# Patient Record
Sex: FEMALE | Race: WHITE | NOT HISPANIC OR LATINO | Employment: OTHER | URBAN - METROPOLITAN AREA
[De-identification: names, ages, dates, MRNs, and addresses within clinical notes are randomized per-mention and may not be internally consistent; named-entity substitution may affect disease eponyms.]

---

## 2020-02-03 ENCOUNTER — RECORDS - HEALTHEAST (OUTPATIENT)
Dept: LAB | Facility: CLINIC | Age: 65
End: 2020-02-03

## 2020-02-03 LAB
ANION GAP SERPL CALCULATED.3IONS-SCNC: 11 MMOL/L (ref 5–18)
BASOPHILS # BLD AUTO: 0 THOU/UL (ref 0–0.2)
BASOPHILS NFR BLD AUTO: 0 % (ref 0–2)
BUN SERPL-MCNC: 46 MG/DL (ref 8–22)
CALCIUM SERPL-MCNC: 10.4 MG/DL (ref 8.5–10.5)
CHLORIDE BLD-SCNC: 100 MMOL/L (ref 98–107)
CO2 SERPL-SCNC: 26 MMOL/L (ref 22–31)
CREAT SERPL-MCNC: 1.58 MG/DL (ref 0.6–1.1)
EOSINOPHIL # BLD AUTO: 0.2 THOU/UL (ref 0–0.4)
EOSINOPHIL NFR BLD AUTO: 2 % (ref 0–6)
ERYTHROCYTE [DISTWIDTH] IN BLOOD BY AUTOMATED COUNT: 14.2 % (ref 11–14.5)
GFR SERPL CREATININE-BSD FRML MDRD: 33 ML/MIN/1.73M2
GLUCOSE BLD-MCNC: 91 MG/DL (ref 70–125)
HCT VFR BLD AUTO: 30.1 % (ref 35–47)
HGB BLD-MCNC: 9.2 G/DL (ref 12–16)
LYMPHOCYTES # BLD AUTO: 2 THOU/UL (ref 0.8–4.4)
LYMPHOCYTES NFR BLD AUTO: 30 % (ref 20–40)
MCH RBC QN AUTO: 27 PG (ref 27–34)
MCHC RBC AUTO-ENTMCNC: 30.6 G/DL (ref 32–36)
MCV RBC AUTO: 88 FL (ref 80–100)
MONOCYTES # BLD AUTO: 0.5 THOU/UL (ref 0–0.9)
MONOCYTES NFR BLD AUTO: 8 % (ref 2–10)
NEUTROPHILS # BLD AUTO: 4 THOU/UL (ref 2–7.7)
NEUTROPHILS NFR BLD AUTO: 59 % (ref 50–70)
PLATELET # BLD AUTO: 142 THOU/UL (ref 140–440)
PMV BLD AUTO: 12.1 FL (ref 8.5–12.5)
POTASSIUM BLD-SCNC: 4.3 MMOL/L (ref 3.5–5)
RBC # BLD AUTO: 3.41 MILL/UL (ref 3.8–5.4)
SODIUM SERPL-SCNC: 137 MMOL/L (ref 136–145)
WBC: 6.8 THOU/UL (ref 4–11)

## 2020-02-05 ENCOUNTER — RECORDS - HEALTHEAST (OUTPATIENT)
Dept: LAB | Facility: CLINIC | Age: 65
End: 2020-02-05

## 2020-02-05 LAB
HGB BLD-MCNC: 8.6 G/DL (ref 12–16)
PLATELET # BLD AUTO: 150 THOU/UL (ref 140–440)
TSH SERPL DL<=0.005 MIU/L-ACNC: <0.01 UIU/ML (ref 0.3–5)

## 2020-02-10 ENCOUNTER — RECORDS - HEALTHEAST (OUTPATIENT)
Dept: LAB | Facility: CLINIC | Age: 65
End: 2020-02-10

## 2020-02-10 LAB — HGB BLD-MCNC: 8.2 G/DL (ref 12–16)

## 2020-02-17 ENCOUNTER — RECORDS - HEALTHEAST (OUTPATIENT)
Dept: LAB | Facility: CLINIC | Age: 65
End: 2020-02-17

## 2020-02-17 LAB
ANION GAP SERPL CALCULATED.3IONS-SCNC: 10 MMOL/L (ref 5–18)
BUN SERPL-MCNC: 91 MG/DL (ref 8–22)
CALCIUM SERPL-MCNC: 11.3 MG/DL (ref 8.5–10.5)
CHLORIDE BLD-SCNC: 107 MMOL/L (ref 98–107)
CO2 SERPL-SCNC: 24 MMOL/L (ref 22–31)
CREAT SERPL-MCNC: 1.99 MG/DL (ref 0.6–1.1)
GFR SERPL CREATININE-BSD FRML MDRD: 25 ML/MIN/1.73M2
GLUCOSE BLD-MCNC: 74 MG/DL (ref 70–125)
POTASSIUM BLD-SCNC: 4.9 MMOL/L (ref 3.5–5)
SODIUM SERPL-SCNC: 141 MMOL/L (ref 136–145)

## 2020-02-18 ENCOUNTER — RECORDS - HEALTHEAST (OUTPATIENT)
Dept: LAB | Facility: CLINIC | Age: 65
End: 2020-02-18

## 2020-02-19 LAB — HGB BLD-MCNC: 7.9 G/DL (ref 12–16)

## 2020-04-30 ENCOUNTER — RECORDS - HEALTHEAST (OUTPATIENT)
Dept: LAB | Facility: CLINIC | Age: 65
End: 2020-04-30

## 2020-04-30 LAB
ANION GAP SERPL CALCULATED.3IONS-SCNC: 10 MMOL/L (ref 5–18)
BUN SERPL-MCNC: 50 MG/DL (ref 8–22)
CALCIUM SERPL-MCNC: 11.4 MG/DL (ref 8.5–10.5)
CHLORIDE BLD-SCNC: 100 MMOL/L (ref 98–107)
CO2 SERPL-SCNC: 25 MMOL/L (ref 22–31)
CREAT SERPL-MCNC: 1.71 MG/DL (ref 0.6–1.1)
ERYTHROCYTE [DISTWIDTH] IN BLOOD BY AUTOMATED COUNT: 14.8 % (ref 11–14.5)
GFR SERPL CREATININE-BSD FRML MDRD: 30 ML/MIN/1.73M2
GLUCOSE BLD-MCNC: 83 MG/DL (ref 70–125)
HCT VFR BLD AUTO: 33.8 % (ref 35–47)
HGB BLD-MCNC: 10.8 G/DL (ref 12–16)
MCH RBC QN AUTO: 27.3 PG (ref 27–34)
MCHC RBC AUTO-ENTMCNC: 32 G/DL (ref 32–36)
MCV RBC AUTO: 86 FL (ref 80–100)
PLATELET # BLD AUTO: 92 THOU/UL (ref 140–440)
PMV BLD AUTO: 13 FL (ref 8.5–12.5)
POTASSIUM BLD-SCNC: 4.4 MMOL/L (ref 3.5–5)
RBC # BLD AUTO: 3.95 MILL/UL (ref 3.8–5.4)
SODIUM SERPL-SCNC: 135 MMOL/L (ref 136–145)
WBC: 5.9 THOU/UL (ref 4–11)

## 2020-05-06 ENCOUNTER — RECORDS - HEALTHEAST (OUTPATIENT)
Dept: LAB | Facility: CLINIC | Age: 65
End: 2020-05-06

## 2020-05-07 LAB
ANION GAP SERPL CALCULATED.3IONS-SCNC: 8 MMOL/L (ref 5–18)
BUN SERPL-MCNC: 35 MG/DL (ref 8–22)
CALCIUM SERPL-MCNC: 11.3 MG/DL (ref 8.5–10.5)
CHLORIDE BLD-SCNC: 107 MMOL/L (ref 98–107)
CO2 SERPL-SCNC: 27 MMOL/L (ref 22–31)
CREAT SERPL-MCNC: 1.34 MG/DL (ref 0.6–1.1)
ERYTHROCYTE [DISTWIDTH] IN BLOOD BY AUTOMATED COUNT: 14.5 % (ref 11–14.5)
GFR SERPL CREATININE-BSD FRML MDRD: 40 ML/MIN/1.73M2
GLUCOSE BLD-MCNC: 88 MG/DL (ref 70–125)
HCT VFR BLD AUTO: 31.8 % (ref 35–47)
HGB BLD-MCNC: 10.1 G/DL (ref 12–16)
MCH RBC QN AUTO: 27.2 PG (ref 27–34)
MCHC RBC AUTO-ENTMCNC: 31.8 G/DL (ref 32–36)
MCV RBC AUTO: 86 FL (ref 80–100)
PLATELET # BLD AUTO: 82 THOU/UL (ref 140–440)
PMV BLD AUTO: 12.6 FL (ref 8.5–12.5)
POTASSIUM BLD-SCNC: 3.9 MMOL/L (ref 3.5–5)
RBC # BLD AUTO: 3.72 MILL/UL (ref 3.8–5.4)
SODIUM SERPL-SCNC: 142 MMOL/L (ref 136–145)
WBC: 5.2 THOU/UL (ref 4–11)

## 2020-06-24 PROBLEM — B37.9 CANDIDIASIS: Status: ACTIVE | Noted: 2020-03-01

## 2020-06-24 PROBLEM — I25.10 CORONARY ARTERY DISEASE INVOLVING NATIVE CORONARY ARTERY: Status: ACTIVE | Noted: 2020-04-17

## 2020-06-24 PROBLEM — M54.50 CHRONIC BILATERAL LOW BACK PAIN WITHOUT SCIATICA: Status: ACTIVE | Noted: 2018-01-17

## 2020-06-24 PROBLEM — K59.00 CONSTIPATION: Status: ACTIVE | Noted: 2020-03-20

## 2020-06-24 PROBLEM — R45.851 SUICIDAL IDEATION: Status: ACTIVE | Noted: 2020-04-01

## 2020-06-24 PROBLEM — Z79.899 LONG-TERM USE OF HIGH-RISK MEDICATION: Status: ACTIVE | Noted: 2020-04-14

## 2020-06-24 PROBLEM — Z76.5 DRUG-SEEKING BEHAVIOR: Status: ACTIVE | Noted: 2020-04-04

## 2020-06-24 PROBLEM — R53.1 WEAKNESS: Status: ACTIVE | Noted: 2019-12-17

## 2020-06-24 PROBLEM — I50.9 ACUTE EXACERBATION OF CHF (CONGESTIVE HEART FAILURE) (H): Status: ACTIVE | Noted: 2019-11-11

## 2020-06-24 PROBLEM — I65.23 INTERNAL CAROTID ARTERY STENOSIS, BILATERAL: Status: ACTIVE | Noted: 2018-05-05

## 2020-06-24 PROBLEM — J44.9 COPD (CHRONIC OBSTRUCTIVE PULMONARY DISEASE) (H): Status: ACTIVE | Noted: 2020-06-24

## 2020-06-24 PROBLEM — Z72.0 TOBACCO ABUSE: Status: ACTIVE | Noted: 2017-02-17

## 2020-06-24 PROBLEM — E53.8 VITAMIN B12 DEFICIENCY: Status: ACTIVE | Noted: 2018-02-14

## 2020-06-24 PROBLEM — F40.01 PANIC DISORDER WITH AGORAPHOBIA: Status: ACTIVE | Noted: 2020-04-14

## 2020-06-24 PROBLEM — N39.46 URINARY INCONTINENCE, MIXED: Status: ACTIVE | Noted: 2017-09-24

## 2020-06-24 PROBLEM — I50.23 ACUTE ON CHRONIC SYSTOLIC (CONGESTIVE) HEART FAILURE (H): Status: ACTIVE | Noted: 2020-01-28

## 2020-06-24 PROBLEM — F43.10 POSTTRAUMATIC STRESS DISORDER: Status: ACTIVE | Noted: 2020-03-01

## 2020-06-24 PROBLEM — F06.30 MOOD DISORDER DUE TO A GENERAL MEDICAL CONDITION: Status: ACTIVE | Noted: 2020-03-01

## 2020-06-24 PROBLEM — G89.4 CHRONIC PAIN DISORDER: Status: ACTIVE | Noted: 2017-10-01

## 2020-06-24 PROBLEM — G89.29 CHRONIC BILATERAL LOW BACK PAIN WITHOUT SCIATICA: Status: ACTIVE | Noted: 2018-01-17

## 2020-06-24 PROBLEM — I24.9 ACUTE CORONARY SYNDROME (H): Status: ACTIVE | Noted: 2019-11-22

## 2020-06-24 PROBLEM — F60.9 PERSONALITY DISORDER (H): Status: ACTIVE | Noted: 2020-03-05

## 2020-06-24 RX ORDER — METOPROLOL SUCCINATE 25 MG/1
37.5 TABLET, EXTENDED RELEASE ORAL DAILY
COMMUNITY

## 2020-06-24 RX ORDER — LANOLIN ALCOHOL/MO/W.PET/CERES
3 CREAM (GRAM) TOPICAL AT BEDTIME
COMMUNITY
End: 2020-07-29

## 2020-06-24 RX ORDER — PRAZOSIN HYDROCHLORIDE 1 MG/1
1 CAPSULE ORAL AT BEDTIME
Status: ON HOLD | COMMUNITY
End: 2020-09-14

## 2020-06-24 RX ORDER — POLYETHYLENE GLYCOL 3350 17 G/17G
17 POWDER, FOR SOLUTION ORAL DAILY PRN
COMMUNITY
End: 2021-04-06

## 2020-06-24 RX ORDER — ACETAMINOPHEN 500 MG
1000 TABLET ORAL 2 TIMES DAILY
COMMUNITY
End: 2020-10-31

## 2020-06-24 RX ORDER — CYANOCOBALAMIN 1000 UG/ML
1 INJECTION, SOLUTION INTRAMUSCULAR; SUBCUTANEOUS
COMMUNITY
End: 2020-07-29

## 2020-06-24 RX ORDER — ERGOCALCIFEROL 1.25 MG/1
50000 CAPSULE, LIQUID FILLED ORAL WEEKLY
COMMUNITY
End: 2020-07-09 | Stop reason: DRUGHIGH

## 2020-06-24 RX ORDER — IPRATROPIUM BROMIDE AND ALBUTEROL SULFATE 2.5; .5 MG/3ML; MG/3ML
1 SOLUTION RESPIRATORY (INHALATION) EVERY 6 HOURS PRN
COMMUNITY
End: 2020-06-25

## 2020-06-24 RX ORDER — SENNOSIDES 8.6 MG
2 TABLET ORAL AT BEDTIME
COMMUNITY

## 2020-06-24 RX ORDER — KETOCONAZOLE 20 MG/G
CREAM TOPICAL AT BEDTIME
COMMUNITY
End: 2021-08-06

## 2020-06-24 RX ORDER — FERROUS SULFATE 325(65) MG
325 TABLET ORAL
COMMUNITY
End: 2021-04-06

## 2020-06-24 RX ORDER — ESCITALOPRAM OXALATE 20 MG/1
20 TABLET ORAL DAILY
Status: ON HOLD | COMMUNITY
End: 2020-09-14

## 2020-06-24 RX ORDER — DIVALPROEX SODIUM 125 MG/1
125 TABLET, DELAYED RELEASE ORAL 3 TIMES DAILY
COMMUNITY
End: 2020-08-28

## 2020-06-24 RX ORDER — QUETIAPINE FUMARATE 50 MG/1
50 TABLET, FILM COATED ORAL
Status: ON HOLD | COMMUNITY
End: 2020-09-14

## 2020-06-24 RX ORDER — ALBUTEROL SULFATE 90 UG/1
2 AEROSOL, METERED RESPIRATORY (INHALATION) EVERY 4 HOURS PRN
COMMUNITY
End: 2020-10-27

## 2020-06-24 RX ORDER — ARIPIPRAZOLE 5 MG/1
5 TABLET ORAL AT BEDTIME
Status: ON HOLD | COMMUNITY
End: 2020-09-14

## 2020-06-24 RX ORDER — PANTOPRAZOLE SODIUM 40 MG/1
TABLET, DELAYED RELEASE ORAL
COMMUNITY
End: 2021-04-06

## 2020-06-24 ASSESSMENT — MIFFLIN-ST. JEOR: SCORE: 1110.15
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
## 2020-06-25 ENCOUNTER — VIRTUAL VISIT (OUTPATIENT)
Dept: GERIATRICS | Facility: CLINIC | Age: 65
End: 2020-06-25
Payer: MEDICARE

## 2020-06-25 VITALS
DIASTOLIC BLOOD PRESSURE: 60 MMHG | BODY MASS INDEX: 31.49 KG/M2 | RESPIRATION RATE: 16 BRPM | HEART RATE: 70 BPM | HEIGHT: 58 IN | OXYGEN SATURATION: 93 % | WEIGHT: 150 LBS | TEMPERATURE: 96.6 F | SYSTOLIC BLOOD PRESSURE: 124 MMHG

## 2020-06-25 DIAGNOSIS — E03.9 HYPOTHYROIDISM, UNSPECIFIED TYPE: ICD-10-CM

## 2020-06-25 DIAGNOSIS — R45.851 SUICIDAL IDEATION: ICD-10-CM

## 2020-06-25 DIAGNOSIS — N18.4 CKD (CHRONIC KIDNEY DISEASE) STAGE 4, GFR 15-29 ML/MIN (H): ICD-10-CM

## 2020-06-25 DIAGNOSIS — I48.20 CHRONIC ATRIAL FIBRILLATION (H): ICD-10-CM

## 2020-06-25 DIAGNOSIS — K21.9 GASTROESOPHAGEAL REFLUX DISEASE, ESOPHAGITIS PRESENCE NOT SPECIFIED: ICD-10-CM

## 2020-06-25 DIAGNOSIS — E78.2 MIXED HYPERLIPIDEMIA: ICD-10-CM

## 2020-06-25 DIAGNOSIS — Z76.5 DRUG-SEEKING BEHAVIOR: ICD-10-CM

## 2020-06-25 DIAGNOSIS — F06.30 MOOD DISORDER DUE TO A GENERAL MEDICAL CONDITION: ICD-10-CM

## 2020-06-25 DIAGNOSIS — M54.50 CHRONIC BILATERAL LOW BACK PAIN WITHOUT SCIATICA: ICD-10-CM

## 2020-06-25 DIAGNOSIS — F33.9 EPISODE OF RECURRENT MAJOR DEPRESSIVE DISORDER, UNSPECIFIED DEPRESSION EPISODE SEVERITY (H): ICD-10-CM

## 2020-06-25 DIAGNOSIS — G89.4 CHRONIC PAIN DISORDER: ICD-10-CM

## 2020-06-25 DIAGNOSIS — I25.5 ISCHEMIC CARDIOMYOPATHY: ICD-10-CM

## 2020-06-25 DIAGNOSIS — E53.8 VITAMIN B12 DEFICIENCY (NON ANEMIC): ICD-10-CM

## 2020-06-25 DIAGNOSIS — I65.23 INTERNAL CAROTID ARTERY STENOSIS, BILATERAL: ICD-10-CM

## 2020-06-25 DIAGNOSIS — I50.22 CHRONIC SYSTOLIC CONGESTIVE HEART FAILURE (H): ICD-10-CM

## 2020-06-25 DIAGNOSIS — I25.119 CORONARY ARTERY DISEASE INVOLVING NATIVE CORONARY ARTERY OF NATIVE HEART WITH ANGINA PECTORIS (H): ICD-10-CM

## 2020-06-25 DIAGNOSIS — D64.9 ANEMIA, UNSPECIFIED TYPE: ICD-10-CM

## 2020-06-25 DIAGNOSIS — F40.01 PANIC DISORDER WITH AGORAPHOBIA: ICD-10-CM

## 2020-06-25 DIAGNOSIS — M35.3 POLYMYALGIA RHEUMATICA (H): ICD-10-CM

## 2020-06-25 DIAGNOSIS — F60.9 PERSONALITY DISORDER (H): ICD-10-CM

## 2020-06-25 DIAGNOSIS — Z95.1 S/P CABG X 5: ICD-10-CM

## 2020-06-25 DIAGNOSIS — J44.9 CHRONIC OBSTRUCTIVE PULMONARY DISEASE, UNSPECIFIED COPD TYPE (H): Primary | ICD-10-CM

## 2020-06-25 DIAGNOSIS — E66.01 MORBID OBESITY (H): ICD-10-CM

## 2020-06-25 DIAGNOSIS — E11.69 TYPE 2 DIABETES MELLITUS WITH OTHER SPECIFIED COMPLICATION, UNSPECIFIED WHETHER LONG TERM INSULIN USE (H): ICD-10-CM

## 2020-06-25 DIAGNOSIS — G62.9 PERIPHERAL POLYNEUROPATHY: ICD-10-CM

## 2020-06-25 DIAGNOSIS — K58.0 IRRITABLE BOWEL SYNDROME WITH DIARRHEA: ICD-10-CM

## 2020-06-25 DIAGNOSIS — I10 ESSENTIAL HYPERTENSION: ICD-10-CM

## 2020-06-25 DIAGNOSIS — G89.29 CHRONIC BILATERAL LOW BACK PAIN WITHOUT SCIATICA: ICD-10-CM

## 2020-06-25 DIAGNOSIS — F43.10 POSTTRAUMATIC STRESS DISORDER: ICD-10-CM

## 2020-06-25 PROCEDURE — 99310 SBSQ NF CARE HIGH MDM 45: CPT | Mod: GT | Performed by: NURSE PRACTITIONER

## 2020-06-25 RX ORDER — HYDROXYZINE HYDROCHLORIDE 25 MG/1
25 TABLET, FILM COATED ORAL EVERY 6 HOURS PRN
Qty: 30 TABLET | Refills: 1 | Status: SHIPPED | OUTPATIENT
Start: 2020-06-25 | End: 2020-07-29
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
## 2020-06-29 ENCOUNTER — HOSPITAL LABORATORY (OUTPATIENT)
Dept: NURSING HOME | Facility: OTHER | Age: 65
End: 2020-06-29

## 2020-06-29 LAB
ALBUMIN SERPL-MCNC: 2.4 G/DL (ref 3.4–5)
ALP SERPL-CCNC: 46 U/L (ref 40–150)
ALT SERPL W P-5'-P-CCNC: 16 U/L (ref 0–50)
ANION GAP SERPL CALCULATED.3IONS-SCNC: 6 MMOL/L (ref 3–14)
AST SERPL W P-5'-P-CCNC: 18 U/L (ref 0–45)
BILIRUB SERPL-MCNC: 0.4 MG/DL (ref 0.2–1.3)
BUN SERPL-MCNC: 17 MG/DL (ref 7–30)
CALCIUM SERPL-MCNC: 9.7 MG/DL (ref 8.5–10.1)
CHLORIDE SERPL-SCNC: 108 MMOL/L (ref 94–109)
CO2 SERPL-SCNC: 25 MMOL/L (ref 20–32)
CREAT SERPL-MCNC: 0.97 MG/DL (ref 0.52–1.04)
FERRITIN SERPL-MCNC: 236 NG/ML (ref 8–252)
GFR SERPL CREATININE-BSD FRML MDRD: 61 ML/MIN/{1.73_M2}
GLUCOSE SERPL-MCNC: 65 MG/DL (ref 70–99)
HGB BLD-MCNC: 10.3 G/DL (ref 11.7–15.7)
POTASSIUM SERPL-SCNC: 4.2 MMOL/L (ref 3.4–5.3)
PROT SERPL-MCNC: 5.6 G/DL (ref 6.8–8.8)
SODIUM SERPL-SCNC: 139 MMOL/L (ref 133–144)
T4 FREE SERPL-MCNC: 1.43 NG/DL (ref 0.76–1.46)
TSH SERPL DL<=0.005 MIU/L-ACNC: 0.02 MU/L (ref 0.4–4)
VIT B12 SERPL-MCNC: 1228 PG/ML (ref 193–986)

## 2020-06-30 LAB
DEPRECATED CALCIDIOL+CALCIFEROL SERPL-MC: <68 UG/L (ref 20–75)
VITAMIN D2 SERPL-MCNC: 63 UG/L
VITAMIN D3 SERPL-MCNC: <5 UG/L

## 2020-07-08 VITALS
WEIGHT: 139.2 LBS | SYSTOLIC BLOOD PRESSURE: 129 MMHG | HEIGHT: 58 IN | HEART RATE: 77 BPM | OXYGEN SATURATION: 98 % | TEMPERATURE: 96.1 F | DIASTOLIC BLOOD PRESSURE: 63 MMHG | BODY MASS INDEX: 29.22 KG/M2 | RESPIRATION RATE: 16 BRPM

## 2020-07-08 ASSESSMENT — MIFFLIN-ST. JEOR: SCORE: 1061.16

## 2020-07-08 NOTE — PROGRESS NOTES
" [facility]  Letty Escobar is being evaluated via a billable video visit due to the restrictions of the Covid-19 pandemic.   The patient has been notified of following:  \"This video visit will be conducted via a call between you and your provider. We have found that certain health care needs can be provided without the need for an in-person physical exam.  This service lets us provide the care you need with a video conversation. If during the course of the call the provider feels a video visit is not appropriate, you will not be charged for this service.\"   The provider has received verbal consent for a Video Visit from the patient and or first contact? Yes  Patient/facility staff would like the video invitation sent by: N/A   Video Start Time: 10:31  Which Facility the Patient is at during the time of visit: [facility]   PRIMARY CARE PROVIDER AND CLINIC:  KILO Alcazar [facility], [address] [address]  Chief Complaint   Patient presents with     [facility] Medical Record Number:  [id number]  Letty Escobar  is a 64 year old  (1955), admitted to the above facility from another SNF facilty (the [facility])..  Admitted to this facility for  rehab, medical management and nursing care.  HPI:    HPI information obtained from: facility staff, patient report and [facility] chart review.   Briefly:  - pt with PMH notable for extensive psychiatric disorder, extensive CVD. Recent hospitalization for ACOPDE, transitioned to a rehab unit, now transferred to LTC unit of this facility.     Updates on Status Since Skilled nursing Admission:   - Pt seen in the presence of RN who graciously assisted with the virtual visit  - Resident reports doing Ok, but has chronic anxiety, no triggering factors, but better with meds.   - reports breathing is ok, denies wheezing or cough    ===============================================================    CODE " STATUS/ADVANCE DIRECTIVES DISCUSSION:   DNR / DNI  Patient's living condition: lives in a skilled nursing facility  ALLERGIES: Contrast dye; Isosorbide mononitrate [isosorbide]; Nitroglycerin; Nystatin; Sulfa drugs; Adhesive tape; Diphenhydramine hcl; and Penicillins  PAST MEDICAL HISTORY:  has a past medical history of Depressive disorder, Diabetes mellitus (H), Myocardial infarction, Neuromuscular disorder (H), and Thyroid disease.  PAST SURGICAL HISTORY:   has a past surgical history that includes Release carpal tunnel; Cholecystectomy; Cardiac surgery; and Abdomen surgery.  FAMILY HISTORY: mother had heart dz and diabetes, father had heart dz, drug abuse and alcoholism in brother.   SOCIAL HISTORY:   former cigarettes smoker and alcohol.   Current Outpatient Medications   Medication Sig Dispense Refill     acetaminophen (TYLENOL) 500 MG tablet Take 1,000 mg by mouth 2 times daily       albuterol (PROAIR HFA/PROVENTIL HFA/VENTOLIN HFA) 108 (90 Base) MCG/ACT inhaler Inhale 2 puffs into the lungs every 4 hours as needed for shortness of breath / dyspnea or wheezing       ARIPiprazole (ABILIFY) 5 MG tablet Take 5 mg by mouth At Bedtime       ASPIRIN PO Take 81 mg by mouth daily        Clopidogrel Bisulfate (PLAVIX PO) Take 75 mg by mouth daily.         cyanocobalamin (CYANOCOBALAMIN) 1000 MCG/ML injection Inject 1 mL into the muscle every 30 days       divalproex sodium delayed-release (DEPAKOTE) 125 MG DR tablet Take 125 mg by mouth 3 times daily       escitalopram (LEXAPRO) 20 MG tablet Take 20 mg by mouth daily       ferrous sulfate (FEROSUL) 325 (65 Fe) MG tablet Take 325 mg by mouth daily (with breakfast)       fluticasone-salmeterol (ADVAIR) 250-50 MCG/DOSE inhaler Inhale 1 puff into the lungs 2 times daily       hydrOXYzine (ATARAX) 25 MG tablet Take 1 tablet (25 mg) by mouth every 6 hours as needed for itching 30 tablet 1     ketoconazole (NIZORAL) 2 % external cream Apply topically At Bedtime        "Levothyroxine Sodium (SYNTHROID PO) Take 200 mcg by mouth daily Before breakfast.        melatonin 3 MG tablet Take 3 mg by mouth At Bedtime       metoprolol succinate ER (TOPROL-XL) 25 MG 24 hr tablet Take 25 mg by mouth daily       miconazole (MICATIN) 2 % AERP powder Apply topically 2 times daily as needed       Omega-3 Fatty Acids (OMEGA-3 FISH OIL PO) Take 1 g by mouth daily.         pantoprazole (PROTONIX) 40 MG EC tablet Take 40 mg by mouth daily       polyethylene glycol (MIRALAX) 17 g packet Take 17 g by mouth daily as needed for constipation       prazosin (MINIPRESS) 1 MG capsule Take 1 mg by mouth At Bedtime       QUEtiapine (SEROQUEL) 50 MG tablet Take 50 mg by mouth At Bedtime       Rosuvastatin Calcium (CRESTOR PO) Take 10 mg by mouth At Bedtime        sennosides (SENOKOT) 8.6 MG tablet Take 2 tablets by mouth At Bedtime       vitamin D2 (ERGOCALCIFEROL) 25110 units (1250 mcg) capsule Take 50,000 Units by mouth once a week      ROS: 10 point ROS of systems including Constitutional, Eyes, Respiratory, Cardiovascular, Gastroenterology, Genitourinary, Integumentary, Musculoskeletal, Psychiatric were all negative except for pertinent positives noted in my HPI.  Vitals:/63   Pulse 77   Temp 96.1  F (35.6  C)   Resp 16   Ht 1.473 m (4' 10\")   Wt 63.1 kg (139 lb 3.2 oz)   SpO2 98%   BMI 29.09 kg/m     Limited Visit Exam done given COVID-19 precautions:  GENERAL APPEARANCE:  in no distress  RESP:  unlabored breathing  M/S:   no joint deformity noted  SKIN:  no rash noted  NEURO:   no purposeful movement in upper and lower extremities  PSYCH:  affect and mood normal    Lab/Diagnostic data: Reviewed in the chart and EHR.      ASSESSMENT/PLAN:  --------------------------------  Chronic obstructive pulmonary disease, unspecified COPD type (H):  Hx of ELI  -  stable.     Chronic atrial fibrillation (H)  Coronary artery disease involving native coronary artery of native heart with angina pectoris " (H)  S/P CABG x 5  Ischemic cardiomyopathy, s/p Biv-AICD implantation  Essential hypertension  Mixed hyperlipidemia  Chronic systolic congestive heart failure (H) - 10/20/17 ECHO: EF 45-50%, severely increased LV thickness, Grade 2 LV diastolic filling  Internal carotid artery stenosis, bilateral  - compensated. Followed by Cardiology.     Episode of recurrent major depressive disorder, unspecified depression episode severity (H)  Personality disorder (H)  Panic disorder with agoraphobia  Suicidal ideation  Posttraumatic stress disorder  Drug-seeking behavior  Mood disorder due to a general medical condition  - stable.     Polymyalgia rheumatica (H)  Chronic pain disorder  Chronic bilateral low back pain without sciatica  Peripheral polyneuropathy  Frailty  - analgesia optimal.   - Significant  Deficits requiring NH placement. Requiring extensive assistance from nursing. Up for meals only o/w spends the day resting in bed    Irritable bowel syndrome with diarrhea  Gastroesophageal reflux disease, esophagitis presence not specified  - stable.     Hypothyroidism:  TSH   Date Value Ref Range Status   06/29/2020 0.02 (L) 0.40 - 4.00 mU/L Final   -  On Levothyroxine. In frail Elderly adult, recommended TSH level is around 6 providing patient is asymptomatic and FT4 is wnl- preferably on the lower normal level.         ORDERS: 07/09  - Psych consultation. Pt in agreement  - reduce LT4 from 200 mcg to 175 mcg  - recheck TSH and FT4 in 6 weeks  - discontinue Vit D 50,000 IU weekly  - start Vit D3 2000 IU daily.     Electronically signed by:  Paola Pastor MD     Video-Visit Details  Type of service:  Video Visit  Video End Time (time video stopped): 10:51  Distant Location (provider location):  [facility]

## 2020-07-09 ENCOUNTER — VIRTUAL VISIT (OUTPATIENT)
Dept: GERIATRICS | Facility: CLINIC | Age: 65
End: 2020-07-09
Payer: MEDICARE

## 2020-07-09 DIAGNOSIS — I50.22 CHRONIC SYSTOLIC CONGESTIVE HEART FAILURE (H): ICD-10-CM

## 2020-07-09 DIAGNOSIS — I25.5 ISCHEMIC CARDIOMYOPATHY: ICD-10-CM

## 2020-07-09 DIAGNOSIS — F60.9 PERSONALITY DISORDER (H): ICD-10-CM

## 2020-07-09 DIAGNOSIS — J44.9 CHRONIC OBSTRUCTIVE PULMONARY DISEASE, UNSPECIFIED COPD TYPE (H): ICD-10-CM

## 2020-07-09 DIAGNOSIS — F06.30 MOOD DISORDER DUE TO A GENERAL MEDICAL CONDITION: ICD-10-CM

## 2020-07-09 DIAGNOSIS — E03.9 HYPOTHYROIDISM, UNSPECIFIED TYPE: Primary | ICD-10-CM

## 2020-07-09 DIAGNOSIS — F33.9 EPISODE OF RECURRENT MAJOR DEPRESSIVE DISORDER, UNSPECIFIED DEPRESSION EPISODE SEVERITY (H): ICD-10-CM

## 2020-07-09 DIAGNOSIS — I48.20 CHRONIC ATRIAL FIBRILLATION (H): ICD-10-CM

## 2020-07-09 PROCEDURE — 99306 1ST NF CARE HIGH MDM 50: CPT | Mod: 95 | Performed by: FAMILY MEDICINE

## 2020-07-09 RX ORDER — LEVOTHYROXINE SODIUM 175 UG/1
175 TABLET ORAL DAILY
Refills: 0 | Status: ON HOLD
Start: 2020-07-09 | End: 2020-09-03

## 2020-07-09 RX ORDER — CHOLECALCIFEROL (VITAMIN D3) 50 MCG
1 TABLET ORAL DAILY
Start: 2020-07-09 | End: 2021-03-16

## 2020-07-09 NOTE — LETTER
"    7/9/2020        RE: Letty Escobar  [address] [facility] [address]         [facility] [place] [facility]  Letty Escobar is being evaluated via a billable video visit due to the restrictions of the Covid-19 pandemic.   The patient has been notified of following:  \"This video visit will be conducted via a call between you and your provider. We have found that certain health care needs can be provided without the need for an in-person physical exam.  This service lets us provide the care you need with a video conversation. If during the course of the call the provider feels a video visit is not appropriate, you will not be charged for this service.\"   The provider has received verbal consent for a Video Visit from the patient and or first contact? Yes  Patient/facility staff would like the video invitation sent by: N/A   Video Start Time: 10:31  Which Facility the Patient is at during the time of visit: [facility]   PRIMARY CARE PROVIDER AND CLINIC:  Priscilla Calderon, KILO [facility], [address] [place] [address] / [facility] [id number]  Chief Complaint   Patient presents with     [facility] Medical Record Number:  [id number]  Letty Escobar  is a 64 year old  (1955), admitted to the above facility from another SNF facilty (the [facility])..  Admitted to this facility for  rehab, medical management and nursing care.  HPI:    HPI information obtained from: facility staff, patient report and [facility] chart review.   Briefly:  - pt with PMH notable for extensive psychiatric disorder, extensive CVD. Recent hospitalization for ACOPDE, transitioned to a rehab unit, now transferred to LTC unit of this facility.     Updates on Status Since Skilled nursing Admission:   - Pt seen in the presence of RN who graciously assisted with the virtual visit  - Resident reports doing Ok, but has chronic anxiety, no triggering factors, but better with meds.   - reports breathing is ok, denies wheezing or " cough    ===============================================================    CODE STATUS/ADVANCE DIRECTIVES DISCUSSION:   DNR / DNI  Patient's living condition: lives in a skilled nursing facility  ALLERGIES: Contrast dye; Isosorbide mononitrate [isosorbide]; Nitroglycerin; Nystatin; Sulfa drugs; Adhesive tape; Diphenhydramine hcl; and Penicillins  PAST MEDICAL HISTORY:  has a past medical history of Depressive disorder, Diabetes mellitus (H), Myocardial infarction, Neuromuscular disorder (H), and Thyroid disease.  PAST SURGICAL HISTORY:   has a past surgical history that includes Release carpal tunnel; Cholecystectomy; Cardiac surgery; and Abdomen surgery.  FAMILY HISTORY: mother had heart dz and diabetes, father had heart dz, drug abuse and alcoholism in brother.   SOCIAL HISTORY:   former cigarettes smoker and alcohol.   Current Outpatient Medications   Medication Sig Dispense Refill     acetaminophen (TYLENOL) 500 MG tablet Take 1,000 mg by mouth 2 times daily       albuterol (PROAIR HFA/PROVENTIL HFA/VENTOLIN HFA) 108 (90 Base) MCG/ACT inhaler Inhale 2 puffs into the lungs every 4 hours as needed for shortness of breath / dyspnea or wheezing       ARIPiprazole (ABILIFY) 5 MG tablet Take 5 mg by mouth At Bedtime       ASPIRIN PO Take 81 mg by mouth daily        Clopidogrel Bisulfate (PLAVIX PO) Take 75 mg by mouth daily.         cyanocobalamin (CYANOCOBALAMIN) 1000 MCG/ML injection Inject 1 mL into the muscle every 30 days       divalproex sodium delayed-release (DEPAKOTE) 125 MG DR tablet Take 125 mg by mouth 3 times daily       escitalopram (LEXAPRO) 20 MG tablet Take 20 mg by mouth daily       ferrous sulfate (FEROSUL) 325 (65 Fe) MG tablet Take 325 mg by mouth daily (with breakfast)       fluticasone-salmeterol (ADVAIR) 250-50 MCG/DOSE inhaler Inhale 1 puff into the lungs 2 times daily       hydrOXYzine (ATARAX) 25 MG tablet Take 1 tablet (25 mg) by mouth every 6 hours as needed for itching 30 tablet 1      "ketoconazole (NIZORAL) 2 % external cream Apply topically At Bedtime       Levothyroxine Sodium (SYNTHROID PO) Take 200 mcg by mouth daily Before breakfast.        melatonin 3 MG tablet Take 3 mg by mouth At Bedtime       metoprolol succinate ER (TOPROL-XL) 25 MG 24 hr tablet Take 25 mg by mouth daily       miconazole (MICATIN) 2 % AERP powder Apply topically 2 times daily as needed       Omega-3 Fatty Acids (OMEGA-3 FISH OIL PO) Take 1 g by mouth daily.         pantoprazole (PROTONIX) 40 MG EC tablet Take 40 mg by mouth daily       polyethylene glycol (MIRALAX) 17 g packet Take 17 g by mouth daily as needed for constipation       prazosin (MINIPRESS) 1 MG capsule Take 1 mg by mouth At Bedtime       QUEtiapine (SEROQUEL) 50 MG tablet Take 50 mg by mouth At Bedtime       Rosuvastatin Calcium (CRESTOR PO) Take 10 mg by mouth At Bedtime        sennosides (SENOKOT) 8.6 MG tablet Take 2 tablets by mouth At Bedtime       vitamin D2 (ERGOCALCIFEROL) 99191 units (1250 mcg) capsule Take 50,000 Units by mouth once a week      ROS: 10 point ROS of systems including Constitutional, Eyes, Respiratory, Cardiovascular, Gastroenterology, Genitourinary, Integumentary, Musculoskeletal, Psychiatric were all negative except for pertinent positives noted in my HPI.  Vitals:/63   Pulse 77   Temp 96.1  F (35.6  C)   Resp 16   Ht 1.473 m (4' 10\")   Wt 63.1 kg (139 lb 3.2 oz)   SpO2 98%   BMI 29.09 kg/m     Limited Visit Exam done given COVID-19 precautions:  GENERAL APPEARANCE:  in no distress  RESP:  unlabored breathing  M/S:   no joint deformity noted  SKIN:  no rash noted  NEURO:   no purposeful movement in upper and lower extremities  PSYCH:  affect and mood normal    Lab/Diagnostic data: Reviewed in the chart and EHR.      ASSESSMENT/PLAN:  --------------------------------  Chronic obstructive pulmonary disease, unspecified COPD type (H):  Hx of ELI  -  stable.     Chronic atrial fibrillation (H)  Coronary artery disease " involving native coronary artery of native heart with angina pectoris (H)  S/P CABG x 5  Ischemic cardiomyopathy, s/p Biv-AICD implantation  Essential hypertension  Mixed hyperlipidemia  Chronic systolic congestive heart failure (H) - 10/20/17 ECHO: EF 45-50%, severely increased LV thickness, Grade 2 LV diastolic filling  Internal carotid artery stenosis, bilateral  - compensated. Followed by Cardiology.     Episode of recurrent major depressive disorder, unspecified depression episode severity (H)  Personality disorder (H)  Panic disorder with agoraphobia  Suicidal ideation  Posttraumatic stress disorder  Drug-seeking behavior  Mood disorder due to a general medical condition  - stable.     Polymyalgia rheumatica (H)  Chronic pain disorder  Chronic bilateral low back pain without sciatica  Peripheral polyneuropathy  Frailty  - analgesia optimal.   - Significant  Deficits requiring NH placement. Requiring extensive assistance from nursing. Up for meals only o/w spends the day resting in bed    Irritable bowel syndrome with diarrhea  Gastroesophageal reflux disease, esophagitis presence not specified  - stable.     Hypothyroidism:  TSH   Date Value Ref Range Status   06/29/2020 0.02 (L) 0.40 - 4.00 mU/L Final   -  On Levothyroxine. In frail Elderly adult, recommended TSH level is around 6 providing patient is asymptomatic and FT4 is wnl- preferably on the lower normal level.         ORDERS: 07/09  - Psych consultation. Pt in agreement  - reduce LT4 from 200 mcg to 175 mcg  - recheck TSH and FT4 in 6 weeks  - discontinue Vit D 50,000 IU weekly  - start Vit D3 2000 IU daily.     Electronically signed by:  Paola Pastor MD     Video-Visit Details  Type of service:  Video Visit  Video End Time (time video stopped): 10:51  Distant Location (provider location):  [facility]                 Sincerely,        Paola Pastor MD

## 2020-07-29 ENCOUNTER — VIRTUAL VISIT (OUTPATIENT)
Dept: GERIATRICS | Facility: CLINIC | Age: 65
End: 2020-07-29
Payer: MEDICARE

## 2020-07-29 VITALS
OXYGEN SATURATION: 98 % | HEIGHT: 58 IN | RESPIRATION RATE: 16 BRPM | WEIGHT: 140.4 LBS | SYSTOLIC BLOOD PRESSURE: 120 MMHG | DIASTOLIC BLOOD PRESSURE: 65 MMHG | BODY MASS INDEX: 29.47 KG/M2 | TEMPERATURE: 96.8 F | HEART RATE: 70 BPM

## 2020-07-29 DIAGNOSIS — F60.9 PERSONALITY DISORDER (H): ICD-10-CM

## 2020-07-29 DIAGNOSIS — J44.9 CHRONIC OBSTRUCTIVE PULMONARY DISEASE, UNSPECIFIED COPD TYPE (H): Primary | ICD-10-CM

## 2020-07-29 DIAGNOSIS — F43.10 POSTTRAUMATIC STRESS DISORDER: ICD-10-CM

## 2020-07-29 DIAGNOSIS — F06.30 MOOD DISORDER DUE TO A GENERAL MEDICAL CONDITION: ICD-10-CM

## 2020-07-29 DIAGNOSIS — Z95.1 S/P CABG X 5: ICD-10-CM

## 2020-07-29 DIAGNOSIS — Z76.5 DRUG-SEEKING BEHAVIOR: ICD-10-CM

## 2020-07-29 DIAGNOSIS — N18.4 CKD (CHRONIC KIDNEY DISEASE) STAGE 4, GFR 15-29 ML/MIN (H): ICD-10-CM

## 2020-07-29 DIAGNOSIS — F40.01 PANIC DISORDER WITH AGORAPHOBIA: ICD-10-CM

## 2020-07-29 DIAGNOSIS — I10 ESSENTIAL HYPERTENSION: ICD-10-CM

## 2020-07-29 DIAGNOSIS — I25.119 CORONARY ARTERY DISEASE INVOLVING NATIVE CORONARY ARTERY OF NATIVE HEART WITH ANGINA PECTORIS (H): ICD-10-CM

## 2020-07-29 DIAGNOSIS — F33.9 EPISODE OF RECURRENT MAJOR DEPRESSIVE DISORDER, UNSPECIFIED DEPRESSION EPISODE SEVERITY (H): ICD-10-CM

## 2020-07-29 DIAGNOSIS — I50.22 CHRONIC SYSTOLIC CONGESTIVE HEART FAILURE (H): ICD-10-CM

## 2020-07-29 DIAGNOSIS — I65.23 INTERNAL CAROTID ARTERY STENOSIS, BILATERAL: ICD-10-CM

## 2020-07-29 DIAGNOSIS — I25.5 ISCHEMIC CARDIOMYOPATHY: ICD-10-CM

## 2020-07-29 DIAGNOSIS — D64.9 ANEMIA, UNSPECIFIED TYPE: ICD-10-CM

## 2020-07-29 DIAGNOSIS — E78.2 MIXED HYPERLIPIDEMIA: ICD-10-CM

## 2020-07-29 PROCEDURE — 99309 SBSQ NF CARE MODERATE MDM 30: CPT | Mod: 95 | Performed by: NURSE PRACTITIONER

## 2020-07-29 RX ORDER — HYDROXYZINE HYDROCHLORIDE 25 MG/1
12.5 TABLET, FILM COATED ORAL EVERY 6 HOURS PRN
Qty: 30 TABLET | Refills: 1
Start: 2020-07-29 | End: 2020-08-28

## 2020-07-29 RX ORDER — LANOLIN ALCOHOL/MO/W.PET/CERES
6 CREAM (GRAM) TOPICAL AT BEDTIME
Status: ON HOLD
Start: 2020-07-29 | End: 2020-09-03

## 2020-07-29 ASSESSMENT — MIFFLIN-ST. JEOR: SCORE: 1066.6
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
## 2020-08-07 ENCOUNTER — HOSPITAL LABORATORY (OUTPATIENT)
Dept: NURSING HOME | Facility: OTHER | Age: 65
End: 2020-08-07

## 2020-08-07 LAB
ALBUMIN SERPL-MCNC: 2.4 G/DL (ref 3.4–5)
ALP SERPL-CCNC: 45 U/L (ref 40–150)
ALT SERPL W P-5'-P-CCNC: 11 U/L (ref 0–50)
ANION GAP SERPL CALCULATED.3IONS-SCNC: 3 MMOL/L (ref 3–14)
AST SERPL W P-5'-P-CCNC: 8 U/L (ref 0–45)
BASOPHILS # BLD AUTO: 0 10E9/L (ref 0–0.2)
BASOPHILS NFR BLD AUTO: 0.3 %
BILIRUB SERPL-MCNC: 0.3 MG/DL (ref 0.2–1.3)
BUN SERPL-MCNC: 22 MG/DL (ref 7–30)
CALCIUM SERPL-MCNC: 10 MG/DL (ref 8.5–10.1)
CHLORIDE SERPL-SCNC: 106 MMOL/L (ref 94–109)
CO2 SERPL-SCNC: 32 MMOL/L (ref 20–32)
CREAT SERPL-MCNC: 0.76 MG/DL (ref 0.52–1.04)
DIFFERENTIAL METHOD BLD: ABNORMAL
EOSINOPHIL NFR BLD AUTO: 2 %
ERYTHROCYTE [DISTWIDTH] IN BLOOD BY AUTOMATED COUNT: 14.6 % (ref 10–15)
GFR SERPL CREATININE-BSD FRML MDRD: 82 ML/MIN/{1.73_M2}
GLUCOSE SERPL-MCNC: 71 MG/DL (ref 70–99)
HCT VFR BLD AUTO: 29.6 % (ref 35–47)
HGB BLD-MCNC: 9.7 G/DL (ref 11.7–15.7)
IMM GRANULOCYTES # BLD: 0.1 10E9/L (ref 0–0.4)
IMM GRANULOCYTES NFR BLD: 1.1 %
LYMPHOCYTES # BLD AUTO: 2.3 10E9/L (ref 0.8–5.3)
LYMPHOCYTES NFR BLD AUTO: 36.6 %
MCH RBC QN AUTO: 29.7 PG (ref 26.5–33)
MCHC RBC AUTO-ENTMCNC: 32.8 G/DL (ref 31.5–36.5)
MCV RBC AUTO: 91 FL (ref 78–100)
MONOCYTES # BLD AUTO: 0.5 10E9/L (ref 0–1.3)
MONOCYTES NFR BLD AUTO: 8.6 %
NEUTROPHILS # BLD AUTO: 3.2 10E9/L (ref 1.6–8.3)
NEUTROPHILS NFR BLD AUTO: 51.4 %
NRBC # BLD AUTO: 0 10*3/UL
NRBC BLD AUTO-RTO: 0 /100
PLATELET # BLD AUTO: 135 10E9/L (ref 150–450)
POTASSIUM SERPL-SCNC: 3.1 MMOL/L (ref 3.4–5.3)
PROT SERPL-MCNC: 5.4 G/DL (ref 6.8–8.8)
RBC # BLD AUTO: 3.27 10E12/L (ref 3.8–5.2)
SODIUM SERPL-SCNC: 141 MMOL/L (ref 133–144)
WBC # BLD AUTO: 6.2 10E9/L (ref 4–11)

## 2020-08-10 ENCOUNTER — HOSPITAL LABORATORY (OUTPATIENT)
Dept: NURSING HOME | Facility: OTHER | Age: 65
End: 2020-08-10

## 2020-08-10 LAB — POTASSIUM SERPL-SCNC: 4.1 MMOL/L (ref 3.4–5.3)

## 2020-08-17 ENCOUNTER — HOSPITAL LABORATORY (OUTPATIENT)
Dept: NURSING HOME | Facility: OTHER | Age: 65
End: 2020-08-17

## 2020-08-17 LAB
ANION GAP SERPL CALCULATED.3IONS-SCNC: 3 MMOL/L (ref 3–14)
BUN SERPL-MCNC: 19 MG/DL (ref 7–30)
CALCIUM SERPL-MCNC: 10.6 MG/DL (ref 8.5–10.1)
CHLORIDE SERPL-SCNC: 108 MMOL/L (ref 94–109)
CO2 SERPL-SCNC: 28 MMOL/L (ref 20–32)
CREAT SERPL-MCNC: 0.82 MG/DL (ref 0.52–1.04)
GFR SERPL CREATININE-BSD FRML MDRD: 75 ML/MIN/{1.73_M2}
GLUCOSE SERPL-MCNC: 74 MG/DL (ref 70–99)
POTASSIUM SERPL-SCNC: 4.3 MMOL/L (ref 3.4–5.3)
SODIUM SERPL-SCNC: 139 MMOL/L (ref 133–144)
T4 FREE SERPL-MCNC: 1.88 NG/DL (ref 0.76–1.46)
TSH SERPL DL<=0.005 MIU/L-ACNC: 0.01 MU/L (ref 0.4–4)

## 2020-08-27 NOTE — PROGRESS NOTES
[facility]  [facility] Record Number:  [id number]  Place of Service where encounter took place:  [facility] [[id number]]  Chief Complaint   Patient presents with     RECHECK       HPI:    Letty Escobar  is a 64 year old (1955), who is being seen today for an episodic care visit.  HPI information obtained from: facility chart records, facility staff, patient report, [facility] chart review and family/first contact Pt's  (Edwar)'s report. Today's concern is:     Severe protein-calorie malnutrition (H)  Weight loss  Pressure injury of head, stage 3 (H)  Failure to thrive in adult  Episode of recurrent major depressive disorder, unspecified depression episode severity (H)  Personality disorder (H)  Panic disorder with agoraphobia  Posttraumatic stress disorder  Mood disorder due to a general medical condition  CKD (chronic kidney disease) stage 4, GFR 15-29 ml/min (H)  Chronic systolic congestive heart failure (H)  Coronary artery disease involving native coronary artery of native heart with angina pectoris (H)  S/P CABG x 5  Ischemic cardiomyopathy  Essential hypertension  Mixed hyperlipidemia  Internal carotid artery stenosis, bilateral     Patient is a 64 year old medically complex woman with PMH including panic disorder with agoraphobia, MDD, anxiety, drug-seeking behavior, personality disorder, PTSD, History suicidal ideation, HTN, HLD, ischemic cardiomyopathy, History MI and CABG x 5 (2015), CHF (EF 45-50%), ICA stenosis bilateral, pAfib, Morbid obesity, IBS with Diarrhea, GERD, DM2, chronic pain with neuropathy, urinary incontinence, PMR, Anemia, Vit B12 def, hypothyroidism who recently moved to [facility] to be closer to family.       Recent Med Changes:  - 6/25/20: Hydroxyzine 25 mg q 6 hours PRN  , discontinue DuoNebs  - 7/9/20 levothyroxine decreased from 200 mcg --> 175 mcg daily, discontinue Vit D 50K weekly,  start Vit D 2000 units daily.   - 8/5/20: Boost 4 oz TID added for weight loss   - 8/18/20: Psychiatrist visit: Taper Depakote, added Buspar  - 8/21/20 Pantoprazole 40 mg daily added  - 8/25/20: ProSource added for wound healing     Ongoing progressive worsening of failure to thrive with patient refusing to eat, refusing to get OOB or change positions which has resulted in a right ear pressure injury.       Labs done today and showing no metabolic or infectious etiology for reason for decline.  Noted hypoalbuminemia and low total protein.    Patient continues to lose weight.  [facility] have been contacted without ability to find open bed for direct admit.  They have recommended going through the ED for admission.    Nursing reporting patient has compassionate-care visit with her  tomorrow AM.      Today I met Letty in her LTC SNF room.  She is laying in bed on her right side and declines turning over to change positions.  She reports she has a lot of anxiety but cannot articulate what she is particularly anxious about.  She reports she has anhedonia and anorexia as well.  She denies depression but her status and demeanor correlates with depression.  She reports she sleeps fine.  She reports she has no thoughts of harming herself or suicide.    PHQ9 - 14 last    SLUMS 3/2020: 30/30, BIMS last 15      Past Medical and Surgical History reviewed in Epic today.    MEDICATIONS:  Current Outpatient Medications   Medication Sig Dispense Refill     mirtazapine (REMERON) 15 MG tablet Take 15 mg by mouth At Bedtime       QUEtiapine (SEROQUEL) 50 MG tablet Take 50 mg by mouth At Bedtime prn x 14 days. Update NP in 12 days with usage + effectiveness       acetaminophen (TYLENOL) 500 MG tablet Take 1,000 mg by mouth 2 times daily       albuterol (PROAIR HFA/PROVENTIL HFA/VENTOLIN HFA) 108 (90 Base) MCG/ACT inhaler Inhale 2 puffs into the lungs every 4 hours as needed for shortness of breath / dyspnea  or wheezing       ARIPiprazole (ABILIFY) 5 MG tablet Take 5 mg by mouth At Bedtime       ASPIRIN PO Take 81 mg by mouth daily        Clopidogrel Bisulfate (PLAVIX PO) Take 75 mg by mouth daily.         escitalopram (LEXAPRO) 20 MG tablet Take 20 mg by mouth daily       ferrous sulfate (FEROSUL) 325 (65 Fe) MG tablet Take 325 mg by mouth daily (with breakfast)       fluticasone-salmeterol (ADVAIR) 250-50 MCG/DOSE inhaler Inhale 1 puff into the lungs 2 times daily       ketoconazole (NIZORAL) 2 % external cream Apply topically At Bedtime       levothyroxine (SYNTHROID/LEVOTHROID) 175 MCG tablet Take 1 tablet (175 mcg) by mouth daily 30 minutes before breakfast  0     melatonin 3 MG tablet Take 2 tablets (6 mg) by mouth At Bedtime       metoprolol succinate ER (TOPROL-XL) 25 MG 24 hr tablet Take 25 mg by mouth daily       miconazole (MICATIN) 2 % AERP powder Apply topically 2 times daily as needed       Omega-3 Fatty Acids (OMEGA-3 FISH OIL PO) Take 1 g by mouth daily.         pantoprazole (PROTONIX) 40 MG EC tablet Take 40 mg by mouth daily       polyethylene glycol (MIRALAX) 17 g packet Take 17 g by mouth daily as needed for constipation       prazosin (MINIPRESS) 1 MG capsule Take 1 mg by mouth At Bedtime       Rosuvastatin Calcium (CRESTOR PO) Take 10 mg by mouth At Bedtime        sennosides (SENOKOT) 8.6 MG tablet Take 2 tablets by mouth At Bedtime       vitamin D3 (CHOLECALCIFEROL) 50 mcg (2000 units) tablet Take 1 tablet (50 mcg) by mouth daily       REVIEW OF SYSTEMS:  10 point ROS of systems including Constitutional, Eyes, Respiratory, Cardiovascular, Gastroenterology, Genitourinary, Integumentary, Musculoskeletal, Psychiatric were all negative except for pertinent positives noted in my HPI.    Objective:  /81   Pulse 78   Temp 97.6  F (36.4  C)   Resp 18   Wt 54.9 kg (121 lb)   SpO2 96%   BMI 25.29 kg/m    Exam:  GENERAL APPEARANCE:  Alert, in no distress, depressed, deconditioned, hypoactive,    RESP:  respiratory effort and palpation of chest normal, auscultation of lungs clear, no respiratory distress, on RA< no cough  CV:  Palpation and auscultation of heart done , rate and rhythm regular/distant, no murmur, no LE peripheral edema  ABDOMEN:  normal bowel sounds, soft, nontender, no hepatosplenomegaly or other masses  M/S:   Gait and station - in bed at today's visit, Digits and nails with arthritic changes, reduced muscle mass  SKIN:  Inspection and Palpation of skin and subcutaneous tissue pale, fragile, thin   PSYCH:  insight and judgement, memory with impairment , affect flat and mood hypoactive, depressed, does follows commands readily d/t will     Labs:   CBC RESULTS:   Recent Labs   Lab Test 08/28/20  0811 08/07/20  0805   WBC 6.8 6.2   RBC 3.33* 3.27*   HGB 10.1* 9.7*   HCT 31.0* 29.6*   MCV 93 91   MCH 30.3 29.7   MCHC 32.6 32.8   RDW 13.8 14.6   * 135*       Last Basic Metabolic Panel:  Recent Labs   Lab Test 08/28/20  0811 08/17/20  0738    139   POTASSIUM 4.1 4.3   CHLORIDE 109 108   ORESTES 12.2* 10.6*   CO2 24 28   BUN 30 19   CR 0.97 0.82   GLC 78 74       Liver Function Studies -   Recent Labs   Lab Test 08/28/20  0811 08/07/20  0805   PROTTOTAL 5.8* 5.4*   ALBUMIN 2.4* 2.4*   BILITOTAL 0.3 0.3   ALKPHOS 56 45   AST 13 8   ALT 18 11       TSH   Date Value Ref Range Status   08/28/2020 0.01 (L) 0.40 - 4.00 mU/L Final   08/17/2020 0.01 (L) 0.40 - 4.00 mU/L Final   ]  ASSESSMENT/PLAN:  Severe protein-calorie malnutrition (H)  Weight loss  Unstable and worsening  Depression with weight loss and malnutrition as patient has declined eating.    She is on Boost 4 oz TID, pureed diet d/t edentulous status  SLP consulted when patient reports fear of swallowing troubles and no issues identified.    EHR showing po intake most often 1-25% or none.      8/29/20 Albumin 2.4, T Protein 5.8  Weights:  6/24/20: 150 lbs  7/9/20: 142.8 lbs  8/1/20: 130 lbs  8/27/20: 121 lbs.    PLAN:  - see  "depression/anxiety POC below  - continue supplements as ordered  - continue to push po intake with food/fluids as able.   - (new) mirtazapine 15 mg q HS for appetite stimulation    Pressure injury of head, stage 3 (H)  Unstable  To right ear.  Nursing noting 100% slough at base so difficult to stage.    See facility EHR for measurements  WOC RN consulted and orders for daily pressing changes in place  Patient declines repositioning off right ear for offloading.   Noted malnutrition also contributing.     PLAN:  - WOC RN following for wound recommendations  - offload as able  - encourage po intake  - on Prosource 30 mm BID    Failure to thrive in adult  Episode of recurrent major depressive disorder, unspecified depression episode severity (H)  Personality disorder (H)  Panic disorder with agoraphobia  Posttraumatic stress disorder  Mood disorder due to a general medical condition  Very unstable and worsening  3/18/2020: [facility] 30/30  Previously f/b MARIBEL ElizabethRAND with [facility] for psychotherapy, also was receiving services from [facility], by [facility] ([phone])    Now f/b Dr Martell, Psychiatrist with [facility] and in-house psychologist, [place], with [facility].  Last visit with Dr Martell 8/18/20 where divalproex was tapered off and Buspirone was started.     History of multiple [facility] stays, multiple ED visits for anxiety with poor coping skills.       On Abilify 5 mg q HS,  Buspirone 10 mg BID, Vitamin D2 50,000 weekly, Lexapro 20 mg daily, melatonin 6 mg q HS, Seroquel 50 mg q HS     Noted worsening failure to thrive with weight loss d/t refusing to eat, new pressure injury 2/2 malnutrition and refusing to get OOB.  Patient today endorsed anxiety but was unable to articulate where the anxiety was coming from or what it was about.  She was not wishing to talk much during our visit and answered \"I don't know to almost everything.  She denied depression but it is " "very clear she has a lot of depression,   She endorses anhedonia and anorexia.  She denies thoughts of suicide or harming herself.  When I mentioned we were all concerned about her, rosa m her family, she said, \"I know.\"  But when asked how we can help or if she could try to eat more, etc she reported, \"I don't know.\"  I asked her if she felt better without the Depakote and with the buspar, which was prescribed by her psychiatrist to help with her anxiety, and she said, \"I don't know.\"     I had spoken to her  Edwar recently who is unable to care for her at home.  We discussed a [facility]-psych stay and he is in agreement.  It is my professional opinion that Letty does not have the mental capacity 2/2 anxiety and depression to make her own decisions at this time and would defer to Edwar (next of kin, POA) for decisions.    Work-up today showing no metabolic or infectious etiology to be cause of failure to thrive.      Edwar is coming to the SNF tomorrow AM for a compassionate visit.  I'm fearful if left to her own devices, she would continue to fail and would be in jeopardy of death.      PLAN:  - continue abilify 5 mg q HS, monitor for need to increase  - continue buspirone 10 mg BID  - continue escitalopram 20 mg daily  - continue melatonin 6 mg daily but monitor for need to decrease  - Change seroquel to PRN as may be sedating/depressing her.  Known PTSD so will monitor for administration and needs  - add mirtazapine 15 mg q HS for appetite stimulation and depression.    - Compassionate care visit tomorrow with her .  Monitor after this visit and if not improved, may anticipate sending patient to ED for eval for [facility]-psych placement.     CKD (chronic kidney disease) stage 4, GFR 15-29 ml/min (H)  worsening  Last few BMPs:   8/7/20: BUN 22, Creat 0.76, GFR 82  8/17/20: BUN 19, Creat 0.82, GFR 75  8/28/20: BUN 30, Creat 0.97, GFR 61    Patient is drinking water but likely needs to be encouraged.  She is not " on ACEI, diuretics nor NSAIDs.     PLAN:  - encourage po intake  - Will avoid nephrotoxic agents (such as ACEI/ARB/NSAIDs, IV contrast) and mindful prescribing with renal dosing.       Chronic systolic congestive heart failure (H) - 10/20/17 ECHO: EF 45-50%, severely increased LV thickness, Grade 2 LV diastolic filling  Coronary artery disease involving native coronary artery of native heart with angina pectoris (H)  S/P CABG x 5  Ischemic cardiomyopathy  Essential hypertension  Mixed hyperlipidemia  Internal carotid artery stenosis, bilateral  Stable   F/b [facility], Dr Saucedo  Per Care Everywhere:   2/5/2009 - MI - Proximal RCA 99%, mild-mod disease elsewhere. EF 60%. PCI: MYRON to pRCA.  2/12/2009 - admit CP - Widely patent RCA stent. Moderate diffuse CAD. Severe stenosis in trivial PDA branch. LVEF 45%.  1/25/2010 - admit CP - PTCA and stent of diagonal  9/8/2010 - admit CP - LAD patent stent. Moderate diffuse CAD. Medical management recommended.   4/25/2012 - NSTEMI - Acute total occlusion of the ostial Circumflex. Acute total occlusion of the ostial ramus. Acute total occlusion of the distal 1st Obtuse Marginal. Angioplasty of ostial circumflex and ostial ramus. There was distal embolization to the OM1 vessel. This vessel was small and not amendable to intervention. Indefinite: plavix and asa 81.  8/10/2015 - CABx5 - 1. LIMA to distal LAD, reverse SVG to OM1 and OM2, reverse SVG to diagonal, reverse SVG to PDA.   2. Mitral valve repair with a [employer]tron[employer] 3-D full ring, 28 mm.   3. Tricuspid repair with a Medtronic 28 mm partial ring  1/12/2016 - TTE - EF 40-45%  Also noted history of Biv-AICD implanted     On ASA 81 mg daily, Plavix 75 mg daily, Toprol XL 25 mg daily, fish oil daily, prazosin 1 mg q HS, rosuvastatin 10 mg q HS      4/4/20 Lipid Panel  Chol 83, HDL 23, LDL 42, Trig 90    BPs 120-130s/60-80s  HRs 70-90s  Patient denies CP, HA, lightheadedness today (may be poor historian or  )    No LE edema  LS clear    PLAN:  - continue ASA, Plavix, Toprol, fish oil, prazosin, rosuvastatin  - BP goals are ~130 -165/60 -90 mmHg.This is higher than ACC and AHA recommendations due to risk for hypotension, risk of dizziness and falls and risk of tissue/cerebral hypoperfusion.        transcribed by : Reta Arrington  1. Mirtazapine 15 mg po at bedtime. Dx: anxiety, depression, weight loss  2. Change Seroquel to prn x 14 days. Update NP in 12 days with usage + effectiveness. Dx: psychotic disorder due to other medical dx of anxiety, depression, PTSD, panic disorder + agoraphobia     Total time with patient visit: >45 minutes including discussions about the POC and care coordination with the patient, family, patient's  and nursing, SW. Greater than 50% of total time spent with counseling and coordinating care due to several and continued conversations with patient's , Nursing , SNF SW, Psychiatrist and Psychologist, SNF collaborating MD and Medical Director, and patient about worsening failure to thrive, medical work-up, psych interventions, malnutrition and new pressure wound, possible [facility]-psych placement and ongoing placement availability, etc.  Patient's status is serious and tenuous.  She likely will need to be evaluated in the ED for ciara-psych placement if compassionate care visit with her family tomorrow AM does not improve her involvement in eating, off-loading, etc or if her medication changes today do not show positive results.  Significant concern for her prognosis is present.        Electronically signed by:  KILO Alcazar CNP

## 2020-08-28 ENCOUNTER — NURSING HOME VISIT (OUTPATIENT)
Dept: GERIATRICS | Facility: CLINIC | Age: 65
End: 2020-08-28
Payer: MEDICARE

## 2020-08-28 ENCOUNTER — HOSPITAL LABORATORY (OUTPATIENT)
Dept: NURSING HOME | Facility: OTHER | Age: 65
End: 2020-08-28

## 2020-08-28 VITALS
SYSTOLIC BLOOD PRESSURE: 123 MMHG | OXYGEN SATURATION: 96 % | DIASTOLIC BLOOD PRESSURE: 81 MMHG | WEIGHT: 121 LBS | TEMPERATURE: 97.6 F | BODY MASS INDEX: 25.29 KG/M2 | RESPIRATION RATE: 18 BRPM | HEART RATE: 78 BPM

## 2020-08-28 DIAGNOSIS — L89.813: ICD-10-CM

## 2020-08-28 DIAGNOSIS — F06.30 MOOD DISORDER DUE TO A GENERAL MEDICAL CONDITION: ICD-10-CM

## 2020-08-28 DIAGNOSIS — I25.5 ISCHEMIC CARDIOMYOPATHY: ICD-10-CM

## 2020-08-28 DIAGNOSIS — Z95.1 S/P CABG X 5: ICD-10-CM

## 2020-08-28 DIAGNOSIS — I65.23 INTERNAL CAROTID ARTERY STENOSIS, BILATERAL: ICD-10-CM

## 2020-08-28 DIAGNOSIS — N18.4 CKD (CHRONIC KIDNEY DISEASE) STAGE 4, GFR 15-29 ML/MIN (H): ICD-10-CM

## 2020-08-28 DIAGNOSIS — F33.9 EPISODE OF RECURRENT MAJOR DEPRESSIVE DISORDER, UNSPECIFIED DEPRESSION EPISODE SEVERITY (H): ICD-10-CM

## 2020-08-28 DIAGNOSIS — I10 ESSENTIAL HYPERTENSION: ICD-10-CM

## 2020-08-28 DIAGNOSIS — F43.10 POSTTRAUMATIC STRESS DISORDER: ICD-10-CM

## 2020-08-28 DIAGNOSIS — R62.7 FAILURE TO THRIVE IN ADULT: ICD-10-CM

## 2020-08-28 DIAGNOSIS — R63.4 WEIGHT LOSS: ICD-10-CM

## 2020-08-28 DIAGNOSIS — F60.9 PERSONALITY DISORDER (H): ICD-10-CM

## 2020-08-28 DIAGNOSIS — F40.01 PANIC DISORDER WITH AGORAPHOBIA: ICD-10-CM

## 2020-08-28 DIAGNOSIS — I50.22 CHRONIC SYSTOLIC CONGESTIVE HEART FAILURE (H): ICD-10-CM

## 2020-08-28 DIAGNOSIS — I25.119 CORONARY ARTERY DISEASE INVOLVING NATIVE CORONARY ARTERY OF NATIVE HEART WITH ANGINA PECTORIS (H): ICD-10-CM

## 2020-08-28 DIAGNOSIS — E43 SEVERE PROTEIN-CALORIE MALNUTRITION (H): Primary | ICD-10-CM

## 2020-08-28 DIAGNOSIS — E78.2 MIXED HYPERLIPIDEMIA: ICD-10-CM

## 2020-08-28 LAB
ALBUMIN SERPL-MCNC: 2.4 G/DL (ref 3.4–5)
ALP SERPL-CCNC: 56 U/L (ref 40–150)
ALT SERPL W P-5'-P-CCNC: 18 U/L (ref 0–50)
ANION GAP SERPL CALCULATED.3IONS-SCNC: 6 MMOL/L (ref 3–14)
AST SERPL W P-5'-P-CCNC: 13 U/L (ref 0–45)
BASOPHILS # BLD AUTO: 0 10E9/L (ref 0–0.2)
BASOPHILS NFR BLD AUTO: 0.1 %
BILIRUB SERPL-MCNC: 0.3 MG/DL (ref 0.2–1.3)
BUN SERPL-MCNC: 30 MG/DL (ref 7–30)
CALCIUM SERPL-MCNC: 12.2 MG/DL (ref 8.5–10.1)
CHLORIDE SERPL-SCNC: 109 MMOL/L (ref 94–109)
CO2 SERPL-SCNC: 24 MMOL/L (ref 20–32)
CREAT SERPL-MCNC: 0.97 MG/DL (ref 0.52–1.04)
DIFFERENTIAL METHOD BLD: ABNORMAL
EOSINOPHIL # BLD AUTO: 0.2 10E9/L (ref 0–0.7)
EOSINOPHIL NFR BLD AUTO: 2.3 %
ERYTHROCYTE [DISTWIDTH] IN BLOOD BY AUTOMATED COUNT: 13.8 % (ref 10–15)
GFR SERPL CREATININE-BSD FRML MDRD: 61 ML/MIN/{1.73_M2}
GLUCOSE SERPL-MCNC: 78 MG/DL (ref 70–99)
HCT VFR BLD AUTO: 31 % (ref 35–47)
HGB BLD-MCNC: 10.1 G/DL (ref 11.7–15.7)
LYMPHOCYTES # BLD AUTO: 2.3 10E9/L (ref 0.8–5.3)
LYMPHOCYTES NFR BLD AUTO: 34.2 %
MCH RBC QN AUTO: 30.3 PG (ref 26.5–33)
MCHC RBC AUTO-ENTMCNC: 32.6 G/DL (ref 31.5–36.5)
MCV RBC AUTO: 93 FL (ref 78–100)
MONOCYTES # BLD AUTO: 0.5 10E9/L (ref 0–1.3)
MONOCYTES NFR BLD AUTO: 6.7 %
NEUTROPHILS # BLD AUTO: 3.9 10E9/L (ref 1.6–8.3)
NEUTROPHILS NFR BLD AUTO: 56.7 %
PLATELET # BLD AUTO: 148 10E9/L (ref 150–450)
POTASSIUM SERPL-SCNC: 4.1 MMOL/L (ref 3.4–5.3)
PROT SERPL-MCNC: 5.8 G/DL (ref 6.8–8.8)
RBC # BLD AUTO: 3.33 10E12/L (ref 3.8–5.2)
SODIUM SERPL-SCNC: 139 MMOL/L (ref 133–144)
T4 FREE SERPL-MCNC: 1.68 NG/DL (ref 0.76–1.46)
TSH SERPL DL<=0.005 MIU/L-ACNC: 0.01 MU/L (ref 0.4–4)
WBC # BLD AUTO: 6.8 10E9/L (ref 4–11)

## 2020-08-28 PROCEDURE — 99310 SBSQ NF CARE HIGH MDM 45: CPT | Performed by: NURSE PRACTITIONER

## 2020-08-28 RX ORDER — MIRTAZAPINE 15 MG/1
15 TABLET, FILM COATED ORAL AT BEDTIME
Status: ON HOLD | COMMUNITY
End: 2020-09-14

## 2020-08-28 NOTE — LETTER
8/28/2020        RE: Letty Escobar  [facility], [place]  [address] [facility] [address]        [facility]  [facility] Medical Record Number:  [id number]  Place of Service where encounter took place:  [facility] [[id number]]  Chief Complaint   Patient presents with     RECHECK       HPI:    Letty Escobar  is a 64 year old (1955), who is being seen today for an episodic care visit.  HPI information obtained from: facility chart records, facility staff, patient report, [facility] chart review and family/first contact Pt's  (Edwar)'s report. Today's concern is:     Severe protein-calorie malnutrition (H)  Weight loss  Pressure injury of head, stage 3 (H)  Failure to thrive in adult  Episode of recurrent major depressive disorder, unspecified depression episode severity (H)  Personality disorder (H)  Panic disorder with agoraphobia  Posttraumatic stress disorder  Mood disorder due to a general medical condition  CKD (chronic kidney disease) stage 4, GFR 15-29 ml/min (H)  Chronic systolic congestive heart failure (H)  Coronary artery disease involving native coronary artery of native heart with angina pectoris (H)  S/P CABG x 5  Ischemic cardiomyopathy  Essential hypertension  Mixed hyperlipidemia  Internal carotid artery stenosis, bilateral     Patient is a 64 year old medically complex woman with PMH including panic disorder with agoraphobia, MDD, anxiety, drug-seeking behavior, personality disorder, PTSD, History suicidal ideation, HTN, HLD, ischemic cardiomyopathy, History MI and CABG x 5 (2015), CHF (EF 45-50%), ICA stenosis bilateral, pAfib, Morbid obesity, IBS with Diarrhea, GERD, DM2, chronic pain with neuropathy, urinary incontinence, PMR, Anemia, Vit B12 def, hypothyroidism who recently moved to [facility] to be closer to family.       Recent Med Changes:  - 6/25/20: Hydroxyzine 25 mg q 6 hours PRN  , discontinue DuoNebs  -  7/9/20 levothyroxine decreased from 200 mcg --> 175 mcg daily, discontinue Vit D 50K weekly, start Vit D 2000 units daily.   - 8/5/20: Boost 4 oz TID added for weight loss   - 8/18/20: Psychiatrist visit: Taper Depakote, added Buspar  - 8/21/20 Pantoprazole 40 mg daily added  - 8/25/20: ProSource added for wound healing     Ongoing progressive worsening of failure to thrive with patient refusing to eat, refusing to get OOB or change positions which has resulted in a right ear pressure injury.       Labs done today and showing no metabolic or infectious etiology for reason for decline.  Noted hypoalbuminemia and low total protein.    Patient continues to lose weight.  [facility] have been contacted without ability to find open bed for direct admit.  They have recommended going through the ED for admission.    Nursing reporting patient has compassionate-care visit with her  tomorrow AM.      Today I met Letty in her LTC SNF room.  She is laying in bed on her right side and declines turning over to change positions.  She reports she has a lot of anxiety but cannot articulate what she is particularly anxious about.  She reports she has anhedonia and anorexia as well.  She denies depression but her status and demeanor correlates with depression.  She reports she sleeps fine.  She reports she has no thoughts of harming herself or suicide.    PHQ9 - 14 last    SLUMS 3/2020: 30/30, BIMS last 15      Past Medical and Surgical History reviewed in Epic today.    MEDICATIONS:  Current Outpatient Medications   Medication Sig Dispense Refill     mirtazapine (REMERON) 15 MG tablet Take 15 mg by mouth At Bedtime       QUEtiapine (SEROQUEL) 50 MG tablet Take 50 mg by mouth At Bedtime prn x 14 days. Update NP in 12 days with usage + effectiveness       acetaminophen (TYLENOL) 500 MG tablet Take 1,000 mg by mouth 2 times daily       albuterol (PROAIR HFA/PROVENTIL HFA/VENTOLIN HFA) 108 (90 Base) MCG/ACT  inhaler Inhale 2 puffs into the lungs every 4 hours as needed for shortness of breath / dyspnea or wheezing       ARIPiprazole (ABILIFY) 5 MG tablet Take 5 mg by mouth At Bedtime       ASPIRIN PO Take 81 mg by mouth daily        Clopidogrel Bisulfate (PLAVIX PO) Take 75 mg by mouth daily.         escitalopram (LEXAPRO) 20 MG tablet Take 20 mg by mouth daily       ferrous sulfate (FEROSUL) 325 (65 Fe) MG tablet Take 325 mg by mouth daily (with breakfast)       fluticasone-salmeterol (ADVAIR) 250-50 MCG/DOSE inhaler Inhale 1 puff into the lungs 2 times daily       ketoconazole (NIZORAL) 2 % external cream Apply topically At Bedtime       levothyroxine (SYNTHROID/LEVOTHROID) 175 MCG tablet Take 1 tablet (175 mcg) by mouth daily 30 minutes before breakfast  0     melatonin 3 MG tablet Take 2 tablets (6 mg) by mouth At Bedtime       metoprolol succinate ER (TOPROL-XL) 25 MG 24 hr tablet Take 25 mg by mouth daily       miconazole (MICATIN) 2 % AERP powder Apply topically 2 times daily as needed       Omega-3 Fatty Acids (OMEGA-3 FISH OIL PO) Take 1 g by mouth daily.         pantoprazole (PROTONIX) 40 MG EC tablet Take 40 mg by mouth daily       polyethylene glycol (MIRALAX) 17 g packet Take 17 g by mouth daily as needed for constipation       prazosin (MINIPRESS) 1 MG capsule Take 1 mg by mouth At Bedtime       Rosuvastatin Calcium (CRESTOR PO) Take 10 mg by mouth At Bedtime        sennosides (SENOKOT) 8.6 MG tablet Take 2 tablets by mouth At Bedtime       vitamin D3 (CHOLECALCIFEROL) 50 mcg (2000 units) tablet Take 1 tablet (50 mcg) by mouth daily       REVIEW OF SYSTEMS:  10 point ROS of systems including Constitutional, Eyes, Respiratory, Cardiovascular, Gastroenterology, Genitourinary, Integumentary, Musculoskeletal, Psychiatric were all negative except for pertinent positives noted in my HPI.    Objective:  /81   Pulse 78   Temp 97.6  F (36.4  C)   Resp 18   Wt 54.9 kg (121 lb)   SpO2 96%   BMI 25.29  kg/m    Exam:  GENERAL APPEARANCE:  Alert, in no distress, depressed, deconditioned, hypoactive,   RESP:  respiratory effort and palpation of chest normal, auscultation of lungs clear, no respiratory distress, on RA< no cough  CV:  Palpation and auscultation of heart done , rate and rhythm regular/distant, no murmur, no LE peripheral edema  ABDOMEN:  normal bowel sounds, soft, nontender, no hepatosplenomegaly or other masses  M/S:   Gait and station - in bed at today's visit, Digits and nails with arthritic changes, reduced muscle mass  SKIN:  Inspection and Palpation of skin and subcutaneous tissue pale, fragile, thin   PSYCH:  insight and judgement, memory with impairment , affect flat and mood hypoactive, depressed, does follows commands readily d/t will     Labs:   CBC RESULTS:   Recent Labs   Lab Test 08/28/20  0811 08/07/20  0805   WBC 6.8 6.2   RBC 3.33* 3.27*   HGB 10.1* 9.7*   HCT 31.0* 29.6*   MCV 93 91   MCH 30.3 29.7   MCHC 32.6 32.8   RDW 13.8 14.6   * 135*       Last Basic Metabolic Panel:  Recent Labs   Lab Test 08/28/20  0811 08/17/20  0738    139   POTASSIUM 4.1 4.3   CHLORIDE 109 108   ORESTES 12.2* 10.6*   CO2 24 28   BUN 30 19   CR 0.97 0.82   GLC 78 74       Liver Function Studies -   Recent Labs   Lab Test 08/28/20  0811 08/07/20  0805   PROTTOTAL 5.8* 5.4*   ALBUMIN 2.4* 2.4*   BILITOTAL 0.3 0.3   ALKPHOS 56 45   AST 13 8   ALT 18 11       TSH   Date Value Ref Range Status   08/28/2020 0.01 (L) 0.40 - 4.00 mU/L Final   08/17/2020 0.01 (L) 0.40 - 4.00 mU/L Final   ]  ASSESSMENT/PLAN:  Severe protein-calorie malnutrition (H)  Weight loss  Unstable and worsening  Depression with weight loss and malnutrition as patient has declined eating.    She is on Boost 4 oz TID, pureed diet d/t edentulous status  SLP consulted when patient reports fear of swallowing troubles and no issues identified.    EHR showing po intake most often 1-25% or none.      8/29/20 Albumin 2.4, T Protein  5.8  Weights:  6/24/20: 150 lbs  7/9/20: 142.8 lbs  8/1/20: 130 lbs  8/27/20: 121 lbs.    PLAN:  - see depression/anxiety POC below  - continue supplements as ordered  - continue to push po intake with food/fluids as able.   - (new) mirtazapine 15 mg q HS for appetite stimulation    Pressure injury of head, stage 3 (H)  Unstable  To right ear.  Nursing noting 100% slough at base so difficult to stage.    See facility EHR for measurements  WOC RN consulted and orders for daily pressing changes in place  Patient declines repositioning off right ear for offloading.   Noted malnutrition also contributing.     PLAN:  - WOC RN following for wound recommendations  - offload as able  - encourage po intake  - on Prosource 30 mm BID    Failure to thrive in adult  Episode of recurrent major depressive disorder, unspecified depression episode severity (H)  Personality disorder (H)  Panic disorder with agoraphobia  Posttraumatic stress disorder  Mood disorder due to a general medical condition  Very unstable and worsening  3/18/2020: SLUMS 30/30  Previously f/[facility] MARIBEL [facility], also was receiving services from [facility], by Jay Moyer [facility] ([phone])    Now f/b Dr Martell, Psychiatrist with [facility] and in-house psychologist, [place], with [facility].  Last visit with Dr Martell 8/18/20 where divalproex was tapered off and Buspirone was started.     History of multiple ciara-psych stays, multiple ED visits for anxiety with poor coping skills.       On Abilify 5 mg q HS,  Buspirone 10 mg BID, Vitamin D2 50,000 weekly, Lexapro 20 mg daily, melatonin 6 mg q HS, Seroquel 50 mg q HS     Noted worsening failure to thrive with weight loss d/t refusing to eat, new pressure injury 2/2 malnutrition and refusing to get OOB.  Patient today endorsed anxiety but was unable to articulate where the anxiety was coming from or what it was about.  She was not wishing to talk  "much during our visit and answered \"I don't know to almost everything.  She denied depression but it is very clear she has a lot of depression,   She endorses anhedonia and anorexia.  She denies thoughts of suicide or harming herself.  When I mentioned we were all concerned about her, rosa m her family, she said, \"I know.\"  But when asked how we can help or if she could try to eat more, etc she reported, \"I don't know.\"  I asked her if she felt better without the Depakote and with the buspar, which was prescribed by her psychiatrist to help with her anxiety, and she said, \"I don't know.\"     I had spoken to her  Edwar recently who is unable to care for her at home.  We discussed a ciara-psych stay and he is in agreement.  It is my professional opinion that Letty does not have the mental capacity 2/2 anxiety and depression to make her own decisions at this time and would defer to Edwar (next of kin, POA) for decisions.    Work-up today showing no metabolic or infectious etiology to be cause of failure to thrive.      Edwar is coming to the SNF tomorrow AM for a compassionate visit.  I'm fearful if left to her own devices, she would continue to fail and would be in jeopardy of death.      PLAN:  - continue abilify 5 mg q HS, monitor for need to increase  - continue buspirone 10 mg BID  - continue escitalopram 20 mg daily  - continue melatonin 6 mg daily but monitor for need to decrease  - Change seroquel to PRN as may be sedating/depressing her.  Known PTSD so will monitor for administration and needs  - add mirtazapine 15 mg q HS for appetite stimulation and depression.    - Compassionate care visit tomorrow with her .  Monitor after this visit and if not improved, may anticipate sending patient to ED for eval for ciara-psych placement.     CKD (chronic kidney disease) stage 4, GFR 15-29 ml/min (H)  worsening  Last few BMPs:   8/7/20: BUN 22, Creat 0.76, GFR 82  8/17/20: BUN 19, Creat 0.82, GFR 75  8/28/20: " BUN 30, Creat 0.97, GFR 61    Patient is drinking water but likely needs to be encouraged.  She is not on ACEI, diuretics nor NSAIDs.     PLAN:  - encourage po intake  - Will avoid nephrotoxic agents (such as ACEI/ARB/NSAIDs, IV contrast) and mindful prescribing with renal dosing.       Chronic systolic congestive heart failure (H) - 10/20/17 ECHO: EF 45-50%, severely increased LV thickness, Grade 2 LV diastolic filling  Coronary artery disease involving native coronary artery of native heart with angina pectoris (H)  S/P CABG x 5  Ischemic cardiomyopathy  Essential hypertension  Mixed hyperlipidemia  Internal carotid artery stenosis, bilateral  Stable   F/b [facility], Dr Saucedo  Per Care Everywhere:   2/5/2009 - MI - Proximal RCA 99%, mild-mod disease elsewhere. EF 60%. PCI: MYRON to pRCA.  2/12/2009 - admit CP - Widely patent RCA stent. Moderate diffuse CAD. Severe stenosis in trivial PDA branch. LVEF 45%.  1/25/2010 - admit CP - PTCA and stent of diagonal  9/8/2010 - admit CP - LAD patent stent. Moderate diffuse CAD. Medical management recommended.   4/25/2012 - NSTEMI - Acute total occlusion of the ostial Circumflex. Acute total occlusion of the ostial ramus. Acute total occlusion of the distal 1st Obtuse Marginal. Angioplasty of ostial circumflex and ostial ramus. There was distal embolization to the OM1 vessel. This vessel was small and not amendable to intervention. Indefinite: plavix and asa 81.  8/10/2015 - CABx5 - 1. LIMA to distal LAD, reverse SVG to OM1 and OM2, reverse SVG to diagonal, reverse SVG to PDA.   2. Mitral valve repair with a Medtronics 3-D full ring, 28 mm.   3. Tricuspid repair with a Medtronic 28 mm partial ring  1/12/2016 - TTE - EF 40-45%  Also noted history of Biv-AICD implanted     On ASA 81 mg daily, Plavix 75 mg daily, Toprol XL 25 mg daily, fish oil daily, prazosin 1 mg q HS, rosuvastatin 10 mg q HS      4/4/20 Lipid Panel  Chol 83, HDL 23, LDL 42, Trig 90    BPs  120-130s/60-80s  HRs 70-90s  Patient denies CP, HA, lightheadedness today (may be poor historian or )    No LE edema  LS clear    PLAN:  - continue ASA, Plavix, Toprol, fish oil, prazosin, rosuvastatin  - BP goals are ~130 -165/60 -90 mmHg.This is higher than ACC and AHA recommendations due to risk for hypotension, risk of dizziness and falls and risk of tissue/cerebral hypoperfusion.        transcribed by : Reta Arrington  1. Mirtazapine 15 mg po at bedtime. Dx: anxiety, depression, weight loss  2. Change Seroquel to prn x 14 days. Update NP in 12 days with usage + effectiveness. Dx: psychotic disorder due to other medical dx of anxiety, depression, PTSD, panic disorder + agoraphobia     Total time with patient visit: >45 minutes including discussions about the POC and care coordination with the patient, family, patient's  and nursing, SW. Greater than 50% of total time spent with counseling and coordinating care due to several and continued conversations with patient's , Nursing , SNF SW, Psychiatrist and Psychologist, SNF collaborating MD and Medical Director, and patient about worsening failure to thrive, medical work-up, psych interventions, malnutrition and new pressure wound, possible ciara-psych placement and ongoing placement availability, etc.  Patient's status is serious and tenuous.  She likely will need to be evaluated in the ED for ciara-psych placement if compassionate care visit with her family tomorrow AM does not improve her involvement in eating, off-loading, etc or if her medication changes today do not show positive results.  Significant concern for her prognosis is present.        Electronically signed by:  KILO Alcazar CNP               Sincerely,        KILO Alcazar CNP

## 2020-09-02 ENCOUNTER — HOSPITAL ENCOUNTER (EMERGENCY)
Facility: CLINIC | Age: 65
Discharge: PSYCHIATRIC OR SUBSTANCE USE FACILITY | End: 2020-09-03
Attending: EMERGENCY MEDICINE | Admitting: EMERGENCY MEDICINE
Payer: MEDICARE

## 2020-09-02 VITALS
SYSTOLIC BLOOD PRESSURE: 120 MMHG | OXYGEN SATURATION: 99 % | DIASTOLIC BLOOD PRESSURE: 72 MMHG | TEMPERATURE: 96.7 F | RESPIRATION RATE: 18 BRPM | HEART RATE: 78 BPM

## 2020-09-02 DIAGNOSIS — R62.7 FAILURE TO THRIVE IN ADULT: ICD-10-CM

## 2020-09-02 DIAGNOSIS — F40.01 PANIC DISORDER WITH AGORAPHOBIA: ICD-10-CM

## 2020-09-02 LAB
ALBUMIN SERPL-MCNC: 2.5 G/DL (ref 3.4–5)
ALP SERPL-CCNC: 65 U/L (ref 40–150)
ALT SERPL W P-5'-P-CCNC: 24 U/L (ref 0–50)
ANION GAP SERPL CALCULATED.3IONS-SCNC: 6 MMOL/L (ref 3–14)
AST SERPL W P-5'-P-CCNC: 21 U/L (ref 0–45)
BASOPHILS # BLD AUTO: 0 10E9/L (ref 0–0.2)
BASOPHILS NFR BLD AUTO: 0.4 %
BILIRUB SERPL-MCNC: 0.1 MG/DL (ref 0.2–1.3)
BUN SERPL-MCNC: 30 MG/DL (ref 7–30)
CALCIUM SERPL-MCNC: 11.9 MG/DL (ref 8.5–10.1)
CHLORIDE SERPL-SCNC: 109 MMOL/L (ref 94–109)
CO2 SERPL-SCNC: 24 MMOL/L (ref 20–32)
CREAT SERPL-MCNC: 0.97 MG/DL (ref 0.52–1.04)
DIFFERENTIAL METHOD BLD: ABNORMAL
EOSINOPHIL NFR BLD AUTO: 1.8 %
ERYTHROCYTE [DISTWIDTH] IN BLOOD BY AUTOMATED COUNT: 13.5 % (ref 10–15)
GFR SERPL CREATININE-BSD FRML MDRD: 61 ML/MIN/{1.73_M2}
GLUCOSE SERPL-MCNC: 98 MG/DL (ref 70–99)
HCT VFR BLD AUTO: 31.3 % (ref 35–47)
HGB BLD-MCNC: 10.4 G/DL (ref 11.7–15.7)
IMM GRANULOCYTES # BLD: 0.1 10E9/L (ref 0–0.4)
IMM GRANULOCYTES NFR BLD: 1 %
LYMPHOCYTES # BLD AUTO: 2.8 10E9/L (ref 0.8–5.3)
LYMPHOCYTES NFR BLD AUTO: 34 %
MCH RBC QN AUTO: 30.4 PG (ref 26.5–33)
MCHC RBC AUTO-ENTMCNC: 33.2 G/DL (ref 31.5–36.5)
MCV RBC AUTO: 92 FL (ref 78–100)
MONOCYTES # BLD AUTO: 0.6 10E9/L (ref 0–1.3)
MONOCYTES NFR BLD AUTO: 7.4 %
NEUTROPHILS # BLD AUTO: 4.5 10E9/L (ref 1.6–8.3)
NEUTROPHILS NFR BLD AUTO: 55.4 %
NRBC # BLD AUTO: 0 10*3/UL
NRBC BLD AUTO-RTO: 0 /100
PLATELET # BLD AUTO: 188 10E9/L (ref 150–450)
POTASSIUM SERPL-SCNC: 4.1 MMOL/L (ref 3.4–5.3)
PROT SERPL-MCNC: 6.1 G/DL (ref 6.8–8.8)
RBC # BLD AUTO: 3.42 10E12/L (ref 3.8–5.2)
SARS-COV-2 RNA SPEC QL NAA+PROBE: NORMAL
SODIUM SERPL-SCNC: 139 MMOL/L (ref 133–144)
SPECIMEN SOURCE: NORMAL
TSH SERPL DL<=0.005 MIU/L-ACNC: 0.02 MU/L (ref 0.4–4)
WBC # BLD AUTO: 8.1 10E9/L (ref 4–11)

## 2020-09-02 PROCEDURE — 84443 ASSAY THYROID STIM HORMONE: CPT | Performed by: EMERGENCY MEDICINE

## 2020-09-02 PROCEDURE — 99285 EMERGENCY DEPT VISIT HI MDM: CPT | Mod: 25 | Performed by: EMERGENCY MEDICINE

## 2020-09-02 PROCEDURE — 36415 COLL VENOUS BLD VENIPUNCTURE: CPT | Performed by: EMERGENCY MEDICINE

## 2020-09-02 PROCEDURE — 85025 COMPLETE CBC W/AUTO DIFF WBC: CPT | Performed by: EMERGENCY MEDICINE

## 2020-09-02 PROCEDURE — 80053 COMPREHEN METABOLIC PANEL: CPT | Performed by: EMERGENCY MEDICINE

## 2020-09-02 PROCEDURE — 99285 EMERGENCY DEPT VISIT HI MDM: CPT | Mod: Z6 | Performed by: EMERGENCY MEDICINE

## 2020-09-02 PROCEDURE — C9803 HOPD COVID-19 SPEC COLLECT: HCPCS | Performed by: EMERGENCY MEDICINE

## 2020-09-02 PROCEDURE — 25800030 ZZH RX IP 258 OP 636: Performed by: EMERGENCY MEDICINE

## 2020-09-02 PROCEDURE — U0003 INFECTIOUS AGENT DETECTION BY NUCLEIC ACID (DNA OR RNA); SEVERE ACUTE RESPIRATORY SYNDROME CORONAVIRUS 2 (SARS-COV-2) (CORONAVIRUS DISEASE [COVID-19]), AMPLIFIED PROBE TECHNIQUE, MAKING USE OF HIGH THROUGHPUT TECHNOLOGIES AS DESCRIBED BY CMS-2020-01-R: HCPCS | Performed by: EMERGENCY MEDICINE

## 2020-09-02 PROCEDURE — 90791 PSYCH DIAGNOSTIC EVALUATION: CPT

## 2020-09-02 PROCEDURE — 96360 HYDRATION IV INFUSION INIT: CPT | Performed by: EMERGENCY MEDICINE

## 2020-09-02 RX ORDER — BUSPIRONE HYDROCHLORIDE 10 MG/1
10 TABLET ORAL 2 TIMES DAILY
Status: ON HOLD | COMMUNITY
End: 2020-09-14

## 2020-09-02 RX ADMIN — SODIUM CHLORIDE 1000 ML: 9 INJECTION, SOLUTION INTRAVENOUS at 15:34

## 2020-09-02 NOTE — ED NOTES
Oral Hygiene done, Lots of dried skin removed from mouth and lips. Offered her something to eat and drink, she declined as she is afraid she will choke.

## 2020-09-02 NOTE — ED NOTES
Has a pressure boot to left foot and has incontinent brief on which is dry, and has a pressure dressing to left outer ear.

## 2020-09-02 NOTE — ED NOTES
Pt found in bed in curled fetal position, quiet, she reported to triage nurse she is here because she is afraid of everything, and not eating. She says she is always depressed. She has been at [facility]. She is failure to thrive. She is here to get medically cleared to got to a geriatric psych facility. She will answer questions when spoken to.

## 2020-09-02 NOTE — ED NOTES
Called to [facility]- spoke with Intake- she is on the list and will go in order, they have a few beds- but have staffing issues, might not be placed tonight. As long as the covid swab has been collected they will moved forward.

## 2020-09-02 NOTE — ED NOTES
Very diffiicult IV start (x 8 attempts by four different individuals) Will have ray do lab draw for her labs.

## 2020-09-02 NOTE — ED PROVIDER NOTES
History     Chief Complaint   Patient presents with     Mental Health Problem     HPI  Letty Escobar is a 64 year old female with longstanding mental health disease including personality disorder, [place] phobia, panic disorder and PTSD.  She is currently on  multiple chronic psychiatric medications including Abilify, BuSpar, Celexa, melatonin and Remeron but still failing at the local nursing home.  They  describe a situation and basically failure to thrive.  She is not eating or walking.  She is scared to do these things due to paranoia.  She denies suicidal homicidal thoughts.  She has lost 29 pounds they report.  No fever.  No cough.  No infectious disease symptoms.  She does have a couple areas of minor skin breakdown due to pressure.      Allergies:  Allergies   Allergen Reactions     Contrast Dye      Isosorbide Mononitrate [Isosorbide]      Nitroglycerin      Nystatin      Sulfa Drugs      Adhesive Tape Rash     Diphenhydramine Hcl Palpitations     Penicillins Rash       Problem List:    Patient Active Problem List    Diagnosis Date Noted     COPD (chronic obstructive pulmonary disease) (H) 06/24/2020     Priority: Medium     Coronary artery disease involving native coronary artery 04/17/2020     Priority: Medium     Long-term use of high-risk medication 04/14/2020     Priority: Medium     Panic disorder with agoraphobia 04/14/2020     Priority: Medium     Drug-seeking behavior 04/04/2020     Priority: Medium     Suicidal ideation 04/01/2020     Priority: Medium     Constipation 03/20/2020     Priority: Medium     Personality disorder (H) 03/05/2020     Priority: Medium     Rule out dependent personality       Candidiasis 03/01/2020     Priority: Medium     Mood disorder due to a general medical condition 03/01/2020     Priority: Medium     Posttraumatic stress disorder 03/01/2020     Priority: Medium     Acute on chronic systolic (congestive) heart failure (H) 01/28/2020     Priority: Medium      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, left leg (H) 01/15/2020     Priority: Medium     Weakness 12/17/2019     Priority: Medium     Acute coronary syndrome (H) 11/22/2019     Priority: Medium     Acute exacerbation of CHF (congestive heart failure) (H) 11/11/2019     Priority: Medium     Polymyalgia rheumatica (H) 11/28/2018     Priority: Medium     Internal carotid artery stenosis, bilateral 05/05/2018     Priority: Medium     Carotid US 05/04/2018 showed moderate plaque formation, consistent with 50 to 69% stenosis in the right internal carotid artery, not significantly changed from 8/5/2015.  Moderate plaque formation, consistent with 50 to 69% stenosis in the left internal carotid artery; there has been mild progression of the left ICA stenosis since 8/5/2015.       Vitamin B12 deficiency 02/14/2018     Priority: Medium     Chronic bilateral low back pain without sciatica 01/17/2018     Priority: Medium     Chronic pain disorder 10/01/2017     Priority: Medium     Urinary incontinence, mixed 09/24/2017     Priority: Medium     Tobacco abuse 02/17/2017     Priority: Medium     Hypertension 10/14/2015     Priority: Medium     Paroxysmal atrial fibrillation (H) 10/02/2015     Priority: Medium     Ischemic cardiomyopathy 09/20/2015     Priority: Medium     EF of 40-45%, status post RV lead revision and LV epicardial lead placement via mini-thoracotomy in August 2016.       S/P CABG x 5 08/21/2015     Priority: Medium     Anemia of chronic disease 01/05/2013     Priority: Medium     Cubital tunnel syndrome on left 11/13/2012     Priority: Medium     Hypothyroidism 12/10/2010     Priority: Medium     ELI (obstructive sleep apnea) 01/31/2010     Priority: Medium     PSG on April/2008 that showed moderate Obstructive Sleep Apnea with an AHI of 23.5 . Limb movements persisted at 29 movements/hour at optimal pressures, despite carbidopa use.       Restless legs syndrome  (RLS) 08/29/2007     Priority: Medium     Hyperlipidemia with target low density lipoprotein (LDL) cholesterol less than 70 mg/dL 05/30/2007     Priority: Medium     Hereditary and idiopathic peripheral neuropathy 04/24/2007     Priority: Medium     Diabetes mellitus type 2 in obese (H) 07/13/2006     Priority: Medium        Past Medical History:    Past Medical History:   Diagnosis Date     Depressive disorder      Diabetes mellitus (H)      Myocardial infarction (H)      Neuromuscular disorder (H)      Thyroid disease        Past Surgical History:    Past Surgical History:   Procedure Laterality Date     CARDIAC SURGERY      stents x 11     CHOLECYSTECTOMY       GENITOURINARY SURGERY      Tubal ligation     RELEASE CARPAL TUNNEL         Family History:    No family history on file.    Social History:  Marital Status:   [2]  Social History     Tobacco Use     Smoking status: Not on file   Substance Use Topics     Alcohol use: Not on file     Drug use: Not on file        Medications:    acetaminophen (TYLENOL) 500 MG tablet  albuterol (PROAIR HFA/PROVENTIL HFA/VENTOLIN HFA) 108 (90 Base) MCG/ACT inhaler  ARIPiprazole (ABILIFY) 5 MG tablet  ASPIRIN PO  Clopidogrel Bisulfate (PLAVIX PO)  escitalopram (LEXAPRO) 20 MG tablet  ferrous sulfate (FEROSUL) 325 (65 Fe) MG tablet  fluticasone-salmeterol (ADVAIR) 250-50 MCG/DOSE inhaler  ketoconazole (NIZORAL) 2 % external cream  levothyroxine (SYNTHROID/LEVOTHROID) 175 MCG tablet  melatonin 3 MG tablet  metoprolol succinate ER (TOPROL-XL) 25 MG 24 hr tablet  miconazole (MICATIN) 2 % AERP powder  mirtazapine (REMERON) 15 MG tablet  Omega-3 Fatty Acids (OMEGA-3 FISH OIL PO)  pantoprazole (PROTONIX) 40 MG EC tablet  polyethylene glycol (MIRALAX) 17 g packet  prazosin (MINIPRESS) 1 MG capsule  QUEtiapine (SEROQUEL) 50 MG tablet  Rosuvastatin Calcium (CRESTOR PO)  sennosides (SENOKOT) 8.6 MG tablet  vitamin D3 (CHOLECALCIFEROL) 50 mcg (2000 units) tablet          Review of  Systems  All other systems are reviewed and are negative    Physical Exam   BP: 114/66  Pulse: 72  Temp: 97.3  F (36.3  C)  Resp: 14  SpO2: 98 %      Physical Exam  Vitals signs and nursing note reviewed.   Constitutional:       General: She is not in acute distress.     Appearance: She is cachectic. She is not diaphoretic.   HENT:      Head: Normocephalic and atraumatic.      Mouth/Throat:      Mouth: Mucous membranes are dry.      Pharynx: No oropharyngeal exudate or posterior oropharyngeal erythema.   Eyes:      General: No scleral icterus.     Pupils: Pupils are equal, round, and reactive to light.   Neck:      Musculoskeletal: Normal range of motion and neck supple.   Cardiovascular:      Rate and Rhythm: Normal rate and regular rhythm.      Heart sounds: Normal heart sounds. No murmur.   Pulmonary:      Effort: No respiratory distress.      Breath sounds: No stridor. No wheezing or rales.   Abdominal:      Palpations: Abdomen is soft.      Tenderness: There is no abdominal tenderness.   Musculoskeletal:         General: No tenderness.   Skin:     General: Skin is warm and dry.      Coloration: Skin is pale.      Findings: No erythema or rash.      Comments: A few areas of minor skin breakdown, but none appear secondarily infected.  These are of the right ear, right lateral ankle and under the right breast.   Neurological:      Mental Status: She is alert.   Psychiatric:         Mood and Affect: Mood is depressed. Affect is blunt.         Thought Content: Thought content does not include homicidal or suicidal ideation.         ED Course        Procedures               Critical Care time:  none               Results for orders placed or performed during the hospital encounter of 09/02/20 (from the past 24 hour(s))   CBC with platelets differential   Result Value Ref Range    WBC 8.1 4.0 - 11.0 10e9/L    RBC Count 3.42 (L) 3.8 - 5.2 10e12/L    Hemoglobin 10.4 (L) 11.7 - 15.7 g/dL    Hematocrit 31.3 (L) 35.0 -  47.0 %    MCV 92 78 - 100 fl    MCH 30.4 26.5 - 33.0 pg    MCHC 33.2 31.5 - 36.5 g/dL    RDW 13.5 10.0 - 15.0 %    Platelet Count 188 150 - 450 10e9/L    Diff Method Automated Method     % Neutrophils 55.4 %    % Lymphocytes 34.0 %    % Monocytes 7.4 %    % Eosinophils 1.8 %    % Basophils 0.4 %    % Immature Granulocytes 1.0 %    Nucleated RBCs 0 0 /100    Absolute Neutrophil 4.5 1.6 - 8.3 10e9/L    Absolute Lymphocytes 2.8 0.8 - 5.3 10e9/L    Absolute Monocytes 0.6 0.0 - 1.3 10e9/L    Absolute Basophils 0.0 0.0 - 0.2 10e9/L    Abs Immature Granulocytes 0.1 0 - 0.4 10e9/L    Absolute Nucleated RBC 0.0    Comprehensive metabolic panel   Result Value Ref Range    Sodium 139 133 - 144 mmol/L    Potassium 4.1 3.4 - 5.3 mmol/L    Chloride 109 94 - 109 mmol/L    Carbon Dioxide 24 20 - 32 mmol/L    Anion Gap 6 3 - 14 mmol/L    Glucose 98 70 - 99 mg/dL    Urea Nitrogen 30 7 - 30 mg/dL    Creatinine 0.97 0.52 - 1.04 mg/dL    GFR Estimate 61 >60 mL/min/[1.73_m2]    GFR Estimate If Black 70 >60 mL/min/[1.73_m2]    Calcium 11.9 (H) 8.5 - 10.1 mg/dL    Bilirubin Total 0.1 (L) 0.2 - 1.3 mg/dL    Albumin 2.5 (L) 3.4 - 5.0 g/dL    Protein Total 6.1 (L) 6.8 - 8.8 g/dL    Alkaline Phosphatase 65 40 - 150 U/L    ALT 24 0 - 50 U/L    AST 21 0 - 45 U/L   TSH   Result Value Ref Range    TSH 0.02 (L) 0.40 - 4.00 mU/L       Medications   0.9% sodium chloride BOLUS (0 mLs Intravenous Stopped 9/2/20 [id number])       Assessments & Plan (with Medical Decision Making)  64-year-old female with long-term psychiatric disorder including PTSD with [place] phobia, panic disorder and anxiety who presents with failure to thrive at the local nursing home despite being on the above 5 psychotropic medications.  She is medically cleared here.  DEC has agreed with the need for admission.  [facility] has tentatively accepted her pending staffing.  She will be signed out to Dr. Coulter at change of shift awaiting placement.     I have reviewed the nursing  notes.    I have reviewed the findings, diagnosis, plan and need for follow up with the patient.       New Prescriptions    No medications on file       Final diagnoses:   Panic disorder with agoraphobia       9/2/2020   [facility] EMERGENCY DEPARTMENT     Lee Solares MD  09/02/20 [id number]

## 2020-09-03 ENCOUNTER — HOSPITAL ENCOUNTER (INPATIENT)
Facility: CLINIC | Age: 65
LOS: 12 days | Discharge: HOME | DRG: 885 | End: 2020-09-15
Attending: PSYCHIATRY & NEUROLOGY | Admitting: PSYCHIATRY & NEUROLOGY
Payer: MEDICARE

## 2020-09-03 ENCOUNTER — APPOINTMENT (OUTPATIENT)
Dept: PHYSICAL THERAPY | Facility: CLINIC | Age: 65
DRG: 885 | End: 2020-09-03
Attending: NURSE PRACTITIONER
Payer: MEDICARE

## 2020-09-03 DIAGNOSIS — J44.9 CHRONIC OBSTRUCTIVE PULMONARY DISEASE, UNSPECIFIED COPD TYPE (H): ICD-10-CM

## 2020-09-03 DIAGNOSIS — R62.7 ADULT FAILURE TO THRIVE: ICD-10-CM

## 2020-09-03 DIAGNOSIS — E03.9 HYPOTHYROIDISM, UNSPECIFIED TYPE: ICD-10-CM

## 2020-09-03 DIAGNOSIS — E05.80 IATROGENIC HYPERTHYROIDISM: Primary | ICD-10-CM

## 2020-09-03 DIAGNOSIS — F06.30 MOOD DISORDER DUE TO A GENERAL MEDICAL CONDITION: ICD-10-CM

## 2020-09-03 DIAGNOSIS — G89.4 CHRONIC PAIN DISORDER: ICD-10-CM

## 2020-09-03 DIAGNOSIS — I25.119 CORONARY ARTERY DISEASE INVOLVING NATIVE CORONARY ARTERY OF NATIVE HEART WITH ANGINA PECTORIS (H): ICD-10-CM

## 2020-09-03 LAB
DEPRECATED CALCIDIOL+CALCIFEROL SERPL-MC: 41 UG/L (ref 20–75)
FOLATE SERPL-MCNC: 4.3 NG/ML
LABORATORY COMMENT REPORT: NORMAL
SARS-COV-2 RNA SPEC QL NAA+PROBE: NEGATIVE
SPECIMEN SOURCE: NORMAL
T4 FREE SERPL-MCNC: 1.7 NG/DL (ref 0.76–1.46)
TSH SERPL DL<=0.005 MIU/L-ACNC: 0.01 MU/L (ref 0.4–4)
VALPROATE SERPL-MCNC: <3 MG/L (ref 50–100)
VIT B12 SERPL-MCNC: 848 PG/ML (ref 193–986)

## 2020-09-03 PROCEDURE — 82607 VITAMIN B-12: CPT | Performed by: NURSE PRACTITIONER

## 2020-09-03 PROCEDURE — 36415 COLL VENOUS BLD VENIPUNCTURE: CPT | Performed by: NURSE PRACTITIONER

## 2020-09-03 PROCEDURE — 25000132 ZZH RX MED GY IP 250 OP 250 PS 637: Mod: GY | Performed by: NURSE PRACTITIONER

## 2020-09-03 PROCEDURE — 93005 ELECTROCARDIOGRAM TRACING: CPT

## 2020-09-03 PROCEDURE — 99207 ZZC CONSULT E&M CHANGED TO INITIAL LEVEL: CPT | Performed by: PHYSICIAN ASSISTANT

## 2020-09-03 PROCEDURE — 99222 1ST HOSP IP/OBS MODERATE 55: CPT | Performed by: PHYSICIAN ASSISTANT

## 2020-09-03 PROCEDURE — 82746 ASSAY OF FOLIC ACID SERUM: CPT | Performed by: NURSE PRACTITIONER

## 2020-09-03 PROCEDURE — 97530 THERAPEUTIC ACTIVITIES: CPT | Mod: GP | Performed by: PHYSICAL THERAPIST

## 2020-09-03 PROCEDURE — 84443 ASSAY THYROID STIM HORMONE: CPT | Performed by: NURSE PRACTITIONER

## 2020-09-03 PROCEDURE — G0463 HOSPITAL OUTPT CLINIC VISIT: HCPCS

## 2020-09-03 PROCEDURE — 82306 VITAMIN D 25 HYDROXY: CPT | Performed by: NURSE PRACTITIONER

## 2020-09-03 PROCEDURE — 99223 1ST HOSP IP/OBS HIGH 75: CPT | Mod: AI | Performed by: NURSE PRACTITIONER

## 2020-09-03 PROCEDURE — 84439 ASSAY OF FREE THYROXINE: CPT | Performed by: NURSE PRACTITIONER

## 2020-09-03 PROCEDURE — 97161 PT EVAL LOW COMPLEX 20 MIN: CPT | Mod: GP | Performed by: PHYSICAL THERAPIST

## 2020-09-03 PROCEDURE — 12400002 ZZH R&B MH SENIOR/ADOLESCENT

## 2020-09-03 PROCEDURE — 80164 ASSAY DIPROPYLACETIC ACD TOT: CPT | Performed by: NURSE PRACTITIONER

## 2020-09-03 PROCEDURE — 97110 THERAPEUTIC EXERCISES: CPT | Mod: GP | Performed by: PHYSICAL THERAPIST

## 2020-09-03 PROCEDURE — 25000132 ZZH RX MED GY IP 250 OP 250 PS 637: Mod: GY | Performed by: PSYCHIATRY & NEUROLOGY

## 2020-09-03 RX ORDER — ESCITALOPRAM OXALATE 20 MG/1
20 TABLET ORAL DAILY
Status: DISCONTINUED | OUTPATIENT
Start: 2020-09-03 | End: 2020-09-03

## 2020-09-03 RX ORDER — POLYETHYLENE GLYCOL 3350 17 G/17G
17 POWDER, FOR SOLUTION ORAL DAILY PRN
Status: DISCONTINUED | OUTPATIENT
Start: 2020-09-03 | End: 2020-09-15 | Stop reason: HOSPADM

## 2020-09-03 RX ORDER — CYANOCOBALAMIN 1000 UG/ML
1 INJECTION, SOLUTION INTRAMUSCULAR; SUBCUTANEOUS
COMMUNITY
End: 2021-04-06

## 2020-09-03 RX ORDER — LANOLIN ALCOHOL/MO/W.PET/CERES
6 CREAM (GRAM) TOPICAL AT BEDTIME
Status: DISCONTINUED | OUTPATIENT
Start: 2020-09-03 | End: 2020-09-03

## 2020-09-03 RX ORDER — ROSUVASTATIN CALCIUM 10 MG/1
10 TABLET, COATED ORAL AT BEDTIME
Status: DISCONTINUED | OUTPATIENT
Start: 2020-09-03 | End: 2020-09-15 | Stop reason: HOSPADM

## 2020-09-03 RX ORDER — LANOLIN ALCOHOL/MO/W.PET/CERES
6 CREAM (GRAM) TOPICAL AT BEDTIME
COMMUNITY
End: 2021-01-29

## 2020-09-03 RX ORDER — ASPIRIN 81 MG/1
81 TABLET, CHEWABLE ORAL DAILY
Status: DISCONTINUED | OUTPATIENT
Start: 2020-09-03 | End: 2020-09-15 | Stop reason: HOSPADM

## 2020-09-03 RX ORDER — ROSUVASTATIN CALCIUM 10 MG/1
10 TABLET, COATED ORAL AT BEDTIME
Status: DISCONTINUED | OUTPATIENT
Start: 2020-09-03 | End: 2020-09-03

## 2020-09-03 RX ORDER — CHLORAL HYDRATE 500 MG
1 CAPSULE ORAL DAILY
Status: DISCONTINUED | OUTPATIENT
Start: 2020-09-03 | End: 2020-09-15 | Stop reason: HOSPADM

## 2020-09-03 RX ORDER — ACETAMINOPHEN 325 MG/1
650 TABLET ORAL EVERY 4 HOURS PRN
Status: DISCONTINUED | OUTPATIENT
Start: 2020-09-03 | End: 2020-09-15 | Stop reason: HOSPADM

## 2020-09-03 RX ORDER — ALUMINA, MAGNESIA, AND SIMETHICONE 2400; 2400; 240 MG/30ML; MG/30ML; MG/30ML
30 SUSPENSION ORAL EVERY 4 HOURS PRN
Status: DISCONTINUED | OUTPATIENT
Start: 2020-09-03 | End: 2020-09-15 | Stop reason: HOSPADM

## 2020-09-03 RX ORDER — POTASSIUM CHLORIDE 750 MG/1
10 TABLET, EXTENDED RELEASE ORAL DAILY
Status: ON HOLD | COMMUNITY
End: 2020-09-14

## 2020-09-03 RX ORDER — MIRTAZAPINE 15 MG/1
15 TABLET, FILM COATED ORAL AT BEDTIME
Status: DISCONTINUED | OUTPATIENT
Start: 2020-09-03 | End: 2020-09-03

## 2020-09-03 RX ORDER — ACETAMINOPHEN 500 MG
1000 TABLET ORAL 2 TIMES DAILY
Status: DISCONTINUED | OUTPATIENT
Start: 2020-09-03 | End: 2020-09-15 | Stop reason: HOSPADM

## 2020-09-03 RX ORDER — SENNOSIDES 8.6 MG
2 TABLET ORAL AT BEDTIME
Status: DISCONTINUED | OUTPATIENT
Start: 2020-09-03 | End: 2020-09-15 | Stop reason: HOSPADM

## 2020-09-03 RX ORDER — BUSPIRONE HYDROCHLORIDE 10 MG/1
10 TABLET ORAL 2 TIMES DAILY
Status: DISCONTINUED | OUTPATIENT
Start: 2020-09-03 | End: 2020-09-03

## 2020-09-03 RX ORDER — HYDROXYZINE HYDROCHLORIDE 25 MG/1
25 TABLET, FILM COATED ORAL EVERY 4 HOURS PRN
Status: DISCONTINUED | OUTPATIENT
Start: 2020-09-03 | End: 2020-09-15 | Stop reason: HOSPADM

## 2020-09-03 RX ORDER — KETOCONAZOLE 20 MG/G
CREAM TOPICAL AT BEDTIME
Status: DISCONTINUED | OUTPATIENT
Start: 2020-09-03 | End: 2020-09-15 | Stop reason: HOSPADM

## 2020-09-03 RX ORDER — CLOPIDOGREL BISULFATE 75 MG/1
75 TABLET ORAL DAILY
Status: DISCONTINUED | OUTPATIENT
Start: 2020-09-03 | End: 2020-09-15 | Stop reason: HOSPADM

## 2020-09-03 RX ORDER — SENNOSIDES 8.6 MG
2 TABLET ORAL AT BEDTIME
Status: DISCONTINUED | OUTPATIENT
Start: 2020-09-03 | End: 2020-09-03

## 2020-09-03 RX ORDER — ALBUTEROL SULFATE 90 UG/1
2 AEROSOL, METERED RESPIRATORY (INHALATION) EVERY 4 HOURS PRN
Status: DISCONTINUED | OUTPATIENT
Start: 2020-09-03 | End: 2020-09-15 | Stop reason: HOSPADM

## 2020-09-03 RX ORDER — POTASSIUM CHLORIDE 1.5 G/1.58G
10 POWDER, FOR SOLUTION ORAL DAILY
Status: ON HOLD | COMMUNITY
End: 2020-09-03

## 2020-09-03 RX ORDER — LEVOTHYROXINE SODIUM 175 UG/1
175 TABLET ORAL DAILY
Status: ON HOLD | COMMUNITY
End: 2020-09-14

## 2020-09-03 RX ORDER — PRAZOSIN HYDROCHLORIDE 1 MG/1
1 CAPSULE ORAL AT BEDTIME
Status: DISCONTINUED | OUTPATIENT
Start: 2020-09-03 | End: 2020-09-03

## 2020-09-03 RX ORDER — KETOCONAZOLE 20 MG/G
CREAM TOPICAL AT BEDTIME
Status: DISCONTINUED | OUTPATIENT
Start: 2020-09-03 | End: 2020-09-03

## 2020-09-03 RX ORDER — LANOLIN ALCOHOL/MO/W.PET/CERES
6 CREAM (GRAM) TOPICAL AT BEDTIME
Status: DISCONTINUED | OUTPATIENT
Start: 2020-09-03 | End: 2020-09-11

## 2020-09-03 RX ORDER — TRAZODONE HYDROCHLORIDE 50 MG/1
50 TABLET, FILM COATED ORAL
Status: DISCONTINUED | OUTPATIENT
Start: 2020-09-03 | End: 2020-09-15 | Stop reason: HOSPADM

## 2020-09-03 RX ORDER — METOPROLOL SUCCINATE 25 MG/1
25 TABLET, EXTENDED RELEASE ORAL DAILY
Status: DISCONTINUED | OUTPATIENT
Start: 2020-09-03 | End: 2020-09-15 | Stop reason: HOSPADM

## 2020-09-03 RX ORDER — ASPIRIN 81 MG/1
81 TABLET ORAL DAILY
COMMUNITY

## 2020-09-03 RX ORDER — ARIPIPRAZOLE 5 MG/1
5 TABLET ORAL AT BEDTIME
Status: DISCONTINUED | OUTPATIENT
Start: 2020-09-03 | End: 2020-09-04

## 2020-09-03 RX ORDER — DEXTROSE MONOHYDRATE 25 G/50ML
25-50 INJECTION, SOLUTION INTRAVENOUS
Status: DISCONTINUED | OUTPATIENT
Start: 2020-09-03 | End: 2020-09-15 | Stop reason: HOSPADM

## 2020-09-03 RX ORDER — QUETIAPINE FUMARATE 50 MG/1
50 TABLET, FILM COATED ORAL AT BEDTIME
Status: DISCONTINUED | OUTPATIENT
Start: 2020-09-03 | End: 2020-09-03

## 2020-09-03 RX ORDER — ARIPIPRAZOLE 5 MG/1
5 TABLET ORAL AT BEDTIME
Status: DISCONTINUED | OUTPATIENT
Start: 2020-09-03 | End: 2020-09-03

## 2020-09-03 RX ORDER — IPRATROPIUM BROMIDE AND ALBUTEROL SULFATE 2.5; .5 MG/3ML; MG/3ML
1 SOLUTION RESPIRATORY (INHALATION) EVERY 8 HOURS PRN
Status: ON HOLD | COMMUNITY
End: 2020-09-14

## 2020-09-03 RX ORDER — BISACODYL 10 MG
10 SUPPOSITORY, RECTAL RECTAL DAILY PRN
Status: DISCONTINUED | OUTPATIENT
Start: 2020-09-03 | End: 2020-09-15 | Stop reason: HOSPADM

## 2020-09-03 RX ORDER — POTASSIUM CHLORIDE 750 MG/1
10 CAPSULE, EXTENDED RELEASE ORAL DAILY
Status: DISCONTINUED | OUTPATIENT
Start: 2020-09-03 | End: 2020-09-15 | Stop reason: HOSPADM

## 2020-09-03 RX ORDER — HYDROXYZINE HYDROCHLORIDE 25 MG/1
25 TABLET, FILM COATED ORAL EVERY 6 HOURS PRN
COMMUNITY
End: 2021-01-06

## 2020-09-03 RX ORDER — DIVALPROEX SODIUM 125 MG/1
125 CAPSULE, COATED PELLETS ORAL 3 TIMES DAILY
Status: ON HOLD | COMMUNITY
End: 2020-09-14

## 2020-09-03 RX ORDER — DIVALPROEX SODIUM 125 MG/1
125 CAPSULE, COATED PELLETS ORAL 3 TIMES DAILY
Status: DISCONTINUED | OUTPATIENT
Start: 2020-09-03 | End: 2020-09-04

## 2020-09-03 RX ORDER — QUETIAPINE FUMARATE 50 MG/1
50 TABLET, FILM COATED ORAL AT BEDTIME
Status: DISCONTINUED | OUTPATIENT
Start: 2020-09-03 | End: 2020-09-11

## 2020-09-03 RX ORDER — NICOTINE POLACRILEX 4 MG
15-30 LOZENGE BUCCAL
Status: DISCONTINUED | OUTPATIENT
Start: 2020-09-03 | End: 2020-09-15 | Stop reason: HOSPADM

## 2020-09-03 RX ORDER — VITAMIN B COMPLEX
50 TABLET ORAL DAILY
Status: DISCONTINUED | OUTPATIENT
Start: 2020-09-03 | End: 2020-09-15 | Stop reason: HOSPADM

## 2020-09-03 RX ORDER — MIRTAZAPINE 30 MG/1
30 TABLET, FILM COATED ORAL AT BEDTIME
Status: DISCONTINUED | OUTPATIENT
Start: 2020-09-03 | End: 2020-09-15 | Stop reason: HOSPADM

## 2020-09-03 RX ORDER — FERROUS SULFATE 325(65) MG
325 TABLET ORAL
Status: DISCONTINUED | OUTPATIENT
Start: 2020-09-03 | End: 2020-09-15 | Stop reason: HOSPADM

## 2020-09-03 RX ORDER — PANTOPRAZOLE SODIUM 40 MG/1
40 TABLET, DELAYED RELEASE ORAL DAILY
Status: DISCONTINUED | OUTPATIENT
Start: 2020-09-03 | End: 2020-09-15 | Stop reason: HOSPADM

## 2020-09-03 RX ADMIN — ACETAMINOPHEN 1000 MG: 500 TABLET, FILM COATED ORAL at 17:54

## 2020-09-03 RX ADMIN — DIVALPROEX SODIUM 125 MG: 125 CAPSULE, COATED PELLETS ORAL at 14:04

## 2020-09-03 RX ADMIN — SENNOSIDES 2 TABLET: 8.6 TABLET, FILM COATED ORAL at 20:25

## 2020-09-03 RX ADMIN — QUETIAPINE FUMARATE 50 MG: 50 TABLET ORAL at 20:25

## 2020-09-03 RX ADMIN — DIVALPROEX SODIUM 125 MG: 125 CAPSULE, COATED PELLETS ORAL at 20:24

## 2020-09-03 RX ADMIN — FLUTICASONE FUROATE AND VILANTEROL TRIFENATATE 1 PUFF: 200; 25 POWDER RESPIRATORY (INHALATION) at 10:31

## 2020-09-03 RX ADMIN — ACETAMINOPHEN 1000 MG: 500 TABLET, FILM COATED ORAL at 08:53

## 2020-09-03 RX ADMIN — BUSPIRONE HYDROCHLORIDE 10 MG: 10 TABLET ORAL at 08:53

## 2020-09-03 RX ADMIN — ASPIRIN 81 MG CHEWABLE TABLET 81 MG: 81 TABLET CHEWABLE at 08:53

## 2020-09-03 RX ADMIN — MELATONIN TAB 3 MG 6 MG: 3 TAB at 20:25

## 2020-09-03 RX ADMIN — ROSUVASTATIN CALCIUM 10 MG: 10 TABLET, FILM COATED ORAL at 17:54

## 2020-09-03 RX ADMIN — KETOCONAZOLE: 20 CREAM TOPICAL at 20:26

## 2020-09-03 RX ADMIN — METOPROLOL SUCCINATE 25 MG: 25 TABLET, EXTENDED RELEASE ORAL at 10:31

## 2020-09-03 RX ADMIN — ESCITALOPRAM OXALATE 20 MG: 20 TABLET ORAL at 08:53

## 2020-09-03 RX ADMIN — Medication 5 MG: at 20:24

## 2020-09-03 RX ADMIN — MIRTAZAPINE 30 MG: 30 TABLET, FILM COATED ORAL at 20:24

## 2020-09-03 RX ADMIN — CLOPIDOGREL BISULFATE 75 MG: 75 TABLET, FILM COATED ORAL at 08:53

## 2020-09-03 RX ADMIN — DIVALPROEX SODIUM 125 MG: 125 CAPSULE, COATED PELLETS ORAL at 08:53

## 2020-09-03 ASSESSMENT — ACTIVITIES OF DAILY LIVING (ADL)
LAUNDRY: UNABLE TO COMPLETE
ORAL_HYGIENE: WITH ASSISTANCE
DRESS: WITH ASSISTANCE
HYGIENE/GROOMING: PROMPTS;WITH ASSISTANCE
RETIRED_EATING: 1-->ASSISTIVE EQUIPMENT
DRESS: 2-->ASSISTIVE PERSON
HYGIENE/GROOMING: WITH ASSISTANCE
DRESS: PROMPTS;WITH ASSISTANCE
FALL_HISTORY_WITHIN_LAST_SIX_MONTHS: NO
ORAL_HYGIENE: PROMPTS
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
## 2020-09-03 NOTE — PROGRESS NOTES
A               Admission:  Pt. Admitted on the night shift to room 361.  Pt. Will have a t-shirt and pants in her room.  Pt. Has a heal float boot in her room.  Pt. Has no other belongings with her.  Jakob Brownlee. Jameoc. 9/3/2020.  I am responsible for any personal items that are not sent to the safe or pharmacy.  [facility] is not responsible for loss, theft or damage of any property in my possession.    Signature:  _________________________________ Date: _______  Time: _____                                              Staff Signature:  ____________________________ Date: ________  Time: _____      2nd Staff person, if patient is unable/unwilling to sign:    Signature: ________________________________ Date: ________  Time: _____     Discharge:  [facility] has returned all of my personal belongings:    Signature: _________________________________ Date: ________  Time: _____                                          Staff Signature:  ____________________________ Date: ________  Time: _____

## 2020-09-03 NOTE — PLAN OF CARE
Problem: General Plan of Care (Inpatient Behavioral)  Goal: Individualization/Patient Specific Goal (IP Behavioral)  Description: The patient and/or their representative will achieve their patient-specific goals related to the plan of care.    The patient-specific goals include:  Outcome: No Change  Flowsheets (Taken 9/3/2020 1023)  Areas of Vulnerability: Failure to thrive: not eating, walking or getting out of bed  Patient Strengths:   Stable housing   Financial stability   Adherent to medication regime   Stable and supportive family    The patient completed the reasons for admit and goals for discharge in the personal plan of care.   Reasons for admit:  1)  I can't walk  2)  I've been in and out of hospital  3)  I have a lot of anxiety      Goals for discharge:  1)  Learn how to walk again  2)  Reduced anxiety

## 2020-09-03 NOTE — PLAN OF CARE
Problem: OT General Care Plan  Goal: OT Goal 1  Description: Will attend OT groups when able,  process through organizing work and thoughts with  concrete familiar step opportunities. Assess cognition further in problem solving and organization.     Note: Pt has not attended OT groups yet. She will be encouraged to attend groups when able, and be provided a Self Assessment form.  OT staff will explain the value of OT, including them in their treatment plan and offer options to meet their needs and identify goals.

## 2020-09-03 NOTE — PROGRESS NOTES
"CLINICAL NUTRITION SERVICES - ASSESSMENT NOTE     Nutrition Prescription    RECOMMENDATIONS FOR MDs/PROVIDERS TO ORDER:  Per wound care protocol order the following:   - vitamin A 20,000 units daily x10 days   - ascorbic acid 500 mg daily x10 days   - zinc sulfate 220 mg daily x10 days   - theravit with minerals daily   Consider enteral nutrition if within GOC and intakes do not improve   Consider thiamine supplements d/t malnutrition/risk of refeeding  Monitor K, Mg and phos and replace PRN    Malnutrition Status:    Severe malnutrition in the context of acute on chronic illness     Recommendations already ordered by Registered Dietitian (RD):  Gelatein q meal  Diet Education    Future/Additional Recommendations:  Monitor intakes and wt  Adjust supplements as needed pending pt preference  Monitor for refeeding  EN recs should intakes not improve    Unable to obtain in-person nutrition history or nutrition focused physical assessment (NFPA) from patient to limit exposure and to minimize use of PPE during COVID-19 pandemic. Spoke to pt over the phone.     REASON FOR ASSESSMENT  Letty Escobar is a 64 year old female assessed by the dietitian for Admission Nutrition Risk Screen for reduced oral intake over the last month and stageable pressure injuries or large/non-healing wound or burn  PMH significant for COPD, CAD, CHF, HTN, PTSD and DM admitted from NH for FTT.  NUTRITION HISTORY  Pt reports loss of appetite and 100# weight loss in an unspecified amount of time. Per NH documentation (8/28) pt has not been eating. Has a \"fear of swallowing\" despite pureed diet and SLP evaluation. Pt was receiving boost TID and prosource at NH d/t inadequate intakes and PI however pt stated she does not drink boost (drinks something similar but unable to name it) as it causes diarrhea. Noted 19# (13.6%) wt loss in 1 month and 70# (36.6%) wt loss in 6 months.    CURRENT NUTRITION ORDERS  Diet: Adult pureed diet  Intake/Tolerance: " "Intakes remain poor since admit, ate 50% of pudding and mashed potatoes for lunch today. Pt declined boost, and magic cup (dislikes ice cream) recommend trying gelatein q meal. Encouraged pt to increase intakes to meet nutritional needs and for wound healing. Discussed preferences, pt stated she enjoys mashed potatoes but not much else.    LABS  Labs reviewed:  Results for TAYLOR BURRIS (MRN [id number]) as of 9/3/2020 09:10   Ref. Range 9/2/2020 15:53   Sodium Latest Ref Range: 133 - 144 mmol/L 139   Potassium Latest Ref Range: 3.4 - 5.3 mmol/L 4.1   Chloride Latest Ref Range: 94 - 109 mmol/L 109   Carbon Dioxide Latest Ref Range: 20 - 32 mmol/L 24   Urea Nitrogen Latest Ref Range: 7 - 30 mg/dL 30   Creatinine Latest Ref Range: 0.52 - 1.04 mg/dL 0.97   GFR Estimate Latest Ref Range: >60 mL/min/1.73_m2 61   GFR Estimate If Black Latest Ref Range: >60 mL/min/1.73_m2 70   Calcium Latest Ref Range: 8.5 - 10.1 mg/dL 11.9 (H)   Anion Gap Latest Ref Range: 3 - 14 mmol/L 6   Albumin Latest Ref Range: 3.4 - 5.0 g/dL 2.5 (L)   Protein Total Latest Ref Range: 6.8 - 8.8 g/dL 6.1 (L)   Bilirubin Total Latest Ref Range: 0.2 - 1.3 mg/dL 0.1 (L)   Alkaline Phosphatase Latest Ref Range: 40 - 150 U/L 65   ALT Latest Ref Range: 0 - 50 U/L 24   AST Latest Ref Range: 0 - 45 U/L 21   TSH Latest Ref Range: 0.40 - 4.00 mU/L 0.02 (L)     MEDICATIONS  Medications reviewed:  Ferrous sulfate  Omega-3  remeron  protonix  Potassium chloride  Senokot  Vit D3  PRN: maalox, dulcolax, MOM, miralax    ANTHROPOMETRICS  Height: 147.3 cm (4'10\")  Most Recent Weight: 54.9 kg (121 lb)      IBW: 43.6 kg (adjusted for height under 5')  BMI: 25.29 kg/m^2, Overweight BMI 25-29.9  Weight History: 19# (13.6%) wt loss in 1 month. 70# (36.6%) wt loss in 6 months,   Wt Readings from Last 10 Encounters:   08/28/20 54.9 kg (121 lb)   07/29/20 63.7 kg (140 lb 6.4 oz)   07/08/20 63.1 kg (139 lb 3.2 oz)   06/24/20 68 kg (150 lb)   05/16/20 78.5 kg (173 lb) "   02/26/20 86.9 kg (191 lb 9.6 oz)   09/18/12 103 kg (227 lb)   08/27/12 106.4 kg (234 lb 9.6 oz)   08/27/12 103 kg (227 lb)   07/25/12 105.4 kg (232 lb 6.4 oz)     Dosing Weight: 54.9 kg (current)    ASSESSED NUTRITION NEEDS  Estimated Energy Needs: 2351-8575 kcals/day (30 - 35 kcals/kg )  Justification: Repletion and Wound healing  Estimated Protein Needs:  grams protein/day (1.5 - 2 grams of pro/kg)  Justification: Wound healing  Estimated Fluid Needs: 3747-1239 mL/day (1 mL/kcal)   Justification: Maintenance or Per provider pending fluid status    PHYSICAL FINDINGS  See malnutrition section below.  Pt is edentulous  Stage 2 PI (R ear) and stage 2-3 PI (R lateral malleolus and R 5th lateral toe) per 9/3 WOC RN note.     MALNUTRITION  % Intake: </=75% for >/= 1 month (severe)  % Weight Loss: > 10% in 6 months (severe)  Subcutaneous Fat Loss: Unable to assess  Muscle Loss: Unable to assess  Fluid Accumulation/Edema: None noted  Malnutrition Diagnosis: Severe malnutrition in the context of acute on chronic illness     NUTRITION DIAGNOSIS  Inadequate oral intake related to loss of appetite/refusal to eat as evidenced by documented intakes and 70# wt loss in 6 months.       INTERVENTIONS  Implementation  Nutrition Education: Discussed ways to increase calorie/protein intakes through small/frequent meals and incorporating higher calorie food items into diet. Discussed higher nutritional needs for wound healing and available supplements. Encouraged intakes.   gelatein q meal     Goals  Patient to consume % of nutritionally adequate meal trays TID, or the equivalent with supplements/snacks.     Monitoring/Evaluation  Progress toward goals will be monitored and evaluated per protocol.    Vanesa Chadwick MS, RD, LDN  Unit Pager [phone]

## 2020-09-03 NOTE — PROGRESS NOTES
"Pt denies all MH symptoms, denies depression, anxiety, SI/SIB. Flat affect, brightens at times. Pt in room all day. Pt verbalized she is here \"because I can't walk and they told my  if I don't start eating I will die. I can't do that to him\", pt denies she wishes to be dead, \"I just don't have an appetite.  Pt verbalized she is \"scared\", pt verbalized she is scared of falling and chocking. Pt able to swallow without issues, no coughing or vocal change after swallowing. Fluids encouraged, intake 300 mL, pt incont of urine x1, incont care provided, barrier cream appled. Pt reports a poor appetite, ate 50% pf pudding and 50% of mash potatoes, pt declined boost, \"it gives me diarrhea\". [place] encouraged, pt able to eat independently after set up.      Pt repositions every 2 hours, bed bath done, lotion applied, interdry placed under breast, redness noted under right breast, pt able to independently wash her face with much encouragement. Pt verbalized she is a heavy 2-assist to transfer. Pt able to stand with 2-assist with gait belt, pt unable to sit without full assistance. Pt able to stand with assistance and take 3 steps with use of walker and gait belt, gait unsteady, pt fatigued easily, pt needed encouragement and reassurance that she could stand and staff would not let her fall. Pt verbalized she will \"try\" to sit in a wheelchair for dinner, cushion provided wheelchair.     Right ear dressing CDI, right ear supported with small pillow.  Reddened areas on her buttocks, groin, left heel.  Right off loading boot in place and left heel off loaded using pillows.     Pt  called, he would like pt to be prescribed \"zyprexa, that's what I take. I think it will help with anxiety\".   "

## 2020-09-03 NOTE — PROGRESS NOTES
Fill Date ID Written Drug Qty Days Prescriber Rx # Pharmacy Refill Daily Dose * Pymt Type  03/30/2020 2 03/30/2020 Pregabalin 75 Mg Capsule   60.00 30 Am Ej [id number] [facility] (4809) 0/2 1.00 [place] 02/17/2020 1 01/31/2020 Pregabalin 100 Mg Capsule   15.00 5 Ku [facility] [id number] Thr (4387) 1/0 2.01 [place] 02/12/2020 1 02/12/2020 Lorazepam 1 Mg Tablet   30.00 8 Mo Cee [id number] Thr (4387) 0/0 3.75 [place] 02/12/2020 1 02/12/2020 Acetaminophen-Cod #3 Tablet   30.00 8 Mo Cee [id number] Thr (4387) 0/0 16.88 MME Comm [facility] [place] [facility] 02/08/2020 1 01/31/2020 Pregabalin 100 Mg Capsule   30.00 10 Ku [facility] [id number] Thr (4387) 0/0 2.01 LME Comm [facility] 01/30/2020 1 01/30/2020 Pregabalin 100 Mg Capsule   30.00 10 [facility] [id number] Thr (4387) 0/0 2.01 LME Comm [facility] 01/30/2020 1 01/29/2020 Lorazepam 1 Mg Tablet   30.00 1 Ti Pro [id number] Thr (4387) 0/0 30.00 LME [place] 01/29/2020 1 01/29/2020 Acetaminophen-Cod #3 Tablet   20.00 5 Ti Pro [id number] Thr (4387) 0/0 18.00 [place] 11/27/2019 2 11/27/2019 Lorazepam 1 Mg Tablet   30.00 8 Am Ej [id number] [facility] (4879) 0/0 3.75 [place] 11/07/2019 2 11/07/2019 Pregabalin 100 Mg Capsule   270.00 90 Am Ej [id number] [facility] (4821) 0/1 2.01 [place] 11/05/2019 2 11/05/2019 Lorazepam 1 Mg Tablet   20.00 5 Am Ej [id number] [facility] (0324) 0/0 4.00 [place] 10/11/2019 2 03/28/2019 Pregabalin 100 Mg Capsule   90.00 30 Am Ej [id number] [facility] (4879) 5/1 2.01 [place] 09/18/2019 2 08/12/2019 Acetaminophen-Cod #3 Tablet   30.00 5 Am Ej [id number] Jose Raul (4879) 1/1 27.00 MME [place]    Name  Address  City  State  Zipcode  Phone    Jermaine Messina  [facility] [id number] [phone]   Kareem Basurto [address] [place] [id number] [phone]   Onesimo Stevens [address]  [place] [id number] [phone]   [facility] [address] [place] [id number] [phone]   Renea Mendez [address] [facility] [address] [phone]   Pharmacies  Total Pharmacies: 2   Name  Address  City  State  Zipcode  Phone    [facility] [phone]) [address]  [facility] [address] [phone]   [facility]  [phone]) [address] [facility] [address] [phone]

## 2020-09-03 NOTE — CONSULTS
Internal Medicine Initial Visit      Letty Escobar MRN# [id number]   YOB: 1955 Age: 64 year old   Date of Admission: 9/3/2020  PCP: Priscilla Calderon    Referring Provider: Behavioral Health - All Guevara MD  Reason for Visit: General Medical Evaluation, FTT         Assessment and Recommendations:   Letty Escobar is a 64 year old year old woman with a history of Bivetricular ICD, CAD, CHF, COPD, DMII, PMR, carotid artery stenosis, 'HTN, depression who is admitted to station 3B with FTT.        Depression  Anxiety  FTT. Management per psychiatry team.     Hyperthyroidism  TSH since June is in the 0.1-0.2 range and pt reports compliance with levothyroxine 175mcg daily.  On exam she has a mild proptosis, fine tremor.  Pt reports low appetite and weight loss. Clinically somewhat hypovolemic.   -hold levothyroxine today and tomorrow  -day after tomorrow  Start levothyroxine 100mcg   -TSH and fT4 repeat testing: recheck in 2 weeks and again in 4 weeks     Low back pain  Sacral decubitus ulcer  Exam negative for focal deficit or focal spinous tenderness  -WOCN consult  -icyhot to intact skin on low back    Severe malnutrition in the context of acute on chronic illness   Patient noted to require vitamin supplementation and increased caloric need.   -will need to consider TF as an option if persistent low po intake  -calorie counts  -Appreciate RD input    Bivetricular ICD  CAD, s/p CABG x5  (2015, 2018)  Ischemic Cardiomyopathy, HFrEF (45-50%)  Follows with Tiffanie Sadler PA-C at the [facility] [place]. EKG 6/1/20: VR 76m paced. Echo 1/2020 with LVEF 47%, mod LAE, mod LVH, sclerotic AV, annuloplasty ring of MV, IVC dilation, moderately increased pulm pressures. Exam shows RRR, no m/r/g.  -continue PTA DAPT: plavix 75mg and aspirin 81mg daily  -continue PTA metoprolol  -continue PTA crestor    COPD  PFTs in 2015 Post bronchodilator Spirometry: FEV1/FVC: 73 %     FEV1/FVC: 80 %  FEV1 0.86 L (35 %)    FEV1 1.13 L (32 % change from Pre) FVC 1.17 L (37 %)    FVC 1.42 L (21 % change from Pre)   -continue PTA Advair  -continue PTA ventolin    ELI  Does not use CPAP, did not tolerate  -O2 prn hypoxia    DMII, controlled  HbA1c 5.8 4/420, glucoses in 70-90s.  -hypoglycemia protocol ordered  -no indication for ISS     PMR  Not currently on steroid medications and would be risky given psychiatric history.   -continue supportive APAP and added icyhot patch    Chronic iron deficiency anemia  H/h stable at 10.4/31.3 and are at BL  -continue PTA ferrous sulfate    History of CKD stage 4  Variable Creat so difficult to assess BL, suspect is ~1. Clinically is  Mildly hypovolemic  -Cr stable at this time, encourage oral hydration    Carotid artery stenosis  TIA (2018)  CT brain 5/202 mild volume loss, no acute changes. Carotid duplex 5/2018 with moderate 50-69% stable right  And left ICA stenosis. Currently asymptomatic  -monitor    History of HTN  bp is currently normotensive  -continue PTA metoprolol    Medicine will will ensure levothyroxine reordered for Sat, please page with any additional concerns.     Shannon Thornton PA-C  Hospitalist Service   Pager: [phone]     Reason for Admission:   Depression and FTT         History of Present Illness:   History is obtained from the patient and medical record.     Letty Escobar is a 64 year old year old woman with a history of Bivetricular ICD, CAD, CHF, COPD, DMII, PMR, carotid artery stenosis, 'HTN, depression who is admitted to station 3B with FTT.     Internal Medicine service was asked to see patient for a general medication evaluation. Currently, patient is feeling tired and low energy with low appetite and endorses she is not drinking enough fluids.     She has a low back pain that is somewhat chronic and requires frequent repositioning.           Review of Systems:   Constitutional: + weight loss, negative for fever/chills   Eyes:  negative for vision changes   Ears/Nose/Throat: negative for ear pain, sore throat, nasal or sinus congestion   Cardiovascular: negative for chest pain or palpitations   Respiratory: negative for cough or shortness of breath   Gastrointestinal: negative for abdominal pain, N/V, diarrhea or constipation   Genitourinary: + incontinence, negative for dysuria, urinary urgency or frequency   Musculoskeletal: + low back pain  Neurologic: negative for headaches or numbness, tingling, weakness of extremities   Skin: negative for rashes or wounds   Endocrine: negative for polydipsia, polyphagia, polyuria; negative for heat/cold intolerance             Past Medical History:   Reviewed and updated in Epic.  Past Medical History:   Diagnosis Date     Depressive disorder      Diabetes mellitus (H)      Myocardial infarction (H)      Neuromuscular disorder (H)     ulnar nerve problem     Thyroid disease              Past Surgical History:   Reviewed and updated in Epic.  Past Surgical History:   Procedure Laterality Date     CARDIAC SURGERY      stents x 11     CHOLECYSTECTOMY       GENITOURINARY SURGERY      Tubal ligation     RELEASE CARPAL TUNNEL               Social History:     Social History     Tobacco Use     Smoking status: Not on file   Substance Use Topics     Alcohol use: Not on file     Drug use: Not on file             Family History:   Reviewed and updated in Epic.  No family history on file.          Allergies:     Allergies   Allergen Reactions     Contrast Dye      Isosorbide Mononitrate [Isosorbide]      Nitroglycerin      Nystatin      Sulfa Drugs      Adhesive Tape Rash     Diphenhydramine Hcl Palpitations     Penicillins Rash             Medications:     Medications Prior to Admission   Medication Sig Dispense Refill Last Dose     divalproex sodium delayed-release (DEPAKOTE SPRINKLE) 125 MG DR capsule Take 125 mg by mouth 3 times daily        potassium chloride (KLOR-CON) 20 MEQ packet Take 10 mEq by mouth  daily        acetaminophen (TYLENOL) 500 MG tablet Take 1,000 mg by mouth 2 times daily        albuterol (PROAIR HFA/PROVENTIL HFA/VENTOLIN HFA) 108 (90 Base) MCG/ACT inhaler Inhale 2 puffs into the lungs every 4 hours as needed for shortness of breath / dyspnea or wheezing        ARIPiprazole (ABILIFY) 5 MG tablet Take 5 mg by mouth At Bedtime        ASPIRIN PO Take 81 mg by mouth daily         busPIRone (BUSPAR) 10 MG tablet Take 10 mg by mouth 2 times daily        Clopidogrel Bisulfate (PLAVIX PO) Take 75 mg by mouth daily.          escitalopram (LEXAPRO) 20 MG tablet Take 20 mg by mouth daily        ferrous sulfate (FEROSUL) 325 (65 Fe) MG tablet Take 325 mg by mouth daily (with breakfast)        fluticasone-salmeterol (ADVAIR) 250-50 MCG/DOSE inhaler Inhale 1 puff into the lungs 2 times daily        ketoconazole (NIZORAL) 2 % external cream Apply topically At Bedtime        levothyroxine (SYNTHROID/LEVOTHROID) 175 MCG tablet Take 1 tablet (175 mcg) by mouth daily 30 minutes before breakfast  0      melatonin 3 MG tablet Take 2 tablets (6 mg) by mouth At Bedtime        metoprolol succinate ER (TOPROL-XL) 25 MG 24 hr tablet Take 25 mg by mouth daily        miconazole (MICATIN) 2 % AERP powder Apply topically 2 times daily as needed        mirtazapine (REMERON) 15 MG tablet Take 15 mg by mouth At Bedtime        Omega-3 Fatty Acids (OMEGA-3 FISH OIL PO) Take 1 g by mouth daily.          pantoprazole (PROTONIX) 40 MG EC tablet Take 40 mg by mouth daily        polyethylene glycol (MIRALAX) 17 g packet Take 17 g by mouth daily as needed for constipation        prazosin (MINIPRESS) 1 MG capsule Take 1 mg by mouth At Bedtime        QUEtiapine (SEROQUEL) 50 MG tablet Take 50 mg by mouth At Bedtime prn x 14 days. Update NP in 12 days with usage + effectiveness        Rosuvastatin Calcium (CRESTOR PO) Take 10 mg by mouth At Bedtime         sennosides (SENOKOT) 8.6 MG tablet Take 2 tablets by mouth At Bedtime        UNABLE  TO FIND Take 10 mEq by mouth daily MEDICATION NAME:  Potassium Chloride Tablet        vitamin D3 (CHOLECALCIFEROL) 50 mcg (2000 units) tablet Take 1 tablet (50 mcg) by mouth daily           Current Facility-Administered Medications   Medication     acetaminophen (TYLENOL) tablet 1,000 mg     acetaminophen (TYLENOL) tablet 650 mg     albuterol (PROAIR HFA/PROVENTIL HFA/VENTOLIN HFA) 108 (90 Base) MCG/ACT inhaler 2 puff     alum & mag hydroxide-simethicone (MAALOX  ES) suspension 30 mL     ARIPiprazole (ABILIFY) half-tab 5 mg     aspirin (ASA) chewable tablet 81 mg     bisacodyl (DULCOLAX) Suppository 10 mg     busPIRone (BUSPAR) tablet 10 mg     clopidogrel (PLAVIX) tablet 75 mg     divalproex sodium delayed-release (DEPAKOTE SPRINKLE) DR capsule 125 mg     escitalopram (LEXAPRO) tablet 20 mg     ferrous sulfate (FEROSUL) tablet 325 mg     fish oil-omega-3 fatty acids capsule 1 g     fluticasone-vilanterol (BREO ELLIPTA) 200-25 MCG/INH inhaler 1 puff     hydrOXYzine (ATARAX) tablet 25 mg     ketoconazole (NIZORAL) 2 % cream     levothyroxine (SYNTHROID/LEVOTHROID) tablet 175 mcg     magnesium hydroxide (MILK OF MAGNESIA) suspension 30 mL     melatonin tablet 6 mg     metoprolol succinate ER (TOPROL-XL) 24 hr tablet 25 mg     miconazole (MICATIN) 2 % powder     mirtazapine (REMERON) tablet 15 mg     pantoprazole (PROTONIX) EC tablet 40 mg     polyethylene glycol (MIRALAX) Packet 17 g     potassium chloride ER (MICRO-K) CR capsule 10 mEq     prazosin (MINIPRESS) capsule 1 mg     QUEtiapine (SEROquel) tablet 50 mg     rosuvastatin (CRESTOR) tablet 10 mg     sennosides (SENOKOT) tablet 2 tablet     traZODone (DESYREL) tablet 50 mg     Vitamin D3 (CHOLECALCIFEROL) tablet 50 mcg            Physical Exam:   Blood pressure 109/72, pulse 85, temperature 98.1  F (36.7  C), temperature source Oral, resp. rate 16, SpO2 98 %.  There is no height or weight on file to calculate BMI.  GENERAL: Alert and oriented x 3. NAD, elderly,  able to roll to side unassisted, cooperative  HEENT: + proptosis. Anicteric sclera. Mucous membranes moist. Pupils equal and round. EOMI. NC. AT.  CV: RRR. S1, S2. No murmurs appreciated.   RESPIRATORY: Effort normal on room air. Lungs CTAB with no wheezing, rales, rhonchi.   GI: Abdomen soft, non distended, non tender.   MUSCULOSKELETAL: No joint swelling or tenderness.  NEUROLOGICAL: No focal deficits. Moves all extremities.   EXTREMITIES: No peripheral edema. Intact bilateral pedal pulses.   SKIN: skin is dry with decreased turgor. No jaundice. No rashes.           Data:   CBC:  Recent Labs   Lab Test 09/02/20  1553   WBC 8.1   RBC 3.42*   HGB 10.4*   HCT 31.3*   MCV 92   MCH 30.4   MCHC 33.2   RDW 13.5          CMP:  Recent Labs   Lab Test 09/02/20  1553      POTASSIUM 4.1   CHLORIDE 109   ORESTES 11.9*   CO2 24   BUN 30   CR 0.97   GLC 98   AST 21   ALT 24   BILITOTAL 0.1*   ALBUMIN 2.5*   PROTTOTAL 6.1*   ALKPHOS 65       TSH:  TSH   Date Value Ref Range Status   09/02/2020 0.02 (L) 0.40 - 4.00 mU/L Final           Unresulted Labs Ordered in the Past 30 Days of this Admission     Date and Time Order Name Status Description    9/3/2020 0606 Vitamin D In process     9/3/2020 0427 Valproic acid In process     9/3/2020 0427 Folate In process     9/3/2020 0427 Vitamin B12 In process     9/3/2020 0427 TSH with free T4 reflex and/or T3 as indicated In process     9/2/2020 1721 SARS-CoV-2 COVID-19 Virus (Coronavirus) RT-PCR In process

## 2020-09-03 NOTE — PHARMACY-ADMISSION MEDICATION HISTORY
Admission Medication History Completed by Pharmacy    See [facility] Admission Navigator for allergy information, preferred outpatient pharmacy, prior to admission medications and immunization status.     Medication history sources:  [facility] medication list [phone]    Changes made to [facility] medication list (reason)  Added:   - vitamin B12 injection [id number]  - vitamin D 50,000 units qMonday  - hydroxyzine PRN  Deleted: N/A  Changed: N/A    Additional medication history information:   - Patient prescribed both vitamin D daily and weekly replacement dosing per [facility] MAR    Prior to Admission medications    Medication Sig Last Dose Taking? Auth Provider   albuterol (PROAIR HFA/PROVENTIL HFA/VENTOLIN HFA) 108 (90 Base) MCG/ACT inhaler Inhale 2 puffs into the lungs every 4 hours as needed for shortness of breath / dyspnea or wheezing  Yes Reported, Patient   ARIPiprazole (ABILIFY) 5 MG tablet Take 5 mg by mouth At Bedtime  Yes Reported, Patient   aspirin 81 MG EC tablet Take 81 mg by mouth daily  Yes Unknown, Entered By History   busPIRone (BUSPAR) 10 MG tablet Take 10 mg by mouth 2 times daily  Yes Reported, Patient   clopidogrel (PLAVIX) 75 MG tablet Take 75 mg by mouth daily   Yes Reported, Patient   cyanocobalamin (CYANOCOBALAMIN) 1000 MCG/ML injection Inject 1 mL into the muscle every 30 days  Yes Unknown, Entered By History   divalproex sodium delayed-release (DEPAKOTE SPRINKLE) 125 MG DR capsule Take 125 mg by mouth 3 times daily  Yes Reported, Patient   escitalopram (LEXAPRO) 20 MG tablet Take 20 mg by mouth daily  Yes Reported, Patient   ferrous sulfate (FEROSUL) 325 (65 Fe) MG tablet Take 325 mg by mouth daily (with breakfast)  Yes Reported, Patient   fluticasone-salmeterol (ADVAIR) 250-50 MCG/DOSE inhaler Inhale 1 puff into the lungs 2 times daily  Yes Reported, Patient   hydrOXYzine (ATARAX) 25 MG tablet Take 25 mg by mouth every 6 hours as needed for itching  Yes Unknown, Entered By History   ipratropium -  albuterol 0.5 mg/2.5 mg/3 mL (DUONEB) 0.5-2.5 (3) MG/3ML neb solution Take 1 vial by nebulization every 8 hours as needed for shortness of breath / dyspnea or wheezing  Yes Unknown, Entered By History   ketoconazole (NIZORAL) 2 % external cream Apply topically At Bedtime  Yes Reported, Patient   levothyroxine (SYNTHROID/LEVOTHROID) 175 MCG tablet Take 175 mcg by mouth daily  Yes Unknown, Entered By History   melatonin 3 MG tablet Take 6 mg by mouth At Bedtime  Yes Unknown, Entered By History   metoprolol succinate ER (TOPROL-XL) 25 MG 24 hr tablet Take 25 mg by mouth daily  Yes Reported, Patient   miconazole (MICATIN) 2 % AERP powder Apply topically 2 times daily as needed  Yes Reported, Patient   mirtazapine (REMERON) 15 MG tablet Take 15 mg by mouth At Bedtime  Yes Reported, Patient   Omega-3 Fatty Acids (OMEGA-3 FISH OIL) 1200 MG CAPS Take 1,200 mg by mouth daily   Yes Reported, Patient   pantoprazole (PROTONIX) 40 MG EC tablet Take 40 mg by mouth daily  Yes Reported, Patient   polyethylene glycol (MIRALAX) 17 g packet Take 17 g by mouth daily as needed for constipation  Yes Reported, Patient   potassium chloride ER (KLOR-CON M) 10 MEQ CR tablet Take 10 mEq by mouth daily  Yes Unknown, Entered By History   prazosin (MINIPRESS) 1 MG capsule Take 1 mg by mouth At Bedtime  Yes Reported, Patient   QUEtiapine (SEROQUEL) 50 MG tablet Take 50 mg by mouth nightly as needed   Yes Reported, Patient   rosuvastatin (CRESTOR) 10 MG tablet Take 10 mg by mouth At Bedtime   Yes Reported, Patient   sennosides (SENOKOT) 8.6 MG tablet Take 2 tablets by mouth At Bedtime  Yes Reported, Patient   vitamin D3 (CHOLECALCIFEROL) 1.25 MG (09468 UT) capsule Take 50,000 Units by mouth every 7 days On Monday  Yes Unknown, Entered By History   vitamin D3 (CHOLECALCIFEROL) 50 mcg (2000 units) tablet Take 1 tablet (50 mcg) by mouth daily  Yes Paola Pastor MD   acetaminophen (TYLENOL) 500 MG tablet Take 1,000 mg by mouth 2 times daily  Yes  Reported, Patient     Date completed: 09/03/20    Medication history completed by:   Naresh Flor PharmD  [facility]  Emergency Department: Ascom *[id number]
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
## 2020-09-03 NOTE — ED PROVIDER NOTES
I assumed patient's care at change of shift from Dr. Solares.  She is a 64-year-old female with long-term psychiatric disorders including PTSD witH agorphobia, panic disorder and anxiety with failure to thrive at the nursing home despite being on 5 psychotropic medications.  She had a deck evaluation and they recommended inpatient admission.  [facility] has accepted her pending staffing.  Hopefully she is able to be transferred later tonight.  She is currently resting quietly.    1:17 AM:   [facility] has a staffing and is able to accept her tonight.  She will go by BLS ambulance.  Transfer forms completed.     David Rust MD  09/03/20 0118

## 2020-09-03 NOTE — PLAN OF CARE
Discharge Planner PT   Patient plan for discharge: nursing home  Current status: Pt eval complete and treatment initiated. Supine on arrival, agreed to PT. Pt completed supine log roll to left and right with MaxA to don pants. Required encouragment and verbal cues for hand placement. Pt completed supine exercises, with moderate tolerance. AAROM for bilateral UE  Barriers to return to prior living situation: activity tolerance, medical, weakness, anxiety  Recommendations for discharge: TCU  Rationale for recommendations: Functional mobility improvement, increase strength and endurance       Entered by: Bradford Hinds 09/03/2020 11:55 AM
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
## 2020-09-03 NOTE — CONSULTS
"[facility] Nurse Inpatient Pressure Injury Assessment   Reason for consultation: Evaluate and treat R ear, R ankle, R foot, High risk coccyx      ASSESSMENT  Pressure Injury: on R ear , present on admission ,   Pressure Injury is Stage 2   Contributing factor of the pressure injury: pressure and immobility  Status: initial assessment     Pressure Injury: on R lateral malleolus , present on admission ,   Pressure Injury is Stage either Stage 2 or 3 deferring definitive staging until wound base has evolved   Contributing factor of the pressure injury: pressure and immobility  Status: initial assessment     Pressure Injury: on R 5th lateral toe , present on admission ,   Pressure Injury is Stage either Stage 2 or 3 deferring definitive staging until wound base has evolved   Contributing factor of the pressure injury: pressure and immobility  Status: initial assessment     Coccyx intact with dark pink blanchable erythema    Recommend provider order: pulsate mattress for pressure reduction     TREATMENT PLAN  R ear wound: Every 3 days   Cleanse ear with soap and water.    Apply 3x3 mepilex (order # [id number]) to edge of ear for protection    R ankle and 5th lateral toe: daily  Cleanse with soap and water.    Continue off loading boot at all times while in bed.  OK to apply mepilex for comfort, changing every three days      Pressure Injury Prevention (PIP) Plan:  If patient is declining pressure injury prevention interventions: Explore reason why and address patient's concerns, Educate on pressure injury risk and prevention intervention(s), If patient is still declining, document \"informed refusal\"  and Ensure Care team is aware ( provider, charge nurse, etc)  Mattress: Follow bed algorithm, reassess daily and order specialty mattress, if indicated. Pulsate mattress if MD agrees.  NO green pads.  Use only one covidian pad under pt, OK for draw sheet to help move pt in bed.  No egg crate mattress  HOB: Maintain at or below 30 " "degrees, unless contraindicated  Repositioning in bed: Every 1-2 hours , Left/right positioning; avoid supine and Raise foot of bed prior to raising head of bed, to reduce patient sliding down (shear)  Heels: Keep elevated off mattress, Pillows under calves and Heel lift boots  Protective Dressing: Sacral Mepilex for prevention (#826923)  Positioning Equipment: pillows  Chair positioning: Chair cushion (#081470)  and Assist patient to reposition hourly   If patient has a buttock pressure injury, or high risk for PI use chair cushion or SPS.  Moisture Management: Perineal cleansing /protection: Follow Incontinence Protocol, Clean and dry skin folds with bathing  and Consider InterDry (#626636) between folds      Skin folds with erythema:    Cleanse the area and dry thoroughly.    Cut a piece of interdry (#888099) and place it as a single layer and date it.     Ensure to leave at least 2\" of tail beyond the fold to promote moisture wicking.    May leave same interdry for 5 days if not soiled.    DO not use this product with any other creams or powder.    Orders Written  WOC Nurse follow-up plan:weekly  Nursing to notify the Provider(s) and re-consult the WOC Nurse if wound(s) deteriorates or new skin concern.    Patient History  According to provider note(s):  Letty Escobar is a 64 year old year old woman with a history of Bivetricular ICD, CAD, CHF, COPD, DMII, PMR, carotid artery stenosis, 'HTN, depression who is admitted to station 3B with FTT.     Objective Data  Containment of urine/stool: Incontinence Protocol    Current Diet/ Nutrition:  Orders Placed This Encounter      Adult Pureed Diet      Output:   No intake/output data recorded.    Risk Assessment:   Sensory Perception: 3-->slightly limited  Moisture: 3-->occasionally moist  Activity: 3-->walks occasionally  Mobility: 2-->very limited  Nutrition: 1-->very poor  Friction and Shear: 1-->problem  Hawk Score: 13      Labs:   Recent Labs   Lab " 20  1553   ALBUMIN 2.5*   HGB 10.4*   WBC 8.1       Physical Exam  Skin inspection: focused R ear, R ankle, R toe, perinal skin    Wound Location:  R ear      Date of last Photo 9/3/2020  Wound History: Pt endorses she prefers to lay on her right side.  Wounds present on admit and pt has poor mobility.    Measurements (length x width x depth, in cm) 0.3 x 0.1 x <0.1 cm   Wound Base:  Cream colored base  Palpation of the wound bed: normal   Periwound skin: intact, edematous and erythema- blanchable  Color: pink  Temperature: normal   Drainage:, scant  Description of drainage: serous  Odor: none  Pain: mild and during dressing change, tender       Wound Location:  R lateral malleolus and 5th lateral toe            Date of last Photo 9/3/2020  Wound History: Pt endorses she prefers to lay on her right side.  Wounds present on admit and pt has poor mobility.    Measurements (length x width x depth, in cm)  L lateral malleolus: 1 x 1 x 0.1 cm dried drainage  L 5th lateral toe: 1 x 0.8 x 0 cm dried drainage  Wound Base: see above  Palpation of the wound bed: normal   Periwound skin: intact  Color: normal and consistent with surrounding tissue  Temperature: normal   Drainage:, none  Description of drainage: dried  Odor: none  Pain: denies , none    Buttocks: assessed buttocks, sacrum, coccyx, perineal skin and akash cleft.  Noted dark pink intact blanchable erythema with little fat pad.  Per chart review recent significant weight loss.  Pt moves poorly and only with strong motivation from staff.  Pt does endorse discomfort on sacral area but it remains intact and blanchable.  Will order preventative mepilex.      Interventions  Current support surface: Standard  Atmos Air mattress  Current off-loading measures: Heel off-loading boot(s) and Pillows  Repositioning aid: Pillows  Visual inspection of wound(s) completed   Tube Securement: n/a  Wound Care: was done per plan of care.  Supplies: ordered: sacral and 3x3  mepilex and discussed with RN  Educated provided: importance of repositioning, plan of care, wound progress and Infection prevention   Education provided to: patient   Discussed importance of:repositioning every 2 hours, off-loading pressure to wound, wearing off-loading boots, their role in pressure injury prevention, dressing change frequency, head elevation <30 degrees, off-loading mattress, nutrition on wound healing and moisture management  Discussed plan of care with Patient and Nurse    Willa Foss RN

## 2020-09-03 NOTE — ED NOTES
Francia from [facility] called; room available on 3B. Nurse to Nurse line [phone]. Accepting Provider Dr Guevara. Call for report after midnight.

## 2020-09-03 NOTE — PLAN OF CARE
Problem: General Plan of Care (Inpatient Behavioral)  Goal: Team Discussion  Description: Team Plan:  Outcome: No Change  Note: BEHAVIORAL TEAM DISCUSSION    Participants: Gela BOATENG DNP, Maame Mueller RN, Mouna SANTIAGO  Progress: New admit  Anticipated length of stay: 5-7 days  Continued Stay Criteria/Rationale: Failure to thrive  Medical/Physical: COPD ;Coronary artery disease ;CHF; Polymyalgia rheumatic; Internal carotid artery stenosis; HTN; DM   Precautions:   Behavioral Orders   Procedures     Code 1 - Restrict to Unit     Fall precautions     Routine Programming     As clinically indicated     Single Room     Status 15     Every 15 minutes.     Plan: Psychiatric Assessment. Medication Management. Therapeutic Mileu. Individual and group support.   Rationale for change in precautions or plan: Initial plan

## 2020-09-04 LAB — INTERPRETATION ECG - MUSE: NORMAL

## 2020-09-04 PROCEDURE — 25000132 ZZH RX MED GY IP 250 OP 250 PS 637: Mod: GY | Performed by: PHYSICIAN ASSISTANT

## 2020-09-04 PROCEDURE — 12400002 ZZH R&B MH SENIOR/ADOLESCENT

## 2020-09-04 PROCEDURE — 25000132 ZZH RX MED GY IP 250 OP 250 PS 637: Mod: GY | Performed by: NURSE PRACTITIONER

## 2020-09-04 PROCEDURE — 25000132 ZZH RX MED GY IP 250 OP 250 PS 637: Mod: GY | Performed by: PSYCHIATRY & NEUROLOGY

## 2020-09-04 PROCEDURE — 99233 SBSQ HOSP IP/OBS HIGH 50: CPT | Performed by: NURSE PRACTITIONER

## 2020-09-04 RX ORDER — DULOXETIN HYDROCHLORIDE 30 MG/1
30 CAPSULE, DELAYED RELEASE ORAL DAILY
Status: DISCONTINUED | OUTPATIENT
Start: 2020-09-05 | End: 2020-09-08

## 2020-09-04 RX ORDER — LEVOTHYROXINE SODIUM 100 UG/1
100 TABLET ORAL
Status: DISCONTINUED | OUTPATIENT
Start: 2020-09-05 | End: 2020-09-15 | Stop reason: HOSPADM

## 2020-09-04 RX ORDER — LEVOTHYROXINE SODIUM 100 UG/1
100 TABLET ORAL
Qty: 30 TABLET | Refills: 0 | Status: CANCELLED | OUTPATIENT
Start: 2020-09-05

## 2020-09-04 RX ORDER — DIVALPROEX SODIUM 125 MG/1
250 CAPSULE, COATED PELLETS ORAL AT BEDTIME
Status: DISCONTINUED | OUTPATIENT
Start: 2020-09-04 | End: 2020-09-15 | Stop reason: HOSPADM

## 2020-09-04 RX ORDER — LORAZEPAM 0.5 MG/1
0.5 TABLET ORAL ONCE
Status: COMPLETED | OUTPATIENT
Start: 2020-09-04 | End: 2020-09-04

## 2020-09-04 RX ADMIN — METOPROLOL SUCCINATE 25 MG: 25 TABLET, EXTENDED RELEASE ORAL at 08:00

## 2020-09-04 RX ADMIN — SENNOSIDES 2 TABLET: 8.6 TABLET, FILM COATED ORAL at 17:47

## 2020-09-04 RX ADMIN — CLOPIDOGREL BISULFATE 75 MG: 75 TABLET, FILM COATED ORAL at 08:00

## 2020-09-04 RX ADMIN — ASPIRIN 81 MG CHEWABLE TABLET 81 MG: 81 TABLET CHEWABLE at 08:00

## 2020-09-04 RX ADMIN — ROSUVASTATIN CALCIUM 10 MG: 10 TABLET, FILM COATED ORAL at 17:47

## 2020-09-04 RX ADMIN — Medication 50 MCG: at 08:00

## 2020-09-04 RX ADMIN — POTASSIUM CHLORIDE 10 MEQ: 750 CAPSULE, EXTENDED RELEASE ORAL at 08:00

## 2020-09-04 RX ADMIN — DIVALPROEX SODIUM 250 MG: 125 CAPSULE, COATED PELLETS ORAL at 19:14

## 2020-09-04 RX ADMIN — MELATONIN TAB 3 MG 6 MG: 3 TAB at 19:14

## 2020-09-04 RX ADMIN — QUETIAPINE FUMARATE 50 MG: 50 TABLET ORAL at 19:37

## 2020-09-04 RX ADMIN — FLUTICASONE FUROATE AND VILANTEROL TRIFENATATE 1 PUFF: 200; 25 POWDER RESPIRATORY (INHALATION) at 08:00

## 2020-09-04 RX ADMIN — Medication 1 G: at 08:00

## 2020-09-04 RX ADMIN — LORAZEPAM 0.5 MG: 0.5 TABLET ORAL at 09:54

## 2020-09-04 RX ADMIN — FERROUS SULFATE TAB 325 MG (65 MG ELEMENTAL FE) 325 MG: 325 (65 FE) TAB at 08:00

## 2020-09-04 RX ADMIN — DIVALPROEX SODIUM 125 MG: 125 CAPSULE, COATED PELLETS ORAL at 08:00

## 2020-09-04 RX ADMIN — ACETAMINOPHEN 1000 MG: 500 TABLET, FILM COATED ORAL at 19:05

## 2020-09-04 RX ADMIN — MIRTAZAPINE 30 MG: 30 TABLET, FILM COATED ORAL at 19:37

## 2020-09-04 RX ADMIN — KETOCONAZOLE: 20 CREAM TOPICAL at 19:08

## 2020-09-04 RX ADMIN — PANTOPRAZOLE SODIUM 40 MG: 40 TABLET, DELAYED RELEASE ORAL at 08:00

## 2020-09-04 RX ADMIN — ACETAMINOPHEN 1000 MG: 500 TABLET, FILM COATED ORAL at 08:00

## 2020-09-04 RX ADMIN — MENTHOL 1 PATCH: 205.5 PATCH TOPICAL at 22:10

## 2020-09-04 ASSESSMENT — ACTIVITIES OF DAILY LIVING (ADL)
DRESS: WITH ASSISTANCE
ORAL_HYGIENE: WITH ASSISTANCE
HYGIENE/GROOMING: WITH ASSISTANCE
HYGIENE/GROOMING: WITH ASSISTANCE
DRESS: WITH ASSISTANCE

## 2020-09-04 NOTE — PROGRESS NOTES
"Patient denies SI and SIB, depression and rates anxiety a 10. Patient endorses anxiety is from \"being scared of everything\". Patient denies AH and VH. Her affect is flat but brightens upon approach. Her mood is calm. She was encouraged to get up for meals and medications. She refused at first but then complied. She took her medication with pudding and water. She was encouraged to be independent with eating, ADL's and mobility which she did to the best of her ability. She endorses feeling hopeful and safe. She states her sleep is \"not good\" and appetite is \"better\". She ate 50% of her dinner with 280 ml fluid intake. She did not attend groups but states she will tomorrow. She was encouraged to take a shower but was \"scared\". This writer showed her the showers and explained use of shower chair and she agreed to take a shower tomorrow. She sat in the lounge for 2 hours. She is assist of one with gait belt and walker. Cushion was placed in wheel chair when sitting. Mepilex dressing was applied to right ear, buttocks, right ankle and right toe. Boot placed to right foot. Heels off loaded using pillows. Pulsating mattress ordered and applied to bed. Patient turned and repositioned every 2 hours from right to left. Lotion and intrerdry applied under bilateral breasts. She is medication compliant. Good oral care provided. Incontinent of urine x2. Barrier cream applied to coccyx area after each incontinence.  "

## 2020-09-04 NOTE — PROGRESS NOTES
"Bed bath completed, pt able to independently wash face. Elizabeth cares completed, barrier cream applied. Under both breast is redness, left worst than right, area cleaned and dried, interdry in place, Lotion applied top t arms, legs and back. Fluids encouraged. Pt up in wheel chair with assist of 2 with use of gait belt. Pt requesting to shower today. Pt verbalized she slept \"like a rock\" last night. Pt needed encouragement as she if \"scared to fall\", pt reassured staff is here to help her.   "

## 2020-09-04 NOTE — PROGRESS NOTES
CLINICAL NUTRITION SERVICES - BRIEF NOTE     Nutrition Prescription      Recommendations already ordered by Registered Dietitian (RD):  Discontinue Gelatein supplement as pt is refusing     Future/Additional Recommendations:  Follow up with weekend calorie count on 9/8     NEW FINDINGS   RD noted calorie count ordered by Provider to start tomorrow (9/5). RD tubed calorie count folder to unit and reviewed plan for calorie count with bedside RN, RD will plan to follow up on calorie count over the weekend on Tuesday(9/8), RN agreeing with plan. RN reports pt disliked the Gelatein supplement and will not take, RD discussed other offer of Breeze, pt will not drink juice so will not likely accept this supplement, RD reviewed with RN previous days RD visit, pt refused all other supplements available. Will plan to just discontinue order for Gelatein as pt will not accept, and monitor meal intakes, RN agreeing.      INTERVENTIONS  Implementation  Calorie count     Monitoring/Evaluation  Will continue to monitor and evaluate per protocol.    Ciarra Cifuentes RD, LD  [facility] RD Pager: [phone]

## 2020-09-04 NOTE — PROGRESS NOTES
Brief Medicine Note    Followed up to ensure     Today's vital signs, medications, and nursing notes were reviewed. Labs reviewed.     /79   Pulse 75   Temp 97.4  F (36.3  C) (Oral)   Resp 16   Ht 1.524 m (5')   SpO2 99%   BMI 23.63 kg/m      A/P:  Iatrogenic hyperthyroidism, history of hypothyroidism  -Levothyroxine held x 2 days (9/3 and 9/4) and former 175mcg dose stopped.    -Restart levothyroxine at lower dose of 100mcg on 9/5   -repeat TSH in 2 weeks and again in 4weeks, outpatient if discharged  -saved orders in discharge for levothyroxine and follow up    Medicine will sign off at this time, please call with questions or concerns    Shannon Thornton PA-C  Hospitalist Service  Pager: [phone]

## 2020-09-04 NOTE — PROGRESS NOTES
"[facility], [place]   Psychiatric Progress Note        Interim History:   From H&P: Letty Escobar is a 64 year old female with longstanding mental health disease including personality disorder, [place] phobia, panic disorder and PTSD.  She is currently on  multiple chronic psychiatric medications including Abilify, BuSpar, Celexa, melatonin and Remeron but still failing at the local nursing home.  They  describe a situation and basically failure to thrive.  She is not eating or walking.  She is scared to do these things due to paranoia.  She denies suicidal homicidal thoughts.  She has lost 29 pounds they report.  No fever.  No cough.  No infectious disease symptoms.  She does have a couple areas of minor skin breakdown due to pressure.      Team meeting report: The patient's care was discussed with the treatment team during the daily team meeting and/or staff's chart notes were reviewed.  Staff report patient has been calm but reports feelings of anxiety.  Patient states that she is \"scared of everything\".  Patient has not yet attended groups.   Patient is eating adequately and is taking medications as prescribed. Rates mood as anxious, but denies feelings of depression. Slept 9 hours.     Met with patient. Met with patient in her room and required to be awoken for the interview.  Patient reports that she \"slept like a rock\" through the night.  The patient was still drowsy during the interview, and needed to be prompted to wake up during several of the questions. Patient reported that she \"did not eat much\" for breakfast, but could not describe what or how much she ate.  Patient reports difficulties with concentration and memory, and could not recall what [place] is.  The patient was agreeable to try and attend groups today, as well as getting out of bed and walking around. It is possible the patient is in a catatonic-like state, and will be prescribed lorazepam to attempt to " alleviate the negative symptoms.  Abilify and day doses of Depakote will be removed from her scheduled medications to attempt to boost energy levels. Will start Cymbalta. One time Lorazepam to rule out catatonia was not beneficial.                 Medications:       acetaminophen  1,000 mg Oral BID     aspirin  81 mg Oral Daily     clopidogrel  75 mg Oral Daily     divalproex sodium delayed-release  125 mg Oral TID     ferrous sulfate  325 mg Oral Daily with breakfast     fish oil-omega-3 fatty acids  1 g Oral Daily     fluticasone-vilanterol  1 puff Inhalation Daily     ketoconazole   Topical At Bedtime     [START ON 9/5/2020] levothyroxine  100 mcg Oral QAM AC     melatonin  6 mg Oral At Bedtime     menthol   Transdermal Q8H     metoprolol succinate ER  25 mg Oral Daily     mirtazapine  30 mg Oral At Bedtime     pantoprazole  40 mg Oral Daily     potassium chloride ER  10 mEq Oral Daily     QUEtiapine  50 mg Oral At Bedtime     rosuvastatin  10 mg Oral At Bedtime     sennosides  2 tablet Oral At Bedtime     vitamin D3  50 mcg Oral Daily          Allergies:     Allergies   Allergen Reactions     Contrast Dye      Isosorbide Mononitrate [Isosorbide]      Nitroglycerin      Nystatin      Sulfa Drugs      Adhesive Tape Rash     Diphenhydramine Hcl Palpitations     Penicillins Rash          Labs:     Recent Results (from the past 672 hour(s))   Potassium    Collection Time: 08/10/20  7:35 AM   Result Value Ref Range    Potassium 4.1 3.4 - 5.3 mmol/L   Basic metabolic panel    Collection Time: 08/17/20  7:38 AM   Result Value Ref Range    Sodium 139 133 - 144 mmol/L    Potassium 4.3 3.4 - 5.3 mmol/L    Chloride 108 94 - 109 mmol/L    Carbon Dioxide 28 20 - 32 mmol/L    Anion Gap 3 3 - 14 mmol/L    Glucose 74 70 - 99 mg/dL    Urea Nitrogen 19 7 - 30 mg/dL    Creatinine 0.82 0.52 - 1.04 mg/dL    GFR Estimate 75 >60 mL/min/[1.73_m2]    GFR Estimate If Black 87 >60 mL/min/[1.73_m2]    Calcium 10.6 (H) 8.5 - 10.1 mg/dL   T4  free    Collection Time: 08/17/20  7:38 AM   Result Value Ref Range    T4 Free 1.88 (H) 0.76 - 1.46 ng/dL   TSH    Collection Time: 08/17/20  7:38 AM   Result Value Ref Range    TSH 0.01 (L) 0.40 - 4.00 mU/L   CBC with platelets differential    Collection Time: 08/28/20  8:11 AM   Result Value Ref Range    WBC 6.8 4.0 - 11.0 10e9/L    RBC Count 3.33 (L) 3.8 - 5.2 10e12/L    Hemoglobin 10.1 (L) 11.7 - 15.7 g/dL    Hematocrit 31.0 (L) 35.0 - 47.0 %    MCV 93 78 - 100 fl    MCH 30.3 26.5 - 33.0 pg    MCHC 32.6 31.5 - 36.5 g/dL    RDW 13.8 10.0 - 15.0 %    Platelet Count 148 (L) 150 - 450 10e9/L    Diff Method Automated Method     % Neutrophils 56.7 %    % Lymphocytes 34.2 %    % Monocytes 6.7 %    % Eosinophils 2.3 %    % Basophils 0.1 %    Absolute Neutrophil 3.9 1.6 - 8.3 10e9/L    Absolute Lymphocytes 2.3 0.8 - 5.3 10e9/L    Absolute Monocytes 0.5 0.0 - 1.3 10e9/L    Absolute Eosinophils 0.2 0.0 - 0.7 10e9/L    Absolute Basophils 0.0 0.0 - 0.2 10e9/L   Comprehensive metabolic panel    Collection Time: 08/28/20  8:11 AM   Result Value Ref Range    Sodium 139 133 - 144 mmol/L    Potassium 4.1 3.4 - 5.3 mmol/L    Chloride 109 94 - 109 mmol/L    Carbon Dioxide 24 20 - 32 mmol/L    Anion Gap 6 3 - 14 mmol/L    Glucose 78 70 - 99 mg/dL    Urea Nitrogen 30 7 - 30 mg/dL    Creatinine 0.97 0.52 - 1.04 mg/dL    GFR Estimate 61 >60 mL/min/[1.73_m2]    GFR Estimate If Black 70 >60 mL/min/[1.73_m2]    Calcium 12.2 (H) 8.5 - 10.1 mg/dL    Bilirubin Total 0.3 0.2 - 1.3 mg/dL    Albumin 2.4 (L) 3.4 - 5.0 g/dL    Protein Total 5.8 (L) 6.8 - 8.8 g/dL    Alkaline Phosphatase 56 40 - 150 U/L    ALT 18 0 - 50 U/L    AST 13 0 - 45 U/L   T4 free    Collection Time: 08/28/20  8:11 AM   Result Value Ref Range    T4 Free 1.68 (H) 0.76 - 1.46 ng/dL   TSH    Collection Time: 08/28/20  8:11 AM   Result Value Ref Range    TSH 0.01 (L) 0.40 - 4.00 mU/L   CBC with platelets differential    Collection Time: 09/02/20  3:53 PM   Result Value Ref  Range    WBC 8.1 4.0 - 11.0 10e9/L    RBC Count 3.42 (L) 3.8 - 5.2 10e12/L    Hemoglobin 10.4 (L) 11.7 - 15.7 g/dL    Hematocrit 31.3 (L) 35.0 - 47.0 %    MCV 92 78 - 100 fl    MCH 30.4 26.5 - 33.0 pg    MCHC 33.2 31.5 - 36.5 g/dL    RDW 13.5 10.0 - 15.0 %    Platelet Count 188 150 - 450 10e9/L    Diff Method Automated Method     % Neutrophils 55.4 %    % Lymphocytes 34.0 %    % Monocytes 7.4 %    % Eosinophils 1.8 %    % Basophils 0.4 %    % Immature Granulocytes 1.0 %    Nucleated RBCs 0 0 /100    Absolute Neutrophil 4.5 1.6 - 8.3 10e9/L    Absolute Lymphocytes 2.8 0.8 - 5.3 10e9/L    Absolute Monocytes 0.6 0.0 - 1.3 10e9/L    Absolute Basophils 0.0 0.0 - 0.2 10e9/L    Abs Immature Granulocytes 0.1 0 - 0.4 10e9/L    Absolute Nucleated RBC 0.0    Comprehensive metabolic panel    Collection Time: 09/02/20  3:53 PM   Result Value Ref Range    Sodium 139 133 - 144 mmol/L    Potassium 4.1 3.4 - 5.3 mmol/L    Chloride 109 94 - 109 mmol/L    Carbon Dioxide 24 20 - 32 mmol/L    Anion Gap 6 3 - 14 mmol/L    Glucose 98 70 - 99 mg/dL    Urea Nitrogen 30 7 - 30 mg/dL    Creatinine 0.97 0.52 - 1.04 mg/dL    GFR Estimate 61 >60 mL/min/[1.73_m2]    GFR Estimate If Black 70 >60 mL/min/[1.73_m2]    Calcium 11.9 (H) 8.5 - 10.1 mg/dL    Bilirubin Total 0.1 (L) 0.2 - 1.3 mg/dL    Albumin 2.5 (L) 3.4 - 5.0 g/dL    Protein Total 6.1 (L) 6.8 - 8.8 g/dL    Alkaline Phosphatase 65 40 - 150 U/L    ALT 24 0 - 50 U/L    AST 21 0 - 45 U/L   TSH    Collection Time: 09/02/20  3:53 PM   Result Value Ref Range    TSH 0.02 (L) 0.40 - 4.00 mU/L   Asymptomatic COVID-19 Virus (Coronavirus) by PCR    Collection Time: 09/02/20  5:21 PM    Specimen: Nasopharyngeal   Result Value Ref Range    COVID-19 Virus PCR to [facility] - Source Nasopharyngeal     COVID-19 Virus PCR to [facility] - Result       Test received-See reflex to IDDL test SARS CoV2 (COVID-19) Virus RT-PCR   SARS-CoV-2 COVID-19 Virus (Coronavirus) RT-PCR Nasopharyngeal    Collection Time:  09/02/20  5:21 PM    Specimen: Nasopharyngeal   Result Value Ref Range    SARS-CoV-2 Virus Specimen Source Nasopharyngeal     SARS-CoV-2 PCR Result NEGATIVE     SARS-CoV-2 PCR Comment       Testing was performed using the Aptima SARS-CoV-2 Assay on the [employer] Instrument System.   Additional information about this Emergency Use Authorization (EUA) assay can be found via   the Lab Guide.     TSH with free T4 reflex and/or T3 as indicated    Collection Time: 09/03/20  7:52 AM   Result Value Ref Range    TSH 0.01 (L) 0.40 - 4.00 mU/L   Vitamin B12    Collection Time: 09/03/20  7:52 AM   Result Value Ref Range    Vitamin B12 848 193 - 986 pg/mL   Folate    Collection Time: 09/03/20  7:52 AM   Result Value Ref Range    Folate 4.3 (L) >5.4 ng/mL   Valproic acid    Collection Time: 09/03/20  7:52 AM   Result Value Ref Range    Valproic Acid Level <3 (L) 50 - 100 mg/L   Vitamin D    Collection Time: 09/03/20  7:52 AM   Result Value Ref Range    Vitamin D Deficiency screening 41 20 - 75 ug/L   T4 free    Collection Time: 09/03/20  7:52 AM   Result Value Ref Range    T4 Free 1.70 (H) 0.76 - 1.46 ng/dL   EKG 12-lead, complete    Collection Time: 09/03/20  8:50 AM   Result Value Ref Range    Interpretation ECG Click View Image link to view waveform and result             Psychiatric Examination:   Temp: 96.3  F (35.7  C) Temp src: Tympanic BP: 138/86 Pulse: 74   Resp: 16 SpO2: 92 % O2 Device: None (Room air)    Weight is 0 lbs 0 oz  Body mass index is 23.63 kg/m .    Appearance: awake, alert, cooperative and slightly unkempt  Attitude:  cooperative  Eye Contact:  fair  Mood:  depressed  Affect:  mood congruent and intensity is flat  Speech:  clear, coherent  Psychomotor Behavior:  no evidence of tardive dyskinesia, dystonia, or tics  Throught Process:  logical  Associations:  no loose associations  Thought Content:  no evidence of suicidal ideation or homicidal ideation and no evidence of psychotic thought  Insight:   fair  Judgement:  intact  Oriented to:  time, person, and place  Attention Span and Concentration:  fair  Recent and Remote Memory:  fair         Precautions:     Behavioral Orders   Procedures     Code 1 - Restrict to Unit     Fall precautions     Routine Programming     As clinically indicated     Single Room     Status 15     Every 15 minutes.          DIagnoses:     1.  Major depressive disorder, recurrent, severe, without psychosis  2.  Generalized anxiety disorder  3.  Panic disorder, per patient  4.  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  PTSD per history  6.  Medical problems include the following: Hypothyroidism, low back pain, pressure ulcers, cardiomyopathy, COPD, ELI, type 2 diabetes, CKD, stage IV, chronic iron deficiency anemia, history of TIA, hypertension, PMR.       Plan:   Patient is a 63 y/o female who was admitted with failure to thrive.  Patient has been able to eat, and will be encouraged to walk around on the unit and attend groups.          Medication changes will include the following:   --Change Depakote to 250 mg, at bedtime  --Continue Seroquel 50 mg, at bedtime  --Increase Remeron to 30 mg, on admission  --Continue Melatonin 6 mg, qhs  --Discontinue Lexapro due to abnormal EKG.  Discontinue BuSpar, does not seem to be helpful.  Discontinue prazosin, denies nightmares and flashbacks. Discontinue Abilify    --One time  0.5mg  lorazepam to r/o catatonia. No change in patient presentation.         Disposition Plan   Reason for ongoing admission: is unable to care for self due to failure to thrive, depression  Disposition: TBD  Estimated length of stay: 7-10 days  Legal Status: voluntary   Discharge will be granted once symptoms improved.    Patient history was taken by student.  Note was prepared by student Quentin MATA-S3.  Student was under direct supervision by Gela Woods DNP.    Date: 09/04/20  Time: 11:47 AM     More than 30 minutes were spent for assessment,  documentation, and coordination of care.       This note was created with the help of Dragon dictation system. All grammatical/typing errors or context distortion are unintentional and inherent to software.

## 2020-09-05 PROCEDURE — 25000132 ZZH RX MED GY IP 250 OP 250 PS 637: Mod: GY | Performed by: PHYSICIAN ASSISTANT

## 2020-09-05 PROCEDURE — 25000132 ZZH RX MED GY IP 250 OP 250 PS 637: Mod: GY | Performed by: PSYCHIATRY & NEUROLOGY

## 2020-09-05 PROCEDURE — 25000132 ZZH RX MED GY IP 250 OP 250 PS 637: Mod: GY | Performed by: NURSE PRACTITIONER

## 2020-09-05 PROCEDURE — 12400002 ZZH R&B MH SENIOR/ADOLESCENT

## 2020-09-05 RX ADMIN — PANTOPRAZOLE SODIUM 40 MG: 40 TABLET, DELAYED RELEASE ORAL at 08:53

## 2020-09-05 RX ADMIN — Medication 50 MCG: at 08:53

## 2020-09-05 RX ADMIN — LEVOTHYROXINE SODIUM 100 MCG: 100 TABLET ORAL at 08:53

## 2020-09-05 RX ADMIN — QUETIAPINE FUMARATE 50 MG: 50 TABLET ORAL at 20:14

## 2020-09-05 RX ADMIN — DIVALPROEX SODIUM 250 MG: 125 CAPSULE, COATED PELLETS ORAL at 20:18

## 2020-09-05 RX ADMIN — ROSUVASTATIN CALCIUM 10 MG: 10 TABLET, FILM COATED ORAL at 17:53

## 2020-09-05 RX ADMIN — SENNOSIDES 2 TABLET: 8.6 TABLET, FILM COATED ORAL at 17:52

## 2020-09-05 RX ADMIN — DULOXETINE HYDROCHLORIDE 30 MG: 30 CAPSULE, DELAYED RELEASE ORAL at 08:53

## 2020-09-05 RX ADMIN — Medication 1 G: at 08:53

## 2020-09-05 RX ADMIN — KETOCONAZOLE: 20 CREAM TOPICAL at 20:15

## 2020-09-05 RX ADMIN — CLOPIDOGREL BISULFATE 75 MG: 75 TABLET, FILM COATED ORAL at 08:53

## 2020-09-05 RX ADMIN — MIRTAZAPINE 30 MG: 30 TABLET, FILM COATED ORAL at 20:14

## 2020-09-05 RX ADMIN — ACETAMINOPHEN 1000 MG: 500 TABLET, FILM COATED ORAL at 20:14

## 2020-09-05 RX ADMIN — FERROUS SULFATE TAB 325 MG (65 MG ELEMENTAL FE) 325 MG: 325 (65 FE) TAB at 08:53

## 2020-09-05 RX ADMIN — POTASSIUM CHLORIDE 10 MEQ: 750 CAPSULE, EXTENDED RELEASE ORAL at 08:54

## 2020-09-05 RX ADMIN — METOPROLOL SUCCINATE 25 MG: 25 TABLET, EXTENDED RELEASE ORAL at 08:53

## 2020-09-05 RX ADMIN — ACETAMINOPHEN 1000 MG: 500 TABLET, FILM COATED ORAL at 08:53

## 2020-09-05 RX ADMIN — FLUTICASONE FUROATE AND VILANTEROL TRIFENATATE 1 PUFF: 200; 25 POWDER RESPIRATORY (INHALATION) at 08:54

## 2020-09-05 RX ADMIN — MELATONIN TAB 3 MG 6 MG: 3 TAB at 20:13

## 2020-09-05 RX ADMIN — ASPIRIN 81 MG CHEWABLE TABLET 81 MG: 81 TABLET CHEWABLE at 08:53

## 2020-09-05 NOTE — PROGRESS NOTES
Patient repositioned q 2hrs per care plan.  No episodes of incontinence. Patient has been sleeping comfortably throughout the night.  Slept for 7 hrs. Patient requested that all her morning meds be given at 0800.  Icy/hot patch and levothyroxine to be administered at 0800 per patient request.

## 2020-09-05 NOTE — PROVIDER NOTIFICATION
09/05/20 1549   Pressure Injury 09/03/20 Right Foot pressure sores on the right ear, right small toe, right outer ankle   Date First Assessed/Time First Assessed: 09/03/20 0330   Orientation: Right  Location: Foot  Description: pressure sores on the right ear, right small toe, right outer ankle   Drainage Amount Scant   Drainage Color/Characteristics Serous   Wound Care/Cleansing Soap and water   Dressing Foam   Dressing Status Changed     Right ear, right foot-5th digit and ankle, along with skin under bilateral breast cleaned with soap and water.  New mepilex applied to right ear and right ankle and toe.   Interdry applied under bilateral breasts. Area under left breast slightly more red and tender than right breast.   Right ear reddened with small opening on anterior side.    After cares, patient was turned and repositioned to left side and boot placed again on right foot and offloaded with pillows.    When dressing removed scant amount of serous drainage noted on foot dressings.  Patient denies pain except for small open area on right ear.    Will continue to monitor closely.

## 2020-09-05 NOTE — PLAN OF CARE
Slept 6.5 hrs last night, napped 2.5 hrs during the day.  Patient states she is very tired and basically refuses activity.  Denies SI or depression and rates her anxiety at 10/10.  This writer did get her up and patient sat in the lounge for 2.5 hrs, requesting numerous times to go back to bed.  Requested HS meds at 1800 and this writer was able to hold off on the meds until  1930 and 1945.  Patient very unsteady on her feet and very weak.  Ate 100% of her dinner which included roast beef, mashed potatoes and gravy and ice cream.  Needs much encouragement to drink fluids.  Incontinent of urine.  P:  continue same plan of care.

## 2020-09-05 NOTE — PLAN OF CARE
Pt was able to stay up until 2 hours after breakfast and then wanted to lay down to rest. Writer was able to transfer pt with reassurance and direction easily. Pt was fearful of falling but did follow direction well. Appetite good ate 100% of her breakfast and 85% of her lunch. Intake 650 ml and voided X1. Pt stated that her anxiety was high due to being fearful Pt rated her depression at 6/10. Pt napped for 3 hours. Pt was able to help reposition herself when encouraged. RN flyer here to do wound care.

## 2020-09-06 ENCOUNTER — APPOINTMENT (OUTPATIENT)
Dept: PHYSICAL THERAPY | Facility: CLINIC | Age: 65
DRG: 885 | End: 2020-09-06
Attending: PSYCHIATRY & NEUROLOGY
Payer: MEDICARE

## 2020-09-06 PROCEDURE — 25000132 ZZH RX MED GY IP 250 OP 250 PS 637: Mod: GY | Performed by: PHYSICIAN ASSISTANT

## 2020-09-06 PROCEDURE — 97530 THERAPEUTIC ACTIVITIES: CPT | Mod: GP

## 2020-09-06 PROCEDURE — 25000132 ZZH RX MED GY IP 250 OP 250 PS 637: Mod: GY | Performed by: PSYCHIATRY & NEUROLOGY

## 2020-09-06 PROCEDURE — 25000132 ZZH RX MED GY IP 250 OP 250 PS 637: Mod: GY | Performed by: NURSE PRACTITIONER

## 2020-09-06 PROCEDURE — 12400002 ZZH R&B MH SENIOR/ADOLESCENT

## 2020-09-06 RX ADMIN — DULOXETINE HYDROCHLORIDE 30 MG: 30 CAPSULE, DELAYED RELEASE ORAL at 08:39

## 2020-09-06 RX ADMIN — SENNOSIDES 2 TABLET: 8.6 TABLET, FILM COATED ORAL at 17:55

## 2020-09-06 RX ADMIN — KETOCONAZOLE: 20 CREAM TOPICAL at 20:04

## 2020-09-06 RX ADMIN — QUETIAPINE FUMARATE 50 MG: 50 TABLET ORAL at 20:02

## 2020-09-06 RX ADMIN — ASPIRIN 81 MG CHEWABLE TABLET 81 MG: 81 TABLET CHEWABLE at 08:38

## 2020-09-06 RX ADMIN — POTASSIUM CHLORIDE 10 MEQ: 750 CAPSULE, EXTENDED RELEASE ORAL at 08:41

## 2020-09-06 RX ADMIN — Medication 1 G: at 08:40

## 2020-09-06 RX ADMIN — LEVOTHYROXINE SODIUM 100 MCG: 100 TABLET ORAL at 08:40

## 2020-09-06 RX ADMIN — DIVALPROEX SODIUM 250 MG: 125 CAPSULE, COATED PELLETS ORAL at 20:01

## 2020-09-06 RX ADMIN — ACETAMINOPHEN 1000 MG: 500 TABLET, FILM COATED ORAL at 08:38

## 2020-09-06 RX ADMIN — Medication 50 MCG: at 08:41

## 2020-09-06 RX ADMIN — MIRTAZAPINE 30 MG: 30 TABLET, FILM COATED ORAL at 20:01

## 2020-09-06 RX ADMIN — MELATONIN TAB 3 MG 6 MG: 3 TAB at 20:01

## 2020-09-06 RX ADMIN — CLOPIDOGREL BISULFATE 75 MG: 75 TABLET, FILM COATED ORAL at 08:39

## 2020-09-06 RX ADMIN — ACETAMINOPHEN 1000 MG: 500 TABLET, FILM COATED ORAL at 20:01

## 2020-09-06 RX ADMIN — ROSUVASTATIN CALCIUM 10 MG: 10 TABLET, FILM COATED ORAL at 17:55

## 2020-09-06 RX ADMIN — PANTOPRAZOLE SODIUM 40 MG: 40 TABLET, DELAYED RELEASE ORAL at 08:41

## 2020-09-06 RX ADMIN — FERROUS SULFATE TAB 325 MG (65 MG ELEMENTAL FE) 325 MG: 325 (65 FE) TAB at 08:39

## 2020-09-06 RX ADMIN — FLUTICASONE FUROATE AND VILANTEROL TRIFENATATE 1 PUFF: 200; 25 POWDER RESPIRATORY (INHALATION) at 08:40

## 2020-09-06 ASSESSMENT — ACTIVITIES OF DAILY LIVING (ADL)
LAUNDRY: UNABLE TO COMPLETE
ORAL_HYGIENE: TOTAL CARE
DRESS: TOTAL CARE
ORAL_HYGIENE: TOTAL CARE
DRESS: TOTAL CARE
HYGIENE/GROOMING: WITH ASSISTANCE
ORAL_HYGIENE: TOTAL CARE
DRESS: WITH ASSISTANCE
HYGIENE/GROOMING: WITH ASSISTANCE
HYGIENE/GROOMING: WITH ASSISTANCE

## 2020-09-06 NOTE — PLAN OF CARE
Discharge Planner PT   Patient plan for discharge: Not verbalized, likely prior living environment  Current status: Supine-sit with Deborah for trunk support and cues for sequencing. Pt verbalizes anxiety but participates to best of ability. Attempted sit-stand with modA2 and walker x2-pt unable to straighten out. Sit-stand with pt holding on to writer and writer in front of pt supporting pt with both hands on belt x 5 total-this is the only way pt able to stand up straight. Pressure relief boot on R foot. Scooted to HOB with Deborah, then sit-supine with Deborah for LEs, and Deborah for repositioning with cues.  Performs some seated exercises for LE strength with encouragement. Noted generalized weakness.  Barriers to return to prior living situation: Significantly impaired functional mobility and activity tolerance, weakness, medical, anxiety  Recommendations for discharge: TCU  Rationale for recommendations: To improve functional mobility, strength, and endurance.       Entered by: Jay Mckeon 09/06/2020 12:07 PM

## 2020-09-06 NOTE — PROGRESS NOTES
"Checked on patient as she was sleeping in her bed.  Examined skin under bilateral breasts.  Redness under patient's left breast has improved and she states that \"it doesn't hurt anymore.\"  No redness noted under right breast.  Will continue to monitor.  "

## 2020-09-06 NOTE — PROGRESS NOTES
Patient incontinent of urine x1.  Pull ups changed and patient repositioned q 2 hrs. Patient slept for 9.5 hrs.

## 2020-09-06 NOTE — PROGRESS NOTES
"   09/05/20 3232   Behavioral Health   Hallucinations denies / not responding to hallucinations   Thinking distractable;poor concentration   Orientation person: oriented;place: oriented   Memory baseline memory   Insight poor   Judgement impaired   Eye Contact at examiner   Affect blunted, flat   Mood mood is calm;hopeless   Physical Appearance/Attire attire appropriate to age and situation   Hygiene well groomed   Suicidality other (see comments)  (None)   1. Wish to be Dead (Recent) No   2. Non-Specific Active Suicidal Thoughts (Recent) No       Pt was active within the milieu this evening. Pt ate dinner and was able to hang out in the milieu with peers and staff. Pt played checkers with staff as well to past the time. Pt denied auditory and visual hallucinations. Pt also denied depression, anxiety, and SI/SIB. Pt during the night mentioned a few times to staff that she was \"scared\", when asked why it was due to her thinking she was going to fall or choke on her medication. Staff let her know that they are there to help and that she will not choke or fall.  "

## 2020-09-06 NOTE — PLAN OF CARE
Patient had a good shift, was up to the dinning room for breakfast and lunch.  Fair appetite at breakfast, but ate about 100% lunch.  Patient was encouraged to feed herself.  Flat affect, denies SI/SIB, depression.  Patient told staff she's always anxious.  Incontinence of bladder x1 this shift.  Needed to encourage to drink more fluid.  Patient was seeing by PT this afternoon.  Took all scheduled medications with no problem.  Resting comfortably in room, will continue to monitor closely.

## 2020-09-07 PROCEDURE — 25000132 ZZH RX MED GY IP 250 OP 250 PS 637: Mod: GY | Performed by: NURSE PRACTITIONER

## 2020-09-07 PROCEDURE — 25000132 ZZH RX MED GY IP 250 OP 250 PS 637: Mod: GY | Performed by: PHYSICIAN ASSISTANT

## 2020-09-07 PROCEDURE — 12400002 ZZH R&B MH SENIOR/ADOLESCENT

## 2020-09-07 PROCEDURE — 99233 SBSQ HOSP IP/OBS HIGH 50: CPT | Performed by: NURSE PRACTITIONER

## 2020-09-07 PROCEDURE — H2032 ACTIVITY THERAPY, PER 15 MIN: HCPCS

## 2020-09-07 PROCEDURE — 25000132 ZZH RX MED GY IP 250 OP 250 PS 637: Mod: GY | Performed by: PSYCHIATRY & NEUROLOGY

## 2020-09-07 RX ADMIN — METOPROLOL SUCCINATE 25 MG: 25 TABLET, EXTENDED RELEASE ORAL at 09:16

## 2020-09-07 RX ADMIN — KETOCONAZOLE: 20 CREAM TOPICAL at 19:06

## 2020-09-07 RX ADMIN — POLYETHYLENE GLYCOL 3350 17 G: 17 POWDER, FOR SOLUTION ORAL at 17:17

## 2020-09-07 RX ADMIN — PANTOPRAZOLE SODIUM 40 MG: 40 TABLET, DELAYED RELEASE ORAL at 09:15

## 2020-09-07 RX ADMIN — MIRTAZAPINE 30 MG: 30 TABLET, FILM COATED ORAL at 19:05

## 2020-09-07 RX ADMIN — MELATONIN TAB 3 MG 6 MG: 3 TAB at 19:05

## 2020-09-07 RX ADMIN — POTASSIUM CHLORIDE 10 MEQ: 750 CAPSULE, EXTENDED RELEASE ORAL at 09:16

## 2020-09-07 RX ADMIN — QUETIAPINE FUMARATE 50 MG: 50 TABLET ORAL at 19:05

## 2020-09-07 RX ADMIN — LEVOTHYROXINE SODIUM 100 MCG: 100 TABLET ORAL at 09:15

## 2020-09-07 RX ADMIN — FERROUS SULFATE TAB 325 MG (65 MG ELEMENTAL FE) 325 MG: 325 (65 FE) TAB at 09:15

## 2020-09-07 RX ADMIN — CLOPIDOGREL BISULFATE 75 MG: 75 TABLET, FILM COATED ORAL at 09:15

## 2020-09-07 RX ADMIN — ACETAMINOPHEN 1000 MG: 500 TABLET, FILM COATED ORAL at 19:05

## 2020-09-07 RX ADMIN — Medication 50 MCG: at 09:16

## 2020-09-07 RX ADMIN — DULOXETINE HYDROCHLORIDE 30 MG: 30 CAPSULE, DELAYED RELEASE ORAL at 09:15

## 2020-09-07 RX ADMIN — DIVALPROEX SODIUM 250 MG: 125 CAPSULE, COATED PELLETS ORAL at 19:04

## 2020-09-07 RX ADMIN — SENNOSIDES 2 TABLET: 8.6 TABLET, FILM COATED ORAL at 17:17

## 2020-09-07 RX ADMIN — ASPIRIN 81 MG CHEWABLE TABLET 81 MG: 81 TABLET CHEWABLE at 09:15

## 2020-09-07 RX ADMIN — FLUTICASONE FUROATE AND VILANTEROL TRIFENATATE 1 PUFF: 200; 25 POWDER RESPIRATORY (INHALATION) at 09:17

## 2020-09-07 RX ADMIN — ROSUVASTATIN CALCIUM 10 MG: 10 TABLET, FILM COATED ORAL at 17:17

## 2020-09-07 RX ADMIN — ACETAMINOPHEN 1000 MG: 500 TABLET, FILM COATED ORAL at 09:15

## 2020-09-07 RX ADMIN — MENTHOL 1 PATCH: 205.5 PATCH TOPICAL at 19:04

## 2020-09-07 ASSESSMENT — ACTIVITIES OF DAILY LIVING (ADL)
ORAL_HYGIENE: WITH ASSISTANCE
DRESS: WITH ASSISTANCE
DRESS: SCRUBS (BEHAVIORAL HEALTH);STREET CLOTHES;WITH ASSISTANCE
HYGIENE/GROOMING: WITH ASSISTANCE
ORAL_HYGIENE: TOTAL CARE;WITH ASSISTANCE

## 2020-09-07 NOTE — PROGRESS NOTES
09/07/20 1500   Music Therapy   Type of Participation Music therapy group   Response Participates independently   Hours 1   Participated in Music Therapy intervention of Ari Rossi today.  Goals of session were focusing, memory recall, positive distraction, emotional containment and social cohesion.  Pt response was engaged and cooperative.  Letty engaged well, knowing many of the musical artists played, and joking around good naturedly with group.

## 2020-09-07 NOTE — PROGRESS NOTES
Patient incontinent of urine X3. Pericares completed.  Required assistance of 2 staff.  Repositioned q hrs.  Patient slept for 7.5 hrs.

## 2020-09-07 NOTE — PROGRESS NOTES
"Patient affect is flat but brightens upon approach. Denies all MH symptoms. She endorses anxiety from being \"scared\". She needs continuous encouragement to be independent with ADL's, eating and mobility. She transferred with assist of one with walker and gait belt to and from wheelchair and bed. She verbalized a fear of falling. She sat in lounge and ate 100% of dinner. Needs prompts for fluid intake. She did attend and participate in group this evening but stated \"it was boring and it just made me irritated\". Wound care done. Miralax given for c/o constipation. No BM documented in flow sheet and patient not clear as to last BM. No results this shift. No urine incontinence thus far. Turned and repositioned from right left and vice versa. Cream applied to breast area. Icy hot patch applied to lower back and right thigh.   "

## 2020-09-07 NOTE — PLAN OF CARE
"  Problem: Depressive Symptoms  Goal: Depressive Symptoms  Description: Signs and symptoms of listed problems will be absent or manageable.  Outcome: Improving  Flowsheets (Taken 9/7/2020 1047)  Depressive Symptoms Assessed: all  Depressive Symptoms Present:    anxiety    insight    affect    mood    other (see comment)   48 HOUR NURSING ASSESSMENT:  Pt continues to verbalize increased anxiety and fearfulness. Pt transferred better today with 2 staff, transfer belt and her walker. Pt was able to take several steps forward, turn and sit on the bed. Pt does voice fear that she is going to fall. Pt was reassured that staff would assist her so that she would not fall. Pt was cooperative with getting up for breakfast. Pt resistive to staying up but with prompts and encouragement did stay up for approximately 1.5 hours this AM. Pt voiced dislike of food options but did take 100% of pureed British toast with syrup and 90% of a container of vanilla pudding. Pt will not drink juice or milk. Pt did agree to drinking 180 ml of warm water.   Pt initially agree to attend community meeting this AM but only stayed there for approximately 5 minutes.   Pt's affect is flat with appropriate eye contact.  Pt has Mepiplex on right ear, coccyx, right ankle and right later foot/small toe (clean dry and intact).     Pt up for lunch at 1245. Pt incontinent of a small amount of urine. Pt voiced concern of falling during transfer/ ambulation but did well with prompts and reassurance. Pt is able to take a few steps with her walker with minimal assistance.     1505- Pt requested to use the toilet. Pt voided a moderate amount of urine. Pt verbalized that she was feeling constipated at that time. Pt stated that she thought she needed something for this. \"I take tablets at the [facility], but I don't know if I am taking them here\". Pt noted to have scheduled senokot 2 tabs at HS.   "

## 2020-09-07 NOTE — PROGRESS NOTES
Up in the chair for a total of at least 3 hrs tonight.  Ate all on her dinner tray and drank 700cc tonight.  Incontinent of urine x2 .

## 2020-09-07 NOTE — PROGRESS NOTES
"[facility], [place]   Psychiatric Progress Note        Interim History:   From H&P: Letty Escobar is a 64 year old female with longstanding mental health disease including personality disorder, [place] phobia, panic disorder and PTSD.  She is currently on  multiple chronic psychiatric medications including Abilify, BuSpar, Celexa, melatonin and Remeron but still failing at the local nursing home.  They  describe a situation and basically failure to thrive.  She is not eating or walking.  She is scared to do these things due to paranoia.  She denies suicidal homicidal thoughts.  She has lost 29 pounds they report.  No fever.  No cough.  No infectious disease symptoms.  She does have a couple areas of minor skin breakdown due to pressure.    Team meeting report: Team meeting was not held due to holiday.  Per nursing notes patient has been eating and drinking adequately.  Patient has been more sociable including playing checkers with staff.  Patient reports fears of walking and swallowing.  Patient denies hallucinations, depression, anxiety, and SI/SIB.       Met with patient. Met with patient while she was seated in her wheelchair after breakfast.  The patient cannot remember the identities of myself and the student, despite meeting several times last week.  Patient reports eating a full breakfast of English toast, and reports that her anxieties of swallowing have been decreased.  The patient cannot report what change prompted this decrease in anxiety with swallowing.  She reports waking up several times in the night due to pain in her tailbone.  The patient reports her anxiety has remained the same.  The patient is anxious about discharge as she is under the belief that she will be \"kicked out\" of the hospital and will have no place to go to.  Patient also has anxieties about walking.  Discussed importance of walking with patient, and PT will work with patient on ambulating.  Patient " denies depression and SI/SIB.           Medications:       acetaminophen  1,000 mg Oral BID     aspirin  81 mg Oral Daily     clopidogrel  75 mg Oral Daily     divalproex sodium delayed-release  250 mg Oral At Bedtime     DULoxetine  30 mg Oral Daily     ferrous sulfate  325 mg Oral Daily with breakfast     fish oil-omega-3 fatty acids  1 g Oral Daily     fluticasone-vilanterol  1 puff Inhalation Daily     ketoconazole   Topical At Bedtime     levothyroxine  100 mcg Oral QAM AC     melatonin  6 mg Oral At Bedtime     menthol   Transdermal Q8H     metoprolol succinate ER  25 mg Oral Daily     mirtazapine  30 mg Oral At Bedtime     pantoprazole  40 mg Oral Daily     potassium chloride ER  10 mEq Oral Daily     QUEtiapine  50 mg Oral At Bedtime     rosuvastatin  10 mg Oral At Bedtime     sennosides  2 tablet Oral At Bedtime     vitamin D3  50 mcg Oral Daily          Allergies:     Allergies   Allergen Reactions     Contrast Dye      Isosorbide Mononitrate [Isosorbide]      Nitroglycerin      Nystatin      Sulfa Drugs      Adhesive Tape Rash     Diphenhydramine Hcl Palpitations     Penicillins Rash          Labs:     Recent Results (from the past 672 hour(s))   Basic metabolic panel    Collection Time: 08/17/20  7:38 AM   Result Value Ref Range    Sodium 139 133 - 144 mmol/L    Potassium 4.3 3.4 - 5.3 mmol/L    Chloride 108 94 - 109 mmol/L    Carbon Dioxide 28 20 - 32 mmol/L    Anion Gap 3 3 - 14 mmol/L    Glucose 74 70 - 99 mg/dL    Urea Nitrogen 19 7 - 30 mg/dL    Creatinine 0.82 0.52 - 1.04 mg/dL    GFR Estimate 75 >60 mL/min/[1.73_m2]    GFR Estimate If Black 87 >60 mL/min/[1.73_m2]    Calcium 10.6 (H) 8.5 - 10.1 mg/dL   T4 free    Collection Time: 08/17/20  7:38 AM   Result Value Ref Range    T4 Free 1.88 (H) 0.76 - 1.46 ng/dL   TSH    Collection Time: 08/17/20  7:38 AM   Result Value Ref Range    TSH 0.01 (L) 0.40 - 4.00 mU/L   CBC with platelets differential    Collection Time: 08/28/20  8:11 AM   Result Value  Ref Range    WBC 6.8 4.0 - 11.0 10e9/L    RBC Count 3.33 (L) 3.8 - 5.2 10e12/L    Hemoglobin 10.1 (L) 11.7 - 15.7 g/dL    Hematocrit 31.0 (L) 35.0 - 47.0 %    MCV 93 78 - 100 fl    MCH 30.3 26.5 - 33.0 pg    MCHC 32.6 31.5 - 36.5 g/dL    RDW 13.8 10.0 - 15.0 %    Platelet Count 148 (L) 150 - 450 10e9/L    Diff Method Automated Method     % Neutrophils 56.7 %    % Lymphocytes 34.2 %    % Monocytes 6.7 %    % Eosinophils 2.3 %    % Basophils 0.1 %    Absolute Neutrophil 3.9 1.6 - 8.3 10e9/L    Absolute Lymphocytes 2.3 0.8 - 5.3 10e9/L    Absolute Monocytes 0.5 0.0 - 1.3 10e9/L    Absolute Eosinophils 0.2 0.0 - 0.7 10e9/L    Absolute Basophils 0.0 0.0 - 0.2 10e9/L   Comprehensive metabolic panel    Collection Time: 08/28/20  8:11 AM   Result Value Ref Range    Sodium 139 133 - 144 mmol/L    Potassium 4.1 3.4 - 5.3 mmol/L    Chloride 109 94 - 109 mmol/L    Carbon Dioxide 24 20 - 32 mmol/L    Anion Gap 6 3 - 14 mmol/L    Glucose 78 70 - 99 mg/dL    Urea Nitrogen 30 7 - 30 mg/dL    Creatinine 0.97 0.52 - 1.04 mg/dL    GFR Estimate 61 >60 mL/min/[1.73_m2]    GFR Estimate If Black 70 >60 mL/min/[1.73_m2]    Calcium 12.2 (H) 8.5 - 10.1 mg/dL    Bilirubin Total 0.3 0.2 - 1.3 mg/dL    Albumin 2.4 (L) 3.4 - 5.0 g/dL    Protein Total 5.8 (L) 6.8 - 8.8 g/dL    Alkaline Phosphatase 56 40 - 150 U/L    ALT 18 0 - 50 U/L    AST 13 0 - 45 U/L   T4 free    Collection Time: 08/28/20  8:11 AM   Result Value Ref Range    T4 Free 1.68 (H) 0.76 - 1.46 ng/dL   TSH    Collection Time: 08/28/20  8:11 AM   Result Value Ref Range    TSH 0.01 (L) 0.40 - 4.00 mU/L   CBC with platelets differential    Collection Time: 09/02/20  3:53 PM   Result Value Ref Range    WBC 8.1 4.0 - 11.0 10e9/L    RBC Count 3.42 (L) 3.8 - 5.2 10e12/L    Hemoglobin 10.4 (L) 11.7 - 15.7 g/dL    Hematocrit 31.3 (L) 35.0 - 47.0 %    MCV 92 78 - 100 fl    MCH 30.4 26.5 - 33.0 pg    MCHC 33.2 31.5 - 36.5 g/dL    RDW 13.5 10.0 - 15.0 %    Platelet Count 188 150 - 450 10e9/L     Diff Method Automated Method     % Neutrophils 55.4 %    % Lymphocytes 34.0 %    % Monocytes 7.4 %    % Eosinophils 1.8 %    % Basophils 0.4 %    % Immature Granulocytes 1.0 %    Nucleated RBCs 0 0 /100    Absolute Neutrophil 4.5 1.6 - 8.3 10e9/L    Absolute Lymphocytes 2.8 0.8 - 5.3 10e9/L    Absolute Monocytes 0.6 0.0 - 1.3 10e9/L    Absolute Basophils 0.0 0.0 - 0.2 10e9/L    Abs Immature Granulocytes 0.1 0 - 0.4 10e9/L    Absolute Nucleated RBC 0.0    Comprehensive metabolic panel    Collection Time: 09/02/20  3:53 PM   Result Value Ref Range    Sodium 139 133 - 144 mmol/L    Potassium 4.1 3.4 - 5.3 mmol/L    Chloride 109 94 - 109 mmol/L    Carbon Dioxide 24 20 - 32 mmol/L    Anion Gap 6 3 - 14 mmol/L    Glucose 98 70 - 99 mg/dL    Urea Nitrogen 30 7 - 30 mg/dL    Creatinine 0.97 0.52 - 1.04 mg/dL    GFR Estimate 61 >60 mL/min/[1.73_m2]    GFR Estimate If Black 70 >60 mL/min/[1.73_m2]    Calcium 11.9 (H) 8.5 - 10.1 mg/dL    Bilirubin Total 0.1 (L) 0.2 - 1.3 mg/dL    Albumin 2.5 (L) 3.4 - 5.0 g/dL    Protein Total 6.1 (L) 6.8 - 8.8 g/dL    Alkaline Phosphatase 65 40 - 150 U/L    ALT 24 0 - 50 U/L    AST 21 0 - 45 U/L   TSH    Collection Time: 09/02/20  3:53 PM   Result Value Ref Range    TSH 0.02 (L) 0.40 - 4.00 mU/L   Asymptomatic COVID-19 Virus (Coronavirus) by PCR    Collection Time: 09/02/20  5:21 PM    Specimen: Nasopharyngeal   Result Value Ref Range    COVID-19 Virus PCR to [facility] - Source Nasopharyngeal     COVID-19 Virus PCR to [facility] - Result       Test received-See reflex to IDDL test SARS CoV2 (COVID-19) Virus RT-PCR   SARS-CoV-2 COVID-19 Virus (Coronavirus) RT-PCR Nasopharyngeal    Collection Time: 09/02/20  5:21 PM    Specimen: Nasopharyngeal   Result Value Ref Range    SARS-CoV-2 Virus Specimen Source Nasopharyngeal     SARS-CoV-2 PCR Result NEGATIVE     SARS-CoV-2 PCR Comment       Testing was performed using the Opez SARS-CoV-2 Assay on the [employer] Instrument System.   Additional information  about this Emergency Use Authorization (EUA) assay can be found via   the Lab Guide.     TSH with free T4 reflex and/or T3 as indicated    Collection Time: 09/03/20  7:52 AM   Result Value Ref Range    TSH 0.01 (L) 0.40 - 4.00 mU/L   Vitamin B12    Collection Time: 09/03/20  7:52 AM   Result Value Ref Range    Vitamin B12 848 193 - 986 pg/mL   Folate    Collection Time: 09/03/20  7:52 AM   Result Value Ref Range    Folate 4.3 (L) >5.4 ng/mL   Valproic acid    Collection Time: 09/03/20  7:52 AM   Result Value Ref Range    Valproic Acid Level <3 (L) 50 - 100 mg/L   Vitamin D    Collection Time: 09/03/20  7:52 AM   Result Value Ref Range    Vitamin D Deficiency screening 41 20 - 75 ug/L   T4 free    Collection Time: 09/03/20  7:52 AM   Result Value Ref Range    T4 Free 1.70 (H) 0.76 - 1.46 ng/dL   EKG 12-lead, complete    Collection Time: 09/03/20  8:50 AM   Result Value Ref Range    Interpretation ECG Click View Image link to view waveform and result             Psychiatric Examination:   Temp: 96.1  F (35.6  C) Temp src: Tympanic BP: 130/87 Pulse: 81   Resp: 16 SpO2: 98 % O2 Device: None (Room air)    Weight is 0 lbs 0 oz  Body mass index is 23.63 kg/m .    Appearance: awake, alert and adequately groomed  Attitude:  cooperative  Eye Contact:  fair  Mood:  better  Affect:  appropriate and in normal range  Speech:  clear, coherent  Psychomotor Behavior:  no evidence of tardive dyskinesia, dystonia, or tics  Throught Process:  logical  Associations:  no loose associations  Thought Content:  no evidence of suicidal ideation or homicidal ideation  Insight:  fair  Judgement:  intact  Oriented to:  time, person, and place  Attention Span and Concentration:  intact  Recent and Remote Memory:  intact         Precautions:     Behavioral Orders   Procedures     Code 1 - Restrict to Unit     Fall precautions     Routine Programming     As clinically indicated     Single Room     Status 15     Every 15 minutes.           DIagnoses:   1.  Major depressive disorder, recurrent, severe, without psychosis  2.  Generalized anxiety disorder  3.  Panic disorder, per patient  4.  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  PTSD per history  6.  Medical problems include the following: Hypothyroidism, low back pain, pressure ulcers, cardiomyopathy, COPD, ELI, type 2 diabetes, CKD, stage IV, chronic iron deficiency anemia, history of TIA, hypertension, PMR.         Plan:   Patient is a 63 y/o female who was admitted with failure to thrive.  Patient has been able to eat, and will be encouraged to walk around on the unit and attend groups.           Medication changes will include the following:   --Change Depakote to 250 mg, at bedtime  --Continue Seroquel 50 mg, at bedtime  --Increase Remeron to 30 mg, on admission  --Continue Melatonin 6 mg, qhs  --Discontinue Lexapro due to abnormal EKG.  Discontinue BuSpar, does not seem to be helpful.  Discontinue prazosin, denies nightmares and flashbacks. Discontinue Abilify    --One time  0.5mg  lorazepam to r/o catatonia. No change in patient presentation.        Disposition Plan   Reason for ongoing admission: is unable to care for self due to failure to thrive, depression  Disposition: TBD  Estimated length of stay: 7-10 days  Legal Status: voluntary   Discharge will be granted once symptoms improved.    Patient history was taken by student.  Note was prepared by student Quentin HORNER.  Student was under direct supervision by Gela Woods DNP.    Date: 09/07/20  Time: 10:57 AM     Gela BOATENG CNP  Date: 09/07/20  Time: 11:36 AM      More than 30 minutes were spent for assessment, documentation, and coordination of care.     This note was created with the help of Dragon dictation system. All grammatical/typing errors or context distortion are unintentional and inherent to software.

## 2020-09-08 PROCEDURE — 25000132 ZZH RX MED GY IP 250 OP 250 PS 637: Mod: GY | Performed by: PSYCHIATRY & NEUROLOGY

## 2020-09-08 PROCEDURE — H2032 ACTIVITY THERAPY, PER 15 MIN: HCPCS

## 2020-09-08 PROCEDURE — 99233 SBSQ HOSP IP/OBS HIGH 50: CPT | Performed by: NURSE PRACTITIONER

## 2020-09-08 PROCEDURE — 25000132 ZZH RX MED GY IP 250 OP 250 PS 637: Mod: GY | Performed by: PHYSICIAN ASSISTANT

## 2020-09-08 PROCEDURE — 25000132 ZZH RX MED GY IP 250 OP 250 PS 637: Mod: GY | Performed by: NURSE PRACTITIONER

## 2020-09-08 PROCEDURE — 12400002 ZZH R&B MH SENIOR/ADOLESCENT

## 2020-09-08 RX ORDER — DULOXETIN HYDROCHLORIDE 20 MG/1
40 CAPSULE, DELAYED RELEASE ORAL DAILY
Status: DISCONTINUED | OUTPATIENT
Start: 2020-09-09 | End: 2020-09-15 | Stop reason: HOSPADM

## 2020-09-08 RX ADMIN — POTASSIUM CHLORIDE 10 MEQ: 750 CAPSULE, EXTENDED RELEASE ORAL at 09:07

## 2020-09-08 RX ADMIN — DIVALPROEX SODIUM 250 MG: 125 CAPSULE, COATED PELLETS ORAL at 20:16

## 2020-09-08 RX ADMIN — CLOPIDOGREL BISULFATE 75 MG: 75 TABLET, FILM COATED ORAL at 09:07

## 2020-09-08 RX ADMIN — SENNOSIDES 2 TABLET: 8.6 TABLET, FILM COATED ORAL at 17:24

## 2020-09-08 RX ADMIN — ACETAMINOPHEN 1000 MG: 500 TABLET, FILM COATED ORAL at 09:07

## 2020-09-08 RX ADMIN — FLUTICASONE FUROATE AND VILANTEROL TRIFENATATE 1 PUFF: 200; 25 POWDER RESPIRATORY (INHALATION) at 09:07

## 2020-09-08 RX ADMIN — ROSUVASTATIN CALCIUM 10 MG: 10 TABLET, FILM COATED ORAL at 17:24

## 2020-09-08 RX ADMIN — Medication 50 MCG: at 09:07

## 2020-09-08 RX ADMIN — QUETIAPINE FUMARATE 50 MG: 50 TABLET ORAL at 20:15

## 2020-09-08 RX ADMIN — PANTOPRAZOLE SODIUM 40 MG: 40 TABLET, DELAYED RELEASE ORAL at 09:07

## 2020-09-08 RX ADMIN — ASPIRIN 81 MG CHEWABLE TABLET 81 MG: 81 TABLET CHEWABLE at 09:07

## 2020-09-08 RX ADMIN — LEVOTHYROXINE SODIUM 100 MCG: 100 TABLET ORAL at 09:07

## 2020-09-08 RX ADMIN — KETOCONAZOLE: 20 CREAM TOPICAL at 20:17

## 2020-09-08 RX ADMIN — MENTHOL 1 PATCH: 205.5 PATCH TOPICAL at 07:07

## 2020-09-08 RX ADMIN — DULOXETINE HYDROCHLORIDE 30 MG: 30 CAPSULE, DELAYED RELEASE ORAL at 09:07

## 2020-09-08 RX ADMIN — MIRTAZAPINE 30 MG: 30 TABLET, FILM COATED ORAL at 20:15

## 2020-09-08 RX ADMIN — ACETAMINOPHEN 1000 MG: 500 TABLET, FILM COATED ORAL at 20:15

## 2020-09-08 RX ADMIN — MELATONIN TAB 3 MG 6 MG: 3 TAB at 20:15

## 2020-09-08 RX ADMIN — METOPROLOL SUCCINATE 25 MG: 25 TABLET, EXTENDED RELEASE ORAL at 09:06

## 2020-09-08 RX ADMIN — FERROUS SULFATE TAB 325 MG (65 MG ELEMENTAL FE) 325 MG: 325 (65 FE) TAB at 09:07

## 2020-09-08 ASSESSMENT — ACTIVITIES OF DAILY LIVING (ADL)
HYGIENE/GROOMING: WITH ASSISTANCE
ORAL_HYGIENE: WITH ASSISTANCE
LAUNDRY: UNABLE TO COMPLETE
DRESS: WITH ASSISTANCE

## 2020-09-08 NOTE — PROGRESS NOTES
Work Completed: The patient's care was discussed with the treatment team and chart notes were reviewed. Patient is reporting that she is not able to return to her nursing home. Writer SMITA with [facility] ([phone]), nurse manager at [facility], to clarify patient's residency.      Discharge plan or goal: Patient will return to group home when stable                Barriers to discharge: Patient needs further stabilization.

## 2020-09-08 NOTE — PLAN OF CARE
INITIAL OT NOTE  Problem: OT General Care Plan  Goal: OT Goal 1  Description: Will attend OT groups when able,  process through organizing work and thoughts with  concrete familiar step opportunities. Assess cognition further in problem solving and organization.    Pt attended about x15 minutes (no charge) of a mental health management group with a focus on coping through movement to facilitate relaxation and stress management via chair yoga. She chose to observe the movements rather than actively participate, though remained attentive to the visual demonstration throughout. Calm and cooperative throughout her brief duration in group. Will continue to assess. Initial assessment to be completed upon additional group participation.

## 2020-09-08 NOTE — PROGRESS NOTES
CLINICAL NUTRITION SERVICES - REASSESSMENT NOTE     Nutrition Prescription    RECOMMENDATIONS FOR MDs/PROVIDERS TO ORDER:  Weigh pt  Per wound care protocol order the following:   - vitamin A 20,000 units daily x10 days   - ascorbic acid 500 mg daily x10 days   - zinc sulfate 220 mg daily x10 days   - theravit with minerals daily     Malnutrition Status:    Severe malnutrition in the context of acute on chronic illness    Recommendations already ordered by Registered Dietitian (RD):  Beneprotein with breakfast    Future/Additional Recommendations:  Monitor intakes and wt  Adjust supplements as needed pending pt preference    Unable to obtain in-person nutrition history or nutrition focused physical assessment (NFPA) from patient to limit exposure and to minimize use of PPE during COVID-19 pandemic.Spoke to pt over the phone.     EVALUATION OF THE PROGRESS TOWARD GOALS   Diet: Pureed  Intake: varied but improving       NEW FINDINGS   Pt previously receiving gelatein but it was discontinued d/t disliking the taste. Declined boost as it cases diarrhea and magic cup (prefers something not cold).   Calorie count completed, intakes appear to be improving:  (9/5)  812 kcals and 68 g protein, 3 meals.  (9/6)  1588 kcals and 84 g protein, 3 meals.  (9/7)  1258 kcals and 71 g protein, 3 meals.    Three day average:  1219 kcals and 74 g protein to meet 74% minimum calorie needs and 90% minimum protein needs.    Discussed other available supplements to better meet needs for wound healing. Pt agreeable to trying beneprotein with breakfast tomorrow which she will mix with hot water.  Wt Readings from Last 10 Encounters:   08/28/20 54.9 kg (121 lb)   07/29/20 63.7 kg (140 lb 6.4 oz)   07/08/20 63.1 kg (139 lb 3.2 oz)   06/24/20 68 kg (150 lb)   05/16/20 78.5 kg (173 lb)   02/26/20 86.9 kg (191 lb 9.6 oz)   09/18/12 103 kg (227 lb)   08/27/12 106.4 kg (234 lb 9.6 oz)   08/27/12 103 kg (227 lb)   07/25/12 105.4 kg (232 lb 6.4 oz)       MALNUTRITION  % Intake: </=75% for >/= 1 month (severe)  % Weight Loss: > 10% in 6 months (severe)  Subcutaneous Fat Loss: Unable to assess  Muscle Loss: Unable to assess  Fluid Accumulation/Edema: None noted  Malnutrition Diagnosis: Severe malnutrition in the context of acute on chronic illness    Previous Goals   Patient to consume % of nutritionally adequate meal trays TID, or the equivalent with supplements/snacks.  Evaluation: Not met    Previous Nutrition Diagnosis  Inadequate oral intake related to loss of appetite/refusal to eat as evidenced by documented intakes and 70# wt loss in 6 months.    Evaluation: Improving    CURRENT NUTRITION DIAGNOSIS  Inadequate oral intake related to loss of appetite/refusal to eat as evidenced by documented intakes and 70# wt loss in 6 months with improving intakes since admit    INTERVENTIONS  Implementation  Beneprotein with breakfast  Placed order for weekly weights    Goals  Patient to consume % of nutritionally adequate meal trays TID, or the equivalent with supplements/snacks.    Monitoring/Evaluation  Progress toward goals will be monitored and evaluated per protocol.    Vanesa Chadwick MS, RD, LDN  Unit Pager [phone]

## 2020-09-08 NOTE — PLAN OF CARE
"48 Hour assessment       Problem: Depressive Symptoms  Goal: Depressive Symptoms  Description: Signs and symptoms of listed problems will be absent or manageable.  Outcome: Improving   Patient denies feelings of depression.     Problem: Depressive Symptoms  Goal: Social and Therapeutic (Depression)  Description: Signs and symptoms of listed problems will be absent or manageable.  Outcome: Improving     D: Pt woke up around 0830 and completed vitals and took AM medication. Patient present very anxious. She states she is scared of \"choking and falling\".  Patient is organized and logical in conversation.     Patient attending groups with a lot of encouragement. Patient ambulated with 2 staff from [facility] to her room. Patient was surprised she was able to complete the task and was proud of herself. Patient declined PT this morning but stated she will participate tomorrow.      Patient denies  SI/SIB/HI.   Patient endorses anxiety rated 10/10.     Intake: Breakfast 0%. Lunch: 25% . Needs encouragement and reassurance she is doing ok.   Output: Incontinent of urine x2 this shift.     A: Provided active listening, emotional encouragement and goal setting, safety planning safety checks q 15min, and medication administration.    R: Patient was behaviorally appropriate during this shift.  No PRNs given this shift. Letty did not require any restraints or seclusion. Continue to encourage activity and give specific time for how long she can lay/rest before getting up and completing a task/attending programming etc.       "

## 2020-09-08 NOTE — PROGRESS NOTES
"Pt was experiencing a high level of anxiety symptoms during dance/movement therapy (DMT) and appeared physically locked, describing feeling the anxiety in every cell of her body.  She said she is \"scared of everything\" and she has visceral memories that are more like flashbacks that she feels like she can't get away from. Pt appeared to be holding her breath with a fear of even moving to place her hand on her heart. She asserted a strong need for self-determination and finding her own path, rejecting ideas or encouragement from peers because she didn't know them.         09/08/20 1130   Dance Movement Therapy   Type of Intervention structured groups   Response participates with encouragement   Hours 1     "

## 2020-09-08 NOTE — PLAN OF CARE
PT: Went over to unit to attempt to see patient for PT. Patient had just returned to bed, very politely declined despite encouragement but agreeable to participate tomorrow.

## 2020-09-08 NOTE — PROGRESS NOTES
09/07/20 1200   Art Therapy   Type of Intervention structured groups   Response engaged   Hours  1 hour  (Art Therapy) hopes, wishes and dreams   Objective- Patients use art media, the creative process & resulting artwork to:explore feelings,reconcile emotions, gain self-awareness/ self esteem, manage behaviors, develop social skills, improve reality orientation, & reduce anxiety /depression.     Outcome- The group had some observers and some doing art. They enjoyed a discussion about mental health management. The group talked about daily self care, and a practice of noticing their joys. The art was about hopes, wishes and dreams. The entire group was very engaged and thoughtful.    Pt was cooperative,pleasant and engaged. At first she observed only but then wanted to paint and painted her happy place [place]. She had not been there before. She talked about her son, his dogs, her grandchildren and great grand children and  being very important to her.

## 2020-09-08 NOTE — PROGRESS NOTES
"[facility], [place]   Psychiatric Progress Note        Interim History:   From H&P: Letty Escobar is a 64 year old female with longstanding mental health disease including personality disorder, [place] phobia, panic disorder and PTSD.  She is currently on  multiple chronic psychiatric medications including Abilify, BuSpar, Celexa, melatonin and Remeron but still failing at the local nursing home.  They  describe a situation and basically failure to thrive.  She is not eating or walking.  She is scared to do these things due to paranoia.  She denies suicidal homicidal thoughts.  She has lost 29 pounds they report.  No fever.  No cough.  No infectious disease symptoms.  She does have a couple areas of minor skin breakdown due to pressure.    Team meeting report: The patient's care was discussed with the treatment team during the daily team meeting and/or staff's chart notes were reviewed.  Staff report patient has been calm, pleasant, cooperative. Patient is attending groups. Patient is eating well and taking medications as prescribed. Rates mood as more anxious.  Patient reports increased anxiety from being scared of numerous things (walking, falling, swallowing) Slept 9 hours.     Met with patient. Patient appears to be more alert with affect and conversation.  Patient reports being \"worried about everything\".  Patient reports hesitations with walking.  She does not think she can do it and is afraid of falling.  PT is scheduled to meet with her tomorrow, and explained that PT will be beneficial in helping ease her anxieties of walking.  Patient reports increasing levels of anxiety due to worry of being discharged with no place to go.  She believes her  has not tried to contact her because he can no longer take care of her safely.  She is anxious that her nursing home will deny taking her back.  Patient's appetite is good, patient reports eating 3 full meals a day.  Patient " reports sleeping 8-9 hours.  Patient reports daytime tiredness.  Will increase dose of Cymbalta to try to increase wakefulness.  Patient says quality was mediocre, mostly due to the increased levels of anxiety.  Patient denies depression and SI/SIB.      Spoke with the patient's  Edwar Escobar.  Reports that the patient has always reported high anxiety but appears completely calm.  Discussed where she is in her recovery.         Medications:       acetaminophen  1,000 mg Oral BID     aspirin  81 mg Oral Daily     clopidogrel  75 mg Oral Daily     divalproex sodium delayed-release  250 mg Oral At Bedtime     [START ON 9/9/2020] DULoxetine  40 mg Oral Daily     ferrous sulfate  325 mg Oral Daily with breakfast     fish oil-omega-3 fatty acids  1 g Oral Daily     fluticasone-vilanterol  1 puff Inhalation Daily     ketoconazole   Topical At Bedtime     levothyroxine  100 mcg Oral QAM AC     melatonin  6 mg Oral At Bedtime     menthol   Transdermal Q8H     metoprolol succinate ER  25 mg Oral Daily     mirtazapine  30 mg Oral At Bedtime     pantoprazole  40 mg Oral Daily     potassium chloride ER  10 mEq Oral Daily     QUEtiapine  50 mg Oral At Bedtime     rosuvastatin  10 mg Oral At Bedtime     sennosides  2 tablet Oral At Bedtime     vitamin D3  50 mcg Oral Daily          Allergies:     Allergies   Allergen Reactions     Contrast Dye      Isosorbide Mononitrate [Isosorbide]      Nitroglycerin      Nystatin      Sulfa Drugs      Adhesive Tape Rash     Diphenhydramine Hcl Palpitations     Penicillins Rash          Labs:     Recent Results (from the past 672 hour(s))   Basic metabolic panel    Collection Time: 08/17/20  7:38 AM   Result Value Ref Range    Sodium 139 133 - 144 mmol/L    Potassium 4.3 3.4 - 5.3 mmol/L    Chloride 108 94 - 109 mmol/L    Carbon Dioxide 28 20 - 32 mmol/L    Anion Gap 3 3 - 14 mmol/L    Glucose 74 70 - 99 mg/dL    Urea Nitrogen 19 7 - 30 mg/dL    Creatinine 0.82 0.52 - 1.04 mg/dL    GFR  Estimate 75 >60 mL/min/[1.73_m2]    GFR Estimate If Black 87 >60 mL/min/[1.73_m2]    Calcium 10.6 (H) 8.5 - 10.1 mg/dL   T4 free    Collection Time: 08/17/20  7:38 AM   Result Value Ref Range    T4 Free 1.88 (H) 0.76 - 1.46 ng/dL   TSH    Collection Time: 08/17/20  7:38 AM   Result Value Ref Range    TSH 0.01 (L) 0.40 - 4.00 mU/L   CBC with platelets differential    Collection Time: 08/28/20  8:11 AM   Result Value Ref Range    WBC 6.8 4.0 - 11.0 10e9/L    RBC Count 3.33 (L) 3.8 - 5.2 10e12/L    Hemoglobin 10.1 (L) 11.7 - 15.7 g/dL    Hematocrit 31.0 (L) 35.0 - 47.0 %    MCV 93 78 - 100 fl    MCH 30.3 26.5 - 33.0 pg    MCHC 32.6 31.5 - 36.5 g/dL    RDW 13.8 10.0 - 15.0 %    Platelet Count 148 (L) 150 - 450 10e9/L    Diff Method Automated Method     % Neutrophils 56.7 %    % Lymphocytes 34.2 %    % Monocytes 6.7 %    % Eosinophils 2.3 %    % Basophils 0.1 %    Absolute Neutrophil 3.9 1.6 - 8.3 10e9/L    Absolute Lymphocytes 2.3 0.8 - 5.3 10e9/L    Absolute Monocytes 0.5 0.0 - 1.3 10e9/L    Absolute Eosinophils 0.2 0.0 - 0.7 10e9/L    Absolute Basophils 0.0 0.0 - 0.2 10e9/L   Comprehensive metabolic panel    Collection Time: 08/28/20  8:11 AM   Result Value Ref Range    Sodium 139 133 - 144 mmol/L    Potassium 4.1 3.4 - 5.3 mmol/L    Chloride 109 94 - 109 mmol/L    Carbon Dioxide 24 20 - 32 mmol/L    Anion Gap 6 3 - 14 mmol/L    Glucose 78 70 - 99 mg/dL    Urea Nitrogen 30 7 - 30 mg/dL    Creatinine 0.97 0.52 - 1.04 mg/dL    GFR Estimate 61 >60 mL/min/[1.73_m2]    GFR Estimate If Black 70 >60 mL/min/[1.73_m2]    Calcium 12.2 (H) 8.5 - 10.1 mg/dL    Bilirubin Total 0.3 0.2 - 1.3 mg/dL    Albumin 2.4 (L) 3.4 - 5.0 g/dL    Protein Total 5.8 (L) 6.8 - 8.8 g/dL    Alkaline Phosphatase 56 40 - 150 U/L    ALT 18 0 - 50 U/L    AST 13 0 - 45 U/L   T4 free    Collection Time: 08/28/20  8:11 AM   Result Value Ref Range    T4 Free 1.68 (H) 0.76 - 1.46 ng/dL   TSH    Collection Time: 08/28/20  8:11 AM   Result Value Ref Range     TSH 0.01 (L) 0.40 - 4.00 mU/L   CBC with platelets differential    Collection Time: 09/02/20  3:53 PM   Result Value Ref Range    WBC 8.1 4.0 - 11.0 10e9/L    RBC Count 3.42 (L) 3.8 - 5.2 10e12/L    Hemoglobin 10.4 (L) 11.7 - 15.7 g/dL    Hematocrit 31.3 (L) 35.0 - 47.0 %    MCV 92 78 - 100 fl    MCH 30.4 26.5 - 33.0 pg    MCHC 33.2 31.5 - 36.5 g/dL    RDW 13.5 10.0 - 15.0 %    Platelet Count 188 150 - 450 10e9/L    Diff Method Automated Method     % Neutrophils 55.4 %    % Lymphocytes 34.0 %    % Monocytes 7.4 %    % Eosinophils 1.8 %    % Basophils 0.4 %    % Immature Granulocytes 1.0 %    Nucleated RBCs 0 0 /100    Absolute Neutrophil 4.5 1.6 - 8.3 10e9/L    Absolute Lymphocytes 2.8 0.8 - 5.3 10e9/L    Absolute Monocytes 0.6 0.0 - 1.3 10e9/L    Absolute Basophils 0.0 0.0 - 0.2 10e9/L    Abs Immature Granulocytes 0.1 0 - 0.4 10e9/L    Absolute Nucleated RBC 0.0    Comprehensive metabolic panel    Collection Time: 09/02/20  3:53 PM   Result Value Ref Range    Sodium 139 133 - 144 mmol/L    Potassium 4.1 3.4 - 5.3 mmol/L    Chloride 109 94 - 109 mmol/L    Carbon Dioxide 24 20 - 32 mmol/L    Anion Gap 6 3 - 14 mmol/L    Glucose 98 70 - 99 mg/dL    Urea Nitrogen 30 7 - 30 mg/dL    Creatinine 0.97 0.52 - 1.04 mg/dL    GFR Estimate 61 >60 mL/min/[1.73_m2]    GFR Estimate If Black 70 >60 mL/min/[1.73_m2]    Calcium 11.9 (H) 8.5 - 10.1 mg/dL    Bilirubin Total 0.1 (L) 0.2 - 1.3 mg/dL    Albumin 2.5 (L) 3.4 - 5.0 g/dL    Protein Total 6.1 (L) 6.8 - 8.8 g/dL    Alkaline Phosphatase 65 40 - 150 U/L    ALT 24 0 - 50 U/L    AST 21 0 - 45 U/L   TSH    Collection Time: 09/02/20  3:53 PM   Result Value Ref Range    TSH 0.02 (L) 0.40 - 4.00 mU/L   Asymptomatic COVID-19 Virus (Coronavirus) by PCR    Collection Time: 09/02/20  5:21 PM    Specimen: Nasopharyngeal   Result Value Ref Range    COVID-19 Virus PCR to [facility] - Source Nasopharyngeal     COVID-19 Virus PCR to [facility] - Result       Test received-See reflex to IDDL test SARS  CoV2 (COVID-19) Virus RT-PCR   SARS-CoV-2 COVID-19 Virus (Coronavirus) RT-PCR Nasopharyngeal    Collection Time: 09/02/20  5:21 PM    Specimen: Nasopharyngeal   Result Value Ref Range    SARS-CoV-2 Virus Specimen Source Nasopharyngeal     SARS-CoV-2 PCR Result NEGATIVE     SARS-CoV-2 PCR Comment       Testing was performed using the [employer]ptima SARS-CoV-2 Assay on the [employer] Instrument System.   Additional information about this Emergency Use Authorization (EUA) assay can be found via   the Lab Guide.     TSH with free T4 reflex and/or T3 as indicated    Collection Time: 09/03/20  7:52 AM   Result Value Ref Range    TSH 0.01 (L) 0.40 - 4.00 mU/L   Vitamin B12    Collection Time: 09/03/20  7:52 AM   Result Value Ref Range    Vitamin B12 848 193 - 986 pg/mL   Folate    Collection Time: 09/03/20  7:52 AM   Result Value Ref Range    Folate 4.3 (L) >5.4 ng/mL   Valproic acid    Collection Time: 09/03/20  7:52 AM   Result Value Ref Range    Valproic Acid Level <3 (L) 50 - 100 mg/L   Vitamin D    Collection Time: 09/03/20  7:52 AM   Result Value Ref Range    Vitamin D Deficiency screening 41 20 - 75 ug/L   T4 free    Collection Time: 09/03/20  7:52 AM   Result Value Ref Range    T4 Free 1.70 (H) 0.76 - 1.46 ng/dL   EKG 12-lead, complete    Collection Time: 09/03/20  8:50 AM   Result Value Ref Range    Interpretation ECG Click View Image link to view waveform and result             Psychiatric Examination:   Temp: 96.5  F (35.8  C) Temp src: Tympanic BP: 117/80 Pulse: 85   Resp: 16 SpO2: 99 % O2 Device: None (Room air)    Weight is 0 lbs 0 oz  Body mass index is 23.63 kg/m .    Appearance: awake, alert and adequately groomed  Attitude:  cooperative  Eye Contact:  good  Mood:  better  Affect:  appropriate and in normal range  Speech:  clear, coherent  Psychomotor Behavior:  no evidence of tardive dyskinesia, dystonia, or tics  Throught Process:  logical  Associations:  no loose associations  Thought Content:  no evidence of  suicidal ideation or homicidal ideation  Insight:  good  Judgement:  intact  Oriented to:  time, person, and place  Attention Span and Concentration:  intact  Recent and Remote Memory:  intact         Precautions:     Behavioral Orders   Procedures     Code 1 - Restrict to Unit     Fall precautions     Routine Programming     As clinically indicated     Single Room     Status 15     Every 15 minutes.          DIagnoses:   1.  Major depressive disorder, recurrent, severe, without psychosis  2.  Generalized anxiety disorder  3.  Panic disorder, per patient  4.  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  PTSD per history  6.  Medical problems include the following: Hypothyroidism, low back pain, pressure ulcers, cardiomyopathy, COPD, ELI, type 2 diabetes, CKD, stage IV, chronic iron deficiency anemia, history of TIA, hypertension, PMR.         Plan:   Patient is a 65 y/o female who was admitted with failure to thrive.  Patient has been able to eat, and will be encouraged to walk around on the unit and attend groups.           Medication changes will include the following:   --Increase Cymbalta to 40mg  --Change Depakote to 250 mg, at bedtime  --Continue Seroquel 50 mg, at bedtime  --Increase Remeron to 30 mg, on admission  --Continue Melatonin 6 mg, qhs  --Discontinue Lexapro due to abnormal EKG.  Discontinue BuSpar, does not seem to be helpful.  Discontinue prazosin, denies nightmares and flashbacks. Discontinue Abilify    --One time  0.5mg  lorazepam to r/o catatonia. No change in patient presentation.        Disposition Plan   Reason for ongoing admission: is unable to care for self due to failure to thrive, depression  Disposition: TBD  Estimated length of stay: 7-10 days  Legal Status: voluntary   Discharge will be granted once symptoms improved.    Patient history was taken by student.  Note was prepared by student Quentin [email or web address].  Student was under direct supervision by Gela Woods  KENDRA BOATENG CNP  Date: 09/08/20  Time: 3:14 PM        More than 30 minutes were spent for assessment, documentation, and coordination of care.       This note was created with the help of Dragon dictation system. All grammatical/typing errors or context distortion are unintentional and inherent to software.

## 2020-09-08 NOTE — PROGRESS NOTES
Patient incontinent of urine X3 overnight.  Elizabeth cares completed.  Patient repositioned q 2 hrs. Patient slept for 8.5 hrs.

## 2020-09-09 ENCOUNTER — APPOINTMENT (OUTPATIENT)
Dept: PHYSICAL THERAPY | Facility: CLINIC | Age: 65
DRG: 885 | End: 2020-09-09
Attending: PSYCHIATRY & NEUROLOGY
Payer: MEDICARE

## 2020-09-09 PROCEDURE — 25000132 ZZH RX MED GY IP 250 OP 250 PS 637: Mod: GY | Performed by: PHYSICIAN ASSISTANT

## 2020-09-09 PROCEDURE — 97116 GAIT TRAINING THERAPY: CPT | Mod: GP

## 2020-09-09 PROCEDURE — 97530 THERAPEUTIC ACTIVITIES: CPT | Mod: GP

## 2020-09-09 PROCEDURE — 25000132 ZZH RX MED GY IP 250 OP 250 PS 637: Mod: GY | Performed by: NURSE PRACTITIONER

## 2020-09-09 PROCEDURE — 25000132 ZZH RX MED GY IP 250 OP 250 PS 637: Mod: GY | Performed by: PSYCHIATRY & NEUROLOGY

## 2020-09-09 PROCEDURE — 12400002 ZZH R&B MH SENIOR/ADOLESCENT

## 2020-09-09 PROCEDURE — 99233 SBSQ HOSP IP/OBS HIGH 50: CPT | Performed by: NURSE PRACTITIONER

## 2020-09-09 RX ADMIN — POLYETHYLENE GLYCOL 3350 17 G: 17 POWDER, FOR SOLUTION ORAL at 08:54

## 2020-09-09 RX ADMIN — ACETAMINOPHEN 1000 MG: 500 TABLET, FILM COATED ORAL at 19:01

## 2020-09-09 RX ADMIN — ROSUVASTATIN CALCIUM 10 MG: 10 TABLET, FILM COATED ORAL at 17:35

## 2020-09-09 RX ADMIN — METOPROLOL SUCCINATE 25 MG: 25 TABLET, EXTENDED RELEASE ORAL at 09:17

## 2020-09-09 RX ADMIN — LEVOTHYROXINE SODIUM 100 MCG: 100 TABLET ORAL at 09:15

## 2020-09-09 RX ADMIN — MAGNESIUM HYDROXIDE 30 ML: 400 SUSPENSION ORAL at 19:00

## 2020-09-09 RX ADMIN — MELATONIN TAB 3 MG 6 MG: 3 TAB at 19:00

## 2020-09-09 RX ADMIN — ASPIRIN 81 MG CHEWABLE TABLET 81 MG: 81 TABLET CHEWABLE at 09:15

## 2020-09-09 RX ADMIN — Medication 50 MCG: at 09:15

## 2020-09-09 RX ADMIN — FERROUS SULFATE TAB 325 MG (65 MG ELEMENTAL FE) 325 MG: 325 (65 FE) TAB at 09:15

## 2020-09-09 RX ADMIN — ACETAMINOPHEN 1000 MG: 500 TABLET, FILM COATED ORAL at 09:15

## 2020-09-09 RX ADMIN — DULOXETINE HYDROCHLORIDE 40 MG: 20 CAPSULE, DELAYED RELEASE ORAL at 09:15

## 2020-09-09 RX ADMIN — MIRTAZAPINE 30 MG: 30 TABLET, FILM COATED ORAL at 20:05

## 2020-09-09 RX ADMIN — DIVALPROEX SODIUM 250 MG: 125 CAPSULE, COATED PELLETS ORAL at 20:05

## 2020-09-09 RX ADMIN — QUETIAPINE FUMARATE 50 MG: 50 TABLET ORAL at 20:05

## 2020-09-09 RX ADMIN — KETOCONAZOLE: 20 CREAM TOPICAL at 20:05

## 2020-09-09 RX ADMIN — POTASSIUM CHLORIDE 10 MEQ: 750 CAPSULE, EXTENDED RELEASE ORAL at 09:15

## 2020-09-09 RX ADMIN — PANTOPRAZOLE SODIUM 40 MG: 40 TABLET, DELAYED RELEASE ORAL at 09:15

## 2020-09-09 RX ADMIN — CLOPIDOGREL BISULFATE 75 MG: 75 TABLET, FILM COATED ORAL at 09:15

## 2020-09-09 RX ADMIN — FLUTICASONE FUROATE AND VILANTEROL TRIFENATATE 1 PUFF: 200; 25 POWDER RESPIRATORY (INHALATION) at 09:16

## 2020-09-09 RX ADMIN — SENNOSIDES 2 TABLET: 8.6 TABLET, FILM COATED ORAL at 17:34

## 2020-09-09 ASSESSMENT — ACTIVITIES OF DAILY LIVING (ADL)
DRESS: WITH ASSISTANCE
ORAL_HYGIENE: WITH ASSISTANCE
HYGIENE/GROOMING: WITH ASSISTANCE
ORAL_HYGIENE: WITH ASSISTANCE
DRESS: WITH ASSISTANCE
HYGIENE/GROOMING: INDEPENDENT

## 2020-09-09 NOTE — PROGRESS NOTES
Patient presents flat and calm in conversation and did brighten slightly after some time in conversation. Patient denies SI and SIB with depression 3 out of 10. Patient came to dinner spent time sitting quietly or talking  with select peers. Some delay in responding to questions and is brief and fairly non-descript. Appropriate on the unit and cooperative.

## 2020-09-09 NOTE — PLAN OF CARE
Discharge Planner PT   Patient plan for discharge: Pt does not state, nursing reports plan to return to previous location  Current status: Variable IND/initiation due to anxiety. Min-A supine>sit, SBA to return to bed. Mod to min-A sit<>stand, anxiety affects form. Ambulating 60' CGA to min-A with heavy encouragement and w/c follow. Pt has notably decreased DF ROM due to significant time not WB prior to admission here.  Barriers to return to prior living situation: Medical/anxiety  Recommendations for discharge: Previous living situation with HH PT  Rationale for recommendations: Pt ambulating in this facility more than reported baseline. Unclear why she was not mobilizing prior to arrival, would benefit from HH PT to continue mobility progression.       Entered by: Arsen Loja 09/09/2020 12:29 PM

## 2020-09-09 NOTE — PROGRESS NOTES
09/08/20 2200   Therapeutic Recreation   Type of Intervention structured groups   Activity game   Response Participates, initiates socially appropriate   Hours 1     Pt participated in Therapeutic Recreation group with focus on leisure participation, communication skills, and critical thinking. Engaged and focused in the group recreational activity via a group game.  Pt was a full participant throughout the entire duration of group.  Pt was eager to offer help and give clues to others throughout the group.

## 2020-09-09 NOTE — PROGRESS NOTES
Ate approx 75% of her meal tonight.  Needs much encouragement to come out of her room.  Ambulated to the lounge and is unsteady with transfers.  Needs stand by assist for walking.  Incontinent of urine x2.

## 2020-09-09 NOTE — PROGRESS NOTES
Patient attended a 60 minute psycho-education group on the topic of boundaries. Patients learned the definition of a boundary, the areas of personal responsibility, how to identify healthy and unhealthy boundaries and how to change unhealthy boundaries into healthy boundaries. Patient was an active participant who contributed to the discussion of how to set healthy boundaries.

## 2020-09-09 NOTE — PROGRESS NOTES
Continues to need much encouragement for activity.  Will be up at least 3 hrs this shift, will be attending group, and ambulated from her room to the lounge then to the OT room and back to the lounge again with stand by assist.

## 2020-09-09 NOTE — PROGRESS NOTES
"[facility], [place]   Psychiatric Progress Note        Interim History:   From H&P: Letty Escobar is a 64 year old female with longstanding mental health disease including personality disorder, [place] phobia, panic disorder and PTSD.  She is currently on  multiple chronic psychiatric medications including Abilify, BuSpar, Celexa, melatonin and Remeron but still failing at the local nursing home.  They  describe a situation and basically failure to thrive.  She is not eating or walking.  She is scared to do these things due to paranoia.  She denies suicidal homicidal thoughts.  She has lost 29 pounds they report.  No fever.  No cough.  No infectious disease symptoms.  She does have a couple areas of minor skin breakdown due to pressure.    Team meeting report: The patient's care was discussed with the treatment team during the daily team meeting and/or staff's chart notes were reviewed.  Staff report patient has been calm, pleasant, cooperative. Patient is attending groups with active participation. Patient is eating well and taking medications as prescribed. Rates mood as very anxious, with little depression. Slept 8 hours.  Patient requires encouragement to eat or attend groups.  If patient is given instructions (ie \"you need to eat two things off your plate\") the patient will comply.  Patient is still very anxious about ambulating.  Ambulated last evening from her room to the lounge with transfers, was unsteady.    Met with patient. Patient is alert and easy to converse with, but is easily distracted by other patients/staff while having our discussion in the lounge.  Patient cannot remember the identity of provider and student.  Patient reports her anxiety levels are the same, reporting the same anxieties with walking and swallowing.  Patient denies depression and SI/SIB.  She reports sleeping about 8 hours.  Patient is hesitant about participating with PT later in the day.  Assured " patient PT will be beneficial, and will speed up the recovery process.  Patient had a conversation with her  yesterday, patient reports she thoroughly enjoyed talking with him.          Medications:       acetaminophen  1,000 mg Oral BID     aspirin  81 mg Oral Daily     clopidogrel  75 mg Oral Daily     divalproex sodium delayed-release  250 mg Oral At Bedtime     DULoxetine  40 mg Oral Daily     ferrous sulfate  325 mg Oral Daily with breakfast     fish oil-omega-3 fatty acids  1 g Oral Daily     fluticasone-vilanterol  1 puff Inhalation Daily     ketoconazole   Topical At Bedtime     levothyroxine  100 mcg Oral QAM AC     melatonin  6 mg Oral At Bedtime     menthol   Transdermal Q8H     metoprolol succinate ER  25 mg Oral Daily     mirtazapine  30 mg Oral At Bedtime     pantoprazole  40 mg Oral Daily     potassium chloride ER  10 mEq Oral Daily     QUEtiapine  50 mg Oral At Bedtime     rosuvastatin  10 mg Oral At Bedtime     sennosides  2 tablet Oral At Bedtime     vitamin D3  50 mcg Oral Daily          Allergies:     Allergies   Allergen Reactions     Contrast Dye      Isosorbide Mononitrate [Isosorbide]      Nitroglycerin      Nystatin      Sulfa Drugs      Adhesive Tape Rash     Diphenhydramine Hcl Palpitations     Penicillins Rash          Labs:     Recent Results (from the past 672 hour(s))   Basic metabolic panel    Collection Time: 08/17/20  7:38 AM   Result Value Ref Range    Sodium 139 133 - 144 mmol/L    Potassium 4.3 3.4 - 5.3 mmol/L    Chloride 108 94 - 109 mmol/L    Carbon Dioxide 28 20 - 32 mmol/L    Anion Gap 3 3 - 14 mmol/L    Glucose 74 70 - 99 mg/dL    Urea Nitrogen 19 7 - 30 mg/dL    Creatinine 0.82 0.52 - 1.04 mg/dL    GFR Estimate 75 >60 mL/min/[1.73_m2]    GFR Estimate If Black 87 >60 mL/min/[1.73_m2]    Calcium 10.6 (H) 8.5 - 10.1 mg/dL   T4 free    Collection Time: 08/17/20  7:38 AM   Result Value Ref Range    T4 Free 1.88 (H) 0.76 - 1.46 ng/dL   TSH    Collection Time: 08/17/20   7:38 AM   Result Value Ref Range    TSH 0.01 (L) 0.40 - 4.00 mU/L   CBC with platelets differential    Collection Time: 08/28/20  8:11 AM   Result Value Ref Range    WBC 6.8 4.0 - 11.0 10e9/L    RBC Count 3.33 (L) 3.8 - 5.2 10e12/L    Hemoglobin 10.1 (L) 11.7 - 15.7 g/dL    Hematocrit 31.0 (L) 35.0 - 47.0 %    MCV 93 78 - 100 fl    MCH 30.3 26.5 - 33.0 pg    MCHC 32.6 31.5 - 36.5 g/dL    RDW 13.8 10.0 - 15.0 %    Platelet Count 148 (L) 150 - 450 10e9/L    Diff Method Automated Method     % Neutrophils 56.7 %    % Lymphocytes 34.2 %    % Monocytes 6.7 %    % Eosinophils 2.3 %    % Basophils 0.1 %    Absolute Neutrophil 3.9 1.6 - 8.3 10e9/L    Absolute Lymphocytes 2.3 0.8 - 5.3 10e9/L    Absolute Monocytes 0.5 0.0 - 1.3 10e9/L    Absolute Eosinophils 0.2 0.0 - 0.7 10e9/L    Absolute Basophils 0.0 0.0 - 0.2 10e9/L   Comprehensive metabolic panel    Collection Time: 08/28/20  8:11 AM   Result Value Ref Range    Sodium 139 133 - 144 mmol/L    Potassium 4.1 3.4 - 5.3 mmol/L    Chloride 109 94 - 109 mmol/L    Carbon Dioxide 24 20 - 32 mmol/L    Anion Gap 6 3 - 14 mmol/L    Glucose 78 70 - 99 mg/dL    Urea Nitrogen 30 7 - 30 mg/dL    Creatinine 0.97 0.52 - 1.04 mg/dL    GFR Estimate 61 >60 mL/min/[1.73_m2]    GFR Estimate If Black 70 >60 mL/min/[1.73_m2]    Calcium 12.2 (H) 8.5 - 10.1 mg/dL    Bilirubin Total 0.3 0.2 - 1.3 mg/dL    Albumin 2.4 (L) 3.4 - 5.0 g/dL    Protein Total 5.8 (L) 6.8 - 8.8 g/dL    Alkaline Phosphatase 56 40 - 150 U/L    ALT 18 0 - 50 U/L    AST 13 0 - 45 U/L   T4 free    Collection Time: 08/28/20  8:11 AM   Result Value Ref Range    T4 Free 1.68 (H) 0.76 - 1.46 ng/dL   TSH    Collection Time: 08/28/20  8:11 AM   Result Value Ref Range    TSH 0.01 (L) 0.40 - 4.00 mU/L   CBC with platelets differential    Collection Time: 09/02/20  3:53 PM   Result Value Ref Range    WBC 8.1 4.0 - 11.0 10e9/L    RBC Count 3.42 (L) 3.8 - 5.2 10e12/L    Hemoglobin 10.4 (L) 11.7 - 15.7 g/dL    Hematocrit 31.3 (L) 35.0 -  47.0 %    MCV 92 78 - 100 fl    MCH 30.4 26.5 - 33.0 pg    MCHC 33.2 31.5 - 36.5 g/dL    RDW 13.5 10.0 - 15.0 %    Platelet Count 188 150 - 450 10e9/L    Diff Method Automated Method     % Neutrophils 55.4 %    % Lymphocytes 34.0 %    % Monocytes 7.4 %    % Eosinophils 1.8 %    % Basophils 0.4 %    % Immature Granulocytes 1.0 %    Nucleated RBCs 0 0 /100    Absolute Neutrophil 4.5 1.6 - 8.3 10e9/L    Absolute Lymphocytes 2.8 0.8 - 5.3 10e9/L    Absolute Monocytes 0.6 0.0 - 1.3 10e9/L    Absolute Basophils 0.0 0.0 - 0.2 10e9/L    Abs Immature Granulocytes 0.1 0 - 0.4 10e9/L    Absolute Nucleated RBC 0.0    Comprehensive metabolic panel    Collection Time: 09/02/20  3:53 PM   Result Value Ref Range    Sodium 139 133 - 144 mmol/L    Potassium 4.1 3.4 - 5.3 mmol/L    Chloride 109 94 - 109 mmol/L    Carbon Dioxide 24 20 - 32 mmol/L    Anion Gap 6 3 - 14 mmol/L    Glucose 98 70 - 99 mg/dL    Urea Nitrogen 30 7 - 30 mg/dL    Creatinine 0.97 0.52 - 1.04 mg/dL    GFR Estimate 61 >60 mL/min/[1.73_m2]    GFR Estimate If Black 70 >60 mL/min/[1.73_m2]    Calcium 11.9 (H) 8.5 - 10.1 mg/dL    Bilirubin Total 0.1 (L) 0.2 - 1.3 mg/dL    Albumin 2.5 (L) 3.4 - 5.0 g/dL    Protein Total 6.1 (L) 6.8 - 8.8 g/dL    Alkaline Phosphatase 65 40 - 150 U/L    ALT 24 0 - 50 U/L    AST 21 0 - 45 U/L   TSH    Collection Time: 09/02/20  3:53 PM   Result Value Ref Range    TSH 0.02 (L) 0.40 - 4.00 mU/L   Asymptomatic COVID-19 Virus (Coronavirus) by PCR    Collection Time: 09/02/20  5:21 PM    Specimen: Nasopharyngeal   Result Value Ref Range    COVID-19 Virus PCR to [facility] - Source Nasopharyngeal     COVID-19 Virus PCR to [facility] - Result       Test received-See reflex to IDDL test SARS CoV2 (COVID-19) Virus RT-PCR   SARS-CoV-2 COVID-19 Virus (Coronavirus) RT-PCR Nasopharyngeal    Collection Time: 09/02/20  5:21 PM    Specimen: Nasopharyngeal   Result Value Ref Range    SARS-CoV-2 Virus Specimen Source Nasopharyngeal     SARS-CoV-2 PCR Result  NEGATIVE     SARS-CoV-2 PCR Comment       Testing was performed using the [employer]ptima SARS-CoV-2 Assay on the [employer] Instrument System.   Additional information about this Emergency Use Authorization (EUA) assay can be found via   the Lab Guide.     TSH with free T4 reflex and/or T3 as indicated    Collection Time: 09/03/20  7:52 AM   Result Value Ref Range    TSH 0.01 (L) 0.40 - 4.00 mU/L   Vitamin B12    Collection Time: 09/03/20  7:52 AM   Result Value Ref Range    Vitamin B12 848 193 - 986 pg/mL   Folate    Collection Time: 09/03/20  7:52 AM   Result Value Ref Range    Folate 4.3 (L) >5.4 ng/mL   Valproic acid    Collection Time: 09/03/20  7:52 AM   Result Value Ref Range    Valproic Acid Level <3 (L) 50 - 100 mg/L   Vitamin D    Collection Time: 09/03/20  7:52 AM   Result Value Ref Range    Vitamin D Deficiency screening 41 20 - 75 ug/L   T4 free    Collection Time: 09/03/20  7:52 AM   Result Value Ref Range    T4 Free 1.70 (H) 0.76 - 1.46 ng/dL   EKG 12-lead, complete    Collection Time: 09/03/20  8:50 AM   Result Value Ref Range    Interpretation ECG Click View Image link to view waveform and result             Psychiatric Examination:   Temp: 97.2  F (36.2  C) Temp src: Oral BP: 129/82 Pulse: 84   Resp: 16 SpO2: 100 % O2 Device: None (Room air)    Weight is 0 lbs 0 oz  Body mass index is 23.63 kg/m .    Appearance: awake, alert and adequately groomed  Attitude:  cooperative  Eye Contact:  fair  Mood:  better  Affect:  appropriate and in normal range  Speech:  clear, coherent  Psychomotor Behavior:  no evidence of tardive dyskinesia, dystonia, or tics  Throught Process:  logical  Associations:  no loose associations  Thought Content:  no evidence of suicidal ideation or homicidal ideation  Insight:  fair  Judgement:  intact  Oriented to:  time, person, and place  Attention Span and Concentration:  fair  Recent and Remote Memory:  intact         Precautions:     Behavioral Orders   Procedures     Code 1 - Restrict  to Unit     Fall precautions     Routine Programming     As clinically indicated     Single Room     Status 15     Every 15 minutes.          DIagnoses:   1.  Major depressive disorder, recurrent, severe, without psychosis  2.  Generalized anxiety disorder  3.  Panic disorder, per patient  4.  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  PTSD per history  6.  Medical problems include the following: Hypothyroidism, low back pain, pressure ulcers, cardiomyopathy, COPD, ELI, type 2 diabetes, CKD, stage IV, chronic iron deficiency anemia, history of TIA, hypertension, PMR.         Plan:   Patient is a 63 y/o female who was admitted with failure to thrive.        Medication changes will include the following:   --Increase Cymbalta to 40mg  --Change Depakote to 250 mg, at bedtime  --Continue Seroquel 50 mg, at bedtime  --Increase Remeron to 30 mg, on admission  --Continue Melatonin 6 mg, qhs  --Discontinue Lexapro due to abnormal EKG.  Discontinue BuSpar, does not seem to be helpful.  Discontinue prazosin, denies nightmares and flashbacks. Discontinue Abilify    --One time  0.5mg  lorazepam to r/o catatonia. No change in patient presentation.       Disposition Plan   Reason for ongoing admission: is unable to care for self due to failure to thrive, depression  Disposition: TBD  Estimated length of stay: 7-10 days  Legal Status: voluntary   Discharge will be granted once symptoms improved.    Patient history was taken by student.  Note was prepared by student Quentin [email or web address].  Student was under direct supervision by Gela Woods DNP.  Date: 09/09/20  Time: 12:16 PM     More than 30 minutes were spent for assessment, documentation, and coordination of care.     This note was created with the help of Dragon dictation system. All grammatical/typing errors or context distortion are unintentional and inherent to software.

## 2020-09-09 NOTE — PLAN OF CARE
"  Problem: Depressive Symptoms  Goal: Social and Therapeutic (Depression)  Description: Signs and symptoms of listed problems will be absent or manageable.  Outcome: Improving     Problem: Depressive Symptoms  Goal: Depressive Symptoms  Description: Signs and symptoms of listed problems will be absent or manageable.  Outcome: Improving   48 HOUR NURSING ASSESSMENT:  Pt has been pleasant and cooperative. Pt denies depression as well as SI/SIB. Pt does report anxiety \"I'm always anxious\". Pt appears calm but does show increased anxiety during cares, ambulation and eating. Pt worries that she is going to choke when she is eating and taking her pills. Pt also worries that she is going to fall during transfers and ambulation.   Pt is transferring and ambulating longer distances. Pt walked with her walker,writer and a transfer belt for approximately 25 feet in the ayala this AM with staff following behind with a wheelchair.   Pt has been eating 50% of meals with a lot of encouragement and prompts. Pt took 50% of her oatmeal this AM with brown sugar and her protein supplement as well as 100% of her omelet. Pt has verbalized feeling constipated and reports that she has not had a BM since prior to coming to the hospital (at least 1 week ago). Pt was given prn miralax mixed in 360 ml of warm water.     1200- MOM order modified to daily prn to prn at HS.   Pt attended community meeting and occupational therapy this AM. Pt was assisted to bed at approximately 1115.  Pt needed a lot of coaxing and direction by writer to work with PT this AM.   Pt is resting in bed and agreeable to get up for lunch around 1245.   Pt was assisted to the bathroom at 1240. Pt ambulated with walker, writer and gait belt. Pt needs reassurance that she is not going to fall. Pt had been incontinent of a moderate amount of urine. Pt has not had a bowel movement since receiving miralax this AM. Pt denies nausea, abdominal pain or feeling constipated. Pt " ate 100% of her pureed chicken for lunch. (which was 50% of what was provided). Pt was given 360 ml of warm water and patient was encouraged to drink this prior to 1500.   Pt dressings are dry and intact (right ear, right lateral ankle, right lateral foot/toe and coccyx.

## 2020-09-09 NOTE — PLAN OF CARE
"  Problem: OT General Care Plan  Goal: OT Goal 1  Description: Will attend OT groups when able,  process through organizing work and thoughts with  concrete familiar step opportunities. Assess cognition further in problem solving and organization.     Note: Pt again attended OT group for approximately 15 minutes (at no charge) as prior day's session. She filled out the OT goal form then asked to be present and not work on any structured task. She asked to go back to her room at that time stated needing to \"lie down\". She stated reason for admission as \"fell @ home\". She did not identify any personal strengths and staed a change she hopes for at time of discharge was \"strength\". She stated feeling \"afraid of everything\". The OT goals she chose to focus on were feel more comfortable trying new things and ask for help when needed. Affect appeared flat. Pt was given and completed a written self assessment. OT purpose was explained with the value of having involvement in treatment plan, and provided options to meet self identified goals. Plan: Provide structure, support, and encouragement through attendance to groups. Assist pt to increase self awareness regarding expanding helpful coping strategies. Encourage asking assistance as needed and will be drawn into conversation as comfort allows.further in the areas of organization, memory, problem solving. Provide successful experiences in reinforcing positive self esteem. Will assess further in the areas of organization, problem solving, concentration.        "

## 2020-09-10 PROCEDURE — 25000132 ZZH RX MED GY IP 250 OP 250 PS 637: Mod: GY | Performed by: PHYSICIAN ASSISTANT

## 2020-09-10 PROCEDURE — G0463 HOSPITAL OUTPT CLINIC VISIT: HCPCS

## 2020-09-10 PROCEDURE — 25000132 ZZH RX MED GY IP 250 OP 250 PS 637: Mod: GY | Performed by: NURSE PRACTITIONER

## 2020-09-10 PROCEDURE — 99233 SBSQ HOSP IP/OBS HIGH 50: CPT | Performed by: NURSE PRACTITIONER

## 2020-09-10 PROCEDURE — 12400002 ZZH R&B MH SENIOR/ADOLESCENT

## 2020-09-10 PROCEDURE — 25000132 ZZH RX MED GY IP 250 OP 250 PS 637: Mod: GY | Performed by: PSYCHIATRY & NEUROLOGY

## 2020-09-10 RX ADMIN — METOPROLOL SUCCINATE 25 MG: 25 TABLET, EXTENDED RELEASE ORAL at 09:32

## 2020-09-10 RX ADMIN — ROSUVASTATIN CALCIUM 10 MG: 10 TABLET, FILM COATED ORAL at 16:37

## 2020-09-10 RX ADMIN — KETOCONAZOLE: 20 CREAM TOPICAL at 20:16

## 2020-09-10 RX ADMIN — ASPIRIN 81 MG CHEWABLE TABLET 81 MG: 81 TABLET CHEWABLE at 09:33

## 2020-09-10 RX ADMIN — QUETIAPINE FUMARATE 50 MG: 50 TABLET ORAL at 20:15

## 2020-09-10 RX ADMIN — ACETAMINOPHEN 1000 MG: 500 TABLET, FILM COATED ORAL at 20:15

## 2020-09-10 RX ADMIN — Medication 50 MCG: at 09:33

## 2020-09-10 RX ADMIN — SENNOSIDES 2 TABLET: 8.6 TABLET, FILM COATED ORAL at 16:36

## 2020-09-10 RX ADMIN — FERROUS SULFATE TAB 325 MG (65 MG ELEMENTAL FE) 325 MG: 325 (65 FE) TAB at 09:32

## 2020-09-10 RX ADMIN — DIVALPROEX SODIUM 250 MG: 125 CAPSULE, COATED PELLETS ORAL at 20:15

## 2020-09-10 RX ADMIN — MENTHOL 1 PATCH: 205.5 PATCH TOPICAL at 20:17

## 2020-09-10 RX ADMIN — MIRTAZAPINE 30 MG: 30 TABLET, FILM COATED ORAL at 20:15

## 2020-09-10 RX ADMIN — Medication 1 G: at 09:31

## 2020-09-10 RX ADMIN — PANTOPRAZOLE SODIUM 40 MG: 40 TABLET, DELAYED RELEASE ORAL at 09:32

## 2020-09-10 RX ADMIN — FLUTICASONE FUROATE AND VILANTEROL TRIFENATATE 1 PUFF: 200; 25 POWDER RESPIRATORY (INHALATION) at 10:33

## 2020-09-10 RX ADMIN — DULOXETINE HYDROCHLORIDE 40 MG: 20 CAPSULE, DELAYED RELEASE ORAL at 09:32

## 2020-09-10 RX ADMIN — CLOPIDOGREL BISULFATE 75 MG: 75 TABLET, FILM COATED ORAL at 09:33

## 2020-09-10 RX ADMIN — LEVOTHYROXINE SODIUM 100 MCG: 100 TABLET ORAL at 09:32

## 2020-09-10 RX ADMIN — MELATONIN TAB 3 MG 6 MG: 3 TAB at 20:15

## 2020-09-10 RX ADMIN — POTASSIUM CHLORIDE 10 MEQ: 750 CAPSULE, EXTENDED RELEASE ORAL at 09:33

## 2020-09-10 RX ADMIN — ACETAMINOPHEN 1000 MG: 500 TABLET, FILM COATED ORAL at 09:33

## 2020-09-10 ASSESSMENT — ACTIVITIES OF DAILY LIVING (ADL)
ORAL_HYGIENE: WITH ASSISTANCE;PROMPTS
HYGIENE/GROOMING: WITH ASSISTANCE;PROMPTS
LAUNDRY: UNABLE TO COMPLETE
DRESS: SCRUBS (BEHAVIORAL HEALTH);WITH ASSISTANCE;PROMPTS
DRESS: SCRUBS (BEHAVIORAL HEALTH);WITH ASSISTANCE
ORAL_HYGIENE: WITH ASSISTANCE;PROMPTS
LAUNDRY: UNABLE TO COMPLETE
HYGIENE/GROOMING: WITH ASSISTANCE;PROMPTS

## 2020-09-10 NOTE — PROGRESS NOTES
09/10/20 1200   General Information   Special Considerations completed on 9-   Clinical Impression   Affect Flat   Orientation Oriented to person;Oriented to place;Disorientated to time;Needs further assessment   Appearance and ADLs Needs physical assistance   Attention to Internal Stimuli Other (see comments)  (appears distracted with stated hip/pelvis discomfort)   Interaction Skills Interacts appropriately with staff;Interacts appropriately with peers   Ability to Communicate Needs Independent   Verbal Content [place];Appropriate to topic;Clear   Ability to Maintain Boundaries Maintains appropriate physical boundaries;Maintains appropriate verbal boundaries   Participation Participates with minimal encouragement   Concentration Concentrates 30+ minutes   Ability to Concentrate With structure   Follows and Comprehends Directions Independently follows 1 step verbal directions   Memory Needs further assessment   Organization Independently organizes medium tasks;Needs further assessment   Decision Making Independent   Planning and Problem Solving Needs further assessment   Ability to Apply and Learn Concepts Applies within group structure   Frustrations / Stress Tolerance Needs further assessment   Level of Insight Some insight;Needs further assessment   Self Esteem Poor self esteem;Needs further assessment   Social Supports Identifies utilizing supports   General Observation/Plan   General Observations/Plan See Comments   Attended 1 of 3 OT groups. She worked on a familiar task using figure ground and visuospatial concepts, which was done successfully and at a quick pace. She asked assistance as needed. Affect appeared flat. She asked to not stay for the 2nd group and instead to go lie down with her hip/pelvic pain from sitting in the chair. She stated the cushions she was sitting on has not helped. See OT note from 9-9-2020 for details of OT goals she chose to work on. Pt was given and completed a  written self assessment. OT purpose was explained with the value of having involvement in treatment plan, and provided options to meet self identified goals.  Plan: Will Provide structure, support, and encouragement. Offer education on coping strategies and life management skills. Assist pt to increase self awareness regarding mental health issues and expand network of support resources. Support all efforts of involvement and participation with encouraging staying for increasing lengths of time to work on building endurance. Assess further.

## 2020-09-10 NOTE — PROGRESS NOTES
"[facility] Nurse Inpatient Pressure Injury Assessment   Reason for consultation: Evaluate and treat R ear, R ankle, R foot, High risk coccyx      ASSESSMENT  Pressure Injury: on Right ear, present on admission   Pressure Injury is Stage 2   Contributing factor of the pressure injury: pressure and immobility  Status: stable     Pressure Injury: on Right lateral malleolus , present on admission  Pressure Injury is Stage either Stage 2 or 3 deferring definitive staging until wound base has evolved   Contributing factor of the pressure injury: pressure and immobility  Status: stable    Pressure Injury: on Right 5th lateral toe, present on admission   Pressure Injury is Stage either Stage 2 or 3 deferring definitive staging until wound base has evolved   Contributing factor of the pressure injury: pressure and immobility  Status: improving    Coccyx intact with dark pink blanchable erythema    Recommend provider order: none, Pulsate in place     TREATMENT PLAN  R ear wound: Every 3 days   Cleanse ear with soap and water.    Apply 3x3 mepilex (order # [id number]) to edge of ear for protection    R ankle and 5th lateral toe: daily  Cleanse with soap and water.    Continue off loading boot at all times while in bed.  OK to apply mepilex for comfort, changing every three days      Pressure Injury Prevention (PIP) Plan:  If patient is declining pressure injury prevention interventions: Explore reason why and address patient's concerns, Educate on pressure injury risk and prevention intervention(s), If patient is still declining, document \"informed refusal\"  and Ensure Care team is aware ( provider, charge nurse, etc)  Mattress: Follow bed algorithm, reassess daily and order specialty mattress, if indicated. Pulsate mattress if MD agrees.  NO green pads.  Use only one covidian pad under pt, OK for draw sheet to help move pt in bed.  No egg crate mattress  HOB: Maintain at or below 30 degrees, unless contraindicated  Repositioning in bed: " "Every 1-2 hours , Left/right positioning; avoid supine and Raise foot of bed prior to raising head of bed, to reduce patient sliding down (shear)  Heels: Keep elevated off mattress, Pillows under calves and Heel lift boots  Protective Dressing: Sacral Mepilex for prevention (#[id number])  Positioning Equipment: pillows  Chair positioning: Chair cushion (#[id number])  and Assist patient to reposition hourly   If patient has a buttock pressure injury, or high risk for PI use chair cushion or SPS.  Moisture Management: Perineal cleansing /protection: Follow Incontinence Protocol, Clean and dry skin folds with bathing  and Consider InterDry (#716152) between folds      Skin folds with erythema:    Cleanse the area and dry thoroughly.    Cut a piece of interdry (#[id number]) and place it as a single layer and date it.     Ensure to leave at least 2\" of tail beyond the fold to promote moisture wicking.    May leave same interdry for 5 days if not soiled.    DO not use this product with any other creams or powder.    Orders Reviewed  WOC Nurse follow-up plan:weekly  Nursing to notify the Provider(s) and re-consult the WOC Nurse if wound(s) deteriorates or new skin concern.    Patient History  According to provider note(s):  Letty Escobar is a 64 year old year old woman with a history of Bivetricular ICD, CAD, CHF, COPD, DMII, PMR, carotid artery stenosis, 'HTN, depression who is admitted to station 3B with FTT.     Objective Data  Containment of urine/stool: Incontinence Protocol    Current Diet/ Nutrition:  Orders Placed This Encounter      Adult Pureed Diet      Output:   I/O last 3 completed shifts:  In: 1370 [P.O.:1370]  Out: -     Risk Assessment:   Sensory Perception: 4-->no impairment  Moisture: 3-->occasionally moist  Activity: 3-->walks occasionally  Mobility: 3-->slightly limited  Nutrition: 2-->probably inadequate  Friction and Shear: 1-->problem  Hawk Score: 16      Labs:   No lab results found in last 7 " days.    Invalid input(s): GLUCOMBO    Physical Exam  Skin inspection: focused R ear, R ankle, R toe, perinal skin    Wound Location:  R ear      Date of last Photo 9/3/2020  Wound History: Pt endorses she prefers to lay on her right side.  Wounds present on admit and pt has poor mobility.    Measurements (length x width x depth, in cm) 0.3 x 0.1 x <0.1 cm   Wound Base:  Cream colored base  Palpation of the wound bed: normal   Periwound skin: intact, edematous and erythema- blanchable  Color: pink  Temperature: normal   Drainage:, scant  Description of drainage: serous  Odor: none  Pain: mild and during dressing change, tender       Wound Location:  R lateral malleolus and 5th lateral toe            Date of last Photo 9/3/2020  Wound History: Pt endorses she prefers to lay on her right side.  Wounds present on admit and pt has poor mobility.    Measurements (length x width x depth, in cm)  L lateral malleolus: 1 x 1 x 0.1 cm dried drainage  L 5th lateral toe: 1 x 0.8 x 0 cm dried drainage  Wound Base: see above  Palpation of the wound bed: normal   Periwound skin: intact  Color: normal and consistent with surrounding tissue  Temperature: normal   Drainage:, none  Description of drainage: dried  Odor: none  Pain: denies , none    Buttocks: Preventative Mepilex in place. Skin is intact and blanchable on assessment.     Interventions  Current support surface: Standard  Atmos Air mattress  Current off-loading measures: Heel off-loading boot(s) and Pillows  Repositioning aid: Pillows  Visual inspection of wound(s) completed   Tube Securement: n/a  Wound Care: was done per plan of care.  Supplies: ordered: sacral and 3x3 mepilex and discussed with RN  Educated provided: importance of repositioning, plan of care, wound progress and Infection prevention   Education provided to: patient   Discussed importance of:repositioning every 2 hours, off-loading pressure to wound, wearing off-loading boots, their role in pressure  injury prevention, dressing change frequency, head elevation <30 degrees, off-loading mattress, nutrition on wound healing and moisture management  Discussed plan of care with Patient and Nurse    Yarelis Leal RN, CWOCN

## 2020-09-10 NOTE — PROGRESS NOTES
"Pt slept until 09:00am when staff went in to wake her up and help her out of bed. She tried to walk and made it a few steps before asking for the wheelchair, she expressed that she does not feel safe walking. She spent majority of the shift in her room resting and sleeping. She tried to go to group but longterm through she left because she felt too tired and needed to lay down. She is concerned about when she will discharge, she asked multiple times throughout the shift when she will be leaving. She rated her anxiety at a 10/10 and said, \"I'm always anxious\" and \"nothing helps\". She presents with a blunted/flat and sad affect with a calm mood. She denied any SI and SIB.        09/10/20 1300   Behavioral Health   Hallucinations denies / not responding to hallucinations   Thinking distractable;poor concentration   Orientation person: oriented;place: oriented;date: oriented;time: oriented   Memory baseline memory   Insight poor   Judgement impaired   Eye Contact at examiner   Affect blunted, flat;sad   Mood mood is calm;anxious   Physical Appearance/Attire disheveled   Hygiene neglected grooming - unclean body, hair, teeth   Suicidality other (see comments)  (pt denies)   1. Wish to be Dead (Recent) No   2. Non-Specific Active Suicidal Thoughts (Recent) No   Self Injury other (see comment)  (none stated or observed)   Elopement   (none stated or observed)   Activity isolative;withdrawn   Speech clear;coherent   Medication Sensitivity no stated side effects;no observed side effects   Psychomotor / Gait unsteady;slow   Activities of Daily Living   Hygiene/Grooming with assistance;prompts   Oral Hygiene with assistance;prompts   Dress scrubs (behavioral health);with assistance;prompts   Laundry unable to complete   Room Organization unable     "

## 2020-09-10 NOTE — PROGRESS NOTES
Work Completed:  The patient's care was discussed with the treatment team and chart notes were reviewed  . Plan is for discharge on Monday. LVM with [facility] to inform on discharge plan and request call back to discuss.    Discharge plan or goal: Discharge Monday back to skilled nursing facility.                Barriers to discharge: Patient needs further stabilization.

## 2020-09-10 NOTE — PROGRESS NOTES
Patient turned to sides every 2 hours. Patient had a X-large BM, greenish to blackish foul-smelling loose stools. She had MOM during the evening shift r/t no BM since admission. Patient was cleaned up. Dressing at the coccyx area full of stools. Dressing changed. No Open Area noted at the coccyx and sacrum. Patient went back to sleep.

## 2020-09-10 NOTE — PROGRESS NOTES
[facility], [place]   Psychiatric Progress Note        Interim History:   From H&P: Letty Escobar is a 64 year old female with longstanding mental health disease including personality disorder, [place] phobia, panic disorder and PTSD.  She is currently on  multiple chronic psychiatric medications including Abilify, BuSpar, Celexa, melatonin and Remeron but still failing at the local nursing home.  They  describe a situation and basically failure to thrive.  She is not eating or walking.  She is scared to do these things due to paranoia.  She denies suicidal homicidal thoughts.  She has lost 29 pounds they report.  No fever.  No cough.  No infectious disease symptoms.  She does have a couple areas of minor skin breakdown due to pressure.    Team meeting report: The patient's care was discussed with the treatment team during the daily team meeting and/or staff's chart notes were reviewed.  Staff report patient has been calm, pleasant, cooperative. Patient is attending some of the groups with active participation. Patient is eating well and taking medications as prescribed. Was seen by PT who is reporting improvement in her mobility. Had a large greenish/blackish lose stool in the middle of the night. Slept all night.     Met with patient.  The patient was able to recognize this provider, unlike yesterday.  She is feeling much better.  She is able to walk with the staff present.  She attended all groups.  She is eating well.  Continues to report high anxiety however, does not appear to be anxious.  Her depression is minimal.  The patient inquiry about discharge, which will occur likely early next week.         Medications:       acetaminophen  1,000 mg Oral BID     aspirin  81 mg Oral Daily     clopidogrel  75 mg Oral Daily     divalproex sodium delayed-release  250 mg Oral At Bedtime     DULoxetine  40 mg Oral Daily     ferrous sulfate  325 mg Oral Daily with breakfast     fish  oil-omega-3 fatty acids  1 g Oral Daily     fluticasone-vilanterol  1 puff Inhalation Daily     ketoconazole   Topical At Bedtime     levothyroxine  100 mcg Oral QAM AC     melatonin  6 mg Oral At Bedtime     menthol   Transdermal Q8H     metoprolol succinate ER  25 mg Oral Daily     mirtazapine  30 mg Oral At Bedtime     pantoprazole  40 mg Oral Daily     potassium chloride ER  10 mEq Oral Daily     QUEtiapine  50 mg Oral At Bedtime     rosuvastatin  10 mg Oral At Bedtime     sennosides  2 tablet Oral At Bedtime     vitamin D3  50 mcg Oral Daily          Allergies:     Allergies   Allergen Reactions     Contrast Dye      Isosorbide Mononitrate [Isosorbide]      Nitroglycerin      Nystatin      Sulfa Drugs      Adhesive Tape Rash     Diphenhydramine Hcl Palpitations     Penicillins Rash          Labs:     Recent Results (from the past 672 hour(s))   Basic metabolic panel    Collection Time: 08/17/20  7:38 AM   Result Value Ref Range    Sodium 139 133 - 144 mmol/L    Potassium 4.3 3.4 - 5.3 mmol/L    Chloride 108 94 - 109 mmol/L    Carbon Dioxide 28 20 - 32 mmol/L    Anion Gap 3 3 - 14 mmol/L    Glucose 74 70 - 99 mg/dL    Urea Nitrogen 19 7 - 30 mg/dL    Creatinine 0.82 0.52 - 1.04 mg/dL    GFR Estimate 75 >60 mL/min/[1.73_m2]    GFR Estimate If Black 87 >60 mL/min/[1.73_m2]    Calcium 10.6 (H) 8.5 - 10.1 mg/dL   T4 free    Collection Time: 08/17/20  7:38 AM   Result Value Ref Range    T4 Free 1.88 (H) 0.76 - 1.46 ng/dL   TSH    Collection Time: 08/17/20  7:38 AM   Result Value Ref Range    TSH 0.01 (L) 0.40 - 4.00 mU/L   CBC with platelets differential    Collection Time: 08/28/20  8:11 AM   Result Value Ref Range    WBC 6.8 4.0 - 11.0 10e9/L    RBC Count 3.33 (L) 3.8 - 5.2 10e12/L    Hemoglobin 10.1 (L) 11.7 - 15.7 g/dL    Hematocrit 31.0 (L) 35.0 - 47.0 %    MCV 93 78 - 100 fl    MCH 30.3 26.5 - 33.0 pg    MCHC 32.6 31.5 - 36.5 g/dL    RDW 13.8 10.0 - 15.0 %    Platelet Count 148 (L) 150 - 450 10e9/L    Diff  Method Automated Method     % Neutrophils 56.7 %    % Lymphocytes 34.2 %    % Monocytes 6.7 %    % Eosinophils 2.3 %    % Basophils 0.1 %    Absolute Neutrophil 3.9 1.6 - 8.3 10e9/L    Absolute Lymphocytes 2.3 0.8 - 5.3 10e9/L    Absolute Monocytes 0.5 0.0 - 1.3 10e9/L    Absolute Eosinophils 0.2 0.0 - 0.7 10e9/L    Absolute Basophils 0.0 0.0 - 0.2 10e9/L   Comprehensive metabolic panel    Collection Time: 08/28/20  8:11 AM   Result Value Ref Range    Sodium 139 133 - 144 mmol/L    Potassium 4.1 3.4 - 5.3 mmol/L    Chloride 109 94 - 109 mmol/L    Carbon Dioxide 24 20 - 32 mmol/L    Anion Gap 6 3 - 14 mmol/L    Glucose 78 70 - 99 mg/dL    Urea Nitrogen 30 7 - 30 mg/dL    Creatinine 0.97 0.52 - 1.04 mg/dL    GFR Estimate 61 >60 mL/min/[1.73_m2]    GFR Estimate If Black 70 >60 mL/min/[1.73_m2]    Calcium 12.2 (H) 8.5 - 10.1 mg/dL    Bilirubin Total 0.3 0.2 - 1.3 mg/dL    Albumin 2.4 (L) 3.4 - 5.0 g/dL    Protein Total 5.8 (L) 6.8 - 8.8 g/dL    Alkaline Phosphatase 56 40 - 150 U/L    ALT 18 0 - 50 U/L    AST 13 0 - 45 U/L   T4 free    Collection Time: 08/28/20  8:11 AM   Result Value Ref Range    T4 Free 1.68 (H) 0.76 - 1.46 ng/dL   TSH    Collection Time: 08/28/20  8:11 AM   Result Value Ref Range    TSH 0.01 (L) 0.40 - 4.00 mU/L   CBC with platelets differential    Collection Time: 09/02/20  3:53 PM   Result Value Ref Range    WBC 8.1 4.0 - 11.0 10e9/L    RBC Count 3.42 (L) 3.8 - 5.2 10e12/L    Hemoglobin 10.4 (L) 11.7 - 15.7 g/dL    Hematocrit 31.3 (L) 35.0 - 47.0 %    MCV 92 78 - 100 fl    MCH 30.4 26.5 - 33.0 pg    MCHC 33.2 31.5 - 36.5 g/dL    RDW 13.5 10.0 - 15.0 %    Platelet Count 188 150 - 450 10e9/L    Diff Method Automated Method     % Neutrophils 55.4 %    % Lymphocytes 34.0 %    % Monocytes 7.4 %    % Eosinophils 1.8 %    % Basophils 0.4 %    % Immature Granulocytes 1.0 %    Nucleated RBCs 0 0 /100    Absolute Neutrophil 4.5 1.6 - 8.3 10e9/L    Absolute Lymphocytes 2.8 0.8 - 5.3 10e9/L    Absolute  Monocytes 0.6 0.0 - 1.3 10e9/L    Absolute Basophils 0.0 0.0 - 0.2 10e9/L    Abs Immature Granulocytes 0.1 0 - 0.4 10e9/L    Absolute Nucleated RBC 0.0    Comprehensive metabolic panel    Collection Time: 09/02/20  3:53 PM   Result Value Ref Range    Sodium 139 133 - 144 mmol/L    Potassium 4.1 3.4 - 5.3 mmol/L    Chloride 109 94 - 109 mmol/L    Carbon Dioxide 24 20 - 32 mmol/L    Anion Gap 6 3 - 14 mmol/L    Glucose 98 70 - 99 mg/dL    Urea Nitrogen 30 7 - 30 mg/dL    Creatinine 0.97 0.52 - 1.04 mg/dL    GFR Estimate 61 >60 mL/min/[1.73_m2]    GFR Estimate If Black 70 >60 mL/min/[1.73_m2]    Calcium 11.9 (H) 8.5 - 10.1 mg/dL    Bilirubin Total 0.1 (L) 0.2 - 1.3 mg/dL    Albumin 2.5 (L) 3.4 - 5.0 g/dL    Protein Total 6.1 (L) 6.8 - 8.8 g/dL    Alkaline Phosphatase 65 40 - 150 U/L    ALT 24 0 - 50 U/L    AST 21 0 - 45 U/L   TSH    Collection Time: 09/02/20  3:53 PM   Result Value Ref Range    TSH 0.02 (L) 0.40 - 4.00 mU/L   Asymptomatic COVID-19 Virus (Coronavirus) by PCR    Collection Time: 09/02/20  5:21 PM    Specimen: Nasopharyngeal   Result Value Ref Range    COVID-19 Virus PCR to [facility] - Source Nasopharyngeal     COVID-19 Virus PCR to [facility] - Result       Test received-See reflex to IDDL test SARS CoV2 (COVID-19) Virus RT-PCR   SARS-CoV-2 COVID-19 Virus (Coronavirus) RT-PCR Nasopharyngeal    Collection Time: 09/02/20  5:21 PM    Specimen: Nasopharyngeal   Result Value Ref Range    SARS-CoV-2 Virus Specimen Source Nasopharyngeal     SARS-CoV-2 PCR Result NEGATIVE     SARS-CoV-2 PCR Comment       Testing was performed using the Aptima SARS-CoV-2 Assay on the [employer] Instrument System.   Additional information about this Emergency Use Authorization (EUA) assay can be found via   the Lab Guide.     TSH with free T4 reflex and/or T3 as indicated    Collection Time: 09/03/20  7:52 AM   Result Value Ref Range    TSH 0.01 (L) 0.40 - 4.00 mU/L   Vitamin B12    Collection Time: 09/03/20  7:52 AM   Result Value Ref  Range    Vitamin B12 848 193 - 986 pg/mL   Folate    Collection Time: 09/03/20  7:52 AM   Result Value Ref Range    Folate 4.3 (L) >5.4 ng/mL   Valproic acid    Collection Time: 09/03/20  7:52 AM   Result Value Ref Range    Valproic Acid Level <3 (L) 50 - 100 mg/L   Vitamin D    Collection Time: 09/03/20  7:52 AM   Result Value Ref Range    Vitamin D Deficiency screening 41 20 - 75 ug/L   T4 free    Collection Time: 09/03/20  7:52 AM   Result Value Ref Range    T4 Free 1.70 (H) 0.76 - 1.46 ng/dL   EKG 12-lead, complete    Collection Time: 09/03/20  8:50 AM   Result Value Ref Range    Interpretation ECG Click View Image link to view waveform and result             Psychiatric Examination:   Temp: 97.4  F (36.3  C) Temp src: Oral BP: 124/84 Pulse: 85   Resp: 16 SpO2: 100 % O2 Device: None (Room air)    Weight is 0 lbs 0 oz  Body mass index is 23.63 kg/m .    Appearance: awake, alert and adequately groomed  Attitude:  cooperative  Eye Contact:  fair  Mood:  better  Affect:  appropriate and in normal range  Speech:  clear, coherent  Psychomotor Behavior:  no evidence of tardive dyskinesia, dystonia, or tics  Throught Process:  logical  Associations:  no loose associations  Thought Content:  no evidence of suicidal ideation or homicidal ideation  Insight:  fair  Judgement:  intact  Oriented to:  time, person, and place  Attention Span and Concentration:  fair  Recent and Remote Memory:  intact         Precautions:     Behavioral Orders   Procedures     Code 1 - Restrict to Unit     Fall precautions     Routine Programming     As clinically indicated     Single Room     Status 15     Every 15 minutes.          DIagnoses:   1.  Major depressive disorder, recurrent, severe, without psychosis  2.  Generalized anxiety disorder  3.  Panic disorder, per patient  4.  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  PTSD per history  6.  Medical problems include the following: Hypothyroidism, low back pain, pressure ulcers,  cardiomyopathy, COPD, ELI, type 2 diabetes, CKD, stage IV, chronic iron deficiency anemia, history of TIA, hypertension, PMR.         Plan:   Patient is a 63 y/o female who was admitted with failure to thrive.        Medication changes will include the following:   --Increase Cymbalta to 40mg  --Change Depakote to 250 mg, at bedtime  --Continue Seroquel 50 mg, at bedtime  --Increase Remeron to 30 mg, on admission  --Continue Melatonin 6 mg, qhs  --Discontinue Lexapro due to abnormal EKG.  Discontinue BuSpar, does not seem to be helpful.  Discontinue prazosin, denies nightmares and flashbacks. Discontinue Abilify    --One time  0.5mg  lorazepam to r/o catatonia. No change in patient presentation.       Disposition Plan   Reason for ongoing admission: is unable to care for self due to failure to thrive, depression  Disposition: TBD  Estimated length of stay: 7-10 days  Legal Status: voluntary   Discharge will be granted once symptoms improved.    Gela BOATENG CNP  Date: 09/10/20  Time: 1:58 PM      More than 30 minutes were spent for assessment, documentation, and coordination of care.     This note was created with the help of Dragon dictation system. All grammatical/typing errors or context distortion are unintentional and inherent to software.

## 2020-09-10 NOTE — PROGRESS NOTES
09/09/20 1800   Art Therapy   Type of Intervention structured groups   Response Participates with encouragement   Hours 1   Treatment Detail    (Art Therapy)benjamin boswell- mindfulness   Objective- Patients use art media, the creative process & resulting artwork to:explore feelings,reconcile emotions, gain self-awareness/ self esteem, manage behaviors, develop social skills, improve reality orientation, & reduce anxiety /depression.     Outcome- Pt was pleasant and cooperative, seh was quietly engaged for a short time and then observed.  She entered group late and writer was unable to capture her mood.

## 2020-09-11 ENCOUNTER — APPOINTMENT (OUTPATIENT)
Dept: PHYSICAL THERAPY | Facility: CLINIC | Age: 65
DRG: 885 | End: 2020-09-11
Attending: PSYCHIATRY & NEUROLOGY
Payer: MEDICARE

## 2020-09-11 PROCEDURE — 25000132 ZZH RX MED GY IP 250 OP 250 PS 637: Mod: GY | Performed by: PHYSICIAN ASSISTANT

## 2020-09-11 PROCEDURE — 25000132 ZZH RX MED GY IP 250 OP 250 PS 637: Mod: GY | Performed by: NURSE PRACTITIONER

## 2020-09-11 PROCEDURE — 97110 THERAPEUTIC EXERCISES: CPT | Mod: GP

## 2020-09-11 PROCEDURE — 25000132 ZZH RX MED GY IP 250 OP 250 PS 637: Mod: GY | Performed by: PSYCHIATRY & NEUROLOGY

## 2020-09-11 PROCEDURE — 99233 SBSQ HOSP IP/OBS HIGH 50: CPT | Performed by: NURSE PRACTITIONER

## 2020-09-11 PROCEDURE — 97116 GAIT TRAINING THERAPY: CPT | Mod: GP

## 2020-09-11 PROCEDURE — 12400002 ZZH R&B MH SENIOR/ADOLESCENT

## 2020-09-11 RX ORDER — QUETIAPINE FUMARATE 25 MG/1
25 TABLET, FILM COATED ORAL AT BEDTIME
Status: DISCONTINUED | OUTPATIENT
Start: 2020-09-11 | End: 2020-09-15 | Stop reason: HOSPADM

## 2020-09-11 RX ADMIN — QUETIAPINE FUMARATE 25 MG: 25 TABLET ORAL at 20:14

## 2020-09-11 RX ADMIN — FLUTICASONE FUROATE AND VILANTEROL TRIFENATATE 1 PUFF: 200; 25 POWDER RESPIRATORY (INHALATION) at 09:16

## 2020-09-11 RX ADMIN — ACETAMINOPHEN 1000 MG: 500 TABLET, FILM COATED ORAL at 09:13

## 2020-09-11 RX ADMIN — Medication 50 MCG: at 09:14

## 2020-09-11 RX ADMIN — ACETAMINOPHEN 1000 MG: 500 TABLET, FILM COATED ORAL at 20:14

## 2020-09-11 RX ADMIN — ASPIRIN 81 MG CHEWABLE TABLET 81 MG: 81 TABLET CHEWABLE at 09:13

## 2020-09-11 RX ADMIN — POTASSIUM CHLORIDE 10 MEQ: 750 CAPSULE, EXTENDED RELEASE ORAL at 09:15

## 2020-09-11 RX ADMIN — DULOXETINE HYDROCHLORIDE 40 MG: 20 CAPSULE, DELAYED RELEASE ORAL at 09:15

## 2020-09-11 RX ADMIN — LEVOTHYROXINE SODIUM 100 MCG: 100 TABLET ORAL at 09:13

## 2020-09-11 RX ADMIN — METOPROLOL SUCCINATE 25 MG: 25 TABLET, EXTENDED RELEASE ORAL at 09:16

## 2020-09-11 RX ADMIN — MENTHOL 1 PATCH: 205.5 PATCH TOPICAL at 20:15

## 2020-09-11 RX ADMIN — MIRTAZAPINE 30 MG: 30 TABLET, FILM COATED ORAL at 20:15

## 2020-09-11 RX ADMIN — CLOPIDOGREL BISULFATE 75 MG: 75 TABLET, FILM COATED ORAL at 09:14

## 2020-09-11 RX ADMIN — FERROUS SULFATE TAB 325 MG (65 MG ELEMENTAL FE) 325 MG: 325 (65 FE) TAB at 09:12

## 2020-09-11 RX ADMIN — DIVALPROEX SODIUM 250 MG: 125 CAPSULE, COATED PELLETS ORAL at 20:14

## 2020-09-11 RX ADMIN — ROSUVASTATIN CALCIUM 10 MG: 10 TABLET, FILM COATED ORAL at 16:56

## 2020-09-11 RX ADMIN — PANTOPRAZOLE SODIUM 40 MG: 40 TABLET, DELAYED RELEASE ORAL at 09:14

## 2020-09-11 RX ADMIN — SENNOSIDES 2 TABLET: 8.6 TABLET, FILM COATED ORAL at 16:55

## 2020-09-11 ASSESSMENT — ACTIVITIES OF DAILY LIVING (ADL)
HYGIENE/GROOMING: PROMPTS;WITH ASSISTANCE
DRESS: INDEPENDENT
HYGIENE/GROOMING: WITH ASSISTANCE
LAUNDRY: UNABLE TO COMPLETE
ORAL_HYGIENE: INDEPENDENT
DRESS: PROMPTS;WITH ASSISTANCE
LAUNDRY: UNABLE TO COMPLETE
ORAL_HYGIENE: PROMPTS;WITH ASSISTANCE

## 2020-09-11 NOTE — PLAN OF CARE
Problem: OT General Care Plan  Goal: OT Goal 1  Description: Will attend OT groups when able,  process through organizing work and thoughts with  concrete familiar step opportunities. Assess cognition further in problem solving and organization.     Note: Pt attended a partial OT group, with was not charged due the shortened time present. She was pleasant, walked to the OT group room with staff and worked at a constant pace on a familiar task. When called out to meet with Provider and staff, she did not return to group. When present, she appeared more engaged, attentive and with more investment.

## 2020-09-11 NOTE — PLAN OF CARE
Discharge Planner PT   Patient plan for discharge: not discussed  Current status: Pt CGA/Deborah sit<>stand to FWW with safety cues needed throughout. Strong encouragement needing to participate due to anxiety but does well once gets going 2/2 strong fear of falling. Participates in standing LE exercises at ayala rail + amb x25' + x75' with FWW and CGA. Pt slow but fairly steady on feet, could be SBA with FWW but due to pt's high fear of falling CGA provided to encourage safe environment to participate.  Barriers to return to prior living situation: medical needs, increased falls risk, significant weakness  Recommendations for discharge: return to SNF +  PT  Rationale for recommendations: Pt currently below baseline level of function and would benefit from ongoing therapy to address the above deficits in order to progress towards PLOF and promote IND mobility.       Entered by: Sejal Cleary 09/11/2020 11:45 AM

## 2020-09-11 NOTE — PLAN OF CARE
"Got patient up out of bed at 1630 and she walked to the dining room with walker and gait belt. She sat in dining room, ate 75% of her dinner, 320 ml fluid intake and walked to group at 1900. Affect is flat. Mood is calm. She denies SI and SIB. She denies depression. She rates her anxiety a 10. She states her anxiety is from \"being scared of falling, choking and everything\". She endorses not having any coping skills. When this writer brought up reading, music, games, coloring, and puzzles she only answered \"I don't know\". When asked why she has not worked on developing coping skills she stated \"it makes me too anxious\". She states her mood today is \"scared, nervous and anxious\". She states her sleep is \"ok\" and her appetite is \"fair\". She states she did not attend any groups today because \"no one woke me up\". Patient needs strong encouragement to be independent with ADL's eating, ambulation and toileting. She was incontinent of urine x1. Icy hot patch applied to lower back and right thigh. Boot placed on right foot while in bed. Patient independent with HS cares with set up. Dressings intact.   "

## 2020-09-11 NOTE — PROGRESS NOTES
[facility], [place]   Psychiatric Progress Note        Interim History:   From H&P: Letty Escobar is a 64 year old female with longstanding mental health disease including personality disorder, [place] phobia, panic disorder and PTSD.  She is currently on  multiple chronic psychiatric medications including Abilify, BuSpar, Celexa, melatonin and Remeron but still failing at the local nursing home.  They  describe a situation and basically failure to thrive.  She is not eating or walking.  She is scared to do these things due to paranoia.  She denies suicidal homicidal thoughts.  She has lost 29 pounds they report.  No fever.  No cough.  No infectious disease symptoms.  She does have a couple areas of minor skin breakdown due to pressure.    Team meeting report: The patient's care was discussed with the treatment team during the daily team meeting and/or staff's chart notes were reviewed.  Staff report patient has been calm, pleasant, cooperative. Patient is attending groups and participating appropriately.  Patient is walking by herself with an assistant close by, however patient still reports anxiety about falling.  Patient is anxious about upcoming discharge. Patient is eating well and taking medications as prescribed. Rates mood as 10/10 for anxiety, denies feelings of depression. Slept 8 hours.     Met with patient. Patient recognized the provider and student today.  Attention and eye contact have improved.  Patient is easy to talk to.  Patient reports high levels of anxiety, but has trouble identifying the source of her anxiety.  She says that working with PT yesterday went well, but still notes high levels of anxiety with falling while walking.  She says that her appetite has been good, and she has been eating most of her meals.  Patient reports continued pain with her tailbone, and would prefer lying in bed as opposed to sitting in her wheelchair.  Patient denies depression and  SI/SIB.    Patient was reported to have slid off the toilet while ambulating to her wheelchair.  BP earlier today was 83/56.  Internal medicine was contacted and will see the patient later in the day.             Medications:       acetaminophen  1,000 mg Oral BID     aspirin  81 mg Oral Daily     clopidogrel  75 mg Oral Daily     divalproex sodium delayed-release  250 mg Oral At Bedtime     DULoxetine  40 mg Oral Daily     ferrous sulfate  325 mg Oral Daily with breakfast     fish oil-omega-3 fatty acids  1 g Oral Daily     fluticasone-vilanterol  1 puff Inhalation Daily     ketoconazole   Topical At Bedtime     levothyroxine  100 mcg Oral QAM AC     menthol   Transdermal Q8H     metoprolol succinate ER  25 mg Oral Daily     mirtazapine  30 mg Oral At Bedtime     pantoprazole  40 mg Oral Daily     potassium chloride ER  10 mEq Oral Daily     QUEtiapine  25 mg Oral At Bedtime     rosuvastatin  10 mg Oral At Bedtime     sennosides  2 tablet Oral At Bedtime     vitamin D3  50 mcg Oral Daily          Allergies:     Allergies   Allergen Reactions     Contrast Dye      Isosorbide Mononitrate [Isosorbide]      Nitroglycerin      Nystatin      Sulfa Drugs      Adhesive Tape Rash     Diphenhydramine Hcl Palpitations     Penicillins Rash          Labs:     Recent Results (from the past 672 hour(s))   Basic metabolic panel    Collection Time: 08/17/20  7:38 AM   Result Value Ref Range    Sodium 139 133 - 144 mmol/L    Potassium 4.3 3.4 - 5.3 mmol/L    Chloride 108 94 - 109 mmol/L    Carbon Dioxide 28 20 - 32 mmol/L    Anion Gap 3 3 - 14 mmol/L    Glucose 74 70 - 99 mg/dL    Urea Nitrogen 19 7 - 30 mg/dL    Creatinine 0.82 0.52 - 1.04 mg/dL    GFR Estimate 75 >60 mL/min/[1.73_m2]    GFR Estimate If Black 87 >60 mL/min/[1.73_m2]    Calcium 10.6 (H) 8.5 - 10.1 mg/dL   T4 free    Collection Time: 08/17/20  7:38 AM   Result Value Ref Range    T4 Free 1.88 (H) 0.76 - 1.46 ng/dL   TSH    Collection Time: 08/17/20  7:38 AM   Result  Value Ref Range    TSH 0.01 (L) 0.40 - 4.00 mU/L   CBC with platelets differential    Collection Time: 08/28/20  8:11 AM   Result Value Ref Range    WBC 6.8 4.0 - 11.0 10e9/L    RBC Count 3.33 (L) 3.8 - 5.2 10e12/L    Hemoglobin 10.1 (L) 11.7 - 15.7 g/dL    Hematocrit 31.0 (L) 35.0 - 47.0 %    MCV 93 78 - 100 fl    MCH 30.3 26.5 - 33.0 pg    MCHC 32.6 31.5 - 36.5 g/dL    RDW 13.8 10.0 - 15.0 %    Platelet Count 148 (L) 150 - 450 10e9/L    Diff Method Automated Method     % Neutrophils 56.7 %    % Lymphocytes 34.2 %    % Monocytes 6.7 %    % Eosinophils 2.3 %    % Basophils 0.1 %    Absolute Neutrophil 3.9 1.6 - 8.3 10e9/L    Absolute Lymphocytes 2.3 0.8 - 5.3 10e9/L    Absolute Monocytes 0.5 0.0 - 1.3 10e9/L    Absolute Eosinophils 0.2 0.0 - 0.7 10e9/L    Absolute Basophils 0.0 0.0 - 0.2 10e9/L   Comprehensive metabolic panel    Collection Time: 08/28/20  8:11 AM   Result Value Ref Range    Sodium 139 133 - 144 mmol/L    Potassium 4.1 3.4 - 5.3 mmol/L    Chloride 109 94 - 109 mmol/L    Carbon Dioxide 24 20 - 32 mmol/L    Anion Gap 6 3 - 14 mmol/L    Glucose 78 70 - 99 mg/dL    Urea Nitrogen 30 7 - 30 mg/dL    Creatinine 0.97 0.52 - 1.04 mg/dL    GFR Estimate 61 >60 mL/min/[1.73_m2]    GFR Estimate If Black 70 >60 mL/min/[1.73_m2]    Calcium 12.2 (H) 8.5 - 10.1 mg/dL    Bilirubin Total 0.3 0.2 - 1.3 mg/dL    Albumin 2.4 (L) 3.4 - 5.0 g/dL    Protein Total 5.8 (L) 6.8 - 8.8 g/dL    Alkaline Phosphatase 56 40 - 150 U/L    ALT 18 0 - 50 U/L    AST 13 0 - 45 U/L   T4 free    Collection Time: 08/28/20  8:11 AM   Result Value Ref Range    T4 Free 1.68 (H) 0.76 - 1.46 ng/dL   TSH    Collection Time: 08/28/20  8:11 AM   Result Value Ref Range    TSH 0.01 (L) 0.40 - 4.00 mU/L   CBC with platelets differential    Collection Time: 09/02/20  3:53 PM   Result Value Ref Range    WBC 8.1 4.0 - 11.0 10e9/L    RBC Count 3.42 (L) 3.8 - 5.2 10e12/L    Hemoglobin 10.4 (L) 11.7 - 15.7 g/dL    Hematocrit 31.3 (L) 35.0 - 47.0 %    MCV 92  78 - 100 fl    MCH 30.4 26.5 - 33.0 pg    MCHC 33.2 31.5 - 36.5 g/dL    RDW 13.5 10.0 - 15.0 %    Platelet Count 188 150 - 450 10e9/L    Diff Method Automated Method     % Neutrophils 55.4 %    % Lymphocytes 34.0 %    % Monocytes 7.4 %    % Eosinophils 1.8 %    % Basophils 0.4 %    % Immature Granulocytes 1.0 %    Nucleated RBCs 0 0 /100    Absolute Neutrophil 4.5 1.6 - 8.3 10e9/L    Absolute Lymphocytes 2.8 0.8 - 5.3 10e9/L    Absolute Monocytes 0.6 0.0 - 1.3 10e9/L    Absolute Basophils 0.0 0.0 - 0.2 10e9/L    Abs Immature Granulocytes 0.1 0 - 0.4 10e9/L    Absolute Nucleated RBC 0.0    Comprehensive metabolic panel    Collection Time: 09/02/20  3:53 PM   Result Value Ref Range    Sodium 139 133 - 144 mmol/L    Potassium 4.1 3.4 - 5.3 mmol/L    Chloride 109 94 - 109 mmol/L    Carbon Dioxide 24 20 - 32 mmol/L    Anion Gap 6 3 - 14 mmol/L    Glucose 98 70 - 99 mg/dL    Urea Nitrogen 30 7 - 30 mg/dL    Creatinine 0.97 0.52 - 1.04 mg/dL    GFR Estimate 61 >60 mL/min/[1.73_m2]    GFR Estimate If Black 70 >60 mL/min/[1.73_m2]    Calcium 11.9 (H) 8.5 - 10.1 mg/dL    Bilirubin Total 0.1 (L) 0.2 - 1.3 mg/dL    Albumin 2.5 (L) 3.4 - 5.0 g/dL    Protein Total 6.1 (L) 6.8 - 8.8 g/dL    Alkaline Phosphatase 65 40 - 150 U/L    ALT 24 0 - 50 U/L    AST 21 0 - 45 U/L   TSH    Collection Time: 09/02/20  3:53 PM   Result Value Ref Range    TSH 0.02 (L) 0.40 - 4.00 mU/L   Asymptomatic COVID-19 Virus (Coronavirus) by PCR    Collection Time: 09/02/20  5:21 PM    Specimen: Nasopharyngeal   Result Value Ref Range    COVID-19 Virus PCR to [facility] - Source Nasopharyngeal     COVID-19 Virus PCR to [facility] - Result       Test received-See reflex to IDDL test SARS CoV2 (COVID-19) Virus RT-PCR   SARS-CoV-2 COVID-19 Virus (Coronavirus) RT-PCR Nasopharyngeal    Collection Time: 09/02/20  5:21 PM    Specimen: Nasopharyngeal   Result Value Ref Range    SARS-CoV-2 Virus Specimen Source Nasopharyngeal     SARS-CoV-2 PCR Result NEGATIVE      SARS-CoV-2 PCR Comment       Testing was performed using the Aptima SARS-CoV-2 Assay on the [employer] Instrument System.   Additional information about this Emergency Use Authorization (EUA) assay can be found via   the Lab Guide.     TSH with free T4 reflex and/or T3 as indicated    Collection Time: 09/03/20  7:52 AM   Result Value Ref Range    TSH 0.01 (L) 0.40 - 4.00 mU/L   Vitamin B12    Collection Time: 09/03/20  7:52 AM   Result Value Ref Range    Vitamin B12 848 193 - 986 pg/mL   Folate    Collection Time: 09/03/20  7:52 AM   Result Value Ref Range    Folate 4.3 (L) >5.4 ng/mL   Valproic acid    Collection Time: 09/03/20  7:52 AM   Result Value Ref Range    Valproic Acid Level <3 (L) 50 - 100 mg/L   Vitamin D    Collection Time: 09/03/20  7:52 AM   Result Value Ref Range    Vitamin D Deficiency screening 41 20 - 75 ug/L   T4 free    Collection Time: 09/03/20  7:52 AM   Result Value Ref Range    T4 Free 1.70 (H) 0.76 - 1.46 ng/dL   EKG 12-lead, complete    Collection Time: 09/03/20  8:50 AM   Result Value Ref Range    Interpretation ECG Click View Image link to view waveform and result             Psychiatric Examination:   Temp: 97.6  F (36.4  C) Temp src: Oral BP: (!) 83/56 Pulse: 87   Resp: 16 SpO2: 99 % O2 Device: None (Room air)    Weight is 0 lbs 0 oz  Body mass index is 23.63 kg/m .    Appearance: awake, alert  Attitude:  cooperative  Eye Contact:  fair  Mood:  better  Affect:  appropriate and in normal range  Speech:  clear, coherent  Psychomotor Behavior:  no evidence of tardive dyskinesia, dystonia, or tics  Throught Process:  logical  Associations:  no loose associations  Thought Content:  no evidence of suicidal ideation or homicidal ideation  Insight:  good  Judgement:  intact  Oriented to:  time, person, and place  Attention Span and Concentration:  intact  Recent and Remote Memory:  intact         Precautions:     Behavioral Orders   Procedures     Code 1 - Restrict to Unit     Fall precautions      Routine Programming     As clinically indicated     Single Room     Status 15     Every 15 minutes.          DIagnoses:   1.  Major depressive disorder, recurrent, severe, without psychosis  2.  Generalized anxiety disorder  3.  Panic disorder, per patient  4.  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  PTSD per history  6.  Medical problems include the following: Hypothyroidism, low back pain, pressure ulcers, cardiomyopathy, COPD, ELI, type 2 diabetes, CKD, stage IV, chronic iron deficiency anemia, history of TIA, hypertension, PMR.         Plan:   Patient is a 65 y/o female who was admitted with failure to thrive.        Medication changes will include the following:   --Cont Cymbalta to 40mg  --Cont Depakote to 250 mg, at bedtime  --Decrease Seroquel to 25 mg, at bedtime  --Cont Remeron to 30 mg, on admission  --Discontinue Melatonin 6 mg, qhs  --Discontinue Lexapro due to abnormal EKG.  Discontinue BuSpar, does not seem to be helpful.  Discontinue prazosin, denies nightmares and flashbacks. Discontinue Abilify    --One time  0.5mg lorazepam to r/o catatonia. No change in patient presentation.       Disposition Plan   Reason for ongoing admission: is unable to care for self due to failure to thrive, depression  Disposition: TBD  Estimated length of stay: 7-10 days  Legal Status: voluntary   Discharge will be granted once symptoms improved.    Patient history was taken by student.  Note was prepared by student Quentin HORNER.  Student was under direct supervision by Gela Woods DNP.  Date: 09/11/20  Time: 1:08 PM     More than 30 minutes were spent for assessment, documentation, and coordination of care.     This note was created with the help of Dragon dictation system. All grammatical/typing errors or context distortion are unintentional and inherent to software.

## 2020-09-11 NOTE — PROGRESS NOTES
"Brief Internal Medicine Progress Note      Received notice from EFRAÍN Ren that patient had a hypotensive episode and a near fall.  Patient reportedly felt weak while on the toilet.  When she tried to stand, she went down on one knee.  She required three people to help her back to bed.      Evaluated patient at bedside. Patient states she \"feels tired\" but is overall okay.  She admits to feeling scared about what happened but admits she often feels scared.  She denies lightheadedness or presyncope during the event.  She states she \"felt weak all over.\" She denies straining with a bowel movement prior to fall. She denies current chest pain, SOB, lightheadedness, abdominal pain, or weakness.     Blood pressure during event was 83/56    /79 (BP Location: Left arm)   Pulse 90   Temp 97.6  F (36.4  C) (Oral)   Resp 16   Ht 1.524 m (5')   SpO2 100%   BMI 23.63 kg/m       GENERAL: Alert and oriented x 3. NAD. Ambulatory with assistance. Cooperative.   HEENT: Anicteric sclera. Mucous membranes moist. NC. AT. EOMI. PERRLA  CV: RRR. S1, S2. No murmurs appreciated.   RESPIRATORY: Effort normal on RA. Lungs CTAB with no wheezing, rales, rhonchi.   GI: Abdomen soft and non distended with normoactive bowel sounds present in all quadrants. No tenderness, rebound, guarding. No lesions.   NEUROLOGICAL: No focal deficits. Moves all extremities.  CN 2-12 grossly intact.  EXTREMITIES: No peripheral edema. Intact bilateral pedal pulses.   SKIN: No jaundice. No rashes.    Suspect hypotensive event is secondary to orthostatic hypotension.    -Encouraged patient to take time transitioning between laying to sitting to standing.   -Continue to work with PT/OT to build strength     Please call Internal Medicine with any further questions or concerns.     Shantelle Saunders PA-C  Hospitalist Service  Pager [phone]      "

## 2020-09-11 NOTE — PROGRESS NOTES
Work Completed: The patient's care was discussed with the treatment team and chart notes were reviewed. Plan is to discharge patient back to SNF on Monday. Phone call with Sabra TORRES at [facility] to discuss discharge. She requested signed orders be faxed to [phone]. Medication can be sent with patient. She requested PT orders be included with discharge orders.      Discharge plan or goal: Discharge backt o SNF on Monday                Barriers to discharge: Patient needs further stabilization.

## 2020-09-11 NOTE — PROGRESS NOTES
09/10/20 2100   Music Therapy   Type of Participation Music therapy group   Response Participates independently   Hours 1   Participated in Music Therapy intervention of Ari Rossi today.  Goals of session were focusing, memory recall, positive distraction, emotional containment and social cohesion.  Letty was engaged and cooperative during group, knowing many of the songs called out before peers did.  She did exhibit some fear and hesitation on walking after group back to her room, so two staff assisted her.

## 2020-09-11 NOTE — PROGRESS NOTES
Late Entry  Pt was up for breakfast and lunch,ambulated with assist of 1 short distances. Attended OT group. Pt stated that her mood was good. Sat out in lounge and played checkers. Medication compliant. Pt did seek out staff at times when anxious. RN flyer here to see pt and assess treatments.

## 2020-09-11 NOTE — PLAN OF CARE
Problem: Fall Injury Risk  Goal: Absence of Fall and Fall-Related Injury  Outcome: Declining     Pt requires a lot of prompting and support to encourage independence. Up w/ assistance of 1-2, walker, and gait belt. Pt ambulated to the lounge, attended groups and PT, and enjoyed word finds in the milieu. Incontinent of urine x1 upon waking. Voided x1. No BMs this shift. Pt denies feeling constipated.     Pt had a witnessed/assisted near fall. Attempted transfer from toilet to wheelchair with gait belt and 2 staff (writer and PA). Call light hit during near fall. Pt able to be transferred to wheelchair with 3 people. Pt denies sustaining any injuries during incident. VSS. Internal Medicine and attending provider notified. Pt went to lay down at ~1330. Pulsate mattress. Right-side lying with pillows to prevent pressure. Nursing will handoff for q2 hour monitoring and reposition.     Denies SI/SIB/HI/AH. Endorses anxiety- rates 10/10 on a scale of 0-10 (10 being the most severe). Denies depressive symptoms. Reports feeling paranoid and scared of falling.      09/11/20 1208 09/11/20 1327   Vital Signs   Temp 97.6  F (36.4  C)  --    Temp src Oral  --    Resp 16  --    Pulse 87 90   Pulse Rate Source Monitor Monitor   BP (!) 83/56 137/79   BP Location Left arm Left arm   Standing Orthostatic BP   Standing Orthostatic BP 93/62  --    Standing Orthostatic Pulse 78 bpm  --    Oxygen Therapy   SpO2 99 % 100 %   O2 Device None (Room air) None (Room air)          09/11/20 0930 09/11/20 1300   Intake (mL)   P.O.  --  690 mL  (shift total )   Intake (%) 75%  (ate 1.5 cream of wheat ) 75%   Appetite Fair Good   Urine Assessment   Urinary Incontinence  --  Yes   Urine: Other  --  Urinary Incontinence   Unmeasured Output   Urine Occurrence  --  2

## 2020-09-12 PROCEDURE — 25000132 ZZH RX MED GY IP 250 OP 250 PS 637: Mod: GY | Performed by: PHYSICIAN ASSISTANT

## 2020-09-12 PROCEDURE — 25000132 ZZH RX MED GY IP 250 OP 250 PS 637: Mod: GY | Performed by: PSYCHIATRY & NEUROLOGY

## 2020-09-12 PROCEDURE — 25000132 ZZH RX MED GY IP 250 OP 250 PS 637: Mod: GY | Performed by: NURSE PRACTITIONER

## 2020-09-12 PROCEDURE — 12400002 ZZH R&B MH SENIOR/ADOLESCENT

## 2020-09-12 RX ORDER — LIDOCAINE 4 G/G
1 PATCH TOPICAL
Status: DISCONTINUED | OUTPATIENT
Start: 2020-09-12 | End: 2020-09-15 | Stop reason: HOSPADM

## 2020-09-12 RX ADMIN — MIRTAZAPINE 30 MG: 30 TABLET, FILM COATED ORAL at 20:37

## 2020-09-12 RX ADMIN — SENNOSIDES 2 TABLET: 8.6 TABLET, FILM COATED ORAL at 17:20

## 2020-09-12 RX ADMIN — FLUTICASONE FUROATE AND VILANTEROL TRIFENATATE 1 PUFF: 200; 25 POWDER RESPIRATORY (INHALATION) at 08:49

## 2020-09-12 RX ADMIN — METOPROLOL SUCCINATE 25 MG: 25 TABLET, EXTENDED RELEASE ORAL at 08:43

## 2020-09-12 RX ADMIN — PANTOPRAZOLE SODIUM 40 MG: 40 TABLET, DELAYED RELEASE ORAL at 08:43

## 2020-09-12 RX ADMIN — LIDOCAINE 1 PATCH: 560 PATCH PERCUTANEOUS; TOPICAL; TRANSDERMAL at 12:32

## 2020-09-12 RX ADMIN — FERROUS SULFATE TAB 325 MG (65 MG ELEMENTAL FE) 325 MG: 325 (65 FE) TAB at 08:43

## 2020-09-12 RX ADMIN — DIVALPROEX SODIUM 250 MG: 125 CAPSULE, COATED PELLETS ORAL at 20:37

## 2020-09-12 RX ADMIN — ACETAMINOPHEN 1000 MG: 500 TABLET, FILM COATED ORAL at 08:42

## 2020-09-12 RX ADMIN — QUETIAPINE FUMARATE 25 MG: 25 TABLET ORAL at 20:37

## 2020-09-12 RX ADMIN — ROSUVASTATIN CALCIUM 10 MG: 10 TABLET, FILM COATED ORAL at 17:19

## 2020-09-12 RX ADMIN — ASPIRIN 81 MG CHEWABLE TABLET 81 MG: 81 TABLET CHEWABLE at 08:42

## 2020-09-12 RX ADMIN — POTASSIUM CHLORIDE 10 MEQ: 750 CAPSULE, EXTENDED RELEASE ORAL at 08:42

## 2020-09-12 RX ADMIN — CLOPIDOGREL BISULFATE 75 MG: 75 TABLET, FILM COATED ORAL at 08:43

## 2020-09-12 RX ADMIN — ACETAMINOPHEN 650 MG: 325 TABLET, FILM COATED ORAL at 17:37

## 2020-09-12 RX ADMIN — ACETAMINOPHEN 1000 MG: 500 TABLET, FILM COATED ORAL at 20:37

## 2020-09-12 RX ADMIN — LEVOTHYROXINE SODIUM 100 MCG: 100 TABLET ORAL at 08:43

## 2020-09-12 RX ADMIN — ACETAMINOPHEN 650 MG: 325 TABLET, FILM COATED ORAL at 11:41

## 2020-09-12 RX ADMIN — Medication 50 MCG: at 08:42

## 2020-09-12 RX ADMIN — DULOXETINE HYDROCHLORIDE 40 MG: 20 CAPSULE, DELAYED RELEASE ORAL at 08:43

## 2020-09-12 ASSESSMENT — ACTIVITIES OF DAILY LIVING (ADL)
ORAL_HYGIENE: INDEPENDENT
DRESS: SCRUBS (BEHAVIORAL HEALTH)
LAUNDRY: UNABLE TO COMPLETE
HYGIENE/GROOMING: WITH ASSISTANCE

## 2020-09-12 NOTE — PROGRESS NOTES
Patient was turned every 2 hours.  She was incontinent of urine x 1.   Patient was cleaned up. Dressing at the  coccyx intact.

## 2020-09-12 NOTE — PROGRESS NOTES
"Patient sat in dining room from 1700 to 2030. She ambulated with walker and CGA. She attended and participated in group this evening. Wound care done to ear. Dressing intact to buttox, right ankle and toe. Incontinent of urine x1. Patient ate 50% of dinner with 440 ml fluid intake. Denies all MH symptoms but does rate anxiety 10. States anxiety is from \"being scared\". Medication compliant. Skin underneath bilateral breasts pink with no rash/redness. Ear is healing well. Needs strong encouragement throughout shift to be independent. Patient did perform ADL's independently.   "

## 2020-09-12 NOTE — PROGRESS NOTES
"Patient has been visible in the milieu. She needs lots of encouragement to complete tasks independently. She was able to use her walker to ambulate with a 2 person assist.    Patient denied depression. Endorsed anxiety 10/10: \"I'm just afraid of everything.\" Pt denied SI/SIB.     Poor appetite. Patient needs lots of encouragement to eat her meals and drink fluids. Total fluids: 240ml.  Patient was able urinate once. No BM. No incontinence during this shift.    Patient reported having a \"bump\" on her chest. She complained of pain 10/10; pain was worse upon touching the \"bump\".  Internal medicine came and assessed the patient. Lidocaine patch was ordered. Tylenol 650mg PRN was administered at 1141.    We'll continue to monitor.  "

## 2020-09-12 NOTE — PROGRESS NOTES
"Brief Internal Medicine Note     Received notice from MELISSA Ibarra regarding patient's new pain.  Patient reportedly has \"new bump\" on her chest.     Evaluated patient while in the common area.  Patient states she thinks the bump on her anterior chest has been present since her near fall yesterday.  She is concerned she fractured a rib.      Her anterior chest was palpated.  Her bony landmarks of her anterior chest, ribs, sternum and xyphoid process were without abnormality.     -Suspect musculoskeletal injury from yesterday's near fall.      -Continue acetaminophen and add lidocaine patch        Shantelle Saunders PA-C  Hospitalist Service  Pager [phone]  "

## 2020-09-12 NOTE — PROGRESS NOTES
09/11/20 [facility]   Therapeutic Recreation   Type of Intervention structured groups   Activity exercise   Response Participates, initiates socially appropriate   Hours 1     Pt actively participated in a structured Therapeutic Recreation group with a focus on leisure participation, relaxation, and exercise. Pt participated in the guided exercise for the full duration of the group. Pt followed along, engaged in the guided chair exercise routine.  Pt was encouraged to use positive imagery with the deep breathing and stretching to foster relaxation, improves focus, and reduce stress.

## 2020-09-13 ENCOUNTER — APPOINTMENT (OUTPATIENT)
Dept: PHYSICAL THERAPY | Facility: CLINIC | Age: 65
DRG: 885 | End: 2020-09-13
Attending: PSYCHIATRY & NEUROLOGY
Payer: MEDICARE

## 2020-09-13 PROCEDURE — 97116 GAIT TRAINING THERAPY: CPT | Mod: GP | Performed by: CLINIC/CENTER

## 2020-09-13 PROCEDURE — 25000132 ZZH RX MED GY IP 250 OP 250 PS 637: Mod: GY | Performed by: PHYSICIAN ASSISTANT

## 2020-09-13 PROCEDURE — 12400002 ZZH R&B MH SENIOR/ADOLESCENT

## 2020-09-13 PROCEDURE — 25000132 ZZH RX MED GY IP 250 OP 250 PS 637: Mod: GY | Performed by: NURSE PRACTITIONER

## 2020-09-13 PROCEDURE — 25000132 ZZH RX MED GY IP 250 OP 250 PS 637: Mod: GY | Performed by: PSYCHIATRY & NEUROLOGY

## 2020-09-13 RX ADMIN — FLUTICASONE FUROATE AND VILANTEROL TRIFENATATE 1 PUFF: 200; 25 POWDER RESPIRATORY (INHALATION) at 09:18

## 2020-09-13 RX ADMIN — CLOPIDOGREL BISULFATE 75 MG: 75 TABLET, FILM COATED ORAL at 09:15

## 2020-09-13 RX ADMIN — PANTOPRAZOLE SODIUM 40 MG: 40 TABLET, DELAYED RELEASE ORAL at 09:14

## 2020-09-13 RX ADMIN — POTASSIUM CHLORIDE 10 MEQ: 750 CAPSULE, EXTENDED RELEASE ORAL at 09:15

## 2020-09-13 RX ADMIN — MIRTAZAPINE 30 MG: 30 TABLET, FILM COATED ORAL at 20:22

## 2020-09-13 RX ADMIN — SENNOSIDES 2 TABLET: 8.6 TABLET, FILM COATED ORAL at 17:53

## 2020-09-13 RX ADMIN — MENTHOL 1 PATCH: 205.5 PATCH TOPICAL at 16:12

## 2020-09-13 RX ADMIN — LEVOTHYROXINE SODIUM 100 MCG: 100 TABLET ORAL at 09:16

## 2020-09-13 RX ADMIN — ASPIRIN 81 MG CHEWABLE TABLET 81 MG: 81 TABLET CHEWABLE at 09:15

## 2020-09-13 RX ADMIN — ROSUVASTATIN CALCIUM 10 MG: 10 TABLET, FILM COATED ORAL at 17:53

## 2020-09-13 RX ADMIN — DULOXETINE HYDROCHLORIDE 40 MG: 20 CAPSULE, DELAYED RELEASE ORAL at 09:14

## 2020-09-13 RX ADMIN — Medication 50 MCG: at 09:12

## 2020-09-13 RX ADMIN — QUETIAPINE FUMARATE 25 MG: 25 TABLET ORAL at 20:22

## 2020-09-13 RX ADMIN — DIVALPROEX SODIUM 250 MG: 125 CAPSULE, COATED PELLETS ORAL at 20:21

## 2020-09-13 RX ADMIN — Medication 1 G: at 09:14

## 2020-09-13 RX ADMIN — ACETAMINOPHEN 1000 MG: 500 TABLET, FILM COATED ORAL at 09:13

## 2020-09-13 RX ADMIN — LIDOCAINE 1 PATCH: 560 PATCH PERCUTANEOUS; TOPICAL; TRANSDERMAL at 20:20

## 2020-09-13 RX ADMIN — METOPROLOL SUCCINATE 25 MG: 25 TABLET, EXTENDED RELEASE ORAL at 09:17

## 2020-09-13 RX ADMIN — FERROUS SULFATE TAB 325 MG (65 MG ELEMENTAL FE) 325 MG: 325 (65 FE) TAB at 09:16

## 2020-09-13 RX ADMIN — ACETAMINOPHEN 1000 MG: 500 TABLET, FILM COATED ORAL at 18:36

## 2020-09-13 ASSESSMENT — MIFFLIN-ST. JEOR: SCORE: 1028.5

## 2020-09-13 ASSESSMENT — ACTIVITIES OF DAILY LIVING (ADL)
DRESS: SCRUBS (BEHAVIORAL HEALTH)
ORAL_HYGIENE: INDEPENDENT
HYGIENE/GROOMING: WITH ASSISTANCE
LAUNDRY: UNABLE TO COMPLETE

## 2020-09-13 NOTE — PLAN OF CARE
Discharge Planner PT   Patient plan for discharge: not discussed  Current status: Pt requesting to go to group session so encouraged ambulation. Pt ambulated approximately 150ft with FWW and CGA. Several standing rest breaks due to fatigue and anxiety, provided reassurance of safety and encouraged continued ambulation. Several verbal and tactile cues for walker management and path correction. Gait speed continues to be slow with L foot in PF. Pt transferred from stand to sit in chair with CGA and verbal/tatile cues for walker management and hand placement. Ended session seated in chair with pt participating in group.   Barriers to return to prior living situation: medical needs, increased falls risk, significant weakness  Recommendations for discharge: return to SNF +  PT  Rationale for recommendations: Pt currently below baseline level of function and would benefit from continued therapy to progress strength and independence with functional mobility.       Entered by: Abi Farley 09/13/2020 10:52 AM

## 2020-09-13 NOTE — PROGRESS NOTES
"Pt is anxious with flat affect. Rates anxiety at 10/10.  Denies depression, SI/SIB/AH/VH.  Ambulating with walker and SBA - pt has fear of falling and needs lots of encouragement.  Pt often states that she needs assist x 2 because \"I am afraid.\"  Assist x 1 used most of this shift, pt using walker and gait belt.  Pt requests assistance for tasks she is able to perform - requires frequent encouragement to be independent.  Commode placed over toilet for easier transferring.  Pt reports as helpful.      Small urine incontinence x 2  "

## 2020-09-13 NOTE — PLAN OF CARE
Pt presents with blunted, flat affect and anxious mood. Denies SI/SIB and hallucinations. Pt currently rates her anxiety 7/10, states that he anxiety is related to the fear of falling and also the fear of choking. Denies depression. Pt is able to ambulate with walker and SBA but requests help frequently due to fear of falling. Pt ambulated from her room to the lounge with no issues. Pt had no incontinence and was able to urinate on the toilet. Pt requires a lot of encouragement to do tasks independently. VSS. Medication compliant. No other concerns or complaints noted.

## 2020-09-13 NOTE — PROGRESS NOTES
Patient turned to side twice. She had urinary  Incontinence x 1. She was cleaned up and went back to sleep.

## 2020-09-14 ENCOUNTER — APPOINTMENT (OUTPATIENT)
Dept: PHYSICAL THERAPY | Facility: CLINIC | Age: 65
DRG: 885 | End: 2020-09-14
Attending: PSYCHIATRY & NEUROLOGY
Payer: MEDICARE

## 2020-09-14 LAB
GLUCOSE BLDC GLUCOMTR-MCNC: 104 MG/DL (ref 70–99)
GLUCOSE BLDC GLUCOMTR-MCNC: 106 MG/DL (ref 70–99)
GLUCOSE BLDC GLUCOMTR-MCNC: 60 MG/DL (ref 70–99)
GLUCOSE BLDC GLUCOMTR-MCNC: 71 MG/DL (ref 70–99)
GLUCOSE BLDC GLUCOMTR-MCNC: 75 MG/DL (ref 70–99)
GLUCOSE BLDC GLUCOMTR-MCNC: 76 MG/DL (ref 70–99)
LABORATORY COMMENT REPORT: NORMAL
SARS-COV-2 RNA SPEC QL NAA+PROBE: NEGATIVE
SARS-COV-2 RNA SPEC QL NAA+PROBE: NORMAL
SPECIMEN SOURCE: NORMAL
SPECIMEN SOURCE: NORMAL

## 2020-09-14 PROCEDURE — 00000146 ZZHCL STATISTIC GLUCOSE BY METER IP

## 2020-09-14 PROCEDURE — 25000132 ZZH RX MED GY IP 250 OP 250 PS 637: Mod: GY | Performed by: PSYCHIATRY & NEUROLOGY

## 2020-09-14 PROCEDURE — 25000132 ZZH RX MED GY IP 250 OP 250 PS 637: Mod: GY | Performed by: NURSE PRACTITIONER

## 2020-09-14 PROCEDURE — 99233 SBSQ HOSP IP/OBS HIGH 50: CPT | Performed by: NURSE PRACTITIONER

## 2020-09-14 PROCEDURE — 25000132 ZZH RX MED GY IP 250 OP 250 PS 637: Mod: GY | Performed by: PHYSICIAN ASSISTANT

## 2020-09-14 PROCEDURE — 12400002 ZZH R&B MH SENIOR/ADOLESCENT

## 2020-09-14 PROCEDURE — U0003 INFECTIOUS AGENT DETECTION BY NUCLEIC ACID (DNA OR RNA); SEVERE ACUTE RESPIRATORY SYNDROME CORONAVIRUS 2 (SARS-COV-2) (CORONAVIRUS DISEASE [COVID-19]), AMPLIFIED PROBE TECHNIQUE, MAKING USE OF HIGH THROUGHPUT TECHNOLOGIES AS DESCRIBED BY CMS-2020-01-R: HCPCS | Performed by: NURSE PRACTITIONER

## 2020-09-14 PROCEDURE — 97116 GAIT TRAINING THERAPY: CPT | Mod: GP

## 2020-09-14 PROCEDURE — 25000128 H RX IP 250 OP 636: Performed by: PHYSICIAN ASSISTANT

## 2020-09-14 PROCEDURE — 97110 THERAPEUTIC EXERCISES: CPT | Mod: GP

## 2020-09-14 RX ORDER — QUETIAPINE FUMARATE 25 MG/1
25 TABLET, FILM COATED ORAL AT BEDTIME
Qty: 30 TABLET | Refills: 0
Start: 2020-09-14 | End: 2020-10-27

## 2020-09-14 RX ORDER — DULOXETINE 40 MG/1
40 CAPSULE, DELAYED RELEASE ORAL DAILY
Start: 2020-09-15 | End: 2021-03-16

## 2020-09-14 RX ORDER — DIVALPROEX SODIUM 125 MG/1
250 CAPSULE, COATED PELLETS ORAL AT BEDTIME
Start: 2020-09-14 | End: 2021-03-16

## 2020-09-14 RX ORDER — LIDOCAINE 4 G/G
1 PATCH TOPICAL EVERY 24 HOURS
Qty: 30 PATCH | Refills: 0
Start: 2020-09-14 | End: 2020-10-14

## 2020-09-14 RX ORDER — MIRTAZAPINE 30 MG/1
30 TABLET, FILM COATED ORAL AT BEDTIME
Qty: 30 TABLET | Refills: 0
Start: 2020-09-14 | End: 2021-03-16

## 2020-09-14 RX ORDER — LEVOTHYROXINE SODIUM 100 UG/1
100 TABLET ORAL
Qty: 30 TABLET | Refills: 0
Start: 2020-09-15 | End: 2021-03-16

## 2020-09-14 RX ORDER — POTASSIUM CHLORIDE 750 MG/1
10 CAPSULE, EXTENDED RELEASE ORAL DAILY
Qty: 30 CAPSULE | Refills: 0
Start: 2020-09-15 | End: 2020-10-15

## 2020-09-14 RX ORDER — CHLORAL HYDRATE 500 MG
1 CAPSULE ORAL DAILY
Qty: 30 CAPSULE | Refills: 0
Start: 2020-09-15 | End: 2020-10-15

## 2020-09-14 RX ORDER — ONDANSETRON 4 MG/1
4 TABLET, FILM COATED ORAL EVERY 6 HOURS PRN
Status: DISCONTINUED | OUTPATIENT
Start: 2020-09-14 | End: 2020-09-15 | Stop reason: HOSPADM

## 2020-09-14 RX ADMIN — CLOPIDOGREL BISULFATE 75 MG: 75 TABLET, FILM COATED ORAL at 08:50

## 2020-09-14 RX ADMIN — ACETAMINOPHEN 1000 MG: 500 TABLET, FILM COATED ORAL at 08:50

## 2020-09-14 RX ADMIN — DULOXETINE HYDROCHLORIDE 40 MG: 20 CAPSULE, DELAYED RELEASE ORAL at 08:50

## 2020-09-14 RX ADMIN — DIVALPROEX SODIUM 250 MG: 125 CAPSULE, COATED PELLETS ORAL at 19:52

## 2020-09-14 RX ADMIN — ACETAMINOPHEN 1000 MG: 500 TABLET, FILM COATED ORAL at 19:51

## 2020-09-14 RX ADMIN — LEVOTHYROXINE SODIUM 100 MCG: 100 TABLET ORAL at 08:50

## 2020-09-14 RX ADMIN — Medication 50 MCG: at 08:50

## 2020-09-14 RX ADMIN — ASPIRIN 81 MG CHEWABLE TABLET 81 MG: 81 TABLET CHEWABLE at 08:50

## 2020-09-14 RX ADMIN — LIDOCAINE 1 PATCH: 560 PATCH PERCUTANEOUS; TOPICAL; TRANSDERMAL at 19:57

## 2020-09-14 RX ADMIN — MIRTAZAPINE 30 MG: 30 TABLET, FILM COATED ORAL at 19:51

## 2020-09-14 RX ADMIN — POTASSIUM CHLORIDE 10 MEQ: 750 CAPSULE, EXTENDED RELEASE ORAL at 08:50

## 2020-09-14 RX ADMIN — SENNOSIDES 2 TABLET: 8.6 TABLET, FILM COATED ORAL at 17:25

## 2020-09-14 RX ADMIN — FLUTICASONE FUROATE AND VILANTEROL TRIFENATATE 1 PUFF: 200; 25 POWDER RESPIRATORY (INHALATION) at 08:51

## 2020-09-14 RX ADMIN — QUETIAPINE FUMARATE 25 MG: 25 TABLET ORAL at 19:52

## 2020-09-14 RX ADMIN — ONDANSETRON HYDROCHLORIDE 4 MG: 4 TABLET, FILM COATED ORAL at 14:00

## 2020-09-14 RX ADMIN — FERROUS SULFATE TAB 325 MG (65 MG ELEMENTAL FE) 325 MG: 325 (65 FE) TAB at 08:50

## 2020-09-14 RX ADMIN — PANTOPRAZOLE SODIUM 40 MG: 40 TABLET, DELAYED RELEASE ORAL at 08:50

## 2020-09-14 RX ADMIN — ROSUVASTATIN CALCIUM 10 MG: 10 TABLET, FILM COATED ORAL at 17:25

## 2020-09-14 RX ADMIN — METOPROLOL SUCCINATE 25 MG: 25 TABLET, EXTENDED RELEASE ORAL at 08:51

## 2020-09-14 ASSESSMENT — ACTIVITIES OF DAILY LIVING (ADL)
HYGIENE/GROOMING: WITH ASSISTANCE
DRESS: SCRUBS (BEHAVIORAL HEALTH);STREET CLOTHES;WITH ASSISTANCE

## 2020-09-14 NOTE — PROGRESS NOTES
[facility], [place]   Psychiatric Progress Note        Interim History:   From H&P: Letty Escobar is a 64 year old female with longstanding mental health disease including personality disorder, [place] phobia, panic disorder and PTSD.  She is currently on  multiple chronic psychiatric medications including Abilify, BuSpar, Celexa, melatonin and Remeron but still failing at the local nursing home.  They  describe a situation and basically failure to thrive.  She is not eating or walking.  She is scared to do these things due to paranoia.  She denies suicidal homicidal thoughts.  She has lost 29 pounds they report.  No fever.  No cough.  No infectious disease symptoms.  She does have a couple areas of minor skin breakdown due to pressure.    Team meeting report: The patient's care was discussed with the treatment team during the daily team meeting and/or staff's chart notes were reviewed.  Staff report patient has been calm, pleasant, cooperative. Patient is attending groups and participating appropriately. Patient is eating well and taking medications as prescribed. Rates mood as improved, but still has high levels of anxiety related to walking. Slept 8.75 hours.  Patient had low blood sugar reading of 60 this morning.  Juice was given.  Most recent blood sugar is now 75.    Met with patient. Patient has a much improved affect.  She is able to easily converse with the provider and student, often with a smile.  Patient still reports anxiety with walking, but reports her walking abilities have improved substantially.  Patient walked up and down the ayala 6 times this morning.  Patient states that she confidently ambulated herself from the toilet to her wheelchair.  This same action gave her significant anxiety last week.  Patient says that her independence is increasing.  Patient reports mild chest pain from a fall last week off her wheelchair, but it is adequately managed on Tylenol and  Icy Hot.  Patient denies depression and SI/SIB.   When told a planned discharge will occur 2 days from now, the patient requested a discharge be planned for tomorrow.  Discharge will occur tomorrow if things can be planned accordingly.               Medications:       acetaminophen  1,000 mg Oral BID     aspirin  81 mg Oral Daily     clopidogrel  75 mg Oral Daily     divalproex sodium delayed-release  250 mg Oral At Bedtime     DULoxetine  40 mg Oral Daily     ferrous sulfate  325 mg Oral Daily with breakfast     fish oil-omega-3 fatty acids  1 g Oral Daily     fluticasone-vilanterol  1 puff Inhalation Daily     ketoconazole   Topical At Bedtime     levothyroxine  100 mcg Oral QAM AC     lidocaine  1 patch Transdermal Q24H     lidocaine   Transdermal Q8H     menthol   Transdermal Q8H     metoprolol succinate ER  25 mg Oral Daily     mirtazapine  30 mg Oral At Bedtime     pantoprazole  40 mg Oral Daily     potassium chloride ER  10 mEq Oral Daily     QUEtiapine  25 mg Oral At Bedtime     rosuvastatin  10 mg Oral At Bedtime     sennosides  2 tablet Oral At Bedtime     vitamin D3  50 mcg Oral Daily          Allergies:     Allergies   Allergen Reactions     Contrast Dye      Isosorbide Mononitrate [Isosorbide]      Nitroglycerin      Nystatin      Sulfa Drugs      Adhesive Tape Rash     Diphenhydramine Hcl Palpitations     Penicillins Rash          Labs:     Recent Results (from the past 672 hour(s))   CBC with platelets differential    Collection Time: 08/28/20  8:11 AM   Result Value Ref Range    WBC 6.8 4.0 - 11.0 10e9/L    RBC Count 3.33 (L) 3.8 - 5.2 10e12/L    Hemoglobin 10.1 (L) 11.7 - 15.7 g/dL    Hematocrit 31.0 (L) 35.0 - 47.0 %    MCV 93 78 - 100 fl    MCH 30.3 26.5 - 33.0 pg    MCHC 32.6 31.5 - 36.5 g/dL    RDW 13.8 10.0 - 15.0 %    Platelet Count 148 (L) 150 - 450 10e9/L    Diff Method Automated Method     % Neutrophils 56.7 %    % Lymphocytes 34.2 %    % Monocytes 6.7 %    % Eosinophils 2.3 %    %  Basophils 0.1 %    Absolute Neutrophil 3.9 1.6 - 8.3 10e9/L    Absolute Lymphocytes 2.3 0.8 - 5.3 10e9/L    Absolute Monocytes 0.5 0.0 - 1.3 10e9/L    Absolute Eosinophils 0.2 0.0 - 0.7 10e9/L    Absolute Basophils 0.0 0.0 - 0.2 10e9/L   Comprehensive metabolic panel    Collection Time: 08/28/20  8:11 AM   Result Value Ref Range    Sodium 139 133 - 144 mmol/L    Potassium 4.1 3.4 - 5.3 mmol/L    Chloride 109 94 - 109 mmol/L    Carbon Dioxide 24 20 - 32 mmol/L    Anion Gap 6 3 - 14 mmol/L    Glucose 78 70 - 99 mg/dL    Urea Nitrogen 30 7 - 30 mg/dL    Creatinine 0.97 0.52 - 1.04 mg/dL    GFR Estimate 61 >60 mL/min/[1.73_m2]    GFR Estimate If Black 70 >60 mL/min/[1.73_m2]    Calcium 12.2 (H) 8.5 - 10.1 mg/dL    Bilirubin Total 0.3 0.2 - 1.3 mg/dL    Albumin 2.4 (L) 3.4 - 5.0 g/dL    Protein Total 5.8 (L) 6.8 - 8.8 g/dL    Alkaline Phosphatase 56 40 - 150 U/L    ALT 18 0 - 50 U/L    AST 13 0 - 45 U/L   T4 free    Collection Time: 08/28/20  8:11 AM   Result Value Ref Range    T4 Free 1.68 (H) 0.76 - 1.46 ng/dL   TSH    Collection Time: 08/28/20  8:11 AM   Result Value Ref Range    TSH 0.01 (L) 0.40 - 4.00 mU/L   CBC with platelets differential    Collection Time: 09/02/20  3:53 PM   Result Value Ref Range    WBC 8.1 4.0 - 11.0 10e9/L    RBC Count 3.42 (L) 3.8 - 5.2 10e12/L    Hemoglobin 10.4 (L) 11.7 - 15.7 g/dL    Hematocrit 31.3 (L) 35.0 - 47.0 %    MCV 92 78 - 100 fl    MCH 30.4 26.5 - 33.0 pg    MCHC 33.2 31.5 - 36.5 g/dL    RDW 13.5 10.0 - 15.0 %    Platelet Count 188 150 - 450 10e9/L    Diff Method Automated Method     % Neutrophils 55.4 %    % Lymphocytes 34.0 %    % Monocytes 7.4 %    % Eosinophils 1.8 %    % Basophils 0.4 %    % Immature Granulocytes 1.0 %    Nucleated RBCs 0 0 /100    Absolute Neutrophil 4.5 1.6 - 8.3 10e9/L    Absolute Lymphocytes 2.8 0.8 - 5.3 10e9/L    Absolute Monocytes 0.6 0.0 - 1.3 10e9/L    Absolute Basophils 0.0 0.0 - 0.2 10e9/L    Abs Immature Granulocytes 0.1 0 - 0.4 10e9/L     Absolute Nucleated RBC 0.0    Comprehensive metabolic panel    Collection Time: 09/02/20  3:53 PM   Result Value Ref Range    Sodium 139 133 - 144 mmol/L    Potassium 4.1 3.4 - 5.3 mmol/L    Chloride 109 94 - 109 mmol/L    Carbon Dioxide 24 20 - 32 mmol/L    Anion Gap 6 3 - 14 mmol/L    Glucose 98 70 - 99 mg/dL    Urea Nitrogen 30 7 - 30 mg/dL    Creatinine 0.97 0.52 - 1.04 mg/dL    GFR Estimate 61 >60 mL/min/[1.73_m2]    GFR Estimate If Black 70 >60 mL/min/[1.73_m2]    Calcium 11.9 (H) 8.5 - 10.1 mg/dL    Bilirubin Total 0.1 (L) 0.2 - 1.3 mg/dL    Albumin 2.5 (L) 3.4 - 5.0 g/dL    Protein Total 6.1 (L) 6.8 - 8.8 g/dL    Alkaline Phosphatase 65 40 - 150 U/L    ALT 24 0 - 50 U/L    AST 21 0 - 45 U/L   TSH    Collection Time: 09/02/20  3:53 PM   Result Value Ref Range    TSH 0.02 (L) 0.40 - 4.00 mU/L   Asymptomatic COVID-19 Virus (Coronavirus) by PCR    Collection Time: 09/02/20  5:21 PM    Specimen: Nasopharyngeal   Result Value Ref Range    COVID-19 Virus PCR to [facility] - Source Nasopharyngeal     COVID-19 Virus PCR to [facility] - Result       Test received-See reflex to IDDL test SARS CoV2 (COVID-19) Virus RT-PCR   SARS-CoV-2 COVID-19 Virus (Coronavirus) RT-PCR Nasopharyngeal    Collection Time: 09/02/20  5:21 PM    Specimen: Nasopharyngeal   Result Value Ref Range    SARS-CoV-2 Virus Specimen Source Nasopharyngeal     SARS-CoV-2 PCR Result NEGATIVE     SARS-CoV-2 PCR Comment       Testing was performed using the Aptima SARS-CoV-2 Assay on the [employer] Instrument System.   Additional information about this Emergency Use Authorization (EUA) assay can be found via   the Lab Guide.     TSH with free T4 reflex and/or T3 as indicated    Collection Time: 09/03/20  7:52 AM   Result Value Ref Range    TSH 0.01 (L) 0.40 - 4.00 mU/L   Vitamin B12    Collection Time: 09/03/20  7:52 AM   Result Value Ref Range    Vitamin B12 848 193 - 986 pg/mL   Folate    Collection Time: 09/03/20  7:52 AM   Result Value Ref Range    Folate 4.3  (L) >5.4 ng/mL   Valproic acid    Collection Time: 09/03/20  7:52 AM   Result Value Ref Range    Valproic Acid Level <3 (L) 50 - 100 mg/L   Vitamin D    Collection Time: 09/03/20  7:52 AM   Result Value Ref Range    Vitamin D Deficiency screening 41 20 - 75 ug/L   T4 free    Collection Time: 09/03/20  7:52 AM   Result Value Ref Range    T4 Free 1.70 (H) 0.76 - 1.46 ng/dL   EKG 12-lead, complete    Collection Time: 09/03/20  8:50 AM   Result Value Ref Range    Interpretation ECG Click View Image link to view waveform and result    Glucose by meter    Collection Time: 09/14/20  9:00 AM   Result Value Ref Range    Glucose 60 (L) 70 - 99 mg/dL   Glucose by meter    Collection Time: 09/14/20  9:20 AM   Result Value Ref Range    Glucose 76 70 - 99 mg/dL   Glucose by meter    Collection Time: 09/14/20  9:48 AM   Result Value Ref Range    Glucose 106 (H) 70 - 99 mg/dL            Psychiatric Examination:   Temp: 96  F (35.6  C) Temp src: Tympanic BP: 105/69 Pulse: 86   Resp: 16 SpO2: 95 %      Weight is 125 lbs 0 oz  Body mass index is 24.41 kg/m .    Appearance: awake, alert and adequately groomed  Attitude:  cooperative  Eye Contact:  good  Mood:  good  Affect:  appropriate and in normal range  Speech:  clear, coherent  Psychomotor Behavior:  no evidence of tardive dyskinesia, dystonia, or tics  Throught Process:  logical  Associations:  no loose associations  Thought Content:  no evidence of suicidal ideation or homicidal ideation  Insight:  good  Judgement:  intact  Oriented to:  time, person, and place  Attention Span and Concentration:  intact  Recent and Remote Memory:  intact         Precautions:     Behavioral Orders   Procedures     Code 1 - Restrict to Unit     Fall precautions     Fall precautions     Routine Programming     As clinically indicated     Single Room     Status 15     Every 15 minutes.          DIagnoses:   1.  Major depressive disorder, recurrent, severe, without psychosis  2.  Generalized anxiety  disorder  3.  Panic disorder, per patient  4.  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  PTSD per history  6.  Medical problems include the following: Hypothyroidism, low back pain, pressure ulcers, cardiomyopathy, COPD, ELI, type 2 diabetes, CKD, stage IV, chronic iron deficiency anemia, history of TIA, hypertension, PMR.         Plan:   Patient is a 63 y/o female who was admitted with failure to thrive.  Discharge will be planned for tomorrow (9/15)        Medication changes will include the following:   --Cont Cymbalta to 40mg  --Cont Depakote to 250 mg, at bedtime  --Decrease Seroquel to 25 mg, at bedtime  --Cont Remeron to 30 mg, on admission  --Discontinue Melatonin 6 mg, qhs  --Discontinue Lexapro due to abnormal EKG.    --Discontinue BuSpar, does not seem to be helpful.  Discontinue prazosin, denies nightmares and flashbacks. Discontinue Abilify    --One time  0.5mg lorazepam to r/o catatonia. No change in patient presentation.     Disposition Plan   Reason for ongoing admission: is unable to care for self due to failure to thrive, depression  Disposition: TBD  Estimated length of stay: 7-10 days  Legal Status: voluntary   Discharge will be granted once symptoms improved.    Patient history was taken by student.  Note was prepared by student Quentin HORNER.  Student was under direct supervision by Gela Woods DNP.  Date: 09/14/20  Time: 12:24 PM     More than 30 minutes were spent for assessment, documentation, and coordination of care.     This note was created with the help of Dragon dictation system. All grammatical/typing errors or context distortion are unintentional and inherent to software.

## 2020-09-14 NOTE — PROGRESS NOTES
Facilitated a group on Thew Cognitive Model. We discussed the relationship between thoughts, emotions and behaviors. Pt participated as best she could in the group discussion.

## 2020-09-14 NOTE — DISCHARGE INSTRUCTIONS
Behavioral Discharge Planning and Instructions      Summary:  You were admitted on 9/3/2020  due to Anxiety and Failure to thrive.  You were treated by KILO Meza DNP and discharged on 9/15/2020 from [facility] to Skilled Nursing    [facility]  [address] [place] [address]  [address]  Phone: [phone], Fax: [phone]    Principal Diagnosis:   1.  Major depressive disorder, recurrent, severe, without psychosis  2.  Generalized anxiety disorder  3.  Panic disorder, per patient  4.  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  PTSD per history  6.  Medical problems include the following: Hypothyroidism, low back pain, pressure ulcers, cardiomyopathy, COPD, ELI, type 2 diabetes, CKD, stage IV, chronic iron deficiency anemia, history of TIA, hypertension, PMR.    Health Care Follow-up Appointments:   Psychiatry appointment: Tuesday, September 29 at 12:20 pm via phone  Provider: Dr. Reagan Martell  [facility]  [address] [place] [address]  Phone:  [phone], Fax: [phone]    Attend all scheduled appointments with your outpatient providers. Call at least 24 hours in advance if you need to reschedule an appointment to ensure continued access to your outpatient providers.   Major Treatments, Procedures and Findings:  You were provided with: a psychiatric assessment, assessed for medical stability, medication evaluation and/or management, group therapy, milieu management and medical interventions    Symptoms to Report: feeling more aggressive, increased confusion, losing more sleep, mood getting worse or thoughts of suicide    Early warning signs can include: increased depression or anxiety sleep disturbances increased thoughts or behaviors of suicide or self-harm  increased unusual thinking, such as paranoia or hearing voices    Safety and Wellness:  Take all medicines as directed.  Make no changes unless your doctor suggests them.   Follow  treatment recommendations.  Refrain from alcohol and non-prescribed drugs.  If there is a concern for safety, call 911.    Resources:   Crisis Intervention: [phone] or [phone] (TTY: 230.113.2282).  Call anytime for help.  National [place] [employer]ness (www.mn.lisa.org): 913.139.6449 or 099-652-9031.      The treatment team has appreciated the opportunity to work with you.     If you have any questions or concerns our unit number is [phone]

## 2020-09-14 NOTE — PROGRESS NOTES
09/13/20 [facility]   Therapeutic Recreation   Type of Intervention structured groups   Activity game   Response Participates, initiates socially appropriate   Hours 1     Pt attended the structured Therapeutic Recreation group, participating in a group activity. Pt participated in group discussion, leisure participation, and social engagement to gain self-esteem, manage behaviors, improve social skills, decrease isolation, and reduce anxiety/depression.   Pt remained focused and engaged throughout group activity.  Pt mood was sociable and was appropriate with interactions, giving and receiving feedback from others. Pt was a full participant for the duration of the group, contributing to the clues and descriptions for others throughout the group.

## 2020-09-14 NOTE — PLAN OF CARE
Discharge Planner PT   Patient plan for discharge: Rehab  Current status: Patient limited by upset stomach but agreeable. Completes transfers with CGA using walker. Ambulates short distances with walker and CGA, fairly steady throughout. Did participate in seated exercises. Per nurse patient is doing well and has been moving around quite a bit today. Ended back in w/c in group.   Barriers to return to prior living situation: safety, independence, pain, activity tolerance, cognition, standing balance  Recommendations for discharge: Rehab  Rationale for recommendations: To progress the above stated prior to returning home       Entered by: Vanesa Foy 09/14/2020 12:04 PM

## 2020-09-14 NOTE — PLAN OF CARE
"48 HOUR NURSING ASSESSMENT:    Pt verbalized feeling \"shaky\" this AM and was visibly shaky. Pt stated that she has a history of being diabetic and also has a history of having episodes of low glucose levels. Pt has an order to do BG orders PRN for symptoms of hypoglycemia. Glucose was checked and found to be low at 60. Pt drank 120 ml of cranberry juice and 25% of her cream of wheat with brown sugar. Glucose rechecked at 0920 and had increased to 76. Pt was given another 120 ml of cranberry juice. Pt requested to use the bathroom and voided a large amount of urine and also had a moderate to large very soft partially formed stool (dark green/black in color). Pt is on a iron supplement in AM. Pt walked back to the [facility] and glucose was rechecked (106). Pt needed to use the restroom again and a few minutes later patient had another moderate very soft partially  formed stool.     Dotty notified and order received for daily BG checks.     Pt attended AM group this morning. Pt reported feeling nauseated prior to lunch. Pt ate approximately 15% of lunch and drank 120 cc of cranberry juice. Pt reported feeling shaky again and glucose was checked-75. Pt was offered ginger ale, however she reported that she did not like this. Pt requested to lay down as she was not feeling well. Pt noted to have bilateral lower leg edema.   Chio MATA notified. Order received for prn zofran. Request was made to monitor edema and let her know tomorrow if it has changed. Possibly dependent edema due to sitting in chair.    Pt talked with her  and was happy that she will be going home tomorrow. Writer attempted to call patient's  as a message had been left. Unable to reach or leave a message as he does not have voice mail set up.     Pt denies SI/SIB/depression. Pt does identify feeling highly anxious. Pt seems less anxious with writer than she did last week. Pt was able to walk to dining room and back and then back to the dining " room with writer gait belt and her walker.     Pt has had no further stool occurrences. Pt had a moderate incontinent void at the start of the shift and was continent of a large amount of urine mid morning.   Pt has taken a total of 480 ml of fluids this shift. Pt took 50% of her cream of wheat this AM and 30% of her vanilla pudding. Pt took about 30% of her mashed potatoes and gravy at lunch time and returned to bed due to nausea.    Pt was approached at 1410 with prn zofran. Pt reported that she was no longer nauseated and that she had fallen asleep. Pt is resting in bed comfortably at this time laying on her left side. Pt has been encouraged to let staff know if nausea returns so that she can have prn zofran.

## 2020-09-14 NOTE — PROGRESS NOTES
Patient had urinary incontinence x 1. She was cleaned up. Pericare done.   Patient was turned to sides every 2 hours.  She slept a total of 8.75 hours.

## 2020-09-14 NOTE — PLAN OF CARE
Pt presents with blunted, flat affect and anxious mood. Denies SI/SIB and hallucinations. Pt still continues to report high anxiety related to the risk of falling. Continues to state she is unable to do things due to fear. Pt ambulates well with walker/gait belt and SBA. Refused to use the toilet this evening and was incontinent x1. Pt has poor fluid intake despite frequent prompts to drink water. Pt is able to turn well in the bed and this helps with changing. She spent most of the evening in the [employer] working on word efw-suhl and attended group, she reports that the group was fun and a psych associate reports pt was very involved. VSS. Medication compliant. No other concerns or complaints noted.     Pt has lidocaine patch to R hip and Icy Hot patch to middle of back.

## 2020-09-14 NOTE — PROGRESS NOTES
Work Completed: The patient's care was discussed with the treatment team and chart notes were reviewed. Patient will discharge tomorrow. Writer spoke with Sabra TORRES at [facility] who requested signed orders and no medication be sent. Medical transport was scheduled for 11:00 am with [facility].    Discharge plan or goal: Discharge tomorrow back to SNF.                Barriers to discharge: Further stabilization.

## 2020-09-15 VITALS
SYSTOLIC BLOOD PRESSURE: 98 MMHG | OXYGEN SATURATION: 100 % | RESPIRATION RATE: 16 BRPM | HEIGHT: 60 IN | HEART RATE: 72 BPM | WEIGHT: 125 LBS | BODY MASS INDEX: 24.54 KG/M2 | TEMPERATURE: 98 F | DIASTOLIC BLOOD PRESSURE: 65 MMHG

## 2020-09-15 LAB
GLUCOSE BLDC GLUCOMTR-MCNC: 102 MG/DL (ref 70–99)
GLUCOSE BLDC GLUCOMTR-MCNC: 65 MG/DL (ref 70–99)
GLUCOSE BLDC GLUCOMTR-MCNC: 65 MG/DL (ref 70–99)
GLUCOSE BLDC GLUCOMTR-MCNC: 94 MG/DL (ref 70–99)

## 2020-09-15 PROCEDURE — 25000132 ZZH RX MED GY IP 250 OP 250 PS 637: Mod: GY | Performed by: PHYSICIAN ASSISTANT

## 2020-09-15 PROCEDURE — 99238 HOSP IP/OBS DSCHRG MGMT 30/<: CPT | Performed by: NURSE PRACTITIONER

## 2020-09-15 PROCEDURE — 00000146 ZZHCL STATISTIC GLUCOSE BY METER IP

## 2020-09-15 PROCEDURE — 25000132 ZZH RX MED GY IP 250 OP 250 PS 637: Mod: GY | Performed by: NURSE PRACTITIONER

## 2020-09-15 RX ADMIN — CLOPIDOGREL BISULFATE 75 MG: 75 TABLET, FILM COATED ORAL at 08:33

## 2020-09-15 RX ADMIN — FERROUS SULFATE TAB 325 MG (65 MG ELEMENTAL FE) 325 MG: 325 (65 FE) TAB at 08:33

## 2020-09-15 RX ADMIN — DULOXETINE HYDROCHLORIDE 40 MG: 20 CAPSULE, DELAYED RELEASE ORAL at 08:33

## 2020-09-15 RX ADMIN — LEVOTHYROXINE SODIUM 100 MCG: 100 TABLET ORAL at 08:32

## 2020-09-15 RX ADMIN — ASPIRIN 81 MG CHEWABLE TABLET 81 MG: 81 TABLET CHEWABLE at 08:32

## 2020-09-15 RX ADMIN — POTASSIUM CHLORIDE 10 MEQ: 750 CAPSULE, EXTENDED RELEASE ORAL at 08:32

## 2020-09-15 RX ADMIN — Medication 50 MCG: at 08:32

## 2020-09-15 RX ADMIN — HYDROXYZINE HYDROCHLORIDE 25 MG: 25 TABLET, FILM COATED ORAL at 09:23

## 2020-09-15 RX ADMIN — ACETAMINOPHEN 1000 MG: 500 TABLET, FILM COATED ORAL at 08:32

## 2020-09-15 RX ADMIN — FLUTICASONE FUROATE AND VILANTEROL TRIFENATATE 1 PUFF: 200; 25 POWDER RESPIRATORY (INHALATION) at 08:37

## 2020-09-15 RX ADMIN — PANTOPRAZOLE SODIUM 40 MG: 40 TABLET, DELAYED RELEASE ORAL at 08:33

## 2020-09-15 ASSESSMENT — ACTIVITIES OF DAILY LIVING (ADL)
DRESS: WITH ASSISTANCE
HYGIENE/GROOMING: WITH ASSISTANCE

## 2020-09-15 NOTE — PROGRESS NOTES
Pt discharged to [facility] at 1105. All belongings were sent with patient including signed AVS negative covid results and BG results from the last 24 hours.   Pt verbalized increased anxiety this AM and reported this was being triggered by discharge. Pt received 25 mg of hydroxyzine for anxiety prior to discharge.  Pt denied SI/SIB. Pt was looking forward to getting back and being able to see her . Pt spoke with her  this  AM prior to leaving the unit.  Pt acknowledged that she needs to eat and increase her activity when she gets back so that she can get home to her .      Patty/RN from [facility] ([phone]) was given report including information on recent hypoglycemia and need to check BG daily. (Fax number [phone])

## 2020-09-15 NOTE — PROGRESS NOTES
Up in the lounge for3 hrs.  Offers no complaints and seems less dependent on others tonight..  Dressing changed onR) ear.  Scant amount tan drainage noted.

## 2020-09-15 NOTE — PROGRESS NOTES
Work Completed: The patient's care was discussed with the treatment team and chart notes were reviewed. Patient is discharging today. Medical transport is scheduled for 11:00 am. Writer SMITA with Sabra at [facility] ([phone]) to inform that patient will discharge at 11:00 today. Requested call back to confirm that signed orders were received yesterday via fax.      Discharge plan or goal: Discharge today back to nursing facility.                Barriers to discharge: None
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
## 2020-09-17 ENCOUNTER — VIRTUAL VISIT (OUTPATIENT)
Dept: GERIATRICS | Facility: CLINIC | Age: 65
End: 2020-09-17
Payer: MEDICARE

## 2020-09-17 VITALS
DIASTOLIC BLOOD PRESSURE: 81 MMHG | RESPIRATION RATE: 18 BRPM | BODY MASS INDEX: 23.63 KG/M2 | SYSTOLIC BLOOD PRESSURE: 123 MMHG | OXYGEN SATURATION: 98 % | WEIGHT: 121 LBS | HEART RATE: 105 BPM | TEMPERATURE: 97.4 F

## 2020-09-17 DIAGNOSIS — I50.22 CHRONIC SYSTOLIC CONGESTIVE HEART FAILURE (H): ICD-10-CM

## 2020-09-17 DIAGNOSIS — F33.9 EPISODE OF RECURRENT MAJOR DEPRESSIVE DISORDER, UNSPECIFIED DEPRESSION EPISODE SEVERITY (H): ICD-10-CM

## 2020-09-17 DIAGNOSIS — T14.8XXA DEEP TISSUE INJURY: ICD-10-CM

## 2020-09-17 DIAGNOSIS — F40.01 PANIC DISORDER WITH AGORAPHOBIA: ICD-10-CM

## 2020-09-17 DIAGNOSIS — L89.813: Primary | ICD-10-CM

## 2020-09-17 DIAGNOSIS — J44.9 CHRONIC OBSTRUCTIVE PULMONARY DISEASE, UNSPECIFIED COPD TYPE (H): ICD-10-CM

## 2020-09-17 DIAGNOSIS — I48.20 CHRONIC ATRIAL FIBRILLATION (H): ICD-10-CM

## 2020-09-17 PROCEDURE — 99306 1ST NF CARE HIGH MDM 50: CPT | Mod: 95 | Performed by: FAMILY MEDICINE

## 2020-09-17 NOTE — LETTER
"    9/17/2020        RE: Letty Reid [place]  [address] [facility] [address]         [place] [facility]  Letty Escobar is being evaluated via a billable video.   The patient has been notified of following:  \"This video visit will be conducted via a call between you and your provider. We have found that certain health care needs can be provided without the need for an in-person physical exam.  This service lets us provide the care you need with a video conversation. If during the course of the call the provider feels a video visit is not appropriate, you will not be charged for this service.\"   The provider has received verbal consent for a Video Visit from the patient and or first contact? Yes  Patient/facility staff would like the video invitation sent by: N/A   Video Start Time: 11:54  Which Facility the Patient is at during the time of visit: [facility]   PRIMARY CARE PROVIDER AND CLINIC:  KILO Alcazar [facility], [address] [address]  Chief Complaint   Patient presents with     Hospital F/U     [place] Medical Record Number:  [id number]  Letty Escobar  is a 64 year old  (1955), admitted to the above facility from  [facility]. Hospital stay 9/3/20 through 9/15/20..  Admitted to this facility for  rehab, medical management and nursing care.  HPI:    HPI information obtained from: facility staff, patient report and [facility] chart review.   Brief Summary of Hospital Course:   -Patient with past medical history of obesity disorder, PTSD, [place] phobia/panic disorder, severe paranoia resulted in significant weight loss hospitalized to [facility], required significant medication changes.  Ambulation and po intake improved.   - Started on Cymbalta to 40mg  - med changed: Depakote to 250 mg, at bedtime, Seroquel reduced to 25 mg at bedtime, and Remeron to 30 mg  - the following medications were stopped : Lexapro, BuSpar, " Abilify  - Advair Diskus and DuoNeb stopped and started on Fluticasone-vilanterol inhaler  - vitamin D and prazosin stopped (reason unknown)  - KCL reduced from 30 meq to 10 meq.     Updates on Status Since Skilled nursing Admission:   - Pt seen in the presence of RN who graciously assisted with the virtual visit.   - RN reports started rehab. Pt reports rehab is going on.  - reports appetite is ok, food is fine. Eats fine.   - RN reports the area behind the right ear is now stage 3 from unstageable., improving. Right ankle has soft scab, but better now, and the one over little right toe (lateral surface), opened now (stage 3). Now on new area 2 inch proximal on the outer surface diagnosed with DTI, prevention measures in place.   - Pt  report mood is fine.     ----------------------------------------------------------------------  CODE STATUS/ADVANCE DIRECTIVES DISCUSSION:   DNR / DNI  Patient's living condition: lives in a skilled nursing facility  ALLERGIES: Contrast dye; Isosorbide mononitrate [isosorbide]; Nitroglycerin; Nystatin; Sulfa drugs; Adhesive tape; Diphenhydramine hcl; and Penicillins  PAST MEDICAL HISTORY:  has a past medical history of Depressive disorder, Diabetes mellitus (H), Myocardial infarction (H), Neuromuscular disorder (H), and Thyroid disease.  PAST SURGICAL HISTORY:   has a past surgical history that includes Release carpal tunnel; Cholecystectomy; Cardiac surgery; and Abdomen surgery.  FAMILY HISTORY: mother had heart dz and diabetes, father had heart dz, drug abuse and alcoholism in brother.   SOCIAL HISTORY:    former cigarettes smoker and alcohol.   Current Outpatient Medications   Medication Sig Dispense Refill     acetaminophen (TYLENOL) 500 MG tablet Take 1,000 mg by mouth 2 times daily       albuterol (PROAIR HFA/PROVENTIL HFA/VENTOLIN HFA) 108 (90 Base) MCG/ACT inhaler Inhale 2 puffs into the lungs every 4 hours as needed for shortness of breath / dyspnea or wheezing        aspirin 81 MG EC tablet Take 81 mg by mouth daily       clopidogrel (PLAVIX) 75 MG tablet Take 75 mg by mouth daily        cyanocobalamin (CYANOCOBALAMIN) 1000 MCG/ML injection Inject 1 mL into the muscle every 30 days       divalproex sodium delayed-release (DEPAKOTE SPRINKLE) 125 MG DR capsule Take 250 mg by mouth At Bedtime       DULoxetine 40 MG CPEP Take 40 mg by mouth daily       ferrous sulfate (FEROSUL) 325 (65 Fe) MG tablet Take 325 mg by mouth daily (with breakfast)       fish oil-omega-3 fatty acids 1000 MG capsule Take 1 capsule (1 g) by mouth daily 30 capsule 0     fluticasone-vilanterol (BREO ELLIPTA) 200-25 MCG/INH inhaler Inhale 1 puff into the lungs daily       hydrOXYzine (ATARAX) 25 MG tablet Take 25 mg by mouth every 6 hours as needed for itching       ketoconazole (NIZORAL) 2 % external cream Apply topically At Bedtime       levothyroxine (SYNTHROID/LEVOTHROID) 100 MCG tablet Take 1 tablet (100 mcg) by mouth every morning (before breakfast) 30 tablet 0     Lidocaine (LIDOCARE) 4 % Patch Place 1 patch onto the skin every 24 hours To prevent lidocaine toxicity, patient should be patch free for 12 hrs daily. 30 patch 0     melatonin 3 MG tablet Take 6 mg by mouth At Bedtime       menthol (ICY HOT) 5 % PTCH Apply 1 patch topically every 8 hours as needed for muscle soreness       metoprolol succinate ER (TOPROL-XL) 25 MG 24 hr tablet Take 25 mg by mouth daily       miconazole (MICATIN) 2 % AERP powder Apply topically 2 times daily as needed       mirtazapine (REMERON) 30 MG tablet Take 1 tablet (30 mg) by mouth At Bedtime 30 tablet 0     Omega-3 Fatty Acids (OMEGA-3 FISH OIL) 1200 MG CAPS Take 1,200 mg by mouth daily        pantoprazole (PROTONIX) 40 MG EC tablet Take 40 mg by mouth daily       polyethylene glycol (MIRALAX) 17 g packet Take 17 g by mouth daily as needed for constipation       potassium chloride ER (MICRO-K) 10 MEQ CR capsule Take 1 capsule (10 mEq) by mouth daily 30 capsule 0      "QUEtiapine (SEROQUEL) 25 MG tablet Take 1 tablet (25 mg) by mouth At Bedtime 30 tablet 0     rosuvastatin (CRESTOR) 10 MG tablet Take 10 mg by mouth At Bedtime        sennosides (SENOKOT) 8.6 MG tablet Take 2 tablets by mouth At Bedtime       vitamin D3 (CHOLECALCIFEROL) 50 mcg (2000 units) tablet Take 1 tablet (50 mcg) by mouth daily        discharge medications reconciled and changed, per note/orders  ROS: 10 point ROS of systems including Constitutional, Eyes, Respiratory, Cardiovascular, Gastroenterology, Genitourinary, Integumentary, Musculoskeletal, Psychiatric were all negative except for pertinent positives noted in my HPI.  Vitals:/81   Pulse 105   Temp 97.4  F (36.3  C)   Resp 18   Wt 54.9 kg (121 lb)   SpO2 98%   BMI 23.63 kg/m     Limited Visit Exam done given COVID-19 precautions:  GENERAL APPEARANCE:  in no distress  RESP:  unlabored breathing  M/S:   no joint deformity noted  SKIN:  no rash noted  NEURO:   no purposeful movement in upper and lower extremities  PSYCH:  affect and mood normal    Lab/Diagnostic data: Reviewed in the chart and EHR.      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   \"Started on Cymbalta to 40mg  - med changed: Depakote to 250 mg at bedtime, Seroquel reduced to 25 mg at bedtime, and Remeron to 30 mg  - Lexapro, BuSpar, Abilify discontinued\".   - will be followed by Psychiat[facility].   - social interaction is of utmost importance to minimized Geripsych hospitalization.       # Severe protein-calorie malnutrition (H)  - Body mass index is 23.63 kg/m .   Wt Readings from Last 5 Encounters:   09/17/20 54.9 kg (121 lb) "   09/13/20 56.7 kg (125 lb)   08/28/20 54.9 kg (121 lb)   07/29/20 63.7 kg (140 lb 6.4 oz)   07/08/20 63.1 kg (139 lb 3.2 oz)   - po intake improved while hospitalized 2/2 improvement with paranoia   - continue monitoring.   - dietician for close follow up      COPD (H):  - compensated.   - Duoneb Advair diskus stopped and started on Breo-Ellipta  - seems stable. Monitor.       # Hx of hypokalemia: KCL reduced from 20 meq daily to 10 meq. Monitor.       # Chronic systolic CHF (H)  # Ischemic cardiomyopathy and Hx of S/P CABG x 5  # Essential hypertension  # Mixed hyperlipidemia  # Internal carotid artery stenosis, bilateral :  - compensated      Right ear Pressure Injury stage 3 from unstageable (H): , improving.   Right ankle Pressure injury unstageable: (H) has soft scab, but better now,   Right little toe pressure ulcer stage 3: continue wound care  New Right foot, lateral surface DTI (H):  prevention measures in place.     # Vitamin Deficiency:  Level 41 (09/03). Vit D 5000 IU q week stopped. Will start Vit D 800 IU daily Recommend resuming at 1000 IU daily      Polymyalgia rheumatica (H)  Chronic pain disorder  Chronic bilateral low back pain without sciatica  Peripheral polyneuropathy  Frailty  - started rehab while hospitalized, and showed improvement with ambulation.   - resume rehab.   - analgesia optimal.       # Hypothyroidism:  TSH   Date Value Ref Range Status   09/03/2020 0.01 (L) 0.40 - 4.00 mU/L Final   - FT4 1.7  - LT4 dose reduced  From 200 mcg to 175 (07/09), reduced to 100 mcg (09/04). This is a significant reduction in the dose.   - will repeat TSH and LT4 in 4 weeks.       Irritable bowel syndrome with diarrhea  Gastroesophageal reflux disease, esophagitis presence not specified  - stable.     Order:  - Vit D 800 IU  - check TSH and FT4 first week of October.   - BMP next lab day  - See above, otherwise, continue the rest of the current POC.       Electronically signed by:  Paola Pastor MD      Video-Visit Details  Type of service:  Video Visit  Video End Time (time video stopped): 11:56  Distant Location (provider location):  [place] [facility]                   Sincerely,        Paola Pastor MD

## 2020-09-17 NOTE — PROGRESS NOTES
" [place] [facility]  Letty Escobar is being evaluated via a billable video.   The patient has been notified of following:  \"This video visit will be conducted via a call between you and your provider. We have found that certain health care needs can be provided without the need for an in-person physical exam.  This service lets us provide the care you need with a video conversation. If during the course of the call the provider feels a video visit is not appropriate, you will not be charged for this service.\"   The provider has received verbal consent for a Video Visit from the patient and or first contact? Yes  Patient/facility staff would like the video invitation sent by: N/A   Video Start Time: 11:54  Which Facility the Patient is at during the time of visit: [facility]   PRIMARY CARE PROVIDER AND CLINIC:  KILO Alcazar [facility], [address] [address]  Chief Complaint   Patient presents with     Hospital F/U     [place] Medical Record Number:  [id number]  Letty Escobar  is a 64 year old  (1955), admitted to the above facility from  [facility]. Hospital stay 9/3/20 through 9/15/20..  Admitted to this facility for  rehab, medical management and nursing care.  HPI:    HPI information obtained from: facility staff, patient report and [facility] chart review.   Brief Summary of Hospital Course:   -Patient with past medical history of obesity disorder, PTSD, [place] phobia/panic disorder, severe paranoia resulted in significant weight loss hospitalized to [facility], required significant medication changes.  Ambulation and po intake improved.   - Started on Cymbalta to 40mg  - med changed: Depakote to 250 mg, at bedtime, Seroquel reduced to 25 mg at bedtime, and Remeron to 30 mg  - the following medications were stopped : Lexapro, BuSpar, Abilify  - Advair Diskus and DuoNeb stopped and started on Fluticasone-vilanterol inhaler  - " vitamin D and prazosin stopped (reason unknown)  - KCL reduced from 30 meq to 10 meq.     Updates on Status Since Skilled nursing Admission:   - Pt seen in the presence of RN who graciously assisted with the virtual visit.   - RN reports started rehab. Pt reports rehab is going on.  - reports appetite is ok, food is fine. Eats fine.   - RN reports the area behind the right ear is now stage 3 from unstageable., improving. Right ankle has soft scab, but better now, and the one over little right toe (lateral surface), opened now (stage 3). Now on new area 2 inch proximal on the outer surface diagnosed with DTI, prevention measures in place.   - Pt  report mood is fine.     ----------------------------------------------------------------------  CODE STATUS/ADVANCE DIRECTIVES DISCUSSION:   DNR / DNI  Patient's living condition: lives in a skilled nursing facility  ALLERGIES: Contrast dye; Isosorbide mononitrate [isosorbide]; Nitroglycerin; Nystatin; Sulfa drugs; Adhesive tape; Diphenhydramine hcl; and Penicillins  PAST MEDICAL HISTORY:  has a past medical history of Depressive disorder, Diabetes mellitus (H), Myocardial infarction (H), Neuromuscular disorder (H), and Thyroid disease.  PAST SURGICAL HISTORY:   has a past surgical history that includes Release carpal tunnel; Cholecystectomy; Cardiac surgery; and Abdomen surgery.  FAMILY HISTORY: mother had heart dz and diabetes, father had heart dz, drug abuse and alcoholism in brother.   SOCIAL HISTORY:    former cigarettes smoker and alcohol.   Current Outpatient Medications   Medication Sig Dispense Refill     acetaminophen (TYLENOL) 500 MG tablet Take 1,000 mg by mouth 2 times daily       albuterol (PROAIR HFA/PROVENTIL HFA/VENTOLIN HFA) 108 (90 Base) MCG/ACT inhaler Inhale 2 puffs into the lungs every 4 hours as needed for shortness of breath / dyspnea or wheezing       aspirin 81 MG EC tablet Take 81 mg by mouth daily       clopidogrel (PLAVIX) 75 MG tablet Take 75  mg by mouth daily        cyanocobalamin (CYANOCOBALAMIN) 1000 MCG/ML injection Inject 1 mL into the muscle every 30 days       divalproex sodium delayed-release (DEPAKOTE SPRINKLE) 125 MG DR capsule Take 250 mg by mouth At Bedtime       DULoxetine 40 MG CPEP Take 40 mg by mouth daily       ferrous sulfate (FEROSUL) 325 (65 Fe) MG tablet Take 325 mg by mouth daily (with breakfast)       fish oil-omega-3 fatty acids 1000 MG capsule Take 1 capsule (1 g) by mouth daily 30 capsule 0     fluticasone-vilanterol (BREO ELLIPTA) 200-25 MCG/INH inhaler Inhale 1 puff into the lungs daily       hydrOXYzine (ATARAX) 25 MG tablet Take 25 mg by mouth every 6 hours as needed for itching       ketoconazole (NIZORAL) 2 % external cream Apply topically At Bedtime       levothyroxine (SYNTHROID/LEVOTHROID) 100 MCG tablet Take 1 tablet (100 mcg) by mouth every morning (before breakfast) 30 tablet 0     Lidocaine (LIDOCARE) 4 % Patch Place 1 patch onto the skin every 24 hours To prevent lidocaine toxicity, patient should be patch free for 12 hrs daily. 30 patch 0     melatonin 3 MG tablet Take 6 mg by mouth At Bedtime       menthol (ICY HOT) 5 % PTCH Apply 1 patch topically every 8 hours as needed for muscle soreness       metoprolol succinate ER (TOPROL-XL) 25 MG 24 hr tablet Take 25 mg by mouth daily       miconazole (MICATIN) 2 % AERP powder Apply topically 2 times daily as needed       mirtazapine (REMERON) 30 MG tablet Take 1 tablet (30 mg) by mouth At Bedtime 30 tablet 0     Omega-3 Fatty Acids (OMEGA-3 FISH OIL) 1200 MG CAPS Take 1,200 mg by mouth daily        pantoprazole (PROTONIX) 40 MG EC tablet Take 40 mg by mouth daily       polyethylene glycol (MIRALAX) 17 g packet Take 17 g by mouth daily as needed for constipation       potassium chloride ER (MICRO-K) 10 MEQ CR capsule Take 1 capsule (10 mEq) by mouth daily 30 capsule 0     QUEtiapine (SEROQUEL) 25 MG tablet Take 1 tablet (25 mg) by mouth At Bedtime 30 tablet 0      "rosuvastatin (CRESTOR) 10 MG tablet Take 10 mg by mouth At Bedtime        sennosides (SENOKOT) 8.6 MG tablet Take 2 tablets by mouth At Bedtime       vitamin D3 (CHOLECALCIFEROL) 50 mcg (2000 units) tablet Take 1 tablet (50 mcg) by mouth daily        discharge medications reconciled and changed, per note/orders  ROS: 10 point ROS of systems including Constitutional, Eyes, Respiratory, Cardiovascular, Gastroenterology, Genitourinary, Integumentary, Musculoskeletal, Psychiatric were all negative except for pertinent positives noted in my HPI.  Vitals:/81   Pulse 105   Temp 97.4  F (36.3  C)   Resp 18   Wt 54.9 kg (121 lb)   SpO2 98%   BMI 23.63 kg/m     Limited Visit Exam done given COVID-19 precautions:  GENERAL APPEARANCE:  in no distress  RESP:  unlabored breathing  M/S:   no joint deformity noted  SKIN:  no rash noted  NEURO:   no purposeful movement in upper and lower extremities  PSYCH:  affect and mood normal    Lab/Diagnostic data: Reviewed in the chart and EHR.      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   \"Started on Cymbalta to 40mg  - med changed: Depakote to 250 mg at bedtime, Seroquel reduced to 25 mg at bedtime, and Remeron to 30 mg  - Lexapro, BuSpar, Abilify discontinued\".   - will be followed by Psychiatrist Dr. [facility] at [facility].   - social interaction is of utmost importance to minimized Geripsych hospitalization.       # Severe protein-calorie malnutrition (H)  - Body mass index is 23.63 kg/m .   Wt Readings from Last 5 Encounters:   09/17/20 54.9 kg (121 lb)   09/13/20 56.7 kg (125 lb)   08/28/20 54.9 kg (121 lb)   07/29/20 63.7 kg (140 lb 6.4 oz) "   07/08/20 63.1 kg (139 lb 3.2 oz)   - po intake improved while hospitalized 2/2 improvement with paranoia   - continue monitoring.   - dietician for close follow up      COPD (H):  - compensated.   - Duoneb Advair diskus stopped and started on Breo-Ellipta  - seems stable. Monitor.       # Hx of hypokalemia: KCL reduced from 20 meq daily to 10 meq. Monitor.       # Chronic systolic CHF (H)  # Ischemic cardiomyopathy and Hx of S/P CABG x 5  # Essential hypertension  # Mixed hyperlipidemia  # Internal carotid artery stenosis, bilateral :  - compensated      Right ear Pressure Injury stage 3 from unstageable (H): , improving.   Right ankle Pressure injury unstageable: (H) has soft scab, but better now,   Right little toe pressure ulcer stage 3: continue wound care  New Right foot, lateral surface DTI (H):  prevention measures in place.     # Vitamin Deficiency:  Level 41 (09/03). Vit D 5000 IU q week stopped. Will start Vit D 800 IU daily Recommend resuming at 1000 IU daily      Polymyalgia rheumatica (H)  Chronic pain disorder  Chronic bilateral low back pain without sciatica  Peripheral polyneuropathy  Frailty  - started rehab while hospitalized, and showed improvement with ambulation.   - resume rehab.   - analgesia optimal.       # Hypothyroidism:  TSH   Date Value Ref Range Status   09/03/2020 0.01 (L) 0.40 - 4.00 mU/L Final   - FT4 1.7  - LT4 dose reduced  From 200 mcg to 175 (07/09), reduced to 100 mcg (09/04). This is a significant reduction in the dose.   - will repeat TSH and LT4 in 4 weeks.       Irritable bowel syndrome with diarrhea  Gastroesophageal reflux disease, esophagitis presence not specified  - stable.     Order:  - Vit D 800 IU  - check TSH and FT4 first week of October.   - BMP next lab day  - See above, otherwise, continue the rest of the current POC.       Electronically signed by:  Paola Pastor MD     Video-Visit Details  Type of service:  Video Visit  Video End Time (time video stopped):  11:56  Distant Location (provider location):  [facility]

## 2020-09-18 ENCOUNTER — HOSPITAL LABORATORY (OUTPATIENT)
Dept: NURSING HOME | Facility: OTHER | Age: 65
End: 2020-09-18

## 2020-09-18 LAB
ANION GAP SERPL CALCULATED.3IONS-SCNC: 8 MMOL/L (ref 3–14)
BUN SERPL-MCNC: 20 MG/DL (ref 7–30)
CALCIUM SERPL-MCNC: 9.7 MG/DL (ref 8.5–10.1)
CHLORIDE SERPL-SCNC: 110 MMOL/L (ref 94–109)
CO2 SERPL-SCNC: 21 MMOL/L (ref 20–32)
CREAT SERPL-MCNC: 0.98 MG/DL (ref 0.52–1.04)
GFR SERPL CREATININE-BSD FRML MDRD: 60 ML/MIN/{1.73_M2}
GLUCOSE SERPL-MCNC: 48 MG/DL (ref 70–99)
POTASSIUM SERPL-SCNC: 4.3 MMOL/L (ref 3.4–5.3)
SODIUM SERPL-SCNC: 139 MMOL/L (ref 133–144)

## 2020-09-25 ENCOUNTER — AMBULATORY - HEALTHEAST (OUTPATIENT)
Dept: OTHER | Facility: CLINIC | Age: 65
End: 2020-09-25

## 2020-09-25 ENCOUNTER — DOCUMENTATION ONLY (OUTPATIENT)
Dept: OTHER | Facility: CLINIC | Age: 65
End: 2020-09-25

## 2020-09-28 ENCOUNTER — HOSPITAL LABORATORY (OUTPATIENT)
Dept: NURSING HOME | Facility: OTHER | Age: 65
End: 2020-09-28

## 2020-09-29 ENCOUNTER — HOSPITAL LABORATORY (OUTPATIENT)
Dept: NURSING HOME | Facility: OTHER | Age: 65
End: 2020-09-29

## 2020-09-29 LAB
ANION GAP SERPL CALCULATED.3IONS-SCNC: 5 MMOL/L (ref 3–14)
BUN SERPL-MCNC: 17 MG/DL (ref 7–30)
CALCIUM SERPL-MCNC: 9.8 MG/DL (ref 8.5–10.1)
CHLORIDE SERPL-SCNC: 115 MMOL/L (ref 94–109)
CO2 SERPL-SCNC: 23 MMOL/L (ref 20–32)
CREAT SERPL-MCNC: 0.98 MG/DL (ref 0.52–1.04)
ERYTHROCYTE [DISTWIDTH] IN BLOOD BY AUTOMATED COUNT: 14.8 % (ref 10–15)
GFR SERPL CREATININE-BSD FRML MDRD: 60 ML/MIN/{1.73_M2}
GLUCOSE SERPL-MCNC: 75 MG/DL (ref 70–99)
HCT VFR BLD AUTO: 32.1 % (ref 35–47)
HGB BLD-MCNC: 10.4 G/DL (ref 11.7–15.7)
MCH RBC QN AUTO: 30.1 PG (ref 26.5–33)
MCHC RBC AUTO-ENTMCNC: 32.4 G/DL (ref 31.5–36.5)
MCV RBC AUTO: 93 FL (ref 78–100)
PLATELET # BLD AUTO: 250 10E9/L (ref 150–450)
POTASSIUM SERPL-SCNC: 5.2 MMOL/L (ref 3.4–5.3)
RBC # BLD AUTO: 3.45 10E12/L (ref 3.8–5.2)
SODIUM SERPL-SCNC: 143 MMOL/L (ref 133–144)
T4 FREE SERPL-MCNC: 1.08 NG/DL (ref 0.76–1.46)
TSH SERPL DL<=0.005 MIU/L-ACNC: 0.64 MU/L (ref 0.4–4)
WBC # BLD AUTO: 9 10E9/L (ref 4–11)

## 2020-10-26 ENCOUNTER — HOSPITAL LABORATORY (OUTPATIENT)
Dept: NURSING HOME | Facility: OTHER | Age: 65
End: 2020-10-26

## 2020-10-26 LAB
T4 FREE SERPL-MCNC: 0.81 NG/DL (ref 0.76–1.46)
TSH SERPL DL<=0.005 MIU/L-ACNC: 14.63 MU/L (ref 0.4–4)

## 2020-10-26 NOTE — PROGRESS NOTES
[facility]  Chief Complaint   Patient presents with     FDC Regulatory     [facility] Medical Record Number:  [id number]  Place of Service where encounter took place:  [facility] [[id number]]    HPI:    Letty Escobar  is 64 year old (1955), who is being seen today for a federally mandated E/M visit.  HPI information obtained from: facility chart records, facility staff, patient report and [facility] chart review. Today's concerns are:     CKD (chronic kidney disease) stage 4, GFR 15-29 ml/min (H)  Chronic obstructive pulmonary disease, unspecified COPD type (H)  Severe protein-calorie malnutrition (H)  Personality disorder (H)  Episode of recurrent major depressive disorder, unspecified depression episode severity (H)  Chronic atrial fibrillation (H)  Coronary artery disease involving native coronary artery of native heart with angina pectoris (H)  Chronic systolic congestive heart failure (H)  Type 2 diabetes mellitus with other specified complication, unspecified whether long term insulin use (H)  Panic disorder with agoraphobia  Posttraumatic stress disorder  Mood disorder due to a general medical condition  S/P CABG x 5  Ischemic cardiomyopathy  Essential hypertension  Mixed hyperlipidemia  Deep tissue injury  Weight loss  Failure to thrive in adult  Chronic pain disorder  Peripheral polyneuropathy     Patient is a 64 year old medically complex woman with PMH including panic disorder with agoraphobia, MDD, anxiety, drug-seeking behavior, personality disorder, PTSD, History suicidal ideation, HTN, HLD, ischemic cardiomyopathy, History MI and CABG x 5 (2015), CHF (EF 45-50%), ICA stenosis bilateral, pAfib, Morbid obesity, IBS with Diarrhea, GERD, DM2, chronic pain with neuropathy, urinary incontinence, PMR, Anemia, Vit B12 def, hypothyroidism who recently moved to [facility] to be closer to family.      [facility] Psych stay at [facility] 9/3-9/15/20. Medication changes included:  --Cymbalta to 40mg  --Depakote to 250 mg, at bedtime  --Decrease Seroquel to 25 mg, at bedtime  --Remeron to 30 mg, on admission  --Discontinue Melatonin, Lexapro, Buspar, Abilify.   --One time  0.5mg lorazepam to r/o catatonia. No change in patient presentation.     She has then been back to the SNF since that time and is now being f/b [facility] Psychiatrist, Dr Reagan Martell and on-site psychologist.    Nursing reports patient is often anxious and requests someone sit with her all day to keep her company.   Nursing reported today patient ambulated 2x in the hallway with staff.     Today patient is met in her SNF room as she is getting ready for lunch.  She tells me she wishes to go home and wants to know when this can happen.  She denies any pain, CP, HA, lightheadedness, heartburn, upset stomach.    She endorses a little trouble falling asleep, being anxious and shortness of breath with anxiety, occasional constipation.      The last conversation I had with her  was that he was unable to care for her on his own.  They do live with their son but he is not at home, working, often.      ALLERGIES:Contrast dye, Isosorbide mononitrate [isosorbide], Nitroglycerin, Nystatin, Sulfa drugs, Adhesive tape, Diphenhydramine hcl, and Penicillins  PAST MEDICAL HISTORY:   has a past medical history of Depressive disorder, Diabetes mellitus (H), Myocardial infarction (H), Neuromuscular disorder (H), and Thyroid disease.  PAST SURGICAL HISTORY:   has a past surgical history that includes Release carpal tunnel; Cholecystectomy; Cardiac surgery; and Abdomen surgery.  FAMILY HISTORY: family history is not on file.  SOCIAL HISTORY:      MEDICATIONS:  Current Outpatient Medications   Medication Sig Dispense Refill     acetaminophen (TYLENOL) 500 MG tablet Take 1,000 mg by mouth 2 times daily       aspirin 81 MG EC tablet Take 81 mg by mouth daily       clopidogrel (PLAVIX) 75 MG  tablet Take 75 mg by mouth daily        cyanocobalamin (CYANOCOBALAMIN) 1000 MCG/ML injection Inject 1 mL into the muscle every 30 days       divalproex sodium delayed-release (DEPAKOTE SPRINKLE) 125 MG DR capsule Take 250 mg by mouth At Bedtime       DULoxetine 40 MG CPEP Take 40 mg by mouth daily       ferrous sulfate (FEROSUL) 325 (65 Fe) MG tablet Take 325 mg by mouth daily (with breakfast)       hydrOXYzine (ATARAX) 25 MG tablet Take 25 mg by mouth every 6 hours as needed for itching       ketoconazole (NIZORAL) 2 % external cream Apply topically At Bedtime       levothyroxine (SYNTHROID/LEVOTHROID) 100 MCG tablet Take 1 tablet (100 mcg) by mouth every morning (before breakfast) 30 tablet 0     melatonin 3 MG tablet Take 6 mg by mouth At Bedtime       metoprolol succinate ER (TOPROL-XL) 25 MG 24 hr tablet Take 25 mg by mouth daily       miconazole (MICATIN) 2 % AERP powder Apply topically 2 times daily as needed       mirtazapine (REMERON) 30 MG tablet Take 1 tablet (30 mg) by mouth At Bedtime 30 tablet 0     pantoprazole (PROTONIX) 40 MG EC tablet Take 40 mg by mouth daily       polyethylene glycol (MIRALAX) 17 g packet Take 17 g by mouth daily as needed for constipation       rosuvastatin (CRESTOR) 10 MG tablet Take 10 mg by mouth At Bedtime        sennosides (SENOKOT) 8.6 MG tablet Take 2 tablets by mouth At Bedtime       vitamin D3 (CHOLECALCIFEROL) 50 mcg (2000 units) tablet Take 1 tablet (50 mcg) by mouth daily       Case Management:  I have reviewed the care plan and MDS and do agree with the plan. Patient's desire to return to the community is present, but is not able due to care needs . Information reviewed:  Medications, vital signs, orders, and nursing notes.    ROS:  Limited secondary to cognitive impairment but today pt reports 10 point ROS of systems including Constitutional, Eyes, Respiratory, Cardiovascular, Gastroenterology, Genitourinary, Integumentary, Musculoskeletal, Psychiatric were all  negative except for pertinent positives noted in my HPI.    Vitals:  BP (!) 142/92   Pulse 67   Temp 97.9  F (36.6  C)   Resp 18   Wt 59 kg (130 lb)   SpO2 98%   BMI 25.39 kg/m    Body mass index is 25.39 kg/m .  Exam:  GENERAL APPEARANCE:  Alert, in no distress, very deconditioned, anxious  RESP:  respiratory effort and palpation of chest normal, auscultation of lungs clear, no respiratory distress  CV:  Palpation and auscultation of heart done , rate and rhythm regular, no murmur, no LE peripheral edema  ABDOMEN:  normal bowel sounds, soft, nontender, no hepatosplenomegaly or other masses  M/S:   Gait and station with w/c for mobility, ambulating short distances with staff, Digits and nails with arthritic changes, significantly reduced muscle mass  SKIN:  Inspection and Palpation of skin and subcutaneous tissue pale. Ear pressure wound covered by Mepilex, not visualized this visit. Patient declined allowing me to see it or her toe or ankle wounds as she wanted to get to lunch.   PSYCH:  insight and judgement, memory with some impairment, affect and mood normal for her (anxious), follows commands readily     Lab/Diagnostic data:   CBC RESULTS:   Recent Labs   Lab Test 09/29/20  0937 09/02/20  1553   WBC 9.0 8.1   RBC 3.45* 3.42*   HGB 10.4* 10.4*   HCT 32.1* 31.3*   MCV 93 92   MCH 30.1 30.4   MCHC 32.4 33.2   RDW 14.8 13.5    188       Last Basic Metabolic Panel:  Recent Labs   Lab Test 09/29/20  0937 09/18/20  0828    139   POTASSIUM 5.2 4.3   CHLORIDE 115* 110*   ORESTES 9.8 9.7   CO2 23 21   BUN 17 20   CR 0.98 0.98   GLC 75 48*       Liver Function Studies -   Recent Labs   Lab Test 09/02/20  1553 08/28/20  0811   PROTTOTAL 6.1* 5.8*   ALBUMIN 2.5* 2.4*   BILITOTAL 0.1* 0.3   ALKPHOS 65 56   AST 21 13   ALT 24 18       TSH   Date Value Ref Range Status   10/26/2020 14.63 (H) 0.40 - 4.00 mU/L Final   09/29/2020 0.64 0.40 - 4.00 mU/L Final   ]    No results found for:  A1C      ASSESSMENT/PLAN  Panic disorder with agoraphobia  Episode of recurrent major depressive disorder, unspecified depression episode severity (H)  Personality disorder (H)  Posttraumatic stress disorder  Mood disorder due to a general medical condition  Now f/b ACP, Dr Reagan Martell and in house psychologist  (Previously f/b KIMBERLY Elizabeth with [facility] for psychotherapy   Also was receiving services from [facility], by Jay Moyer [facility] ([phone])  History of at least 4 jazmín-psych stays and multiple ED visits)  Most recent Jazmín-psych stay 9/3-9/15/20 at [facility].   3/18/2020: SLUMS 30/30, most recent BIMS 14, PHQ9 - 7    Patient is currently on divalproex 250 mg nightly, duloxetine 40 mg every morning, melatonin 6 mg nightly, mirtazapine 30 mg nightly, vitamin B12 monthly, olanzapine 5 mg nightly, hydroxyzine 25 mg every 6 hours as needed    Patient had failure to thrive, had jazmín-psych stay, continued to fail but Dr Martell adjusted her medications and now has been gaining weight - see below.  Patient still reports anxiety and does portray behaviors of frequent requests and requesting someone always be with her.  Today patient reports she wishes to go home.  Last conversation with patient's , he reported to me that he was unable to care for her.      PLAN:  - no GDR of regimen at this time as patient improving  - Dr Martell to make medication adjustments as needed  - in house psychologist following  - monitor for S/Es      Type 2 diabetes mellitus with other specified complication, unspecified whether long term insulin use (H)  No A1C on file  Not on any meds  9/29/20 glucose on BMP - 75 (previous one when not eating 48)  Is on ASA, statin.     PLAN:  - recheck Hgb A1C for monitoring.     Chronic systolic congestive heart failure (H) - 10/20/17 ECHO: EF 45-50%, severely increased LV thickness, Grade 2 LV diastolic filling  Chronic atrial  fibrillation (H)  S/P CABG x 5  Ischemic cardiomyopathy  Essential hypertension  Mixed hyperlipidemia  Coronary artery disease involving native coronary artery of native heart with angina pectoris (H)  F/b [facility], Dr Saucedo  Per Care Everywhere:   2/5/2009 - MI - Proximal RCA 99%, mild-mod disease elsewhere. EF 60%. PCI: MYRON to pRCA.  2/12/2009 - admit CP - Widely patent RCA stent. Moderate diffuse CAD. Severe stenosis in trivial PDA branch. LVEF 45%.  1/25/2010 - admit CP - PTCA and stent of diagonal  9/8/2010 - admit CP - LAD patent stent. Moderate diffuse CAD. Medical management recommended.   4/25/2012 - NSTEMI - Acute total occlusion of the ostial Circumflex. Acute total occlusion of the ostial ramus. Acute total occlusion of the distal 1st Obtuse Marginal. Angioplasty of ostial circumflex and ostial ramus. There was distal embolization to the OM1 vessel. This vessel was small and not amendable to intervention. Indefinite: plavix and asa 81.  8/10/2015 - CABx5 - 1. LIMA to distal LAD, reverse SVG to OM1 and OM2, reverse SVG to diagonal, reverse SVG to PDA.   2. Mitral valve repair with a Medtronics 3-D full ring, 28 mm.   3. Tricuspid repair with a Medtronic 28 mm partial ring  1/12/2016 - TTE - EF 40-45%  Also noted history of Biv-AICD implanted - patient reports upcoming appt 11/9/20.    Patient is on rosuvastatin 10 mg nightly, Toprol-XL 25 mg daily, clopidogrel 75 mg daily, ASA 81 mg daily,    4/4/20 Lipid Panel  Chol 83, HDL 23, LDL 42, Trig 90    BPs 110-130s/50-70s  HRs 62-76, regular today  Patient denies CP, HA, lightheadedness    PLAN:  - continue statin, asa, Toprol XL, Plavix,   - BP goals are <150/90 mm Hg.This is higher than ACC and AHA recommendations due to risk for hypotension, risk of dizziness and falls, risk of tissue/cerebral hypoperfusion and frailty.   - monitor for changes after upcoming Cardiology appt.       CKD (chronic kidney disease) stage 4, GFR 15-29 ml/min  (H)  Variable Creat so difficult to assess BL  Multiple significant AKIs in the past  Last few BMPs:   9/2/20: BUN 30, Creat 0.97, GFR 61  9/18/20: BUN 20, Creat 0.98, GFR 60  9/29/20: BUN 17, Creat 0.98, GFR 60    Patient is not on NSAIDs, diuretics, ACEI.    PLAN:  - recheck BMP for stability  - Will avoid nephrotoxic agents (such as ACEI/ARB/NSAIDs, IV contrast) and mindful prescribing with renal dosing.       Chronic obstructive pulmonary disease, unspecified COPD type (H)  History of ELI and tobacco abuse   On Albuterol Inhaler PRN, Breo-Ellipta 200-25 mcg/dose 1 puff q AM,   Patient reports SO with anxiety, otherwise no shortness of breath  LS clear  Sats 95-98% on RA  Afebrile     PLAN:  - Continue Breo-Ellipta daily   - discontinue albuterol inhaler PRN d/t no usage   - follow clinically     Deep tissue injury  Severe protein-calorie malnutrition (H)  Weight loss  Failure to thrive in adult  Failure to thrive with patient refusing to get OOB, eat, etc --> [facility] psych stay --> psychiatry following now --> patient improving.   Patient has 3 wounds which all are reportedly improving - no longer open (right ear, 5th toe, right lateral malleolus).  On nutritional supplement 6 oz BID, ProSource 30 mL daily, mirtazapine 30 mg q HS,  Nursing working diligently on off-loading and encouraging patient to get OOB.     Weights:  6/24/20 - 150 lbs  7/9/20 - 142.8  8/1/20 - 130  9/17/20 - 124  10/1/20 - 130 lbs    PLAN:  - continue regimen as above  - appreciate nursing's diligent efforts for getting patient OOB for off-loading and mental health     Chronic pain disorder  Peripheral polyneuropathy  Current analgesia with Tylenol 1000 mg twice daily, duloxetine 40 mg daily, lidocaine patch daily as needed - no usage  Patient declined pain today    PLAN:  - Tylenol 1000 mg BID, monitor for ability to GDR  - duloxetine for anxiety as well as neuropathic pain  - discontinue lidocaine patch - no usage       transcribed by team  coordinator: Reta Arrington  1. Friday 10/30/20: BMP, Hgb A1C. Dx: HTN, DM2  2. Discontinue Lidocaine patch - no usage  3. Discontinue Albuterol inhaler - no usage        Electronically signed by:  KILO Alcazar CNP

## 2020-10-27 ENCOUNTER — NURSING HOME VISIT (OUTPATIENT)
Dept: GERIATRICS | Facility: CLINIC | Age: 65
End: 2020-10-27
Payer: MEDICARE

## 2020-10-27 VITALS
RESPIRATION RATE: 18 BRPM | HEART RATE: 67 BPM | WEIGHT: 130 LBS | BODY MASS INDEX: 25.39 KG/M2 | OXYGEN SATURATION: 98 % | TEMPERATURE: 97.9 F | DIASTOLIC BLOOD PRESSURE: 92 MMHG | SYSTOLIC BLOOD PRESSURE: 142 MMHG

## 2020-10-27 DIAGNOSIS — E78.2 MIXED HYPERLIPIDEMIA: ICD-10-CM

## 2020-10-27 DIAGNOSIS — R62.7 FAILURE TO THRIVE IN ADULT: ICD-10-CM

## 2020-10-27 DIAGNOSIS — R63.4 WEIGHT LOSS: ICD-10-CM

## 2020-10-27 DIAGNOSIS — F40.01 PANIC DISORDER WITH AGORAPHOBIA: Primary | ICD-10-CM

## 2020-10-27 DIAGNOSIS — I48.20 CHRONIC ATRIAL FIBRILLATION (H): ICD-10-CM

## 2020-10-27 DIAGNOSIS — F33.9 EPISODE OF RECURRENT MAJOR DEPRESSIVE DISORDER, UNSPECIFIED DEPRESSION EPISODE SEVERITY (H): ICD-10-CM

## 2020-10-27 DIAGNOSIS — I50.22 CHRONIC SYSTOLIC CONGESTIVE HEART FAILURE (H): ICD-10-CM

## 2020-10-27 DIAGNOSIS — J44.9 CHRONIC OBSTRUCTIVE PULMONARY DISEASE, UNSPECIFIED COPD TYPE (H): ICD-10-CM

## 2020-10-27 DIAGNOSIS — F43.10 POSTTRAUMATIC STRESS DISORDER: ICD-10-CM

## 2020-10-27 DIAGNOSIS — I25.119 CORONARY ARTERY DISEASE INVOLVING NATIVE CORONARY ARTERY OF NATIVE HEART WITH ANGINA PECTORIS (H): ICD-10-CM

## 2020-10-27 DIAGNOSIS — G62.9 PERIPHERAL POLYNEUROPATHY: ICD-10-CM

## 2020-10-27 DIAGNOSIS — I25.5 ISCHEMIC CARDIOMYOPATHY: ICD-10-CM

## 2020-10-27 DIAGNOSIS — E43 SEVERE PROTEIN-CALORIE MALNUTRITION (H): ICD-10-CM

## 2020-10-27 DIAGNOSIS — I10 ESSENTIAL HYPERTENSION: ICD-10-CM

## 2020-10-27 DIAGNOSIS — F60.9 PERSONALITY DISORDER (H): ICD-10-CM

## 2020-10-27 DIAGNOSIS — F06.30 MOOD DISORDER DUE TO A GENERAL MEDICAL CONDITION: ICD-10-CM

## 2020-10-27 DIAGNOSIS — G89.4 CHRONIC PAIN DISORDER: ICD-10-CM

## 2020-10-27 DIAGNOSIS — T14.8XXA DEEP TISSUE INJURY: ICD-10-CM

## 2020-10-27 DIAGNOSIS — Z95.1 S/P CABG X 5: ICD-10-CM

## 2020-10-27 DIAGNOSIS — E11.69 TYPE 2 DIABETES MELLITUS WITH OTHER SPECIFIED COMPLICATION, UNSPECIFIED WHETHER LONG TERM INSULIN USE (H): ICD-10-CM

## 2020-10-27 DIAGNOSIS — N18.4 CKD (CHRONIC KIDNEY DISEASE) STAGE 4, GFR 15-29 ML/MIN (H): ICD-10-CM

## 2020-10-27 PROCEDURE — 99309 SBSQ NF CARE MODERATE MDM 30: CPT | Performed by: NURSE PRACTITIONER

## 2020-10-27 NOTE — LETTER
10/27/2020        RE: Letty Reid [place]  [address] [facility] [address]        [facility]  Chief Complaint   Patient presents with     senior living Regulatory     [place] Medical Record Number:  [id number]  Place of Service where encounter took place:  [facility] ([facility] [[id number]]    HPI:    Letty Escobar  is 64 year old (1955), who is being seen today for a federally mandated E/M visit.  HPI information obtained from: facility chart records, facility staff, patient report and [facility] chart review. Today's concerns are:     CKD (chronic kidney disease) stage 4, GFR 15-29 ml/min (H)  Chronic obstructive pulmonary disease, unspecified COPD type (H)  Severe protein-calorie malnutrition (H)  Personality disorder (H)  Episode of recurrent major depressive disorder, unspecified depression episode severity (H)  Chronic atrial fibrillation (H)  Coronary artery disease involving native coronary artery of native heart with angina pectoris (H)  Chronic systolic congestive heart failure (H)  Type 2 diabetes mellitus with other specified complication, unspecified whether long term insulin use (H)  Panic disorder with agoraphobia  Posttraumatic stress disorder  Mood disorder due to a general medical condition  S/P CABG x 5  Ischemic cardiomyopathy  Essential hypertension  Mixed hyperlipidemia  Deep tissue injury  Weight loss  Failure to thrive in adult  Chronic pain disorder  Peripheral polyneuropathy     Patient is a 64 year old medically complex woman with PMH including panic disorder with agoraphobia, MDD, anxiety, drug-seeking behavior, personality disorder, PTSD, History suicidal ideation, HTN, HLD, ischemic cardiomyopathy, History MI and CABG x 5 (2015), CHF (EF 45-50%), ICA stenosis bilateral, pAfib, Morbid obesity, IBS with Diarrhea, GERD, DM2, chronic pain with neuropathy, urinary incontinence, PMR, Anemia, Vit B12 def, hypothyroidism who recently  moved to [facility] to be closer to family.      [facility] stay at [facility] 9/3-9/15/20. Medication changes included:  --Cymbalta to 40mg  --Depakote to 250 mg, at bedtime  --Decrease Seroquel to 25 mg, at bedtime  --Remeron to 30 mg, on admission  --Discontinue Melatonin, Lexapro, Buspar, Abilify.   --One time  0.5mg lorazepam to r/o catatonia. No change in patient presentation.     She has then been back to the SNF since that time and is now being f/b [facility] Psychiatrist, Dr Reagan Martell and on-site psychologist.    Nursing reports patient is often anxious and requests someone sit with her all day to keep her company.   Nursing reported today patient ambulated 2x in the hallway with staff.     Today patient is met in her SNF room as she is getting ready for lunch.  She tells me she wishes to go home and wants to know when this can happen.  She denies any pain, CP, HA, lightheadedness, heartburn, upset stomach.    She endorses a little trouble falling asleep, being anxious and shortness of breath with anxiety, occasional constipation.      The last conversation I had with her  was that he was unable to care for her on his own.  They do live with their son but he is not at home, working, often.      ALLERGIES:Contrast dye, Isosorbide mononitrate [isosorbide], Nitroglycerin, Nystatin, Sulfa drugs, Adhesive tape, Diphenhydramine hcl, and Penicillins  PAST MEDICAL HISTORY:   has a past medical history of Depressive disorder, Diabetes mellitus (H), Myocardial infarction (H), Neuromuscular disorder (H), and Thyroid disease.  PAST SURGICAL HISTORY:   has a past surgical history that includes Release carpal tunnel; Cholecystectomy; Cardiac surgery; and Abdomen surgery.  FAMILY HISTORY: family history is not on file.  SOCIAL HISTORY:      MEDICATIONS:  Current Outpatient Medications   Medication Sig Dispense Refill     acetaminophen (TYLENOL) 500 MG tablet Take 1,000 mg by mouth 2 times  daily       aspirin 81 MG EC tablet Take 81 mg by mouth daily       clopidogrel (PLAVIX) 75 MG tablet Take 75 mg by mouth daily        cyanocobalamin (CYANOCOBALAMIN) 1000 MCG/ML injection Inject 1 mL into the muscle every 30 days       divalproex sodium delayed-release (DEPAKOTE SPRINKLE) 125 MG DR capsule Take 250 mg by mouth At Bedtime       DULoxetine 40 MG CPEP Take 40 mg by mouth daily       ferrous sulfate (FEROSUL) 325 (65 Fe) MG tablet Take 325 mg by mouth daily (with breakfast)       hydrOXYzine (ATARAX) 25 MG tablet Take 25 mg by mouth every 6 hours as needed for itching       ketoconazole (NIZORAL) 2 % external cream Apply topically At Bedtime       levothyroxine (SYNTHROID/LEVOTHROID) 100 MCG tablet Take 1 tablet (100 mcg) by mouth every morning (before breakfast) 30 tablet 0     melatonin 3 MG tablet Take 6 mg by mouth At Bedtime       metoprolol succinate ER (TOPROL-XL) 25 MG 24 hr tablet Take 25 mg by mouth daily       miconazole (MICATIN) 2 % AERP powder Apply topically 2 times daily as needed       mirtazapine (REMERON) 30 MG tablet Take 1 tablet (30 mg) by mouth At Bedtime 30 tablet 0     pantoprazole (PROTONIX) 40 MG EC tablet Take 40 mg by mouth daily       polyethylene glycol (MIRALAX) 17 g packet Take 17 g by mouth daily as needed for constipation       rosuvastatin (CRESTOR) 10 MG tablet Take 10 mg by mouth At Bedtime        sennosides (SENOKOT) 8.6 MG tablet Take 2 tablets by mouth At Bedtime       vitamin D3 (CHOLECALCIFEROL) 50 mcg (2000 units) tablet Take 1 tablet (50 mcg) by mouth daily       Case Management:  I have reviewed the care plan and MDS and do agree with the plan. Patient's desire to return to the community is present, but is not able due to care needs . Information reviewed:  Medications, vital signs, orders, and nursing notes.    ROS:  Limited secondary to cognitive impairment but today pt reports 10 point ROS of systems including Constitutional, Eyes, Respiratory,  Cardiovascular, Gastroenterology, Genitourinary, Integumentary, Musculoskeletal, Psychiatric were all negative except for pertinent positives noted in my HPI.    Vitals:  BP (!) 142/92   Pulse 67   Temp 97.9  F (36.6  C)   Resp 18   Wt 59 kg (130 lb)   SpO2 98%   BMI 25.39 kg/m    Body mass index is 25.39 kg/m .  Exam:  GENERAL APPEARANCE:  Alert, in no distress, very deconditioned, anxious  RESP:  respiratory effort and palpation of chest normal, auscultation of lungs clear, no respiratory distress  CV:  Palpation and auscultation of heart done , rate and rhythm regular, no murmur, no LE peripheral edema  ABDOMEN:  normal bowel sounds, soft, nontender, no hepatosplenomegaly or other masses  M/S:   Gait and station with w/c for mobility, ambulating short distances with staff, Digits and nails with arthritic changes, significantly reduced muscle mass  SKIN:  Inspection and Palpation of skin and subcutaneous tissue pale. Ear pressure wound covered by Mepilex, not visualized this visit. Patient declined allowing me to see it or her toe or ankle wounds as she wanted to get to lunch.   PSYCH:  insight and judgement, memory with some impairment, affect and mood normal for her (anxious), follows commands readily     Lab/Diagnostic data:   CBC RESULTS:   Recent Labs   Lab Test 09/29/20  0937 09/02/20  1553   WBC 9.0 8.1   RBC 3.45* 3.42*   HGB 10.4* 10.4*   HCT 32.1* 31.3*   MCV 93 92   MCH 30.1 30.4   MCHC 32.4 33.2   RDW 14.8 13.5    188       Last Basic Metabolic Panel:  Recent Labs   Lab Test 09/29/20  0937 09/18/20  0828    139   POTASSIUM 5.2 4.3   CHLORIDE 115* 110*   ORESTES 9.8 9.7   CO2 23 21   BUN 17 20   CR 0.98 0.98   GLC 75 48*       Liver Function Studies -   Recent Labs   Lab Test 09/02/20  1553 08/28/20  0811   PROTTOTAL 6.1* 5.8*   ALBUMIN 2.5* 2.4*   BILITOTAL 0.1* 0.3   ALKPHOS 65 56   AST 21 13   ALT 24 18       TSH   Date Value Ref Range Status   10/26/2020 14.63 (H) 0.40 - 4.00 mU/L  Final   09/29/2020 0.64 0.40 - 4.00 mU/L Final   ]    No results found for: A1C      ASSESSMENT/PLAN  Panic disorder with agoraphobia  Episode of recurrent major depressive disorder, unspecified depression episode severity (H)  Personality disorder (H)  Posttraumatic stress disorder  Mood disorder due to a general medical condition  Now f/b ACP, Dr Reagan Martell and in house psychologist  (Previously f/b MARIBEL ElizabethRAND with [facility] for psychotherapy   Also was receiving services from [facility], by Jay Moyer [facility] ([phone])  History of at least 4 jazmín-psych stays and multiple ED visits)  Most recent Jazm[facility]-psych stay 9/3-9/15/20 at [facility].   3/18/2020: SLUMS 30/30, most recent BIMS 14, PHQ9 - 7    Patient is currently on divalproex 250 mg nightly, duloxetine 40 mg every morning, melatonin 6 mg nightly, mirtazapine 30 mg nightly, vitamin B12 monthly, olanzapine 5 mg nightly, hydroxyzine 25 mg every 6 hours as needed    Patient had failure to thrive, had jazmín-psych stay, continued to fail but Dr Martell adjusted her medications and now has been gaining weight - see below.  Patient still reports anxiety and does portray behaviors of frequent requests and requesting someone always be with her.  Today patient reports she wishes to go home.  Last conversation with patient's , he reported to me that he was unable to care for her.      PLAN:  - no GDR of regimen at this time as patient improving  - Dr Martell to make medication adjustments as needed  - in house psychologist following  - monitor for S/Es      Type 2 diabetes mellitus with other specified complication, unspecified whether long term insulin use (H)  No A1C on file  Not on any meds  9/29/20 glucose on BMP - 75 (previous one when not eating 48)  Is on ASA, statin.     PLAN:  - recheck Hgb A1C for monitoring.     Chronic systolic congestive heart failure (H) - 10/20/17 ECHO: EF 45-50%, severely  increased LV thickness, Grade 2 LV diastolic filling  Chronic atrial fibrillation (H)  S/P CABG x 5  Ischemic cardiomyopathy  Essential hypertension  Mixed hyperlipidemia  Coronary artery disease involving native coronary artery of native heart with angina pectoris (H)  F/b [facility], Dr Saucedo  Per Care Everywhere:   2/5/2009 - MI - Proximal RCA 99%, mild-mod disease elsewhere. EF 60%. PCI: MYRON to pRCA.  2/12/2009 - admit CP - Widely patent RCA stent. Moderate diffuse CAD. Severe stenosis in trivial PDA branch. LVEF 45%.  1/25/2010 - admit CP - PTCA and stent of diagonal  9/8/2010 - admit CP - LAD patent stent. Moderate diffuse CAD. Medical management recommended.   4/25/2012 - NSTEMI - Acute total occlusion of the ostial Circumflex. Acute total occlusion of the ostial ramus. Acute total occlusion of the distal 1st Obtuse Marginal. Angioplasty of ostial circumflex and ostial ramus. There was distal embolization to the OM1 vessel. This vessel was small and not amendable to intervention. Indefinite: plavix and asa 81.  8/10/2015 - CABx5 - 1. LIMA to distal LAD, reverse SVG to OM1 and OM2, reverse SVG to diagonal, reverse SVG to PDA.   2. Mitral valve repair with a Medtronics 3-D full ring, 28 mm.   3. Tricuspid repair with a Medtronic 28 mm partial ring  1/12/2016 - TTE - EF 40-45%  Also noted history of Biv-AICD implanted - patient reports upcoming appt 11/9/20.    Patient is on rosuvastatin 10 mg nightly, Toprol-XL 25 mg daily, clopidogrel 75 mg daily, ASA 81 mg daily,    4/4/20 Lipid Panel  Chol 83, HDL 23, LDL 42, Trig 90    BPs 110-130s/50-70s  HRs 62-76, regular today  Patient denies CP, HA, lightheadedness    PLAN:  - continue statin, asa, Toprol XL, Plavix,   - BP goals are <150/90 mm Hg.This is higher than ACC and AHA recommendations due to risk for hypotension, risk of dizziness and falls, risk of tissue/cerebral hypoperfusion and frailty.   - monitor for changes after upcoming Cardiology  appt.       CKD (chronic kidney disease) stage 4, GFR 15-29 ml/min (H)  Variable Creat so difficult to assess BL  Multiple significant AKIs in the past  Last few BMPs:   9/2/20: BUN 30, Creat 0.97, GFR 61  9/18/20: BUN 20, Creat 0.98, GFR 60  9/29/20: BUN 17, Creat 0.98, GFR 60    Patient is not on NSAIDs, diuretics, ACEI.    PLAN:  - recheck BMP for stability  - Will avoid nephrotoxic agents (such as ACEI/ARB/NSAIDs, IV contrast) and mindful prescribing with renal dosing.       Chronic obstructive pulmonary disease, unspecified COPD type (H)  History of ELI and tobacco abuse   On Albuterol Inhaler PRN, Breo-Ellipta 200-25 mcg/dose 1 puff q AM,   Patient reports SO with anxiety, otherwise no shortness of breath  LS clear  Sats 95-98% on RA  Afebrile     PLAN:  - Continue Breo-Ellipta daily   - discontinue albuterol inhaler PRN d/t no usage   - follow clinically     Deep tissue injury  Severe protein-calorie malnutrition (H)  Weight loss  Failure to thrive in adult  Failure to thrive with patient refusing to get OOB, eat, etc --> [facility] stay --> psychiatry following now --> patient improving.   Patient has 3 wounds which all are reportedly improving - no longer open (right ear, 5th toe, right lateral malleolus).  On nutritional supplement 6 oz BID, ProSource 30 mL daily, mirtazapine 30 mg q HS,  Nursing working diligently on off-loading and encouraging patient to get OOB.     Weights:  6/24/20 - 150 lbs  7/9/20 - 142.8  8/1/20 - 130  9/17/20 - 124  10/1/20 - 130 lbs    PLAN:  - continue regimen as above  - appreciate nursing's diligent efforts for getting patient OOB for off-loading and mental health     Chronic pain disorder  Peripheral polyneuropathy  Current analgesia with Tylenol 1000 mg twice daily, duloxetine 40 mg daily, lidocaine patch daily as needed - no usage  Patient declined pain today    PLAN:  - Tylenol 1000 mg BID, monitor for ability to GDR  - duloxetine for anxiety as well as neuropathic pain  -  discontinue lidocaine patch - no usage       transcribed by : Reta Arrington  1. Friday 10/30/20: BMP, Hgb A1C. Dx: HTN, DM2  2. Discontinue Lidocaine patch - no usage  3. Discontinue Albuterol inhaler - no usage        Electronically signed by:  KILO Alcazar CNP                Sincerely,        KILO Alcazar CNP

## 2020-10-30 ENCOUNTER — NURSING HOME VISIT (OUTPATIENT)
Dept: GERIATRICS | Facility: CLINIC | Age: 65
End: 2020-10-30
Payer: MEDICARE

## 2020-10-30 ENCOUNTER — HOSPITAL LABORATORY (OUTPATIENT)
Dept: NURSING HOME | Facility: OTHER | Age: 65
End: 2020-10-30

## 2020-10-30 ENCOUNTER — TELEPHONE (OUTPATIENT)
Dept: GERIATRICS | Facility: CLINIC | Age: 65
End: 2020-10-30

## 2020-10-30 VITALS
BODY MASS INDEX: 25.39 KG/M2 | RESPIRATION RATE: 20 BRPM | OXYGEN SATURATION: 93 % | WEIGHT: 130 LBS | HEART RATE: 75 BPM | DIASTOLIC BLOOD PRESSURE: 59 MMHG | SYSTOLIC BLOOD PRESSURE: 131 MMHG | TEMPERATURE: 101.9 F

## 2020-10-30 DIAGNOSIS — N18.4 ACUTE RENAL FAILURE SUPERIMPOSED ON STAGE 4 CHRONIC KIDNEY DISEASE, UNSPECIFIED ACUTE RENAL FAILURE TYPE (H): ICD-10-CM

## 2020-10-30 DIAGNOSIS — R50.9 FEVER, UNSPECIFIED FEVER CAUSE: Primary | ICD-10-CM

## 2020-10-30 DIAGNOSIS — R82.90 ABNORMAL URINALYSIS: ICD-10-CM

## 2020-10-30 DIAGNOSIS — I10 ESSENTIAL HYPERTENSION: ICD-10-CM

## 2020-10-30 DIAGNOSIS — I50.22 CHRONIC SYSTOLIC CONGESTIVE HEART FAILURE (H): ICD-10-CM

## 2020-10-30 DIAGNOSIS — Z95.1 S/P CABG X 5: ICD-10-CM

## 2020-10-30 DIAGNOSIS — D64.9 ANEMIA, UNSPECIFIED TYPE: ICD-10-CM

## 2020-10-30 DIAGNOSIS — E78.2 MIXED HYPERLIPIDEMIA: ICD-10-CM

## 2020-10-30 DIAGNOSIS — N17.9 ACUTE RENAL FAILURE SUPERIMPOSED ON STAGE 4 CHRONIC KIDNEY DISEASE, UNSPECIFIED ACUTE RENAL FAILURE TYPE (H): ICD-10-CM

## 2020-10-30 DIAGNOSIS — I25.5 ISCHEMIC CARDIOMYOPATHY: ICD-10-CM

## 2020-10-30 LAB
ALBUMIN UR-MCNC: 100 MG/DL
ANION GAP SERPL CALCULATED.3IONS-SCNC: 7 MMOL/L (ref 3–14)
APPEARANCE UR: ABNORMAL
BASOPHILS # BLD AUTO: 0 10E9/L (ref 0–0.2)
BASOPHILS NFR BLD AUTO: 0.1 %
BILIRUB UR QL STRIP: NEGATIVE
BUN SERPL-MCNC: 34 MG/DL (ref 7–30)
CALCIUM SERPL-MCNC: 9.7 MG/DL (ref 8.5–10.1)
CHLORIDE SERPL-SCNC: 109 MMOL/L (ref 94–109)
CO2 SERPL-SCNC: 22 MMOL/L (ref 20–32)
COLOR UR AUTO: ABNORMAL
CREAT SERPL-MCNC: 1.2 MG/DL (ref 0.52–1.04)
DIFFERENTIAL METHOD BLD: ABNORMAL
EOSINOPHIL NFR BLD AUTO: 0 %
ERYTHROCYTE [DISTWIDTH] IN BLOOD BY AUTOMATED COUNT: 14.2 % (ref 10–15)
GFR SERPL CREATININE-BSD FRML MDRD: 47 ML/MIN/{1.73_M2}
GLUCOSE SERPL-MCNC: 121 MG/DL (ref 70–99)
GLUCOSE UR STRIP-MCNC: NEGATIVE MG/DL
HBA1C MFR BLD: 4.6 % (ref 0–5.6)
HCT VFR BLD AUTO: 22.8 % (ref 35–47)
HGB BLD-MCNC: 7.2 G/DL (ref 11.7–15.7)
HGB UR QL STRIP: ABNORMAL
IMM GRANULOCYTES # BLD: 0.1 10E9/L (ref 0–0.4)
IMM GRANULOCYTES NFR BLD: 1.4 %
KETONES UR STRIP-MCNC: NEGATIVE MG/DL
LEUKOCYTE ESTERASE UR QL STRIP: ABNORMAL
LYMPHOCYTES # BLD AUTO: 0.6 10E9/L (ref 0.8–5.3)
LYMPHOCYTES NFR BLD AUTO: 7.9 %
MCH RBC QN AUTO: 30.8 PG (ref 26.5–33)
MCHC RBC AUTO-ENTMCNC: 31.6 G/DL (ref 31.5–36.5)
MCV RBC AUTO: 97 FL (ref 78–100)
MONOCYTES # BLD AUTO: 0.4 10E9/L (ref 0–1.3)
MONOCYTES NFR BLD AUTO: 5 %
MUCOUS THREADS #/AREA URNS LPF: PRESENT /LPF
NEUTROPHILS # BLD AUTO: 6.2 10E9/L (ref 1.6–8.3)
NEUTROPHILS NFR BLD AUTO: 85.6 %
NITRATE UR QL: NEGATIVE
NRBC # BLD AUTO: 0 10*3/UL
NRBC BLD AUTO-RTO: 0 /100
PH UR STRIP: 5 PH (ref 5–7)
PLATELET # BLD AUTO: 93 10E9/L (ref 150–450)
POTASSIUM SERPL-SCNC: 4 MMOL/L (ref 3.4–5.3)
RBC # BLD AUTO: 2.34 10E12/L (ref 3.8–5.2)
RBC #/AREA URNS AUTO: 5 /HPF (ref 0–2)
SODIUM SERPL-SCNC: 138 MMOL/L (ref 133–144)
SOURCE: ABNORMAL
SP GR UR STRIP: 1.03 (ref 1–1.03)
UROBILINOGEN UR STRIP-MCNC: 0 MG/DL (ref 0–2)
WBC # BLD AUTO: 7.2 10E9/L (ref 4–11)
WBC #/AREA URNS AUTO: 63 /HPF (ref 0–5)
WBC CLUMPS #/AREA URNS HPF: PRESENT /HPF

## 2020-10-30 PROCEDURE — 99309 SBSQ NF CARE MODERATE MDM 30: CPT | Performed by: NURSE PRACTITIONER

## 2020-10-30 NOTE — PROGRESS NOTES
[place] [facility]  [place] [facility] Record Number:  [id number]  Place of Service where encounter took place:  [facility] ([facility] [[id number]]  Chief Complaint   Patient presents with     RECHECK       HPI:    Letty Escobar  is a 64 year old (1955), who is being seen today for an episodic care visit.  HPI information obtained from: facility chart records, facility staff, patient report and [facility] chart review. Today's concern is:     Fever, unspecified fever cause  Abnormal urinalysis  Acute renal failure superimposed on stage 4 chronic kidney disease, unspecified acute renal failure type (H)  Anemia, unspecified type  Chronic systolic congestive heart failure (H)  Essential hypertension  S/P CABG x 5  Ischemic cardiomyopathy  Mixed hyperlipidemia     Patient is a 64 year old medically complex woman with PMH including panic disorder with agoraphobia, MDD, anxiety, drug-seeking behavior, personality disorder, PTSD, History suicidal ideation, HTN, HLD, ischemic cardiomyopathy, History MI and CABG x 5 (2015), CHF (EF 45-50%), ICA stenosis bilateral, pAfib, Morbid obesity, IBS with Diarrhea, GERD, DM2, chronic pain with neuropathy, urinary incontinence, PMR, Anemia, Vit B12 def, hypothyroidism who [facility] to family.       Jazmín Psych stay at [facility] 9/3-9/15/20. Medication changes included:  --Cymbalta to 40mg  --Depakote to 250 mg, at bedtime  --Decrease Seroquel to 25 mg, at bedtime  --Remeron to 30 mg, on admission  --Discontinue Melatonin, Lexapro, Buspar, Abilify.   --One time  0.5mg lorazepam to r/o catatonia. No change in patient presentation.      She has then been back to the [facility] since that time and is now being f/b [facility], Dr Reagan Martell and on-site psychologist.    Nursing has been reporting patient has been having fevers, TMAx 103.4.  Nursing reporting LS clear, initially no urinary  symptoms, then patient reporting increase in incontinence and increase in urgency, frequency.  Tylenol has been increased and Rocephin has been given IM x 1 (by the time of today's visit) and urine had just been sent to the lab.   COVID test from 10/28/20 returned negative  Labs today returned and concerning for ERIC and sudden anemia  Just prior to this visit nursing reporting patient's temperature was 98.2    Patient is met today in her SNF room where she reports no abdominal pain, nausea, CP, HA< lightheadedness.  She endorses some shortness of breath but only with anxiety.  She reported diarrhea x 1 a few days ago but has since stopped.      Past Medical and Surgical History reviewed in Epic today.    MEDICATIONS:  Current Outpatient Medications   Medication Sig Dispense Refill     acetaminophen (TYLENOL) 500 MG tablet Take 2 tablets (1,000 mg) by mouth 3 times daily       pantoprazole (PROTONIX) 40 MG EC tablet 40 mg po BID x 10 days, then return to 40 mg po every day.       aspirin 81 MG EC tablet Take 81 mg by mouth daily       clopidogrel (PLAVIX) 75 MG tablet Take 75 mg by mouth daily        cyanocobalamin (CYANOCOBALAMIN) 1000 MCG/ML injection Inject 1 mL into the muscle every 30 days       divalproex sodium delayed-release (DEPAKOTE SPRINKLE) 125 MG DR capsule Take 250 mg by mouth At Bedtime       DULoxetine 40 MG CPEP Take 40 mg by mouth daily       ferrous sulfate (FEROSUL) 325 (65 Fe) MG tablet Take 325 mg by mouth daily (with breakfast)       hydrOXYzine (ATARAX) 25 MG tablet Take 25 mg by mouth every 6 hours as needed for itching       ketoconazole (NIZORAL) 2 % external cream Apply topically At Bedtime       levothyroxine (SYNTHROID/LEVOTHROID) 100 MCG tablet Take 1 tablet (100 mcg) by mouth every morning (before breakfast) 30 tablet 0     melatonin 3 MG tablet Take 6 mg by mouth At Bedtime       metoprolol succinate ER (TOPROL-XL) 25 MG 24 hr tablet Take 25 mg by mouth daily       miconazole  (MICATIN) 2 % AERP powder Apply topically 2 times daily as needed       mirtazapine (REMERON) 30 MG tablet Take 1 tablet (30 mg) by mouth At Bedtime 30 tablet 0     polyethylene glycol (MIRALAX) 17 g packet Take 17 g by mouth daily as needed for constipation       rosuvastatin (CRESTOR) 10 MG tablet Take 10 mg by mouth At Bedtime        sennosides (SENOKOT) 8.6 MG tablet Take 2 tablets by mouth At Bedtime       vitamin D3 (CHOLECALCIFEROL) 50 mcg (2000 units) tablet Take 1 tablet (50 mcg) by mouth daily       REVIEW OF SYSTEMS:  Limited secondary to cognitive impairment but today pt reports 10 point ROS of systems including Constitutional, Eyes, Respiratory, Cardiovascular, Gastroenterology, Genitourinary, Integumentary, Musculoskeletal, Psychiatric were all negative except for pertinent positives noted in my HPI.    Objective:  /59   Pulse 75   Temp 101.9  F (38.8  C)   Resp 20   Wt 59 kg (130 lb)   SpO2 93%   BMI 25.39 kg/m    Exam:  GENERAL APPEARANCE:  Alert, in no distress, deconditioned, frail  RESP:  respiratory effort and palpation of chest normal, auscultation of lungs clear , no respiratory distress, no cough   CV:  Palpation and auscultation of heart done , rate and rhythm regular, no murmur, no LE peripheral edema  ABDOMEN:  normal bowel sounds, soft, nontender, no hepatosplenomegaly or other masses  M/S:   Gait and station with w/c for mobility, Digits and nails with arthritic changes, reduced muscle mass  SKIN:  Inspection and Palpation of skin and subcutaneous tissue pale, wounds healing, covered so did not visualize today   PSYCH:  insight and judgement, memory with impairment, affect and mood normal, follows commands readily         Labs:   CBC RESULTS:   Recent Labs   Lab Test 10/30/20  0852 09/29/20  0937   WBC 7.2 9.0   RBC 2.34* 3.45*   HGB 7.2* 10.4*   HCT 22.8* 32.1*   MCV 97 93   MCH 30.8 30.1   MCHC 31.6 32.4   RDW 14.2 14.8   PLT 93* 250       Last Basic Metabolic Panel:  Recent  Labs   Lab Test 10/30/20  0852 09/29/20  0937    143   POTASSIUM 4.0 5.2   CHLORIDE 109 115*   ORESTES 9.7 9.8   CO2 22 23   BUN 34* 17   CR 1.20* 0.98   * 75       Liver Function Studies -   Recent Labs   Lab Test 09/02/20  1553 08/28/20  0811   PROTTOTAL 6.1* 5.8*   ALBUMIN 2.5* 2.4*   BILITOTAL 0.1* 0.3   ALKPHOS 65 56   AST 21 13   ALT 24 18       TSH   Date Value Ref Range Status   10/26/2020 14.63 (H) 0.40 - 4.00 mU/L Final   09/29/2020 0.64 0.40 - 4.00 mU/L Final   ]    Lab Results   Component Value Date    A1C 4.6 10/30/2020       UA RESULTS:  Recent Labs   Lab Test 10/30/20  1145   COLOR Juju   APPEARANCE Cloudy   URINEGLC Negative   URINEBILI Negative   URINEKETONE Negative   SG 1.026   UBLD Small*   URINEPH 5.0   PROTEIN 100*   NITRITE Negative   LEUKEST Moderate*   RBCU 5*   WBCU 63*       ASSESSMENT/PLAN:  Fever, unspecified fever cause  Abnormal urinalysis  Unstable. Likely UTI but Cx pending  See UA as above  TMax 103.4 - Tylenol increased to 1000 mg TID and 1000 mg daily PRN  Temp 98.2 today per nursing.   Rocephin ordered 1 gm with lidocaine IM daily x 2 - extend to 3 days to help assist rapid treatment while UC pending and going into the weekend.   Monitor closely for UC results for treatment of likely UTI.     Acute renal failure superimposed on stage 4 chronic kidney disease, unspecified acute renal failure type (H)  Unstable.   Last few BMPs:   9/18/20: BUN 20, Creat 0.98, GFR 60  9/29/20: BUN 17, Creat 0.98, GFR 60  10/20/20: BUN 34, Creat 1.20, GFR 47    Patient is drinking water adequately per nursing but likely in setting of fever with increase in insensible losses and with infection, not adequate enough.  Spoke with nursing about encouraging po intake even more.  Treatment underway for likely UTI  Patient is not on diuretics, ACEI nor NSAIDs    PLAN:  - encourage po intake  - Will avoid nephrotoxic agents (such as ACEI/ARB/NSAIDs, IV contrast) and mindful prescribing with renal  dosing.     Anemia, unspecified type  Very unstable  Patient is on Fe Sulfate 325 mg daily, pantoprazole 40 mg daily  Hemoglobin   Date Value Ref Range Status   10/30/2020 7.2 (L) 11.7 - 15.7 g/dL Final   09/29/2020 10.4 (L) 11.7 - 15.7 g/dL Final     10/30/30 Plt 93 (prev 250)    Nursing noting no recent black, tarry stools or bright red stools.  No signs of bleeding.     Patient denies abdominal pain, nausea, signs of GIB.   Patient is on ASA and Plavix for CVA  Likely UTI present with treatment underway.    PLAN:  - hold ASA and Plavix until Mon  - repeat Hgb on Mon for recheck  - increase pantoprazole to 40 mg BID x 10 days, then return to daily   - monitor closely for s/s of bleeding.   - CMP on Monday as well to monitor liver function.     Chronic systolic congestive heart failure (H) - 10/20/17 ECHO: EF 45-50%, severely increased LV thickness, Grade 2 LV diastolic filling  Essential hypertension  S/P CABG x 5  Ischemic cardiomyopathy  Mixed hyperlipidemia  F/b [facility], Dr Saucedo  Per Care Everywhere:   2/5/2009 - MI - Proximal RCA 99%, mild-mod disease elsewhere. EF 60%. PCI: MYRON to pRCA.  2/12/2009 - admit CP - Widely patent RCA stent. Moderate diffuse CAD. Severe stenosis in trivial PDA branch. LVEF 45%.  1/25/2010 - admit CP - PTCA and stent of diagonal  9/8/2010 - admit CP - LAD patent stent. Moderate diffuse CAD. Medical management recommended.   4/25/2012 - NSTEMI - Acute total occlusion of the ostial Circumflex. Acute total occlusion of the ostial ramus. Acute total occlusion of the distal 1st Obtuse Marginal. Angioplasty of ostial circumflex and ostial ramus. There was distal embolization to the OM1 vessel. This vessel was small and not amendable to intervention. Indefinite: plavix and asa 81.  8/10/2015 - CABx5 - 1. LIMA to distal LAD, reverse SVG to OM1 and OM2, reverse SVG to diagonal, reverse SVG to PDA.   2. Mitral valve repair with a Medtronics 3-D full ring, 28 mm.   3.  Tricuspid repair with a Medtronic 28 mm partial ring  1/12/2016 - TTE - EF 40-45%  Also noted history of Biv-AICD implanted - patient reports upcoming appt 11/9/20.     Patient is on rosuvastatin 10 mg nightly, Toprol-XL 25 mg daily, clopidogrel 75 mg daily, ASA 81 mg daily,       BPs 130-140s/60-70s  HRs 67-80  Patient denies CP, HA< lightheadedness    No evidence of sepsis, AVSS except for fever (improved today)    PLAN:  - continue statin, asa, Toprol XL, Plavix,   - BP goals are <150/90 mm Hg.This is higher than ACC and AHA recommendations due to risk for hypotension, risk of dizziness and falls, risk of tissue/cerebral hypoperfusion and frailty.   - monitor for changes after upcoming Cardiology appt.       transcribed by : Reta Arrington  1. Monday 11/2/20 Labs: CMP, CBC with diff. Dx: anemia, ERIC, Fever, psychotic depression  2. Increase Pantoprazole to 40 mg po BID x 10 days, then return to 40 mg po every day. Dx: anemia, GERD  3. Push fluids. Dx: ERIC  4. Hold ASA + Plavix  5. Extend Rocephin 1 GM 1M x 1 more day. Dx: fever     Electronically signed by:  KILO Alcazar CNP

## 2020-10-30 NOTE — LETTER
10/30/2020        RE: Letty Escobar  [facility]  [address] [facility] [address]        [place] [facility]  [place] [facility] Record Number:  [id number]  Place of Service where encounter took place:  [facility] ([facility] [[id number]]  Chief Complaint   Patient presents with     RECHECK       HPI:    Letty Escobar  is a 64 year old (1955), who is being seen today for an episodic care visit.  HPI information obtained from: facility chart records, facility staff, patient report and [facility] chart review. Today's concern is:     Fever, unspecified fever cause  Abnormal urinalysis  Acute renal failure superimposed on stage 4 chronic kidney disease, unspecified acute renal failure type (H)  Anemia, unspecified type  Chronic systolic congestive heart failure (H)  Essential hypertension  S/P CABG x 5  Ischemic cardiomyopathy  Mixed hyperlipidemia     Patient is a 64 year old medically complex woman with PMH including panic disorder with agoraphobia, MDD, anxiety, drug-seeking behavior, personality disorder, PTSD, History suicidal ideation, HTN, HLD, ischemic cardiomyopathy, History MI and CABG x 5 (2015), CHF (EF 45-50%), ICA stenosis bilateral, pAfib, Morbid obesity, IBS with Diarrhea, GERD, DM2, chronic pain with neuropathy, urinary incontinence, PMR, Anemia, Vit B12 def, hypothyroidism who [facility].       Jazmín Psych stay at [facility] 9/3-9/15/20. Medication changes included:  --Cymbalta to 40mg  --Depakote to 250 mg, at bedtime  --Decrease Seroquel to 25 mg, at bedtime  --Remeron to 30 mg, on admission  --Discontinue Melatonin, Lexapro, Buspar, Abilify.   --One time  0.5mg lorazepam to r/o catatonia. No change in patient presentation.      She has then been back to the SNF since that time and is now being f/b [facility], Dr Reagan Martell and on-site psychologist.    Nursing has been reporting  patient has been having fevers, TMAx 103.4.  Nursing reporting LS clear, initially no urinary symptoms, then patient reporting increase in incontinence and increase in urgency, frequency.  Tylenol has been increased and Rocephin has been given IM x 1 (by the time of today's visit) and urine had just been sent to the lab.   COVID test from 10/28/20 returned negative  Labs today returned and concerning for ERIC and sudden anemia  Just prior to this visit nursing reporting patient's temperature was 98.2    Patient is met today in her SNF room where she reports no abdominal pain, nausea, CP, HA< lightheadedness.  She endorses some shortness of breath but only with anxiety.  She reported diarrhea x 1 a few days ago but has since stopped.      Past Medical and Surgical History reviewed in Epic today.    MEDICATIONS:  Current Outpatient Medications   Medication Sig Dispense Refill     acetaminophen (TYLENOL) 500 MG tablet Take 2 tablets (1,000 mg) by mouth 3 times daily       pantoprazole (PROTONIX) 40 MG EC tablet 40 mg po BID x 10 days, then return to 40 mg po every day.       aspirin 81 MG EC tablet Take 81 mg by mouth daily       clopidogrel (PLAVIX) 75 MG tablet Take 75 mg by mouth daily        cyanocobalamin (CYANOCOBALAMIN) 1000 MCG/ML injection Inject 1 mL into the muscle every 30 days       divalproex sodium delayed-release (DEPAKOTE SPRINKLE) 125 MG DR capsule Take 250 mg by mouth At Bedtime       DULoxetine 40 MG CPEP Take 40 mg by mouth daily       ferrous sulfate (FEROSUL) 325 (65 Fe) MG tablet Take 325 mg by mouth daily (with breakfast)       hydrOXYzine (ATARAX) 25 MG tablet Take 25 mg by mouth every 6 hours as needed for itching       ketoconazole (NIZORAL) 2 % external cream Apply topically At Bedtime       levothyroxine (SYNTHROID/LEVOTHROID) 100 MCG tablet Take 1 tablet (100 mcg) by mouth every morning (before breakfast) 30 tablet 0     melatonin 3 MG tablet Take 6 mg by mouth At Bedtime       metoprolol  succinate ER (TOPROL-XL) 25 MG 24 hr tablet Take 25 mg by mouth daily       miconazole (MICATIN) 2 % AERP powder Apply topically 2 times daily as needed       mirtazapine (REMERON) 30 MG tablet Take 1 tablet (30 mg) by mouth At Bedtime 30 tablet 0     polyethylene glycol (MIRALAX) 17 g packet Take 17 g by mouth daily as needed for constipation       rosuvastatin (CRESTOR) 10 MG tablet Take 10 mg by mouth At Bedtime        sennosides (SENOKOT) 8.6 MG tablet Take 2 tablets by mouth At Bedtime       vitamin D3 (CHOLECALCIFEROL) 50 mcg (2000 units) tablet Take 1 tablet (50 mcg) by mouth daily       REVIEW OF SYSTEMS:  Limited secondary to cognitive impairment but today pt reports 10 point ROS of systems including Constitutional, Eyes, Respiratory, Cardiovascular, Gastroenterology, Genitourinary, Integumentary, Musculoskeletal, Psychiatric were all negative except for pertinent positives noted in my HPI.    Objective:  /59   Pulse 75   Temp 101.9  F (38.8  C)   Resp 20   Wt 59 kg (130 lb)   SpO2 93%   BMI 25.39 kg/m    Exam:  GENERAL APPEARANCE:  Alert, in no distress, deconditioned, frail  RESP:  respiratory effort and palpation of chest normal, auscultation of lungs clear , no respiratory distress, no cough   CV:  Palpation and auscultation of heart done , rate and rhythm regular, no murmur, no LE peripheral edema  ABDOMEN:  normal bowel sounds, soft, nontender, no hepatosplenomegaly or other masses  M/S:   Gait and station with w/c for mobility, Digits and nails with arthritic changes, reduced muscle mass  SKIN:  Inspection and Palpation of skin and subcutaneous tissue pale, wounds healing, covered so did not visualize today   PSYCH:  insight and judgement, memory with impairment, affect and mood normal, follows commands readily         Labs:   CBC RESULTS:   Recent Labs   Lab Test 10/30/20  0852 09/29/20  0937   WBC 7.2 9.0   RBC 2.34* 3.45*   HGB 7.2* 10.4*   HCT 22.8* 32.1*   MCV 97 93   MCH 30.8 30.1    MCHC 31.6 32.4   RDW 14.2 14.8   PLT 93* 250       Last Basic Metabolic Panel:  Recent Labs   Lab Test 10/30/20  0852 09/29/20  0937    143   POTASSIUM 4.0 5.2   CHLORIDE 109 115*   ORESTES 9.7 9.8   CO2 22 23   BUN 34* 17   CR 1.20* 0.98   * 75       Liver Function Studies -   Recent Labs   Lab Test 09/02/20  1553 08/28/20  0811   PROTTOTAL 6.1* 5.8*   ALBUMIN 2.5* 2.4*   BILITOTAL 0.1* 0.3   ALKPHOS 65 56   AST 21 13   ALT 24 18       TSH   Date Value Ref Range Status   10/26/2020 14.63 (H) 0.40 - 4.00 mU/L Final   09/29/2020 0.64 0.40 - 4.00 mU/L Final   ]    Lab Results   Component Value Date    A1C 4.6 10/30/2020       UA RESULTS:  Recent Labs   Lab Test 10/30/20  1145   COLOR Juju   APPEARANCE Cloudy   URINEGLC Negative   URINEBILI Negative   URINEKETONE Negative   SG 1.026   UBLD Small*   URINEPH 5.0   PROTEIN 100*   NITRITE Negative   LEUKEST Moderate*   RBCU 5*   WBCU 63*       ASSESSMENT/PLAN:  Fever, unspecified fever cause  Abnormal urinalysis  Unstable. Likely UTI but Cx pending  See UA as above  TMax 103.4 - Tylenol increased to 1000 mg TID and 1000 mg daily PRN  Temp 98.2 today per nursing.   Rocephin ordered 1 gm with lidocaine IM daily x 2 - extend to 3 days to help assist rapid treatment while UC pending and going into the weekend.   Monitor closely for UC results for treatment of likely UTI.     Acute renal failure superimposed on stage 4 chronic kidney disease, unspecified acute renal failure type (H)  Unstable.   Last few BMPs:   9/18/20: BUN 20, Creat 0.98, GFR 60  9/29/20: BUN 17, Creat 0.98, GFR 60  10/20/20: BUN 34, Creat 1.20, GFR 47    Patient is drinking water adequately per nursing but likely in setting of fever with increase in insensible losses and with infection, not adequate enough.  Spoke with nursing about encouraging po intake even more.  Treatment underway for likely UTI  Patient is not on diuretics, ACEI nor NSAIDs    PLAN:  - encourage po intake  - Will avoid  nephrotoxic agents (such as ACEI/ARB/NSAIDs, IV contrast) and mindful prescribing with renal dosing.     Anemia, unspecified type  Very unstable  Patient is on Fe Sulfate 325 mg daily, pantoprazole 40 mg daily  Hemoglobin   Date Value Ref Range Status   10/30/2020 7.2 (L) 11.7 - 15.7 g/dL Final   09/29/2020 10.4 (L) 11.7 - 15.7 g/dL Final     10/30/30 Plt 93 (prev 250)    Nursing noting no recent black, tarry stools or bright red stools.  No signs of bleeding.     Patient denies abdominal pain, nausea, signs of GIB.   Patient is on ASA and Plavix for CVA  Likely UTI present with treatment underway.    PLAN:  - hold ASA and Plavix until Mon  - repeat Hgb on Mon for recheck  - increase pantoprazole to 40 mg BID x 10 days, then return to daily   - monitor closely for s/s of bleeding.   - CMP on Monday as well to monitor liver function.     Chronic systolic congestive heart failure (H) - 10/20/17 ECHO: EF 45-50%, severely increased LV thickness, Grade 2 LV diastolic filling  Essential hypertension  S/P CABG x 5  Ischemic cardiomyopathy  Mixed hyperlipidemia  F/b [facility], Dr Saucedo  Per Care Everywhere:   2/5/2009 - MI - Proximal RCA 99%, mild-mod disease elsewhere. EF 60%. PCI: MYRON to pRCA.  2/12/2009 - admit CP - Widely patent RCA stent. Moderate diffuse CAD. Severe stenosis in trivial PDA branch. LVEF 45%.  1/25/2010 - admit CP - PTCA and stent of diagonal  9/8/2010 - admit CP - LAD patent stent. Moderate diffuse CAD. Medical management recommended.   4/25/2012 - NSTEMI - Acute total occlusion of the ostial Circumflex. Acute total occlusion of the ostial ramus. Acute total occlusion of the distal 1st Obtuse Marginal. Angioplasty of ostial circumflex and ostial ramus. There was distal embolization to the OM1 vessel. This vessel was small and not amendable to intervention. Indefinite: plavix and asa 81.  8/10/2015 - CABx5 - 1. LIMA to distal LAD, reverse SVG to OM1 and OM2, reverse SVG to diagonal,  reverse SVG to PDA.   2. Mitral valve repair with a Medtronics 3-D full ring, 28 mm.   3. Tricuspid repair with a Medtronic 28 mm partial ring  1/12/2016 - TTE - EF 40-45%  Also noted history of Biv-AICD implanted - patient reports upcoming appt 11/9/20.     Patient is on rosuvastatin 10 mg nightly, Toprol-XL 25 mg daily, clopidogrel 75 mg daily, ASA 81 mg daily,       BPs 130-140s/60-70s  HRs 67-80  Patient denies CP, HA< lightheadedness    No evidence of sepsis, AVSS except for fever (improved today)    PLAN:  - continue statin, asa, Toprol XL, Plavix,   - BP goals are <150/90 mm Hg.This is higher than ACC and AHA recommendations due to risk for hypotension, risk of dizziness and falls, risk of tissue/cerebral hypoperfusion and frailty.   - monitor for changes after upcoming Cardiology appt.       transcribed by : Reta Arrington  1. Monday 11/2/20 Labs: CMP, CBC with diff. Dx: anemia, ERIC, Fever, psychotic depression  2. Increase Pantoprazole to 40 mg po BID x 10 days, then return to 40 mg po every day. Dx: anemia, GERD  3. Push fluids. Dx: ERIC  4. Hold ASA + Plavix  5. Extend Rocephin 1 GM 1M x 1 more day. Dx: fever     Electronically signed by:  KILO Alcazar CNP                 Sincerely,        KILO Alcazar CNP

## 2020-10-30 NOTE — TELEPHONE ENCOUNTER
Prior Authorization Retail Medication Request    Medication/Dose: Duloxetine 40 mg   ICD code (if different than what is on RX):    Previously Tried and Failed:    Rationale:      Insurance Name:  Medicare  Insurance ID:  [id number]      Pharmacy Information (if different than what is on RX)  Name:    Phone:

## 2020-10-31 LAB
BACTERIA SPEC CULT: NO GROWTH
Lab: NORMAL
SPECIMEN SOURCE: NORMAL

## 2020-10-31 RX ORDER — ACETAMINOPHEN 500 MG
1000 TABLET ORAL 3 TIMES DAILY
Start: 2020-10-31

## 2020-11-02 ENCOUNTER — HOSPITAL LABORATORY (OUTPATIENT)
Dept: NURSING HOME | Facility: OTHER | Age: 65
End: 2020-11-02

## 2020-11-02 LAB
ALBUMIN SERPL-MCNC: 2.2 G/DL (ref 3.4–5)
ALP SERPL-CCNC: 94 U/L (ref 40–150)
ALT SERPL W P-5'-P-CCNC: 91 U/L (ref 0–50)
ANION GAP SERPL CALCULATED.3IONS-SCNC: 9 MMOL/L (ref 3–14)
AST SERPL W P-5'-P-CCNC: 62 U/L (ref 0–45)
BASOPHILS # BLD AUTO: 0 10E9/L (ref 0–0.2)
BASOPHILS NFR BLD AUTO: 0.3 %
BILIRUB SERPL-MCNC: 0.3 MG/DL (ref 0.2–1.3)
BUN SERPL-MCNC: 23 MG/DL (ref 7–30)
CALCIUM SERPL-MCNC: 9.9 MG/DL (ref 8.5–10.1)
CHLORIDE SERPL-SCNC: 109 MMOL/L (ref 94–109)
CO2 SERPL-SCNC: 21 MMOL/L (ref 20–32)
CREAT SERPL-MCNC: 0.87 MG/DL (ref 0.52–1.04)
DIFFERENTIAL METHOD BLD: ABNORMAL
EOSINOPHIL NFR BLD AUTO: 1.3 %
ERYTHROCYTE [DISTWIDTH] IN BLOOD BY AUTOMATED COUNT: 14.1 % (ref 10–15)
GFR SERPL CREATININE-BSD FRML MDRD: 69 ML/MIN/{1.73_M2}
GLUCOSE SERPL-MCNC: 70 MG/DL (ref 70–99)
HCT VFR BLD AUTO: 26.7 % (ref 35–47)
HGB BLD-MCNC: 8.3 G/DL (ref 11.7–15.7)
IMM GRANULOCYTES # BLD: 0.1 10E9/L (ref 0–0.4)
IMM GRANULOCYTES NFR BLD: 0.8 %
LYMPHOCYTES # BLD AUTO: 1.2 10E9/L (ref 0.8–5.3)
LYMPHOCYTES NFR BLD AUTO: 19.6 %
MCH RBC QN AUTO: 30.3 PG (ref 26.5–33)
MCHC RBC AUTO-ENTMCNC: 31.1 G/DL (ref 31.5–36.5)
MCV RBC AUTO: 97 FL (ref 78–100)
MONOCYTES # BLD AUTO: 0.5 10E9/L (ref 0–1.3)
MONOCYTES NFR BLD AUTO: 8.4 %
NEUTROPHILS # BLD AUTO: 4.4 10E9/L (ref 1.6–8.3)
NEUTROPHILS NFR BLD AUTO: 69.6 %
NRBC # BLD AUTO: 0 10*3/UL
NRBC BLD AUTO-RTO: 0 /100
PLATELET # BLD AUTO: 128 10E9/L (ref 150–450)
POTASSIUM SERPL-SCNC: 4.4 MMOL/L (ref 3.4–5.3)
PROT SERPL-MCNC: 6.3 G/DL (ref 6.8–8.8)
RBC # BLD AUTO: 2.74 10E12/L (ref 3.8–5.2)
SODIUM SERPL-SCNC: 139 MMOL/L (ref 133–144)
WBC # BLD AUTO: 6.3 10E9/L (ref 4–11)

## 2020-11-02 NOTE — TELEPHONE ENCOUNTER
Central Prior Authorization Team   Phone: [phone]    PA Initiation    Medication: Duloxetine   Insurance Company: [employer] - Phone [phone] Fax [phone]  Pharmacy Filling the Rx: [facility] - [place], [place] - [address]  Filling Pharmacy Phone: [phone]  Filling Pharmacy Fax: [phone]  Start Date: 11/2/2020

## 2020-11-03 PROBLEM — M75.52 SUBACROMIAL BURSITIS OF LEFT SHOULDER JOINT: Status: ACTIVE | Noted: 2018-08-06

## 2020-11-03 PROBLEM — B37.2 MONILIASIS, CUTANEOUS: Status: ACTIVE | Noted: 2020-03-31

## 2020-11-03 PROBLEM — E83.52 SERUM CALCIUM ELEVATED: Status: ACTIVE | Noted: 2020-03-01

## 2020-11-03 PROBLEM — G45.9 TIA (TRANSIENT ISCHEMIC ATTACK): Status: ACTIVE | Noted: 2018-05-04

## 2020-11-03 PROBLEM — H81.10 BPPV (BENIGN PAROXYSMAL POSITIONAL VERTIGO): Status: ACTIVE | Noted: 2018-05-31

## 2020-11-03 PROBLEM — N39.0 UTI (URINARY TRACT INFECTION): Status: ACTIVE | Noted: 2020-04-17

## 2020-11-03 PROBLEM — M99.04 SOMATIC DYSFUNCTION OF SACRAL REGION: Status: ACTIVE | Noted: 2018-01-17

## 2020-11-03 NOTE — TELEPHONE ENCOUNTER
Prior Authorization Approval    Authorization Effective Date: 8/4/2020  Authorization Expiration Date: 11/2/2021  Medication: Duloxetine-APPROVED  Approved Dose/Quantity:    Reference #:     Insurance Company: [employer] - Phone [phone] Fax [phone]  Expected CoPay:       CoPay Card Available:      Foundation Assistance Needed:    Which Pharmacy is filling the prescription (Not needed for infusion/clinic administered): [facility] - [place], [place] - [address]  Pharmacy Notified: Yes  Patient Notified: Yes  **Instructed pharmacy to notify patient when script is ready to /ship.**

## 2020-11-10 ENCOUNTER — OFFICE VISIT - HEALTHEAST (OUTPATIENT)
Dept: GERIATRICS | Facility: CLINIC | Age: 65
End: 2020-11-10

## 2020-11-10 DIAGNOSIS — F60.9 PERSONALITY DISORDER (H): ICD-10-CM

## 2020-11-10 DIAGNOSIS — R53.1 WEAKNESS: ICD-10-CM

## 2020-11-10 DIAGNOSIS — E11.69 DIABETES MELLITUS TYPE 2 IN OBESE: ICD-10-CM

## 2020-11-10 DIAGNOSIS — I10 HYPERTENSION, UNSPECIFIED TYPE: ICD-10-CM

## 2020-11-10 DIAGNOSIS — F06.30 MOOD DISORDER DUE TO A GENERAL MEDICAL CONDITION: ICD-10-CM

## 2020-11-10 DIAGNOSIS — I25.5 ISCHEMIC CARDIOMYOPATHY: ICD-10-CM

## 2020-11-10 DIAGNOSIS — F43.10 POSTTRAUMATIC STRESS DISORDER: ICD-10-CM

## 2020-11-10 DIAGNOSIS — U07.1 2019 NOVEL CORONAVIRUS DISEASE (COVID-19): ICD-10-CM

## 2020-11-10 DIAGNOSIS — J44.9 CHRONIC OBSTRUCTIVE PULMONARY DISEASE, UNSPECIFIED COPD TYPE (H): ICD-10-CM

## 2020-11-10 DIAGNOSIS — E66.9 DIABETES MELLITUS TYPE 2 IN OBESE: ICD-10-CM

## 2020-11-10 ASSESSMENT — MIFFLIN-ST. JEOR: SCORE: 1105.14

## 2020-11-18 ENCOUNTER — OFFICE VISIT - HEALTHEAST (OUTPATIENT)
Dept: GERIATRICS | Facility: CLINIC | Age: 65
End: 2020-11-18

## 2020-11-18 DIAGNOSIS — F60.9 PERSONALITY DISORDER (H): ICD-10-CM

## 2020-11-18 DIAGNOSIS — F06.30 MOOD DISORDER DUE TO A GENERAL MEDICAL CONDITION: ICD-10-CM

## 2020-11-18 DIAGNOSIS — J44.9 CHRONIC OBSTRUCTIVE PULMONARY DISEASE, UNSPECIFIED COPD TYPE (H): ICD-10-CM

## 2020-11-18 DIAGNOSIS — R53.1 WEAKNESS: ICD-10-CM

## 2020-11-18 DIAGNOSIS — I15.9 SECONDARY HYPERTENSION: ICD-10-CM

## 2020-11-18 DIAGNOSIS — E11.69 DIABETES MELLITUS TYPE 2 IN OBESE: ICD-10-CM

## 2020-11-18 DIAGNOSIS — U07.1 2019 NOVEL CORONAVIRUS DISEASE (COVID-19): ICD-10-CM

## 2020-11-18 DIAGNOSIS — F43.10 PTSD (POST-TRAUMATIC STRESS DISORDER): ICD-10-CM

## 2020-11-18 DIAGNOSIS — E66.9 DIABETES MELLITUS TYPE 2 IN OBESE: ICD-10-CM

## 2020-11-18 DIAGNOSIS — I25.5 ISCHEMIC CARDIOMYOPATHY: ICD-10-CM

## 2020-11-18 ASSESSMENT — MIFFLIN-ST. JEOR: SCORE: 1098.79

## 2020-11-24 ENCOUNTER — OFFICE VISIT - HEALTHEAST (OUTPATIENT)
Dept: GERIATRICS | Facility: CLINIC | Age: 65
End: 2020-11-24

## 2020-11-24 DIAGNOSIS — E66.9 DIABETES MELLITUS TYPE 2 IN OBESE: ICD-10-CM

## 2020-11-24 DIAGNOSIS — F43.10 PTSD (POST-TRAUMATIC STRESS DISORDER): ICD-10-CM

## 2020-11-24 DIAGNOSIS — U07.1 2019 NOVEL CORONAVIRUS DISEASE (COVID-19): ICD-10-CM

## 2020-11-24 DIAGNOSIS — I10 HYPERTENSION, UNSPECIFIED TYPE: ICD-10-CM

## 2020-11-24 DIAGNOSIS — J44.9 CHRONIC OBSTRUCTIVE PULMONARY DISEASE, UNSPECIFIED COPD TYPE (H): ICD-10-CM

## 2020-11-24 DIAGNOSIS — E11.69 DIABETES MELLITUS TYPE 2 IN OBESE: ICD-10-CM

## 2020-11-24 DIAGNOSIS — F43.10 POSTTRAUMATIC STRESS DISORDER: ICD-10-CM

## 2020-11-24 DIAGNOSIS — I25.5 ISCHEMIC CARDIOMYOPATHY: ICD-10-CM

## 2020-11-24 DIAGNOSIS — I15.9 SECONDARY HYPERTENSION: ICD-10-CM

## 2020-11-24 DIAGNOSIS — F60.9 PERSONALITY DISORDER (H): ICD-10-CM

## 2020-11-24 DIAGNOSIS — F06.30 MOOD DISORDER DUE TO A GENERAL MEDICAL CONDITION: ICD-10-CM

## 2020-11-24 ASSESSMENT — MIFFLIN-ST. JEOR: SCORE: 1098.79

## 2020-11-27 ENCOUNTER — AMBULATORY - HEALTHEAST (OUTPATIENT)
Dept: GERIATRICS | Facility: CLINIC | Age: 65
End: 2020-11-27

## 2020-12-04 ENCOUNTER — HOSPITAL LABORATORY (OUTPATIENT)
Dept: NURSING HOME | Facility: OTHER | Age: 65
End: 2020-12-04

## 2020-12-04 LAB
ANION GAP SERPL CALCULATED.3IONS-SCNC: 4 MMOL/L (ref 3–14)
BASOPHILS # BLD AUTO: 0 10E9/L (ref 0–0.2)
BASOPHILS NFR BLD AUTO: 0.7 %
BUN SERPL-MCNC: 18 MG/DL (ref 7–30)
CALCIUM SERPL-MCNC: 9.7 MG/DL (ref 8.5–10.1)
CHLORIDE SERPL-SCNC: 108 MMOL/L (ref 94–109)
CO2 SERPL-SCNC: 25 MMOL/L (ref 20–32)
CREAT SERPL-MCNC: 0.75 MG/DL (ref 0.52–1.04)
DIFFERENTIAL METHOD BLD: ABNORMAL
EOSINOPHIL NFR BLD AUTO: 1.5 %
ERYTHROCYTE [DISTWIDTH] IN BLOOD BY AUTOMATED COUNT: 13.6 % (ref 10–15)
GFR SERPL CREATININE-BSD FRML MDRD: 83 ML/MIN/{1.73_M2}
GLUCOSE SERPL-MCNC: 72 MG/DL (ref 70–99)
HCT VFR BLD AUTO: 30.7 % (ref 35–47)
HGB BLD-MCNC: 9.5 G/DL (ref 11.7–15.7)
IMM GRANULOCYTES # BLD: 0 10E9/L (ref 0–0.4)
IMM GRANULOCYTES NFR BLD: 0.5 %
LYMPHOCYTES # BLD AUTO: 2.3 10E9/L (ref 0.8–5.3)
LYMPHOCYTES NFR BLD AUTO: 39.6 %
MCH RBC QN AUTO: 30.3 PG (ref 26.5–33)
MCHC RBC AUTO-ENTMCNC: 30.9 G/DL (ref 31.5–36.5)
MCV RBC AUTO: 98 FL (ref 78–100)
MONOCYTES # BLD AUTO: 0.4 10E9/L (ref 0–1.3)
MONOCYTES NFR BLD AUTO: 6.6 %
NEUTROPHILS # BLD AUTO: 3 10E9/L (ref 1.6–8.3)
NEUTROPHILS NFR BLD AUTO: 51.1 %
NRBC # BLD AUTO: 0 10*3/UL
NRBC BLD AUTO-RTO: 0 /100
PLATELET # BLD AUTO: 164 10E9/L (ref 150–450)
POTASSIUM SERPL-SCNC: 4.3 MMOL/L (ref 3.4–5.3)
RBC # BLD AUTO: 3.14 10E12/L (ref 3.8–5.2)
SODIUM SERPL-SCNC: 137 MMOL/L (ref 133–144)
T4 FREE SERPL-MCNC: 1.09 NG/DL (ref 0.76–1.46)
TSH SERPL DL<=0.005 MIU/L-ACNC: 41.34 MU/L (ref 0.4–4)
WBC # BLD AUTO: 5.9 10E9/L (ref 4–11)

## 2020-12-10 ENCOUNTER — VIRTUAL VISIT (OUTPATIENT)
Dept: GERIATRICS | Facility: CLINIC | Age: 65
End: 2020-12-10
Payer: MEDICARE

## 2020-12-10 VITALS
DIASTOLIC BLOOD PRESSURE: 72 MMHG | HEIGHT: 58 IN | RESPIRATION RATE: 18 BRPM | OXYGEN SATURATION: 92 % | WEIGHT: 131.8 LBS | TEMPERATURE: 99 F | BODY MASS INDEX: 27.66 KG/M2 | HEART RATE: 62 BPM | SYSTOLIC BLOOD PRESSURE: 179 MMHG

## 2020-12-10 DIAGNOSIS — I10 ESSENTIAL HYPERTENSION: ICD-10-CM

## 2020-12-10 DIAGNOSIS — F43.10 POSTTRAUMATIC STRESS DISORDER: ICD-10-CM

## 2020-12-10 DIAGNOSIS — Z86.16 HISTORY OF 2019 NOVEL CORONAVIRUS DISEASE (COVID-19): ICD-10-CM

## 2020-12-10 DIAGNOSIS — M35.3 POLYMYALGIA RHEUMATICA (H): ICD-10-CM

## 2020-12-10 DIAGNOSIS — I50.22 CHRONIC SYSTOLIC CONGESTIVE HEART FAILURE (H): ICD-10-CM

## 2020-12-10 DIAGNOSIS — E78.2 MIXED HYPERLIPIDEMIA: ICD-10-CM

## 2020-12-10 DIAGNOSIS — I70.422 ATHEROSCLEROSIS OF AUTOLOGOUS VEIN BYPASS GRAFT(S) OF THE EXTREMITIES WITH REST PAIN, LEFT LEG (H): ICD-10-CM

## 2020-12-10 DIAGNOSIS — J44.9 CHRONIC OBSTRUCTIVE PULMONARY DISEASE, UNSPECIFIED COPD TYPE (H): Primary | ICD-10-CM

## 2020-12-10 DIAGNOSIS — I25.5 ISCHEMIC CARDIOMYOPATHY: ICD-10-CM

## 2020-12-10 PROCEDURE — 99305 1ST NF CARE MODERATE MDM 35: CPT | Mod: 95 | Performed by: FAMILY MEDICINE

## 2020-12-10 ASSESSMENT — MIFFLIN-ST. JEOR: SCORE: 1022.59

## 2020-12-10 NOTE — LETTER
"    12/10/2020        RE: Letty Reid [place]  [address] [facility] [address]         [place] [facility]  Letty Escobar is being evaluated via a billable video.   The patient has been notified of following:  \"This video visit will be conducted via a call between you and your provider. We have found that certain health care needs can be provided without the need for an in-person physical exam.  This service lets us provide the care you need with a video conversation. If during the course of the call the provider feels a video visit is not appropriate, you will not be charged for this service.\"   The provider has received verbal consent for a Video Visit from the patient and or first contact? Yes  Patient/facility staff would like the video invitation sent by: N/A   Video Start Time: 09:54  Which Facility the Patient is at during the time of visit: [facility]   PRIMARY CARE PROVIDER AND CLINIC:  KILO Alcazar [facility], [address] [address]  Chief Complaint   Patient presents with     Video Visit     [place] Medical Record Number:  [id number]  Letty Escobar  is a 65 year old  (1955), re-admitted to the above facility from [facility] on 11/25/20.  Admitted to this facility for  rehab, medical management and nursing care.  HPI:    HPI information obtained from: facility staff, patient report and [facility] chart review.   Brief Summary of Hospital Course:   - tested postive for covid19, transferred to  [facility] facility, improved, transferred bvack.     Today:  - Pt seen in the presence of RN who graciously assisted with the virtual visit  - RN reports now anxiety is improving since she came back from outside facility, getting PT/OT, improving. Pt reports getting stronger with rehab, walking with one by stand assist.   - RN reports pressure injury resolved.   - Heart: denies cp or sob  - Resp:pt reports breathing is good, no " cough or wheezing.   - Psych: little anxious, about every thing, practices slow breathing. RN preorts assist her with relaxation, also on hydroxyxine sometimes.   - RN reports gaining weight.     -----------------------------------------------------------------------------------  CODE STATUS/ADVANCE DIRECTIVES DISCUSSION:   DNR / DNI  Patient's living condition: lives in a skilled nursing facility  ALLERGIES: Diphenhydramine, Isosorbide, Nitroglycerin, Contrast dye, Adhesive tape, Diphenhydramine hcl, Liquid adhesive, Nystatin, Nystatin (topical), Penicillins, and Sulfa drugs   PAST MEDICAL HISTORY:  has a past medical history of Depressive disorder, Diabetes mellitus (H), Myocardial infarction (H), Neuromuscular disorder (H), and Thyroid disease.  PAST SURGICAL HISTORY:   has a past surgical history that includes Release carpal tunnel; Cholecystectomy; Cardiac surgery; and Abdomen surgery.  FAMILY HISTORY: mother had heart dz and diabetes, father had heart dz, drug abuse and alcoholism in brother.   SOCIAL HISTORY:    former cigarettes smoker and alcohol.     Current Outpatient Medications   Medication Sig Dispense Refill     acetaminophen (TYLENOL) 500 MG tablet Take 2 tablets (1,000 mg) by mouth 3 times daily       aspirin 81 MG EC tablet Take 81 mg by mouth daily       clopidogrel (PLAVIX) 75 MG tablet Take 75 mg by mouth daily        cyanocobalamin (CYANOCOBALAMIN) 1000 MCG/ML injection Inject 1 mL into the muscle every 30 days       divalproex sodium delayed-release (DEPAKOTE SPRINKLE) 125 MG DR capsule Take 250 mg by mouth At Bedtime       DULoxetine 40 MG CPEP Take 40 mg by mouth daily       ferrous sulfate (FEROSUL) 325 (65 Fe) MG tablet Take 325 mg by mouth daily (with breakfast)       hydrOXYzine (ATARAX) 25 MG tablet Take 25 mg by mouth every 6 hours as needed for itching       ketoconazole (NIZORAL) 2 % external cream Apply topically At Bedtime       levothyroxine (SYNTHROID/LEVOTHROID) 100 MCG tablet  "Take 1 tablet (100 mcg) by mouth every morning (before breakfast) 30 tablet 0     melatonin 3 MG tablet Take 6 mg by mouth At Bedtime       metoprolol succinate ER (TOPROL-XL) 25 MG 24 hr tablet Take 25 mg by mouth daily       miconazole (MICATIN) 2 % AERP powder Apply topically 2 times daily as needed       mirtazapine (REMERON) 30 MG tablet Take 1 tablet (30 mg) by mouth At Bedtime 30 tablet 0     pantoprazole (PROTONIX) 40 MG EC tablet 40 mg po BID x 10 days, then return to 40 mg po every day.       polyethylene glycol (MIRALAX) 17 g packet Take 17 g by mouth daily as needed for constipation       rosuvastatin (CRESTOR) 10 MG tablet Take 10 mg by mouth At Bedtime        sennosides (SENOKOT) 8.6 MG tablet Take 2 tablets by mouth At Bedtime       vitamin D3 (CHOLECALCIFEROL) 50 mcg (2000 units) tablet Take 1 tablet (50 mcg) by mouth daily        discharge medications reconciled and changed, per note/orders    ROS: 10 point ROS of systems including Constitutional, Eyes, Respiratory, Cardiovascular, Gastroenterology, Genitourinary, Integumentary, Musculoskeletal, Psychiatric were all negative except for pertinent positives noted in my HPI.  Vitals:BP (!) 179/72   Pulse 62   Temp 99  F (37.2  C)   Resp 18   Ht 1.473 m (4' 10\")   Wt 59.8 kg (131 lb 12.8 oz)   SpO2 92%   BMI 27.55 kg/m     Limited Visit Exam done given COVID-19 precautions:  GENERAL APPEARANCE:  in no distress  RESP:  unlabored breathing  M/S:   no joint deformity noted  SKIN:  no rash noted  NEURO:   no purposeful movement in upper and lower extremities  PSYCH:  affect and mood normal    Lab/Diagnostic data: Reviewed in the chart and EHR.        ASSESSMENT/PLAN:  ------------------------------  COPD (H):  Recent clinical diagnosis of covid19  - stable.       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       # Chronic systolic CHF (H)  # Ischemic cardiomyopathy and Hx of S/P CABG x 5  # Essential hypertension  # Mixed hyperlipidemia  # Internal carotid artery stenosis, bilateral :  - compensated. followed by Cardiology.     Polymyalgia rheumatica (H)  Chronic pain disorder  Chronic bilateral low back pain without sciatica  Peripheral polyneuropathy  Frailty  - analgesia optimal  - Significant  Deficits requiring NH placement. Requiring extensive assistance from nursing. Up for meals only o/w spends the day resting in bed      # protein-calorie malnutrition (H)  - Body mass index is 27.55 kg/m . from 23.63 kg/m .  Improved.     Irritable bowel syndrome with diarrhea  Gastroesophageal reflux disease, esophagitis presence not specified  - stable.      Normocytic anemia: HH trending up. Stable.  Felt 2/2 asa and plavix. On Protonix.  Noted on iron supplement . No iron study. Consider checking iron study. If DECLAN dx change iron/vit c to 3x/w for optimal GI absorption, if not stop iron supplement.     Hypothyroidism:  -  On Levothyroxine. In frail Elderly adult, recommended TSH level is around 6 providing patient is asymptomatic and FT4 is wnl- preferably on the lower normal level.       Order:   - See above, otherwise, continue the rest of the current POC.     Order: See above, otherwise, continue the rest of the current POC.       Electronically signed by:  Paola Pastor MD     Video-Visit Details  Type of service:  Video Visit  Video End Time (time video stopped): 09:58  Distant Location (provider location):  [place] [facility]                   Sincerely,        Paola Pastor MD

## 2020-12-10 NOTE — PROGRESS NOTES
" [place] [facility]  Letty Escobar is being evaluated via a billable video.   The patient has been notified of following:  \"This video visit will be conducted via a call between you and your provider. We have found that certain health care needs can be provided without the need for an in-person physical exam.  This service lets us provide the care you need with a video conversation. If during the course of the call the provider feels a video visit is not appropriate, you will not be charged for this service.\"   The provider has received verbal consent for a Video Visit from the patient and or first contact? Yes  Patient/facility staff would like the video invitation sent by: N/A   Video Start Time: 09:54  Which Facility the Patient is at during the time of visit: [facility]   PRIMARY CARE PROVIDER AND CLINIC:  KILO Alcazar [facility], [address] [address]  Chief Complaint   Patient presents with     Video Visit     [place] Medical Record Number:  [id number]  Letty Escobar  is a 65 year old  (1955), re-admitted to the above facility from [facility] on 11/25/20.  Admitted to this facility for  rehab, medical management and nursing care.  HPI:    HPI information obtained from: facility staff, patient report and [facility] chart review.   Brief Summary of Hospital Course:   - tested postive for covid19, transferred to  [place] [address] facility, improved, transferred bvack.     Today:  - Pt seen in the presence of RN who graciously assisted with the virtual visit  - RN reports now anxiety is improving since she came back from outside facility, getting PT/OT, improving. Pt reports getting stronger with rehab, walking with one by stand assist.   - RN reports pressure injury resolved.   - Heart: denies cp or sob  - Resp:pt reports breathing is good, no cough or wheezing.   - Psych: little anxious, about every thing, practices slow breathing. RN " preorts assist her with relaxation, also on hydroxyxine sometimes.   - RN reports gaining weight.     -----------------------------------------------------------------------------------  CODE STATUS/ADVANCE DIRECTIVES DISCUSSION:   DNR / DNI  Patient's living condition: lives in a skilled nursing facility  ALLERGIES: Diphenhydramine, Isosorbide, Nitroglycerin, Contrast dye, Adhesive tape, Diphenhydramine hcl, Liquid adhesive, Nystatin, Nystatin (topical), Penicillins, and Sulfa drugs   PAST MEDICAL HISTORY:  has a past medical history of Depressive disorder, Diabetes mellitus (H), Myocardial infarction (H), Neuromuscular disorder (H), and Thyroid disease.  PAST SURGICAL HISTORY:   has a past surgical history that includes Release carpal tunnel; Cholecystectomy; Cardiac surgery; and Abdomen surgery.  FAMILY HISTORY: mother had heart dz and diabetes, father had heart dz, drug abuse and alcoholism in brother.   SOCIAL HISTORY:    former cigarettes smoker and alcohol.     Current Outpatient Medications   Medication Sig Dispense Refill     acetaminophen (TYLENOL) 500 MG tablet Take 2 tablets (1,000 mg) by mouth 3 times daily       aspirin 81 MG EC tablet Take 81 mg by mouth daily       clopidogrel (PLAVIX) 75 MG tablet Take 75 mg by mouth daily        cyanocobalamin (CYANOCOBALAMIN) 1000 MCG/ML injection Inject 1 mL into the muscle every 30 days       divalproex sodium delayed-release (DEPAKOTE SPRINKLE) 125 MG DR capsule Take 250 mg by mouth At Bedtime       DULoxetine 40 MG CPEP Take 40 mg by mouth daily       ferrous sulfate (FEROSUL) 325 (65 Fe) MG tablet Take 325 mg by mouth daily (with breakfast)       hydrOXYzine (ATARAX) 25 MG tablet Take 25 mg by mouth every 6 hours as needed for itching       ketoconazole (NIZORAL) 2 % external cream Apply topically At Bedtime       levothyroxine (SYNTHROID/LEVOTHROID) 100 MCG tablet Take 1 tablet (100 mcg) by mouth every morning (before breakfast) 30 tablet 0     melatonin 3  "MG tablet Take 6 mg by mouth At Bedtime       metoprolol succinate ER (TOPROL-XL) 25 MG 24 hr tablet Take 25 mg by mouth daily       miconazole (MICATIN) 2 % AERP powder Apply topically 2 times daily as needed       mirtazapine (REMERON) 30 MG tablet Take 1 tablet (30 mg) by mouth At Bedtime 30 tablet 0     pantoprazole (PROTONIX) 40 MG EC tablet 40 mg po BID x 10 days, then return to 40 mg po every day.       polyethylene glycol (MIRALAX) 17 g packet Take 17 g by mouth daily as needed for constipation       rosuvastatin (CRESTOR) 10 MG tablet Take 10 mg by mouth At Bedtime        sennosides (SENOKOT) 8.6 MG tablet Take 2 tablets by mouth At Bedtime       vitamin D3 (CHOLECALCIFEROL) 50 mcg (2000 units) tablet Take 1 tablet (50 mcg) by mouth daily        discharge medications reconciled and changed, per note/orders    ROS: 10 point ROS of systems including Constitutional, Eyes, Respiratory, Cardiovascular, Gastroenterology, Genitourinary, Integumentary, Musculoskeletal, Psychiatric were all negative except for pertinent positives noted in my HPI.  Vitals:BP (!) 179/72   Pulse 62   Temp 99  F (37.2  C)   Resp 18   Ht 1.473 m (4' 10\")   Wt 59.8 kg (131 lb 12.8 oz)   SpO2 92%   BMI 27.55 kg/m     Limited Visit Exam done given COVID-19 precautions:  GENERAL APPEARANCE:  in no distress  RESP:  unlabored breathing  M/S:   no joint deformity noted  SKIN:  no rash noted  NEURO:   no purposeful movement in upper and lower extremities  PSYCH:  affect and mood normal    Lab/Diagnostic data: Reviewed in the chart and EHR.        ASSESSMENT/PLAN:  ------------------------------  COPD (H):  Recent clinical diagnosis of covid19  - stable.       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       # Chronic systolic CHF (H)  # " Ischemic cardiomyopathy and Hx of S/P CABG x 5  # Essential hypertension  # Mixed hyperlipidemia  # Internal carotid artery stenosis, bilateral :  - compensated. followed by Cardiology.     Polymyalgia rheumatica (H)  Chronic pain disorder  Chronic bilateral low back pain without sciatica  Peripheral polyneuropathy  Frailty  - analgesia optimal  - Significant  Deficits requiring NH placement. Requiring extensive assistance from nursing. Up for meals only o/w spends the day resting in bed      # protein-calorie malnutrition (H)  - Body mass index is 27.55 kg/m . from 23.63 kg/m .  Improved.     Irritable bowel syndrome with diarrhea  Gastroesophageal reflux disease, esophagitis presence not specified  - stable.      Normocytic anemia: HH trending up. Stable.  Felt 2/2 asa and plavix. On Protonix.  Noted on iron supplement . No iron study. Consider checking iron study. If DECLAN dx change iron/vit c to 3x/w for optimal GI absorption, if not stop iron supplement.     Hypothyroidism:  -  On Levothyroxine. In frail Elderly adult, recommended TSH level is around 6 providing patient is asymptomatic and FT4 is wnl- preferably on the lower normal level.       Order:   - See above, otherwise, continue the rest of the current POC.     Order: See above, otherwise, continue the rest of the current POC.       Electronically signed by:  Paola Pastor MD     Video-Visit Details  Type of service:  Video Visit  Video End Time (time video stopped): 09:58  Distant Location (provider location):  [facility]

## 2020-12-23 ENCOUNTER — NURSING HOME VISIT (OUTPATIENT)
Dept: GERIATRICS | Facility: CLINIC | Age: 65
End: 2020-12-23
Payer: MEDICARE

## 2020-12-23 VITALS
WEIGHT: 140 LBS | DIASTOLIC BLOOD PRESSURE: 72 MMHG | BODY MASS INDEX: 29.39 KG/M2 | HEART RATE: 61 BPM | TEMPERATURE: 98.5 F | OXYGEN SATURATION: 98 % | SYSTOLIC BLOOD PRESSURE: 130 MMHG | RESPIRATION RATE: 18 BRPM | HEIGHT: 58 IN

## 2020-12-23 DIAGNOSIS — F40.01 PANIC DISORDER WITH AGORAPHOBIA: ICD-10-CM

## 2020-12-23 DIAGNOSIS — G47.9 SLEEP DISTURBANCES: Primary | ICD-10-CM

## 2020-12-23 DIAGNOSIS — F43.10 POSTTRAUMATIC STRESS DISORDER: ICD-10-CM

## 2020-12-23 PROCEDURE — 99309 SBSQ NF CARE MODERATE MDM 30: CPT | Performed by: NURSE PRACTITIONER

## 2020-12-23 ASSESSMENT — MIFFLIN-ST. JEOR: SCORE: 1059.79

## 2020-12-23 NOTE — LETTER
"    12/23/2020        RE: Letty Reid [place]  [address] [facility] [address]        [place] [facility]  [place] Medical Record Number:  [id number]  Place of Service where encounter took place:  [facility] [[id number]]  Chief Complaint   Patient presents with     Nursing Home Acute       HPI:    Letty Escobar  is a 65 year old (1955), who is being seen today for an episodic care visit.  HPI information obtained from: facility chart records, facility staff and patient report. Today's concern is:    1. Sleep disturbances    2. Panic disorder with agoraphobia    3. Posttraumatic stress disorder      Nursing brought up concerns about Letty's sleeping habits and asking to see if anything could change or be added like Melatonin?  This NP does not know Letty all that well and so staff stated multiple cognitive diagnoses that affect her.  The big concern is that her spouse will be on the phone with her for an extended amount of time as like a \"security blanket\".  Not that they are talking, but both may be quiet and it is the fact she knows he is on the other end.      Staff have used the Atarax for the anxiety, but Letty feels it can be over sedating.  This is on a PRN basis.  Discussed cutting the dose down to see if that can allow her to relax but not feel she can not wake up.  Brought up melatonin as a natural medication that may help calm her mind that she could drift off to sleep as well.  Staff liked that idea.    Letty happened to be walking past this NP and nurse manager and so stopped to talk about the ideas.      Past Medical and Surgical History reviewed in Epic today.    MEDICATIONS:    Current Outpatient Medications   Medication Sig Dispense Refill     hydrOXYzine (ATARAX) 25 MG tablet Take 0.5 tablets (12.5 mg) by mouth every 6 hours as needed for itching       melatonin 3 MG tablet Take 2 tablets (6 mg) by mouth At Bedtime       acetaminophen (TYLENOL) " "500 MG tablet Take 2 tablets (1,000 mg) by mouth 3 times daily       aspirin 81 MG EC tablet Take 81 mg by mouth daily       clopidogrel (PLAVIX) 75 MG tablet Take 75 mg by mouth daily        cyanocobalamin (CYANOCOBALAMIN) 1000 MCG/ML injection Inject 1 mL into the muscle every 30 days       divalproex sodium delayed-release (DEPAKOTE SPRINKLE) 125 MG DR capsule Take 250 mg by mouth At Bedtime       DULoxetine 40 MG CPEP Take 40 mg by mouth daily       ferrous sulfate (FEROSUL) 325 (65 Fe) MG tablet Take 325 mg by mouth daily (with breakfast)       ketoconazole (NIZORAL) 2 % external cream Apply topically At Bedtime       levothyroxine (SYNTHROID/LEVOTHROID) 100 MCG tablet Take 1 tablet (100 mcg) by mouth every morning (before breakfast) 30 tablet 0     melatonin 3 MG tablet Take 6 mg by mouth At Bedtime       metoprolol succinate ER (TOPROL-XL) 25 MG 24 hr tablet Take 25 mg by mouth daily       miconazole (MICATIN) 2 % AERP powder Apply topically 2 times daily as needed       mirtazapine (REMERON) 30 MG tablet Take 1 tablet (30 mg) by mouth At Bedtime 30 tablet 0     pantoprazole (PROTONIX) 40 MG EC tablet 40 mg po BID x 10 days, then return to 40 mg po every day.       polyethylene glycol (MIRALAX) 17 g packet Take 17 g by mouth daily as needed for constipation       rosuvastatin (CRESTOR) 10 MG tablet Take 10 mg by mouth At Bedtime        sennosides (SENOKOT) 8.6 MG tablet Take 2 tablets by mouth At Bedtime       vitamin D3 (CHOLECALCIFEROL) 50 mcg (2000 units) tablet Take 1 tablet (50 mcg) by mouth daily           REVIEW OF SYSTEMS:  4 point ROS including Respiratory, CV, GI and , other than that noted in the HPI,  is negative    Objective:  /72   Pulse 61   Temp 98.5  F (36.9  C)   Resp 18   Ht 1.473 m (4' 10\")   Wt 63.5 kg (140 lb)   SpO2 98%   BMI 29.26 kg/m    Exam:  Letty was ambulating with a walker, T belt and assist of one staff in the hallways.  Otherwise use a wheelchair as well.  Alert " and pleasant.  No acute distress.  Tolerating the ambulation.  Coloring is pale, skin is dry and warm.  Lungs are clear.  No oxygen used.  Heart rate regular and strong.  On 12/15/2020  BIMS = 14/15 and PHQ-9 = 3/27    In looking at her charting, it appears that she is awake frequently during the night.  Will put her call light on for minimal things that bother her.  Staff do utilize the PRN Hydroxyzine for sleep and anxiety.       Labs:   N/A    ASSESSMENT/PLAN:  1. Sleep disturbances    2. Panic disorder with agoraphobia    3. Posttraumatic stress disorder      Feel there are several things going on with multiple mental issues.  Agree with nursing of trying to work with what she has but not introduce more medications that could do harm.  Would like to see her be able to not depend on her spouse to be on the line as a security blanket but that may not change and that is OK.  Feel her mind races when she is up and alone.  Night time can be hard for many folks.  Explained to Letty about lowering the dose of the Hydroxyzine to 12.5mg to see if that helps settle her without the over sedated feeling.  She liked that.  Then start Melatonin but at 6mg in PM.  Sometimes this may take a couple hours to work but goal is to calm her thoughts down.    Orders written by provider at facility  1.  Lower the Hydroxyzine to 12.5mg every 6 hours prn x6 months - anxiety/panic disorder/PTSD  2.  Melatonin 6mg po every HS for sleep disturbances.    Total time spent with patient visit at the [facility] nursing facility was 15 min including patient visit and review of past records. Greater than 50% of total time spent with counseling and coordinating care due to explaination of changes in medications and goals of the change.     Electronically signed by:  KILO Abreu CNP                 Sincerely,        KILO Abreu CNP

## 2020-12-23 NOTE — PROGRESS NOTES
"[place] [facility]  [place] Medical Record Number:  [id number]  Place of Service where encounter took place:  [facility] ([facility]) [[id number]]  Chief Complaint   Patient presents with     Nursing Home Acute       HPI:    Letty Escobar  is a 65 year old (1955), who is being seen today for an episodic care visit.  HPI information obtained from: facility chart records, facility staff and patient report. Today's concern is:    1. Sleep disturbances    2. Panic disorder with agoraphobia    3. Posttraumatic stress disorder      Nursing brought up concerns about Letty's sleeping habits and asking to see if anything could change or be added like Melatonin?  This NP does not know Letty all that well and so staff stated multiple cognitive diagnoses that affect her.  The big concern is that her spouse will be on the phone with her for an extended amount of time as like a \"security blanket\".  Not that they are talking, but both may be quiet and it is the fact she knows he is on the other end.      Staff have used the Atarax for the anxiety, but Letty feels it can be over sedating.  This is on a PRN basis.  Discussed cutting the dose down to see if that can allow her to relax but not feel she can not wake up.  Brought up melatonin as a natural medication that may help calm her mind that she could drift off to sleep as well.  Staff liked that idea.    Letty happened to be walking past this NP and nurse manager and so stopped to talk about the ideas.      Past Medical and Surgical History reviewed in Epic today.    MEDICATIONS:    Current Outpatient Medications   Medication Sig Dispense Refill     hydrOXYzine (ATARAX) 25 MG tablet Take 0.5 tablets (12.5 mg) by mouth every 6 hours as needed for itching       melatonin 3 MG tablet Take 2 tablets (6 mg) by mouth At Bedtime       acetaminophen (TYLENOL) 500 MG tablet Take 2 tablets (1,000 mg) by mouth 3 times daily       aspirin 81 MG EC tablet " "Take 81 mg by mouth daily       clopidogrel (PLAVIX) 75 MG tablet Take 75 mg by mouth daily        cyanocobalamin (CYANOCOBALAMIN) 1000 MCG/ML injection Inject 1 mL into the muscle every 30 days       divalproex sodium delayed-release (DEPAKOTE SPRINKLE) 125 MG DR capsule Take 250 mg by mouth At Bedtime       DULoxetine 40 MG CPEP Take 40 mg by mouth daily       ferrous sulfate (FEROSUL) 325 (65 Fe) MG tablet Take 325 mg by mouth daily (with breakfast)       ketoconazole (NIZORAL) 2 % external cream Apply topically At Bedtime       levothyroxine (SYNTHROID/LEVOTHROID) 100 MCG tablet Take 1 tablet (100 mcg) by mouth every morning (before breakfast) 30 tablet 0     melatonin 3 MG tablet Take 6 mg by mouth At Bedtime       metoprolol succinate ER (TOPROL-XL) 25 MG 24 hr tablet Take 25 mg by mouth daily       miconazole (MICATIN) 2 % AERP powder Apply topically 2 times daily as needed       mirtazapine (REMERON) 30 MG tablet Take 1 tablet (30 mg) by mouth At Bedtime 30 tablet 0     pantoprazole (PROTONIX) 40 MG EC tablet 40 mg po BID x 10 days, then return to 40 mg po every day.       polyethylene glycol (MIRALAX) 17 g packet Take 17 g by mouth daily as needed for constipation       rosuvastatin (CRESTOR) 10 MG tablet Take 10 mg by mouth At Bedtime        sennosides (SENOKOT) 8.6 MG tablet Take 2 tablets by mouth At Bedtime       vitamin D3 (CHOLECALCIFEROL) 50 mcg (2000 units) tablet Take 1 tablet (50 mcg) by mouth daily           REVIEW OF SYSTEMS:  4 point ROS including Respiratory, CV, GI and , other than that noted in the HPI,  is negative    Objective:  /72   Pulse 61   Temp 98.5  F (36.9  C)   Resp 18   Ht 1.473 m (4' 10\")   Wt 63.5 kg (140 lb)   SpO2 98%   BMI 29.26 kg/m    Exam:  Letty was ambulating with a walker, T belt and assist of one staff in the hallways.  Otherwise use a wheelchair as well.  Alert and pleasant.  No acute distress.  Tolerating the ambulation.  Coloring is pale, skin is dry " and warm.  Lungs are clear.  No oxygen used.  Heart rate regular and strong.  On 12/15/2020  BIMS = 14/15 and PHQ-9 = 3/27    In looking at her charting, it appears that she is awake frequently during the night.  Will put her call light on for minimal things that bother her.  Staff do utilize the PRN Hydroxyzine for sleep and anxiety.       Labs:   N/A    ASSESSMENT/PLAN:  1. Sleep disturbances    2. Panic disorder with agoraphobia    3. Posttraumatic stress disorder      Feel there are several things going on with multiple mental issues.  Agree with nursing of trying to work with what she has but not introduce more medications that could do harm.  Would like to see her be able to not depend on her spouse to be on the line as a security blanket but that may not change and that is OK.  Feel her mind races when she is up and alone.  Night time can be hard for many folks.  Explained to Letty about lowering the dose of the Hydroxyzine to 12.5mg to see if that helps settle her without the over sedated feeling.  She liked that.  Then start Melatonin but at 6mg in PM.  Sometimes this may take a couple hours to work but goal is to calm her thoughts down.    Orders written by provider at facility  1.  Lower the Hydroxyzine to 12.5mg every 6 hours prn x6 months - anxiety/panic disorder/PTSD  2.  Melatonin 6mg po every HS for sleep disturbances.    Total time spent with patient visit at the [facility] nursing facility was 15 min including patient visit and review of past records. Greater than 50% of total time spent with counseling and coordinating care due to explaination of changes in medications and goals of the change.     Electronically signed by:  KILO Abreu CNP

## 2020-12-29 ENCOUNTER — NURSING HOME VISIT (OUTPATIENT)
Dept: GERIATRICS | Facility: CLINIC | Age: 65
End: 2020-12-29
Payer: MEDICARE

## 2020-12-29 VITALS
HEIGHT: 58 IN | SYSTOLIC BLOOD PRESSURE: 130 MMHG | WEIGHT: 139.4 LBS | RESPIRATION RATE: 18 BRPM | DIASTOLIC BLOOD PRESSURE: 72 MMHG | OXYGEN SATURATION: 97 % | BODY MASS INDEX: 29.26 KG/M2 | TEMPERATURE: 97.8 F | HEART RATE: 60 BPM

## 2020-12-29 DIAGNOSIS — G47.9 SLEEP DISTURBANCES: Primary | ICD-10-CM

## 2020-12-29 DIAGNOSIS — F40.01 PANIC DISORDER WITH AGORAPHOBIA: ICD-10-CM

## 2020-12-29 PROCEDURE — 99308 SBSQ NF CARE LOW MDM 20: CPT | Performed by: NURSE PRACTITIONER

## 2020-12-29 ASSESSMENT — MIFFLIN-ST. JEOR: SCORE: 1057.06

## 2020-12-29 NOTE — PROGRESS NOTES
[place] [facility]  [place] Medical Record Number:  [id number]  Place of Service where encounter took place:  [facility] ([facility]) [[id number]]  Chief Complaint   Patient presents with     Nursing Home Acute       HPI:    Letty Escobar  is a 65 year old (1955), who is being seen today for an episodic care visit.  HPI information obtained from: facility chart records, facility staff and patient report. Today's concern is:    1. Sleep disturbances    2. Panic disorder with agoraphobia      Staff updating today about Letty and the use of the Melatonin.  Asking if the dose could be increased to 9mg at HS.  Still showing anxiety where she is on her call light often and will take her brief off asking if she is wet.  Nurse manager informed this NP that they are moving Letty to the closest room to the nursing station down the long ayala.  Goal is that she would be closer to staff and could hear things better so she would not feel so alone.  Down the long ayala, more isolated.  Being closer may distract her more.    Past Medical and Surgical History reviewed in Epic today.    MEDICATIONS:    Current Outpatient Medications   Medication Sig Dispense Refill     melatonin 3 MG tablet Take 3 tablets (9 mg) by mouth At Bedtime       acetaminophen (TYLENOL) 500 MG tablet Take 2 tablets (1,000 mg) by mouth 3 times daily       aspirin 81 MG EC tablet Take 81 mg by mouth daily       clopidogrel (PLAVIX) 75 MG tablet Take 75 mg by mouth daily        cyanocobalamin (CYANOCOBALAMIN) 1000 MCG/ML injection Inject 1 mL into the muscle every 30 days       divalproex sodium delayed-release (DEPAKOTE SPRINKLE) 125 MG DR capsule Take 250 mg by mouth At Bedtime       DULoxetine 40 MG CPEP Take 40 mg by mouth daily       ferrous sulfate (FEROSUL) 325 (65 Fe) MG tablet Take 325 mg by mouth daily (with breakfast)       hydrOXYzine (ATARAX) 25 MG tablet Take 0.5 tablets (12.5 mg) by mouth every 6 hours as needed for  "itching       ketoconazole (NIZORAL) 2 % external cream Apply topically At Bedtime       levothyroxine (SYNTHROID/LEVOTHROID) 100 MCG tablet Take 1 tablet (100 mcg) by mouth every morning (before breakfast) 30 tablet 0     melatonin 3 MG tablet Take 6 mg by mouth At Bedtime       metoprolol succinate ER (TOPROL-XL) 25 MG 24 hr tablet Take 25 mg by mouth daily       miconazole (MICATIN) 2 % AERP powder Apply topically 2 times daily as needed       mirtazapine (REMERON) 30 MG tablet Take 1 tablet (30 mg) by mouth At Bedtime 30 tablet 0     pantoprazole (PROTONIX) 40 MG EC tablet 40 mg po BID x 10 days, then return to 40 mg po every day.       polyethylene glycol (MIRALAX) 17 g packet Take 17 g by mouth daily as needed for constipation       rosuvastatin (CRESTOR) 10 MG tablet Take 10 mg by mouth At Bedtime        sennosides (SENOKOT) 8.6 MG tablet Take 2 tablets by mouth At Bedtime       vitamin D3 (CHOLECALCIFEROL) 50 mcg (2000 units) tablet Take 1 tablet (50 mcg) by mouth daily         REVIEW OF SYSTEMS:  4 point ROS including Respiratory, CV, GI and , other than that noted in the HPI,  is negative    Objective:  /72   Pulse 60   Temp 97.8  F (36.6  C)   Resp 18   Ht 1.473 m (4' 10\")   Wt 63.2 kg (139 lb 6.4 oz)   SpO2 97%   BMI 29.13 kg/m    Exam:  Seeing Letty walking again with staff.  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  Good spirits but being occupied.    Petite female with good strength using the walker.  Skin is pale, dry and warm.  No use of oxygen.  No respiratory distress.    Labs:   Component      Latest Ref Rng & Units 12/4/2020   WBC      4.0 - 11.0 10e9/L 5.9   RBC Count      3.8 - 5.2 10e12/L 3.14 (L)   Hemoglobin      11.7 - 15.7 g/dL 9.5 (L)   Hematocrit      35.0 - 47.0 % 30.7 (L)   MCV      78 - 100 fl 98   MCH      26.5 - 33.0 pg 30.3   MCHC      31.5 - 36.5 g/dL 30.9 (L)   RDW      10.0 - 15.0 % 13.6   Platelet Count      150 - 450 10e9/L " 164     Component      Latest Ref Rng & Units 12/4/2020   Sodium      133 - 144 mmol/L 137   Potassium      3.4 - 5.3 mmol/L 4.3   Chloride      94 - 109 mmol/L 108   Carbon Dioxide      20 - 32 mmol/L 25   Anion Gap      3 - 14 mmol/L 4   Glucose      70 - 99 mg/dL 72   Urea Nitrogen      7 - 30 mg/dL 18   Creatinine      0.52 - 1.04 mg/dL 0.75   GFR Estimate      >60 mL/min/1.73:m2 83   GFR Estimate If Black      >60 mL/min/1.73:m2 >90   Calcium      8.5 - 10.1 mg/dL 9.7       ASSESSMENT/PLAN:  1. Sleep disturbances    2. Panic disorder with agoraphobia      According to the charting, Letty just has issues through the night.  Call light frequently, removing brief to ask if she is wet, all little things.  Agree with nursing about the room change and that may help to hear people for comfort.  Will go ahead and increase the melatonin to 9mg at HS to see if this will keep her asleep longer.    No changes with the hydroxyzine 12.5mg every 6 hours prn.    Orders written by provider at facility  1.  Increase the Melatonin to 9mg po at HS for sleep disturbances    Total time spent with patient visit at the skilled nursing facility was 10 min including patient visit and review of past records. Greater than 50% of total time spent with counseling and coordinating care due to helping find solutions to work for her anxiety.     Electronically signed by:  KILO Abreu CNP

## 2020-12-29 NOTE — LETTER
12/29/2020        RE: Letty [place]  [address] [facility] [address]        [place] [facility]  [place] Medical Record Number:  [id number]  Place of Service where encounter took place:  [facility] ([facility]) [[id number]]  Chief Complaint   Patient presents with     Nursing Home Acute       HPI:    Letty Escobar  is a 65 year old (1955), who is being seen today for an episodic care visit.  HPI information obtained from: facility chart records, facility staff and patient report. Today's concern is:    1. Sleep disturbances    2. Panic disorder with agoraphobia      Staff updating today about Letty and the use of the Melatonin.  Asking if the dose could be increased to 9mg at HS.  Still showing anxiety where she is on her call light often and will take her brief off asking if she is wet.  Nurse manager informed this NP that they are moving Letty to the closest room to the nursing station down the long ayala.  Goal is that she would be closer to staff and could hear things better so she would not feel so alone.  Down the long ayala, more isolated.  Being closer may distract her more.    Past Medical and Surgical History reviewed in Epic today.    MEDICATIONS:    Current Outpatient Medications   Medication Sig Dispense Refill     melatonin 3 MG tablet Take 3 tablets (9 mg) by mouth At Bedtime       acetaminophen (TYLENOL) 500 MG tablet Take 2 tablets (1,000 mg) by mouth 3 times daily       aspirin 81 MG EC tablet Take 81 mg by mouth daily       clopidogrel (PLAVIX) 75 MG tablet Take 75 mg by mouth daily        cyanocobalamin (CYANOCOBALAMIN) 1000 MCG/ML injection Inject 1 mL into the muscle every 30 days       divalproex sodium delayed-release (DEPAKOTE SPRINKLE) 125 MG DR capsule Take 250 mg by mouth At Bedtime       DULoxetine 40 MG CPEP Take 40 mg by mouth daily       ferrous sulfate (FEROSUL) 325 (65 Fe) MG tablet Take 325 mg by mouth daily (with breakfast)        "hydrOXYzine (ATARAX) 25 MG tablet Take 0.5 tablets (12.5 mg) by mouth every 6 hours as needed for itching       ketoconazole (NIZORAL) 2 % external cream Apply topically At Bedtime       levothyroxine (SYNTHROID/LEVOTHROID) 100 MCG tablet Take 1 tablet (100 mcg) by mouth every morning (before breakfast) 30 tablet 0     melatonin 3 MG tablet Take 6 mg by mouth At Bedtime       metoprolol succinate ER (TOPROL-XL) 25 MG 24 hr tablet Take 25 mg by mouth daily       miconazole (MICATIN) 2 % AERP powder Apply topically 2 times daily as needed       mirtazapine (REMERON) 30 MG tablet Take 1 tablet (30 mg) by mouth At Bedtime 30 tablet 0     pantoprazole (PROTONIX) 40 MG EC tablet 40 mg po BID x 10 days, then return to 40 mg po every day.       polyethylene glycol (MIRALAX) 17 g packet Take 17 g by mouth daily as needed for constipation       rosuvastatin (CRESTOR) 10 MG tablet Take 10 mg by mouth At Bedtime        sennosides (SENOKOT) 8.6 MG tablet Take 2 tablets by mouth At Bedtime       vitamin D3 (CHOLECALCIFEROL) 50 mcg (2000 units) tablet Take 1 tablet (50 mcg) by mouth daily         REVIEW OF SYSTEMS:  4 point ROS including Respiratory, CV, GI and , other than that noted in the HPI,  is negative    Objective:  /72   Pulse 60   Temp 97.8  F (36.6  C)   Resp 18   Ht 1.473 m (4' 10\")   Wt 63.2 kg (139 lb 6.4 oz)   SpO2 97%   BMI 29.13 kg/m    Exam:  Seeing Letty walking again with staff.  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  Good spirits but being occupied.    Petite female with good strength using the walker.  Skin is pale, dry and warm.  No use of oxygen.  No respiratory distress.    Labs:   Component      Latest Ref Rng & Units 12/4/2020   WBC      4.0 - 11.0 10e9/L 5.9   RBC Count      3.8 - 5.2 10e12/L 3.14 (L)   Hemoglobin      11.7 - 15.7 g/dL 9.5 (L)   Hematocrit      35.0 - 47.0 % 30.7 (L)   MCV      78 - 100 fl 98   MCH      26.5 - 33.0 pg 30.3   MCHC      " 31.5 - 36.5 g/dL 30.9 (L)   RDW      10.0 - 15.0 % 13.6   Platelet Count      150 - 450 10e9/L 164     Component      Latest Ref Rng & Units 12/4/2020   Sodium      133 - 144 mmol/L 137   Potassium      3.4 - 5.3 mmol/L 4.3   Chloride      94 - 109 mmol/L 108   Carbon Dioxide      20 - 32 mmol/L 25   Anion Gap      3 - 14 mmol/L 4   Glucose      70 - 99 mg/dL 72   Urea Nitrogen      7 - 30 mg/dL 18   Creatinine      0.52 - 1.04 mg/dL 0.75   GFR Estimate      >60 mL/min/1.73:m2 83   GFR Estimate If Black      >60 mL/min/1.73:m2 >90   Calcium      8.5 - 10.1 mg/dL 9.7       ASSESSMENT/PLAN:  1. Sleep disturbances    2. Panic disorder with agoraphobia      According to the charting, Letty just has issues through the night.  Call light frequently, removing brief to ask if she is wet, all little things.  Agree with nursing about the room change and that may help to hear people for comfort.  Will go ahead and increase the melatonin to 9mg at HS to see if this will keep her asleep longer.    No changes with the hydroxyzine 12.5mg every 6 hours prn.    Orders written by provider at facility  1.  Increase the Melatonin to 9mg po at HS for sleep disturbances    Total time spent with patient visit at the skilled nursing facility was 10 min including patient visit and review of past records. Greater than 50% of total time spent with counseling and coordinating care due to helping find solutions to work for her anxiety.     Electronically signed by:  KILO Abreu CNP                 Sincerely,        KILO Abreu CNP

## 2021-01-06 RX ORDER — HYDROXYZINE HYDROCHLORIDE 25 MG/1
12.5 TABLET, FILM COATED ORAL EVERY 6 HOURS PRN
Start: 2020-12-23 | End: 2021-04-06

## 2021-01-06 RX ORDER — LANOLIN ALCOHOL/MO/W.PET/CERES
6 CREAM (GRAM) TOPICAL AT BEDTIME
Start: 2020-12-23 | End: 2021-01-06

## 2021-01-06 RX ORDER — LANOLIN ALCOHOL/MO/W.PET/CERES
9 CREAM (GRAM) TOPICAL AT BEDTIME
Start: 2020-12-29 | End: 2021-03-16

## 2021-01-08 ENCOUNTER — NURSING HOME VISIT (OUTPATIENT)
Dept: GERIATRICS | Facility: CLINIC | Age: 66
End: 2021-01-08
Payer: MEDICARE

## 2021-01-08 VITALS
SYSTOLIC BLOOD PRESSURE: 130 MMHG | WEIGHT: 144.4 LBS | TEMPERATURE: 97.2 F | HEIGHT: 58 IN | DIASTOLIC BLOOD PRESSURE: 72 MMHG | RESPIRATION RATE: 18 BRPM | HEART RATE: 63 BPM | OXYGEN SATURATION: 95 % | BODY MASS INDEX: 30.31 KG/M2

## 2021-01-08 DIAGNOSIS — N63.0 BREAST MASS: Primary | ICD-10-CM

## 2021-01-08 DIAGNOSIS — F40.01 PANIC DISORDER WITH AGORAPHOBIA: ICD-10-CM

## 2021-01-08 PROCEDURE — 99308 SBSQ NF CARE LOW MDM 20: CPT | Performed by: NURSE PRACTITIONER

## 2021-01-08 ASSESSMENT — MIFFLIN-ST. JEOR: SCORE: 1079.74

## 2021-01-08 NOTE — LETTER
"    1/8/2021        RE: Letty Escobar  [facility]  [address] [facility] [address]        [place] [facility]  [place] Medical Record Number:  [id number]  Place of Service where encounter took place:  [facility] ([facility]) [[id number]]  Chief Complaint   Patient presents with     Nursing Home Acute       HPI:    Letty Escobar  is a 65 year old (1955), who is being seen today for an episodic care visit.  HPI information obtained from: facility chart records, facility staff, patient report and [facility] chart review. Today's concern is:     Breast mass  Panic disorder with agoraphobia     Nursing reporting patient's right breast was very hard this AM.  They applied hot packs.    Patient is met in her SNF LTC room where she reports no pain and that the warm packs are helping her breast \"hardness.\"     Past Medical and Surgical History reviewed in Epic today.    MEDICATIONS:  Current Outpatient Medications   Medication Sig Dispense Refill     acetaminophen (TYLENOL) 500 MG tablet Take 2 tablets (1,000 mg) by mouth 3 times daily       aspirin 81 MG EC tablet Take 81 mg by mouth daily       clopidogrel (PLAVIX) 75 MG tablet Take 75 mg by mouth daily        cyanocobalamin (CYANOCOBALAMIN) 1000 MCG/ML injection Inject 1 mL into the muscle every 30 days       divalproex sodium delayed-release (DEPAKOTE SPRINKLE) 125 MG DR capsule Take 250 mg by mouth At Bedtime       DULoxetine 40 MG CPEP Take 40 mg by mouth daily       ferrous sulfate (FEROSUL) 325 (65 Fe) MG tablet Take 325 mg by mouth daily (with breakfast)       hydrOXYzine (ATARAX) 25 MG tablet Take 0.5 tablets (12.5 mg) by mouth every 6 hours as needed for itching       ketoconazole (NIZORAL) 2 % external cream Apply topically At Bedtime       levothyroxine (SYNTHROID/LEVOTHROID) 100 MCG tablet Take 1 tablet (100 mcg) by mouth every morning (before breakfast) 30 tablet 0     melatonin 3 MG tablet Take 3 tablets (9 mg) by mouth " "At Bedtime       melatonin 3 MG tablet Take 6 mg by mouth At Bedtime       metoprolol succinate ER (TOPROL-XL) 25 MG 24 hr tablet Take 25 mg by mouth daily       miconazole (MICATIN) 2 % AERP powder Apply topically 2 times daily as needed       mirtazapine (REMERON) 30 MG tablet Take 1 tablet (30 mg) by mouth At Bedtime 30 tablet 0     pantoprazole (PROTONIX) 40 MG EC tablet 40 mg po BID x 10 days, then return to 40 mg po every day.       polyethylene glycol (MIRALAX) 17 g packet Take 17 g by mouth daily as needed for constipation       rosuvastatin (CRESTOR) 10 MG tablet Take 10 mg by mouth At Bedtime        sennosides (SENOKOT) 8.6 MG tablet Take 2 tablets by mouth At Bedtime       vitamin D3 (CHOLECALCIFEROL) 50 mcg (2000 units) tablet Take 1 tablet (50 mcg) by mouth daily       REVIEW OF SYSTEMS:  Limited secondary to cognitive impairment but today pt reports 4 point ROS including Respiratory, CV, GI and , other than that noted in the HPI,  is negative    Objective:  /72   Pulse 63   Temp 97.2  F (36.2  C)   Resp 18   Ht 1.473 m (4' 10\")   Wt 65.5 kg (144 lb 6.4 oz)   SpO2 95%   BMI 30.18 kg/m    Exam:  GENERAL APPEARANCE:  Alert, in no distress  RESP:  respiratory effort normal, no respiratory distress, on RA  CV:  No LE peripheral edema  ABDOMEN:  nondistended  M/S:   Gait and station with w/c for mobility, Digits and nails with arthritic changes, reduced muscle mass  SKIN:  Inspection and Palpation of skin and subcutaneous tissue pale.  GYN: right breast with mass to 9:00 of nipple with some mild orange-peel appearance.  Nontender to patient. No redness or discharge.   PSYCH:  insight and judgement, memory with impairment, affect and mood normal, follows commands readily           Labs:   CBC RESULTS:   Recent Labs   Lab Test 12/04/20  0725 11/02/20  0756   WBC 5.9 6.3   RBC 3.14* 2.74*   HGB 9.5* 8.3*   HCT 30.7* 26.7*   MCV 98 97   MCH 30.3 30.3   MCHC 30.9* 31.1*   RDW 13.6 14.1    " "128*       Last Basic Metabolic Panel:  Recent Labs   Lab Test 12/04/20  0725 11/02/20  0756    139   POTASSIUM 4.3 4.4   CHLORIDE 108 109   ORESTES 9.7 9.9   CO2 25 21   BUN 18 23   CR 0.75 0.87   GLC 72 70       Liver Function Studies -   Recent Labs   Lab Test 11/02/20  0756 09/02/20  1553   PROTTOTAL 6.3* 6.1*   ALBUMIN 2.2* 2.5*   BILITOTAL 0.3 0.1*   ALKPHOS 94 65   AST 62* 21   ALT 91* 24       TSH   Date Value Ref Range Status   12/04/2020 41.34 (H) 0.40 - 4.00 mU/L Final   10/26/2020 14.63 (H) 0.40 - 4.00 mU/L Final   ]    Lab Results   Component Value Date    A1C 4.6 10/30/2020       ASSESSMENT/PLAN:     Breast mass  Panic disorder with agoraphobia     Exam with hard mass to 9:00 trudy of nipple.  Nursing reporting \"whole right breast\" was hard earlier today, now much less after warm backs.   No history of breast Cancer.  Blocked ducts vs benign nodule vs tumor   Will order a breast U/S and monitor for results.  May need mammogram in future, depending on U/S results.     Orders written by provider at facility  1. Right breast ultrasound      Electronically signed by:  KILO Alcazar CNP                 Sincerely,        KILO Alcazar CNP    "

## 2021-01-08 NOTE — PROGRESS NOTES
"[facility]  [facility] Medical Record Number:  [id number]  Place of Service where encounter took place:  [facility] [[id number]]  Chief Complaint   Patient presents with     Nursing Home Acute       HPI:    Letty Escobar  is a 65 year old (1955), who is being seen today for an episodic care visit.  HPI information obtained from: facility chart records, facility staff, patient report and [facility] chart review. Today's concern is:     Breast mass  Panic disorder with agoraphobia     Nursing reporting patient's right breast was very hard this AM.  They applied hot packs.    Patient is met in her SNF LTC room where she reports no pain and that the warm packs are helping her breast \"hardness.\"     Past Medical and Surgical History reviewed in Epic today.    MEDICATIONS:  Current Outpatient Medications   Medication Sig Dispense Refill     acetaminophen (TYLENOL) 500 MG tablet Take 2 tablets (1,000 mg) by mouth 3 times daily       aspirin 81 MG EC tablet Take 81 mg by mouth daily       clopidogrel (PLAVIX) 75 MG tablet Take 75 mg by mouth daily        cyanocobalamin (CYANOCOBALAMIN) 1000 MCG/ML injection Inject 1 mL into the muscle every 30 days       divalproex sodium delayed-release (DEPAKOTE SPRINKLE) 125 MG DR capsule Take 250 mg by mouth At Bedtime       DULoxetine 40 MG CPEP Take 40 mg by mouth daily       ferrous sulfate (FEROSUL) 325 (65 Fe) MG tablet Take 325 mg by mouth daily (with breakfast)       hydrOXYzine (ATARAX) 25 MG tablet Take 0.5 tablets (12.5 mg) by mouth every 6 hours as needed for itching       ketoconazole (NIZORAL) 2 % external cream Apply topically At Bedtime       levothyroxine (SYNTHROID/LEVOTHROID) 100 MCG tablet Take 1 tablet (100 mcg) by mouth every morning (before breakfast) 30 tablet 0     melatonin 3 MG tablet Take 3 tablets (9 mg) by mouth At Bedtime       melatonin 3 MG tablet Take 6 mg by mouth At Bedtime       metoprolol " "succinate ER (TOPROL-XL) 25 MG 24 hr tablet Take 25 mg by mouth daily       miconazole (MICATIN) 2 % AERP powder Apply topically 2 times daily as needed       mirtazapine (REMERON) 30 MG tablet Take 1 tablet (30 mg) by mouth At Bedtime 30 tablet 0     pantoprazole (PROTONIX) 40 MG EC tablet 40 mg po BID x 10 days, then return to 40 mg po every day.       polyethylene glycol (MIRALAX) 17 g packet Take 17 g by mouth daily as needed for constipation       rosuvastatin (CRESTOR) 10 MG tablet Take 10 mg by mouth At Bedtime        sennosides (SENOKOT) 8.6 MG tablet Take 2 tablets by mouth At Bedtime       vitamin D3 (CHOLECALCIFEROL) 50 mcg (2000 units) tablet Take 1 tablet (50 mcg) by mouth daily       REVIEW OF SYSTEMS:  Limited secondary to cognitive impairment but today pt reports 4 point ROS including Respiratory, CV, GI and , other than that noted in the HPI,  is negative    Objective:  /72   Pulse 63   Temp 97.2  F (36.2  C)   Resp 18   Ht 1.473 m (4' 10\")   Wt 65.5 kg (144 lb 6.4 oz)   SpO2 95%   BMI 30.18 kg/m    Exam:  GENERAL APPEARANCE:  Alert, in no distress  RESP:  respiratory effort normal, no respiratory distress, on RA  CV:  No LE peripheral edema  ABDOMEN:  nondistended  M/S:   Gait and station with w/c for mobility, Digits and nails with arthritic changes, reduced muscle mass  SKIN:  Inspection and Palpation of skin and subcutaneous tissue pale.  GYN: right breast with mass to 9:00 of nipple with some mild orange-peel appearance.  Nontender to patient. No redness or discharge.   PSYCH:  insight and judgement, memory with impairment, affect and mood normal, follows commands readily           Labs:   CBC RESULTS:   Recent Labs   Lab Test 12/04/20  0725 11/02/20  0756   WBC 5.9 6.3   RBC 3.14* 2.74*   HGB 9.5* 8.3*   HCT 30.7* 26.7*   MCV 98 97   MCH 30.3 30.3   MCHC 30.9* 31.1*   RDW 13.6 14.1    128*       Last Basic Metabolic Panel:  Recent Labs   Lab Test 12/04/20  0725 " "11/02/20  0756    139   POTASSIUM 4.3 4.4   CHLORIDE 108 109   ORESTES 9.7 9.9   CO2 25 21   BUN 18 23   CR 0.75 0.87   GLC 72 70       Liver Function Studies -   Recent Labs   Lab Test 11/02/20  0756 09/02/20  1553   PROTTOTAL 6.3* 6.1*   ALBUMIN 2.2* 2.5*   BILITOTAL 0.3 0.1*   ALKPHOS 94 65   AST 62* 21   ALT 91* 24       TSH   Date Value Ref Range Status   12/04/2020 41.34 (H) 0.40 - 4.00 mU/L Final   10/26/2020 14.63 (H) 0.40 - 4.00 mU/L Final   ]    Lab Results   Component Value Date    A1C 4.6 10/30/2020       ASSESSMENT/PLAN:     Breast mass  Panic disorder with agoraphobia     Exam with hard mass to 9:00 trudy of nipple.  Nursing reporting \"whole right breast\" was hard earlier today, now much less after warm backs.   No history of breast Cancer.  Blocked ducts vs benign nodule vs tumor   Will order a breast U/S and monitor for results.  May need mammogram in future, depending on U/S results.     Orders written by provider at facility  1. Right breast ultrasound      Electronically signed by:  KILO Alcazar CNP           "

## 2021-01-15 ENCOUNTER — TELEPHONE (OUTPATIENT)
Dept: GERIATRICS | Facility: CLINIC | Age: 66
End: 2021-01-15

## 2021-01-15 DIAGNOSIS — N63.10 UNSPECIFIED LUMP IN THE RIGHT BREAST, UNSPECIFIED QUADRANT: ICD-10-CM

## 2021-01-15 DIAGNOSIS — N63.11 UNSPECIFIED LUMP IN THE RIGHT BREAST, UPPER OUTER QUADRANT: ICD-10-CM

## 2021-01-15 DIAGNOSIS — N63.0 BREAST NODULE: Primary | ICD-10-CM

## 2021-01-25 ENCOUNTER — HOSPITAL ENCOUNTER (OUTPATIENT)
Dept: MAMMOGRAPHY | Facility: CLINIC | Age: 66
End: 2021-01-25
Attending: NURSE PRACTITIONER
Payer: MEDICARE

## 2021-01-25 ENCOUNTER — HOSPITAL ENCOUNTER (OUTPATIENT)
Dept: ULTRASOUND IMAGING | Facility: CLINIC | Age: 66
End: 2021-01-25
Attending: NURSE PRACTITIONER
Payer: MEDICARE

## 2021-01-25 DIAGNOSIS — N63.0 BREAST NODULE: ICD-10-CM

## 2021-01-25 DIAGNOSIS — N64.51 HARDNESS OF BREAST: ICD-10-CM

## 2021-01-25 DIAGNOSIS — N63.10 UNSPECIFIED LUMP IN THE RIGHT BREAST, UNSPECIFIED QUADRANT: ICD-10-CM

## 2021-01-25 DIAGNOSIS — N63.11 UNSPECIFIED LUMP IN THE RIGHT BREAST, UPPER OUTER QUADRANT: ICD-10-CM

## 2021-01-25 PROCEDURE — 76641 ULTRASOUND BREAST COMPLETE: CPT | Mod: RT

## 2021-01-25 PROCEDURE — G0279 TOMOSYNTHESIS, MAMMO: HCPCS

## 2021-01-26 ENCOUNTER — NURSING HOME VISIT (OUTPATIENT)
Dept: GERIATRICS | Facility: CLINIC | Age: 66
End: 2021-01-26
Payer: MEDICARE

## 2021-01-26 VITALS
WEIGHT: 144.4 LBS | RESPIRATION RATE: 18 BRPM | OXYGEN SATURATION: 97 % | HEIGHT: 58 IN | SYSTOLIC BLOOD PRESSURE: 135 MMHG | HEART RATE: 74 BPM | TEMPERATURE: 97.7 F | BODY MASS INDEX: 30.31 KG/M2 | DIASTOLIC BLOOD PRESSURE: 69 MMHG

## 2021-01-26 DIAGNOSIS — I50.22 CHRONIC SYSTOLIC CONGESTIVE HEART FAILURE (H): ICD-10-CM

## 2021-01-26 DIAGNOSIS — F43.10 POSTTRAUMATIC STRESS DISORDER: ICD-10-CM

## 2021-01-26 DIAGNOSIS — I48.20 CHRONIC ATRIAL FIBRILLATION (H): ICD-10-CM

## 2021-01-26 DIAGNOSIS — Z95.1 S/P CABG X 5: ICD-10-CM

## 2021-01-26 DIAGNOSIS — F60.9 PERSONALITY DISORDER (H): ICD-10-CM

## 2021-01-26 DIAGNOSIS — F33.2 MAJOR DEPRESSIVE DISORDER, RECURRENT SEVERE WITHOUT PSYCHOTIC FEATURES (H): ICD-10-CM

## 2021-01-26 DIAGNOSIS — J44.9 CHRONIC OBSTRUCTIVE PULMONARY DISEASE, UNSPECIFIED COPD TYPE (H): ICD-10-CM

## 2021-01-26 DIAGNOSIS — E78.2 MIXED HYPERLIPIDEMIA: ICD-10-CM

## 2021-01-26 DIAGNOSIS — N18.4 CKD (CHRONIC KIDNEY DISEASE) STAGE 4, GFR 15-29 ML/MIN (H): ICD-10-CM

## 2021-01-26 DIAGNOSIS — E66.01 MORBID OBESITY (H): ICD-10-CM

## 2021-01-26 DIAGNOSIS — I25.119 CORONARY ARTERY DISEASE INVOLVING NATIVE CORONARY ARTERY OF NATIVE HEART WITH ANGINA PECTORIS (H): ICD-10-CM

## 2021-01-26 DIAGNOSIS — I25.5 ISCHEMIC CARDIOMYOPATHY: ICD-10-CM

## 2021-01-26 DIAGNOSIS — I10 ESSENTIAL HYPERTENSION: ICD-10-CM

## 2021-01-26 DIAGNOSIS — N64.51 HARDNESS OF BREAST: ICD-10-CM

## 2021-01-26 DIAGNOSIS — R93.89 ABNORMAL SCREENING CT OF CHEST: Primary | ICD-10-CM

## 2021-01-26 DIAGNOSIS — F40.01 PANIC DISORDER WITH AGORAPHOBIA: ICD-10-CM

## 2021-01-26 PROBLEM — L89.813: Status: ACTIVE | Noted: 2021-01-26

## 2021-01-26 PROBLEM — E43 SEVERE PROTEIN-CALORIE MALNUTRITION (H): Status: ACTIVE | Noted: 2021-01-26

## 2021-01-26 PROBLEM — N17.9 ACUTE RENAL FAILURE SUPERIMPOSED ON STAGE 4 CHRONIC KIDNEY DISEASE, UNSPECIFIED ACUTE RENAL FAILURE TYPE (H): Status: ACTIVE | Noted: 2021-01-26

## 2021-01-26 PROCEDURE — 99309 SBSQ NF CARE MODERATE MDM 30: CPT | Performed by: NURSE PRACTITIONER

## 2021-01-26 ASSESSMENT — MIFFLIN-ST. JEOR: SCORE: 1079.74

## 2021-01-26 NOTE — LETTER
1/26/2021        RE: Letty Escobar  [facility]  [address]  [facility] [address]        [facility]  [facility] Medical Record Number:  [id number]  Place of Service where encounter took place:  [facility] [[id number]]  Chief Complaint   Patient presents with     Nursing Home Acute       HPI:    Letty Escobar  is a 65 year old (1955), who is being seen today for an episodic care visit.  HPI information obtained from: facility chart records, facility staff, patient report, [facility] chart review and family/first contact pt's 's report. Today's concern is:     Abnormal screening CT of chest  Hardness of breast  Morbid obesity (H)  CKD (chronic kidney disease) stage 4, GFR 15-29 ml/min (H)  Chronic systolic congestive heart failure (H)  Coronary artery disease involving native coronary artery of native heart with angina pectoris (H)  Chronic atrial fibrillation (H)  Chronic obstructive pulmonary disease, unspecified COPD type (H)  Ischemic cardiomyopathy  Essential hypertension  Mixed hyperlipidemia  S/P CABG x 5  Major depressive disorder, recurrent severe without psychotic features (H)  Panic disorder with agoraphobia  Posttraumatic stress disorder  Personality disorder (H)     Patient is a 65 year old medically complex woman with PMH including panic disorder with agoraphobia, MDD, anxiety, drug-seeking behavior, personality disorder, PTSD, History suicidal ideation, HTN, HLD, ischemic cardiomyopathy, History MI and CABG x 5 (2015), CHF (EF 45-50%), ICA stenosis bilateral, pAfib, Morbid obesity, IBS with Diarrhea, GERD, DM2, chronic pain with neuropathy, urinary incontinence, PMR, Anemia, Vit B12 def, hypothyroidism [facility].    [facility] Psych stay at [facility] 9/3-9/15/20. She is being f/b ACP Psychiatrist, Dr Reagan Martell and on-site psychologist.    Recently patient visited  after nursing reported hardness of right breast.  1/25/21 Mammogram performed:  HISTORY: Right breast hardening felt by the nursing service at the  [facility]. Patient has history of heart surgery and multiple  bypass with low ejection fraction. Patient sleeps on her right side.     COMPARISON:  None. This is a baseline study.     BREAST DENSITY: Scattered fibroglandular densities.     FINDINGS: There is marked thickening of the skin in the right breast.  Minimal skin thickening may be present in the left breast. No focal  lesion is identified in the right breast. There is overall increased  density of the right breast as compared to the left breast. No other  mammographic findings of concern for malignancy.     Targeted ultrasound of the right breast demonstrates marked skin  thickening. There is also edema in the underlying right breast  parenchymal tissues. The skin of the left breast appears grossly  within normal limits. No significant edema is seen in the underlying  breast parenchymal tissues in the left breast.                                                                      IMPRESSION: BI-RADS CATEGORY: 4 - Suspicious Abnormality-Biopsy Should  Be Considered. Significant right breast skin thickening without  associated pain. This patient does have history of significant cardiac  limitations and sleeps on her right side. This could be secondary to  congestive heart failure with worsening edema in the right breast as  compared to the left due to her positioning while sleeping.  Differential diagnosis also includes inflammatory breast cancer. I  recommend clinical correlation. Skin biopsy may be necessary.     RECOMMENDED FOLLOW-UP: Clinical Follow-up for asymmetric breast edema  and skin thickening, possibly due to congestive heart failure.  Inflammatory breast cancer is also in the differential diagnosis and  skin biopsy may be necessary, as clinically indicated.    Patient is met today in her SNF  "room where she is laying in bed on her right side.  Her  is on the phone.  F/u visit today to address concerns for CT results and for eval of possible CHF.    Patient is anxious but calmer today than I have seen her in a while.    She reports she has shortness of breath only with high anxiety but does feel \"some chest congestion.\"  She denies any CP, HA, lightheadedness, constipation.  She endorses some heartburn with too many tomatoes but otherwise feels well.         Past Medical and Surgical History reviewed in Epic today.    MEDICATIONS:  Current Outpatient Medications   Medication Sig Dispense Refill     clopidogrel (PLAVIX) 75 MG tablet Take 75 mg by mouth daily        acetaminophen (TYLENOL) 500 MG tablet Take 2 tablets (1,000 mg) by mouth 3 times daily       aspirin 81 MG EC tablet Take 81 mg by mouth daily       cyanocobalamin (CYANOCOBALAMIN) 1000 MCG/ML injection Inject 1 mL into the muscle every 30 days       divalproex sodium delayed-release (DEPAKOTE SPRINKLE) 125 MG DR capsule Take 250 mg by mouth At Bedtime       DULoxetine 40 MG CPEP Take 40 mg by mouth daily       ferrous sulfate (FEROSUL) 325 (65 Fe) MG tablet Take 325 mg by mouth daily (with breakfast)       hydrOXYzine (ATARAX) 25 MG tablet Take 0.5 tablets (12.5 mg) by mouth every 6 hours as needed for itching       ketoconazole (NIZORAL) 2 % external cream Apply topically At Bedtime       levothyroxine (SYNTHROID/LEVOTHROID) 100 MCG tablet Take 1 tablet (100 mcg) by mouth every morning (before breakfast) 30 tablet 0     melatonin 3 MG tablet Take 3 tablets (9 mg) by mouth At Bedtime       melatonin 3 MG tablet Take 6 mg by mouth At Bedtime       metoprolol succinate ER (TOPROL-XL) 25 MG 24 hr tablet Take 25 mg by mouth daily       miconazole (MICATIN) 2 % AERP powder Apply topically 2 times daily as needed       mirtazapine (REMERON) 30 MG tablet Take 1 tablet (30 mg) by mouth At Bedtime 30 tablet 0     pantoprazole (PROTONIX) 40 MG EC " "tablet 40 mg po BID x 10 days, then return to 40 mg po every day.       polyethylene glycol (MIRALAX) 17 g packet Take 17 g by mouth daily as needed for constipation       rosuvastatin (CRESTOR) 10 MG tablet Take 10 mg by mouth At Bedtime        sennosides (SENOKOT) 8.6 MG tablet Take 2 tablets by mouth At Bedtime       vitamin D3 (CHOLECALCIFEROL) 50 mcg (2000 units) tablet Take 1 tablet (50 mcg) by mouth daily       REVIEW OF SYSTEMS:  10 point ROS of systems including Constitutional, Eyes, Respiratory, Cardiovascular, Gastroenterology, Genitourinary, Integumentary, Musculoskeletal, Psychiatric were all negative except for pertinent positives noted in my HPI.    Objective:  /69   Pulse 74   Temp 97.7  F (36.5  C)   Resp 18   Ht 1.473 m (4' 10\")   Wt 65.5 kg (144 lb 6.4 oz)   SpO2 97%   BMI 30.18 kg/m    Exam:  GENERAL APPEARANCE:  Alert, in no distress, deconditioned   RESP:  respiratory effort and palpation of chest normal, auscultation of lungs with possible right-sided fine crackles (difficult to auscultate), no respiratory distress, on RA  CV:  Palpation and auscultation of heart done , rate and rhythm regular, pacemaker on left chest, no murmur, no LE peripheral edema  ABDOMEN:  Obese, normal bowel sounds, soft, nontender, NELIDA for hepatosplenomegaly or other masses  M/S:   Gait and station with w/c for mobility but does transfer with A x 1, Digits and nails with arthritic changes, reduced muscle mass  SKIN:  Inspection and Palpation of skin and subcutaneous tissue pale, right ear still with Mepilex (likey for protection at this time as healed)   PSYCH:  insight and judgement, memory intact , affect and mood anxious but less than usual, follows commands readily         Labs:   CBC RESULTS:   Recent Labs   Lab Test 12/04/20  0725 11/02/20  0756   WBC 5.9 6.3   RBC 3.14* 2.74*   HGB 9.5* 8.3*   HCT 30.7* 26.7*   MCV 98 97   MCH 30.3 30.3   MCHC 30.9* 31.1*   RDW 13.6 14.1    128*       Last " Basic Metabolic Panel:  Recent Labs   Lab Test 12/04/20  0725 11/02/20  0756    139   POTASSIUM 4.3 4.4   CHLORIDE 108 109   ORESTES 9.7 9.9   CO2 25 21   BUN 18 23   CR 0.75 0.87   GLC 72 70       Liver Function Studies -   Recent Labs   Lab Test 11/02/20  0756 09/02/20  1553   PROTTOTAL 6.3* 6.1*   ALBUMIN 2.2* 2.5*   BILITOTAL 0.3 0.1*   ALKPHOS 94 65   AST 62* 21   ALT 91* 24       TSH   Date Value Ref Range Status   12/04/2020 41.34 (H) 0.40 - 4.00 mU/L Final   10/26/2020 14.63 (H) 0.40 - 4.00 mU/L Final   ]    Lab Results   Component Value Date    A1C 4.6 10/30/2020         ASSESSMENT/PLAN:  Abnormal screening CT of chest  Hardness of breast  Noted CT results of above  Unclear of CHF status but while patient may appear slightly volume up - no overt exacerbation at this time - see below for POC.  Answered questions of patient's and her 's about results and next steps.  We discussed biopsy and can consult general surgery.     PLAN:  - tenuous   - General Surgery consult for breast Bx (HUC to please ask if recommended to hold ASA/Plavix prior to bx)   - f/u on CHF - see below.        CKD (chronic kidney disease) stage 4, GFR 15-29 ml/min (H)  Last Basic Metabolic Panel:  Recent Labs   Lab Test 12/04/20  0725 11/02/20  0756    139   POTASSIUM 4.3 4.4   CHLORIDE 108 109   ORESTES 9.7 9.9   CO2 25 21   BUN 18 23   CR 0.75 0.87   GLC 72 70     PLAN:  - recheck BMP  - Will avoid nephrotoxic agents (such as ACEI/ARB/NSAIDs, IV contrast) and mindful prescribing with renal dosing.     Chronic obstructive pulmonary disease, unspecified COPD type (H)  Patient is on Breo 200-25 MCG/dose 1 puff daily,  She reports some shortness of breath but only with high anxiety  LS today with possible right sided fine crackles (patient does sleep  On her right side only)  Sats 93-97% on RA  Afebrile     PLAN:  - stable   - continue Breo  - monitor for COPD exacerbation  - likely shortness of breath r/t anxiety, not  exacerbation    Chronic systolic congestive heart failure (H)  Coronary artery disease involving native coronary artery of native heart with angina pectoris (H)  Chronic atrial fibrillation (H)  Ischemic cardiomyopathy  Essential hypertension  Mixed hyperlipidemia  S/P CABG x 5  F/b [facility], Dr Saucedo  Per Care Everywhere:   2/5/2009 - MI - Proximal RCA 99%, mild-mod disease elsewhere. EF 60%. PCI: MYRON to pRCA.  2/12/2009 - admit CP - Widely patent RCA stent. Moderate diffuse CAD. Severe stenosis in trivial PDA branch. LVEF 45%.  1/25/2010 - admit CP - PTCA and stent of diagonal  9/8/2010 - admit CP - LAD patent stent. Moderate diffuse CAD. Medical management recommended.   4/25/2012 - NSTEMI - Acute total occlusion of the ostial Circumflex. Acute total occlusion of the ostial ramus. Acute total occlusion of the distal 1st Obtuse Marginal. Angioplasty of ostial circumflex and ostial ramus. There was distal embolization to the OM1 vessel. This vessel was small and not amendable to intervention. Indefinite: plavix and asa 81.  8/10/2015 - CABx5 - 1. LIMA to distal LAD, reverse SVG to OM1 and OM2, reverse SVG to diagonal, reverse SVG to PDA.   2. Mitral valve repair with a Medtronics 3-D full ring, 28 mm.   3. Tricuspid repair with a Medtronic 28 mm partial ring  1/12/2016 - TTE - EF 40-45%  Also noted history of Biv-AICD implanted      Patient is on rosuvastatin 10 mg nightly, Toprol-XL 25 mg daily, ASA 81 mg daily,  Plavix stopped a while ago with patient's bleeding - not restarted.     BPs mostly 130s/70s  HRs 60-80, regular today (has a pacemaker)  Patient denies CP, HA, lightheadedness    LS with possible right sided fine crackles  LE edema none    Weights:  6/24/20 - 150  7/13/20 - 140  9/17/20 - 124 (patient had been refusing to eat d/t high anxiety, [facility] psych stay and meds adjusted)  12/7/20 - 134.4  12/24/20 - 139.4  1/1/21 - 144.4    PLAN:  - tenuous   - not currently on diuretics.   - no  overt CHF exacerbation but could be mild as patient does have possible right-sided fine crackles (although difficult to auscultate as patient declined getting OOB and was lying on her right side)  - BMP, BNP tomorrow to assess.   - continue statin, ASA, TOprol XL  - restart Plavix  - BP goals are ~130 -165/60 -90 mmHg.This is higher than ACC and AHA recommendations due to risk for hypotension, risk of dizziness and falls, risk of tissue/cerebral hypoperfusion and frailty.     Major depressive disorder, recurrent severe without psychotic features (H)  Panic disorder with agoraphobia  Posttraumatic stress disorder  Personality disorder (H)  Now f/b ACP, Dr Reagan Martell and in house psychologist  (Previously f/b KIMBERLY Elizabeth with [facility] for psychotherapy   Also was receiving services from [facility], part of [facility], by Jay M[facility] ([phone])  History of at least 5 jazmín-psych stays and multiple ED visits - all but one stay PTA)  Most recent Jazm[facility]-psych stay 9/3-9/15/20 at [facility].   3/18/2020: SLUMS 30/30, most recent BIMS 14, PHQ9 - 7    Patient is on divalproex 250 mg nightly, duloxetine 40 mg daily, hydroxyzine 12.5 mg every 6 hours as needed, melatonin 9 mg nightly, mirtazapine 30 mg nightly, Seroquel 25 mg twice daily, Zyprexa 7.5 mg nightly    Patient reports shortness of breath with anxiety but not other times.  She reports she is worried about her results and I reassure her this is understandable and we will work together to get appts and results to her and her  as soon as I know anything.  Labs tomorrow for CHF assessment as well.      That being said, she is more out-going and alert/happy than I have seen her in quite some time.      PLAN:  - Stable but tenuous   - no GFR of medications at this time as psychiatry following for med adjustments and patient doing quite well on current regimen, during a pandemic with isolation, and with looming  results of biopsy needed.   - continue regimen  - reassure often  - nursing for increased care needs       Orders written by provider at facility and transcribed by : Ciera Morgan MA  1.  2/27/21 labs: BMP, BNP, CBC.  Dx: CHF, CKD, inflammatory R/O.    2.  General surgery consult for right breast biopsy.  Dx: abnormal CT (when making apt, please ask about need/duration for holding ASA/Plavix.  3.  Plavix 75 mg po every day.  Dx: s/p CABG  4.  Discontinue order to push fluids.        Electronically signed by:  KILO Alcazar CNP                 Sincerely,        KILO Alcazar CNP

## 2021-01-26 NOTE — PROGRESS NOTES
[facility]  [place] Medical Record Number:  [id number]  Place of Service where encounter took place:  [facility] ([facility]) [[id number]]  Chief Complaint   Patient presents with     Nursing Home Acute       HPI:    Letty Escobar  is a 65 year old (1955), who is being seen today for an episodic care visit.  HPI information obtained from: facility chart records, facility staff, patient report, [facility] chart review and family/first contact pt's 's report. Today's concern is:     Abnormal screening CT of chest  Hardness of breast  Morbid obesity (H)  CKD (chronic kidney disease) stage 4, GFR 15-29 ml/min (H)  Chronic systolic congestive heart failure (H)  Coronary artery disease involving native coronary artery of native heart with angina pectoris (H)  Chronic atrial fibrillation (H)  Chronic obstructive pulmonary disease, unspecified COPD type (H)  Ischemic cardiomyopathy  Essential hypertension  Mixed hyperlipidemia  S/P CABG x 5  Major depressive disorder, recurrent severe without psychotic features (H)  Panic disorder with agoraphobia  Posttraumatic stress disorder  Personality disorder (H)     Patient is a 65 year old medically complex woman with PMH including panic disorder with agoraphobia, MDD, anxiety, drug-seeking behavior, personality disorder, PTSD, History suicidal ideation, HTN, HLD, ischemic cardiomyopathy, History MI and CABG x 5 (2015), CHF (EF 45-50%), ICA stenosis bilateral, pAfib, Morbid obesity, IBS with Diarrhea, GERD, DM2, chronic pain with neuropathy, urinary incontinence, PMR, Anemia, Vit B12 def, hypothyroidism who [facility] [place] [facility] [place] [facility].    Jazmín Psych stay at [facility] 9/3-9/15/20. She is being f/b ACP Psychiatrist, Dr Reagan Martell and on-site psychologist.    Recently patient visited after nursing reported hardness of right breast.  1/25/21 Mammogram performed:  HISTORY: Right  breast hardening felt by the nursing service at the  [facility]. Patient has history of heart surgery and multiple  bypass with low ejection fraction. Patient sleeps on her right side.     COMPARISON:  None. This is a baseline study.     BREAST DENSITY: Scattered fibroglandular densities.     FINDINGS: There is marked thickening of the skin in the right breast.  Minimal skin thickening may be present in the left breast. No focal  lesion is identified in the right breast. There is overall increased  density of the right breast as compared to the left breast. No other  mammographic findings of concern for malignancy.     Targeted ultrasound of the right breast demonstrates marked skin  thickening. There is also edema in the underlying right breast  parenchymal tissues. The skin of the left breast appears grossly  within normal limits. No significant edema is seen in the underlying  breast parenchymal tissues in the left breast.                                                                      IMPRESSION: BI-RADS CATEGORY: 4 - Suspicious Abnormality-Biopsy Should  Be Considered. Significant right breast skin thickening without  associated pain. This patient does have history of significant cardiac  limitations and sleeps on her right side. This could be secondary to  congestive heart failure with worsening edema in the right breast as  compared to the left due to her positioning while sleeping.  Differential diagnosis also includes inflammatory breast cancer. I  recommend clinical correlation. Skin biopsy may be necessary.     RECOMMENDED FOLLOW-UP: Clinical Follow-up for asymmetric breast edema  and skin thickening, possibly due to congestive heart failure.  Inflammatory breast cancer is also in the differential diagnosis and  skin biopsy may be necessary, as clinically indicated.    Patient is met today in her SNF room where she is laying in bed on her right side.  Her  is on the phone.  F/u visit  "today to address concerns for CT results and for eval of possible CHF.    Patient is anxious but calmer today than I have seen her in a while.    She reports she has shortness of breath only with high anxiety but does feel \"some chest congestion.\"  She denies any CP, HA, lightheadedness, constipation.  She endorses some heartburn with too many tomatoes but otherwise feels well.         Past Medical and Surgical History reviewed in Epic today.    MEDICATIONS:  Current Outpatient Medications   Medication Sig Dispense Refill     clopidogrel (PLAVIX) 75 MG tablet Take 75 mg by mouth daily        acetaminophen (TYLENOL) 500 MG tablet Take 2 tablets (1,000 mg) by mouth 3 times daily       aspirin 81 MG EC tablet Take 81 mg by mouth daily       cyanocobalamin (CYANOCOBALAMIN) 1000 MCG/ML injection Inject 1 mL into the muscle every 30 days       divalproex sodium delayed-release (DEPAKOTE SPRINKLE) 125 MG DR capsule Take 250 mg by mouth At Bedtime       DULoxetine 40 MG CPEP Take 40 mg by mouth daily       ferrous sulfate (FEROSUL) 325 (65 Fe) MG tablet Take 325 mg by mouth daily (with breakfast)       hydrOXYzine (ATARAX) 25 MG tablet Take 0.5 tablets (12.5 mg) by mouth every 6 hours as needed for itching       ketoconazole (NIZORAL) 2 % external cream Apply topically At Bedtime       levothyroxine (SYNTHROID/LEVOTHROID) 100 MCG tablet Take 1 tablet (100 mcg) by mouth every morning (before breakfast) 30 tablet 0     melatonin 3 MG tablet Take 3 tablets (9 mg) by mouth At Bedtime       melatonin 3 MG tablet Take 6 mg by mouth At Bedtime       metoprolol succinate ER (TOPROL-XL) 25 MG 24 hr tablet Take 25 mg by mouth daily       miconazole (MICATIN) 2 % AERP powder Apply topically 2 times daily as needed       mirtazapine (REMERON) 30 MG tablet Take 1 tablet (30 mg) by mouth At Bedtime 30 tablet 0     pantoprazole (PROTONIX) 40 MG EC tablet 40 mg po BID x 10 days, then return to 40 mg po every day.       polyethylene glycol " "(MIRALAX) 17 g packet Take 17 g by mouth daily as needed for constipation       rosuvastatin (CRESTOR) 10 MG tablet Take 10 mg by mouth At Bedtime        sennosides (SENOKOT) 8.6 MG tablet Take 2 tablets by mouth At Bedtime       vitamin D3 (CHOLECALCIFEROL) 50 mcg (2000 units) tablet Take 1 tablet (50 mcg) by mouth daily       REVIEW OF SYSTEMS:  10 point ROS of systems including Constitutional, Eyes, Respiratory, Cardiovascular, Gastroenterology, Genitourinary, Integumentary, Musculoskeletal, Psychiatric were all negative except for pertinent positives noted in my HPI.    Objective:  /69   Pulse 74   Temp 97.7  F (36.5  C)   Resp 18   Ht 1.473 m (4' 10\")   Wt 65.5 kg (144 lb 6.4 oz)   SpO2 97%   BMI 30.18 kg/m    Exam:  GENERAL APPEARANCE:  Alert, in no distress, deconditioned   RESP:  respiratory effort and palpation of chest normal, auscultation of lungs with possible right-sided fine crackles (difficult to auscultate), no respiratory distress, on RA  CV:  Palpation and auscultation of heart done , rate and rhythm regular, pacemaker on left chest, no murmur, no LE peripheral edema  ABDOMEN:  Obese, normal bowel sounds, soft, nontender, NELIDA for hepatosplenomegaly or other masses  M/S:   Gait and station with w/c for mobility but does transfer with A x 1, Digits and nails with arthritic changes, reduced muscle mass  SKIN:  Inspection and Palpation of skin and subcutaneous tissue pale, right ear still with Mepilex (likey for protection at this time as healed)   PSYCH:  insight and judgement, memory intact , affect and mood anxious but less than usual, follows commands readily         Labs:   CBC RESULTS:   Recent Labs   Lab Test 12/04/20 0725 11/02/20  0756   WBC 5.9 6.3   RBC 3.14* 2.74*   HGB 9.5* 8.3*   HCT 30.7* 26.7*   MCV 98 97   MCH 30.3 30.3   MCHC 30.9* 31.1*   RDW 13.6 14.1    128*       Last Basic Metabolic Panel:  Recent Labs   Lab Test 12/04/20 0725 11/02/20  0756    139 "   POTASSIUM 4.3 4.4   CHLORIDE 108 109   ORESTES 9.7 9.9   CO2 25 21   BUN 18 23   CR 0.75 0.87   GLC 72 70       Liver Function Studies -   Recent Labs   Lab Test 11/02/20  0756 09/02/20  1553   PROTTOTAL 6.3* 6.1*   ALBUMIN 2.2* 2.5*   BILITOTAL 0.3 0.1*   ALKPHOS 94 65   AST 62* 21   ALT 91* 24       TSH   Date Value Ref Range Status   12/04/2020 41.34 (H) 0.40 - 4.00 mU/L Final   10/26/2020 14.63 (H) 0.40 - 4.00 mU/L Final   ]    Lab Results   Component Value Date    A1C 4.6 10/30/2020         ASSESSMENT/PLAN:  Abnormal screening CT of chest  Hardness of breast  Noted CT results of above  Unclear of CHF status but while patient may appear slightly volume up - no overt exacerbation at this time - see below for POC.  Answered questions of patient's and her 's about results and next steps.  We discussed biopsy and can consult general surgery.     PLAN:  - tenuous   - General Surgery consult for breast Bx (HUC to please ask if recommended to hold ASA/Plavix prior to bx)   - f/u on CHF - see below.        CKD (chronic kidney disease) stage 4, GFR 15-29 ml/min (H)  Last Basic Metabolic Panel:  Recent Labs   Lab Test 12/04/20  0725 11/02/20  0756    139   POTASSIUM 4.3 4.4   CHLORIDE 108 109   ORESTES 9.7 9.9   CO2 25 21   BUN 18 23   CR 0.75 0.87   GLC 72 70     PLAN:  - recheck BMP  - Will avoid nephrotoxic agents (such as ACEI/ARB/NSAIDs, IV contrast) and mindful prescribing with renal dosing.     Chronic obstructive pulmonary disease, unspecified COPD type (H)  Patient is on Breo 200-25 MCG/dose 1 puff daily,  She reports some shortness of breath but only with high anxiety  LS today with possible right sided fine crackles (patient does sleep  On her right side only)  Sats 93-97% on RA  Afebrile     PLAN:  - stable   - continue Breo  - monitor for COPD exacerbation  - likely shortness of breath r/t anxiety, not exacerbation    Chronic systolic congestive heart failure (H)  Coronary artery disease involving  native coronary artery of native heart with angina pectoris (H)  Chronic atrial fibrillation (H)  Ischemic cardiomyopathy  Essential hypertension  Mixed hyperlipidemia  S/P CABG x 5  F/b [facility], Dr Saucedo  Per Care Everywhere:   2/5/2009 - MI - Proximal RCA 99%, mild-mod disease elsewhere. EF 60%. PCI: MYRON to pRCA.  2/12/2009 - admit CP - Widely patent RCA stent. Moderate diffuse CAD. Severe stenosis in trivial PDA branch. LVEF 45%.  1/25/2010 - admit CP - PTCA and stent of diagonal  9/8/2010 - admit CP - LAD patent stent. Moderate diffuse CAD. Medical management recommended.   4/25/2012 - NSTEMI - Acute total occlusion of the ostial Circumflex. Acute total occlusion of the ostial ramus. Acute total occlusion of the distal 1st Obtuse Marginal. Angioplasty of ostial circumflex and ostial ramus. There was distal embolization to the OM1 vessel. This vessel was small and not amendable to intervention. Indefinite: plavix and asa 81.  8/10/2015 - CABx5 - 1. LIMA to distal LAD, reverse SVG to OM1 and OM2, reverse SVG to diagonal, reverse SVG to PDA.   2. Mitral valve repair with a Medtronics 3-D full ring, 28 mm.   3. Tricuspid repair with a Medtronic 28 mm partial ring  1/12/2016 - TTE - EF 40-45%  Also noted history of Biv-AICD implanted      Patient is on rosuvastatin 10 mg nightly, Toprol-XL 25 mg daily, ASA 81 mg daily,  Plavix stopped a while ago with patient's bleeding - not restarted.     BPs mostly 130s/70s  HRs 60-80, regular today (has a pacemaker)  Patient denies CP, HA, lightheadedness    LS with possible right sided fine crackles  LE edema none    Weights:  6/24/20 - 150  7/13/20 - 140  9/17/20 - 124 (patient had been refusing to eat d/t high anxiety, [facility] psych stay and meds adjusted)  12/7/20 - 134.4  12/24/20 - 139.4  1/1/21 - 144.4    PLAN:  - tenuous   - not currently on diuretics.   - no overt CHF exacerbation but could be mild as patient does have possible right-sided fine crackles  (although difficult to auscultate as patient declined getting OOB and was lying on her right side)  - BMP, BNP tomorrow to assess.   - continue statin, ASA, TOprol XL  - restart Plavix  - BP goals are ~130 -165/60 -90 mmHg.This is higher than ACC and AHA recommendations due to risk for hypotension, risk of dizziness and falls, risk of tissue/cerebral hypoperfusion and frailty.     Major depressive disorder, recurrent severe without psychotic features (H)  Panic disorder with agoraphobia  Posttraumatic stress disorder  Personality disorder (H)  Now f/b ACP, Dr Reagan Martell and in house psychologist  ([facility]   Also was receiving services from [facility], by [facility] ([phone])  History of at least 5 jazmín-psych stays and multiple ED visits - all but one stay PTA)  Most recent Jazmín-psych stay 9/3-9/15/20 at [facility].   3/18/2020: SLUMS 30/30, most recent BIMS 14, PHQ9 - 7    Patient is on divalproex 250 mg nightly, duloxetine 40 mg daily, hydroxyzine 12.5 mg every 6 hours as needed, melatonin 9 mg nightly, mirtazapine 30 mg nightly, Seroquel 25 mg twice daily, Zyprexa 7.5 mg nightly    Patient reports shortness of breath with anxiety but not other times.  She reports she is worried about her results and I reassure her this is understandable and we will work together to get appts and results to her and her  as soon as I know anything.  Labs tomorrow for CHF assessment as well.      That being said, she is more out-going and alert/happy than I have seen her in quite some time.      PLAN:  - Stable but tenuous   - no GFR of medications at this time as psychiatry following for med adjustments and patient doing quite well on current regimen, during a pandemic with isolation, and with looming results of biopsy needed.   - continue regimen  - reassure often  - nursing for increased care needs        Orders written by provider at facility and transcribed by : Ciera Morgan MA  1.  1/27/21 labs: BMP, BNP, CBC.  Dx: CHF, CKD, inflammatory R/O.    2.  General surgery consult for right breast biopsy.  Dx: abnormal CT (when making apt, please ask about need/duration for holding ASA/Plavix.  3.  Plavix 75 mg po every day.  Dx: s/p CABG  4.  Discontinue order to push fluids.        Electronically signed by:  KILO Alcazar CNP

## 2021-01-27 ENCOUNTER — HOSPITAL LABORATORY (OUTPATIENT)
Dept: NURSING HOME | Facility: OTHER | Age: 66
End: 2021-01-27

## 2021-01-27 LAB
ANION GAP SERPL CALCULATED.3IONS-SCNC: 5 MMOL/L (ref 3–14)
BUN SERPL-MCNC: 33 MG/DL (ref 7–30)
CALCIUM SERPL-MCNC: 9.2 MG/DL (ref 8.5–10.1)
CHLORIDE SERPL-SCNC: 110 MMOL/L (ref 94–109)
CO2 SERPL-SCNC: 22 MMOL/L (ref 20–32)
CREAT SERPL-MCNC: 0.79 MG/DL (ref 0.52–1.04)
ERYTHROCYTE [DISTWIDTH] IN BLOOD BY AUTOMATED COUNT: 14.6 % (ref 10–15)
GFR SERPL CREATININE-BSD FRML MDRD: 77 ML/MIN/{1.73_M2}
GLUCOSE SERPL-MCNC: 75 MG/DL (ref 70–99)
HCT VFR BLD AUTO: 28.5 % (ref 35–47)
HGB BLD-MCNC: 8.8 G/DL (ref 11.7–15.7)
MCH RBC QN AUTO: 31.2 PG (ref 26.5–33)
MCHC RBC AUTO-ENTMCNC: 30.9 G/DL (ref 31.5–36.5)
MCV RBC AUTO: 101 FL (ref 78–100)
NT-PROBNP SERPL-MCNC: 2217 PG/ML (ref 0–900)
PLATELET # BLD AUTO: 104 10E9/L (ref 150–450)
POTASSIUM SERPL-SCNC: 5 MMOL/L (ref 3.4–5.3)
RBC # BLD AUTO: 2.82 10E12/L (ref 3.8–5.2)
SODIUM SERPL-SCNC: 137 MMOL/L (ref 133–144)
WBC # BLD AUTO: 5.1 10E9/L (ref 4–11)

## 2021-01-29 ENCOUNTER — NURSING HOME VISIT (OUTPATIENT)
Dept: GERIATRICS | Facility: CLINIC | Age: 66
End: 2021-01-29
Payer: MEDICARE

## 2021-01-29 ENCOUNTER — HOSPITAL LABORATORY (OUTPATIENT)
Dept: NURSING HOME | Facility: OTHER | Age: 66
End: 2021-01-29

## 2021-01-29 VITALS
DIASTOLIC BLOOD PRESSURE: 77 MMHG | SYSTOLIC BLOOD PRESSURE: 159 MMHG | RESPIRATION RATE: 16 BRPM | WEIGHT: 153 LBS | OXYGEN SATURATION: 99 % | HEART RATE: 85 BPM | HEIGHT: 58 IN | TEMPERATURE: 98.3 F | BODY MASS INDEX: 32.12 KG/M2

## 2021-01-29 DIAGNOSIS — N18.4 CKD (CHRONIC KIDNEY DISEASE) STAGE 4, GFR 15-29 ML/MIN (H): ICD-10-CM

## 2021-01-29 DIAGNOSIS — I48.20 CHRONIC ATRIAL FIBRILLATION (H): ICD-10-CM

## 2021-01-29 DIAGNOSIS — Z95.1 S/P CABG X 5: ICD-10-CM

## 2021-01-29 DIAGNOSIS — I25.5 ISCHEMIC CARDIOMYOPATHY: ICD-10-CM

## 2021-01-29 DIAGNOSIS — I50.22 CHRONIC SYSTOLIC CONGESTIVE HEART FAILURE (H): Primary | ICD-10-CM

## 2021-01-29 DIAGNOSIS — E78.2 MIXED HYPERLIPIDEMIA: ICD-10-CM

## 2021-01-29 DIAGNOSIS — I25.119 CORONARY ARTERY DISEASE INVOLVING NATIVE CORONARY ARTERY OF NATIVE HEART WITH ANGINA PECTORIS (H): ICD-10-CM

## 2021-01-29 DIAGNOSIS — I10 ESSENTIAL HYPERTENSION: ICD-10-CM

## 2021-01-29 PROBLEM — L89.813: Status: RESOLVED | Noted: 2021-01-26 | Resolved: 2021-01-29

## 2021-01-29 PROBLEM — E43 SEVERE PROTEIN-CALORIE MALNUTRITION (H): Status: RESOLVED | Noted: 2021-01-26 | Resolved: 2021-01-29

## 2021-01-29 LAB
ANION GAP SERPL CALCULATED.3IONS-SCNC: 6 MMOL/L (ref 3–14)
BUN SERPL-MCNC: 43 MG/DL (ref 7–30)
CALCIUM SERPL-MCNC: 9.4 MG/DL (ref 8.5–10.1)
CHLORIDE SERPL-SCNC: 111 MMOL/L (ref 94–109)
CO2 SERPL-SCNC: 20 MMOL/L (ref 20–32)
CREAT SERPL-MCNC: 0.78 MG/DL (ref 0.52–1.04)
GFR SERPL CREATININE-BSD FRML MDRD: 79 ML/MIN/{1.73_M2}
GLUCOSE SERPL-MCNC: 72 MG/DL (ref 70–99)
NT-PROBNP SERPL-MCNC: 2197 PG/ML (ref 0–125)
POTASSIUM SERPL-SCNC: 4.6 MMOL/L (ref 3.4–5.3)
SODIUM SERPL-SCNC: 137 MMOL/L (ref 133–144)

## 2021-01-29 PROCEDURE — 99309 SBSQ NF CARE MODERATE MDM 30: CPT | Performed by: NURSE PRACTITIONER

## 2021-01-29 RX ORDER — FUROSEMIDE 20 MG
40 TABLET ORAL DAILY
COMMUNITY
Start: 2021-02-01 | End: 2023-09-10

## 2021-01-29 ASSESSMENT — MIFFLIN-ST. JEOR: SCORE: 1118.75

## 2021-01-29 NOTE — LETTER
1/29/2021        RE: Letty Escobar  [facility]  [address]  [facility] [address]        [facility]  [place] Medical Record Number:  [id number]  Place of Service where encounter took place:  [facility] ([facility]) [[id number]]  Chief Complaint   Patient presents with     Nursing Home Acute       HPI:    Letty Escobar  is a 65 year old (1955), who is being seen today for an episodic care visit.  HPI information obtained from: facility chart records, facility staff, patient report, [facility] chart review and family/first contact pt's 's report. Today's concern is:     Chronic systolic congestive heart failure (H)  Coronary artery disease involving native coronary artery of native heart with angina pectoris (H)  Chronic atrial fibrillation (H)  Ischemic cardiomyopathy  Essential hypertension  Mixed hyperlipidemia  S/P CABG x 5  CKD (chronic kidney disease) stage 4, GFR 15-29 ml/min (H)     Patient is a 65 year old medically complex woman with PMH including panic disorder with agoraphobia, MDD, anxiety, drug-seeking behavior, personality disorder, PTSD, History suicidal ideation, HTN, HLD, ischemic cardiomyopathy, History MI and CABG x 5 (2015), CHF (EF 45-50%), ICA stenosis bilateral, pAfib, Morbid obesity, IBS with Diarrhea, GERD, DM2, chronic pain with neuropathy, urinary incontinence, PMR, Anemia, Vit B12 def, hypothyroidism who [facility].    Jazmín Psych stay at [facility] 9/3-9/15/20. She is being f/b ACP Psychiatrist, Dr Reagan Martell and on-site psychologist.    Recent Medication Changes:  - 1/26/21: restart Plavix 75 mg po every day  - 1/27/21: Lasix 40 mg daily x 3 days, then decrease to 20 mg daily    Patient is met today today for f/u after diuresis and with labs returning, to assess her CHF.    Nursing noted patient has not been being weighed more than monthly - today weight was  taken and showed 9 lb increase from 29 days ago.    Patient today reports shortness of breath but she says it is because she is having anxiety.  Otherwise she feels no shortness of breath.  She denies any CP, HA, lightheadedness, upset stomach, constipation.  She reports she has been urinating a lot lately with the increase in diuretics.   Her  is called during our visit.      Past Medical and Surgical History reviewed in Epic today.    MEDICATIONS:    Current Outpatient Medications   Medication Sig Dispense Refill     [START ON 2/1/2021] furosemide (LASIX) 20 MG tablet Take 20 mg by mouth daily       acetaminophen (TYLENOL) 500 MG tablet Take 2 tablets (1,000 mg) by mouth 3 times daily       aspirin 81 MG EC tablet Take 81 mg by mouth daily       clopidogrel (PLAVIX) 75 MG tablet Take 75 mg by mouth daily        cyanocobalamin (CYANOCOBALAMIN) 1000 MCG/ML injection Inject 1 mL into the muscle every 30 days       divalproex sodium delayed-release (DEPAKOTE SPRINKLE) 125 MG DR capsule Take 250 mg by mouth At Bedtime       DULoxetine 40 MG CPEP Take 40 mg by mouth daily       ferrous sulfate (FEROSUL) 325 (65 Fe) MG tablet Take 325 mg by mouth daily (with breakfast)       hydrOXYzine (ATARAX) 25 MG tablet Take 0.5 tablets (12.5 mg) by mouth every 6 hours as needed for itching       ketoconazole (NIZORAL) 2 % external cream Apply topically At Bedtime       levothyroxine (SYNTHROID/LEVOTHROID) 100 MCG tablet Take 1 tablet (100 mcg) by mouth every morning (before breakfast) 30 tablet 0     melatonin 3 MG tablet Take 3 tablets (9 mg) by mouth At Bedtime       metoprolol succinate ER (TOPROL-XL) 25 MG 24 hr tablet Take 25 mg by mouth daily       miconazole (MICATIN) 2 % AERP powder Apply topically 2 times daily as needed       mirtazapine (REMERON) 30 MG tablet Take 1 tablet (30 mg) by mouth At Bedtime 30 tablet 0     pantoprazole (PROTONIX) 40 MG EC tablet 40 mg po BID x 10 days, then return to 40 mg po every day.   "     polyethylene glycol (MIRALAX) 17 g packet Take 17 g by mouth daily as needed for constipation       rosuvastatin (CRESTOR) 10 MG tablet Take 10 mg by mouth At Bedtime        sennosides (SENOKOT) 8.6 MG tablet Take 2 tablets by mouth At Bedtime       vitamin D3 (CHOLECALCIFEROL) 50 mcg (2000 units) tablet Take 1 tablet (50 mcg) by mouth daily       REVIEW OF SYSTEMS:  10 point ROS of systems including Constitutional, Eyes, Respiratory, Cardiovascular, Gastroenterology, Genitourinary, Integumentary, Musculoskeletal, Psychiatric were all negative except for pertinent positives noted in my HPI.    Objective:  BP (!) 159/77   Pulse 85   Temp 98.3  F (36.8  C)   Resp 16   Ht 1.473 m (4' 10\")   Wt 69.4 kg (153 lb)   SpO2 99%   BMI 31.98 kg/m    Exam:  GENERAL APPEARANCE:  Alert, in no distress, pleasant, mildly anxious   RESP:  respiratory effort and palpation of chest normal, auscultation of lungs clear, no respiratory distress  CV:  Palpation and auscultation of heart done , rate and rhythm regular, no murmur, scant left foot edema (none in ankle), no RLE peripheral edema  ABDOMEN:  normal bowel sounds, soft, nontender, no hepatosplenomegaly or other masses  M/S:   Gait and station with w/c for mobility, Digits and nails with arthritic changes, reduced muscle mass  SKIN:  Inspection and Palpation of skin and subcutaneous tissue pale,   PSYCH:  insight and judgement with some impairment, memory intact , affect and mood mildly anxious, follows commands readily       Labs:   CBC RESULTS:   Recent Labs   Lab Test 01/27/21  0743 12/04/20  0725   WBC 5.1 5.9   RBC 2.82* 3.14*   HGB 8.8* 9.5*   HCT 28.5* 30.7*   * 98   MCH 31.2 30.3   MCHC 30.9* 30.9*   RDW 14.6 13.6   * 164       Last Basic Metabolic Panel:  Recent Labs   Lab Test 01/29/21  0725 01/27/21  0743    137   POTASSIUM 4.6 5.0   CHLORIDE 111* 110*   ORESTES 9.4 9.2   CO2 20 22   BUN 43* 33*   CR 0.78 0.79   GLC 72 75       Liver Function " Studies -   Recent Labs   Lab Test 11/02/20  0756 09/02/20  1553   PROTTOTAL 6.3* 6.1*   ALBUMIN 2.2* 2.5*   BILITOTAL 0.3 0.1*   ALKPHOS 94 65   AST 62* 21   ALT 91* 24       TSH   Date Value Ref Range Status   12/04/2020 41.34 (H) 0.40 - 4.00 mU/L Final   10/26/2020 14.63 (H) 0.40 - 4.00 mU/L Final   ]    Lab Results   Component Value Date    A1C 4.6 10/30/2020       ASSESSMENT/PLAN:  Chronic systolic congestive heart failure (H)  Coronary artery disease involving native coronary artery of native heart with angina pectoris (H)  Chronic atrial fibrillation (H)  Ischemic cardiomyopathy  Essential hypertension  Mixed hyperlipidemia  S/P CABG x 5  F/b [facility], Dr Saucedo  Per Care Everywhere:   2/5/2009 - MI - Proximal RCA 99%, mild-mod disease elsewhere. EF 60%. PCI: MYRON to pRCA.  2/12/2009 - admit CP - Widely patent RCA stent. Moderate diffuse CAD. Severe stenosis in trivial PDA branch. LVEF 45%.  1/25/2010 - admit CP - PTCA and stent of diagonal  9/8/2010 - admit CP - LAD patent stent. Moderate diffuse CAD. Medical management recommended.   4/25/2012 - NSTEMI - Acute total occlusion of the ostial Circumflex. Acute total occlusion of the ostial ramus. Acute total occlusion of the distal 1st Obtuse Marginal. Angioplasty of ostial circumflex and ostial ramus. There was distal embolization to the OM1 vessel. This vessel was small and not amendable to intervention. Indefinite: plavix and asa 81.  8/10/2015 - CABx5 - 1. LIMA to distal LAD, reverse SVG to OM1 and OM2, reverse SVG to diagonal, reverse SVG to PDA.   2. Mitral valve repair with a Medtronics 3-D full ring, 28 mm.   3. Tricuspid repair with a Medtronic 28 mm partial ring  1/12/2016 - TTE - EF 40-45%  Also noted history of Biv-AICD implanted      Patient is on rosuvastatin 10 mg nightly, Toprol-XL 25 mg daily, ASA 81 mg daily, Plavix 75 mg daily   1/27 - BNP 2217 - Lasix 40 mg daily x 3 days, then reduce to 20 mg daily ordered  1/29/ - BNP 2195  (see BMP below)      BPs 130s/60s prior to diuresis  HRs 64-85 (mostly 60s-70s)   Patient denies CP, HA, lightheadedness      LS clear  LE edema scant in Left foot      Weights:  6/24/20 - 150  7/13/20 - 140  9/17/20 - 124 (patient had been refusing to eat d/t high anxiety, [facility] psych stay and meds adjusted)  12/7/20 - 134.4  12/24/20 - 139.4  1/1/21 - 144.4  1/29/21 - 153 lbs - patient does endorse eating a lot more these days     PLAN:  - Continue Lasix 40 mg daily for an additional 2 more days (total of 5)  - then drop Lasix down to 20 mg daily  - BMP recheck next Wednesday  - continue Toprol XL 25 mg daily  - continue ASA  - continue Plavix  - BP goals are ~130 -165/60 -90 mmHg.This is higher than ACC and AHA recommendations due to risk for hypotension, risk of dizziness and falls, risk of tissue/cerebral hypoperfusion and frailty.       CKD (chronic kidney disease) stage 4, GFR 15-29 ml/min (H)  Cardio-renal  Last few BMPs:   11/2/20: BUN 23, Creat 0.87, GFR 69  12/4/20: BUN 18, Creat 0.75, GFR 83  1/27/21: BUN 33, Creat 0.79, GFR 77 (K 5.0)  1/29/21: BUN 43, Creat 0.78, GFR 79 (K 4.6)    Patient is now on diuretics (after recent bolus).    PLAN;  - on Lasix  - Will avoid nephrotoxic agents (such as ACEI/ARB/NSAIDs, IV contrast) and mindful prescribing with renal dosing.       Orders written by provider at facility and transcribed by : Ciera Morgan MA  1.  Discontinue previous Lasix orders.  2.  Lasix 40 mg po Saturday, 1/30 & Sun, 1/31.  Dx: CHF  3.  Starting 2/1/21 Lasix 20 mg po Qam.  Dx: CHF  4.  BMP on Wednesday, 2/3/21.  Dx: CHF      Electronically signed by:  KILO Alcazar CNP                 Sincerely,        KILO Alcazar CNP

## 2021-02-03 ENCOUNTER — HOSPITAL LABORATORY (OUTPATIENT)
Dept: NURSING HOME | Facility: OTHER | Age: 66
End: 2021-02-03

## 2021-02-03 LAB
ANION GAP SERPL CALCULATED.3IONS-SCNC: 5 MMOL/L (ref 3–14)
BUN SERPL-MCNC: 41 MG/DL (ref 7–30)
CALCIUM SERPL-MCNC: 9.6 MG/DL (ref 8.5–10.1)
CHLORIDE SERPL-SCNC: 113 MMOL/L (ref 94–109)
CO2 SERPL-SCNC: 24 MMOL/L (ref 20–32)
CREAT SERPL-MCNC: 0.86 MG/DL (ref 0.52–1.04)
GFR SERPL CREATININE-BSD FRML MDRD: 70 ML/MIN/{1.73_M2}
GLUCOSE SERPL-MCNC: 78 MG/DL (ref 70–99)
POTASSIUM SERPL-SCNC: 4.6 MMOL/L (ref 3.4–5.3)
SODIUM SERPL-SCNC: 142 MMOL/L (ref 133–144)

## 2021-02-11 ENCOUNTER — VIRTUAL VISIT (OUTPATIENT)
Dept: GERIATRICS | Facility: CLINIC | Age: 66
End: 2021-02-11
Payer: MEDICARE

## 2021-02-11 VITALS
HEART RATE: 60 BPM | HEIGHT: 58 IN | DIASTOLIC BLOOD PRESSURE: 77 MMHG | WEIGHT: 153 LBS | BODY MASS INDEX: 32.12 KG/M2 | SYSTOLIC BLOOD PRESSURE: 159 MMHG | RESPIRATION RATE: 16 BRPM | OXYGEN SATURATION: 90 % | TEMPERATURE: 97.5 F

## 2021-02-11 DIAGNOSIS — I70.422 ATHEROSCLEROSIS OF AUTOLOGOUS VEIN BYPASS GRAFT(S) OF THE EXTREMITIES WITH REST PAIN, LEFT LEG (H): ICD-10-CM

## 2021-02-11 DIAGNOSIS — M35.3 POLYMYALGIA RHEUMATICA (H): ICD-10-CM

## 2021-02-11 DIAGNOSIS — J44.9 CHRONIC OBSTRUCTIVE PULMONARY DISEASE, UNSPECIFIED COPD TYPE (H): ICD-10-CM

## 2021-02-11 DIAGNOSIS — F33.2 MAJOR DEPRESSIVE DISORDER, RECURRENT SEVERE WITHOUT PSYCHOTIC FEATURES (H): ICD-10-CM

## 2021-02-11 DIAGNOSIS — I50.22 CHRONIC SYSTOLIC CONGESTIVE HEART FAILURE (H): Primary | ICD-10-CM

## 2021-02-11 DIAGNOSIS — E78.2 MIXED HYPERLIPIDEMIA: ICD-10-CM

## 2021-02-11 DIAGNOSIS — I10 ESSENTIAL HYPERTENSION: ICD-10-CM

## 2021-02-11 PROCEDURE — 99309 SBSQ NF CARE MODERATE MDM 30: CPT | Mod: 95 | Performed by: FAMILY MEDICINE

## 2021-02-11 ASSESSMENT — MIFFLIN-ST. JEOR: SCORE: 1118.75

## 2021-02-11 NOTE — LETTER
"    2/11/2021        RE: Letty Reid Buford  [address] [facility] [address]        [facility] Regulatory   Letty Escobar is being evaluated via a billable video visit.   The patient has been notified of following:  \"This video visit will be conducted via a call between you and your provider. We have found that certain health care needs can be provided without the need for an in-person physical exam.  This service lets us provide the care you need with a video conversation. If during the course of the call the provider feels a video visit is not appropriate, you will not be charged for this service.\"   The provider has received verbal consent for a Video Visit from the patient or first contact? Yes  Patient or facility staff would like the video invitation sent by: N/A   Video Start Time: 12:12  [facility] Record Number:  [id number]  Place of Location at the time of visit: [facility]   Chief Complaint   Patient presents with     penitentiary Regulatory     Video Visit     HPI:  Letty Escobar  is a 65 year old (1955), who is being seen today for a Regulatory visit.  HPI information obtained from: facility staff, patient report and [facility] chart review.     Today's concern is:  - Pt seen in the presence of GNP who graciously assisted with the virtual visit  -   * CHF: GNP reports recent started on diuretics for CHF, going well.  Denies chest pain or leg edema, or sob.     * Psych: GNP reports followed by psychiatrist. Pt reports mood is good.     * Breast Nodule: GNP reports pt is scheduled for breast bx on 2/16, ASA and plavix on hold as of today     -------------------------------------------------------  Past Medical and Surgical History reviewed in Epic today.  MEDICATIONS:  Current Outpatient Medications   Medication Sig Dispense Refill     acetaminophen (TYLENOL) 500 MG tablet Take 2 tablets (1,000 mg) by mouth 3 times daily       aspirin 81 MG EC tablet " "Take 81 mg by mouth daily       clopidogrel (PLAVIX) 75 MG tablet Take 75 mg by mouth daily        cyanocobalamin (CYANOCOBALAMIN) 1000 MCG/ML injection Inject 1 mL into the muscle every 30 days       divalproex sodium delayed-release (DEPAKOTE SPRINKLE) 125 MG DR capsule Take 250 mg by mouth At Bedtime       DULoxetine 40 MG CPEP Take 40 mg by mouth daily       ferrous sulfate (FEROSUL) 325 (65 Fe) MG tablet Take 325 mg by mouth daily (with breakfast)       furosemide (LASIX) 20 MG tablet Take 20 mg by mouth daily       hydrOXYzine (ATARAX) 25 MG tablet Take 0.5 tablets (12.5 mg) by mouth every 6 hours as needed for itching       ketoconazole (NIZORAL) 2 % external cream Apply topically At Bedtime       levothyroxine (SYNTHROID/LEVOTHROID) 100 MCG tablet Take 1 tablet (100 mcg) by mouth every morning (before breakfast) 30 tablet 0     melatonin 3 MG tablet Take 3 tablets (9 mg) by mouth At Bedtime       metoprolol succinate ER (TOPROL-XL) 25 MG 24 hr tablet Take 25 mg by mouth daily       miconazole (MICATIN) 2 % AERP powder Apply topically 2 times daily as needed       mirtazapine (REMERON) 30 MG tablet Take 1 tablet (30 mg) by mouth At Bedtime 30 tablet 0     pantoprazole (PROTONIX) 40 MG EC tablet 40 mg po BID x 10 days, then return to 40 mg po every day.       polyethylene glycol (MIRALAX) 17 g packet Take 17 g by mouth daily as needed for constipation       rosuvastatin (CRESTOR) 10 MG tablet Take 10 mg by mouth At Bedtime        sennosides (SENOKOT) 8.6 MG tablet Take 2 tablets by mouth At Bedtime       vitamin D3 (CHOLECALCIFEROL) 50 mcg (2000 units) tablet Take 1 tablet (50 mcg) by mouth daily       REVIEW OF SYSTEMS: 4 point ROS including Respiratory, CV, GI and , other than that noted in the HPI,  is negative  Objective: BP (!) 159/77   Pulse 60   Temp 97.5  F (36.4  C)   Resp 16   Ht 1.473 m (4' 10\")   Wt 69.4 kg (153 lb)   SpO2 90%   BMI 31.98 kg/m    Limited visit exam done given COVID-19 " precautions.   GENERAL APPEARANCE:  in no distress  RESP:  unlabored breathing  M/S:   no joint deformity noted  SKIN:  no rash noted  NEURO:   no purposeful movement in upper and lower extremities  PSYCH:  affect and mood normal      Labs: Reviewed in the chart and EHR.        ASSESSMENT/PLAN:  -----------------------------  Thickened breast skin: scheduled for bx. Follow on the result and POC    # Chronic systolic CHF (H)  # Ischemic cardiomyopathy and Hx of S/P CABG x 5, ICD in place  # Essential hypertension  # Mixed hyperlipidemia  # Internal carotid artery stenosis, bilateral :  - compensated. followed by Cardiology.       COPD (H): at baseline.       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 Requiring extensive assistance from nursing. Up for meals only o/w spends the day resting in bed.       # protein-calorie malnutrition (H)  - Body mass index is 31.98 kg/m . from 27.55 kg/m . from 23.63 kg/m .  Improving  - In frail elderly keep BMI b/lw 25-35 Kg(m2).       Irritable bowel syndrome with diarrhea  Gastroesophageal reflux disease, esophagitis presence not specified  - stable.    Normocytic anemia: HH trending up. Stable.  Felt 2/2 asa and plavix. On Protonix.  Noted on iron supplement . Ferritin 236 June 2020).  Continued to be on iron supplement daily. Consider stopping, repeat HH in 3-6 months.         Hypothyroidism:  TSH   Date Value Ref Range Status   12/04/2020 41.34 (H) 0.40 - 4.00 mU/L Final   -  On Levothyroxine. In frail Elderly adult, recommended TSH level is around 6 providing patient  is asymptomatic and FT4 is wnl- preferably on the lower normal level.     Order: See above, otherwise, continue the rest of the current POC.       Electronically signed by:  Paola Pastor MD     Video-Visit Details  Type of service:  Video Visit  Video End Time (time video stopped): 12:!3  Distant Location (provider location):  [facility]               Sincerely,        Paola Pastor MD

## 2021-02-11 NOTE — PROGRESS NOTES
"[facility] Reg[facility]   Letty Escobar is being evaluated via a billable video visit.   The patient has been notified of following:  \"This video visit will be conducted via a call between you and your provider. We have found that certain health care needs can be provided without the need for an in-person physical exam.  This service lets us provide the care you need with a video conversation. If during the course of the call the provider feels a video visit is not appropriate, you will not be charged for this service.\"   The provider has received verbal consent for a Video Visit from the patient or first contact? Yes  Patient or facility staff would like the video invitation sent by: N/A   Video Start Time: 12:12  [facility] Medical Record Number:  [id number]  Place of Location at the time of visit: [facility]   Chief Complaint   Patient presents with     alf Regulatory     Video Visit     HPI:  Letty Escobar  is a 65 year old (1955), who is being seen today for a Regulatory visit.  HPI information obtained from: facility staff, patient report and [facility] chart review.     Today's concern is:  - Pt seen in the presence of GNP who graciously assisted with the virtual visit  -   * CHF: GNP reports recent started on diuretics for CHF, going well.  Denies chest pain or leg edema, or sob.     * Psych: GNP reports followed by psychiatrist. Pt reports mood is good.     * Breast Nodule: GNP reports pt is scheduled for breast bx on 2/16, ASA and plavix on hold as of today     -------------------------------------------------------  Past Medical and Surgical History reviewed in Epic today.  MEDICATIONS:  Current Outpatient Medications   Medication Sig Dispense Refill     acetaminophen (TYLENOL) 500 MG tablet Take 2 tablets (1,000 mg) by mouth 3 times daily       aspirin 81 MG EC tablet Take 81 mg by mouth daily       clopidogrel (PLAVIX) 75 MG tablet Take 75 mg by mouth daily    " "    cyanocobalamin (CYANOCOBALAMIN) 1000 MCG/ML injection Inject 1 mL into the muscle every 30 days       divalproex sodium delayed-release (DEPAKOTE SPRINKLE) 125 MG DR capsule Take 250 mg by mouth At Bedtime       DULoxetine 40 MG CPEP Take 40 mg by mouth daily       ferrous sulfate (FEROSUL) 325 (65 Fe) MG tablet Take 325 mg by mouth daily (with breakfast)       furosemide (LASIX) 20 MG tablet Take 20 mg by mouth daily       hydrOXYzine (ATARAX) 25 MG tablet Take 0.5 tablets (12.5 mg) by mouth every 6 hours as needed for itching       ketoconazole (NIZORAL) 2 % external cream Apply topically At Bedtime       levothyroxine (SYNTHROID/LEVOTHROID) 100 MCG tablet Take 1 tablet (100 mcg) by mouth every morning (before breakfast) 30 tablet 0     melatonin 3 MG tablet Take 3 tablets (9 mg) by mouth At Bedtime       metoprolol succinate ER (TOPROL-XL) 25 MG 24 hr tablet Take 25 mg by mouth daily       miconazole (MICATIN) 2 % AERP powder Apply topically 2 times daily as needed       mirtazapine (REMERON) 30 MG tablet Take 1 tablet (30 mg) by mouth At Bedtime 30 tablet 0     pantoprazole (PROTONIX) 40 MG EC tablet 40 mg po BID x 10 days, then return to 40 mg po every day.       polyethylene glycol (MIRALAX) 17 g packet Take 17 g by mouth daily as needed for constipation       rosuvastatin (CRESTOR) 10 MG tablet Take 10 mg by mouth At Bedtime        sennosides (SENOKOT) 8.6 MG tablet Take 2 tablets by mouth At Bedtime       vitamin D3 (CHOLECALCIFEROL) 50 mcg (2000 units) tablet Take 1 tablet (50 mcg) by mouth daily       REVIEW OF SYSTEMS: 4 point ROS including Respiratory, CV, GI and , other than that noted in the HPI,  is negative  Objective: BP (!) 159/77   Pulse 60   Temp 97.5  F (36.4  C)   Resp 16   Ht 1.473 m (4' 10\")   Wt 69.4 kg (153 lb)   SpO2 90%   BMI 31.98 kg/m    Limited visit exam done given COVID-19 precautions.   GENERAL APPEARANCE:  in no distress  RESP:  unlabored breathing  M/S:   no joint " deformity noted  SKIN:  no rash noted  NEURO:   no purposeful movement in upper and lower extremities  PSYCH:  affect and mood normal      Labs: Reviewed in the chart and EHR.        ASSESSMENT/PLAN:  -----------------------------  Thickened breast skin: scheduled for bx. Follow on the result and POC    # Chronic systolic CHF (H)  # Ischemic cardiomyopathy and Hx of S/P CABG x 5, ICD in place  # Essential hypertension  # Mixed hyperlipidemia  # Internal carotid artery stenosis, bilateral :  - compensated. followed by Cardiology.       COPD (H): at baseline.       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 Requiring extensive assistance from nursing. Up for meals only o/w spends the day resting in bed.       # protein-calorie malnutrition (H)  - Body mass index is 31.98 kg/m . from 27.55 kg/m . from 23.63 kg/m .  Improving  - In frail elderly keep BMI b/lw 25-35 Kg(m2).       Irritable bowel syndrome with diarrhea  Gastroesophageal reflux disease, esophagitis presence not specified  - stable.    Normocytic anemia: HH trending up. Stable.  Felt 2/2 asa and plavix. On Protonix.  Noted on iron supplement . Ferritin 236 June 2020).  Continued to be on iron supplement daily. Consider stopping, repeat HH in 3-6 months.         Hypothyroidism:  TSH   Date Value Ref Range Status   12/04/2020 41.34 (H) 0.40 - 4.00 mU/L Final   -  On Levothyroxine. In frail Elderly adult, recommended TSH level is around 6 providing patient is asymptomatic and FT4 is wnl- preferably  on the lower normal level.     Order: See above, otherwise, continue the rest of the current POC.       Electronically signed by:  Paola Pastor MD     Video-Visit Details  Type of service:  Video Visit  Video End Time (time video stopped): 12:!3  Distant Location (provider location):  [facility]

## 2021-02-16 ENCOUNTER — OFFICE VISIT (OUTPATIENT)
Dept: SURGERY | Facility: CLINIC | Age: 66
End: 2021-02-16
Payer: MEDICARE

## 2021-02-16 VITALS — TEMPERATURE: 97.5 F

## 2021-02-16 DIAGNOSIS — E66.9 DIABETES MELLITUS TYPE 2 IN OBESE: ICD-10-CM

## 2021-02-16 DIAGNOSIS — I24.9 ACUTE CORONARY SYNDROME (H): ICD-10-CM

## 2021-02-16 DIAGNOSIS — E11.69 DIABETES MELLITUS TYPE 2 IN OBESE: ICD-10-CM

## 2021-02-16 DIAGNOSIS — E78.5 HYPERLIPIDEMIA WITH TARGET LOW DENSITY LIPOPROTEIN (LDL) CHOLESTEROL LESS THAN 70 MG/DL: ICD-10-CM

## 2021-02-16 DIAGNOSIS — R92.8 ABNORMAL MAMMOGRAM OF RIGHT BREAST: Primary | ICD-10-CM

## 2021-02-16 DIAGNOSIS — N64.59 THICKENING OF SKIN OF BREAST: ICD-10-CM

## 2021-02-16 DIAGNOSIS — J44.9 CHRONIC OBSTRUCTIVE PULMONARY DISEASE, UNSPECIFIED COPD TYPE (H): ICD-10-CM

## 2021-02-16 DIAGNOSIS — G47.33 OSA (OBSTRUCTIVE SLEEP APNEA): ICD-10-CM

## 2021-02-16 PROCEDURE — 88305 TISSUE EXAM BY PATHOLOGIST: CPT | Performed by: DERMATOLOGY

## 2021-02-16 PROCEDURE — 11104 PUNCH BX SKIN SINGLE LESION: CPT | Performed by: SURGERY

## 2021-02-16 PROCEDURE — 99205 OFFICE O/P NEW HI 60 MIN: CPT | Mod: 25 | Performed by: SURGERY

## 2021-02-16 PROCEDURE — 88342 IMHCHEM/IMCYTCHM 1ST ANTB: CPT | Mod: GC | Performed by: DERMATOLOGY

## 2021-02-16 PROCEDURE — 11105 PUNCH BX SKIN EA SEP/ADDL: CPT | Performed by: SURGERY

## 2021-02-16 NOTE — LETTER
2/16/2021         RE: Letty Escobar  [facility]  [address] [facility] [address]        Dear Colleague,    Thank you for referring your patient, Letty Escobar, to the [facility]. Please see a copy of my visit note below.        Assessment & Plan   Problem List Items Addressed This Visit        Respiratory    COPD (chronic obstructive pulmonary disease) (H)    ELI (obstructive sleep apnea)       Endocrine    Diabetes mellitus type 2 in obese (H)    Hyperlipidemia with target low density lipoprotein (LDL) cholesterol less than 70 mg/dL       Circulatory    Acute coronary syndrome (H)      Other Visit Diagnoses     Abnormal mammogram of right breast    -  Primary    Relevant Orders    Dermatological path order and indications (Completed)    Thickening of skin of breast        right breast         Patient has thickened skin on the lateral and inferior edge of her right breast.  This is likely due to her acute on chronic heart failure and the fact that she lays on that side.  However I will do punch biopsy in 2 areas of the area of thickened skin.  I doubt that this is inflammatory breast cancer.  Please see the procedure note for further details.  I will call the patient's been once I get the final pathology result.  Patient may restart her Plavix after the procedure today when she gets back to her nursing home.  Her suture will need to be removed in 7 days.  Appropriate paperwork were filled out and signed and was given to the patient and her .  All of their questions were answered to their satisfaction.    Review of external notes as documented elsewhere in note  Diagnosis or treatment significantly limited by social determinants of health - patient's early dementia and  is POA.       70 minutes spent on the date of the encounter doing chart review, review of test results, interpretation of tests, patient visit, documentation, discussion with family and the in office  "procedure.         BMI:   Estimated body mass index is 32.02 kg/m  as calculated from the following:    Height as of 2/23/21: 1.473 m (4' 10\").    Weight as of 2/23/21: 69.5 kg (153 lb 3.2 oz).         No follow-ups on file.    PraveenJessica Carey MD  [facility] EASTON Saenz is a 65 year old who presents for the following health issues  accompanied by her spouse:    Patient presented with her  Edwar for new finding of right breast skin thickening.  Pt has high anxiety and ?dementia thus Edwar provided majority of history.  Thickening of this area was noted 1 month ago.  No pain to this area.  Edwar stated pt complained of right breast mass; doesn't know if it is getting bigger.  Pt reported one time serous discharge, spontaneous.  Has not seen any additional discharge.  Does not know if the extent of the thickening of her skin is worsen or been the same.  No family hx of breast cancer.  No redness; no seepage of the skin of the right breast.      Mammogram and US showed no breast tissue abnormality; however noted thickening of the skin of the right breast - concern for inflammatory breast cancer vs sequela from CHF and pt sleeping on the right side.      Patient has signficant PMH - ELI, COPD, CABG 3x, CHF, hx of ACS,  CAD, PAF.  Pt denies being on plavix but per elim home - pt is on plavix.               Review of Systems   Constitutional, HEENT, cardiovascular, pulmonary, GI, , musculoskeletal, neuro, skin, endocrine and psych systems are negative, except as otherwise noted.      Objective    Temp 97.5  F (36.4  C) (Temporal)   There is no height or weight on file to calculate BMI.  Physical Exam  Vitals signs reviewed.   Eyes:      Conjunctiva/sclera: Conjunctivae normal.   Neck:      Musculoskeletal: Normal range of motion.   Chest:      Breasts:         Right: Skin change present. No mass or nipple discharge.         Left: Normal. No swelling, inverted nipple, nipple " discharge, skin change or tenderness.          Comments: LImited exam as pt is wheel chair bound and did not want to get onto exam table.    Lymphadenopathy:      Upper Body:      Right upper body: No supraclavicular or axillary adenopathy.      Left upper body: No supraclavicular or axillary adenopathy.   Neurological:      Mental Status: She is alert.          Imagings:   MA DIAGNOSTIC BILATERAL WITH TOMOSYNTHESIS, US RIGHT BREAST COMPLETE  FOUR QUADRANTS DIGITAL w/CAD       TARGETED ULTRASOUND, RIGHT BREAST - 1/25/2021 10:23 AM     HISTORY: Right breast hardening felt by the nursing service at the  [facility]. Patient has history of heart surgery and multiple  bypass with low ejection fraction. Patient sleeps on her right side.     COMPARISON:  None. This is a baseline study.     BREAST DENSITY: Scattered fibroglandular densities.     FINDINGS: There is marked thickening of the skin in the right breast.  Minimal skin thickening may be present in the left breast. No focal  lesion is identified in the right breast. There is overall increased  density of the right breast as compared to the left breast. No other  mammographic findings of concern for malignancy.     Targeted ultrasound of the right breast demonstrates marked skin  thickening. There is also edema in the underlying right breast  parenchymal tissues. The skin of the left breast appears grossly  within normal limits. No significant edema is seen in the underlying  breast parenchymal tissues in the left breast.                                                                      IMPRESSION: BI-RADS CATEGORY: 4 - Suspicious Abnormality-Biopsy Should  Be Considered. Significant right breast skin thickening without  associated pain. This patient does have history of significant cardiac  limitations and sleeps on her right side. This could be secondary to  congestive heart failure with worsening edema in the right breast as  compared to the left due to  her positioning while sleeping.  Differential diagnosis also includes inflammatory breast cancer. I  recommend clinical correlation. Skin biopsy may be necessary.     RECOMMENDED FOLLOW-UP: Clinical Follow-up for asymmetric breast edema  and skin thickening, possibly due to congestive heart failure.  Inflammatory breast cancer is also in the differential diagnosis and  skin biopsy may be necessary, as clinically indicated.     I discussed the findings and recommendations with the patient and with  the patient's  at the time of the exam. I discussed these  findings and recommendations with Anna Lewis RN at [facility] on  1/25/2021 at 3:52 PM. I discussed these findings and recommendations  with Danita Calderon on 1/25/2021 at approximately 6:00 PM.                [facility]    Home Care Following AMBULATORY SURGERY    Dr. Carey    Care of the Incision:    Dermabond dressing is in place.  No other dressing is needed.    On post-op day 1 you may shower, but don t soak in a tub or swim for at least 1 week.  Gently pat your incision dry with a freshly laundered towel.    Do not pick at your incision.    Leave your incision open to air.  Cover it only if clothing rubs or irritates it.    ______________________________________________________    Gauze dressing is in place.  Remove the dressing in 24 hours (replace it earlier if it is heavily soiled).  At 24 hours you may wash it in the shower with soap and water, but do not soak in a tub or swim for at least 1 week.  Gently pat your incision dry with a freshly laundered towel.  Either replace the dressing or leave it open to air according to your doctor's instructions.    ______________________________________________________  Activity:    Gradually increase your activity.  Walk short distances several times each day and increase the distance as your strength allows.    To promote circulation, do not cross your legs while sitting.    No strenuous lifting  (20 pounds) or straining for 6 weeks (2 weeks for laparoscopic surgery).      Return to work will be determined by the type of work you do and should be discussed with your physician.    By 2 weeks after surgery, you may do any non-strenuous activity you feel like doing as long as you STOP IF IT HURTS.    Do not drive or operate equipment while taking prescription pain medicines.  You may drive 1 week after surgery if you have stopped taking prescription pain medicines and are pain-free enough to make an emergency stop if necessary.    Diet:    Return to the diet you were on before surgery.    Drink plenty of  water and fruit juices.    Avoid foods that cause constipation.      REMEMBER--most prescription pain pills cause constipation.  Walking, extra fluids, and increased fiber (fresh fruits and vegetables, etc.) are natural remedies for constipation.  Ask your doctor about a stool softener such as Colace or Metamucil.    Call Your Physician if You Have:    Redness, increased swelling or cloudy drainage from your incision.    A temperature of more than 101 degrees F.    Worsening pain in your incision not relieved by your prescription pain pills and/or a short rest.  Persistent nausea/vomiting. Jaundice. Worsening abdominal pain. Shortness of breath or difficulty swallowing.    Any questions or concerns about your recovery, please call [phone].    Follow-up Care:    Make an appointment 1 week after your surgery.  Call [phone].        Again, thank you for allowing me to participate in the care of your patient.        Sincerely,        PraveenPia Carey MD

## 2021-02-16 NOTE — PROGRESS NOTES
"    Assessment & Plan   Problem List Items Addressed This Visit        Respiratory    COPD (chronic obstructive pulmonary disease) (H)    ELI (obstructive sleep apnea)       Endocrine    Diabetes mellitus type 2 in obese (H)    Hyperlipidemia with target low density lipoprotein (LDL) cholesterol less than 70 mg/dL       Circulatory    Acute coronary syndrome (H)      Other Visit Diagnoses     Abnormal mammogram of right breast    -  Primary    Relevant Orders    Dermatological path order and indications (Completed)    Thickening of skin of breast        right breast         Patient has thickened skin on the lateral and inferior edge of her right breast.  This is likely due to her acute on chronic heart failure and the fact that she lays on that side.  However I will do punch biopsy in 2 areas of the area of thickened skin.  I doubt that this is inflammatory breast cancer.  Please see the procedure note for further details.  I will call the patient's been once I get the final pathology result.  Patient may restart her Plavix after the procedure today when she gets back to her nursing home.  Her suture will need to be removed in 7 days.  Appropriate paperwork were filled out and signed and was given to the patient and her .  All of their questions were answered to their satisfaction.    Review of external notes as documented elsewhere in note  Diagnosis or treatment significantly limited by social determinants of health - patient's early dementia and  is POA.       70 minutes spent on the date of the encounter doing chart review, review of test results, interpretation of tests, patient visit, documentation, discussion with family and the in office procedure.         BMI:   Estimated body mass index is 32.02 kg/m  as calculated from the following:    Height as of 2/23/21: 1.473 m (4' 10\").    Weight as of 2/23/21: 69.5 kg (153 lb 3.2 oz).         No follow-ups on file.    Praveen-Jessica Carey MD  [facility] EASTON Saenz is a 65 year old who presents for the following health issues  accompanied by her spouse:    Patient presented with her  Edwar for new finding of right breast skin thickening.  Pt has high anxiety and ?dementia thus Edwar provided majority of history.  Thickening of this area was noted 1 month ago.  No pain to this area.  Edwar stated pt complained of right breast mass; doesn't know if it is getting bigger.  Pt reported one time serous discharge, spontaneous.  Has not seen any additional discharge.  Does not know if the extent of the thickening of her skin is worsen or been the same.  No family hx of breast cancer.  No redness; no seepage of the skin of the right breast.      Mammogram and US showed no breast tissue abnormality; however noted thickening of the skin of the right breast - concern for inflammatory breast cancer vs sequela from CHF and pt sleeping on the right side.      Patient has signficant PMH - ELI, COPD, CABG 3x, CHF, hx of ACS,  CAD, PAF.  Pt denies being on plavix but per elim home - pt is on plavix.               Review of Systems   Constitutional, HEENT, cardiovascular, pulmonary, GI, , musculoskeletal, neuro, skin, endocrine and psych systems are negative, except as otherwise noted.      Objective    Temp 97.5  F (36.4  C) (Temporal)   There is no height or weight on file to calculate BMI.  Physical Exam  Vitals signs reviewed.   Eyes:      Conjunctiva/sclera: Conjunctivae normal.   Neck:      Musculoskeletal: Normal range of motion.   Chest:      Breasts:         Right: Skin change present. No mass or nipple discharge.         Left: Normal. No swelling, inverted nipple, nipple discharge, skin change or tenderness.          Comments: LImited exam as pt is wheel chair bound and did not want to get onto exam table.    Lymphadenopathy:      Upper Body:      Right upper body: No supraclavicular or axillary adenopathy.      Left upper body:  No supraclavicular or axillary adenopathy.   Neurological:      Mental Status: She is alert.          Imagings:   MA DIAGNOSTIC BILATERAL WITH TOMOSYNTHESIS, US RIGHT BREAST COMPLETE  FOUR QUADRANTS DIGITAL w/CAD       TARGETED ULTRASOUND, RIGHT BREAST - 1/25/2021 10:23 AM     HISTORY: Right breast hardening felt by the nursing service at the  [facility]. Patient has history of heart surgery and multiple  bypass with low ejection fraction. Patient sleeps on her right side.     COMPARISON:  None. This is a baseline study.     BREAST DENSITY: Scattered fibroglandular densities.     FINDINGS: There is marked thickening of the skin in the right breast.  Minimal skin thickening may be present in the left breast. No focal  lesion is identified in the right breast. There is overall increased  density of the right breast as compared to the left breast. No other  mammographic findings of concern for malignancy.     Targeted ultrasound of the right breast demonstrates marked skin  thickening. There is also edema in the underlying right breast  parenchymal tissues. The skin of the left breast appears grossly  within normal limits. No significant edema is seen in the underlying  breast parenchymal tissues in the left breast.                                                                      IMPRESSION: BI-RADS CATEGORY: 4 - Suspicious Abnormality-Biopsy Should  Be Considered. Significant right breast skin thickening without  associated pain. This patient does have history of significant cardiac  limitations and sleeps on her right side. This could be secondary to  congestive heart failure with worsening edema in the right breast as  compared to the left due to her positioning while sleeping.  Differential diagnosis also includes inflammatory breast cancer. I  recommend clinical correlation. Skin biopsy may be necessary.     RECOMMENDED FOLLOW-UP: Clinical Follow-up for asymmetric breast edema  and skin thickening,  possibly due to congestive heart failure.  Inflammatory breast cancer is also in the differential diagnosis and  skin biopsy may be necessary, as clinically indicated.     I discussed the findings and recommendations with the patient and with  the patient's  at the time of the exam. I discussed these  findings and recommendations with Anna Lewis RN at [facility] on  1/25/2021 at 3:52 PM. I discussed these findings and recommendations  with Danita Calderon on 1/25/2021 at approximately 6:00 PM.

## 2021-02-16 NOTE — PROGRESS NOTES
[facility]    Home Care Following AMBULATORY SURGERY    Dr. Carey    Care of the Incision:    Dermabond dressing is in place.  No other dressing is needed.    On post-op day 1 you may shower, but don t soak in a tub or swim for at least 1 week.  Gently pat your incision dry with a freshly laundered towel.    Do not pick at your incision.    Leave your incision open to air.  Cover it only if clothing rubs or irritates it.    ______________________________________________________    Gauze dressing is in place.  Remove the dressing in 24 hours (replace it earlier if it is heavily soiled).  At 24 hours you may wash it in the shower with soap and water, but do not soak in a tub or swim for at least 1 week.  Gently pat your incision dry with a freshly laundered towel.  Either replace the dressing or leave it open to air according to your doctor's instructions.    ______________________________________________________  Activity:    Gradually increase your activity.  Walk short distances several times each day and increase the distance as your strength allows.    To promote circulation, do not cross your legs while sitting.    No strenuous lifting (20 pounds) or straining for 6 weeks (2 weeks for laparoscopic surgery).      Return to work will be determined by the type of work you do and should be discussed with your physician.    By 2 weeks after surgery, you may do any non-strenuous activity you feel like doing as long as you STOP IF IT HURTS.    Do not drive or operate equipment while taking prescription pain medicines.  You may drive 1 week after surgery if you have stopped taking prescription pain medicines and are pain-free enough to make an emergency stop if necessary.    Diet:    Return to the diet you were on before surgery.    Drink plenty of  water and fruit juices.    Avoid foods that cause constipation.      REMEMBER--most prescription pain pills cause constipation.  Walking, extra fluids, and  increased fiber (fresh fruits and vegetables, etc.) are natural remedies for constipation.  Ask your doctor about a stool softener such as Colace or Metamucil.    Call Your Physician if You Have:    Redness, increased swelling or cloudy drainage from your incision.    A temperature of more than 101 degrees F.    Worsening pain in your incision not relieved by your prescription pain pills and/or a short rest.  Persistent nausea/vomiting. Jaundice. Worsening abdominal pain. Shortness of breath or difficulty swallowing.    Any questions or concerns about your recovery, please call [phone].    Follow-up Care:    Make an appointment 1 week after your surgery.  Call [phone].

## 2021-02-16 NOTE — PATIENT INSTRUCTIONS
[facility]    Home Care Following AMBULATORY SURGERY    Dr. Carey    Care of the Incision:    Dermabond dressing is in place.  No other dressing is needed.    On post-op day 1 you may shower, but don t soak in a tub or swim for at least 1 week.  Gently pat your incision dry with a freshly laundered towel.    Do not pick at your incision.    Leave your incision open to air.  Cover it only if clothing rubs or irritates it.    ______________________________________________________    Gauze dressing is in place.  Remove the dressing in 24 hours (replace it earlier if it is heavily soiled).  At 24 hours you may wash it in the shower with soap and water, but do not soak in a tub or swim for at least 1 week.  Gently pat your incision dry with a freshly laundered towel.  Either replace the dressing or leave it open to air according to your doctor's instructions.    ______________________________________________________  Activity:    Gradually increase your activity.  Walk short distances several times each day and increase the distance as your strength allows.    To promote circulation, do not cross your legs while sitting.    No strenuous lifting (20 pounds) or straining for 6 weeks (2 weeks for laparoscopic surgery).      Return to work will be determined by the type of work you do and should be discussed with your physician.    By 2 weeks after surgery, you may do any non-strenuous activity you feel like doing as long as you STOP IF IT HURTS.    Do not drive or operate equipment while taking prescription pain medicines.  You may drive 1 week after surgery if you have stopped taking prescription pain medicines and are pain-free enough to make an emergency stop if necessary.    Diet:    Return to the diet you were on before surgery.    Drink plenty of  water and fruit juices.    Avoid foods that cause constipation.      REMEMBER--most prescription pain pills cause constipation.  Walking, extra fluids, and  increased fiber (fresh fruits and vegetables, etc.) are natural remedies for constipation.  Ask your doctor about a stool softener such as Colace or Metamucil.    Call Your Physician if You Have:    Redness, increased swelling or cloudy drainage from your incision.    A temperature of more than 101 degrees F.    Worsening pain in your incision not relieved by your prescription pain pills and/or a short rest.  Persistent nausea/vomiting. Jaundice. Worsening abdominal pain. Shortness of breath or difficulty swallowing.    Any questions or concerns about your recovery, please call [phone].    Follow-up Care:    Make an appointment 1 week after your surgery.  Call [phone].

## 2021-02-23 ENCOUNTER — NURSING HOME VISIT (OUTPATIENT)
Dept: GERIATRICS | Facility: CLINIC | Age: 66
End: 2021-02-23
Payer: MEDICARE

## 2021-02-23 VITALS
HEIGHT: 58 IN | WEIGHT: 153.2 LBS | RESPIRATION RATE: 16 BRPM | HEART RATE: 61 BPM | OXYGEN SATURATION: 98 % | DIASTOLIC BLOOD PRESSURE: 77 MMHG | BODY MASS INDEX: 32.16 KG/M2 | TEMPERATURE: 98.7 F | SYSTOLIC BLOOD PRESSURE: 159 MMHG

## 2021-02-23 DIAGNOSIS — R63.5 WEIGHT GAIN: ICD-10-CM

## 2021-02-23 DIAGNOSIS — I10 ESSENTIAL HYPERTENSION: ICD-10-CM

## 2021-02-23 DIAGNOSIS — I50.22 CHRONIC SYSTOLIC CONGESTIVE HEART FAILURE (H): Primary | ICD-10-CM

## 2021-02-23 PROCEDURE — 99308 SBSQ NF CARE LOW MDM 20: CPT | Performed by: NURSE PRACTITIONER

## 2021-02-23 ASSESSMENT — MIFFLIN-ST. JEOR: SCORE: 1119.66

## 2021-02-23 NOTE — LETTER
"    2/23/2021        RE: Letty Reid [place]  [address] [facility] [address]        [place] [facility]    Chief Complaint   Patient presents with     Nursing Home Acute       Place of Service where encounter took place:  [facility] () [[id number]]    HPI:    Letty Escobar is a 65 year old  (1955), who is being seen today for an episodic care visit.  HPI information obtained from: facility chart records, facility staff and patient report.Today's concern is:  Data Unavailable   Nursing reports weight up 20 pounds since 12/10 recently started lasix.     Wt Readings from Last 4 Encounters:   02/23/21 69.5 kg (153 lb 3.2 oz)   02/11/21 69.4 kg (153 lb)   01/29/21 69.4 kg (153 lb)   01/26/21 65.5 kg (144 lb 6.4 oz)      BP Readings from Last 3 Encounters:   02/23/21 (!) 159/77   02/11/21 (!) 159/77   01/29/21 (!) 159/77      Wt on 12/10 was 131.      ALLERGIES: Diphenhydramine, Isosorbide, Nitroglycerin, Contrast dye, Adhesive tape, Diphenhydramine hcl, Liquid adhesive, Nystatin, Nystatin (topical), Penicillins, and Sulfa drugs  Past Medical, Surgical, Family and Social History reviewed and updated in EPIC.  Med list reviewed in nursing home EHR    REVIEW OF SYSTEMS:  4 point ROS including Respiratory, CV, GI and , other than that noted in the HPI,  is negative    PHYSICAL EXAM:  BP (!) 159/77   Pulse 61   Temp 98.7  F (37.1  C)   Resp 16   Ht 1.473 m (4' 10\")   Wt 69.5 kg (153 lb 3.2 oz)   SpO2 98%   BMI 32.02 kg/m    GENERAL APPEARANCE:  Alert, in no distress  RESP: no distress  CV:  , edema no lower extrem edema.  M/S:  Gait and station resting comfortably in bed  SKIN:  Inspection and palpation of skin and subcutaneous tissue at baseline  PSYCH:  insight and judgement, memory not formally assessed, affect and mood normal     Assessment/Plan:     Chronic systolic congestive heart failure (H)  Essential hypertension  Weight gain     Unable to review nursing " home EMR. B/ps are uncontrolled. Do not suspect fluid overload on exam. Follow up in 2 weeks for b/p recheck.     Time 15 minutes    Electronically signed by  Lamar Keating NP            Sincerely,        Lamar Keating NP

## 2021-02-23 NOTE — PROGRESS NOTES
"[place] [facility]    Chief Complaint   Patient presents with     Nursing [place] Acute       Place of Service where encounter took place:  [facility] AND REHAB [facility] () [[id number]]    HPI:    Letty Escobar is a 65 year old  (1955), who is being seen today for an episodic care visit.  HPI information obtained from: facility chart records, facility staff and patient report.Today's concern is:  Data Unavailable   Nursing reports weight up 20 pounds since 12/10 recently started lasix.     Wt Readings from Last 4 Encounters:   02/23/21 69.5 kg (153 lb 3.2 oz)   02/11/21 69.4 kg (153 lb)   01/29/21 69.4 kg (153 lb)   01/26/21 65.5 kg (144 lb 6.4 oz)      BP Readings from Last 3 Encounters:   02/23/21 (!) 159/77   02/11/21 (!) 159/77   01/29/21 (!) 159/77      Wt on 12/10 was 131.      ALLERGIES: Diphenhydramine, Isosorbide, Nitroglycerin, Contrast dye, Adhesive tape, Diphenhydramine hcl, Liquid adhesive, Nystatin, Nystatin (topical), Penicillins, and Sulfa drugs  Past Medical, Surgical, Family and Social History reviewed and updated in EPIC.  Med list reviewed in nursing home EHR    REVIEW OF SYSTEMS:  4 point ROS including Respiratory, CV, GI and , other than that noted in the HPI,  is negative    PHYSICAL EXAM:  BP (!) 159/77   Pulse 61   Temp 98.7  F (37.1  C)   Resp 16   Ht 1.473 m (4' 10\")   Wt 69.5 kg (153 lb 3.2 oz)   SpO2 98%   BMI 32.02 kg/m    GENERAL APPEARANCE:  Alert, in no distress  RESP: no distress  CV:  , edema no lower extrem edema.  M/S:  Gait and station resting comfortably in bed  SKIN:  Inspection and palpation of skin and subcutaneous tissue at baseline  PSYCH:  insight and judgement, memory not formally assessed, affect and mood normal     Assessment/Plan:     Chronic systolic congestive heart failure (H)  Essential hypertension  Weight gain     Unable to review nursing home EMR. B/ps are uncontrolled. Do not suspect fluid overload on exam. Follow up in 2 weeks for " b/p recheck.     Time 15 minutes    Electronically signed by  Lamar Keating NP

## 2021-02-24 NOTE — PROCEDURES
EXCISION  PROCEDURE NOTE    Name: [facility]: [unfilled]   : 1955, 65 year old Room/Bed: Room/bed info not found   CSN: [id number] Admission: No admission date for patient encounter.   MRN: [id number] Today: 2021,     PCP: Lamar Keating Attending: No att. providers found       PROCEDURE:   1. Punch biopsies of right breast 2x - at 9 oclock and 11 oclock position with 4mm punch      INDICATION: thicken skin of right breast    PRE-PROCEDURE     : Bria Carey MD  CONSENT: signed an Informed Consent Document.      PROCEDURE     The right breast area was prepped and appropriately anesthetized with 1% lidocaine with epinephrine. Using the usual technique, punch biopsy size 4 mm was performed at the 9 oclock and the 11 oclock position where the skin was thickened.    Incisions were closed with 4-0 prolene in simple interrupted.  The procedure was well tolerated and without complications. Specimen was sent to Pathology      POST-PROCEDURE     The patient tolerated the procedure we;;    COMPLICATIONS: none    Plan:  1. Instructed to keep the wound dry and covered for 24-48h and clean thereafter.  2. Warning signs of infection were reviewed.    3. Recommended that the patient use OTC acetaminophen, OTC ibuprofen as needed for pain.         Electronically signed by: Bria Carey MD; 2021

## 2021-03-01 LAB — COPATH REPORT: NORMAL

## 2021-03-05 ENCOUNTER — NURSING HOME VISIT (OUTPATIENT)
Dept: GERIATRICS | Facility: CLINIC | Age: 66
End: 2021-03-05
Payer: MEDICARE

## 2021-03-05 VITALS
HEART RATE: 61 BPM | HEIGHT: 58 IN | RESPIRATION RATE: 16 BRPM | OXYGEN SATURATION: 99 % | SYSTOLIC BLOOD PRESSURE: 110 MMHG | WEIGHT: 156.6 LBS | TEMPERATURE: 97.5 F | DIASTOLIC BLOOD PRESSURE: 45 MMHG | BODY MASS INDEX: 32.87 KG/M2

## 2021-03-05 DIAGNOSIS — L29.9 ITCHING: ICD-10-CM

## 2021-03-05 DIAGNOSIS — I10 ESSENTIAL HYPERTENSION: ICD-10-CM

## 2021-03-05 DIAGNOSIS — I50.22 CHRONIC SYSTOLIC CONGESTIVE HEART FAILURE (H): Primary | ICD-10-CM

## 2021-03-05 PROCEDURE — 99308 SBSQ NF CARE LOW MDM 20: CPT | Performed by: NURSE PRACTITIONER

## 2021-03-05 ASSESSMENT — MIFFLIN-ST. JEOR: SCORE: 1135.08

## 2021-03-05 NOTE — LETTER
"    3/5/2021        RE: Letty Reid [place]  [address] [facility] [place] [address]        [place] [facility]    Chief Complaint   Patient presents with     Nursing Home Acute       Place of Service where encounter took place:  [facility] () [[id number]]    HPI:    Letty Escobar is a 65 year old  (1955), who is being seen today for an episodic care visit.  HPI information obtained from: facility chart records, facility staff and patient report.Today's concern is:     Chronic systolic congestive heart failure (H)  Essential hypertension  Itching    ALLERGIES: Diphenhydramine, Isosorbide, Nitroglycerin, Contrast dye, Adhesive tape, Diphenhydramine hcl, Liquid adhesive, Nystatin, Nystatin (topical), Penicillins, and Sulfa drugs  Past Medical, Surgical, Family and Social History reviewed and updated in EPIC.  Med list reviewed in nursing home EHR    REVIEW OF SYSTEMS:  4 point ROS including Respiratory, CV, GI and , other than that noted in the HPI,  is negative    PHYSICAL EXAM:  /45   Pulse 61   Temp 97.5  F (36.4  C)   Resp 16   Ht 1.473 m (4' 10\")   Wt 71 kg (156 lb 9.6 oz)   SpO2 99%   BMI 32.73 kg/m    GENERAL APPEARANCE:  Alert, in no distress    Assessment/Plan:  Chronic systolic congestive heart failure (H)  Essential hypertension  Weights and b/ps reviewed. SBP Have been 110 - 150 over the last week. No need to add antihypertensive agent. Continue with lasix only.     Itching  Not using hydroxyzine. Will discontinue.       Orders:  Discontinue hydroxyzine.     Time 15 minutes     Electronically signed by  Lamar Keating NP          Sincerely,        Lamar Keating NP    "

## 2021-03-05 NOTE — PROGRESS NOTES
"[place] [facility]    Chief Complaint   Patient presents with     Nursing Home Acute       Place of Service where encounter took place:  [facility] () [[id number]]    HPI:    Letty Escobar is a 65 year old  (1955), who is being seen today for an episodic care visit.  HPI information obtained from: facility chart records, facility staff and patient report.Today's concern is:     Chronic systolic congestive heart failure (H)  Essential hypertension  Itching    ALLERGIES: Diphenhydramine, Isosorbide, Nitroglycerin, Contrast dye, Adhesive tape, Diphenhydramine hcl, Liquid adhesive, Nystatin, Nystatin (topical), Penicillins, and Sulfa drugs  Past Medical, Surgical, Family and Social History reviewed and updated in EPIC.  Med list reviewed in nursing home EHR    REVIEW OF SYSTEMS:  4 point ROS including Respiratory, CV, GI and , other than that noted in the HPI,  is negative    PHYSICAL EXAM:  /45   Pulse 61   Temp 97.5  F (36.4  C)   Resp 16   Ht 1.473 m (4' 10\")   Wt 71 kg (156 lb 9.6 oz)   SpO2 99%   BMI 32.73 kg/m    GENERAL APPEARANCE:  Alert, in no distress    Assessment/Plan:  Chronic systolic congestive heart failure (H)  Essential hypertension  Weights and b/ps reviewed. SBP Have been 110 - 150 over the last week. No need to add antihypertensive agent. Continue with lasix only.     Itching  Not using hydroxyzine. Will discontinue.       Orders:  Discontinue hydroxyzine.     Time 15 minutes     Electronically signed by  Lamar Keating NP    "

## 2021-03-16 ENCOUNTER — APPOINTMENT (OUTPATIENT)
Dept: GENERAL RADIOLOGY | Facility: CLINIC | Age: 66
End: 2021-03-16
Attending: FAMILY MEDICINE
Payer: MEDICARE

## 2021-03-16 ENCOUNTER — HOSPITAL ENCOUNTER (EMERGENCY)
Facility: CLINIC | Age: 66
Discharge: HOME | End: 2021-03-16
Attending: FAMILY MEDICINE | Admitting: FAMILY MEDICINE
Payer: MEDICARE

## 2021-03-16 VITALS
DIASTOLIC BLOOD PRESSURE: 70 MMHG | OXYGEN SATURATION: 99 % | WEIGHT: 155 LBS | SYSTOLIC BLOOD PRESSURE: 156 MMHG | HEART RATE: 64 BPM | TEMPERATURE: 97.4 F | BODY MASS INDEX: 32.4 KG/M2 | RESPIRATION RATE: 14 BRPM

## 2021-03-16 DIAGNOSIS — R07.89 ATYPICAL CHEST PAIN: ICD-10-CM

## 2021-03-16 LAB
ALBUMIN SERPL-MCNC: 3.2 G/DL (ref 3.4–5)
ALP SERPL-CCNC: 57 U/L (ref 40–150)
ALT SERPL W P-5'-P-CCNC: 74 U/L (ref 0–50)
ANION GAP SERPL CALCULATED.3IONS-SCNC: 6 MMOL/L (ref 3–14)
AST SERPL W P-5'-P-CCNC: 23 U/L (ref 0–45)
BASOPHILS # BLD AUTO: 0 10E9/L (ref 0–0.2)
BASOPHILS NFR BLD AUTO: 0.4 %
BILIRUB SERPL-MCNC: 0.2 MG/DL (ref 0.2–1.3)
BUN SERPL-MCNC: 45 MG/DL (ref 7–30)
CALCIUM SERPL-MCNC: 9.4 MG/DL (ref 8.5–10.1)
CHLORIDE SERPL-SCNC: 111 MMOL/L (ref 94–109)
CO2 SERPL-SCNC: 22 MMOL/L (ref 20–32)
CREAT SERPL-MCNC: 0.98 MG/DL (ref 0.52–1.04)
DIFFERENTIAL METHOD BLD: ABNORMAL
EOSINOPHIL # BLD AUTO: 0.1 10E9/L (ref 0–0.7)
EOSINOPHIL NFR BLD AUTO: 2.3 %
ERYTHROCYTE [DISTWIDTH] IN BLOOD BY AUTOMATED COUNT: 12.6 % (ref 10–15)
GFR SERPL CREATININE-BSD FRML MDRD: 60 ML/MIN/{1.73_M2}
GLUCOSE SERPL-MCNC: 114 MG/DL (ref 70–99)
HCT VFR BLD AUTO: 28.5 % (ref 35–47)
HGB BLD-MCNC: 9 G/DL (ref 11.7–15.7)
IMM GRANULOCYTES # BLD: 0 10E9/L (ref 0–0.4)
IMM GRANULOCYTES NFR BLD: 0.4 %
LYMPHOCYTES # BLD AUTO: 1.6 10E9/L (ref 0.8–5.3)
LYMPHOCYTES NFR BLD AUTO: 27.7 %
MCH RBC QN AUTO: 30.9 PG (ref 26.5–33)
MCHC RBC AUTO-ENTMCNC: 31.6 G/DL (ref 31.5–36.5)
MCV RBC AUTO: 98 FL (ref 78–100)
MONOCYTES # BLD AUTO: 0.4 10E9/L (ref 0–1.3)
MONOCYTES NFR BLD AUTO: 7.7 %
NEUTROPHILS # BLD AUTO: 3.4 10E9/L (ref 1.6–8.3)
NEUTROPHILS NFR BLD AUTO: 61.5 %
NRBC # BLD AUTO: 0 10*3/UL
NRBC BLD AUTO-RTO: 0 /100
NT-PROBNP SERPL-MCNC: 2115 PG/ML (ref 0–900)
PLATELET # BLD AUTO: 141 10E9/L (ref 150–450)
POTASSIUM SERPL-SCNC: 4.7 MMOL/L (ref 3.4–5.3)
PROT SERPL-MCNC: 6.9 G/DL (ref 6.8–8.8)
RBC # BLD AUTO: 2.91 10E12/L (ref 3.8–5.2)
SODIUM SERPL-SCNC: 139 MMOL/L (ref 133–144)
TROPONIN I SERPL-MCNC: <0.015 UG/L (ref 0–0.04)
TROPONIN I SERPL-MCNC: <0.015 UG/L (ref 0–0.04)
WBC # BLD AUTO: 5.6 10E9/L (ref 4–11)

## 2021-03-16 PROCEDURE — 80053 COMPREHEN METABOLIC PANEL: CPT | Performed by: FAMILY MEDICINE

## 2021-03-16 PROCEDURE — 85025 COMPLETE CBC W/AUTO DIFF WBC: CPT | Performed by: FAMILY MEDICINE

## 2021-03-16 PROCEDURE — 93010 ELECTROCARDIOGRAM REPORT: CPT | Performed by: FAMILY MEDICINE

## 2021-03-16 PROCEDURE — 36415 COLL VENOUS BLD VENIPUNCTURE: CPT | Performed by: FAMILY MEDICINE

## 2021-03-16 PROCEDURE — 99285 EMERGENCY DEPT VISIT HI MDM: CPT | Mod: 25 | Performed by: FAMILY MEDICINE

## 2021-03-16 PROCEDURE — 84484 ASSAY OF TROPONIN QUANT: CPT | Performed by: FAMILY MEDICINE

## 2021-03-16 PROCEDURE — 83880 ASSAY OF NATRIURETIC PEPTIDE: CPT | Mod: GZ | Performed by: FAMILY MEDICINE

## 2021-03-16 PROCEDURE — 84484 ASSAY OF TROPONIN QUANT: CPT | Mod: 91 | Performed by: FAMILY MEDICINE

## 2021-03-16 PROCEDURE — 71045 X-RAY EXAM CHEST 1 VIEW: CPT

## 2021-03-16 PROCEDURE — 93005 ELECTROCARDIOGRAM TRACING: CPT | Performed by: FAMILY MEDICINE

## 2021-03-16 RX ORDER — PHENOL 1.4 %
10 AEROSOL, SPRAY (ML) MUCOUS MEMBRANE AT BEDTIME
COMMUNITY

## 2021-03-16 RX ORDER — OLANZAPINE 5 MG/1
2.5 TABLET ORAL DAILY
COMMUNITY
Start: 2021-03-12 | End: 2021-08-06

## 2021-03-16 RX ORDER — BISACODYL 10 MG
10 SUPPOSITORY, RECTAL RECTAL 2 TIMES DAILY PRN
COMMUNITY
End: 2021-04-06

## 2021-03-16 RX ORDER — POTASSIUM CHLORIDE 750 MG/1
10 CAPSULE, EXTENDED RELEASE ORAL DAILY
COMMUNITY
Start: 2021-01-14

## 2021-03-16 RX ORDER — DIVALPROEX SODIUM 250 MG/1
250 TABLET, DELAYED RELEASE ORAL DAILY
COMMUNITY
Start: 2021-02-26 | End: 2024-04-26

## 2021-03-16 RX ORDER — OMEGA-3S/DHA/EPA/FISH OIL/D3 300MG-1000
20 CAPSULE ORAL DAILY
COMMUNITY
Start: 2021-03-13 | End: 2022-06-01

## 2021-03-16 RX ORDER — DULOXETINE 40 MG/1
40 CAPSULE, DELAYED RELEASE ORAL DAILY
COMMUNITY
Start: 2020-09-15

## 2021-03-16 RX ORDER — NITROGLYCERIN 0.4 MG/1
0.4 TABLET SUBLINGUAL EVERY 5 MIN PRN
COMMUNITY
End: 2021-08-06

## 2021-03-16 RX ORDER — MIRTAZAPINE 30 MG/1
15 TABLET, FILM COATED ORAL DAILY
COMMUNITY
Start: 2020-09-14 | End: 2024-04-26

## 2021-03-16 RX ORDER — LEVOTHYROXINE SODIUM 112 UG/1
112 TABLET ORAL
COMMUNITY
Start: 2020-09-15 | End: 2022-03-28 | Stop reason: DRUGHIGH

## 2021-03-16 RX ORDER — QUETIAPINE FUMARATE 25 MG/1
37.5 TABLET, FILM COATED ORAL 3 TIMES DAILY
COMMUNITY
Start: 2021-01-14 | End: 2022-08-13

## 2021-03-16 NOTE — ED TRIAGE NOTES
Pt started having CP at approx 1200.  Pt given 2 NTG at NH without relief.  EMS gave ASA and 2 NTG bringing pain from 7 to 3/10.

## 2021-03-18 ENCOUNTER — AMBULATORY - HEALTHEAST (OUTPATIENT)
Dept: OTHER | Facility: CLINIC | Age: 66
End: 2021-03-18

## 2021-04-06 ENCOUNTER — NURSING HOME VISIT (OUTPATIENT)
Dept: GERIATRICS | Facility: CLINIC | Age: 66
End: 2021-04-06
Payer: COMMERCIAL

## 2021-04-06 VITALS
TEMPERATURE: 98.9 F | SYSTOLIC BLOOD PRESSURE: 150 MMHG | WEIGHT: 162 LBS | OXYGEN SATURATION: 98 % | HEART RATE: 69 BPM | RESPIRATION RATE: 17 BRPM | BODY MASS INDEX: 34 KG/M2 | DIASTOLIC BLOOD PRESSURE: 76 MMHG | HEIGHT: 58 IN

## 2021-04-06 DIAGNOSIS — M35.3 POLYMYALGIA RHEUMATICA (H): ICD-10-CM

## 2021-04-06 DIAGNOSIS — I10 ESSENTIAL HYPERTENSION: ICD-10-CM

## 2021-04-06 DIAGNOSIS — J44.9 CHRONIC OBSTRUCTIVE PULMONARY DISEASE, UNSPECIFIED COPD TYPE (H): ICD-10-CM

## 2021-04-06 DIAGNOSIS — I50.22 CHRONIC SYSTOLIC CONGESTIVE HEART FAILURE (H): Primary | ICD-10-CM

## 2021-04-06 DIAGNOSIS — K59.01 SLOW TRANSIT CONSTIPATION: ICD-10-CM

## 2021-04-06 DIAGNOSIS — E03.9 HYPOTHYROIDISM, UNSPECIFIED TYPE: ICD-10-CM

## 2021-04-06 DIAGNOSIS — I48.0 PAROXYSMAL ATRIAL FIBRILLATION (H): ICD-10-CM

## 2021-04-06 DIAGNOSIS — F33.2 MAJOR DEPRESSIVE DISORDER, RECURRENT SEVERE WITHOUT PSYCHOTIC FEATURES (H): ICD-10-CM

## 2021-04-06 DIAGNOSIS — E78.5 HYPERLIPIDEMIA WITH TARGET LOW DENSITY LIPOPROTEIN (LDL) CHOLESTEROL LESS THAN 70 MG/DL: ICD-10-CM

## 2021-04-06 DIAGNOSIS — I70.422 ATHEROSCLEROSIS OF AUTOLOGOUS VEIN BYPASS GRAFT(S) OF THE EXTREMITIES WITH REST PAIN, LEFT LEG (H): ICD-10-CM

## 2021-04-06 DIAGNOSIS — I25.709 ATHEROSCLEROSIS OF CORONARY ARTERY BYPASS GRAFT OF NATIVE HEART WITH ANGINA PECTORIS (H): ICD-10-CM

## 2021-04-06 DIAGNOSIS — I48.20 CHRONIC ATRIAL FIBRILLATION (H): ICD-10-CM

## 2021-04-06 DIAGNOSIS — Z95.0 CARDIAC PACEMAKER IN SITU: ICD-10-CM

## 2021-04-06 PROBLEM — M99.04 SOMATIC DYSFUNCTION OF SACRAL REGION: Status: RESOLVED | Noted: 2018-01-17 | Resolved: 2021-04-06

## 2021-04-06 PROBLEM — I24.9 ACUTE CORONARY SYNDROME (H): Status: RESOLVED | Noted: 2019-11-22 | Resolved: 2021-04-06

## 2021-04-06 PROBLEM — E66.01 MORBID OBESITY (H): Status: RESOLVED | Noted: 2021-01-26 | Resolved: 2021-04-06

## 2021-04-06 PROBLEM — N39.0 UTI (URINARY TRACT INFECTION): Status: RESOLVED | Noted: 2020-04-17 | Resolved: 2021-04-06

## 2021-04-06 PROBLEM — Z79.899 LONG-TERM USE OF HIGH-RISK MEDICATION: Status: RESOLVED | Noted: 2020-04-14 | Resolved: 2021-04-06

## 2021-04-06 PROBLEM — I20.0 UNSTABLE ANGINA (H): Status: RESOLVED | Noted: 2018-05-02 | Resolved: 2021-04-06

## 2021-04-06 PROBLEM — R53.1 WEAKNESS: Status: RESOLVED | Noted: 2019-12-17 | Resolved: 2021-04-06

## 2021-04-06 PROBLEM — N18.4 ACUTE RENAL FAILURE SUPERIMPOSED ON STAGE 4 CHRONIC KIDNEY DISEASE, UNSPECIFIED ACUTE RENAL FAILURE TYPE (H): Status: RESOLVED | Noted: 2021-01-26 | Resolved: 2021-04-06

## 2021-04-06 PROBLEM — B37.2 MONILIASIS, CUTANEOUS: Status: RESOLVED | Noted: 2020-03-31 | Resolved: 2021-04-06

## 2021-04-06 PROBLEM — F06.30 MOOD DISORDER DUE TO A GENERAL MEDICAL CONDITION: Status: RESOLVED | Noted: 2020-03-01 | Resolved: 2021-04-06

## 2021-04-06 PROBLEM — I50.9 ACUTE EXACERBATION OF CHF (CONGESTIVE HEART FAILURE) (H): Status: RESOLVED | Noted: 2019-11-11 | Resolved: 2021-04-06

## 2021-04-06 PROBLEM — N17.9 ACUTE RENAL FAILURE SUPERIMPOSED ON STAGE 4 CHRONIC KIDNEY DISEASE, UNSPECIFIED ACUTE RENAL FAILURE TYPE (H): Status: RESOLVED | Noted: 2021-01-26 | Resolved: 2021-04-06

## 2021-04-06 PROBLEM — G45.9 TIA (TRANSIENT ISCHEMIC ATTACK): Status: RESOLVED | Noted: 2018-05-04 | Resolved: 2021-04-06

## 2021-04-06 PROBLEM — H81.10 BPPV (BENIGN PAROXYSMAL POSITIONAL VERTIGO): Status: RESOLVED | Noted: 2018-05-31 | Resolved: 2021-04-06

## 2021-04-06 PROBLEM — E83.52 SERUM CALCIUM ELEVATED: Status: RESOLVED | Noted: 2020-03-01 | Resolved: 2021-04-06

## 2021-04-06 PROBLEM — Z72.0 TOBACCO ABUSE: Status: RESOLVED | Noted: 2017-02-17 | Resolved: 2021-04-06

## 2021-04-06 PROBLEM — R07.9 CHEST PAIN: Status: RESOLVED | Noted: 2019-11-11 | Resolved: 2021-04-06

## 2021-04-06 PROBLEM — R45.851 SUICIDAL IDEATION: Status: RESOLVED | Noted: 2020-04-01 | Resolved: 2021-04-06

## 2021-04-06 PROBLEM — R62.7 ADULT FAILURE TO THRIVE: Status: RESOLVED | Noted: 2020-09-03 | Resolved: 2021-04-06

## 2021-04-06 PROBLEM — M75.52 SUBACROMIAL BURSITIS OF LEFT SHOULDER JOINT: Status: RESOLVED | Noted: 2018-08-06 | Resolved: 2021-04-06

## 2021-04-06 PROCEDURE — 99309 SBSQ NF CARE MODERATE MDM 30: CPT | Performed by: NURSE PRACTITIONER

## 2021-04-06 RX ORDER — CYANOCOBALAMIN 1000 UG/ML
1 INJECTION, SOLUTION INTRAMUSCULAR; SUBCUTANEOUS
COMMUNITY
End: 2022-06-01

## 2021-04-06 ASSESSMENT — MIFFLIN-ST. JEOR: SCORE: 1159.58

## 2021-04-06 NOTE — LETTER
4/6/2021        RE: Letty Reid [place]  [address] [facility] [address]        [place] [facility]    Chief Complaint   Patient presents with     detention Regulatory       Place of Service where encounter took place:  [facility] () [[id number]]    HPI:    Letty Escobar is a 65 year old  (1955), who is being seen today for a federally mandated E/M visit.  HPI information obtained from: facility chart records, facility staff and patient report. 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  PHQ-9: 3/16/2021 - 7  BIMS: 3/16/2021 -15/15  Function: ambulates with assist of 1 and rolling walker.     ALLERGIES: Diphenhydramine, Isosorbide, Nitroglycerin, Contrast dye, Adhesive tape, Diphenhydramine hcl, Liquid adhesive, Nystatin, Nystatin (topical), Penicillins, and Sulfa drugs  PAST MEDICAL HISTORY:  has a past medical history of Depressive disorder, Diabetes mellitus (H), Diabetes mellitus type 2 in obese (H) (7/13/2006), Myocardial infarction (H), Neuromuscular disorder (H), Restless legs syndrome (RLS) (8/29/2007), Thyroid disease, TIA (transient ischemic attack) (5/4/2018), and Tobacco abuse  (2/17/2017).  PAST SURGICAL HISTORY:  has a past surgical history that includes Release carpal tunnel; Cholecystectomy; Cardiac surgery; and Abdomen surgery.  FAMILY HISTORY: family history is not on file.  SOCIAL HISTORY:  reports that she has never smoked. She has never used smokeless tobacco.    MEDICATIONS:  Current Outpatient Medications   Medication Sig Dispense Refill     acetaminophen (TYLENOL) 500 MG tablet Take 2 tablets (1,000 mg) by mouth 3 times daily       aspirin 81 MG EC tablet Take 81 mg by mouth daily       divalproex sodium delayed-release (DEPAKOTE) 250 MG DR tablet Take 250 mg by mouth daily       DULoxetine HCl 40 MG CPEP Take 40 mg by mouth daily       fluticasone-vilanterol (BREO ELLIPTA) 200-25 MCG/INH inhaler Inhale 1 puff into the lungs daily       furosemide (LASIX) 20 MG tablet Take 20 mg by mouth daily       ketoconazole (NIZORAL) 2 % external cream Apply topically At Bedtime       levothyroxine (SYNTHROID/LEVOTHROID) 112 MCG tablet Take 112 mcg by mouth daily before breakfast        Melatonin 10 MG TABS tablet Take 10 mg by mouth At Bedtime        metoprolol succinate ER (TOPROL-XL) 25 MG 24 hr tablet Take 25 mg by mouth daily       miconazole (MICATIN) 2 % AERP powder Apply topically as needed        mirtazapine (REMERON) 30 MG tablet Take 30 mg by mouth daily       nitroGLYcerin (NITROSTAT) 0.4 MG sublingual tablet Place 0.4 mg under the tongue every 5 minutes as needed for chest pain For chest pain place 1 tablet under the tongue every 5 minutes for 3 doses. If symptoms persist 5 minutes after 1st dose call 911.       OLANZapine (ZYPREXA) 5 MG tablet Take 2.5 mg by mouth daily        potassium chloride ER (MICRO-K) 10 MEQ CR capsule Take 10 mEq by mouth daily        QUEtiapine (SEROQUEL) 25 MG tablet Take 50 mg by mouth 3 times daily        rosuvastatin (CRESTOR) 10 MG tablet Take 10 mg by mouth At Bedtime        sennosides (SENOKOT) 8.6 MG tablet Take 2 tablets by mouth At Bedtime   "     Vitamin D3 (VITAMIN D) 10 MCG (400 UNIT) tablet Take 20 mcg by mouth daily         Information reviewed:  Medications, vital signs, orders, care plan, and nursing notes.    ROS:  4 point ROS including Respiratory, CV, GI and , other than that noted in the HPI,  is negative    Exam:  Vitals: BP (!) 150/76   Pulse 69   Temp 98.9  F (37.2  C)   Resp 17   Ht 1.473 m (4' 10\")   Wt 73.5 kg (162 lb)   SpO2 98%   BMI 33.86 kg/m    BMI= Body mass index is 33.86 kg/m .  GENERAL APPEARANCE:  Alert, in no distress  RESP:  respiratory effort and palpation of chest normal, no respiratory distress, lungs sounds clear  CV:  Palpation and auscultation of heart done , regular rate and rhythm, no murmur, rub, or gallop, edema none  ABDOMEN:  normal bowel sounds, soft, nontender,  M/S:  Gait and station observed in bed, no tenderness or swelling of the joints   SKIN:  Inspection and palpation of skin and subcutaneous tissue at baseline  PSYCH:  insight and judgement, memory seems intact, affect and mood normal    Lab/Diagnostic data: reviewed in nursing home EHR    ASSESSMENT/PLAN  Chronic systolic congestive heart failure (H)   No evidence of fluid overload.  Continue with beta-blocker, and Lasix    Essential hypertension  Blood pressures reviewed in nursing home E HR.  Most blood pressures less than 140.  Continue metoprolol and Lasix.    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    Chronic obstructive pulmonary disease, unspecified COPD type (H)  Stable.  Currently has no complaints of shortness of breath or cough.  Continue inhaler .    Major depressive disorder, recurrent severe without psychotic features (H)  Followed by in house psychiatry.  Defer med management to them.  Patient states she is anxious today.  Continues on Depakote, duloxetine, mirtazapine, Zyprexa, Seroquel.    Slow transit constipation  Stable. Continue senna. "     Hyperlipidemia with target low density lipoprotein (LDL) cholesterol less than 70 mg/dL  Last lipid panel done 4/4/20 in care everywhere. LDL 42 and cholesterol 83. HDL 23. Continue following with cardiology.     Hypothyroidism, unspecified type  TSH   Date Value Ref Range Status   12/04/2020 41.34 (H) 0.40 - 4.00 mU/L Final    I couldn't find any mention of this being addressed. Will increase levothyroxine and follow up. Patient has been gaining weight and sleeping more.     Atherosclerosis of coronary artery bypass graft of native heart with angina pectoris (H)  Followed by Dr. Saucedo of cardiology. B/ps mostly controlled.    Paroxysmal atrial fibrillation (H)  Rate controlled. Only on aspirin for stroke prevention.     Cardiac pacemaker in situ  Needs new battery. Patient nervous about it.  Copied from last visit : ICD has reached CLARENCE (elective replacement indicator) on 4-6-2021 per remote  Status: non-urgent (EOL-end of battery life imminent).   Present operating mode is DDDR  with presenting rhythm of AS/Bi V paced rate 62 bpm.  Last seen by provider: *1- by Tiffanie BUCHANAN Per her note: Will continue with device checks, next is 2/8/2021 - patient will need generator change when device hits CLARENCE, however discussed gen change for biventricular pacemaker and not the defibrillator portion of the device.  Last echo done: January 2020 withcalculated EF of 47% . Order placed for limited echo if not done within the last year.  Last in clinic device check done: December 2020 with underlying rhythm of NSR 80 bpm.  Atrial paced: 30.5%; BiV paced: 97.4%  Lead measurements are: within normal limits noting stable but higher LV threshold at 2.75v .  Any previous lead issues to be addressed: no     Anemia  Has history of iron deficiency anemia, vitamin B deficiency.  No longer on vitamin B supplements.  Will discontinue Plavix and iron.  Follow-up with the CBC in 8 weeks.  Hemoglobins have been stable ~ 8 - 9      Orders:  Discontinue ferrous sulfate  Discontinue Plavix  Increase levothyroxine to 112 mcg p.o. every day diagnosis hypothyroidism  Check CBC and lipid profile in 8 weeks diagnosis CAD    Electronically signed by:  Lamar Keating NP            Sincerely,        Lamar Keating NP

## 2021-04-06 NOTE — PROGRESS NOTES
[place] [facility]    Chief Complaint   Patient presents with     penitentiary Regulatory       Place of Service where encounter took place:  [facility] [place] () [[id number]]    HPI:    Letty Escobar is a 65 year old  (1955), who is being seen today for a federally mandated E/M visit.  HPI information obtained from: facility chart records, facility staff and patient report. 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  PHQ-9: 3/16/2021 - 7  BIMS: 3/16/2021 -15/15  Function: ambulates with assist of 1 and rolling walker.     ALLERGIES: Diphenhydramine, Isosorbide, Nitroglycerin, Contrast dye, Adhesive tape, Diphenhydramine hcl, Liquid adhesive, Nystatin, Nystatin (topical), Penicillins, and Sulfa drugs  PAST MEDICAL HISTORY:  has a past medical history of Depressive disorder, Diabetes mellitus (H), Diabetes mellitus type 2 in obese (H) (7/13/2006), Myocardial infarction (H), Neuromuscular disorder (H), Restless legs syndrome (RLS) (8/29/2007), Thyroid disease, TIA (transient ischemic attack) (5/4/2018), and Tobacco abuse (2/17/2017).  PAST SURGICAL HISTORY:  has a past surgical history that includes Release carpal tunnel;  Cholecystectomy; Cardiac surgery; and Abdomen surgery.  FAMILY HISTORY: family history is not on file.  SOCIAL HISTORY:  reports that she has never smoked. She has never used smokeless tobacco.    MEDICATIONS:  Current Outpatient Medications   Medication Sig Dispense Refill     acetaminophen (TYLENOL) 500 MG tablet Take 2 tablets (1,000 mg) by mouth 3 times daily       aspirin 81 MG EC tablet Take 81 mg by mouth daily       divalproex sodium delayed-release (DEPAKOTE) 250 MG DR tablet Take 250 mg by mouth daily       DULoxetine HCl 40 MG CPEP Take 40 mg by mouth daily       fluticasone-vilanterol (BREO ELLIPTA) 200-25 MCG/INH inhaler Inhale 1 puff into the lungs daily       furosemide (LASIX) 20 MG tablet Take 20 mg by mouth daily       ketoconazole (NIZORAL) 2 % external cream Apply topically At Bedtime       levothyroxine (SYNTHROID/LEVOTHROID) 112 MCG tablet Take 112 mcg by mouth daily before breakfast        Melatonin 10 MG TABS tablet Take 10 mg by mouth At Bedtime        metoprolol succinate ER (TOPROL-XL) 25 MG 24 hr tablet Take 25 mg by mouth daily       miconazole (MICATIN) 2 % AERP powder Apply topically as needed        mirtazapine (REMERON) 30 MG tablet Take 30 mg by mouth daily       nitroGLYcerin (NITROSTAT) 0.4 MG sublingual tablet Place 0.4 mg under the tongue every 5 minutes as needed for chest pain For chest pain place 1 tablet under the tongue every 5 minutes for 3 doses. If symptoms persist 5 minutes after 1st dose call 911.       OLANZapine (ZYPREXA) 5 MG tablet Take 2.5 mg by mouth daily        potassium chloride ER (MICRO-K) 10 MEQ CR capsule Take 10 mEq by mouth daily        QUEtiapine (SEROQUEL) 25 MG tablet Take 50 mg by mouth 3 times daily        rosuvastatin (CRESTOR) 10 MG tablet Take 10 mg by mouth At Bedtime        sennosides (SENOKOT) 8.6 MG tablet Take 2 tablets by mouth At Bedtime       Vitamin D3 (VITAMIN D) 10 MCG (400 UNIT) tablet Take 20 mcg by mouth daily         Information  "reviewed:  Medications, vital signs, orders, care plan, and nursing notes.    ROS:  4 point ROS including Respiratory, CV, GI and , other than that noted in the HPI,  is negative    Exam:  Vitals: BP (!) 150/76   Pulse 69   Temp 98.9  F (37.2  C)   Resp 17   Ht 1.473 m (4' 10\")   Wt 73.5 kg (162 lb)   SpO2 98%   BMI 33.86 kg/m    BMI= Body mass index is 33.86 kg/m .  GENERAL APPEARANCE:  Alert, in no distress  RESP:  respiratory effort and palpation of chest normal, no respiratory distress, lungs sounds clear  CV:  Palpation and auscultation of heart done , regular rate and rhythm, no murmur, rub, or gallop, edema none  ABDOMEN:  normal bowel sounds, soft, nontender,  M/S:  Gait and station observed in bed, no tenderness or swelling of the joints   SKIN:  Inspection and palpation of skin and subcutaneous tissue at baseline  PSYCH:  insight and judgement, memory seems intact, affect and mood normal    Lab/Diagnostic data: reviewed in nursing home EHR    ASSESSMENT/PLAN  Chronic systolic congestive heart failure (H)   No evidence of fluid overload.  Continue with beta-blocker, and Lasix    Essential hypertension  Blood pressures reviewed in nursing home E HR.  Most blood pressures less than 140.  Continue metoprolol and Lasix.    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    Chronic obstructive pulmonary disease, unspecified COPD type (H)  Stable.  Currently has no complaints of shortness of breath or cough.  Continue inhaler .    Major depressive disorder, recurrent severe without psychotic features (H)  Followed by in house psychiatry.  Defer med management to them.  Patient states she is anxious today.  Continues on Depakote, duloxetine, mirtazapine, Zyprexa, Seroquel.    Slow transit constipation  Stable. Continue senna.     Hyperlipidemia with target low density lipoprotein (LDL) cholesterol less than 70 mg/dL  Last " lipid panel done 4/4/20 in care everywhere. LDL 42 and cholesterol 83. HDL 23. Continue following with cardiology.     Hypothyroidism, unspecified type  TSH   Date Value Ref Range Status   12/04/2020 41.34 (H) 0.40 - 4.00 mU/L Final    I couldn't find any mention of this being addressed. Will increase levothyroxine and follow up. Patient has been gaining weight and sleeping more.     Atherosclerosis of coronary artery bypass graft of native heart with angina pectoris (H)  Followed by Dr. Saucedo of cardiology. B/ps mostly controlled.    Paroxysmal atrial fibrillation (H)  Rate controlled. Only on aspirin for stroke prevention.     Cardiac pacemaker in situ  Needs new battery. Patient nervous about it.  Copied from last visit : ICD has reached CLARENCE (elective replacement indicator) on 4-6-2021 per remote  Status: non-urgent (EOL-end of battery life imminent).   Present operating mode is DDDR  with presenting rhythm of AS/Bi V paced rate 62 bpm.  Last seen by provider: *1- by Tiffanie BUCHANAN Per her note: Will continue with device checks, next is 2/8/2021 - patient will need generator change when device hits CLARENCE, however discussed gen change for biventricular pacemaker and not the defibrillator portion of the device.  Last echo done: January 2020 withcalculated EF of 47% . Order placed for limited echo if not done within the last year.  Last in clinic device check done: December 2020 with underlying rhythm of NSR 80 bpm.  Atrial paced: 30.5%; BiV paced: 97.4%  Lead measurements are: within normal limits noting stable but higher LV threshold at 2.75v .  Any previous lead issues to be addressed: no     Anemia  Has history of iron deficiency anemia, vitamin B deficiency.  No longer on vitamin B supplements.  Will discontinue Plavix and iron.  Follow-up with the CBC in 8 weeks.  Hemoglobins have been stable ~ 8 - 9     Orders:  Discontinue ferrous sulfate  Discontinue Plavix  Increase levothyroxine to 112 mcg  p.o. every day diagnosis hypothyroidism  Check CBC and lipid profile in 8 weeks diagnosis CAD    Electronically signed by:  Lamar Keating NP

## 2021-05-28 ENCOUNTER — HOSPITAL LABORATORY (OUTPATIENT)
Dept: NURSING HOME | Facility: OTHER | Age: 66
End: 2021-05-28

## 2021-05-28 LAB
BASOPHILS # BLD AUTO: 0 10E9/L (ref 0–0.2)
BASOPHILS NFR BLD AUTO: 0.6 %
CHOLEST SERPL-MCNC: 154 MG/DL
DIFFERENTIAL METHOD BLD: NORMAL
EOSINOPHIL # BLD AUTO: 0.2 10E9/L (ref 0–0.7)
EOSINOPHIL NFR BLD AUTO: 3.1 %
ERYTHROCYTE [DISTWIDTH] IN BLOOD BY AUTOMATED COUNT: 12.5 % (ref 10–15)
HCT VFR BLD AUTO: 38.3 % (ref 35–47)
HDLC SERPL-MCNC: 42 MG/DL
HGB BLD-MCNC: 12.5 G/DL (ref 11.7–15.7)
IMM GRANULOCYTES # BLD: 0 10E9/L (ref 0–0.4)
IMM GRANULOCYTES NFR BLD: 0.4 %
LDLC SERPL CALC-MCNC: 83 MG/DL
LYMPHOCYTES # BLD AUTO: 2.2 10E9/L (ref 0.8–5.3)
LYMPHOCYTES NFR BLD AUTO: 31.7 %
MCH RBC QN AUTO: 30.8 PG (ref 26.5–33)
MCHC RBC AUTO-ENTMCNC: 32.6 G/DL (ref 31.5–36.5)
MCV RBC AUTO: 94 FL (ref 78–100)
MONOCYTES # BLD AUTO: 0.5 10E9/L (ref 0–1.3)
MONOCYTES NFR BLD AUTO: 6.6 %
NEUTROPHILS # BLD AUTO: 4 10E9/L (ref 1.6–8.3)
NEUTROPHILS NFR BLD AUTO: 57.6 %
NONHDLC SERPL-MCNC: 112 MG/DL
NRBC # BLD AUTO: 0 10*3/UL
NRBC BLD AUTO-RTO: 0 /100
PLATELET # BLD AUTO: 169 10E9/L (ref 150–450)
RBC # BLD AUTO: 4.06 10E12/L (ref 3.8–5.2)
TRIGL SERPL-MCNC: 144 MG/DL
WBC # BLD AUTO: 6.9 10E9/L (ref 4–11)

## 2021-06-05 VITALS
RESPIRATION RATE: 20 BRPM | HEART RATE: 60 BPM | HEIGHT: 60 IN | DIASTOLIC BLOOD PRESSURE: 76 MMHG | WEIGHT: 141.6 LBS | SYSTOLIC BLOOD PRESSURE: 132 MMHG | BODY MASS INDEX: 27.8 KG/M2 | TEMPERATURE: 96 F | OXYGEN SATURATION: 94 %

## 2021-06-05 VITALS
DIASTOLIC BLOOD PRESSURE: 67 MMHG | RESPIRATION RATE: 18 BRPM | OXYGEN SATURATION: 92 % | TEMPERATURE: 98 F | BODY MASS INDEX: 28.07 KG/M2 | SYSTOLIC BLOOD PRESSURE: 118 MMHG | HEART RATE: 58 BPM | HEIGHT: 60 IN | WEIGHT: 143 LBS

## 2021-06-05 VITALS
WEIGHT: 141.6 LBS | HEIGHT: 60 IN | TEMPERATURE: 98 F | OXYGEN SATURATION: 95 % | BODY MASS INDEX: 27.8 KG/M2 | HEART RATE: 65 BPM | RESPIRATION RATE: 20 BRPM | DIASTOLIC BLOOD PRESSURE: 76 MMHG | SYSTOLIC BLOOD PRESSURE: 132 MMHG

## 2021-06-10 ENCOUNTER — NURSING HOME VISIT (OUTPATIENT)
Dept: GERIATRICS | Facility: CLINIC | Age: 66
End: 2021-06-10
Payer: COMMERCIAL

## 2021-06-10 VITALS
OXYGEN SATURATION: 96 % | DIASTOLIC BLOOD PRESSURE: 62 MMHG | RESPIRATION RATE: 19 BRPM | HEIGHT: 58 IN | BODY MASS INDEX: 33.37 KG/M2 | WEIGHT: 159 LBS | HEART RATE: 64 BPM | TEMPERATURE: 98.5 F | SYSTOLIC BLOOD PRESSURE: 99 MMHG

## 2021-06-10 DIAGNOSIS — I50.22 CHRONIC SYSTOLIC CONGESTIVE HEART FAILURE (H): Primary | ICD-10-CM

## 2021-06-10 DIAGNOSIS — I70.422 ATHEROSCLEROSIS OF AUTOLOGOUS VEIN BYPASS GRAFT(S) OF THE EXTREMITIES WITH REST PAIN, LEFT LEG (H): ICD-10-CM

## 2021-06-10 DIAGNOSIS — J44.9 CHRONIC OBSTRUCTIVE PULMONARY DISEASE, UNSPECIFIED COPD TYPE (H): ICD-10-CM

## 2021-06-10 DIAGNOSIS — F33.2 MAJOR DEPRESSIVE DISORDER, RECURRENT SEVERE WITHOUT PSYCHOTIC FEATURES (H): ICD-10-CM

## 2021-06-10 DIAGNOSIS — I48.20 CHRONIC ATRIAL FIBRILLATION (H): ICD-10-CM

## 2021-06-10 DIAGNOSIS — Z95.0 CARDIAC PACEMAKER IN SITU: ICD-10-CM

## 2021-06-10 DIAGNOSIS — I10 ESSENTIAL HYPERTENSION: ICD-10-CM

## 2021-06-10 DIAGNOSIS — M35.3 POLYMYALGIA RHEUMATICA (H): ICD-10-CM

## 2021-06-10 PROCEDURE — 99309 SBSQ NF CARE MODERATE MDM 30: CPT | Performed by: FAMILY MEDICINE

## 2021-06-10 ASSESSMENT — MIFFLIN-ST. JEOR: SCORE: 1145.97

## 2021-06-10 NOTE — LETTER
6/10/2021        RE: Letty Escobar  [facility] [place]  [address] [facility] [address]        [place] [facility]  Chief Complaint   Patient presents with     USP Regulatory     [place] Medical Record Number:  [id number]  Place of Service where encounter took place:  [facility] () [[id number]]    HPI:    Letty Escobar  is 65 year old (1955), who is being seen today for a federally mandated E/M visit.  HPI information obtained from: facility staff, patient report and [facility] chart review.     Today's concerns are:   - Resident seen and examined.   - Reports pacemaker was changed. Denies CP or SOB  - reports anxiety, but could not pinpoint what triggers it or what makes it better. Reports appetite and sleep are fine. Asks if she can have Ativan.   - denies pain today. .   - RN has no concern today.   - GNP has no concern  --------------------------------  - - Past Medical, social, family histories, medications, and allergies reviewed and updated  - Medications reviewed: in the chart and EHR.   - Case Management:   I have reviewed the care plan and MDS and do agree with the plan. Patient's desire to return to the community is not present.  Information reviewed:  Medications, vital signs, orders, and nursing notes.      MEDICATIONS:  Current Outpatient Medications   Medication Sig Dispense Refill     acetaminophen (TYLENOL) 500 MG tablet Take 2 tablets (1,000 mg) by mouth 3 times daily       aspirin 81 MG EC tablet Take 81 mg by mouth daily       cyanocobalamin (CYANOCOBALAMIN) 1000 MCG/ML injection Inject 1 mL into the muscle every 30 days       divalproex sodium delayed-release (DEPAKOTE) 250 MG DR tablet Take 250 mg by mouth daily       DULoxetine HCl 40 MG CPEP Take 40 mg by mouth daily       fluticasone-vilanterol (BREO ELLIPTA) 200-25 MCG/INH inhaler Inhale 1 puff into the lungs daily       furosemide (LASIX) 20 MG tablet Take 20 mg by mouth  "daily       ketoconazole (NIZORAL) 2 % external cream Apply topically At Bedtime       levothyroxine (SYNTHROID/LEVOTHROID) 112 MCG tablet Take 112 mcg by mouth daily before breakfast        Melatonin 10 MG TABS tablet Take 10 mg by mouth At Bedtime        metoprolol succinate ER (TOPROL-XL) 25 MG 24 hr tablet Take 25 mg by mouth daily       miconazole (MICATIN) 2 % AERP powder Apply topically as needed        mirtazapine (REMERON) 30 MG tablet Take 30 mg by mouth daily       nitroGLYcerin (NITROSTAT) 0.4 MG sublingual tablet Place 0.4 mg under the tongue every 5 minutes as needed for chest pain For chest pain place 1 tablet under the tongue every 5 minutes for 3 doses. If symptoms persist 5 minutes after 1st dose call 911.       OLANZapine (ZYPREXA) 5 MG tablet Take 2.5 mg by mouth daily        potassium chloride ER (MICRO-K) 10 MEQ CR capsule Take 10 mEq by mouth daily        QUEtiapine (SEROQUEL) 25 MG tablet Take 50 mg by mouth 3 times daily        rosuvastatin (CRESTOR) 10 MG tablet Take 10 mg by mouth At Bedtime        sennosides (SENOKOT) 8.6 MG tablet Take 2 tablets by mouth At Bedtime       Vitamin D3 (VITAMIN D) 10 MCG (400 UNIT) tablet Take 20 mcg by mouth daily         ROS: 4 point ROS including Respiratory, CV, GI and , other than that noted in the HPI,  is negative    Vitals:  BP 99/62   Pulse 64   Temp 98.5  F (36.9  C)   Resp 19   Ht 1.473 m (4' 10\")   Wt 72.1 kg (159 lb)   SpO2 96%   BMI 33.23 kg/m    Body mass index is 33.23 kg/m .  Exam:  GENERAL APPEARANCE:  in no distress, cooperative  RESP:  lungs clear to auscultation   CV:  S1S2 audible, regular HR, no murmur appreciated.   ABDOMEN:  soft, NT/ND, BS audible. no mass appreciated on palpation.   M/S:   no joint deformity noted on observation.   SKIN:  Pacemaker in place.   NEURO:   No NFD appreciated on observation. l  PSYCH:  affect and mood normal    Lab/Diagnostic data: Reviewed in the chart and EHR.  "       ASSESSMENT/PLAN  -----------------------------  # Chronic systolic CHF (H)  # Ischemic cardiomyopathy and Hx of S/P CABG x 5, ICD in place  # Essential hypertension  # Mixed hyperlipidemia  # Internal carotid artery stenosis, bilateral :  - compensated. Pacemaker recently changed.  no concern. followed by Cardiology.   - on lipitor. LDL 83 (may 2021)        COPD (H): at baseline. 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 routine labs  - on Remeron 30 mg, this could contribute to her anxiety.   - On Olanzapine and Seroquel, both are 2nd generation antipsychotic.   - Stable. Counseled on AE of Benzo, verbalized understanding.   - consider consulting Psychiatry  for drug review.            Polymyalgia rheumatica (H)  Chronic pain disorder  Chronic bilateral low back pain without sciatica  Peripheral polyneuropathy  Frailty  - analgesia optimal  - Significant  Deficits requiring NH placement. Requiring extensive assistance from nursing. Up for meals only o/w spends the day resting in bed.        # protein-calorie malnutrition (H)  - Body mass index is 33.23 kg/m . from 31.98 kg/m . from 27.55 kg/m . from 23.63 kg/m .  Improving  - In frail elderly keep BMI b/lw 25-35 Kg(m2).         Irritable bowel syndrome with diarrhea  Gastroesophageal reflux disease, esophagitis presence not specified  - stable.     Normocytic anemia:   - Hb wnl at 12.5 (May 2021). Anemia was Felt 2/2 asa and plavix. On Protonix.  Noted on iron supplement . Ferritin 236 June 2020).  Continued to be on iron supplement daily. Consider stopping, repeat HH in 3-6 months.            Hypothyroidism:  TSH   Date Value Ref Range Status    12/04/2020 41.34 (H) 0.40 - 4.00 mU/L Final   On Levothyroxine. In frail Elderly adult, recommended TSH level is around 6 providing patient is asymptomatic and FT4 is wnl- preferably on the lower normal level.    Order: See above, otherwise, continue the rest of the current POC.       Electronically signed by:  Paola Pastor MD        Sincerely,        Paola Pastor MD

## 2021-06-10 NOTE — PROGRESS NOTES
[place] [facility]  Chief Complaint   Patient presents with     assisted Regulatory     [place] Medical Record Number:  [id number]  Place of Service where encounter took place:  [facility] () [[id number]]    HPI:    Letty Escobar  is 65 year old (1955), who is being seen today for a federally mandated E/M visit.  HPI information obtained from: facility staff, patient report and [facility] chart review.     Today's concerns are:   - Resident seen and examined.   - Reports pacemaker was changed. Denies CP or SOB  - reports anxiety, but could not pinpoint what triggers it or what makes it better. Reports appetite and sleep are fine. Asks if she can have Ativan.   - denies pain today. .   - RN has no concern today.   - GNP has no concern  --------------------------------  - - Past Medical, social, family histories, medications, and allergies reviewed and updated  - Medications reviewed: in the chart and EHR.   - Case Management:   I have reviewed the care plan and MDS and do agree with the plan. Patient's desire to return to the community is not present.  Information reviewed:  Medications, vital signs, orders, and nursing notes.      MEDICATIONS:  Current Outpatient Medications   Medication Sig Dispense Refill     acetaminophen (TYLENOL) 500 MG tablet Take 2 tablets (1,000 mg) by mouth 3 times daily       aspirin 81 MG EC tablet Take 81 mg by mouth daily       cyanocobalamin (CYANOCOBALAMIN) 1000 MCG/ML injection Inject 1 mL into the muscle every 30 days       divalproex sodium delayed-release (DEPAKOTE) 250 MG DR tablet Take 250 mg by mouth daily       DULoxetine HCl 40 MG CPEP Take 40 mg by mouth daily       fluticasone-vilanterol (BREO ELLIPTA) 200-25 MCG/INH inhaler Inhale 1 puff into the lungs daily       furosemide (LASIX) 20 MG tablet Take 20 mg by mouth daily       ketoconazole (NIZORAL) 2 % external cream Apply topically At Bedtime       levothyroxine  "(SYNTHROID/LEVOTHROID) 112 MCG tablet Take 112 mcg by mouth daily before breakfast        Melatonin 10 MG TABS tablet Take 10 mg by mouth At Bedtime        metoprolol succinate ER (TOPROL-XL) 25 MG 24 hr tablet Take 25 mg by mouth daily       miconazole (MICATIN) 2 % AERP powder Apply topically as needed        mirtazapine (REMERON) 30 MG tablet Take 30 mg by mouth daily       nitroGLYcerin (NITROSTAT) 0.4 MG sublingual tablet Place 0.4 mg under the tongue every 5 minutes as needed for chest pain For chest pain place 1 tablet under the tongue every 5 minutes for 3 doses. If symptoms persist 5 minutes after 1st dose call 911.       OLANZapine (ZYPREXA) 5 MG tablet Take 2.5 mg by mouth daily        potassium chloride ER (MICRO-K) 10 MEQ CR capsule Take 10 mEq by mouth daily        QUEtiapine (SEROQUEL) 25 MG tablet Take 50 mg by mouth 3 times daily        rosuvastatin (CRESTOR) 10 MG tablet Take 10 mg by mouth At Bedtime        sennosides (SENOKOT) 8.6 MG tablet Take 2 tablets by mouth At Bedtime       Vitamin D3 (VITAMIN D) 10 MCG (400 UNIT) tablet Take 20 mcg by mouth daily         ROS: 4 point ROS including Respiratory, CV, GI and , other than that noted in the HPI,  is negative    Vitals:  BP 99/62   Pulse 64   Temp 98.5  F (36.9  C)   Resp 19   Ht 1.473 m (4' 10\")   Wt 72.1 kg (159 lb)   SpO2 96%   BMI 33.23 kg/m    Body mass index is 33.23 kg/m .  Exam:  GENERAL APPEARANCE:  in no distress, cooperative  RESP:  lungs clear to auscultation   CV:  S1S2 audible, regular HR, no murmur appreciated.   ABDOMEN:  soft, NT/ND, BS audible. no mass appreciated on palpation.   M/S:   no joint deformity noted on observation.   SKIN:  Pacemaker in place.   NEURO:   No NFD appreciated on observation. l  PSYCH:  affect and mood normal    Lab/Diagnostic data: Reviewed in the chart and EHR.        ASSESSMENT/PLAN  -----------------------------  # Chronic systolic CHF (H)  # Ischemic cardiomyopathy and Hx of S/P CABG x 5, " ICD in place  # Essential hypertension  # Mixed hyperlipidemia  # Internal carotid artery stenosis, bilateral :  - compensated. Pacemaker recently changed.  no concern. followed by Cardiology.   - on lipitor. LDL 83 (may 2021)        COPD (H): at baseline. 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 routine labs  - on Remeron 30 mg, this could contribute to her anxiety.   - On Olanzapine and Seroquel, both are 2nd generation antipsychotic.   - Stable. Counseled on AE of Benzo, verbalized understanding.   - consider consulting Psychiatry  for drug review.            Polymyalgia rheumatica (H)  Chronic pain disorder  Chronic bilateral low back pain without sciatica  Peripheral polyneuropathy  Frailty  - analgesia optimal  - Significant  Deficits requiring NH placement. Requiring extensive assistance from nursing. Up for meals only o/w spends the day resting in bed.        # protein-calorie malnutrition (H)  - Body mass index is 33.23 kg/m . from 31.98 kg/m . from 27.55 kg/m . from 23.63 kg/m .  Improving  - In frail elderly keep BMI b/lw 25-35 Kg(m2).         Irritable bowel syndrome with diarrhea  Gastroesophageal reflux disease, esophagitis presence not specified  - stable.     Normocytic anemia:   - Hb wnl at 12.5 (May 2021). Anemia was Felt 2/2 asa and plavix. On Protonix.  Noted on iron supplement . Ferritin 236 June 2020).  Continued to be on iron supplement daily. Consider stopping, repeat HH in 3-6 months.            Hypothyroidism:  TSH   Date Value Ref Range Status   12/04/2020 41.34 (H) 0.40 - 4.00 mU/L Final   On Levothyroxine. In frail Elderly adult, recommended TSH level is around 6 providing patient  is asymptomatic and FT4 is wnl- preferably on the lower normal level.    Order: See above, otherwise, continue the rest of the current POC.       Electronically signed by:  Paola Pastor MD

## 2021-06-13 NOTE — PROGRESS NOTES
Code Status:  DNR/DNI  Visit Type: Discharge Summary     Facility:  [facility] [[id number]]          PCP:  Provider, No Primary Care  None       Admission Date to our Facility: 11/9/2020  Discharge Date from our Facility: 11/25/2020    Discharge Diagnosis:    1. 2019 novel coronavirus disease (COVID-19)     2. Chronic obstructive pulmonary disease, unspecified COPD type (H)     3. Ischemic cardiomyopathy     4. Diabetes mellitus type 2 in obese (H)     5. Secondary hypertension     6. Mood disorder due to a general medical condition     7. PTSD (post-traumatic stress disorder)     8. Personality disorder (H)     9. Hypertension, unspecified type     10. Posttraumatic stress disorder          History of Present Illness: Letty Escobar is a 65 y.o. female with a past medical history for panic disorder with agoraphobia, MDD, anxiety, drug-seeking behavior, personality disorder, PTSD, History suicidal ideation, HTN, HLD, ischemic cardiomyopathy, History MI and CABG x 5 (2015), CHF (EF 45-50%), ICA stenosis bilateral, pAfib, Morbid obesity, IBS with Diarrhea, GERD, DM2, chronic pain with neuropathy, urinary incontinence, PMR, Anemia, Vit B12 def, hypothyroidism.  She has been transferred to this facility due to a positive COVID-19 test on 10/7/2020.     Chart review reveals that she was having high temperatures in late October on 10/28/2020.  She has been tested multiple times for COVID-19 with negative results    Skilled Nursing Facility Course:  While at the isolation facility she was grossly asymptomatic her entire stay.  She does have a cough which is intermittent and is likely chronic due to lying in bed for a good part of the day.  She does need significant reassurance and motivation for activity.  She has a good appetite and has been without respiratory symptoms or GI symptoms.  She has also been afebrile her entire stay.        Discharge Medications:    Current Outpatient Medications   Medication  Sig Dispense Refill     acetaminophen (TYLENOL) 500 MG tablet Take 1,000 mg by mouth 3 (three) times a day.       acetaminophen (TYLENOL) 500 MG tablet Take 500 mg by mouth 2 (two) times a day as needed for pain.       cholecalciferol, vitamin D3, (VITAMIN D3) 10 mcg (400 unit) Chew Chew 800 Units daily.       cyanocobalamin 1,000 mcg/mL injection Inject 1 mL into the shoulder, thigh, or buttocks every 30 (thirty) days.       divalproex (DEPAKOTE SPRINKLE) 125 mg capsule Take 250 mg by mouth daily.       DULoxetine 40 mg CpDR Take 40 mg by mouth daily.       ferrous sulfate 325 (65 FE) MG tablet Take 325 mg by mouth daily.       fluticasone furoate-vilanteroL (BREO ELLIPTA) 200-25 mcg/dose DsDv inhaler Inhale 1 puff daily.       hydrOXYzine HCL (ATARAX) 25 MG tablet Take 25 mg by mouth every 6 (six) hours as needed for itching.       ketoconazole (NIZORAL) 2 % cream Apply topically daily.       levothyroxine (SYNTHROID, LEVOTHROID) 100 MCG tablet Take 100 mcg by mouth daily.       melatonin 3 mg Tab tablet Take 6 mg by mouth at bedtime.       metoprolol succinate (TOPROL-XL) 25 MG Take 25 mg by mouth daily.       miconazole nitrate 2 % SprA Apply topically 2 (two) times a day as needed.       mirtazapine (REMERON) 30 MG tablet Take 30 mg by mouth daily.       OLANZapine (ZYPREXA) 7.5 MG tablet Take 7.5 mg by mouth daily.       potassium chloride (KLOR-CON) 10 MEQ CR tablet Take 10 mEq by mouth daily.       rosuvastatin (CRESTOR) 10 MG tablet Take 10 mg by mouth daily.       senna (SENOKOT) 8.6 mg tablet Take 2 tablets by mouth daily.       No current facility-administered medications for this visit.        For most current and accurate medication list, please contact the skilled nursing facility that this patient visit took place at.      Discharge Plan:  Patient is stable to return to her previous LTC facility with current medications and treatments.     Review of Systems   Patient denies fever, chills, headache,  lightheadedness, dizziness, rhinorrhea, cough, congestion, shortness of breath, chest pain, palpitations, abdominal pain, n/v, diarrhea, constipation, change in appetite, dysuria, frequency, burning or pain with urination.  Other than stated in HPI all other review of systems is negative.         Physical Exam   In order to maintain social distancing during the COVID pandemic, a visual exam was completed.     Vitals:    11/24/20 1027   BP: 132/76   Pulse: 60   Resp: 20   Temp: (!) 96  F (35.6  C)   SpO2: 94%        Patient is well nourished and no acute distress.  Head is AT/NC, EOM intact. Respiratory effort normal. No visible LE edema. Alert and oriented, face symmetric. No visible skin issues or rashes. Mood anxious at baseline      Labs:  No results found for this or any previous visit (from the past 240 hour(s)).        Assessment:  1. 2019 novel coronavirus disease (COVID-19)     2. Chronic obstructive pulmonary disease, unspecified COPD type (H)     3. Ischemic cardiomyopathy     4. Diabetes mellitus type 2 in obese (H)     5. Secondary hypertension     6. Mood disorder due to a general medical condition     7. PTSD (post-traumatic stress disorder)     8. Personality disorder (H)     9. Hypertension, unspecified type     10. Posttraumatic stress disorder         MEDICAL EQUIPMENT NEEDS:  NA        Electronically signed by: Ernestina Gatica CNP     Scribe Attestation (For Scribes USE Only)... I have personally evaluated and examined the patient. The Attending was available to me as a supervising provider if needed./Scribe Attestation (For Scribes USE Only)...

## 2021-06-13 NOTE — PROGRESS NOTES
Code Status:  DNR/DNI  Visit Type: Discharge Summary     Facility:  [facility] [[id number]]          PCP:  Provider, No Primary Care  None       Admission Date to our Facility: 11/9/2020  Discharge Date from our Facility: 11/19/2020    Discharge Diagnosis:    1. 2019 novel coronavirus disease (COVID-19)     2. Chronic obstructive pulmonary disease, unspecified COPD type (H)     3. Ischemic cardiomyopathy     4. Diabetes mellitus type 2 in obese (H)     5. Secondary hypertension     6. Mood disorder due to a general medical condition     7. PTSD (post-traumatic stress disorder)     8. Personality disorder (H)     9. Weakness          History of Present Illness: Letty Escobar is a 65 y.o. female with a past medical history for panic disorder with agoraphobia, MDD, anxiety, drug-seeking behavior, personality disorder, PTSD, History suicidal ideation, HTN, HLD, ischemic cardiomyopathy, History MI and CABG x 5 (2015), CHF (EF 45-50%), ICA stenosis bilateral, pAfib, Morbid obesity, IBS with Diarrhea, GERD, DM2, chronic pain with neuropathy, urinary incontinence, PMR, Anemia, Vit B12 def, hypothyroidism.  She has been transferred to this facility due to a positive COVID-19 test on 10/7/2020.     Chart review reveals that she was having high temperatures in late October on 10/28/2020.  She has been tested multiple times for COVID-19 with negative results    Skilled Nursing Facility Course: While at the isolation facility she was grossly asymptomatic her entire stay.  She does have a cough which is intermittent and is likely chronic due to lying in bed for a good part of the day.  She does need significant reassurance and motivation for activity.  She has a good appetite and has been without respiratory symptoms or GI symptoms.  She has also been afebrile her entire stay.    Discharge Medications:   Current Outpatient Medications   Medication Sig Dispense Refill     acetaminophen (TYLENOL) 500 MG tablet  Take 1,000 mg by mouth 3 (three) times a day.       aspirin 81 MG EC tablet Take 81 mg by mouth daily.       cyanocobalamin 1,000 mcg/mL injection Inject 1 mL into the shoulder, thigh, or buttocks every 30 (thirty) days.       divalproex (DEPAKOTE SPRINKLE) 125 mg capsule Take 250 mg by mouth daily.       DULoxetine 40 mg CpDR Take 40 mg by mouth daily.       ferrous sulfate 325 (65 FE) MG tablet Take 325 mg by mouth daily.       ketoconazole (NIZORAL) 2 % cream Apply topically daily.       levothyroxine (SYNTHROID, LEVOTHROID) 100 MCG tablet Take 100 mcg by mouth daily.       melatonin 3 mg Tab tablet Take 6 mg by mouth at bedtime.       metoprolol succinate (TOPROL-XL) 25 MG Take 25 mg by mouth daily.       miconazole nitrate 2 % SprA Apply topically 2 (two) times a day as needed.       mirtazapine (REMERON) 30 MG tablet Take 30 mg by mouth daily.       OLANZapine (ZYPREXA) 7.5 MG tablet Take 7.5 mg by mouth daily.       potassium chloride (KLOR-CON) 10 MEQ CR tablet Take 10 mEq by mouth daily.       rosuvastatin (CRESTOR) 10 MG tablet Take 10 mg by mouth daily.       senna (SENOKOT) 8.6 mg tablet Take 2 tablets by mouth daily.       No current facility-administered medications for this visit.        For most current and accurate medication list, please contact the skilled nursing facility that this patient visit took place at.      Discharge Plan: Patient is stable to discharge back to her previous facility.  During her isolation stay she has had no changes to her medications or treatments.    *Review of Systems   Patient denies fever, chills, headache, lightheadedness, dizziness, rhinorrhea, cough, congestion, shortness of breath, chest pain, palpitations, abdominal pain, n/v, diarrhea, constipation, change in appetite, dysuria, frequency, burning or pain with urination.  Other than stated in HPI all other review of systems is negative.         Physical Exam   In order to maintain social distancing during the  COVID pandemic, a visual exam was completed.     Vitals:    11/18/20 1039   BP: 132/76   Pulse: 65   Resp: 20   Temp: 98  F (36.7  C)   SpO2: 95%        Patient is an elderly female in no acute distress.  Head is AT/NC, EOM intact. Respiratory effort normal. No visible LE edema. Alert and oriented, face symmetric. No visible skin issues or rashes. Mood anxious      Labs:  No results found for this or any previous visit (from the past 240 hour(s)).      Assessment:  1. 2019 novel coronavirus disease (COVID-19)     2. Chronic obstructive pulmonary disease, unspecified COPD type (H)     3. Ischemic cardiomyopathy     4. Diabetes mellitus type 2 in obese (H)     5. Secondary hypertension     6. Mood disorder due to a general medical condition     7. PTSD (post-traumatic stress disorder)     8. Personality disorder (H)     9. Weakness         The patient is, or has been, under my care and I have initiated the establishment of the plan of care. This patient will be followed by a physician who will periodically review the plan of care.        Electronically signed by: Ernestina Gatica CNP

## 2021-06-13 NOTE — PROGRESS NOTES
Code Status:  DNR/DNI  Visit Type: Review Of Multiple Medical Conditions (Cancer for Covid isolation, hypertension, PTSD with personality disorder, HLD, anemia, GERD, cardiomyopathy)     Facility:  [facility] [[id number]]      Facility Type: SNF (Skilled Nursing Facility, TCU)    History of Present Illness:   Facility Admission Date: 11/9/2020    Letty Escobar is a 64 y.o. female panic disorder with agoraphobia, MDD, anxiety, drug-seeking behavior, personality disorder, PTSD, History suicidal ideation, HTN, HLD, ischemic cardiomyopathy, History MI and CABG x 5 (2015), CHF (EF 45-50%), ICA stenosis bilateral, pAfib, Morbid obesity, IBS with Diarrhea, GERD, DM2, chronic pain with neuropathy, urinary incontinence, PMR, Anemia, Vit B12 def, hypothyroidism.  She has been transferred to this facility due to a positive COVID-19 test on 10/7/2020.    Chart review reveals that she was having high temperatures in late October on 10/28/2020.  She has been tested multiple times for COVID-19 with negative results.  Upon her positive test she has been asymptomatic without respiratory symptoms, afebrile and without GI symptoms.  She denies any pain or discomfort today.    For her psychiatric issues she is followed by ACP and psychiatrist Dr. Martell.      Past Medical History:   Diagnosis Date     ACP (advance care planning) 11/3/2010    Formatting of this note might be different from the original. Patient has identified Health Care Agent(s): Yes Add Health Care Agents: Yes   Health Care Agent(s):  Primary Health Care Agent:   Edwar Escobar Relationship:  Spouse Phone:   [phone]  Secondary Health Care Agent:   Chiki Oro Relationship:   Son Phone:   [phone]  Patient has Advance Care Plan Documents ([employer]     Acute coronary syndrome (H) 11/22/2019     Acute exacerbation of CHF (congestive heart failure) (H) 11/11/2019     Acute on chronic systolic (congestive) heart failure (H) 1/28/2020      Anemia of chronic disease 1/5/2013     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, left leg (H) 1/15/2020     BPPV (benign paroxysmal positional vertigo) 5/31/2018     Candidiasis 3/1/2020     Carotid stenosis, right 7/13/2016    Carotid US 05/04/2018 showed moderate plaque formation, consistent with 50 to 69% stenosis in the right internal carotid artery, not significantly changed from 8/5/2015.  Moderate plaque formation, consistent with 50 to 69% stenosis in the left internal carotid artery; there has been mild progression of the left ICA stenosis since 8/5/2015.     Chest pain 11/11/2019     Chronic bilateral low back pain without sciatica 1/17/2018     Chronic pain disorder 10/1/2017     Constipation 3/20/2020     COPD (chronic obstructive pulmonary disease) (H) 6/24/2020     Coronary atherosclerosis 2/6/2009 2/5/2009 - MI - Proximal RCA 99%, mild-mod disease elsewhere.  EF 60%.  PCI:  MYRON to pRCA. 2/12/2009 - admit CP - Widely patent RCA stent. Moderate diffuse CAD. Severe stenosis in trivial PDA branch. LVEF 45%. 1/25/2010 - admit CP - PTCA and stent of diagonal 9/8/2010 - admit CP - LAD patent stent. Moderate diffuse CAD. Medical management recommended.  4/25/2012 - NSTEMI - Acute total occlusion of     Cubital tunnel syndrome on left 11/13/2012     Diabetes mellitus type 2 in obese (H) 7/13/2006     Drug-seeking behavior 4/4/2020     Failure to thrive in adult 9/3/2020     Hereditary and idiopathic peripheral neuropathy 4/24/2007     Hyperlipidemia with target low density lipoprotein (LDL) cholesterol less than 70 mg/dL 5/30/2007     Hypertension 10/14/2015     Hypothyroidism 12/10/2010     Irritable bowel syndrome 9/7/2015     Ischemic cardiomyopathy 9/20/2015    EF of 40-45%, status post RV lead revision and LV epicardial lead placement via mini-thoracotomy in August 2016.     Long-term use of high-risk medication 4/14/2020     Moniliasis, cutaneous  3/31/2020     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     Other specified postprocedural states 10/8/2015     Pain medication agreement 4/20/2013    Controlled substance agreement for percocet #30/month on file and signed 4/17/13.  Designated pharmacy: [facility] Prescribing physician: Andrea Diagnosis: Ulnar neuropathy     Panic disorder with agoraphobia 4/14/2020     Paroxysmal atrial fibrillation (H) 10/2/2015     Personality disorder (H) 3/5/2020    Rule out dependent personality     Polymyalgia rheumatica (H) 11/28/2018     Posttraumatic stress disorder 3/1/2020     Restless legs syndrome (RLS) 8/29/2007     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     Serum calcium elevated 3/1/2020     Somatic dysfunction of sacral region 1/17/2018     Subacromial bursitis of left shoulder joint 8/6/2018     Suicidal ideation 4/1/2020     TIA (transient ischemic attack) 5/4/2018     Tobacco abuse 2/17/2017     Urinary incontinence, mixed 9/24/2017     UTI (urinary tract infection) 4/17/2020     Vitamin B12 deficiency 2/14/2018     Weakness 12/17/2019     Past Surgical History:   Procedure Laterality Date     CARDIAC SURGERY       CARPAL TUNNEL RELEASE       CHOLECYSTECTOMY       Genitourinary surgery       No family history on file.  Social History     Socioeconomic History     Marital status:      Spouse name: Not on file     Number of children: Not on file     Years of education: Not on file     Highest education level: Not on file   Occupational History     Not on file   Social Needs     Financial resource strain: Not on file     Food insecurity     Worry: Not on file     Inability: Not on file     Transportation needs     Medical: Not on file     Non-medical: Not on file   Tobacco Use     Smoking status: Not on file   Substance and Sexual Activity     Alcohol use: Not on file     Drug use: Not on file     Sexual activity: Not on file   Lifestyle     Physical activity     Days per week: Not on file     Minutes per  session: Not on file     Stress: Not on file   Relationships     Social connections     Talks on phone: Not on file     Gets together: Not on file     Attends Yazidi service: Not on file     Active member of club or organization: Not on file     Attends meetings of clubs or organizations: Not on file     Relationship status: Not on file     Intimate partner violence     Fear of current or ex partner: Not on file     Emotionally abused: Not on file     Physically abused: Not on file     Forced sexual activity: Not on file   Other Topics Concern     Not on file   Social History Narrative     Not on file     Current Outpatient Medications   Medication Sig Dispense Refill     acetaminophen (TYLENOL) 500 MG tablet Take 1,000 mg by mouth 3 (three) times a day.       aspirin 81 MG EC tablet Take 81 mg by mouth daily.       cyanocobalamin 1,000 mcg/mL injection Inject 1 mL into the shoulder, thigh, or buttocks every 30 (thirty) days.       divalproex (DEPAKOTE SPRINKLE) 125 mg capsule Take 250 mg by mouth daily.       DULoxetine 40 mg CpDR Take 40 mg by mouth daily.       ferrous sulfate 325 (65 FE) MG tablet Take 325 mg by mouth daily.       ketoconazole (NIZORAL) 2 % cream Apply topically daily.       levothyroxine (SYNTHROID, LEVOTHROID) 100 MCG tablet Take 100 mcg by mouth daily.       melatonin 3 mg Tab tablet Take 6 mg by mouth at bedtime.       metoprolol succinate (TOPROL-XL) 25 MG Take 25 mg by mouth daily.       miconazole nitrate 2 % SprA Apply topically 2 (two) times a day as needed.       mirtazapine (REMERON) 30 MG tablet Take 30 mg by mouth daily.       OLANZapine (ZYPREXA) 7.5 MG tablet Take 7.5 mg by mouth daily.       potassium chloride (KLOR-CON) 10 MEQ CR tablet Take 10 mEq by mouth daily.       rosuvastatin (CRESTOR) 10 MG tablet Take 10 mg by mouth daily.       senna (SENOKOT) 8.6 mg tablet Take 2 tablets by mouth daily.       No current facility-administered medications for this visit.       Allergies   Allergen Reactions     Diphenhydramine Palpitations     Tolerated IV Benadryl when not pushed too fast       Isosorbide Dizziness and Other (See Comments)     Also causes syncope (has fallen before) and brain fog/mental disturbances - please do not prescribe     Nitroglycerin Dizziness and Other (See Comments)     Specifically the patch - please do not prescribe     Other Drug Allergy (See Comments) Hives and Itching     Bee Pollen      Adhesive Tape-Silicones Rash     Nystatin Dermatitis     Penicillins Rash and Swelling     Occurred as a child - not 100% sure on specific reactions     Sulfa (Sulfonamide Antibiotics) Other (See Comments)     Occurred as a child / patient does not remember specific reaction     Immunization History   Administered Date(s) Administered     INFLUENZA,SEASONAL QUAD, PF, =/> 6months 10/01/2014, 09/29/2015, 09/26/2016, 09/20/2017, 10/10/2018, 10/04/2019     Influenza, Seasonal, Inj PF IIV3 10/18/2011     Influenza,seasonal, Inj IIV3 10/21/2003, 01/05/2006, 09/22/2009, 10/19/2010, 10/18/2011, 10/08/2012, 10/15/2013     Pneumo Conj 13-V (2010&after) 11/14/2018     Pneumo Polysac 23-V 10/21/2003, 01/01/2008, 11/03/2015     Tdap 08/23/2010       Post Discharge Medication Reconciliation Status: discharge medications reconciled, continue medications without change    Review of Systems   Patient denies fever, chills, headache, lightheadedness, dizziness, rhinorrhea, cough, congestion, shortness of breath, chest pain, palpitations, abdominal pain, n/v, diarrhea, constipation, change in appetite, dysuria, frequency, burning or pain with urination.  Other than stated in HPI all other review of systems is negative.       Physical Exam   Vitals:    11/10/20 1255   BP: 118/67   Pulse: (!) 58   Resp: 18   Temp: 98  F (36.7  C)   SpO2: 92%       GENERAL APPEARANCE: well nourished woman, in no acute distress.  HEENT: normocephalic, atraumatic  PERRL, sclerae anicteric, conjunctivae clear  and moist, EOM intact  LUNGS: Lung sounds CTA, no adventitious sounds, respiratory effort normal.  CARD: RRR, S1, S2, without murmurs, gallops, rubs  ABD: Soft and nontender with normal bowel sounds.   MSK: Muscle strength and tone were equal  EXTREMITIES: No cyanosis, clubbing or edema.  NEURO: Alert and oriented x 3.Face is symmetric.  SKIN: Inspection of the skin reveals no rashes, ulcerations or petechiae.  PSYCH: Intermittent anxiety    Labs:  No results found for this or any previous visit (from the past 240 hour(s)).    Assessment:  1. 2019 novel coronavirus disease (COVID-19)     2. Chronic obstructive pulmonary disease, unspecified COPD type (H)     3. Ischemic cardiomyopathy     4. Diabetes mellitus type 2 in obese (H)     5. Hypertension, unspecified type     6. Mood disorder due to a general medical condition     7. Posttraumatic stress disorder     8. Personality disorder (H)     9. Weakness         Plan:  COVID-19: No symptoms at this time continue to monitor.  Anticipate return back to her previous facility in the next 10 days.  Counseled patient on this plan.    COPD: She is on no medications.  Breathing is stable at this time continue to monitor. I do not have any PFTs.    cardiomyopathy and CAD: On aspirin and Crestor.  No reports of angina.    Diabetes: Diet controlled not checking blood sugars.  Will not check and less symptomatic here and defer to PCP for A1c monitoring.    Hypertension: Blood pressures are stable continue with metoprolol.  Does have some heart rates under 60 may need to add parameters if consistently bradycardic.    Mood disorder, PTSD, and personality disorder; psychiatric illnesses are managed by [facility] and Dr. Martell.  No changes to medications at this time.  Continue on duloxetine for depression Depakote for mood disorder, mirtazapine for mood disorder, olanzapine for anxiety.  Monitor for worsening symptoms with isolation.    Weakness: PT OT to eval and treat as she is high  risk for declining functional status due to isolation.      Electronically signed by: Ernestina Gatica CNP

## 2021-06-21 NOTE — LETTER
Letter by Ernestina Gatica CNP at      Author: Ernestina Gatica CNP Service: -- Author Type: --    Filed:  Encounter Date: 11/10/2020 Status: (Other)         [facility] [address] [address]                                  November 10, 2020    Patient: Letty Escobar   MR Number: [id number]   YOB: 1955   Date of Visit: 11/10/2020     Dear Dr. Pruitt:    Thank you for referring Letty Escobar to me for evaluation. Below are the relevant portions of my assessment and plan of care.    If you have questions, please do not hesitate to call me. I look forward to following Letty along with you.    Sincerely,        Ernestina Gatica CNP          CC  No Recipients  Ernestina Gatica CNP  11/10/2020  3:17 PM  Signed  Code Status:  DNR/DNI  Visit Type: Review Of Multiple Medical Conditions (Cancer for Covid isolation, hypertension, PTSD with personality disorder, HLD, anemia, GERD, cardiomyopathy)     Facility:  [facility] [[id number]]      Facility Type: SNF (Skilled Nursing Facility, TCU)    History of Present Illness:   Facility Admission Date: 11/9/2020    Letty Escobar is a 64 y.o. female panic disorder with agoraphobia, MDD, anxiety, drug-seeking behavior, personality disorder, PTSD, History suicidal ideation, HTN, HLD, ischemic cardiomyopathy, History MI and CABG x 5 (2015), CHF (EF 45-50%), ICA stenosis bilateral, pAfib, Morbid obesity, IBS with Diarrhea, GERD, DM2, chronic pain with neuropathy, urinary incontinence, PMR, Anemia, Vit B12 def, hypothyroidism.  She has been transferred to this facility due to a positive COVID-19 test on 10/7/2020.    Chart review reveals that she was having high temperatures in late October on 10/28/2020.  She has been tested multiple times for COVID-19 with negative results.  Upon her positive test she has been asymptomatic without respiratory symptoms, afebrile and without GI symptoms.  She denies any pain  or discomfort today.    For her psychiatric issues she is followed by ACP and psychiatrist Dr. Martell.      Past Medical History:   Diagnosis Date   ? ACP (advance care planning) 11/3/2010    Formatting of this note might be different from the original. Patient has identified Health Care Agent(s): Yes Add Health Care Agents: Yes   Health Care Agent(s):  Primary Health Care Agent:   Edwar Escobar Relationship:  Spouse Phone:   [phone]  Secondary Health Care Agent:   Chiki Oro Relationship:   Son Phone:   [phone]  Patient has Advance Care Plan Documents (Health Care Direct   ? Acute coronary syndrome (H) 11/22/2019   ? Acute exacerbation of CHF (congestive heart failure) (H) 11/11/2019   ? Acute on chronic systolic (congestive) heart failure (H) 1/28/2020   ? Anemia of chronic disease 1/5/2013   ? 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, left leg (H) 1/15/2020   ? BPPV (benign paroxysmal positional vertigo) 5/31/2018   ? Candidiasis 3/1/2020   ? Carotid stenosis, right 7/13/2016    Carotid US 05/04/2018 showed moderate plaque formation, consistent with 50 to 69% stenosis in the right internal carotid artery, not significantly changed from 8/5/2015.  Moderate plaque formation, consistent with 50 to 69% stenosis in the left internal carotid artery; there has been mild progression of the left ICA stenosis since 8/5/2015.   ? Chest pain 11/11/2019   ? Chronic bilateral low back pain without sciatica 1/17/2018   ? Chronic pain disorder 10/1/2017   ? Constipation 3/20/2020   ? COPD (chronic obstructive pulmonary disease) (H) 6/24/2020   ? Coronary atherosclerosis 2/6/2009 2/5/2009 - MI - Proximal RCA 99%, mild-mod disease elsewhere.  EF 60%.  PCI:  MYRON to pRCA. 2/12/2009 - admit CP - Widely patent RCA stent. Moderate diffuse CAD. Severe stenosis in trivial PDA branch. LVEF 45%. 1/25/2010 - admit CP - PTCA and stent of diagonal 9/8/2010 - admit CP - LAD patent  stent. Moderate diffuse CAD. Medical management recommended.  4/25/2012 - NSTEMI - Acute total occlusion of   ? Cubital tunnel syndrome on left 11/13/2012   ? Diabetes mellitus type 2 in obese (H) 7/13/2006   ? Drug-seeking behavior 4/4/2020   ? Failure to thrive in adult 9/3/2020   ? Hereditary and idiopathic peripheral neuropathy 4/24/2007   ? Hyperlipidemia with target low density lipoprotein (LDL) cholesterol less than 70 mg/dL 5/30/2007   ? Hypertension 10/14/2015   ? Hypothyroidism 12/10/2010   ? Irritable bowel syndrome 9/7/2015   ? Ischemic cardiomyopathy 9/20/2015    EF of 40-45%, status post RV lead revision and LV epicardial lead placement via mini-thoracotomy in August 2016.   ? Long-term use of high-risk medication 4/14/2020   ? Moniliasis, 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   ? 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   ? Other specified postprocedural states 10/8/2015   ? Pain medication agreement 4/20/2013    Controlled substance agreement for percocet #30/month on file and signed 4/17/13.  Designated pharmacy: [facility] Prescribing physician: Andrea Diagnosis: Ulnar neuropathy   ? Panic disorder with agoraphobia 4/14/2020   ? Paroxysmal atrial fibrillation (H) 10/2/2015   ? Personality disorder (H) 3/5/2020    Rule out dependent personality   ? Polymyalgia rheumatica (H) 11/28/2018   ? Posttraumatic stress disorder 3/1/2020   ? Restless legs syndrome (RLS) 8/29/2007   ? 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   ? Serum calcium elevated 3/1/2020   ? Somatic dysfunction of sacral region 1/17/2018   ? Subacromial bursitis of left shoulder joint 8/6/2018   ? Suicidal ideation 4/1/2020   ? TIA (transient ischemic attack) 5/4/2018   ? Tobacco abuse 2/17/2017   ? Urinary incontinence, mixed 9/24/2017   ? UTI (urinary tract infection) 4/17/2020   ? Vitamin B12 deficiency 2/14/2018   ? Weakness 12/17/2019     Past Surgical History:   Procedure Laterality Date   ? CARDIAC SURGERY     ? CARPAL TUNNEL RELEASE     ?  CHOLECYSTECTOMY     ? Genitourinary surgery       No family history on file.  Social History     Socioeconomic History   ? Marital status:      Spouse name: Not on file   ? Number of children: Not on file   ? Years of education: Not on file   ? Highest education level: Not on file   Occupational History   ? Not on file   Social Needs   ? Financial resource strain: Not on file   ? Food insecurity     Worry: Not on file     Inability: Not on file   ? Transportation needs     Medical: Not on file     Non-medical: Not on file   Tobacco Use   ? Smoking status: Not on file   Substance and Sexual Activity   ? Alcohol use: Not on file   ? Drug use: Not on file   ? Sexual activity: Not on file   Lifestyle   ? Physical activity     Days per week: Not on file     Minutes per session: Not on file   ? Stress: Not on file   Relationships   ? Social connections     Talks on phone: Not on file     Gets together: Not on file     Attends Christian service: Not on file     Active member of club or organization: Not on file     Attends meetings of clubs or organizations: Not on file     Relationship status: Not on file   ? Intimate partner violence     Fear of current or ex partner: Not on file     Emotionally abused: Not on file     Physically abused: Not on file     Forced sexual activity: Not on file   Other Topics Concern   ? Not on file   Social History Narrative   ? Not on file     Current Outpatient Medications   Medication Sig Dispense Refill   ? acetaminophen (TYLENOL) 500 MG tablet Take 1,000 mg by mouth 3 (three) times a day.     ? aspirin 81 MG EC tablet Take 81 mg by mouth daily.     ? cyanocobalamin 1,000 mcg/mL injection Inject 1 mL into the shoulder, thigh, or buttocks every 30 (thirty) days.     ? divalproex (DEPAKOTE SPRINKLE) 125 mg capsule Take 250 mg by mouth daily.     ? DULoxetine 40 mg CpDR Take 40 mg by mouth daily.     ? ferrous sulfate 325 (65 FE) MG tablet Take 325 mg by mouth daily.     ?  ketoconazole (NIZORAL) 2 % cream Apply topically daily.     ? levothyroxine (SYNTHROID, LEVOTHROID) 100 MCG tablet Take 100 mcg by mouth daily.     ? melatonin 3 mg Tab tablet Take 6 mg by mouth at bedtime.     ? metoprolol succinate (TOPROL-XL) 25 MG Take 25 mg by mouth daily.     ? miconazole nitrate 2 % SprA Apply topically 2 (two) times a day as needed.     ? mirtazapine (REMERON) 30 MG tablet Take 30 mg by mouth daily.     ? OLANZapine (ZYPREXA) 7.5 MG tablet Take 7.5 mg by mouth daily.     ? potassium chloride (KLOR-CON) 10 MEQ CR tablet Take 10 mEq by mouth daily.     ? rosuvastatin (CRESTOR) 10 MG tablet Take 10 mg by mouth daily.     ? senna (SENOKOT) 8.6 mg tablet Take 2 tablets by mouth daily.       No current facility-administered medications for this visit.      Allergies   Allergen Reactions   ? Diphenhydramine Palpitations     Tolerated IV Benadryl when not pushed too fast     ? Isosorbide Dizziness and Other (See Comments)     Also causes syncope (has fallen before) and brain fog/mental disturbances - please do not prescribe   ? Nitroglycerin Dizziness and Other (See Comments)     Specifically the patch - please do not prescribe   ? Other Drug Allergy (See Comments) Hives and Itching   ? Bee Pollen    ? Adhesive Tape-Silicones Rash   ? Nystatin Dermatitis   ? Penicillins Rash and Swelling     Occurred as a child - not 100% sure on specific reactions   ? Sulfa (Sulfonamide Antibiotics) Other (See Comments)     Occurred as a child / patient does not remember specific reaction     Immunization History   Administered Date(s) Administered   ? INFLUENZA,SEASONAL QUAD, PF, =/> 6months 10/01/2014, 09/29/2015, 09/26/2016, 09/20/2017, 10/10/2018, 10/04/2019   ? Influenza, Seasonal, Inj PF IIV3 10/18/2011   ? Influenza,seasonal, Inj IIV3 10/21/2003, 01/05/2006, 09/22/2009, 10/19/2010, 10/18/2011, 10/08/2012, 10/15/2013   ? Pneumo Conj 13-V (2010&after) 11/14/2018   ? Pneumo Polysac 23-V 10/21/2003, 01/01/2008,  11/03/2015   ? Tdap 08/23/2010       Post Discharge Medication Reconciliation Status: discharge medications reconciled, continue medications without change    Review of Systems   Patient denies fever, chills, headache, lightheadedness, dizziness, rhinorrhea, cough, congestion, shortness of breath, chest pain, palpitations, abdominal pain, n/v, diarrhea, constipation, change in appetite, dysuria, frequency, burning or pain with urination.  Other than stated in HPI all other review of systems is negative.       Physical Exam   Vitals:    11/10/20 1255   BP: 118/67   Pulse: (!) 58   Resp: 18   Temp: 98  F (36.7  C)   SpO2: 92%       GENERAL APPEARANCE: well nourished woman, in no acute distress.  HEENT: normocephalic, atraumatic  PERRL, sclerae anicteric, conjunctivae clear and moist, EOM intact  LUNGS: Lung sounds CTA, no adventitious sounds, respiratory effort normal.  CARD: RRR, S1, S2, without murmurs, gallops, rubs  ABD: Soft and nontender with normal bowel sounds.   MSK: Muscle strength and tone were equal  EXTREMITIES: No cyanosis, clubbing or edema.  NEURO: Alert and oriented x 3.Face is symmetric.  SKIN: Inspection of the skin reveals no rashes, ulcerations or petechiae.  PSYCH: Intermittent anxiety    Labs:  No results found for this or any previous visit (from the past 240 hour(s)).    Assessment:  1. 2019 novel coronavirus disease (COVID-19)     2. Chronic obstructive pulmonary disease, unspecified COPD type (H)     3. Ischemic cardiomyopathy     4. Diabetes mellitus type 2 in obese (H)     5. Hypertension, unspecified type     6. Mood disorder due to a general medical condition     7. Posttraumatic stress disorder     8. Personality disorder (H)     9. Weakness         Plan:  COVID-19: No symptoms at this time continue to monitor.  Anticipate return back to her previous facility in the next 10 days.  Counseled patient on this plan.    COPD: She is on no medications.  Breathing is stable at this time  continue to monitor. I do not have any PFTs.    cardiomyopathy and CAD: On aspirin and Crestor.  No reports of angina.    Diabetes: Diet controlled not checking blood sugars.  Will not check and less symptomatic here and defer to PCP for A1c monitoring.    Hypertension: Blood pressures are stable continue with metoprolol.  Does have some heart rates under 60 may need to add parameters if consistently bradycardic.    Mood disorder, PTSD, and personality disorder; psychiatric illnesses are managed by ACP and Dr. Martell.  No changes to medications at this time.  Continue on duloxetine for depression Depakote for mood disorder, mirtazapine for mood disorder, olanzapine for anxiety.  Monitor for worsening symptoms with isolation.    Weakness: PT OT to eval and treat as she is high risk for declining functional status due to isolation.      Electronically signed by: Ernestina Gatica CNP

## 2021-06-21 NOTE — LETTER
Letter by Ernestina Gatica CNP at      Author: Ernestina Gatica CNP Service: -- Author Type: --    Filed:  Encounter Date: 11/18/2020 Status: (Other)         [facility]  [address] [address]                                  November 18, 2020    Patient: Letty Escobar   MR Number: [id number]   YOB: 1955   Date of Visit: 11/18/2020     Dear Dr. Pruitt:    Thank you for referring Letty Escobar to me for evaluation. Below are the relevant portions of my assessment and plan of care.    If you have questions, please do not hesitate to call me. I look forward to following Letty along with you.    Sincerely,        Ernestina Gatica CNP          CC  No Recipients  Ernestina Gatica CNP  11/18/2020  1:58 PM  Signed  Code Status:  DNR/DNI  Visit Type: Discharge Summary     Facility:  [facility] [[id number]]          PCP:  Provider, No Primary Care  None       Admission Date to our Facility: 11/9/2020  Discharge Date from our Facility: 11/19/2020    Discharge Diagnosis:    1. 2019 novel coronavirus disease (COVID-19)     2. Chronic obstructive pulmonary disease, unspecified COPD type (H)     3. Ischemic cardiomyopathy     4. Diabetes mellitus type 2 in obese (H)     5. Secondary hypertension     6. Mood disorder due to a general medical condition     7. PTSD (post-traumatic stress disorder)     8. Personality disorder (H)     9. Weakness          History of Present Illness: Letty Escobar is a 65 y.o. female with a past medical history for panic disorder with agoraphobia, MDD, anxiety, drug-seeking behavior, personality disorder, PTSD, History suicidal ideation, HTN, HLD, ischemic cardiomyopathy, History MI and CABG x 5 (2015), CHF (EF 45-50%), ICA stenosis bilateral, pAfib, Morbid obesity, IBS with Diarrhea, GERD, DM2, chronic pain with neuropathy, urinary incontinence, PMR, Anemia, Vit B12 def, hypothyroidism.  She has been transferred to this  facility due to a positive COVID-19 test on 10/7/2020.     Chart review reveals that she was having high temperatures in late October on 10/28/2020.  She has been tested multiple times for COVID-19 with negative results    Skilled Nursing Facility Course: While at the isolation facility she was grossly asymptomatic her entire stay.  She does have a cough which is intermittent and is likely chronic due to lying in bed for a good part of the day.  She does need significant reassurance and motivation for activity.  She has a good appetite and has been without respiratory symptoms or GI symptoms.  She has also been afebrile her entire stay.    Discharge Medications:   Current Outpatient Medications   Medication Sig Dispense Refill   ? acetaminophen (TYLENOL) 500 MG tablet Take 1,000 mg by mouth 3 (three) times a day.     ? aspirin 81 MG EC tablet Take 81 mg by mouth daily.     ? cyanocobalamin 1,000 mcg/mL injection Inject 1 mL into the shoulder, thigh, or buttocks every 30 (thirty) days.     ? divalproex (DEPAKOTE SPRINKLE) 125 mg capsule Take 250 mg by mouth daily.     ? DULoxetine 40 mg CpDR Take 40 mg by mouth daily.     ? ferrous sulfate 325 (65 FE) MG tablet Take 325 mg by mouth daily.     ? ketoconazole (NIZORAL) 2 % cream Apply topically daily.     ? levothyroxine (SYNTHROID, LEVOTHROID) 100 MCG tablet Take 100 mcg by mouth daily.     ? melatonin 3 mg Tab tablet Take 6 mg by mouth at bedtime.     ? metoprolol succinate (TOPROL-XL) 25 MG Take 25 mg by mouth daily.     ? miconazole nitrate 2 % SprA Apply topically 2 (two) times a day as needed.     ? mirtazapine (REMERON) 30 MG tablet Take 30 mg by mouth daily.     ? OLANZapine (ZYPREXA) 7.5 MG tablet Take 7.5 mg by mouth daily.     ? potassium chloride (KLOR-CON) 10 MEQ CR tablet Take 10 mEq by mouth daily.     ? rosuvastatin (CRESTOR) 10 MG tablet Take 10 mg by mouth daily.     ? senna (SENOKOT) 8.6 mg tablet Take 2 tablets by mouth daily.       No current  facility-administered medications for this visit.        For most current and accurate medication list, please contact the skilled nursing facility that this patient visit took place at.      Discharge Plan: Patient is stable to discharge back to her previous facility.  During her isolation stay she has had no changes to her medications or treatments.    *Review of Systems   Patient denies fever, chills, headache, lightheadedness, dizziness, rhinorrhea, cough, congestion, shortness of breath, chest pain, palpitations, abdominal pain, n/v, diarrhea, constipation, change in appetite, dysuria, frequency, burning or pain with urination.  Other than stated in HPI all other review of systems is negative.         Physical Exam   In order to maintain social distancing during the COVID pandemic, a visual exam was completed.     Vitals:    11/18/20 1039   BP: 132/76   Pulse: 65   Resp: 20   Temp: 98  F (36.7  C)   SpO2: 95%        Patient is an elderly female in no acute distress.  Head is AT/NC, EOM intact. Respiratory effort normal. No visible LE edema. Alert and oriented, face symmetric. No visible skin issues or rashes. Mood anxious      Labs:  No results found for this or any previous visit (from the past 240 hour(s)).      Assessment:  1. 2019 novel coronavirus disease (COVID-19)     2. Chronic obstructive pulmonary disease, unspecified COPD type (H)     3. Ischemic cardiomyopathy     4. Diabetes mellitus type 2 in obese (H)     5. Secondary hypertension     6. Mood disorder due to a general medical condition     7. PTSD (post-traumatic stress disorder)     8. Personality disorder (H)     9. Weakness         The patient is, or has been, under my care and I have initiated the establishment of the plan of care. This patient will be followed by a physician who will periodically review the plan of care.        Electronically signed by: Ernestina Gatica CNP

## 2021-06-21 NOTE — LETTER
Letter by Ernestina Gatica CNP at      Author: Ernestina Gatica CNP Service: -- Author Type: --    Filed:  Encounter Date: 11/24/2020 Status: (Other)         [facility]  [address] [address]                                  November 24, 2020    Patient: Letty Escobar   MR Number: [id number]   YOB: 1955   Date of Visit: 11/24/2020     Dear Dr. Pruitt:    Thank you for referring Letty Escobar to me for evaluation. Below are the relevant portions of my assessment and plan of care.    If you have questions, please do not hesitate to call me. I look forward to following Letty along with you.    Sincerely,        Ernestina Gatica CNP          CC  No Recipients  Ernestina Gatica CNP  11/24/2020  2:04 PM  Signed  Code Status:  DNR/DNI  Visit Type: Discharge Summary     Facility:  [facility] [[id number]]          PCP:  Provider, No Primary Care  None       Admission Date to our Facility: 11/9/2020  Discharge Date from our Facility: 11/25/2020    Discharge Diagnosis:    1. 2019 novel coronavirus disease (COVID-19)     2. Chronic obstructive pulmonary disease, unspecified COPD type (H)     3. Ischemic cardiomyopathy     4. Diabetes mellitus type 2 in obese (H)     5. Secondary hypertension     6. Mood disorder due to a general medical condition     7. PTSD (post-traumatic stress disorder)     8. Personality disorder (H)     9. Hypertension, unspecified type     10. Posttraumatic stress disorder          History of Present Illness: Letty Escobar is a 65 y.o. female with a past medical history for panic disorder with agoraphobia, MDD, anxiety, drug-seeking behavior, personality disorder, PTSD, History suicidal ideation, HTN, HLD, ischemic cardiomyopathy, History MI and CABG x 5 (2015), CHF (EF 45-50%), ICA stenosis bilateral, pAfib, Morbid obesity, IBS with Diarrhea, GERD, DM2, chronic pain with neuropathy, urinary incontinence, PMR, Anemia, Vit  B12 def, hypothyroidism.  She has been transferred to this facility due to a positive COVID-19 test on 10/7/2020.     Chart review reveals that she was having high temperatures in late October on 10/28/2020.  She has been tested multiple times for COVID-19 with negative results    Skilled Nursing Facility Course:  While at the isolation facility she was grossly asymptomatic her entire stay.  She does have a cough which is intermittent and is likely chronic due to lying in bed for a good part of the day.  She does need significant reassurance and motivation for activity.  She has a good appetite and has been without respiratory symptoms or GI symptoms.  She has also been afebrile her entire stay.        Discharge Medications:    Current Outpatient Medications   Medication Sig Dispense Refill   ? acetaminophen (TYLENOL) 500 MG tablet Take 1,000 mg by mouth 3 (three) times a day.     ? acetaminophen (TYLENOL) 500 MG tablet Take 500 mg by mouth 2 (two) times a day as needed for pain.     ? cholecalciferol, vitamin D3, (VITAMIN D3) 10 mcg (400 unit) Chew Chew 800 Units daily.     ? cyanocobalamin 1,000 mcg/mL injection Inject 1 mL into the shoulder, thigh, or buttocks every 30 (thirty) days.     ? divalproex (DEPAKOTE SPRINKLE) 125 mg capsule Take 250 mg by mouth daily.     ? DULoxetine 40 mg CpDR Take 40 mg by mouth daily.     ? ferrous sulfate 325 (65 FE) MG tablet Take 325 mg by mouth daily.     ? fluticasone furoate-vilanteroL (BREO ELLIPTA) 200-25 mcg/dose DsDv inhaler Inhale 1 puff daily.     ? hydrOXYzine HCL (ATARAX) 25 MG tablet Take 25 mg by mouth every 6 (six) hours as needed for itching.     ? ketoconazole (NIZORAL) 2 % cream Apply topically daily.     ? levothyroxine (SYNTHROID, LEVOTHROID) 100 MCG tablet Take 100 mcg by mouth daily.     ? melatonin 3 mg Tab tablet Take 6 mg by mouth at bedtime.     ? metoprolol succinate (TOPROL-XL) 25 MG Take 25 mg by mouth daily.     ? miconazole nitrate 2 % SprA Apply  topically 2 (two) times a day as needed.     ? mirtazapine (REMERON) 30 MG tablet Take 30 mg by mouth daily.     ? OLANZapine (ZYPREXA) 7.5 MG tablet Take 7.5 mg by mouth daily.     ? potassium chloride (KLOR-CON) 10 MEQ CR tablet Take 10 mEq by mouth daily.     ? rosuvastatin (CRESTOR) 10 MG tablet Take 10 mg by mouth daily.     ? senna (SENOKOT) 8.6 mg tablet Take 2 tablets by mouth daily.       No current facility-administered medications for this visit.        For most current and accurate medication list, please contact the skilled nursing facility that this patient visit took place at.      Discharge Plan:  Patient is stable to return to her previous LTC facility with current medications and treatments.     Review of Systems   Patient denies fever, chills, headache, lightheadedness, dizziness, rhinorrhea, cough, congestion, shortness of breath, chest pain, palpitations, abdominal pain, n/v, diarrhea, constipation, change in appetite, dysuria, frequency, burning or pain with urination.  Other than stated in HPI all other review of systems is negative.         Physical Exam   In order to maintain social distancing during the COVID pandemic, a visual exam was completed.     Vitals:    11/24/20 1027   BP: 132/76   Pulse: 60   Resp: 20   Temp: (!) 96  F (35.6  C)   SpO2: 94%        Patient is well nourished and no acute distress.  Head is AT/NC, EOM intact. Respiratory effort normal. No visible LE edema. Alert and oriented, face symmetric. No visible skin issues or rashes. Mood anxious at baseline      Labs:  No results found for this or any previous visit (from the past 240 hour(s)).        Assessment:  1. 2019 novel coronavirus disease (COVID-19)     2. Chronic obstructive pulmonary disease, unspecified COPD type (H)     3. Ischemic cardiomyopathy     4. Diabetes mellitus type 2 in obese (H)     5. Secondary hypertension     6. Mood disorder due to a general medical condition     7. PTSD (post-traumatic stress  disorder)     8. Personality disorder (H)     9. Hypertension, unspecified type     10. Posttraumatic stress disorder         MEDICAL EQUIPMENT NEEDS:  NA        Electronically signed by: Ernestina Gatica CNP

## 2021-07-12 ENCOUNTER — TELEPHONE (OUTPATIENT)
Dept: GERIATRICS | Facility: CLINIC | Age: 66
End: 2021-07-12

## 2021-08-03 ENCOUNTER — LAB REQUISITION (OUTPATIENT)
Dept: LAB | Facility: CLINIC | Age: 66
End: 2021-08-03
Payer: COMMERCIAL

## 2021-08-03 DIAGNOSIS — E03.9 HYPOTHYROIDISM, UNSPECIFIED: ICD-10-CM

## 2021-08-04 LAB — TSH SERPL DL<=0.005 MIU/L-ACNC: 0.86 MU/L (ref 0.4–4)

## 2021-08-04 PROCEDURE — P9604 ONE-WAY ALLOW PRORATED TRIP: HCPCS | Mod: ORL | Performed by: FAMILY MEDICINE

## 2021-08-04 PROCEDURE — 84443 ASSAY THYROID STIM HORMONE: CPT | Mod: ORL | Performed by: FAMILY MEDICINE

## 2021-08-04 PROCEDURE — 36415 COLL VENOUS BLD VENIPUNCTURE: CPT | Mod: ORL | Performed by: FAMILY MEDICINE

## 2021-08-05 ENCOUNTER — NURSING HOME VISIT (OUTPATIENT)
Dept: GERIATRICS | Facility: CLINIC | Age: 66
End: 2021-08-05
Payer: COMMERCIAL

## 2021-08-05 VITALS
BODY MASS INDEX: 32.75 KG/M2 | WEIGHT: 156 LBS | RESPIRATION RATE: 17 BRPM | TEMPERATURE: 97.3 F | OXYGEN SATURATION: 98 % | SYSTOLIC BLOOD PRESSURE: 105 MMHG | HEIGHT: 58 IN | HEART RATE: 60 BPM | DIASTOLIC BLOOD PRESSURE: 51 MMHG

## 2021-08-05 DIAGNOSIS — F33.2 MAJOR DEPRESSIVE DISORDER, RECURRENT SEVERE WITHOUT PSYCHOTIC FEATURES (H): ICD-10-CM

## 2021-08-05 DIAGNOSIS — Z95.0 CARDIAC PACEMAKER IN SITU: ICD-10-CM

## 2021-08-05 DIAGNOSIS — I10 ESSENTIAL HYPERTENSION: ICD-10-CM

## 2021-08-05 DIAGNOSIS — I50.22 CHRONIC SYSTOLIC CONGESTIVE HEART FAILURE (H): Primary | ICD-10-CM

## 2021-08-05 DIAGNOSIS — I70.422 ATHEROSCLEROSIS OF AUTOLOGOUS VEIN BYPASS GRAFT(S) OF THE EXTREMITIES WITH REST PAIN, LEFT LEG (H): ICD-10-CM

## 2021-08-05 DIAGNOSIS — E78.5 HYPERLIPIDEMIA WITH TARGET LOW DENSITY LIPOPROTEIN (LDL) CHOLESTEROL LESS THAN 70 MG/DL: ICD-10-CM

## 2021-08-05 DIAGNOSIS — E03.9 HYPOTHYROIDISM, UNSPECIFIED TYPE: ICD-10-CM

## 2021-08-05 DIAGNOSIS — I48.20 CHRONIC ATRIAL FIBRILLATION (H): ICD-10-CM

## 2021-08-05 DIAGNOSIS — J44.9 CHRONIC OBSTRUCTIVE PULMONARY DISEASE, UNSPECIFIED COPD TYPE (H): ICD-10-CM

## 2021-08-05 PROCEDURE — 99309 SBSQ NF CARE MODERATE MDM 30: CPT | Performed by: NURSE PRACTITIONER

## 2021-08-05 ASSESSMENT — MIFFLIN-ST. JEOR: SCORE: 1132.36

## 2021-08-05 NOTE — LETTER
8/5/2021        RE: Letty Escobar  [facility]  [address] [address]        [facility]    Chief Complaint   Patient presents with     shelter Regulatory       Place of Service where encounter took place:  [facility] () [[id number]]    HPI:    Letty Escobar is a 65 year old  (1955), who is being seen today for a federally mandated E/M visit.  HPI information obtained from: facility chart records, facility staff and patient report. 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  PHQ-9: 1  BIMS: 15/15  Function: ambulates with assist of 1 and rolling walker.     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, left leg (H) I70.422     Candidiasis B37.9     Carotid stenosis, right I65.21     Chronic bilateral low back pain without sciatica M54.5, G89.29     Chronic pain disorder G89.4     Constipation K59.00     COPD (chronic obstructive pulmonary disease) (H) J44.9     Hyperlipidemia with target low density lipoprotein (LDL) cholesterol less than 70 mg/dL E78.5     Hypertension I10     Hypothyroidism E03.9     ELI (obstructive sleep apnea) G47.33     Panic disorder with agoraphobia  F40.01     Personality disorder (H) F60.9     Posttraumatic stress disorder F43.10     S/P CABG x 5 Z95.1     Urinary incontinence, mixed N39.46     Vitamin B12 deficiency E53.8     ACP (advance care planning) Z71.89     Major depressive disorder, recurrent severe without psychotic features (H) F33.2     Chronic atrial fibrillation (H) I48.20     Cardiac pacemaker in situ Z95.0       ALLERGIES: Diphenhydramine, Isosorbide, Nitroglycerin, Contrast dye, Adhesive tape, Diphenhydramine hcl, Liquid adhesive, Nystatin, Nystatin (topical), Penicillins, and Sulfa drugs  PAST MEDICAL HISTORY:  has a past medical history of ACP (advance care planning) (11/3/2010), Acute coronary syndrome (H) (11/22/2019), Acute exacerbation of CHF (congestive heart failure) (H) (11/11/2019), Acute on chronic systolic (congestive) heart failure (H) (1/28/2020), Anemia of chronic disease (1/5/2013), 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, left leg (H) (1/15/2020), BPPV (benign paroxysmal positional vertigo) (5/31/2018), Candidiasis (3/1/2020), Carotid stenosis, right (7/13/2016), Chest pain (11/11/2019), Chronic bilateral low back pain without sciatica (1/17/2018), Chronic pain disorder (10/1/2017), Constipation (3/20/2020), COPD (chronic obstructive pulmonary disease) (H) (6/24/2020), Coronary atherosclerosis (2/6/2009), Cubital tunnel syndrome on left (11/13/2012), Depressive disorder, Diabetes mellitus (H), Diabetes mellitus type 2 in obese (H) (7/13/2006), Diabetes mellitus type 2 in obese (H) (7/13/2006), Drug-seeking behavior (4/4/2020), Failure to thrive in adult (9/3/2020), Hereditary and idiopathic peripheral neuropathy (4/24/2007), Hyperlipidemia with target low density lipoprotein (LDL) cholesterol less than 70 mg/dL (5/30/2007), Hypertension (10/14/2015), Hypothyroidism (12/10/2010), Irritable bowel syndrome (9/7/2015), Ischemic cardiomyopathy (9/20/2015), Long-term use of high-risk  medication (4/14/2020), Moniliasis, cutaneous (3/31/2020), Mood disorder due to a general medical condition (3/1/2020), Myocardial infarction (H), Neuromuscular disorder (H), Other specified postprocedural states (10/8/2015), Pain medication agreement (4/20/2013), Panic disorder with agoraphobia (4/14/2020), Paroxysmal atrial fibrillation (H) (10/2/2015), Personality disorder (H) (3/5/2020), Polymyalgia rheumatica (H) (11/28/2018), Posttraumatic stress disorder (3/1/2020), Restless legs syndrome (RLS) (8/29/2007), Restless legs syndrome (RLS) (8/29/2007), S/P CABG x 5 (8/21/2015), Serum calcium elevated (3/1/2020), Somatic dysfunction of sacral region (1/17/2018), Subacromial bursitis of left shoulder joint (8/6/2018), Suicidal ideation (4/1/2020), Thyroid disease, TIA (transient ischemic attack) (5/4/2018), TIA (transient ischemic attack) (5/4/2018), Tobacco abuse (2/17/2017), Tobacco abuse (2/17/2017), Urinary incontinence, mixed (9/24/2017), UTI (urinary tract infection) (4/17/2020), Vitamin B12 deficiency (2/14/2018), and Weakness (12/17/2019).  PAST SURGICAL HISTORY:  has a past surgical history that includes Release carpal tunnel; Cholecystectomy; Cardiac surgery; Abdomen surgery; Release carpal tunnel; Cholecystectomy; Cardiac surgery; and other surgical history.  FAMILY HISTORY: family history is not on file.  SOCIAL HISTORY:  reports that she has never smoked. She has never used smokeless tobacco.    MEDICATIONS:  Current Outpatient Medications   Medication Sig Dispense Refill     acetaminophen (TYLENOL) 500 MG tablet Take 2 tablets (1,000 mg) by mouth 3 times daily       aspirin 81 MG EC tablet Take 81 mg by mouth daily       cyanocobalamin (CYANOCOBALAMIN) 1000 MCG/ML injection Inject 1 mL into the muscle every 30 days       divalproex sodium delayed-release (DEPAKOTE) 250 MG DR tablet Take 250 mg by mouth daily       DULoxetine HCl 40 MG CPEP Take 40 mg by mouth daily       fluticasone-vilanterol (BREO  "ELLIPTA) 200-25 MCG/INH inhaler Inhale 1 puff into the lungs daily       furosemide (LASIX) 20 MG tablet Take 20 mg by mouth daily       levothyroxine (SYNTHROID/LEVOTHROID) 112 MCG tablet Take 112 mcg by mouth daily before breakfast        Melatonin 10 MG TABS tablet Take 10 mg by mouth At Bedtime        metoprolol succinate ER (TOPROL-XL) 25 MG 24 hr tablet Take 25 mg by mouth daily       miconazole (MICATIN) 2 % AERP powder Apply topically as needed        mirtazapine (REMERON) 30 MG tablet Take 30 mg by mouth daily       polyethylene glycol (MIRALAX) 17 g packet Take 1 packet by mouth daily       potassium chloride ER (MICRO-K) 10 MEQ CR capsule Take 10 mEq by mouth daily        QUEtiapine (SEROQUEL) 25 MG tablet Take 50 mg by mouth 3 times daily        rosuvastatin (CRESTOR) 10 MG tablet Take 10 mg by mouth At Bedtime        sennosides (SENOKOT) 8.6 MG tablet Take 2 tablets by mouth At Bedtime       Vitamin D3 (VITAMIN D) 10 MCG (400 UNIT) tablet Take 20 mcg by mouth daily         Information reviewed:  Medications, vital signs, orders, care plan, and nursing notes.    ROS:  4 point ROS including Respiratory, CV, GI and , other than that noted in the HPI,  is negative    Exam:  Vitals: /51   Pulse 60   Temp 97.3  F (36.3  C)   Resp 17   Ht 1.473 m (4' 10\")   Wt 70.8 kg (156 lb)   SpO2 98%   BMI 32.60 kg/m    BMI= Body mass index is 32.6 kg/m .  GENERAL APPEARANCE:  Alert, in no distress  RESP:  respiratory effort and palpation of chest normal, no respiratory distress, lungs sounds clear  CV:  Palpation and auscultation of heart done , regular rate and rhythm, no murmur, rub, or gallop, edema none  ABDOMEN:  normal bowel sounds, soft, nontender,  M/S:  Gait and station observed in bed, no tenderness or swelling of the joints   SKIN:  Inspection and palpation of skin and subcutaneous tissue at baseline  PSYCH:  insight and judgement, memory seems intact, affect and mood normal    Lab/Diagnostic " data: reviewed in nursing home EHR    ASSESSMENT/PLAN  Chronic systolic congestive heart failure (H)   No evidence of fluid overload.  Continue with beta-blocker, and Lasix    Essential hypertension  Blood pressures reviewed in nursing home E HR.  Most blood pressures less than 140.  Continue metoprolol and Lasix.    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    Chronic obstructive pulmonary disease, unspecified COPD type (H)  Stable.  Currently has no complaints of shortness of breath or cough.  Continue inhaler .    Major depressive disorder, recurrent severe without psychotic features (H)  Followed by in house psychiatry.  Defer med management to them.  Patient states she is doing well today.  Continues on Depakote, duloxetine, mirtazapine, Seroquel. Nurse previously reported orthostatic hypotension during routine antipsychotic use monitoring.   - follow up with psychiatry    Hypothyroidism, unspecified type  TSH   Date Value Ref Range Status   08/04/2021 0.86 0.40 - 4.00 mU/L Final   12/04/2020 41.34 (H) 0.40 - 4.00 mU/L Final   stable. Continue to monitor yearly.     Atherosclerosis of coronary artery bypass graft of native heart with angina pectoris (H)  Hyperlipidemia with target low density lipoprotein (LDL) cholesterol less than 70 mg/dL  Followed by Dr. Saucedo of cardiology. B/ps mostly controlled.    Paroxysmal atrial fibrillation (H)  Rate controlled. Only on aspirin for stroke prevention.     Cardiac pacemaker in situ  Stable. Continue routine cares per cardiology.     Anemia  Has history of iron deficiency anemia, vitamin B deficiency.  Continues on vitamin b supplements. hgb stable.    Constipation  Chart review shows that patient is going several days without bowel movements and then is needing a suppository.  Suspect that she is not going on her own and that she is actually constipated.  Will add senna every  day.    Orders:  Increase senna to 2 tabs p.o. twice daily diagnosis constipation  MiraLAX 17 g p.o. every day diagnosis constipation  Check labs on 10/6/2021 CBC, BMP, vitamin D level, vitamin B12 level diagnosis vitamin D deficiency vitamin B deficiency and CHF    Electronically signed by:  Lamar Keating NP            Sincerely,        Lamar Keating NP

## 2021-08-06 PROBLEM — Z76.5 DRUG-SEEKING BEHAVIOR: Status: RESOLVED | Noted: 2020-04-04 | Resolved: 2021-08-06

## 2021-08-06 RX ORDER — POLYETHYLENE GLYCOL 3350 17 G/17G
1 POWDER, FOR SOLUTION ORAL DAILY
COMMUNITY
End: 2022-03-24

## 2021-08-06 NOTE — PROGRESS NOTES
[place] [facility]    Chief Complaint   Patient presents with     longterm Regulatory       Place of Service where encounter took place:  [facility] [place] () [[id number]]    HPI:    Letty Escobar is a 65 year old  (1955), who is being seen today for a federally mandated E/M visit.  HPI information obtained from: facility chart records, facility staff and patient report. 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  PHQ-9: 1  BIMS: 15/15  Function: ambulates with assist of 1 and rolling walker.     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, left leg (H) I70.422     Candidiasis B37.9     Carotid stenosis, right I65.21     Chronic bilateral low back pain without sciatica M54.5, G89.29     Chronic pain disorder G89.4     Constipation K59.00     COPD (chronic obstructive pulmonary disease) (H) J44.9     Hyperlipidemia with target low density lipoprotein (LDL) cholesterol less than 70 mg/dL E78.5     Hypertension I10     Hypothyroidism E03.9     ELI (obstructive sleep apnea) G47.33     Panic disorder with agoraphobia F40.01     Personality disorder (H) F60.9     Posttraumatic stress disorder F43.10     S/P CABG x 5 Z95.1      Urinary incontinence, mixed N39.46     Vitamin B12 deficiency E53.8     ACP (advance care planning) Z71.89     Major depressive disorder, recurrent severe without psychotic features (H) F33.2     Chronic atrial fibrillation (H) I48.20     Cardiac pacemaker in situ Z95.0       ALLERGIES: Diphenhydramine, Isosorbide, Nitroglycerin, Contrast dye, Adhesive tape, Diphenhydramine hcl, Liquid adhesive, Nystatin, Nystatin (topical), Penicillins, and Sulfa drugs  PAST MEDICAL HISTORY:  has a past medical history of ACP (advance care planning) (11/3/2010), Acute coronary syndrome (H) (11/22/2019), Acute exacerbation of CHF (congestive heart failure) (H) (11/11/2019), Acute on chronic systolic (congestive) heart failure (H) (1/28/2020), Anemia of chronic disease (1/5/2013), 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, left leg (H) (1/15/2020), BPPV (benign paroxysmal positional vertigo) (5/31/2018), Candidiasis (3/1/2020), Carotid stenosis, right (7/13/2016), Chest pain (11/11/2019), Chronic bilateral low back pain without sciatica (1/17/2018), Chronic pain disorder (10/1/2017), Constipation (3/20/2020), COPD (chronic obstructive pulmonary disease) (H) (6/24/2020), Coronary atherosclerosis (2/6/2009), Cubital tunnel syndrome on left (11/13/2012), Depressive disorder, Diabetes mellitus (H), Diabetes mellitus type 2 in obese (H) (7/13/2006), Diabetes mellitus type 2 in obese (H) (7/13/2006), Drug-seeking behavior (4/4/2020), Failure to thrive in adult (9/3/2020), Hereditary and idiopathic peripheral neuropathy (4/24/2007), Hyperlipidemia with target low density lipoprotein (LDL) cholesterol less than 70 mg/dL (5/30/2007), Hypertension (10/14/2015), Hypothyroidism (12/10/2010), Irritable bowel syndrome (9/7/2015), Ischemic cardiomyopathy (9/20/2015), Long-term use of high-risk medication (4/14/2020), Moniliasis, cutaneous (3/31/2020), Mood disorder due to a general medical condition  (3/1/2020), Myocardial infarction (H), Neuromuscular disorder (H), Other specified postprocedural states (10/8/2015), Pain medication agreement (4/20/2013), Panic disorder with agoraphobia (4/14/2020), Paroxysmal atrial fibrillation (H) (10/2/2015), Personality disorder (H) (3/5/2020), Polymyalgia rheumatica (H) (11/28/2018), Posttraumatic stress disorder (3/1/2020), Restless legs syndrome (RLS) (8/29/2007), Restless legs syndrome (RLS) (8/29/2007), S/P CABG x 5 (8/21/2015), Serum calcium elevated (3/1/2020), Somatic dysfunction of sacral region (1/17/2018), Subacromial bursitis of left shoulder joint (8/6/2018), Suicidal ideation (4/1/2020), Thyroid disease, TIA (transient ischemic attack) (5/4/2018), TIA (transient ischemic attack) (5/4/2018), Tobacco abuse (2/17/2017), Tobacco abuse (2/17/2017), Urinary incontinence, mixed (9/24/2017), UTI (urinary tract infection) (4/17/2020), Vitamin B12 deficiency (2/14/2018), and Weakness (12/17/2019).  PAST SURGICAL HISTORY:  has a past surgical history that includes Release carpal tunnel; Cholecystectomy; Cardiac surgery; Abdomen surgery; Release carpal tunnel; Cholecystectomy; Cardiac surgery; and other surgical history.  FAMILY HISTORY: family history is not on file.  SOCIAL HISTORY:  reports that she has never smoked. She has never used smokeless tobacco.    MEDICATIONS:  Current Outpatient Medications   Medication Sig Dispense Refill     acetaminophen (TYLENOL) 500 MG tablet Take 2 tablets (1,000 mg) by mouth 3 times daily       aspirin 81 MG EC tablet Take 81 mg by mouth daily       cyanocobalamin (CYANOCOBALAMIN) 1000 MCG/ML injection Inject 1 mL into the muscle every 30 days       divalproex sodium delayed-release (DEPAKOTE) 250 MG DR tablet Take 250 mg by mouth daily       DULoxetine HCl 40 MG CPEP Take 40 mg by mouth daily       fluticasone-vilanterol (BREO ELLIPTA) 200-25 MCG/INH inhaler Inhale 1 puff into the lungs daily       furosemide (LASIX) 20 MG tablet Take  "20 mg by mouth daily       levothyroxine (SYNTHROID/LEVOTHROID) 112 MCG tablet Take 112 mcg by mouth daily before breakfast        Melatonin 10 MG TABS tablet Take 10 mg by mouth At Bedtime        metoprolol succinate ER (TOPROL-XL) 25 MG 24 hr tablet Take 25 mg by mouth daily       miconazole (MICATIN) 2 % AERP powder Apply topically as needed        mirtazapine (REMERON) 30 MG tablet Take 30 mg by mouth daily       polyethylene glycol (MIRALAX) 17 g packet Take 1 packet by mouth daily       potassium chloride ER (MICRO-K) 10 MEQ CR capsule Take 10 mEq by mouth daily        QUEtiapine (SEROQUEL) 25 MG tablet Take 50 mg by mouth 3 times daily        rosuvastatin (CRESTOR) 10 MG tablet Take 10 mg by mouth At Bedtime        sennosides (SENOKOT) 8.6 MG tablet Take 2 tablets by mouth At Bedtime       Vitamin D3 (VITAMIN D) 10 MCG (400 UNIT) tablet Take 20 mcg by mouth daily         Information reviewed:  Medications, vital signs, orders, care plan, and nursing notes.    ROS:  4 point ROS including Respiratory, CV, GI and , other than that noted in the HPI,  is negative    Exam:  Vitals: /51   Pulse 60   Temp 97.3  F (36.3  C)   Resp 17   Ht 1.473 m (4' 10\")   Wt 70.8 kg (156 lb)   SpO2 98%   BMI 32.60 kg/m    BMI= Body mass index is 32.6 kg/m .  GENERAL APPEARANCE:  Alert, in no distress  RESP:  respiratory effort and palpation of chest normal, no respiratory distress, lungs sounds clear  CV:  Palpation and auscultation of heart done , regular rate and rhythm, no murmur, rub, or gallop, edema none  ABDOMEN:  normal bowel sounds, soft, nontender,  M/S:  Gait and station observed in bed, no tenderness or swelling of the joints   SKIN:  Inspection and palpation of skin and subcutaneous tissue at baseline  PSYCH:  insight and judgement, memory seems intact, affect and mood normal    Lab/Diagnostic data: reviewed in nursing home EHR    ASSESSMENT/PLAN  Chronic systolic congestive heart failure (H)   No " evidence of fluid overload.  Continue with beta-blocker, and Lasix    Essential hypertension  Blood pressures reviewed in nursing home E HR.  Most blood pressures less than 140.  Continue metoprolol and Lasix.    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    Chronic obstructive pulmonary disease, unspecified COPD type (H)  Stable.  Currently has no complaints of shortness of breath or cough.  Continue inhaler .    Major depressive disorder, recurrent severe without psychotic features (H)  Followed by in house psychiatry.  Defer med management to them.  Patient states she is doing well today.  Continues on Depakote, duloxetine, mirtazapine, Seroquel. Nurse previously reported orthostatic hypotension during routine antipsychotic use monitoring.   - follow up with psychiatry    Hypothyroidism, unspecified type  TSH   Date Value Ref Range Status   08/04/2021 0.86 0.40 - 4.00 mU/L Final   12/04/2020 41.34 (H) 0.40 - 4.00 mU/L Final   stable. Continue to monitor yearly.     Atherosclerosis of coronary artery bypass graft of native heart with angina pectoris (H)  Hyperlipidemia with target low density lipoprotein (LDL) cholesterol less than 70 mg/dL  Followed by Dr. Saucedo of cardiology. B/ps mostly controlled.    Paroxysmal atrial fibrillation (H)  Rate controlled. Only on aspirin for stroke prevention.     Cardiac pacemaker in situ  Stable. Continue routine cares per cardiology.     Anemia  Has history of iron deficiency anemia, vitamin B deficiency.  Continues on vitamin b supplements. hgb stable.    Constipation  Chart review shows that patient is going several days without bowel movements and then is needing a suppository.  Suspect that she is not going on her own and that she is actually constipated.  Will add senna every day.    Orders:  Increase senna to 2 tabs p.o. twice daily diagnosis constipation  MiraLAX 17 g p.o. every day  diagnosis constipation  Check labs on 10/6/2021 CBC, BMP, vitamin D level, vitamin B12 level diagnosis vitamin D deficiency vitamin B deficiency and CHF    Electronically signed by:  Lamar Keating NP

## 2021-10-05 ENCOUNTER — LAB REQUISITION (OUTPATIENT)
Dept: LAB | Facility: CLINIC | Age: 66
End: 2021-10-05
Payer: COMMERCIAL

## 2021-10-05 DIAGNOSIS — I50.22 CHRONIC SYSTOLIC (CONGESTIVE) HEART FAILURE (H): ICD-10-CM

## 2021-10-06 LAB
ANION GAP SERPL CALCULATED.3IONS-SCNC: 5 MMOL/L (ref 3–14)
BUN SERPL-MCNC: 35 MG/DL (ref 7–30)
CALCIUM SERPL-MCNC: 9 MG/DL (ref 8.5–10.1)
CHLORIDE BLD-SCNC: 113 MMOL/L (ref 94–109)
CO2 SERPL-SCNC: 23 MMOL/L (ref 20–32)
CREAT SERPL-MCNC: 0.87 MG/DL (ref 0.52–1.04)
ERYTHROCYTE [DISTWIDTH] IN BLOOD BY AUTOMATED COUNT: 12.9 % (ref 10–15)
GFR SERPL CREATININE-BSD FRML MDRD: 69 ML/MIN/1.73M2
GLUCOSE BLD-MCNC: 75 MG/DL (ref 70–99)
HCT VFR BLD AUTO: 32.5 % (ref 35–47)
HGB BLD-MCNC: 10.6 G/DL (ref 11.7–15.7)
MCH RBC QN AUTO: 30.3 PG (ref 26.5–33)
MCHC RBC AUTO-ENTMCNC: 32.6 G/DL (ref 31.5–36.5)
MCV RBC AUTO: 93 FL (ref 78–100)
PLATELET # BLD AUTO: 116 10E3/UL (ref 150–450)
POTASSIUM BLD-SCNC: 4.5 MMOL/L (ref 3.4–5.3)
RBC # BLD AUTO: 3.5 10E6/UL (ref 3.8–5.2)
SODIUM SERPL-SCNC: 141 MMOL/L (ref 133–144)
WBC # BLD AUTO: 6.5 10E3/UL (ref 4–11)

## 2021-10-06 PROCEDURE — 85027 COMPLETE CBC AUTOMATED: CPT | Mod: ORL | Performed by: FAMILY MEDICINE

## 2021-10-06 PROCEDURE — 36415 COLL VENOUS BLD VENIPUNCTURE: CPT | Mod: ORL | Performed by: FAMILY MEDICINE

## 2021-10-06 PROCEDURE — 82306 VITAMIN D 25 HYDROXY: CPT | Mod: ORL | Performed by: FAMILY MEDICINE

## 2021-10-06 PROCEDURE — 80048 BASIC METABOLIC PNL TOTAL CA: CPT | Mod: ORL | Performed by: FAMILY MEDICINE

## 2021-10-06 PROCEDURE — P9604 ONE-WAY ALLOW PRORATED TRIP: HCPCS | Mod: ORL | Performed by: FAMILY MEDICINE

## 2021-10-07 ENCOUNTER — NURSING HOME VISIT (OUTPATIENT)
Dept: GERIATRICS | Facility: CLINIC | Age: 66
End: 2021-10-07
Payer: COMMERCIAL

## 2021-10-07 VITALS
SYSTOLIC BLOOD PRESSURE: 116 MMHG | WEIGHT: 168 LBS | HEART RATE: 60 BPM | BODY MASS INDEX: 35.26 KG/M2 | DIASTOLIC BLOOD PRESSURE: 62 MMHG | HEIGHT: 58 IN | TEMPERATURE: 98.7 F | OXYGEN SATURATION: 99 % | RESPIRATION RATE: 16 BRPM

## 2021-10-07 DIAGNOSIS — F43.10 POSTTRAUMATIC STRESS DISORDER: ICD-10-CM

## 2021-10-07 DIAGNOSIS — J44.9 CHRONIC OBSTRUCTIVE PULMONARY DISEASE, UNSPECIFIED COPD TYPE (H): ICD-10-CM

## 2021-10-07 DIAGNOSIS — I10 ESSENTIAL HYPERTENSION: ICD-10-CM

## 2021-10-07 DIAGNOSIS — M35.3 POLYMYALGIA RHEUMATICA (H): ICD-10-CM

## 2021-10-07 DIAGNOSIS — E78.3 MIXED HYPERGLYCERIDEMIA: ICD-10-CM

## 2021-10-07 DIAGNOSIS — F33.2 MAJOR DEPRESSIVE DISORDER, RECURRENT SEVERE WITHOUT PSYCHOTIC FEATURES (H): ICD-10-CM

## 2021-10-07 DIAGNOSIS — I50.22 CHRONIC SYSTOLIC CONGESTIVE HEART FAILURE (H): Primary | ICD-10-CM

## 2021-10-07 PROCEDURE — 99310 SBSQ NF CARE HIGH MDM 45: CPT | Performed by: FAMILY MEDICINE

## 2021-10-07 ASSESSMENT — MIFFLIN-ST. JEOR: SCORE: 1186.79

## 2021-10-07 NOTE — LETTER
10/7/2021        RE: Letty Escobar  [facility]  [address] [facility] [address]            [place] [facility]  Chief Complaint   Patient presents with     senior living Regulatory     [place] [facility] Record Number:  [id number]  Place of Service where encounter took place:  [facility] () [[id number]]    HPI:    Letty Escobra  is 65 year old (1955), who is being seen today for a federally mandated E/M visit.  HPI information obtained from: facility staff, patient report and [facility] chart review.     Today's concerns are:   - Resident seen and examined.   - Anemia: GNP reports that hb dropped from 12.5 in May to 10.6, and on ASA. Resident denies abdominal pain, nausea, black stool or blood per rectum.     - CHF: Denies CP or swelling in the legs.   - COPD: Denies wheezing, cough or SOB  - Depression/ psych: reports mood is very good.     - - PMR / chronic pain: denies pain in muscles or joints. . Endorses transfer from the bed to the  with assist, also uses walker with belt and assist one.     - RN has no concern today.     --------------------------------  - - Past Medical, social, family histories, medications, and allergies reviewed and updated  - Medications reviewed: in the chart and EHR.   - Case Management:   I have reviewed the care plan and MDS and do agree with the plan. Patient's desire to return to the community is not present.  Information reviewed:  Medications, vital signs, orders, and nursing notes.      MEDICATIONS:  Current Outpatient Medications   Medication Sig Dispense Refill     acetaminophen (TYLENOL) 500 MG tablet Take 2 tablets (1,000 mg) by mouth 3 times daily       aspirin 81 MG EC tablet Take 81 mg by mouth daily       cyanocobalamin (CYANOCOBALAMIN) 1000 MCG/ML injection Inject 1 mL into the muscle every 30 days       divalproex sodium delayed-release (DEPAKOTE) 250 MG DR tablet Take 250 mg by mouth daily       DULoxetine  "HCl 40 MG CPEP Take 40 mg by mouth daily       fluticasone-vilanterol (BREO ELLIPTA) 200-25 MCG/INH inhaler Inhale 1 puff into the lungs daily       furosemide (LASIX) 20 MG tablet Take 20 mg by mouth daily       levothyroxine (SYNTHROID/LEVOTHROID) 112 MCG tablet Take 112 mcg by mouth daily before breakfast        Melatonin 10 MG TABS tablet Take 10 mg by mouth At Bedtime        metoprolol succinate ER (TOPROL-XL) 25 MG 24 hr tablet Take 25 mg by mouth daily       miconazole (MICATIN) 2 % AERP powder Apply topically as needed        mirtazapine (REMERON) 30 MG tablet Take 30 mg by mouth daily       polyethylene glycol (MIRALAX) 17 g packet Take 1 packet by mouth daily       potassium chloride ER (MICRO-K) 10 MEQ CR capsule Take 10 mEq by mouth daily        prazosin (MINIPRESS) 1 MG capsule Take 1 mg by mouth At Bedtime       QUEtiapine (SEROQUEL) 25 MG tablet Take 50 mg by mouth 3 times daily        rosuvastatin (CRESTOR) 10 MG tablet Take 10 mg by mouth At Bedtime        sennosides (SENOKOT) 8.6 MG tablet Take 2 tablets by mouth At Bedtime       Vitamin D3 (VITAMIN D) 10 MCG (400 UNIT) tablet Take 20 mcg by mouth daily         ROS: 4 point ROS including Respiratory, CV, GI and , other than that noted in the HPI,  is negative    Vitals:  /62   Pulse 60   Temp 98.7  F (37.1  C)   Resp 16   Ht 1.473 m (4' 10\")   Wt 76.2 kg (168 lb)   SpO2 99%   BMI 35.11 kg/m    Body mass index is 35.11 kg/m .  Exam:  GENERAL APPEARANCE:  in no distress, cooperative  RESP:  lungs clear to auscultation   CV:  S1S2 audible, regular HR, no murmur appreciated.   ABDOMEN:  soft, NT/ND, BS audible. no mass appreciated on palpation.   M/S:   no joint deformity noted on observation.   SKIN:  Pacemaker in place. Bandage over right ear lob  NEURO:   No NFD appreciated on observation.   PSYCH:  affect and mood normal    Lab/Diagnostic data: Reviewed in the chart and EHR.  "       ASSESSMENT/PLAN  -----------------------------  # Chronic systolic CHF (H)  # Ischemic cardiomyopathy and Hx of S/P CABG x 5, ICD in place  # Essential hypertension  # Mixed hyperlipidemia  # Internal carotid artery stenosis, bilateral :  - compensated. Followed by Cardiology.   - on lipitor. LDL 83 (may 2021)       COPD (H): at baseline. 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 routine labs  - on Remeron 30 mg, this could contribute to her anxiety.   - On  Seroquel 50 mg daily.   - consider increasing Depakote to bid, and reduce Seroquel to 37.5, if tolerate, increase Depakote PM dose, and further reduce Seroquel, unitl stopped. May increase Depakote to tid if needed.    - On Cymbalta 40 mg.         Polymyalgia rheumatica (H)  Chronic pain disorder  Chronic bilateral low back pain without sciatica  Peripheral polyneuropathy  Frailty  - analgesia optimal  - Significant  Deficits requiring NH placement. Requiring extensive assistance from nursing. Up for meals only o/w spends the day resting in bed.          Irritable bowel syndrome with diarrhea  Gastroesophageal reflux disease, esophagitis presence not specified  - stable.     Normocytic anemia:   - Hb wnl at 12.5 (May 2021), Anemia was Felt 2/2 asa and plavix. On Protonix.  Hb dropped to 10.6 (10/6). Asymptomatic. Will order occult stool and repeat HH in one week.   - Noted on iron supplement . Ferritin 236 June 2020).  Continued to be on iron supplement daily. Consider checking iron panel.          Hypothyroidism:  TSH   Date Value Ref Range Status   08/04/2021 0.86 0.40 - 4.00 mU/L Final   12/04/2020 41.34 (H) 0.40 - 4.00 mU/L Final   On  Levothyroxine. In frail Elderly adult, recommended TSH level is around 6 providing patient is asymptomatic and FT4 is wnl- preferably on the lower normal level.    Order: See above, otherwise, continue the rest of the current POC.       Electronically signed by:  Paola Pastor MD          Sincerely,        Paola Pastor MD

## 2021-10-07 NOTE — PROGRESS NOTES
[place] [facility]  Chief Complaint   Patient presents with     custodial Regulatory     [place] [facility] Record Number:  [id number]  Place of Service where encounter took place:  [facility] () [[id number]]    HPI:    Letty Escobar  is 65 year old (1955), who is being seen today for a federally mandated E/M visit.  HPI information obtained from: facility staff, patient report and [facility] chart review.     Today's concerns are:   - Resident seen and examined.   - Anemia: GNP reports that hb dropped from 12.5 in May to 10.6, and on ASA. Resident denies abdominal pain, nausea, black stool or blood per rectum.     - CHF: Denies CP or swelling in the legs.   - COPD: Denies wheezing, cough or SOB  - Depression/ psych: reports mood is very good.     - - PMR / chronic pain: denies pain in muscles or joints. . Endorses transfer from the bed to the  with assist, also uses walker with belt and assist one.     - RN has no concern today.     --------------------------------  - - Past Medical, social, family histories, medications, and allergies reviewed and updated  - Medications reviewed: in the chart and EHR.   - Case Management:   I have reviewed the care plan and MDS and do agree with the plan. Patient's desire to return to the community is not present.  Information reviewed:  Medications, vital signs, orders, and nursing notes.      MEDICATIONS:  Current Outpatient Medications   Medication Sig Dispense Refill     acetaminophen (TYLENOL) 500 MG tablet Take 2 tablets (1,000 mg) by mouth 3 times daily       aspirin 81 MG EC tablet Take 81 mg by mouth daily       cyanocobalamin (CYANOCOBALAMIN) 1000 MCG/ML injection Inject 1 mL into the muscle every 30 days       divalproex sodium delayed-release (DEPAKOTE) 250 MG DR tablet Take 250 mg by mouth daily       DULoxetine HCl 40 MG CPEP Take 40 mg by mouth daily       fluticasone-vilanterol (BREO ELLIPTA) 200-25 MCG/INH inhaler  "Inhale 1 puff into the lungs daily       furosemide (LASIX) 20 MG tablet Take 20 mg by mouth daily       levothyroxine (SYNTHROID/LEVOTHROID) 112 MCG tablet Take 112 mcg by mouth daily before breakfast        Melatonin 10 MG TABS tablet Take 10 mg by mouth At Bedtime        metoprolol succinate ER (TOPROL-XL) 25 MG 24 hr tablet Take 25 mg by mouth daily       miconazole (MICATIN) 2 % AERP powder Apply topically as needed        mirtazapine (REMERON) 30 MG tablet Take 30 mg by mouth daily       polyethylene glycol (MIRALAX) 17 g packet Take 1 packet by mouth daily       potassium chloride ER (MICRO-K) 10 MEQ CR capsule Take 10 mEq by mouth daily        prazosin (MINIPRESS) 1 MG capsule Take 1 mg by mouth At Bedtime       QUEtiapine (SEROQUEL) 25 MG tablet Take 50 mg by mouth 3 times daily        rosuvastatin (CRESTOR) 10 MG tablet Take 10 mg by mouth At Bedtime        sennosides (SENOKOT) 8.6 MG tablet Take 2 tablets by mouth At Bedtime       Vitamin D3 (VITAMIN D) 10 MCG (400 UNIT) tablet Take 20 mcg by mouth daily         ROS: 4 point ROS including Respiratory, CV, GI and , other than that noted in the HPI,  is negative    Vitals:  /62   Pulse 60   Temp 98.7  F (37.1  C)   Resp 16   Ht 1.473 m (4' 10\")   Wt 76.2 kg (168 lb)   SpO2 99%   BMI 35.11 kg/m    Body mass index is 35.11 kg/m .  Exam:  GENERAL APPEARANCE:  in no distress, cooperative  RESP:  lungs clear to auscultation   CV:  S1S2 audible, regular HR, no murmur appreciated.   ABDOMEN:  soft, NT/ND, BS audible. no mass appreciated on palpation.   M/S:   no joint deformity noted on observation.   SKIN:  Pacemaker in place. Bandage over right ear lob  NEURO:   No NFD appreciated on observation.   PSYCH:  affect and mood normal    Lab/Diagnostic data: Reviewed in the chart and EHR.        ASSESSMENT/PLAN  -----------------------------  # Chronic systolic CHF (H)  # Ischemic cardiomyopathy and Hx of S/P CABG x 5, ICD in place  # Essential " hypertension  # Mixed hyperlipidemia  # Internal carotid artery stenosis, bilateral :  - compensated. Followed by Cardiology.   - on lipitor. LDL 83 (may 2021)       COPD (H): at baseline. 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 routine labs  - on Remeron 30 mg, this could contribute to her anxiety.   - On  Seroquel 50 mg daily.   - consider increasing Depakote to bid, and reduce Seroquel to 37.5, if tolerate, increase Depakote PM dose, and further reduce Seroquel, unitl stopped. May increase Depakote to tid if needed.    - On Cymbalta 40 mg.         Polymyalgia rheumatica (H)  Chronic pain disorder  Chronic bilateral low back pain without sciatica  Peripheral polyneuropathy  Frailty  - analgesia optimal  - Significant  Deficits requiring NH placement. Requiring extensive assistance from nursing. Up for meals only o/w spends the day resting in bed.          Irritable bowel syndrome with diarrhea  Gastroesophageal reflux disease, esophagitis presence not specified  - stable.     Normocytic anemia:   - Hb wnl at 12.5 (May 2021), Anemia was Felt 2/2 asa and plavix. On Protonix.  Hb dropped to 10.6 (10/6). Asymptomatic. Will order occult stool and repeat HH in one week.   - Noted on iron supplement . Ferritin 236 June 2020).  Continued to be on iron supplement daily. Consider checking iron panel.          Hypothyroidism:  TSH   Date Value Ref Range Status   08/04/2021 0.86 0.40 - 4.00 mU/L Final   12/04/2020 41.34 (H) 0.40 - 4.00 mU/L Final   On Levothyroxine. In frail Elderly adult, recommended TSH level is around 6 providing patient is asymptomatic and FT4 is wnl- preferably on the lower normal  level.    Order: See above, otherwise, continue the rest of the current POC.       Electronically signed by:  Paola Pastor MD

## 2021-10-11 LAB
DEPRECATED CALCIDIOL+CALCIFEROL SERPL-MC: 40 UG/L (ref 20–75)
VITAMIN D2 SERPL-MCNC: 16 UG/L
VITAMIN D3 SERPL-MCNC: 24 UG/L

## 2021-10-12 ENCOUNTER — LAB REQUISITION (OUTPATIENT)
Dept: LAB | Facility: CLINIC | Age: 66
End: 2021-10-12
Payer: COMMERCIAL

## 2021-10-12 ENCOUNTER — LAB REQUISITION (OUTPATIENT)
Dept: LAB | Facility: CLINIC | Age: 66
End: 2021-10-12

## 2021-10-12 DIAGNOSIS — D64.9 ANEMIA, UNSPECIFIED: ICD-10-CM

## 2021-10-13 ENCOUNTER — LAB REQUISITION (OUTPATIENT)
Dept: LAB | Facility: CLINIC | Age: 66
End: 2021-10-13
Payer: COMMERCIAL

## 2021-10-13 DIAGNOSIS — D64.9 ANEMIA, UNSPECIFIED: ICD-10-CM

## 2021-10-13 LAB
HEMOCCULT STL QL: NEGATIVE
HGB BLD-MCNC: 10 G/DL (ref 11.7–15.7)

## 2021-10-13 PROCEDURE — 85018 HEMOGLOBIN: CPT | Mod: ORL | Performed by: FAMILY MEDICINE

## 2021-10-13 PROCEDURE — 82272 OCCULT BLD FECES 1-3 TESTS: CPT | Mod: ORL | Performed by: FAMILY MEDICINE

## 2021-10-13 PROCEDURE — 36415 COLL VENOUS BLD VENIPUNCTURE: CPT | Performed by: FAMILY MEDICINE

## 2021-10-13 PROCEDURE — 85018 HEMOGLOBIN: CPT | Performed by: FAMILY MEDICINE

## 2021-10-15 ENCOUNTER — LAB REQUISITION (OUTPATIENT)
Dept: LAB | Facility: CLINIC | Age: 66
End: 2021-10-15
Payer: COMMERCIAL

## 2021-10-15 DIAGNOSIS — D64.9 ANEMIA, UNSPECIFIED: ICD-10-CM

## 2021-10-16 ENCOUNTER — LAB REQUISITION (OUTPATIENT)
Dept: LAB | Facility: CLINIC | Age: 66
End: 2021-10-16
Payer: COMMERCIAL

## 2021-10-16 DIAGNOSIS — D64.9 ANEMIA, UNSPECIFIED: ICD-10-CM

## 2021-10-16 LAB
HEMOCCULT STL QL: NEGATIVE
HEMOCCULT STL QL: NEGATIVE

## 2021-10-16 PROCEDURE — 82272 OCCULT BLD FECES 1-3 TESTS: CPT | Mod: ORL | Performed by: FAMILY MEDICINE

## 2021-10-18 ENCOUNTER — NURSING HOME VISIT (OUTPATIENT)
Dept: GERIATRICS | Facility: CLINIC | Age: 66
End: 2021-10-18
Payer: COMMERCIAL

## 2021-10-18 VITALS
SYSTOLIC BLOOD PRESSURE: 122 MMHG | DIASTOLIC BLOOD PRESSURE: 81 MMHG | HEART RATE: 60 BPM | RESPIRATION RATE: 16 BRPM | BODY MASS INDEX: 35.11 KG/M2 | TEMPERATURE: 96.1 F | WEIGHT: 168 LBS | OXYGEN SATURATION: 99 %

## 2021-10-18 DIAGNOSIS — I50.22 CHRONIC SYSTOLIC CONGESTIVE HEART FAILURE (H): ICD-10-CM

## 2021-10-18 DIAGNOSIS — E53.8 VITAMIN B12 DEFICIENCY: ICD-10-CM

## 2021-10-18 DIAGNOSIS — D63.8 ANEMIA OF CHRONIC DISEASE: Primary | ICD-10-CM

## 2021-10-18 LAB
ERYTHROCYTE [DISTWIDTH] IN BLOOD BY AUTOMATED COUNT: 12.7 % (ref 10–15)
FOLATE SERPL-MCNC: 10.2 NG/ML
HCT VFR BLD AUTO: 33.9 % (ref 35–47)
HGB BLD-MCNC: 10.8 G/DL (ref 11.7–15.7)
IRON SERPL-MCNC: 86 UG/DL (ref 35–180)
MCH RBC QN AUTO: 30.1 PG (ref 26.5–33)
MCHC RBC AUTO-ENTMCNC: 31.9 G/DL (ref 31.5–36.5)
MCV RBC AUTO: 94 FL (ref 78–100)
PLATELET # BLD AUTO: 135 10E3/UL (ref 150–450)
RBC # BLD AUTO: 3.59 10E6/UL (ref 3.8–5.2)
VIT B12 SERPL-MCNC: 863 PG/ML (ref 193–986)
WBC # BLD AUTO: 6.8 10E3/UL (ref 4–11)

## 2021-10-18 PROCEDURE — P9604 ONE-WAY ALLOW PRORATED TRIP: HCPCS | Mod: ORL | Performed by: NURSE PRACTITIONER

## 2021-10-18 PROCEDURE — 36415 COLL VENOUS BLD VENIPUNCTURE: CPT | Mod: ORL | Performed by: NURSE PRACTITIONER

## 2021-10-18 PROCEDURE — 82746 ASSAY OF FOLIC ACID SERUM: CPT | Mod: ORL | Performed by: NURSE PRACTITIONER

## 2021-10-18 PROCEDURE — 83540 ASSAY OF IRON: CPT | Mod: ORL | Performed by: NURSE PRACTITIONER

## 2021-10-18 PROCEDURE — 85027 COMPLETE CBC AUTOMATED: CPT | Mod: ORL | Performed by: NURSE PRACTITIONER

## 2021-10-18 PROCEDURE — 82607 VITAMIN B-12: CPT | Mod: ORL | Performed by: NURSE PRACTITIONER

## 2021-10-18 PROCEDURE — 99309 SBSQ NF CARE MODERATE MDM 30: CPT | Performed by: NURSE PRACTITIONER

## 2021-10-18 RX ORDER — PRAZOSIN HYDROCHLORIDE 1 MG/1
1 CAPSULE ORAL AT BEDTIME
COMMUNITY
End: 2023-05-12

## 2021-10-18 NOTE — LETTER
10/18/2021        RE: Letty Escobar  [facility]  [address] [facility] [address]        [facility]    Chief Complaint   Patient presents with     RECHECK       Place of Service where encounter took place:  [facility] () [[id number]]    HPI:    Letty Escobar is a 65 year old  (1955), who is being seen today for an episodic care visit.  HPI information obtained from: facility chart records, facility staff and patient report.Today's concern is:     Anemia of chronic disease  Vitamin B12 deficiency  Chronic systolic congestive heart failure (H)      Last hemoglobin drawn on 10/13 was 10.0.  Hemoglobin today was back up to 10.8.  Patient denies any symptoms like faintness, lightheadedness, or fatigue.  No symptoms of chest pain, shortness of breath, or abdominal concerns.  Patient reports feeling well and having no complaints to discuss today.     ALLERGIES: Diphenhydramine, Isosorbide, Nitroglycerin, Contrast dye, Adhesive tape, Diphenhydramine hcl, Liquid adhesive, Nystatin, Nystatin (topical), Penicillins, and Sulfa drugs  Past Medical, Surgical, Family and Social History reviewed and updated in EPIC.  Med list reviewed in nursing home EHR    REVIEW OF SYSTEMS:  4 point ROS including Respiratory, CV, GI and , other than that noted in the HPI,  is negative    PHYSICAL EXAM:  /81   Pulse 60   Temp (!) 96.1  F (35.6  C)   Resp 16   Wt 76.2 kg (168 lb)   SpO2 99%   BMI 35.11 kg/m    GENERAL APPEARANCE:  Alert, in no distress, cooperative  RESP:  lungs clear to auscultation , no respiratory distress  CV:  regular rate and rhythm, no murmur, rub, or gallop, no edema  ABDOMEN:  bowel sounds normal, soft, non-tender  M/S:   Gait and station observed in wheelchair  SKIN:  Inspection of skin and subcutaneous tissue baseline, Palpation of skin and subcutaneous tissue baseline  PSYCH:  normal insight, judgement and memory, affect and mood  normal      Assessment/Plan:  Anemia of chronic disease  Vitamin B12 deficiency  Hemoglobin stable. Up from 10.0 on 10/13 to 10.8 today, 10/18. Recheck hemoglobin level in two weeks on Monday, 11/1. Iron level also normal today.  GUiac negative x 2. Monthly b12 injections.     Chronic systolic congestive heart failure (H)  Stable. Continue diuretics and metoprolol.       Orders:  No changes    Electronically signed by  Lamar Keating NP        Sincerely,        Lamar Keating, NP

## 2021-10-18 NOTE — PROGRESS NOTES
[facility]ERIATRIC SERVICES    Chief Complaint   Patient presents with     RECHECK       Place of Service where encounter took place:  [facility] () [[id number]]    HPI:    Letty Escobar is a 65 year old  (1955), who is being seen today for an episodic care visit.  HPI information obtained from: facility chart records, facility staff and patient report.Today's concern is:     Anemia of chronic disease  Vitamin B12 deficiency  Chronic systolic congestive heart failure (H)      Last hemoglobin drawn on 10/13 was 10.0.  Hemoglobin today was back up to 10.8.  Patient denies any symptoms like faintness, lightheadedness, or fatigue.  No symptoms of chest pain, shortness of breath, or abdominal concerns.  Patient reports feeling well and having no complaints to discuss today.     ALLERGIES: Diphenhydramine, Isosorbide, Nitroglycerin, Contrast dye, Adhesive tape, Diphenhydramine hcl, Liquid adhesive, Nystatin, Nystatin (topical), Penicillins, and Sulfa drugs  Past Medical, Surgical, Family and Social History reviewed and updated in EPIC.  Med list reviewed in nursing home EHR    REVIEW OF SYSTEMS:  4 point ROS including Respiratory, CV, GI and , other than that noted in the HPI,  is negative    PHYSICAL EXAM:  /81   Pulse 60   Temp (!) 96.1  F (35.6  C)   Resp 16   Wt 76.2 kg (168 lb)   SpO2 99%   BMI 35.11 kg/m    GENERAL APPEARANCE:  Alert, in no distress, cooperative  RESP:  lungs clear to auscultation , no respiratory distress  CV:  regular rate and rhythm, no murmur, rub, or gallop, no edema  ABDOMEN:  bowel sounds normal, soft, non-tender  M/S:   Gait and station observed in wheelchair  SKIN:  Inspection of skin and subcutaneous tissue baseline, Palpation of skin and subcutaneous tissue baseline  PSYCH:  normal insight, judgement and memory, affect and mood normal      Assessment/Plan:  Anemia of chronic disease  Vitamin B12 deficiency  Hemoglobin stable. Up from 10.0 on  10/13 to 10.8 today, 10/18. Recheck hemoglobin level in two weeks on Monday, 11/1. Iron level also normal today.  GUiac negative x 2. Monthly b12 injections.     Chronic systolic congestive heart failure (H)  Stable. Continue diuretics and metoprolol.       Orders:  No changes    Electronically signed by  Lamar Keating NP

## 2021-11-01 ENCOUNTER — LAB REQUISITION (OUTPATIENT)
Dept: LAB | Facility: CLINIC | Age: 66
End: 2021-11-01
Payer: COMMERCIAL

## 2021-11-01 DIAGNOSIS — D64.9 ANEMIA, UNSPECIFIED: ICD-10-CM

## 2021-11-01 LAB — HGB BLD-MCNC: 10.5 G/DL (ref 11.7–15.7)

## 2021-11-01 PROCEDURE — P9604 ONE-WAY ALLOW PRORATED TRIP: HCPCS | Mod: ORL | Performed by: NURSE PRACTITIONER

## 2021-11-01 PROCEDURE — 36415 COLL VENOUS BLD VENIPUNCTURE: CPT | Mod: ORL | Performed by: NURSE PRACTITIONER

## 2021-11-01 PROCEDURE — 85018 HEMOGLOBIN: CPT | Mod: ORL | Performed by: NURSE PRACTITIONER

## 2021-12-09 ENCOUNTER — NURSING HOME VISIT (OUTPATIENT)
Dept: GERIATRICS | Facility: CLINIC | Age: 66
End: 2021-12-09
Payer: COMMERCIAL

## 2021-12-09 VITALS
DIASTOLIC BLOOD PRESSURE: 72 MMHG | WEIGHT: 181 LBS | RESPIRATION RATE: 16 BRPM | HEIGHT: 57 IN | BODY MASS INDEX: 39.05 KG/M2 | OXYGEN SATURATION: 98 % | TEMPERATURE: 97.1 F | HEART RATE: 61 BPM | SYSTOLIC BLOOD PRESSURE: 120 MMHG

## 2021-12-09 DIAGNOSIS — E66.01 MORBID OBESITY (H): Primary | ICD-10-CM

## 2021-12-09 DIAGNOSIS — J44.9 CHRONIC OBSTRUCTIVE PULMONARY DISEASE, UNSPECIFIED COPD TYPE (H): ICD-10-CM

## 2021-12-09 DIAGNOSIS — E03.9 HYPOTHYROIDISM, UNSPECIFIED TYPE: ICD-10-CM

## 2021-12-09 DIAGNOSIS — I50.22 CHRONIC SYSTOLIC CONGESTIVE HEART FAILURE (H): ICD-10-CM

## 2021-12-09 DIAGNOSIS — R63.5 WEIGHT GAIN: ICD-10-CM

## 2021-12-09 DIAGNOSIS — I10 ESSENTIAL HYPERTENSION: ICD-10-CM

## 2021-12-09 DIAGNOSIS — F33.2 MAJOR DEPRESSIVE DISORDER, RECURRENT SEVERE WITHOUT PSYCHOTIC FEATURES (H): ICD-10-CM

## 2021-12-09 PROCEDURE — 99309 SBSQ NF CARE MODERATE MDM 30: CPT | Performed by: NURSE PRACTITIONER

## 2021-12-09 ASSESSMENT — MIFFLIN-ST. JEOR: SCORE: 1224.89

## 2021-12-09 NOTE — LETTER
12/9/2021        RE: Letty Escobar  [facility]  [address] [facility] [address]        [facility]    Chief Complaint   Patient presents with     FVP Care Coordination - Regulatory       Place of Service where encounter took place:  [facility] () [[id number]]    HPI:    Letty Escobar is a 66 year old  (1955), who is being seen today for a federally mandated E/M visit.  HPI information obtained from: facility chart records, facility staff and patient report. Today's concerns are:     Morbid obesity (H)  Chronic systolic congestive heart failure (H)  Essential hypertension  Chronic obstructive pulmonary disease, unspecified COPD type (H)  Hypothyroidism, unspecified type  Weight gain  Major depressive disorder, recurrent severe without psychotic features (H)    Nursing concerns: none. Weight gain noted. Eats everything at meal time.   Nutrition: 3/16/2021 mechanical soft diet patient has gained weight in the last 6 months.  PHQ-9: 2  BIMS: 15/15  Function: ambulates with assist of 1 and rolling walker.     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, left leg (H) I70.422     Candidiasis B37.9     Carotid stenosis, right I65.21     Chronic bilateral low back pain without sciatica M54.50, G89.29     Chronic pain disorder G89.4     Constipation K59.00     Chronic obstructive pulmonary disease, unspecified COPD type (H) J44.9     Hyperlipidemia with target low density lipoprotein (LDL) cholesterol less than 70 mg/dL E78.5     Essential hypertension I10     Hypothyroidism E03.9     ELI (obstructive sleep apnea) G47.33     Panic disorder with agoraphobia F40.01     Personality disorder (H) F60.9     Posttraumatic stress disorder F43.10     S/P CABG x 5 Z95.1     Urinary  incontinence, mixed N39.46     Vitamin B12 deficiency E53.8     ACP (advance care planning) Z71.89     Major depressive disorder, recurrent severe without psychotic features (H) F33.2     Chronic atrial fibrillation (H) I48.20     Cardiac pacemaker in situ Z95.0     Morbid obesity (H) E66.01       ALLERGIES: Diphenhydramine, Isosorbide, Nitroglycerin, Contrast dye, Adhesive tape, Diphenhydramine hcl, Liquid adhesive, Nystatin, Nystatin (topical), Penicillins, and Sulfa drugs  PAST MEDICAL HISTORY:  has a past medical history of ACP (advance care planning) (11/3/2010), Acute coronary syndrome (H) (11/22/2019), Acute exacerbation of CHF (congestive heart failure) (H) (11/11/2019), Acute on chronic systolic (congestive) heart failure (H) (1/28/2020), Anemia of chronic disease (1/5/2013), 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, left leg (H) (1/15/2020), BPPV (benign paroxysmal positional vertigo) (5/31/2018), Candidiasis (3/1/2020), Carotid stenosis, right (7/13/2016), Chest pain (11/11/2019), Chronic bilateral low back pain without sciatica (1/17/2018), Chronic pain disorder (10/1/2017), Constipation (3/20/2020), COPD (chronic obstructive pulmonary disease) (H) (6/24/2020), Coronary atherosclerosis (2/6/2009), Cubital tunnel syndrome on left (11/13/2012), Depressive disorder, Diabetes mellitus (H), Diabetes mellitus type 2 in obese (H) (7/13/2006), Diabetes mellitus type 2 in obese (H) (7/13/2006), Drug-seeking behavior (4/4/2020), Failure to thrive in adult (9/3/2020), Hereditary and idiopathic peripheral neuropathy (4/24/2007), Hyperlipidemia with target low density lipoprotein (LDL) cholesterol less than 70 mg/dL (5/30/2007), Hypertension (10/14/2015), Hypothyroidism (12/10/2010), Irritable bowel syndrome (9/7/2015), Ischemic cardiomyopathy (9/20/2015), Long-term use of high-risk medication (4/14/2020), Moniliasis, cutaneous (3/31/2020), 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/1/2020), Myocardial infarction (H), Neuromuscular disorder (H), Other specified postprocedural states (10/8/2015), Pain medication agreement (4/20/2013), Panic disorder with agoraphobia (4/14/2020), Paroxysmal atrial fibrillation (H) (10/2/2015), Personality disorder (H) (3/5/2020), Polymyalgia rheumatica (H) (11/28/2018), Posttraumatic stress disorder (3/1/2020), Restless legs syndrome (RLS) (8/29/2007), Restless legs syndrome (RLS) (8/29/2007), S/P CABG x 5 (8/21/2015), Serum calcium elevated (3/1/2020), Somatic dysfunction of sacral region (1/17/2018), Subacromial bursitis of left shoulder joint (8/6/2018), Suicidal ideation (4/1/2020), Thyroid disease, TIA (transient ischemic attack) (5/4/2018), TIA (transient ischemic attack) (5/4/2018), Tobacco abuse (2/17/2017), Tobacco abuse (2/17/2017), Urinary incontinence, mixed (9/24/2017), UTI (urinary tract infection) (4/17/2020), Vitamin B12 deficiency (2/14/2018), and Weakness (12/17/2019).  PAST SURGICAL HISTORY:  has a past surgical history that includes Release carpal tunnel; Cholecystectomy; Cardiac surgery; Abdomen surgery; Release carpal tunnel; Cholecystectomy; Cardiac surgery; and other surgical history.  FAMILY HISTORY: family history is not on file.  SOCIAL HISTORY:  reports that she has never smoked. She has never used smokeless tobacco.    MEDICATIONS:  Current Outpatient Medications   Medication Sig Dispense Refill     acetaminophen (TYLENOL) 500 MG tablet Take 2 tablets (1,000 mg) by mouth 3 times daily       aspirin 81 MG EC tablet Take 81 mg by mouth daily       cyanocobalamin (CYANOCOBALAMIN) 1000 MCG/ML injection Inject 1 mL into the muscle every 30 days       divalproex sodium delayed-release (DEPAKOTE) 250 MG DR tablet Take 250 mg by mouth daily       DULoxetine HCl 40 MG CPEP Take 40 mg by mouth daily       fluticasone-vilanterol (BREO ELLIPTA) 200-25 MCG/INH inhaler Inhale 1 puff into the lungs daily       furosemide (LASIX)  "20 MG tablet Take 20 mg by mouth daily       levothyroxine (SYNTHROID/LEVOTHROID) 112 MCG tablet Take 112 mcg by mouth daily before breakfast        Melatonin 10 MG TABS tablet Take 10 mg by mouth At Bedtime        metoprolol succinate ER (TOPROL-XL) 25 MG 24 hr tablet Take 25 mg by mouth daily       miconazole (MICATIN) 2 % AERP powder Apply topically as needed        mirtazapine (REMERON) 30 MG tablet Take 30 mg by mouth daily       polyethylene glycol (MIRALAX) 17 g packet Take 1 packet by mouth daily       potassium chloride ER (MICRO-K) 10 MEQ CR capsule Take 10 mEq by mouth daily        prazosin (MINIPRESS) 1 MG capsule Take 1 mg by mouth At Bedtime       QUEtiapine (SEROQUEL) 25 MG tablet Take 50 mg by mouth 3 times daily        rosuvastatin (CRESTOR) 10 MG tablet Take 10 mg by mouth At Bedtime        sennosides (SENOKOT) 8.6 MG tablet Take 2 tablets by mouth At Bedtime       Vitamin D3 (VITAMIN D) 10 MCG (400 UNIT) tablet Take 20 mcg by mouth daily         Information reviewed:  Medications, vital signs, orders, care plan, and nursing notes.    ROS:  4 point ROS including Respiratory, CV, GI and , other than that noted in the HPI,  is negative    Exam:  Vitals: /72   Pulse 61   Temp 97.1  F (36.2  C)   Resp 16   Ht 1.448 m (4' 9\")   Wt 82.1 kg (181 lb)   SpO2 98%   BMI 39.17 kg/m    BMI= Body mass index is 39.17 kg/m .  GENERAL APPEARANCE:  Alert, in no distress  RESP:  respiratory effort and palpation of chest normal, no respiratory distress, lungs sounds clear  CV:  Palpation and auscultation of heart done , regular rate and rhythm,  edema none  ABDOMEN:  normal bowel sounds, soft, nontender,  M/S:  Gait and station observed in bed, no tenderness or swelling of the joints   SKIN:  Inspection and palpation of skin and subcutaneous tissue at baseline  PSYCH:  insight and judgement, memory seems intact, affect and mood normal    Lab/Diagnostic data: reviewed in nursing home " EHR    ASSESSMENT/PLAN:  Morbid obesity (H)  Wt Readings from Last 4 Encounters:   12/09/21 82.1 kg (181 lb)   10/18/21 76.2 kg (168 lb)   10/07/21 76.2 kg (168 lb)   08/05/21 70.8 kg (156 lb)    weight gain significant. No signs of fluid gain. Patient eats well.     Chronic systolic congestive heart failure (H)  No evidence of fluid overload.  Continue with beta-blocker, and Lasix. Continue to follow with cardiology.     Essential hypertension  controlled. Continue metoprolol and lasix.     Chronic obstructive pulmonary disease, unspecified COPD type (H)  Stable.  Currently has no complaints of shortness of breath or cough.  Continue inhaler     Hypothyroidism, unspecified type  Weight gain  Last tsh acceptable. No reason to suspect hypothyroidism contributing to weight gain.     Major depressive disorder, recurrent severe without psychotic features (H)  On several medications that contribute to weight gain.   - continue management per psychiatry.     Orders:  No changes    Electronically signed by:  Lamar Keating NP                Sincerely,        Lamar Keating NP

## 2021-12-10 PROBLEM — E66.01 MORBID OBESITY (H): Status: ACTIVE | Noted: 2021-12-10

## 2021-12-10 PROBLEM — E66.01 MORBID OBESITY (H): Status: RESOLVED | Noted: 2021-12-10 | Resolved: 2021-12-10

## 2021-12-10 NOTE — PROGRESS NOTES
[facility] GERIATRIC SERVICES    Chief Complaint   Patient presents with     FVP Care Coordination - Regulatory       Place of Service where encounter took place:  [facility] () [[id number]]    HPI:    Letty Escobar is a 66 year old  (1955), who is being seen today for a federally mandated E/M visit.  HPI information obtained from: facility chart records, facility staff and patient report. Today's concerns are:     Morbid obesity (H)  Chronic systolic congestive heart failure (H)  Essential hypertension  Chronic obstructive pulmonary disease, unspecified COPD type (H)  Hypothyroidism, unspecified type  Weight gain  Major depressive disorder, recurrent severe without psychotic features (H)    Nursing concerns: none. Weight gain noted. Eats everything at meal time.   Nutrition: 3/16/2021 mechanical soft diet patient has gained weight in the last 6 months.  PHQ-9: 2  BIMS: 15/15  Function: ambulates with assist of 1 and rolling walker.     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, left leg (H) I70.422     Candidiasis B37.9     Carotid stenosis, right I65.21     Chronic bilateral low back pain without sciatica M54.50, G89.29     Chronic pain disorder G89.4     Constipation K59.00     Chronic obstructive pulmonary disease, unspecified COPD type (H) J44.9     Hyperlipidemia with target low density lipoprotein (LDL) cholesterol less than 70 mg/dL E78.5     Essential hypertension I10     Hypothyroidism E03.9     ELI (obstructive sleep apnea) G47.33     Panic disorder with agoraphobia F40.01     Personality disorder (H) F60.9     Posttraumatic stress disorder F43.10     S/P CABG x 5 Z95.1     Urinary incontinence, mixed N39.46     Vitamin B12 deficiency E53.8     ACP (advance care planning) Z71.89     Major  depressive disorder, recurrent severe without psychotic features (H) F33.2     Chronic atrial fibrillation (H) I48.20     Cardiac pacemaker in situ Z95.0     Morbid obesity (H) E66.01       ALLERGIES: Diphenhydramine, Isosorbide, Nitroglycerin, Contrast dye, Adhesive tape, Diphenhydramine hcl, Liquid adhesive, Nystatin, Nystatin (topical), Penicillins, and Sulfa drugs  PAST MEDICAL HISTORY:  has a past medical history of ACP (advance care planning) (11/3/2010), Acute coronary syndrome (H) (11/22/2019), Acute exacerbation of CHF (congestive heart failure) (H) (11/11/2019), Acute on chronic systolic (congestive) heart failure (H) (1/28/2020), Anemia of chronic disease (1/5/2013), 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, left leg (H) (1/15/2020), BPPV (benign paroxysmal positional vertigo) (5/31/2018), Candidiasis (3/1/2020), Carotid stenosis, right (7/13/2016), Chest pain (11/11/2019), Chronic bilateral low back pain without sciatica (1/17/2018), Chronic pain disorder (10/1/2017), Constipation (3/20/2020), COPD (chronic obstructive pulmonary disease) (H) (6/24/2020), Coronary atherosclerosis (2/6/2009), Cubital tunnel syndrome on left (11/13/2012), Depressive disorder, Diabetes mellitus (H), Diabetes mellitus type 2 in obese (H) (7/13/2006), Diabetes mellitus type 2 in obese (H) (7/13/2006), Drug-seeking behavior (4/4/2020), Failure to thrive in adult (9/3/2020), Hereditary and idiopathic peripheral neuropathy (4/24/2007), Hyperlipidemia with target low density lipoprotein (LDL) cholesterol less than 70 mg/dL (5/30/2007), Hypertension (10/14/2015), Hypothyroidism (12/10/2010), Irritable bowel syndrome (9/7/2015), Ischemic cardiomyopathy (9/20/2015), Long-term use of high-risk medication (4/14/2020), Moniliasis, cutaneous (3/31/2020), Mood disorder due to a general medical condition (3/1/2020), Myocardial infarction (H), Neuromuscular disorder (H), Other specified  postprocedural states (10/8/2015), Pain medication agreement (4/20/2013), Panic disorder with agoraphobia (4/14/2020), Paroxysmal atrial fibrillation (H) (10/2/2015), Personality disorder (H) (3/5/2020), Polymyalgia rheumatica (H) (11/28/2018), Posttraumatic stress disorder (3/1/2020), Restless legs syndrome (RLS) (8/29/2007), Restless legs syndrome (RLS) (8/29/2007), S/P CABG x 5 (8/21/2015), Serum calcium elevated (3/1/2020), Somatic dysfunction of sacral region (1/17/2018), Subacromial bursitis of left shoulder joint (8/6/2018), Suicidal ideation (4/1/2020), Thyroid disease, TIA (transient ischemic attack) (5/4/2018), TIA (transient ischemic attack) (5/4/2018), Tobacco abuse (2/17/2017), Tobacco abuse (2/17/2017), Urinary incontinence, mixed (9/24/2017), UTI (urinary tract infection) (4/17/2020), Vitamin B12 deficiency (2/14/2018), and Weakness (12/17/2019).  PAST SURGICAL HISTORY:  has a past surgical history that includes Release carpal tunnel; Cholecystectomy; Cardiac surgery; Abdomen surgery; Release carpal tunnel; Cholecystectomy; Cardiac surgery; and other surgical history.  FAMILY HISTORY: family history is not on file.  SOCIAL HISTORY:  reports that she has never smoked. She has never used smokeless tobacco.    MEDICATIONS:  Current Outpatient Medications   Medication Sig Dispense Refill     acetaminophen (TYLENOL) 500 MG tablet Take 2 tablets (1,000 mg) by mouth 3 times daily       aspirin 81 MG EC tablet Take 81 mg by mouth daily       cyanocobalamin (CYANOCOBALAMIN) 1000 MCG/ML injection Inject 1 mL into the muscle every 30 days       divalproex sodium delayed-release (DEPAKOTE) 250 MG DR tablet Take 250 mg by mouth daily       DULoxetine HCl 40 MG CPEP Take 40 mg by mouth daily       fluticasone-vilanterol (BREO ELLIPTA) 200-25 MCG/INH inhaler Inhale 1 puff into the lungs daily       furosemide (LASIX) 20 MG tablet Take 20 mg by mouth daily       levothyroxine (SYNTHROID/LEVOTHROID) 112 MCG tablet  "Take 112 mcg by mouth daily before breakfast        Melatonin 10 MG TABS tablet Take 10 mg by mouth At Bedtime        metoprolol succinate ER (TOPROL-XL) 25 MG 24 hr tablet Take 25 mg by mouth daily       miconazole (MICATIN) 2 % AERP powder Apply topically as needed        mirtazapine (REMERON) 30 MG tablet Take 30 mg by mouth daily       polyethylene glycol (MIRALAX) 17 g packet Take 1 packet by mouth daily       potassium chloride ER (MICRO-K) 10 MEQ CR capsule Take 10 mEq by mouth daily        prazosin (MINIPRESS) 1 MG capsule Take 1 mg by mouth At Bedtime       QUEtiapine (SEROQUEL) 25 MG tablet Take 50 mg by mouth 3 times daily        rosuvastatin (CRESTOR) 10 MG tablet Take 10 mg by mouth At Bedtime        sennosides (SENOKOT) 8.6 MG tablet Take 2 tablets by mouth At Bedtime       Vitamin D3 (VITAMIN D) 10 MCG (400 UNIT) tablet Take 20 mcg by mouth daily         Information reviewed:  Medications, vital signs, orders, care plan, and nursing notes.    ROS:  4 point ROS including Respiratory, CV, GI and , other than that noted in the HPI,  is negative    Exam:  Vitals: /72   Pulse 61   Temp 97.1  F (36.2  C)   Resp 16   Ht 1.448 m (4' 9\")   Wt 82.1 kg (181 lb)   SpO2 98%   BMI 39.17 kg/m    BMI= Body mass index is 39.17 kg/m .  GENERAL APPEARANCE:  Alert, in no distress  RESP:  respiratory effort and palpation of chest normal, no respiratory distress, lungs sounds clear  CV:  Palpation and auscultation of heart done , regular rate and rhythm,  edema none  ABDOMEN:  normal bowel sounds, soft, nontender,  M/S:  Gait and station observed in bed, no tenderness or swelling of the joints   SKIN:  Inspection and palpation of skin and subcutaneous tissue at baseline  PSYCH:  insight and judgement, memory seems intact, affect and mood normal    Lab/Diagnostic data: reviewed in nursing home EHR    ASSESSMENT/PLAN:  Morbid obesity (H)  Wt Readings from Last 4 Encounters:   12/09/21 82.1 kg (181 lb) "   10/18/21 76.2 kg (168 lb)   10/07/21 76.2 kg (168 lb)   08/05/21 70.8 kg (156 lb)    weight gain significant. No signs of fluid gain. Patient eats well.     Chronic systolic congestive heart failure (H)  No evidence of fluid overload.  Continue with beta-blocker, and Lasix. Continue to follow with cardiology.     Essential hypertension  controlled. Continue metoprolol and lasix.     Chronic obstructive pulmonary disease, unspecified COPD type (H)  Stable.  Currently has no complaints of shortness of breath or cough.  Continue inhaler     Hypothyroidism, unspecified type  Weight gain  Last tsh acceptable. No reason to suspect hypothyroidism contributing to weight gain.     Major depressive disorder, recurrent severe without psychotic features (H)  On several medications that contribute to weight gain.   - continue management per psychiatry.     Orders:  No changes    Electronically signed by:  Lamar Keating NP

## 2022-01-20 ENCOUNTER — CLINICAL UPDATE (OUTPATIENT)
Dept: PHARMACY | Facility: CLINIC | Age: 67
End: 2022-01-20
Payer: COMMERCIAL

## 2022-01-20 DIAGNOSIS — J44.9 CHRONIC OBSTRUCTIVE PULMONARY DISEASE, UNSPECIFIED COPD TYPE (H): Primary | ICD-10-CM

## 2022-01-20 DIAGNOSIS — D63.8 ANEMIA OF CHRONIC DISEASE: ICD-10-CM

## 2022-01-20 DIAGNOSIS — I65.21 CAROTID STENOSIS, RIGHT: ICD-10-CM

## 2022-01-20 DIAGNOSIS — I10 ESSENTIAL HYPERTENSION: ICD-10-CM

## 2022-01-20 DIAGNOSIS — F33.2 MAJOR DEPRESSIVE DISORDER, RECURRENT SEVERE WITHOUT PSYCHOTIC FEATURES (H): ICD-10-CM

## 2022-01-20 DIAGNOSIS — E78.5 HYPERLIPIDEMIA WITH TARGET LOW DENSITY LIPOPROTEIN (LDL) CHOLESTEROL LESS THAN 70 MG/DL: ICD-10-CM

## 2022-01-20 DIAGNOSIS — I50.22 CHRONIC SYSTOLIC CONGESTIVE HEART FAILURE (H): ICD-10-CM

## 2022-01-20 DIAGNOSIS — F60.9 PERSONALITY DISORDER (H): ICD-10-CM

## 2022-01-20 DIAGNOSIS — E53.8 VITAMIN B12 DEFICIENCY: ICD-10-CM

## 2022-01-20 DIAGNOSIS — F40.01 PANIC DISORDER WITH AGORAPHOBIA: ICD-10-CM

## 2022-01-20 DIAGNOSIS — Z95.1 S/P CABG X 5: ICD-10-CM

## 2022-01-20 DIAGNOSIS — I48.20 CHRONIC ATRIAL FIBRILLATION (H): ICD-10-CM

## 2022-01-20 DIAGNOSIS — E03.9 HYPOTHYROIDISM, UNSPECIFIED TYPE: ICD-10-CM

## 2022-01-20 DIAGNOSIS — F43.10 POSTTRAUMATIC STRESS DISORDER: ICD-10-CM

## 2022-01-20 PROCEDURE — 99207 PR NO CHARGE LOS: CPT | Performed by: PHARMACIST

## 2022-01-20 NOTE — PROGRESS NOTES
This patient's medication list and chart were reviewed as part of the service provided by [facility].    Assessment/Recommendations:    Consider d'c prazosin if patient without nightmares or flashbacks.  Per 9/14/2020 NP note (and some prior to this), appears this was dc'd previously due to patient denying these symptoms.  Unclear why restarted.  Potential for prazosin to exacerbate heart failure symptoms.  May also contribute to dizziness, orthostasis, weakness, urinary frequency, etc.    Of note, depakote may contribute to thrombocytopenia. Aspirin may as well, of course, but patient with h/o CAD.  Platelets slightly low at 135 on 10/18/21, and have been lower than that in past.  Continue to monitor CBC with platelets periodically as well as LFTs.  Appears LFTs last checked 3/16/21 and ALT slightly elevated at that time.    In future, if patient without psychotic symptoms/upsetting hallucinations, delusions, etc., may consider small dose reduction in quetiapine to 37.5mg three times daily if psychiatry feels appropriate.  Med may contribute to weight gain, orthostasis, dizziness, falls, increased rate of cognitive decline, etc.  Noted h/o frequent psych hospitalizations.  Agree with MD that mirtazapine at dose of 30mg may be more stimulating, so another option may be to consider reduction in this to 22.5mg daily and monitor.    Noted patient not on ACEi or ARB, and has history of systolic HF, CAD - likely not on ACEi or ARB due to h/o multiple AKIs in past per chart review/previous NP note.        Renea Dhaliwal, Pharm.D.,[facility]  Board Certified Geriatric Pharmacist  Medication Therapy Management Pharmacist  [phone]

## 2022-02-02 NOTE — PROGRESS NOTES
[facility]  Chief Complaint   Patient presents with     penitentiary Regulatory     [facility] Record Number:  [id number]  Place of Service where encounter took place:  [facility] () [[id number]]    HPI:    Letty Escobar  is 65 year old (1955), who is being seen today for a federally mandated E/M visit.  HPI information obtained from: facility staff, patient report and [facility] chart review.     Today's concerns are:   - Resident seen and examined.   - CHF: Denies CP or swelling in the legs, orthopnea or PND  - COPD: Denies wheezing, cough or SOB  - Depression/ psych: reports mood is very good.   - Denies pain  - GNP and  RN has no concern today.     --------------------------------  - - Past Medical, social, family histories, medications, and allergies reviewed and updated  - Medications reviewed: in the chart and EHR.   - Case Management:   I have reviewed the care plan and MDS and do agree with the plan. Patient's desire to return to the community is not present.  Information reviewed:  Medications, vital signs, orders, and nursing notes.      MEDICATIONS:  Current Outpatient Medications   Medication Sig Dispense Refill     acetaminophen (TYLENOL) 500 MG tablet Take 2 tablets (1,000 mg) by mouth 3 times daily       aspirin 81 MG EC tablet Take 81 mg by mouth daily       cyanocobalamin (CYANOCOBALAMIN) 1000 MCG/ML injection Inject 1 mL into the muscle every 30 days       divalproex sodium delayed-release (DEPAKOTE) 250 MG DR tablet Take 250 mg by mouth daily       DULoxetine HCl 40 MG CPEP Take 40 mg by mouth daily       fluticasone-vilanterol (BREO ELLIPTA) 200-25 MCG/INH inhaler Inhale 1 puff into the lungs daily       furosemide (LASIX) 20 MG tablet Take 20 mg by mouth daily       guaiFENesin (ROBITUSSIN) 100 MG/5ML liquid Take 20 mLs (400 mg) by mouth every 4 hours as needed for cough       levothyroxine (SYNTHROID/LEVOTHROID) 112 MCG tablet Take  "112 mcg by mouth daily before breakfast        Melatonin 10 MG TABS tablet Take 10 mg by mouth At Bedtime        metoprolol succinate ER (TOPROL-XL) 25 MG 24 hr tablet Take 25 mg by mouth daily       miconazole (MICATIN) 2 % AERP powder Apply topically as needed        mirtazapine (REMERON) 30 MG tablet Take 30 mg by mouth daily       polyethylene glycol (MIRALAX) 17 g packet Take 1 packet by mouth daily       potassium chloride ER (MICRO-K) 10 MEQ CR capsule Take 10 mEq by mouth daily        prazosin (MINIPRESS) 1 MG capsule Take 1 mg by mouth At Bedtime       QUEtiapine (SEROQUEL) 25 MG tablet Take 50 mg by mouth 3 times daily        rosuvastatin (CRESTOR) 10 MG tablet Take 10 mg by mouth At Bedtime        sennosides (SENOKOT) 8.6 MG tablet Take 2 tablets by mouth At Bedtime       Vitamin D3 (VITAMIN D) 10 MCG (400 UNIT) tablet Take 20 mcg by mouth daily         ROS: 4 point ROS including Respiratory, CV, GI and , other than that noted in the HPI,  is negative    Vitals:  BP (!) 140/72   Pulse 62   Temp 97.1  F (36.2  C)   Resp 18   Ht 1.448 m (4' 9\")   Wt 85.7 kg (188 lb 14.4 oz)   SpO2 96%   BMI 40.88 kg/m    Body mass index is 40.88 kg/m .  Exam:  GENERAL APPEARANCE:  in no distress, cooperative  RESP:  lungs clear to auscultation   CV:  S1S2 audible, regular HR, no murmur appreciated.   ABDOMEN:  soft, NT/ND, BS audible. no mass appreciated on palpation.   M/S:   no joint deformity noted on observation.   SKIN:  Pacemaker in place.  NEURO:   No NFD appreciated on observation.   PSYCH:  affect and mood normal    Lab/Diagnostic data: Reviewed in the chart and EHR.        ASSESSMENT/PLAN  -----------------------------  # Chronic systolic CHF (H)  # Ischemic cardiomyopathy and Hx of S/P CABG x 5, and bi-vent ICD in place  # Essential hypertension  # Mixed hyperlipidemia  # I# PAD, internal carotid artery stenosis, bilateral (H)  # hx of stroke with ischemic CVD  - compensated. Followed by Cardiology.   - " on Crestor. LDL 83 (may 2021). Routine lab       COPD (H): at baseline. 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 routine labs  - on Remeron 30 mg, this could contribute to her anxiety.   - On  Seroquel 50 mg tid.   - consider increasing Depakote to bid, and reduce Seroquel to 37.5, if tolerate, increase Depakote PM dose, and further reduce Seroquel, unitl stopped. May increase Depakote to tid if needed.    - On Cymbalta 40 mg.         Polymyalgia rheumatica (H)  Chronic pain disorder  Chronic bilateral low back pain without sciatica  Peripheral polyneuropathy  Frailty  - analgesia optimal  - Significant  Deficits requiring NH placement. Requiring extensive assistance from nursing. Up for meals only o/w spends the day resting in bed.       Morbid Obesity  Body mass index is 40.88 kg/m . (H):  to reduce calories intake. Increase cardiovascular risk.       Normocytic anemia, query ACD:  - Hb wnl at 12.5 (May 2021), Anemia was Felt 2/2 asa and plavix. On Protonix.  Hb dropped to 10.6 (10/6). Asymptomatic.  Noted on iron supplement . Ferritin 236 June 2020), and iron level is 86 (Oct 2021).  Continued to be on iron supplement daily. Consider checking ferritin level as well.      Irritable bowel syndrome with diarrhea  Gastroesophageal reflux disease, esophagitis presence not specified  - stable.        Hypothyroidism:  TSH   Date Value Ref Range Status   08/04/2021 0.86 0.40 - 4.00 mU/L Final   12/04/2020 41.34 (H) 0.40 - 4.00 mU/L Final   On Levothyroxine. In frail Elderly adult, recommended TSH level is around 6 providing patient is asymptomatic and FT4 is wnl- preferably on the lower normal  level.    Order: See above, otherwise, continue the rest of the current POC.       Electronically signed by:  Paola Pastor MD

## 2022-02-03 ENCOUNTER — NURSING HOME VISIT (OUTPATIENT)
Dept: GERIATRICS | Facility: CLINIC | Age: 67
End: 2022-02-03
Payer: COMMERCIAL

## 2022-02-03 VITALS
HEIGHT: 57 IN | WEIGHT: 188.9 LBS | TEMPERATURE: 97.1 F | HEART RATE: 62 BPM | RESPIRATION RATE: 18 BRPM | SYSTOLIC BLOOD PRESSURE: 140 MMHG | DIASTOLIC BLOOD PRESSURE: 72 MMHG | BODY MASS INDEX: 40.75 KG/M2 | OXYGEN SATURATION: 96 %

## 2022-02-03 DIAGNOSIS — F33.2 MAJOR DEPRESSIVE DISORDER, RECURRENT SEVERE WITHOUT PSYCHOTIC FEATURES (H): ICD-10-CM

## 2022-02-03 DIAGNOSIS — M35.3 POLYMYALGIA RHEUMATICA (H): ICD-10-CM

## 2022-02-03 DIAGNOSIS — I50.42 CHRONIC COMBINED SYSTOLIC AND DIASTOLIC CONGESTIVE HEART FAILURE (H): Primary | ICD-10-CM

## 2022-02-03 DIAGNOSIS — E66.01 MORBID OBESITY (H): ICD-10-CM

## 2022-02-03 DIAGNOSIS — I25.709 ATHEROSCLEROSIS OF CORONARY ARTERY BYPASS GRAFT OF NATIVE HEART WITH ANGINA PECTORIS (H): ICD-10-CM

## 2022-02-03 DIAGNOSIS — I10 ESSENTIAL HYPERTENSION: ICD-10-CM

## 2022-02-03 DIAGNOSIS — D63.8 ANEMIA OF CHRONIC DISEASE: ICD-10-CM

## 2022-02-03 DIAGNOSIS — J44.9 CHRONIC OBSTRUCTIVE PULMONARY DISEASE, UNSPECIFIED COPD TYPE (H): ICD-10-CM

## 2022-02-03 PROCEDURE — 99309 SBSQ NF CARE MODERATE MDM 30: CPT | Performed by: FAMILY MEDICINE

## 2022-02-03 ASSESSMENT — MIFFLIN-ST. JEOR: SCORE: 1260.72

## 2022-02-03 NOTE — LETTER
2/3/2022        RE: Letty Escobar  [facility] [place]  [address] [address]            [place] [facility]  Chief Complaint   Patient presents with     custodial Regulatory     [place] [facility] Record Number:  [id number]  Place of Service where encounter took place:  [facility] () [[id number]]    HPI:    Letty Escobar  is 65 year old (1955), who is being seen today for a federally mandated E/M visit.  HPI information obtained from: facility staff, patient report and [facility] chart review.     Today's concerns are:   - Resident seen and examined.   - CHF: Denies CP or swelling in the legs, orthopnea or PND  - COPD: Denies wheezing, cough or SOB  - Depression/ psych: reports mood is very good.   - Denies pain  - GNP and  RN has no concern today.     --------------------------------  - - Past Medical, social, family histories, medications, and allergies reviewed and updated  - Medications reviewed: in the chart and EHR.   - Case Management:   I have reviewed the care plan and MDS and do agree with the plan. Patient's desire to return to the community is not present.  Information reviewed:  Medications, vital signs, orders, and nursing notes.      MEDICATIONS:  Current Outpatient Medications   Medication Sig Dispense Refill     acetaminophen (TYLENOL) 500 MG tablet Take 2 tablets (1,000 mg) by mouth 3 times daily       aspirin 81 MG EC tablet Take 81 mg by mouth daily       cyanocobalamin (CYANOCOBALAMIN) 1000 MCG/ML injection Inject 1 mL into the muscle every 30 days       divalproex sodium delayed-release (DEPAKOTE) 250 MG DR tablet Take 250 mg by mouth daily       DULoxetine HCl 40 MG CPEP Take 40 mg by mouth daily       fluticasone-vilanterol (BREO ELLIPTA) 200-25 MCG/INH inhaler Inhale 1 puff into the lungs daily       furosemide (LASIX) 20 MG tablet Take 20 mg by mouth daily       guaiFENesin (ROBITUSSIN) 100 MG/5ML liquid Take 20 mLs (400 mg)  "by mouth every 4 hours as needed for cough       levothyroxine (SYNTHROID/LEVOTHROID) 112 MCG tablet Take 112 mcg by mouth daily before breakfast        Melatonin 10 MG TABS tablet Take 10 mg by mouth At Bedtime        metoprolol succinate ER (TOPROL-XL) 25 MG 24 hr tablet Take 25 mg by mouth daily       miconazole (MICATIN) 2 % AERP powder Apply topically as needed        mirtazapine (REMERON) 30 MG tablet Take 30 mg by mouth daily       polyethylene glycol (MIRALAX) 17 g packet Take 1 packet by mouth daily       potassium chloride ER (MICRO-K) 10 MEQ CR capsule Take 10 mEq by mouth daily        prazosin (MINIPRESS) 1 MG capsule Take 1 mg by mouth At Bedtime       QUEtiapine (SEROQUEL) 25 MG tablet Take 50 mg by mouth 3 times daily        rosuvastatin (CRESTOR) 10 MG tablet Take 10 mg by mouth At Bedtime        sennosides (SENOKOT) 8.6 MG tablet Take 2 tablets by mouth At Bedtime       Vitamin D3 (VITAMIN D) 10 MCG (400 UNIT) tablet Take 20 mcg by mouth daily         ROS: 4 point ROS including Respiratory, CV, GI and , other than that noted in the HPI,  is negative    Vitals:  BP (!) 140/72   Pulse 62   Temp 97.1  F (36.2  C)   Resp 18   Ht 1.448 m (4' 9\")   Wt 85.7 kg (188 lb 14.4 oz)   SpO2 96%   BMI 40.88 kg/m    Body mass index is 40.88 kg/m .  Exam:  GENERAL APPEARANCE:  in no distress, cooperative  RESP:  lungs clear to auscultation   CV:  S1S2 audible, regular HR, no murmur appreciated.   ABDOMEN:  soft, NT/ND, BS audible. no mass appreciated on palpation.   M/S:   no joint deformity noted on observation.   SKIN:  Pacemaker in place.  NEURO:   No NFD appreciated on observation.   PSYCH:  affect and mood normal    Lab/Diagnostic data: Reviewed in the chart and EHR.        ASSESSMENT/PLAN  -----------------------------  # Chronic systolic CHF (H)  # Ischemic cardiomyopathy and Hx of S/P CABG x 5, and bi-vent ICD in place  # Essential hypertension  # Mixed hyperlipidemia  # I# PAD, internal carotid " artery stenosis, bilateral (H)  # hx of stroke with ischemic CVD  - compensated. Followed by Cardiology.   - on Crestor. LDL 83 (may 2021). Routine lab       COPD (H): at baseline. 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 routine labs  - on Remeron 30 mg, this could contribute to her anxiety.   - On  Seroquel 50 mg tid.   - consider increasing Depakote to bid, and reduce Seroquel to 37.5, if tolerate, increase Depakote PM dose, and further reduce Seroquel, unitl stopped. May increase Depakote to tid if needed.    - On Cymbalta 40 mg.         Polymyalgia rheumatica (H)  Chronic pain disorder  Chronic bilateral low back pain without sciatica  Peripheral polyneuropathy  Frailty  - analgesia optimal  - Significant  Deficits requiring NH placement. Requiring extensive assistance from nursing. Up for meals only o/w spends the day resting in bed.       Morbid Obesity  Body mass index is 40.88 kg/m . (H):  to reduce calories intake. Increase cardiovascular risk.       Normocytic anemia, query ACD:  - Hb wnl at 12.5 (May 2021), Anemia was Felt 2/2 asa and plavix. On Protonix.  Hb dropped to 10.6 (10/6). Asymptomatic.  Noted on iron supplement . Ferritin 236 June 2020), and iron level is 86 (Oct 2021).  Continued to be on iron supplement daily. Consider checking ferritin level as well.      Irritable bowel syndrome with diarrhea  Gastroesophageal reflux disease, esophagitis presence not specified  - stable.        Hypothyroidism:  TSH   Date Value Ref Range Status   08/04/2021 0.86 0.40 - 4.00 mU/L Final   12/04/2020 41.34 (H) 0.40 - 4.00 mU/L Final   On Levothyroxine. In frail Elderly adult, recommended  TSH level is around 6 providing patient is asymptomatic and FT4 is wnl- preferably on the lower normal level.    Order: See above, otherwise, continue the rest of the current POC.       Electronically signed by:  Paola Pastor MD        Sincerely,        Paola Pastor MD

## 2022-02-12 ENCOUNTER — TELEPHONE (OUTPATIENT)
Dept: GERIATRICS | Facility: CLINIC | Age: 67
End: 2022-02-12
Payer: COMMERCIAL

## 2022-02-13 ENCOUNTER — TELEPHONE (OUTPATIENT)
Dept: GERIATRICS | Facility: CLINIC | Age: 67
End: 2022-02-13
Payer: COMMERCIAL

## 2022-02-13 DIAGNOSIS — R05.9 COUGH: Primary | ICD-10-CM

## 2022-02-13 PROBLEM — Z79.4 DIABETES MELLITUS TYPE 2, INSULIN DEPENDENT (H): Status: ACTIVE | Noted: 2022-02-13

## 2022-02-13 PROBLEM — E11.9 DIABETES MELLITUS TYPE 2, INSULIN DEPENDENT (H): Status: ACTIVE | Noted: 2022-02-13

## 2022-02-13 PROBLEM — I25.709 ATHEROSCLEROSIS OF CORONARY ARTERY BYPASS GRAFT OF NATIVE HEART WITH ANGINA PECTORIS (H): Status: ACTIVE | Noted: 2022-02-13

## 2022-02-13 PROBLEM — M35.3 POLYMYALGIA RHEUMATICA (H): Status: ACTIVE | Noted: 2018-11-28

## 2022-02-13 RX ORDER — GUAIFENESIN 200 MG/10ML
400 LIQUID ORAL EVERY 4 HOURS PRN
Start: 2022-02-13 | End: 2023-03-17

## 2022-02-13 NOTE — TELEPHONE ENCOUNTER
Staff called due to using the 72 hours on the Mucinex liquid up.    Orders:  mucinex liquid 400mg po every 4 hours prn for cough    Electronically signed by Chantal Uribe RN, CNP

## 2022-02-13 NOTE — TELEPHONE ENCOUNTER
[place] [facility] TELEPHONE ENCOUNTER    Chief Complaint   Patient presents with     Breathing Problem       Letty Escobar is a 66 year old  (1955),Nurse called today to report: Increased shortness of breath for the past 3 days. Oxygen saturations previously ranging 89-91% without oxygen. They received orders recently for nebuizations PRN and robitussin PRN with shortness of breath complaints. Wheezing noted bilaterally. She has orders for PRN oxygen which she seldomly used prior to this change in status. Oxygen applied at 2L with saturations ranging 92-94%. Recent PCR covid test yesterday 2/11/22 was negative. Registered Nurse noted shortness of breath relief post nebulization administration. Remains afebrile.     ASSESSMENT/PLAN      CXR 2 views    covid re-test tomorrow    BMP and CBC with 2/14/22    Continue PRN nebulization's as directed    Continue PRN robitussin as directed    Monitor for worsening s/sx of concerns.     Electronically signed by:   KILO Freeman CNP

## 2022-02-14 PROCEDURE — 80048 BASIC METABOLIC PNL TOTAL CA: CPT | Mod: ORL | Performed by: FAMILY MEDICINE

## 2022-02-14 PROCEDURE — 85025 COMPLETE CBC W/AUTO DIFF WBC: CPT | Mod: ORL | Performed by: FAMILY MEDICINE

## 2022-02-14 PROCEDURE — P9604 ONE-WAY ALLOW PRORATED TRIP: HCPCS | Mod: ORL | Performed by: FAMILY MEDICINE

## 2022-03-24 RX ORDER — GABAPENTIN 300 MG/1
400 CAPSULE ORAL AT BEDTIME
COMMUNITY
Start: 2022-03-24 | End: 2023-11-10

## 2022-03-24 RX ORDER — ALBUTEROL SULFATE 0.83 MG/ML
2.5 SOLUTION RESPIRATORY (INHALATION) 2 TIMES DAILY PRN
Qty: 90 ML | COMMUNITY
Start: 2022-03-24 | End: 2023-11-10

## 2022-03-24 NOTE — PROGRESS NOTES
[facility] GERIATRICS  Chief Complaint   Patient presents with     Annual Comprehensive Nursing Home     [place] [facility] Record Number:  [id number]  Place of Service where encounter took place:  [facility] () [[id number]]    HPI:    Letty Escobar  is a 66 year old  (1955), who is being seen today for an annual comprehensive visit. HPI information obtained from: facility chart records, facility staff, patient report and [facility] chart review.     Nursing reported no new behaviors or concerns. Letty likes being here and the staff treats her well.         ALLERGIES: Diphenhydramine, Isosorbide, Nitroglycerin, Contrast dye, Adhesive tape, Diphenhydramine hcl, Liquid adhesive, Nystatin, Nystatin (topical), Penicillins, and Sulfa drugs  PAST MEDICAL HISTORY:   Past Medical History:   Diagnosis Date     ACP (advance care planning) 11/3/2010    Formatting of this note might be different from the original. Patient has identified Health Care Agent(s): Yes Add Health Care Agents: Yes   Health Care Agent(s):  Primary Health Care Agent:   Edwar Escobar Relationship:  Spouse Phone:   [phone]  Secondary Health Care Agent:   Chiki Oro Relationship:   Son Phone:   [phone]  Patient has Advance Care Plan Documents (Health Care Direct     Acute coronary syndrome (H) 11/22/2019     Acute exacerbation of CHF (congestive heart failure) (H) 11/11/2019     Acute on chronic systolic (congestive) heart failure (H) 1/28/2020     Anemia of chronic disease 1/5/2013     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, left leg (H) 1/15/2020     BPPV (benign paroxysmal positional vertigo) 5/31/2018     Candidiasis 3/1/2020     Carotid stenosis, right 7/13/2016    Carotid US 05/04/2018 showed moderate plaque formation, consistent with 50 to 69% stenosis in the right internal carotid artery, not significantly changed from 8/5/2015.  Moderate plaque  formation, consistent with 50 to 69% stenosis in the left internal carotid artery; there has been mild progression of the left ICA stenosis since 8/5/2015.     Chest pain 11/11/2019     Chronic bilateral low back pain without sciatica 1/17/2018     Chronic pain disorder 10/1/2017     Constipation 3/20/2020     COPD (chronic obstructive pulmonary disease) (H) 6/24/2020     Coronary atherosclerosis 2/6/2009 2/5/2009 - MI - Proximal RCA 99%, mild-mod disease elsewhere.  EF 60%.  PCI:  MYRON to pRCA. 2/12/2009 - admit CP - Widely patent RCA stent. Moderate diffuse CAD. Severe stenosis in trivial PDA branch. LVEF 45%. 1/25/2010 - admit CP - PTCA and stent of diagonal 9/8/2010 - admit CP - LAD patent stent. Moderate diffuse CAD. Medical management recommended.  4/25/2012 - NSTEMI - Acute total occlusion of     Cubital tunnel syndrome on left 11/13/2012     Depressive disorder      Diabetes mellitus (H)      Diabetes mellitus type 2 in obese (H) 7/13/2006     Diabetes mellitus type 2 in obese (H) 7/13/2006     Drug-seeking behavior 4/4/2020     Failure to thrive in adult 9/3/2020     Hereditary and idiopathic peripheral neuropathy 4/24/2007     Hyperlipidemia with target low density lipoprotein (LDL) cholesterol less than 70 mg/dL 5/30/2007     Hypertension 10/14/2015     Hypothyroidism 12/10/2010     Irritable bowel syndrome 9/7/2015     Ischemic cardiomyopathy 9/20/2015    EF of 40-45%, status post RV lead revision and LV epicardial lead placement via mini-thoracotomy in August 2016.     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  Designated pharmacy: [facility]  Prescribing physician: Andrea Diagnosis: Ulnar neuropathy     Panic disorder with agoraphobia 4/14/2020     Paroxysmal atrial fibrillation (H) 10/2/2015     Personality disorder (H) 3/5/2020    Rule out dependent personality     Polymyalgia rheumatica (H) 11/28/2018     Posttraumatic stress disorder 3/1/2020     Restless legs syndrome (RLS) 8/29/2007     Restless legs syndrome (RLS) 8/29/2007     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     Serum calcium elevated 3/1/2020     Somatic dysfunction of sacral region 1/17/2018     Subacromial bursitis of left shoulder joint 8/6/2018     Suicidal ideation 4/1/2020     Thyroid disease      TIA (transient ischemic attack) 5/4/2018     TIA (transient ischemic attack) 5/4/2018     Tobacco abuse 2/17/2017     Tobacco abuse 2/17/2017     Urinary incontinence, mixed 9/24/2017     UTI (urinary tract infection) 4/17/2020     Vitamin B12 deficiency 2/14/2018     Weakness 12/17/2019      PAST SURGICAL HISTORY:  has a past surgical history that includes Release carpal tunnel; Cholecystectomy; Cardiac surgery; Abdomen surgery; Release carpal tunnel; Cholecystectomy; Cardiac surgery; and other surgical history.  IMMUNIZATIONS:  Immunization History   Administered Date(s) Administered     COVID-19,PF,Moderna 12/31/2020, 01/28/2021     FLU 6-35 months 10/18/2011     Influenza (IIV3) PF 10/21/2003, 01/05/2006, 09/22/2009, 10/19/2010, 10/18/2011, 10/08/2012, 10/15/2013     Influenza Vaccine IM > 6 months Valent IIV4 (Alfuria,Fluzone) 10/01/2014, 09/29/2015, 09/26/2016, 09/20/2017, 10/10/2018, 10/04/2019     Pneumo Conj 13-V (2010&after) 11/14/2018     Pneumococcal 23 valent 10/21/2003, 01/01/2008, 11/03/2015     TDAP Vaccine (Adacel) 08/23/2010     Above immunizations pulled from [facility]. MIIC and facility records also reconciled. Outstanding information sent to  to update [facility] .  Future immunizations needed:  TDAP    Current Outpatient Medications:      acetaminophen  (TYLENOL) 500 MG tablet, Take 2 tablets (1,000 mg) by mouth 3 times daily, Disp: , Rfl:      aspirin 81 MG EC tablet, Take 81 mg by mouth daily, Disp: , Rfl:      cyanocobalamin (CYANOCOBALAMIN) 1000 MCG/ML injection, Inject 1 mL into the muscle every 30 days, Disp: , Rfl:      divalproex sodium delayed-release (DEPAKOTE) 250 MG DR tablet, Take 250 mg by mouth daily, Disp: , Rfl:      DULoxetine HCl 40 MG CPEP, Take 40 mg by mouth daily, Disp: , Rfl:      fluticasone-vilanterol (BREO ELLIPTA) 200-25 MCG/INH inhaler, Inhale 1 puff into the lungs daily, Disp: , Rfl:      furosemide (LASIX) 20 MG tablet, Take 20 mg by mouth daily, Disp: , Rfl:      guaiFENesin (ROBITUSSIN) 100 MG/5ML liquid, Take 20 mLs (400 mg) by mouth every 4 hours as needed for cough, Disp: , Rfl:      levothyroxine (SYNTHROID/LEVOTHROID) 112 MCG tablet, Take 112 mcg by mouth daily before breakfast , Disp: , Rfl:      Melatonin 10 MG TABS tablet, Take 10 mg by mouth At Bedtime , Disp: , Rfl:      metoprolol succinate ER (TOPROL-XL) 25 MG 24 hr tablet, Take 25 mg by mouth daily, Disp: , Rfl:      miconazole (MICATIN) 2 % AERP powder, Apply topically as needed , Disp: , Rfl:      mirtazapine (REMERON) 30 MG tablet, Take 30 mg by mouth daily, Disp: , Rfl:      polyethylene glycol (MIRALAX) 17 g packet, Take 1 packet by mouth daily, Disp: , Rfl:      potassium chloride ER (MICRO-K) 10 MEQ CR capsule, Take 10 mEq by mouth daily , Disp: , Rfl:      prazosin (MINIPRESS) 1 MG capsule, Take 1 mg by mouth At Bedtime, Disp: , Rfl:      QUEtiapine (SEROQUEL) 25 MG tablet, Take 50 mg by mouth 3 times daily , Disp: , Rfl:      rosuvastatin (CRESTOR) 10 MG tablet, Take 10 mg by mouth At Bedtime , Disp: , Rfl:      sennosides (SENOKOT) 8.6 MG tablet, Take 2 tablets by mouth At Bedtime, Disp: , Rfl:      Vitamin D3 (VITAMIN D) 10 MCG (400 UNIT) tablet, Take 20 mcg by mouth daily, Disp: , Rfl:      Case Management:  I have reviewed the facility/SNF care plan/MDS,  including the falls risk, nutrition and pain screening. I also reviewed the current immunizations, and preventive care.. Future cancer screening is not clinically indicated secondary to age/goals of care Patient's desire to return to the community is present, but is not able due to care needs . Current Level of Care is appropriate.    Advance Directive Discussion:    I reviewed the current advanced directives as reflected in EPIC, the POLST and the facility chart, and verified the congruency of orders . I contacted the first party spouse Edwar and telephone from nursing home was blocked plan of Care.  I did review the advance directives with the resident. Patient's goal is pain control and comfort.    Team Discussion:  I communicated with the appropriate disciplines involved with the Plan of Care:   Nursing    Information reviewed:  Medications, vital signs, orders, and nursing notes.    ROS:  4 point ROS including Respiratory, CV, GI and , other than that noted in the HPI,  is negative    Vitals:  There were no vitals taken for this visit. There is no height or weight on file to calculate BMI.  Exam:  GENERAL APPEARANCE:  Alert, in no distress  ENT:  Mouth and posterior oropharynx normal, moist mucous membranes  EYES:  EOM normal, conjunctiva and lids normal  RESP:  respiratory effort and palpation of chest normal, lungs clear to auscultation , no respiratory distress  CV:  Palpation and auscultation of heart done , regular rate and rhythm, no murmur, rub, or gallop, no edema  ABDOMEN:  normal bowel sounds, soft, nontender, no hepatosplenomegaly or other masses  M/S:   no gross deformities, no joint swelling  SKIN:  no rash, warm and dry  NEURO:   Cranial nerves 2-12 are normal tested and grossly at patient's baseline, Examination of sensation by touch normal  PSYCH:  oriented X 3, affect and mood normal     Lab/Diagnostic data:     Most Recent 3 CBC's:Recent Labs   Lab Test 02/14/22  0740 11/01/21  0750  10/18/21  0738 10/13/21  0720 10/06/21  0754   WBC 7.3  --  6.8  --  6.5   HGB 8.8* 10.5* 10.8*   < > 10.6*   MCV 97  --  94  --  93   *  --  135*  --  116*    < > = values in this interval not displayed.     Most Recent 3 BMP's:Recent Labs   Lab Test 02/14/22  0740 10/06/21  0754 03/16/21  1434    141 139   POTASSIUM 4.5 4.5 4.7   CHLORIDE 109 113* 111*   CO2 27 23 22   BUN 16 35* 45*   CR 0.87 0.87 0.98   ANIONGAP 4 5 6   ORESTES 9.5 9.0 9.4   * 75 114*       ASSESSMENT/PLAN    (Z00.00) Annual physical exam  (primary encounter diagnosis)      (J44.9) Chronic obstructive pulmonary disease, unspecified COPD type (H)  Plan:   -Stable  -Continue inhalers    (I48.20) Chronic atrial fibrillation (H)  (Z95.0) Cardiac pacemaker in situ  Plan:  -Rate controlled  -Continue BB and ASA  -Status post biventricular ICD with epicardial LV lead placed August 2016  -Followed by cardiology    (F60.9) Personality disorder (H)  (F33.2) Major depressive disorder, recurrent severe without psychotic features (H)  Plan:   -Well-controlled  -Continue Cymbalta and mirtazapine  -Decrease Seroquel to 37.5 mg 3 times daily.  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 (H)  Plan:   -Stable, asymptomatic  -Status post CABG August 2015  -Continue statin and aspirin    (E03.9) Hypothyroidism, unspecified type  Plan:   -Overcontrolled  -TSH level 0.86 August 2021  -Goal TSH around 6 provided free T4 is on the lower side of  normal and is asymptomatic  -Check TSH and free T4 level  3/28    (E53.8) Vitamin B12 deficiency  Plan:  -Well-controlled  -Vitamin B12 level October 2021 863  -Continue monthly injection    (Z13.6) Screening for hypertension  Plan:   Based on JNC-8 goals,  patients age of 66 year old, no presence of diabetes or CKD, and  goals of care goal BP is <150/90 mm Hg. Patient is stable with current plan of care and routine assessment..    Electronically signed by:  Yinka Manzo CNP

## 2022-03-25 ENCOUNTER — LAB REQUISITION (OUTPATIENT)
Dept: LAB | Facility: CLINIC | Age: 67
End: 2022-03-25
Payer: COMMERCIAL

## 2022-03-25 ENCOUNTER — NURSING HOME VISIT (OUTPATIENT)
Dept: GERIATRICS | Facility: CLINIC | Age: 67
End: 2022-03-25
Payer: COMMERCIAL

## 2022-03-25 VITALS
WEIGHT: 189.4 LBS | TEMPERATURE: 97.5 F | OXYGEN SATURATION: 98 % | BODY MASS INDEX: 40.86 KG/M2 | SYSTOLIC BLOOD PRESSURE: 134 MMHG | DIASTOLIC BLOOD PRESSURE: 70 MMHG | HEART RATE: 74 BPM | HEIGHT: 57 IN | RESPIRATION RATE: 16 BRPM

## 2022-03-25 DIAGNOSIS — Z13.6 SCREENING FOR HYPERTENSION: ICD-10-CM

## 2022-03-25 DIAGNOSIS — Z95.0 CARDIAC PACEMAKER IN SITU: ICD-10-CM

## 2022-03-25 DIAGNOSIS — F60.9 PERSONALITY DISORDER (H): ICD-10-CM

## 2022-03-25 DIAGNOSIS — J44.9 CHRONIC OBSTRUCTIVE PULMONARY DISEASE, UNSPECIFIED COPD TYPE (H): ICD-10-CM

## 2022-03-25 DIAGNOSIS — E53.8 VITAMIN B12 DEFICIENCY: ICD-10-CM

## 2022-03-25 DIAGNOSIS — E03.9 HYPOTHYROIDISM, UNSPECIFIED TYPE: ICD-10-CM

## 2022-03-25 DIAGNOSIS — I48.20 CHRONIC ATRIAL FIBRILLATION (H): ICD-10-CM

## 2022-03-25 DIAGNOSIS — F33.2 MAJOR DEPRESSIVE DISORDER, RECURRENT SEVERE WITHOUT PSYCHOTIC FEATURES (H): ICD-10-CM

## 2022-03-25 DIAGNOSIS — I25.709 ATHEROSCLEROSIS OF CORONARY ARTERY BYPASS GRAFT OF NATIVE HEART WITH ANGINA PECTORIS (H): ICD-10-CM

## 2022-03-25 DIAGNOSIS — F60.9 PERSONALITY DISORDER, UNSPECIFIED (H): ICD-10-CM

## 2022-03-25 DIAGNOSIS — Z00.00 ANNUAL PHYSICAL EXAM: Primary | ICD-10-CM

## 2022-03-25 PROCEDURE — 99318 PR ANNUAL NURSING FAC ASSESSMNT, STABLE: CPT | Performed by: NURSE PRACTITIONER

## 2022-03-25 NOTE — LETTER
3/25/2022        RE: Letty Escobar  [facility]  [address] [facility] [address]        [facility] GERIATRICS  Chief Complaint   Patient presents with     Annual Comprehensive Nursing Home     [place] Medical Record Number:  [id number]  Place of Service where encounter took place:  [facility] () [[id number]]    HPI:    Letty Escobar  is a 66 year old  (1955), who is being seen today for an annual comprehensive visit. HPI information obtained from: facility chart records, facility staff, patient report and [facility] chart review.     Nursing reported no new behaviors or concerns. Letty likes being here and the staff treats her well.         ALLERGIES: Diphenhydramine, Isosorbide, Nitroglycerin, Contrast dye, Adhesive tape, Diphenhydramine hcl, Liquid adhesive, Nystatin, Nystatin (topical), Penicillins, and Sulfa drugs  PAST MEDICAL HISTORY:   Past Medical History:   Diagnosis Date     ACP (advance care planning) 11/3/2010    Formatting of this note might be different from the original. Patient has identified Health Care Agent(s): Yes Add Health Care Agents: Yes   Health Care Agent(s):  Primary Health Care Agent:   Edwar Escobar Relationship:  Spouse Phone:   [phone]  Secondary Health Care Agent:   Chiki Oro Relationship:   Son Phone:   [phone]  Patient has Advance Care Plan Documents (Health Care Direct     Acute coronary syndrome (H) 11/22/2019     Acute exacerbation of CHF (congestive heart failure) (H) 11/11/2019     Acute on chronic systolic (congestive) heart failure (H) 1/28/2020     Anemia of chronic disease 1/5/2013     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, left leg (H) 1/15/2020     BPPV (benign paroxysmal positional vertigo) 5/31/2018     Candidiasis 3/1/2020     Carotid stenosis, right 7/13/2016    Carotid US 05/04/2018 showed moderate plaque formation, consistent with 50 to  69% stenosis in the right internal carotid artery, not significantly changed from 8/5/2015.  Moderate plaque formation, consistent with 50 to 69% stenosis in the left internal carotid artery; there has been mild progression of the left ICA stenosis since 8/5/2015.     Chest pain 11/11/2019     Chronic bilateral low back pain without sciatica 1/17/2018     Chronic pain disorder 10/1/2017     Constipation 3/20/2020     COPD (chronic obstructive pulmonary disease) (H) 6/24/2020     Coronary atherosclerosis 2/6/2009 2/5/2009 - MI - Proximal RCA 99%, mild-mod disease elsewhere.  EF 60%.  PCI:  MYRON to pRCA. 2/12/2009 - admit CP - Widely patent RCA stent. Moderate diffuse CAD. Severe stenosis in trivial PDA branch. LVEF 45%. 1/25/2010 - admit CP - PTCA and stent of diagonal 9/8/2010 - admit CP - LAD patent stent. Moderate diffuse CAD. Medical management recommended.  4/25/2012 - NSTEMI - Acute total occlusion of     Cubital tunnel syndrome on left 11/13/2012     Depressive disorder      Diabetes mellitus (H)      Diabetes mellitus type 2 in obese (H) 7/13/2006     Diabetes mellitus type 2 in obese (H) 7/13/2006     Drug-seeking behavior 4/4/2020     Failure to thrive in adult 9/3/2020     Hereditary and idiopathic peripheral neuropathy 4/24/2007     Hyperlipidemia with target low density lipoprotein (LDL) cholesterol less than 70 mg/dL 5/30/2007     Hypertension 10/14/2015     Hypothyroidism 12/10/2010     Irritable bowel syndrome 9/7/2015     Ischemic cardiomyopathy 9/20/2015    EF of 40-45%, status post RV lead revision and LV epicardial lead placement via mini-thoracotomy in August 2016.     Long-term use of high-risk medication 4/14/2020     Moniliasis, 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     Myocardial infarction (H)      Neuromuscular disorder (H)     ulnar nerve problem     Other specified postprocedural states 10/8/2015     Pain medication agreement 4/20/2013     Controlled substance agreement for percocet #30/month on file and signed 4/17/13.  Designated pharmacy: [facility] Prescribing physician: Andrea Diagnosis: Ulnar neuropathy     Panic disorder with agoraphobia 4/14/2020     Paroxysmal atrial fibrillation (H) 10/2/2015     Personality disorder (H) 3/5/2020    Rule out dependent personality     Polymyalgia rheumatica (H) 11/28/2018     Posttraumatic stress disorder 3/1/2020     Restless legs syndrome (RLS) 8/29/2007     Restless legs syndrome (RLS) 8/29/2007     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     Serum calcium elevated 3/1/2020     Somatic dysfunction of sacral region 1/17/2018     Subacromial bursitis of left shoulder joint 8/6/2018     Suicidal ideation 4/1/2020     Thyroid disease      TIA (transient ischemic attack) 5/4/2018     TIA (transient ischemic attack) 5/4/2018     Tobacco abuse 2/17/2017     Tobacco abuse 2/17/2017     Urinary incontinence, mixed 9/24/2017     UTI (urinary tract infection) 4/17/2020     Vitamin B12 deficiency 2/14/2018     Weakness 12/17/2019      PAST SURGICAL HISTORY:  has a past surgical history that includes Release carpal tunnel; Cholecystectomy; Cardiac surgery; Abdomen surgery; Release carpal tunnel; Cholecystectomy; Cardiac surgery; and other surgical history.  IMMUNIZATIONS:  Immunization History   Administered Date(s) Administered     COVID-19,PF,Moderna 12/31/2020, 01/28/2021     FLU 6-35 months 10/18/2011     Influenza (IIV3) PF 10/21/2003, 01/05/2006, 09/22/2009, 10/19/2010, 10/18/2011, 10/08/2012, 10/15/2013     Influenza Vaccine IM > 6 months Valent IIV4 (Alfuria,Fluzone) 10/01/2014, 09/29/2015, 09/26/2016, 09/20/2017, 10/10/2018, 10/04/2019     Pneumo Conj 13-V (2010&after) 11/14/2018     Pneumococcal 23 valent 10/21/2003, 01/01/2008, 11/03/2015     TDAP Vaccine (Adacel) 08/23/2010     Above immunizations pulled from [facility]. MIIC and facility records also reconciled. Outstanding information sent to  to update  [facility] .  Future immunizations needed:  TDAP    Current Outpatient Medications:      acetaminophen (TYLENOL) 500 MG tablet, Take 2 tablets (1,000 mg) by mouth 3 times daily, Disp: , Rfl:      aspirin 81 MG EC tablet, Take 81 mg by mouth daily, Disp: , Rfl:      cyanocobalamin (CYANOCOBALAMIN) 1000 MCG/ML injection, Inject 1 mL into the muscle every 30 days, Disp: , Rfl:      divalproex sodium delayed-release (DEPAKOTE) 250 MG DR tablet, Take 250 mg by mouth daily, Disp: , Rfl:      DULoxetine HCl 40 MG CPEP, Take 40 mg by mouth daily, Disp: , Rfl:      fluticasone-vilanterol (BREO ELLIPTA) 200-25 MCG/INH inhaler, Inhale 1 puff into the lungs daily, Disp: , Rfl:      furosemide (LASIX) 20 MG tablet, Take 20 mg by mouth daily, Disp: , Rfl:      guaiFENesin (ROBITUSSIN) 100 MG/5ML liquid, Take 20 mLs (400 mg) by mouth every 4 hours as needed for cough, Disp: , Rfl:      levothyroxine (SYNTHROID/LEVOTHROID) 112 MCG tablet, Take 112 mcg by mouth daily before breakfast , Disp: , Rfl:      Melatonin 10 MG TABS tablet, Take 10 mg by mouth At Bedtime , Disp: , Rfl:      metoprolol succinate ER (TOPROL-XL) 25 MG 24 hr tablet, Take 25 mg by mouth daily, Disp: , Rfl:      miconazole (MICATIN) 2 % AERP powder, Apply topically as needed , Disp: , Rfl:      mirtazapine (REMERON) 30 MG tablet, Take 30 mg by mouth daily, Disp: , Rfl:      polyethylene glycol (MIRALAX) 17 g packet, Take 1 packet by mouth daily, Disp: , Rfl:      potassium chloride ER (MICRO-K) 10 MEQ CR capsule, Take 10 mEq by mouth daily , Disp: , Rfl:      prazosin (MINIPRESS) 1 MG capsule, Take 1 mg by mouth At Bedtime, Disp: , Rfl:      QUEtiapine (SEROQUEL) 25 MG tablet, Take 50 mg by mouth 3 times daily , Disp: , Rfl:      rosuvastatin (CRESTOR) 10 MG tablet, Take 10 mg by mouth At Bedtime , Disp: , Rfl:      sennosides (SENOKOT) 8.6 MG tablet, Take 2 tablets by mouth At Bedtime, Disp: , Rfl:      Vitamin D3 (VITAMIN D) 10 MCG (400 UNIT) tablet, Take 20  mcg by mouth daily, Disp: , Rfl:      Case Management:  I have reviewed the facility/SNF care plan/MDS, including the falls risk, nutrition and pain screening. I also reviewed the current immunizations, and preventive care.. Future cancer screening is not clinically indicated secondary to age/goals of care Patient's desire to return to the community is present, but is not able due to care needs . Current Level of Care is appropriate.    Advance Directive Discussion:    I reviewed the current advanced directives as reflected in EPIC, the POLST and the facility chart, and verified the congruency of orders . I contacted the first party spouse Edwar and telephone from nursing home was blocked plan of Care.  I did review the advance directives with the resident. Patient's goal is pain control and comfort.    Team Discussion:  I communicated with the appropriate disciplines involved with the Plan of Care:   Nursing    Information reviewed:  Medications, vital signs, orders, and nursing notes.    ROS:  4 point ROS including Respiratory, CV, GI and , other than that noted in the HPI,  is negative    Vitals:  There were no vitals taken for this visit. There is no height or weight on file to calculate BMI.  Exam:  GENERAL APPEARANCE:  Alert, in no distress  ENT:  Mouth and posterior oropharynx normal, moist mucous membranes  EYES:  EOM normal, conjunctiva and lids normal  RESP:  respiratory effort and palpation of chest normal, lungs clear to auscultation , no respiratory distress  CV:  Palpation and auscultation of heart done , regular rate and rhythm, no murmur, rub, or gallop, no edema  ABDOMEN:  normal bowel sounds, soft, nontender, no hepatosplenomegaly or other masses  M/S:   no gross deformities, no joint swelling  SKIN:  no rash, warm and dry  NEURO:   Cranial nerves 2-12 are normal tested and grossly at patient's baseline, Examination of sensation by touch normal  PSYCH:  oriented X 3, affect and mood normal      Lab/Diagnostic data:     Most Recent 3 CBC's:Recent Labs   Lab Test 02/14/22  0740 11/01/21  0750 10/18/21  0738 10/13/21  0720 10/06/21  0754   WBC 7.3  --  6.8  --  6.5   HGB 8.8* 10.5* 10.8*   < > 10.6*   MCV 97  --  94  --  93   *  --  135*  --  116*    < > = values in this interval not displayed.     Most Recent 3 BMP's:Recent Labs   Lab Test 02/14/22  0740 10/06/21  0754 03/16/21  1434    141 139   POTASSIUM 4.5 4.5 4.7   CHLORIDE 109 113* 111*   CO2 27 23 22   BUN 16 35* 45*   CR 0.87 0.87 0.98   ANIONGAP 4 5 6   ORESTES 9.5 9.0 9.4   * 75 114*       ASSESSMENT/PLAN    (Z00.00) Annual physical exam  (primary encounter diagnosis)      (J44.9) Chronic obstructive pulmonary disease, unspecified COPD type (H)  Plan:   -Stable  -Continue inhalers    (I48.20) Chronic atrial fibrillation (H)  (Z95.0) Cardiac pacemaker in situ  Plan:  -Rate controlled  -Continue BB and ASA  -Status post biventricular ICD with epicardial LV lead placed August 2016  -Followed by cardiology    (F60.9) Personality disorder (H)  (F33.2) Major depressive disorder, recurrent severe without psychotic features (H)  Plan:   -Well-controlled  -Continue Cymbalta and mirtazapine  -Decrease Seroquel to 37.5 mg 3 times daily.  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 (H)  Plan:   -Stable, asymptomatic  -Status post CABG August 2015  -Continue statin and aspirin    (E03.9) Hypothyroidism, unspecified type  Plan:   -Overcontrolled  -TSH level 0.86 August 2021  -Goal TSH around 6 provided free T4 is on the lower side of  normal and is asymptomatic  -Check TSH and free T4 level  3/28    (E53.8) Vitamin B12 deficiency  Plan:  -Well-controlled  -Vitamin B12 level October 2021 863  -Continue monthly injection    (Z13.6) Screening for  hypertension  Plan:   Based on JNC-8 goals,  patients age of 66 year old, no presence of diabetes or CKD, and goals of care goal BP is <150/90 mm Hg. Patient is stable with current plan of care and routine assessment..    Electronically signed by:  Yinka Manzo CNP              Sincerely,        Yinka Manzo CNP

## 2022-03-28 ENCOUNTER — TELEPHONE (OUTPATIENT)
Dept: GERIATRICS | Facility: CLINIC | Age: 67
End: 2022-03-28

## 2022-03-28 LAB
ALBUMIN SERPL-MCNC: 2.9 G/DL (ref 3.4–5)
ALP SERPL-CCNC: 53 U/L (ref 40–150)
ALT SERPL W P-5'-P-CCNC: 21 U/L (ref 0–50)
AST SERPL W P-5'-P-CCNC: 15 U/L (ref 0–45)
BILIRUB DIRECT SERPL-MCNC: <0.1 MG/DL (ref 0–0.2)
BILIRUB SERPL-MCNC: 0.2 MG/DL (ref 0.2–1.3)
PROT SERPL-MCNC: 6.8 G/DL (ref 6.8–8.8)
TSH SERPL DL<=0.005 MIU/L-ACNC: 2.58 MU/L (ref 0.4–4)

## 2022-03-28 PROCEDURE — 80076 HEPATIC FUNCTION PANEL: CPT | Mod: ORL | Performed by: FAMILY MEDICINE

## 2022-03-28 PROCEDURE — P9604 ONE-WAY ALLOW PRORATED TRIP: HCPCS | Mod: ORL | Performed by: FAMILY MEDICINE

## 2022-03-28 PROCEDURE — 84443 ASSAY THYROID STIM HORMONE: CPT | Mod: ORL | Performed by: FAMILY MEDICINE

## 2022-03-28 PROCEDURE — 36415 COLL VENOUS BLD VENIPUNCTURE: CPT | Mod: ORL | Performed by: FAMILY MEDICINE

## 2022-03-28 RX ORDER — LEVOTHYROXINE SODIUM 100 UG/1
100 TABLET ORAL DAILY
COMMUNITY
Start: 2022-03-29 | End: 2023-01-23

## 2022-03-28 NOTE — TELEPHONE ENCOUNTER
[facility] Triage Nurse Telephone Encounter    Provider: KILO De Leon CNP  Facility: [facility]        Facility Type:  LTC    Caller: Dori  Call Back Number: [phone]    Allergies:    Allergies   Allergen Reactions     Diphenhydramine Palpitations     Tolerated IV Benadryl when not pushed too fast     Isosorbide Other (See Comments) and Dizziness     Also causes syncope (has fallen before) and brain fog/mental disturbances - please do not prescribe     Nitroglycerin Dizziness and Fatigue     Specifically the patch - please do not prescribe     Contrast Dye Hives and Itching     Adhesive Tape Rash     Diphenhydramine Hcl Palpitations     Liquid Adhesive Itching     Nystatin Dermatitis     Nystatin (Topical) Dermatitis     Also blisters     Penicillins Swelling and Rash     Occurred as a child - not 100% sure on specific reactions     Sulfa Drugs Other (See Comments)     Occurred as a child / patient does not remember specific reaction        Reason for call: TSH 2.58 on Synthroid 112mcg every day. Albumin 2.9    Verbal Order/Direction given by Provider: Decrease Synthroid to 100mcg every day, recheck TSH & Freew T4 in 8 weeks.    Provider giving Order:  KILO De Leon CNP    Verbal Order given to: Dori Forbes RN

## 2022-04-06 ENCOUNTER — TELEPHONE (OUTPATIENT)
Dept: GERIATRICS | Facility: CLINIC | Age: 67
End: 2022-04-06
Payer: COMMERCIAL

## 2022-04-07 NOTE — PROGRESS NOTES
"[facility] GERIATRICS    PRIMARY CARE PROVIDER AND CLINIC:  Yinka Manzo, CNP, [address] / [place] [address]  Chief Complaint   Patient presents with     Hospital F/U      [facility] Number:  [id number]  Place of Service where encounter took place:  [facility] () [[id number]]    Letty Escobar  is a 66 year old  (1955), admitted to the above facility from  [facility]. Hospital stay 4/6/22 through 4/7/22..   HPI:    Per Dr. Messina \" 66-year-old female presents to the hospital secondary to shortness of breath and atypical chest pain. EMS brought her to the hospital on a nitroglycerin drip. She developed some hypotension and nitroglycerin drip was discontinued. Initial troponin is within normal limits. She does have an abnormal D-dimer. However she seems to be anxious but no signs of distress. Denies any other complaints at this time including fevers, cough, trauma to the chest wall, nausea, vomiting, constipation, diarrhea, headaches, blurry vision, urinary symptoms, and any neurologic deficits.    She was admitted for observation. Her serial troponins were normal. Her pulmonary perfusion scan was read as low probability. The perfusion scans were compared to previous perfusion scans dated 10/2/2017. There were no changes. It was felt that it was unlikely the patient had a pulmonary embolus. Treatment of noncardiac chest pain with empiric proton pump inhibitors was discussed with the patient. She stated that she did not want to start a new medication at this point in time. She will follow up with the providers at the nursing home.\"    Letty reported she feels back to \"normal.\" She denied CP and dyspnea.       CODE STATUS/ADVANCE DIRECTIVES DISCUSSION:  DNR/DNI  ALLERGIES:   Allergies   Allergen Reactions     Diphenhydramine Palpitations     Tolerated IV Benadryl when not pushed too fast     Isosorbide Other (See Comments) and Dizziness     " Also causes syncope (has fallen before) and brain fog/mental disturbances - please do not prescribe     Nitroglycerin Dizziness and Fatigue     Specifically the patch - please do not prescribe     Contrast Dye Hives and Itching     Adhesive Tape Rash     Diphenhydramine Hcl Palpitations     Liquid Adhesive Itching     Nystatin Dermatitis     Nystatin (Topical) Dermatitis     Also blisters     Penicillins Swelling and Rash     Occurred as a child - not 100% sure on specific reactions     Sulfa Drugs Other (See Comments)     Occurred as a child / patient does not remember specific reaction      PAST MEDICAL HISTORY:   Past Medical History:   Diagnosis Date     ACP (advance care planning) 11/3/2010    Formatting of this note might be different from the original. Patient has identified Health Care Agent(s): Yes Add Health Care Agents: Yes   Health Care Agent(s):  Primary Health Care Agent:   Edwar Chappellvedaalec Relationship:  Spouse Phone:   [phone]  Secondary Health Care Agent:   Chiki Vaughnsarah Relationship:   Son Phone:   [phone]  Patient has Advance Care Plan Documents (Health Care Direct     Acute coronary syndrome (H) 11/22/2019     Acute exacerbation of CHF (congestive heart failure) (H) 11/11/2019     Acute on chronic systolic (congestive) heart failure (H) 1/28/2020     Anemia of chronic disease 1/5/2013     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, left leg (H) 1/15/2020     BPPV (benign paroxysmal positional vertigo) 5/31/2018     Candidiasis 3/1/2020     Carotid stenosis, right 7/13/2016    Carotid US 05/04/2018 showed moderate plaque formation, consistent with 50 to 69% stenosis in the right internal carotid artery, not significantly changed from 8/5/2015.  Moderate plaque formation, consistent with 50 to 69% stenosis in the left internal carotid artery; there has been mild progression of the left ICA stenosis since 8/5/2015.     Chest pain 11/11/2019      Chronic bilateral low back pain without sciatica 1/17/2018     Chronic pain disorder 10/1/2017     Constipation 3/20/2020     COPD (chronic obstructive pulmonary disease) (H) 6/24/2020     Coronary atherosclerosis 2/6/2009 2/5/2009 - MI - Proximal RCA 99%, mild-mod disease elsewhere.  EF 60%.  PCI:  MYRON to pRCA. 2/12/2009 - admit CP - Widely patent RCA stent. Moderate diffuse CAD. Severe stenosis in trivial PDA branch. LVEF 45%. 1/25/2010 - admit CP - PTCA and stent of diagonal 9/8/2010 - admit CP - LAD patent stent. Moderate diffuse CAD. Medical management recommended.  4/25/2012 - NSTEMI - Acute total occlusion of     Cubital tunnel syndrome on left 11/13/2012     Depressive disorder      Diabetes mellitus (H)      Diabetes mellitus type 2 in obese (H) 7/13/2006     Diabetes mellitus type 2 in obese (H) 7/13/2006     Drug-seeking behavior 4/4/2020     Failure to thrive in adult 9/3/2020     Hereditary and idiopathic peripheral neuropathy 4/24/2007     Hyperlipidemia with target low density lipoprotein (LDL) cholesterol less than 70 mg/dL 5/30/2007     Hypertension 10/14/2015     Hypothyroidism 12/10/2010     Irritable bowel syndrome 9/7/2015     Ischemic cardiomyopathy 9/20/2015    EF of 40-45%, status post RV lead revision and LV epicardial lead placement via mini-thoracotomy in August 2016.     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  Designated pharmacy: [facility] Prescribing physician: Andrea Diagnosis: Ulnar neuropathy     Panic disorder with agoraphobia 4/14/2020     Paroxysmal atrial fibrillation (H) 10/2/2015     Personality disorder (H)  3/5/2020    Rule out dependent personality     Polymyalgia rheumatica (H) 11/28/2018     Posttraumatic stress disorder 3/1/2020     Restless legs syndrome (RLS) 8/29/2007     Restless legs syndrome (RLS) 8/29/2007     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     Serum calcium elevated 3/1/2020     Somatic dysfunction of sacral region 1/17/2018     Subacromial bursitis of left shoulder joint 8/6/2018     Suicidal ideation 4/1/2020     Thyroid disease      TIA (transient ischemic attack) 5/4/2018     TIA (transient ischemic attack) 5/4/2018     Tobacco abuse 2/17/2017     Tobacco abuse 2/17/2017     Urinary incontinence, mixed 9/24/2017     UTI (urinary tract infection) 4/17/2020     Vitamin B12 deficiency 2/14/2018     Weakness 12/17/2019      PAST SURGICAL HISTORY:   has a past surgical history that includes Release carpal tunnel; Cholecystectomy; Cardiac surgery; Abdomen surgery; Release carpal tunnel; Cholecystectomy; Cardiac surgery; and other surgical history.  FAMILY HISTORY: family history is not on file.  SOCIAL HISTORY:   reports that she has never smoked. She has never used smokeless tobacco.  Patient's living condition: lives in a skilled nursing facility    Post Discharge Medication Reconciliation Status: discharge medications reconciled, continue medications without change  Current Outpatient Medications   Medication Sig     acetaminophen (TYLENOL) 500 MG tablet Take 2 tablets (1,000 mg) by mouth 3 times daily     albuterol (PROVENTIL) (2.5 MG/3ML) 0.083% neb solution Take 1 vial (2.5 mg) by nebulization 2 times daily as needed for shortness of breath / dyspnea or wheezing     aspirin 81 MG EC tablet Take 81 mg by mouth daily     cyanocobalamin (CYANOCOBALAMIN) 1000 MCG/ML injection Inject 1 mL into the muscle every 30 days     divalproex sodium delayed-release (DEPAKOTE) 250 MG DR tablet Take 250 mg by mouth daily     DULoxetine HCl 40 MG CPEP Take 40 mg by mouth daily     fluticasone-vilanterol (BREO ELLIPTA) 200-25  "MCG/INH inhaler Inhale 1 puff into the lungs daily     furosemide (LASIX) 20 MG tablet Take 20 mg by mouth daily     gabapentin (NEURONTIN) 300 MG capsule Take 1 capsule (300 mg) by mouth At Bedtime     guaiFENesin (ROBITUSSIN) 100 MG/5ML liquid Take 20 mLs (400 mg) by mouth every 4 hours as needed for cough     levothyroxine (SYNTHROID/LEVOTHROID) 100 MCG tablet Take 100 mcg by mouth daily     Melatonin 10 MG TABS tablet Take 10 mg by mouth At Bedtime      metoprolol succinate ER (TOPROL-XL) 25 MG 24 hr tablet Take 25 mg by mouth daily     miconazole (MICATIN) 2 % AERP powder Apply topically as needed      mirtazapine (REMERON) 30 MG tablet Take 30 mg by mouth daily     potassium chloride ER (MICRO-K) 10 MEQ CR capsule Take 10 mEq by mouth daily      prazosin (MINIPRESS) 1 MG capsule Take 1 mg by mouth At Bedtime     QUEtiapine (SEROQUEL) 25 MG tablet Take 37.5 mg by mouth 3 times daily     rosuvastatin (CRESTOR) 10 MG tablet Take 10 mg by mouth At Bedtime      sennosides (SENOKOT) 8.6 MG tablet Take 2 tablets by mouth At Bedtime     Vitamin D3 (VITAMIN D) 10 MCG (400 UNIT) tablet Take 20 mcg by mouth daily     No current facility-administered medications for this visit.       ROS:  4 point ROS including Respiratory, CV, GI and , other than that noted in the HPI,  is negative    Vitals:  /68   Pulse 64   Temp 98.1  F (36.7  C)   Resp 18   Ht 1.448 m (4' 9\")   Wt 87.2 kg (192 lb 3.2 oz)   SpO2 96%   BMI 41.59 kg/m    Exam:  GENERAL APPEARANCE:  Alert, in no distress  ENT:  Mouth and posterior oropharynx normal, moist mucous membranes  EYES:  EOM normal, conjunctiva and lids normal  RESP:  respiratory effort and palpation of chest normal, lungs clear to auscultation , no respiratory distress  CV:  Palpation and auscultation of heart done , regular rate and rhythm, no murmur, rub, or gallop, no edema  ABDOMEN:  normal bowel sounds, soft, nontender, no hepatosplenomegaly or other masses  M/S:   no gross " deformities, no joint swelling  SKIN:  no rash, warm and dry  NEURO:   Cranial nerves 2-12 are normal tested and grossly at patient's baseline,   PSYCH:  oriented X 3, affect and mood normal     Lab/Diagnostic data:  Recent labs in [facility] reviewed by me today.     ASSESSMENT/PLAN:    (R07.9) Chest pain, unspecified type  (primary encounter diagnosis)  Plan:   -Resolved  -Cardiac work up negative during hospilization    (R79.89) Elevated d-dimer  Plan:   -Perfusion scan low probability for PE with no changes compared to prior scan   -Continue ASA    (I48.20) Chronic atrial fibrillation (H)  (Z95.0) Cardiac pacemaker in situ  Plan:  -Rate controlled  -Continue BB and ASA  -Status post biventricular ICD with epicardial LV lead placed August 2016  -Followed by cardiology    (J44.9) Chronic obstructive pulmonary disease, unspecified COPD type (H)  Plan:   -Stable, no recent COPD exacerbations  -Continue inhalers     (F60.9) Personality disorder (H)  (F33.2) Major depressive disorder, recurrent severe without psychotic features (H)  Plan:   -Well-controlled  -Continue Cymbalta and mirtazapine  -Seroquel to 37.5 mg 3 times daily.  Consider increasing Depakote at bedtime dose and further reduction of Seroquel if tolerated  -Monitor behaviors    (I23.435) Atherosclerosis of coronary artery bypass graft of native heart with angina pectoris (H)  Plan:   -Asymptomatic today  -Status post CABG August 2015  -Continue statin and aspirin    (E53.8) Vitamin B12 deficiency  Plan:   -Well-controlled  -Vitamin B12 level October 2021 863  -Continue monthly injection    Electronically signed by:  Yinka Manzo, CNP

## 2022-04-07 NOTE — TELEPHONE ENCOUNTER
[facility] TELEPHONE ENCOUNTER    Chief Complaint   Patient presents with     Chest Pain       Letty Escobar is a 66 year old  (1955),Nurse called today to report: called at 2222 to report patient with chest pain, unable to take nitroglycerin due to allergy and is insisting on ED eval    ASSESSMENT/PLAN  Chest pain    OK to send to ED for eval per patient request  Electronically signed by:   KILO Martinez CNP

## 2022-04-08 ENCOUNTER — NURSING HOME VISIT (OUTPATIENT)
Dept: GERIATRICS | Facility: CLINIC | Age: 67
End: 2022-04-08
Payer: COMMERCIAL

## 2022-04-08 VITALS
WEIGHT: 192.2 LBS | HEART RATE: 64 BPM | SYSTOLIC BLOOD PRESSURE: 110 MMHG | HEIGHT: 57 IN | RESPIRATION RATE: 18 BRPM | BODY MASS INDEX: 41.46 KG/M2 | TEMPERATURE: 98.1 F | OXYGEN SATURATION: 96 % | DIASTOLIC BLOOD PRESSURE: 68 MMHG

## 2022-04-08 DIAGNOSIS — Z95.0 CARDIAC PACEMAKER IN SITU: ICD-10-CM

## 2022-04-08 DIAGNOSIS — E03.9 HYPOTHYROIDISM, UNSPECIFIED TYPE: ICD-10-CM

## 2022-04-08 DIAGNOSIS — I48.20 CHRONIC ATRIAL FIBRILLATION (H): ICD-10-CM

## 2022-04-08 DIAGNOSIS — R79.89 ELEVATED D-DIMER: ICD-10-CM

## 2022-04-08 DIAGNOSIS — E53.8 VITAMIN B12 DEFICIENCY: ICD-10-CM

## 2022-04-08 DIAGNOSIS — I25.709 ATHEROSCLEROSIS OF CORONARY ARTERY BYPASS GRAFT OF NATIVE HEART WITH ANGINA PECTORIS (H): ICD-10-CM

## 2022-04-08 DIAGNOSIS — I50.22 CHRONIC SYSTOLIC CONGESTIVE HEART FAILURE (H): ICD-10-CM

## 2022-04-08 DIAGNOSIS — J44.9 CHRONIC OBSTRUCTIVE PULMONARY DISEASE, UNSPECIFIED COPD TYPE (H): ICD-10-CM

## 2022-04-08 DIAGNOSIS — F60.9 PERSONALITY DISORDER (H): ICD-10-CM

## 2022-04-08 DIAGNOSIS — F33.2 MAJOR DEPRESSIVE DISORDER, RECURRENT SEVERE WITHOUT PSYCHOTIC FEATURES (H): ICD-10-CM

## 2022-04-08 DIAGNOSIS — R07.9 CHEST PAIN, UNSPECIFIED TYPE: Primary | ICD-10-CM

## 2022-04-08 PROCEDURE — 99309 SBSQ NF CARE MODERATE MDM 30: CPT | Performed by: NURSE PRACTITIONER

## 2022-04-08 NOTE — LETTER
"    4/8/2022        RE: Letty Escobar  [facility]  [address] [facility] [address]        [facility] GERIATRICS    PRIMARY CARE PROVIDER AND CLINIC:  Yinka Manzo, [facility], [address] / [place] [address]  Chief Complaint   Patient presents with     Hospital F/U      [place] Medical Record Number:  [id number]  Place of Service where encounter took place:  [facility] () [[id number]]    Letty Escobar  is a 66 year old  (1955), admitted to the above facility from  [facility]. Hospital stay 4/6/22 through 4/7/22..   HPI:    Per Dr. Messina \" 66-year-old female presents to the hospital secondary to shortness of breath and atypical chest pain. EMS brought her to the hospital on a nitroglycerin drip. She developed some hypotension and nitroglycerin drip was discontinued. Initial troponin is within normal limits. She does have an abnormal D-dimer. However she seems to be anxious but no signs of distress. Denies any other complaints at this time including fevers, cough, trauma to the chest wall, nausea, vomiting, constipation, diarrhea, headaches, blurry vision, urinary symptoms, and any neurologic deficits.    She was admitted for observation. Her serial troponins were normal. Her pulmonary perfusion scan was read as low probability. The perfusion scans were compared to previous perfusion scans dated 10/2/2017. There were no changes. It was felt that it was unlikely the patient had a pulmonary embolus. Treatment of noncardiac chest pain with empiric proton pump inhibitors was discussed with the patient. She stated that she did not want to start a new medication at this point in time. She will follow up with the providers at the nursing home.\"    Letty reported she feels back to \"normal.\" She denied CP and dyspnea.       CODE STATUS/ADVANCE DIRECTIVES DISCUSSION:  DNR/DNI  ALLERGIES:   Allergies   Allergen Reactions     Diphenhydramine " Palpitations     Tolerated IV Benadryl when not pushed too fast     Isosorbide Other (See Comments) and Dizziness     Also causes syncope (has fallen before) and brain fog/mental disturbances - please do not prescribe     Nitroglycerin Dizziness and Fatigue     Specifically the patch - please do not prescribe     Contrast Dye Hives and Itching     Adhesive Tape Rash     Diphenhydramine Hcl Palpitations     Liquid Adhesive Itching     Nystatin Dermatitis     Nystatin (Topical) Dermatitis     Also blisters     Penicillins Swelling and Rash     Occurred as a child - not 100% sure on specific reactions     Sulfa Drugs Other (See Comments)     Occurred as a child / patient does not remember specific reaction      PAST MEDICAL HISTORY:   Past Medical History:   Diagnosis Date     ACP (advance care planning) 11/3/2010    Formatting of this note might be different from the original. Patient has identified Health Care Agent(s): Yes Add Health Care Agents: Yes   Health Care Agent(s):  Primary Health Care Agent:   Edwar Escobar Relationship:  Spouse Phone:   [phone]  Secondary Health Care Agent:   Chiki Oro Relationship:   Son Phone:   [phone]  Patient has Advance Care Plan Documents (Health Care Direct     Acute coronary syndrome (H) 11/22/2019     Acute exacerbation of CHF (congestive heart failure) (H) 11/11/2019     Acute on chronic systolic (congestive) heart failure (H) 1/28/2020     Anemia of chronic disease 1/5/2013     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, left leg (H) 1/15/2020     BPPV (benign paroxysmal positional vertigo) 5/31/2018     Candidiasis 3/1/2020     Carotid stenosis, right 7/13/2016    Carotid US 05/04/2018 showed moderate plaque formation, consistent with 50 to 69% stenosis in the right internal carotid artery, not significantly changed from 8/5/2015.  Moderate plaque formation, consistent with 50 to 69% stenosis in the left internal  carotid artery; there has been mild progression of the left ICA stenosis since 8/5/2015.     Chest pain 11/11/2019     Chronic bilateral low back pain without sciatica 1/17/2018     Chronic pain disorder 10/1/2017     Constipation 3/20/2020     COPD (chronic obstructive pulmonary disease) (H) 6/24/2020     Coronary atherosclerosis 2/6/2009 2/5/2009 - MI - Proximal RCA 99%, mild-mod disease elsewhere.  EF 60%.  PCI:  MYRON to pRCA. 2/12/2009 - admit CP - Widely patent RCA stent. Moderate diffuse CAD. Severe stenosis in trivial PDA branch. LVEF 45%. 1/25/2010 - admit CP - PTCA and stent of diagonal 9/8/2010 - admit CP - LAD patent stent. Moderate diffuse CAD. Medical management recommended.  4/25/2012 - NSTEMI - Acute total occlusion of     Cubital tunnel syndrome on left 11/13/2012     Depressive disorder      Diabetes mellitus (H)      Diabetes mellitus type 2 in obese (H) 7/13/2006     Diabetes mellitus type 2 in obese (H) 7/13/2006     Drug-seeking behavior 4/4/2020     Failure to thrive in adult 9/3/2020     Hereditary and idiopathic peripheral neuropathy 4/24/2007     Hyperlipidemia with target low density lipoprotein (LDL) cholesterol less than 70 mg/dL 5/30/2007     Hypertension 10/14/2015     Hypothyroidism 12/10/2010     Irritable bowel syndrome 9/7/2015     Ischemic cardiomyopathy 9/20/2015    EF of 40-45%, status post RV lead revision and LV epicardial lead placement via mini-thoracotomy in August 2016.     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  Designated pharmacy: [facility] Prescribing physician: Andrea Diagnosis: Ulnar neuropathy     Panic  disorder with agoraphobia 4/14/2020     Paroxysmal atrial fibrillation (H) 10/2/2015     Personality disorder (H) 3/5/2020    Rule out dependent personality     Polymyalgia rheumatica (H) 11/28/2018     Posttraumatic stress disorder 3/1/2020     Restless legs syndrome (RLS) 8/29/2007     Restless legs syndrome (RLS) 8/29/2007     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     Serum calcium elevated 3/1/2020     Somatic dysfunction of sacral region 1/17/2018     Subacromial bursitis of left shoulder joint 8/6/2018     Suicidal ideation 4/1/2020     Thyroid disease      TIA (transient ischemic attack) 5/4/2018     TIA (transient ischemic attack) 5/4/2018     Tobacco abuse 2/17/2017     Tobacco abuse 2/17/2017     Urinary incontinence, mixed 9/24/2017     UTI (urinary tract infection) 4/17/2020     Vitamin B12 deficiency 2/14/2018     Weakness 12/17/2019      PAST SURGICAL HISTORY:   has a past surgical history that includes Release carpal tunnel; Cholecystectomy; Cardiac surgery; Abdomen surgery; Release carpal tunnel; Cholecystectomy; Cardiac surgery; and other surgical history.  FAMILY HISTORY: family history is not on file.  SOCIAL HISTORY:   reports that she has never smoked. She has never used smokeless tobacco.  Patient's living condition: lives in a skilled nursing facility    Post Discharge Medication Reconciliation Status: discharge medications reconciled, continue medications without change  Current Outpatient Medications   Medication Sig     acetaminophen (TYLENOL) 500 MG tablet Take 2 tablets (1,000 mg) by mouth 3 times daily     albuterol (PROVENTIL) (2.5 MG/3ML) 0.083% neb solution Take 1 vial (2.5 mg) by nebulization 2 times daily as needed for shortness of breath / dyspnea or wheezing     aspirin 81 MG EC tablet Take 81 mg by mouth daily     cyanocobalamin (CYANOCOBALAMIN) 1000 MCG/ML injection Inject 1 mL into the muscle every 30 days     divalproex sodium delayed-release (DEPAKOTE) 250 MG DR tablet Take 250 mg by mouth  "daily     DULoxetine HCl 40 MG CPEP Take 40 mg by mouth daily     fluticasone-vilanterol (BREO ELLIPTA) 200-25 MCG/INH inhaler Inhale 1 puff into the lungs daily     furosemide (LASIX) 20 MG tablet Take 20 mg by mouth daily     gabapentin (NEURONTIN) 300 MG capsule Take 1 capsule (300 mg) by mouth At Bedtime     guaiFENesin (ROBITUSSIN) 100 MG/5ML liquid Take 20 mLs (400 mg) by mouth every 4 hours as needed for cough     levothyroxine (SYNTHROID/LEVOTHROID) 100 MCG tablet Take 100 mcg by mouth daily     Melatonin 10 MG TABS tablet Take 10 mg by mouth At Bedtime      metoprolol succinate ER (TOPROL-XL) 25 MG 24 hr tablet Take 25 mg by mouth daily     miconazole (MICATIN) 2 % AERP powder Apply topically as needed      mirtazapine (REMERON) 30 MG tablet Take 30 mg by mouth daily     potassium chloride ER (MICRO-K) 10 MEQ CR capsule Take 10 mEq by mouth daily      prazosin (MINIPRESS) 1 MG capsule Take 1 mg by mouth At Bedtime     QUEtiapine (SEROQUEL) 25 MG tablet Take 37.5 mg by mouth 3 times daily     rosuvastatin (CRESTOR) 10 MG tablet Take 10 mg by mouth At Bedtime      sennosides (SENOKOT) 8.6 MG tablet Take 2 tablets by mouth At Bedtime     Vitamin D3 (VITAMIN D) 10 MCG (400 UNIT) tablet Take 20 mcg by mouth daily     No current facility-administered medications for this visit.       ROS:  4 point ROS including Respiratory, CV, GI and , other than that noted in the HPI,  is negative    Vitals:  /68   Pulse 64   Temp 98.1  F (36.7  C)   Resp 18   Ht 1.448 m (4' 9\")   Wt 87.2 kg (192 lb 3.2 oz)   SpO2 96%   BMI 41.59 kg/m    Exam:  GENERAL APPEARANCE:  Alert, in no distress  ENT:  Mouth and posterior oropharynx normal, moist mucous membranes  EYES:  EOM normal, conjunctiva and lids normal  RESP:  respiratory effort and palpation of chest normal, lungs clear to auscultation , no respiratory distress  CV:  Palpation and auscultation of heart done , regular rate and rhythm, no murmur, rub, or gallop, no " edema  ABDOMEN:  normal bowel sounds, soft, nontender, no hepatosplenomegaly or other masses  M/S:   no gross deformities, no joint swelling  SKIN:  no rash, warm and dry  NEURO:   Cranial nerves 2-12 are normal tested and grossly at patient's baseline,   PSYCH:  oriented X 3, affect and mood normal     Lab/Diagnostic data:  Recent labs in [facility] reviewed by me today.     ASSESSMENT/PLAN:    (R07.9) Chest pain, unspecified type  (primary encounter diagnosis)  Plan:   -Resolved  -Cardiac work up negative during hospilization    (R79.89) Elevated d-dimer  Plan:   -Perfusion scan low probability for PE with no changes compared to prior scan   -Continue ASA    (I48.20) Chronic atrial fibrillation (H)  (Z95.0) Cardiac pacemaker in situ  Plan:  -Rate controlled  -Continue BB and ASA  -Status post biventricular ICD with epicardial LV lead placed August 2016  -Followed by cardiology    (J44.9) Chronic obstructive pulmonary disease, unspecified COPD type (H)  Plan:   -Stable, no recent COPD exacerbations  -Continue inhalers     (F60.9) Personality disorder (H)  (F33.2) Major depressive disorder, recurrent severe without psychotic features (H)  Plan:   -Well-controlled  -Continue Cymbalta and mirtazapine  -Seroquel to 37.5 mg 3 times daily.  Consider increasing Depakote at bedtime dose and further reduction of Seroquel if tolerated  -Monitor behaviors    (I25.039) Atherosclerosis of coronary artery bypass graft of native heart with angina pectoris (H)  Plan:   -Asymptomatic today  -Status post CABG August 2015  -Continue statin and aspirin    (E53.8) Vitamin B12 deficiency  Plan:   -Well-controlled  -Vitamin B12 level October 2021 863  -Continue monthly injection    Electronically signed by:  Yinka Manzo, CYDNEY                           Sincerely,        Yinka Manzo, CNP

## 2022-04-22 ENCOUNTER — HOSPITAL ENCOUNTER (OUTPATIENT)
Dept: ULTRASOUND IMAGING | Facility: CLINIC | Age: 67
Discharge: HOME | End: 2022-04-22
Attending: NURSE PRACTITIONER
Payer: COMMERCIAL

## 2022-04-22 ENCOUNTER — HOSPITAL ENCOUNTER (OUTPATIENT)
Dept: MAMMOGRAPHY | Facility: CLINIC | Age: 67
Discharge: HOME | End: 2022-04-22
Attending: NURSE PRACTITIONER
Payer: COMMERCIAL

## 2022-04-22 DIAGNOSIS — N64.59 OTHER SIGNS AND SYMPTOMS IN BREAST: ICD-10-CM

## 2022-04-22 DIAGNOSIS — N63.0 BREAST LUMP: ICD-10-CM

## 2022-04-22 PROCEDURE — 77066 DX MAMMO INCL CAD BI: CPT

## 2022-04-22 PROCEDURE — 76642 ULTRASOUND BREAST LIMITED: CPT | Mod: LT

## 2022-04-26 ENCOUNTER — LAB REQUISITION (OUTPATIENT)
Dept: LAB | Facility: CLINIC | Age: 67
End: 2022-04-26
Payer: COMMERCIAL

## 2022-04-26 DIAGNOSIS — Z51.81 ENCOUNTER FOR THERAPEUTIC DRUG LEVEL MONITORING: ICD-10-CM

## 2022-04-27 LAB — POTASSIUM BLD-SCNC: 4.6 MMOL/L (ref 3.4–5.3)

## 2022-04-27 PROCEDURE — P9604 ONE-WAY ALLOW PRORATED TRIP: HCPCS | Mod: ORL | Performed by: NURSE PRACTITIONER

## 2022-04-27 PROCEDURE — 36415 COLL VENOUS BLD VENIPUNCTURE: CPT | Mod: ORL | Performed by: NURSE PRACTITIONER

## 2022-04-27 PROCEDURE — 84132 ASSAY OF SERUM POTASSIUM: CPT | Mod: ORL | Performed by: NURSE PRACTITIONER

## 2022-05-10 ENCOUNTER — TELEPHONE (OUTPATIENT)
Dept: GERIATRICS | Facility: CLINIC | Age: 67
End: 2022-05-10
Payer: COMMERCIAL

## 2022-05-10 ENCOUNTER — HOSPITAL ENCOUNTER (EMERGENCY)
Facility: CLINIC | Age: 67
Discharge: HOME | End: 2022-05-10
Attending: FAMILY MEDICINE | Admitting: FAMILY MEDICINE
Payer: COMMERCIAL

## 2022-05-10 ENCOUNTER — APPOINTMENT (OUTPATIENT)
Dept: GENERAL RADIOLOGY | Facility: CLINIC | Age: 67
End: 2022-05-10
Attending: FAMILY MEDICINE
Payer: COMMERCIAL

## 2022-05-10 VITALS
BODY MASS INDEX: 39.17 KG/M2 | RESPIRATION RATE: 16 BRPM | WEIGHT: 181 LBS | DIASTOLIC BLOOD PRESSURE: 79 MMHG | OXYGEN SATURATION: 96 % | HEART RATE: 61 BPM | TEMPERATURE: 99.3 F | SYSTOLIC BLOOD PRESSURE: 112 MMHG

## 2022-05-10 DIAGNOSIS — K21.00 GASTROESOPHAGEAL REFLUX DISEASE WITH ESOPHAGITIS, UNSPECIFIED WHETHER HEMORRHAGE: Primary | ICD-10-CM

## 2022-05-10 DIAGNOSIS — F60.9 PERSONALITY DISORDER (H): ICD-10-CM

## 2022-05-10 DIAGNOSIS — F41.9 ANXIETY: ICD-10-CM

## 2022-05-10 DIAGNOSIS — R07.9 CHEST PAIN, UNSPECIFIED TYPE: ICD-10-CM

## 2022-05-10 LAB
ALBUMIN SERPL-MCNC: 3 G/DL (ref 3.4–5)
ALP SERPL-CCNC: 49 U/L (ref 40–150)
ALT SERPL W P-5'-P-CCNC: 24 U/L (ref 0–50)
ANION GAP SERPL CALCULATED.3IONS-SCNC: 7 MMOL/L (ref 3–14)
AST SERPL W P-5'-P-CCNC: 18 U/L (ref 0–45)
BASOPHILS # BLD AUTO: 0 10E3/UL (ref 0–0.2)
BASOPHILS NFR BLD AUTO: 0 %
BILIRUB SERPL-MCNC: 0.1 MG/DL (ref 0.2–1.3)
BUN SERPL-MCNC: 31 MG/DL (ref 7–30)
CALCIUM SERPL-MCNC: 9.5 MG/DL (ref 8.5–10.1)
CHLORIDE BLD-SCNC: 113 MMOL/L (ref 94–109)
CO2 SERPL-SCNC: 22 MMOL/L (ref 20–32)
CREAT SERPL-MCNC: 1.01 MG/DL (ref 0.52–1.04)
EOSINOPHIL # BLD AUTO: 0.2 10E3/UL (ref 0–0.7)
EOSINOPHIL NFR BLD AUTO: 2 %
ERYTHROCYTE [DISTWIDTH] IN BLOOD BY AUTOMATED COUNT: 13.8 % (ref 10–15)
GFR SERPL CREATININE-BSD FRML MDRD: 60 ML/MIN/1.73M2
GLUCOSE BLD-MCNC: 132 MG/DL (ref 70–99)
HCT VFR BLD AUTO: 33.6 % (ref 35–47)
HGB BLD-MCNC: 10 G/DL (ref 11.7–15.7)
IMM GRANULOCYTES # BLD: 0.1 10E3/UL
IMM GRANULOCYTES NFR BLD: 1 %
LYMPHOCYTES # BLD AUTO: 2.1 10E3/UL (ref 0.8–5.3)
LYMPHOCYTES NFR BLD AUTO: 26 %
MCH RBC QN AUTO: 30.3 PG (ref 26.5–33)
MCHC RBC AUTO-ENTMCNC: 29.8 G/DL (ref 31.5–36.5)
MCV RBC AUTO: 102 FL (ref 78–100)
MONOCYTES # BLD AUTO: 0.5 10E3/UL (ref 0–1.3)
MONOCYTES NFR BLD AUTO: 6 %
NEUTROPHILS # BLD AUTO: 5.2 10E3/UL (ref 1.6–8.3)
NEUTROPHILS NFR BLD AUTO: 65 %
NRBC # BLD AUTO: 0 10E3/UL
NRBC BLD AUTO-RTO: 0 /100
PLATELET # BLD AUTO: 83 10E3/UL (ref 150–450)
POTASSIUM BLD-SCNC: 4.5 MMOL/L (ref 3.4–5.3)
PROT SERPL-MCNC: 6.8 G/DL (ref 6.8–8.8)
RBC # BLD AUTO: 3.3 10E6/UL (ref 3.8–5.2)
SODIUM SERPL-SCNC: 142 MMOL/L (ref 133–144)
TROPONIN I SERPL HS-MCNC: 24 NG/L
WBC # BLD AUTO: 8.1 10E3/UL (ref 4–11)

## 2022-05-10 PROCEDURE — 99285 EMERGENCY DEPT VISIT HI MDM: CPT | Mod: 25 | Performed by: FAMILY MEDICINE

## 2022-05-10 PROCEDURE — 36415 COLL VENOUS BLD VENIPUNCTURE: CPT | Performed by: FAMILY MEDICINE

## 2022-05-10 PROCEDURE — 85025 COMPLETE CBC W/AUTO DIFF WBC: CPT | Performed by: FAMILY MEDICINE

## 2022-05-10 PROCEDURE — 93005 ELECTROCARDIOGRAM TRACING: CPT | Performed by: FAMILY MEDICINE

## 2022-05-10 PROCEDURE — 93010 ELECTROCARDIOGRAM REPORT: CPT | Performed by: FAMILY MEDICINE

## 2022-05-10 PROCEDURE — 80053 COMPREHEN METABOLIC PANEL: CPT | Performed by: FAMILY MEDICINE

## 2022-05-10 PROCEDURE — 84484 ASSAY OF TROPONIN QUANT: CPT | Performed by: FAMILY MEDICINE

## 2022-05-10 PROCEDURE — 82040 ASSAY OF SERUM ALBUMIN: CPT | Performed by: FAMILY MEDICINE

## 2022-05-10 PROCEDURE — 71045 X-RAY EXAM CHEST 1 VIEW: CPT

## 2022-05-10 RX ORDER — QUETIAPINE FUMARATE 25 MG/1
25 TABLET, FILM COATED ORAL
Qty: 1 TABLET | Refills: 0 | Status: SHIPPED | OUTPATIENT
Start: 2022-05-10 | End: 2022-07-16

## 2022-05-10 RX ORDER — FAMOTIDINE 10 MG
10 TABLET ORAL DAILY PRN
Qty: 15 TABLET | Refills: 0 | Status: SHIPPED | OUTPATIENT
Start: 2022-05-10 | End: 2022-07-12

## 2022-05-10 NOTE — TELEPHONE ENCOUNTER
[facility] TELEPHONE ENCOUNTER    Chief Complaint   Patient presents with     chest pounding       Letyt Escobar is a 66 year old  (1955),Nurse called today to report: complaining of pounding chest, but does not have any pain. 176/75, 76. No fevers. Suspect anxiety concerns. Patient requesting to go back into hospital, however easily redirectable at this point and willing to intervene on site and monitor for now    Per Registered Nurse and chart review patient went to ED last month for similar symptoms.During that time There were no changes. It was felt that it was unlikely the patient had a pulmonary embolus. Treatment of noncardiac chest pain with empiric proton pump inhibitors was discussed with the patient.  Denies any other complaints at this time including fevers, cough, trauma to the chest wall, nausea, vomiting, constipation, diarrhea, headaches, blurry vision, urinary symptoms, and any neurologic deficits.    ASSESSMENT/PLAN      Give dose of PRN albuterol now    Pepcid 10my daily PRN. Give dose now    If no relief from above measures then give one time dose of seroquel 25mg at 0200.     BMP, CBC with platelet    EKG tomorrow 5/10/22    PCP to follow up in AM.     Electronically signed by:   KILO Freeman CNP    ADDENDUM 5/10/22 @ 0156am: Registered Nurse called back to report that patient is wanting to go to hospital versus monitoring on site  Plan:  -Ok to send to [facility] per patient preference

## 2022-05-10 NOTE — ED TRIAGE NOTES
Patient reports pounding in her chest that woke her up at 2300 last evening and has been constant since.

## 2022-05-10 NOTE — ED NOTES
Called [facility] to let them know patient was being discharged. Patient reports she does not have family to transport her back home and requests an ambulance. Patient also does not have her walker with her and does not ambulate independently.

## 2022-05-10 NOTE — DISCHARGE INSTRUCTIONS
Your chest pain did not result in a heart attack.  It is possible that you have angina, or noncardiac causes.  Please follow-up with your cardiologist within the next week.  Have your platelet count rechecked within the next 2 weeks.  Return to the emergency department if your symptoms worsen.

## 2022-05-10 NOTE — ED PROVIDER NOTES
[facility] ED Provider Note   Patient: Letty Escobar  MRN #:  [id number]  Date of Visit: May 10, 2022    CC:     Chief Complaint   Patient presents with     Chest Pain     HPI:  Letty Escobar is a 66 year old female resident at [facility] who presented to the emergency department with acute onset of chest pain that started at 11 PM last night and has persisted for the past 4-1/2 hours.  Patient states that she receives her nighttime medication at about 930 and promptly falls asleep.  She was awakened at 11 with a chest pressure and heaviness that is currently rated 7 out of 10.  She has not noticed any exacerbating or alleviating factors.  EMS administered aspirin but not nitroglycerin.  She has a history of known coronary artery disease, status post 5 vessel CABG in 2015, and 20 coronary stents.  She was recently hospitalized at [place] on April 6 with chest pain, and found to have no bump in her troponin enzymes and discharged with noncardiac chest pain.  Patient has other complex medical and psychiatric history as noted below.  Patient denies fever, chills, cough, shortness of breath, diaphoresis, nausea, abdominal pain, calf pain or ankle swelling.  She states that she gets tested for COVID twice a week.  She has not been able to sleep since the pain started 4-1/2 hours ago.    Problem List:  Patient Active Problem List    Diagnosis Date Noted     Atherosclerosis of coronary artery bypass graft of native heart with angina pectoris (H) 02/13/2022     Priority: Medium     Diabetes mellitus type 2, insulin dependent (H) 02/13/2022     Priority: Medium     Morbid obesity (H) 12/10/2021     Priority: Medium     Chronic atrial fibrillation (H) 04/06/2021     Priority: Medium     Cardiac pacemaker in situ 04/06/2021     Priority: Medium     Major depressive disorder, recurrent severe without psychotic features (H) 01/26/2021      Priority: Medium     Chronic obstructive pulmonary disease, unspecified COPD type (H) 06/24/2020     Priority: Medium     Panic disorder with agoraphobia 04/14/2020     Priority: Medium     Constipation 03/20/2020     Priority: Medium     Personality disorder (H) 03/05/2020     Priority: Medium     Rule out dependent personality       Candidiasis 03/01/2020     Priority: Medium     Posttraumatic stress disorder 03/01/2020     Priority: Medium     Chronic systolic congestive heart failure (H) 01/28/2020     Priority: Medium     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, left leg (H) 01/15/2020     Priority: Medium     Polymyalgia rheumatica (H) 11/28/2018     Priority: Medium     Vitamin B12 deficiency 02/14/2018     Priority: Medium     Chronic bilateral low back pain without sciatica 01/17/2018     Priority: Medium     Chronic pain disorder 10/01/2017     Priority: Medium     Urinary incontinence, mixed 09/24/2017     Priority: Medium     Carotid stenosis, right 07/13/2016     Priority: Medium     Carotid US 05/04/2018 showed moderate plaque formation, consistent with 50 to 69% stenosis in the right internal carotid artery, not significantly changed from 8/5/2015.  Moderate plaque formation, consistent with 50 to 69% stenosis in the left internal carotid artery; there has been mild progression of the left ICA stenosis since 8/5/2015.       Essential hypertension 10/14/2015     Priority: Medium     S/P CABG x 5 08/21/2015     Priority: Medium     Anemia of chronic disease 01/05/2013     Priority: Medium     Hypothyroidism 12/10/2010     Priority: Medium     ACP (advance care planning) 11/03/2010     Priority: Medium     Formatting of this note might be different from the original.  Patient has identified Health Care Agent(s): Yes  Add Health Care Agents: Yes    Health Care Agent(s):    Primary Health Care Agent:     Edawr Escobar Relationship:    Spouse Phone:  "    [phone]    Secondary Health Care Agent:     Chiki Oro Relationship:     Son Phone:     [phone]      Patient has Advance Care Plan Documents (Health Care Directive, POLST): Yes    Advance Care Plan Documents:  Health Care Directive--03/28/11  Resuscitation Guidelines--    Patient has identified Specific Treatment Preferences: Yes   Specific Treatment Preferences:   a.) Code Status:  DNR/ Do Not Attempt Resuscitation - Allow a Natural Death    b.) Goals of Treatment:     ii. Limited Interventions and treat reversible conditions.  Provide interventions aimed at treatment of new or reversible illness/injury or non-life threatening chronic conditions. Duration of invasive or uncomfortable interventions should generally be limited.- Trial of intubation 5 days or other instructions \"If no improvement, stop.\"  c.) Interventions and Treatments:   i.   Antibiotics:          - Aggressive antibiotics  ii.  Nutrition/Hydration:         - Offer food and liquids by mouth         - IV fluid administration         - No feeding tube under any circumstance.  iii. Transfusion:          - Blood products for comfort/relief of symptoms only  iv. Dialysis:           - Dialysis for short term \"for recovery, but not long term.\"        Last Assessment & Plan:   Advance Care Planning: Disease-specific Session    Letty Escobar is an [facility] patient of Dr. Renea Mendez of the [facility].    Advance care planning discussions were completed with Letty and her healthcare agent, spouse Edwar Escobar on Monday, March 28, 2011 at the [facility].    Understanding of Illness and Disease [place]:   Letty identifies her medical condition as diabetes with peripheral neuropathy, heart problems with a heart attack February 2009 plus 4 stent placements; sleep apnea; depression; hypothyroid; high cholesterol; tobacco use; and describes it as needing further assistance with thyroid and diabetes " "management.  She identifies the following symptoms of her medical condition as being the most bothersome: some memory loss, balance problems, light headedness and dizziness, puffy eyes and lower extremity edema from time to time.    Let[place] is retired [place] .  She is independent with all cares and lives an active life style although, \"I'm not as active as I used to be.\"    Goals of Care:   Letty currently hopes to maintain independence, control pain and symptoms and delay progression of, but not cure, the illness.  \"I want to get the diabetes and thyroid under better control.\"  Letty has an appointment the first part of April for follow up.    Quality of Life:    Letty identifies quality of life as family involvement twice weekly, Yazidi activities, newly assigned  , playing cards, the computer,    Letty christiano with serious challenges in her life through her ganesh in God, prayers and support of her Yazidi family, spouse and son.    Letty identifies the following fears and worries about her medical care:  \"I just want the diabetes straightened out.\"    Treatment and Care Preferences:   Past experiences in dealing with family and/or friends that have  or been seriously ill include her brother who took his own life.  \"He was 53 years old and living with us.  I found him.\"   As a result of these experiences, Letty expresses these health care preferences:      Summary Letty's Treatment Preferences:  LOW SURVIVAL; HIGH TREATMENT BURDEN:  If Letty suffered a serious complication, such that she was facing a prolonged hospital stay, required ongoing medical interventions, and the chance of living through the complication was low (for example, only 5 out of 100 would live), Letty would choose:  to focus treatment on comfort and quality of life (\"Quality of life is more important than length of life to Letty.\")  COMMENT:  \"If I can't live a " "normal life, I wouldn't want to live.\"    HIGH SURVIVAL; LOW FUNCTIONAL STATUS:  If Letty had a serious complication and had a good chance of living through the complication but it was expected that she would never be able to walk or talk again and would require 24 hour nursing care, she would choose:  to focus treatment on comfort and quality of life (\"Quality of life is more important than length of life to Letty.\")  COMMENT:  \"I'd accept rehabilitation.\"    HIGH SURVIVAL; LOW COGNITIVE STATUS:  If Letty had a serious complication and had a good chance of living through the complication but it was expected that she would never know who she was or who she was with and would require 24 hour nursing care, she would choose:  to focus treatment on comfort and quality of life (\"Quality of life is more important than length of life to Letty.\")    CARDIO-PULMONARY RESUSCITATION (CPR):  The facts, risks and benefits of CPR were discussed with Letty.  If she had a sudden event that caused her heart and breathing to stop, she:  WOULD NOT want CPR attempted and instead would prefer that a natural death occur.    MECHANICAL VENTILATION: If Letty had an episode where she was unable to breathe on her own, she would choose the following:  attempt to use any appropriate non-invasive method to assist breathing, and use mechanical ventilation.  COMMENT:  \"If reversible ventilator is acceptable for 5 days.  If no improvement, stop.\"     Letty has chosen her healthcare agent to:  strictly follow her wishes.    Follow Up Plan:   Letty was encouraged to continue advance care planning discussions with her health care agents and primary care provider.   Letty and her health care agent declined handouts.     Documents addressed during this advance care planning session:  1.  Health Care Agents identified.          .  Primary health care agent is spouse, Edwar Escobar;          .  Secondary health care agent is son, Jermaine Oro.  2.  " Health Care Directive completed and scanned into medical record.  3.  Statement of Treatment Preferences for advanced illness completed and scanned into the medical record.  4.  Resuscitation Guidelines completed for DNR.  Document sent to Dr. Renea Mendez for signature and returned to the home. Letty, please place on the refrigerator.    Recommendations/Plan:   Letty and her health care agent to review Advance Care Plan.     Letty would benefit from:   Care Navigation/Care Navigation Help Desk--explained services for pending future needs.  No identified needs today.  Care Navigation brochure was given to Letty and her healthcare agent.    Advance Care Planning recommendations and Letty's concerns and questions were cc ed to her primary provider.     Thank you, Letty for the opportunity of assisting with Advance Care Planning. It was a seeing you again and meeting Edwar.  Please contact me if I can be of further assistance.    Interviewer: Pat Martin RN    Advance Care Planning Facilitator  [phone]   3/28/2011       ELI (obstructive sleep apnea) 01/31/2010     Priority: Medium     PSG on April/2008 that showed moderate Obstructive Sleep Apnea with an AHI of 23.5 . Limb movements persisted at 29 movements/hour at optimal pressures, despite carbidopa use.       Hyperlipidemia with target low density lipoprotein (LDL) cholesterol less than 70 mg/dL 05/30/2007     Priority: Medium       Past Medical History:   Diagnosis Date     ACP (advance care planning) 11/3/2010     Acute coronary syndrome (H) 11/22/2019     Acute exacerbation of CHF (congestive heart failure) (H) 11/11/2019     Acute on chronic systolic (congestive) heart failure (H) 1/28/2020     Anemia of chronic disease 1/5/2013     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, left leg (H) 1/15/2020     BPPV (benign paroxysmal positional vertigo) 5/31/2018     Candidiasis 3/1/2020     Carotid stenosis, right  7/13/2016     Chest pain 11/11/2019     Chronic bilateral low back pain without sciatica 1/17/2018     Chronic pain disorder 10/1/2017     Constipation 3/20/2020     COPD (chronic obstructive pulmonary disease) (H) 6/24/2020     Coronary atherosclerosis 2/6/2009     Cubital tunnel syndrome on left 11/13/2012     Depressive disorder      Diabetes mellitus (H)      Diabetes mellitus type 2 in obese (H) 7/13/2006     Diabetes mellitus type 2 in obese (H) 7/13/2006     Drug-seeking behavior 4/4/2020     Failure to thrive in adult 9/3/2020     Hereditary and idiopathic peripheral neuropathy 4/24/2007     Hyperlipidemia with target low density lipoprotein (LDL) cholesterol less than 70 mg/dL 5/30/2007     Hypertension 10/14/2015     Hypothyroidism 12/10/2010     Irritable bowel syndrome 9/7/2015     Ischemic cardiomyopathy 9/20/2015     Long-term use of high-risk medication 4/14/2020     Moniliasis, 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     Myocardial infarction (H)      Neuromuscular disorder (H)      Other specified postprocedural states 10/8/2015     Pain medication agreement 4/20/2013     Panic disorder with agoraphobia 4/14/2020     Paroxysmal atrial fibrillation (H) 10/2/2015     Personality disorder (H) 3/5/2020     Polymyalgia rheumatica (H) 11/28/2018     Posttraumatic stress disorder 3/1/2020     Restless legs syndrome (RLS) 8/29/2007     Restless legs syndrome (RLS) 8/29/2007     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     Serum calcium elevated 3/1/2020     Somatic dysfunction of sacral region 1/17/2018     Subacromial bursitis of left shoulder joint 8/6/2018     Suicidal ideation 4/1/2020     Thyroid disease      TIA (transient ischemic attack) 5/4/2018     TIA (transient ischemic attack) 5/4/2018     Tobacco abuse 2/17/2017     Tobacco abuse 2/17/2017     Urinary incontinence, mixed 9/24/2017     UTI (urinary tract infection) 4/17/2020     Vitamin B12 deficiency 2/14/2018      Weakness 12/17/2019       MEDS: acetaminophen (TYLENOL) 500 MG tablet  aspirin 81 MG EC tablet  cyanocobalamin (CYANOCOBALAMIN) 1000 MCG/ML injection  divalproex sodium delayed-release (DEPAKOTE) 250 MG DR tablet  DULoxetine HCl 40 MG CPEP  fluticasone-vilanterol (BREO ELLIPTA) 200-25 MCG/INH inhaler  furosemide (LASIX) 20 MG tablet  gabapentin (NEURONTIN) 300 MG capsule  guaiFENesin (ROBITUSSIN) 100 MG/5ML liquid  levothyroxine (SYNTHROID/LEVOTHROID) 100 MCG tablet  Melatonin 10 MG TABS tablet  metoprolol succinate ER (TOPROL-XL) 25 MG 24 hr tablet  miconazole (MICATIN) 2 % AERP powder  mirtazapine (REMERON) 30 MG tablet  potassium chloride ER (MICRO-K) 10 MEQ CR capsule  prazosin (MINIPRESS) 1 MG capsule  QUEtiapine (SEROQUEL) 25 MG tablet  rosuvastatin (CRESTOR) 10 MG tablet  sennosides (SENOKOT) 8.6 MG tablet  Vitamin D3 (VITAMIN D) 10 MCG (400 UNIT) tablet  albuterol (PROVENTIL) (2.5 MG/3ML) 0.083% neb solution  famotidine (PEPCID) 10 MG tablet  QUEtiapine (SEROQUEL) 25 MG tablet        ALLERGIES:    Allergies   Allergen Reactions     Diphenhydramine Palpitations     Tolerated IV Benadryl when not pushed too fast     Isosorbide Other (See Comments) and Dizziness     Also causes syncope (has fallen before) and brain fog/mental disturbances - please do not prescribe     Nitroglycerin Dizziness and Fatigue     Specifically the patch - please do not prescribe     Contrast Dye Hives and Itching     Adhesive Tape Rash     Diphenhydramine Hcl Palpitations     Liquid Adhesive Itching     Nystatin Dermatitis     Nystatin (Topical) Dermatitis     Also blisters     Penicillins Swelling and Rash     Occurred as a child - not 100% sure on specific reactions     Sulfa Drugs Other (See Comments)     Occurred as a child / patient does not remember specific reaction       Past Surgical History:   Procedure Laterality Date     CARDIAC SURGERY      stents x 11     CARDIAC SURGERY       CHOLECYSTECTOMY       CHOLECYSTECTOMY        GENITOURINARY SURGERY      Tubal ligation     OTHER SURGICAL HISTORY      Genitourinary surgery     RELEASE CARPAL TUNNEL       RELEASE CARPAL TUNNEL         Social History     Tobacco Use     Smoking status: Never Smoker     Smokeless tobacco: Never Used   Substance Use Topics     Alcohol use: Not Currently     Drug use: Never         Review of Systems   Except as noted in HPI, all other systems were reviewed and are negative    Physical Exam     Vitals were reviewed  Patient Vitals for the past 12 hrs:   BP Temp Temp src Pulse Resp SpO2 Weight   05/10/22 0315 135/56 99.3  F (37.4  C) Oral 71 16 95 % 82.1 kg (181 lb)     GENERAL APPEARANCE: Alert, no acute respiratory distress  FACE: normal facies  EYES: Pupils are equal  HENT: normal external exam; patient has her right earlobe padded  NECK: no adenopathy or asymmetry  RESP: normal respiratory effort; clear breath sounds bilaterally  CV: regular rate and rhythm; no significant murmurs, gallops or rubs  ABD: soft, obese, no tenderness; no rebound or guarding; bowel sounds are normal  MS: no gross deformities noted; normal muscle tone.  EXT: No pitting edema; patient has a right leg cushion as she sleeps on her right side causing pressure points to the right ear and right leg.  SKIN: no worrisome rash  NEURO: no facial droop; no focal deficits, speech is normal        Available Lab/Imaging Results     Results for orders placed or performed during the hospital encounter of 05/10/22 (from the past 24 hour(s))   CBC with platelets differential    Narrative    The following orders were created for panel order CBC with platelets differential.  Procedure                               Abnormality         Status                     ---------                               -----------         ------                     CBC with platelets and d...[[id number]]  Abnormal            Final result                 Please view results for these tests on the individual orders.    Comprehensive metabolic panel   Result Value Ref Range    Sodium 142 133 - 144 mmol/L    Potassium 4.5 3.4 - 5.3 mmol/L    Chloride 113 (H) 94 - 109 mmol/L    Carbon Dioxide (CO2) 22 20 - 32 mmol/L    Anion Gap 7 3 - 14 mmol/L    Urea Nitrogen 31 (H) 7 - 30 mg/dL    Creatinine 1.01 0.52 - 1.04 mg/dL    Calcium 9.5 8.5 - 10.1 mg/dL    Glucose 132 (H) 70 - 99 mg/dL    Alkaline Phosphatase 49 40 - 150 U/L    AST 18 0 - 45 U/L    ALT 24 0 - 50 U/L    Protein Total 6.8 6.8 - 8.8 g/dL    Albumin 3.0 (L) 3.4 - 5.0 g/dL    Bilirubin Total 0.1 (L) 0.2 - 1.3 mg/dL    GFR Estimate 60 (L) >60 mL/min/1.73m2   Troponin I   Result Value Ref Range    Troponin I High Sensitivity 24 <54 ng/L   CBC with platelets and differential   Result Value Ref Range    WBC Count 8.1 4.0 - 11.0 10e3/uL    RBC Count 3.30 (L) 3.80 - 5.20 10e6/uL    Hemoglobin 10.0 (L) 11.7 - 15.7 g/dL    Hematocrit 33.6 (L) 35.0 - 47.0 %     (H) 78 - 100 fL    MCH 30.3 26.5 - 33.0 pg    MCHC 29.8 (L) 31.5 - 36.5 g/dL    RDW 13.8 10.0 - 15.0 %    Platelet Count 83 (L) 150 - 450 10e3/uL    % Neutrophils 65 %    % Lymphocytes 26 %    % Monocytes 6 %    % Eosinophils 2 %    % Basophils 0 %    % Immature Granulocytes 1 %    NRBCs per 100 WBC 0 <1 /100    Absolute Neutrophils 5.2 1.6 - 8.3 10e3/uL    Absolute Lymphocytes 2.1 0.8 - 5.3 10e3/uL    Absolute Monocytes 0.5 0.0 - 1.3 10e3/uL    Absolute Eosinophils 0.2 0.0 - 0.7 10e3/uL    Absolute Basophils 0.0 0.0 - 0.2 10e3/uL    Absolute Immature Granulocytes 0.1 <=0.4 10e3/uL    Absolute NRBCs 0.0 10e3/uL   XR Chest Port 1 View    Narrative    EXAM: XR CHEST PORT 1 VIEW  LOCATION: [facility]  DATE/TIME: 5/10/2022 3:45 AM    INDICATION: Chest pain  COMPARISON: 03/16/2021      Impression    IMPRESSION: Median sternotomy. Cardiac valve replacement. Cardiac leads. Stable cardiomediastinal silhouette. No focal consolidation or pleural effusion. Slight interstitial prominence. Mild venous  congestion not excluded.     EKG reviewed by me: Electronic ventricular pacemaker, demand type.  Insufficient data.  Rate of 82, normal NH interval.  Wide QRS duration.  No acute changes compared with previous EKG.           Impression     Final diagnoses:   Chest pain         ED Course & Medical Decision Making   Letty Escobar is a 66 year old female who presented to the emergency department with persistent chest pain for the past 4-1/2 hours.  Patient has a history of known coronary disease, with 5 vessel CABG in 2015, and a total of 20 coronary stents in her lifetime.  She was hospitalized at the beginning of April with atypical chest pain, deemed not to have resulted in a heart attack.  Patient states that her pain started at 11 PM last night and she arrived in the emergency department at 3:30 AM by ambulance.  Patient insisted on coming to the emergency department even after the geriatric nurse practitioner felt that the patient could remain.  Upon arrival, patient rated her pain level 7 out of 10.  She had some epigastric discomfort with pressure into the chest but states that that pain is different.  Heart and lung sounds were normal.  EKG revealed today electronic paced rhythm.  No acute changes.  CBC was stable with hemoglobin of 10.0, close to her baseline.  Platelet count is slightly low at 83 with previous slightly depressed platelet count as low as 116 7 months ago.  Troponin is 24 well within normal limits.  Comprehensive metabolic panel reveals normal electrolytes, but nonfasting glucose of 132.  Patient was informed that her troponin was unremarkable.  We would expect this to be elevated after 4-1/2 hours of constant chest pain.  Patient states that she is confident it is not her gastrointestinal tract.  She still thinks that she has angina which I stated we could not completely rule out.  I would like her to follow-up with her cardiologist within the next week.  She should keep track of the  frequency of these episodes and if there are any triggers.  Patient is stable for discharge back to the nursing home.      Written after-visit summary and instructions were given at the time of discharge.    Follow up Plan:   Your cardiologist    In 1 week      [facility]  [address] [place] [id number] [phone]    If symptoms worsen      Discharge Instructions:   Your chest pain did not result in a heart attack.  It is possible that you have angina, or noncardiac causes.  Please follow-up with your cardiologist within the next week.  Have your platelet count rechecked within the next 2 weeks.  Return to the emergency department if your symptoms worsen.         Disclaimer: This note consists of words and symbols derived from keyboarding and dictation using voice recognition software.  As a result, there may be errors that have gone undetected.  Please consider this when interpreting information found in this note.       Monie Weiss MD  05/10/22 0426

## 2022-05-13 ENCOUNTER — LAB REQUISITION (OUTPATIENT)
Dept: LAB | Facility: CLINIC | Age: 67
End: 2022-05-13
Payer: COMMERCIAL

## 2022-05-13 DIAGNOSIS — E03.9 HYPOTHYROIDISM, UNSPECIFIED: ICD-10-CM

## 2022-05-16 LAB — TSH SERPL DL<=0.005 MIU/L-ACNC: 3.91 MU/L (ref 0.4–4)

## 2022-05-16 PROCEDURE — 84443 ASSAY THYROID STIM HORMONE: CPT | Mod: ORL | Performed by: NURSE PRACTITIONER

## 2022-05-16 PROCEDURE — P9604 ONE-WAY ALLOW PRORATED TRIP: HCPCS | Mod: ORL | Performed by: NURSE PRACTITIONER

## 2022-05-16 PROCEDURE — 36415 COLL VENOUS BLD VENIPUNCTURE: CPT | Mod: ORL | Performed by: NURSE PRACTITIONER

## 2022-05-24 ENCOUNTER — LAB REQUISITION (OUTPATIENT)
Dept: LAB | Facility: CLINIC | Age: 67
End: 2022-05-24
Payer: COMMERCIAL

## 2022-05-24 DIAGNOSIS — R07.9 CHEST PAIN, UNSPECIFIED: ICD-10-CM

## 2022-05-25 LAB — PLATELET # BLD AUTO: 135 10E3/UL (ref 150–450)

## 2022-05-25 PROCEDURE — P9604 ONE-WAY ALLOW PRORATED TRIP: HCPCS | Mod: ORL | Performed by: NURSE PRACTITIONER

## 2022-05-25 PROCEDURE — 85049 AUTOMATED PLATELET COUNT: CPT | Mod: ORL | Performed by: NURSE PRACTITIONER

## 2022-05-25 PROCEDURE — 36415 COLL VENOUS BLD VENIPUNCTURE: CPT | Mod: ORL | Performed by: NURSE PRACTITIONER

## 2022-06-01 ENCOUNTER — APPOINTMENT (OUTPATIENT)
Dept: GENERAL RADIOLOGY | Facility: CLINIC | Age: 67
End: 2022-06-01
Attending: FAMILY MEDICINE
Payer: COMMERCIAL

## 2022-06-01 ENCOUNTER — HOSPITAL ENCOUNTER (EMERGENCY)
Facility: CLINIC | Age: 67
Discharge: HOME | End: 2022-06-01
Attending: FAMILY MEDICINE | Admitting: FAMILY MEDICINE
Payer: COMMERCIAL

## 2022-06-01 ENCOUNTER — APPOINTMENT (OUTPATIENT)
Dept: CT IMAGING | Facility: CLINIC | Age: 67
End: 2022-06-01
Attending: FAMILY MEDICINE
Payer: COMMERCIAL

## 2022-06-01 VITALS
HEIGHT: 62 IN | DIASTOLIC BLOOD PRESSURE: 68 MMHG | WEIGHT: 206 LBS | SYSTOLIC BLOOD PRESSURE: 133 MMHG | HEART RATE: 60 BPM | TEMPERATURE: 99.1 F | BODY MASS INDEX: 37.91 KG/M2 | RESPIRATION RATE: 10 BRPM | OXYGEN SATURATION: 95 %

## 2022-06-01 DIAGNOSIS — R07.89 ATYPICAL CHEST PAIN: ICD-10-CM

## 2022-06-01 LAB
ALBUMIN SERPL-MCNC: 3.2 G/DL (ref 3.4–5)
ALP SERPL-CCNC: 51 U/L (ref 40–150)
ALT SERPL W P-5'-P-CCNC: 24 U/L (ref 0–50)
ANION GAP SERPL CALCULATED.3IONS-SCNC: 7 MMOL/L (ref 3–14)
AST SERPL W P-5'-P-CCNC: 19 U/L (ref 0–45)
BASOPHILS # BLD AUTO: 0 10E3/UL (ref 0–0.2)
BASOPHILS NFR BLD AUTO: 0 %
BILIRUB DIRECT SERPL-MCNC: <0.1 MG/DL (ref 0–0.2)
BILIRUB SERPL-MCNC: 0.2 MG/DL (ref 0.2–1.3)
BUN SERPL-MCNC: 23 MG/DL (ref 7–30)
CALCIUM SERPL-MCNC: 9.3 MG/DL (ref 8.5–10.1)
CHLORIDE BLD-SCNC: 109 MMOL/L (ref 94–109)
CO2 SERPL-SCNC: 25 MMOL/L (ref 20–32)
CREAT SERPL-MCNC: 0.97 MG/DL (ref 0.52–1.04)
EOSINOPHIL # BLD AUTO: 0.2 10E3/UL (ref 0–0.7)
EOSINOPHIL NFR BLD AUTO: 2 %
ERYTHROCYTE [DISTWIDTH] IN BLOOD BY AUTOMATED COUNT: 13.9 % (ref 10–15)
GFR SERPL CREATININE-BSD FRML MDRD: 63 ML/MIN/1.73M2
GLUCOSE BLD-MCNC: 132 MG/DL (ref 70–99)
HCT VFR BLD AUTO: 33.1 % (ref 35–47)
HGB BLD-MCNC: 10.6 G/DL (ref 11.7–15.7)
HOLD SPECIMEN: NORMAL
IMM GRANULOCYTES # BLD: 0.1 10E3/UL
IMM GRANULOCYTES NFR BLD: 1 %
LYMPHOCYTES # BLD AUTO: 2.1 10E3/UL (ref 0.8–5.3)
LYMPHOCYTES NFR BLD AUTO: 28 %
MCH RBC QN AUTO: 30.4 PG (ref 26.5–33)
MCHC RBC AUTO-ENTMCNC: 32 G/DL (ref 31.5–36.5)
MCV RBC AUTO: 95 FL (ref 78–100)
MONOCYTES # BLD AUTO: 0.4 10E3/UL (ref 0–1.3)
MONOCYTES NFR BLD AUTO: 6 %
NEUTROPHILS # BLD AUTO: 4.8 10E3/UL (ref 1.6–8.3)
NEUTROPHILS NFR BLD AUTO: 63 %
NRBC # BLD AUTO: 0 10E3/UL
NRBC BLD AUTO-RTO: 0 /100
NT-PROBNP SERPL-MCNC: 1140 PG/ML (ref 0–900)
PLATELET # BLD AUTO: 136 10E3/UL (ref 150–450)
POTASSIUM BLD-SCNC: 4.4 MMOL/L (ref 3.4–5.3)
PROT SERPL-MCNC: 7.1 G/DL (ref 6.8–8.8)
RBC # BLD AUTO: 3.49 10E6/UL (ref 3.8–5.2)
SODIUM SERPL-SCNC: 141 MMOL/L (ref 133–144)
TROPONIN I SERPL HS-MCNC: 23 NG/L
TROPONIN I SERPL HS-MCNC: 25 NG/L
WBC # BLD AUTO: 7.6 10E3/UL (ref 4–11)

## 2022-06-01 PROCEDURE — 250N000009 HC RX 250: Performed by: FAMILY MEDICINE

## 2022-06-01 PROCEDURE — 82248 BILIRUBIN DIRECT: CPT | Performed by: FAMILY MEDICINE

## 2022-06-01 PROCEDURE — 84484 ASSAY OF TROPONIN QUANT: CPT | Performed by: FAMILY MEDICINE

## 2022-06-01 PROCEDURE — 99285 EMERGENCY DEPT VISIT HI MDM: CPT | Mod: 25 | Performed by: FAMILY MEDICINE

## 2022-06-01 PROCEDURE — 250N000013 HC RX MED GY IP 250 OP 250 PS 637: Performed by: FAMILY MEDICINE

## 2022-06-01 PROCEDURE — 80053 COMPREHEN METABOLIC PANEL: CPT | Performed by: FAMILY MEDICINE

## 2022-06-01 PROCEDURE — 83880 ASSAY OF NATRIURETIC PEPTIDE: CPT | Performed by: FAMILY MEDICINE

## 2022-06-01 PROCEDURE — 36415 COLL VENOUS BLD VENIPUNCTURE: CPT | Performed by: FAMILY MEDICINE

## 2022-06-01 PROCEDURE — 71045 X-RAY EXAM CHEST 1 VIEW: CPT

## 2022-06-01 PROCEDURE — 93005 ELECTROCARDIOGRAM TRACING: CPT | Performed by: FAMILY MEDICINE

## 2022-06-01 PROCEDURE — 70450 CT HEAD/BRAIN W/O DYE: CPT

## 2022-06-01 PROCEDURE — 85004 AUTOMATED DIFF WBC COUNT: CPT | Performed by: FAMILY MEDICINE

## 2022-06-01 PROCEDURE — 93010 ELECTROCARDIOGRAM REPORT: CPT | Performed by: FAMILY MEDICINE

## 2022-06-01 RX ORDER — CYANOCOBALAMIN 1000 UG/ML
1 INJECTION, SOLUTION INTRAMUSCULAR; SUBCUTANEOUS
COMMUNITY

## 2022-06-01 RX ORDER — POLYETHYLENE GLYCOL 3350 17 G/17G
17 POWDER, FOR SOLUTION ORAL EVERY OTHER DAY
COMMUNITY
Start: 2022-05-09

## 2022-06-01 RX ORDER — VITAMIN B COMPLEX
1 TABLET ORAL DAILY
COMMUNITY
End: 2023-09-10

## 2022-06-01 RX ORDER — ACETAMINOPHEN 325 MG/1
325 TABLET ORAL 2 TIMES DAILY PRN
COMMUNITY

## 2022-06-01 RX ORDER — ACETAMINOPHEN 325 MG/1
975 TABLET ORAL ONCE
Status: COMPLETED | OUTPATIENT
Start: 2022-06-01 | End: 2022-06-01

## 2022-06-01 RX ORDER — BISACODYL 10 MG
10 SUPPOSITORY, RECTAL RECTAL DAILY PRN
COMMUNITY

## 2022-06-01 RX ADMIN — ACETAMINOPHEN 975 MG: 325 TABLET, FILM COATED ORAL at 10:08

## 2022-06-01 RX ADMIN — LIDOCAINE HYDROCHLORIDE 30 ML: 20 SOLUTION ORAL; TOPICAL at 07:52

## 2022-06-01 NOTE — ED PROVIDER NOTES
History     Chief Complaint   Patient presents with     Chest Pain     HPI  Letty Escobar is a 66 year old female who presents with chest pain that started about 2 hours prior to arrival.  Patient is concerned she has a known cardiac history including pacemaker, bypass and multiple stents.  She states that this pain feels similar to previous episodes when she is coming before and those work-ups have been negative.  Patient is having pain that is radiating from the upper belly through to the back.  Denies any nausea any vomiting.  Patient is concerned because she is having some tingling in both of her hands.  Denies any headache or neck pain or jaw pain.  Pain is mostly constant.    Allergies:  Allergies   Allergen Reactions     Diphenhydramine Palpitations     Tolerated IV Benadryl when not pushed too fast     Isosorbide Other (See Comments) and Dizziness     Also causes syncope (has fallen before) and brain fog/mental disturbances - please do not prescribe     Nitroglycerin Dizziness and Fatigue     Specifically the patch - please do not prescribe     Contrast Dye Hives and Itching     Adhesive Tape Rash     Diphenhydramine Hcl Palpitations     Liquid Adhesive Itching     Nystatin Dermatitis     Nystatin (Topical) Dermatitis     Also blisters     Penicillins Swelling and Rash     Occurred as a child - not 100% sure on specific reactions     Sulfa Drugs Other (See Comments)     Occurred as a child / patient does not remember specific reaction       Problem List:    Patient Active Problem List    Diagnosis Date Noted     Atherosclerosis of coronary artery bypass graft of native heart with angina pectoris (H) 02/13/2022     Priority: Medium     Diabetes mellitus type 2, insulin dependent (H) 02/13/2022     Priority: Medium     Morbid obesity (H) 12/10/2021     Priority: Medium     Chronic atrial fibrillation (H) 04/06/2021     Priority: Medium     Cardiac pacemaker in situ 04/06/2021     Priority: Medium     Major  depressive disorder, recurrent severe without psychotic features (H) 01/26/2021     Priority: Medium     Chronic obstructive pulmonary disease, unspecified COPD type (H) 06/24/2020     Priority: Medium     Panic disorder with agoraphobia 04/14/2020     Priority: Medium     Constipation 03/20/2020     Priority: Medium     Personality disorder (H) 03/05/2020     Priority: Medium     Rule out dependent personality       Candidiasis 03/01/2020     Priority: Medium     Posttraumatic stress disorder 03/01/2020     Priority: Medium     Chronic systolic congestive heart failure (H) 01/28/2020     Priority: Medium     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, left leg (H) 01/15/2020     Priority: Medium     Polymyalgia rheumatica (H) 11/28/2018     Priority: Medium     Vitamin B12 deficiency 02/14/2018     Priority: Medium     Chronic bilateral low back pain without sciatica 01/17/2018     Priority: Medium     Chronic pain disorder 10/01/2017     Priority: Medium     Urinary incontinence, mixed 09/24/2017     Priority: Medium     Carotid stenosis, right 07/13/2016     Priority: Medium     Carotid US 05/04/2018 showed moderate plaque formation, consistent with 50 to 69% stenosis in the right internal carotid artery, not significantly changed from 8/5/2015.  Moderate plaque formation, consistent with 50 to 69% stenosis in the left internal carotid artery; there has been mild progression of the left ICA stenosis since 8/5/2015.       Essential hypertension 10/14/2015     Priority: Medium     S/P CABG x 5 08/21/2015     Priority: Medium     Anemia of chronic disease 01/05/2013     Priority: Medium     Hypothyroidism 12/10/2010     Priority: Medium     ACP (advance care planning) 11/03/2010     Priority: Medium     Formatting of this note might be different from the original.  Patient has identified Health Care Agent(s): Yes  Add Health Care Agents: Yes    Health Care  "Agent(s):    Primary Health Care Agent:     Edwar Escobar Relationship:    Spouse Phone:     [phone]    Secondary Health Care Agent:     Chiki Oro Relationship:     Son Phone:     [phone]      Patient has Advance Care Plan Documents (Health Care Directive, POLST): Yes    Advance Care Plan Documents:  Health Care Directive--03/28/11  Resuscitation Guidelines--    Patient has identified Specific Treatment Preferences: Yes   Specific Treatment Preferences:   a.) Code Status:  DNR/ Do Not Attempt Resuscitation - Allow a Natural Death    b.) Goals of Treatment:     ii. Limited Interventions and treat reversible conditions.  Provide interventions aimed at treatment of new or reversible illness/injury or non-life threatening chronic conditions. Duration of invasive or uncomfortable interventions should generally be limited.- Trial of intubation 5 days or other instructions \"If no improvement, stop.\"  c.) Interventions and Treatments:   i.   Antibiotics:          - Aggressive antibiotics  ii.  Nutrition/Hydration:         - Offer food and liquids by mouth         - IV fluid administration         - No feeding tube under any circumstance.  iii. Transfusion:          - Blood products for comfort/relief of symptoms only  iv. Dialysis:           - Dialysis for short term \"for recovery, but not long term.\"        Last Assessment & Plan:   Advance Care Planning: Disease-specific Session    Letty Escobar is an [facility] patient of Dr. Renea Mendez of the [facility].    Advance care planning discussions were completed with Letty and her healthcare agent, spouse Edwar Escobar on Monday, March 28, 2011 at the [facility] Room.    Understanding of Illness and Disease [place]:   Letty identifies her medical condition as diabetes with peripheral neuropathy, heart problems with a heart attack February 2009 plus 4 stent placements; sleep apnea; depression; hypothyroid; high cholesterol; " "tobacco use; and describes it as needing further assistance with thyroid and diabetes management.  She identifies the following symptoms of her medical condition as being the most bothersome: some memory loss, balance problems, light headedness and dizziness, puffy eyes and lower extremity edema from time to time.    Let[place] is retired [place] .  She is independent with all cares and lives an active life style although, \"I'm not as active as I used to be.\"    Goals of Care:   Letty currently hopes to maintain independence, control pain and symptoms and delay progression of, but not cure, the illness.  \"I want to get the diabetes and thyroid under better control.\"  Letty has an appointment the first part of April for follow up.    Quality of Life:    Letty identifies quality of life as family involvement twice weekly, Alevism activities, newly assigned  , playing cards, the computer,    Letty christiano with serious challenges in her life through her ganesh in God, prayers and support of her Alevism family, spouse and son.    Letty identifies the following fears and worries about her medical care:  \"I just want the diabetes straightened out.\"    Treatment and Care Preferences:   Past experiences in dealing with family and/or friends that have  or been seriously ill include her brother who took his own life.  \"He was 53 years old and living with us.  I found him.\"   As a result of these experiences, Letty expresses these health care preferences:      Summary Letty's Treatment Preferences:  LOW SURVIVAL; HIGH TREATMENT BURDEN:  If Letty suffered a serious complication, such that she was facing a prolonged hospital stay, required ongoing medical interventions, and the chance of living through the complication was low (for example, only 5 out of 100 would live), Letty would choose:  to focus treatment on comfort and quality of life (\"Quality " "of life is more important than length of life to Letty.\")  COMMENT:  \"If I can't live a normal life, I wouldn't want to live.\"    HIGH SURVIVAL; LOW FUNCTIONAL STATUS:  If Letty had a serious complication and had a good chance of living through the complication but it was expected that she would never be able to walk or talk again and would require 24 hour nursing care, she would choose:  to focus treatment on comfort and quality of life (\"Quality of life is more important than length of life to Letty.\")  COMMENT:  \"I'd accept rehabilitation.\"    HIGH SURVIVAL; LOW COGNITIVE STATUS:  If Letty had a serious complication and had a good chance of living through the complication but it was expected that she would never know who she was or who she was with and would require 24 hour nursing care, she would choose:  to focus treatment on comfort and quality of life (\"Quality of life is more important than length of life to Letty.\")    CARDIO-PULMONARY RESUSCITATION (CPR):  The facts, risks and benefits of CPR were discussed with Letty.  If she had a sudden event that caused her heart and breathing to stop, she:  WOULD NOT want CPR attempted and instead would prefer that a natural death occur.    MECHANICAL VENTILATION: If Letty had an episode where she was unable to breathe on her own, she would choose the following:  attempt to use any appropriate non-invasive method to assist breathing, and use mechanical ventilation.  COMMENT:  \"If reversible ventilator is acceptable for 5 days.  If no improvement, stop.\"     Letty has chosen her healthcare agent to:  strictly follow her wishes.    Follow Up Plan:   Letty was encouraged to continue advance care planning discussions with her health care agents and primary care provider.   Letty and her health care agent declined handouts.     Documents addressed during this advance care planning session:  1.  Health Care Agents identified.          .  Primary health care agent is spouse, " Edwar Escobar;          .  Secondary health care agent is son, Jermaine Oro.  2.  Health Care Directive completed and scanned into medical record.  3.  Statement of Treatment Preferences for advanced illness completed and scanned into the medical record.  4.  Resuscitation Guidelines completed for DNR.  Document sent to Dr. Renea Mendez for signature and returned to the home. Letty, please place on the refrigerator.    Recommendations/Plan:   Letty and her health care agent to review Advance Care Plan.     Letty would benefit from:   Care Navigation/Care Navigation Help Desk--explained services for pending future needs.  No identified needs today.  Care Navigation brochure was given to Letty and her healthcare agent.    Advance Care Planning recommendations and Letty's concerns and questions were cc ed to her primary provider.     Thank you, Letty for the opportunity of assisting with Advance Care Planning. It was a seeing you again and meeting Edwar.  Please contact me if I can be of further assistance.    Interviewer: Pat Martin RN    Advance Care Planning Facilitator  [phone]   3/28/2011       ELI (obstructive sleep apnea) 01/31/2010     Priority: Medium     PSG on April/2008 that showed moderate Obstructive Sleep Apnea with an AHI of 23.5 . Limb movements persisted at 29 movements/hour at optimal pressures, despite carbidopa use.       Hyperlipidemia with target low density lipoprotein (LDL) cholesterol less than 70 mg/dL 05/30/2007     Priority: Medium        Past Medical History:    Past Medical History:   Diagnosis Date     ACP (advance care planning) 11/3/2010     Acute coronary syndrome (H) 11/22/2019     Acute exacerbation of CHF (congestive heart failure) (H) 11/11/2019     Acute on chronic systolic (congestive) heart failure (H) 1/28/2020     Anemia of chronic disease 1/5/2013     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, left leg (H) 1/15/2020      BPPV (benign paroxysmal positional vertigo) 5/31/2018     Candidiasis 3/1/2020     Carotid stenosis, right 7/13/2016     Chest pain 11/11/2019     Chronic bilateral low back pain without sciatica 1/17/2018     Chronic pain disorder 10/1/2017     Constipation 3/20/2020     COPD (chronic obstructive pulmonary disease) (H) 6/24/2020     Coronary atherosclerosis 2/6/2009     Cubital tunnel syndrome on left 11/13/2012     Depressive disorder      Diabetes mellitus (H)      Diabetes mellitus type 2 in obese (H) 7/13/2006     Diabetes mellitus type 2 in obese (H) 7/13/2006     Drug-seeking behavior 4/4/2020     Failure to thrive in adult 9/3/2020     Hereditary and idiopathic peripheral neuropathy 4/24/2007     Hyperlipidemia with target low density lipoprotein (LDL) cholesterol less than 70 mg/dL 5/30/2007     Hypertension 10/14/2015     Hypothyroidism 12/10/2010     Irritable bowel syndrome 9/7/2015     Ischemic cardiomyopathy 9/20/2015     Long-term use of high-risk medication 4/14/2020     Moniliasis, 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     Myocardial infarction (H)      Neuromuscular disorder (H)      Other specified postprocedural states 10/8/2015     Pain medication agreement 4/20/2013     Panic disorder with agoraphobia 4/14/2020     Paroxysmal atrial fibrillation (H) 10/2/2015     Personality disorder (H) 3/5/2020     Polymyalgia rheumatica (H) 11/28/2018     Posttraumatic stress disorder 3/1/2020     Restless legs syndrome (RLS) 8/29/2007     Restless legs syndrome (RLS) 8/29/2007     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     Serum calcium elevated 3/1/2020     Somatic dysfunction of sacral region 1/17/2018     Subacromial bursitis of left shoulder joint 8/6/2018     Suicidal ideation 4/1/2020     Thyroid disease      TIA (transient ischemic attack) 5/4/2018     TIA (transient ischemic attack) 5/4/2018     Tobacco abuse 2/17/2017     Tobacco abuse 2/17/2017     Urinary  incontinence, mixed 9/24/2017     UTI (urinary tract infection) 4/17/2020     Vitamin B12 deficiency 2/14/2018     Weakness 12/17/2019       Past Surgical History:    Past Surgical History:   Procedure Laterality Date     CARDIAC SURGERY      stents x 11     CARDIAC SURGERY       CHOLECYSTECTOMY       CHOLECYSTECTOMY       GENITOURINARY SURGERY      Tubal ligation     OTHER SURGICAL HISTORY      Genitourinary surgery     RELEASE CARPAL TUNNEL       RELEASE CARPAL TUNNEL         Family History:    No family history on file.    Social History:  Marital Status:   [2]  Social History     Tobacco Use     Smoking status: Never Smoker     Smokeless tobacco: Never Used   Substance Use Topics     Alcohol use: Not Currently     Drug use: Never        Medications:    acetaminophen (TYLENOL) 325 MG tablet  acetaminophen (TYLENOL) 500 MG tablet  albuterol (PROVENTIL) (2.5 MG/3ML) 0.083% neb solution  aspirin 81 MG EC tablet  bisacodyl (DULCOLAX) 10 MG suppository  cholecalciferol (VITAMIN D3) 10 mcg (400 units) TABS tablet  cyanocobalamin (CYANOCOBALAMIN) 1000 MCG/ML injection  divalproex sodium delayed-release (DEPAKOTE) 250 MG DR tablet  DULoxetine HCl 40 MG CPEP  fluticasone-vilanterol (BREO ELLIPTA) 200-25 MCG/INH inhaler  furosemide (LASIX) 20 MG tablet  gabapentin (NEURONTIN) 300 MG capsule  guaiFENesin (ROBITUSSIN) 100 MG/5ML liquid  levothyroxine (SYNTHROID/LEVOTHROID) 100 MCG tablet  Melatonin 10 MG TABS tablet  metoprolol succinate ER (TOPROL-XL) 25 MG 24 hr tablet  miconazole (MICATIN) 2 % AERP powder  mirtazapine (REMERON) 30 MG tablet  polyethylene glycol (MIRALAX) 17 GM/Dose powder  potassium chloride ER (MICRO-K) 10 MEQ CR capsule  prazosin (MINIPRESS) 1 MG capsule  QUEtiapine (SEROQUEL) 25 MG tablet  rosuvastatin (CRESTOR) 10 MG tablet  sennosides (SENOKOT) 8.6 MG tablet  Vitamin D3 (CHOLECALCIFEROL) 25 mcg (1000 units) tablet  famotidine (PEPCID) 10 MG tablet  QUEtiapine (SEROQUEL) 25 MG  "tablet          Review of Systems   All other systems reviewed and are negative.      Physical Exam   BP: (!) 146/84  Pulse: 79  Temp: 99.1  F (37.3  C)  Resp: 20  Height: 157.5 cm (5' 2\")  Weight: 93.4 kg (206 lb) (Bed scale)  SpO2: 96 %      Physical Exam  Vitals and nursing note reviewed.   Constitutional:       General: She is not in acute distress.     Appearance: She is well-developed. She is not diaphoretic.   HENT:      Head: Normocephalic and atraumatic.      Nose: Nose normal.      Mouth/Throat:      Pharynx: No oropharyngeal exudate.   Eyes:      Conjunctiva/sclera: Conjunctivae normal.   Cardiovascular:      Rate and Rhythm: Normal rate and regular rhythm.      Heart sounds: Normal heart sounds. No murmur heard.    No friction rub.   Pulmonary:      Effort: Pulmonary effort is normal. No respiratory distress.      Breath sounds: Normal breath sounds. No stridor. No wheezing or rales.   Abdominal:      General: Bowel sounds are normal. There is no distension.      Palpations: Abdomen is soft. There is no mass.      Tenderness: There is no abdominal tenderness. There is no guarding.   Musculoskeletal:         General: No tenderness. Normal range of motion.      Cervical back: Normal range of motion and neck supple.   Skin:     General: Skin is warm and dry.      Capillary Refill: Capillary refill takes less than 2 seconds.      Findings: No erythema.   Neurological:      Mental Status: She is alert and oriented to person, place, and time.   Psychiatric:         Judgment: Judgment normal.         ED Course                 Procedures    EKG: Reviewed by me  Rate of 63  Atrial paced rhythm  No ST segment changes  : Impression atrial paced rhythm       Results for orders placed or performed during the hospital encounter of 06/01/22 (from the past 24 hour(s))   [place] Draw    Narrative    The following orders were created for panel order [place] Draw.  Procedure                               Abnormality        "  Status                     ---------                               -----------         ------                     Extra Green Top (Lithium...[[id number]]                      Final result               Extra Purple Top Tube[[id number]]                            Final result                 Please view results for these tests on the individual orders.   Basic metabolic panel   Result Value Ref Range    Sodium 141 133 - 144 mmol/L    Potassium 4.4 3.4 - 5.3 mmol/L    Chloride 109 94 - 109 mmol/L    Carbon Dioxide (CO2) 25 20 - 32 mmol/L    Anion Gap 7 3 - 14 mmol/L    Urea Nitrogen 23 7 - 30 mg/dL    Creatinine 0.97 0.52 - 1.04 mg/dL    Calcium 9.3 8.5 - 10.1 mg/dL    Glucose 132 (H) 70 - 99 mg/dL    GFR Estimate 63 >60 mL/min/1.73m2   Troponin I   Result Value Ref Range    Troponin I High Sensitivity 25 <54 ng/L   Nt probnp inpatient (BNP)   Result Value Ref Range    N terminal Pro BNP Inpatient 1,140 (H) 0 - 900 pg/mL   Extra Green Top (Lithium Heparin) Tube   Result Value Ref Range    Hold Specimen [facility]    Hepatic panel   Result Value Ref Range    Bilirubin Total 0.2 0.2 - 1.3 mg/dL    Bilirubin Direct <0.1 0.0 - 0.2 mg/dL    Protein Total 7.1 6.8 - 8.8 g/dL    Albumin 3.2 (L) 3.4 - 5.0 g/dL    Alkaline Phosphatase 51 40 - 150 U/L    AST 19 0 - 45 U/L    ALT 24 0 - 50 U/L   CBC with platelets differential    Narrative    The following orders were created for panel order CBC with platelets differential.  Procedure                               Abnormality         Status                     ---------                               -----------         ------                     CBC with platelets and d...[[id number]]  Abnormal            Final result                 Please view results for these tests on the individual orders.   Extra Purple Top Tube   Result Value Ref Range    Hold Specimen JI    CBC with platelets and differential   Result Value Ref Range    WBC Count 7.6 4.0 - 11.0 10e3/uL    RBC Count 3.49 (L) 3.80  - 5.20 10e6/uL    Hemoglobin 10.6 (L) 11.7 - 15.7 g/dL    Hematocrit 33.1 (L) 35.0 - 47.0 %    MCV 95 78 - 100 fL    MCH 30.4 26.5 - 33.0 pg    MCHC 32.0 31.5 - 36.5 g/dL    RDW 13.9 10.0 - 15.0 %    Platelet Count 136 (L) 150 - 450 10e3/uL    % Neutrophils 63 %    % Lymphocytes 28 %    % Monocytes 6 %    % Eosinophils 2 %    % Basophils 0 %    % Immature Granulocytes 1 %    NRBCs per 100 WBC 0 <1 /100    Absolute Neutrophils 4.8 1.6 - 8.3 10e3/uL    Absolute Lymphocytes 2.1 0.8 - 5.3 10e3/uL    Absolute Monocytes 0.4 0.0 - 1.3 10e3/uL    Absolute Eosinophils 0.2 0.0 - 0.7 10e3/uL    Absolute Basophils 0.0 0.0 - 0.2 10e3/uL    Absolute Immature Granulocytes 0.1 <=0.4 10e3/uL    Absolute NRBCs 0.0 10e3/uL   Extra Tube    Narrative    The following orders were created for panel order Extra Tube.  Procedure                               Abnormality         Status                     ---------                               -----------         ------                     Extra Blue Top Tube[[id number]]                              Final result               Extra Red Top Tube[[id number]]                               Final result                 Please view results for these tests on the individual orders.   Extra Blue Top Tube   Result Value Ref Range    Hold Specimen JIC    Extra Red Top Tube   Result Value Ref Range    Hold Specimen [facility]    Head CT w/o contrast    Narrative    CT SCAN OF THE HEAD WITHOUT CONTRAST June 1, 2022 8:18 AM     HISTORY: Dizziness, bilateral hand numbness.    TECHNIQUE: 4 mm thick axial images of the head without IV contrast  material. Radiation dose for this scan was reduced using automated  exposure control, adjustment of the mA and/or kV according to patient  size, or iterative reconstruction technique.    COMPARISON: None.    FINDINGS: There is mild generalized atrophy of the brain. Areas of low  attenuation are present in the white matter of the cerebral  hemispheres that are consistent with  small vessel ischemic disease in  this age patient. There is a partially empty sella. There is no  evidence of intracranial hemorrhage, mass, acute infarct or anomaly.  The visualized portions of the sinuses and mastoids appear normal.  There is no evidence of trauma. Probable mucus retention cyst right  sphenoid sinus. Visualized portions of the orbits, paranasal sinuses,  mastoid air spaces, and calvarium are otherwise grossly unremarkable.      Impression    IMPRESSION:   1. Mild cerebral atrophy, less than typically seen in the patient at  this age.  2. White matter changes consistent with small vessel ischemic disease.  3. Partially empty sella.  4. No acute intracranial hemorrhage, mass, or mass effect.  5. Probable mucus retention cyst in the right sphenoid sinus.     I discussed the lack of visualized acute changes in the head with Dr. Kellogg on 6/1/2022 at 8:26 AM.      XR Chest Port 1 View    Narrative    CHEST PORTABLE ONE VIEW June 1, 2022 8:20 AM     HISTORY: Chest pain.    COMPARISON: Chest x-rays dated 5/10/2022 and 3/16/2021.    FINDINGS: Pacemaker/defibrillator and multiple leads, postop changes  status post probable CABG and mitral valve replacement are stable in  appearance. Clips project over the heart again noted. There are  sternal cerclage wires and cardiac valve replacement, cardiomegaly,  and bilateral carotid artery calcification is are again noted. No  pulmonary edema, pleural effusion or pneumothorax. Chronic  posterolateral right rib fractures again noted.      Impression    IMPRESSION: Stable postoperative findings the chest as described  above. No evidence for congestive heart failure is identified.    I discussed these stable (since 3/16/2021) findings with Dr. Kellogg on  6/1/2022 at 8:26 AM.   Troponin I (now)   Result Value Ref Range    Troponin I High Sensitivity 23 <54 ng/L       Medications   lidocaine (viscous) (XYLOCAINE) 2 % 15 mL, alum & mag hydroxide-simethicone (MAALOX) 15  mL GI Cocktail (30 mLs Oral Given 6/1/22 3110)   acetaminophen (TYLENOL) tablet 975 mg (975 mg Oral Given 6/1/22 1008)     Initial labs came back and were unremarkable, CT scan of the head and chest x-ray were normal.  Because of her timing, I did repeat the troponin after 2 hours and this was also negative.  I do not think that the patient likely is having any acute coronary event, this could be more muscular or she is pointing more to her epigastric area, gastritis or GERD.  She does take medicine for this.  At this point we will discharge the patient home.  Recommend follow-up with her doctor if things or not improving over the next few days.    Assessments & Plan (with Medical Decision Making)  Atypical chest pain     I have reviewed the nursing notes.    I have reviewed the findings, diagnosis, plan and need for follow up with the patient.      New Prescriptions    No medications on file       Final diagnoses:   Atypical chest pain       6/1/2022   [facility] EMERGENCY DEPT     Roni Kellogg MD  06/01/22 [id number]

## 2022-06-01 NOTE — ED TRIAGE NOTES
Patient arrived via EMS from [facility] with concerns for chest pain since 0530 this morning. She has a pacer. Saige Bui RN

## 2022-06-06 VITALS
RESPIRATION RATE: 18 BRPM | OXYGEN SATURATION: 94 % | DIASTOLIC BLOOD PRESSURE: 83 MMHG | WEIGHT: 191.8 LBS | TEMPERATURE: 97.1 F | SYSTOLIC BLOOD PRESSURE: 98 MMHG | HEART RATE: 76 BPM | BODY MASS INDEX: 35.08 KG/M2

## 2022-06-06 NOTE — PROGRESS NOTES
[place] [facility]  Chief Complaint   Patient presents with     half-way Regulatory     [place] Medical Record Number:  [id number]  Place of Service where encounter took place:  [facility] () [[id number]]    HPI:    Letty Escobar  is 65 year old (1955), who is being seen today for a federally mandated E/M visit.  HPI information obtained from: facility staff, patient report and [facility] chart review.     Today's concerns are:  -Resident seen and examined, chart reviewed and discussed with the nursing staff.   - CHF /a-fib: Denies CP or swelling in the legs, orthopnea or PND. Denies palpitation.   - COPD: Denies wheezing, cough or SOB  - Depression/ psych: denies fatigue, irritability, SI/HI, changes in appetite, or guilt.   - Denies pain. Uses 2FWW and WC, endorses need A1 with transfer.   - DNP and RN have no concern today.   --------------------------------  - - Past Medical, social, family histories, medications, and allergies reviewed and updated  - Medications reviewed: in the chart and EHR.   - Case Management:   I have reviewed the care plan and MDS and do agree with the plan. Patient's desire to return to the community is not present.  Information reviewed:  Medications, vital signs, orders, and nursing notes.      MEDICATIONS:  Current Outpatient Medications   Medication Sig Dispense Refill     acetaminophen (TYLENOL) 325 MG tablet Take 650 mg by mouth every 6 hours as needed for mild pain       acetaminophen (TYLENOL) 500 MG tablet Take 2 tablets (1,000 mg) by mouth 3 times daily       albuterol (PROVENTIL) (2.5 MG/3ML) 0.083% neb solution Take 1 vial (2.5 mg) by nebulization 2 times daily as needed for shortness of breath / dyspnea or wheezing 90 mL      aspirin 81 MG EC tablet Take 81 mg by mouth daily       bisacodyl (DULCOLAX) 10 MG suppository Place 10 mg rectally daily as needed for constipation       cholecalciferol (VITAMIN D3) 10 mcg (400 units)  TABS tablet Take 1,000 Units by mouth       cyanocobalamin (CYANOCOBALAMIN) 1000 MCG/ML injection Inject 1 mL into the muscle every 30 days       divalproex sodium delayed-release (DEPAKOTE) 250 MG DR tablet Take 250 mg by mouth daily       DULoxetine HCl 40 MG CPEP Take 40 mg by mouth daily       famotidine (PEPCID) 10 MG tablet Take 1 tablet (10 mg) by mouth daily as needed (GERD) 15 tablet 0     fluticasone-vilanterol (BREO ELLIPTA) 200-25 MCG/INH inhaler Inhale 1 puff into the lungs daily       furosemide (LASIX) 20 MG tablet Take 20 mg by mouth daily       gabapentin (NEURONTIN) 300 MG capsule Take 1 capsule (300 mg) by mouth At Bedtime       guaiFENesin (ROBITUSSIN) 100 MG/5ML liquid Take 20 mLs (400 mg) by mouth every 4 hours as needed for cough       levothyroxine (SYNTHROID/LEVOTHROID) 100 MCG tablet Take 100 mcg by mouth daily       Melatonin 10 MG TABS tablet Take 10 mg by mouth At Bedtime       metoprolol succinate ER (TOPROL-XL) 25 MG 24 hr tablet Take 25 mg by mouth daily       miconazole (MICATIN) 2 % AERP powder Apply topically as needed        mirtazapine (REMERON) 30 MG tablet Take 30 mg by mouth daily       polyethylene glycol (MIRALAX) 17 GM/Dose powder Take 17 g by mouth daily Every other day       potassium chloride ER (MICRO-K) 10 MEQ CR capsule Take 10 mEq by mouth daily        prazosin (MINIPRESS) 1 MG capsule Take 1 mg by mouth At Bedtime       QUEtiapine (SEROQUEL) 25 MG tablet Take 1 tablet (25 mg) by mouth once as needed (anxiety) 1 tablet 0     QUEtiapine (SEROQUEL) 25 MG tablet Take 37.5 mg by mouth 3 times daily       rosuvastatin (CRESTOR) 10 MG tablet Take 10 mg by mouth At Bedtime       sennosides (SENOKOT) 8.6 MG tablet Take 2 tablets by mouth At Bedtime       Vitamin D3 (CHOLECALCIFEROL) 25 mcg (1000 units) tablet Take 1 tablet by mouth daily         ROS: 4 point ROS including Respiratory, CV, GI and , other than that noted in the HPI,  is negative    Vitals:  BP 98/83   Pulse  76   Temp 97.1  F (36.2  C)   Resp 18   Wt 87 kg (191 lb 12.8 oz)   SpO2 94%   BMI 35.08 kg/m    Body mass index is 35.08 kg/m .  Exam:  GENERAL APPEARANCE:  in no distress, cooperative  RESP:  lungs clear to auscultation   CV:  S1S2 audible, regular HR, no murmur appreciated.   ABDOMEN:  soft, NT/, obese, BS audible. no mass appreciated on palpation.   M/S:   no joint deformity noted on observation.   SKIN:  Pacemaker in place.  NEURO:   No NFD appreciated on observation.   PSYCH:  affect and mood normal    Lab/Diagnostic data: Reviewed in the chart and EHR.        ASSESSMENT/PLAN  -----------------------------  # Chronic systolic CHF (H)  # Ischemic cardiomyopathy and Hx of S/P CABG x 5 , and bi-vent ICD in place  # Essential hypertension  # Mixed hyperlipidemia  #  PAD, internal carotid artery stenosis, bilateral (H)  # hx of stroke with ischemic CVD  - hospitalized from 4/6/22-4/7/22 with atypical chest pain. Trop was normal. Pulmonary perfusion scan was low probability and no change when compared to 2017 scan, felt to have GERD, offered PPI but decline..  Went to ED on 5/10 and 6/1 with atypical chest pain, work up negative. started on famotidine 10 mg daily prn (5/11).   - was seen by Cardiology on 5/11, and no changed was made  - compensated. Followed by Cardiology.   - on Crestor. LDL 83 (may 2021). Routine lab  - on lasix and metoprolol.          COPD (H): at baseline. 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 routine labs  - on Remeron 30 mg, this could contribute to her anxiety.   - Seroquel GDR initiated, from 50 mg to  37.5 mg tid, and prn 25 mg .  Tolerating.       - consider increasing Depakote to bid, and reduce Seroquel to 37.5, if tolerate, increase Depakote PM dose, and further reduce Seroquel, unitl stopped. May increase Depakote to tid if needed.    - On Cymbalta 40 mg.         Polymyalgia rheumatica (H)  Chronic pain disorder  Chronic bilateral low back pain without sciatica  Peripheral polyneuropathy  Frailty  - analgesia optimal  - Significant  Deficits requiring NH placement. Requiring extensive assistance from nursing. Up for meals only o/w spends the day resting in bed.       Obesity: Body mass index is 35.08 kg/m . from 40.88 kg/m . (H):  to reduce calories intake. Increase cardiovascular risk.       Normocytic anemia, query ACD:  - iron stopped     Irritable bowel syndrome with diarrhea  Gastroesophageal reflux disease, esophagitis presence not specified  - stable.        Hypothyroidism:  TSH   Date Value Ref Range Status   05/16/2022 3.91 0.40 - 4.00 mU/L Final   12/04/2020 41.34 (H) 0.40 - 4.00 mU/L Final   On Levothyroxine. In frail Elderly adult, recommended TSH level is around 6 providing patient is asymptomatic and FT4 is wnl- preferably on the lower normal level.    Order: See above, otherwise, continue the rest of the current POC.         Electronically signed by:  Paola Pastor MD

## 2022-06-07 ENCOUNTER — NURSING HOME VISIT (OUTPATIENT)
Dept: GERIATRICS | Facility: CLINIC | Age: 67
End: 2022-06-07
Payer: COMMERCIAL

## 2022-06-07 DIAGNOSIS — I50.22 CHRONIC SYSTOLIC CONGESTIVE HEART FAILURE (H): ICD-10-CM

## 2022-06-07 DIAGNOSIS — Z95.0 CARDIAC PACEMAKER IN SITU: ICD-10-CM

## 2022-06-07 DIAGNOSIS — J44.9 CHRONIC OBSTRUCTIVE PULMONARY DISEASE, UNSPECIFIED COPD TYPE (H): ICD-10-CM

## 2022-06-07 DIAGNOSIS — F33.2 MAJOR DEPRESSIVE DISORDER, RECURRENT SEVERE WITHOUT PSYCHOTIC FEATURES (H): ICD-10-CM

## 2022-06-07 DIAGNOSIS — M35.3 POLYMYALGIA RHEUMATICA (H): ICD-10-CM

## 2022-06-07 DIAGNOSIS — I25.709 ATHEROSCLEROSIS OF CORONARY ARTERY BYPASS GRAFT OF NATIVE HEART WITH ANGINA PECTORIS (H): ICD-10-CM

## 2022-06-07 DIAGNOSIS — I70.422 ATHEROSCLEROSIS OF AUTOLOGOUS VEIN BYPASS GRAFT(S) OF THE EXTREMITIES WITH REST PAIN, LEFT LEG (H): Primary | ICD-10-CM

## 2022-06-07 PROCEDURE — 99310 SBSQ NF CARE HIGH MDM 45: CPT | Performed by: FAMILY MEDICINE

## 2022-06-07 NOTE — LETTER
6/7/2022        RE: Letty Escobar  [facility]  [address] [facility] [address]        [place] [facility]  Chief Complaint   Patient presents with     intermediate Regulatory     [place] Medical Record Number:  [id number]  Place of Service where encounter took place:  [facility] () [[id number]]    HPI:    Letty Escobar  is 65 year old (1955), who is being seen today for a federally mandated E/M visit.  HPI information obtained from: facility staff, patient report and [facility] chart review.     Today's concerns are:  -Resident seen and examined, chart reviewed and discussed with the nursing staff.   - CHF /a-fib: Denies CP or swelling in the legs, orthopnea or PND. Denies palpitation.   - COPD: Denies wheezing, cough or SOB  - Depression/ psych: denies fatigue, irritability, SI/HI, changes in appetite, or guilt.   - Denies pain. Uses 2FWW and WC, endorses need A1 with transfer.   - DNP and RN have no concern today.   --------------------------------  - - Past Medical, social, family histories, medications, and allergies reviewed and updated  - Medications reviewed: in the chart and EHR.   - Case Management:   I have reviewed the care plan and MDS and do agree with the plan. Patient's desire to return to the community is not present.  Information reviewed:  Medications, vital signs, orders, and nursing notes.      MEDICATIONS:  Current Outpatient Medications   Medication Sig Dispense Refill     acetaminophen (TYLENOL) 325 MG tablet Take 650 mg by mouth every 6 hours as needed for mild pain       acetaminophen (TYLENOL) 500 MG tablet Take 2 tablets (1,000 mg) by mouth 3 times daily       albuterol (PROVENTIL) (2.5 MG/3ML) 0.083% neb solution Take 1 vial (2.5 mg) by nebulization 2 times daily as needed for shortness of breath / dyspnea or wheezing 90 mL      aspirin 81 MG EC tablet Take 81 mg by mouth daily       bisacodyl (DULCOLAX) 10 MG  suppository Place 10 mg rectally daily as needed for constipation       cholecalciferol (VITAMIN D3) 10 mcg (400 units) TABS tablet Take 1,000 Units by mouth       cyanocobalamin (CYANOCOBALAMIN) 1000 MCG/ML injection Inject 1 mL into the muscle every 30 days       divalproex sodium delayed-release (DEPAKOTE) 250 MG DR tablet Take 250 mg by mouth daily       DULoxetine HCl 40 MG CPEP Take 40 mg by mouth daily       famotidine (PEPCID) 10 MG tablet Take 1 tablet (10 mg) by mouth daily as needed (GERD) 15 tablet 0     fluticasone-vilanterol (BREO ELLIPTA) 200-25 MCG/INH inhaler Inhale 1 puff into the lungs daily       furosemide (LASIX) 20 MG tablet Take 20 mg by mouth daily       gabapentin (NEURONTIN) 300 MG capsule Take 1 capsule (300 mg) by mouth At Bedtime       guaiFENesin (ROBITUSSIN) 100 MG/5ML liquid Take 20 mLs (400 mg) by mouth every 4 hours as needed for cough       levothyroxine (SYNTHROID/LEVOTHROID) 100 MCG tablet Take 100 mcg by mouth daily       Melatonin 10 MG TABS tablet Take 10 mg by mouth At Bedtime       metoprolol succinate ER (TOPROL-XL) 25 MG 24 hr tablet Take 25 mg by mouth daily       miconazole (MICATIN) 2 % AERP powder Apply topically as needed        mirtazapine (REMERON) 30 MG tablet Take 30 mg by mouth daily       polyethylene glycol (MIRALAX) 17 GM/Dose powder Take 17 g by mouth daily Every other day       potassium chloride ER (MICRO-K) 10 MEQ CR capsule Take 10 mEq by mouth daily        prazosin (MINIPRESS) 1 MG capsule Take 1 mg by mouth At Bedtime       QUEtiapine (SEROQUEL) 25 MG tablet Take 1 tablet (25 mg) by mouth once as needed (anxiety) 1 tablet 0     QUEtiapine (SEROQUEL) 25 MG tablet Take 37.5 mg by mouth 3 times daily       rosuvastatin (CRESTOR) 10 MG tablet Take 10 mg by mouth At Bedtime       sennosides (SENOKOT) 8.6 MG tablet Take 2 tablets by mouth At Bedtime       Vitamin D3 (CHOLECALCIFEROL) 25 mcg (1000 units) tablet Take 1 tablet by mouth daily         ROS: 4  point ROS including Respiratory, CV, GI and , other than that noted in the HPI,  is negative    Vitals:  BP 98/83   Pulse 76   Temp 97.1  F (36.2  C)   Resp 18   Wt 87 kg (191 lb 12.8 oz)   SpO2 94%   BMI 35.08 kg/m    Body mass index is 35.08 kg/m .  Exam:  GENERAL APPEARANCE:  in no distress, cooperative  RESP:  lungs clear to auscultation   CV:  S1S2 audible, regular HR, no murmur appreciated.   ABDOMEN:  soft, NT/, obese, BS audible. no mass appreciated on palpation.   M/S:   no joint deformity noted on observation.   SKIN:  Pacemaker in place.  NEURO:   No NFD appreciated on observation.   PSYCH:  affect and mood normal    Lab/Diagnostic data: Reviewed in the chart and EHR.        ASSESSMENT/PLAN  -----------------------------  # Chronic systolic CHF (H)  # Ischemic cardiomyopathy and Hx of S/P CABG x 5 , and bi-vent ICD in place  # Essential hypertension  # Mixed hyperlipidemia  #  PAD, internal carotid artery stenosis, bilateral (H)  # hx of stroke with ischemic CVD  - hospitalized from 4/6/22-4/7/22 with atypical chest pain. Trop was normal. Pulmonary perfusion scan was low probability and no change when compared to 2017 scan, felt to have GERD, offered PPI but decline..  Went to ED on 5/10 and 6/1 with atypical chest pain, work up negative. started on famotidine 10 mg daily prn (5/11).   - was seen by Cardiology on 5/11, and no changed was made  - compensated. Followed by Cardiology.   - on Crestor. LDL 83 (may 2021). Routine lab  - on lasix and metoprolol.          COPD (H): at baseline. 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 routine labs  - on  Remeron 30 mg, this could contribute to her anxiety.   - Seroquel GDR initiated, from 50 mg to  37.5 mg tid, and prn 25 mg . Tolerating.       - consider increasing Depakote to bid, and reduce Seroquel to 37.5, if tolerate, increase Depakote PM dose, and further reduce Seroquel, unitl stopped. May increase Depakote to tid if needed.    - On Cymbalta 40 mg.         Polymyalgia rheumatica (H)  Chronic pain disorder  Chronic bilateral low back pain without sciatica  Peripheral polyneuropathy  Frailty  - analgesia optimal  - Significant  Deficits requiring NH placement. Requiring extensive assistance from nursing. Up for meals only o/w spends the day resting in bed.       Obesity: Body mass index is 35.08 kg/m . from 40.88 kg/m . (H):  to reduce calories intake. Increase cardiovascular risk.       Normocytic anemia, query ACD:  - iron stopped     Irritable bowel syndrome with diarrhea  Gastroesophageal reflux disease, esophagitis presence not specified  - stable.        Hypothyroidism:  TSH   Date Value Ref Range Status   05/16/2022 3.91 0.40 - 4.00 mU/L Final   12/04/2020 41.34 (H) 0.40 - 4.00 mU/L Final   On Levothyroxine. In frail Elderly adult, recommended TSH level is around 6 providing patient is asymptomatic and FT4 is wnl- preferably on the lower normal level.    Order: See above, otherwise, continue the rest of the current POC.         Electronically signed by:  Paola Pastor MD        Sincerely,        Paola Pastor MD

## 2022-06-15 PROBLEM — Z79.4 DIABETES MELLITUS TYPE 2, INSULIN DEPENDENT (H): Status: RESOLVED | Noted: 2022-02-13 | Resolved: 2022-06-15

## 2022-06-15 PROBLEM — E11.9 DIABETES MELLITUS TYPE 2, INSULIN DEPENDENT (H): Status: RESOLVED | Noted: 2022-02-13 | Resolved: 2022-06-15

## 2022-06-17 ENCOUNTER — PATIENT OUTREACH (OUTPATIENT)
Dept: GERIATRIC MEDICINE | Facility: CLINIC | Age: 67
End: 2022-06-17
Payer: COMMERCIAL

## 2022-06-17 NOTE — PROGRESS NOTES
[facility] Care Coordination Contact    Internal CC change effective 07/01/2022.  Mailed member CC Change letter.  Additional tasks to be completed by CMS include: update database & EPIC, and create member files on [facility].

## 2022-06-17 NOTE — LETTER
June 17, 2022    Important [employer] Information    TAYLOR OATES [facility]  [address]  [facility] [address]    Your New Care Coordinator  Dear Taylor,  My name is MARIYA Jaramillo, RN, PHN, CCM and I am your new Care Coordinator. You may reach me by calling [phone]. I will be in touch with you shortly to address any questions you may have.   I have also been in contact with Jeff Alvarado CNP, your previous care coordinator, to ensure a smooth transition.  Questions?  Call me at [phone] Monday-Friday between 8am and 5pm. TTY: [phone]. I look forward to working with you as a [employer]  member.  Sincerely,    MARIYA Jaramillo, RN, PHN, CCM  [phone]  [email or web address]    cc: member records                                                                                                                        CB5 ([employer]) (5-2020)    Civil Rights Notice  Discrimination is against the law. [employer] does not discriminate on the basis of any of the following:    Race    Color    National Origin    Creed    Latter-day    Age    Public Assistance Status    Receipt of Health Care Services    Disability (including physical or mental impairment)    Sex (including sex stereotypes and gender identity)    Marital Status    Political Beliefs    Medical Condition    Genetic Information    Sexual Orientation    Claims Experience    Medical History    Health Status    Auxiliary Aids and Services:  [employer] provides auxiliary aids and services, like qualified interpreters or information in accessible formats, free of charge and in a timely manner, to ensure an equal opportunity to participate in our health care programs. Contact [employer] at [email or web address] medicaid or call [phone] (toll free); TTY:711 or at [employer]/contactmedicaid.    Language Assistance Services:  [employer] provides translated documents and spoken language interpreting, free of charge and in a timely manner, when  language assistance services are necessary to ensure limited English speakers have meaningful access to our information and services. Contact [employer] at [phone] (toll free); TTY: 711 or [email or web address].     Civil Rights Complaints  You have the right to file a discrimination complaint if you believe you were treated in a discriminatory way by [employer]. You may contact any of the following four agencies directly to file a discrimination complaint.        U.S. Department of Health and Human Services  Office for Civil Rights (OCR)  You have the right to file a complaint with the OCR, a federal agency, if you believe you have been discriminated against because of any of the following:    Race    Disability    Color    Sex    National Origin    Age    Mandaeism (in some cases)    Contact the OCR directly to file a complaint:         Director         U.S. Department of Health and Human Services  Office for Civil Rights         [address] [place] [address]         Customer Response Center: Toll-free: [phone]          TDD: [phone]         Email: [email or web address]    [place] Department of Human Rights (MDHR)  In [place], you have the right to file a complaint with the MDHR if you believe you have been discriminated against because of any of the following:      Race    Color    National Origin    Mandaeism    Creed    Sex    Sexual Orientation    Marital Status    Public Assistance Status    Disability    Contact the MDHR directly to file a complaint:         [place] Department of Human Rights         [address] [place] [address]         [phone] (voice)          [phone] (toll free)         711 or [phone] ([place] Relay)         [phone] (Fax)         [email or web address] (Email)     [place] Department of Human Services (DHS)  You have the right to file a complaint with DHS if  you believe you have been discriminated against in our health care programs because of any of the following:    Race    Color    National Origin    Creed    Restorationism    Age    Public Assistance Status    Receipt of Health Care Services    Disability (including physical or mental impairment)    Sex (including sex stereotypes and gender identity)    Marital Status    Political Beliefs    Medical Condition    Genetic Information    Sexual Orientation    Claims Experience    Medical History    Health Status    Complaints must be in writing and filed within 180 days of the date you discovered the alleged discrimination. The complaint must contain your name and address and describe the discrimination you are complaining about. After we get your complaint, we will review it and notify you in writing about whether we have authority to investigate. If we do, we will investigate the complaint.      [facility] will notify you in writing of the investigation s outcome. You have a right to appeal the outcome if you disagree with the decision. To appeal, you must send a written request to have [facility] review the investigation outcome. Be brief and state why you disagree with the decision. Include additional information you think is important.      If you file a complaint in this way, the people who work for the agency named in the complaint cannot retaliate against you. This means they cannot punish you in any way for filing a complaint. Filing a complaint in this way does not stop you from seeking out other legal or administration actions.     Contact [facility] directly to file a discrimination complaint:        Civil Rights Coordinator        [place] Department of Human Services        Equal Opportunity and Access Division        [address] [address]        [place] [address] [phone] (voice) or use your preferred relay service     [employer] Complaint Notice   You have the right to file a complaint with [employer] if you believe you have  been discriminated against because of any of the following:       Medical condition    Health status    Receipt of health care services    Claims experience    Medical history    Genetic information    Disability (including mental or physical impairment)    Marital status    Age    Sex (including sex stereotypes and gender identity)    Sexual orientation    National origin    Race    Color    Yazidi    Creed    Public assistance status    Political beliefs    You can file a complaint and ask for help in filing a complaint in person or by mail, phone, fax, or email at:     [employer] Civil Rights Coordinator  [facility]  [address], [place] [address] [phone] (voice and fax) or TTC:115  Email: [email or web address]    American Indians can begin or continue to use [place] and [facility] ([facility]) clinics. We will not require prior approval or impose any conditions for you to get services at these clinics. For elders age 65 years and older this includes Elderly Waiver (EW) services accessed through the [place]. If a doctor or other provider in a [place] or [facility] clinic refers you to a provider in our network, we will not require you to see your primary care provider prior to the referral.

## 2022-07-12 ENCOUNTER — APPOINTMENT (OUTPATIENT)
Dept: GENERAL RADIOLOGY | Facility: CLINIC | Age: 67
End: 2022-07-12
Attending: FAMILY MEDICINE
Payer: COMMERCIAL

## 2022-07-12 ENCOUNTER — HOSPITAL ENCOUNTER (EMERGENCY)
Facility: CLINIC | Age: 67
Discharge: HOME | End: 2022-07-12
Attending: FAMILY MEDICINE | Admitting: FAMILY MEDICINE
Payer: COMMERCIAL

## 2022-07-12 VITALS
OXYGEN SATURATION: 96 % | DIASTOLIC BLOOD PRESSURE: 68 MMHG | HEART RATE: 87 BPM | TEMPERATURE: 97.6 F | RESPIRATION RATE: 20 BRPM | SYSTOLIC BLOOD PRESSURE: 115 MMHG

## 2022-07-12 DIAGNOSIS — K21.00 GASTROESOPHAGEAL REFLUX DISEASE WITH ESOPHAGITIS, UNSPECIFIED WHETHER HEMORRHAGE: ICD-10-CM

## 2022-07-12 DIAGNOSIS — R07.9 CHEST PAIN, UNSPECIFIED TYPE: ICD-10-CM

## 2022-07-12 LAB
ALBUMIN SERPL-MCNC: 3.1 G/DL (ref 3.4–5)
ALP SERPL-CCNC: 51 U/L (ref 40–150)
ALT SERPL W P-5'-P-CCNC: 26 U/L (ref 0–50)
ANION GAP SERPL CALCULATED.3IONS-SCNC: 5 MMOL/L (ref 3–14)
AST SERPL W P-5'-P-CCNC: 26 U/L (ref 0–45)
BASOPHILS # BLD AUTO: 0 10E3/UL (ref 0–0.2)
BASOPHILS NFR BLD AUTO: 0 %
BILIRUB SERPL-MCNC: 0.2 MG/DL (ref 0.2–1.3)
BUN SERPL-MCNC: 32 MG/DL (ref 7–30)
CALCIUM SERPL-MCNC: 9.8 MG/DL (ref 8.5–10.1)
CHLORIDE BLD-SCNC: 107 MMOL/L (ref 94–109)
CO2 SERPL-SCNC: 27 MMOL/L (ref 20–32)
CREAT SERPL-MCNC: 0.98 MG/DL (ref 0.52–1.04)
EOSINOPHIL # BLD AUTO: 0.2 10E3/UL (ref 0–0.7)
EOSINOPHIL NFR BLD AUTO: 2 %
ERYTHROCYTE [DISTWIDTH] IN BLOOD BY AUTOMATED COUNT: 14.3 % (ref 10–15)
GFR SERPL CREATININE-BSD FRML MDRD: 63 ML/MIN/1.73M2
GLUCOSE BLD-MCNC: 133 MG/DL (ref 70–99)
HCT VFR BLD AUTO: 32.8 % (ref 35–47)
HGB BLD-MCNC: 10.4 G/DL (ref 11.7–15.7)
IMM GRANULOCYTES # BLD: 0.1 10E3/UL
IMM GRANULOCYTES NFR BLD: 1 %
LYMPHOCYTES # BLD AUTO: 2.5 10E3/UL (ref 0.8–5.3)
LYMPHOCYTES NFR BLD AUTO: 32 %
MCH RBC QN AUTO: 30.7 PG (ref 26.5–33)
MCHC RBC AUTO-ENTMCNC: 31.7 G/DL (ref 31.5–36.5)
MCV RBC AUTO: 97 FL (ref 78–100)
MONOCYTES # BLD AUTO: 0.5 10E3/UL (ref 0–1.3)
MONOCYTES NFR BLD AUTO: 7 %
NEUTROPHILS # BLD AUTO: 4.6 10E3/UL (ref 1.6–8.3)
NEUTROPHILS NFR BLD AUTO: 58 %
NRBC # BLD AUTO: 0 10E3/UL
NRBC BLD AUTO-RTO: 0 /100
PLATELET # BLD AUTO: 141 10E3/UL (ref 150–450)
POTASSIUM BLD-SCNC: 4.9 MMOL/L (ref 3.4–5.3)
PROT SERPL-MCNC: 7.3 G/DL (ref 6.8–8.8)
RBC # BLD AUTO: 3.39 10E6/UL (ref 3.8–5.2)
SODIUM SERPL-SCNC: 139 MMOL/L (ref 133–144)
TROPONIN I SERPL HS-MCNC: 24 NG/L
WBC # BLD AUTO: 7.9 10E3/UL (ref 4–11)

## 2022-07-12 PROCEDURE — 71045 X-RAY EXAM CHEST 1 VIEW: CPT

## 2022-07-12 PROCEDURE — 93010 ELECTROCARDIOGRAM REPORT: CPT | Performed by: FAMILY MEDICINE

## 2022-07-12 PROCEDURE — 99285 EMERGENCY DEPT VISIT HI MDM: CPT | Mod: 25 | Performed by: FAMILY MEDICINE

## 2022-07-12 PROCEDURE — 93005 ELECTROCARDIOGRAM TRACING: CPT | Performed by: FAMILY MEDICINE

## 2022-07-12 PROCEDURE — 82040 ASSAY OF SERUM ALBUMIN: CPT | Performed by: FAMILY MEDICINE

## 2022-07-12 PROCEDURE — 36415 COLL VENOUS BLD VENIPUNCTURE: CPT | Performed by: FAMILY MEDICINE

## 2022-07-12 PROCEDURE — 80053 COMPREHEN METABOLIC PANEL: CPT | Performed by: FAMILY MEDICINE

## 2022-07-12 PROCEDURE — 84484 ASSAY OF TROPONIN QUANT: CPT | Performed by: FAMILY MEDICINE

## 2022-07-12 PROCEDURE — 85025 COMPLETE CBC W/AUTO DIFF WBC: CPT | Performed by: FAMILY MEDICINE

## 2022-07-12 RX ORDER — FAMOTIDINE 10 MG
20 TABLET ORAL AT BEDTIME
Qty: 60 TABLET | Refills: 0 | Status: SHIPPED | OUTPATIENT
Start: 2022-07-12 | End: 2022-08-11

## 2022-07-12 NOTE — ED TRIAGE NOTES
Patient started to have chest pain at 2000 tonight. Patient given ASA and stating pain going into her left shoulder.

## 2022-07-12 NOTE — ED PROVIDER NOTES
[facility] ED Provider Note   Patient: Letty Escobar  MRN #:  [id number]  Date of Visit: July 12, 2022    CC:     Chief Complaint   Patient presents with     Chest Pain     HPI:  Letty Escobar is a 66 year old female who presented to the emergency department with acute onset of chest pain that started at 11 PM tonight.  Patient was awakened by the pain.  Pain is along the left side of the chest with some radiation into the neck.  She reports some nausea and shortness of breath.  Patient has had several episodes of chest pain over the last 3 months, including a hospitalization overnight at [facility] on April 6.  She came into the emergency department on May 10, had a follow-up with cardiology on May 11, and returned on June 1 with another episode of atypical chest pain.  Patient does not have any exertional symptoms.  She uses a walker and is assisted with her walking.  Her pains do not ever come on with exertion.  They typically occur a couple of hours after she goes to sleep.  Patient has not noticed any significant increase in swelling of her lower extremities.  She has a history of coronary artery disease, status post coronary artery bypass in August 2015.  She has a history of chronic ischemic cardiomyopathy with left ventricular ejection function of 45 to 50%.  She is status post biventricular ICD in August 2016 with recent interrogation of her device on May 17.  Patient denies any fever, cough, changes in bowel habits.  Patient had a elevated D-dimer back in April, and a negative V/Q nuclear medicine lung scan revealing low probability for PE.      Problem List:  Patient Active Problem List    Diagnosis Date Noted     Atherosclerosis of coronary artery bypass graft of native heart with angina pectoris (H) 02/13/2022     Priority: Medium     Morbid obesity (H) 12/10/2021     Priority: Medium     Chronic atrial fibrillation (H)  04/06/2021     Priority: Medium     Cardiac pacemaker in situ 04/06/2021     Priority: Medium     Major depressive disorder, recurrent severe without psychotic features (H) 01/26/2021     Priority: Medium     Chronic obstructive pulmonary disease, unspecified COPD type (H) 06/24/2020     Priority: Medium     Panic disorder with agoraphobia 04/14/2020     Priority: Medium     Constipation 03/20/2020     Priority: Medium     Personality disorder (H) 03/05/2020     Priority: Medium     Rule out dependent personality       Candidiasis 03/01/2020     Priority: Medium     Posttraumatic stress disorder 03/01/2020     Priority: Medium     Chronic systolic congestive heart failure (H) 01/28/2020     Priority: Medium     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, left leg (H) 01/15/2020     Priority: Medium     Polymyalgia rheumatica (H) 11/28/2018     Priority: Medium     Vitamin B12 deficiency 02/14/2018     Priority: Medium     Chronic bilateral low back pain without sciatica 01/17/2018     Priority: Medium     Chronic pain disorder 10/01/2017     Priority: Medium     Urinary incontinence, mixed 09/24/2017     Priority: Medium     Carotid stenosis, right 07/13/2016     Priority: Medium     Carotid US 05/04/2018 showed moderate plaque formation, consistent with 50 to 69% stenosis in the right internal carotid artery, not significantly changed from 8/5/2015.  Moderate plaque formation, consistent with 50 to 69% stenosis in the left internal carotid artery; there has been mild progression of the left ICA stenosis since 8/5/2015.       Essential hypertension 10/14/2015     Priority: Medium     S/P CABG x 5 08/21/2015     Priority: Medium     Anemia of chronic disease 01/05/2013     Priority: Medium     Hypothyroidism 12/10/2010     Priority: Medium     ACP (advance care planning) 11/03/2010     Priority: Medium     Formatting of this note might be different from the original.  Patient  "has identified Health Care Agent(s): Yes  Add Health Care Agents: Yes    Health Care Agent(s):    Primary Health Care Agent:     Edwar Escobar Relationship:    Spouse Phone:     [phone]    Secondary Health Care Agent:     Chiki Oro Relationship:     Son Phone:     [phone]      Patient has Advance Care Plan Documents (Health Care Directive, POLST): Yes    Advance Care Plan Documents:  Health Care Directive--03/28/11  Resuscitation Guidelines--    Patient has identified Specific Treatment Preferences: Yes   Specific Treatment Preferences:   a.) Code Status:  DNR/ Do Not Attempt Resuscitation - Allow a Natural Death    b.) Goals of Treatment:     ii. Limited Interventions and treat reversible conditions.  Provide interventions aimed at treatment of new or reversible illness/injury or non-life threatening chronic conditions. Duration of invasive or uncomfortable interventions should generally be limited.- Trial of intubation 5 days or other instructions \"If no improvement, stop.\"  c.) Interventions and Treatments:   i.   Antibiotics:          - Aggressive antibiotics  ii.  Nutrition/Hydration:         - Offer food and liquids by mouth         - IV fluid administration         - No feeding tube under any circumstance.  iii. Transfusion:          - Blood products for comfort/relief of symptoms only  iv. Dialysis:           - Dialysis for short term \"for recovery, but not long term.\"        Last Assessment & Plan:   Advance Care Planning: Disease-specific Session    Letty Escobar is an [facility] patient of Dr. Renea Mendez of the [facility].    Advance care planning discussions were completed with Letty and her healthcare agent, spouse Edwar Escobar on Monday, March 28, 2011 at the [facility].    Understanding of Illness and Disease [place]:   Letty identifies her medical condition as diabetes with peripheral neuropathy, heart problems with a heart attack February " "2009 plus 4 stent placements; sleep apnea; depression; hypothyroid; high cholesterol; tobacco use; and describes it as needing further assistance with thyroid and diabetes management.  She identifies the following symptoms of her medical condition as being the most bothersome: some memory loss, balance problems, light headedness and dizziness, puffy eyes and lower extremity edema from time to time.    Let[place] is retired [place] .  She is independent with all cares and lives an active life style although, \"I'm not as active as I used to be.\"    Goals of Care:   Letty currently hopes to maintain independence, control pain and symptoms and delay progression of, but not cure, the illness.  \"I want to get the diabetes and thyroid under better control.\"  Letty has an appointment the first part of April for follow up.    Quality of Life:    Letty identifies quality of life as family involvement twice weekly, Gnosticism activities, newly assigned  , playing cards, the computer,    Letty christiano with serious challenges in her life through her ganesh in God, prayers and support of her Gnosticism family, spouse and son.    Letty identifies the following fears and worries about her medical care:  \"I just want the diabetes straightened out.\"    Treatment and Care Preferences:   Past experiences in dealing with family and/or friends that have  or been seriously ill include her brother who took his own life.  \"He was 53 years old and living with us.  I found him.\"   As a result of these experiences, Letty expresses these health care preferences:      Summary Letty's Treatment Preferences:  LOW SURVIVAL; HIGH TREATMENT BURDEN:  If Letty suffered a serious complication, such that she was facing a prolonged hospital stay, required ongoing medical interventions, and the chance of living through the complication was low (for example, only 5 out of 100 would " "live), Letty would choose:  to focus treatment on comfort and quality of life (\"Quality of life is more important than length of life to Letty.\")  COMMENT:  \"If I can't live a normal life, I wouldn't want to live.\"    HIGH SURVIVAL; LOW FUNCTIONAL STATUS:  If Letty had a serious complication and had a good chance of living through the complication but it was expected that she would never be able to walk or talk again and would require 24 hour nursing care, she would choose:  to focus treatment on comfort and quality of life (\"Quality of life is more important than length of life to Letty.\")  COMMENT:  \"I'd accept rehabilitation.\"    HIGH SURVIVAL; LOW COGNITIVE STATUS:  If Letty had a serious complication and had a good chance of living through the complication but it was expected that she would never know who she was or who she was with and would require 24 hour nursing care, she would choose:  to focus treatment on comfort and quality of life (\"Quality of life is more important than length of life to Letty.\")    CARDIO-PULMONARY RESUSCITATION (CPR):  The facts, risks and benefits of CPR were discussed with Letty.  If she had a sudden event that caused her heart and breathing to stop, she:  WOULD NOT want CPR attempted and instead would prefer that a natural death occur.    MECHANICAL VENTILATION: If Letty had an episode where she was unable to breathe on her own, she would choose the following:  attempt to use any appropriate non-invasive method to assist breathing, and use mechanical ventilation.  COMMENT:  \"If reversible ventilator is acceptable for 5 days.  If no improvement, stop.\"     Letty has chosen her healthcare agent to:  strictly follow her wishes.    Follow Up Plan:   Letty was encouraged to continue advance care planning discussions with her health care agents and primary care provider.   Letty and her health care agent declined handouts.     Documents addressed during this advance care planning " session:  1.  Health Care Agents identified.          .  Primary health care agent is spouse, Edwar Escobar;          .  Secondary health care agent is son, Jermaine Oro.  2.  Health Care Directive completed and scanned into medical record.  3.  Statement of Treatment Preferences for advanced illness completed and scanned into the medical record.  4.  Resuscitation Guidelines completed for DNR.  Document sent to Dr. Renea Mendez for signature and returned to the home. Letty, please place on the refrigerator.    Recommendations/Plan:   Letty and her health care agent to review Advance Care Plan.     Letty would benefit from:   Care Navigation/Care Navigation Help Desk--explained services for pending future needs.  No identified needs today.  Care Navigation brochure was given to Letty and her healthcare agent.    Advance Care Planning recommendations and Letty's concerns and questions were cc ed to her primary provider.     Thank you, Letty for the opportunity of assisting with Advance Care Planning. It was a seeing you again and meeting Edwar.  Please contact me if I can be of further assistance.    Interviewer: Pat Martin RN    Advance Care Planning Facilitator  [phone]   3/28/2011       ELI (obstructive sleep apnea) 01/31/2010     Priority: Medium     PSG on April/2008 that showed moderate Obstructive Sleep Apnea with an AHI of 23.5 . Limb movements persisted at 29 movements/hour at optimal pressures, despite carbidopa use.       Hyperlipidemia with target low density lipoprotein (LDL) cholesterol less than 70 mg/dL 05/30/2007     Priority: Medium       Past Medical History:   Diagnosis Date     ACP (advance care planning) 11/3/2010     Acute coronary syndrome (H) 11/22/2019     Acute exacerbation of CHF (congestive heart failure) (H) 11/11/2019     Acute on chronic systolic (congestive) heart failure (H) 1/28/2020     Anemia of chronic disease 1/5/2013     Atherosclerosis of autologous vein bypass  graft(s) of the extremities with rest pain, left leg (H) 1/15/2020     BPPV (benign paroxysmal positional vertigo) 5/31/2018     Candidiasis 3/1/2020     Carotid stenosis, right 7/13/2016     Chest pain 11/11/2019     Chronic bilateral low back pain without sciatica 1/17/2018     Chronic pain disorder 10/1/2017     Constipation 3/20/2020     COPD (chronic obstructive pulmonary disease) (H) 6/24/2020     Coronary atherosclerosis 2/6/2009     Cubital tunnel syndrome on left 11/13/2012     Depressive disorder      Diabetes mellitus (H)      Diabetes mellitus type 2 in obese (H) 7/13/2006     Diabetes mellitus type 2 in obese (H) 7/13/2006     Drug-seeking behavior 4/4/2020     Failure to thrive in adult 9/3/2020     Hereditary and idiopathic peripheral neuropathy 4/24/2007     Hyperlipidemia with target low density lipoprotein (LDL) cholesterol less than 70 mg/dL 5/30/2007     Hypertension 10/14/2015     Hypothyroidism 12/10/2010     Irritable bowel syndrome 9/7/2015     Ischemic cardiomyopathy 9/20/2015     Long-term use of high-risk medication 4/14/2020     Moniliasis, 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     Myocardial infarction (H)      Neuromuscular disorder (H)      Other specified postprocedural states 10/8/2015     Pain medication agreement 4/20/2013     Panic disorder with agoraphobia 4/14/2020     Paroxysmal atrial fibrillation (H) 10/2/2015     Personality disorder (H) 3/5/2020     Polymyalgia rheumatica (H) 11/28/2018     Posttraumatic stress disorder 3/1/2020     Restless legs syndrome (RLS) 8/29/2007     Restless legs syndrome (RLS) 8/29/2007     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     Serum calcium elevated 3/1/2020     Somatic dysfunction of sacral region 1/17/2018     Subacromial bursitis of left shoulder joint 8/6/2018     Suicidal ideation 4/1/2020     Thyroid disease      TIA (transient ischemic attack) 5/4/2018     TIA (transient ischemic attack) 5/4/2018      Tobacco abuse 2/17/2017     Tobacco abuse 2/17/2017     Urinary incontinence, mixed 9/24/2017     UTI (urinary tract infection) 4/17/2020     Vitamin B12 deficiency 2/14/2018     Weakness 12/17/2019       MEDS: famotidine (PEPCID) 10 MG tablet  acetaminophen (TYLENOL) 325 MG tablet  acetaminophen (TYLENOL) 500 MG tablet  albuterol (PROVENTIL) (2.5 MG/3ML) 0.083% neb solution  aspirin 81 MG EC tablet  bisacodyl (DULCOLAX) 10 MG suppository  cholecalciferol (VITAMIN D3) 10 mcg (400 units) TABS tablet  cyanocobalamin (CYANOCOBALAMIN) 1000 MCG/ML injection  divalproex sodium delayed-release (DEPAKOTE) 250 MG DR tablet  DULoxetine HCl 40 MG CPEP  fluticasone-vilanterol (BREO ELLIPTA) 200-25 MCG/INH inhaler  furosemide (LASIX) 20 MG tablet  gabapentin (NEURONTIN) 300 MG capsule  guaiFENesin (ROBITUSSIN) 100 MG/5ML liquid  levothyroxine (SYNTHROID/LEVOTHROID) 100 MCG tablet  Melatonin 10 MG TABS tablet  metoprolol succinate ER (TOPROL-XL) 25 MG 24 hr tablet  miconazole (MICATIN) 2 % AERP powder  mirtazapine (REMERON) 30 MG tablet  polyethylene glycol (MIRALAX) 17 GM/Dose powder  potassium chloride ER (MICRO-K) 10 MEQ CR capsule  prazosin (MINIPRESS) 1 MG capsule  QUEtiapine (SEROQUEL) 25 MG tablet  QUEtiapine (SEROQUEL) 25 MG tablet  rosuvastatin (CRESTOR) 10 MG tablet  sennosides (SENOKOT) 8.6 MG tablet  Vitamin D3 (CHOLECALCIFEROL) 25 mcg (1000 units) tablet        ALLERGIES:    Allergies   Allergen Reactions     Diphenhydramine Palpitations     Tolerated IV Benadryl when not pushed too fast     Isosorbide Other (See Comments) and Dizziness     Also causes syncope (has fallen before) and brain fog/mental disturbances - please do not prescribe     Nitroglycerin Dizziness and Fatigue     Specifically the patch - please do not prescribe     Contrast Dye Hives and Itching     Adhesive Tape Rash     Diphenhydramine Hcl Palpitations     Liquid Adhesive Itching     Nystatin Dermatitis     Nystatin (Topical) Dermatitis     Also  blisters     Penicillins Swelling and Rash     Occurred as a child - not 100% sure on specific reactions     Sulfa Drugs Other (See Comments)     Occurred as a child / patient does not remember specific reaction       Past Surgical History:   Procedure Laterality Date     CARDIAC SURGERY      stents x 11     CARDIAC SURGERY       CHOLECYSTECTOMY       CHOLECYSTECTOMY       GENITOURINARY SURGERY      Tubal ligation     OTHER SURGICAL HISTORY      Genitourinary surgery     RELEASE CARPAL TUNNEL       RELEASE CARPAL TUNNEL         Social History     Tobacco Use     Smoking status: Never Smoker     Smokeless tobacco: Never Used   Substance Use Topics     Alcohol use: Not Currently     Drug use: Never         Review of Systems   Except as noted in HPI, all other systems were reviewed and are negative    Physical Exam     Vitals were reviewed  Patient Vitals for the past 8 hrs:   BP Temp Pulse Resp SpO2   07/12/22 0126 (!) 164/84 97.6  F (36.4  C) 81 18 97 %     GENERAL APPEARANCE: Alert and oriented x3, no acute distress  FACE: normal facies  EYES: Pupils are equal  HENT: normal external exam  NECK: no adenopathy or asymmetry  CHEST: Left parasternal chest wall tenderness  RESP: normal respiratory effort; clear breath sounds bilaterally  CV: regular rate and rhythm; no significant murmurs, gallops or rubs  ABD: soft, obese, no tenderness; no rebound or guarding; bowel sounds are hyperactive  MS: no gross deformities noted; normal muscle tone.  EXT: No calf tenderness or pitting edema  SKIN: no worrisome rash  NEURO: no facial droop; no focal deficits, speech is normal  PSYCH: normal mood and affect      Available Lab/Imaging Results     Results for orders placed or performed during the hospital encounter of 07/12/22 (from the past 24 hour(s))   CBC with platelets differential    Narrative    The following orders were created for panel order CBC with platelets differential.  Procedure                                Abnormality         Status                     ---------                               -----------         ------                     CBC with platelets and d...[[id number]]  Abnormal            Final result                 Please view results for these tests on the individual orders.   Comprehensive metabolic panel   Result Value Ref Range    Sodium 139 133 - 144 mmol/L    Potassium 4.9 3.4 - 5.3 mmol/L    Chloride 107 94 - 109 mmol/L    Carbon Dioxide (CO2) 27 20 - 32 mmol/L    Anion Gap 5 3 - 14 mmol/L    Urea Nitrogen 32 (H) 7 - 30 mg/dL    Creatinine 0.98 0.52 - 1.04 mg/dL    Calcium 9.8 8.5 - 10.1 mg/dL    Glucose 133 (H) 70 - 99 mg/dL    Alkaline Phosphatase 51 40 - 150 U/L    AST 26 0 - 45 U/L    ALT 26 0 - 50 U/L    Protein Total 7.3 6.8 - 8.8 g/dL    Albumin 3.1 (L) 3.4 - 5.0 g/dL    Bilirubin Total 0.2 0.2 - 1.3 mg/dL    GFR Estimate 63 >60 mL/min/1.73m2   Troponin I   Result Value Ref Range    Troponin I High Sensitivity 24 <54 ng/L   CBC with platelets and differential   Result Value Ref Range    WBC Count 7.9 4.0 - 11.0 10e3/uL    RBC Count 3.39 (L) 3.80 - 5.20 10e6/uL    Hemoglobin 10.4 (L) 11.7 - 15.7 g/dL    Hematocrit 32.8 (L) 35.0 - 47.0 %    MCV 97 78 - 100 fL    MCH 30.7 26.5 - 33.0 pg    MCHC 31.7 31.5 - 36.5 g/dL    RDW 14.3 10.0 - 15.0 %    Platelet Count 141 (L) 150 - 450 10e3/uL    % Neutrophils 58 %    % Lymphocytes 32 %    % Monocytes 7 %    % Eosinophils 2 %    % Basophils 0 %    % Immature Granulocytes 1 %    NRBCs per 100 WBC 0 <1 /100    Absolute Neutrophils 4.6 1.6 - 8.3 10e3/uL    Absolute Lymphocytes 2.5 0.8 - 5.3 10e3/uL    Absolute Monocytes 0.5 0.0 - 1.3 10e3/uL    Absolute Eosinophils 0.2 0.0 - 0.7 10e3/uL    Absolute Basophils 0.0 0.0 - 0.2 10e3/uL    Absolute Immature Granulocytes 0.1 <=0.4 10e3/uL    Absolute NRBCs 0.0 10e3/uL   XR Chest Port 1 View    Narrative    EXAM: XR CHEST PORT 1 VIEW  LOCATION: [facility]  DATE/TIME: 7/12/2022 1:50  AM    INDICATION: Chest pain  COMPARISON: 06/01/2022, 05/10/2022      Impression    IMPRESSION: Sternotomy. Left-sided AICD. Normal heart size and pulmonary vascularity. Lungs clear. Minimal linear scarring. Tortuous calcified thoracic aorta. Degenerative changes in the spine and shoulders.     EKG reviewed by me: Electronically paced rhythm, rate of 72, AK interval of 112 ms, QT interval of 470 ms, QRS duration of 210 ms.  Electronically paced rhythm             Impression     Final diagnoses:   Chest pain         ED Course & Medical Decision Making   Letty Escobar is a 66 year old female resident at [facility] who presented to the emergency department with acute recurrent chest pain that started tonight around 11 PM.  This was 1 to 2 hours after she went to bed.  She reports that this pain is persisted and has some radiation up into the neck.  She presented with similar complaints back in May and June.  She had a follow-up visit with her cardiologist on May 11.  She had interrogation of her ICD on May 17.  Patient is status post coronary artery bypass in 2015.  She has not had any exertional chest pain or dyspnea.  Patient arrived by EMS, requesting hospitalization.  Vital signs reveal a blood pressure of 164/84, temperature is 97.6, heart rate of 81, respiratory rate of 18 with 97% oxygen saturation.  Patient had some reproducible left-sided chest wall tenderness.  The rest of her exam was unremarkable.  She has a well-healed sternotomy scar over the anterior chest.  She has no epigastric or abdominal tenderness.  She has no significant pedal edema or calf swelling/tenderness.  EKG revealed no acute ischemic changes.  It does appear to be electronically paced.  Her work-up reveals a white blood count of 7.9, hemoglobin is stable at 10.4, platelet count of 141 with normal differential.  Comprehensive metabolic panel was normal except for nonfasting glucose of 133.  BUN is 32, and creatinine is  0.98.  Troponin is 24, consistent with her previous troponin enzyme levels.  Chest x-ray reveals normal heart size and pulmonary vascularity.  I reviewed the x-rays personally.  Patient was informed that her work-up again does not reveal any signs of heart attack.  Her symptoms are occurring typically at nighttime.  She does not have any exertional chest pain or dyspnea.  With her third ER visit in the last 3 months without any significant findings, it is unlikely that she is having symptomatic coronary disease.  Patient will return to the nursing home, and follow-up with her primary care provider in the next 2 to 3 days if symptoms persist.  Begin a trial of Pepcid 20 mg at bedtime.  We will arrange for EMS transport back to the nursing home.        Written after-visit summary and instructions were given at the time of discharge.    Follow up Plan:   Jay Alvarado, EFRAÍN  [address] [id number] [phone]    In 3 days  if not improving    [facility] Emergency Dept  [address] [place] [address] [phone]    If symptoms worsen      Discharge Instructions:   Your blood work and chest x-ray did not reveal any worrisome findings.  Your chest pain is not likely related to blockages of your coronary arteries.  Your chest pains are likely due to some other cause such as gastroesophageal reflux disease, or chest wall pain.  Consider taking Pepcid 20 mg at bedtime for the next month.  Follow-up with your primary care provider in 3 days if not improving.       Disclaimer: This note consists of words and symbols derived from keyboarding and dictation using voice recognition software.  As a result, there may be errors that have gone undetected.  Please consider this when interpreting information found in this note.       Monie Weiss MD  07/12/22 [id number]

## 2022-07-12 NOTE — DISCHARGE INSTRUCTIONS
Your blood work and chest x-ray did not reveal any worrisome findings.  Your chest pain is not likely related to blockages of your coronary arteries.  Your chest pains are likely due to some other cause such as gastroesophageal reflux disease, or chest wall pain.  Consider taking Pepcid 20 mg at bedtime for the next month.  Follow-up with your primary care provider in 3 days if not improving.

## 2022-07-14 VITALS
OXYGEN SATURATION: 95 % | HEART RATE: 64 BPM | TEMPERATURE: 97.4 F | SYSTOLIC BLOOD PRESSURE: 119 MMHG | RESPIRATION RATE: 18 BRPM | DIASTOLIC BLOOD PRESSURE: 69 MMHG | WEIGHT: 199.2 LBS | BODY MASS INDEX: 36.43 KG/M2

## 2022-07-15 ENCOUNTER — NURSING HOME VISIT (OUTPATIENT)
Dept: GERIATRICS | Facility: CLINIC | Age: 67
End: 2022-07-15
Payer: COMMERCIAL

## 2022-07-15 DIAGNOSIS — F60.9 PERSONALITY DISORDER (H): ICD-10-CM

## 2022-07-15 DIAGNOSIS — Z95.1 S/P CABG X 5: ICD-10-CM

## 2022-07-15 DIAGNOSIS — I10 ESSENTIAL HYPERTENSION: ICD-10-CM

## 2022-07-15 DIAGNOSIS — D63.8 ANEMIA OF CHRONIC DISEASE: ICD-10-CM

## 2022-07-15 DIAGNOSIS — J44.9 CHRONIC OBSTRUCTIVE PULMONARY DISEASE, UNSPECIFIED COPD TYPE (H): ICD-10-CM

## 2022-07-15 DIAGNOSIS — I50.22 CHRONIC SYSTOLIC CONGESTIVE HEART FAILURE (H): ICD-10-CM

## 2022-07-15 DIAGNOSIS — I25.709 ATHEROSCLEROSIS OF CORONARY ARTERY BYPASS GRAFT OF NATIVE HEART WITH ANGINA PECTORIS (H): Primary | ICD-10-CM

## 2022-07-15 PROCEDURE — 99309 SBSQ NF CARE MODERATE MDM 30: CPT | Performed by: NURSE PRACTITIONER

## 2022-07-15 NOTE — PROGRESS NOTES
[facility] GERIATRICS    Chief Complaint   Patient presents with     RECHECK     HPI:  Letty Escobar is a 66 year old  (1955), who is being seen today for an episodic care visit at: [facility]HAB [facility] () [[id number]].     This is a 67 yo with PMHx of Htn, CAD, PAD, CVA, personality disorder, panic attacks per presented again to the ED with C/O CP. Workup negative, suggested source might be GI, advised to use H2B at .  This was ordered by the PCP recently.    Today's concern is:   F/u per ED visit    Allergies, and PMH/PSH reviewed in [employer] today.  REVIEW OF SYSTEMS:  4 point ROS including Respiratory, CV, GI and , other than that noted in the HPI,  is negative    Post Discharge Medication Reconciliation Status: discharge medications reconciled, continue medications without change.      Objective:   /69   Pulse 64   Temp 97.4  F (36.3  C)   Resp 18   Wt 90.4 kg (199 lb 3.2 oz)   SpO2 95%   BMI 36.43 kg/m    GENERAL APPEARANCE:  Alert, cooperative, denies pain  RESP:  no respiratory distress, diminished breath sounds bilaterally, RA  CV:  regular rate and rhythm, no murmur, rub, or gallop, no edema  PSYCH:  oriented to 2      Most Recent 3 CBC's:  Recent Labs   Lab Test 07/12/22  0146 06/01/22  0743 05/25/22  0740 05/10/22  0342   WBC 7.9 7.6  --  8.1   HGB 10.4* 10.6*  --  10.0*   MCV 97 95  --  102*   * 136* 135* 83*     Most Recent 3 BMP's:  Recent Labs   Lab Test 07/12/22  0146 06/01/22  0742 05/10/22  0342    141 142   POTASSIUM 4.9 4.4 4.5   CHLORIDE 107 109 113*   CO2 27 25 22   BUN 32* 23 31*   CR 0.98 0.97 1.01   ANIONGAP 5 7 7   ORESTES 9.8 9.3 9.5   * 132* 132*       Assessment/Plan:    (I25.709) Atherosclerosis of coronary artery bypass graft of native heart with angina pectoris (H)   (Z95.1) S/P CABG x 5  HLD  Apparently long hx of CP, workup always negative. Recently started on H2B (famotidine 20mg) x 30d at HS per cardiology rec.  Effective  per report. Continue statin and ASA 81 mg daily    (J44.9) Chronic obstructive pulmonary disease, unspecified COPD type (H)  Continue breo 200/25mcg 1 puff daily, stable, RA    (I50.22) Chronic systolic congestive heart failure (H)  (I10) Essential hypertension  Continue lasix 20mg daily, metoprolol succinate 25mg daily change in wts per report. -130s, HR <70    Pain control  Continue tylenol 1000mg TID and 650mg q6h PRN, max 3650mg daily. Denies pain    (F60.9) Personality disorder (H)  Continue depakote 250mg daily, duloxetine 40mg daily and remeron 30mg at HS and quetiapine 37.5mg TID. Stable, no behavior issues    Renal fx  Last Cr 0.98 with GFR >60 on 7/12/22    Hypokalemia per diuretic  Continue potassium 10mEq daily, last K 4.9 on 7/12/22    (D63.8) Anemia of chronic disease  Last Hgb 10.4 on 7/12/22    Insomnia  Continue melatonin 10mg at HS, stable    Orders:  NA    Electronically signed by: Jay Alvarado NP

## 2022-07-15 NOTE — LETTER
7/15/2022        RE: Letty Escobar  [facility]  [address] [facility] [address]        [facility] GERIATRICS    Chief Complaint   Patient presents with     RECHECK     HPI:  Letty Escobar is a 66 year old  (1955), who is being seen today for an episodic care visit at: [facility] () [[id number]].     This is a 65 yo with PMHx of Htn, CAD, PAD, CVA, personality disorder, panic attacks per presented again to the ED with C/O CP. Workup negative, suggested source might be GI, advised to use H2B at HS.  This was ordered by the PCP recently.    Today's concern is:   F/u per ED visit    Allergies, and PMH/PSH reviewed in [facility] today.  REVIEW OF SYSTEMS:  4 point ROS including Respiratory, CV, GI and , other than that noted in the HPI,  is negative    Post Discharge Medication Reconciliation Status: discharge medications reconciled, continue medications without change.      Objective:   /69   Pulse 64   Temp 97.4  F (36.3  C)   Resp 18   Wt 90.4 kg (199 lb 3.2 oz)   SpO2 95%   BMI 36.43 kg/m    GENERAL APPEARANCE:  Alert, cooperative, denies pain  RESP:  no respiratory distress, diminished breath sounds bilaterally, RA  CV:  regular rate and rhythm, no murmur, rub, or gallop, no edema  PSYCH:  oriented to 2      Most Recent 3 CBC's:  Recent Labs   Lab Test 07/12/22  0146 06/01/22  0743 05/25/22  0740 05/10/22  0342   WBC 7.9 7.6  --  8.1   HGB 10.4* 10.6*  --  10.0*   MCV 97 95  --  102*   * 136* 135* 83*     Most Recent 3 BMP's:  Recent Labs   Lab Test 07/12/22  0146 06/01/22  0742 05/10/22  0342    141 142   POTASSIUM 4.9 4.4 4.5   CHLORIDE 107 109 113*   CO2 27 25 22   BUN 32* 23 31*   CR 0.98 0.97 1.01   ANIONGAP 5 7 7   ORESTES 9.8 9.3 9.5   * 132* 132*       Assessment/Plan:    (I25.709) Atherosclerosis of coronary artery bypass graft of native heart with angina pectoris (H)   (Z95.1) S/P CABG x 5  HLD  Apparently long hx of  CP, workup always negative. Recently started on H2B (famotidine 20mg) x 30d at HS per cardiology rec.  Effective per report. Continue statin and ASA 81 mg daily    (J44.9) Chronic obstructive pulmonary disease, unspecified COPD type (H)  Continue breo 200/25mcg 1 puff daily, stable, RA    (I50.22) Chronic systolic congestive heart failure (H)  (I10) Essential hypertension  Continue lasix 20mg daily, metoprolol succinate 25mg daily change in wts per report. -130s, HR <70    Pain control  Continue tylenol 1000mg TID and 650mg q6h PRN, max 3650mg daily. Denies pain    (F60.9) Personality disorder (H)  Continue depakote 250mg daily, duloxetine 40mg daily and remeron 30mg at HS and quetiapine 37.5mg TID. Stable, no behavior issues    Renal fx  Last Cr 0.98 with GFR >60 on 7/12/22    Hypokalemia per diuretic  Continue potassium 10mEq daily, last K 4.9 on 7/12/22    (D63.8) Anemia of chronic disease  Last Hgb 10.4 on 7/12/22    Insomnia  Continue melatonin 10mg at HS, stable    Orders:  NA    Electronically signed by: Jay Alvarado NP             Sincerely,        Jay Alvarado NP

## 2022-08-02 ENCOUNTER — NURSING HOME VISIT (OUTPATIENT)
Dept: GERIATRICS | Facility: CLINIC | Age: 67
End: 2022-08-02
Payer: COMMERCIAL

## 2022-08-02 VITALS
OXYGEN SATURATION: 97 % | HEART RATE: 66 BPM | DIASTOLIC BLOOD PRESSURE: 63 MMHG | TEMPERATURE: 96.4 F | BODY MASS INDEX: 42.93 KG/M2 | SYSTOLIC BLOOD PRESSURE: 107 MMHG | HEIGHT: 57 IN | RESPIRATION RATE: 14 BRPM | WEIGHT: 199 LBS

## 2022-08-02 DIAGNOSIS — D63.8 ANEMIA OF CHRONIC DISEASE: ICD-10-CM

## 2022-08-02 DIAGNOSIS — I50.22 CHRONIC SYSTOLIC CONGESTIVE HEART FAILURE (H): ICD-10-CM

## 2022-08-02 DIAGNOSIS — I48.20 CHRONIC ATRIAL FIBRILLATION (H): ICD-10-CM

## 2022-08-02 DIAGNOSIS — J44.9 CHRONIC OBSTRUCTIVE PULMONARY DISEASE, UNSPECIFIED COPD TYPE (H): ICD-10-CM

## 2022-08-02 DIAGNOSIS — I10 ESSENTIAL HYPERTENSION: ICD-10-CM

## 2022-08-02 DIAGNOSIS — M35.3 POLYMYALGIA RHEUMATICA (H): ICD-10-CM

## 2022-08-02 DIAGNOSIS — F32.3 MAJOR DEPRESSIVE DISORDER, SINGLE EPISODE, SEVERE WITH PSYCHOTIC FEATURES (H): Primary | ICD-10-CM

## 2022-08-02 DIAGNOSIS — E66.01 MORBID OBESITY (H): ICD-10-CM

## 2022-08-02 DIAGNOSIS — K21.00 GASTROESOPHAGEAL REFLUX DISEASE WITH ESOPHAGITIS, UNSPECIFIED WHETHER HEMORRHAGE: ICD-10-CM

## 2022-08-02 DIAGNOSIS — Z95.0 CARDIAC PACEMAKER IN SITU: ICD-10-CM

## 2022-08-02 PROCEDURE — 99310 SBSQ NF CARE HIGH MDM 45: CPT | Performed by: FAMILY MEDICINE

## 2022-08-02 NOTE — PROGRESS NOTES
[place] GERIATRIC SERVICES  Chief Complaint   Patient presents with     [facility] Regulatory     [place] Medical Record Number:  [id number]  Place of Service where encounter took place:  [facility] () [[id number]]    HPI:    Letty Escobar  is 65 year old (1955), who is being seen today for a federally mandated E/M visit.  HPI information obtained from: facility staff, patient report and [facility] chart review.     Recent Updates:  ----------------------  - Went to ED for chest pain, ECG negative for AMI, pacemaker appears working, started on famotidine for query nocturnal GERD.       Today's concerns are:  -Resident seen and examined, chart reviewed and discussed with the nursing staff.   - CHF /a-fib: Denies CP or swelling in the legs, endorses keep the headside elevated.Denies palpitation.  Reports ICD stopped, but has pacemaker.   - COPD: Denies wheezing, cough or SOB  - Depression/ psych: denies fatigue, irritability, SI/HI, changes in appetite, or guilt.   - Obesity: not interested in loosing weight.   - Psych: RN reports no concern. Letty reports feeling good, appetite and sleeps are fine.   - DNP and RN have no concern today.   --------------------------------  - - Past Medical, social, family histories, medications, and allergies reviewed and updated  - Medications reviewed: in the chart and EHR.   - Case Management:   I have reviewed the care plan and MDS and do agree with the plan. Patient's desire to return to the community is not present.  Information reviewed:  Medications, vital signs, orders, and nursing notes.      MEDICATIONS:  Current Outpatient Medications   Medication Sig Dispense Refill     acetaminophen (TYLENOL) 325 MG tablet Take 650 mg by mouth every 6 hours as needed for mild pain       acetaminophen (TYLENOL) 500 MG tablet Take 2 tablets (1,000 mg) by mouth 3 times daily       albuterol (PROVENTIL) (2.5 MG/3ML) 0.083% neb solution Take 1 vial (2.5  mg) by nebulization 2 times daily as needed for shortness of breath / dyspnea or wheezing 90 mL      aspirin 81 MG EC tablet Take 81 mg by mouth daily       bisacodyl (DULCOLAX) 10 MG suppository Place 10 mg rectally daily as needed for constipation       cholecalciferol (VITAMIN D3) 10 mcg (400 units) TABS tablet Take 1,000 Units by mouth       cyanocobalamin (CYANOCOBALAMIN) 1000 MCG/ML injection Inject 1 mL into the muscle every 30 days       divalproex sodium delayed-release (DEPAKOTE) 250 MG DR tablet Take 250 mg by mouth daily       DULoxetine HCl 40 MG CPEP Take 40 mg by mouth daily       fluticasone-vilanterol (BREO ELLIPTA) 200-25 MCG/INH inhaler Inhale 1 puff into the lungs daily       furosemide (LASIX) 20 MG tablet Take 20 mg by mouth daily       gabapentin (NEURONTIN) 300 MG capsule Take 1 capsule (300 mg) by mouth At Bedtime       guaiFENesin (ROBITUSSIN) 100 MG/5ML liquid Take 20 mLs (400 mg) by mouth every 4 hours as needed for cough       levothyroxine (SYNTHROID/LEVOTHROID) 100 MCG tablet Take 100 mcg by mouth daily       Melatonin 10 MG TABS tablet Take 10 mg by mouth At Bedtime       metoprolol succinate ER (TOPROL-XL) 25 MG 24 hr tablet Take 12.5 mg by mouth daily       miconazole (MICATIN) 2 % AERP powder Apply topically as needed        mirtazapine (REMERON) 30 MG tablet Take 30 mg by mouth daily       polyethylene glycol (MIRALAX) 17 GM/Dose powder Take 17 g by mouth daily Every other day       potassium chloride ER (MICRO-K) 10 MEQ CR capsule Take 10 mEq by mouth daily        prazosin (MINIPRESS) 1 MG capsule Take 1 mg by mouth At Bedtime       QUEtiapine (SEROQUEL) 25 MG tablet Take 37.5 mg by mouth 3 times daily       rosuvastatin (CRESTOR) 10 MG tablet Take 10 mg by mouth At Bedtime       sennosides (SENOKOT) 8.6 MG tablet Take 2 tablets by mouth At Bedtime       Vitamin D3 (CHOLECALCIFEROL) 25 mcg (1000 units) tablet Take 1 tablet by mouth daily         ROS: 4 point ROS including  "Respiratory, CV, GI and , other than that noted in the HPI,  is negative    Vitals:  /63   Pulse 66   Temp (!) 96.4  F (35.8  C)   Resp 14   Ht 1.448 m (4' 9\")   Wt 90.3 kg (199 lb)   SpO2 97%   BMI 43.06 kg/m    Body mass index is 43.06 kg/m .  Exam:  GENERAL APPEARANCE:  in no distress, cooperative  RESP:  lungs clear to auscultation   CV:  S1S2 audible, regular HR, no murmur appreciated. No peripheral edema  ABDOMEN:  soft, NT/, obese, BS audible. no mass appreciated on palpation.   M/S:   no joint deformity noted on observation.   SKIN:  Pacemaker in place over left side.   NEURO:   No NFD appreciated on observation.   PSYCH:  affect and mood normal    Lab/Diagnostic data: Reviewed in the chart and EHR.        ASSESSMENT/PLAN  -----------------------------  # Chronic systolic CHF (H)  # Ischemic cardiomyopathy and Hx of S/P CABG x 5 (2015), and bi-vent ICD in place  # Essential hypertension  # Mixed hyperlipidemia  #  PAD, internal carotid artery stenosis, bilateral (H)  # hx of stroke with ischemic CVD  - hospitalized from 4/6/22-4/7/22 with atypical chest pain. Trop was normal. Pulmonary perfusion scan was low probability and no change when compared to 2017 scan, felt to have GERD, offered PPI but decline..    - Went to ED on 5/10 and 6/1 with atypical chest pain, work up negative. started on famotidine 10 mg daily prn (5/11).   - was seen by Cardiology on 5/11, and no changed was made:   - 5/17: s/p downgrade from ICD to pacemaker but implanted system includes DF-4 lad, hence ICD generator was placed but the tachy disabled.   - 7/112:  ED visit for chest pain. AMI ruled out. Started on famotidine 20 mg daily.   - today appears compensated, and no concern.   -  Followed by Cardiology.   - on Crestor. LDL 83 (may 2021).  Routine lab as per cardiology's discretion.   - on lasix and metoprolol. Continue for now.        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 routine labs  - on Remeron 30 mg.   - Seroquel GDR initiated, from 50 mg to  37.5 mg tid.  Stable.   - will further GDR Seroquel as follows.    25 mg in am, 37.5 mg at noon,and 37.5 mg HS x 14  days, then   25 mg in am, 25    mg at noon,and 37.5 mg HS x 14 days, then   25 mg in am, 25    Mg at noon, and 25    mg HS x 14 days, then assess.        COPD (H): at baseline. on CSI/LABA, continue meds.     Polymyalgia rheumatica (H)  Chronic pain disorder  Chronic bilateral low back pain without sciatica  Peripheral polyneuropathy  Frailty  - analgesia optimal. Continue current regimen  - Significant  Deficits requiring NH placement. Requiring extensive assistance from nursing. Up for meals only o/w spends the day resting in bed.       Obesity: Body mass index is 43.06 kg/m . (H)  to reduce calories intake, but she is not interested. Will continue provide educations      Normocytic anemia, query ACD: off iron. Hb 10.4 gm/dl and MC 97 (7/12/22). On B12 supplement. Routine level check.           Hypothyroidism:  TSH   Date Value Ref Range Status   05/16/2022 3.91 0.40 - 4.00 mU/L Final   12/04/2020 41.34 (H) 0.40 - 4.00 mU/L Final   On Levothyroxine. In frail Elderly adult, recommended TSH level is around 6 providing patient is asymptomatic and FT4 is wnl- preferably on the lower normal level.  - next time TSH is checked, also check FT4.       Irritable bowel syndrome with diarrhea  Gastroesophageal reflux disease, esophagitis presence not specified  - stable.     Order: See above, otherwise, continue the rest of the current POC.         Electronically signed by:  Paola Pastor MD

## 2022-08-02 NOTE — LETTER
8/2/2022        RE: Letty Escobar  [facility]  [address] [facility] [address]        [place] GERIATRIC SERVICES  Chief Complaint   Patient presents with     residential Regulatory     [place] Medical Record Number:  [id number]  Place of Service where encounter took place:  [facility]HAB [facility] () [[id number]]    HPI:    Letty Escobar  is 65 year old (1955), who is being seen today for a federally mandated E/M visit.  HPI information obtained from: facility staff, patient report and [facility] chart review.     Recent Updates:  ----------------------  - Went to ED for chest pain, ECG negative for AMI, pacemaker appears working, started on famotidine for query nocturnal GERD.       Today's concerns are:  -Resident seen and examined, chart reviewed and discussed with the nursing staff.   - CHF /a-fib: Denies CP or swelling in the legs, endorses keep the headside elevated.Denies palpitation.  Reports ICD stopped, but has pacemaker.   - COPD: Denies wheezing, cough or SOB  - Depression/ psych: denies fatigue, irritability, SI/HI, changes in appetite, or guilt.   - Obesity: not interested in loosing weight.   - Psych: RN reports no concern. Letty reports feeling good, appetite and sleeps are fine.   - DNP and RN have no concern today.   --------------------------------  - - Past Medical, social, family histories, medications, and allergies reviewed and updated  - Medications reviewed: in the chart and EHR.   - Case Management:   I have reviewed the care plan and MDS and do agree with the plan. Patient's desire to return to the community is not present.  Information reviewed:  Medications, vital signs, orders, and nursing notes.      MEDICATIONS:  Current Outpatient Medications   Medication Sig Dispense Refill     acetaminophen (TYLENOL) 325 MG tablet Take 650 mg by mouth every 6 hours as needed for mild pain       acetaminophen (TYLENOL) 500 MG tablet Take 2 tablets  (1,000 mg) by mouth 3 times daily       albuterol (PROVENTIL) (2.5 MG/3ML) 0.083% neb solution Take 1 vial (2.5 mg) by nebulization 2 times daily as needed for shortness of breath / dyspnea or wheezing 90 mL      aspirin 81 MG EC tablet Take 81 mg by mouth daily       bisacodyl (DULCOLAX) 10 MG suppository Place 10 mg rectally daily as needed for constipation       cholecalciferol (VITAMIN D3) 10 mcg (400 units) TABS tablet Take 1,000 Units by mouth       cyanocobalamin (CYANOCOBALAMIN) 1000 MCG/ML injection Inject 1 mL into the muscle every 30 days       divalproex sodium delayed-release (DEPAKOTE) 250 MG DR tablet Take 250 mg by mouth daily       DULoxetine HCl 40 MG CPEP Take 40 mg by mouth daily       fluticasone-vilanterol (BREO ELLIPTA) 200-25 MCG/INH inhaler Inhale 1 puff into the lungs daily       furosemide (LASIX) 20 MG tablet Take 20 mg by mouth daily       gabapentin (NEURONTIN) 300 MG capsule Take 1 capsule (300 mg) by mouth At Bedtime       guaiFENesin (ROBITUSSIN) 100 MG/5ML liquid Take 20 mLs (400 mg) by mouth every 4 hours as needed for cough       levothyroxine (SYNTHROID/LEVOTHROID) 100 MCG tablet Take 100 mcg by mouth daily       Melatonin 10 MG TABS tablet Take 10 mg by mouth At Bedtime       metoprolol succinate ER (TOPROL-XL) 25 MG 24 hr tablet Take 12.5 mg by mouth daily       miconazole (MICATIN) 2 % AERP powder Apply topically as needed        mirtazapine (REMERON) 30 MG tablet Take 30 mg by mouth daily       polyethylene glycol (MIRALAX) 17 GM/Dose powder Take 17 g by mouth daily Every other day       potassium chloride ER (MICRO-K) 10 MEQ CR capsule Take 10 mEq by mouth daily        prazosin (MINIPRESS) 1 MG capsule Take 1 mg by mouth At Bedtime       QUEtiapine (SEROQUEL) 25 MG tablet Take 37.5 mg by mouth 3 times daily       rosuvastatin (CRESTOR) 10 MG tablet Take 10 mg by mouth At Bedtime       sennosides (SENOKOT) 8.6 MG tablet Take 2 tablets by mouth At Bedtime       Vitamin D3  "(CHOLECALCIFEROL) 25 mcg (1000 units) tablet Take 1 tablet by mouth daily         ROS: 4 point ROS including Respiratory, CV, GI and , other than that noted in the HPI,  is negative    Vitals:  /63   Pulse 66   Temp (!) 96.4  F (35.8  C)   Resp 14   Ht 1.448 m (4' 9\")   Wt 90.3 kg (199 lb)   SpO2 97%   BMI 43.06 kg/m    Body mass index is 43.06 kg/m .  Exam:  GENERAL APPEARANCE:  in no distress, cooperative  RESP:  lungs clear to auscultation   CV:  S1S2 audible, regular HR, no murmur appreciated. No peripheral edema  ABDOMEN:  soft, NT/, obese, BS audible. no mass appreciated on palpation.   M/S:   no joint deformity noted on observation.   SKIN:  Pacemaker in place over left side.   NEURO:   No NFD appreciated on observation.   PSYCH:  affect and mood normal    Lab/Diagnostic data: Reviewed in the chart and EHR.        ASSESSMENT/PLAN  -----------------------------  # Chronic systolic CHF (H)  # Ischemic cardiomyopathy and Hx of S/P CABG x 5 (2015), and bi-vent ICD in place  # Essential hypertension  # Mixed hyperlipidemia  #  PAD, internal carotid artery stenosis, bilateral (H)  # hx of stroke with ischemic CVD  - hospitalized from 4/6/22-4/7/22 with atypical chest pain. Trop was normal. Pulmonary perfusion scan was low probability and no change when compared to 2017 scan, felt to have GERD, offered PPI but decline..    - Went to ED on 5/10 and 6/1 with atypical chest pain, work up negative. started on famotidine 10 mg daily prn (5/11).   - was seen by Cardiology on 5/11, and no changed was made:   - 5/17: s/p downgrade from ICD to pacemaker but implanted system includes DF-4 lad, hence ICD generator was placed but the tachy disabled.   - 7/112:  ED visit for chest pain. AMI ruled out. Started on famotidine 20 mg daily.   - today appears compensated, and no concern.   -  Followed by Cardiology.   - on Crestor. LDL 83 (may 2021).  Routine lab as per cardiology's discretion.   - on lasix and " metoprolol. Continue for now.        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 routine labs  - on Remeron 30 mg.   - Seroquel GDR initiated, from 50 mg to  37.5 mg tid.  Stable.   - will further GDR Seroquel as follows.    25 mg in am, 37.5 mg at noon,and 37.5 mg HS x 14  days, then   25 mg in am, 25    mg at noon,and 37.5 mg HS x 14 days, then   25 mg in am, 25    Mg at noon, and 25    mg HS x 14 days, then assess.        COPD (H): at baseline. on CSI/LABA, continue meds.     Polymyalgia rheumatica (H)  Chronic pain disorder  Chronic bilateral low back pain without sciatica  Peripheral polyneuropathy  Frailty  - analgesia optimal. Continue current regimen  - Significant  Deficits requiring NH placement. Requiring extensive assistance from nursing. Up for meals only o/w spends the day resting in bed.       Obesity: Body mass index is 43.06 kg/m . (H)  to reduce calories intake, but she is not interested. Will continue provide educations      Normocytic anemia, query ACD: off iron. Hb 10.4 gm/dl and MC 97 (7/12/22). On B12 supplement. Routine level check.           Hypothyroidism:  TSH   Date Value Ref Range Status   05/16/2022 3.91 0.40 - 4.00 mU/L Final   12/04/2020 41.34 (H) 0.40 - 4.00 mU/L Final   On Levothyroxine. In frail Elderly adult, recommended TSH level is around 6 providing patient is asymptomatic and FT4 is wnl- preferably on the lower normal level.  - next time TSH is checked, also check FT4.       Irritable bowel syndrome with diarrhea  Gastroesophageal reflux disease, esophagitis presence not specified  - stable.     Order: See above, otherwise, continue the rest  of the current POC.         Electronically signed by:  Paola Pastor MD           Sincerely,        Paola Pastor MD

## 2022-08-13 RX ORDER — QUETIAPINE FUMARATE 25 MG/1
TABLET, FILM COATED ORAL
Refills: 0
Start: 2022-08-13 | End: 2022-10-16

## 2022-08-18 ENCOUNTER — PATIENT OUTREACH (OUTPATIENT)
Dept: GERIATRIC MEDICINE | Facility: CLINIC | Age: 67
End: 2022-08-18

## 2022-10-01 ENCOUNTER — APPOINTMENT (OUTPATIENT)
Dept: GENERAL RADIOLOGY | Facility: CLINIC | Age: 67
End: 2022-10-01
Attending: FAMILY MEDICINE
Payer: COMMERCIAL

## 2022-10-01 ENCOUNTER — HOSPITAL ENCOUNTER (EMERGENCY)
Facility: CLINIC | Age: 67
Discharge: HOME | End: 2022-10-01
Attending: FAMILY MEDICINE | Admitting: FAMILY MEDICINE
Payer: COMMERCIAL

## 2022-10-01 VITALS
DIASTOLIC BLOOD PRESSURE: 50 MMHG | TEMPERATURE: 98.3 F | RESPIRATION RATE: 18 BRPM | BODY MASS INDEX: 45.44 KG/M2 | OXYGEN SATURATION: 95 % | WEIGHT: 210 LBS | HEART RATE: 61 BPM | SYSTOLIC BLOOD PRESSURE: 108 MMHG

## 2022-10-01 DIAGNOSIS — R07.9 CHEST PAIN, UNSPECIFIED TYPE: ICD-10-CM

## 2022-10-01 LAB
ALBUMIN SERPL-MCNC: 3 G/DL (ref 3.4–5)
ALP SERPL-CCNC: 53 U/L (ref 40–150)
ALT SERPL W P-5'-P-CCNC: 26 U/L (ref 0–50)
ANION GAP SERPL CALCULATED.3IONS-SCNC: 6 MMOL/L (ref 3–14)
AST SERPL W P-5'-P-CCNC: 17 U/L (ref 0–45)
BASOPHILS # BLD AUTO: 0 10E3/UL (ref 0–0.2)
BASOPHILS NFR BLD AUTO: 0 %
BILIRUB SERPL-MCNC: 0.1 MG/DL (ref 0.2–1.3)
BUN SERPL-MCNC: 30 MG/DL (ref 7–30)
CALCIUM SERPL-MCNC: 9.2 MG/DL (ref 8.5–10.1)
CHLORIDE BLD-SCNC: 111 MMOL/L (ref 94–109)
CO2 SERPL-SCNC: 26 MMOL/L (ref 20–32)
CREAT SERPL-MCNC: 1.17 MG/DL (ref 0.52–1.04)
EOSINOPHIL # BLD AUTO: 0.2 10E3/UL (ref 0–0.7)
EOSINOPHIL NFR BLD AUTO: 3 %
ERYTHROCYTE [DISTWIDTH] IN BLOOD BY AUTOMATED COUNT: 14.4 % (ref 10–15)
GFR SERPL CREATININE-BSD FRML MDRD: 51 ML/MIN/1.73M2
GLUCOSE BLD-MCNC: 178 MG/DL (ref 70–99)
HCT VFR BLD AUTO: 28.3 % (ref 35–47)
HGB BLD-MCNC: 9.2 G/DL (ref 11.7–15.7)
IMM GRANULOCYTES # BLD: 0.1 10E3/UL
IMM GRANULOCYTES NFR BLD: 1 %
LYMPHOCYTES # BLD AUTO: 2.2 10E3/UL (ref 0.8–5.3)
LYMPHOCYTES NFR BLD AUTO: 30 %
MCH RBC QN AUTO: 31.3 PG (ref 26.5–33)
MCHC RBC AUTO-ENTMCNC: 32.5 G/DL (ref 31.5–36.5)
MCV RBC AUTO: 96 FL (ref 78–100)
MONOCYTES # BLD AUTO: 0.5 10E3/UL (ref 0–1.3)
MONOCYTES NFR BLD AUTO: 7 %
NEUTROPHILS # BLD AUTO: 4.3 10E3/UL (ref 1.6–8.3)
NEUTROPHILS NFR BLD AUTO: 59 %
NRBC # BLD AUTO: 0 10E3/UL
NRBC BLD AUTO-RTO: 0 /100
PLATELET # BLD AUTO: 134 10E3/UL (ref 150–450)
POTASSIUM BLD-SCNC: 4.2 MMOL/L (ref 3.4–5.3)
PROT SERPL-MCNC: 6.8 G/DL (ref 6.8–8.8)
RBC # BLD AUTO: 2.94 10E6/UL (ref 3.8–5.2)
SODIUM SERPL-SCNC: 143 MMOL/L (ref 133–144)
TROPONIN I SERPL HS-MCNC: 22 NG/L
TROPONIN I SERPL HS-MCNC: 22 NG/L
WBC # BLD AUTO: 7.2 10E3/UL (ref 4–11)

## 2022-10-01 PROCEDURE — 250N000013 HC RX MED GY IP 250 OP 250 PS 637: Performed by: FAMILY MEDICINE

## 2022-10-01 PROCEDURE — 71045 X-RAY EXAM CHEST 1 VIEW: CPT

## 2022-10-01 PROCEDURE — 99284 EMERGENCY DEPT VISIT MOD MDM: CPT | Mod: 25 | Performed by: FAMILY MEDICINE

## 2022-10-01 PROCEDURE — 82040 ASSAY OF SERUM ALBUMIN: CPT | Performed by: FAMILY MEDICINE

## 2022-10-01 PROCEDURE — 84484 ASSAY OF TROPONIN QUANT: CPT | Performed by: FAMILY MEDICINE

## 2022-10-01 PROCEDURE — 250N000009 HC RX 250: Performed by: FAMILY MEDICINE

## 2022-10-01 PROCEDURE — 99284 EMERGENCY DEPT VISIT MOD MDM: CPT | Performed by: FAMILY MEDICINE

## 2022-10-01 PROCEDURE — 36415 COLL VENOUS BLD VENIPUNCTURE: CPT | Performed by: FAMILY MEDICINE

## 2022-10-01 PROCEDURE — 85025 COMPLETE CBC W/AUTO DIFF WBC: CPT | Performed by: FAMILY MEDICINE

## 2022-10-01 PROCEDURE — 80053 COMPREHEN METABOLIC PANEL: CPT | Performed by: FAMILY MEDICINE

## 2022-10-01 RX ORDER — HYDROCODONE BITARTRATE AND ACETAMINOPHEN 5; 325 MG/1; MG/1
1 TABLET ORAL ONCE
Status: COMPLETED | OUTPATIENT
Start: 2022-10-01 | End: 2022-10-01

## 2022-10-01 RX ADMIN — HYDROCODONE BITARTRATE AND ACETAMINOPHEN 1 TABLET: 5; 325 TABLET ORAL at 02:26

## 2022-10-01 RX ADMIN — LIDOCAINE HYDROCHLORIDE 30 ML: 20 SOLUTION ORAL; TOPICAL at 01:21

## 2022-10-01 ASSESSMENT — ACTIVITIES OF DAILY LIVING (ADL)
ADLS_ACUITY_SCORE: 35

## 2022-10-01 NOTE — ED TRIAGE NOTES
C/o chest pain worsening tonight 'woke pt up from sleeping' - hx anxiety and similar episodes. EMS administered ASA and nitrospray x1 with no improvement on chest pain 8/10.    Triage Assessment     Row Name 10/01/22 0056       Triage Assessment (Adult)    Airway WDL WDL       Respiratory WDL    Respiratory WDL WDL       Cardiac WDL    Cardiac WDL X;chest pain

## 2022-10-01 NOTE — DISCHARGE INSTRUCTIONS
It does not appear that your chest pain is a result of a heart attack.  Your 2 troponin enzymes, taken 4 hours apart reveal no damage to your heart.  Follow-up with your primary care provider within the next 3 days if you have any ongoing chest pain.  Return to the emergency department if your symptoms worsen.

## 2022-10-01 NOTE — ED PROVIDER NOTES
She                                                            [facility] ED Provider Note   Patient: Letty Escobar  MRN #:  [id number]  Date of Visit: October 1, 2022    CC:     Chief Complaint   Patient presents with     Chest Pain     HPI:  Letty Escobar is a 66 year old female resident at the [facility] who presented to the emergency department by EMS with acute onset of chest pain/left arm pain, radiating into the jaw.  Symptoms started at about 11:50 PM.  Patient states that she was awakened by the pain, and feels short of breath and nauseated.  She has a distant history of coronary artery stenting for unstable angina.  Patient rates her pain level 7 out of 10.  There has been no exacerbating or alleviating factors.  Patient has a history of chronic atrial fibrillation, COPD, hypertension, status post CABG in 2015, with electronic pacemaker.  Patient has not had any recent calf pain or ankle swelling.  She has not had any recent illness or injury.  Patient was given aspirin and nitroglycerin spray by EMS without any improvement in her pain.  Patient was previously hospitalized in April 2022 for chest pain.  Her serial troponin enzymes were negative had a VQ scan to look for pulmonary embolism and that was negative.  She has had 3 additional ED visits for chest pains occurring in May, June and July with no findings for heart attack.    Problem List:  Patient Active Problem List    Diagnosis Date Noted     Atherosclerosis of coronary artery bypass graft of native heart with angina pectoris (H) 02/13/2022     Priority: Medium     Morbid obesity (H) 12/10/2021     Priority: Medium     Chronic atrial fibrillation (H) 04/06/2021     Priority: Medium     Cardiac pacemaker in situ 04/06/2021     Priority: Medium     Major depressive disorder, recurrent severe without psychotic features (H) 01/26/2021     Priority: Medium     Chronic obstructive pulmonary disease, unspecified COPD type (H)  06/24/2020     Priority: Medium     Panic disorder with agoraphobia 04/14/2020     Priority: Medium     Constipation 03/20/2020     Priority: Medium     Personality disorder (H) 03/05/2020     Priority: Medium     Rule out dependent personality       Candidiasis 03/01/2020     Priority: Medium     Posttraumatic stress disorder 03/01/2020     Priority: Medium     Chronic systolic congestive heart failure (H) 01/28/2020     Priority: Medium     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, left leg (H) 01/15/2020     Priority: Medium     Polymyalgia rheumatica (H) 11/28/2018     Priority: Medium     Vitamin B12 deficiency 02/14/2018     Priority: Medium     Chronic bilateral low back pain without sciatica 01/17/2018     Priority: Medium     Chronic pain disorder 10/01/2017     Priority: Medium     Urinary incontinence, mixed 09/24/2017     Priority: Medium     Carotid stenosis, right 07/13/2016     Priority: Medium     Carotid US 05/04/2018 showed moderate plaque formation, consistent with 50 to 69% stenosis in the right internal carotid artery, not significantly changed from 8/5/2015.  Moderate plaque formation, consistent with 50 to 69% stenosis in the left internal carotid artery; there has been mild progression of the left ICA stenosis since 8/5/2015.       Essential hypertension 10/14/2015     Priority: Medium     S/P CABG x 5 08/21/2015     Priority: Medium     Anemia of chronic disease 01/05/2013     Priority: Medium     Hypothyroidism 12/10/2010     Priority: Medium     ACP (advance care planning) 11/03/2010     Priority: Medium     Formatting of this note might be different from the original.  Patient has identified Health Care Agent(s): Yes  Add Health Care Agents: Yes    Health Care Agent(s):    Primary Health Care Agent:     Edwar Escobar Relationship:    Spouse Phone:     [phone]    Secondary Health Care Agent:     Chiki Oro Relationship:     Son Phone:  "    [phone]      Patient has Advance Care Plan Documents (Health Care Directive, POLST): Yes    Advance Care Plan Documents:  Health Care Directive--03/28/11  Resuscitation Guidelines--    Patient has identified Specific Treatment Preferences: Yes   Specific Treatment Preferences:   a.) Code Status:  DNR/ Do Not Attempt Resuscitation - Allow a Natural Death    b.) Goals of Treatment:     ii. Limited Interventions and treat reversible conditions.  Provide interventions aimed at treatment of new or reversible illness/injury or non-life threatening chronic conditions. Duration of invasive or uncomfortable interventions should generally be limited.- Trial of intubation 5 days or other instructions \"If no improvement, stop.\"  c.) Interventions and Treatments:   i.   Antibiotics:          - Aggressive antibiotics  ii.  Nutrition/Hydration:         - Offer food and liquids by mouth         - IV fluid administration         - No feeding tube under any circumstance.  iii. Transfusion:          - Blood products for comfort/relief of symptoms only  iv. Dialysis:           - Dialysis for short term \"for recovery, but not long term.\"        Last Assessment & Plan:   Advance Care Planning: Disease-specific Session    Letty Escobar is an [facility] patient of Dr. Renea Mendez of the .    Advance care planning discussions were completed with Letty and her healthcare agent, spouse Edwar Escobar on Monday, March 28, 2011 at the  [facility].    Understanding of Illness and Disease [place]:   Letty identifies her medical condition as diabetes with peripheral neuropathy, heart problems with a heart attack February 2009 plus 4 stent placements; sleep apnea; depression; hypothyroid; high cholesterol; tobacco use; and describes it as needing further assistance with thyroid and diabetes management.  She identifies the following symptoms of her medical condition as being the most " "bothersome: some memory loss, balance problems, light headedness and dizziness, puffy eyes and lower extremity edema from time to time.    Let[place] is retired [place] .  She is independent with all cares and lives an active life style although, \"I'm not as active as I used to be.\"    Goals of Care:   Letty currently hopes to maintain independence, control pain and symptoms and delay progression of, but not cure, the illness.  \"I want to get the diabetes and thyroid under better control.\"  Letty has an appointment the first part of April for follow up.    Quality of Life:    Letty identifies quality of life as family involvement twice weekly, Sikh activities, newly assigned  , playing cards, the computer,    Letty christiano with serious challenges in her life through her ganesh in God, prayers and support of her Sikh family, spouse and son.    Letty identifies the following fears and worries about her medical care:  \"I just want the diabetes straightened out.\"    Treatment and Care Preferences:   Past experiences in dealing with family and/or friends that have  or been seriously ill include her brother who took his own life.  \"He was 53 years old and living with us.  I found him.\"   As a result of these experiences, Letty expresses these health care preferences:      Summary Letty's Treatment Preferences:  LOW SURVIVAL; HIGH TREATMENT BURDEN:  If Letty suffered a serious complication, such that she was facing a prolonged hospital stay, required ongoing medical interventions, and the chance of living through the complication was low (for example, only 5 out of 100 would live), Letty would choose:  to focus treatment on comfort and quality of life (\"Quality of life is more important than length of life to Letty.\")  COMMENT:  \"If I can't live a normal life, I wouldn't want to live.\"    HIGH SURVIVAL; LOW FUNCTIONAL STATUS:  If Letty had " "a serious complication and had a good chance of living through the complication but it was expected that she would never be able to walk or talk again and would require 24 hour nursing care, she would choose:  to focus treatment on comfort and quality of life (\"Quality of life is more important than length of life to Letty.\")  COMMENT:  \"I'd accept rehabilitation.\"    HIGH SURVIVAL; LOW COGNITIVE STATUS:  If Letty had a serious complication and had a good chance of living through the complication but it was expected that she would never know who she was or who she was with and would require 24 hour nursing care, she would choose:  to focus treatment on comfort and quality of life (\"Quality of life is more important than length of life to Letty.\")    CARDIO-PULMONARY RESUSCITATION (CPR):  The facts, risks and benefits of CPR were discussed with Letty.  If she had a sudden event that caused her heart and breathing to stop, she:  WOULD NOT want CPR attempted and instead would prefer that a natural death occur.    MECHANICAL VENTILATION: If Letty had an episode where she was unable to breathe on her own, she would choose the following:  attempt to use any appropriate non-invasive method to assist breathing, and use mechanical ventilation.  COMMENT:  \"If reversible ventilator is acceptable for 5 days.  If no improvement, stop.\"     Letty has chosen her healthcare agent to:  strictly follow her wishes.    Follow Up Plan:   Letty was encouraged to continue advance care planning discussions with her health care agents and primary care provider.   Letty and her health care agent declined handouts.     Documents addressed during this advance care planning session:  1.  Health Care Agents identified.          .  Primary health care agent is spouse, Edwar Escobar;          .  Secondary health care agent is son, Jermaine Oro.  2.  Health Care Directive completed and scanned into medical record.  3.  Statement of Treatment " Preferences for advanced illness completed and scanned into the medical record.  4.  Resuscitation Guidelines completed for DNR.  Document sent to Dr. Renea Mendez for signature and returned to the home. Letty, please place on the refrigerator.    Recommendations/Plan:   Letty and her health care agent to review Advance Care Plan.     Letty would benefit from:   Care Navigation/Care Navigation Help Desk--explained services for pending future needs.  No identified needs today.  Care Navigation brochure was given to Letty and her healthcare agent.    Advance Care Planning recommendations and Letty's concerns and questions were cc ed to her primary provider.     Thank you, Letty for the opportunity of assisting with Advance Care Planning. It was a seeing you again and meeting Edwar.  Please contact me if I can be of further assistance.    Interviewer: Pat Martin RN    Advance Care Planning Facilitator  [phone]   3/28/2011       ELI (obstructive sleep apnea) 01/31/2010     Priority: Medium     PSG on April/2008 that showed moderate Obstructive Sleep Apnea with an AHI of 23.5 . Limb movements persisted at 29 movements/hour at optimal pressures, despite carbidopa use.       Hyperlipidemia with target low density lipoprotein (LDL) cholesterol less than 70 mg/dL 05/30/2007     Priority: Medium       Past Medical History:   Diagnosis Date     ACP (advance care planning) 11/3/2010     Acute coronary syndrome (H) 11/22/2019     Acute exacerbation of CHF (congestive heart failure) (H) 11/11/2019     Acute on chronic systolic (congestive) heart failure (H) 1/28/2020     Anemia of chronic disease 1/5/2013     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, left leg (H) 1/15/2020     BPPV (benign paroxysmal positional vertigo) 5/31/2018     Candidiasis 3/1/2020     Carotid stenosis, right 7/13/2016     Chest pain 11/11/2019     Chronic bilateral low back pain without sciatica  1/17/2018     Chronic pain disorder 10/1/2017     Constipation 3/20/2020     COPD (chronic obstructive pulmonary disease) (H) 6/24/2020     Coronary atherosclerosis 2/6/2009     Cubital tunnel syndrome on left 11/13/2012     Depressive disorder      Diabetes mellitus (H)      Diabetes mellitus type 2 in obese (H) 7/13/2006     Diabetes mellitus type 2 in obese (H) 7/13/2006     Drug-seeking behavior 4/4/2020     Failure to thrive in adult 9/3/2020     Hereditary and idiopathic peripheral neuropathy 4/24/2007     Hyperlipidemia with target low density lipoprotein (LDL) cholesterol less than 70 mg/dL 5/30/2007     Hypertension 10/14/2015     Hypothyroidism 12/10/2010     Irritable bowel syndrome 9/7/2015     Ischemic cardiomyopathy 9/20/2015     Long-term use of high-risk medication 4/14/2020     Moniliasis, 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     Myocardial infarction (H)      Neuromuscular disorder (H)      Other specified postprocedural states 10/8/2015     Pain medication agreement 4/20/2013     Panic disorder with agoraphobia 4/14/2020     Paroxysmal atrial fibrillation (H) 10/2/2015     Personality disorder (H) 3/5/2020     Polymyalgia rheumatica (H) 11/28/2018     Posttraumatic stress disorder 3/1/2020     Restless legs syndrome (RLS) 8/29/2007     Restless legs syndrome (RLS) 8/29/2007     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     Serum calcium elevated 3/1/2020     Somatic dysfunction of sacral region 1/17/2018     Subacromial bursitis of left shoulder joint 8/6/2018     Suicidal ideation 4/1/2020     Thyroid disease      TIA (transient ischemic attack) 5/4/2018     TIA (transient ischemic attack) 5/4/2018     Tobacco abuse 2/17/2017     Tobacco abuse 2/17/2017     Urinary incontinence, mixed 9/24/2017     UTI (urinary tract infection) 4/17/2020     Vitamin B12 deficiency 2/14/2018     Weakness 12/17/2019       MEDS: acetaminophen (TYLENOL) 325 MG tablet  acetaminophen (TYLENOL)  500 MG tablet  albuterol (PROVENTIL) (2.5 MG/3ML) 0.083% neb solution  aspirin 81 MG EC tablet  bisacodyl (DULCOLAX) 10 MG suppository  cholecalciferol (VITAMIN D3) 10 mcg (400 units) TABS tablet  cyanocobalamin (CYANOCOBALAMIN) 1000 MCG/ML injection  divalproex sodium delayed-release (DEPAKOTE) 250 MG DR tablet  DULoxetine HCl 40 MG CPEP  fluticasone-vilanterol (BREO ELLIPTA) 200-25 MCG/INH inhaler  furosemide (LASIX) 20 MG tablet  gabapentin (NEURONTIN) 300 MG capsule  guaiFENesin (ROBITUSSIN) 100 MG/5ML liquid  levothyroxine (SYNTHROID/LEVOTHROID) 100 MCG tablet  Melatonin 10 MG TABS tablet  metoprolol succinate ER (TOPROL-XL) 25 MG 24 hr tablet  miconazole (MICATIN) 2 % AERP powder  mirtazapine (REMERON) 30 MG tablet  polyethylene glycol (MIRALAX) 17 GM/Dose powder  potassium chloride ER (MICRO-K) 10 MEQ CR capsule  prazosin (MINIPRESS) 1 MG capsule  QUEtiapine (SEROQUEL) 25 MG tablet  rosuvastatin (CRESTOR) 10 MG tablet  sennosides (SENOKOT) 8.6 MG tablet  Vitamin D3 (CHOLECALCIFEROL) 25 mcg (1000 units) tablet        ALLERGIES:    Allergies   Allergen Reactions     Diphenhydramine Palpitations     Tolerated IV Benadryl when not pushed too fast     Isosorbide Other (See Comments) and Dizziness     Also causes syncope (has fallen before) and brain fog/mental disturbances - please do not prescribe     Nitroglycerin Dizziness and Fatigue     Specifically the patch - please do not prescribe     Contrast Dye Hives and Itching     Adhesive Tape Rash     Diphenhydramine Hcl Palpitations     Liquid Adhesive Itching     Nystatin Dermatitis     Nystatin (Topical) Dermatitis     Also blisters     Penicillins Swelling and Rash     Occurred as a child - not 100% sure on specific reactions     Sulfa Drugs Other (See Comments)     Occurred as a child / patient does not remember specific reaction       Past Surgical History:   Procedure Laterality Date     CARDIAC SURGERY      stents x 11     CARDIAC SURGERY        CHOLECYSTECTOMY       CHOLECYSTECTOMY       GENITOURINARY SURGERY      Tubal ligation     OTHER SURGICAL HISTORY      Genitourinary surgery     RELEASE CARPAL TUNNEL       RELEASE CARPAL TUNNEL         Social History     Tobacco Use     Smoking status: Never Smoker     Smokeless tobacco: Never Used   Substance Use Topics     Alcohol use: Not Currently     Drug use: Never         Review of Systems   Except as noted in HPI, all other systems were reviewed and are negative    Physical Exam     Vitals were reviewed  Patient Vitals for the past 12 hrs:   BP Temp Temp src Pulse Resp SpO2 Weight   10/01/22 0330 108/50 -- -- 61 18 95 % --   10/01/22 0300 105/42 -- -- 62 10 93 % --   10/01/22 0230 124/53 -- -- 61 12 94 % --   10/01/22 0200 122/56 -- -- 64 14 94 % --   10/01/22 0130 127/60 -- -- 67 -- 96 % --   10/01/22 0118 -- 98.3  F (36.8  C) Oral -- -- -- --   10/01/22 0059 119/73 -- -- 75 18 94 % 95.3 kg (210 lb)     GENERAL APPEARANCE: Alert and oriented x3, no acute distress  FACE: normal facies  EYES: Pupils are equal  HENT: normal external exam  NECK: no adenopathy or asymmetry  RESP: normal respiratory effort; clear breath sounds bilaterally  CHEST: Mild bilateral chest tenderness  CV: regular rate and rhythm; no significant murmurs, gallops or rubs  ABD: soft, obese, epigastric tenderness; no rebound or guarding; bowel sounds are normal  MS: no gross deformities noted; normal muscle tone.  EXT: No calf tenderness or pitting edema  SKIN: no worrisome rash  NEURO: no facial droop; no focal deficits, speech is normal  PSYCH: normal mood and affect          Available Lab/Imaging Results     Results for orders placed or performed during the hospital encounter of 10/01/22 (from the past 24 hour(s))   CBC with platelets differential    Narrative    The following orders were created for panel order CBC with platelets differential.  Procedure                               Abnormality         Status                      ---------                               -----------         ------                     CBC with platelets and d...[[id number]]  Abnormal            Final result                 Please view results for these tests on the individual orders.   Comprehensive metabolic panel   Result Value Ref Range    Sodium 143 133 - 144 mmol/L    Potassium 4.2 3.4 - 5.3 mmol/L    Chloride 111 (H) 94 - 109 mmol/L    Carbon Dioxide (CO2) 26 20 - 32 mmol/L    Anion Gap 6 3 - 14 mmol/L    Urea Nitrogen 30 7 - 30 mg/dL    Creatinine 1.17 (H) 0.52 - 1.04 mg/dL    Calcium 9.2 8.5 - 10.1 mg/dL    Glucose 178 (H) 70 - 99 mg/dL    Alkaline Phosphatase 53 40 - 150 U/L    AST 17 0 - 45 U/L    ALT 26 0 - 50 U/L    Protein Total 6.8 6.8 - 8.8 g/dL    Albumin 3.0 (L) 3.4 - 5.0 g/dL    Bilirubin Total 0.1 (L) 0.2 - 1.3 mg/dL    GFR Estimate 51 (L) >60 mL/min/1.73m2   Troponin I   Result Value Ref Range    Troponin I High Sensitivity 22 <54 ng/L   CBC with platelets and differential   Result Value Ref Range    WBC Count 7.2 4.0 - 11.0 10e3/uL    RBC Count 2.94 (L) 3.80 - 5.20 10e6/uL    Hemoglobin 9.2 (L) 11.7 - 15.7 g/dL    Hematocrit 28.3 (L) 35.0 - 47.0 %    MCV 96 78 - 100 fL    MCH 31.3 26.5 - 33.0 pg    MCHC 32.5 31.5 - 36.5 g/dL    RDW 14.4 10.0 - 15.0 %    Platelet Count 134 (L) 150 - 450 10e3/uL    % Neutrophils 59 %    % Lymphocytes 30 %    % Monocytes 7 %    % Eosinophils 3 %    % Basophils 0 %    % Immature Granulocytes 1 %    NRBCs per 100 WBC 0 <1 /100    Absolute Neutrophils 4.3 1.6 - 8.3 10e3/uL    Absolute Lymphocytes 2.2 0.8 - 5.3 10e3/uL    Absolute Monocytes 0.5 0.0 - 1.3 10e3/uL    Absolute Eosinophils 0.2 0.0 - 0.7 10e3/uL    Absolute Basophils 0.0 0.0 - 0.2 10e3/uL    Absolute Immature Granulocytes 0.1 <=0.4 10e3/uL    Absolute NRBCs 0.0 10e3/uL   XR Chest Port 1 View    Narrative    EXAM: XR CHEST PORT 1 VIEW  LOCATION: [facility]  DATE/TIME: 10/1/2022 1:21 AM    INDICATION: Chest  pain.  COMPARISON: 7/12/2022      Impression    IMPRESSION: PO sternotomy. Left-sided cardiac pacer. Mild cardiac enlargement. Pulmonary vascularity within normal limits. Lungs are clear. There appear to be minimally displaced right seventh and eighth posterior rib fractures which did appear to be   present previously. Clinical correlation. Otherwise unremarkable.   Troponin I (now)   Result Value Ref Range    Troponin I High Sensitivity 22 <54 ng/L         EKG reviewed by me: Electronic ventricular pacemaker.  Insufficient data for analysis.  No definite ST elevation.           Impression     Final diagnoses:   Chest pain         ED Course & Medical Decision Making   Letty Escobar is a 66 year old female who presented to the emergency department by EMS with acute onset of chest pain that started before 11 PM Friday night.  Patient arrived in the emergency department approximately an hour and a half later.  Patient described left-sided chest and arm pain with radiation into the jaw.  She has a significant cardiac history including bypass and stenting.  She has had no evidence for myocardial infarction when she has had chest pains during the last 6 months.  Vital signs reveal a blood pressure of 119/73, heart rate of 75, respiratory rate of 18 with 94% oxygen saturation.  Patient had no improvement of her chest pain when paramedics administered nitroglycerin spray and aspirin.  She had some mild epigastric tenderness but not reproduced chest pain.  She did not have any improvement with a GI cocktail.  EKG reveals electronic ventricular pacemaker.  Initial troponin enzyme was 22, with blood sugar of 178, normal electrolytes, and creatinine of 1.17.  CBC reveals a white blood count of 7.2, hemoglobin of 9.2, platelet count of 134 with normal differential.  Patient's hemoglobin have been running between 8.8 and 10.6.  Chest x-ray revealed mild cardiac enlargement, left-sided cardiac pacemaker, postop sternotomy,  pulmonary vascularity within normal limits.  There appear to be minimally displaced right seventh and eighth posterior rib fractures which were not seen on previous studies.  Patient has no recent trauma as per nursing home staff.  She does not have any chest wall or rib tenderness.  Patient stayed for a second troponin enzyme which was done 5 hours after the onset of her pain.  Her second troponin enzyme was unchanged.  It is unlikely that the patient has a myocardial infarction.  Patient is safe to return to the nursing home.  Patient is stable for discharge home.        Written after-visit summary and instructions were given at the time of discharge.    Follow up Plan:   Jay Chapman, NP  [address] [address]  [phone]    In 3 days  if not improving    [facility] Emergency Dept  [address] [place] [address] [phone]    If symptoms worsen      Discharge Instructions:   It does not appear that your chest pain is a result of a heart attack.  Your 2 troponin enzymes, taken 4 hours apart reveal no damage to your heart.  Follow-up with your primary care provider within the next 3 days if you have any ongoing chest pain.  Return to the emergency department if your symptoms worsen.       Disclaimer: This note consists of words and symbols derived from keyboarding and dictation using voice recognition software.  As a result, there may be errors that have gone undetected.  Please consider this when interpreting information found in this note.       Monie Weiss MD  10/01/22 0436

## 2022-10-01 NOTE — ED NOTES
Bed: ED11  Expected date:   Expected time:   Means of arrival:   Comments:  [id number]  67 y/o F  Chest pain

## 2022-10-03 NOTE — PROGRESS NOTES
"  [facility]  Chief Complaint   Patient presents with     alf Regulatory     [place] Medical Record Number:  [id number]  Place of Service where encounter took place:  [facility] () [[id number]]    HPI:    Letty Escobar  is 65 year old (1955), who is being seen today for a federally mandated E/M visit.  HPI information obtained from: facility staff, patient report and [facility] chart review.     Recent Updates:  10/1: ED visit for chest pain with radiation to jaw and left arm, SOB and nausea.  Did not respond to NTG en route and to GI cocktail in ED. Trop x2 no changed. ECG no acute changes.  Sent back to the facility.          Today's concerns are:  -Resident seen and examined, chart reviewed and discussed with the nursing staff.   - CHF /a-fib: Denies CP ,SOB, orthopnea, PND, bendepnea or legs swelling. reports has a cardiologist at [facility].     - COPD:  No cough, wheezing, or SOB reported.    - Depression/ psych: reports mood, sleep and appetite are fine. Later when  was present in the room, pt added that she gets anxious and does not know how to control this.     - Headache: Patient called the Writer after the first encounter was completed, and noted  in the room. Pt reports started having migraine about three months ago. When asked if she ever mentioned this to the nursing staff or to providers, replied\"No\". Describes it as a pressure over her eyes socket, but denies blurry vision, or pain in the keys balls, endorses runny nose, but denies stuffy nose, pressure over the sinuses, postnasal drip, cough or wheezing.     - DNP and RN have no concern today.   --------------------------------  - - Past Medical, social, family histories, medications, and allergies reviewed and updated  - Medications reviewed: in the chart and EHR.   - Case Management:   I have reviewed the care plan and MDS and do agree with the plan. Patient's desire to " return to the community is not present.  Information reviewed:  Medications, vital signs, orders, and nursing notes.      MEDICATIONS:  Current Outpatient Medications   Medication Sig Dispense Refill     acetaminophen (TYLENOL) 325 MG tablet Take 650 mg by mouth every 6 hours as needed for mild pain       acetaminophen (TYLENOL) 500 MG tablet Take 2 tablets (1,000 mg) by mouth 3 times daily       albuterol (PROVENTIL) (2.5 MG/3ML) 0.083% neb solution Take 1 vial (2.5 mg) by nebulization 2 times daily as needed for shortness of breath / dyspnea or wheezing 90 mL      aspirin 81 MG EC tablet Take 81 mg by mouth daily       bisacodyl (DULCOLAX) 10 MG suppository Place 10 mg rectally daily as needed for constipation       cholecalciferol (VITAMIN D3) 10 mcg (400 units) TABS tablet Take 1,000 Units by mouth       cyanocobalamin (CYANOCOBALAMIN) 1000 MCG/ML injection Inject 1 mL into the muscle every 30 days       divalproex sodium delayed-release (DEPAKOTE) 250 MG DR tablet Take 250 mg by mouth daily       DULoxetine HCl 40 MG CPEP Take 40 mg by mouth daily       fluticasone-vilanterol (BREO ELLIPTA) 200-25 MCG/INH inhaler Inhale 1 puff into the lungs daily       furosemide (LASIX) 20 MG tablet Take 20 mg by mouth daily       gabapentin (NEURONTIN) 300 MG capsule Take 1 capsule (300 mg) by mouth At Bedtime       guaiFENesin (ROBITUSSIN) 100 MG/5ML liquid Take 20 mLs (400 mg) by mouth every 4 hours as needed for cough       levothyroxine (SYNTHROID/LEVOTHROID) 100 MCG tablet Take 100 mcg by mouth daily       Melatonin 10 MG TABS tablet Take 10 mg by mouth At Bedtime       metoprolol succinate ER (TOPROL-XL) 25 MG 24 hr tablet Take 12.5 mg by mouth daily       miconazole (MICATIN) 2 % AERP powder Apply topically as needed        mirtazapine (REMERON) 30 MG tablet Take 30 mg by mouth daily       polyethylene glycol (MIRALAX) 17 GM/Dose powder Take 17 g by mouth daily Every other day       potassium chloride ER (MICRO-K) 10  "MEQ CR capsule Take 10 mEq by mouth daily        prazosin (MINIPRESS) 1 MG capsule Take 1 mg by mouth At Bedtime       QUEtiapine (SEROQUEL) 25 MG tablet 25 mg in am, 37.5 mg at noon,and 37.5 mg HS x 14  days, then              25 mg in am, 25    mg at noon,and 37.5 mg HS x 14 days, then              25 mg in am, 25    Mg at noon, and 25    mg HS x 14 days, then assess  0     rosuvastatin (CRESTOR) 10 MG tablet Take 10 mg by mouth At Bedtime       sennosides (SENOKOT) 8.6 MG tablet Take 2 tablets by mouth At Bedtime       Vitamin D3 (CHOLECALCIFEROL) 25 mcg (1000 units) tablet Take 1 tablet by mouth daily         ROS: 4 point ROS including Respiratory, CV, GI and , other than that noted in the HPI,  is negative    Vitals:  /80   Pulse 70   Temp 97.8  F (36.6  C)   Resp 18   Ht 1.448 m (4' 9\")   Wt 90.6 kg (199 lb 12.8 oz)   SpO2 92%   BMI 43.24 kg/m    Body mass index is 43.24 kg/m .  Exam:  Done on 10/4/22  GENERAL APPEARANCE:  in no distress, cooperative  HEENT: no pressure over sinuses, nasal mucosa is dry. Throat is w/o erythema or post nasal drip. Tonsils are fine.   Lymphadenopathy: submental and submandibular, cervical chairs are not palpables.   RESP:  lungs clear to auscultation   CV:  S1S2 audible, regular HR, no murmur appreciated. No peripheral edema. Pacemaker in place over anterior chest wall on the left side.   ABDOMEN:  soft, NT/, obese, BS audible. no mass appreciated on palpation.   M/S:   no joint deformity noted on observation.   SKIN:  Pacemaker in place over left side.   NEURO:   No NFD appreciated on observation.   PSYCH:  affect and mood normal    Lab/Diagnostic data: Reviewed in the chart and EHR.        ASSESSMENT/PLAN  -----------------------------  # Chronic systolic CHF (H)  # Ischemic cardiomyopathy and Hx of S/P CABG x 5 (2015), and bi-vent ICD in place  # chronic angina pectoris at rest (H)  # Essential hypertension  # Mixed hyperlipidemia  #  PAD, internal carotid " artery stenosis, bilateral (H)  # hx of stroke with ischemic CVD  - hospitalized from 4/6/22-4/7/22 with atypical chest pain. Trop was normal. Pulmonary perfusion scan was low probability and no change when compared to 2017 scan, felt to have GERD, offered PPI but decline..    - Went to ED on 5/10 and 6/1 with atypical chest pain, work up negative. started on famotidine 10 mg daily prn (5/11).   - was seen by Cardiology on 5/11, and no changed was made:   - 5/17: s/p downgrade from ICD to pacemaker but implanted system includes DF-4 lad, hence ICD generator was placed but the tachy disabled.   - 7/112:  ED visit for chest pain. AMI ruled out. Started on famotidine 20 mg daily.   *10/1: ED visit for chest pain with radiation to jaw and left arm, SOB and nausea.  Did not respond to NTG en route and to GI cocktail in ED. Trop x2 no changed. ECG no acute changes.  Sent back to the facility.  * will need a cardiology follow up. Discussed with the patient and her  at the bedside.   - on Crestor. LDL 83 (may 2021).  Routine lab as per cardiology's discretion.   - on lasix and metoprolol,and ASA. . Continue for now.   - not on long acting nitroglycerine. Query could benefit.        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 routine labs  - on Remeron 30 mg.   - Seroquel GDR initiated, from 50 mg to  37.5 mg tid.  On 8/2, further GDR initiated over 6 weeks, down to 25 mg tid. Appears stable. Will further GDR as follows:    - 25 mg in am, 12.5 mg noon, 25 mg at HS x 7 days   - 12.5 mg am,  12.5 mg noon, 25 mg at HS x 7 days   - 12.5 mg tid x 7 days   - 12.5 mg at noon and HS x 7 day   -  12.5 mg at HS x 7 days then stop       COPD (H): at baseline. on CSI/LABA, continue meds.     Polymyalgia rheumatica (H)  Chronic pain disorder  Chronic bilateral low back pain without sciatica  Peripheral polyneuropathy  Frailty  - analgesia optimal. Continue current regimen  - Significant  Deficits requiring NH placement. Requiring extensive assistance from nursing. Up for meals only o/w spends the day resting in bed.       Obesity: Body mass index is Body mass index is 43.24 kg/m .H)   -  to reduce calories intake, but she is not interested. Will continue provide educations      CKD 3a from stage 2, GFR 51 ml/min (H):  - slight reduction in GFR. - Avoid nephrotoxic  medications. Renally dose medications. Monitor electrolytes, and dehydration status    Headache, non specified: based hx. No clinical evidence. Continue to monitor. On tylenol.     Normocytic anemia, query ACD: off iron. Hb 10.4 gm/dl and MC 97 (7/12/22). On B12 supplement. Routine level check.          Hypothyroidism:  TSH   Date Value Ref Range Status   05/16/2022 3.91 0.40 - 4.00 mU/L Final   12/04/2020 41.34 (H) 0.40 - 4.00 mU/L Final   On Levothyroxine. In frail Elderly adult, recommended TSH level is around 6 providing patient is asymptomatic and FT4 is wnl- preferably on the lower normal level.  - next time TSH is checked, also check FT4.       Irritable bowel syndrome with diarrhea  Gastroesophageal reflux disease, esophagitis presence not specified  - stable.     Order: See above, otherwise, continue the rest of the current POC.         Electronically signed by:  Paola Pastor MD

## 2022-10-04 ENCOUNTER — NURSING HOME VISIT (OUTPATIENT)
Dept: GERIATRICS | Facility: CLINIC | Age: 67
End: 2022-10-04
Payer: COMMERCIAL

## 2022-10-04 VITALS
TEMPERATURE: 97.8 F | RESPIRATION RATE: 18 BRPM | OXYGEN SATURATION: 92 % | WEIGHT: 199.8 LBS | SYSTOLIC BLOOD PRESSURE: 133 MMHG | HEART RATE: 70 BPM | BODY MASS INDEX: 43.11 KG/M2 | DIASTOLIC BLOOD PRESSURE: 80 MMHG | HEIGHT: 57 IN

## 2022-10-04 DIAGNOSIS — F32.3 MAJOR DEPRESSIVE DISORDER, SINGLE EPISODE, SEVERE WITH PSYCHOTIC FEATURES (H): ICD-10-CM

## 2022-10-04 DIAGNOSIS — J44.9 CHRONIC OBSTRUCTIVE PULMONARY DISEASE, UNSPECIFIED COPD TYPE (H): ICD-10-CM

## 2022-10-04 DIAGNOSIS — I25.709 ATHEROSCLEROSIS OF CORONARY ARTERY BYPASS GRAFT OF NATIVE HEART WITH ANGINA PECTORIS (H): ICD-10-CM

## 2022-10-04 DIAGNOSIS — I20.89 ANGINA AT REST (H): ICD-10-CM

## 2022-10-04 DIAGNOSIS — R51.9 NONINTRACTABLE HEADACHE, UNSPECIFIED CHRONICITY PATTERN, UNSPECIFIED HEADACHE TYPE: ICD-10-CM

## 2022-10-04 DIAGNOSIS — I10 ESSENTIAL HYPERTENSION: ICD-10-CM

## 2022-10-04 DIAGNOSIS — N18.31 CHRONIC KIDNEY DISEASE, STAGE 3A (H): ICD-10-CM

## 2022-10-04 DIAGNOSIS — D63.8 ANEMIA OF CHRONIC DISEASE: ICD-10-CM

## 2022-10-04 DIAGNOSIS — I48.20 CHRONIC ATRIAL FIBRILLATION (H): ICD-10-CM

## 2022-10-04 DIAGNOSIS — I50.22 CHRONIC SYSTOLIC CONGESTIVE HEART FAILURE (H): Primary | ICD-10-CM

## 2022-10-04 DIAGNOSIS — E66.01 MORBID OBESITY (H): ICD-10-CM

## 2022-10-04 DIAGNOSIS — M35.3 POLYMYALGIA RHEUMATICA (H): ICD-10-CM

## 2022-10-04 PROCEDURE — 99310 SBSQ NF CARE HIGH MDM 45: CPT | Performed by: FAMILY MEDICINE

## 2022-10-04 NOTE — LETTER
"    10/4/2022        RE: Letty Escobar  [facility]  [address] [facility] [address]          [facility]  Chief Complaint   Patient presents with     retirement Regulatory     [place] Medical Record Number:  [id number]  Place of Service where encounter took place:  [facility] () [[id number]]    HPI:    Letty Escobar  is 65 year old (1955), who is being seen today for a federally mandated E/M visit.  HPI information obtained from: facility staff, patient report and [facility] chart review.     Recent Updates:  10/1: ED visit for chest pain with radiation to jaw and left arm, SOB and nausea.  Did not respond to NTG en route and to GI cocktail in ED. Trop x2 no changed. ECG no acute changes.  Sent back to the facility.          Today's concerns are:  -Resident seen and examined, chart reviewed and discussed with the nursing staff.   - CHF /a-fib: Denies CP ,SOB, orthopnea, PND, bendepnea or legs swelling. reports has a cardiologist at [facility].     - COPD:  No cough, wheezing, or SOB reported.    - Depression/ psych: reports mood, sleep and appetite are fine. Later when  was present in the room, pt added that she gets anxious and does not know how to control this.     - Headache: Patient called the Writer after the first encounter was completed, and noted  in the room. Pt reports started having migraine about three months ago. When asked if she ever mentioned this to the nursing staff or to providers, replied\"No\". Describes it as a pressure over her eyes socket, but denies blurry vision, or pain in the keys balls, endorses runny nose, but denies stuffy nose, pressure over the sinuses, postnasal drip, cough or wheezing.     - DNP and RN have no concern today.   --------------------------------  - - Past Medical, social, family histories, medications, and allergies reviewed and updated  - Medications reviewed: in the chart " and EHR.   - Case Management:   I have reviewed the care plan and MDS and do agree with the plan. Patient's desire to return to the community is not present.  Information reviewed:  Medications, vital signs, orders, and nursing notes.      MEDICATIONS:  Current Outpatient Medications   Medication Sig Dispense Refill     acetaminophen (TYLENOL) 325 MG tablet Take 650 mg by mouth every 6 hours as needed for mild pain       acetaminophen (TYLENOL) 500 MG tablet Take 2 tablets (1,000 mg) by mouth 3 times daily       albuterol (PROVENTIL) (2.5 MG/3ML) 0.083% neb solution Take 1 vial (2.5 mg) by nebulization 2 times daily as needed for shortness of breath / dyspnea or wheezing 90 mL      aspirin 81 MG EC tablet Take 81 mg by mouth daily       bisacodyl (DULCOLAX) 10 MG suppository Place 10 mg rectally daily as needed for constipation       cholecalciferol (VITAMIN D3) 10 mcg (400 units) TABS tablet Take 1,000 Units by mouth       cyanocobalamin (CYANOCOBALAMIN) 1000 MCG/ML injection Inject 1 mL into the muscle every 30 days       divalproex sodium delayed-release (DEPAKOTE) 250 MG DR tablet Take 250 mg by mouth daily       DULoxetine HCl 40 MG CPEP Take 40 mg by mouth daily       fluticasone-vilanterol (BREO ELLIPTA) 200-25 MCG/INH inhaler Inhale 1 puff into the lungs daily       furosemide (LASIX) 20 MG tablet Take 20 mg by mouth daily       gabapentin (NEURONTIN) 300 MG capsule Take 1 capsule (300 mg) by mouth At Bedtime       guaiFENesin (ROBITUSSIN) 100 MG/5ML liquid Take 20 mLs (400 mg) by mouth every 4 hours as needed for cough       levothyroxine (SYNTHROID/LEVOTHROID) 100 MCG tablet Take 100 mcg by mouth daily       Melatonin 10 MG TABS tablet Take 10 mg by mouth At Bedtime       metoprolol succinate ER (TOPROL-XL) 25 MG 24 hr tablet Take 12.5 mg by mouth daily       miconazole (MICATIN) 2 % AERP powder Apply topically as needed        mirtazapine (REMERON) 30 MG tablet Take 30 mg by mouth daily       polyethylene  "glycol (MIRALAX) 17 GM/Dose powder Take 17 g by mouth daily Every other day       potassium chloride ER (MICRO-K) 10 MEQ CR capsule Take 10 mEq by mouth daily        prazosin (MINIPRESS) 1 MG capsule Take 1 mg by mouth At Bedtime       QUEtiapine (SEROQUEL) 25 MG tablet 25 mg in am, 37.5 mg at noon,and 37.5 mg HS x 14  days, then              25 mg in am, 25    mg at noon,and 37.5 mg HS x 14 days, then              25 mg in am, 25    Mg at noon, and 25    mg HS x 14 days, then assess  0     rosuvastatin (CRESTOR) 10 MG tablet Take 10 mg by mouth At Bedtime       sennosides (SENOKOT) 8.6 MG tablet Take 2 tablets by mouth At Bedtime       Vitamin D3 (CHOLECALCIFEROL) 25 mcg (1000 units) tablet Take 1 tablet by mouth daily         ROS: 4 point ROS including Respiratory, CV, GI and , other than that noted in the HPI,  is negative    Vitals:  /80   Pulse 70   Temp 97.8  F (36.6  C)   Resp 18   Ht 1.448 m (4' 9\")   Wt 90.6 kg (199 lb 12.8 oz)   SpO2 92%   BMI 43.24 kg/m    Body mass index is 43.24 kg/m .  Exam:  Done on 10/4/22  GENERAL APPEARANCE:  in no distress, cooperative  HEENT: no pressure over sinuses, nasal mucosa is dry. Throat is w/o erythema or post nasal drip. Tonsils are fine.   Lymphadenopathy: submental and submandibular, cervical chairs are not palpables.   RESP:  lungs clear to auscultation   CV:  S1S2 audible, regular HR, no murmur appreciated. No peripheral edema. Pacemaker in place over anterior chest wall on the left side.   ABDOMEN:  soft, NT/, obese, BS audible. no mass appreciated on palpation.   M/S:   no joint deformity noted on observation.   SKIN:  Pacemaker in place over left side.   NEURO:   No NFD appreciated on observation.   PSYCH:  affect and mood normal    Lab/Diagnostic data: Reviewed in the chart and EHR.        ASSESSMENT/PLAN  -----------------------------  # Chronic systolic CHF (H)  # Ischemic cardiomyopathy and Hx of S/P CABG x 5 (2015), and bi-vent ICD in place  # " chronic angina pectoris at rest (H)  # Essential hypertension  # Mixed hyperlipidemia  #  PAD, internal carotid artery stenosis, bilateral (H)  # hx of stroke with ischemic CVD  - hospitalized from 4/6/22-4/7/22 with atypical chest pain. Trop was normal. Pulmonary perfusion scan was low probability and no change when compared to 2017 scan, felt to have GERD, offered PPI but decline..    - Went to ED on 5/10 and 6/1 with atypical chest pain, work up negative. started on famotidine 10 mg daily prn (5/11).   - was seen by Cardiology on 5/11, and no changed was made:   - 5/17: s/p downgrade from ICD to pacemaker but implanted system includes DF-4 lad, hence ICD generator was placed but the tachy disabled.   - 7/112:  ED visit for chest pain. AMI ruled out. Started on famotidine 20 mg daily.   *10/1: ED visit for chest pain with radiation to jaw and left arm, SOB and nausea.  Did not respond to NTG en route and to GI cocktail in ED. Trop x2 no changed. ECG no acute changes.  Sent back to the facility.  * will need a cardiology follow up. Discussed with the patient and her  at the bedside.   - on Crestor. LDL 83 (may 2021).  Routine lab as per cardiology's discretion.   - on lasix and metoprolol,and ASA. . Continue for now.   - not on long acting nitroglycerine. Query could benefit.        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 routine labs  - on Remeron 30 mg.   - Seroquel GDR initiated, from 50 mg to  37.5 mg tid.  On 8/2, further GDR initiated over 6 weeks, down to 25 mg tid. Appears stable. Will further GDR as follows:    - 25 mg in am, 12.5 mg noon, 25 mg at HS x 7 days   -  12.5 mg am,  12.5 mg noon, 25 mg at HS x 7 days   - 12.5 mg tid x 7 days   - 12.5 mg at noon and HS x 7 day   - 12.5 mg at HS x 7 days then stop       COPD (H): at baseline. on CSI/LABA, continue meds.     Polymyalgia rheumatica (H)  Chronic pain disorder  Chronic bilateral low back pain without sciatica  Peripheral polyneuropathy  Frailty  - analgesia optimal. Continue current regimen  - Significant  Deficits requiring NH placement. Requiring extensive assistance from nursing. Up for meals only o/w spends the day resting in bed.       Obesity: Body mass index is Body mass index is 43.24 kg/m .H)   -  to reduce calories intake, but she is not interested. Will continue provide educations      CKD 3a from stage 2, GFR 51 ml/min (H):  - slight reduction in GFR. - Avoid nephrotoxic  medications. Renally dose medications. Monitor electrolytes, and dehydration status    Headache, non specified: based hx. No clinical evidence. Continue to monitor. On tylenol.     Normocytic anemia, query ACD: off iron. Hb 10.4 gm/dl and MC 97 (7/12/22). On B12 supplement. Routine level check.          Hypothyroidism:  TSH   Date Value Ref Range Status   05/16/2022 3.91 0.40 - 4.00 mU/L Final   12/04/2020 41.34 (H) 0.40 - 4.00 mU/L Final   On Levothyroxine. In frail Elderly adult, recommended TSH level is around 6 providing patient is asymptomatic and FT4 is wnl- preferably on the lower normal level.  - next time TSH is checked, also check FT4.       Irritable bowel syndrome with diarrhea  Gastroesophageal reflux disease, esophagitis presence not specified  - stable.     Order: See above, otherwise, continue the rest of the current POC.         Electronically signed by:  Paola Pastor MD           Sincerely,        Paola Pastor MD

## 2022-10-06 ENCOUNTER — NURSING HOME VISIT (OUTPATIENT)
Dept: GERIATRICS | Facility: CLINIC | Age: 67
End: 2022-10-06
Payer: COMMERCIAL

## 2022-10-06 VITALS
BODY MASS INDEX: 43.11 KG/M2 | WEIGHT: 199.8 LBS | HEART RATE: 79 BPM | DIASTOLIC BLOOD PRESSURE: 65 MMHG | TEMPERATURE: 97.7 F | RESPIRATION RATE: 17 BRPM | OXYGEN SATURATION: 93 % | HEIGHT: 57 IN | SYSTOLIC BLOOD PRESSURE: 133 MMHG

## 2022-10-06 DIAGNOSIS — E53.8 VITAMIN B12 DEFICIENCY: Primary | ICD-10-CM

## 2022-10-06 DIAGNOSIS — I10 ESSENTIAL (PRIMARY) HYPERTENSION: ICD-10-CM

## 2022-10-06 DIAGNOSIS — I48.20 CHRONIC ATRIAL FIBRILLATION (H): ICD-10-CM

## 2022-10-06 DIAGNOSIS — E78.5 HYPERLIPIDEMIA, UNSPECIFIED HYPERLIPIDEMIA TYPE: ICD-10-CM

## 2022-10-06 PROBLEM — I20.0 UNSTABLE ANGINA (H): Status: ACTIVE | Noted: 2018-05-02

## 2022-10-06 PROBLEM — R07.89 ATYPICAL CHEST PAIN: Status: ACTIVE | Noted: 2019-11-11

## 2022-10-06 PROBLEM — Z79.4 LONG TERM (CURRENT) USE OF INSULIN (H): Status: ACTIVE | Noted: 2020-01-29

## 2022-10-06 PROBLEM — R06.02 SOB (SHORTNESS OF BREATH): Status: ACTIVE | Noted: 2022-04-07

## 2022-10-06 PROBLEM — F11.20 OPIOID TYPE DEPENDENCE, CONTINUOUS USE (H): Status: ACTIVE | Noted: 2022-04-07

## 2022-10-06 PROBLEM — E11.65 TYPE 2 DIABETES MELLITUS WITH HYPERGLYCEMIA (H): Status: ACTIVE | Noted: 2020-01-29

## 2022-10-06 PROCEDURE — 99310 SBSQ NF CARE HIGH MDM 45: CPT | Performed by: FAMILY MEDICINE

## 2022-10-06 NOTE — LETTER
10/6/2022        RE: Letty Escobar  [facility]  [address] [facility] [address]        [facility]    Facility:   [facility] () [[id number]]   Code Status: DNR/DNI      CHIEF COMPLAINT/REASON FOR VISIT:  Chief Complaint   Patient presents with     RECHECK       HISTORY:      HPI: Letty is a 66 year old female Who currently does reside in long-term care room [facility] and who I was asked to visit with today not only secondary to medication management and overdue laboratory studies but also recently did go to the hospital on October 1 secondary to chest pain.  She does have a history of A. fib, COPD, hypertension, CAD, CABG in 2015 along with a pacemaker.  The work-up was not revealing of any true cardiovascular chest pain.  See results below but the BMP was unremarkable albumin at 3.0 hemoglobin 9.2 negative troponins and negative chest x-ray.  Her blood pressure on October 5 was 133/65 in September the systolic blood pressures ranging 104-133 and there have been no recent weights.  Went over her medications.  She is due for a B12 level.  Is been well over a year since her last lipid panel she is on Crestor 10 mg.  She is also on divalproex 250 mg at bedtime there is no level and also on vitamin D 8000 units and her last TSH in May was 3.91.  She is also on Seroquel 3 times daily at 25 mg along with Cymbalta, mirtazapine and uses melatonin for sleep.    Past Medical History:   Diagnosis Date     ACP (advance care planning) 11/3/2010    Formatting of this note might be different from the original. Patient has identified Health Care Agent(s): Yes Add Health Care Agents: Yes   Health Care Agent(s):  Primary Health Care Agent:   Edwar Shawn Relationship:  Spouse Phone:   [phone]  Secondary Health Care Agent:   Chiki Oro Relationship:   Son Phone:   [phone]  Patient has Advance Care Plan Documents (Health Care Direct     Acute coronary syndrome  (H) 11/22/2019     Acute exacerbation of CHF (congestive heart failure) (H) 11/11/2019     Acute on chronic systolic (congestive) heart failure (H) 1/28/2020     Anemia of chronic disease 1/5/2013     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, left leg (H) 1/15/2020     BPPV (benign paroxysmal positional vertigo) 5/31/2018     Candidiasis 3/1/2020     Carotid stenosis, right 7/13/2016    Carotid US 05/04/2018 showed moderate plaque formation, consistent with 50 to 69% stenosis in the right internal carotid artery, not significantly changed from 8/5/2015.  Moderate plaque formation, consistent with 50 to 69% stenosis in the left internal carotid artery; there has been mild progression of the left ICA stenosis since 8/5/2015.     Chest pain 11/11/2019     Chronic bilateral low back pain without sciatica 1/17/2018     Chronic pain disorder 10/1/2017     Constipation 3/20/2020     COPD (chronic obstructive pulmonary disease) (H) 6/24/2020     Coronary atherosclerosis 2/6/2009 2/5/2009 - MI - Proximal RCA 99%, mild-mod disease elsewhere.  EF 60%.  PCI:  MYRON to pRCA. 2/12/2009 - admit CP - Widely patent RCA stent. Moderate diffuse CAD. Severe stenosis in trivial PDA branch. LVEF 45%. 1/25/2010 - admit CP - PTCA and stent of diagonal 9/8/2010 - admit CP - LAD patent stent. Moderate diffuse CAD. Medical management recommended.  4/25/2012 - NSTEMI - Acute total occlusion of     Cubital tunnel syndrome on left 11/13/2012     Depressive disorder      Diabetes mellitus (H)      Diabetes mellitus type 2 in obese (H) 7/13/2006     Diabetes mellitus type 2 in obese (H) 7/13/2006     Drug-seeking behavior 4/4/2020     Failure to thrive in adult 9/3/2020     Hereditary and idiopathic peripheral neuropathy 4/24/2007     Hyperlipidemia with target low density lipoprotein (LDL) cholesterol less than 70 mg/dL 5/30/2007     Hypertension 10/14/2015     Hypothyroidism 12/10/2010     Irritable  bowel syndrome 9/7/2015     Ischemic cardiomyopathy 9/20/2015    EF of 40-45%, status post RV lead revision and LV epicardial lead placement via mini-thoracotomy in August 2016.     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  Designated pharmacy: [facility] Prescribing physician: Andrea Diagnosis: Ulnar neuropathy     Panic disorder with agoraphobia 4/14/2020     Paroxysmal atrial fibrillation (H) 10/2/2015     Personality disorder (H) 3/5/2020    Rule out dependent personality     Polymyalgia rheumatica (H) 11/28/2018     Posttraumatic stress disorder 3/1/2020     Restless legs syndrome (RLS) 8/29/2007     Restless legs syndrome (RLS) 8/29/2007     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     Serum calcium elevated 3/1/2020     Somatic dysfunction of sacral region 1/17/2018     Subacromial bursitis of left shoulder joint 8/6/2018     Suicidal ideation 4/1/2020     Thyroid disease      TIA (transient ischemic attack) 5/4/2018     TIA (transient ischemic attack) 5/4/2018     Tobacco abuse 2/17/2017     Tobacco abuse 2/17/2017     Urinary incontinence, mixed 9/24/2017     UTI (urinary tract infection) 4/17/2020     Vitamin B12 deficiency 2/14/2018     Weakness 12/17/2019            No family history on file.   Social History     Socioeconomic History     Marital status:    Tobacco Use     Smoking status: Never Smoker     Smokeless tobacco: Never Used   Substance and Sexual Activity     Alcohol use: Not Currently     Drug use: Never        REVIEW OF SYSTEM:  She currently denies any new symptoms of cough or cold sore throat postnasal drip wheezing chest pain dizziness  vertigo nausea vomiting diarrhea dysuria frequency or urgency.  Does have a history of dyslipidemia, COPD, CABG, depression, hypertension, posttraumatic stress disorder, panic disorder, anxiety, personality disorder    PHYSICAL EXAM:   Pleasant female in no acute distress.  Head is normocephalic.  Neck is supple without adenopathy.  Lung sounds are clear throughout.  Cardiovascular S1-S2 regular rate and rhythm and no lower extremity edema.  Gastrointestinal is protuberant nontender nondistended.  Musculoskeletal currently in bed.  Denies pain to her major joints.  Currently on scheduled Tylenol.  Psychiatric: Pleasant affect    Current Outpatient Medications:      acetaminophen (TYLENOL) 325 MG tablet, Take 650 mg by mouth every 6 hours as needed for mild pain, Disp: , Rfl:      acetaminophen (TYLENOL) 500 MG tablet, Take 2 tablets (1,000 mg) by mouth 3 times daily, Disp: , Rfl:      albuterol (PROVENTIL) (2.5 MG/3ML) 0.083% neb solution, Take 1 vial (2.5 mg) by nebulization 2 times daily as needed for shortness of breath / dyspnea or wheezing, Disp: 90 mL, Rfl:      aspirin 81 MG EC tablet, Take 81 mg by mouth daily, Disp: , Rfl:      bisacodyl (DULCOLAX) 10 MG suppository, Place 10 mg rectally daily as needed for constipation, Disp: , Rfl:      cholecalciferol (VITAMIN D3) 10 mcg (400 units) TABS tablet, Take 1,000 Units by mouth, Disp: , Rfl:      cyanocobalamin (CYANOCOBALAMIN) 1000 MCG/ML injection, Inject 1 mL into the muscle every 30 days, Disp: , Rfl:      divalproex sodium delayed-release (DEPAKOTE) 250 MG DR tablet, Take 250 mg by mouth daily, Disp: , Rfl:      DULoxetine HCl 40 MG CPEP, Take 40 mg by mouth daily, Disp: , Rfl:      fluticasone-vilanterol (BREO ELLIPTA) 200-25 MCG/INH inhaler, Inhale 1 puff into the lungs daily, Disp: , Rfl:      furosemide (LASIX) 20 MG tablet, Take 20 mg by mouth daily, Disp: , Rfl:      gabapentin (NEURONTIN) 300 MG capsule, Take 1 capsule (300 mg) by mouth At Bedtime,  "Disp: , Rfl:      guaiFENesin (ROBITUSSIN) 100 MG/5ML liquid, Take 20 mLs (400 mg) by mouth every 4 hours as needed for cough, Disp: , Rfl:      levothyroxine (SYNTHROID/LEVOTHROID) 100 MCG tablet, Take 100 mcg by mouth daily, Disp: , Rfl:      Melatonin 10 MG TABS tablet, Take 10 mg by mouth At Bedtime, Disp: , Rfl:      metoprolol succinate ER (TOPROL-XL) 25 MG 24 hr tablet, Take 12.5 mg by mouth daily, Disp: , Rfl:      miconazole (MICATIN) 2 % AERP powder, Apply topically as needed , Disp: , Rfl:      mirtazapine (REMERON) 30 MG tablet, Take 30 mg by mouth daily, Disp: , Rfl:      polyethylene glycol (MIRALAX) 17 GM/Dose powder, Take 17 g by mouth daily Every other day, Disp: , Rfl:      potassium chloride ER (MICRO-K) 10 MEQ CR capsule, Take 10 mEq by mouth daily , Disp: , Rfl:      prazosin (MINIPRESS) 1 MG capsule, Take 1 mg by mouth At Bedtime, Disp: , Rfl:      QUEtiapine (SEROQUEL) 25 MG tablet, 25 mg in am, 37.5 mg at noon,and 37.5 mg HS x 14  days, then             25 mg in am, 25    mg at noon,and 37.5 mg HS x 14 days, then             25 mg in am, 25    Mg at noon, and 25    mg HS x 14 days, then assess, Disp: , Rfl: 0     rosuvastatin (CRESTOR) 10 MG tablet, Take 10 mg by mouth At Bedtime, Disp: , Rfl:      sennosides (SENOKOT) 8.6 MG tablet, Take 2 tablets by mouth At Bedtime, Disp: , Rfl:      Vitamin D3 (CHOLECALCIFEROL) 25 mcg (1000 units) tablet, Take 1 tablet by mouth daily, Disp: , Rfl:        /65   Pulse 79   Temp 97.7  F (36.5  C)   Resp 17   Ht 1.448 m (4' 9\")   Wt 90.6 kg (199 lb 12.8 oz)   SpO2 93%   BMI 43.24 kg/m      LABS:   Last Comprehensive Metabolic Panel:  Sodium   Date Value Ref Range Status   10/01/2022 143 133 - 144 mmol/L Final   03/16/2021 139 133 - 144 mmol/L Final     Potassium   Date Value Ref Range Status   10/01/2022 4.2 3.4 - 5.3 mmol/L Final   03/16/2021 4.7 3.4 - 5.3 mmol/L Final     Chloride   Date Value Ref Range Status   10/01/2022 111 (H) 94 - 109 " mmol/L Final   03/16/2021 111 (H) 94 - 109 mmol/L Final     Carbon Dioxide   Date Value Ref Range Status   03/16/2021 22 20 - 32 mmol/L Final     Carbon Dioxide (CO2)   Date Value Ref Range Status   10/01/2022 26 20 - 32 mmol/L Final     Anion Gap   Date Value Ref Range Status   10/01/2022 6 3 - 14 mmol/L Final   03/16/2021 6 3 - 14 mmol/L Final     Glucose   Date Value Ref Range Status   10/01/2022 178 (H) 70 - 99 mg/dL Final   03/16/2021 114 (H) 70 - 99 mg/dL Final     Urea Nitrogen   Date Value Ref Range Status   10/01/2022 30 7 - 30 mg/dL Final   03/16/2021 45 (H) 7 - 30 mg/dL Final     Creatinine   Date Value Ref Range Status   10/01/2022 1.17 (H) 0.52 - 1.04 mg/dL Final   03/16/2021 0.98 0.52 - 1.04 mg/dL Final     GFR Estimate   Date Value Ref Range Status   10/01/2022 51 (L) >60 mL/min/1.73m2 Final     Comment:     Effective December 21, 2021 eGFRcr in adults is calculated using the 2021 CKD-EPI creatinine equation which includes age and gender (Roland et al., [place], DOI: 10.1056/HFXWwd7702082)   03/16/2021 60 (L) >60 mL/min/[1.73_m2] Final     Comment:     Non  GFR Calc  Starting 12/18/2018, serum creatinine based estimated GFR (eGFR) will be   calculated using the Chronic Kidney Disease Epidemiology Collaboration   (CKD-EPI) equation.       Calcium   Date Value Ref Range Status   10/01/2022 9.2 8.5 - 10.1 mg/dL Final   03/16/2021 9.4 8.5 - 10.1 mg/dL Final     CBC RESULTS: Recent Labs   Lab Test 10/01/22  0117   WBC 7.2   RBC 2.94*   HGB 9.2*   HCT 28.3*   MCV 96   MCH 31.3   MCHC 32.5   RDW 14.4   *     Recent Labs   Lab Test 05/28/21  0818   CHOL 154   HDL 42*   LDL 83   TRIG 144           ASSESSMENT:    Encounter Diagnoses   Name Primary?     Vitamin B12 deficiency Yes     Hyperlipidemia, unspecified hyperlipidemia type      Chronic atrial fibrillation (H)      Essential (primary) hypertension        PLAN:    October 19 obtaining a B12, divalproex, lipid panel vitamin D.  Then the  next round of labs we can check on her thyroid.  Recently went to the hospital and the CMP looks good along with a hemoglobin of 9.2 and negative troponins.  She is aware of the current plan of care as well as her previous laboratory studies from her hospital including the x-ray and work-up.  She was appreciative of the visit.        Electronically signed by: Jay Chapman NP          Sincerely,        Jay Chapman NP

## 2022-10-06 NOTE — PROGRESS NOTES
[facility] GERIATRIC SERVICES    Facility:   [facility] () [[id number]]   Code Status: DNR/DNI      CHIEF COMPLAINT/REASON FOR VISIT:  Chief Complaint   Patient presents with     RECHECK       HISTORY:      HPI: Letty is a 66 year old female Who currently does reside in long-term care room 249 and who I was asked to visit with today not only secondary to medication management and overdue laboratory studies but also recently did go to the hospital on October 1 secondary to chest pain.  She does have a history of A. fib, COPD, hypertension, CAD, CABG in 2015 along with a pacemaker.  The work-up was not revealing of any true cardiovascular chest pain.  See results below but the BMP was unremarkable albumin at 3.0 hemoglobin 9.2 negative troponins and negative chest x-ray.  Her blood pressure on October 5 was 133/65 in September the systolic blood pressures ranging 104-133 and there have been no recent weights.  Went over her medications.  She is due for a B12 level.  Is been well over a year since her last lipid panel she is on Crestor 10 mg.  She is also on divalproex 250 mg at bedtime there is no level and also on vitamin D 8000 units and her last TSH in May was 3.91.  She is also on Seroquel 3 times daily at 25 mg along with Cymbalta, mirtazapine and uses melatonin for sleep.    Past Medical History:   Diagnosis Date     ACP (advance care planning) 11/3/2010    Formatting of this note might be different from the original. Patient has identified Health Care Agent(s): Yes Add Health Care Agents: Yes   Health Care Agent(s):  Primary Health Care Agent:   Edwar Escobar Relationship:  Spouse Phone:   [phone]  Secondary Health Care Agent:   Chiki Vaughnsarah Relationship:   Son Phone:   [phone]  Patient has Advance Care Plan Documents (Health Care Direct     Acute coronary syndrome (H) 11/22/2019     Acute exacerbation of CHF (congestive heart failure) (H) 11/11/2019     Acute on chronic  systolic (congestive) heart failure (H) 1/28/2020     Anemia of chronic disease 1/5/2013     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, left leg (H) 1/15/2020     BPPV (benign paroxysmal positional vertigo) 5/31/2018     Candidiasis 3/1/2020     Carotid stenosis, right 7/13/2016    Carotid US 05/04/2018 showed moderate plaque formation, consistent with 50 to 69% stenosis in the right internal carotid artery, not significantly changed from 8/5/2015.  Moderate plaque formation, consistent with 50 to 69% stenosis in the left internal carotid artery; there has been mild progression of the left ICA stenosis since 8/5/2015.     Chest pain 11/11/2019     Chronic bilateral low back pain without sciatica 1/17/2018     Chronic pain disorder 10/1/2017     Constipation 3/20/2020     COPD (chronic obstructive pulmonary disease) (H) 6/24/2020     Coronary atherosclerosis 2/6/2009 2/5/2009 - MI - Proximal RCA 99%, mild-mod disease elsewhere.  EF 60%.  PCI:  MYRON to pRCA. 2/12/2009 - admit CP - Widely patent RCA stent. Moderate diffuse CAD. Severe stenosis in trivial PDA branch. LVEF 45%. 1/25/2010 - admit CP - PTCA and stent of diagonal 9/8/2010 - admit CP - LAD patent stent. Moderate diffuse CAD. Medical management recommended.  4/25/2012 - NSTEMI - Acute total occlusion of     Cubital tunnel syndrome on left 11/13/2012     Depressive disorder      Diabetes mellitus (H)      Diabetes mellitus type 2 in obese (H) 7/13/2006     Diabetes mellitus type 2 in obese (H) 7/13/2006     Drug-seeking behavior 4/4/2020     Failure to thrive in adult 9/3/2020     Hereditary and idiopathic peripheral neuropathy 4/24/2007     Hyperlipidemia with target low density lipoprotein (LDL) cholesterol less than 70 mg/dL 5/30/2007     Hypertension 10/14/2015     Hypothyroidism 12/10/2010     Irritable bowel syndrome 9/7/2015     Ischemic cardiomyopathy 9/20/2015    EF of 40-45%, status post RV lead revision  and LV epicardial lead placement via mini-thoracotomy in August 2016.     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  Designated pharmacy: [facility] Prescribing physician: Andrea Diagnosis: Ulnar neuropathy     Panic disorder with agoraphobia 4/14/2020     Paroxysmal atrial fibrillation (H) 10/2/2015     Personality disorder (H) 3/5/2020    Rule out dependent personality     Polymyalgia rheumatica (H) 11/28/2018     Posttraumatic stress disorder 3/1/2020     Restless legs syndrome (RLS) 8/29/2007     Restless legs syndrome (RLS) 8/29/2007     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     Serum calcium elevated 3/1/2020     Somatic dysfunction of sacral region 1/17/2018     Subacromial bursitis of left shoulder joint 8/6/2018     Suicidal ideation 4/1/2020     Thyroid disease      TIA (transient ischemic attack) 5/4/2018     TIA (transient ischemic attack) 5/4/2018     Tobacco abuse 2/17/2017     Tobacco abuse 2/17/2017     Urinary incontinence, mixed 9/24/2017     UTI (urinary tract infection) 4/17/2020     Vitamin B12 deficiency 2/14/2018     Weakness 12/17/2019            No family history on file.   Social History     Socioeconomic History     Marital status:    Tobacco Use     Smoking status: Never Smoker     Smokeless tobacco: Never Used   Substance and Sexual Activity     Alcohol use: Not Currently     Drug use: Never        REVIEW OF SYSTEM:  She currently denies any new symptoms of cough or cold sore throat postnasal drip wheezing chest pain dizziness vertigo nausea vomiting diarrhea dysuria frequency or urgency.  Does have a history of dyslipidemia, COPD, CABG,  depression, hypertension, posttraumatic stress disorder, panic disorder, anxiety, personality disorder    PHYSICAL EXAM:   Pleasant female in no acute distress.  Head is normocephalic.  Neck is supple without adenopathy.  Lung sounds are clear throughout.  Cardiovascular S1-S2 regular rate and rhythm and no lower extremity edema.  Gastrointestinal is protuberant nontender nondistended.  Musculoskeletal currently in bed.  Denies pain to her major joints.  Currently on scheduled Tylenol.  Psychiatric: Pleasant affect    Current Outpatient Medications:      acetaminophen (TYLENOL) 325 MG tablet, Take 650 mg by mouth every 6 hours as needed for mild pain, Disp: , Rfl:      acetaminophen (TYLENOL) 500 MG tablet, Take 2 tablets (1,000 mg) by mouth 3 times daily, Disp: , Rfl:      albuterol (PROVENTIL) (2.5 MG/3ML) 0.083% neb solution, Take 1 vial (2.5 mg) by nebulization 2 times daily as needed for shortness of breath / dyspnea or wheezing, Disp: 90 mL, Rfl:      aspirin 81 MG EC tablet, Take 81 mg by mouth daily, Disp: , Rfl:      bisacodyl (DULCOLAX) 10 MG suppository, Place 10 mg rectally daily as needed for constipation, Disp: , Rfl:      cholecalciferol (VITAMIN D3) 10 mcg (400 units) TABS tablet, Take 1,000 Units by mouth, Disp: , Rfl:      cyanocobalamin (CYANOCOBALAMIN) 1000 MCG/ML injection, Inject 1 mL into the muscle every 30 days, Disp: , Rfl:      divalproex sodium delayed-release (DEPAKOTE) 250 MG DR tablet, Take 250 mg by mouth daily, Disp: , Rfl:      DULoxetine HCl 40 MG CPEP, Take 40 mg by mouth daily, Disp: , Rfl:      fluticasone-vilanterol (BREO ELLIPTA) 200-25 MCG/INH inhaler, Inhale 1 puff into the lungs daily, Disp: , Rfl:      furosemide (LASIX) 20 MG tablet, Take 20 mg by mouth daily, Disp: , Rfl:      gabapentin (NEURONTIN) 300 MG capsule, Take 1 capsule (300 mg) by mouth At Bedtime, Disp: , Rfl:      guaiFENesin (ROBITUSSIN) 100 MG/5ML liquid, Take 20 mLs (400 mg) by mouth every 4 hours as  "needed for cough, Disp: , Rfl:      levothyroxine (SYNTHROID/LEVOTHROID) 100 MCG tablet, Take 100 mcg by mouth daily, Disp: , Rfl:      Melatonin 10 MG TABS tablet, Take 10 mg by mouth At Bedtime, Disp: , Rfl:      metoprolol succinate ER (TOPROL-XL) 25 MG 24 hr tablet, Take 12.5 mg by mouth daily, Disp: , Rfl:      miconazole (MICATIN) 2 % AERP powder, Apply topically as needed , Disp: , Rfl:      mirtazapine (REMERON) 30 MG tablet, Take 30 mg by mouth daily, Disp: , Rfl:      polyethylene glycol (MIRALAX) 17 GM/Dose powder, Take 17 g by mouth daily Every other day, Disp: , Rfl:      potassium chloride ER (MICRO-K) 10 MEQ CR capsule, Take 10 mEq by mouth daily , Disp: , Rfl:      prazosin (MINIPRESS) 1 MG capsule, Take 1 mg by mouth At Bedtime, Disp: , Rfl:      QUEtiapine (SEROQUEL) 25 MG tablet, 25 mg in am, 37.5 mg at noon,and 37.5 mg HS x 14  days, then             25 mg in am, 25    mg at noon,and 37.5 mg HS x 14 days, then             25 mg in am, 25    Mg at noon, and 25    mg HS x 14 days, then assess, Disp: , Rfl: 0     rosuvastatin (CRESTOR) 10 MG tablet, Take 10 mg by mouth At Bedtime, Disp: , Rfl:      sennosides (SENOKOT) 8.6 MG tablet, Take 2 tablets by mouth At Bedtime, Disp: , Rfl:      Vitamin D3 (CHOLECALCIFEROL) 25 mcg (1000 units) tablet, Take 1 tablet by mouth daily, Disp: , Rfl:        /65   Pulse 79   Temp 97.7  F (36.5  C)   Resp 17   Ht 1.448 m (4' 9\")   Wt 90.6 kg (199 lb 12.8 oz)   SpO2 93%   BMI 43.24 kg/m      LABS:   Last Comprehensive Metabolic Panel:  Sodium   Date Value Ref Range Status   10/01/2022 143 133 - 144 mmol/L Final   03/16/2021 139 133 - 144 mmol/L Final     Potassium   Date Value Ref Range Status   10/01/2022 4.2 3.4 - 5.3 mmol/L Final   03/16/2021 4.7 3.4 - 5.3 mmol/L Final     Chloride   Date Value Ref Range Status   10/01/2022 111 (H) 94 - 109 mmol/L Final   03/16/2021 111 (H) 94 - 109 mmol/L Final     Carbon Dioxide   Date Value Ref Range Status "   03/16/2021 22 20 - 32 mmol/L Final     Carbon Dioxide (CO2)   Date Value Ref Range Status   10/01/2022 26 20 - 32 mmol/L Final     Anion Gap   Date Value Ref Range Status   10/01/2022 6 3 - 14 mmol/L Final   03/16/2021 6 3 - 14 mmol/L Final     Glucose   Date Value Ref Range Status   10/01/2022 178 (H) 70 - 99 mg/dL Final   03/16/2021 114 (H) 70 - 99 mg/dL Final     Urea Nitrogen   Date Value Ref Range Status   10/01/2022 30 7 - 30 mg/dL Final   03/16/2021 45 (H) 7 - 30 mg/dL Final     Creatinine   Date Value Ref Range Status   10/01/2022 1.17 (H) 0.52 - 1.04 mg/dL Final   03/16/2021 0.98 0.52 - 1.04 mg/dL Final     GFR Estimate   Date Value Ref Range Status   10/01/2022 51 (L) >60 mL/min/1.73m2 Final     Comment:     Effective December 21, 2021 eGFRcr in adults is calculated using the 2021 CKD-EPI creatinine equation which includes age and gender (Roland et al., NEJM, DOI: 10.1056/GLZWcz4419622)   03/16/2021 60 (L) >60 mL/min/[1.73_m2] Final     Comment:     Non  GFR Calc  Starting 12/18/2018, serum creatinine based estimated GFR (eGFR) will be   calculated using the Chronic Kidney Disease Epidemiology Collaboration   (CKD-EPI) equation.       Calcium   Date Value Ref Range Status   10/01/2022 9.2 8.5 - 10.1 mg/dL Final   03/16/2021 9.4 8.5 - 10.1 mg/dL Final     CBC RESULTS: Recent Labs   Lab Test 10/01/22  0117   WBC 7.2   RBC 2.94*   HGB 9.2*   HCT 28.3*   MCV 96   MCH 31.3   MCHC 32.5   RDW 14.4   *     Recent Labs   Lab Test 05/28/21  0818   CHOL 154   HDL 42*   LDL 83   TRIG 144           ASSESSMENT:    Encounter Diagnoses   Name Primary?     Vitamin B12 deficiency Yes     Hyperlipidemia, unspecified hyperlipidemia type      Chronic atrial fibrillation (H)      Essential (primary) hypertension        PLAN:    October 19 obtaining a B12, divalproex, lipid panel vitamin D.  Then the next round of labs we can check on her thyroid.  Recently went to the hospital and the CMP looks good  along with a hemoglobin of 9.2 and negative troponins.  She is aware of the current plan of care as well as her previous laboratory studies from her hospital including the x-ray and work-up.  She was appreciative of the visit.        Electronically signed by: Jay Chapman NP

## 2022-10-16 PROBLEM — E11.65 TYPE 2 DIABETES MELLITUS WITH HYPERGLYCEMIA (H): Status: RESOLVED | Noted: 2020-01-29 | Resolved: 2022-10-16

## 2022-10-16 PROBLEM — F11.20 OPIOID TYPE DEPENDENCE, CONTINUOUS USE (H): Status: RESOLVED | Noted: 2022-04-07 | Resolved: 2022-10-16

## 2022-10-16 PROBLEM — N18.31 CHRONIC KIDNEY DISEASE, STAGE 3A (H): Status: ACTIVE | Noted: 2022-10-16

## 2022-10-16 RX ORDER — QUETIAPINE FUMARATE 25 MG/1
100 TABLET, FILM COATED ORAL 3 TIMES DAILY
Refills: 0 | COMMUNITY
Start: 2022-10-16 | End: 2023-11-10

## 2022-10-18 ENCOUNTER — LAB REQUISITION (OUTPATIENT)
Dept: LAB | Facility: CLINIC | Age: 67
End: 2022-10-18
Payer: COMMERCIAL

## 2022-10-18 DIAGNOSIS — E78.5 HYPERLIPIDEMIA, UNSPECIFIED: ICD-10-CM

## 2022-10-18 DIAGNOSIS — E56.9 VITAMIN DEFICIENCY, UNSPECIFIED: ICD-10-CM

## 2022-10-18 DIAGNOSIS — F41.9 ANXIETY DISORDER, UNSPECIFIED: ICD-10-CM

## 2022-10-18 DIAGNOSIS — D51.3 OTHER DIETARY VITAMIN B12 DEFICIENCY ANEMIA: ICD-10-CM

## 2022-10-19 LAB
CHOLEST SERPL-MCNC: 162 MG/DL
FASTING STATUS PATIENT QL REPORTED: YES
HDLC SERPL-MCNC: 30 MG/DL
LDLC SERPL CALC-MCNC: 56 MG/DL
NONHDLC SERPL-MCNC: 132 MG/DL
TRIGL SERPL-MCNC: 382 MG/DL
VIT B12 SERPL-MCNC: 627 PG/ML (ref 232–1245)

## 2022-10-19 PROCEDURE — 80165 DIPROPYLACETIC ACID FREE: CPT | Mod: ORL | Performed by: FAMILY MEDICINE

## 2022-10-19 PROCEDURE — 80061 LIPID PANEL: CPT | Mod: ORL | Performed by: FAMILY MEDICINE

## 2022-10-19 PROCEDURE — 82306 VITAMIN D 25 HYDROXY: CPT | Mod: ORL | Performed by: FAMILY MEDICINE

## 2022-10-19 PROCEDURE — P9604 ONE-WAY ALLOW PRORATED TRIP: HCPCS | Mod: ORL | Performed by: FAMILY MEDICINE

## 2022-10-19 PROCEDURE — 36415 COLL VENOUS BLD VENIPUNCTURE: CPT | Mod: ORL | Performed by: FAMILY MEDICINE

## 2022-10-19 PROCEDURE — 82607 VITAMIN B-12: CPT | Mod: ORL | Performed by: FAMILY MEDICINE

## 2022-10-21 LAB
DEPRECATED CALCIDIOL+CALCIFEROL SERPL-MC: 38 UG/L (ref 20–75)
VITAMIN D2 SERPL-MCNC: 14 UG/L
VITAMIN D3 SERPL-MCNC: 24 UG/L

## 2022-10-22 LAB
VALPROATE FREE MFR SERPL: ABNORMAL %
VALPROATE FREE SERPL-MCNC: <7 UG/ML
VALPROATE SERPL-MCNC: 22 UG/ML

## 2022-10-24 ENCOUNTER — PATIENT OUTREACH (OUTPATIENT)
Dept: GERIATRIC MEDICINE | Facility: CLINIC | Age: 67
End: 2022-10-24

## 2022-10-24 NOTE — PROGRESS NOTES
[facility] Six-Month Assessment    6 month assessment completed on 10/24/2022 with Altagracia TORRES.    ER visits: [facility]  [facility]  10-1 for chest pain, no significant findings.  Hospitalizations: No  TCU stays: No  Significant health status changes: no significant changes  Falls/Injuries: No  ADL/IADL changes: No    Reviewed Institutional Assessment and updated as needed.     Will see member in 6 months for an annual health risk assessment.       Esperanza Damon RN  Care Coordinator[place] [address]  [email or web address]   [email or web address]     Office: [phone]   Fax: [phone]

## 2022-11-07 ENCOUNTER — NURSING HOME VISIT (OUTPATIENT)
Dept: GERIATRICS | Facility: CLINIC | Age: 67
End: 2022-11-07
Payer: COMMERCIAL

## 2022-11-07 VITALS
WEIGHT: 202.4 LBS | OXYGEN SATURATION: 95 % | DIASTOLIC BLOOD PRESSURE: 80 MMHG | RESPIRATION RATE: 18 BRPM | HEIGHT: 57 IN | HEART RATE: 62 BPM | TEMPERATURE: 96.4 F | SYSTOLIC BLOOD PRESSURE: 125 MMHG | BODY MASS INDEX: 43.67 KG/M2

## 2022-11-07 DIAGNOSIS — G25.81 RESTLESS LEGS SYNDROME: ICD-10-CM

## 2022-11-07 DIAGNOSIS — N18.31 CHRONIC KIDNEY DISEASE, STAGE 3A (H): ICD-10-CM

## 2022-11-07 DIAGNOSIS — I10 ESSENTIAL (PRIMARY) HYPERTENSION: ICD-10-CM

## 2022-11-07 DIAGNOSIS — Z95.810 ICD (IMPLANTABLE CARDIOVERTER-DEFIBRILLATOR) IN PLACE: Primary | ICD-10-CM

## 2022-11-07 PROCEDURE — 99310 SBSQ NF CARE HIGH MDM 45: CPT | Performed by: FAMILY MEDICINE

## 2022-11-07 NOTE — PROGRESS NOTES
[facility] GERIATRIC SERVICES    Facility:   [facility] () [[id number]]   Code Status: DNR/DNI      CHIEF COMPLAINT/REASON FOR VISIT:  Chief Complaint   Patient presents with     FVP Care Coordination - Regulatory       HISTORY:      HPI: Letty is a 66 year old female who currently resides in long-term care room 249 and who I the opportunity and was asked to visit with secondary to a regulatory review of chronic medical conditions.  Very good spirits.  Appetite is good.  Talked about her chronic medical conditions including her medications.  Very talkative today.  Her systolic blood pressure in the 120 range.  Her weight 202 pounds in comparison to October 11 202 pounds.  Recently she did have a gradual dose reduction of the Remeron now at 15 mg rather than 30 mg she does not notice any side effects also back in September the Seroquel was decreased 25 mg 3 times daily.  She does have a history of pacemaker and does have a follow-up with her pacemaker recheck on November 17.  Also went over her laboratory studies from October and see results below.  Valproic acid level at 22.  Regarding restless legs she is on gabapentin 300 mg she occasionally still having some issues so increase it to 400 mg rather than 300 mg at bedtime.  Her pain she feels is managed.  No COPD issues.  She feels her depression and anxiety also managed.    Past Medical History:   Diagnosis Date     ACP (advance care planning) 11/3/2010    Formatting of this note might be different from the original. Patient has identified Health Care Agent(s): Yes Add Health Care Agents: Yes   Health Care Agent(s):  Primary Health Care Agent:   Edwar Tasusana Relationship:  Spouse Phone:   [phone]  Secondary Health Care Agent:   Chiki Oro Relationship:   Son Phone:   [phone]  Patient has Advance Care Plan Documents (Health Care Direct     Acute coronary syndrome (H) 11/22/2019     Acute exacerbation of CHF (congestive heart  failure) (H) 11/11/2019     Acute on chronic systolic (congestive) heart failure (H) 1/28/2020     Anemia of chronic disease 1/5/2013     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, left leg (H) 1/15/2020     BPPV (benign paroxysmal positional vertigo) 5/31/2018     Candidiasis 3/1/2020     Carotid stenosis, right 7/13/2016    Carotid US 05/04/2018 showed moderate plaque formation, consistent with 50 to 69% stenosis in the right internal carotid artery, not significantly changed from 8/5/2015.  Moderate plaque formation, consistent with 50 to 69% stenosis in the left internal carotid artery; there has been mild progression of the left ICA stenosis since 8/5/2015.     Chest pain 11/11/2019     Chronic bilateral low back pain without sciatica 1/17/2018     Chronic pain disorder 10/1/2017     Constipation 3/20/2020     COPD (chronic obstructive pulmonary disease) (H) 6/24/2020     Coronary atherosclerosis 2/6/2009 2/5/2009 - MI - Proximal RCA 99%, mild-mod disease elsewhere.  EF 60%.  PCI:  MYRON to pRCA. 2/12/2009 - admit CP - Widely patent RCA stent. Moderate diffuse CAD. Severe stenosis in trivial PDA branch. LVEF 45%. 1/25/2010 - admit CP - PTCA and stent of diagonal 9/8/2010 - admit CP - LAD patent stent. Moderate diffuse CAD. Medical management recommended.  4/25/2012 - NSTEMI - Acute total occlusion of     Cubital tunnel syndrome on left 11/13/2012     Depressive disorder      Diabetes mellitus (H)      Diabetes mellitus type 2 in obese (H) 7/13/2006     Diabetes mellitus type 2 in obese (H) 7/13/2006     Drug-seeking behavior 4/4/2020     Failure to thrive in adult 9/3/2020     Hereditary and idiopathic peripheral neuropathy 4/24/2007     Hyperlipidemia with target low density lipoprotein (LDL) cholesterol less than 70 mg/dL 5/30/2007     Hypertension 10/14/2015     Hypothyroidism 12/10/2010     Irritable bowel syndrome 9/7/2015     Ischemic cardiomyopathy 9/20/2015     EF of 40-45%, status post RV lead revision and LV epicardial lead placement via mini-thoracotomy in August 2016.     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  Designated pharmacy: [facility] Prescribing physician: Andrea Diagnosis: Ulnar neuropathy     Panic disorder with agoraphobia 4/14/2020     Paroxysmal atrial fibrillation (H) 10/2/2015     Personality disorder (H) 3/5/2020    Rule out dependent personality     Polymyalgia rheumatica (H) 11/28/2018     Posttraumatic stress disorder 3/1/2020     Restless legs syndrome (RLS) 8/29/2007     Restless legs syndrome (RLS) 8/29/2007     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     Serum calcium elevated 3/1/2020     Somatic dysfunction of sacral region 1/17/2018     Subacromial bursitis of left shoulder joint 8/6/2018     Suicidal ideation 4/1/2020     Thyroid disease      TIA (transient ischemic attack) 5/4/2018     TIA (transient ischemic attack) 5/4/2018     Tobacco abuse 2/17/2017     Tobacco abuse 2/17/2017     Urinary incontinence, mixed 9/24/2017     UTI (urinary tract infection) 4/17/2020     Vitamin B12 deficiency 2/14/2018     Weakness 12/17/2019            No family history on file.   Social History     Socioeconomic History     Marital status:    Tobacco Use     Smoking status: Never     Smokeless tobacco: Never   Substance and Sexual Activity     Alcohol use: Not Currently     Drug use: Never      No new changes to the review of systems or physical exam since our last visit on October 6  REVIEW OF SYSTEM:  She currently denies any new symptoms of cough or cold sore throat postnasal drip wheezing chest pain  dizziness vertigo nausea vomiting diarrhea dysuria frequency or urgency.  Does have a history of dyslipidemia, COPD, CABG, depression, hypertension, posttraumatic stress disorder, panic disorder, anxiety, personality disorder    PHYSICAL EXAM:   Pleasant female in no acute distress.  Head is normocephalic.  Neck is supple without adenopathy.  Lung sounds are clear throughout.  Cardiovascular S1-S2 regular rate and rhythm and no lower extremity edema.  Gastrointestinal is protuberant nontender nondistended.  Musculoskeletal currently in bed.  Denies pain to her major joints.  Currently on scheduled Tylenol.  Psychiatric: Pleasant affect      Current Outpatient Medications:      acetaminophen (TYLENOL) 325 MG tablet, Take 650 mg by mouth every 6 hours as needed for mild pain, Disp: , Rfl:      acetaminophen (TYLENOL) 500 MG tablet, Take 2 tablets (1,000 mg) by mouth 3 times daily, Disp: , Rfl:      albuterol (PROVENTIL) (2.5 MG/3ML) 0.083% neb solution, Take 1 vial (2.5 mg) by nebulization 2 times daily as needed for shortness of breath / dyspnea or wheezing, Disp: 90 mL, Rfl:      aspirin 81 MG EC tablet, Take 81 mg by mouth daily, Disp: , Rfl:      bisacodyl (DULCOLAX) 10 MG suppository, Place 10 mg rectally daily as needed for constipation, Disp: , Rfl:      cholecalciferol (VITAMIN D3) 10 mcg (400 units) TABS tablet, Take 1,000 Units by mouth, Disp: , Rfl:      cyanocobalamin (CYANOCOBALAMIN) 1000 MCG/ML injection, Inject 1 mL into the muscle every 30 days, Disp: , Rfl:      divalproex sodium delayed-release (DEPAKOTE) 250 MG DR tablet, Take 250 mg by mouth daily, Disp: , Rfl:      DULoxetine HCl 40 MG CPEP, Take 40 mg by mouth daily, Disp: , Rfl:      fluticasone-vilanterol (BREO ELLIPTA) 200-25 MCG/INH inhaler, Inhale 1 puff into the lungs daily, Disp: , Rfl:      furosemide (LASIX) 20 MG tablet, Take 20 mg by mouth daily, Disp: , Rfl:      gabapentin (NEURONTIN) 300 MG capsule, Take 400 mg by mouth At Bedtime,  "Disp: , Rfl:      guaiFENesin (ROBITUSSIN) 100 MG/5ML liquid, Take 20 mLs (400 mg) by mouth every 4 hours as needed for cough, Disp: , Rfl:      levothyroxine (SYNTHROID/LEVOTHROID) 100 MCG tablet, Take 100 mcg by mouth daily, Disp: , Rfl:      Melatonin 10 MG TABS tablet, Take 10 mg by mouth At Bedtime, Disp: , Rfl:      metoprolol succinate ER (TOPROL-XL) 25 MG 24 hr tablet, Take 12.5 mg by mouth daily, Disp: , Rfl:      miconazole (MICATIN) 2 % AERP powder, Apply topically as needed , Disp: , Rfl:      mirtazapine (REMERON) 30 MG tablet, Take 15 mg by mouth daily, Disp: , Rfl:      polyethylene glycol (MIRALAX) 17 GM/Dose powder, Take 17 g by mouth daily Every other day, Disp: , Rfl:      potassium chloride ER (MICRO-K) 10 MEQ CR capsule, Take 10 mEq by mouth daily , Disp: , Rfl:      prazosin (MINIPRESS) 1 MG capsule, Take 1 mg by mouth At Bedtime, Disp: , Rfl:      QUEtiapine (SEROQUEL) 25 MG tablet, 25 mg 3 times daily, Disp: , Rfl: 0     rosuvastatin (CRESTOR) 10 MG tablet, Take 10 mg by mouth At Bedtime, Disp: , Rfl:      sennosides (SENOKOT) 8.6 MG tablet, Take 2 tablets by mouth At Bedtime, Disp: , Rfl:      Vitamin D3 (CHOLECALCIFEROL) 25 mcg (1000 units) tablet, Take 1 tablet by mouth daily, Disp: , Rfl:     /80   Pulse 62   Temp (!) 96.4  F (35.8  C)   Resp 18   Ht 1.448 m (4' 9\")   Wt 91.8 kg (202 lb 6.4 oz)   SpO2 95%   BMI 43.80 kg/m        LABS:   Last Comprehensive Metabolic Panel:  Sodium   Date Value Ref Range Status   10/01/2022 143 133 - 144 mmol/L Final   03/16/2021 139 133 - 144 mmol/L Final     Potassium   Date Value Ref Range Status   10/01/2022 4.2 3.4 - 5.3 mmol/L Final   03/16/2021 4.7 3.4 - 5.3 mmol/L Final     Chloride   Date Value Ref Range Status   10/01/2022 111 (H) 94 - 109 mmol/L Final   03/16/2021 111 (H) 94 - 109 mmol/L Final     Carbon Dioxide   Date Value Ref Range Status   03/16/2021 22 20 - 32 mmol/L Final     Carbon Dioxide (CO2)   Date Value Ref Range Status "   10/01/2022 26 20 - 32 mmol/L Final     Anion Gap   Date Value Ref Range Status   10/01/2022 6 3 - 14 mmol/L Final   03/16/2021 6 3 - 14 mmol/L Final     Glucose   Date Value Ref Range Status   10/01/2022 178 (H) 70 - 99 mg/dL Final   03/16/2021 114 (H) 70 - 99 mg/dL Final     Urea Nitrogen   Date Value Ref Range Status   10/01/2022 30 7 - 30 mg/dL Final   03/16/2021 45 (H) 7 - 30 mg/dL Final     Creatinine   Date Value Ref Range Status   10/01/2022 1.17 (H) 0.52 - 1.04 mg/dL Final   03/16/2021 0.98 0.52 - 1.04 mg/dL Final     GFR Estimate   Date Value Ref Range Status   10/01/2022 51 (L) >60 mL/min/1.73m2 Final     Comment:     Effective December 21, 2021 eGFRcr in adults is calculated using the 2021 CKD-EPI creatinine equation which includes age and gender (Roland et al., NEJ, DOI: 10.1056/ZBAIdn0779956)   03/16/2021 60 (L) >60 mL/min/[1.73_m2] Final     Comment:     Non  GFR Calc  Starting 12/18/2018, serum creatinine based estimated GFR (eGFR) will be   calculated using the Chronic Kidney Disease Epidemiology Collaboration   (CKD-EPI) equation.       Calcium   Date Value Ref Range Status   10/01/2022 9.2 8.5 - 10.1 mg/dL Final   03/16/2021 9.4 8.5 - 10.1 mg/dL Final     Last Comprehensive Metabolic Panel:  Sodium   Date Value Ref Range Status   10/01/2022 143 133 - 144 mmol/L Final   03/16/2021 139 133 - 144 mmol/L Final     Potassium   Date Value Ref Range Status   10/01/2022 4.2 3.4 - 5.3 mmol/L Final   03/16/2021 4.7 3.4 - 5.3 mmol/L Final     Chloride   Date Value Ref Range Status   10/01/2022 111 (H) 94 - 109 mmol/L Final   03/16/2021 111 (H) 94 - 109 mmol/L Final     Carbon Dioxide   Date Value Ref Range Status   03/16/2021 22 20 - 32 mmol/L Final     Carbon Dioxide (CO2)   Date Value Ref Range Status   10/01/2022 26 20 - 32 mmol/L Final     Anion Gap   Date Value Ref Range Status   10/01/2022 6 3 - 14 mmol/L Final   03/16/2021 6 3 - 14 mmol/L Final     Glucose   Date Value Ref Range  Status   10/01/2022 178 (H) 70 - 99 mg/dL Final   03/16/2021 114 (H) 70 - 99 mg/dL Final     Urea Nitrogen   Date Value Ref Range Status   10/01/2022 30 7 - 30 mg/dL Final   03/16/2021 45 (H) 7 - 30 mg/dL Final     Creatinine   Date Value Ref Range Status   10/01/2022 1.17 (H) 0.52 - 1.04 mg/dL Final   03/16/2021 0.98 0.52 - 1.04 mg/dL Final     GFR Estimate   Date Value Ref Range Status   10/01/2022 51 (L) >60 mL/min/1.73m2 Final     Comment:     Effective December 21, 2021 eGFRcr in adults is calculated using the 2021 CKD-EPI creatinine equation which includes age and gender (Roland et al., NE, DOI: 10.1056/PNNHck7506835)   03/16/2021 60 (L) >60 mL/min/[1.73_m2] Final     Comment:     Non  GFR Calc  Starting 12/18/2018, serum creatinine based estimated GFR (eGFR) will be   calculated using the Chronic Kidney Disease Epidemiology Collaboration   (CKD-EPI) equation.       Calcium   Date Value Ref Range Status   10/01/2022 9.2 8.5 - 10.1 mg/dL Final   03/16/2021 9.4 8.5 - 10.1 mg/dL Final     Bilirubin Total   Date Value Ref Range Status   10/01/2022 0.1 (L) 0.2 - 1.3 mg/dL Final   03/16/2021 0.2 0.2 - 1.3 mg/dL Final     Alkaline Phosphatase   Date Value Ref Range Status   10/01/2022 53 40 - 150 U/L Final   03/16/2021 57 40 - 150 U/L Final     ALT   Date Value Ref Range Status   10/01/2022 26 0 - 50 U/L Final   03/16/2021 74 (H) 0 - 50 U/L Final     AST   Date Value Ref Range Status   10/01/2022 17 0 - 45 U/L Final   03/16/2021 23 0 - 45 U/L Final             CBC RESULTS: Recent Labs   Lab Test 10/01/22  0117   WBC 7.2   RBC 2.94*   HGB 9.2*   HCT 28.3*   MCV 96   MCH 31.3   MCHC 32.5   RDW 14.4   *     Recent Labs   Lab Test 10/19/22  0743 05/28/21  0818   CHOL 162 154   HDL 30* 42*   LDL 56 83   TRIG 382* 144     Vitamin D Deficiency Screening Results:  Lab Results   Component Value Date    VITDT 41 09/03/2020         ASSESSMENT:    (Z95.810) ICD (implantable cardioverter-defibrillator) in  place  (primary encounter diagnosis)  Comment: Follow-up with cardiology November 17  Plan: Follow-up November 17    (I10) Essential (primary) hypertension  Comment: Doing well  Plan: Medications and continue to monitor    (G25.81) Restless legs syndrome  Comment: Increasing gabapentin 400 mg at bedtime  Plan: Continue to monitor    (N18.31) Chronic kidney disease, stage 3a (H)  Comment: See results above over CMP  Plan: Stable      Case Management:  I have reviewed the facility/SNF care plan/MDS which was done Today, including the falls risk, nutrition and pain screening. I also reviewed the current immunizations, and preventive care.. Future cancer screening is not clinically indicated secondary to age/goals of care.   Patient's desire to return to the community is present, but is not able due to care needs .    Information reviewed:  Medications, vital signs, orders, and nursing notes.  PLAN:    According to the staff she has been doing well.  She does need assistance with basic ADLs.  Went over her laboratory studies as well as her current medications and the recent gradual dose reduction of the mirtazapine 15 mg now rather than 30 mg.  We will also increase gabapentin 400 mg at bedtime due to restless legs and she did not have any other questions    Electronically signed by: Jay Chapman NP

## 2022-11-07 NOTE — LETTER
11/7/2022        RE: Letty Escobar  [facility]  [address] [place] [address]          [facility]    Facility:   [facility] () [[id number]]   Code Status: DNR/DNI      CHIEF COMPLAINT/REASON FOR VISIT:  Chief Complaint   Patient presents with     FVP Care Coordination - Regulatory       HISTORY:      HPI: Letty is a 66 year old female who currently resides in long-term care room 249 and who I the opportunity and was asked to visit with secondary to a regulatory review of chronic medical conditions.  Very good spirits.  Appetite is good.  Talked about her chronic medical conditions including her medications.  Very talkative today.  Her systolic blood pressure in the 120 range.  Her weight 202 pounds in comparison to October 11 202 pounds.  Recently she did have a gradual dose reduction of the Remeron now at 15 mg rather than 30 mg she does not notice any side effects also back in September the Seroquel was decreased 25 mg 3 times daily.  She does have a history of pacemaker and does have a follow-up with her pacemaker recheck on November 17.  Also went over her laboratory studies from October and see results below.  Valproic acid level at 22.  Regarding restless legs she is on gabapentin 300 mg she occasionally still having some issues so increase it to 400 mg rather than 300 mg at bedtime.  Her pain she feels is managed.  No COPD issues.  She feels her depression and anxiety also managed.    Past Medical History:   Diagnosis Date     ACP (advance care planning) 11/3/2010    Formatting of this note might be different from the original. Patient has identified Health Care Agent(s): Yes Add Health Care Agents: Yes   Health Care Agent(s):  Primary Health Care Agent:   Edwar Escobar Relationship:  Spouse Phone:   [phone]  Secondary Health Care Agent:   Chiki Pricekarla Relationship:   Son Phone:   [phone]  Patient has Advance Care Plan  Documents (Health Care Direct     Acute coronary syndrome (H) 11/22/2019     Acute exacerbation of CHF (congestive heart failure) (H) 11/11/2019     Acute on chronic systolic (congestive) heart failure (H) 1/28/2020     Anemia of chronic disease 1/5/2013     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, left leg (H) 1/15/2020     BPPV (benign paroxysmal positional vertigo) 5/31/2018     Candidiasis 3/1/2020     Carotid stenosis, right 7/13/2016    Carotid US 05/04/2018 showed moderate plaque formation, consistent with 50 to 69% stenosis in the right internal carotid artery, not significantly changed from 8/5/2015.  Moderate plaque formation, consistent with 50 to 69% stenosis in the left internal carotid artery; there has been mild progression of the left ICA stenosis since 8/5/2015.     Chest pain 11/11/2019     Chronic bilateral low back pain without sciatica 1/17/2018     Chronic pain disorder 10/1/2017     Constipation 3/20/2020     COPD (chronic obstructive pulmonary disease) (H) 6/24/2020     Coronary atherosclerosis 2/6/2009 2/5/2009 - MI - Proximal RCA 99%, mild-mod disease elsewhere.  EF 60%.  PCI:  MYRON to pRCA. 2/12/2009 - admit CP - Widely patent RCA stent. Moderate diffuse CAD. Severe stenosis in trivial PDA branch. LVEF 45%. 1/25/2010 - admit CP - PTCA and stent of diagonal 9/8/2010 - admit CP - LAD patent stent. Moderate diffuse CAD. Medical management recommended.  4/25/2012 - NSTEMI - Acute total occlusion of     Cubital tunnel syndrome on left 11/13/2012     Depressive disorder      Diabetes mellitus (H)      Diabetes mellitus type 2 in obese (H) 7/13/2006     Diabetes mellitus type 2 in obese (H) 7/13/2006     Drug-seeking behavior 4/4/2020     Failure to thrive in adult 9/3/2020     Hereditary and idiopathic peripheral neuropathy 4/24/2007     Hyperlipidemia with target low density lipoprotein (LDL) cholesterol less than 70 mg/dL 5/30/2007      Hypertension 10/14/2015     Hypothyroidism 12/10/2010     Irritable bowel syndrome 9/7/2015     Ischemic cardiomyopathy 9/20/2015    EF of 40-45%, status post RV lead revision and LV epicardial lead placement via mini-thoracotomy in August 2016.     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  Designated pharmacy: [facility] Prescribing physician: Andrea Diagnosis: Ulnar neuropathy     Panic disorder with agoraphobia 4/14/2020     Paroxysmal atrial fibrillation (H) 10/2/2015     Personality disorder (H) 3/5/2020    Rule out dependent personality     Polymyalgia rheumatica (H) 11/28/2018     Posttraumatic stress disorder 3/1/2020     Restless legs syndrome (RLS) 8/29/2007     Restless legs syndrome (RLS) 8/29/2007     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     Serum calcium elevated 3/1/2020     Somatic dysfunction of sacral region 1/17/2018     Subacromial bursitis of left shoulder joint 8/6/2018     Suicidal ideation 4/1/2020     Thyroid disease      TIA (transient ischemic attack) 5/4/2018     TIA (transient ischemic attack) 5/4/2018     Tobacco abuse 2/17/2017     Tobacco abuse 2/17/2017     Urinary incontinence, mixed 9/24/2017     UTI (urinary tract infection) 4/17/2020     Vitamin B12 deficiency 2/14/2018     Weakness 12/17/2019            No family history on file.   Social History     Socioeconomic History     Marital status:    Tobacco Use     Smoking status: Never     Smokeless tobacco: Never   Substance and Sexual Activity     Alcohol use: Not Currently     Drug use: Never      No new changes to the review of systems or physical exam since our last visit on  October 6  REVIEW OF SYSTEM:  She currently denies any new symptoms of cough or cold sore throat postnasal drip wheezing chest pain dizziness vertigo nausea vomiting diarrhea dysuria frequency or urgency.  Does have a history of dyslipidemia, COPD, CABG, depression, hypertension, posttraumatic stress disorder, panic disorder, anxiety, personality disorder    PHYSICAL EXAM:   Pleasant female in no acute distress.  Head is normocephalic.  Neck is supple without adenopathy.  Lung sounds are clear throughout.  Cardiovascular S1-S2 regular rate and rhythm and no lower extremity edema.  Gastrointestinal is protuberant nontender nondistended.  Musculoskeletal currently in bed.  Denies pain to her major joints.  Currently on scheduled Tylenol.  Psychiatric: Pleasant affect      Current Outpatient Medications:      acetaminophen (TYLENOL) 325 MG tablet, Take 650 mg by mouth every 6 hours as needed for mild pain, Disp: , Rfl:      acetaminophen (TYLENOL) 500 MG tablet, Take 2 tablets (1,000 mg) by mouth 3 times daily, Disp: , Rfl:      albuterol (PROVENTIL) (2.5 MG/3ML) 0.083% neb solution, Take 1 vial (2.5 mg) by nebulization 2 times daily as needed for shortness of breath / dyspnea or wheezing, Disp: 90 mL, Rfl:      aspirin 81 MG EC tablet, Take 81 mg by mouth daily, Disp: , Rfl:      bisacodyl (DULCOLAX) 10 MG suppository, Place 10 mg rectally daily as needed for constipation, Disp: , Rfl:      cholecalciferol (VITAMIN D3) 10 mcg (400 units) TABS tablet, Take 1,000 Units by mouth, Disp: , Rfl:      cyanocobalamin (CYANOCOBALAMIN) 1000 MCG/ML injection, Inject 1 mL into the muscle every 30 days, Disp: , Rfl:      divalproex sodium delayed-release (DEPAKOTE) 250 MG DR tablet, Take 250 mg by mouth daily, Disp: , Rfl:      DULoxetine HCl 40 MG CPEP, Take 40 mg by mouth daily, Disp: , Rfl:      fluticasone-vilanterol (BREO ELLIPTA) 200-25 MCG/INH inhaler, Inhale 1 puff into the lungs daily, Disp: , Rfl:      furosemide (LASIX)  "20 MG tablet, Take 20 mg by mouth daily, Disp: , Rfl:      gabapentin (NEURONTIN) 300 MG capsule, Take 400 mg by mouth At Bedtime, Disp: , Rfl:      guaiFENesin (ROBITUSSIN) 100 MG/5ML liquid, Take 20 mLs (400 mg) by mouth every 4 hours as needed for cough, Disp: , Rfl:      levothyroxine (SYNTHROID/LEVOTHROID) 100 MCG tablet, Take 100 mcg by mouth daily, Disp: , Rfl:      Melatonin 10 MG TABS tablet, Take 10 mg by mouth At Bedtime, Disp: , Rfl:      metoprolol succinate ER (TOPROL-XL) 25 MG 24 hr tablet, Take 12.5 mg by mouth daily, Disp: , Rfl:      miconazole (MICATIN) 2 % AERP powder, Apply topically as needed , Disp: , Rfl:      mirtazapine (REMERON) 30 MG tablet, Take 15 mg by mouth daily, Disp: , Rfl:      polyethylene glycol (MIRALAX) 17 GM/Dose powder, Take 17 g by mouth daily Every other day, Disp: , Rfl:      potassium chloride ER (MICRO-K) 10 MEQ CR capsule, Take 10 mEq by mouth daily , Disp: , Rfl:      prazosin (MINIPRESS) 1 MG capsule, Take 1 mg by mouth At Bedtime, Disp: , Rfl:      QUEtiapine (SEROQUEL) 25 MG tablet, 25 mg 3 times daily, Disp: , Rfl: 0     rosuvastatin (CRESTOR) 10 MG tablet, Take 10 mg by mouth At Bedtime, Disp: , Rfl:      sennosides (SENOKOT) 8.6 MG tablet, Take 2 tablets by mouth At Bedtime, Disp: , Rfl:      Vitamin D3 (CHOLECALCIFEROL) 25 mcg (1000 units) tablet, Take 1 tablet by mouth daily, Disp: , Rfl:     /80   Pulse 62   Temp (!) 96.4  F (35.8  C)   Resp 18   Ht 1.448 m (4' 9\")   Wt 91.8 kg (202 lb 6.4 oz)   SpO2 95%   BMI 43.80 kg/m        LABS:   Last Comprehensive Metabolic Panel:  Sodium   Date Value Ref Range Status   10/01/2022 143 133 - 144 mmol/L Final   03/16/2021 139 133 - 144 mmol/L Final     Potassium   Date Value Ref Range Status   10/01/2022 4.2 3.4 - 5.3 mmol/L Final   03/16/2021 4.7 3.4 - 5.3 mmol/L Final     Chloride   Date Value Ref Range Status   10/01/2022 111 (H) 94 - 109 mmol/L Final   03/16/2021 111 (H) 94 - 109 mmol/L Final     Carbon " Dioxide   Date Value Ref Range Status   03/16/2021 22 20 - 32 mmol/L Final     Carbon Dioxide (CO2)   Date Value Ref Range Status   10/01/2022 26 20 - 32 mmol/L Final     Anion Gap   Date Value Ref Range Status   10/01/2022 6 3 - 14 mmol/L Final   03/16/2021 6 3 - 14 mmol/L Final     Glucose   Date Value Ref Range Status   10/01/2022 178 (H) 70 - 99 mg/dL Final   03/16/2021 114 (H) 70 - 99 mg/dL Final     Urea Nitrogen   Date Value Ref Range Status   10/01/2022 30 7 - 30 mg/dL Final   03/16/2021 45 (H) 7 - 30 mg/dL Final     Creatinine   Date Value Ref Range Status   10/01/2022 1.17 (H) 0.52 - 1.04 mg/dL Final   03/16/2021 0.98 0.52 - 1.04 mg/dL Final     GFR Estimate   Date Value Ref Range Status   10/01/2022 51 (L) >60 mL/min/1.73m2 Final     Comment:     Effective December 21, 2021 eGFRcr in adults is calculated using the 2021 CKD-EPI creatinine equation which includes age and gender (Roland et al., NEJ, DOI: 10.1056/WCAQnc3677302)   03/16/2021 60 (L) >60 mL/min/[1.73_m2] Final     Comment:     Non  GFR Calc  Starting 12/18/2018, serum creatinine based estimated GFR (eGFR) will be   calculated using the Chronic Kidney Disease Epidemiology Collaboration   (CKD-EPI) equation.       Calcium   Date Value Ref Range Status   10/01/2022 9.2 8.5 - 10.1 mg/dL Final   03/16/2021 9.4 8.5 - 10.1 mg/dL Final     Last Comprehensive Metabolic Panel:  Sodium   Date Value Ref Range Status   10/01/2022 143 133 - 144 mmol/L Final   03/16/2021 139 133 - 144 mmol/L Final     Potassium   Date Value Ref Range Status   10/01/2022 4.2 3.4 - 5.3 mmol/L Final   03/16/2021 4.7 3.4 - 5.3 mmol/L Final     Chloride   Date Value Ref Range Status   10/01/2022 111 (H) 94 - 109 mmol/L Final   03/16/2021 111 (H) 94 - 109 mmol/L Final     Carbon Dioxide   Date Value Ref Range Status   03/16/2021 22 20 - 32 mmol/L Final     Carbon Dioxide (CO2)   Date Value Ref Range Status   10/01/2022 26 20 - 32 mmol/L Final     Anion Gap   Date  Value Ref Range Status   10/01/2022 6 3 - 14 mmol/L Final   03/16/2021 6 3 - 14 mmol/L Final     Glucose   Date Value Ref Range Status   10/01/2022 178 (H) 70 - 99 mg/dL Final   03/16/2021 114 (H) 70 - 99 mg/dL Final     Urea Nitrogen   Date Value Ref Range Status   10/01/2022 30 7 - 30 mg/dL Final   03/16/2021 45 (H) 7 - 30 mg/dL Final     Creatinine   Date Value Ref Range Status   10/01/2022 1.17 (H) 0.52 - 1.04 mg/dL Final   03/16/2021 0.98 0.52 - 1.04 mg/dL Final     GFR Estimate   Date Value Ref Range Status   10/01/2022 51 (L) >60 mL/min/1.73m2 Final     Comment:     Effective December 21, 2021 eGFRcr in adults is calculated using the 2021 CKD-EPI creatinine equation which includes age and gender (Roland et al., NEJ, DOI: 10.1056/GLGOlk2589381)   03/16/2021 60 (L) >60 mL/min/[1.73_m2] Final     Comment:     Non  GFR Calc  Starting 12/18/2018, serum creatinine based estimated GFR (eGFR) will be   calculated using the Chronic Kidney Disease Epidemiology Collaboration   (CKD-EPI) equation.       Calcium   Date Value Ref Range Status   10/01/2022 9.2 8.5 - 10.1 mg/dL Final   03/16/2021 9.4 8.5 - 10.1 mg/dL Final     Bilirubin Total   Date Value Ref Range Status   10/01/2022 0.1 (L) 0.2 - 1.3 mg/dL Final   03/16/2021 0.2 0.2 - 1.3 mg/dL Final     Alkaline Phosphatase   Date Value Ref Range Status   10/01/2022 53 40 - 150 U/L Final   03/16/2021 57 40 - 150 U/L Final     ALT   Date Value Ref Range Status   10/01/2022 26 0 - 50 U/L Final   03/16/2021 74 (H) 0 - 50 U/L Final     AST   Date Value Ref Range Status   10/01/2022 17 0 - 45 U/L Final   03/16/2021 23 0 - 45 U/L Final             CBC RESULTS: Recent Labs   Lab Test 10/01/22  0117   WBC 7.2   RBC 2.94*   HGB 9.2*   HCT 28.3*   MCV 96   MCH 31.3   MCHC 32.5   RDW 14.4   *     Recent Labs   Lab Test 10/19/22  0743 05/28/21  0818   CHOL 162 154   HDL 30* 42*   LDL 56 83   TRIG 382* 144     Vitamin D Deficiency Screening Results:  Lab Results    Component Value Date    VITDT 41 09/03/2020         ASSESSMENT:    (Z95.810) ICD (implantable cardioverter-defibrillator) in place  (primary encounter diagnosis)  Comment: Follow-up with cardiology November 17  Plan: Follow-up November 17    (I10) Essential (primary) hypertension  Comment: Doing well  Plan: Medications and continue to monitor    (G25.81) Restless legs syndrome  Comment: Increasing gabapentin 400 mg at bedtime  Plan: Continue to monitor    (N18.31) Chronic kidney disease, stage 3a (H)  Comment: See results above over CMP  Plan: Stable      Case Management:  I have reviewed the facility/SNF care plan/MDS which was done Today, including the falls risk, nutrition and pain screening. I also reviewed the current immunizations, and preventive care.. Future cancer screening is not clinically indicated secondary to age/goals of care.   Patient's desire to return to the community is present, but is not able due to care needs .    Information reviewed:  Medications, vital signs, orders, and nursing notes.  PLAN:    According to the staff she has been doing well.  She does need assistance with basic ADLs.  Went over her laboratory studies as well as her current medications and the recent gradual dose reduction of the mirtazapine 15 mg now rather than 30 mg.  We will also increase gabapentin 400 mg at bedtime due to restless legs and she did not have any other questions    Electronically signed by: Jay Chapman NP            Sincerely,        Jay Chapman NP

## 2022-11-11 ENCOUNTER — HOSPITAL ENCOUNTER (EMERGENCY)
Facility: CLINIC | Age: 67
Discharge: HOME | End: 2022-11-12
Attending: EMERGENCY MEDICINE | Admitting: EMERGENCY MEDICINE
Payer: COMMERCIAL

## 2022-11-11 ENCOUNTER — APPOINTMENT (OUTPATIENT)
Dept: GENERAL RADIOLOGY | Facility: CLINIC | Age: 67
End: 2022-11-11
Attending: EMERGENCY MEDICINE
Payer: COMMERCIAL

## 2022-11-11 DIAGNOSIS — Z95.1 S/P CABG X 5: ICD-10-CM

## 2022-11-11 DIAGNOSIS — R07.9 CHEST PAIN, UNSPECIFIED TYPE: ICD-10-CM

## 2022-11-11 DIAGNOSIS — D63.8 ANEMIA IN OTHER CHRONIC DISEASES CLASSIFIED ELSEWHERE: ICD-10-CM

## 2022-11-11 DIAGNOSIS — I95.2 HYPOTENSION DUE TO DRUGS: ICD-10-CM

## 2022-11-11 DIAGNOSIS — G89.4 CHRONIC PAIN DISORDER: ICD-10-CM

## 2022-11-11 LAB
ANION GAP SERPL CALCULATED.3IONS-SCNC: 5 MMOL/L (ref 3–14)
BASOPHILS # BLD AUTO: 0 10E3/UL (ref 0–0.2)
BASOPHILS NFR BLD AUTO: 0 %
BUN SERPL-MCNC: 24 MG/DL (ref 7–30)
CALCIUM SERPL-MCNC: 9.3 MG/DL (ref 8.5–10.1)
CHLORIDE BLD-SCNC: 108 MMOL/L (ref 94–109)
CO2 SERPL-SCNC: 25 MMOL/L (ref 20–32)
CREAT SERPL-MCNC: 0.98 MG/DL (ref 0.52–1.04)
EOSINOPHIL # BLD AUTO: 0.2 10E3/UL (ref 0–0.7)
EOSINOPHIL NFR BLD AUTO: 2 %
ERYTHROCYTE [DISTWIDTH] IN BLOOD BY AUTOMATED COUNT: 13.9 % (ref 10–15)
GFR SERPL CREATININE-BSD FRML MDRD: 63 ML/MIN/1.73M2
GLUCOSE BLD-MCNC: 156 MG/DL (ref 70–99)
HCT VFR BLD AUTO: 29.8 % (ref 35–47)
HGB BLD-MCNC: 9.6 G/DL (ref 11.7–15.7)
HOLD SPECIMEN: NORMAL
IMM GRANULOCYTES # BLD: 0.1 10E3/UL
IMM GRANULOCYTES NFR BLD: 1 %
LYMPHOCYTES # BLD AUTO: 2.1 10E3/UL (ref 0.8–5.3)
LYMPHOCYTES NFR BLD AUTO: 29 %
MCH RBC QN AUTO: 30.9 PG (ref 26.5–33)
MCHC RBC AUTO-ENTMCNC: 32.2 G/DL (ref 31.5–36.5)
MCV RBC AUTO: 96 FL (ref 78–100)
MONOCYTES # BLD AUTO: 0.5 10E3/UL (ref 0–1.3)
MONOCYTES NFR BLD AUTO: 6 %
NEUTROPHILS # BLD AUTO: 4.5 10E3/UL (ref 1.6–8.3)
NEUTROPHILS NFR BLD AUTO: 62 %
NRBC # BLD AUTO: 0 10E3/UL
NRBC BLD AUTO-RTO: 0 /100
PLATELET # BLD AUTO: 128 10E3/UL (ref 150–450)
POTASSIUM BLD-SCNC: 4.4 MMOL/L (ref 3.4–5.3)
RBC # BLD AUTO: 3.11 10E6/UL (ref 3.8–5.2)
SODIUM SERPL-SCNC: 138 MMOL/L (ref 133–144)
TROPONIN I SERPL HS-MCNC: 19 NG/L
WBC # BLD AUTO: 7.2 10E3/UL (ref 4–11)

## 2022-11-11 PROCEDURE — 84484 ASSAY OF TROPONIN QUANT: CPT | Mod: 91 | Performed by: EMERGENCY MEDICINE

## 2022-11-11 PROCEDURE — 93010 ELECTROCARDIOGRAM REPORT: CPT | Performed by: EMERGENCY MEDICINE

## 2022-11-11 PROCEDURE — 93005 ELECTROCARDIOGRAM TRACING: CPT

## 2022-11-11 PROCEDURE — 96361 HYDRATE IV INFUSION ADD-ON: CPT

## 2022-11-11 PROCEDURE — 99285 EMERGENCY DEPT VISIT HI MDM: CPT | Mod: 25 | Performed by: EMERGENCY MEDICINE

## 2022-11-11 PROCEDURE — 96374 THER/PROPH/DIAG INJ IV PUSH: CPT

## 2022-11-11 PROCEDURE — 99285 EMERGENCY DEPT VISIT HI MDM: CPT | Mod: 25

## 2022-11-11 PROCEDURE — 80048 BASIC METABOLIC PNL TOTAL CA: CPT | Performed by: EMERGENCY MEDICINE

## 2022-11-11 PROCEDURE — 85025 COMPLETE CBC W/AUTO DIFF WBC: CPT | Performed by: EMERGENCY MEDICINE

## 2022-11-11 PROCEDURE — 36415 COLL VENOUS BLD VENIPUNCTURE: CPT | Performed by: EMERGENCY MEDICINE

## 2022-11-11 PROCEDURE — 258N000003 HC RX IP 258 OP 636: Performed by: EMERGENCY MEDICINE

## 2022-11-11 PROCEDURE — 84484 ASSAY OF TROPONIN QUANT: CPT | Performed by: EMERGENCY MEDICINE

## 2022-11-11 PROCEDURE — 71045 X-RAY EXAM CHEST 1 VIEW: CPT

## 2022-11-11 PROCEDURE — 250N000011 HC RX IP 250 OP 636: Performed by: EMERGENCY MEDICINE

## 2022-11-11 RX ORDER — ONDANSETRON 2 MG/ML
4 INJECTION INTRAMUSCULAR; INTRAVENOUS EVERY 30 MIN PRN
Status: DISCONTINUED | OUTPATIENT
Start: 2022-11-11 | End: 2022-11-12 | Stop reason: HOSPADM

## 2022-11-11 RX ORDER — MORPHINE SULFATE 4 MG/ML
4 INJECTION, SOLUTION INTRAMUSCULAR; INTRAVENOUS
Status: DISCONTINUED | OUTPATIENT
Start: 2022-11-11 | End: 2022-11-12

## 2022-11-11 RX ADMIN — MORPHINE SULFATE 4 MG: 4 INJECTION, SOLUTION INTRAMUSCULAR; INTRAVENOUS at 23:03

## 2022-11-11 RX ADMIN — SODIUM CHLORIDE 500 ML: 9 INJECTION, SOLUTION INTRAVENOUS at 23:24

## 2022-11-11 ASSESSMENT — ACTIVITIES OF DAILY LIVING (ADL): ADLS_ACUITY_SCORE: 35

## 2022-11-12 ENCOUNTER — APPOINTMENT (OUTPATIENT)
Dept: GENERAL RADIOLOGY | Facility: CLINIC | Age: 67
End: 2022-11-12
Attending: EMERGENCY MEDICINE
Payer: COMMERCIAL

## 2022-11-12 VITALS
TEMPERATURE: 98.2 F | OXYGEN SATURATION: 96 % | DIASTOLIC BLOOD PRESSURE: 47 MMHG | HEART RATE: 60 BPM | WEIGHT: 211.2 LBS | SYSTOLIC BLOOD PRESSURE: 106 MMHG | BODY MASS INDEX: 45.7 KG/M2 | RESPIRATION RATE: 10 BRPM

## 2022-11-12 LAB
BASOPHILS # BLD AUTO: 0 10E3/UL (ref 0–0.2)
BASOPHILS NFR BLD AUTO: 0 %
EOSINOPHIL # BLD AUTO: 0.2 10E3/UL (ref 0–0.7)
EOSINOPHIL NFR BLD AUTO: 2 %
ERYTHROCYTE [DISTWIDTH] IN BLOOD BY AUTOMATED COUNT: 14.2 % (ref 10–15)
HCT VFR BLD AUTO: 27.5 % (ref 35–47)
HGB BLD-MCNC: 8.5 G/DL (ref 11.7–15.7)
IMM GRANULOCYTES # BLD: 0.1 10E3/UL
IMM GRANULOCYTES NFR BLD: 1 %
LYMPHOCYTES # BLD AUTO: 2.1 10E3/UL (ref 0.8–5.3)
LYMPHOCYTES NFR BLD AUTO: 31 %
MCH RBC QN AUTO: 30.8 PG (ref 26.5–33)
MCHC RBC AUTO-ENTMCNC: 30.9 G/DL (ref 31.5–36.5)
MCV RBC AUTO: 100 FL (ref 78–100)
MONOCYTES # BLD AUTO: 0.5 10E3/UL (ref 0–1.3)
MONOCYTES NFR BLD AUTO: 7 %
NEUTROPHILS # BLD AUTO: 4.1 10E3/UL (ref 1.6–8.3)
NEUTROPHILS NFR BLD AUTO: 59 %
NRBC # BLD AUTO: 0 10E3/UL
NRBC BLD AUTO-RTO: 0 /100
PLATELET # BLD AUTO: 114 10E3/UL (ref 150–450)
RBC # BLD AUTO: 2.76 10E6/UL (ref 3.8–5.2)
TROPONIN I SERPL HS-MCNC: 19 NG/L
TROPONIN I SERPL HS-MCNC: 19 NG/L
WBC # BLD AUTO: 6.8 10E3/UL (ref 4–11)

## 2022-11-12 PROCEDURE — 84484 ASSAY OF TROPONIN QUANT: CPT | Mod: 91 | Performed by: EMERGENCY MEDICINE

## 2022-11-12 PROCEDURE — 250N000011 HC RX IP 250 OP 636: Performed by: EMERGENCY MEDICINE

## 2022-11-12 PROCEDURE — 258N000003 HC RX IP 258 OP 636: Performed by: EMERGENCY MEDICINE

## 2022-11-12 PROCEDURE — 96375 TX/PRO/DX INJ NEW DRUG ADDON: CPT

## 2022-11-12 PROCEDURE — 71045 X-RAY EXAM CHEST 1 VIEW: CPT

## 2022-11-12 PROCEDURE — 84484 ASSAY OF TROPONIN QUANT: CPT | Performed by: EMERGENCY MEDICINE

## 2022-11-12 PROCEDURE — 36415 COLL VENOUS BLD VENIPUNCTURE: CPT | Performed by: EMERGENCY MEDICINE

## 2022-11-12 PROCEDURE — 85025 COMPLETE CBC W/AUTO DIFF WBC: CPT | Performed by: EMERGENCY MEDICINE

## 2022-11-12 RX ORDER — ROPIVACAINE IN 0.9% SOD CHL/PF 0.1 %
.03-.125 PLASTIC BAG, INJECTION (ML) EPIDURAL CONTINUOUS
Status: DISCONTINUED | OUTPATIENT
Start: 2022-11-12 | End: 2022-11-12 | Stop reason: HOSPADM

## 2022-11-12 RX ORDER — SODIUM CHLORIDE 9 MG/ML
INJECTION, SOLUTION INTRAVENOUS ONCE
Status: COMPLETED | OUTPATIENT
Start: 2022-11-12 | End: 2022-11-12

## 2022-11-12 RX ORDER — SODIUM CHLORIDE 9 MG/ML
INJECTION, SOLUTION INTRAVENOUS CONTINUOUS
Status: DISCONTINUED | OUTPATIENT
Start: 2022-11-12 | End: 2022-11-12 | Stop reason: HOSPADM

## 2022-11-12 RX ADMIN — ONDANSETRON 4 MG: 2 INJECTION INTRAMUSCULAR; INTRAVENOUS at 00:06

## 2022-11-12 RX ADMIN — SODIUM CHLORIDE: 9 INJECTION, SOLUTION INTRAVENOUS at 00:50

## 2022-11-12 RX ADMIN — SODIUM CHLORIDE 1000 ML: 9 INJECTION, SOLUTION INTRAVENOUS at 01:43

## 2022-11-12 ASSESSMENT — ACTIVITIES OF DAILY LIVING (ADL)
ADLS_ACUITY_SCORE: 35

## 2022-11-12 NOTE — ED PROVIDER NOTES
History     Chief Complaint   Patient presents with     Chest Pain     HPI  Letty Escobar is a 66 year old female who presents to the emergency room via EMS from nursing home over concern of chest pain.  She said that she woke from sleep around 9 PM with central chest pain.  Rates it as an 8/10.  Has had similar pain before when she has had a heart attack.  Does have history of coronary artery disease with stent placement, total of 21 stents.  Also has implantable defibrillator.  She says she feels somewhat short of breath with this chest pain.  Denies radiation to the jaw or arm, no radiation to her back.  Denies nausea or vomiting.  Has not noticed any swelling in her legs.  Has not had any cough or fever.  Was given 325 mg of aspirin by EMS in route, and 1 tablet of nitroglycerin without any change in her symptoms.    Allergies:  Allergies   Allergen Reactions     Diphenhydramine Palpitations     Tolerated IV Benadryl when not pushed too fast     Isosorbide Other (See Comments) and Dizziness     Also causes syncope (has fallen before) and brain fog/mental disturbances - please do not prescribe     Nitroglycerin Dizziness, Fatigue and Other (See Comments)     Specifically the patch - please do not prescribe  Specifically the patch - please do not prescribe       Contrast Dye Hives and Itching     Other Drug Allergy (See Comments) Hives and Itching     Bee Pollen      Penicillin G      Adhesive Tape Rash     Diphenhydramine Hcl Palpitations     Liquid Adhesive Itching     Nystatin Dermatitis     Nystatin (Topical) Dermatitis     Also blisters     Penicillins Swelling and Rash     Occurred as a child - not 100% sure on specific reactions     Sulfa Drugs Other (See Comments)     Occurred as a child / patient does not remember specific reaction       Problem List:    Patient Active Problem List    Diagnosis Date Noted     Chronic kidney disease, stage 3a (H) 10/16/2022     Priority: Medium     SOB (shortness of  breath) 04/07/2022     Priority: Medium     Morbid obesity (H) 12/10/2021     Priority: Medium     ICD (implantable cardioverter-defibrillator) in place 11/17/2021     Priority: Medium     Formatting of this note is different from the original.  Date of last device in office evaluation: 5/17/2022    ?  and model: Medtronic Cobalt CRT-D.   Date of implant: 5/7/2021  Tachy therapies remain OFF: S/p downgrade from ICD to pacemaker, but her implanted system includes a DF-4 lead, so an ICD generator was placed with the tachycardia therapies disabled.     ? Indication for device: Ischemic cardiomyopathy   ? Cardiac resynchronization therapy:   no      MRI Conditional:  No  o If No:  Reason why:  Abandoned LV lead    ? Battery longevity documented as less than 3  Months: No  ? Are any of the leads less than 3 months old:  No    ? Programming              ? Pacing mode and programmed lower rate: DDDR 60 - 130 bpm              ? Rate-responsive sensor type, if programmed on: Accelerometer        Underlying rhythm and heart rate:  SR @ 60 bpm    ? What is the response of this device to magnet placement:  None - tachy therapies off  ? PM magnet pacing rate: N/A   ? Any alert status on CIED generator or lead: No    ? Last pacing threshold    Atrial   1.0 V @ 0.4 ms   Ventricular RV: 0.75 V @ 0.4 ms  LV:  2.75 V @ 1.0 ms       Chronic atrial fibrillation (H) 04/06/2021     Priority: Medium     Cardiac pacemaker in situ 04/06/2021     Priority: Medium     Major depressive disorder, recurrent severe without psychotic features (H) 01/26/2021     Priority: Medium     Panic disorder with agoraphobia 04/14/2020     Priority: Medium     Constipation 03/20/2020     Priority: Medium     Personality disorder (H) 03/05/2020     Priority: Medium     Rule out dependent personality    Formatting of this note might be different from the original.  Rule out dependent personality  Formatting of this note might be different from the  original.  Rule out dependent personality       Candidiasis 03/01/2020     Priority: Medium     Posttraumatic stress disorder 03/01/2020     Priority: Medium     Chronic obstructive pulmonary disease, unspecified (H) 01/29/2020     Priority: Medium     Long term (current) use of insulin (H) 01/29/2020     Priority: Medium     Acute on chronic systolic (congestive) heart failure (H) 01/28/2020     Priority: Medium     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, left leg (H) 01/15/2020     Priority: Medium     Atypical chest pain 11/11/2019     Priority: Medium     Polymyalgia rheumatica (H) 11/28/2018     Priority: Medium     Unstable angina (H) 05/02/2018     Priority: Medium     Coronary angiogram 05/02/2018 demonstrated 30% stenosis in the LMCA, 50% stenosis in the Proximal LAD, 50% stenosis in the Mid LAD, 95% stenosis in the Proximal Circumflex (Restenosis - Balloon Angioplasty), 100% stenosis in the 1st Marginal, 50% stenosis in the Proximal RCA, 50% stenosis in the Distal RCA, the LIMA graft from the LIMA to the Distal LAD is free of significant disease; the SVG graft from the Aorta to the 1st Marginal is free of significant disease; the SVG graft from the Aorta to the Distal RCA is free of significant disease. Intervention included a successful  2.5mm x 12mm Balloon,  2.5mm x 10mm Cutting Balloon,  3mm x 12mm Drug Eluting Stent,  3mm x 12mm Balloon, and  3mm x 8mm Balloon to Proximal Circumflex, post stenosis 0%.    Formatting of this note is different from the original.  Coronary angiogram 05/02/2018 demonstrated 30% stenosis in the LMCA, 50% stenosis in the Proximal LAD, 50% stenosis in the Mid LAD, 95% stenosis in the Proximal Circumflex (Restenosis - Balloon Angioplasty), 100% stenosis in the 1st Marginal, 50% stenosis in the Proximal RCA, 50% stenosis in the Distal RCA, the LIMA graft from the LIMA to the Distal LAD is free of significant disease; the SVG graft  from the Aorta to the 1st Marginal is free of significant disease; the SVG graft from the Aorta to the Distal RCA is free of significant disease. Intervention included a successful  2.5mm x 12mm Balloon,  2.5mm x 10mm Cutting Balloon,  3mm x 12mm Drug Eluting Stent,  3mm x 12mm Balloon, and  3mm x 8mm Balloon to Proximal Circumflex, post stenosis 0%.  Formatting of this note is different from the original.  Coronary angiogram 05/02/2018 demonstrated 30% stenosis in the LMCA, 50% stenosis in the Proximal LAD, 50% stenosis in the Mid LAD, 95% stenosis in the Proximal Circumflex (Restenosis - Balloon Angioplasty), 100% stenosis in the 1st Marginal, 50% stenosis in the Proximal RCA, 50% stenosis in the Distal RCA, the LIMA graft from the LIMA to the Distal LAD is free of significant disease; the SVG graft from the Aorta to the 1st Marginal is free of significant disease; the SVG graft from the Aorta to the Distal RCA is free of significant disease. Intervention included a successful  2.5mm x 12mm Balloon,  2.5mm x 10mm Cutting Balloon,  3mm x 12mm Drug Eluting Stent,  3mm x 12mm Balloon, and  3mm x 8mm Balloon to Proximal Circumflex, post stenosis 0%.       Vitamin B12 deficiency 02/14/2018     Priority: Medium     Chronic bilateral low back pain without sciatica 01/17/2018     Priority: Medium     Chronic pain disorder 10/01/2017     Priority: Medium     Urinary incontinence, mixed 09/24/2017     Priority: Medium     Internal carotid artery stenosis, bilateral 07/13/2016     Priority: Medium     Carotid US 05/04/2018 showed moderate plaque formation, consistent with 50 to 69% stenosis in the right internal carotid artery, not significantly changed from 8/5/2015.  Moderate plaque formation, consistent with 50 to 69% stenosis in the left internal carotid artery; there has been mild progression of the left ICA stenosis since 8/5/2015.    Formatting of this note might be different from the original.  Carotid US 05/04/2018  showed moderate plaque formation, consistent with 50 to 69% stenosis in the right internal carotid artery, not significantly changed from 8/5/2015.  Moderate plaque formation, consistent with 50 to 69% stenosis in the left internal carotid artery; there has been mild progression of the left ICA stenosis since 8/5/2015.    Formatting of this note might be different from the original.  Carotid US 05/04/2018 showed moderate plaque formation, consistent with 50 to 69% stenosis in the right internal carotid artery, not significantly changed from 8/5/2015.  Moderate plaque formation, consistent with 50 to 69% stenosis in the left internal carotid artery; there has been mild progression of the left ICA stenosis since 8/5/2015.       Essential (primary) hypertension 10/14/2015     Priority: Medium     Ischemic cardiomyopathy 09/20/2015     Priority: Medium     EF of 40-45%, status post RV lead revision and LV epicardial lead placement via mini-thoracotomy in August 2016.    Formatting of this note might be different from the original.  EF of 40-45%, status post RV lead revision and LV epicardial lead placement via mini-thoracotomy in August 2016.  Formatting of this note might be different from the original.  EF of 40-45%, status post RV lead revision and LV epicardial lead placement via mini-thoracotomy in August 2016.       S/P CABG x 5 08/21/2015     Priority: Medium     Pain medication agreement 04/20/2013     Priority: Medium     Controlled substance agreement for percocet #30/month on file and signed 4/17/13.  Designated pharmacy: [facility] Prescribing physician: Andrea Diagnosis: Ulnar neuropathy    Formatting of this note might be different from the original.  Controlled substance agreement for percocet #30/month on file and signed 4/17/13.  Designated pharmacy: [facility] Prescribing physician: Andrea Diagnosis: Ulnar neuropathy       Anemia in other chronic diseases classified elsewhere 01/05/2013     Priority:  "Medium     Hypothyroidism, unspecified 12/10/2010     Priority: Medium     ACP (advance care planning) 11/03/2010     Priority: Medium     Formatting of this note might be different from the original.  Patient has identified Health Care Agent(s): Yes  Add Health Care Agents: Yes    Health Care Agent(s):    Primary Health Care Agent:     Edwar Escobar Relationship:    Spouse Phone:     [phone]    Secondary Health Care Agent:     Chiki Oro Relationship:     Son Phone:     [phone]      Patient has Advance Care Plan Documents (Health Care Directive, POLST): Yes    Advance Care Plan Documents:  Health Care Directive--03/28/11  Resuscitation Guidelines--    Patient has identified Specific Treatment Preferences: Yes   Specific Treatment Preferences:   a.) Code Status:  DNR/ Do Not Attempt Resuscitation - Allow a Natural Death    b.) Goals of Treatment:     ii. Limited Interventions and treat reversible conditions.  Provide interventions aimed at treatment of new or reversible illness/injury or non-life threatening chronic conditions. Duration of invasive or uncomfortable interventions should generally be limited.- Trial of intubation 5 days or other instructions \"If no improvement, stop.\"  c.) Interventions and Treatments:   i.   Antibiotics:          - Aggressive antibiotics  ii.  Nutrition/Hydration:         - Offer food and liquids by mouth         - IV fluid administration         - No feeding tube under any circumstance.  iii. Transfusion:          - Blood products for comfort/relief of symptoms only  iv. Dialysis:           - Dialysis for short term \"for recovery, but not long term.\"        Last Assessment & Plan:   Advance Care Planning: Disease-specific Session    Letty Escobar is an [facility] patient of Dr. Renea Mendez of the [facility].    Advance care planning discussions were completed with Letty and her healthcare agent, spouse Edwar Escobar on Monday, March 28, 2011 at the " "[facility].    Understanding of Illness and Disease [place]:   Letty identifies her medical condition as diabetes with peripheral neuropathy, heart problems with a heart attack 2009 plus 4 stent placements; sleep apnea; depression; hypothyroid; high cholesterol; tobacco use; and describes it as needing further assistance with thyroid and diabetes management.  She identifies the following symptoms of her medical condition as being the most bothersome: some memory loss, balance problems, light headedness and dizziness, puffy eyes and lower extremity edema from time to time.    Letty is retired [place] .  She is independent with all cares and lives an active life style although, \"I'm not as active as I used to be.\"    Goals of Care:   Letty currently hopes to maintain independence, control pain and symptoms and delay progression of, but not cure, the illness.  \"I want to get the diabetes and thyroid under better control.\"  Letty has an appointment the first part of April for follow up.    Quality of Life:    Letty identifies quality of life as family involvement twice weekly, Yarsanism activities, newly assigned  , playing cards, the computer,    Letty christiano with serious challenges in her life through her ganesh in God, prayers and support of her Yarsanism family, spouse and son.    Letty identifies the following fears and worries about her medical care:  \"I just want the diabetes straightened out.\"    Treatment and Care Preferences:   Past experiences in dealing with family and/or friends that have  or been seriously ill include her brother who took his own life.  \"He was 53 years old and living with us.  I found him.\"   As a result of these experiences, Letty expresses these health care preferences:      Summary Letty's Treatment Preferences:  LOW SURVIVAL; HIGH TREATMENT BURDEN:  If Letty suffered " "a serious complication, such that she was facing a prolonged hospital stay, required ongoing medical interventions, and the chance of living through the complication was low (for example, only 5 out of 100 would live), Letty would choose:  to focus treatment on comfort and quality of life (\"Quality of life is more important than length of life to Letty.\")  COMMENT:  \"If I can't live a normal life, I wouldn't want to live.\"    HIGH SURVIVAL; LOW FUNCTIONAL STATUS:  If Letty had a serious complication and had a good chance of living through the complication but it was expected that she would never be able to walk or talk again and would require 24 hour nursing care, she would choose:  to focus treatment on comfort and quality of life (\"Quality of life is more important than length of life to Letty.\")  COMMENT:  \"I'd accept rehabilitation.\"    HIGH SURVIVAL; LOW COGNITIVE STATUS:  If Letty had a serious complication and had a good chance of living through the complication but it was expected that she would never know who she was or who she was with and would require 24 hour nursing care, she would choose:  to focus treatment on comfort and quality of life (\"Quality of life is more important than length of life to Letty.\")    CARDIO-PULMONARY RESUSCITATION (CPR):  The facts, risks and benefits of CPR were discussed with Letty.  If she had a sudden event that caused her heart and breathing to stop, she:  WOULD NOT want CPR attempted and instead would prefer that a natural death occur.    MECHANICAL VENTILATION: If Letty had an episode where she was unable to breathe on her own, she would choose the following:  attempt to use any appropriate non-invasive method to assist breathing, and use mechanical ventilation.  COMMENT:  \"If reversible ventilator is acceptable for 5 days.  If no improvement, stop.\"     Letty has chosen her healthcare agent to:  strictly follow her wishes.    Follow Up Plan:   Letty was encouraged to " continue advance care planning discussions with her health care agents and primary care provider.   Letty and her health care agent declined handouts.     Documents addressed during this advance care planning session:  1.  Health Care Agents identified.          .  Primary health care agent is spouse, Edwar Escobar;          .  Secondary health care agent is son, Jermaine Oro.  2.  Health Care Directive completed and scanned into medical record.  3.  Statement of Treatment Preferences for advanced illness completed and scanned into the medical record.  4.  Resuscitation Guidelines completed for DNR.  Document sent to Dr. Renea Mendez for signature and returned to the home. Letty, please place on the refrigerator.    Recommendations/Plan:   Letty and her health care agent to review Advance Care Plan.     Letty would benefit from:   Care Navigation/Care Navigation Help Desk--explained services for pending future needs.  No identified needs today.  Care Navigation brochure was given to Letty and her healthcare agent.    Advance Care Planning recommendations and Letty's concerns and questions were cc ed to her primary provider.     Thank you, Letty for the opportunity of assisting with Advance Care Planning. It was a seeing you again and meeting Edwar.  Please contact me if I can be of further assistance.    Interviewer: Pat Martin RN    Advance Care Planning Facilitator  [phone]   3/28/2011       ELI (obstructive sleep apnea) 01/31/2010     Priority: Medium     PSG on April/2008 that showed moderate Obstructive Sleep Apnea with an AHI of 23.5 . Limb movements persisted at 29 movements/hour at optimal pressures, despite carbidopa use.    Formatting of this note might be different from the original.  PSG on April/2008 that showed moderate Obstructive Sleep Apnea with an AHI of 23.5 . Limb movements persisted at 29 movements/hour at optimal pressures, despite carbidopa use.  Formatting of this note might  be different from the original.  PSG on April/2008 that showed moderate Obstructive Sleep Apnea with an AHI of 23.5 . Limb movements persisted at 29 movements/hour at optimal pressures, despite carbidopa use.       Coronary atherosclerosis 02/06/2009     Priority: Medium     Formatting of this note might be different from the original.  2/5/2009 - MI - Proximal RCA 99%, mild-mod disease elsewhere.  EF 60%.  PCI:  MYRON to pRCA.  2/12/2009 - admit CP - Widely patent RCA stent. Moderate diffuse CAD. Severe stenosis in trivial PDA branch. LVEF 45%.  1/25/2010 - admit CP - PTCA and stent of diagonal  9/8/2010 - admit CP - LAD patent stent. Moderate diffuse CAD. Medical management recommended.   4/25/2012 - NSTEMI - Acute total occlusion of the ostial Circumflex. Acute total occlusion of the ostial ramus. Acute total occlusion of the distal 1st Obtuse Marginal. Angioplasty of ostial circumflex and ostial ramus. There was distal embolization to the OM1 vessel. This vessel was small and not amendable to intervention. Indefinite: plavix and asa 81.  8/10/2015 - CABx5 - 1. LIMA to distal LAD, reverse SVG to OM1 and OM2, reverse SVG to diagonal, reverse SVG to PDA.   2. Mitral valve repair with a Medtronics 3-D full ring, 28 mm.   3. Tricuspid repair with a Medtronic 28 mm partial ring  1/12/2016 - TTE - EF 40-45%  Formatting of this note might be different from the original.  2/5/2009 - MI - Proximal RCA 99%, mild-mod disease elsewhere.  EF 60%.  PCI:  MYRON to pRCA.  2/12/2009 - admit CP - Widely patent RCA stent. Moderate diffuse CAD. Severe stenosis in trivial PDA branch. LVEF 45%.  1/25/2010 - admit CP - PTCA and stent of diagonal  9/8/2010 - admit CP - LAD patent stent. Moderate diffuse CAD. Medical management recommended.   4/25/2012 - NSTEMI - Acute total occlusion of the ostial Circumflex. Acute total occlusion of the ostial ramus. Acute total occlusion of the distal 1st Obtuse Marginal. Angioplasty of ostial circumflex  and ostial ramus. There was distal embolization to the OM1 vessel. This vessel was small and not amendable to intervention. Indefinite: plavix and asa 81.  8/10/2015 - CABx5 - 1. LIMA to distal LAD, reverse SVG to OM1 and OM2, reverse SVG to diagonal, reverse SVG to PDA.   2. Mitral valve repair with a Medtronics 3-D full ring, 28 mm.   3. Tricuspid repair with a Medtronic 28 mm partial ring  1/12/2016 - TTE - EF 40-45%       Restless legs syndrome 08/29/2007     Priority: Medium     Hyperlipidemia, unspecified 05/30/2007     Priority: Medium        Past Medical History:    Past Medical History:   Diagnosis Date     ACP (advance care planning) 11/3/2010     Acute coronary syndrome (H) 11/22/2019     Acute exacerbation of CHF (congestive heart failure) (H) 11/11/2019     Acute on chronic systolic (congestive) heart failure (H) 1/28/2020     Anemia of chronic disease 1/5/2013     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, left leg (H) 1/15/2020     BPPV (benign paroxysmal positional vertigo) 5/31/2018     Candidiasis 3/1/2020     Carotid stenosis, right 7/13/2016     Chest pain 11/11/2019     Chronic bilateral low back pain without sciatica 1/17/2018     Chronic pain disorder 10/1/2017     Constipation 3/20/2020     COPD (chronic obstructive pulmonary disease) (H) 6/24/2020     Coronary atherosclerosis 2/6/2009     Cubital tunnel syndrome on left 11/13/2012     Depressive disorder      Diabetes mellitus (H)      Diabetes mellitus type 2 in obese (H) 7/13/2006     Diabetes mellitus type 2 in obese (H) 7/13/2006     Drug-seeking behavior 4/4/2020     Failure to thrive in adult 9/3/2020     Hereditary and idiopathic peripheral neuropathy 4/24/2007     Hyperlipidemia with target low density lipoprotein (LDL) cholesterol less than 70 mg/dL 5/30/2007     Hypertension 10/14/2015     Hypothyroidism 12/10/2010     Irritable bowel syndrome 9/7/2015     Ischemic cardiomyopathy 9/20/2015      Long-term use of high-risk medication 4/14/2020     Moniliasis, 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     Myocardial infarction (H)      Neuromuscular disorder (H)      Other specified postprocedural states 10/8/2015     Pain medication agreement 4/20/2013     Panic disorder with agoraphobia 4/14/2020     Paroxysmal atrial fibrillation (H) 10/2/2015     Personality disorder (H) 3/5/2020     Polymyalgia rheumatica (H) 11/28/2018     Posttraumatic stress disorder 3/1/2020     Restless legs syndrome (RLS) 8/29/2007     Restless legs syndrome (RLS) 8/29/2007     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     Serum calcium elevated 3/1/2020     Somatic dysfunction of sacral region 1/17/2018     Subacromial bursitis of left shoulder joint 8/6/2018     Suicidal ideation 4/1/2020     Thyroid disease      TIA (transient ischemic attack) 5/4/2018     TIA (transient ischemic attack) 5/4/2018     Tobacco abuse 2/17/2017     Tobacco abuse 2/17/2017     Urinary incontinence, mixed 9/24/2017     UTI (urinary tract infection) 4/17/2020     Vitamin B12 deficiency 2/14/2018     Weakness 12/17/2019       Past Surgical History:    Past Surgical History:   Procedure Laterality Date     CARDIAC SURGERY      stents x 11     CARDIAC SURGERY       CHOLECYSTECTOMY       CHOLECYSTECTOMY       GENITOURINARY SURGERY      Tubal ligation     OTHER SURGICAL HISTORY      Genitourinary surgery     RELEASE CARPAL TUNNEL       RELEASE CARPAL TUNNEL         Family History:    History reviewed. No pertinent family history.    Social History:  Marital Status:   [2]  Social History     Tobacco Use     Smoking status: Never     Smokeless tobacco: Never   Substance Use Topics     Alcohol use: Not Currently     Drug use: Never        Medications:    acetaminophen (TYLENOL) 325 MG tablet  acetaminophen (TYLENOL) 500 MG tablet  albuterol (PROVENTIL) (2.5 MG/3ML) 0.083% neb solution  aspirin 81 MG EC tablet  bisacodyl  (DULCOLAX) 10 MG suppository  cholecalciferol (VITAMIN D3) 10 mcg (400 units) TABS tablet  cyanocobalamin (CYANOCOBALAMIN) 1000 MCG/ML injection  divalproex sodium delayed-release (DEPAKOTE) 250 MG DR tablet  DULoxetine HCl 40 MG CPEP  fluticasone-vilanterol (BREO ELLIPTA) 200-25 MCG/INH inhaler  furosemide (LASIX) 20 MG tablet  gabapentin (NEURONTIN) 300 MG capsule  guaiFENesin (ROBITUSSIN) 100 MG/5ML liquid  levothyroxine (SYNTHROID/LEVOTHROID) 100 MCG tablet  Melatonin 10 MG TABS tablet  metoprolol succinate ER (TOPROL-XL) 25 MG 24 hr tablet  miconazole (MICATIN) 2 % AERP powder  mirtazapine (REMERON) 30 MG tablet  polyethylene glycol (MIRALAX) 17 GM/Dose powder  potassium chloride ER (MICRO-K) 10 MEQ CR capsule  prazosin (MINIPRESS) 1 MG capsule  QUEtiapine (SEROQUEL) 25 MG tablet  rosuvastatin (CRESTOR) 10 MG tablet  sennosides (SENOKOT) 8.6 MG tablet  Vitamin D3 (CHOLECALCIFEROL) 25 mcg (1000 units) tablet          Review of Systems   All other systems reviewed and are negative.      Physical Exam   BP: 101/69  Pulse: 70  Temp: 98.2  F (36.8  C)  Resp: 16  Weight: 95.8 kg (211 lb 3.2 oz)  SpO2: 92 %      Physical Exam  Vitals and nursing note reviewed.   Constitutional:       General: She is not in acute distress.     Appearance: She is obese. She is not diaphoretic.   HENT:      Head: Atraumatic.      Mouth/Throat:      Pharynx: No oropharyngeal exudate.   Eyes:      General: No scleral icterus.     Pupils: Pupils are equal, round, and reactive to light.   Cardiovascular:      Rate and Rhythm: Normal rate and regular rhythm.      Heart sounds: Normal heart sounds.   Pulmonary:      Effort: No respiratory distress.      Breath sounds: Normal breath sounds.   Chest:      Chest wall: Tenderness present. No crepitus.      Comments: Reproduces pain  Abdominal:      General: Bowel sounds are normal.      Palpations: Abdomen is soft.      Tenderness: There is no abdominal tenderness.   Musculoskeletal:          General: No tenderness.   Skin:     General: Skin is warm.      Findings: No rash.   Neurological:      Mental Status: She is alert.         ED Course                 Procedures              EKG Interpretation:      Interpreted by Yris Curiel DO  Time reviewed: 2221  Symptoms at time of EKG: Chest pain   Rhythm: paced  Rate: 70 bpm    Clinical Impression: paced rhythm                Critical Care time:  none               Results for orders placed or performed during the hospital encounter of 11/11/22 (from the past 24 hour(s))   CBC with Platelets & Differential    Narrative    The following orders were created for panel order CBC with Platelets & Differential.  Procedure                               Abnormality         Status                     ---------                               -----------         ------                     CBC with platelets and d...[[id number]]  Abnormal            Final result                 Please view results for these tests on the individual orders.   Basic metabolic panel   Result Value Ref Range    Sodium 138 133 - 144 mmol/L    Potassium 4.4 3.4 - 5.3 mmol/L    Chloride 108 94 - 109 mmol/L    Carbon Dioxide (CO2) 25 20 - 32 mmol/L    Anion Gap 5 3 - 14 mmol/L    Urea Nitrogen 24 7 - 30 mg/dL    Creatinine 0.98 0.52 - 1.04 mg/dL    Calcium 9.3 8.5 - 10.1 mg/dL    Glucose 156 (H) 70 - 99 mg/dL    GFR Estimate 63 >60 mL/min/1.73m2   Troponin I   Result Value Ref Range    Troponin I High Sensitivity 19 <54 ng/L   CBC with platelets and differential   Result Value Ref Range    WBC Count 7.2 4.0 - 11.0 10e3/uL    RBC Count 3.11 (L) 3.80 - 5.20 10e6/uL    Hemoglobin 9.6 (L) 11.7 - 15.7 g/dL    Hematocrit 29.8 (L) 35.0 - 47.0 %    MCV 96 78 - 100 fL    MCH 30.9 26.5 - 33.0 pg    MCHC 32.2 31.5 - 36.5 g/dL    RDW 13.9 10.0 - 15.0 %    Platelet Count 128 (L) 150 - 450 10e3/uL    % Neutrophils 62 %    % Lymphocytes 29 %    % Monocytes 6 %    % Eosinophils 2 %    % Basophils 0 %    %  Immature Granulocytes 1 %    NRBCs per 100 WBC 0 <1 /100    Absolute Neutrophils 4.5 1.6 - 8.3 10e3/uL    Absolute Lymphocytes 2.1 0.8 - 5.3 10e3/uL    Absolute Monocytes 0.5 0.0 - 1.3 10e3/uL    Absolute Eosinophils 0.2 0.0 - 0.7 10e3/uL    Absolute Basophils 0.0 0.0 - 0.2 10e3/uL    Absolute Immature Granulocytes 0.1 <=0.4 10e3/uL    Absolute NRBCs 0.0 10e3/uL   Extra Tube    Narrative    The following orders were created for panel order Extra Tube.  Procedure                               Abnormality         Status                     ---------                               -----------         ------                     Extra Red Top Tube[[id number]]                               Final result                 Please view results for these tests on the individual orders.   Extra Red Top Tube   Result Value Ref Range    Hold Specimen     XR Chest Port 1 View    Narrative    EXAM: CHEST SINGLE VIEW PORTABLE  LOCATION: [facility]  DATE/TIME: 11/11/2022 10:50 PM    INDICATION: Chest pain.  COMPARISON: 10/1/2022.    FINDINGS: Hypoinflated lungs. The lungs are clear. Normal-sized cardiac silhouette. Left anterior chest wall cardiac device with lead tips in the right atrium and ventricle and two epicardial pacing wires. Prior median sternotomy. A nonspecific 3 cm long   thin radiodense curvilinear wire-like structure again projects over the right side of the heart.      Impression    IMPRESSION: No evidence of active cardiopulmonary disease.    Troponin I   Result Value Ref Range    Troponin I High Sensitivity 19 <54 ng/L   CBC with platelets differential    Narrative    The following orders were created for panel order CBC with platelets differential.  Procedure                               Abnormality         Status                     ---------                               -----------         ------                     CBC with platelets and d...[[id number]]  Abnormal            Final  result                 Please view results for these tests on the individual orders.   Troponin I   Result Value Ref Range    Troponin I High Sensitivity 19 <54 ng/L   CBC with platelets and differential   Result Value Ref Range    WBC Count 6.8 4.0 - 11.0 10e3/uL    RBC Count 2.76 (L) 3.80 - 5.20 10e6/uL    Hemoglobin 8.5 (L) 11.7 - 15.7 g/dL    Hematocrit 27.5 (L) 35.0 - 47.0 %     78 - 100 fL    MCH 30.8 26.5 - 33.0 pg    MCHC 30.9 (L) 31.5 - 36.5 g/dL    RDW 14.2 10.0 - 15.0 %    Platelet Count 114 (L) 150 - 450 10e3/uL    % Neutrophils 59 %    % Lymphocytes 31 %    % Monocytes 7 %    % Eosinophils 2 %    % Basophils 0 %    % Immature Granulocytes 1 %    NRBCs per 100 WBC 0 <1 /100    Absolute Neutrophils 4.1 1.6 - 8.3 10e3/uL    Absolute Lymphocytes 2.1 0.8 - 5.3 10e3/uL    Absolute Monocytes 0.5 0.0 - 1.3 10e3/uL    Absolute Eosinophils 0.2 0.0 - 0.7 10e3/uL    Absolute Basophils 0.0 0.0 - 0.2 10e3/uL    Absolute Immature Granulocytes 0.1 <=0.4 10e3/uL    Absolute NRBCs 0.0 10e3/uL   XR Chest Port 1 View    Narrative    EXAM: XR CHEST PORT 1 VIEW  LOCATION: [facility]  DATE/TIME: 11/12/2022 4:16 AM    INDICATION: Chest pain  COMPARISON: 11/11/2022      Impression    IMPRESSION: Mildly enlarged cardiac silhouette status post sternotomy and CABG. Left-sided multi chamber cardiac pacer device. Radiopaque device projects over the left heart likely a pulmonary artery pressure monitor. No evidence of pneumonia or heart   failure. No pneumothorax.       Medications   0.9% sodium chloride BOLUS (0 mLs Intravenous Stopped 11/12/22 0000)   sodium chloride 0.9% infusion (0 mLs Intravenous Stopped 11/12/22 0150)   0.9% sodium chloride BOLUS (0 mLs Intravenous Stopped 11/12/22 0243)       Assessments & Plan (with Medical Decision Making)  Letty is a 66-year-old female presenting to the emergency room via EMS from nursing home for concern of chest pain.  See history of focused physical  exam as above  66-year-old female, not diaphoretic, does not appear to be in any acute distress.  Is vitally stable and afebrile.  Pain was reproducible with palpation, which leads me to suspect this is less likely a cardiac cause.  EKG was obtained on arrival, which shows a paced rhythm without any acute ST changes.  She had been given aspirin and nitro in route without any relief.  Will complete work-up with labs and chest x-ray.  We will also give IV morphine for pain since she did not have relief with anything given by EMS  Labs and imaging as above.  No acute worrisome findings on initial work-up.  We will keep the patient and monitor, collect a second troponin after 2 hours.  Did note that she had a drop in blood pressure after being given initial dose of morphine, will hold any additional doses of morphine and was given a 500 mL fluid bolus  After 500 mL fluid bolus, continued to be hypotensive, given a second 500 mL bolus.  She remains pain-free.  After continued monitoring, noticed repeat hypotension.  Ordered for a second full liter of IV fluids to be given.  Patient did come with paperwork from nursing home, is DNR/DNI.  Will continue fluid resuscitation and monitoring at this time  See vitals and nurse charting.  Patient continued to be awake, alert, and stable other than blood pressure on reassessments.  She had multiple pressures with MAP below 65, but otherwise remained quite stable.  Did not want to fluid overload her, but did continue to give IV fluids, discontinued any additional doses of morphine, has not needed any further doses for her symptoms.  Continue to monitor throughout the night, as there were no beds available for admission, but did not think patient required transfer to a higher level of care since she is DNR/DNI and may be reacting to medications given earlier.  Repeated lab work and x-ray, did not notice significant change.  Eventually, did order for peripheral Levophed with thoughts  of needing to transfer the patient, but before this was started she did have an improvement in her blood pressures, and serial pressures remained with MAP above 65 and she was improved.  At this time, the she has been monitored for over 9 hours, has had no change other than improvement of her initial pain from when she came in, and can be safely sent back to nursing home.  EMS was called for transport she left the department in no distress     I have reviewed the nursing notes.    I have reviewed the findings, diagnosis, plan and need for follow up with the patient.       Discharge Medication List as of 11/12/2022  6:27 AM          Final diagnoses:   Chest pain, unspecified type   Hypotension due to drugs   Anemia in other chronic diseases classified elsewhere   Chronic pain disorder   S/P CABG x 5       11/11/2022   [facility] EMERGENCY DEPT     Yris Curiel DO  11/12/22 7840

## 2022-11-12 NOTE — ED NOTES
Attempted to contact [facility] to update them on ED course of care, patient's disposition, and pending return. Unable to reach live individual. Voicemail left for facility staff.    0628: Able to reach Peter at [facility]. Updated regarding course of care, disposition, and pending return. Awaiting ambulance ETA.

## 2022-11-12 NOTE — ED NOTES
Bed: ED12  Expected date: 11/11/22  Expected time: 10:01 PM  Means of arrival: Ambulance  Comments:  Held for EMS

## 2022-11-12 NOTE — ED TRIAGE NOTES
PT was sent over from [facility]. She has chest pain that started around 0900. Has a history of MI's and stents. PT was given 324 of aspirin by ems and 1 nitroglycerin by EMS and [place]

## 2022-11-12 NOTE — DISCHARGE INSTRUCTIONS
Your work-up today did not show any acute worrisome cause for your chest pain    Your blood counts (hemoglobin) was low, but not low enough to require a blood transfusion.  Your primary doctor can continue to monitor this    While you were in the emergency room, your blood pressure was low for period of time.  This may have been due to medications given for pain such as the nitro or morphine.  After receiving fluids and monitoring, your blood pressure improved.  Continue to monitor your blood pressure at home    If you develop any new or worsening symptoms do not hesitate to return to the emergency room for evaluation.  Otherwise follow-up with your primary provider in the outpatient clinic

## 2022-11-14 ENCOUNTER — PATIENT OUTREACH (OUTPATIENT)
Dept: GERIATRIC MEDICINE | Facility: CLINIC | Age: 67
End: 2022-11-14

## 2022-11-14 NOTE — PROGRESS NOTES
[facility] Care Coordination Contact  CC received notification of Emergency Room visit.  ER visit occurred on 11/12/22 at [facility] with Dx of chest pain unspecific.     CC contacted Altagracia Saenz is back to baseline.   Member has a follow-up appointment with PCP: Yes: scheduled on Will see NP at Facility  Member has had a change in condition: No  New referrals placed: No  Home Visit Needed: No  Care plan reviewed and updated.  PCP notified of ED visit via EMR.    Esperanza Damon RN  Care Coordinator-Long Term Care  [address] [address]  [email or web address]   [email or web address]     Office: [phone]   Fax: [phone]

## 2022-12-08 ENCOUNTER — HOSPITAL ENCOUNTER (EMERGENCY)
Facility: CLINIC | Age: 67
Discharge: HOME | End: 2022-12-08
Attending: FAMILY MEDICINE | Admitting: FAMILY MEDICINE
Payer: COMMERCIAL

## 2022-12-08 ENCOUNTER — PATIENT OUTREACH (OUTPATIENT)
Dept: GERIATRIC MEDICINE | Facility: CLINIC | Age: 67
End: 2022-12-08

## 2022-12-08 ENCOUNTER — APPOINTMENT (OUTPATIENT)
Dept: GENERAL RADIOLOGY | Facility: CLINIC | Age: 67
End: 2022-12-08
Attending: FAMILY MEDICINE
Payer: COMMERCIAL

## 2022-12-08 VITALS
RESPIRATION RATE: 14 BRPM | WEIGHT: 208.6 LBS | HEART RATE: 62 BPM | TEMPERATURE: 98.2 F | SYSTOLIC BLOOD PRESSURE: 118 MMHG | OXYGEN SATURATION: 96 % | BODY MASS INDEX: 45.14 KG/M2 | DIASTOLIC BLOOD PRESSURE: 71 MMHG

## 2022-12-08 DIAGNOSIS — R07.9 CHEST PAIN, UNSPECIFIED TYPE: ICD-10-CM

## 2022-12-08 LAB
ALBUMIN SERPL-MCNC: 3.1 G/DL (ref 3.4–5)
ALP SERPL-CCNC: 51 U/L (ref 40–150)
ALT SERPL W P-5'-P-CCNC: 26 U/L (ref 0–50)
ANION GAP SERPL CALCULATED.3IONS-SCNC: 6 MMOL/L (ref 3–14)
AST SERPL W P-5'-P-CCNC: 18 U/L (ref 0–45)
BASOPHILS # BLD AUTO: 0 10E3/UL (ref 0–0.2)
BASOPHILS NFR BLD AUTO: 0 %
BILIRUB SERPL-MCNC: 0.2 MG/DL (ref 0.2–1.3)
BUN SERPL-MCNC: 32 MG/DL (ref 7–30)
CALCIUM SERPL-MCNC: 9.5 MG/DL (ref 8.5–10.1)
CHLORIDE BLD-SCNC: 109 MMOL/L (ref 94–109)
CO2 SERPL-SCNC: 25 MMOL/L (ref 20–32)
CREAT SERPL-MCNC: 1.18 MG/DL (ref 0.52–1.04)
EOSINOPHIL # BLD AUTO: 0.2 10E3/UL (ref 0–0.7)
EOSINOPHIL NFR BLD AUTO: 2 %
ERYTHROCYTE [DISTWIDTH] IN BLOOD BY AUTOMATED COUNT: 14.2 % (ref 10–15)
GFR SERPL CREATININE-BSD FRML MDRD: 50 ML/MIN/1.73M2
GLUCOSE BLD-MCNC: 194 MG/DL (ref 70–99)
HCT VFR BLD AUTO: 30.9 % (ref 35–47)
HGB BLD-MCNC: 9.7 G/DL (ref 11.7–15.7)
IMM GRANULOCYTES # BLD: 0 10E3/UL
IMM GRANULOCYTES NFR BLD: 1 %
LYMPHOCYTES # BLD AUTO: 2.3 10E3/UL (ref 0.8–5.3)
LYMPHOCYTES NFR BLD AUTO: 29 %
MCH RBC QN AUTO: 30.2 PG (ref 26.5–33)
MCHC RBC AUTO-ENTMCNC: 31.4 G/DL (ref 31.5–36.5)
MCV RBC AUTO: 96 FL (ref 78–100)
MONOCYTES # BLD AUTO: 0.5 10E3/UL (ref 0–1.3)
MONOCYTES NFR BLD AUTO: 6 %
NEUTROPHILS # BLD AUTO: 5 10E3/UL (ref 1.6–8.3)
NEUTROPHILS NFR BLD AUTO: 62 %
NRBC # BLD AUTO: 0 10E3/UL
NRBC BLD AUTO-RTO: 0 /100
PLATELET # BLD AUTO: 144 10E3/UL (ref 150–450)
POTASSIUM BLD-SCNC: 4.3 MMOL/L (ref 3.4–5.3)
PROT SERPL-MCNC: 7 G/DL (ref 6.8–8.8)
RBC # BLD AUTO: 3.21 10E6/UL (ref 3.8–5.2)
SODIUM SERPL-SCNC: 140 MMOL/L (ref 133–144)
TROPONIN I SERPL HS-MCNC: 27 NG/L
WBC # BLD AUTO: 8 10E3/UL (ref 4–11)

## 2022-12-08 PROCEDURE — 250N000013 HC RX MED GY IP 250 OP 250 PS 637: Performed by: FAMILY MEDICINE

## 2022-12-08 PROCEDURE — 85025 COMPLETE CBC W/AUTO DIFF WBC: CPT | Performed by: FAMILY MEDICINE

## 2022-12-08 PROCEDURE — 84484 ASSAY OF TROPONIN QUANT: CPT | Performed by: FAMILY MEDICINE

## 2022-12-08 PROCEDURE — 99285 EMERGENCY DEPT VISIT HI MDM: CPT | Mod: 25 | Performed by: FAMILY MEDICINE

## 2022-12-08 PROCEDURE — 36415 COLL VENOUS BLD VENIPUNCTURE: CPT | Performed by: FAMILY MEDICINE

## 2022-12-08 PROCEDURE — 80053 COMPREHEN METABOLIC PANEL: CPT | Performed by: FAMILY MEDICINE

## 2022-12-08 PROCEDURE — 93010 ELECTROCARDIOGRAM REPORT: CPT | Performed by: FAMILY MEDICINE

## 2022-12-08 PROCEDURE — 93005 ELECTROCARDIOGRAM TRACING: CPT | Performed by: FAMILY MEDICINE

## 2022-12-08 PROCEDURE — 71045 X-RAY EXAM CHEST 1 VIEW: CPT

## 2022-12-08 RX ORDER — OXYCODONE HYDROCHLORIDE 5 MG/1
5 TABLET ORAL ONCE
Status: COMPLETED | OUTPATIENT
Start: 2022-12-08 | End: 2022-12-08

## 2022-12-08 RX ADMIN — ACETAMINOPHEN 640 MG: 160 SUSPENSION ORAL at 02:09

## 2022-12-08 RX ADMIN — OXYCODONE HYDROCHLORIDE 5 MG: 5 TABLET ORAL at 02:09

## 2022-12-08 ASSESSMENT — ACTIVITIES OF DAILY LIVING (ADL)
ADLS_ACUITY_SCORE: 35
ADLS_ACUITY_SCORE: 35

## 2022-12-08 NOTE — ED PROVIDER NOTES
[facility] ED Provider Note   Patient: Letty Escobar  MRN #:  [id number]  Date of Visit: December 8, 2022    CC:     Chief Complaint   Patient presents with     Chest Pain     HPI:  Letty Escobar is a 67 year old female who presented to the emergency department by EMS with recurrent chest pain that started around 11 PM last night.  Patient has had several visits to the emergency department with unexplained chest pains.  These typically occur at night and do not occur during the day.  Patient states that the pain awakened her at around 11 PM.  Pain does not radiate, and does not cause any nausea, vomiting, diaphoresis.  She has an implantable cardioverter defibrillator in place for history of chronic atrial fibrillation.  Patient has had previous significant coronary disease including unstable angina, and 5 vessel CABG in 2015.    Problem List:  Patient Active Problem List    Diagnosis Date Noted     Chronic kidney disease, stage 3a (H) 10/16/2022     Priority: Medium     SOB (shortness of breath) 04/07/2022     Priority: Medium     Morbid obesity (H) 12/10/2021     Priority: Medium     ICD (implantable cardioverter-defibrillator) in place 11/17/2021     Priority: Medium     Formatting of this note is different from the original.  Date of last device in office evaluation: 5/17/2022    ?  and model: Medtronic Cobalt CRT-D.   Date of implant: 5/7/2021  Tachy therapies remain OFF: S/p downgrade from ICD to pacemaker, but her implanted system includes a DF-4 lead, so an ICD generator was placed with the tachycardia therapies disabled.     ? Indication for device: Ischemic cardiomyopathy   ? Cardiac resynchronization therapy:   no      MRI Conditional:  No  o If No:  Reason why:  Abandoned LV lead    ? Battery longevity documented as less than 3  Months: No  ? Are any of the leads less than 3 months old:  No    ? Programming              ?  Pacing mode and programmed lower rate: DDDR 60 - 130 bpm              ? Rate-responsive sensor type, if programmed on: Accelerometer        Underlying rhythm and heart rate:  SR @ 60 bpm    ? What is the response of this device to magnet placement:  None - tachy therapies off  ? PM magnet pacing rate: N/A   ? Any alert status on CIED generator or lead: No    ? Last pacing threshold    Atrial   1.0 V @ 0.4 ms   Ventricular RV: 0.75 V @ 0.4 ms  LV:  2.75 V @ 1.0 ms       Chronic atrial fibrillation (H) 04/06/2021     Priority: Medium     Cardiac pacemaker in situ 04/06/2021     Priority: Medium     Major depressive disorder, recurrent severe without psychotic features (H) 01/26/2021     Priority: Medium     Panic disorder with agoraphobia 04/14/2020     Priority: Medium     Constipation 03/20/2020     Priority: Medium     Personality disorder (H) 03/05/2020     Priority: Medium     Rule out dependent personality    Formatting of this note might be different from the original.  Rule out dependent personality  Formatting of this note might be different from the original.  Rule out dependent personality       Candidiasis 03/01/2020     Priority: Medium     Posttraumatic stress disorder 03/01/2020     Priority: Medium     Chronic obstructive pulmonary disease, unspecified (H) 01/29/2020     Priority: Medium     Long term (current) use of insulin (H) 01/29/2020     Priority: Medium     Acute on chronic systolic (congestive) heart failure (H) 01/28/2020     Priority: Medium     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, left leg (H) 01/15/2020     Priority: Medium     Atypical chest pain 11/11/2019     Priority: Medium     Polymyalgia rheumatica (H) 11/28/2018     Priority: Medium     Unstable angina (H) 05/02/2018     Priority: Medium     Coronary angiogram 05/02/2018 demonstrated 30% stenosis in the LMCA, 50% stenosis in the Proximal LAD, 50% stenosis in the Mid LAD, 95% stenosis  in the Proximal Circumflex (Restenosis - Balloon Angioplasty), 100% stenosis in the 1st Marginal, 50% stenosis in the Proximal RCA, 50% stenosis in the Distal RCA, the LIMA graft from the LIMA to the Distal LAD is free of significant disease; the SVG graft from the Aorta to the 1st Marginal is free of significant disease; the SVG graft from the Aorta to the Distal RCA is free of significant disease. Intervention included a successful  2.5mm x 12mm Balloon,  2.5mm x 10mm Cutting Balloon,  3mm x 12mm Drug Eluting Stent,  3mm x 12mm Balloon, and  3mm x 8mm Balloon to Proximal Circumflex, post stenosis 0%.    Formatting of this note is different from the original.  Coronary angiogram 05/02/2018 demonstrated 30% stenosis in the LMCA, 50% stenosis in the Proximal LAD, 50% stenosis in the Mid LAD, 95% stenosis in the Proximal Circumflex (Restenosis - Balloon Angioplasty), 100% stenosis in the 1st Marginal, 50% stenosis in the Proximal RCA, 50% stenosis in the Distal RCA, the LIMA graft from the LIMA to the Distal LAD is free of significant disease; the SVG graft from the Aorta to the 1st Marginal is free of significant disease; the SVG graft from the Aorta to the Distal RCA is free of significant disease. Intervention included a successful  2.5mm x 12mm Balloon,  2.5mm x 10mm Cutting Balloon,  3mm x 12mm Drug Eluting Stent,  3mm x 12mm Balloon, and  3mm x 8mm Balloon to Proximal Circumflex, post stenosis 0%.  Formatting of this note is different from the original.  Coronary angiogram 05/02/2018 demonstrated 30% stenosis in the LMCA, 50% stenosis in the Proximal LAD, 50% stenosis in the Mid LAD, 95% stenosis in the Proximal Circumflex (Restenosis - Balloon Angioplasty), 100% stenosis in the 1st Marginal, 50% stenosis in the Proximal RCA, 50% stenosis in the Distal RCA, the LIMA graft from the LIMA to the Distal LAD is free of significant disease; the SVG graft from the Aorta to the 1st Marginal is free of significant  disease; the SVG graft from the Aorta to the Distal RCA is free of significant disease. Intervention included a successful  2.5mm x 12mm Balloon,  2.5mm x 10mm Cutting Balloon,  3mm x 12mm Drug Eluting Stent,  3mm x 12mm Balloon, and  3mm x 8mm Balloon to Proximal Circumflex, post stenosis 0%.       Vitamin B12 deficiency 02/14/2018     Priority: Medium     Chronic bilateral low back pain without sciatica 01/17/2018     Priority: Medium     Chronic pain disorder 10/01/2017     Priority: Medium     Urinary incontinence, mixed 09/24/2017     Priority: Medium     Internal carotid artery stenosis, bilateral 07/13/2016     Priority: Medium     Carotid US 05/04/2018 showed moderate plaque formation, consistent with 50 to 69% stenosis in the right internal carotid artery, not significantly changed from 8/5/2015.  Moderate plaque formation, consistent with 50 to 69% stenosis in the left internal carotid artery; there has been mild progression of the left ICA stenosis since 8/5/2015.    Formatting of this note might be different from the original.  Carotid US 05/04/2018 showed moderate plaque formation, consistent with 50 to 69% stenosis in the right internal carotid artery, not significantly changed from 8/5/2015.  Moderate plaque formation, consistent with 50 to 69% stenosis in the left internal carotid artery; there has been mild progression of the left ICA stenosis since 8/5/2015.    Formatting of this note might be different from the original.  Carotid US 05/04/2018 showed moderate plaque formation, consistent with 50 to 69% stenosis in the right internal carotid artery, not significantly changed from 8/5/2015.  Moderate plaque formation, consistent with 50 to 69% stenosis in the left internal carotid artery; there has been mild progression of the left ICA stenosis since 8/5/2015.       Essential (primary) hypertension 10/14/2015     Priority: Medium     Ischemic cardiomyopathy 09/20/2015     Priority: Medium     EF of  40-45%, status post RV lead revision and LV epicardial lead placement via mini-thoracotomy in August 2016.    Formatting of this note might be different from the original.  EF of 40-45%, status post RV lead revision and LV epicardial lead placement via mini-thoracotomy in August 2016.  Formatting of this note might be different from the original.  EF of 40-45%, status post RV lead revision and LV epicardial lead placement via mini-thoracotomy in August 2016.       S/P CABG x 5 08/21/2015     Priority: Medium     Pain medication agreement 04/20/2013     Priority: Medium     Controlled substance agreement for percocet #30/month on file and signed 4/17/13.  Designated pharmacy: [facility] Prescribing physician: Andrea Diagnosis: Ulnar neuropathy    Formatting of this note might be different from the original.  Controlled substance agreement for percocet #30/month on file and signed 4/17/13.  Designated pharmacy: [facility] Prescribing physician: Andrea Diagnosis: Ulnar neuropathy       Anemia in other chronic diseases classified elsewhere 01/05/2013     Priority: Medium     Hypothyroidism, unspecified 12/10/2010     Priority: Medium     ACP (advance care planning) 11/03/2010     Priority: Medium     Formatting of this note might be different from the original.  Patient has identified Health Care Agent(s): Yes  Add Health Care Agents: Yes    Health Care Agent(s):    Primary Health Care Agent:     Edwar Escobar Relationship:    Spouse Phone:     [phone]    Secondary Health Care Agent:     Chiki Oro Relationship:     Son Phone:     [phone]      Patient has Advance Care Plan Documents (Health Care Directive, POLST): Yes    Advance Care Plan Documents:  Health Care Directive--03/28/11  Resuscitation Guidelines--    Patient has identified Specific Treatment Preferences: Yes   Specific Treatment Preferences:   a.) Code Status:  DNR/ Do Not Attempt Resuscitation - Allow a Natural Death    b.) Goals of Treatment:    "  ii. Limited Interventions and treat reversible conditions.  Provide interventions aimed at treatment of new or reversible illness/injury or non-life threatening chronic conditions. Duration of invasive or uncomfortable interventions should generally be limited.- Trial of intubation 5 days or other instructions \"If no improvement, stop.\"  c.) Interventions and Treatments:   i.   Antibiotics:          - Aggressive antibiotics  ii.  Nutrition/Hydration:         - Offer food and liquids by mouth         - IV fluid administration         - No feeding tube under any circumstance.  iii. Transfusion:          - Blood products for comfort/relief of symptoms only  iv. Dialysis:           - Dialysis for short term \"for recovery, but not long term.\"        Last Assessment & Plan:   Advance Care Planning: Disease-specific Session    Letty Escobar is an [facility] patient of Dr. Renea Mendez of the [facility].    Advance care planning discussions were completed with Letty and her healthcare agent, spouse Edwar Escobar on Monday, March 28, 2011 at the [facility].    Understanding of Illness and Disease [place]:   Letty identifies her medical condition as diabetes with peripheral neuropathy, heart problems with a heart attack February 2009 plus 4 stent placements; sleep apnea; depression; hypothyroid; high cholesterol; tobacco use; and describes it as needing further assistance with thyroid and diabetes management.  She identifies the following symptoms of her medical condition as being the most bothersome: some memory loss, balance problems, light headedness and dizziness, puffy eyes and lower extremity edema from time to time.    Let[place] is retired [place] .  She is independent with all cares and lives an active life style although, \"I'm not as active as I used to be.\"    Goals of Care:   Letty currently hopes to maintain " "independence, control pain and symptoms and delay progression of, but not cure, the illness.  \"I want to get the diabetes and thyroid under better control.\"  Letty has an appointment the first part of April for follow up.    Quality of Life:    Letty identifies quality of life as family involvement twice weekly, [employer] activities, newly assigned  , playing cards, the computer,    Letty christiano with serious challenges in her life through her ganesh in God, prayers and support of her [employer] family, spouse and son.    Letty identifies the following fears and worries about her medical care:  \"I just want the diabetes straightened out.\"    Treatment and Care Preferences:   Past experiences in dealing with family and/or friends that have  or been seriously ill include her brother who took his own life.  \"He was 53 years old and living with us.  I found him.\"   As a result of these experiences, Letty expresses these health care preferences:      Summary Letty's Treatment Preferences:  LOW SURVIVAL; HIGH TREATMENT BURDEN:  If Letty suffered a serious complication, such that she was facing a prolonged hospital stay, required ongoing medical interventions, and the chance of living through the complication was low (for example, only 5 out of 100 would live), Letty would choose:  to focus treatment on comfort and quality of life (\"Quality of life is more important than length of life to Letty.\")  COMMENT:  \"If I can't live a normal life, I wouldn't want to live.\"    HIGH SURVIVAL; LOW FUNCTIONAL STATUS:  If Letty had a serious complication and had a good chance of living through the complication but it was expected that she would never be able to walk or talk again and would require 24 hour nursing care, she would choose:  to focus treatment on comfort and quality of life (\"Quality of life is more important than length of life to Letty.\")  COMMENT:  \"I'd accept rehabilitation.\"    HIGH SURVIVAL; LOW " "COGNITIVE STATUS:  If Letty had a serious complication and had a good chance of living through the complication but it was expected that she would never know who she was or who she was with and would require 24 hour nursing care, she would choose:  to focus treatment on comfort and quality of life (\"Quality of life is more important than length of life to Letty.\")    CARDIO-PULMONARY RESUSCITATION (CPR):  The facts, risks and benefits of CPR were discussed with Letty.  If she had a sudden event that caused her heart and breathing to stop, she:  WOULD NOT want CPR attempted and instead would prefer that a natural death occur.    MECHANICAL VENTILATION: If Letty had an episode where she was unable to breathe on her own, she would choose the following:  attempt to use any appropriate non-invasive method to assist breathing, and use mechanical ventilation.  COMMENT:  \"If reversible ventilator is acceptable for 5 days.  If no improvement, stop.\"     Letty has chosen her healthcare agent to:  strictly follow her wishes.    Follow Up Plan:   Letty was encouraged to continue advance care planning discussions with her health care agents and primary care provider.   Letty and her health care agent declined handouts.     Documents addressed during this advance care planning session:  1.  Health Care Agents identified.          .  Primary health care agent is spouse, Edwar Escobar;          .  Secondary health care agent is son, Jermaine Oro.  2.  Health Care Directive completed and scanned into medical record.  3.  Statement of Treatment Preferences for advanced illness completed and scanned into the medical record.  4.  Resuscitation Guidelines completed for DNR.  Document sent to Dr. Renea Mendez for signature and returned to the home. Letty, please place on the refrigerator.    Recommendations/Plan:   Letty and her health care agent to review Advance Care Plan.     Letty would benefit from:   Care Navigation/Care " Navigation Help Desk--explained services for pending future needs.  No identified needs today.  Care Navigation brochure was given to Letty and her healthcare agent.    Advance Care Planning recommendations and Letty's concerns and questions were cc ed to her primary provider.     Thank you, Letty for the opportunity of assisting with Advance Care Planning. It was a seeing you again and meeting Edwar.  Please contact me if I can be of further assistance.    Interviewer: Pat Martin RN    Advance Care Planning Facilitator  [phone]   3/28/2011       ELI (obstructive sleep apnea) 01/31/2010     Priority: Medium     PSG on April/2008 that showed moderate Obstructive Sleep Apnea with an AHI of 23.5 . Limb movements persisted at 29 movements/hour at optimal pressures, despite carbidopa use.    Formatting of this note might be different from the original.  PSG on April/2008 that showed moderate Obstructive Sleep Apnea with an AHI of 23.5 . Limb movements persisted at 29 movements/hour at optimal pressures, despite carbidopa use.  Formatting of this note might be different from the original.  PSG on April/2008 that showed moderate Obstructive Sleep Apnea with an AHI of 23.5 . Limb movements persisted at 29 movements/hour at optimal pressures, despite carbidopa use.       Coronary atherosclerosis 02/06/2009     Priority: Medium     Formatting of this note might be different from the original.  2/5/2009 - MI - Proximal RCA 99%, mild-mod disease elsewhere.  EF 60%.  PCI:  MYRON to pRCA.  2/12/2009 - admit CP - Widely patent RCA stent. Moderate diffuse CAD. Severe stenosis in trivial PDA branch. LVEF 45%.  1/25/2010 - admit CP - PTCA and stent of diagonal  9/8/2010 - admit CP - LAD patent stent. Moderate diffuse CAD. Medical management recommended.   4/25/2012 - NSTEMI - Acute total occlusion of the ostial Circumflex. Acute total occlusion of the ostial ramus. Acute total occlusion of the distal 1st Obtuse Marginal.  Angioplasty of ostial circumflex and ostial ramus. There was distal embolization to the OM1 vessel. This vessel was small and not amendable to intervention. Indefinite: plavix and asa 81.  8/10/2015 - CABx5 - 1. LIMA to distal LAD, reverse SVG to OM1 and OM2, reverse SVG to diagonal, reverse SVG to PDA.   2. Mitral valve repair with a Medtronics 3-D full ring, 28 mm.   3. Tricuspid repair with a Medtronic 28 mm partial ring  1/12/2016 - TTE - EF 40-45%  Formatting of this note might be different from the original.  2/5/2009 - MI - Proximal RCA 99%, mild-mod disease elsewhere.  EF 60%.  PCI:  MYRON to pRCA.  2/12/2009 - admit CP - Widely patent RCA stent. Moderate diffuse CAD. Severe stenosis in trivial PDA branch. LVEF 45%.  1/25/2010 - admit CP - PTCA and stent of diagonal  9/8/2010 - admit CP - LAD patent stent. Moderate diffuse CAD. Medical management recommended.   4/25/2012 - NSTEMI - Acute total occlusion of the ostial Circumflex. Acute total occlusion of the ostial ramus. Acute total occlusion of the distal 1st Obtuse Marginal. Angioplasty of ostial circumflex and ostial ramus. There was distal embolization to the OM1 vessel. This vessel was small and not amendable to intervention. Indefinite: plavix and asa 81.  8/10/2015 - CABx5 - 1. LIMA to distal LAD, reverse SVG to OM1 and OM2, reverse SVG to diagonal, reverse SVG to PDA.   2. Mitral valve repair with a Medtronics 3-D full ring, 28 mm.   3. Tricuspid repair with a Medtronic 28 mm partial ring  1/12/2016 - TTE - EF 40-45%       Restless legs syndrome 08/29/2007     Priority: Medium     Hyperlipidemia, unspecified 05/30/2007     Priority: Medium       Past Medical History:   Diagnosis Date     ACP (advance care planning) 11/3/2010     Acute coronary syndrome (H) 11/22/2019     Acute exacerbation of CHF (congestive heart failure) (H) 11/11/2019     Acute on chronic systolic (congestive) heart failure (H) 1/28/2020     Anemia of chronic disease 1/5/2013      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, left leg (H) 1/15/2020     BPPV (benign paroxysmal positional vertigo) 5/31/2018     Candidiasis 3/1/2020     Carotid stenosis, right 7/13/2016     Chest pain 11/11/2019     Chronic bilateral low back pain without sciatica 1/17/2018     Chronic pain disorder 10/1/2017     Constipation 3/20/2020     COPD (chronic obstructive pulmonary disease) (H) 6/24/2020     Coronary atherosclerosis 2/6/2009     Cubital tunnel syndrome on left 11/13/2012     Depressive disorder      Diabetes mellitus (H)      Diabetes mellitus type 2 in obese (H) 7/13/2006     Diabetes mellitus type 2 in obese (H) 7/13/2006     Drug-seeking behavior 4/4/2020     Failure to thrive in adult 9/3/2020     Hereditary and idiopathic peripheral neuropathy 4/24/2007     Hyperlipidemia with target low density lipoprotein (LDL) cholesterol less than 70 mg/dL 5/30/2007     Hypertension 10/14/2015     Hypothyroidism 12/10/2010     Irritable bowel syndrome 9/7/2015     Ischemic cardiomyopathy 9/20/2015     Long-term use of high-risk medication 4/14/2020     Moniliasis, 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     Myocardial infarction (H)      Neuromuscular disorder (H)      Other specified postprocedural states 10/8/2015     Pain medication agreement 4/20/2013     Panic disorder with agoraphobia 4/14/2020     Paroxysmal atrial fibrillation (H) 10/2/2015     Personality disorder (H) 3/5/2020     Polymyalgia rheumatica (H) 11/28/2018     Posttraumatic stress disorder 3/1/2020     Restless legs syndrome (RLS) 8/29/2007     Restless legs syndrome (RLS) 8/29/2007     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     Serum calcium elevated 3/1/2020     Somatic dysfunction of sacral region 1/17/2018     Subacromial bursitis of left shoulder joint 8/6/2018     Suicidal ideation 4/1/2020     Thyroid disease      TIA (transient ischemic attack) 5/4/2018     TIA  (transient ischemic attack) 5/4/2018     Tobacco abuse 2/17/2017     Tobacco abuse 2/17/2017     Urinary incontinence, mixed 9/24/2017     UTI (urinary tract infection) 4/17/2020     Vitamin B12 deficiency 2/14/2018     Weakness 12/17/2019       MEDS: acetaminophen (TYLENOL) 325 MG tablet  acetaminophen (TYLENOL) 500 MG tablet  albuterol (PROVENTIL) (2.5 MG/3ML) 0.083% neb solution  aspirin 81 MG EC tablet  bisacodyl (DULCOLAX) 10 MG suppository  cholecalciferol (VITAMIN D3) 10 mcg (400 units) TABS tablet  cyanocobalamin (CYANOCOBALAMIN) 1000 MCG/ML injection  divalproex sodium delayed-release (DEPAKOTE) 250 MG DR tablet  DULoxetine HCl 40 MG CPEP  fluticasone-vilanterol (BREO ELLIPTA) 200-25 MCG/INH inhaler  furosemide (LASIX) 20 MG tablet  gabapentin (NEURONTIN) 300 MG capsule  guaiFENesin (ROBITUSSIN) 100 MG/5ML liquid  levothyroxine (SYNTHROID/LEVOTHROID) 100 MCG tablet  Melatonin 10 MG TABS tablet  metoprolol succinate ER (TOPROL-XL) 25 MG 24 hr tablet  miconazole (MICATIN) 2 % AERP powder  mirtazapine (REMERON) 30 MG tablet  polyethylene glycol (MIRALAX) 17 GM/Dose powder  potassium chloride ER (MICRO-K) 10 MEQ CR capsule  prazosin (MINIPRESS) 1 MG capsule  QUEtiapine (SEROQUEL) 25 MG tablet  rosuvastatin (CRESTOR) 10 MG tablet  sennosides (SENOKOT) 8.6 MG tablet  Vitamin D3 (CHOLECALCIFEROL) 25 mcg (1000 units) tablet        ALLERGIES:    Allergies   Allergen Reactions     Diphenhydramine Palpitations     Tolerated IV Benadryl when not pushed too fast     Isosorbide Other (See Comments) and Dizziness     Also causes syncope (has fallen before) and brain fog/mental disturbances - please do not prescribe     Nitroglycerin Dizziness, Fatigue and Other (See Comments)     Specifically the patch - please do not prescribe  Specifically the patch - please do not prescribe       Contrast Dye Hives and Itching     Other Drug Allergy (See Comments) Hives and Itching     Bee Pollen      Penicillin G      Adhesive Tape  Rash     Diphenhydramine Hcl Palpitations     Liquid Adhesive Itching     Nystatin Dermatitis     Nystatin (Topical) Dermatitis     Also blisters     Penicillins Swelling and Rash     Occurred as a child - not 100% sure on specific reactions     Sulfa Drugs Other (See Comments)     Occurred as a child / patient does not remember specific reaction       Past Surgical History:   Procedure Laterality Date     CARDIAC SURGERY      stents x 11     CARDIAC SURGERY       CHOLECYSTECTOMY       CHOLECYSTECTOMY       GENITOURINARY SURGERY      Tubal ligation     OTHER SURGICAL HISTORY      Genitourinary surgery     RELEASE CARPAL TUNNEL       RELEASE CARPAL TUNNEL         Social History     Tobacco Use     Smoking status: Never     Smokeless tobacco: Never   Substance Use Topics     Alcohol use: Not Currently     Drug use: Never         Review of Systems   Except as noted in HPI, all other systems were reviewed and are negative    Physical Exam     Vitals were reviewed  Patient Vitals for the past 12 hrs:   BP Temp Temp src Pulse Resp SpO2 Weight   12/08/22 0230 -- -- -- 62 14 96 % --   12/08/22 0215 118/71 -- -- 65 15 98 % --   12/08/22 0200 109/76 -- -- 63 10 96 % --   12/08/22 0130 106/62 -- -- 65 (!) 6 95 % --   12/08/22 0115 113/63 -- -- 69 13 96 % --   12/08/22 0100 111/70 -- -- 73 14 96 % --   12/08/22 0045 109/56 -- -- 69 19 95 % --   12/08/22 0034 -- -- -- -- -- 95 % 94.6 kg (208 lb 9.6 oz)   12/08/22 0033 97/45 98.2  F (36.8  C) Temporal 72 18 -- --   12/08/22 0029 -- -- -- -- 18 -- --     GENERAL APPEARANCE: Alert and oriented x3, no acute distress  FACE: normal facies  EYES: Pupils are equal  HENT: normal external exam  NECK: no adenopathy or asymmetry  CHEST: Anterior chest wall tenderness.  Sternotomy scar, as well as scar over the left upper chest overlying her electronic pacemaker  RESP: normal respiratory effort; clear breath sounds bilaterally  CV: regular rate and rhythm; no significant murmurs, gallops or  rubs  ABD: soft, no tenderness; no rebound or guarding; bowel sounds are normal  MS: no gross deformities noted; normal muscle tone.  EXT: No calf tenderness or pitting edema  SKIN: no worrisome rash  NEURO: no facial droop; no focal deficits, speech is normal        Available Lab/Imaging Results     Results for orders placed or performed during the hospital encounter of 12/08/22 (from the past 24 hour(s))   CBC with platelets differential    Narrative    The following orders were created for panel order CBC with platelets differential.  Procedure                               Abnormality         Status                     ---------                               -----------         ------                     CBC with platelets and d...[[id number]]  Abnormal            Final result                 Please view results for these tests on the individual orders.   Comprehensive metabolic panel   Result Value Ref Range    Sodium 140 133 - 144 mmol/L    Potassium 4.3 3.4 - 5.3 mmol/L    Chloride 109 94 - 109 mmol/L    Carbon Dioxide (CO2) 25 20 - 32 mmol/L    Anion Gap 6 3 - 14 mmol/L    Urea Nitrogen 32 (H) 7 - 30 mg/dL    Creatinine 1.18 (H) 0.52 - 1.04 mg/dL    Calcium 9.5 8.5 - 10.1 mg/dL    Glucose 194 (H) 70 - 99 mg/dL    Alkaline Phosphatase 51 40 - 150 U/L    AST 18 0 - 45 U/L    ALT 26 0 - 50 U/L    Protein Total 7.0 6.8 - 8.8 g/dL    Albumin 3.1 (L) 3.4 - 5.0 g/dL    Bilirubin Total 0.2 0.2 - 1.3 mg/dL    GFR Estimate 50 (L) >60 mL/min/1.73m2   Troponin I   Result Value Ref Range    Troponin I High Sensitivity 27 <54 ng/L   CBC with platelets and differential   Result Value Ref Range    WBC Count 8.0 4.0 - 11.0 10e3/uL    RBC Count 3.21 (L) 3.80 - 5.20 10e6/uL    Hemoglobin 9.7 (L) 11.7 - 15.7 g/dL    Hematocrit 30.9 (L) 35.0 - 47.0 %    MCV 96 78 - 100 fL    MCH 30.2 26.5 - 33.0 pg    MCHC 31.4 (L) 31.5 - 36.5 g/dL    RDW 14.2 10.0 - 15.0 %    Platelet Count 144 (L) 150 - 450 10e3/uL    % Neutrophils 62 %    %  Lymphocytes 29 %    % Monocytes 6 %    % Eosinophils 2 %    % Basophils 0 %    % Immature Granulocytes 1 %    NRBCs per 100 WBC 0 <1 /100    Absolute Neutrophils 5.0 1.6 - 8.3 10e3/uL    Absolute Lymphocytes 2.3 0.8 - 5.3 10e3/uL    Absolute Monocytes 0.5 0.0 - 1.3 10e3/uL    Absolute Eosinophils 0.2 0.0 - 0.7 10e3/uL    Absolute Basophils 0.0 0.0 - 0.2 10e3/uL    Absolute Immature Granulocytes 0.0 <=0.4 10e3/uL    Absolute NRBCs 0.0 10e3/uL   XR Chest Port 1 View    Narrative    EXAM: XR CHEST PORT 1 VIEW  LOCATION: [facility]  DATE/TIME: 12/8/2022 1:12 AM    INDICATION: Chest pain.  COMPARISON: 11/12/2022.    FINDINGS: Sternal wires, mediastinal clips and a left subclavian cardiac device. No pneumothorax. The heart is at the upper limits normal in size. There is no pulmonary edema. Minimal scarring at the left lung base. The lungs are otherwise clear.      Impression    IMPRESSION: No acute abnormality.           EKG reviewed by me: Electronic ventricular pacemaker.  Unchanged from previous.  Insufficient data to assess for ischemia.         Impression     Final diagnoses:   Chest pain         ED Course & Medical Decision Making   Letty Escobar is a 67 year old female who presented to the emergency department by EMS with acute recurrent chest pain that awakened her at around 11 PM last night.  Patient has had several episodes of unexplained chest pains over the last year.  She has had numerous work-ups that have been negative for coronary disease.  She has a significant history of coronary disease with multiple stents in the past, as well as a 5 vessel bypass in 2015.  She has a history of chronic atrial fibrillation, and currently has a pacemaker.  Patient was seen shortly after arrival.  Vital signs were reassuring including blood pressure, heart rate, respiratory rate and oxygen saturation.  She has some anterior chest wall tenderness.  EKG revealed electronic paced rhythm.   Her work-up reveals a white blood count of 8.0, hemoglobin of 9.7, platelet count of 144 these levels are compatible with previous results.  Patient's comprehensive metabolic panel is likewise unchanged from previous levels.  Creatinine is slightly elevated 1.18, similar to what it was 2 months ago.  Troponin is 27.  Glucose tonight is 194.  Chest x-ray reveals no acute abnormality.  Patient informed me that she received bad news earlier in the day, finding out that her granddaughter was diagnosed with MS.  She thinks that this may have worried her more than she thought.  Patient was reassured that her chest pain now and in the past several visits do not appear to be cardiac.  This may be related to chest wall or nerve/scar pain from her previous sternotomy.  Consider taking Tylenol and ibuprofen the next time she is awakened with similar pain.  Patient expressed understanding and agreement with discharge instructions below.  Patient is stable for discharge back to the nursing home.        Written after-visit summary and instructions were given at the time of discharge.    Follow up Plan:   Jay Chapman, NP  [address] [address]  [phone]    In 1 week  Follow-up on recurrent chest pain    [facility] Emergency Dept  [address] [place] [address] [phone]    If symptoms worsen      Discharge Instructions:   We did not find a worrisome cause for your chest pain.  Try taking some Tylenol/ibuprofen the next time you wake up with similar chest pains.  See your primary care provider in 1 week for follow-up of your recurrent chest pain.  You can always return to the emergency department if your symptoms worsen.       Disclaimer: This note consists of words and symbols derived from keyboarding and dictation using voice recognition software.  As a result, there may be errors that have gone undetected.  Please consider this when interpreting information  found in this note.       Monie Weiss MD  12/08/22 [id number]

## 2022-12-08 NOTE — ED NOTES
Patient discharged back to [facility] . Discharge instructions given to patient and [facility]. Patient denied pain upon discharge. She was transported back to the nursing home by [facility]

## 2022-12-08 NOTE — ED TRIAGE NOTES
Patient having substernal chest pain tonight rating it at 8. Patient was given ASA and nitroglycerin from EMS.

## 2022-12-08 NOTE — DISCHARGE INSTRUCTIONS
We did not find a worrisome cause for your chest pain.  Try taking some Tylenol/ibuprofen the next time you wake up with similar chest pains.  See your primary care provider in 1 week for follow-up of your recurrent chest pain.  You can always return to the emergency department if your symptoms worsen.

## 2022-12-09 NOTE — PROGRESS NOTES
[facility] Care Coordination Contact  CC received notification of Emergency Room visit.  ER visit occurred on 128/22 at [facility] with Dx of Chest Pain.    CC contacted Dori at NH states she usually has the chest pains at night and unsure of what is causing the pain.  Her work ups are usually negative for cardiac concerns.  Member has a follow-up appointment with PCP: Yes: scheduled on Will see NP at facility  Member has had a change in condition: No  New referrals placed: No  Home Visit Needed: No  Care plan reviewed and updated.  PCP notified of ED visit via EMR.    Esperanza Damon RN  Care Coordinator-Long Term Care  [address] [address]  [email or web address]   [email or web address]     Office: [phone]   Fax: [phone]

## 2022-12-12 ENCOUNTER — NURSING HOME VISIT (OUTPATIENT)
Dept: GERIATRICS | Facility: CLINIC | Age: 67
End: 2022-12-12
Payer: COMMERCIAL

## 2022-12-12 VITALS
TEMPERATURE: 97.4 F | WEIGHT: 201.4 LBS | SYSTOLIC BLOOD PRESSURE: 118 MMHG | HEART RATE: 74 BPM | BODY MASS INDEX: 43.45 KG/M2 | OXYGEN SATURATION: 96 % | RESPIRATION RATE: 20 BRPM | HEIGHT: 57 IN | DIASTOLIC BLOOD PRESSURE: 64 MMHG

## 2022-12-12 DIAGNOSIS — I10 ESSENTIAL (PRIMARY) HYPERTENSION: ICD-10-CM

## 2022-12-12 DIAGNOSIS — N18.31 CHRONIC KIDNEY DISEASE, STAGE 3A (H): ICD-10-CM

## 2022-12-12 DIAGNOSIS — Z95.0 CARDIAC PACEMAKER IN SITU: ICD-10-CM

## 2022-12-12 DIAGNOSIS — R07.89 ATYPICAL CHEST PAIN: Primary | ICD-10-CM

## 2022-12-12 PROCEDURE — 99310 SBSQ NF CARE HIGH MDM 45: CPT | Performed by: FAMILY MEDICINE

## 2022-12-12 NOTE — PROGRESS NOTES
[facility] GERIATRIC SERVICES    Facility:   [facility] ([facility]) [[id number]]   Code Status: DNR/DNI      CHIEF COMPLAINT/REASON FOR VISIT:  Chief Complaint   Patient presents with     RECHECK       HISTORY:      HPI: Letty is a 67 year old female who currently does reside in long-term care room [facility] and who I was asked to visit with secondary to her most recent visit to the emergency department on December 8, 2022 secondary to recurrent chest pain.  Prior to that she was in the emergency department in November as well regarding atypical chest pain.  Does have a history of A. fib along with a implantable cardioverter defibrillator along with a history of hypertension and CABG and in talking with her today she does admit that the primary reason why she went and and she agrees it was not cardiovascular in nature however it pretty much was anxiety.  She did have a good work-up and the electrolytes were normal along with a BUN of 32 and a creatinine 1.18 and see results below and her troponins were at 27 her hemoglobin 9.7 with a white cell count of 8.0 and platelets 144, chest x-ray did not show any acute abnormality.  The EKG did show ventricular pacemaker.  Her last weight on December 9 was 201 pounds and her last blood pressure on December 7 was 118/64.  Currently she is completely asymptomatic as we do our exam.  Denies any chest pain dizziness vertigo or lower extremity edema.  She is just getting up now waiting for staff to get her up for breakfast.  Denies any heartburn or reflux currently on Pepcid 20 mg daily.  Denies any other new muscular aches or pains.    Past Medical History:   Diagnosis Date     ACP (advance care planning) 11/3/2010    Formatting of this note might be different from the original. Patient has identified Health Care Agent(s): Yes Add Health Care Agents: Yes   Health Care Agent(s):  Primary Health Care Agent:   Edwar Escobar Relationship:  Spouse Phone:   [phone]   Secondary Health Care Agent:   Chiki Oro Relationship:   Son Phone:   [phone]  Patient has Advance Care Plan Documents (Health Care Direct     Acute coronary syndrome (H) 11/22/2019     Acute exacerbation of CHF (congestive heart failure) (H) 11/11/2019     Acute on chronic systolic (congestive) heart failure (H) 1/28/2020     Anemia of chronic disease 1/5/2013     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, left leg (H) 1/15/2020     BPPV (benign paroxysmal positional vertigo) 5/31/2018     Candidiasis 3/1/2020     Carotid stenosis, right 7/13/2016    Carotid US 05/04/2018 showed moderate plaque formation, consistent with 50 to 69% stenosis in the right internal carotid artery, not significantly changed from 8/5/2015.  Moderate plaque formation, consistent with 50 to 69% stenosis in the left internal carotid artery; there has been mild progression of the left ICA stenosis since 8/5/2015.     Chest pain 11/11/2019     Chronic bilateral low back pain without sciatica 1/17/2018     Chronic pain disorder 10/1/2017     Constipation 3/20/2020     COPD (chronic obstructive pulmonary disease) (H) 6/24/2020     Coronary atherosclerosis 2/6/2009 2/5/2009 - MI - Proximal RCA 99%, mild-mod disease elsewhere.  EF 60%.  PCI:  MYRON to pRCA. 2/12/2009 - admit CP - Widely patent RCA stent. Moderate diffuse CAD. Severe stenosis in trivial PDA branch. LVEF 45%. 1/25/2010 - admit CP - PTCA and stent of diagonal 9/8/2010 - admit CP - LAD patent stent. Moderate diffuse CAD. Medical management recommended.  4/25/2012 - NSTEMI - Acute total occlusion of     Cubital tunnel syndrome on left 11/13/2012     Depressive disorder      Diabetes mellitus (H)      Diabetes mellitus type 2 in obese (H) 7/13/2006     Diabetes mellitus type 2 in obese (H) 7/13/2006     Drug-seeking behavior 4/4/2020     Failure to thrive in adult 9/3/2020     Hereditary and idiopathic peripheral neuropathy  4/24/2007     Hyperlipidemia with target low density lipoprotein (LDL) cholesterol less than 70 mg/dL 5/30/2007     Hypertension 10/14/2015     Hypothyroidism 12/10/2010     Irritable bowel syndrome 9/7/2015     Ischemic cardiomyopathy 9/20/2015    EF of 40-45%, status post RV lead revision and LV epicardial lead placement via mini-thoracotomy in August 2016.     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  Designated pharmacy: [facility] Prescribing physician: Andrea Diagnosis: Ulnar neuropathy     Panic disorder with agoraphobia 4/14/2020     Paroxysmal atrial fibrillation (H) 10/2/2015     Personality disorder (H) 3/5/2020    Rule out dependent personality     Polymyalgia rheumatica (H) 11/28/2018     Posttraumatic stress disorder 3/1/2020     Restless legs syndrome (RLS) 8/29/2007     Restless legs syndrome (RLS) 8/29/2007     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     Serum calcium elevated 3/1/2020     Somatic dysfunction of sacral region 1/17/2018     Subacromial bursitis of left shoulder joint 8/6/2018     Suicidal ideation 4/1/2020     Thyroid disease      TIA (transient ischemic attack) 5/4/2018     TIA (transient ischemic attack) 5/4/2018     Tobacco abuse 2/17/2017     Tobacco abuse 2/17/2017     Urinary incontinence, mixed 9/24/2017     UTI (urinary tract infection) 4/17/2020     Vitamin B12 deficiency 2/14/2018     Weakness 12/17/2019            No family history on file.   Social History     Socioeconomic History     Marital status:    Tobacco Use     Smoking status: Never     Smokeless tobacco: Never   Substance and Sexual Activity     Alcohol use: Not  Currently     Drug use: Never      No new changes to the review of systems or physical exam since our last visit on November 7 however does have periods of atypical chest pain and does go to the emergency department.  REVIEW OF SYSTEM:  She currently denies any new symptoms of cough or cold sore throat postnasal drip wheezing chest pain dizziness vertigo nausea vomiting diarrhea dysuria frequency or urgency.  Does have a history of dyslipidemia, COPD, CABG, depression, hypertension, posttraumatic stress disorder, panic disorder, anxiety, personality disorder    PHYSICAL EXAM:   Pleasant female in no acute distress.  Head is normocephalic.  Neck is supple without adenopathy.  Lung sounds are clear throughout.  Cardiovascular S1-S2 regular rate and rhythm and no lower extremity edema.  Gastrointestinal is protuberant nontender nondistended.  Musculoskeletal currently in bed.  Denies pain to her major joints.  Currently on scheduled Tylenol.  Psychiatric: Pleasant affect    Current Outpatient Medications:      acetaminophen (TYLENOL) 325 MG tablet, Take 650 mg by mouth every 6 hours as needed for mild pain, Disp: , Rfl:      acetaminophen (TYLENOL) 500 MG tablet, Take 2 tablets (1,000 mg) by mouth 3 times daily, Disp: , Rfl:      albuterol (PROVENTIL) (2.5 MG/3ML) 0.083% neb solution, Take 1 vial (2.5 mg) by nebulization 2 times daily as needed for shortness of breath / dyspnea or wheezing, Disp: 90 mL, Rfl:      aspirin 81 MG EC tablet, Take 81 mg by mouth daily, Disp: , Rfl:      bisacodyl (DULCOLAX) 10 MG suppository, Place 10 mg rectally daily as needed for constipation, Disp: , Rfl:      cholecalciferol (VITAMIN D3) 10 mcg (400 units) TABS tablet, Take 1,000 Units by mouth, Disp: , Rfl:      cyanocobalamin (CYANOCOBALAMIN) 1000 MCG/ML injection, Inject 1 mL into the muscle every 30 days, Disp: , Rfl:      divalproex sodium delayed-release (DEPAKOTE) 250 MG DR tablet, Take 250 mg by mouth daily, Disp: , Rfl:       "DULoxetine HCl 40 MG CPEP, Take 40 mg by mouth daily, Disp: , Rfl:      fluticasone-vilanterol (BREO ELLIPTA) 200-25 MCG/INH inhaler, Inhale 1 puff into the lungs daily, Disp: , Rfl:      furosemide (LASIX) 20 MG tablet, Take 20 mg by mouth daily, Disp: , Rfl:      gabapentin (NEURONTIN) 300 MG capsule, Take 400 mg by mouth At Bedtime, Disp: , Rfl:      guaiFENesin (ROBITUSSIN) 100 MG/5ML liquid, Take 20 mLs (400 mg) by mouth every 4 hours as needed for cough, Disp: , Rfl:      levothyroxine (SYNTHROID/LEVOTHROID) 100 MCG tablet, Take 100 mcg by mouth daily, Disp: , Rfl:      Melatonin 10 MG TABS tablet, Take 10 mg by mouth At Bedtime, Disp: , Rfl:      metoprolol succinate ER (TOPROL-XL) 25 MG 24 hr tablet, Take 12.5 mg by mouth daily, Disp: , Rfl:      miconazole (MICATIN) 2 % AERP powder, Apply topically as needed , Disp: , Rfl:      mirtazapine (REMERON) 30 MG tablet, Take 15 mg by mouth daily, Disp: , Rfl:      polyethylene glycol (MIRALAX) 17 GM/Dose powder, Take 17 g by mouth daily Every other day, Disp: , Rfl:      potassium chloride ER (MICRO-K) 10 MEQ CR capsule, Take 10 mEq by mouth daily , Disp: , Rfl:      prazosin (MINIPRESS) 1 MG capsule, Take 1 mg by mouth At Bedtime, Disp: , Rfl:      QUEtiapine (SEROQUEL) 25 MG tablet, 25 mg 3 times daily, Disp: , Rfl: 0     rosuvastatin (CRESTOR) 10 MG tablet, Take 10 mg by mouth At Bedtime, Disp: , Rfl:      sennosides (SENOKOT) 8.6 MG tablet, Take 2 tablets by mouth At Bedtime, Disp: , Rfl:      Vitamin D3 (CHOLECALCIFEROL) 25 mcg (1000 units) tablet, Take 1 tablet by mouth daily, Disp: , Rfl:        /64   Pulse 74   Temp 97.4  F (36.3  C)   Resp 20   Ht 1.448 m (4' 9\")   Wt 91.4 kg (201 lb 6.4 oz)   SpO2 96%   BMI 43.58 kg/m      LABS:   Last Comprehensive Metabolic Panel:  Sodium   Date Value Ref Range Status   12/08/2022 140 133 - 144 mmol/L Final   03/16/2021 139 133 - 144 mmol/L Final     Potassium   Date Value Ref Range Status   12/08/2022 4.3 " 3.4 - 5.3 mmol/L Final   03/16/2021 4.7 3.4 - 5.3 mmol/L Final     Chloride   Date Value Ref Range Status   12/08/2022 109 94 - 109 mmol/L Final   03/16/2021 111 (H) 94 - 109 mmol/L Final     Carbon Dioxide   Date Value Ref Range Status   03/16/2021 22 20 - 32 mmol/L Final     Carbon Dioxide (CO2)   Date Value Ref Range Status   12/08/2022 25 20 - 32 mmol/L Final     Anion Gap   Date Value Ref Range Status   12/08/2022 6 3 - 14 mmol/L Final   03/16/2021 6 3 - 14 mmol/L Final     Glucose   Date Value Ref Range Status   12/08/2022 194 (H) 70 - 99 mg/dL Final   03/16/2021 114 (H) 70 - 99 mg/dL Final     Urea Nitrogen   Date Value Ref Range Status   12/08/2022 32 (H) 7 - 30 mg/dL Final   03/16/2021 45 (H) 7 - 30 mg/dL Final     Creatinine   Date Value Ref Range Status   12/08/2022 1.18 (H) 0.52 - 1.04 mg/dL Final   03/16/2021 0.98 0.52 - 1.04 mg/dL Final     GFR Estimate   Date Value Ref Range Status   12/08/2022 50 (L) >60 mL/min/1.73m2 Final     Comment:     Effective December 21, 2021 eGFRcr in adults is calculated using the 2021 CKD-EPI creatinine equation which includes age and gender (Roland muhammad al., NEJ, DOI: 10.1056/SNDFoa6902212)   03/16/2021 60 (L) >60 mL/min/[1.73_m2] Final     Comment:     Non  GFR Calc  Starting 12/18/2018, serum creatinine based estimated GFR (eGFR) will be   calculated using the Chronic Kidney Disease Epidemiology Collaboration   (CKD-EPI) equation.       Calcium   Date Value Ref Range Status   12/08/2022 9.5 8.5 - 10.1 mg/dL Final   03/16/2021 9.4 8.5 - 10.1 mg/dL Final     CBC RESULTS: Recent Labs   Lab Test 12/08/22  0048   WBC 8.0   RBC 3.21*   HGB 9.7*   HCT 30.9*   MCV 96   MCH 30.2   MCHC 31.4*   RDW 14.2   *     TSH   Date Value Ref Range Status   05/16/2022 3.91 0.40 - 4.00 mU/L Final   12/04/2020 41.34 (H) 0.40 - 4.00 mU/L Final     T4 Free   Date Value Ref Range Status   12/04/2020 1.09 0.76 - 1.46 ng/dL Final     Recent Labs   Lab Test 10/19/22  0743  05/28/21  0818   CHOL 162 154   HDL 30* 42*   LDL 56 83   TRIG 382* 144           ASSESSMENT:    Encounter Diagnoses   Name Primary?     Atypical chest pain Yes     Essential (primary) hypertension      Cardiac pacemaker in situ      Chronic kidney disease, stage 3a (H)        PLAN:    Had a chance to go over her laboratory studies as well as her visit to the emergency department.  She feels comfortable now from our conversation she does admit to periods of anxiety.  In the meantime she did not have any other questions and we will continue to manage and follow her chronic medical conditions.        Electronically signed by: Jay Chapman NP

## 2022-12-12 NOTE — LETTER
12/12/2022        RE: Letty Escobar  [facility]  [address] [facility] [address]        [facility]    Facility:   [facility] ([facility]) [[id number]]   Code Status: DNR/DNI      CHIEF COMPLAINT/REASON FOR VISIT:  Chief Complaint   Patient presents with     RECHECK       HISTORY:      HPI: Letty is a 67 year old female who currently does reside in long-term care room [facility] and who I was asked to visit with secondary to her most recent visit to the emergency department on December 8, 2022 secondary to recurrent chest pain.  Prior to that she was in the emergency department in November as well regarding atypical chest pain.  Does have a history of A. fib along with a implantable cardioverter defibrillator along with a history of hypertension and CABG and in talking with her today she does admit that the primary reason why she went and and she agrees it was not cardiovascular in nature however it pretty much was anxiety.  She did have a good work-up and the electrolytes were normal along with a BUN of 32 and a creatinine 1.18 and see results below and her troponins were at 27 her hemoglobin 9.7 with a white cell count of 8.0 and platelets 144, chest x-ray did not show any acute abnormality.  The EKG did show ventricular pacemaker.  Her last weight on December 9 was 201 pounds and her last blood pressure on December 7 was 118/64.  Currently she is completely asymptomatic as we do our exam.  Denies any chest pain dizziness vertigo or lower extremity edema.  She is just getting up now waiting for staff to get her up for breakfast.  Denies any heartburn or reflux currently on Pepcid 20 mg daily.  Denies any other new muscular aches or pains.    Past Medical History:   Diagnosis Date     ACP (advance care planning) 11/3/2010    Formatting of this note might be different from the original. Patient has identified Health Care Agent(s): Yes Add Health Care Agents: Yes    Health Care Agent(s):  Primary Health Care Agent:   Edwar Escobar Relationship:  Spouse Phone:   [phone]  Secondary Health Care Agent:   Chiki Oro Relationship:   Son Phone:   [phone]  Patient has Advance Care Plan Documents (Health Care Direct     Acute coronary syndrome (H) 11/22/2019     Acute exacerbation of CHF (congestive heart failure) (H) 11/11/2019     Acute on chronic systolic (congestive) heart failure (H) 1/28/2020     Anemia of chronic disease 1/5/2013     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, left leg (H) 1/15/2020     BPPV (benign paroxysmal positional vertigo) 5/31/2018     Candidiasis 3/1/2020     Carotid stenosis, right 7/13/2016    Carotid US 05/04/2018 showed moderate plaque formation, consistent with 50 to 69% stenosis in the right internal carotid artery, not significantly changed from 8/5/2015.  Moderate plaque formation, consistent with 50 to 69% stenosis in the left internal carotid artery; there has been mild progression of the left ICA stenosis since 8/5/2015.     Chest pain 11/11/2019     Chronic bilateral low back pain without sciatica 1/17/2018     Chronic pain disorder 10/1/2017     Constipation 3/20/2020     COPD (chronic obstructive pulmonary disease) (H) 6/24/2020     Coronary atherosclerosis 2/6/2009 2/5/2009 - MI - Proximal RCA 99%, mild-mod disease elsewhere.  EF 60%.  PCI:  MYRON to pRCA. 2/12/2009 - admit CP - Widely patent RCA stent. Moderate diffuse CAD. Severe stenosis in trivial PDA branch. LVEF 45%. 1/25/2010 - admit CP - PTCA and stent of diagonal 9/8/2010 - admit CP - LAD patent stent. Moderate diffuse CAD. Medical management recommended.  4/25/2012 - NSTEMI - Acute total occlusion of     Cubital tunnel syndrome on left 11/13/2012     Depressive disorder      Diabetes mellitus (H)      Diabetes mellitus type 2 in obese (H) 7/13/2006     Diabetes mellitus type 2 in obese (H) 7/13/2006     Drug-seeking  behavior 4/4/2020     Failure to thrive in adult 9/3/2020     Hereditary and idiopathic peripheral neuropathy 4/24/2007     Hyperlipidemia with target low density lipoprotein (LDL) cholesterol less than 70 mg/dL 5/30/2007     Hypertension 10/14/2015     Hypothyroidism 12/10/2010     Irritable bowel syndrome 9/7/2015     Ischemic cardiomyopathy 9/20/2015    EF of 40-45%, status post RV lead revision and LV epicardial lead placement via mini-thoracotomy in August 2016.     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  Designated pharmacy: [facility] Prescribing physician: Andrea Diagnosis: Ulnar neuropathy     Panic disorder with agoraphobia 4/14/2020     Paroxysmal atrial fibrillation (H) 10/2/2015     Personality disorder (H) 3/5/2020    Rule out dependent personality     Polymyalgia rheumatica (H) 11/28/2018     Posttraumatic stress disorder 3/1/2020     Restless legs syndrome (RLS) 8/29/2007     Restless legs syndrome (RLS) 8/29/2007     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     Serum calcium elevated 3/1/2020     Somatic dysfunction of sacral region 1/17/2018     Subacromial bursitis of left shoulder joint 8/6/2018     Suicidal ideation 4/1/2020     Thyroid disease      TIA (transient ischemic attack) 5/4/2018     TIA (transient ischemic attack) 5/4/2018     Tobacco abuse 2/17/2017     Tobacco abuse 2/17/2017     Urinary incontinence, mixed 9/24/2017     UTI (urinary tract infection) 4/17/2020     Vitamin B12 deficiency 2/14/2018     Weakness 12/17/2019            No family history on file.   Social History     Socioeconomic History     Marital status:    Tobacco  Use     Smoking status: Never     Smokeless tobacco: Never   Substance and Sexual Activity     Alcohol use: Not Currently     Drug use: Never      No new changes to the review of systems or physical exam since our last visit on November 7 however does have periods of atypical chest pain and does go to the emergency department.  REVIEW OF SYSTEM:  She currently denies any new symptoms of cough or cold sore throat postnasal drip wheezing chest pain dizziness vertigo nausea vomiting diarrhea dysuria frequency or urgency.  Does have a history of dyslipidemia, COPD, CABG, depression, hypertension, posttraumatic stress disorder, panic disorder, anxiety, personality disorder    PHYSICAL EXAM:   Pleasant female in no acute distress.  Head is normocephalic.  Neck is supple without adenopathy.  Lung sounds are clear throughout.  Cardiovascular S1-S2 regular rate and rhythm and no lower extremity edema.  Gastrointestinal is protuberant nontender nondistended.  Musculoskeletal currently in bed.  Denies pain to her major joints.  Currently on scheduled Tylenol.  Psychiatric: Pleasant affect    Current Outpatient Medications:      acetaminophen (TYLENOL) 325 MG tablet, Take 650 mg by mouth every 6 hours as needed for mild pain, Disp: , Rfl:      acetaminophen (TYLENOL) 500 MG tablet, Take 2 tablets (1,000 mg) by mouth 3 times daily, Disp: , Rfl:      albuterol (PROVENTIL) (2.5 MG/3ML) 0.083% neb solution, Take 1 vial (2.5 mg) by nebulization 2 times daily as needed for shortness of breath / dyspnea or wheezing, Disp: 90 mL, Rfl:      aspirin 81 MG EC tablet, Take 81 mg by mouth daily, Disp: , Rfl:      bisacodyl (DULCOLAX) 10 MG suppository, Place 10 mg rectally daily as needed for constipation, Disp: , Rfl:      cholecalciferol (VITAMIN D3) 10 mcg (400 units) TABS tablet, Take 1,000 Units by mouth, Disp: , Rfl:      cyanocobalamin (CYANOCOBALAMIN) 1000 MCG/ML injection, Inject 1 mL into the muscle every 30 days, Disp: , Rfl:      " divalproex sodium delayed-release (DEPAKOTE) 250 MG DR tablet, Take 250 mg by mouth daily, Disp: , Rfl:      DULoxetine HCl 40 MG CPEP, Take 40 mg by mouth daily, Disp: , Rfl:      fluticasone-vilanterol (BREO ELLIPTA) 200-25 MCG/INH inhaler, Inhale 1 puff into the lungs daily, Disp: , Rfl:      furosemide (LASIX) 20 MG tablet, Take 20 mg by mouth daily, Disp: , Rfl:      gabapentin (NEURONTIN) 300 MG capsule, Take 400 mg by mouth At Bedtime, Disp: , Rfl:      guaiFENesin (ROBITUSSIN) 100 MG/5ML liquid, Take 20 mLs (400 mg) by mouth every 4 hours as needed for cough, Disp: , Rfl:      levothyroxine (SYNTHROID/LEVOTHROID) 100 MCG tablet, Take 100 mcg by mouth daily, Disp: , Rfl:      Melatonin 10 MG TABS tablet, Take 10 mg by mouth At Bedtime, Disp: , Rfl:      metoprolol succinate ER (TOPROL-XL) 25 MG 24 hr tablet, Take 12.5 mg by mouth daily, Disp: , Rfl:      miconazole (MICATIN) 2 % AERP powder, Apply topically as needed , Disp: , Rfl:      mirtazapine (REMERON) 30 MG tablet, Take 15 mg by mouth daily, Disp: , Rfl:      polyethylene glycol (MIRALAX) 17 GM/Dose powder, Take 17 g by mouth daily Every other day, Disp: , Rfl:      potassium chloride ER (MICRO-K) 10 MEQ CR capsule, Take 10 mEq by mouth daily , Disp: , Rfl:      prazosin (MINIPRESS) 1 MG capsule, Take 1 mg by mouth At Bedtime, Disp: , Rfl:      QUEtiapine (SEROQUEL) 25 MG tablet, 25 mg 3 times daily, Disp: , Rfl: 0     rosuvastatin (CRESTOR) 10 MG tablet, Take 10 mg by mouth At Bedtime, Disp: , Rfl:      sennosides (SENOKOT) 8.6 MG tablet, Take 2 tablets by mouth At Bedtime, Disp: , Rfl:      Vitamin D3 (CHOLECALCIFEROL) 25 mcg (1000 units) tablet, Take 1 tablet by mouth daily, Disp: , Rfl:        /64   Pulse 74   Temp 97.4  F (36.3  C)   Resp 20   Ht 1.448 m (4' 9\")   Wt 91.4 kg (201 lb 6.4 oz)   SpO2 96%   BMI 43.58 kg/m      LABS:   Last Comprehensive Metabolic Panel:  Sodium   Date Value Ref Range Status   12/08/2022 140 133 - 144 " mmol/L Final   03/16/2021 139 133 - 144 mmol/L Final     Potassium   Date Value Ref Range Status   12/08/2022 4.3 3.4 - 5.3 mmol/L Final   03/16/2021 4.7 3.4 - 5.3 mmol/L Final     Chloride   Date Value Ref Range Status   12/08/2022 109 94 - 109 mmol/L Final   03/16/2021 111 (H) 94 - 109 mmol/L Final     Carbon Dioxide   Date Value Ref Range Status   03/16/2021 22 20 - 32 mmol/L Final     Carbon Dioxide (CO2)   Date Value Ref Range Status   12/08/2022 25 20 - 32 mmol/L Final     Anion Gap   Date Value Ref Range Status   12/08/2022 6 3 - 14 mmol/L Final   03/16/2021 6 3 - 14 mmol/L Final     Glucose   Date Value Ref Range Status   12/08/2022 194 (H) 70 - 99 mg/dL Final   03/16/2021 114 (H) 70 - 99 mg/dL Final     Urea Nitrogen   Date Value Ref Range Status   12/08/2022 32 (H) 7 - 30 mg/dL Final   03/16/2021 45 (H) 7 - 30 mg/dL Final     Creatinine   Date Value Ref Range Status   12/08/2022 1.18 (H) 0.52 - 1.04 mg/dL Final   03/16/2021 0.98 0.52 - 1.04 mg/dL Final     GFR Estimate   Date Value Ref Range Status   12/08/2022 50 (L) >60 mL/min/1.73m2 Final     Comment:     Effective December 21, 2021 eGFRcr in adults is calculated using the 2021 CKD-EPI creatinine equation which includes age and gender (Roland et al., NEJM, DOI: 10.1056/EFXFqk8106206)   03/16/2021 60 (L) >60 mL/min/[1.73_m2] Final     Comment:     Non  GFR Calc  Starting 12/18/2018, serum creatinine based estimated GFR (eGFR) will be   calculated using the Chronic Kidney Disease Epidemiology Collaboration   (CKD-EPI) equation.       Calcium   Date Value Ref Range Status   12/08/2022 9.5 8.5 - 10.1 mg/dL Final   03/16/2021 9.4 8.5 - 10.1 mg/dL Final     CBC RESULTS: Recent Labs   Lab Test 12/08/22  0048   WBC 8.0   RBC 3.21*   HGB 9.7*   HCT 30.9*   MCV 96   MCH 30.2   MCHC 31.4*   RDW 14.2   *     TSH   Date Value Ref Range Status   05/16/2022 3.91 0.40 - 4.00 mU/L Final   12/04/2020 41.34 (H) 0.40 - 4.00 mU/L Final     T4 Free    Date Value Ref Range Status   12/04/2020 1.09 0.76 - 1.46 ng/dL Final     Recent Labs   Lab Test 10/19/22  0743 05/28/21  0818   CHOL 162 154   HDL 30* 42*   LDL 56 83   TRIG 382* 144           ASSESSMENT:    Encounter Diagnoses   Name Primary?     Atypical chest pain Yes     Essential (primary) hypertension      Cardiac pacemaker in situ      Chronic kidney disease, stage 3a (H)        PLAN:    Had a chance to go over her laboratory studies as well as her visit to the emergency department.  She feels comfortable now from our conversation she does admit to periods of anxiety.  In the meantime she did not have any other questions and we will continue to manage and follow her chronic medical conditions.        Electronically signed by: Jay Chapman NP          Sincerely,        Jay Chapman NP

## 2022-12-14 ENCOUNTER — TELEPHONE (OUTPATIENT)
Dept: GERIATRICS | Facility: CLINIC | Age: 67
End: 2022-12-14

## 2022-12-14 ENCOUNTER — HOSPITAL ENCOUNTER (EMERGENCY)
Facility: CLINIC | Age: 67
Discharge: HOME | End: 2022-12-15
Attending: FAMILY MEDICINE | Admitting: FAMILY MEDICINE
Payer: COMMERCIAL

## 2022-12-14 ENCOUNTER — APPOINTMENT (OUTPATIENT)
Dept: GENERAL RADIOLOGY | Facility: CLINIC | Age: 67
End: 2022-12-14
Attending: FAMILY MEDICINE
Payer: COMMERCIAL

## 2022-12-14 DIAGNOSIS — G47.30 SLEEP APNEA, UNSPECIFIED TYPE: ICD-10-CM

## 2022-12-14 DIAGNOSIS — U07.1 INFECTION DUE TO 2019 NOVEL CORONAVIRUS: ICD-10-CM

## 2022-12-14 DIAGNOSIS — R06.02 SHORTNESS OF BREATH: ICD-10-CM

## 2022-12-14 LAB
ALBUMIN SERPL-MCNC: 3.1 G/DL (ref 3.4–5)
ALP SERPL-CCNC: 45 U/L (ref 40–150)
ALT SERPL W P-5'-P-CCNC: 21 U/L (ref 0–50)
ANION GAP SERPL CALCULATED.3IONS-SCNC: 6 MMOL/L (ref 3–14)
AST SERPL W P-5'-P-CCNC: 11 U/L (ref 0–45)
BASE EXCESS BLDV CALC-SCNC: 0.3 MMOL/L (ref -7.7–1.9)
BASOPHILS # BLD AUTO: 0 10E3/UL (ref 0–0.2)
BASOPHILS NFR BLD AUTO: 1 %
BILIRUB SERPL-MCNC: 0.2 MG/DL (ref 0.2–1.3)
BUN SERPL-MCNC: 24 MG/DL (ref 7–30)
CALCIUM SERPL-MCNC: 9.1 MG/DL (ref 8.5–10.1)
CHLORIDE BLD-SCNC: 110 MMOL/L (ref 94–109)
CO2 SERPL-SCNC: 25 MMOL/L (ref 20–32)
CREAT SERPL-MCNC: 1.1 MG/DL (ref 0.52–1.04)
EOSINOPHIL # BLD AUTO: 0.1 10E3/UL (ref 0–0.7)
EOSINOPHIL NFR BLD AUTO: 1 %
ERYTHROCYTE [DISTWIDTH] IN BLOOD BY AUTOMATED COUNT: 14.5 % (ref 10–15)
GFR SERPL CREATININE-BSD FRML MDRD: 54 ML/MIN/1.73M2
GLUCOSE BLD-MCNC: 199 MG/DL (ref 70–99)
HCO3 BLDV-SCNC: 25 MMOL/L (ref 21–28)
HCT VFR BLD AUTO: 29.3 % (ref 35–47)
HGB BLD-MCNC: 9.1 G/DL (ref 11.7–15.7)
IMM GRANULOCYTES # BLD: 0 10E3/UL
IMM GRANULOCYTES NFR BLD: 1 %
LACTATE SERPL-SCNC: 2.2 MMOL/L (ref 0.7–2)
LYMPHOCYTES # BLD AUTO: 1.5 10E3/UL (ref 0.8–5.3)
LYMPHOCYTES NFR BLD AUTO: 17 %
MCH RBC QN AUTO: 29.6 PG (ref 26.5–33)
MCHC RBC AUTO-ENTMCNC: 31.1 G/DL (ref 31.5–36.5)
MCV RBC AUTO: 95 FL (ref 78–100)
MONOCYTES # BLD AUTO: 0.7 10E3/UL (ref 0–1.3)
MONOCYTES NFR BLD AUTO: 9 %
NEUTROPHILS # BLD AUTO: 6.2 10E3/UL (ref 1.6–8.3)
NEUTROPHILS NFR BLD AUTO: 71 %
NRBC # BLD AUTO: 0 10E3/UL
NRBC BLD AUTO-RTO: 0 /100
O2/TOTAL GAS SETTING VFR VENT: 28 %
PCO2 BLDV: 42 MM HG (ref 40–50)
PH BLDV: 7.39 [PH] (ref 7.32–7.43)
PLATELET # BLD AUTO: 109 10E3/UL (ref 150–450)
PO2 BLDV: 45 MM HG (ref 25–47)
POTASSIUM BLD-SCNC: 4.7 MMOL/L (ref 3.4–5.3)
PROT SERPL-MCNC: 7.2 G/DL (ref 6.8–8.8)
RBC # BLD AUTO: 3.07 10E6/UL (ref 3.8–5.2)
SODIUM SERPL-SCNC: 141 MMOL/L (ref 133–144)
TROPONIN I SERPL HS-MCNC: 46 NG/L
WBC # BLD AUTO: 8.6 10E3/UL (ref 4–11)

## 2022-12-14 PROCEDURE — 36415 COLL VENOUS BLD VENIPUNCTURE: CPT | Performed by: FAMILY MEDICINE

## 2022-12-14 PROCEDURE — 85025 COMPLETE CBC W/AUTO DIFF WBC: CPT | Performed by: FAMILY MEDICINE

## 2022-12-14 PROCEDURE — 99284 EMERGENCY DEPT VISIT MOD MDM: CPT | Mod: 25

## 2022-12-14 PROCEDURE — 99284 EMERGENCY DEPT VISIT MOD MDM: CPT | Performed by: FAMILY MEDICINE

## 2022-12-14 PROCEDURE — 96361 HYDRATE IV INFUSION ADD-ON: CPT

## 2022-12-14 PROCEDURE — 71045 X-RAY EXAM CHEST 1 VIEW: CPT

## 2022-12-14 PROCEDURE — 83605 ASSAY OF LACTIC ACID: CPT | Performed by: FAMILY MEDICINE

## 2022-12-14 PROCEDURE — 84484 ASSAY OF TROPONIN QUANT: CPT | Performed by: FAMILY MEDICINE

## 2022-12-14 PROCEDURE — 82803 BLOOD GASES ANY COMBINATION: CPT | Performed by: FAMILY MEDICINE

## 2022-12-14 PROCEDURE — 80053 COMPREHEN METABOLIC PANEL: CPT | Performed by: FAMILY MEDICINE

## 2022-12-14 PROCEDURE — 96374 THER/PROPH/DIAG INJ IV PUSH: CPT

## 2022-12-14 ASSESSMENT — ACTIVITIES OF DAILY LIVING (ADL): ADLS_ACUITY_SCORE: 35

## 2022-12-14 NOTE — TELEPHONE ENCOUNTER
[facility] Triage Nurse Telephone Encounter    Provider: CANDY Gates  Facility: [facility] Facility Type:  LT    Caller: Dori  Call Back Number: [phone]    Allergies:    Allergies   Allergen Reactions     Diphenhydramine Palpitations     Tolerated IV Benadryl when not pushed too fast     Isosorbide Other (See Comments) and Dizziness     Also causes syncope (has fallen before) and brain fog/mental disturbances - please do not prescribe     Nitroglycerin Dizziness, Fatigue and Other (See Comments)     Specifically the patch - please do not prescribe  Specifically the patch - please do not prescribe       Contrast Dye Hives and Itching     Other Drug Allergy (See Comments) Hives and Itching     Bee Pollen      Penicillin G      Adhesive Tape Rash     Diphenhydramine Hcl Palpitations     Liquid Adhesive Itching     Nystatin Dermatitis     Nystatin (Topical) Dermatitis     Also blisters     Penicillins Swelling and Rash     Occurred as a child - not 100% sure on specific reactions     Sulfa Drugs Other (See Comments)     Occurred as a child / patient does not remember specific reaction        Reason for call: Nurse is reporting that patient is c/o headache and sinus congestion.  VSS.  A rapid test for Covid-19 was performed and came back positive.  No c/o SOB.  Patient is requesting antiviral treatment.  The nurse conducted a med review with the pharmacist who recommended Paxlovid, but her Crestor would need to be held while on Paxlovid.      Verbal Order/Direction given by Provider: Paxlovid 300/100 BID x 5 days.  Hold Crestor while on Paxlovid and resume after completing Paxlovid.  Robitussin 10cc Q 4 hours PRN x 5 days.      Provider giving Order:  CANDY Gates    Verbal Order given to: Dori Zamarripa RN

## 2022-12-15 ENCOUNTER — PATIENT OUTREACH (OUTPATIENT)
Dept: GERIATRIC MEDICINE | Facility: CLINIC | Age: 67
End: 2022-12-15

## 2022-12-15 VITALS
DIASTOLIC BLOOD PRESSURE: 72 MMHG | HEART RATE: 83 BPM | OXYGEN SATURATION: 99 % | SYSTOLIC BLOOD PRESSURE: 154 MMHG | RESPIRATION RATE: 19 BRPM | HEIGHT: 62 IN | BODY MASS INDEX: 37.08 KG/M2 | WEIGHT: 201.5 LBS | TEMPERATURE: 99.1 F

## 2022-12-15 LAB — LACTATE SERPL-SCNC: 1.6 MMOL/L (ref 0.7–2)

## 2022-12-15 PROCEDURE — 83605 ASSAY OF LACTIC ACID: CPT | Performed by: FAMILY MEDICINE

## 2022-12-15 PROCEDURE — 258N000003 HC RX IP 258 OP 636: Performed by: FAMILY MEDICINE

## 2022-12-15 PROCEDURE — 250N000011 HC RX IP 250 OP 636: Performed by: FAMILY MEDICINE

## 2022-12-15 PROCEDURE — 36415 COLL VENOUS BLD VENIPUNCTURE: CPT | Performed by: FAMILY MEDICINE

## 2022-12-15 RX ORDER — DEXAMETHASONE SODIUM PHOSPHATE 10 MG/ML
6 INJECTION, SOLUTION INTRAMUSCULAR; INTRAVENOUS ONCE
Status: COMPLETED | OUTPATIENT
Start: 2022-12-15 | End: 2022-12-15

## 2022-12-15 RX ORDER — DEXAMETHASONE SODIUM PHOSPHATE 10 MG/ML
6 INJECTION, SOLUTION INTRAMUSCULAR; INTRAVENOUS DAILY
Status: DISCONTINUED | OUTPATIENT
Start: 2022-12-15 | End: 2022-12-15 | Stop reason: HOSPADM

## 2022-12-15 RX ADMIN — DEXAMETHASONE SODIUM PHOSPHATE 6 MG: 10 INJECTION, SOLUTION INTRAMUSCULAR; INTRAVENOUS at 01:08

## 2022-12-15 RX ADMIN — SODIUM CHLORIDE 1000 ML: 9 INJECTION, SOLUTION INTRAVENOUS at 00:33

## 2022-12-15 ASSESSMENT — ACTIVITIES OF DAILY LIVING (ADL)
ADLS_ACUITY_SCORE: 35
ADLS_ACUITY_SCORE: 35

## 2022-12-15 NOTE — ED PROVIDER NOTES
[facility] ED Provider Note   Patient: Letty Escobar  MRN #:  [id number]  Date of Visit: December 14, 2022    CC:     Chief Complaint   Patient presents with     Shortness of Breath     HPI:  Letty Escobar is a 67 year old female resident at the [facility] who presented to the emergency department by ambulance with shortness of breath in the setting of newly diagnosed COVID-19 infection.  Patient states that she was sick the last couple of days.  She was tested for COVID yesterday and it was negative, but today it was positive.  She has a history of chronic intermittent shortness of breath and chest pain.  She was placed on 4 L of oxygen, and we have been able to wean her down to 0 to 2 L/min here in the ED.  She states that she has been coughing a lot, but it is nonproductive.  She has low-grade temperatures, but no lit fever or chills.  She denies any chest pain now and has not had any nausea, vomiting or diarrhea.  Patient reports that she was wheezing earlier this evening, and is concerned that she might have pneumonia.    Problem List:  Patient Active Problem List    Diagnosis Date Noted     Chronic kidney disease, stage 3a (H) 10/16/2022     Priority: Medium     SOB (shortness of breath) 04/07/2022     Priority: Medium     Morbid obesity (H) 12/10/2021     Priority: Medium     ICD (implantable cardioverter-defibrillator) in place 11/17/2021     Priority: Medium     Formatting of this note is different from the original.  Date of last device in office evaluation: 5/17/2022    ?  and model: Medtronic Cobalt CRT-D.   Date of implant: 5/7/2021  Tachy therapies remain OFF: S/p downgrade from ICD to pacemaker, but her implanted system includes a DF-4 lead, so an ICD generator was placed with the tachycardia therapies disabled.     ? Indication for device: Ischemic cardiomyopathy   ? Cardiac resynchronization  therapy:   no      MRI Conditional:  No  o If No:  Reason why:  Abandoned LV lead    ? Battery longevity documented as less than 3  Months: No  ? Are any of the leads less than 3 months old:  No    ? Programming              ? Pacing mode and programmed lower rate: DDDR 60 - 130 bpm              ? Rate-responsive sensor type, if programmed on: Accelerometer        Underlying rhythm and heart rate:  SR @ 60 bpm    ? What is the response of this device to magnet placement:  None - tachy therapies off  ? PM magnet pacing rate: N/A   ? Any alert status on CIED generator or lead: No    ? Last pacing threshold    Atrial   1.0 V @ 0.4 ms   Ventricular RV: 0.75 V @ 0.4 ms  LV:  2.75 V @ 1.0 ms       Chronic atrial fibrillation (H) 04/06/2021     Priority: Medium     Cardiac pacemaker in situ 04/06/2021     Priority: Medium     Major depressive disorder, recurrent severe without psychotic features (H) 01/26/2021     Priority: Medium     Panic disorder with agoraphobia 04/14/2020     Priority: Medium     Constipation 03/20/2020     Priority: Medium     Personality disorder (H) 03/05/2020     Priority: Medium     Rule out dependent personality    Formatting of this note might be different from the original.  Rule out dependent personality  Formatting of this note might be different from the original.  Rule out dependent personality       Candidiasis 03/01/2020     Priority: Medium     Posttraumatic stress disorder 03/01/2020     Priority: Medium     Chronic obstructive pulmonary disease, unspecified (H) 01/29/2020     Priority: Medium     Long term (current) use of insulin (H) 01/29/2020     Priority: Medium     Acute on chronic systolic (congestive) heart failure (H) 01/28/2020     Priority: Medium     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, left leg (H) 01/15/2020     Priority: Medium     Atypical chest pain 11/11/2019     Priority: Medium     Polymyalgia rheumatica (H)  11/28/2018     Priority: Medium     Unstable angina (H) 05/02/2018     Priority: Medium     Coronary angiogram 05/02/2018 demonstrated 30% stenosis in the LMCA, 50% stenosis in the Proximal LAD, 50% stenosis in the Mid LAD, 95% stenosis in the Proximal Circumflex (Restenosis - Balloon Angioplasty), 100% stenosis in the 1st Marginal, 50% stenosis in the Proximal RCA, 50% stenosis in the Distal RCA, the LIMA graft from the LIMA to the Distal LAD is free of significant disease; the SVG graft from the Aorta to the 1st Marginal is free of significant disease; the SVG graft from the Aorta to the Distal RCA is free of significant disease. Intervention included a successful  2.5mm x 12mm Balloon,  2.5mm x 10mm Cutting Balloon,  3mm x 12mm Drug Eluting Stent,  3mm x 12mm Balloon, and  3mm x 8mm Balloon to Proximal Circumflex, post stenosis 0%.    Formatting of this note is different from the original.  Coronary angiogram 05/02/2018 demonstrated 30% stenosis in the LMCA, 50% stenosis in the Proximal LAD, 50% stenosis in the Mid LAD, 95% stenosis in the Proximal Circumflex (Restenosis - Balloon Angioplasty), 100% stenosis in the 1st Marginal, 50% stenosis in the Proximal RCA, 50% stenosis in the Distal RCA, the LIMA graft from the LIMA to the Distal LAD is free of significant disease; the SVG graft from the Aorta to the 1st Marginal is free of significant disease; the SVG graft from the Aorta to the Distal RCA is free of significant disease. Intervention included a successful  2.5mm x 12mm Balloon,  2.5mm x 10mm Cutting Balloon,  3mm x 12mm Drug Eluting Stent,  3mm x 12mm Balloon, and  3mm x 8mm Balloon to Proximal Circumflex, post stenosis 0%.  Formatting of this note is different from the original.  Coronary angiogram 05/02/2018 demonstrated 30% stenosis in the LMCA, 50% stenosis in the Proximal LAD, 50% stenosis in the Mid LAD, 95% stenosis in the Proximal Circumflex (Restenosis - Balloon Angioplasty), 100% stenosis in the  1st Marginal, 50% stenosis in the Proximal RCA, 50% stenosis in the Distal RCA, the LIMA graft from the LIMA to the Distal LAD is free of significant disease; the SVG graft from the Aorta to the 1st Marginal is free of significant disease; the SVG graft from the Aorta to the Distal RCA is free of significant disease. Intervention included a successful  2.5mm x 12mm Balloon,  2.5mm x 10mm Cutting Balloon,  3mm x 12mm Drug Eluting Stent,  3mm x 12mm Balloon, and  3mm x 8mm Balloon to Proximal Circumflex, post stenosis 0%.       Vitamin B12 deficiency 02/14/2018     Priority: Medium     Chronic bilateral low back pain without sciatica 01/17/2018     Priority: Medium     Chronic pain disorder 10/01/2017     Priority: Medium     Urinary incontinence, mixed 09/24/2017     Priority: Medium     Internal carotid artery stenosis, bilateral 07/13/2016     Priority: Medium     Carotid US 05/04/2018 showed moderate plaque formation, consistent with 50 to 69% stenosis in the right internal carotid artery, not significantly changed from 8/5/2015.  Moderate plaque formation, consistent with 50 to 69% stenosis in the left internal carotid artery; there has been mild progression of the left ICA stenosis since 8/5/2015.    Formatting of this note might be different from the original.  Carotid US 05/04/2018 showed moderate plaque formation, consistent with 50 to 69% stenosis in the right internal carotid artery, not significantly changed from 8/5/2015.  Moderate plaque formation, consistent with 50 to 69% stenosis in the left internal carotid artery; there has been mild progression of the left ICA stenosis since 8/5/2015.    Formatting of this note might be different from the original.  Carotid US 05/04/2018 showed moderate plaque formation, consistent with 50 to 69% stenosis in the right internal carotid artery, not significantly changed from 8/5/2015.  Moderate plaque formation, consistent with 50 to 69% stenosis in the left internal  carotid artery; there has been mild progression of the left ICA stenosis since 8/5/2015.       Essential (primary) hypertension 10/14/2015     Priority: Medium     Ischemic cardiomyopathy 09/20/2015     Priority: Medium     EF of 40-45%, status post RV lead revision and LV epicardial lead placement via mini-thoracotomy in August 2016.    Formatting of this note might be different from the original.  EF of 40-45%, status post RV lead revision and LV epicardial lead placement via mini-thoracotomy in August 2016.  Formatting of this note might be different from the original.  EF of 40-45%, status post RV lead revision and LV epicardial lead placement via mini-thoracotomy in August 2016.       S/P CABG x 5 08/21/2015     Priority: Medium     Pain medication agreement 04/20/2013     Priority: Medium     Controlled substance agreement for percocet #30/month on file and signed 4/17/13.  Designated pharmacy: [facility] Prescribing physician: Andrea Diagnosis: Ulnar neuropathy    Formatting of this note might be different from the original.  Controlled substance agreement for percocet #30/month on file and signed 4/17/13.  Designated pharmacy: [facility] Prescribing physician: Andrea Diagnosis: Ulnar neuropathy       Anemia in other chronic diseases classified elsewhere 01/05/2013     Priority: Medium     Hypothyroidism, unspecified 12/10/2010     Priority: Medium     ACP (advance care planning) 11/03/2010     Priority: Medium     Formatting of this note might be different from the original.  Patient has identified Health Care Agent(s): Yes  Add Health Care Agents: Yes    Health Care Agent(s):    Primary Health Care Agent:     Edwar Chappellvedaalec Relationship:    Spouse Phone:     [phone]    Secondary Health Care Agent:     Chiki Oro Relationship:     Son Phone:     [phone]      Patient has Advance Care Plan Documents (Health Care Directive, POLST): Yes    Advance Care Plan Documents:  Health Care  "Directive--03/28/11  Resuscitation Guidelines--    Patient has identified Specific Treatment Preferences: Yes   Specific Treatment Preferences:   a.) Code Status:  DNR/ Do Not Attempt Resuscitation - Allow a Natural Death    b.) Goals of Treatment:     ii. Limited Interventions and treat reversible conditions.  Provide interventions aimed at treatment of new or reversible illness/injury or non-life threatening chronic conditions. Duration of invasive or uncomfortable interventions should generally be limited.- Trial of intubation 5 days or other instructions \"If no improvement, stop.\"  c.) Interventions and Treatments:   i.   Antibiotics:          - Aggressive antibiotics  ii.  Nutrition/Hydration:         - Offer food and liquids by mouth         - IV fluid administration         - No feeding tube under any circumstance.  iii. Transfusion:          - Blood products for comfort/relief of symptoms only  iv. Dialysis:           - Dialysis for short term \"for recovery, but not long term.\"        Last Assessment & Plan:   Advance Care Planning: Disease-specific Session    Lettybo Escobar is an [facility] patient of Dr. Renea Mendez of the [facility].    Advance care planning discussions were completed with Letty and her healthcare agent, spouse Edwar Escobar on Monday, March 28, 2011 at the [facility] Room.    Understanding of Illness and Disease [place]:   Letty identifies her medical condition as diabetes with peripheral neuropathy, heart problems with a heart attack February 2009 plus 4 stent placements; sleep apnea; depression; hypothyroid; high cholesterol; tobacco use; and describes it as needing further assistance with thyroid and diabetes management.  She identifies the following symptoms of her medical condition as being the most bothersome: some memory loss, balance problems, light headedness and dizziness, puffy eyes and lower extremity edema from time to time.    Letty is " "retired [place] .  She is independent with all cares and lives an active life style although, \"I'm not as active as I used to be.\"    Goals of Care:   Letty currently hopes to maintain independence, control pain and symptoms and delay progression of, but not cure, the illness.  \"I want to get the diabetes and thyroid under better control.\"  Letty has an appointment the first part of April for follow up.    Quality of Life:    Letty identifies quality of life as family involvement twice weekly, Yazdanism activities, newly assigned  , playing cards, the computer,    Letty christiano with serious challenges in her life through her ganesh in God, prayers and support of her Yazdanism family, spouse and son.    Letty identifies the following fears and worries about her medical care:  \"I just want the diabetes straightened out.\"    Treatment and Care Preferences:   Past experiences in dealing with family and/or friends that have  or been seriously ill include her brother who took his own life.  \"He was 53 years old and living with us.  I found him.\"   As a result of these experiences, Letty expresses these health care preferences:      Summary Letty's Treatment Preferences:  LOW SURVIVAL; HIGH TREATMENT BURDEN:  If Letty suffered a serious complication, such that she was facing a prolonged hospital stay, required ongoing medical interventions, and the chance of living through the complication was low (for example, only 5 out of 100 would live), Letty would choose:  to focus treatment on comfort and quality of life (\"Quality of life is more important than length of life to Letty.\")  COMMENT:  \"If I can't live a normal life, I wouldn't want to live.\"    HIGH SURVIVAL; LOW FUNCTIONAL STATUS:  If Letty had a serious complication and had a good chance of living through the complication but it was expected that she would never be able to walk or talk " "again and would require 24 hour nursing care, she would choose:  to focus treatment on comfort and quality of life (\"Quality of life is more important than length of life to Letty.\")  COMMENT:  \"I'd accept rehabilitation.\"    HIGH SURVIVAL; LOW COGNITIVE STATUS:  If Letty had a serious complication and had a good chance of living through the complication but it was expected that she would never know who she was or who she was with and would require 24 hour nursing care, she would choose:  to focus treatment on comfort and quality of life (\"Quality of life is more important than length of life to Letty.\")    CARDIO-PULMONARY RESUSCITATION (CPR):  The facts, risks and benefits of CPR were discussed with Letty.  If she had a sudden event that caused her heart and breathing to stop, she:  WOULD NOT want CPR attempted and instead would prefer that a natural death occur.    MECHANICAL VENTILATION: If Letty had an episode where she was unable to breathe on her own, she would choose the following:  attempt to use any appropriate non-invasive method to assist breathing, and use mechanical ventilation.  COMMENT:  \"If reversible ventilator is acceptable for 5 days.  If no improvement, stop.\"     Letty has chosen her healthcare agent to:  strictly follow her wishes.    Follow Up Plan:   Letty was encouraged to continue advance care planning discussions with her health care agents and primary care provider.   Letty and her health care agent declined handouts.     Documents addressed during this advance care planning session:  1.  Health Care Agents identified.          .  Primary health care agent is spouse, Edwar Escobar;          .  Secondary health care agent is son, Jermaine Oro.  2.  Health Care Directive completed and scanned into medical record.  3.  Statement of Treatment Preferences for advanced illness completed and scanned into the medical record.  4.  Resuscitation Guidelines completed for DNR.  Document sent " to Dr. Renea Mendez for signature and returned to the home. Letty, please place on the refrigerator.    Recommendations/Plan:   Letty and her health care agent to review Advance Care Plan.     Letty would benefit from:   Care Navigation/Care Navigation Help Desk--explained services for pending future needs.  No identified needs today.  Care Navigation brochure was given to Letty and her healthcare agent.    Advance Care Planning recommendations and Letty's concerns and questions were cc ed to her primary provider.     Thank you, Letty for the opportunity of assisting with Advance Care Planning. It was a seeing you again and meeting Edwar.  Please contact me if I can be of further assistance.    Interviewer: Pat Martin RN    Advance Care Planning Facilitator  [phone]   3/28/2011       ELI (obstructive sleep apnea) 01/31/2010     Priority: Medium     PSG on April/2008 that showed moderate Obstructive Sleep Apnea with an AHI of 23.5 . Limb movements persisted at 29 movements/hour at optimal pressures, despite carbidopa use.    Formatting of this note might be different from the original.  PSG on April/2008 that showed moderate Obstructive Sleep Apnea with an AHI of 23.5 . Limb movements persisted at 29 movements/hour at optimal pressures, despite carbidopa use.  Formatting of this note might be different from the original.  PSG on April/2008 that showed moderate Obstructive Sleep Apnea with an AHI of 23.5 . Limb movements persisted at 29 movements/hour at optimal pressures, despite carbidopa use.       Coronary atherosclerosis 02/06/2009     Priority: Medium     Formatting of this note might be different from the original.  2/5/2009 - MI - Proximal RCA 99%, mild-mod disease elsewhere.  EF 60%.  PCI:  MYRON to pRCA.  2/12/2009 - admit CP - Widely patent RCA stent. Moderate diffuse CAD. Severe stenosis in trivial PDA branch. LVEF 45%.  1/25/2010 - admit CP - PTCA and stent of diagonal  9/8/2010 - admit CP - LAD  patent stent. Moderate diffuse CAD. Medical management recommended.   4/25/2012 - NSTEMI - Acute total occlusion of the ostial Circumflex. Acute total occlusion of the ostial ramus. Acute total occlusion of the distal 1st Obtuse Marginal. Angioplasty of ostial circumflex and ostial ramus. There was distal embolization to the OM1 vessel. This vessel was small and not amendable to intervention. Indefinite: plavix and asa 81.  8/10/2015 - CABx5 - 1. LIMA to distal LAD, reverse SVG to OM1 and OM2, reverse SVG to diagonal, reverse SVG to PDA.   2. Mitral valve repair with a Medtronics 3-D full ring, 28 mm.   3. Tricuspid repair with a Medtronic 28 mm partial ring  1/12/2016 - TTE - EF 40-45%  Formatting of this note might be different from the original.  2/5/2009 - MI - Proximal RCA 99%, mild-mod disease elsewhere.  EF 60%.  PCI:  MYRON to pRCA.  2/12/2009 - admit CP - Widely patent RCA stent. Moderate diffuse CAD. Severe stenosis in trivial PDA branch. LVEF 45%.  1/25/2010 - admit CP - PTCA and stent of diagonal  9/8/2010 - admit CP - LAD patent stent. Moderate diffuse CAD. Medical management recommended.   4/25/2012 - NSTEMI - Acute total occlusion of the ostial Circumflex. Acute total occlusion of the ostial ramus. Acute total occlusion of the distal 1st Obtuse Marginal. Angioplasty of ostial circumflex and ostial ramus. There was distal embolization to the OM1 vessel. This vessel was small and not amendable to intervention. Indefinite: plavix and asa 81.  8/10/2015 - CABx5 - 1. LIMA to distal LAD, reverse SVG to OM1 and OM2, reverse SVG to diagonal, reverse SVG to PDA.   2. Mitral valve repair with a Medtronics 3-D full ring, 28 mm.   3. Tricuspid repair with a Medtronic 28 mm partial ring  1/12/2016 - TTE - EF 40-45%       Restless legs syndrome 08/29/2007     Priority: Medium     Hyperlipidemia, unspecified 05/30/2007     Priority: Medium       Past Medical History:   Diagnosis Date     ACP (advance care planning)  11/3/2010     Acute coronary syndrome (H) 11/22/2019     Acute exacerbation of CHF (congestive heart failure) (H) 11/11/2019     Acute on chronic systolic (congestive) heart failure (H) 1/28/2020     Anemia of chronic disease 1/5/2013     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, left leg (H) 1/15/2020     BPPV (benign paroxysmal positional vertigo) 5/31/2018     Candidiasis 3/1/2020     Carotid stenosis, right 7/13/2016     Chest pain 11/11/2019     Chronic bilateral low back pain without sciatica 1/17/2018     Chronic pain disorder 10/1/2017     Constipation 3/20/2020     COPD (chronic obstructive pulmonary disease) (H) 6/24/2020     Coronary atherosclerosis 2/6/2009     Cubital tunnel syndrome on left 11/13/2012     Depressive disorder      Diabetes mellitus (H)      Diabetes mellitus type 2 in obese (H) 7/13/2006     Diabetes mellitus type 2 in obese (H) 7/13/2006     Drug-seeking behavior 4/4/2020     Failure to thrive in adult 9/3/2020     Hereditary and idiopathic peripheral neuropathy 4/24/2007     Hyperlipidemia with target low density lipoprotein (LDL) cholesterol less than 70 mg/dL 5/30/2007     Hypertension 10/14/2015     Hypothyroidism 12/10/2010     Irritable bowel syndrome 9/7/2015     Ischemic cardiomyopathy 9/20/2015     Long-term use of high-risk medication 4/14/2020     Moniliasis, 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     Myocardial infarction (H)      Neuromuscular disorder (H)      Other specified postprocedural states 10/8/2015     Pain medication agreement 4/20/2013     Panic disorder with agoraphobia 4/14/2020     Paroxysmal atrial fibrillation (H) 10/2/2015     Personality disorder (H) 3/5/2020     Polymyalgia rheumatica (H) 11/28/2018     Posttraumatic stress disorder 3/1/2020     Restless legs syndrome (RLS) 8/29/2007     Restless legs syndrome (RLS) 8/29/2007     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     Serum  calcium elevated 3/1/2020     Somatic dysfunction of sacral region 1/17/2018     Subacromial bursitis of left shoulder joint 8/6/2018     Suicidal ideation 4/1/2020     Thyroid disease      TIA (transient ischemic attack) 5/4/2018     TIA (transient ischemic attack) 5/4/2018     Tobacco abuse 2/17/2017     Tobacco abuse 2/17/2017     Urinary incontinence, mixed 9/24/2017     UTI (urinary tract infection) 4/17/2020     Vitamin B12 deficiency 2/14/2018     Weakness 12/17/2019       MEDS: acetaminophen (TYLENOL) 325 MG tablet  acetaminophen (TYLENOL) 500 MG tablet  albuterol (PROVENTIL) (2.5 MG/3ML) 0.083% neb solution  aspirin 81 MG EC tablet  bisacodyl (DULCOLAX) 10 MG suppository  cholecalciferol (VITAMIN D3) 10 mcg (400 units) TABS tablet  cyanocobalamin (CYANOCOBALAMIN) 1000 MCG/ML injection  divalproex sodium delayed-release (DEPAKOTE) 250 MG DR tablet  DULoxetine HCl 40 MG CPEP  fluticasone-vilanterol (BREO ELLIPTA) 200-25 MCG/INH inhaler  furosemide (LASIX) 20 MG tablet  gabapentin (NEURONTIN) 300 MG capsule  guaiFENesin (ROBITUSSIN) 100 MG/5ML liquid  levothyroxine (SYNTHROID/LEVOTHROID) 100 MCG tablet  Melatonin 10 MG TABS tablet  metoprolol succinate ER (TOPROL-XL) 25 MG 24 hr tablet  miconazole (MICATIN) 2 % AERP powder  mirtazapine (REMERON) 30 MG tablet  nirmatrelvir and ritonavir (PAXLOVID) therapy pack  polyethylene glycol (MIRALAX) 17 GM/Dose powder  potassium chloride ER (MICRO-K) 10 MEQ CR capsule  prazosin (MINIPRESS) 1 MG capsule  QUEtiapine (SEROQUEL) 25 MG tablet  rosuvastatin (CRESTOR) 10 MG tablet  sennosides (SENOKOT) 8.6 MG tablet  Vitamin D3 (CHOLECALCIFEROL) 25 mcg (1000 units) tablet        ALLERGIES:    Allergies   Allergen Reactions     Diphenhydramine Palpitations     Tolerated IV Benadryl when not pushed too fast     Isosorbide Other (See Comments) and Dizziness     Also causes syncope (has fallen before) and brain fog/mental disturbances - please do not prescribe     Nitroglycerin  Dizziness, Fatigue and Other (See Comments)     Specifically the patch - please do not prescribe  Specifically the patch - please do not prescribe       Contrast Dye Hives and Itching     Other Drug Allergy (See Comments) Hives and Itching     Bee Pollen      Penicillin G      Adhesive Tape Rash     Diphenhydramine Hcl Palpitations     Liquid Adhesive Itching     Nystatin Dermatitis     Nystatin (Topical) Dermatitis     Also blisters     Penicillins Swelling and Rash     Occurred as a child - not 100% sure on specific reactions     Sulfa Drugs Other (See Comments)     Occurred as a child / patient does not remember specific reaction       Past Surgical History:   Procedure Laterality Date     CARDIAC SURGERY      stents x 11     CARDIAC SURGERY       CHOLECYSTECTOMY       CHOLECYSTECTOMY       GENITOURINARY SURGERY      Tubal ligation     OTHER SURGICAL HISTORY      Genitourinary surgery     RELEASE CARPAL TUNNEL       RELEASE CARPAL TUNNEL         Social History     Tobacco Use     Smoking status: Never     Smokeless tobacco: Never   Substance Use Topics     Alcohol use: Not Currently     Drug use: Never         Review of Systems   Except as noted in HPI, all other systems were reviewed and are negative    Physical Exam     Vitals were reviewed  Patient Vitals for the past 24 hrs:   BP Temp Temp src Pulse Resp SpO2 Height Weight   12/15/22 0330 (!) 154/72 -- -- 83 -- 98 % -- --   12/15/22 0322 -- -- -- -- -- 98 % -- --   12/15/22 0320 -- -- -- -- -- (!) 89 % -- --   12/15/22 0315 137/63 -- -- 78 -- (!) 89 % -- --   12/15/22 0300 131/63 -- -- 77 -- 92 % -- --   12/15/22 0245 133/67 -- -- 77 -- 92 % -- --   12/15/22 0230 (!) 140/65 -- -- 77 -- 94 % -- --   12/15/22 0215 135/62 -- -- 76 -- 99 % -- --   12/15/22 0200 122/55 -- -- 75 19 98 % -- --   12/15/22 0145 113/57 -- -- 75 18 99 % -- --   12/15/22 0135 -- -- -- -- -- 99 % -- --   12/15/22 0130 117/57 -- -- 75 -- 99 % -- --   12/15/22 0115 123/58 -- -- 75 -- 99 %  "-- --   12/15/22 0102 -- -- -- -- -- 92 % -- --   12/15/22 0100 107/60 -- -- 77 -- (!) 89 % -- --   12/15/22 0059 -- -- -- -- -- 90 % -- --   12/15/22 0058 -- -- -- -- -- 90 % -- --   12/15/22 0057 -- -- -- -- -- 90 % -- --   12/15/22 0056 -- -- -- -- -- (!) 89 % -- --   12/15/22 0000 107/56 -- -- 77 -- 91 % -- --   12/14/22 2330 127/74 -- -- 81 -- 93 % -- --   12/14/22 2315 116/61 -- -- 82 20 97 % -- --   12/14/22 2302 121/78 99.1  F (37.3  C) Oral 85 20 97 % 1.575 m (5' 2\") 91.4 kg (201 lb 8 oz)   12/14/22 2300 -- -- -- -- -- 99 % -- --     GENERAL APPEARANCE: Alert and oriented x3, no acute respiratory distress  FACE: normal facies  EYES: Pupils are equal  HENT: normal external exam  NECK: no adenopathy or asymmetry  RESP: normal respiratory effort; essentially clear breath sounds; no appreciable wheezes or rhonchi  CV: regular rate and rhythm; no significant murmurs, gallops or rubs  ABD: soft, obese, no tenderness; no rebound or guarding; bowel sounds are normal  MS: no gross deformities noted; normal muscle tone.  EXT: No calf tenderness or pitting edema  SKIN: no worrisome rash  NEURO: no facial droop; no focal deficits, speech is normal and in full sentences  PSYCH: Flat affect      Available Lab/Imaging Results     Results for orders placed or performed during the hospital encounter of 12/14/22 (from the past 24 hour(s))   CBC with platelets differential    Narrative    The following orders were created for panel order CBC with platelets differential.  Procedure                               Abnormality         Status                     ---------                               -----------         ------                     CBC with platelets and d...[[id number]]  Abnormal            Final result                 Please view results for these tests on the individual orders.   Comprehensive metabolic panel   Result Value Ref Range    Sodium 141 133 - 144 mmol/L    Potassium 4.7 3.4 - 5.3 mmol/L    Chloride 110 " (H) 94 - 109 mmol/L    Carbon Dioxide (CO2) 25 20 - 32 mmol/L    Anion Gap 6 3 - 14 mmol/L    Urea Nitrogen 24 7 - 30 mg/dL    Creatinine 1.10 (H) 0.52 - 1.04 mg/dL    Calcium 9.1 8.5 - 10.1 mg/dL    Glucose 199 (H) 70 - 99 mg/dL    Alkaline Phosphatase 45 40 - 150 U/L    AST 11 0 - 45 U/L    ALT 21 0 - 50 U/L    Protein Total 7.2 6.8 - 8.8 g/dL    Albumin 3.1 (L) 3.4 - 5.0 g/dL    Bilirubin Total 0.2 0.2 - 1.3 mg/dL    GFR Estimate 54 (L) >60 mL/min/1.73m2   Lactic acid whole blood   Result Value Ref Range    Lactic Acid 2.2 (H) 0.7 - 2.0 mmol/L   Troponin I   Result Value Ref Range    Troponin I High Sensitivity 46 <54 ng/L   Blood gas venous   Result Value Ref Range    pH Venous 7.39 7.32 - 7.43    pCO2 Venous 42 40 - 50 mm Hg    pO2 Venous 45 25 - 47 mm Hg    Bicarbonate Venous 25 21 - 28 mmol/L    Base Excess/Deficit (+/-) 0.3 -7.7 - 1.9 mmol/L    FIO2 28    CBC with platelets and differential   Result Value Ref Range    WBC Count 8.6 4.0 - 11.0 10e3/uL    RBC Count 3.07 (L) 3.80 - 5.20 10e6/uL    Hemoglobin 9.1 (L) 11.7 - 15.7 g/dL    Hematocrit 29.3 (L) 35.0 - 47.0 %    MCV 95 78 - 100 fL    MCH 29.6 26.5 - 33.0 pg    MCHC 31.1 (L) 31.5 - 36.5 g/dL    RDW 14.5 10.0 - 15.0 %    Platelet Count 109 (L) 150 - 450 10e3/uL    % Neutrophils 71 %    % Lymphocytes 17 %    % Monocytes 9 %    % Eosinophils 1 %    % Basophils 1 %    % Immature Granulocytes 1 %    NRBCs per 100 WBC 0 <1 /100    Absolute Neutrophils 6.2 1.6 - 8.3 10e3/uL    Absolute Lymphocytes 1.5 0.8 - 5.3 10e3/uL    Absolute Monocytes 0.7 0.0 - 1.3 10e3/uL    Absolute Eosinophils 0.1 0.0 - 0.7 10e3/uL    Absolute Basophils 0.0 0.0 - 0.2 10e3/uL    Absolute Immature Granulocytes 0.0 <=0.4 10e3/uL    Absolute NRBCs 0.0 10e3/uL   XR Chest Port 1 View    Narrative    EXAM: XR CHEST PORT 1 VIEW  LOCATION: [facility]  DATE/TIME: 12/14/2022 11:42 PM    INDICATION: COVID-19; hypoxemia.  COMPARISON: 12/08/2022.      Impression     IMPRESSION: Stable since 12/08/2022 with no acute cardiopulmonary findings to explain patient's hypoxemia. Postoperative changes sternotomy. Transvenous pacer with appliances in stable position. Partially calcified thoracic aorta.   Lactic acid whole blood   Result Value Ref Range    Lactic Acid 1.6 0.7 - 2.0 mmol/L              Impression     Final diagnoses:   Infection due to 2019 novel coronavirus   Shortness of breath   Sleep apnea with transient hypoxemia         ED Course & Medical Decision Making   Letty Escobar is a 67 year old female who presented to the emergency department by EMS from the nursing home with complaints of shortness of breath and wheezing.  Patient is newly diagnosed with having COVID-19 infection.  She was sick yesterday, had a negative rapid COVID test, and was tested again tonight and was positive.  She was initially placed on some oxygen, and transported to the ED.  Patient reports that she has had a nonproductive cough.  Vital signs reveal a temp of 99.1, blood pressure of 107/60, heart rate of 77, respiratory rate of 20 with 92% on room air.  Her oxygen levels were initially 97% on 2 L/min.  When she slept, her oxygen saturation dropped to 89%.  Laboratory work-up reveals a normal white blood count of 8.6, hemoglobin is 9.1 similar to her baseline.  Venous blood gas reveals a pH of 7.39, PCO2 42, PO2 of 45.  Troponin is 46.  Lactic acid levels 2.2.  Comprehensive metabolic panel is normal except for slight elevation of the creatinine at 1.1 and glucose of 199.  Chest x-ray reveals no acute cardiopulmonary findings.  No clear evidence of pneumonia.    Medications   dexamethasone PF (DECADRON) injection 6 mg (has no administration in time range)   0.9% sodium chloride BOLUS (0 mLs Intravenous Stopped 12/15/22 0230)   dexamethasone PF (DECADRON) injection 6 mg (6 mg Intravenous Given 12/15/22 0108)      Patient woke up around 4:00 AM, and states that she is feeling better and is ready  to go back to the nursing home.  She tells me that her fever has gone away and that she does not feel as sick as she did.  We noticed that she had brief episodes of desaturations in her sleep, with suspected brief episodes of apnea.  These episodes are not related to her COVID infection as her oxygen saturations have been between 97-99%.      Written after-visit summary and instructions were given at the time of discharge.      Discharge Instructions:   You have COVID-19 infection without any evidence of pneumonia or low oxygen saturations.  We noticed that you have some signs of sleep apnea with mild low oxygen saturations when asleep.  Please see your healthcare provider to discuss treatment options.  Begin your Paxlovid today.       Disclaimer: This note consists of words and symbols derived from keyboarding and dictation using voice recognition software.  As a result, there may be errors that have gone undetected.  Please consider this when interpreting information found in this note.       Monie Weiss MD  12/15/22 [id number]

## 2022-12-15 NOTE — DISCHARGE INSTRUCTIONS
You have COVID-19 infection without any evidence of pneumonia or low oxygen saturations.  We noticed that you have some signs of sleep apnea with mild low oxygen saturations when asleep.  Please see your healthcare provider to discuss treatment options.  Begin your Paxlovid today.

## 2022-12-15 NOTE — PROGRESS NOTES
[facility] Care Coordination Contact  CC received notification of Emergency Room visit.  ER visit occurred on 12/14/22 at [facility] with Dx of chest pain,anxiety.    CC contacted Altagracia RN at NH they are working with member on her anxiety to hopefully reduce ED visit.   Member has a follow-up appointment with PCP: Yes: scheduled on will see [facility] NP  Member has had a change in condition: No  New referrals placed: No  Home Visit Needed: No  Care plan reviewed and updated.  PCP notified of ED visit via EMR.    Esperanza Damon RN  Care Coordinator-Long Term Care  [address] [address]  [email or web address]   [email or web address]     Office: [phone]   Fax: [phone]

## 2022-12-15 NOTE — TELEPHONE ENCOUNTER
Call from nurse :patient having shortness of breath   Diagnosed COVID19 today, has not received antiviral Paxlovid at this time (not arrived from pharmacy)  Vitals stable room air, respiratory 20  Temp 101  Patient states she is going to the hospital and she will call herself if they do not send her.    Order:okay to send to hospital to evaluate and treat due to shortness of breath from COVID19

## 2022-12-15 NOTE — ED TRIAGE NOTES
Newly diagnosed with COVID19 today; approx 1 hr ago, started to have shortness of breath and wheezing.  Brought in via EMS from [facility]     Triage Assessment     Row Name 12/14/22 4521       Triage Assessment (Adult)    Airway WDL WDL       Respiratory WDL    Respiratory WDL X;cough    Cough Frequency infrequent    Cough Type dry;nonproductive       Skin Circulation/Temperature WDL    Skin Circulation/Temperature WDL WDL       Cardiac WDL    Cardiac WDL WDL       Peripheral/Neurovascular WDL    Peripheral Neurovascular WDL WDL       Cognitive/Neuro/Behavioral WDL    Cognitive/Neuro/Behavioral WDL WDL       [place] Coma Scale    Best Eye Response 4-->(E4) spontaneous    Best Motor Response 6-->(M6) obeys commands    Best Verbal Response 5-->(V5) oriented    [place] Coma Scale Score 15

## 2022-12-19 ENCOUNTER — CLINICAL UPDATE (OUTPATIENT)
Dept: PHARMACY | Facility: CLINIC | Age: 67
End: 2022-12-19
Payer: COMMERCIAL

## 2022-12-19 ENCOUNTER — NURSING HOME VISIT (OUTPATIENT)
Dept: GERIATRICS | Facility: CLINIC | Age: 67
End: 2022-12-19
Payer: COMMERCIAL

## 2022-12-19 VITALS
RESPIRATION RATE: 20 BRPM | SYSTOLIC BLOOD PRESSURE: 122 MMHG | BODY MASS INDEX: 43.45 KG/M2 | HEIGHT: 57 IN | WEIGHT: 201.4 LBS | TEMPERATURE: 97.7 F | OXYGEN SATURATION: 94 % | HEART RATE: 60 BPM | DIASTOLIC BLOOD PRESSURE: 72 MMHG

## 2022-12-19 DIAGNOSIS — E03.9 HYPOTHYROIDISM, UNSPECIFIED TYPE: ICD-10-CM

## 2022-12-19 DIAGNOSIS — I10 ESSENTIAL HYPERTENSION: ICD-10-CM

## 2022-12-19 DIAGNOSIS — I65.21 CAROTID STENOSIS, RIGHT: ICD-10-CM

## 2022-12-19 DIAGNOSIS — F40.01 PANIC DISORDER WITH AGORAPHOBIA: ICD-10-CM

## 2022-12-19 DIAGNOSIS — I48.20 CHRONIC ATRIAL FIBRILLATION (H): ICD-10-CM

## 2022-12-19 DIAGNOSIS — I10 ESSENTIAL (PRIMARY) HYPERTENSION: ICD-10-CM

## 2022-12-19 DIAGNOSIS — Z95.1 S/P CABG X 5: ICD-10-CM

## 2022-12-19 DIAGNOSIS — E53.8 VITAMIN B12 DEFICIENCY: ICD-10-CM

## 2022-12-19 DIAGNOSIS — J44.9 CHRONIC OBSTRUCTIVE PULMONARY DISEASE, UNSPECIFIED COPD TYPE (H): Primary | ICD-10-CM

## 2022-12-19 DIAGNOSIS — E78.5 HYPERLIPIDEMIA WITH TARGET LOW DENSITY LIPOPROTEIN (LDL) CHOLESTEROL LESS THAN 70 MG/DL: ICD-10-CM

## 2022-12-19 DIAGNOSIS — F43.10 POSTTRAUMATIC STRESS DISORDER: ICD-10-CM

## 2022-12-19 DIAGNOSIS — I50.22 CHRONIC SYSTOLIC CONGESTIVE HEART FAILURE (H): ICD-10-CM

## 2022-12-19 DIAGNOSIS — Z95.0 CARDIAC PACEMAKER IN SITU: ICD-10-CM

## 2022-12-19 DIAGNOSIS — R05.9 COUGH, UNSPECIFIED TYPE: Primary | ICD-10-CM

## 2022-12-19 PROCEDURE — 99207 PR NO CHARGE LOS: CPT | Performed by: PHARMACIST

## 2022-12-19 PROCEDURE — 99310 SBSQ NF CARE HIGH MDM 45: CPT | Performed by: FAMILY MEDICINE

## 2022-12-19 NOTE — PROGRESS NOTES
This patient's medication list and chart were reviewed as part of the service provided by [facility].    Assessment/Recommendations:    Consider d'c prazosin.  Of note, there are postmarketing reports of prazosin contributing to angina, including exacerbation of angina (per UpToDate).  Additionally, prazosin may contribute to bradycardia, orthostasis, palpitations, syncope, edema.  May exacerbate heart failure symptoms.    Noted on chart review that with most recent ED visit, provider noting some signs of sleep apnea with mild low oxygen sats when asleep.   Consider sleep clinic referral - possible sleep apnea is contributing to episodes of chest pain that have contributed to several ED visits.    Agree with previous MD recommendations to continue with quetiapine taper, and if tolerated and necessary, could increase Depakote in the future.  See previous encounters from Dr. Pastor.        Renea Dhaliwal, Pharm.D.,[facility]  Board Certified Geriatric Pharmacist  Medication Therapy Management Pharmacist  [phone]

## 2022-12-19 NOTE — LETTER
12/19/2022        RE: Letty Escobar  [facility] (formerly [facility])[3]  [address] [facility] [address]          [facility]    Facility:   [facility] () [[id number]]   Code Status: DNR/DNI      CHIEF COMPLAINT/REASON FOR VISIT:  Chief Complaint   Patient presents with     FVP Care Coordination - Regulatory       HISTORY:      HPI: Letty is a 67 year old female who currently resides in long-term care room 249 and who I was asked to visit with today secondary to a regulatory review of chronic medical conditions.  She has been to the hospital a couple times in the last 10 days 1 for chest pain which was negative the other one was for a cough who was already diagnosed with COVID who went in and they sent her back to the nursing home.  The end of last week I did put her on paxlovid.  Today she states she is feeling a little bit better with increased energy.  Did not have any significant major symptoms.  Had a chance to go over her medications today and for chronic pain is on scheduled Tylenol 3 times daily as well as gabapentin 300 mg in the evening time.  For sleep is on melatonin 10 mg along with mirtazapine 15 mg and Seroquel 25 mg 3 times daily and duloxetine 40 mg and divalproex 250 mg.  She has been normotensive and afebrile and also on room air.  Does need assistance with all ADLs.  Went over her laboratory studies her last TSH in May was 3.91 she is on Synthroid 100 mcg we will recheck that in a week along with her vitamin D level.  Her last B12 level was in October at 627 her valproic acid level in October was at 22 hemoglobin recently at 9.1 while in the hospital the CMP looked good.  Denies any heartburn or reflux currently on Pepcid 20 mg daily.    Past Medical History:   Diagnosis Date     ACP (advance care planning) 11/3/2010    Formatting of this note might be different from the original. Patient has identified Health Care Agent(s): Yes Add Health Care Agents:  Yes   Health Care Agent(s):  Primary Health Care Agent:   Edwar Escobar Relationship:  Spouse Phone:   [phone]  Secondary Health Care Agent:   Chiki Oro Relationship:   Son Phone:   [phone]  Patient has Advance Care Plan Documents (Health Care Direct     Acute coronary syndrome (H) 11/22/2019     Acute exacerbation of CHF (congestive heart failure) (H) 11/11/2019     Acute on chronic systolic (congestive) heart failure (H) 1/28/2020     Anemia of chronic disease 1/5/2013     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, left leg (H) 1/15/2020     BPPV (benign paroxysmal positional vertigo) 5/31/2018     Candidiasis 3/1/2020     Carotid stenosis, right 7/13/2016    Carotid US 05/04/2018 showed moderate plaque formation, consistent with 50 to 69% stenosis in the right internal carotid artery, not significantly changed from 8/5/2015.  Moderate plaque formation, consistent with 50 to 69% stenosis in the left internal carotid artery; there has been mild progression of the left ICA stenosis since 8/5/2015.     Chest pain 11/11/2019     Chronic bilateral low back pain without sciatica 1/17/2018     Chronic pain disorder 10/1/2017     Constipation 3/20/2020     COPD (chronic obstructive pulmonary disease) (H) 6/24/2020     Coronary atherosclerosis 2/6/2009 2/5/2009 - MI - Proximal RCA 99%, mild-mod disease elsewhere.  EF 60%.  PCI:  MYRON to pRCA. 2/12/2009 - admit CP - Widely patent RCA stent. Moderate diffuse CAD. Severe stenosis in trivial PDA branch. LVEF 45%. 1/25/2010 - admit CP - PTCA and stent of diagonal 9/8/2010 - admit CP - LAD patent stent. Moderate diffuse CAD. Medical management recommended.  4/25/2012 - NSTEMI - Acute total occlusion of     Cubital tunnel syndrome on left 11/13/2012     Depressive disorder      Diabetes mellitus (H)      Diabetes mellitus type 2 in obese (H) 7/13/2006     Diabetes mellitus type 2 in obese (H) 7/13/2006     Drug-seeking  behavior 4/4/2020     Failure to thrive in adult 9/3/2020     Hereditary and idiopathic peripheral neuropathy 4/24/2007     Hyperlipidemia with target low density lipoprotein (LDL) cholesterol less than 70 mg/dL 5/30/2007     Hypertension 10/14/2015     Hypothyroidism 12/10/2010     Irritable bowel syndrome 9/7/2015     Ischemic cardiomyopathy 9/20/2015    EF of 40-45%, status post RV lead revision and LV epicardial lead placement via mini-thoracotomy in August 2016.     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  Designated pharmacy: [facility] Prescribing physician: Andrea Diagnosis: Ulnar neuropathy     Panic disorder with agoraphobia 4/14/2020     Paroxysmal atrial fibrillation (H) 10/2/2015     Personality disorder (H) 3/5/2020    Rule out dependent personality     Polymyalgia rheumatica (H) 11/28/2018     Posttraumatic stress disorder 3/1/2020     Restless legs syndrome (RLS) 8/29/2007     Restless legs syndrome (RLS) 8/29/2007     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     Serum calcium elevated 3/1/2020     Somatic dysfunction of sacral region 1/17/2018     Subacromial bursitis of left shoulder joint 8/6/2018     Suicidal ideation 4/1/2020     Thyroid disease      TIA (transient ischemic attack) 5/4/2018     TIA (transient ischemic attack) 5/4/2018     Tobacco abuse 2/17/2017     Tobacco abuse 2/17/2017     Urinary incontinence, mixed 9/24/2017     UTI (urinary tract infection) 4/17/2020     Vitamin B12 deficiency 2/14/2018     Weakness 12/17/2019            No family history on file.   Social History     Socioeconomic History     Marital status:    Tobacco  Use     Smoking status: Never     Smokeless tobacco: Never   Substance and Sexual Activity     Alcohol use: Not Currently     Drug use: Never      No new changes in the review of systems or physical exam since our last visit November 7  REVIEW OF SYSTEM:  She currently denies any new symptoms of cough or cold sore throat postnasal drip wheezing chest pain dizziness vertigo nausea vomiting diarrhea dysuria frequency or urgency.  Does have a history of dyslipidemia, COPD, CABG, depression, hypertension, posttraumatic stress disorder, panic disorder, anxiety, personality disorder    PHYSICAL EXAM:   Pleasant female in no acute distress.  Head is normocephalic.  Neck is supple without adenopathy.  Lung sounds are clear throughout.  Cardiovascular S1-S2 regular rate and rhythm and no lower extremity edema.  Gastrointestinal is protuberant nontender nondistended.  Musculoskeletal currently in bed.  Denies pain to her major joints.  Currently on scheduled Tylenol and gabapentin at bedtime.  Psychiatric: Pleasant affect     Current Outpatient Medications:      acetaminophen (TYLENOL) 325 MG tablet, Take 650 mg by mouth every 6 hours as needed for mild pain, Disp: , Rfl:      acetaminophen (TYLENOL) 500 MG tablet, Take 2 tablets (1,000 mg) by mouth 3 times daily, Disp: , Rfl:      albuterol (PROVENTIL) (2.5 MG/3ML) 0.083% neb solution, Take 1 vial (2.5 mg) by nebulization 2 times daily as needed for shortness of breath / dyspnea or wheezing, Disp: 90 mL, Rfl:      aspirin 81 MG EC tablet, Take 81 mg by mouth daily, Disp: , Rfl:      bisacodyl (DULCOLAX) 10 MG suppository, Place 10 mg rectally daily as needed for constipation, Disp: , Rfl:      cholecalciferol (VITAMIN D3) 10 mcg (400 units) TABS tablet, Take 1,000 Units by mouth, Disp: , Rfl:      cyanocobalamin (CYANOCOBALAMIN) 1000 MCG/ML injection, Inject 1 mL into the muscle every 30 days, Disp: , Rfl:      divalproex sodium delayed-release (DEPAKOTE) 250 MG DR tablet,  "Take 250 mg by mouth daily, Disp: , Rfl:      DULoxetine HCl 40 MG CPEP, Take 40 mg by mouth daily, Disp: , Rfl:      fluticasone-vilanterol (BREO ELLIPTA) 200-25 MCG/INH inhaler, Inhale 1 puff into the lungs daily, Disp: , Rfl:      furosemide (LASIX) 20 MG tablet, Take 20 mg by mouth daily, Disp: , Rfl:      gabapentin (NEURONTIN) 300 MG capsule, Take 400 mg by mouth At Bedtime, Disp: , Rfl:      guaiFENesin (ROBITUSSIN) 100 MG/5ML liquid, Take 20 mLs (400 mg) by mouth every 4 hours as needed for cough, Disp: , Rfl:      levothyroxine (SYNTHROID/LEVOTHROID) 100 MCG tablet, Take 100 mcg by mouth daily, Disp: , Rfl:      Melatonin 10 MG TABS tablet, Take 10 mg by mouth At Bedtime, Disp: , Rfl:      metoprolol succinate ER (TOPROL-XL) 25 MG 24 hr tablet, Take 12.5 mg by mouth daily, Disp: , Rfl:      miconazole (MICATIN) 2 % AERP powder, Apply topically as needed , Disp: , Rfl:      mirtazapine (REMERON) 30 MG tablet, Take 15 mg by mouth daily, Disp: , Rfl:      nirmatrelvir and ritonavir (PAXLOVID) therapy pack, Take 300/100mg BID x 5 days., Disp: , Rfl:      polyethylene glycol (MIRALAX) 17 GM/Dose powder, Take 17 g by mouth daily Every other day, Disp: , Rfl:      potassium chloride ER (MICRO-K) 10 MEQ CR capsule, Take 10 mEq by mouth daily , Disp: , Rfl:      prazosin (MINIPRESS) 1 MG capsule, Take 1 mg by mouth At Bedtime, Disp: , Rfl:      QUEtiapine (SEROQUEL) 25 MG tablet, 25 mg 3 times daily, Disp: , Rfl: 0     rosuvastatin (CRESTOR) 10 MG tablet, Take 10 mg by mouth At Bedtime, Disp: , Rfl:      sennosides (SENOKOT) 8.6 MG tablet, Take 2 tablets by mouth At Bedtime, Disp: , Rfl:      Vitamin D3 (CHOLECALCIFEROL) 25 mcg (1000 units) tablet, Take 1 tablet by mouth daily, Disp: , Rfl:     /72   Pulse 60   Temp 97.7  F (36.5  C)   Resp 20   Ht 1.448 m (4' 9\")   Wt 91.4 kg (201 lb 6.4 oz)   SpO2 94%   BMI 43.58 kg/m        LABS:   Last Comprehensive Metabolic Panel:  Sodium   Date Value Ref Range " Status   12/14/2022 141 133 - 144 mmol/L Final   03/16/2021 139 133 - 144 mmol/L Final     Potassium   Date Value Ref Range Status   12/14/2022 4.7 3.4 - 5.3 mmol/L Final   03/16/2021 4.7 3.4 - 5.3 mmol/L Final     Chloride   Date Value Ref Range Status   12/14/2022 110 (H) 94 - 109 mmol/L Final   03/16/2021 111 (H) 94 - 109 mmol/L Final     Carbon Dioxide   Date Value Ref Range Status   03/16/2021 22 20 - 32 mmol/L Final     Carbon Dioxide (CO2)   Date Value Ref Range Status   12/14/2022 25 20 - 32 mmol/L Final     Anion Gap   Date Value Ref Range Status   12/14/2022 6 3 - 14 mmol/L Final   03/16/2021 6 3 - 14 mmol/L Final     Glucose   Date Value Ref Range Status   12/14/2022 199 (H) 70 - 99 mg/dL Final   03/16/2021 114 (H) 70 - 99 mg/dL Final     Urea Nitrogen   Date Value Ref Range Status   12/14/2022 24 7 - 30 mg/dL Final   03/16/2021 45 (H) 7 - 30 mg/dL Final     Creatinine   Date Value Ref Range Status   12/14/2022 1.10 (H) 0.52 - 1.04 mg/dL Final   03/16/2021 0.98 0.52 - 1.04 mg/dL Final     GFR Estimate   Date Value Ref Range Status   12/14/2022 54 (L) >60 mL/min/1.73m2 Final     Comment:     Effective December 21, 2021 eGFRcr in adults is calculated using the 2021 CKD-EPI creatinine equation which includes age and gender (Roland et al., NEJM, DOI: 10.1056/KCMVhp5066839)   03/16/2021 60 (L) >60 mL/min/[1.73_m2] Final     Comment:     Non  GFR Calc  Starting 12/18/2018, serum creatinine based estimated GFR (eGFR) will be   calculated using the Chronic Kidney Disease Epidemiology Collaboration   (CKD-EPI) equation.       Calcium   Date Value Ref Range Status   12/14/2022 9.1 8.5 - 10.1 mg/dL Final   03/16/2021 9.4 8.5 - 10.1 mg/dL Final     CBC RESULTS: Recent Labs   Lab Test 12/14/22  2324   WBC 8.6   RBC 3.07*   HGB 9.1*   HCT 29.3*   MCV 95   MCH 29.6   MCHC 31.1*   RDW 14.5   *     TSH   Date Value Ref Range Status   05/16/2022 3.91 0.40 - 4.00 mU/L Final   12/04/2020 41.34 (H) 0.40  - 4.00 mU/L Final     T4 Free   Date Value Ref Range Status   12/04/2020 1.09 0.76 - 1.46 ng/dL Final         ASSESSMENT:    (R05.9) Cough, unspecified type  (primary encounter diagnosis)  Comment: Doing much better  Plan: Is positive COVID but otherwise asymptomatic and on paxlovid    (I10) Essential (primary) hypertension  Comment: Stable  Plan: Continue to manage and follow    (Z95.0) Cardiac pacemaker in situ  Comment: Stable  Plan: Follow-up with cardiology    (E03.9) Hypothyroidism, unspecified type  Comment: Due for TSH  Plan: Continue with Synthroid 100 mcg daily      Case Management:  I have reviewed the facility/SNF care plan/MDS which was done Today, including the falls risk, nutrition and pain screening. I also reviewed the current immunizations, and preventive care.. Future cancer screening is not clinically indicated secondary to age/goals of care.   Patient's desire to return to the community is present, but is not able due to care needs .    Information reviewed:  Medications, vital signs, orders, and nursing notes.  PLAN:    Will eventually get a TSH and a vitamin D level.  Her valproic acid level in October was 22 recent CMP and hemoglobin while in the hospital last week.  Otherwise she will finish up her paxlovid on December 19.  Also a chance to talk to the charge nurse today.  They will keep me updated for any other problems or concerns.    Electronically signed by: Jay Chapman NP            Sincerely,        Jay Chapman NP

## 2022-12-19 NOTE — PROGRESS NOTES
[facility] GERIATRIC SERVICES    Facility:   [facility] () [[id number]]   Code Status: DNR/DNI      CHIEF COMPLAINT/REASON FOR VISIT:  Chief Complaint   Patient presents with     FVP Care Coordination - Regulatory       HISTORY:      HPI: Letty is a 67 year old female who currently resides in long-term care room 249 and who I was asked to visit with today secondary to a regulatory review of chronic medical conditions.  She has been to the hospital a couple times in the last 10 days 1 for chest pain which was negative the other one was for a cough who was already diagnosed with COVID who went in and they sent her back to the nursing home.  The end of last week I did put her on paxlovid.  Today she states she is feeling a little bit better with increased energy.  Did not have any significant major symptoms.  Had a chance to go over her medications today and for chronic pain is on scheduled Tylenol 3 times daily as well as gabapentin 300 mg in the evening time.  For sleep is on melatonin 10 mg along with mirtazapine 15 mg and Seroquel 25 mg 3 times daily and duloxetine 40 mg and divalproex 250 mg.  She has been normotensive and afebrile and also on room air.  Does need assistance with all ADLs.  Went over her laboratory studies her last TSH in May was 3.91 she is on Synthroid 100 mcg we will recheck that in a week along with her vitamin D level.  Her last B12 level was in October at 627 her valproic acid level in October was at 22 hemoglobin recently at 9.1 while in the hospital the CMP looked good.  Denies any heartburn or reflux currently on Pepcid 20 mg daily.    Past Medical History:   Diagnosis Date     ACP (advance care planning) 11/3/2010    Formatting of this note might be different from the original. Patient has identified Health Care Agent(s): Yes Add Health Care Agents: Yes   Health Care Agent(s):  Primary Health Care Agent:   Edwar Escobar Relationship:  Spouse Phone:   [phone]   Secondary Health Care Agent:   Chiki Oro Relationship:   Son Phone:   [phone]  Patient has Advance Care Plan Documents (Health Care Direct     Acute coronary syndrome (H) 11/22/2019     Acute exacerbation of CHF (congestive heart failure) (H) 11/11/2019     Acute on chronic systolic (congestive) heart failure (H) 1/28/2020     Anemia of chronic disease 1/5/2013     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, left leg (H) 1/15/2020     BPPV (benign paroxysmal positional vertigo) 5/31/2018     Candidiasis 3/1/2020     Carotid stenosis, right 7/13/2016    Carotid US 05/04/2018 showed moderate plaque formation, consistent with 50 to 69% stenosis in the right internal carotid artery, not significantly changed from 8/5/2015.  Moderate plaque formation, consistent with 50 to 69% stenosis in the left internal carotid artery; there has been mild progression of the left ICA stenosis since 8/5/2015.     Chest pain 11/11/2019     Chronic bilateral low back pain without sciatica 1/17/2018     Chronic pain disorder 10/1/2017     Constipation 3/20/2020     COPD (chronic obstructive pulmonary disease) (H) 6/24/2020     Coronary atherosclerosis 2/6/2009 2/5/2009 - MI - Proximal RCA 99%, mild-mod disease elsewhere.  EF 60%.  PCI:  MYRON to pRCA. 2/12/2009 - admit CP - Widely patent RCA stent. Moderate diffuse CAD. Severe stenosis in trivial PDA branch. LVEF 45%. 1/25/2010 - admit CP - PTCA and stent of diagonal 9/8/2010 - admit CP - LAD patent stent. Moderate diffuse CAD. Medical management recommended.  4/25/2012 - NSTEMI - Acute total occlusion of     Cubital tunnel syndrome on left 11/13/2012     Depressive disorder      Diabetes mellitus (H)      Diabetes mellitus type 2 in obese (H) 7/13/2006     Diabetes mellitus type 2 in obese (H) 7/13/2006     Drug-seeking behavior 4/4/2020     Failure to thrive in adult 9/3/2020     Hereditary and idiopathic peripheral neuropathy  4/24/2007     Hyperlipidemia with target low density lipoprotein (LDL) cholesterol less than 70 mg/dL 5/30/2007     Hypertension 10/14/2015     Hypothyroidism 12/10/2010     Irritable bowel syndrome 9/7/2015     Ischemic cardiomyopathy 9/20/2015    EF of 40-45%, status post RV lead revision and LV epicardial lead placement via mini-thoracotomy in August 2016.     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  Designated pharmacy: [facility] Prescribing physician: Andrea Diagnosis: Ulnar neuropathy     Panic disorder with agoraphobia 4/14/2020     Paroxysmal atrial fibrillation (H) 10/2/2015     Personality disorder (H) 3/5/2020    Rule out dependent personality     Polymyalgia rheumatica (H) 11/28/2018     Posttraumatic stress disorder 3/1/2020     Restless legs syndrome (RLS) 8/29/2007     Restless legs syndrome (RLS) 8/29/2007     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     Serum calcium elevated 3/1/2020     Somatic dysfunction of sacral region 1/17/2018     Subacromial bursitis of left shoulder joint 8/6/2018     Suicidal ideation 4/1/2020     Thyroid disease      TIA (transient ischemic attack) 5/4/2018     TIA (transient ischemic attack) 5/4/2018     Tobacco abuse 2/17/2017     Tobacco abuse 2/17/2017     Urinary incontinence, mixed 9/24/2017     UTI (urinary tract infection) 4/17/2020     Vitamin B12 deficiency 2/14/2018     Weakness 12/17/2019            No family history on file.   Social History     Socioeconomic History     Marital status:    Tobacco Use     Smoking status: Never     Smokeless tobacco: Never   Substance and Sexual Activity     Alcohol use: Not  Currently     Drug use: Never      No new changes in the review of systems or physical exam since our last visit November 7  REVIEW OF SYSTEM:  She currently denies any new symptoms of cough or cold sore throat postnasal drip wheezing chest pain dizziness vertigo nausea vomiting diarrhea dysuria frequency or urgency.  Does have a history of dyslipidemia, COPD, CABG, depression, hypertension, posttraumatic stress disorder, panic disorder, anxiety, personality disorder    PHYSICAL EXAM:   Pleasant female in no acute distress.  Head is normocephalic.  Neck is supple without adenopathy.  Lung sounds are clear throughout.  Cardiovascular S1-S2 regular rate and rhythm and no lower extremity edema.  Gastrointestinal is protuberant nontender nondistended.  Musculoskeletal currently in bed.  Denies pain to her major joints.  Currently on scheduled Tylenol and gabapentin at bedtime.  Psychiatric: Pleasant affect     Current Outpatient Medications:      acetaminophen (TYLENOL) 325 MG tablet, Take 650 mg by mouth every 6 hours as needed for mild pain, Disp: , Rfl:      acetaminophen (TYLENOL) 500 MG tablet, Take 2 tablets (1,000 mg) by mouth 3 times daily, Disp: , Rfl:      albuterol (PROVENTIL) (2.5 MG/3ML) 0.083% neb solution, Take 1 vial (2.5 mg) by nebulization 2 times daily as needed for shortness of breath / dyspnea or wheezing, Disp: 90 mL, Rfl:      aspirin 81 MG EC tablet, Take 81 mg by mouth daily, Disp: , Rfl:      bisacodyl (DULCOLAX) 10 MG suppository, Place 10 mg rectally daily as needed for constipation, Disp: , Rfl:      cholecalciferol (VITAMIN D3) 10 mcg (400 units) TABS tablet, Take 1,000 Units by mouth, Disp: , Rfl:      cyanocobalamin (CYANOCOBALAMIN) 1000 MCG/ML injection, Inject 1 mL into the muscle every 30 days, Disp: , Rfl:      divalproex sodium delayed-release (DEPAKOTE) 250 MG DR tablet, Take 250 mg by mouth daily, Disp: , Rfl:      DULoxetine HCl 40 MG CPEP, Take 40 mg by mouth daily, Disp: , Rfl:  "     fluticasone-vilanterol (BREO ELLIPTA) 200-25 MCG/INH inhaler, Inhale 1 puff into the lungs daily, Disp: , Rfl:      furosemide (LASIX) 20 MG tablet, Take 20 mg by mouth daily, Disp: , Rfl:      gabapentin (NEURONTIN) 300 MG capsule, Take 400 mg by mouth At Bedtime, Disp: , Rfl:      guaiFENesin (ROBITUSSIN) 100 MG/5ML liquid, Take 20 mLs (400 mg) by mouth every 4 hours as needed for cough, Disp: , Rfl:      levothyroxine (SYNTHROID/LEVOTHROID) 100 MCG tablet, Take 100 mcg by mouth daily, Disp: , Rfl:      Melatonin 10 MG TABS tablet, Take 10 mg by mouth At Bedtime, Disp: , Rfl:      metoprolol succinate ER (TOPROL-XL) 25 MG 24 hr tablet, Take 12.5 mg by mouth daily, Disp: , Rfl:      miconazole (MICATIN) 2 % AERP powder, Apply topically as needed , Disp: , Rfl:      mirtazapine (REMERON) 30 MG tablet, Take 15 mg by mouth daily, Disp: , Rfl:      nirmatrelvir and ritonavir (PAXLOVID) therapy pack, Take 300/100mg BID x 5 days., Disp: , Rfl:      polyethylene glycol (MIRALAX) 17 GM/Dose powder, Take 17 g by mouth daily Every other day, Disp: , Rfl:      potassium chloride ER (MICRO-K) 10 MEQ CR capsule, Take 10 mEq by mouth daily , Disp: , Rfl:      prazosin (MINIPRESS) 1 MG capsule, Take 1 mg by mouth At Bedtime, Disp: , Rfl:      QUEtiapine (SEROQUEL) 25 MG tablet, 25 mg 3 times daily, Disp: , Rfl: 0     rosuvastatin (CRESTOR) 10 MG tablet, Take 10 mg by mouth At Bedtime, Disp: , Rfl:      sennosides (SENOKOT) 8.6 MG tablet, Take 2 tablets by mouth At Bedtime, Disp: , Rfl:      Vitamin D3 (CHOLECALCIFEROL) 25 mcg (1000 units) tablet, Take 1 tablet by mouth daily, Disp: , Rfl:     /72   Pulse 60   Temp 97.7  F (36.5  C)   Resp 20   Ht 1.448 m (4' 9\")   Wt 91.4 kg (201 lb 6.4 oz)   SpO2 94%   BMI 43.58 kg/m        LABS:   Last Comprehensive Metabolic Panel:  Sodium   Date Value Ref Range Status   12/14/2022 141 133 - 144 mmol/L Final   03/16/2021 139 133 - 144 mmol/L Final     Potassium   Date Value " Ref Range Status   12/14/2022 4.7 3.4 - 5.3 mmol/L Final   03/16/2021 4.7 3.4 - 5.3 mmol/L Final     Chloride   Date Value Ref Range Status   12/14/2022 110 (H) 94 - 109 mmol/L Final   03/16/2021 111 (H) 94 - 109 mmol/L Final     Carbon Dioxide   Date Value Ref Range Status   03/16/2021 22 20 - 32 mmol/L Final     Carbon Dioxide (CO2)   Date Value Ref Range Status   12/14/2022 25 20 - 32 mmol/L Final     Anion Gap   Date Value Ref Range Status   12/14/2022 6 3 - 14 mmol/L Final   03/16/2021 6 3 - 14 mmol/L Final     Glucose   Date Value Ref Range Status   12/14/2022 199 (H) 70 - 99 mg/dL Final   03/16/2021 114 (H) 70 - 99 mg/dL Final     Urea Nitrogen   Date Value Ref Range Status   12/14/2022 24 7 - 30 mg/dL Final   03/16/2021 45 (H) 7 - 30 mg/dL Final     Creatinine   Date Value Ref Range Status   12/14/2022 1.10 (H) 0.52 - 1.04 mg/dL Final   03/16/2021 0.98 0.52 - 1.04 mg/dL Final     GFR Estimate   Date Value Ref Range Status   12/14/2022 54 (L) >60 mL/min/1.73m2 Final     Comment:     Effective December 21, 2021 eGFRcr in adults is calculated using the 2021 CKD-EPI creatinine equation which includes age and gender (Roland muhammad al., [place], DOI: 10.1056/FKYNej2976468)   03/16/2021 60 (L) >60 mL/min/[1.73_m2] Final     Comment:     Non  GFR Calc  Starting 12/18/2018, serum creatinine based estimated GFR (eGFR) will be   calculated using the Chronic Kidney Disease Epidemiology Collaboration   (CKD-EPI) equation.       Calcium   Date Value Ref Range Status   12/14/2022 9.1 8.5 - 10.1 mg/dL Final   03/16/2021 9.4 8.5 - 10.1 mg/dL Final     CBC RESULTS: Recent Labs   Lab Test 12/14/22  2324   WBC 8.6   RBC 3.07*   HGB 9.1*   HCT 29.3*   MCV 95   MCH 29.6   MCHC 31.1*   RDW 14.5   *     TSH   Date Value Ref Range Status   05/16/2022 3.91 0.40 - 4.00 mU/L Final   12/04/2020 41.34 (H) 0.40 - 4.00 mU/L Final     T4 Free   Date Value Ref Range Status   12/04/2020 1.09 0.76 - 1.46 ng/dL Final          ASSESSMENT:    (R05.9) Cough, unspecified type  (primary encounter diagnosis)  Comment: Doing much better  Plan: Is positive COVID but otherwise asymptomatic and on paxlovid    (I10) Essential (primary) hypertension  Comment: Stable  Plan: Continue to manage and follow    (Z95.0) Cardiac pacemaker in situ  Comment: Stable  Plan: Follow-up with cardiology    (E03.9) Hypothyroidism, unspecified type  Comment: Due for TSH  Plan: Continue with Synthroid 100 mcg daily      Case Management:  I have reviewed the facility/SNF care plan/MDS which was done Today, including the falls risk, nutrition and pain screening. I also reviewed the current immunizations, and preventive care.. Future cancer screening is not clinically indicated secondary to age/goals of care.   Patient's desire to return to the community is present, but is not able due to care needs .    Information reviewed:  Medications, vital signs, orders, and nursing notes.  PLAN:    Will eventually get a TSH and a vitamin D level.  Her valproic acid level in October was 22 recent CMP and hemoglobin while in the hospital last week.  Otherwise she will finish up her paxlovid on December 19.  Also a chance to talk to the charge nurse today.  They will keep me updated for any other problems or concerns.    Electronically signed by: Jay Chapman NP

## 2022-12-27 ENCOUNTER — LAB REQUISITION (OUTPATIENT)
Dept: LAB | Facility: CLINIC | Age: 67
End: 2022-12-27
Payer: COMMERCIAL

## 2022-12-27 DIAGNOSIS — E03.9 HYPOTHYROIDISM, UNSPECIFIED: ICD-10-CM

## 2022-12-27 DIAGNOSIS — I25.709: ICD-10-CM

## 2022-12-27 DIAGNOSIS — J44.9 CHRONIC OBSTRUCTIVE PULMONARY DISEASE, UNSPECIFIED (H): ICD-10-CM

## 2022-12-28 ENCOUNTER — TELEPHONE (OUTPATIENT)
Dept: GERIATRICS | Facility: CLINIC | Age: 67
End: 2022-12-28

## 2022-12-28 LAB — TSH SERPL DL<=0.005 MIU/L-ACNC: 25.51 MU/L (ref 0.4–4)

## 2022-12-28 PROCEDURE — 82306 VITAMIN D 25 HYDROXY: CPT | Mod: ORL | Performed by: FAMILY MEDICINE

## 2022-12-28 PROCEDURE — 84443 ASSAY THYROID STIM HORMONE: CPT | Mod: ORL | Performed by: FAMILY MEDICINE

## 2022-12-28 PROCEDURE — P9604 ONE-WAY ALLOW PRORATED TRIP: HCPCS | Mod: ORL | Performed by: FAMILY MEDICINE

## 2022-12-28 PROCEDURE — 36415 COLL VENOUS BLD VENIPUNCTURE: CPT | Mod: ORL | Performed by: FAMILY MEDICINE

## 2022-12-28 NOTE — TELEPHONE ENCOUNTER
[facility] Geriatrics Lab Note     Provider: CANDY Gates  Facility: [facility] Facility Type:  LTC    Allergies   Allergen Reactions     Diphenhydramine Palpitations     Tolerated IV Benadryl when not pushed too fast     Isosorbide Other (See Comments) and Dizziness     Also causes syncope (has fallen before) and brain fog/mental disturbances - please do not prescribe     Nitroglycerin Dizziness, Fatigue and Other (See Comments)     Specifically the patch - please do not prescribe  Specifically the patch - please do not prescribe       Contrast Dye Hives and Itching     Other Drug Allergy (See Comments) Hives and Itching     Bee Pollen      Penicillin G      Adhesive Tape Rash     Diphenhydramine Hcl Palpitations     Liquid Adhesive Itching     Nystatin Dermatitis     Nystatin (Topical) Dermatitis     Also blisters     Penicillins Swelling and Rash     Occurred as a child - not 100% sure on specific reactions     Sulfa Drugs Other (See Comments)     Occurred as a child / patient does not remember specific reaction       Labs Reviewed by provider: TSH     Verbal Order/Direction given by Provider: NP to address when in the facility tomorrow.      Provider giving Order:  CANDY Gates    Verbal Order given to: Dori([phone])    Ronal Zamarripa RN

## 2022-12-31 LAB
DEPRECATED CALCIDIOL+CALCIFEROL SERPL-MC: 40 UG/L (ref 20–75)
VITAMIN D2 SERPL-MCNC: 19 UG/L
VITAMIN D3 SERPL-MCNC: 21 UG/L

## 2023-01-16 ENCOUNTER — HOSPITAL ENCOUNTER (EMERGENCY)
Facility: CLINIC | Age: 68
Discharge: NURSING, REHAB OR LONG-TERM CARE | End: 2023-01-16
Attending: FAMILY MEDICINE | Admitting: FAMILY MEDICINE
Payer: COMMERCIAL

## 2023-01-16 ENCOUNTER — PATIENT OUTREACH (OUTPATIENT)
Dept: GERIATRIC MEDICINE | Facility: CLINIC | Age: 68
End: 2023-01-16

## 2023-01-16 VITALS
DIASTOLIC BLOOD PRESSURE: 52 MMHG | SYSTOLIC BLOOD PRESSURE: 120 MMHG | TEMPERATURE: 98 F | WEIGHT: 204 LBS | HEART RATE: 72 BPM | BODY MASS INDEX: 44.15 KG/M2 | OXYGEN SATURATION: 96 % | RESPIRATION RATE: 16 BRPM

## 2023-01-16 DIAGNOSIS — R07.9 RECURRENT CHEST PAIN: ICD-10-CM

## 2023-01-16 DIAGNOSIS — E03.9 HYPOTHYROIDISM, UNSPECIFIED TYPE: ICD-10-CM

## 2023-01-16 LAB
ANION GAP SERPL CALCULATED.3IONS-SCNC: 8 MMOL/L (ref 3–14)
BASOPHILS # BLD AUTO: 0 10E3/UL (ref 0–0.2)
BASOPHILS NFR BLD AUTO: 0 %
BUN SERPL-MCNC: 23 MG/DL (ref 7–30)
CALCIUM SERPL-MCNC: 9.2 MG/DL (ref 8.5–10.1)
CHLORIDE BLD-SCNC: 108 MMOL/L (ref 94–109)
CO2 SERPL-SCNC: 24 MMOL/L (ref 20–32)
CREAT SERPL-MCNC: 1 MG/DL (ref 0.52–1.04)
EOSINOPHIL # BLD AUTO: 0.2 10E3/UL (ref 0–0.7)
EOSINOPHIL NFR BLD AUTO: 3 %
ERYTHROCYTE [DISTWIDTH] IN BLOOD BY AUTOMATED COUNT: 15.4 % (ref 10–15)
GFR SERPL CREATININE-BSD FRML MDRD: 61 ML/MIN/1.73M2
GLUCOSE BLD-MCNC: 165 MG/DL (ref 70–99)
HCT VFR BLD AUTO: 28.9 % (ref 35–47)
HGB BLD-MCNC: 9 G/DL (ref 11.7–15.7)
HOLD SPECIMEN: NORMAL
HOLD SPECIMEN: NORMAL
IMM GRANULOCYTES # BLD: 0.1 10E3/UL
IMM GRANULOCYTES NFR BLD: 2 %
LIPASE SERPL-CCNC: 87 U/L (ref 73–393)
LYMPHOCYTES # BLD AUTO: 2 10E3/UL (ref 0.8–5.3)
LYMPHOCYTES NFR BLD AUTO: 30 %
MCH RBC QN AUTO: 29.9 PG (ref 26.5–33)
MCHC RBC AUTO-ENTMCNC: 31.1 G/DL (ref 31.5–36.5)
MCV RBC AUTO: 96 FL (ref 78–100)
MONOCYTES # BLD AUTO: 0.5 10E3/UL (ref 0–1.3)
MONOCYTES NFR BLD AUTO: 7 %
NEUTROPHILS # BLD AUTO: 4 10E3/UL (ref 1.6–8.3)
NEUTROPHILS NFR BLD AUTO: 58 %
NRBC # BLD AUTO: 0 10E3/UL
NRBC BLD AUTO-RTO: 0 /100
PLATELET # BLD AUTO: 164 10E3/UL (ref 150–450)
POTASSIUM BLD-SCNC: 3.9 MMOL/L (ref 3.4–5.3)
RBC # BLD AUTO: 3.01 10E6/UL (ref 3.8–5.2)
SODIUM SERPL-SCNC: 140 MMOL/L (ref 133–144)
TROPONIN I SERPL HS-MCNC: 24 NG/L
WBC # BLD AUTO: 6.9 10E3/UL (ref 4–11)

## 2023-01-16 PROCEDURE — 99284 EMERGENCY DEPT VISIT MOD MDM: CPT | Mod: 25 | Performed by: FAMILY MEDICINE

## 2023-01-16 PROCEDURE — 93005 ELECTROCARDIOGRAM TRACING: CPT | Performed by: FAMILY MEDICINE

## 2023-01-16 PROCEDURE — 250N000011 HC RX IP 250 OP 636: Performed by: FAMILY MEDICINE

## 2023-01-16 PROCEDURE — 80048 BASIC METABOLIC PNL TOTAL CA: CPT | Performed by: FAMILY MEDICINE

## 2023-01-16 PROCEDURE — 96374 THER/PROPH/DIAG INJ IV PUSH: CPT | Performed by: FAMILY MEDICINE

## 2023-01-16 PROCEDURE — 36415 COLL VENOUS BLD VENIPUNCTURE: CPT | Performed by: FAMILY MEDICINE

## 2023-01-16 PROCEDURE — 85025 COMPLETE CBC W/AUTO DIFF WBC: CPT | Performed by: FAMILY MEDICINE

## 2023-01-16 PROCEDURE — 93010 ELECTROCARDIOGRAM REPORT: CPT | Performed by: FAMILY MEDICINE

## 2023-01-16 PROCEDURE — 84484 ASSAY OF TROPONIN QUANT: CPT | Performed by: FAMILY MEDICINE

## 2023-01-16 PROCEDURE — 99284 EMERGENCY DEPT VISIT MOD MDM: CPT | Performed by: FAMILY MEDICINE

## 2023-01-16 PROCEDURE — 83690 ASSAY OF LIPASE: CPT | Performed by: FAMILY MEDICINE

## 2023-01-16 RX ADMIN — FAMOTIDINE 20 MG: 10 INJECTION INTRAVENOUS at 00:51

## 2023-01-16 ASSESSMENT — ACTIVITIES OF DAILY LIVING (ADL): ADLS_ACUITY_SCORE: 35

## 2023-01-16 NOTE — ED TRIAGE NOTES
Chest pain 8/10 - pt has chronic pain syndrome and frequent reoccuring chest pain. Hx of about 21 stents and ventricular pacemaker. Allergy to nitropatch per pt - Franklin johnson RN administered aspirin prior to EMS arrival.      Triage Assessment     Row Name 01/16/23 0019       Triage Assessment (Adult)    Airway WDL WDL       Respiratory WDL    Respiratory WDL WDL       Cardiac WDL    Cardiac WDL X

## 2023-01-16 NOTE — ED PROVIDER NOTES
[facility] ED Provider Note   Patient: Taylor Escobar  MRN #:  [id number]  Date of Visit: 2023    CC:     Chief Complaint   Patient presents with     Chest Pain     HPI:  Taylor Escobar is a 67 year old female who presented to the emergency department by ambulance with acute recurrent chest pain described as pressure in the center of her chest with radiation into the left side of her neck and some numbness into her left hand.  Patient states that she was awakened at 10:45 PM with symptoms.  She did not have any symptoms earlier in the week.  She has had many visits to the emergency department in the middle of the night with chest pains.  Her symptoms typically occur at nighttime.  She had a 5 vessel CABG in , has chronic atrial fibrillation, with an ICD in place.  She had it recently interrogated and was told that it would last a long time.  Patient has an anxiety disorder.  She denies any GERD symptoms although she is on chronic omeprazole therapy.  She last ate at around 5 PM last night and does not snack before going to bed.  Extensive coronary disease is noted in her medical history below.  Current pain level is rated 8 out of 10.    Echocardiogram from 2020 revealed the following:  TAYLOR ESCOBAR   MRN:    [id number]          Accession#:   [id number]   :    1955 64 years Study Date:   2020 11:25:26 AM   Gender: F                   BP:           153/85 mmHg   Height: 157.00 cm           BSA:          1.95 mÂ    Weight: 95.00 kg            Tech:         SIMÓN                               Referring MD: CLARA OVIEDO   Site:             [facility]   Reading Location: [facility]W       Procedure: 2D w/ Contrast, Color Doppler and Spectral Doppler.     Indication for study: CHF   Cardiac Rhythm: Regular.Study quality: Good.     Final Impressions:    1. Moderately increased left ventricular size,  normal wall thickness, mildly reduced global systolic function, calculated EF of 47 %.    2. Moderately enlarged left atrium.    3. Moderate evidence of left ventricular hypertrophy.    4. Right ventricular cavity size is normal, global systolic RV function is borderline reduced.    5. The aortic valve is sclerotic, no stenosis and trivial regurgitation.    6. The mitral valve is repair with an annuloplasty ring, mild mitral regurgitation. Mean gradient of 4.5 mmHg across the mitral valve    7. The inferior vena cava is dilated, respiratory size variation less than 50%.    8. Echo contrast was administrered to enhance visualization of all left ventricular segments.    9. Moderately increased estimated pulmonary pressures by tricuspid regurgitation velocity and right atrial pressure (50 mmHg plus RAP).     Recent visit by the nurse practitioner including lab work reveals that her TSH level was 25.51.  Vitamin D levels were within normal limits.    Problem List:  Patient Active Problem List    Diagnosis Date Noted     Chronic kidney disease, stage 3a (H) 10/16/2022     Priority: Medium     SOB (shortness of breath) 04/07/2022     Priority: Medium     Morbid obesity (H) 12/10/2021     Priority: Medium     ICD (implantable cardioverter-defibrillator) in place 11/17/2021     Priority: Medium     Formatting of this note is different from the original.  Date of last device in office evaluation: 5/17/2022    ?  and model: Medtronic Cobalt CRT-D.   Date of implant: 5/7/2021  Tachy therapies remain OFF: S/p downgrade from ICD to pacemaker, but her implanted system includes a DF-4 lead, so an ICD generator was placed with the tachycardia therapies disabled.     ? Indication for device: Ischemic cardiomyopathy   ? Cardiac resynchronization therapy:   no      MRI Conditional:  No  o If No:  Reason why:  Abandoned LV lead    ? Battery longevity documented as less than 3  Months: No  ? Are any of the leads less than 3  months old:  No    ? Programming              ? Pacing mode and programmed lower rate: DDDR 60 - 130 bpm              ? Rate-responsive sensor type, if programmed on: Accelerometer        Underlying rhythm and heart rate:  SR @ 60 bpm    ? What is the response of this device to magnet placement:  None - tachy therapies off  ? PM magnet pacing rate: N/A   ? Any alert status on CIED generator or lead: No    ? Last pacing threshold    Atrial   1.0 V @ 0.4 ms   Ventricular RV: 0.75 V @ 0.4 ms  LV:  2.75 V @ 1.0 ms       Chronic atrial fibrillation (H) 04/06/2021     Priority: Medium     Cardiac pacemaker in situ 04/06/2021     Priority: Medium     Major depressive disorder, recurrent severe without psychotic features (H) 01/26/2021     Priority: Medium     Panic disorder with agoraphobia 04/14/2020     Priority: Medium     Constipation 03/20/2020     Priority: Medium     Personality disorder (H) 03/05/2020     Priority: Medium     Rule out dependent personality    Formatting of this note might be different from the original.  Rule out dependent personality  Formatting of this note might be different from the original.  Rule out dependent personality       Candidiasis 03/01/2020     Priority: Medium     Posttraumatic stress disorder 03/01/2020     Priority: Medium     Chronic obstructive pulmonary disease, unspecified (H) 01/29/2020     Priority: Medium     Long term (current) use of insulin (H) 01/29/2020     Priority: Medium     Acute on chronic systolic (congestive) heart failure (H) 01/28/2020     Priority: Medium     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, left leg (H) 01/15/2020     Priority: Medium     Atypical chest pain 11/11/2019     Priority: Medium     Polymyalgia rheumatica (H) 11/28/2018     Priority: Medium     Unstable angina (H) 05/02/2018     Priority: Medium     Coronary angiogram 05/02/2018 demonstrated 30% stenosis in the LMCA, 50% stenosis in the  Proximal LAD, 50% stenosis in the Mid LAD, 95% stenosis in the Proximal Circumflex (Restenosis - Balloon Angioplasty), 100% stenosis in the 1st Marginal, 50% stenosis in the Proximal RCA, 50% stenosis in the Distal RCA, the LIMA graft from the LIMA to the Distal LAD is free of significant disease; the SVG graft from the Aorta to the 1st Marginal is free of significant disease; the SVG graft from the Aorta to the Distal RCA is free of significant disease. Intervention included a successful  2.5mm x 12mm Balloon,  2.5mm x 10mm Cutting Balloon,  3mm x 12mm Drug Eluting Stent,  3mm x 12mm Balloon, and  3mm x 8mm Balloon to Proximal Circumflex, post stenosis 0%.    Formatting of this note is different from the original.  Coronary angiogram 05/02/2018 demonstrated 30% stenosis in the LMCA, 50% stenosis in the Proximal LAD, 50% stenosis in the Mid LAD, 95% stenosis in the Proximal Circumflex (Restenosis - Balloon Angioplasty), 100% stenosis in the 1st Marginal, 50% stenosis in the Proximal RCA, 50% stenosis in the Distal RCA, the LIMA graft from the LIMA to the Distal LAD is free of significant disease; the SVG graft from the Aorta to the 1st Marginal is free of significant disease; the SVG graft from the Aorta to the Distal RCA is free of significant disease. Intervention included a successful  2.5mm x 12mm Balloon,  2.5mm x 10mm Cutting Balloon,  3mm x 12mm Drug Eluting Stent,  3mm x 12mm Balloon, and  3mm x 8mm Balloon to Proximal Circumflex, post stenosis 0%.  Formatting of this note is different from the original.  Coronary angiogram 05/02/2018 demonstrated 30% stenosis in the LMCA, 50% stenosis in the Proximal LAD, 50% stenosis in the Mid LAD, 95% stenosis in the Proximal Circumflex (Restenosis - Balloon Angioplasty), 100% stenosis in the 1st Marginal, 50% stenosis in the Proximal RCA, 50% stenosis in the Distal RCA, the LIMA graft from the LIMA to the Distal LAD is free of significant disease; the SVG graft from the  Aorta to the 1st Marginal is free of significant disease; the SVG graft from the Aorta to the Distal RCA is free of significant disease. Intervention included a successful  2.5mm x 12mm Balloon,  2.5mm x 10mm Cutting Balloon,  3mm x 12mm Drug Eluting Stent,  3mm x 12mm Balloon, and  3mm x 8mm Balloon to Proximal Circumflex, post stenosis 0%.       Vitamin B12 deficiency 02/14/2018     Priority: Medium     Chronic bilateral low back pain without sciatica 01/17/2018     Priority: Medium     Chronic pain disorder 10/01/2017     Priority: Medium     Urinary incontinence, mixed 09/24/2017     Priority: Medium     Internal carotid artery stenosis, bilateral 07/13/2016     Priority: Medium     Carotid US 05/04/2018 showed moderate plaque formation, consistent with 50 to 69% stenosis in the right internal carotid artery, not significantly changed from 8/5/2015.  Moderate plaque formation, consistent with 50 to 69% stenosis in the left internal carotid artery; there has been mild progression of the left ICA stenosis since 8/5/2015.    Formatting of this note might be different from the original.  Carotid US 05/04/2018 showed moderate plaque formation, consistent with 50 to 69% stenosis in the right internal carotid artery, not significantly changed from 8/5/2015.  Moderate plaque formation, consistent with 50 to 69% stenosis in the left internal carotid artery; there has been mild progression of the left ICA stenosis since 8/5/2015.    Formatting of this note might be different from the original.  Carotid US 05/04/2018 showed moderate plaque formation, consistent with 50 to 69% stenosis in the right internal carotid artery, not significantly changed from 8/5/2015.  Moderate plaque formation, consistent with 50 to 69% stenosis in the left internal carotid artery; there has been mild progression of the left ICA stenosis since 8/5/2015.       Essential (primary) hypertension 10/14/2015     Priority: Medium     Ischemic  cardiomyopathy 09/20/2015     Priority: Medium     EF of 40-45%, status post RV lead revision and LV epicardial lead placement via mini-thoracotomy in August 2016.    Formatting of this note might be different from the original.  EF of 40-45%, status post RV lead revision and LV epicardial lead placement via mini-thoracotomy in August 2016.  Formatting of this note might be different from the original.  EF of 40-45%, status post RV lead revision and LV epicardial lead placement via mini-thoracotomy in August 2016.       S/P CABG x 5 08/21/2015     Priority: Medium     Pain medication agreement 04/20/2013     Priority: Medium     Controlled substance agreement for percocet #30/month on file and signed 4/17/13.  Designated pharmacy: [facility] Prescribing physician: Andrea Diagnosis: Ulnar neuropathy    Formatting of this note might be different from the original.  Controlled substance agreement for percocet #30/month on file and signed 4/17/13.  Designated pharmacy: [facility] Prescribing physician: Andrea Diagnosis: Ulnar neuropathy       Anemia in other chronic diseases classified elsewhere 01/05/2013     Priority: Medium     Hypothyroidism, unspecified 12/10/2010     Priority: Medium     ACP (advance care planning) 11/03/2010     Priority: Medium     Formatting of this note might be different from the original.  Patient has identified Health Care Agent(s): Yes  Add Health Care Agents: Yes    Health Care Agent(s):    Primary Health Care Agent:     Edwar Escobar Relationship:    Spouse Phone:     [phone]    Secondary Health Care Agent:     Chiki Oro Relationship:     Son Phone:     [phone]      Patient has Advance Care Plan Documents (Health Care Directive, POLST): Yes    Advance Care Plan Documents:  Health Care Directive--03/28/11  Resuscitation Guidelines--    Patient has identified Specific Treatment Preferences: Yes   Specific Treatment Preferences:   a.) Code Status:  DNR/ Do Not Attempt  "Resuscitation - Allow a Natural Death    b.) Goals of Treatment:     ii. Limited Interventions and treat reversible conditions.  Provide interventions aimed at treatment of new or reversible illness/injury or non-life threatening chronic conditions. Duration of invasive or uncomfortable interventions should generally be limited.- Trial of intubation 5 days or other instructions \"If no improvement, stop.\"  c.) Interventions and Treatments:   i.   Antibiotics:          - Aggressive antibiotics  ii.  Nutrition/Hydration:         - Offer food and liquids by mouth         - IV fluid administration         - No feeding tube under any circumstance.  iii. Transfusion:          - Blood products for comfort/relief of symptoms only  iv. Dialysis:           - Dialysis for short term \"for recovery, but not long term.\"        Last Assessment & Plan:   Advance Care Planning: Disease-specific Session    Letty Escobar is an [facility] patient of Dr. Renea Mendez of the [facility].    Advance care planning discussions were completed with Letty and her healthcare agent, spouse Edwar Escobar on Monday, March 28, 2011 at the [facility] Room.    Understanding of Illness and Disease Eucha:   Letty identifies her medical condition as diabetes with peripheral neuropathy, heart problems with a heart attack February 2009 plus 4 stent placements; sleep apnea; depression; hypothyroid; high cholesterol; tobacco use; and describes it as needing further assistance with thyroid and diabetes management.  She identifies the following symptoms of her medical condition as being the most bothersome: some memory loss, balance problems, light headedness and dizziness, puffy eyes and lower extremity edema from time to time.    Letty is retired [place] .  She is independent with all cares and lives an active life style although, \"I'm not as active as I used to " "be.\"    Goals of Care:   Letty currently hopes to maintain independence, control pain and symptoms and delay progression of, but not cure, the illness.  \"I want to get the diabetes and thyroid under better control.\"  Letty has an appointment the first part of April for follow up.    Quality of Life:    Letty identifies quality of life as family involvement twice weekly, Taoism activities, newly assigned  , playing cards, the computer,    Letty christiano with serious challenges in her life through her ganesh in God, prayers and support of her Taoism family, spouse and son.    Letty identifies the following fears and worries about her medical care:  \"I just want the diabetes straightened out.\"    Treatment and Care Preferences:   Past experiences in dealing with family and/or friends that have  or been seriously ill include her brother who took his own life.  \"He was 53 years old and living with us.  I found him.\"   As a result of these experiences, Letty expresses these health care preferences:      Summary Letty's Treatment Preferences:  LOW SURVIVAL; HIGH TREATMENT BURDEN:  If Letty suffered a serious complication, such that she was facing a prolonged hospital stay, required ongoing medical interventions, and the chance of living through the complication was low (for example, only 5 out of 100 would live), Letty would choose:  to focus treatment on comfort and quality of life (\"Quality of life is more important than length of life to Letty.\")  COMMENT:  \"If I can't live a normal life, I wouldn't want to live.\"    HIGH SURVIVAL; LOW FUNCTIONAL STATUS:  If Letty had a serious complication and had a good chance of living through the complication but it was expected that she would never be able to walk or talk again and would require 24 hour nursing care, she would choose:  to focus treatment on comfort and quality of life (\"Quality of life is more important than length of life to " "Letty.\")  COMMENT:  \"I'd accept rehabilitation.\"    HIGH SURVIVAL; LOW COGNITIVE STATUS:  If Letty had a serious complication and had a good chance of living through the complication but it was expected that she would never know who she was or who she was with and would require 24 hour nursing care, she would choose:  to focus treatment on comfort and quality of life (\"Quality of life is more important than length of life to Letty.\")    CARDIO-PULMONARY RESUSCITATION (CPR):  The facts, risks and benefits of CPR were discussed with Letty.  If she had a sudden event that caused her heart and breathing to stop, she:  WOULD NOT want CPR attempted and instead would prefer that a natural death occur.    MECHANICAL VENTILATION: If Letty had an episode where she was unable to breathe on her own, she would choose the following:  attempt to use any appropriate non-invasive method to assist breathing, and use mechanical ventilation.  COMMENT:  \"If reversible ventilator is acceptable for 5 days.  If no improvement, stop.\"     Letty has chosen her healthcare agent to:  strictly follow her wishes.    Follow Up Plan:   Letty was encouraged to continue advance care planning discussions with her health care agents and primary care provider.   Letty and her health care agent declined handouts.     Documents addressed during this advance care planning session:  1.  Health Care Agents identified.          .  Primary health care agent is spouse, Edwar Escobar;          .  Secondary health care agent is son, Jermaine Oro.  2.  Health Care Directive completed and scanned into medical record.  3.  Statement of Treatment Preferences for advanced illness completed and scanned into the medical record.  4.  Resuscitation Guidelines completed for DNR.  Document sent to Dr. Renea Mendez for signature and returned to the home. Letty, please place on the refrigerator.    Recommendations/Plan:   Letty and her health care agent to review " Advance Care Plan.     Letty would benefit from:   Care Navigation/Care Navigation Help Desk--explained services for pending future needs.  No identified needs today.  Care Navigation brochure was given to Letty and her healthcare agent.    Advance Care Planning recommendations and Letty's concerns and questions were cc ed to her primary provider.     Thank you, Letty for the opportunity of assisting with Advance Care Planning. It was a seeing you again and meeting Edwar.  Please contact me if I can be of further assistance.    Interviewer: Pat Martin RN    Advance Care Planning Facilitator  [phone]   3/28/2011       ELI (obstructive sleep apnea) 01/31/2010     Priority: Medium     PSG on April/2008 that showed moderate Obstructive Sleep Apnea with an AHI of 23.5 . Limb movements persisted at 29 movements/hour at optimal pressures, despite carbidopa use.    Formatting of this note might be different from the original.  PSG on April/2008 that showed moderate Obstructive Sleep Apnea with an AHI of 23.5 . Limb movements persisted at 29 movements/hour at optimal pressures, despite carbidopa use.  Formatting of this note might be different from the original.  PSG on April/2008 that showed moderate Obstructive Sleep Apnea with an AHI of 23.5 . Limb movements persisted at 29 movements/hour at optimal pressures, despite carbidopa use.       Coronary atherosclerosis 02/06/2009     Priority: Medium     Formatting of this note might be different from the original.  2/5/2009 - MI - Proximal RCA 99%, mild-mod disease elsewhere.  EF 60%.  PCI:  MYRON to pRCA.  2/12/2009 - admit CP - Widely patent RCA stent. Moderate diffuse CAD. Severe stenosis in trivial PDA branch. LVEF 45%.  1/25/2010 - admit CP - PTCA and stent of diagonal  9/8/2010 - admit CP - LAD patent stent. Moderate diffuse CAD. Medical management recommended.   4/25/2012 - NSTEMI - Acute total occlusion of the ostial Circumflex. Acute total occlusion of the  ostial ramus. Acute total occlusion of the distal 1st Obtuse Marginal. Angioplasty of ostial circumflex and ostial ramus. There was distal embolization to the OM1 vessel. This vessel was small and not amendable to intervention. Indefinite: plavix and asa 81.  8/10/2015 - CABx5 - 1. LIMA to distal LAD, reverse SVG to OM1 and OM2, reverse SVG to diagonal, reverse SVG to PDA.   2. Mitral valve repair with a Medtronics 3-D full ring, 28 mm.   3. Tricuspid repair with a Medtronic 28 mm partial ring  1/12/2016 - TTE - EF 40-45%  Formatting of this note might be different from the original.  2/5/2009 - MI - Proximal RCA 99%, mild-mod disease elsewhere.  EF 60%.  PCI:  MYRON to pRCA.  2/12/2009 - admit CP - Widely patent RCA stent. Moderate diffuse CAD. Severe stenosis in trivial PDA branch. LVEF 45%.  1/25/2010 - admit CP - PTCA and stent of diagonal  9/8/2010 - admit CP - LAD patent stent. Moderate diffuse CAD. Medical management recommended.   4/25/2012 - NSTEMI - Acute total occlusion of the ostial Circumflex. Acute total occlusion of the ostial ramus. Acute total occlusion of the distal 1st Obtuse Marginal. Angioplasty of ostial circumflex and ostial ramus. There was distal embolization to the OM1 vessel. This vessel was small and not amendable to intervention. Indefinite: plavix and asa 81.  8/10/2015 - CABx5 - 1. LIMA to distal LAD, reverse SVG to OM1 and OM2, reverse SVG to diagonal, reverse SVG to PDA.   2. Mitral valve repair with a Medtronics 3-D full ring, 28 mm.   3. Tricuspid repair with a Medtronic 28 mm partial ring  1/12/2016 - TTE - EF 40-45%       Restless legs syndrome 08/29/2007     Priority: Medium     Hyperlipidemia, unspecified 05/30/2007     Priority: Medium       Past Medical History:   Diagnosis Date     ACP (advance care planning) 11/3/2010     Acute coronary syndrome (H) 11/22/2019     Acute exacerbation of CHF (congestive heart failure) (H) 11/11/2019     Acute on chronic systolic (congestive)  heart failure (H) 1/28/2020     Anemia of chronic disease 1/5/2013     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, left leg (H) 1/15/2020     BPPV (benign paroxysmal positional vertigo) 5/31/2018     Candidiasis 3/1/2020     Carotid stenosis, right 7/13/2016     Chest pain 11/11/2019     Chronic bilateral low back pain without sciatica 1/17/2018     Chronic pain disorder 10/1/2017     Constipation 3/20/2020     COPD (chronic obstructive pulmonary disease) (H) 6/24/2020     Coronary atherosclerosis 2/6/2009     Cubital tunnel syndrome on left 11/13/2012     Depressive disorder      Diabetes mellitus (H)      Diabetes mellitus type 2 in obese (H) 7/13/2006     Diabetes mellitus type 2 in obese (H) 7/13/2006     Drug-seeking behavior 4/4/2020     Failure to thrive in adult 9/3/2020     Hereditary and idiopathic peripheral neuropathy 4/24/2007     Hyperlipidemia with target low density lipoprotein (LDL) cholesterol less than 70 mg/dL 5/30/2007     Hypertension 10/14/2015     Hypothyroidism 12/10/2010     Irritable bowel syndrome 9/7/2015     Ischemic cardiomyopathy 9/20/2015     Long-term use of high-risk medication 4/14/2020     Moniliasis, 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     Myocardial infarction (H)      Neuromuscular disorder (H)      Other specified postprocedural states 10/8/2015     Pain medication agreement 4/20/2013     Panic disorder with agoraphobia 4/14/2020     Paroxysmal atrial fibrillation (H) 10/2/2015     Personality disorder (H) 3/5/2020     Polymyalgia rheumatica (H) 11/28/2018     Posttraumatic stress disorder 3/1/2020     Restless legs syndrome (RLS) 8/29/2007     Restless legs syndrome (RLS) 8/29/2007     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     Serum calcium elevated 3/1/2020     Somatic dysfunction of sacral region 1/17/2018     Subacromial bursitis of left shoulder joint 8/6/2018     Suicidal ideation 4/1/2020      Thyroid disease      TIA (transient ischemic attack) 5/4/2018     TIA (transient ischemic attack) 5/4/2018     Tobacco abuse 2/17/2017     Tobacco abuse 2/17/2017     Urinary incontinence, mixed 9/24/2017     UTI (urinary tract infection) 4/17/2020     Vitamin B12 deficiency 2/14/2018     Weakness 12/17/2019       MEDS: acetaminophen (TYLENOL) 325 MG tablet  acetaminophen (TYLENOL) 500 MG tablet  albuterol (PROVENTIL) (2.5 MG/3ML) 0.083% neb solution  aspirin 81 MG EC tablet  bisacodyl (DULCOLAX) 10 MG suppository  cholecalciferol (VITAMIN D3) 10 mcg (400 units) TABS tablet  cyanocobalamin (CYANOCOBALAMIN) 1000 MCG/ML injection  divalproex sodium delayed-release (DEPAKOTE) 250 MG DR tablet  DULoxetine HCl 40 MG CPEP  fluticasone-vilanterol (BREO ELLIPTA) 200-25 MCG/INH inhaler  furosemide (LASIX) 20 MG tablet  gabapentin (NEURONTIN) 300 MG capsule  guaiFENesin (ROBITUSSIN) 100 MG/5ML liquid  levothyroxine (SYNTHROID/LEVOTHROID) 100 MCG tablet  Melatonin 10 MG TABS tablet  metoprolol succinate ER (TOPROL-XL) 25 MG 24 hr tablet  miconazole (MICATIN) 2 % AERP powder  mirtazapine (REMERON) 30 MG tablet  polyethylene glycol (MIRALAX) 17 GM/Dose powder  potassium chloride ER (MICRO-K) 10 MEQ CR capsule  prazosin (MINIPRESS) 1 MG capsule  QUEtiapine (SEROQUEL) 25 MG tablet  rosuvastatin (CRESTOR) 10 MG tablet  sennosides (SENOKOT) 8.6 MG tablet  Vitamin D3 (CHOLECALCIFEROL) 25 mcg (1000 units) tablet        ALLERGIES:    Allergies   Allergen Reactions     Diphenhydramine Palpitations     Tolerated IV Benadryl when not pushed too fast     Isosorbide Other (See Comments) and Dizziness     Also causes syncope (has fallen before) and brain fog/mental disturbances - please do not prescribe     Nitroglycerin Dizziness, Fatigue and Other (See Comments)     Specifically the patch - please do not prescribe  Specifically the patch - please do not prescribe       Contrast Dye Hives and Itching     Other Drug Allergy (See Comments)  Hives and Itching     Bee Pollen      Penicillin G      Adhesive Tape Rash     Diphenhydramine Hcl Palpitations     Liquid Adhesive Itching     Nystatin Dermatitis     Nystatin (Topical) Dermatitis     Also blisters     Penicillins Swelling and Rash     Occurred as a child - not 100% sure on specific reactions     Sulfa Drugs Other (See Comments)     Occurred as a child / patient does not remember specific reaction       Past Surgical History:   Procedure Laterality Date     CARDIAC SURGERY      stents x 11     CARDIAC SURGERY       CHOLECYSTECTOMY       CHOLECYSTECTOMY       GENITOURINARY SURGERY      Tubal ligation     OTHER SURGICAL HISTORY      Genitourinary surgery     RELEASE CARPAL TUNNEL       RELEASE CARPAL TUNNEL         Social History     Tobacco Use     Smoking status: Never     Smokeless tobacco: Never   Substance Use Topics     Alcohol use: Not Currently     Drug use: Never         Review of Systems   Except as noted in HPI, all other systems were reviewed and are negative    Physical Exam     Vitals were reviewed  Patient Vitals for the past 24 hrs:   BP Pulse Resp SpO2 Weight   01/16/23 0045 120/52 72 16 96 % --   01/16/23 0035 -- 75 -- 94 % --   01/16/23 0030 (!) 142/76 73 10 93 % --   01/16/23 0025 -- 73 16 93 % --   01/16/23 0022 (!) 154/86 73 18 92 % 92.5 kg (204 lb)   01/16/23 0020 -- 73 13 94 % --   01/16/23 0015 (!) 154/86 89 -- 94 % --     GENERAL APPEARANCE: Alert, no apparent distress  FACE: normal facies  EYES: Pupils are equal  HENT: normal external exam  NECK: no adenopathy or asymmetry  CHEST: Well-healed sternotomy scar.  No chest wall tenderness.  Pacemaker/AICD located in the left upper chest  RESP: normal respiratory effort; clear breath sounds bilaterally  CV: regular rate and rhythm; paced rhythm  ABD: soft, obese, no tenderness; no rebound or guarding; bowel sounds are normal  MS: no gross deformities noted; normal muscle tone.  EXT: No calf tenderness or pitting edema  SKIN: no  worrisome rash  NEURO: no facial droop; no focal deficits, speech is normal  PSYCH: Flat affect      Available Lab/Imaging Results     Results for orders placed or performed during the hospital encounter of 01/16/23 (from the past 24 hour(s))   Troponin I (now)   Result Value Ref Range    Troponin I High Sensitivity 24 <54 ng/L   [place] Draw    Narrative    The following orders were created for panel order [place] Draw.  Procedure                               Abnormality         Status                     ---------                               -----------         ------                     Extra Blue Top Tube[[id number]]                              In process                 Extra Green Top (Lithium...[[id number]]                                                 Extra Purple Top Tube[[id number]]                            In process                   Please view results for these tests on the individual orders.   CBC with platelets differential    Narrative    The following orders were created for panel order CBC with platelets differential.  Procedure                               Abnormality         Status                     ---------                               -----------         ------                     CBC with platelets and d...[[id number]]  Abnormal            Final result                 Please view results for these tests on the individual orders.   Basic metabolic panel   Result Value Ref Range    Sodium 140 133 - 144 mmol/L    Potassium 3.9 3.4 - 5.3 mmol/L    Chloride 108 94 - 109 mmol/L    Carbon Dioxide (CO2) 24 20 - 32 mmol/L    Anion Gap 8 3 - 14 mmol/L    Urea Nitrogen 23 7 - 30 mg/dL    Creatinine 1.00 0.52 - 1.04 mg/dL    Calcium 9.2 8.5 - 10.1 mg/dL    Glucose 165 (H) 70 - 99 mg/dL    GFR Estimate 61 >60 mL/min/1.73m2   Lipase   Result Value Ref Range    Lipase 87 73 - 393 U/L   CBC with platelets and differential   Result Value Ref Range    WBC Count 6.9 4.0 - 11.0 10e3/uL    RBC Count 3.01  (L) 3.80 - 5.20 10e6/uL    Hemoglobin 9.0 (L) 11.7 - 15.7 g/dL    Hematocrit 28.9 (L) 35.0 - 47.0 %    MCV 96 78 - 100 fL    MCH 29.9 26.5 - 33.0 pg    MCHC 31.1 (L) 31.5 - 36.5 g/dL    RDW 15.4 (H) 10.0 - 15.0 %    Platelet Count 164 150 - 450 10e3/uL    % Neutrophils 58 %    % Lymphocytes 30 %    % Monocytes 7 %    % Eosinophils 3 %    % Basophils 0 %    % Immature Granulocytes 2 %    NRBCs per 100 WBC 0 <1 /100    Absolute Neutrophils 4.0 1.6 - 8.3 10e3/uL    Absolute Lymphocytes 2.0 0.8 - 5.3 10e3/uL    Absolute Monocytes 0.5 0.0 - 1.3 10e3/uL    Absolute Eosinophils 0.2 0.0 - 0.7 10e3/uL    Absolute Basophils 0.0 0.0 - 0.2 10e3/uL    Absolute Immature Granulocytes 0.1 <=0.4 10e3/uL    Absolute NRBCs 0.0 10e3/uL       EKG reviewed by me: Electronic ventricular pacemaker.  Ventricular rate of 75.  QT interval of 426 ms, QRS duration of 186 ms.  Impression: Pacemaker ECG.  No further analysis.  No change compared with previous EKG from December 8.               Impression     Final diagnoses:   Recurrent chest pain   Hypothyroidism         ED Course & Medical Decision Making   Letty Escobar is a 67 year old female resident at [facility] who presented to the emergency department by EMS with acute recurrent chest pain.  Patient states that she was awakened at 10:45 PM with chest pressure with radiation into the left neck and numbness into the left arm.  She had some mild shortness of breath.  The symptoms are similar to what she has had before but she reports that it was more intense.  Pain levels rated 8 out of 10.  Patient is intolerant of nitroglycerin.  She had some aspirin this evening.  Vital signs reveal a blood pressure 120/52, heart rate of 72, respiratory rate of 16 with 96% oxygen saturation.  Patient had no reproducible chest wall or abdominal tenderness.  Her EKG reveals electronic ventricular pacemaker as in the past.  Her CBC reveals a hemoglobin of 9.0, close to her baseline, white  blood count of 6.9, platelet count of 164.  Troponin enzyme is 24, basic metabolic panel is normal except for glucose of 165, and lipase level is 87.  Patient was given a dose of Pepcid 20 mg IV, even though she was certain that it was not GERD.  I reassured her that she has not had any signs of significant coronary ischemia in the many visits that she has had over the past year.  It appears that her coronary bypass from 2015 has helped tremendously.  She admits that the chest pains remind her of her previous heart attacks and she gets anxious.  Patient is stable for discharge back to the nursing home.  She had a recent significant elevation of her TSH and will need adjustment of her thyroid supplement if that has not already happened.          Written after-visit summary and instructions were given at the time of discharge.      Discharge Instructions:   Your EKG and blood work were reassuring.  Your pacemaker is working well, and there is no signs that you are having a heart attack.  Your thyroid medications should be increased slightly accounting for your recent elevated TSH level.  Follow-up with your nurse practitioner within 1 week.       Disclaimer: This note consists of words and symbols derived from keyboarding and dictation using voice recognition software.  As a result, there may be errors that have gone undetected.  Please consider this when interpreting information found in this note.       Monie Weiss MD  01/16/23 0118

## 2023-01-16 NOTE — DISCHARGE INSTRUCTIONS
Your EKG and blood work were reassuring.  Your pacemaker is working well, and there is no signs that you are having a heart attack.  Your thyroid medications should be increased slightly accounting for your recent elevated TSH level.  Follow-up with your nurse practitioner within 1 week.

## 2023-01-16 NOTE — PROGRESS NOTES
[facility] Care Coordination Contact  CC received notification of Emergency Room visit.  ER visit occurred on 1/16/23 at [facility] with Dx of chest pain.    CC contacted Altagracia TORRES at NH, no changes in care.   Member has a follow-up appointment with PCP: Yes: scheduled on will see facility NP  Member has had a change in condition: No  New referrals placed: No  Home Visit Needed: No  Care plan reviewed and updated.  PCP notified of ED visit via EMR      Esperanza Damon RN  Care Coordinator[place] [address]  [email or web address]   [email or web address]     Office: [phone]   Fax: [phone]

## 2023-01-23 ENCOUNTER — NURSING HOME VISIT (OUTPATIENT)
Dept: GERIATRICS | Facility: CLINIC | Age: 68
End: 2023-01-23
Payer: COMMERCIAL

## 2023-01-23 VITALS
HEIGHT: 57 IN | WEIGHT: 201.3 LBS | BODY MASS INDEX: 43.43 KG/M2 | SYSTOLIC BLOOD PRESSURE: 114 MMHG | TEMPERATURE: 96.5 F | DIASTOLIC BLOOD PRESSURE: 65 MMHG | OXYGEN SATURATION: 95 % | HEART RATE: 73 BPM | RESPIRATION RATE: 20 BRPM

## 2023-01-23 DIAGNOSIS — G89.4 CHRONIC PAIN DISORDER: Primary | ICD-10-CM

## 2023-01-23 DIAGNOSIS — Z95.0 CARDIAC PACEMAKER IN SITU: ICD-10-CM

## 2023-01-23 DIAGNOSIS — N18.31 CHRONIC KIDNEY DISEASE, STAGE 3A (H): ICD-10-CM

## 2023-01-23 DIAGNOSIS — I10 ESSENTIAL (PRIMARY) HYPERTENSION: ICD-10-CM

## 2023-01-23 PROCEDURE — 99309 SBSQ NF CARE MODERATE MDM 30: CPT | Performed by: FAMILY MEDICINE

## 2023-01-23 RX ORDER — LEVOTHYROXINE SODIUM 112 UG/1
112 TABLET ORAL
COMMUNITY
End: 2023-12-06

## 2023-01-23 NOTE — PROGRESS NOTES
[facility] GERIATRIC SERVICES    Facility:   [facility] () [[id number]]   Code Status: DNR/DNI      CHIEF COMPLAINT/REASON FOR VISIT:  Chief Complaint   Patient presents with     RECHECK       HISTORY:      HPI: Letty is a 67 year old female Who currently resides in [facility] room 249 and who wanted to visit with today secondary to her recent hospitalization January 18 through January 19, 2022 secondary to chest pain.  She has had multiple episodes in the emergency department over the past 3 months for chest pain.  Once again her troponins were negative.  EKG did show nonischemia and a CT cardiac perfusion stress test did show a large anterior/anterior lateral infarct without significant elena-infarct ischemia.  She was discharged back to [facility] with instructions to follow-up again in about 2 weeks.  See results below over most recent laboratory studies.  We did talk about her multiple hospitalizations for chest pain in the multiple work-up that she has had.  Currently her systolic blood pressures have been normotensive and afebrile and also she is on room air with a weight of 201 pounds back on January 4 weight 200 pounds.  Her appetite is good.  Denies heartburn reflux or pyrosis.  No increase in lower extremity edema.  No increase in pain currently on Tylenol 1000 mg 3 times daily.  Her last lipid panel back in October total cholesterol 162 and is on Crestor 10 mg.  Currently now on Seroquel 100 mg 3 times daily.  After talking further about her recent hospitalization she does feel comfortable with our visit today.  She otherwise continues to be asymptomatic over the past few days.    Past Medical History:   Diagnosis Date     ACP (advance care planning) 11/3/2010    Formatting of this note might be different from the original. Patient has identified Health Care Agent(s): Yes Add Health Care Agents: Yes   Health Care Agent(s):  Primary Health Care Agent:   Edwar Escobar  Relationship:  Spouse Phone:   [phone]  Secondary Health Care Agent:   Chiki Oro Relationship:   Son Phone:   [phone]  Patient has Advance Care Plan Documents (Health Care Direct     Acute coronary syndrome (H) 11/22/2019     Acute exacerbation of CHF (congestive heart failure) (H) 11/11/2019     Acute on chronic systolic (congestive) heart failure (H) 1/28/2020     Anemia of chronic disease 1/5/2013     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, left leg (H) 1/15/2020     BPPV (benign paroxysmal positional vertigo) 5/31/2018     Candidiasis 3/1/2020     Carotid stenosis, right 7/13/2016    Carotid US 05/04/2018 showed moderate plaque formation, consistent with 50 to 69% stenosis in the right internal carotid artery, not significantly changed from 8/5/2015.  Moderate plaque formation, consistent with 50 to 69% stenosis in the left internal carotid artery; there has been mild progression of the left ICA stenosis since 8/5/2015.     Chest pain 11/11/2019     Chronic bilateral low back pain without sciatica 1/17/2018     Chronic pain disorder 10/1/2017     Constipation 3/20/2020     COPD (chronic obstructive pulmonary disease) (H) 6/24/2020     Coronary atherosclerosis 2/6/2009 2/5/2009 - MI - Proximal RCA 99%, mild-mod disease elsewhere.  EF 60%.  PCI:  MYRON to pRCA. 2/12/2009 - admit CP - Widely patent RCA stent. Moderate diffuse CAD. Severe stenosis in trivial PDA branch. LVEF 45%. 1/25/2010 - admit CP - PTCA and stent of diagonal 9/8/2010 - admit CP - LAD patent stent. Moderate diffuse CAD. Medical management recommended.  4/25/2012 - NSTEMI - Acute total occlusion of     Cubital tunnel syndrome on left 11/13/2012     Depressive disorder      Diabetes mellitus (H)      Diabetes mellitus type 2 in obese (H) 7/13/2006     Diabetes mellitus type 2 in obese (H) 7/13/2006     Drug-seeking behavior 4/4/2020     Failure to thrive in adult 9/3/2020     Hereditary  and idiopathic peripheral neuropathy 4/24/2007     Hyperlipidemia with target low density lipoprotein (LDL) cholesterol less than 70 mg/dL 5/30/2007     Hypertension 10/14/2015     Hypothyroidism 12/10/2010     Irritable bowel syndrome 9/7/2015     Ischemic cardiomyopathy 9/20/2015    EF of 40-45%, status post RV lead revision and LV epicardial lead placement via mini-thoracotomy in August 2016.     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  Designated pharmacy: [facility] Prescribing physician: Andrea Diagnosis: Ulnar neuropathy     Panic disorder with agoraphobia 4/14/2020     Paroxysmal atrial fibrillation (H) 10/2/2015     Personality disorder (H) 3/5/2020    Rule out dependent personality     Polymyalgia rheumatica (H) 11/28/2018     Posttraumatic stress disorder 3/1/2020     Restless legs syndrome (RLS) 8/29/2007     Restless legs syndrome (RLS) 8/29/2007     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     Serum calcium elevated 3/1/2020     Somatic dysfunction of sacral region 1/17/2018     Subacromial bursitis of left shoulder joint 8/6/2018     Suicidal ideation 4/1/2020     Thyroid disease      TIA (transient ischemic attack) 5/4/2018     TIA (transient ischemic attack) 5/4/2018     Tobacco abuse 2/17/2017     Tobacco abuse 2/17/2017     Urinary incontinence, mixed 9/24/2017     UTI (urinary tract infection) 4/17/2020     Vitamin B12 deficiency 2/14/2018     Weakness 12/17/2019            No family history on file.   Social History     Socioeconomic History     Marital status:    Tobacco Use     Smoking status: Never     Smokeless tobacco: Never   Substance and  Sexual Activity     Alcohol use: Not Currently     Drug use: Never   No new changes to the review of systems or physical exam since our last visit on December 19  REVIEW OF SYSTEM:  She currently denies any new symptoms of cough or cold sore throat postnasal drip wheezing chest pain dizziness vertigo nausea vomiting diarrhea dysuria frequency or urgency.  Does have a history of dyslipidemia, COPD, CABG, depression, hypertension, posttraumatic stress disorder, panic disorder, anxiety, personality disorder    PHYSICAL EXAM:   Pleasant female in no acute distress.  Head is normocephalic.  Neck is supple without adenopathy.  Lung sounds are clear throughout.  Cardiovascular S1-S2 regular rate and rhythm and no lower extremity edema.  Pacemaker.  Gastrointestinal is protuberant nontender nondistended.  Musculoskeletal-denies discomfort.  Psychiatric: Pleasant affect.       Current Outpatient Medications:      levothyroxine (SYNTHROID/LEVOTHROID) 112 MCG tablet, Take 112 mcg by mouth daily, Disp: , Rfl:      acetaminophen (TYLENOL) 325 MG tablet, Take 650 mg by mouth every 6 hours as needed for mild pain, Disp: , Rfl:      acetaminophen (TYLENOL) 500 MG tablet, Take 2 tablets (1,000 mg) by mouth 3 times daily, Disp: , Rfl:      albuterol (PROVENTIL) (2.5 MG/3ML) 0.083% neb solution, Take 1 vial (2.5 mg) by nebulization 2 times daily as needed for shortness of breath / dyspnea or wheezing, Disp: 90 mL, Rfl:      aspirin 81 MG EC tablet, Take 81 mg by mouth daily, Disp: , Rfl:      bisacodyl (DULCOLAX) 10 MG suppository, Place 10 mg rectally daily as needed for constipation, Disp: , Rfl:      cholecalciferol (VITAMIN D3) 10 mcg (400 units) TABS tablet, Take 1,000 Units by mouth, Disp: , Rfl:      cyanocobalamin (CYANOCOBALAMIN) 1000 MCG/ML injection, Inject 1 mL into the muscle every 30 days, Disp: , Rfl:      divalproex sodium delayed-release (DEPAKOTE) 250 MG DR tablet, Take 250 mg by mouth daily, Disp: , Rfl:       "DULoxetine HCl 40 MG CPEP, Take 40 mg by mouth daily, Disp: , Rfl:      fluticasone-vilanterol (BREO ELLIPTA) 200-25 MCG/INH inhaler, Inhale 1 puff into the lungs daily, Disp: , Rfl:      furosemide (LASIX) 20 MG tablet, Take 20 mg by mouth daily, Disp: , Rfl:      gabapentin (NEURONTIN) 300 MG capsule, Take 400 mg by mouth At Bedtime, Disp: , Rfl:      guaiFENesin (ROBITUSSIN) 100 MG/5ML liquid, Take 20 mLs (400 mg) by mouth every 4 hours as needed for cough, Disp: , Rfl:      Melatonin 10 MG TABS tablet, Take 10 mg by mouth At Bedtime, Disp: , Rfl:      metoprolol succinate ER (TOPROL-XL) 25 MG 24 hr tablet, Take 12.5 mg by mouth daily, Disp: , Rfl:      miconazole (MICATIN) 2 % AERP powder, Apply topically as needed , Disp: , Rfl:      mirtazapine (REMERON) 30 MG tablet, Take 15 mg by mouth daily, Disp: , Rfl:      polyethylene glycol (MIRALAX) 17 GM/Dose powder, Take 17 g by mouth daily Every other day, Disp: , Rfl:      potassium chloride ER (MICRO-K) 10 MEQ CR capsule, Take 10 mEq by mouth daily , Disp: , Rfl:      prazosin (MINIPRESS) 1 MG capsule, Take 1 mg by mouth At Bedtime, Disp: , Rfl:      QUEtiapine (SEROQUEL) 25 MG tablet, 100 mg 3 times daily, Disp: , Rfl: 0     rosuvastatin (CRESTOR) 10 MG tablet, Take 10 mg by mouth At Bedtime, Disp: , Rfl:      sennosides (SENOKOT) 8.6 MG tablet, Take 2 tablets by mouth At Bedtime, Disp: , Rfl:      Vitamin D3 (CHOLECALCIFEROL) 25 mcg (1000 units) tablet, Take 1 tablet by mouth daily, Disp: , Rfl:     /65   Pulse 73   Temp (!) 96.5  F (35.8  C)   Resp 20   Ht 1.448 m (4' 9\")   Wt 91.3 kg (201 lb 4.8 oz)   SpO2 95%   BMI 43.56 kg/m      LABS:   Last Comprehensive Metabolic Panel:  Sodium   Date Value Ref Range Status   01/16/2023 140 133 - 144 mmol/L Final   03/16/2021 139 133 - 144 mmol/L Final     Potassium   Date Value Ref Range Status   01/16/2023 3.9 3.4 - 5.3 mmol/L Final   03/16/2021 4.7 3.4 - 5.3 mmol/L Final     Chloride   Date Value Ref " Range Status   01/16/2023 108 94 - 109 mmol/L Final   03/16/2021 111 (H) 94 - 109 mmol/L Final     Carbon Dioxide   Date Value Ref Range Status   03/16/2021 22 20 - 32 mmol/L Final     Carbon Dioxide (CO2)   Date Value Ref Range Status   01/16/2023 24 20 - 32 mmol/L Final     Anion Gap   Date Value Ref Range Status   01/16/2023 8 3 - 14 mmol/L Final   03/16/2021 6 3 - 14 mmol/L Final     Glucose   Date Value Ref Range Status   01/16/2023 165 (H) 70 - 99 mg/dL Final   03/16/2021 114 (H) 70 - 99 mg/dL Final     Urea Nitrogen   Date Value Ref Range Status   01/16/2023 23 7 - 30 mg/dL Final   03/16/2021 45 (H) 7 - 30 mg/dL Final     Creatinine   Date Value Ref Range Status   01/16/2023 1.00 0.52 - 1.04 mg/dL Final   03/16/2021 0.98 0.52 - 1.04 mg/dL Final     GFR Estimate   Date Value Ref Range Status   01/16/2023 61 >60 mL/min/1.73m2 Final     Comment:     Effective December 21, 2021 eGFRcr in adults is calculated using the 2021 CKD-EPI creatinine equation which includes age and gender (Roland et al., NEJ, DOI: 10.1056/GFKIax0365221)   03/16/2021 60 (L) >60 mL/min/[1.73_m2] Final     Comment:     Non  GFR Calc  Starting 12/18/2018, serum creatinine based estimated GFR (eGFR) will be   calculated using the Chronic Kidney Disease Epidemiology Collaboration   (CKD-EPI) equation.       Calcium   Date Value Ref Range Status   01/16/2023 9.2 8.5 - 10.1 mg/dL Final   03/16/2021 9.4 8.5 - 10.1 mg/dL Final     Last Comprehensive Metabolic Panel:  Sodium   Date Value Ref Range Status   01/16/2023 140 133 - 144 mmol/L Final   03/16/2021 139 133 - 144 mmol/L Final     Potassium   Date Value Ref Range Status   01/16/2023 3.9 3.4 - 5.3 mmol/L Final   03/16/2021 4.7 3.4 - 5.3 mmol/L Final     Chloride   Date Value Ref Range Status   01/16/2023 108 94 - 109 mmol/L Final   03/16/2021 111 (H) 94 - 109 mmol/L Final     Carbon Dioxide   Date Value Ref Range Status   03/16/2021 22 20 - 32 mmol/L Final     Carbon Dioxide  (CO2)   Date Value Ref Range Status   01/16/2023 24 20 - 32 mmol/L Final     Anion Gap   Date Value Ref Range Status   01/16/2023 8 3 - 14 mmol/L Final   03/16/2021 6 3 - 14 mmol/L Final     Glucose   Date Value Ref Range Status   01/16/2023 165 (H) 70 - 99 mg/dL Final   03/16/2021 114 (H) 70 - 99 mg/dL Final     Urea Nitrogen   Date Value Ref Range Status   01/16/2023 23 7 - 30 mg/dL Final   03/16/2021 45 (H) 7 - 30 mg/dL Final     Creatinine   Date Value Ref Range Status   01/16/2023 1.00 0.52 - 1.04 mg/dL Final   03/16/2021 0.98 0.52 - 1.04 mg/dL Final     GFR Estimate   Date Value Ref Range Status   01/16/2023 61 >60 mL/min/1.73m2 Final     Comment:     Effective December 21, 2021 eGFRcr in adults is calculated using the 2021 CKD-EPI creatinine equation which includes age and gender (Roland et al., NEJM, DOI: 10.1056/DUURya5565632)   03/16/2021 60 (L) >60 mL/min/[1.73_m2] Final     Comment:     Non  GFR Calc  Starting 12/18/2018, serum creatinine based estimated GFR (eGFR) will be   calculated using the Chronic Kidney Disease Epidemiology Collaboration   (CKD-EPI) equation.       Calcium   Date Value Ref Range Status   01/16/2023 9.2 8.5 - 10.1 mg/dL Final   03/16/2021 9.4 8.5 - 10.1 mg/dL Final     Bilirubin Total   Date Value Ref Range Status   12/14/2022 0.2 0.2 - 1.3 mg/dL Final   03/16/2021 0.2 0.2 - 1.3 mg/dL Final     Alkaline Phosphatase   Date Value Ref Range Status   12/14/2022 45 40 - 150 U/L Final   03/16/2021 57 40 - 150 U/L Final     ALT   Date Value Ref Range Status   12/14/2022 21 0 - 50 U/L Final   03/16/2021 74 (H) 0 - 50 U/L Final     AST   Date Value Ref Range Status   12/14/2022 11 0 - 45 U/L Final   03/16/2021 23 0 - 45 U/L Final             Recent Labs   Lab Test 10/19/22  0743 05/28/21  0818   CHOL 162 154   HDL 30* 42*   LDL 56 83   TRIG 382* 144     CBC RESULTS: Recent Labs   Lab Test 01/16/23  0032   WBC 6.9   RBC 3.01*   HGB 9.0*   HCT 28.9*   MCV 96   MCH 29.9   MCHC  31.1*   RDW 15.4*        TSH   Date Value Ref Range Status   12/28/2022 25.51 (H) 0.40 - 4.00 mU/L Final   12/04/2020 41.34 (H) 0.40 - 4.00 mU/L Final     T4 Free   Date Value Ref Range Status   12/04/2020 1.09 0.76 - 1.46 ng/dL Final     Valproic acid level in October was at 22      ASSESSMENT:    Encounter Diagnoses   Name Primary?     Chronic pain disorder Yes     Cardiac pacemaker in situ      Essential (primary) hypertension      Chronic kidney disease, stage 3a (H)        PLAN:    We will recheck when her next TSH is scheduled more than likely 6 weeks from December 28 when her last TSH was 25 and now currently on Synthroid 112 mcg.  Pain has been managed on Tylenol 3 times daily.  Seems to be in pretty good spirits overall.  We did talk about her work-up once again in the hospital regarding her recent history of chest pain.  She agrees with the current plan of care as well as her current medications and she is happy that she is asymptomatic.        Electronically signed by: Jay Chapman NP

## 2023-01-23 NOTE — LETTER
1/23/2023        RE: Letty Escobra  [facility]  [address] [facility] [address]        [facility]    Facility:   [facility] () [[id number]]   Code Status: DNR/DNI      CHIEF COMPLAINT/REASON FOR VISIT:  Chief Complaint   Patient presents with     RECHECK       HISTORY:      HPI: Letty is a 67 year old female Who currently resides in long-term care room [facility] and who wanted to visit with today secondary to her recent hospitalization January 18 through January 19, 2022 secondary to chest pain.  She has had multiple episodes in the emergency department over the past 3 months for chest pain.  Once again her troponins were negative.  EKG did show nonischemia and a CT cardiac perfusion stress test did show a large anterior/anterior lateral infarct without significant elena-infarct ischemia.  She was discharged back to long-[facility] facility with instructions to follow-up again in about 2 weeks.  See results below over most recent laboratory studies.  We did talk about her multiple hospitalizations for chest pain in the multiple work-up that she has had.  Currently her systolic blood pressures have been normotensive and afebrile and also she is on room air with a weight of 201 pounds back on January 4 weight 200 pounds.  Her appetite is good.  Denies heartburn reflux or pyrosis.  No increase in lower extremity edema.  No increase in pain currently on Tylenol 1000 mg 3 times daily.  Her last lipid panel back in October total cholesterol 162 and is on Crestor 10 mg.  Currently now on Seroquel 100 mg 3 times daily.  After talking further about her recent hospitalization she does feel comfortable with our visit today.  She otherwise continues to be asymptomatic over the past few days.    Past Medical History:   Diagnosis Date     ACP (advance care planning) 11/3/2010    Formatting of this note might be different from the original. Patient has identified Health  Care Agent(s): Yes Add Health Care Agents: Yes   Health Care Agent(s):  Primary Health Care Agent:   Edwar Escobar Relationship:  Spouse Phone:   [phone]  Secondary Health Care Agent:   Chiki Oro Relationship:   Son Phone:   [phone]  Patient has Advance Care Plan Documents (Health Care Direct     Acute coronary syndrome (H) 11/22/2019     Acute exacerbation of CHF (congestive heart failure) (H) 11/11/2019     Acute on chronic systolic (congestive) heart failure (H) 1/28/2020     Anemia of chronic disease 1/5/2013     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, left leg (H) 1/15/2020     BPPV (benign paroxysmal positional vertigo) 5/31/2018     Candidiasis 3/1/2020     Carotid stenosis, right 7/13/2016    Carotid US 05/04/2018 showed moderate plaque formation, consistent with 50 to 69% stenosis in the right internal carotid artery, not significantly changed from 8/5/2015.  Moderate plaque formation, consistent with 50 to 69% stenosis in the left internal carotid artery; there has been mild progression of the left ICA stenosis since 8/5/2015.     Chest pain 11/11/2019     Chronic bilateral low back pain without sciatica 1/17/2018     Chronic pain disorder 10/1/2017     Constipation 3/20/2020     COPD (chronic obstructive pulmonary disease) (H) 6/24/2020     Coronary atherosclerosis 2/6/2009 2/5/2009 - MI - Proximal RCA 99%, mild-mod disease elsewhere.  EF 60%.  PCI:  MYRON to pRCA. 2/12/2009 - admit CP - Widely patent RCA stent. Moderate diffuse CAD. Severe stenosis in trivial PDA branch. LVEF 45%. 1/25/2010 - admit CP - PTCA and stent of diagonal 9/8/2010 - admit CP - LAD patent stent. Moderate diffuse CAD. Medical management recommended.  4/25/2012 - NSTEMI - Acute total occlusion of     Cubital tunnel syndrome on left 11/13/2012     Depressive disorder      Diabetes mellitus (H)      Diabetes mellitus type 2 in obese (H) 7/13/2006     Diabetes mellitus type  2 in obese (H) 7/13/2006     Drug-seeking behavior 4/4/2020     Failure to thrive in adult 9/3/2020     Hereditary and idiopathic peripheral neuropathy 4/24/2007     Hyperlipidemia with target low density lipoprotein (LDL) cholesterol less than 70 mg/dL 5/30/2007     Hypertension 10/14/2015     Hypothyroidism 12/10/2010     Irritable bowel syndrome 9/7/2015     Ischemic cardiomyopathy 9/20/2015    EF of 40-45%, status post RV lead revision and LV epicardial lead placement via mini-thoracotomy in August 2016.     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  Designated pharmacy: [facility] Prescribing physician: Andrea Diagnosis: Ulnar neuropathy     Panic disorder with agoraphobia 4/14/2020     Paroxysmal atrial fibrillation (H) 10/2/2015     Personality disorder (H) 3/5/2020    Rule out dependent personality     Polymyalgia rheumatica (H) 11/28/2018     Posttraumatic stress disorder 3/1/2020     Restless legs syndrome (RLS) 8/29/2007     Restless legs syndrome (RLS) 8/29/2007     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     Serum calcium elevated 3/1/2020     Somatic dysfunction of sacral region 1/17/2018     Subacromial bursitis of left shoulder joint 8/6/2018     Suicidal ideation 4/1/2020     Thyroid disease      TIA (transient ischemic attack) 5/4/2018     TIA (transient ischemic attack) 5/4/2018     Tobacco abuse 2/17/2017     Tobacco abuse 2/17/2017     Urinary incontinence, mixed 9/24/2017     UTI (urinary tract infection) 4/17/2020     Vitamin B12 deficiency 2/14/2018     Weakness 12/17/2019            No family history on file.   Social History     Socioeconomic  History     Marital status:    Tobacco Use     Smoking status: Never     Smokeless tobacco: Never   Substance and Sexual Activity     Alcohol use: Not Currently     Drug use: Never   No new changes to the review of systems or physical exam since our last visit on December 19  REVIEW OF SYSTEM:  She currently denies any new symptoms of cough or cold sore throat postnasal drip wheezing chest pain dizziness vertigo nausea vomiting diarrhea dysuria frequency or urgency.  Does have a history of dyslipidemia, COPD, CABG, depression, hypertension, posttraumatic stress disorder, panic disorder, anxiety, personality disorder    PHYSICAL EXAM:   Pleasant female in no acute distress.  Head is normocephalic.  Neck is supple without adenopathy.  Lung sounds are clear throughout.  Cardiovascular S1-S2 regular rate and rhythm and no lower extremity edema.  Pacemaker.  Gastrointestinal is protuberant nontender nondistended.  Musculoskeletal-denies discomfort.  Psychiatric: Pleasant affect.       Current Outpatient Medications:      levothyroxine (SYNTHROID/LEVOTHROID) 112 MCG tablet, Take 112 mcg by mouth daily, Disp: , Rfl:      acetaminophen (TYLENOL) 325 MG tablet, Take 650 mg by mouth every 6 hours as needed for mild pain, Disp: , Rfl:      acetaminophen (TYLENOL) 500 MG tablet, Take 2 tablets (1,000 mg) by mouth 3 times daily, Disp: , Rfl:      albuterol (PROVENTIL) (2.5 MG/3ML) 0.083% neb solution, Take 1 vial (2.5 mg) by nebulization 2 times daily as needed for shortness of breath / dyspnea or wheezing, Disp: 90 mL, Rfl:      aspirin 81 MG EC tablet, Take 81 mg by mouth daily, Disp: , Rfl:      bisacodyl (DULCOLAX) 10 MG suppository, Place 10 mg rectally daily as needed for constipation, Disp: , Rfl:      cholecalciferol (VITAMIN D3) 10 mcg (400 units) TABS tablet, Take 1,000 Units by mouth, Disp: , Rfl:      cyanocobalamin (CYANOCOBALAMIN) 1000 MCG/ML injection, Inject 1 mL into the muscle every 30 days, Disp: , Rfl:  "     divalproex sodium delayed-release (DEPAKOTE) 250 MG DR tablet, Take 250 mg by mouth daily, Disp: , Rfl:      DULoxetine HCl 40 MG CPEP, Take 40 mg by mouth daily, Disp: , Rfl:      fluticasone-vilanterol (BREO ELLIPTA) 200-25 MCG/INH inhaler, Inhale 1 puff into the lungs daily, Disp: , Rfl:      furosemide (LASIX) 20 MG tablet, Take 20 mg by mouth daily, Disp: , Rfl:      gabapentin (NEURONTIN) 300 MG capsule, Take 400 mg by mouth At Bedtime, Disp: , Rfl:      guaiFENesin (ROBITUSSIN) 100 MG/5ML liquid, Take 20 mLs (400 mg) by mouth every 4 hours as needed for cough, Disp: , Rfl:      Melatonin 10 MG TABS tablet, Take 10 mg by mouth At Bedtime, Disp: , Rfl:      metoprolol succinate ER (TOPROL-XL) 25 MG 24 hr tablet, Take 12.5 mg by mouth daily, Disp: , Rfl:      miconazole (MICATIN) 2 % AERP powder, Apply topically as needed , Disp: , Rfl:      mirtazapine (REMERON) 30 MG tablet, Take 15 mg by mouth daily, Disp: , Rfl:      polyethylene glycol (MIRALAX) 17 GM/Dose powder, Take 17 g by mouth daily Every other day, Disp: , Rfl:      potassium chloride ER (MICRO-K) 10 MEQ CR capsule, Take 10 mEq by mouth daily , Disp: , Rfl:      prazosin (MINIPRESS) 1 MG capsule, Take 1 mg by mouth At Bedtime, Disp: , Rfl:      QUEtiapine (SEROQUEL) 25 MG tablet, 100 mg 3 times daily, Disp: , Rfl: 0     rosuvastatin (CRESTOR) 10 MG tablet, Take 10 mg by mouth At Bedtime, Disp: , Rfl:      sennosides (SENOKOT) 8.6 MG tablet, Take 2 tablets by mouth At Bedtime, Disp: , Rfl:      Vitamin D3 (CHOLECALCIFEROL) 25 mcg (1000 units) tablet, Take 1 tablet by mouth daily, Disp: , Rfl:     /65   Pulse 73   Temp (!) 96.5  F (35.8  C)   Resp 20   Ht 1.448 m (4' 9\")   Wt 91.3 kg (201 lb 4.8 oz)   SpO2 95%   BMI 43.56 kg/m      LABS:   Last Comprehensive Metabolic Panel:  Sodium   Date Value Ref Range Status   01/16/2023 140 133 - 144 mmol/L Final   03/16/2021 139 133 - 144 mmol/L Final     Potassium   Date Value Ref Range Status "   01/16/2023 3.9 3.4 - 5.3 mmol/L Final   03/16/2021 4.7 3.4 - 5.3 mmol/L Final     Chloride   Date Value Ref Range Status   01/16/2023 108 94 - 109 mmol/L Final   03/16/2021 111 (H) 94 - 109 mmol/L Final     Carbon Dioxide   Date Value Ref Range Status   03/16/2021 22 20 - 32 mmol/L Final     Carbon Dioxide (CO2)   Date Value Ref Range Status   01/16/2023 24 20 - 32 mmol/L Final     Anion Gap   Date Value Ref Range Status   01/16/2023 8 3 - 14 mmol/L Final   03/16/2021 6 3 - 14 mmol/L Final     Glucose   Date Value Ref Range Status   01/16/2023 165 (H) 70 - 99 mg/dL Final   03/16/2021 114 (H) 70 - 99 mg/dL Final     Urea Nitrogen   Date Value Ref Range Status   01/16/2023 23 7 - 30 mg/dL Final   03/16/2021 45 (H) 7 - 30 mg/dL Final     Creatinine   Date Value Ref Range Status   01/16/2023 1.00 0.52 - 1.04 mg/dL Final   03/16/2021 0.98 0.52 - 1.04 mg/dL Final     GFR Estimate   Date Value Ref Range Status   01/16/2023 61 >60 mL/min/1.73m2 Final     Comment:     Effective December 21, 2021 eGFRcr in adults is calculated using the 2021 CKD-EPI creatinine equation which includes age and gender (Roland et al., NEJ, DOI: 10.1056/XCVYhp8193408)   03/16/2021 60 (L) >60 mL/min/[1.73_m2] Final     Comment:     Non  GFR Calc  Starting 12/18/2018, serum creatinine based estimated GFR (eGFR) will be   calculated using the Chronic Kidney Disease Epidemiology Collaboration   (CKD-EPI) equation.       Calcium   Date Value Ref Range Status   01/16/2023 9.2 8.5 - 10.1 mg/dL Final   03/16/2021 9.4 8.5 - 10.1 mg/dL Final     Last Comprehensive Metabolic Panel:  Sodium   Date Value Ref Range Status   01/16/2023 140 133 - 144 mmol/L Final   03/16/2021 139 133 - 144 mmol/L Final     Potassium   Date Value Ref Range Status   01/16/2023 3.9 3.4 - 5.3 mmol/L Final   03/16/2021 4.7 3.4 - 5.3 mmol/L Final     Chloride   Date Value Ref Range Status   01/16/2023 108 94 - 109 mmol/L Final   03/16/2021 111 (H) 94 - 109 mmol/L  Final     Carbon Dioxide   Date Value Ref Range Status   03/16/2021 22 20 - 32 mmol/L Final     Carbon Dioxide (CO2)   Date Value Ref Range Status   01/16/2023 24 20 - 32 mmol/L Final     Anion Gap   Date Value Ref Range Status   01/16/2023 8 3 - 14 mmol/L Final   03/16/2021 6 3 - 14 mmol/L Final     Glucose   Date Value Ref Range Status   01/16/2023 165 (H) 70 - 99 mg/dL Final   03/16/2021 114 (H) 70 - 99 mg/dL Final     Urea Nitrogen   Date Value Ref Range Status   01/16/2023 23 7 - 30 mg/dL Final   03/16/2021 45 (H) 7 - 30 mg/dL Final     Creatinine   Date Value Ref Range Status   01/16/2023 1.00 0.52 - 1.04 mg/dL Final   03/16/2021 0.98 0.52 - 1.04 mg/dL Final     GFR Estimate   Date Value Ref Range Status   01/16/2023 61 >60 mL/min/1.73m2 Final     Comment:     Effective December 21, 2021 eGFRcr in adults is calculated using the 2021 CKD-EPI creatinine equation which includes age and gender (Roland et al., NEJM, DOI: 10.1056/MHKApd4668355)   03/16/2021 60 (L) >60 mL/min/[1.73_m2] Final     Comment:     Non  GFR Calc  Starting 12/18/2018, serum creatinine based estimated GFR (eGFR) will be   calculated using the Chronic Kidney Disease Epidemiology Collaboration   (CKD-EPI) equation.       Calcium   Date Value Ref Range Status   01/16/2023 9.2 8.5 - 10.1 mg/dL Final   03/16/2021 9.4 8.5 - 10.1 mg/dL Final     Bilirubin Total   Date Value Ref Range Status   12/14/2022 0.2 0.2 - 1.3 mg/dL Final   03/16/2021 0.2 0.2 - 1.3 mg/dL Final     Alkaline Phosphatase   Date Value Ref Range Status   12/14/2022 45 40 - 150 U/L Final   03/16/2021 57 40 - 150 U/L Final     ALT   Date Value Ref Range Status   12/14/2022 21 0 - 50 U/L Final   03/16/2021 74 (H) 0 - 50 U/L Final     AST   Date Value Ref Range Status   12/14/2022 11 0 - 45 U/L Final   03/16/2021 23 0 - 45 U/L Final             Recent Labs   Lab Test 10/19/22  0743 05/28/21  0818   CHOL 162 154   HDL 30* 42*   LDL 56 83   TRIG 382* 144     CBC RESULTS:  Recent Labs   Lab Test 01/16/23  0032   WBC 6.9   RBC 3.01*   HGB 9.0*   HCT 28.9*   MCV 96   MCH 29.9   MCHC 31.1*   RDW 15.4*        TSH   Date Value Ref Range Status   12/28/2022 25.51 (H) 0.40 - 4.00 mU/L Final   12/04/2020 41.34 (H) 0.40 - 4.00 mU/L Final     T4 Free   Date Value Ref Range Status   12/04/2020 1.09 0.76 - 1.46 ng/dL Final     Valproic acid level in October was at 22      ASSESSMENT:    Encounter Diagnoses   Name Primary?     Chronic pain disorder Yes     Cardiac pacemaker in situ      Essential (primary) hypertension      Chronic kidney disease, stage 3a (H)        PLAN:    We will recheck when her next TSH is scheduled more than likely 6 weeks from December 28 when her last TSH was 25 and now currently on Synthroid 112 mcg.  Pain has been managed on Tylenol 3 times daily.  Seems to be in pretty good spirits overall.  We did talk about her work-up once again in the hospital regarding her recent history of chest pain.  She agrees with the current plan of care as well as her current medications and she is happy that she is asymptomatic.        Electronically signed by: Jay Chapman NP          Sincerely,        Jay Chapman NP

## 2023-01-26 ENCOUNTER — PATIENT OUTREACH (OUTPATIENT)
Dept: GERIATRIC MEDICINE | Facility: CLINIC | Age: 68
End: 2023-01-26
Payer: COMMERCIAL

## 2023-01-26 NOTE — PROGRESS NOTES
TRANSITIONS OF CARE (ALEXANDER) LOG   ALEXANDER tasks should be completed by the CC within one (1) business day of notification of each transition. Follow up contact with member is required after return to their usual care setting.  Note:  If CC finds out about the transitions fifteen (15) days or more after the member has returned to their usual care setting, no ALEXANDER log is needed. However, the CC should check in with the member to discuss the transition process, any changes needed to the care plan and document it in a case note.    Member Name:  Letty Escobar [facility] Name:  [employer] Member ID#: [id number]   Product: [facility] Care Coordinator Contact:  FILEMON Morrison, CMC covering for Esperanza Damon RN [place]   Transition Communication Actions from Care Management Contact   Transition #1   Notification Date: 1/26/23 Transition Date:   1/18/23 Transition From: Nursing Home, [facility]     Is this the member s usual care setting?               yes Transition To: Hospital, [facility]    Transition Type:  Unplanned  Reason for Admission/Comments:  Letty Escobar admitted on 1/18 to [facility] due to chest pain, shortness of breath and sweating. She discharged on 1/19 back to [facility].    Contact member/responsible party to offer assistance with transition Date completed: 1/26/23    Notes from conversation with the member/responsible party, provider, discharging and receiving facility (as applicable):   CC reached out to [place] , Altagracia Hunt regarding transition via e-mail.  Reviewed and update care plan as needed.  Transition log initiated completed.  NP Jay Chapman has already seen Letty for hospital follow up on 1/23/23.   PCP notified of hospitalization via EMR.    FILEMON Morrison, [facility]  [phone]       Shared CC contact info, care plan/services with receiving setting--Date completed: 1/26/23       Notified PCP of transition--Date completed:  1/18/23     via  EMR  Name of PCP: Jay Chapman     Transition #2   Notification Date: 1/26/23 Transition Date:   1/19/23 Transition From: Hospital, [facility]      Is this the member s usual care setting?               no Transition To: Nursing Home, [facility]   Transition Type:  Planned  Notes from conversation with the member/responsible party, provider, discharging and receiving facility (as applicable):   CC reached out to [place] , Altagracia Hunt regarding transition via e-mail.  Reviewed and update care plan as needed.  Transition log initiated completed.  NP Jay Chapman has already seen Letty for hospital follow up on 1/23/23.   PCP notified of hospitalization via EMR.    FILEMON Morrison, [facility]  [phone]     Shared CC contact info, care plan/services with receiving setting--Date completed: 1/26/23            *RETURN TO USUAL CARE SETTING: *Complete tasks below when the member is discharging TO their usual care setting within one (1) business day of notification..      For situations where the Care Coordinator is notified of the discharge prior to the date of discharge, the Care Coordinator must follow up with the member or designated representative to confirm that discharge actually occurred and discuss required ALEXANDER tasks as outlined in the ALEXANDER Instructions.  (This includes situations where it may be a  new  usual care setting for the member. (i.e., a community member who decides upon permanent nursing home placement following hospitalization and rehab).    Discuss with Member/Responsible Party:    Check  Yes  - if the member, family member and/or SNF/facility staff manages the following:    If  No  provide explanation in the comments section.          Date completed: 1/26/23 Communicated with member or their designated representative about the following:  care transition process; about  changes to the member s health status; plan of care updates; education about transitions and how to prevent unplanned transitions/readmissions    Four Pillars for Optimal Transition:    Check  Yes  - if the member, family member and/or SNF/facility staff manages the following:    If  No  provide explanation in the comments section.          [x]  Yes     []  No Does the member have a follow-up appointment scheduled with primary care or specialist? (Mental health hospitalizations--the appt. should be w/in 7 days)              For mental health hospitalizations:  []  Yes     []  No     Does the member have a follow-up appointment scheduled with a mental health practitioner within 7 days of discharge?  [x]  Yes     []  No     Has a medication review been completed with member? If no, refer to PCP, home care nurse, MTM, pharmacist  [x]  Yes     []  No     Can the member manage their medications or is there a system in place to manage medications (e.g. home care set-up)?         [x]  Yes     []  No     Can the member verbalize warning signs and symptoms to watch for and how to respond?  [x]  Yes     []  No     Does the member have a copy of and understand their discharge instructions?  If no, assist to obtain copy of discharge instructions, review discharge instructions, and assist to contact PCP to discuss questions about their recent hospitalization.  [x]  Yes     []  No     Does the member have adequate food, housing and transportation?  If no, add goal and discuss additional supports available to the member                                                                                                                                                                                 [x]  Yes     []  No     Is the member safe in their home?  If no, document needs and support provided                                                                                                                                                                           []  Yes     [x]  No     Are there any concerns of vulnerability, abuse, or neglect?  If yes, document concerns and actions taken by Care Coordinator as a mandated                                                                                                                                                                              [x]  Yes     []  No     Does the member use a Personal Health Care Record?  Check  Yes  if visit summary, discharge summary, and/or healthcare summary are being used as a PHR.                                                                                                                                                                                  [x]  Yes     []  No     Have you reviewed the discharge summary with the member? If  No  provide explanation in comments.  [x]  Yes     []  No     Have you updated the member s care plan/support plan? Add new diagnosis, medications, treatments, goals & interventions, as applicable. If No, provide explanation in comments.    Comments:           Has already seen PCP/NO Jay Chapman on 1/23/23.

## 2023-02-06 NOTE — PROGRESS NOTES
"[facility]  Chief Complaint   Patient presents with     senior living Regulatory     [place] Medical Record Number:  [id number]  Place of Service where encounter took place:  [facility] () [[id number]]    HPI:    Letty Escobar  is 67 year old (1955), who is being seen today for a federally mandated E/M visit.  HPI information obtained from: facility chart records, facility staff, patient report and [facility] chart review.     1/18-19: for CP. \"PET CT cardiac perfusion stress test, which showed a large anterior/anterolateral infarct without significant elena-infarct ischemia.\"    Today's concerns are:   - Resident seen and examined, chart reviewed and discussed with the nursing staff.     - When patient asked about chest pain, reports \" I am fine\". Denies chest pain, SOB.     - ELI: asked if she ever used CPAP.     - DNP and RN have no concern today.   --------------------------------    MEDICATIONS:  Current Outpatient Medications   Medication Sig Dispense Refill     acetaminophen (TYLENOL) 325 MG tablet Take 650 mg by mouth every 6 hours as needed for mild pain       acetaminophen (TYLENOL) 500 MG tablet Take 2 tablets (1,000 mg) by mouth 3 times daily       albuterol (PROVENTIL) (2.5 MG/3ML) 0.083% neb solution Take 1 vial (2.5 mg) by nebulization 2 times daily as needed for shortness of breath / dyspnea or wheezing 90 mL      aspirin 81 MG EC tablet Take 81 mg by mouth daily       bisacodyl (DULCOLAX) 10 MG suppository Place 10 mg rectally daily as needed for constipation       cholecalciferol (VITAMIN D3) 10 mcg (400 units) TABS tablet Take 1,000 Units by mouth       cyanocobalamin (CYANOCOBALAMIN) 1000 MCG/ML injection Inject 1 mL into the muscle every 30 days       divalproex sodium delayed-release (DEPAKOTE) 250 MG DR tablet Take 250 mg by mouth daily       DULoxetine HCl 40 MG CPEP Take 40 mg by mouth daily       fluticasone-vilanterol (BREO ELLIPTA) 200-25 " "MCG/INH inhaler Inhale 1 puff into the lungs daily       furosemide (LASIX) 20 MG tablet Take 20 mg by mouth daily       gabapentin (NEURONTIN) 300 MG capsule Take 400 mg by mouth At Bedtime       guaiFENesin (ROBITUSSIN) 100 MG/5ML liquid Take 20 mLs (400 mg) by mouth every 4 hours as needed for cough       levothyroxine (SYNTHROID/LEVOTHROID) 112 MCG tablet Take 112 mcg by mouth daily       Lidocaine (LIDOCARE) 4 % Patch Place 1 patch onto the skin every 24 hours 30 patch 0     Melatonin 10 MG TABS tablet Take 10 mg by mouth At Bedtime       metoprolol succinate ER (TOPROL-XL) 25 MG 24 hr tablet Take 12.5 mg by mouth daily       miconazole (MICATIN) 2 % AERP powder Apply topically as needed        mirtazapine (REMERON) 30 MG tablet Take 15 mg by mouth daily       polyethylene glycol (MIRALAX) 17 GM/Dose powder Take 17 g by mouth daily Every other day       potassium chloride ER (MICRO-K) 10 MEQ CR capsule Take 10 mEq by mouth daily        prazosin (MINIPRESS) 1 MG capsule Take 1 mg by mouth At Bedtime       QUEtiapine (SEROQUEL) 25 MG tablet 100 mg 3 times daily  0     rosuvastatin (CRESTOR) 10 MG tablet Take 10 mg by mouth At Bedtime       sennosides (SENOKOT) 8.6 MG tablet Take 2 tablets by mouth At Bedtime       Vitamin D3 (CHOLECALCIFEROL) 25 mcg (1000 units) tablet Take 1 tablet by mouth daily       ROS: 4 point ROS including Respiratory, CV, GI and , other than that noted in the HPI,  is negative    Vitals:  /72   Pulse 71   Temp 97.1  F (36.2  C)   Resp 19   Ht 1.448 m (4' 9\")   Wt 90.2 kg (198 lb 14.4 oz)   SpO2 98%   BMI 43.04 kg/m    Body mass index is 43.04 kg/m .  Exam:  GENERAL APPEARANCE:  in no distress,   RESP:  Unlabored breathing. CTA b/l.   CV:  S1S2 audible, regular HR, no murmur appreciated.   ABDOMEN:  soft, NT/ND, BS audible.   M/S:   no joint deformity noted on observation.   SKIN:  No rash noted on observation  NEURO:   No NFD appreciated on observation.   PSYCH:  affect and " "mood normal      Lab/Diagnostic data: Resides in dementia urias      ASSESSMENT/PLAN  --------------------------------   # Chronic systolic CHF (H)  # Ischemic cardiomyopathy and Hx of S/P CABG x 5 (2015), and bi-vent ICD in place  # chronic angina pectoris at rest (H)  # Essential hypertension  # Mixed hyperlipidemia  #  PAD, internal carotid artery stenosis, bilateral (H)  # hx of stroke with ischemic CVD  -hospitalized from 4/6/22-4/7/22 with atypical chest pain. Trop was normal. Pulmonary perfusion scan was low probability and no change when compared to 2017 scan, felt to have GERD, offered PPI but decline..    - Went to ED on 5/10 and 6/1 with atypical chest pain, work up negative. started on famotidine 10 mg daily prn (5/11).   - was seen by Cardiology on 5/11/22, and no changed was made:   - 5/17: s/p downgrade from ICD to pacemaker but implanted system includes DF-4 lad, hence ICD generator was placed but the tachy disabled.   - 7/11/21:  ED visit for chest pain. AMI ruled out. Started on famotidine 20 mg daily.   *10/1/21: ED visit for chest pain with radiation to jaw and left arm, SOB and nausea.  Did not respond to NTG en route and to GI cocktail in ED. Trop x2 no changed. ECG no acute changes.  Sent back to the facility.  *  1/18-19: for CP. \"PET CT cardiac perfusion stress test, which showed a large anterior/anterolateral infarct without significant elena-infarct ischemia.\"  - followed by Cardiology. Appreciate help.   - currently on multiple agents. Continue.        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 Appreciate help  - I planned to GDR Seroquel during out previous visit in " October down to stop. However, patient currently is on Seroquel 100 mg bid, as per Psych's order.        COPD (H): at baseline. on CSI/LABA, continue meds.   ELI not on cpap: ED provider questioned CP possibly due to ELI. Pt endorse was prescribed CPAP but does not use it due to claustrophobia. Discussed that there is nasal option, but again declined.        Polymyalgia rheumatica (H)  Chronic pain disorder  Chronic bilateral low back pain without sciatica  Peripheral polyneuropathy  Frailty  - analgesia optimal. Continue current regimen  - Significant  Deficits requiring NH placement. Requiring extensive assistance from nursing. Up for meals only o/w spends the day resting in bed.        Obesity: Body mass index is Body mass index is Body mass index is 43.04 kg/m .  -  to reduce calories intake, but she is not interested. Will continue provide educations        CKD greater than 60 stage 2 (H):  - slight reduction in GFR. - Avoid nephrotoxic  medications. Renally dose medications. Monitor electrolytes, and dehydration status     Headache, non specified: based hx. No clinical evidence. Continue to monitor. On tylenol.      Normocytic anemia, query ACD: off iron. Hb 10.4 gm/dl and MC 97 (7/12/22). On B12 supplement. Routine level check.        Hypothyroidism:  TSH   Date Value Ref Range Status   02/08/2023 2.19 0.30 - 4.20 uIU/mL Final   12/28/2022 25.51 (H) 0.40 - 4.00 mU/L Final   12/04/2020 41.34 (H) 0.40 - 4.00 mU/L Final   On Levothyroxine. In frail Elderly adult, recommended TSH level is around 6 providing patient is asymptomatic and FT4 is wnl- preferably on the lower normal level.  - Consider checking FT4 next time TSH is checked.         Irritable bowel syndrome with diarrhea  Gastroesophageal reflux disease, esophagitis presence not specified  - stable.        Electronically signed by:  Paola Pastor MD

## 2023-02-07 ENCOUNTER — LAB REQUISITION (OUTPATIENT)
Dept: LAB | Facility: CLINIC | Age: 68
End: 2023-02-07
Payer: COMMERCIAL

## 2023-02-07 ENCOUNTER — NURSING HOME VISIT (OUTPATIENT)
Dept: GERIATRICS | Facility: CLINIC | Age: 68
End: 2023-02-07
Payer: COMMERCIAL

## 2023-02-07 VITALS
OXYGEN SATURATION: 98 % | HEART RATE: 71 BPM | DIASTOLIC BLOOD PRESSURE: 72 MMHG | SYSTOLIC BLOOD PRESSURE: 136 MMHG | BODY MASS INDEX: 42.91 KG/M2 | HEIGHT: 57 IN | TEMPERATURE: 97.1 F | WEIGHT: 198.9 LBS | RESPIRATION RATE: 19 BRPM

## 2023-02-07 DIAGNOSIS — E66.01 MORBID OBESITY (H): ICD-10-CM

## 2023-02-07 DIAGNOSIS — I10 ESSENTIAL (PRIMARY) HYPERTENSION: ICD-10-CM

## 2023-02-07 DIAGNOSIS — I48.20 CHRONIC ATRIAL FIBRILLATION (H): ICD-10-CM

## 2023-02-07 DIAGNOSIS — I50.22 CHRONIC SYSTOLIC CONGESTIVE HEART FAILURE (H): Primary | ICD-10-CM

## 2023-02-07 DIAGNOSIS — F32.3 MAJOR DEPRESSIVE DISORDER, SINGLE EPISODE, SEVERE WITH PSYCHOTIC FEATURES (H): ICD-10-CM

## 2023-02-07 DIAGNOSIS — M35.3 POLYMYALGIA RHEUMATICA (H): ICD-10-CM

## 2023-02-07 DIAGNOSIS — Z79.4 LONG TERM (CURRENT) USE OF INSULIN (H): ICD-10-CM

## 2023-02-07 DIAGNOSIS — I70.422 ATHEROSCLEROSIS OF AUTOLOGOUS VEIN BYPASS GRAFT(S) OF THE EXTREMITIES WITH REST PAIN, LEFT LEG (H): ICD-10-CM

## 2023-02-07 DIAGNOSIS — I20.89 ANGINA AT REST (H): ICD-10-CM

## 2023-02-07 DIAGNOSIS — E03.9 HYPOTHYROIDISM, UNSPECIFIED: ICD-10-CM

## 2023-02-07 PROCEDURE — 99310 SBSQ NF CARE HIGH MDM 45: CPT | Performed by: FAMILY MEDICINE

## 2023-02-07 NOTE — LETTER
"    2/7/2023        RE: Letty Escobar  [facility]  [address] [facility] [address]        [facility]  Chief Complaint   Patient presents with     long term Regulatory     [place] Medical Record Number:  [id number]  Place of Service where encounter took place:  [facility] () [[id number]]    HPI:    Letty Escobar  is 67 year old (1955), who is being seen today for a federally mandated E/M visit.  HPI information obtained from: facility chart records, facility staff, patient report and [facility] chart review.     1/18-19: for CP. \"PET CT cardiac perfusion stress test, which showed a large anterior/anterolateral infarct without significant elena-infarct ischemia.\"    Today's concerns are:   - Resident seen and examined, chart reviewed and discussed with the nursing staff.     - When patient asked about chest pain, reports \" I am fine\". Denies chest pain, SOB.     - ELI: asked if she ever used CPAP.     - DNP and RN have no concern today.   --------------------------------    MEDICATIONS:  Current Outpatient Medications   Medication Sig Dispense Refill     acetaminophen (TYLENOL) 325 MG tablet Take 650 mg by mouth every 6 hours as needed for mild pain       acetaminophen (TYLENOL) 500 MG tablet Take 2 tablets (1,000 mg) by mouth 3 times daily       albuterol (PROVENTIL) (2.5 MG/3ML) 0.083% neb solution Take 1 vial (2.5 mg) by nebulization 2 times daily as needed for shortness of breath / dyspnea or wheezing 90 mL      aspirin 81 MG EC tablet Take 81 mg by mouth daily       bisacodyl (DULCOLAX) 10 MG suppository Place 10 mg rectally daily as needed for constipation       cholecalciferol (VITAMIN D3) 10 mcg (400 units) TABS tablet Take 1,000 Units by mouth       cyanocobalamin (CYANOCOBALAMIN) 1000 MCG/ML injection Inject 1 mL into the muscle every 30 days       divalproex sodium delayed-release (DEPAKOTE) 250 MG DR tablet Take 250 mg by mouth " "daily       DULoxetine HCl 40 MG CPEP Take 40 mg by mouth daily       fluticasone-vilanterol (BREO ELLIPTA) 200-25 MCG/INH inhaler Inhale 1 puff into the lungs daily       furosemide (LASIX) 20 MG tablet Take 20 mg by mouth daily       gabapentin (NEURONTIN) 300 MG capsule Take 400 mg by mouth At Bedtime       guaiFENesin (ROBITUSSIN) 100 MG/5ML liquid Take 20 mLs (400 mg) by mouth every 4 hours as needed for cough       levothyroxine (SYNTHROID/LEVOTHROID) 112 MCG tablet Take 112 mcg by mouth daily       Lidocaine (LIDOCARE) 4 % Patch Place 1 patch onto the skin every 24 hours 30 patch 0     Melatonin 10 MG TABS tablet Take 10 mg by mouth At Bedtime       metoprolol succinate ER (TOPROL-XL) 25 MG 24 hr tablet Take 12.5 mg by mouth daily       miconazole (MICATIN) 2 % AERP powder Apply topically as needed        mirtazapine (REMERON) 30 MG tablet Take 15 mg by mouth daily       polyethylene glycol (MIRALAX) 17 GM/Dose powder Take 17 g by mouth daily Every other day       potassium chloride ER (MICRO-K) 10 MEQ CR capsule Take 10 mEq by mouth daily        prazosin (MINIPRESS) 1 MG capsule Take 1 mg by mouth At Bedtime       QUEtiapine (SEROQUEL) 25 MG tablet 100 mg 3 times daily  0     rosuvastatin (CRESTOR) 10 MG tablet Take 10 mg by mouth At Bedtime       sennosides (SENOKOT) 8.6 MG tablet Take 2 tablets by mouth At Bedtime       Vitamin D3 (CHOLECALCIFEROL) 25 mcg (1000 units) tablet Take 1 tablet by mouth daily       ROS: 4 point ROS including Respiratory, CV, GI and , other than that noted in the HPI,  is negative    Vitals:  /72   Pulse 71   Temp 97.1  F (36.2  C)   Resp 19   Ht 1.448 m (4' 9\")   Wt 90.2 kg (198 lb 14.4 oz)   SpO2 98%   BMI 43.04 kg/m    Body mass index is 43.04 kg/m .  Exam:  GENERAL APPEARANCE:  in no distress,   RESP:  Unlabored breathing. CTA b/l.   CV:  S1S2 audible, regular HR, no murmur appreciated.   ABDOMEN:  soft, NT/ND, BS audible.   M/S:   no joint deformity noted on " "observation.   SKIN:  No rash noted on observation  NEURO:   No NFD appreciated on observation.   PSYCH:  affect and mood normal      Lab/Diagnostic data: Resides in dementia urias      ASSESSMENT/PLAN  --------------------------------   # Chronic systolic CHF (H)  # Ischemic cardiomyopathy and Hx of S/P CABG x 5 (2015), and bi-vent ICD in place  # chronic angina pectoris at rest (H)  # Essential hypertension  # Mixed hyperlipidemia  #  PAD, internal carotid artery stenosis, bilateral (H)  # hx of stroke with ischemic CVD  -hospitalized from 4/6/22-4/7/22 with atypical chest pain. Trop was normal. Pulmonary perfusion scan was low probability and no change when compared to 2017 scan, felt to have GERD, offered PPI but decline..    - Went to ED on 5/10 and 6/1 with atypical chest pain, work up negative. started on famotidine 10 mg daily prn (5/11).   - was seen by Cardiology on 5/11/22, and no changed was made:   - 5/17: s/p downgrade from ICD to pacemaker but implanted system includes DF-4 lad, hence ICD generator was placed but the tachy disabled.   - 7/11/21:  ED visit for chest pain. AMI ruled out. Started on famotidine 20 mg daily.   *10/1/21: ED visit for chest pain with radiation to jaw and left arm, SOB and nausea.  Did not respond to NTG en route and to GI cocktail in ED. Trop x2 no changed. ECG no acute changes.  Sent back to the facility.  *  1/18-19: for CP. \"PET CT cardiac perfusion stress test, which showed a large anterior/anterolateral infarct without significant elena-infarct ischemia.\"  - followed by Cardiology. Appreciate help.   - currently on multiple agents. Continue.        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 Appreciate help  - I planned to GDR Seroquel during out previous visit in October down to stop. However, patient currently is on Seroquel 100 mg bid, as per Psych's order.        COPD (H): at baseline. on CSI/LABA, continue meds.   ELI not on cpap: ED provider questioned CP possibly due to ELI. Pt endorse was prescribed CPAP but does not use it due to claustrophobia. Discussed that there is nasal option, but again declined.        Polymyalgia rheumatica (H)  Chronic pain disorder  Chronic bilateral low back pain without sciatica  Peripheral polyneuropathy  Frailty  - analgesia optimal. Continue current regimen  - Significant  Deficits requiring NH placement. Requiring extensive assistance from nursing. Up for meals only o/w spends the day resting in bed.        Obesity: Body mass index is Body mass index is Body mass index is 43.04 kg/m .  -  to reduce calories intake, but she is not interested. Will continue provide educations        CKD greater than 60 stage 2 (H):  - slight reduction in GFR. - Avoid nephrotoxic  medications. Renally dose medications. Monitor electrolytes, and dehydration status     Headache, non specified: based hx. No clinical evidence. Continue to monitor. On tylenol.      Normocytic anemia, query ACD: off iron. Hb 10.4 gm/dl and MC 97 (7/12/22). On B12 supplement. Routine level check.        Hypothyroidism:  TSH   Date Value Ref Range Status   02/08/2023 2.19 0.30 - 4.20 uIU/mL Final   12/28/2022 25.51 (H) 0.40 - 4.00 mU/L Final   12/04/2020 41.34 (H) 0.40 - 4.00 mU/L Final   On Levothyroxine. In frail Elderly adult, recommended TSH level is around 6 providing patient is asymptomatic and FT4 is wnl- preferably on the lower normal level.  - Consider checking FT4 next time TSH is checked.         Irritable bowel syndrome with diarrhea  Gastroesophageal reflux disease, esophagitis presence not specified  - stable.        Electronically signed  by:  Paola Pastor MD        Sincerely,        Paola Pastor MD

## 2023-02-08 LAB — TSH SERPL DL<=0.005 MIU/L-ACNC: 2.19 UIU/ML (ref 0.3–4.2)

## 2023-02-08 PROCEDURE — 36415 COLL VENOUS BLD VENIPUNCTURE: CPT | Mod: ORL | Performed by: FAMILY MEDICINE

## 2023-02-08 PROCEDURE — 84443 ASSAY THYROID STIM HORMONE: CPT | Mod: ORL | Performed by: FAMILY MEDICINE

## 2023-02-08 PROCEDURE — P9604 ONE-WAY ALLOW PRORATED TRIP: HCPCS | Mod: ORL | Performed by: FAMILY MEDICINE

## 2023-02-12 ENCOUNTER — APPOINTMENT (OUTPATIENT)
Dept: CT IMAGING | Facility: CLINIC | Age: 68
End: 2023-02-12
Attending: FAMILY MEDICINE
Payer: COMMERCIAL

## 2023-02-12 ENCOUNTER — APPOINTMENT (OUTPATIENT)
Dept: GENERAL RADIOLOGY | Facility: CLINIC | Age: 68
End: 2023-02-12
Attending: FAMILY MEDICINE
Payer: COMMERCIAL

## 2023-02-12 ENCOUNTER — HOSPITAL ENCOUNTER (EMERGENCY)
Facility: CLINIC | Age: 68
Discharge: HOME | End: 2023-02-12
Attending: FAMILY MEDICINE | Admitting: FAMILY MEDICINE
Payer: COMMERCIAL

## 2023-02-12 VITALS
TEMPERATURE: 97.8 F | HEART RATE: 64 BPM | DIASTOLIC BLOOD PRESSURE: 59 MMHG | BODY MASS INDEX: 44.69 KG/M2 | WEIGHT: 206.5 LBS | RESPIRATION RATE: 10 BRPM | OXYGEN SATURATION: 92 % | SYSTOLIC BLOOD PRESSURE: 104 MMHG

## 2023-02-12 DIAGNOSIS — G43.809 OTHER MIGRAINE WITHOUT STATUS MIGRAINOSUS, NOT INTRACTABLE: Primary | ICD-10-CM

## 2023-02-12 DIAGNOSIS — M62.838 TRAPEZIUS MUSCLE SPASM: ICD-10-CM

## 2023-02-12 DIAGNOSIS — G44.219 EPISODIC TENSION-TYPE HEADACHE, NOT INTRACTABLE: ICD-10-CM

## 2023-02-12 LAB
ANION GAP SERPL CALCULATED.3IONS-SCNC: 12 MMOL/L (ref 7–15)
BASOPHILS # BLD AUTO: 0 10E3/UL (ref 0–0.2)
BASOPHILS NFR BLD AUTO: 1 %
BUN SERPL-MCNC: 24.9 MG/DL (ref 8–23)
CALCIUM SERPL-MCNC: 9.6 MG/DL (ref 8.8–10.2)
CHLORIDE SERPL-SCNC: 102 MMOL/L (ref 98–107)
CREAT SERPL-MCNC: 1.13 MG/DL (ref 0.51–0.95)
DEPRECATED HCO3 PLAS-SCNC: 22 MMOL/L (ref 22–29)
EOSINOPHIL # BLD AUTO: 0.3 10E3/UL (ref 0–0.7)
EOSINOPHIL NFR BLD AUTO: 4 %
ERYTHROCYTE [DISTWIDTH] IN BLOOD BY AUTOMATED COUNT: 14.6 % (ref 10–15)
GFR SERPL CREATININE-BSD FRML MDRD: 52 ML/MIN/1.73M2
GLUCOSE SERPL-MCNC: 131 MG/DL (ref 70–99)
HCT VFR BLD AUTO: 28.4 % (ref 35–47)
HGB BLD-MCNC: 8.9 G/DL (ref 11.7–15.7)
IMM GRANULOCYTES # BLD: 0.1 10E3/UL
IMM GRANULOCYTES NFR BLD: 1 %
LYMPHOCYTES # BLD AUTO: 2.4 10E3/UL (ref 0.8–5.3)
LYMPHOCYTES NFR BLD AUTO: 32 %
MCH RBC QN AUTO: 29.9 PG (ref 26.5–33)
MCHC RBC AUTO-ENTMCNC: 31.3 G/DL (ref 31.5–36.5)
MCV RBC AUTO: 95 FL (ref 78–100)
MONOCYTES # BLD AUTO: 0.5 10E3/UL (ref 0–1.3)
MONOCYTES NFR BLD AUTO: 6 %
NEUTROPHILS # BLD AUTO: 4.2 10E3/UL (ref 1.6–8.3)
NEUTROPHILS NFR BLD AUTO: 56 %
NRBC # BLD AUTO: 0 10E3/UL
NRBC BLD AUTO-RTO: 0 /100
PLATELET # BLD AUTO: 143 10E3/UL (ref 150–450)
POTASSIUM SERPL-SCNC: 4.4 MMOL/L (ref 3.4–5.3)
RBC # BLD AUTO: 2.98 10E6/UL (ref 3.8–5.2)
SODIUM SERPL-SCNC: 136 MMOL/L (ref 136–145)
TROPONIN T SERPL HS-MCNC: 34 NG/L
TROPONIN T SERPL HS-MCNC: 35 NG/L
WBC # BLD AUTO: 7.4 10E3/UL (ref 4–11)

## 2023-02-12 PROCEDURE — 93010 ELECTROCARDIOGRAM REPORT: CPT | Performed by: FAMILY MEDICINE

## 2023-02-12 PROCEDURE — 99284 EMERGENCY DEPT VISIT MOD MDM: CPT | Mod: 25 | Performed by: FAMILY MEDICINE

## 2023-02-12 PROCEDURE — 99285 EMERGENCY DEPT VISIT HI MDM: CPT | Mod: 25 | Performed by: FAMILY MEDICINE

## 2023-02-12 PROCEDURE — 71045 X-RAY EXAM CHEST 1 VIEW: CPT

## 2023-02-12 PROCEDURE — 84484 ASSAY OF TROPONIN QUANT: CPT | Performed by: FAMILY MEDICINE

## 2023-02-12 PROCEDURE — 70450 CT HEAD/BRAIN W/O DYE: CPT

## 2023-02-12 PROCEDURE — 85025 COMPLETE CBC W/AUTO DIFF WBC: CPT | Performed by: FAMILY MEDICINE

## 2023-02-12 PROCEDURE — 84484 ASSAY OF TROPONIN QUANT: CPT | Mod: 91 | Performed by: FAMILY MEDICINE

## 2023-02-12 PROCEDURE — 250N000011 HC RX IP 250 OP 636: Performed by: FAMILY MEDICINE

## 2023-02-12 PROCEDURE — 82310 ASSAY OF CALCIUM: CPT | Performed by: FAMILY MEDICINE

## 2023-02-12 PROCEDURE — 36415 COLL VENOUS BLD VENIPUNCTURE: CPT | Performed by: FAMILY MEDICINE

## 2023-02-12 PROCEDURE — 93005 ELECTROCARDIOGRAM TRACING: CPT | Performed by: FAMILY MEDICINE

## 2023-02-12 PROCEDURE — 250N000013 HC RX MED GY IP 250 OP 250 PS 637: Performed by: FAMILY MEDICINE

## 2023-02-12 RX ORDER — LIDOCAINE 4 G/G
1 PATCH TOPICAL ONCE
Status: DISCONTINUED | OUTPATIENT
Start: 2023-02-12 | End: 2023-02-12 | Stop reason: HOSPADM

## 2023-02-12 RX ORDER — HYDROCODONE BITARTRATE AND ACETAMINOPHEN 5; 325 MG/1; MG/1
1 TABLET ORAL ONCE
Qty: 2 TABLET | Refills: 0 | Status: SHIPPED | OUTPATIENT
Start: 2023-02-12 | End: 2023-02-12

## 2023-02-12 RX ORDER — CETIRIZINE HYDROCHLORIDE 10 MG/1
10 TABLET ORAL ONCE
Status: COMPLETED | OUTPATIENT
Start: 2023-02-12 | End: 2023-02-12

## 2023-02-12 RX ORDER — LIDOCAINE 4 G/G
1 PATCH TOPICAL EVERY 24 HOURS
Qty: 30 PATCH | Refills: 0 | Status: SHIPPED | OUTPATIENT
Start: 2023-02-12 | End: 2023-09-10

## 2023-02-12 RX ORDER — ONDANSETRON 4 MG/1
4 TABLET, ORALLY DISINTEGRATING ORAL ONCE
Status: COMPLETED | OUTPATIENT
Start: 2023-02-12 | End: 2023-02-12

## 2023-02-12 RX ADMIN — LIDOCAINE 1 PATCH: 560 PATCH PERCUTANEOUS; TOPICAL; TRANSDERMAL at 01:35

## 2023-02-12 RX ADMIN — CETIRIZINE HYDROCHLORIDE 10 MG: 10 TABLET, FILM COATED ORAL at 03:06

## 2023-02-12 RX ADMIN — ONDANSETRON 4 MG: 4 TABLET, ORALLY DISINTEGRATING ORAL at 03:05

## 2023-02-12 ASSESSMENT — ENCOUNTER SYMPTOMS
EYES NEGATIVE: 1
NECK STIFFNESS: 1
PSYCHIATRIC NEGATIVE: 1
DIZZINESS: 1
HEADACHES: 1
FEVER: 0
CHILLS: 0
RESPIRATORY NEGATIVE: 1
WEAKNESS: 0
ABDOMINAL PAIN: 0
GASTROINTESTINAL NEGATIVE: 1
TREMORS: 0
LIGHT-HEADEDNESS: 0

## 2023-02-12 ASSESSMENT — ACTIVITIES OF DAILY LIVING (ADL)
ADLS_ACUITY_SCORE: 35
ADLS_ACUITY_SCORE: 35

## 2023-02-12 NOTE — ED TRIAGE NOTES
Patient woke with headache and neck stiffness, she was worried it was a precursor to a stroke or heart attack.

## 2023-02-12 NOTE — PROGRESS NOTES
Staff page this evening noting patient with severe headache, nausea, decreased appetite, presence of black floaters. Neuro exam and vital signs within normal limits. Patient would like to go to the hospital, however, this writer does not feel that is necessary at this time. Will attempt to control what appears to be a migraine in-house.    Orders:  Norco 5/325 one tablet now, may repeat times one within 30 minutes of an effective    Dr iNcol BOATENG DNP

## 2023-02-12 NOTE — DISCHARGE INSTRUCTIONS
Please read and follow the handout(s) instructions. Return, if needed, for increased or worsening symptoms and as directed by the handout(s).    You can use the lidocaine patches to the left neck and shoulder region to help with the spasm that is likely triggering your headaches.  See the new prescription for the patches.  If not using the patch then ice the therapy to the area for 10 to 15 minutes followed by gentle stretching can also help.  I recommend that you have the physical therapist that you have home evaluate your left trapezius muscle to see if they can help reduce the spasm and pain that is likely triggering your headaches.    Your heart tests and the scan of your head did not suggest heart attack or stroke as reason for your symptoms today.    I hope you feel better soon.

## 2023-02-12 NOTE — ED NOTES
Bed: ED01  Expected date: 2/12/23  Expected time: 12:58 AM  Means of arrival:   Comments:  EMS headache

## 2023-02-12 NOTE — ED PROVIDER NOTES
History     Chief Complaint   Patient presents with     Headache     HPI  Letty Escobar is a 67 year old female who presents to the emergency room via ambulance from the [facility] secondary to concerns of awaking this morning with headache, dizziness, left neck and shoulder area pain.  Patient is concerned that her symptoms could be associated with recurrence of her coronary artery disease and/or possible stroke.  Patient states that she has been getting headaches on and off for several weeks and wonders what the reason is for them.  Patient denies any specific muscle weakness or disuse that is new today.  The patient has a very long and complicated past medical history.  See below for further details.  She denied any recent fall or injury as reason for symptoms.  She denies feeling short of breath but states that the chest pain and left shoulder pain feels similar to other episodes she has had in the past associated with blockage of a artery in her heart.  Patient states that she has 21 stents and has had 5 bypass procedures of her heart in the past.  She is also worried that the headache could suggest possible stroke symptoms.  She denies any specific weakness in her face or extremities, however.    Allergies:  Allergies   Allergen Reactions     Diphenhydramine Palpitations     Tolerated IV Benadryl when not pushed too fast     Isosorbide Other (See Comments) and Dizziness     Also causes syncope (has fallen before) and brain fog/mental disturbances - please do not prescribe     Nitroglycerin Dizziness, Fatigue and Other (See Comments)     Specifically the patch - please do not prescribe  Specifically the patch - please do not prescribe       Contrast Dye Hives and Itching     Other Drug Allergy (See Comments) Hives and Itching     Bee Pollen      Penicillin G      Adhesive Tape Rash     Diphenhydramine Hcl Palpitations     Liquid Adhesive Itching     Nystatin Dermatitis     Nystatin (Topical)  Dermatitis     Also blisters     Penicillins Swelling and Rash     Occurred as a child - not 100% sure on specific reactions     Sulfa Drugs Other (See Comments)     Occurred as a child / patient does not remember specific reaction       Problem List:    Patient Active Problem List    Diagnosis Date Noted     Chronic kidney disease, stage 3a (H) 10/16/2022     Priority: Medium     SOB (shortness of breath) 04/07/2022     Priority: Medium     Morbid obesity (H) 12/10/2021     Priority: Medium     ICD (implantable cardioverter-defibrillator) in place 11/17/2021     Priority: Medium     Formatting of this note is different from the original.  Date of last device in office evaluation: 5/17/2022    ?  and model: Medtronic Cobalt CRT-D.   Date of implant: 5/7/2021  Tachy therapies remain OFF: S/p downgrade from ICD to pacemaker, but her implanted system includes a DF-4 lead, so an ICD generator was placed with the tachycardia therapies disabled.     ? Indication for device: Ischemic cardiomyopathy   ? Cardiac resynchronization therapy:   no      MRI Conditional:  No  o If No:  Reason why:  Abandoned LV lead    ? Battery longevity documented as less than 3  Months: No  ? Are any of the leads less than 3 months old:  No    ? Programming              ? Pacing mode and programmed lower rate: DDDR 60 - 130 bpm              ? Rate-responsive sensor type, if programmed on: Accelerometer        Underlying rhythm and heart rate:  SR @ 60 bpm    ? What is the response of this device to magnet placement:  None - tachy therapies off  ? PM magnet pacing rate: N/A   ? Any alert status on CIED generator or lead: No    ? Last pacing threshold    Atrial   1.0 V @ 0.4 ms   Ventricular RV: 0.75 V @ 0.4 ms  LV:  2.75 V @ 1.0 ms       Chronic atrial fibrillation (H) 04/06/2021     Priority: Medium     Cardiac pacemaker in situ 04/06/2021     Priority: Medium     Major depressive disorder, recurrent severe without psychotic  features (H) 01/26/2021     Priority: Medium     Panic disorder with agoraphobia 04/14/2020     Priority: Medium     Constipation 03/20/2020     Priority: Medium     Personality disorder (H) 03/05/2020     Priority: Medium     Rule out dependent personality    Formatting of this note might be different from the original.  Rule out dependent personality  Formatting of this note might be different from the original.  Rule out dependent personality       Candidiasis 03/01/2020     Priority: Medium     Posttraumatic stress disorder 03/01/2020     Priority: Medium     Chronic obstructive pulmonary disease, unspecified (H) 01/29/2020     Priority: Medium     Long term (current) use of insulin (H) 01/29/2020     Priority: Medium     Acute on chronic systolic (congestive) heart failure (H) 01/28/2020     Priority: Medium     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, left leg (H) 01/15/2020     Priority: Medium     Atypical chest pain 11/11/2019     Priority: Medium     Polymyalgia rheumatica (H) 11/28/2018     Priority: Medium     Unstable angina (H) 05/02/2018     Priority: Medium     Coronary angiogram 05/02/2018 demonstrated 30% stenosis in the LMCA, 50% stenosis in the Proximal LAD, 50% stenosis in the Mid LAD, 95% stenosis in the Proximal Circumflex (Restenosis - Balloon Angioplasty), 100% stenosis in the 1st Marginal, 50% stenosis in the Proximal RCA, 50% stenosis in the Distal RCA, the LIMA graft from the LIMA to the Distal LAD is free of significant disease; the SVG graft from the Aorta to the 1st Marginal is free of significant disease; the SVG graft from the Aorta to the Distal RCA is free of significant disease. Intervention included a successful  2.5mm x 12mm Balloon,  2.5mm x 10mm Cutting Balloon,  3mm x 12mm Drug Eluting Stent,  3mm x 12mm Balloon, and  3mm x 8mm Balloon to Proximal Circumflex, post stenosis 0%.    Formatting of this note is different from the  original.  Coronary angiogram 05/02/2018 demonstrated 30% stenosis in the LMCA, 50% stenosis in the Proximal LAD, 50% stenosis in the Mid LAD, 95% stenosis in the Proximal Circumflex (Restenosis - Balloon Angioplasty), 100% stenosis in the 1st Marginal, 50% stenosis in the Proximal RCA, 50% stenosis in the Distal RCA, the LIMA graft from the LIMA to the Distal LAD is free of significant disease; the SVG graft from the Aorta to the 1st Marginal is free of significant disease; the SVG graft from the Aorta to the Distal RCA is free of significant disease. Intervention included a successful  2.5mm x 12mm Balloon,  2.5mm x 10mm Cutting Balloon,  3mm x 12mm Drug Eluting Stent,  3mm x 12mm Balloon, and  3mm x 8mm Balloon to Proximal Circumflex, post stenosis 0%.  Formatting of this note is different from the original.  Coronary angiogram 05/02/2018 demonstrated 30% stenosis in the LMCA, 50% stenosis in the Proximal LAD, 50% stenosis in the Mid LAD, 95% stenosis in the Proximal Circumflex (Restenosis - Balloon Angioplasty), 100% stenosis in the 1st Marginal, 50% stenosis in the Proximal RCA, 50% stenosis in the Distal RCA, the LIMA graft from the LIMA to the Distal LAD is free of significant disease; the SVG graft from the Aorta to the 1st Marginal is free of significant disease; the SVG graft from the Aorta to the Distal RCA is free of significant disease. Intervention included a successful  2.5mm x 12mm Balloon,  2.5mm x 10mm Cutting Balloon,  3mm x 12mm Drug Eluting Stent,  3mm x 12mm Balloon, and  3mm x 8mm Balloon to Proximal Circumflex, post stenosis 0%.       Vitamin B12 deficiency 02/14/2018     Priority: Medium     Chronic bilateral low back pain without sciatica 01/17/2018     Priority: Medium     Chronic pain disorder 10/01/2017     Priority: Medium     Urinary incontinence, mixed 09/24/2017     Priority: Medium     Internal carotid artery stenosis, bilateral 07/13/2016     Priority: Medium     Carotid US  05/04/2018 showed moderate plaque formation, consistent with 50 to 69% stenosis in the right internal carotid artery, not significantly changed from 8/5/2015.  Moderate plaque formation, consistent with 50 to 69% stenosis in the left internal carotid artery; there has been mild progression of the left ICA stenosis since 8/5/2015.    Formatting of this note might be different from the original.  Carotid US 05/04/2018 showed moderate plaque formation, consistent with 50 to 69% stenosis in the right internal carotid artery, not significantly changed from 8/5/2015.  Moderate plaque formation, consistent with 50 to 69% stenosis in the left internal carotid artery; there has been mild progression of the left ICA stenosis since 8/5/2015.    Formatting of this note might be different from the original.  Carotid US 05/04/2018 showed moderate plaque formation, consistent with 50 to 69% stenosis in the right internal carotid artery, not significantly changed from 8/5/2015.  Moderate plaque formation, consistent with 50 to 69% stenosis in the left internal carotid artery; there has been mild progression of the left ICA stenosis since 8/5/2015.       Essential (primary) hypertension 10/14/2015     Priority: Medium     Ischemic cardiomyopathy 09/20/2015     Priority: Medium     EF of 40-45%, status post RV lead revision and LV epicardial lead placement via mini-thoracotomy in August 2016.    Formatting of this note might be different from the original.  EF of 40-45%, status post RV lead revision and LV epicardial lead placement via mini-thoracotomy in August 2016.  Formatting of this note might be different from the original.  EF of 40-45%, status post RV lead revision and LV epicardial lead placement via mini-thoracotomy in August 2016.       S/P CABG x 5 08/21/2015     Priority: Medium     Pain medication agreement 04/20/2013     Priority: Medium     Controlled substance agreement for percocet #30/month on file and signed  "4/17/13.  Designated pharmacy: [facility] Prescribing physician: Andrea Diagnosis: Ulnar neuropathy    Formatting of this note might be different from the original.  Controlled substance agreement for percocet #30/month on file and signed 4/17/13.  Designated pharmacy: [facility] Prescribing physician: Andrea Diagnosis: Ulnar neuropathy       Anemia in other chronic diseases classified elsewhere 01/05/2013     Priority: Medium     Hypothyroidism, unspecified 12/10/2010     Priority: Medium     ACP (advance care planning) 11/03/2010     Priority: Medium     Formatting of this note might be different from the original.  Patient has identified Health Care Agent(s): Yes  Add Health Care Agents: Yes    Health Care Agent(s):    Primary Health Care Agent:     Edwar Escobar Relationship:    Spouse Phone:     [phone]    Secondary Health Care Agent:     Chiki Oro Relationship:     Son Phone:     [phone]      Patient has Advance Care Plan Documents (Health Care Directive, POLST): Yes    Advance Care Plan Documents:  Health Care Directive--03/28/11  Resuscitation Guidelines--    Patient has identified Specific Treatment Preferences: Yes   Specific Treatment Preferences:   a.) Code Status:  DNR/ Do Not Attempt Resuscitation - Allow a Natural Death    b.) Goals of Treatment:     ii. Limited Interventions and treat reversible conditions.  Provide interventions aimed at treatment of new or reversible illness/injury or non-life threatening chronic conditions. Duration of invasive or uncomfortable interventions should generally be limited.- Trial of intubation 5 days or other instructions \"If no improvement, stop.\"  c.) Interventions and Treatments:   i.   Antibiotics:          - Aggressive antibiotics  ii.  Nutrition/Hydration:         - Offer food and liquids by mouth         - IV fluid administration         - No feeding tube under any circumstance.  iii. Transfusion:          - Blood products for comfort/relief of " "symptoms only  iv. Dialysis:           - Dialysis for short term \"for recovery, but not long term.\"        Last Assessment & Plan:   Advance Care Planning: Disease-specific Session    Letty Escobar is an [facility] patient of Dr. Renea Mendez of the [facility].    Advance care planning discussions were completed with Letty and her healthcare agent, spouse Edwar Escobar on Monday, March 28, 2011 at the [facility].    Understanding of Illness and Disease [place]:   Letty identifies her medical condition as diabetes with peripheral neuropathy, heart problems with a heart attack February 2009 plus 4 stent placements; sleep apnea; depression; hypothyroid; high cholesterol; tobacco use; and describes it as needing further assistance with thyroid and diabetes management.  She identifies the following symptoms of her medical condition as being the most bothersome: some memory loss, balance problems, light headedness and dizziness, puffy eyes and lower extremity edema from time to time.    Letty is retired [place] .  She is independent with all cares and lives an active life style although, \"I'm not as active as I used to be.\"    Goals of Care:   Letty currently hopes to maintain independence, control pain and symptoms and delay progression of, but not cure, the illness.  \"I want to get the diabetes and thyroid under better control.\"  Letty has an appointment the first part of April for follow up.    Quality of Life:    Letty identifies quality of life as family involvement twice weekly, [employer] activities, newly assigned Sunday , playing cards, the computer,    Letty christiano with serious challenges in her life through her ganesh in God, prayers and support of her [employer] family, spouse and son.    Letty identifies the following fears and worries about her medical care:  \"I just want the diabetes straightened " "out.\"    Treatment and Care Preferences:   Past experiences in dealing with family and/or friends that have  or been seriously ill include her brother who took his own life.  \"He was 53 years old and living with us.  I found him.\"   As a result of these experiences, Letty expresses these health care preferences:      Summary Letty's Treatment Preferences:  LOW SURVIVAL; HIGH TREATMENT BURDEN:  If Letty suffered a serious complication, such that she was facing a prolonged hospital stay, required ongoing medical interventions, and the chance of living through the complication was low (for example, only 5 out of 100 would live), Letty would choose:  to focus treatment on comfort and quality of life (\"Quality of life is more important than length of life to Letty.\")  COMMENT:  \"If I can't live a normal life, I wouldn't want to live.\"    HIGH SURVIVAL; LOW FUNCTIONAL STATUS:  If Letty had a serious complication and had a good chance of living through the complication but it was expected that she would never be able to walk or talk again and would require 24 hour nursing care, she would choose:  to focus treatment on comfort and quality of life (\"Quality of life is more important than length of life to Letty.\")  COMMENT:  \"I'd accept rehabilitation.\"    HIGH SURVIVAL; LOW COGNITIVE STATUS:  If Letty had a serious complication and had a good chance of living through the complication but it was expected that she would never know who she was or who she was with and would require 24 hour nursing care, she would choose:  to focus treatment on comfort and quality of life (\"Quality of life is more important than length of life to Letty.\")    CARDIO-PULMONARY RESUSCITATION (CPR):  The facts, risks and benefits of CPR were discussed with Letty.  If she had a sudden event that caused her heart and breathing to stop, she:  WOULD NOT want CPR attempted and instead would prefer that a natural death occur.    MECHANICAL " "VENTILATION: If Letty had an episode where she was unable to breathe on her own, she would choose the following:  attempt to use any appropriate non-invasive method to assist breathing, and use mechanical ventilation.  COMMENT:  \"If reversible ventilator is acceptable for 5 days.  If no improvement, stop.\"     Letty has chosen her healthcare agent to:  strictly follow her wishes.    Follow Up Plan:   Letty was encouraged to continue advance care planning discussions with her health care agents and primary care provider.   Letty and her health care agent declined handouts.     Documents addressed during this advance care planning session:  1.  Health Care Agents identified.          .  Primary health care agent is spouse, Edwar Escobar;          .  Secondary health care agent is son, Jermaine Oro.  2.  Health Care Directive completed and scanned into medical record.  3.  Statement of Treatment Preferences for advanced illness completed and scanned into the medical record.  4.  Resuscitation Guidelines completed for DNR.  Document sent to Dr. Renea Mendez for signature and returned to the home. Letty, please place on the refrigerator.    Recommendations/Plan:   Letty and her health care agent to review Advance Care Plan.     Letty would benefit from:   Care Navigation/Care Navigation Help Desk--explained services for pending future needs.  No identified needs today.  Care Navigation brochure was given to Letty and her healthcare agent.    Advance Care Planning recommendations and Letty's concerns and questions were cc ed to her primary provider.     Thank you, Letty for the opportunity of assisting with Advance Care Planning. It was a seeing you again and meeting Edwar.  Please contact me if I can be of further assistance.    Interviewer: Pat Martin RN    Advance Care Planning Facilitator  [phone]   3/28/2011       ELI (obstructive sleep apnea) 01/31/2010     Priority: Medium     PSG on April/2008 that " showed moderate Obstructive Sleep Apnea with an AHI of 23.5 . Limb movements persisted at 29 movements/hour at optimal pressures, despite carbidopa use.    Formatting of this note might be different from the original.  PSG on April/2008 that showed moderate Obstructive Sleep Apnea with an AHI of 23.5 . Limb movements persisted at 29 movements/hour at optimal pressures, despite carbidopa use.  Formatting of this note might be different from the original.  PSG on April/2008 that showed moderate Obstructive Sleep Apnea with an AHI of 23.5 . Limb movements persisted at 29 movements/hour at optimal pressures, despite carbidopa use.       Coronary atherosclerosis 02/06/2009     Priority: Medium     Formatting of this note might be different from the original.  2/5/2009 - MI - Proximal RCA 99%, mild-mod disease elsewhere.  EF 60%.  PCI:  MYRON to pRCA.  2/12/2009 - admit CP - Widely patent RCA stent. Moderate diffuse CAD. Severe stenosis in trivial PDA branch. LVEF 45%.  1/25/2010 - admit CP - PTCA and stent of diagonal  9/8/2010 - admit CP - LAD patent stent. Moderate diffuse CAD. Medical management recommended.   4/25/2012 - NSTEMI - Acute total occlusion of the ostial Circumflex. Acute total occlusion of the ostial ramus. Acute total occlusion of the distal 1st Obtuse Marginal. Angioplasty of ostial circumflex and ostial ramus. There was distal embolization to the OM1 vessel. This vessel was small and not amendable to intervention. Indefinite: plavix and asa 81.  8/10/2015 - CABx5 - 1. LIMA to distal LAD, reverse SVG to OM1 and OM2, reverse SVG to diagonal, reverse SVG to PDA.   2. Mitral valve repair with a Medtronics 3-D full ring, 28 mm.   3. Tricuspid repair with a Medtronic 28 mm partial ring  1/12/2016 - TTE - EF 40-45%  Formatting of this note might be different from the original.  2/5/2009 - MI - Proximal RCA 99%, mild-mod disease elsewhere.  EF 60%.  PCI:  MYRON to pRCA.  2/12/2009 - admit CP - Widely patent RCA  stent. Moderate diffuse CAD. Severe stenosis in trivial PDA branch. LVEF 45%.  1/25/2010 - admit CP - PTCA and stent of diagonal  9/8/2010 - admit CP - LAD patent stent. Moderate diffuse CAD. Medical management recommended.   4/25/2012 - NSTEMI - Acute total occlusion of the ostial Circumflex. Acute total occlusion of the ostial ramus. Acute total occlusion of the distal 1st Obtuse Marginal. Angioplasty of ostial circumflex and ostial ramus. There was distal embolization to the OM1 vessel. This vessel was small and not amendable to intervention. Indefinite: plavix and asa 81.  8/10/2015 - CABx5 - 1. LIMA to distal LAD, reverse SVG to OM1 and OM2, reverse SVG to diagonal, reverse SVG to PDA.   2. Mitral valve repair with a Medtronics 3-D full ring, 28 mm.   3. Tricuspid repair with a Medtronic 28 mm partial ring  1/12/2016 - TTE - EF 40-45%       Restless legs syndrome 08/29/2007     Priority: Medium     Hyperlipidemia, unspecified 05/30/2007     Priority: Medium        Past Medical History:    Past Medical History:   Diagnosis Date     ACP (advance care planning) 11/3/2010     Acute coronary syndrome (H) 11/22/2019     Acute exacerbation of CHF (congestive heart failure) (H) 11/11/2019     Acute on chronic systolic (congestive) heart failure (H) 1/28/2020     Anemia of chronic disease 1/5/2013     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, left leg (H) 1/15/2020     BPPV (benign paroxysmal positional vertigo) 5/31/2018     Candidiasis 3/1/2020     Carotid stenosis, right 7/13/2016     Chest pain 11/11/2019     Chronic bilateral low back pain without sciatica 1/17/2018     Chronic pain disorder 10/1/2017     Constipation 3/20/2020     COPD (chronic obstructive pulmonary disease) (H) 6/24/2020     Coronary atherosclerosis 2/6/2009     Cubital tunnel syndrome on left 11/13/2012     Depressive disorder      Diabetes mellitus (H)      Diabetes mellitus type 2 in obese (H)  7/13/2006     Diabetes mellitus type 2 in obese (H) 7/13/2006     Drug-seeking behavior 4/4/2020     Failure to thrive in adult 9/3/2020     Hereditary and idiopathic peripheral neuropathy 4/24/2007     Hyperlipidemia with target low density lipoprotein (LDL) cholesterol less than 70 mg/dL 5/30/2007     Hypertension 10/14/2015     Hypothyroidism 12/10/2010     Irritable bowel syndrome 9/7/2015     Ischemic cardiomyopathy 9/20/2015     Long-term use of high-risk medication 4/14/2020     Moniliasis, 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     Myocardial infarction (H)      Neuromuscular disorder (H)      Other specified postprocedural states 10/8/2015     Pain medication agreement 4/20/2013     Panic disorder with agoraphobia 4/14/2020     Paroxysmal atrial fibrillation (H) 10/2/2015     Personality disorder (H) 3/5/2020     Polymyalgia rheumatica (H) 11/28/2018     Posttraumatic stress disorder 3/1/2020     Restless legs syndrome (RLS) 8/29/2007     Restless legs syndrome (RLS) 8/29/2007     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     Serum calcium elevated 3/1/2020     Somatic dysfunction of sacral region 1/17/2018     Subacromial bursitis of left shoulder joint 8/6/2018     Suicidal ideation 4/1/2020     Thyroid disease      TIA (transient ischemic attack) 5/4/2018     TIA (transient ischemic attack) 5/4/2018     Tobacco abuse 2/17/2017     Tobacco abuse 2/17/2017     Urinary incontinence, mixed 9/24/2017     UTI (urinary tract infection) 4/17/2020     Vitamin B12 deficiency 2/14/2018     Weakness 12/17/2019       Past Surgical History:    Past Surgical History:   Procedure Laterality Date     CARDIAC SURGERY      stents x 11     CARDIAC SURGERY       CHOLECYSTECTOMY       CHOLECYSTECTOMY       GENITOURINARY SURGERY      Tubal ligation     OTHER SURGICAL HISTORY      Genitourinary surgery     RELEASE CARPAL TUNNEL       RELEASE CARPAL TUNNEL         Family History:    No family history on  file.    Social History:  Marital Status:   [2]  Social History     Tobacco Use     Smoking status: Never     Smokeless tobacco: Never   Substance Use Topics     Alcohol use: Not Currently     Drug use: Never        Medications:    Lidocaine (LIDOCARE) 4 % Patch  acetaminophen (TYLENOL) 325 MG tablet  acetaminophen (TYLENOL) 500 MG tablet  albuterol (PROVENTIL) (2.5 MG/3ML) 0.083% neb solution  aspirin 81 MG EC tablet  bisacodyl (DULCOLAX) 10 MG suppository  cholecalciferol (VITAMIN D3) 10 mcg (400 units) TABS tablet  cyanocobalamin (CYANOCOBALAMIN) 1000 MCG/ML injection  divalproex sodium delayed-release (DEPAKOTE) 250 MG DR tablet  DULoxetine HCl 40 MG CPEP  fluticasone-vilanterol (BREO ELLIPTA) 200-25 MCG/INH inhaler  furosemide (LASIX) 20 MG tablet  gabapentin (NEURONTIN) 300 MG capsule  guaiFENesin (ROBITUSSIN) 100 MG/5ML liquid  HYDROcodone-acetaminophen (NORCO) 5-325 MG tablet  levothyroxine (SYNTHROID/LEVOTHROID) 112 MCG tablet  Melatonin 10 MG TABS tablet  metoprolol succinate ER (TOPROL-XL) 25 MG 24 hr tablet  miconazole (MICATIN) 2 % AERP powder  mirtazapine (REMERON) 30 MG tablet  polyethylene glycol (MIRALAX) 17 GM/Dose powder  potassium chloride ER (MICRO-K) 10 MEQ CR capsule  prazosin (MINIPRESS) 1 MG capsule  QUEtiapine (SEROQUEL) 25 MG tablet  rosuvastatin (CRESTOR) 10 MG tablet  sennosides (SENOKOT) 8.6 MG tablet  Vitamin D3 (CHOLECALCIFEROL) 25 mcg (1000 units) tablet          Review of Systems   Constitutional: Negative for chills and fever.   HENT: Negative.    Eyes: Negative.    Respiratory: Negative.    Gastrointestinal: Negative.  Negative for abdominal pain.   Genitourinary: Negative.    Musculoskeletal: Positive for neck stiffness.   Skin: Negative.  Negative for rash.   Neurological: Positive for dizziness and headaches. Negative for tremors, weakness and light-headedness.   Psychiatric/Behavioral: Negative.    All other systems reviewed and are negative.      Physical Exam   BP:  (!) 151/74  Pulse: 65  Temp: 97.8  F (36.6  C)  Resp: 14  Weight: 93.7 kg (206 lb 8 oz)  SpO2: 96 %      Physical Exam  Vitals and nursing note reviewed.   Constitutional:       General: She is in acute distress.   HENT:      Head: Normocephalic and atraumatic.      Mouth/Throat:      Mouth: Mucous membranes are moist.   Eyes:      Extraocular Movements: Extraocular movements intact.      Conjunctiva/sclera: Conjunctivae normal.      Pupils: Pupils are equal, round, and reactive to light.   Neck:      Trachea: Trachea and phonation normal.        Comments: Patient with tenderness and significant spasm to the trapezius muscle on the left.  Palpation of this area causes the patient's headache to worsen.  No deformity noted to the cervical spine.  Compression's test of the cervical spine was negative.  No radicular symptoms exacerbated with the compression test of the neck.  A Lidoderm patch is applied over the area of the left trapezius spasm/tenderness.  Cardiovascular:      Rate and Rhythm: Normal rate and regular rhythm.      Pulses: Normal pulses.      Heart sounds:     No friction rub.   Pulmonary:      Effort: Pulmonary effort is normal. No respiratory distress.   Abdominal:      Tenderness: There is no abdominal tenderness.   Musculoskeletal:      Cervical back: Normal range of motion and neck supple. No signs of trauma. Pain with movement and muscular tenderness present. No spinous process tenderness.   Skin:     Capillary Refill: Capillary refill takes less than 2 seconds.      Findings: No bruising, erythema or rash.   Neurological:      General: No focal deficit present.      Mental Status: She is alert and oriented to person, place, and time.   Psychiatric:         Mood and Affect: Mood normal.         Behavior: Behavior normal.         ED Course                 Procedures              EKG Interpretation:      Interpreted by Bao Leal DO  Time reviewed: 01:40  Symptoms at time of EKG: left  shoulder blade pain   Rhythm: paced  Rate: Normal  Axis: Other (paced)  Ectopy: none  Conduction: paced  ST Segments/ T Waves: paced  Q Waves: paced  Comparison to prior: Unchanged from 11/11/22    Clinical Impression: paced rhythm      Critical Care time:  none               Results for orders placed or performed during the hospital encounter of 02/12/23 (from the past 24 hour(s))   CBC with platelets differential    Narrative    The following orders were created for panel order CBC with platelets differential.  Procedure                               Abnormality         Status                     ---------                               -----------         ------                     CBC with platelets and d...[[id number]]  Abnormal            Final result                 Please view results for these tests on the individual orders.   Basic metabolic panel   Result Value Ref Range    Sodium 136 136 - 145 mmol/L    Potassium 4.4 3.4 - 5.3 mmol/L    Chloride 102 98 - 107 mmol/L    Carbon Dioxide (CO2) 22 22 - 29 mmol/L    Anion Gap 12 7 - 15 mmol/L    Urea Nitrogen 24.9 (H) 8.0 - 23.0 mg/dL    Creatinine 1.13 (H) 0.51 - 0.95 mg/dL    Calcium 9.6 8.8 - 10.2 mg/dL    Glucose 131 (H) 70 - 99 mg/dL    GFR Estimate 52 (L) >60 mL/min/1.73m2   Troponin T, High Sensitivity   Result Value Ref Range    Troponin T, High Sensitivity 35 (H) <=14 ng/L   CBC with platelets and differential   Result Value Ref Range    WBC Count 7.4 4.0 - 11.0 10e3/uL    RBC Count 2.98 (L) 3.80 - 5.20 10e6/uL    Hemoglobin 8.9 (L) 11.7 - 15.7 g/dL    Hematocrit 28.4 (L) 35.0 - 47.0 %    MCV 95 78 - 100 fL    MCH 29.9 26.5 - 33.0 pg    MCHC 31.3 (L) 31.5 - 36.5 g/dL    RDW 14.6 10.0 - 15.0 %    Platelet Count 143 (L) 150 - 450 10e3/uL    % Neutrophils 56 %    % Lymphocytes 32 %    % Monocytes 6 %    % Eosinophils 4 %    % Basophils 1 %    % Immature Granulocytes 1 %    NRBCs per 100 WBC 0 <1 /100    Absolute Neutrophils 4.2 1.6 - 8.3 10e3/uL    Absolute  Lymphocytes 2.4 0.8 - 5.3 10e3/uL    Absolute Monocytes 0.5 0.0 - 1.3 10e3/uL    Absolute Eosinophils 0.3 0.0 - 0.7 10e3/uL    Absolute Basophils 0.0 0.0 - 0.2 10e3/uL    Absolute Immature Granulocytes 0.1 <=0.4 10e3/uL    Absolute NRBCs 0.0 10e3/uL   XR Chest Port 1 View    Narrative    EXAM: XR CHEST PORT 1 VIEW  LOCATION: [facility]  DATE/TIME: 2/12/2023 2:14 AM    INDICATION: chest pain  COMPARISON: 01/19/2023      Impression    IMPRESSION: No acute process or change. Left chest AICD with leads in place. Sternotomy wires. Its. Lungs clear. No pleural collections. No acute osseous process.   CT Head w/o Contrast    Narrative    EXAM: CT HEAD W/O CONTRAST  LOCATION: [facility]  DATE/TIME: 2/12/2023 2:30 AM    INDICATION: headache, dizziness  COMPARISON: 06/01/2022  TECHNIQUE: Routine CT Head without IV contrast. Multiplanar reformats. Dose reduction techniques were used.    FINDINGS:  INTRACRANIAL CONTENTS: No intracranial hemorrhage, extraaxial collection, or mass effect.  No CT evidence of acute infarct. Mild presumed chronic small vessel ischemic changes. Chronic lacunar infarcts bilateral caudate nuclei. Mild generalized volume   loss. No hydrocephalus.     VISUALIZED ORBITS/SINUSES/MASTOIDS: No intraorbital abnormality. Retention cyst or polyp right sphenoid locule. No middle ear or mastoid effusion.    BONES/SOFT TISSUES: No acute abnormality.      Impression    IMPRESSION:  1.  No acute intracranial process.  2.  No change from prior.         Medications   Lidocaine (LIDOCARE) 4 % Patch 1 patch (1 patch Transdermal Patch/Med Applied 2/12/23 0135)   ondansetron (ZOFRAN ODT) ODT tab 4 mg (4 mg Oral Given 2/12/23 0305)   cetirizine (zyrTEC) tablet 10 mg (10 mg Oral Given 2/12/23 0306)       Assessments & Plan (with Medical Decision Making)  67-year-old female to the ER via ambulance from her nursing home secondary to concerns of waking this  morning with a another headache with concerns of possible recurrent stroke and or cardiac reason for headache symptoms.  Patient with a past history for prior stroke and has had multiple issues with her heart in the past.  She has had 21 stents placed in 5 bypasses done on her heart arteries.  Patient's exam today revealed evidence for likely tension headache with left trapezius muscle spasm as the likely trigger for the headache.  Patient had a Lidoderm patch applied over the area of left trapezius muscle spasm.  Patient screening exam including CT scan of head, screening for ACS and other metabolic abnormalities proved unremarkable for abnormality.  Patient felt reassured by the exam findings.  Patient will be discharged back to her [facility] nursing home.  I recommended physical therapy evaluate and treat for her left trapezius muscle spasm.  She was given a prescription for additional Lidoderm patches to use over the area of left trapezius muscle spasm.  It is hoped with treatment of this area of spasm that her headaches would lessen in intensity and frequency.  He should felt comfortable with the plan of care.     I have reviewed the nursing notes.    I have reviewed the findings, diagnosis, plan and need for follow up with the patient.       Medical Decision Making  The patient's presentation is strongly suggestive of a chronic illness mild to moderate exacerbation, progression, or side effect of treatment.    The patient's evaluation involved:  review of external note(s) from 1 sources ([facility])  ordering and/or review of 3+ test(s) in this encounter (see separate area of note for details)  review of 3+ test result(s) ordered prior to this encounter (I reviewed several prior troponin levels done in the last month at the [facility] system)    The patient's management involved prescription drug management (including medications given in the ED).        New Prescriptions    LIDOCAINE (LIDOCARE) 4  % PATCH    Place 1 patch onto the skin every 24 hours            I verbally discussed the findings of the evaluation today in the ER. I have verbally discussed with Letty the suggested treatment(s) as described in the discharge instructions and handouts. I have prescribed the above listed medications and instructed her on appropriate use of these medications.      I have verbally suggested she follow-up in her clinic or return to the ER for increased symptoms. See the follow-up recommendations documented  in the after visit summary in this visit's EPIC chart.      Disclaimer: This note consists of words and symbols derived from keyboarding and dictation using voice recognition software.  As a result, there may be errors that have gone undetected.  Please consider this when interpreting information found in this note.    Final diagnoses:   Episodic tension-type headache, not intractable   Trapezius muscle spasm - left       2/12/2023   [facility] EMERGENCY DEPT     Bao Leal,   02/12/23 0357

## 2023-02-13 ENCOUNTER — NURSING HOME VISIT (OUTPATIENT)
Dept: GERIATRICS | Facility: CLINIC | Age: 68
End: 2023-02-13
Payer: COMMERCIAL

## 2023-02-13 VITALS
WEIGHT: 198.9 LBS | HEART RATE: 69 BPM | RESPIRATION RATE: 19 BRPM | DIASTOLIC BLOOD PRESSURE: 73 MMHG | OXYGEN SATURATION: 95 % | BODY MASS INDEX: 42.91 KG/M2 | SYSTOLIC BLOOD PRESSURE: 113 MMHG | TEMPERATURE: 98.1 F | HEIGHT: 57 IN

## 2023-02-13 DIAGNOSIS — I10 ESSENTIAL (PRIMARY) HYPERTENSION: ICD-10-CM

## 2023-02-13 DIAGNOSIS — M54.2 NECK PAIN: Primary | ICD-10-CM

## 2023-02-13 DIAGNOSIS — G44.209 TENSION HEADACHE: ICD-10-CM

## 2023-02-13 DIAGNOSIS — J44.9 CHRONIC OBSTRUCTIVE PULMONARY DISEASE, UNSPECIFIED COPD TYPE (H): ICD-10-CM

## 2023-02-13 PROCEDURE — 99309 SBSQ NF CARE MODERATE MDM 30: CPT | Performed by: FAMILY MEDICINE

## 2023-02-13 NOTE — PROGRESS NOTES
[facility] GERIATRIC SERVICES    Facility:   [facility] () [[id number]]   Code Status: DNR/DNI      CHIEF COMPLAINT/REASON FOR VISIT:  Chief Complaint   Patient presents with     RECHECK       HISTORY:      HPI: Letty is a 67 year old female who I was asked to visit with today secondary to her recent hospital visit secondary to a headache.  She did go to the hospital secondary to a headache along with left neck and shoulder pain.  She was concerned that the symptoms could be related to her CAD or even a stroke.  Her EKG did have normal paced rhythm.  The sodium 136, potassium 4.4, BUN 24 and creatinine 1.13.  Initially the troponins were up at 35.  The white cell count 7.4 hemoglobin 8.9 with platelets 143.  The chest x-ray did not show any acute process.  The head CT did not show any acute intercranial process.  Therefore after the exam as well as the laboratory studies and scans she was discharged back to the nursing home and they did give her lidocaine patch to put on the left side of her neck.  Regarding the lidocaine patch to the left side of her neck she feels that it does help with her symptoms.  The meantime her blood pressure on February 8 was 113/73 her weight on February 1 198 pounds in the month of January her systolic blood pressures ranging 114-150.  Staff wanted her to start physical and Occupational Therapy.  It was deemed that her headache was probably more of a tension headache and she agrees with that and looking at her today in agreement as well that she does have some tightness to her left trapezius area but the left sternocleidomastoid muscles into the occipital area seem pretty symmetrical on each side.  Strong symmetrical .  Past Medical History:   Diagnosis Date     ACP (advance care planning) 11/3/2010    Formatting of this note might be different from the original. Patient has identified Health Care Agent(s): Yes Add Health Care Agents: Yes   Health Care Agent(s):   Primary Health Care Agent:   Edwar Escobar Relationship:  Spouse Phone:   [phone]  Secondary Health Care Agent:   Chiki Oro Relationship:   Son Phone:   [phone]  Patient has Advance Care Plan Documents (Health Care Direct     Acute coronary syndrome (H) 11/22/2019     Acute exacerbation of CHF (congestive heart failure) (H) 11/11/2019     Acute on chronic systolic (congestive) heart failure (H) 1/28/2020     Anemia of chronic disease 1/5/2013     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, left leg (H) 1/15/2020     BPPV (benign paroxysmal positional vertigo) 5/31/2018     Candidiasis 3/1/2020     Carotid stenosis, right 7/13/2016    Carotid US 05/04/2018 showed moderate plaque formation, consistent with 50 to 69% stenosis in the right internal carotid artery, not significantly changed from 8/5/2015.  Moderate plaque formation, consistent with 50 to 69% stenosis in the left internal carotid artery; there has been mild progression of the left ICA stenosis since 8/5/2015.     Chest pain 11/11/2019     Chronic bilateral low back pain without sciatica 1/17/2018     Chronic pain disorder 10/1/2017     Constipation 3/20/2020     COPD (chronic obstructive pulmonary disease) (H) 6/24/2020     Coronary atherosclerosis 2/6/2009 2/5/2009 - MI - Proximal RCA 99%, mild-mod disease elsewhere.  EF 60%.  PCI:  MYRON to pRCA. 2/12/2009 - admit CP - Widely patent RCA stent. Moderate diffuse CAD. Severe stenosis in trivial PDA branch. LVEF 45%. 1/25/2010 - admit CP - PTCA and stent of diagonal 9/8/2010 - admit CP - LAD patent stent. Moderate diffuse CAD. Medical management recommended.  4/25/2012 - NSTEMI - Acute total occlusion of     Cubital tunnel syndrome on left 11/13/2012     Depressive disorder      Diabetes mellitus (H)      Diabetes mellitus type 2 in obese (H) 7/13/2006     Diabetes mellitus type 2 in obese (H) 7/13/2006     Drug-seeking behavior 4/4/2020     Failure  to thrive in adult 9/3/2020     Hereditary and idiopathic peripheral neuropathy 4/24/2007     Hyperlipidemia with target low density lipoprotein (LDL) cholesterol less than 70 mg/dL 5/30/2007     Hypertension 10/14/2015     Hypothyroidism 12/10/2010     Irritable bowel syndrome 9/7/2015     Ischemic cardiomyopathy 9/20/2015    EF of 40-45%, status post RV lead revision and LV epicardial lead placement via mini-thoracotomy in August 2016.     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  Designated pharmacy: [facility] Prescribing physician: Andrea Diagnosis: Ulnar neuropathy     Panic disorder with agoraphobia 4/14/2020     Paroxysmal atrial fibrillation (H) 10/2/2015     Personality disorder (H) 3/5/2020    Rule out dependent personality     Polymyalgia rheumatica (H) 11/28/2018     Posttraumatic stress disorder 3/1/2020     Restless legs syndrome (RLS) 8/29/2007     Restless legs syndrome (RLS) 8/29/2007     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     Serum calcium elevated 3/1/2020     Somatic dysfunction of sacral region 1/17/2018     Subacromial bursitis of left shoulder joint 8/6/2018     Suicidal ideation 4/1/2020     Thyroid disease      TIA (transient ischemic attack) 5/4/2018     TIA (transient ischemic attack) 5/4/2018     Tobacco abuse 2/17/2017     Tobacco abuse 2/17/2017     Urinary incontinence, mixed 9/24/2017     UTI (urinary tract infection) 4/17/2020     Vitamin B12 deficiency 2/14/2018     Weakness 12/17/2019            No family history on file.   Social History     Socioeconomic History     Marital status:    Tobacco Use     Smoking status: Never      Smokeless tobacco: Never   Substance and Sexual Activity     Alcohol use: Not Currently     Drug use: Never      No new changes to the review of systems or physical exam since our last visit on January 23 however went to the emergency department for left-sided neck strain/headache  REVIEW OF SYSTEM:  She currently denies any new symptoms of cough or cold sore throat postnasal drip wheezing chest pain dizziness vertigo nausea vomiting diarrhea dysuria frequency or urgency.  Does have a history of dyslipidemia, COPD, CABG, depression, hypertension, posttraumatic stress disorder, panic disorder, anxiety, personality disorder    PHYSICAL EXAM:   Pleasant female in no acute distress.  Head is normocephalic.  Neck is supple without adenopathy.  Lung sounds are clear throughout.  Cardiovascular S1-S2 regular rate and rhythm and no lower extremity edema.  Pacemaker.  Gastrointestinal is protuberant nontender nondistended.  Musculoskeletal-denies discomfort.  Psychiatric: Pleasant affect  The left trapezius and lateral neck muscles including the sternocleidomastoid along with the posterior cervical muscles seem symmetrical.  She states it is little more tender on the left than the right however symmetrical .       Current Outpatient Medications:      acetaminophen (TYLENOL) 325 MG tablet, Take 650 mg by mouth every 6 hours as needed for mild pain, Disp: , Rfl:      acetaminophen (TYLENOL) 500 MG tablet, Take 2 tablets (1,000 mg) by mouth 3 times daily, Disp: , Rfl:      albuterol (PROVENTIL) (2.5 MG/3ML) 0.083% neb solution, Take 1 vial (2.5 mg) by nebulization 2 times daily as needed for shortness of breath / dyspnea or wheezing, Disp: 90 mL, Rfl:      aspirin 81 MG EC tablet, Take 81 mg by mouth daily, Disp: , Rfl:      bisacodyl (DULCOLAX) 10 MG suppository, Place 10 mg rectally daily as needed for constipation, Disp: , Rfl:      cholecalciferol (VITAMIN D3) 10 mcg (400 units) TABS tablet, Take 1,000 Units by  mouth, Disp: , Rfl:      cyanocobalamin (CYANOCOBALAMIN) 1000 MCG/ML injection, Inject 1 mL into the muscle every 30 days, Disp: , Rfl:      divalproex sodium delayed-release (DEPAKOTE) 250 MG DR tablet, Take 250 mg by mouth daily, Disp: , Rfl:      DULoxetine HCl 40 MG CPEP, Take 40 mg by mouth daily, Disp: , Rfl:      fluticasone-vilanterol (BREO ELLIPTA) 200-25 MCG/INH inhaler, Inhale 1 puff into the lungs daily, Disp: , Rfl:      furosemide (LASIX) 20 MG tablet, Take 20 mg by mouth daily, Disp: , Rfl:      gabapentin (NEURONTIN) 300 MG capsule, Take 400 mg by mouth At Bedtime, Disp: , Rfl:      guaiFENesin (ROBITUSSIN) 100 MG/5ML liquid, Take 20 mLs (400 mg) by mouth every 4 hours as needed for cough, Disp: , Rfl:      levothyroxine (SYNTHROID/LEVOTHROID) 112 MCG tablet, Take 112 mcg by mouth daily, Disp: , Rfl:      Lidocaine (LIDOCARE) 4 % Patch, Place 1 patch onto the skin every 24 hours, Disp: 30 patch, Rfl: 0     Melatonin 10 MG TABS tablet, Take 10 mg by mouth At Bedtime, Disp: , Rfl:      metoprolol succinate ER (TOPROL-XL) 25 MG 24 hr tablet, Take 12.5 mg by mouth daily, Disp: , Rfl:      miconazole (MICATIN) 2 % AERP powder, Apply topically as needed , Disp: , Rfl:      mirtazapine (REMERON) 30 MG tablet, Take 15 mg by mouth daily, Disp: , Rfl:      polyethylene glycol (MIRALAX) 17 GM/Dose powder, Take 17 g by mouth daily Every other day, Disp: , Rfl:      potassium chloride ER (MICRO-K) 10 MEQ CR capsule, Take 10 mEq by mouth daily , Disp: , Rfl:      prazosin (MINIPRESS) 1 MG capsule, Take 1 mg by mouth At Bedtime, Disp: , Rfl:      QUEtiapine (SEROQUEL) 25 MG tablet, 100 mg 3 times daily, Disp: , Rfl: 0     rosuvastatin (CRESTOR) 10 MG tablet, Take 10 mg by mouth At Bedtime, Disp: , Rfl:      sennosides (SENOKOT) 8.6 MG tablet, Take 2 tablets by mouth At Bedtime, Disp: , Rfl:      Vitamin D3 (CHOLECALCIFEROL) 25 mcg (1000 units) tablet, Take 1 tablet by mouth daily, Disp: , Rfl:     /73    "Pulse 69   Temp 98.1  F (36.7  C)   Resp 19   Ht 1.448 m (4' 9\")   Wt 90.2 kg (198 lb 14.4 oz)   SpO2 95%   BMI 43.04 kg/m      LABS:   Last Comprehensive Metabolic Panel:  Sodium   Date Value Ref Range Status   02/12/2023 136 136 - 145 mmol/L Final   03/16/2021 139 133 - 144 mmol/L Final     Potassium   Date Value Ref Range Status   02/12/2023 4.4 3.4 - 5.3 mmol/L Final     Comment:     Specimen slightly hemolyzed, potassium may be falsely elevated.   01/16/2023 3.9 3.4 - 5.3 mmol/L Final   03/16/2021 4.7 3.4 - 5.3 mmol/L Final     Chloride   Date Value Ref Range Status   02/12/2023 102 98 - 107 mmol/L Final   01/16/2023 108 94 - 109 mmol/L Final   03/16/2021 111 (H) 94 - 109 mmol/L Final     Carbon Dioxide   Date Value Ref Range Status   03/16/2021 22 20 - 32 mmol/L Final     Carbon Dioxide (CO2)   Date Value Ref Range Status   02/12/2023 22 22 - 29 mmol/L Final   01/16/2023 24 20 - 32 mmol/L Final     Anion Gap   Date Value Ref Range Status   02/12/2023 12 7 - 15 mmol/L Final   01/16/2023 8 3 - 14 mmol/L Final   03/16/2021 6 3 - 14 mmol/L Final     Glucose   Date Value Ref Range Status   02/12/2023 131 (H) 70 - 99 mg/dL Final   01/16/2023 165 (H) 70 - 99 mg/dL Final   03/16/2021 114 (H) 70 - 99 mg/dL Final     Urea Nitrogen   Date Value Ref Range Status   02/12/2023 24.9 (H) 8.0 - 23.0 mg/dL Final   01/16/2023 23 7 - 30 mg/dL Final   03/16/2021 45 (H) 7 - 30 mg/dL Final     Creatinine   Date Value Ref Range Status   02/12/2023 1.13 (H) 0.51 - 0.95 mg/dL Final   03/16/2021 0.98 0.52 - 1.04 mg/dL Final     GFR Estimate   Date Value Ref Range Status   02/12/2023 52 (L) >60 mL/min/1.73m2 Final     Comment:     eGFR calculated using 2021 CKD-EPI equation.   03/16/2021 60 (L) >60 mL/min/[1.73_m2] Final     Comment:     Non  GFR Calc  Starting 12/18/2018, serum creatinine based estimated GFR (eGFR) will be   calculated using the Chronic Kidney Disease Epidemiology Collaboration   (CKD-EPI) " equation.       Calcium   Date Value Ref Range Status   02/12/2023 9.6 8.8 - 10.2 mg/dL Final   03/16/2021 9.4 8.5 - 10.1 mg/dL Final     CBC RESULTS: Recent Labs   Lab Test 02/12/23  0139   WBC 7.4   RBC 2.98*   HGB 8.9*   HCT 28.4*   MCV 95   MCH 29.9   MCHC 31.3*   RDW 14.6   *           ASSESSMENT:    Encounter Diagnoses   Name Primary?     Neck pain Yes     Tension headache      Chronic obstructive pulmonary disease, unspecified COPD type (H)      Essential (primary) hypertension        PLAN:    We will continue to watch and wait and see how things go over the next couple of days.  Staff already admitted her and requested that she have physical and Occupational Therapy.  In the meantime she does have a lidocaine patch.  She did not have any other questions.        Electronically signed by: Jay Chapman NP

## 2023-02-13 NOTE — LETTER
2/13/2023        RE: Letty Escobar  [facility]  [address] [facility] [address]        [facility]    Facility:   [facility] () [[id number]]   Code Status: DNR/DNI      CHIEF COMPLAINT/REASON FOR VISIT:  Chief Complaint   Patient presents with     RECHECK       HISTORY:      HPI: Letty is a 67 year old female who I was asked to visit with today secondary to her recent hospital visit secondary to a headache.  She did go to the hospital secondary to a headache along with left neck and shoulder pain.  She was concerned that the symptoms could be related to her CAD or even a stroke.  Her EKG did have normal paced rhythm.  The sodium 136, potassium 4.4, BUN 24 and creatinine 1.13.  Initially the troponins were up at 35.  The white cell count 7.4 hemoglobin 8.9 with platelets 143.  The chest x-ray did not show any acute process.  The head CT did not show any acute intercranial process.  Therefore after the exam as well as the laboratory studies and scans she was discharged back to the nursing home and they did give her lidocaine patch to put on the left side of her neck.  Regarding the lidocaine patch to the left side of her neck she feels that it does help with her symptoms.  The meantime her blood pressure on February 8 was 113/73 her weight on February 1 198 pounds in the month of January her systolic blood pressures ranging 114-150.  Staff wanted her to start physical and Occupational Therapy.  It was deemed that her headache was probably more of a tension headache and she agrees with that and looking at her today in agreement as well that she does have some tightness to her left trapezius area but the left sternocleidomastoid muscles into the occipital area seem pretty symmetrical on each side.  Strong symmetrical .  Past Medical History:   Diagnosis Date     ACP (advance care planning) 11/3/2010    Formatting of this note might be different  from the original. Patient has identified Health Care Agent(s): Yes Add Health Care Agents: Yes   Health Care Agent(s):  Primary Health Care Agent:   Edwar Escobar Relationship:  Spouse Phone:   [phone]  Secondary Health Care Agent:   Chiki Oro Relationship:   Son Phone:   [phone]  Patient has Advance Care Plan Documents (Health Care Direct     Acute coronary syndrome (H) 11/22/2019     Acute exacerbation of CHF (congestive heart failure) (H) 11/11/2019     Acute on chronic systolic (congestive) heart failure (H) 1/28/2020     Anemia of chronic disease 1/5/2013     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, left leg (H) 1/15/2020     BPPV (benign paroxysmal positional vertigo) 5/31/2018     Candidiasis 3/1/2020     Carotid stenosis, right 7/13/2016    Carotid US 05/04/2018 showed moderate plaque formation, consistent with 50 to 69% stenosis in the right internal carotid artery, not significantly changed from 8/5/2015.  Moderate plaque formation, consistent with 50 to 69% stenosis in the left internal carotid artery; there has been mild progression of the left ICA stenosis since 8/5/2015.     Chest pain 11/11/2019     Chronic bilateral low back pain without sciatica 1/17/2018     Chronic pain disorder 10/1/2017     Constipation 3/20/2020     COPD (chronic obstructive pulmonary disease) (H) 6/24/2020     Coronary atherosclerosis 2/6/2009 2/5/2009 - MI - Proximal RCA 99%, mild-mod disease elsewhere.  EF 60%.  PCI:  MYRON to pRCA. 2/12/2009 - admit CP - Widely patent RCA stent. Moderate diffuse CAD. Severe stenosis in trivial PDA branch. LVEF 45%. 1/25/2010 - admit CP - PTCA and stent of diagonal 9/8/2010 - admit CP - LAD patent stent. Moderate diffuse CAD. Medical management recommended.  4/25/2012 - NSTEMI - Acute total occlusion of     Cubital tunnel syndrome on left 11/13/2012     Depressive disorder      Diabetes mellitus (H)      Diabetes mellitus type 2  in obese (H) 7/13/2006     Diabetes mellitus type 2 in obese (H) 7/13/2006     Drug-seeking behavior 4/4/2020     Failure to thrive in adult 9/3/2020     Hereditary and idiopathic peripheral neuropathy 4/24/2007     Hyperlipidemia with target low density lipoprotein (LDL) cholesterol less than 70 mg/dL 5/30/2007     Hypertension 10/14/2015     Hypothyroidism 12/10/2010     Irritable bowel syndrome 9/7/2015     Ischemic cardiomyopathy 9/20/2015    EF of 40-45%, status post RV lead revision and LV epicardial lead placement via mini-thoracotomy in August 2016.     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  Designated pharmacy: [facility] Prescribing physician: Andrea Diagnosis: Ulnar neuropathy     Panic disorder with agoraphobia 4/14/2020     Paroxysmal atrial fibrillation (H) 10/2/2015     Personality disorder (H) 3/5/2020    Rule out dependent personality     Polymyalgia rheumatica (H) 11/28/2018     Posttraumatic stress disorder 3/1/2020     Restless legs syndrome (RLS) 8/29/2007     Restless legs syndrome (RLS) 8/29/2007     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     Serum calcium elevated 3/1/2020     Somatic dysfunction of sacral region 1/17/2018     Subacromial bursitis of left shoulder joint 8/6/2018     Suicidal ideation 4/1/2020     Thyroid disease      TIA (transient ischemic attack) 5/4/2018     TIA (transient ischemic attack) 5/4/2018     Tobacco abuse 2/17/2017     Tobacco abuse 2/17/2017     Urinary incontinence, mixed 9/24/2017     UTI (urinary tract infection) 4/17/2020     Vitamin B12 deficiency 2/14/2018     Weakness 12/17/2019            No family history  on file.   Social History     Socioeconomic History     Marital status:    Tobacco Use     Smoking status: Never     Smokeless tobacco: Never   Substance and Sexual Activity     Alcohol use: Not Currently     Drug use: Never      No new changes to the review of systems or physical exam since our last visit on January 23 however went to the emergency department for left-sided neck strain/headache  REVIEW OF SYSTEM:  She currently denies any new symptoms of cough or cold sore throat postnasal drip wheezing chest pain dizziness vertigo nausea vomiting diarrhea dysuria frequency or urgency.  Does have a history of dyslipidemia, COPD, CABG, depression, hypertension, posttraumatic stress disorder, panic disorder, anxiety, personality disorder    PHYSICAL EXAM:   Pleasant female in no acute distress.  Head is normocephalic.  Neck is supple without adenopathy.  Lung sounds are clear throughout.  Cardiovascular S1-S2 regular rate and rhythm and no lower extremity edema.  Pacemaker.  Gastrointestinal is protuberant nontender nondistended.  Musculoskeletal-denies discomfort.  Psychiatric: Pleasant affect  The left trapezius and lateral neck muscles including the sternocleidomastoid along with the posterior cervical muscles seem symmetrical.  She states it is little more tender on the left than the right however symmetrical .       Current Outpatient Medications:      acetaminophen (TYLENOL) 325 MG tablet, Take 650 mg by mouth every 6 hours as needed for mild pain, Disp: , Rfl:      acetaminophen (TYLENOL) 500 MG tablet, Take 2 tablets (1,000 mg) by mouth 3 times daily, Disp: , Rfl:      albuterol (PROVENTIL) (2.5 MG/3ML) 0.083% neb solution, Take 1 vial (2.5 mg) by nebulization 2 times daily as needed for shortness of breath / dyspnea or wheezing, Disp: 90 mL, Rfl:      aspirin 81 MG EC tablet, Take 81 mg by mouth daily, Disp: , Rfl:      bisacodyl (DULCOLAX) 10 MG suppository, Place 10 mg rectally daily as needed  for constipation, Disp: , Rfl:      cholecalciferol (VITAMIN D3) 10 mcg (400 units) TABS tablet, Take 1,000 Units by mouth, Disp: , Rfl:      cyanocobalamin (CYANOCOBALAMIN) 1000 MCG/ML injection, Inject 1 mL into the muscle every 30 days, Disp: , Rfl:      divalproex sodium delayed-release (DEPAKOTE) 250 MG DR tablet, Take 250 mg by mouth daily, Disp: , Rfl:      DULoxetine HCl 40 MG CPEP, Take 40 mg by mouth daily, Disp: , Rfl:      fluticasone-vilanterol (BREO ELLIPTA) 200-25 MCG/INH inhaler, Inhale 1 puff into the lungs daily, Disp: , Rfl:      furosemide (LASIX) 20 MG tablet, Take 20 mg by mouth daily, Disp: , Rfl:      gabapentin (NEURONTIN) 300 MG capsule, Take 400 mg by mouth At Bedtime, Disp: , Rfl:      guaiFENesin (ROBITUSSIN) 100 MG/5ML liquid, Take 20 mLs (400 mg) by mouth every 4 hours as needed for cough, Disp: , Rfl:      levothyroxine (SYNTHROID/LEVOTHROID) 112 MCG tablet, Take 112 mcg by mouth daily, Disp: , Rfl:      Lidocaine (LIDOCARE) 4 % Patch, Place 1 patch onto the skin every 24 hours, Disp: 30 patch, Rfl: 0     Melatonin 10 MG TABS tablet, Take 10 mg by mouth At Bedtime, Disp: , Rfl:      metoprolol succinate ER (TOPROL-XL) 25 MG 24 hr tablet, Take 12.5 mg by mouth daily, Disp: , Rfl:      miconazole (MICATIN) 2 % AERP powder, Apply topically as needed , Disp: , Rfl:      mirtazapine (REMERON) 30 MG tablet, Take 15 mg by mouth daily, Disp: , Rfl:      polyethylene glycol (MIRALAX) 17 GM/Dose powder, Take 17 g by mouth daily Every other day, Disp: , Rfl:      potassium chloride ER (MICRO-K) 10 MEQ CR capsule, Take 10 mEq by mouth daily , Disp: , Rfl:      prazosin (MINIPRESS) 1 MG capsule, Take 1 mg by mouth At Bedtime, Disp: , Rfl:      QUEtiapine (SEROQUEL) 25 MG tablet, 100 mg 3 times daily, Disp: , Rfl: 0     rosuvastatin (CRESTOR) 10 MG tablet, Take 10 mg by mouth At Bedtime, Disp: , Rfl:      sennosides (SENOKOT) 8.6 MG tablet, Take 2 tablets by mouth At Bedtime, Disp: , Rfl:       "Vitamin D3 (CHOLECALCIFEROL) 25 mcg (1000 units) tablet, Take 1 tablet by mouth daily, Disp: , Rfl:     /73   Pulse 69   Temp 98.1  F (36.7  C)   Resp 19   Ht 1.448 m (4' 9\")   Wt 90.2 kg (198 lb 14.4 oz)   SpO2 95%   BMI 43.04 kg/m      LABS:   Last Comprehensive Metabolic Panel:  Sodium   Date Value Ref Range Status   02/12/2023 136 136 - 145 mmol/L Final   03/16/2021 139 133 - 144 mmol/L Final     Potassium   Date Value Ref Range Status   02/12/2023 4.4 3.4 - 5.3 mmol/L Final     Comment:     Specimen slightly hemolyzed, potassium may be falsely elevated.   01/16/2023 3.9 3.4 - 5.3 mmol/L Final   03/16/2021 4.7 3.4 - 5.3 mmol/L Final     Chloride   Date Value Ref Range Status   02/12/2023 102 98 - 107 mmol/L Final   01/16/2023 108 94 - 109 mmol/L Final   03/16/2021 111 (H) 94 - 109 mmol/L Final     Carbon Dioxide   Date Value Ref Range Status   03/16/2021 22 20 - 32 mmol/L Final     Carbon Dioxide (CO2)   Date Value Ref Range Status   02/12/2023 22 22 - 29 mmol/L Final   01/16/2023 24 20 - 32 mmol/L Final     Anion Gap   Date Value Ref Range Status   02/12/2023 12 7 - 15 mmol/L Final   01/16/2023 8 3 - 14 mmol/L Final   03/16/2021 6 3 - 14 mmol/L Final     Glucose   Date Value Ref Range Status   02/12/2023 131 (H) 70 - 99 mg/dL Final   01/16/2023 165 (H) 70 - 99 mg/dL Final   03/16/2021 114 (H) 70 - 99 mg/dL Final     Urea Nitrogen   Date Value Ref Range Status   02/12/2023 24.9 (H) 8.0 - 23.0 mg/dL Final   01/16/2023 23 7 - 30 mg/dL Final   03/16/2021 45 (H) 7 - 30 mg/dL Final     Creatinine   Date Value Ref Range Status   02/12/2023 1.13 (H) 0.51 - 0.95 mg/dL Final   03/16/2021 0.98 0.52 - 1.04 mg/dL Final     GFR Estimate   Date Value Ref Range Status   02/12/2023 52 (L) >60 mL/min/1.73m2 Final     Comment:     eGFR calculated using 2021 CKD-EPI equation.   03/16/2021 60 (L) >60 mL/min/[1.73_m2] Final     Comment:     Non  GFR Calc  Starting 12/18/2018, serum creatinine based " estimated GFR (eGFR) will be   calculated using the Chronic Kidney Disease Epidemiology Collaboration   (CKD-EPI) equation.       Calcium   Date Value Ref Range Status   02/12/2023 9.6 8.8 - 10.2 mg/dL Final   03/16/2021 9.4 8.5 - 10.1 mg/dL Final     CBC RESULTS: Recent Labs   Lab Test 02/12/23  0139   WBC 7.4   RBC 2.98*   HGB 8.9*   HCT 28.4*   MCV 95   MCH 29.9   MCHC 31.3*   RDW 14.6   *           ASSESSMENT:    Encounter Diagnoses   Name Primary?     Neck pain Yes     Tension headache      Chronic obstructive pulmonary disease, unspecified COPD type (H)      Essential (primary) hypertension        PLAN:    We will continue to watch and wait and see how things go over the next couple of days.  Staff already admitted her and requested that she have physical and Occupational Therapy.  In the meantime she does have a lidocaine patch.  She did not have any other questions.        Electronically signed by: Jay Chapman NP          Sincerely,        Jay Chapman NP

## 2023-02-20 ENCOUNTER — PATIENT OUTREACH (OUTPATIENT)
Dept: GERIATRIC MEDICINE | Facility: CLINIC | Age: 68
End: 2023-02-20
Payer: COMMERCIAL

## 2023-02-20 NOTE — PROGRESS NOTES
[facility]  [place] [facility] Record Number:  [id number]  Place of Service where encounter took place:  [facility]) [[id number]]  Chief Complaint   Patient presents with     Nursing Home Acute       HPI:    Letty Escobar  is a 65 year old (1955), who is being seen today for an episodic care visit.  HPI information obtained from: facility chart records, facility staff, patient report, [facility] chart review and family/first contact pt's 's report. Today's concern is:     Chronic systolic congestive heart failure (H)  Coronary artery disease involving native coronary artery of native heart with angina pectoris (H)  Chronic atrial fibrillation (H)  Ischemic cardiomyopathy  Essential hypertension  Mixed hyperlipidemia  S/P CABG x 5  CKD (chronic kidney disease) stage 4, GFR 15-29 ml/min (H)     Patient is a 65 year old medically complex woman with PMH including panic disorder with agoraphobia, MDD, anxiety, drug-seeking behavior, personality disorder, PTSD, History suicidal ideation, HTN, HLD, ischemic cardiomyopathy, History MI and CABG x 5 (2015), CHF (EF 45-50%), ICA stenosis bilateral, pAfib, Morbid obesity, IBS with Diarrhea, GERD, DM2, chronic pain with neuropathy, urinary incontinence, PMR, Anemia, Vit B12 def, hypothyroidism who [facility] [place].    [facility] Psych stay at [facility] 9/3-9/15/20. She is being f/b [facility] Psychiatrist, Dr Reagan Martell and on-site psychologist.    Recent Medication Changes:  - 1/26/21: restart Plavix 75 mg po every day  - 1/27/21: Lasix 40 mg daily x 3 days, then decrease to 20 mg daily    Patient is met today today for f/u after diuresis and with labs returning, to assess her CHF.    Nursing noted patient has not been being weighed more than monthly - today weight was taken and showed 9 lb increase from 29 days ago.    Patient today reports shortness of breath but  she says it is because she is having anxiety.  Otherwise she feels no shortness of breath.  She denies any CP, HA, lightheadedness, upset stomach, constipation.  She reports she has been urinating a lot lately with the increase in diuretics.   Her  is called during our visit.      Past Medical and Surgical History reviewed in Epic today.    MEDICATIONS:    Current Outpatient Medications   Medication Sig Dispense Refill     [START ON 2/1/2021] furosemide (LASIX) 20 MG tablet Take 20 mg by mouth daily       acetaminophen (TYLENOL) 500 MG tablet Take 2 tablets (1,000 mg) by mouth 3 times daily       aspirin 81 MG EC tablet Take 81 mg by mouth daily       clopidogrel (PLAVIX) 75 MG tablet Take 75 mg by mouth daily        cyanocobalamin (CYANOCOBALAMIN) 1000 MCG/ML injection Inject 1 mL into the muscle every 30 days       divalproex sodium delayed-release (DEPAKOTE SPRINKLE) 125 MG DR capsule Take 250 mg by mouth At Bedtime       DULoxetine 40 MG CPEP Take 40 mg by mouth daily       ferrous sulfate (FEROSUL) 325 (65 Fe) MG tablet Take 325 mg by mouth daily (with breakfast)       hydrOXYzine (ATARAX) 25 MG tablet Take 0.5 tablets (12.5 mg) by mouth every 6 hours as needed for itching       ketoconazole (NIZORAL) 2 % external cream Apply topically At Bedtime       levothyroxine (SYNTHROID/LEVOTHROID) 100 MCG tablet Take 1 tablet (100 mcg) by mouth every morning (before breakfast) 30 tablet 0     melatonin 3 MG tablet Take 3 tablets (9 mg) by mouth At Bedtime       metoprolol succinate ER (TOPROL-XL) 25 MG 24 hr tablet Take 25 mg by mouth daily       miconazole (MICATIN) 2 % AERP powder Apply topically 2 times daily as needed       mirtazapine (REMERON) 30 MG tablet Take 1 tablet (30 mg) by mouth At Bedtime 30 tablet 0     pantoprazole (PROTONIX) 40 MG EC tablet 40 mg po BID x 10 days, then return to 40 mg po every day.       polyethylene glycol (MIRALAX) 17 g packet Take 17 g by mouth daily as needed for  "constipation       rosuvastatin (CRESTOR) 10 MG tablet Take 10 mg by mouth At Bedtime        sennosides (SENOKOT) 8.6 MG tablet Take 2 tablets by mouth At Bedtime       vitamin D3 (CHOLECALCIFEROL) 50 mcg (2000 units) tablet Take 1 tablet (50 mcg) by mouth daily       REVIEW OF SYSTEMS:  10 point ROS of systems including Constitutional, Eyes, Respiratory, Cardiovascular, Gastroenterology, Genitourinary, Integumentary, Musculoskeletal, Psychiatric were all negative except for pertinent positives noted in my HPI.    Objective:  BP (!) 159/77   Pulse 85   Temp 98.3  F (36.8  C)   Resp 16   Ht 1.473 m (4' 10\")   Wt 69.4 kg (153 lb)   SpO2 99%   BMI 31.98 kg/m    Exam:  GENERAL APPEARANCE:  Alert, in no distress, pleasant, mildly anxious   RESP:  respiratory effort and palpation of chest normal, auscultation of lungs clear, no respiratory distress  CV:  Palpation and auscultation of heart done , rate and rhythm regular, no murmur, scant left foot edema (none in ankle), no RLE peripheral edema  ABDOMEN:  normal bowel sounds, soft, nontender, no hepatosplenomegaly or other masses  M/S:   Gait and station with w/c for mobility, Digits and nails with arthritic changes, reduced muscle mass  SKIN:  Inspection and Palpation of skin and subcutaneous tissue pale,   PSYCH:  insight and judgement with some impairment, memory intact , affect and mood mildly anxious, follows commands readily       Labs:   CBC RESULTS:   Recent Labs   Lab Test 01/27/21  0743 12/04/20  0725   WBC 5.1 5.9   RBC 2.82* 3.14*   HGB 8.8* 9.5*   HCT 28.5* 30.7*   * 98   MCH 31.2 30.3   MCHC 30.9* 30.9*   RDW 14.6 13.6   * 164       Last Basic Metabolic Panel:  Recent Labs   Lab Test 01/29/21  0725 01/27/21  0743    137   POTASSIUM 4.6 5.0   CHLORIDE 111* 110*   ORESTES 9.4 9.2   CO2 20 22   BUN 43* 33*   CR 0.78 0.79   GLC 72 75       Liver Function Studies -   Recent Labs   Lab Test 11/02/20  0756 09/02/20  1553   PROTTOTAL 6.3* 6.1* "   ALBUMIN 2.2* 2.5*   BILITOTAL 0.3 0.1*   ALKPHOS 94 65   AST 62* 21   ALT 91* 24       TSH   Date Value Ref Range Status   12/04/2020 41.34 (H) 0.40 - 4.00 mU/L Final   10/26/2020 14.63 (H) 0.40 - 4.00 mU/L Final   ]    Lab Results   Component Value Date    A1C 4.6 10/30/2020       ASSESSMENT/PLAN:  Chronic systolic congestive heart failure (H)  Coronary artery disease involving native coronary artery of native heart with angina pectoris (H)  Chronic atrial fibrillation (H)  Ischemic cardiomyopathy  Essential hypertension  Mixed hyperlipidemia  S/P CABG x 5  F/b [facility], Dr Saucedo  Per Care Everywhere:   2/5/2009 - MI - Proximal RCA 99%, mild-mod disease elsewhere. EF 60%. PCI: MYRON to pRCA.  2/12/2009 - admit CP - Widely patent RCA stent. Moderate diffuse CAD. Severe stenosis in trivial PDA branch. LVEF 45%.  1/25/2010 - admit CP - PTCA and stent of diagonal  9/8/2010 - admit CP - LAD patent stent. Moderate diffuse CAD. Medical management recommended.   4/25/2012 - NSTEMI - Acute total occlusion of the ostial Circumflex. Acute total occlusion of the ostial ramus. Acute total occlusion of the distal 1st Obtuse Marginal. Angioplasty of ostial circumflex and ostial ramus. There was distal embolization to the OM1 vessel. This vessel was small and not amendable to intervention. Indefinite: plavix and asa 81.  8/10/2015 - CABx5 - 1. LIMA to distal LAD, reverse SVG to OM1 and OM2, reverse SVG to diagonal, reverse SVG to PDA.   2. Mitral valve repair with a Medtronics 3-D full ring, 28 mm.   3. Tricuspid repair with a Medtronic 28 mm partial ring  1/12/2016 - TTE - EF 40-45%  Also noted history of Biv-AICD implanted      Patient is on rosuvastatin 10 mg nightly, Toprol-XL 25 mg daily, ASA 81 mg daily, Plavix 75 mg daily   1/27 - BNP 2217 - Lasix 40 mg daily x 3 days, then reduce to 20 mg daily ordered  1/29/ - BNP 2197 (see BMP below)      BPs 130s/60s prior to diuresis  HRs 64-85 (mostly 60s-70s)    Patient denies CP, HA, lightheadedness      LS clear  LE edema scant in Left foot      Weights:  6/24/20 - 150  7/13/20 - 140  9/17/20 - 124 (patient had been refusing to eat d/t high anxiety, [facility] psych stay and meds adjusted)  12/7/20 - 134.4  12/24/20 - 139.4  1/1/21 - 144.4  1/29/21 - 153 lbs - patient does endorse eating a lot more these days     PLAN:  - Continue Lasix 40 mg daily for an additional 2 more days (total of 5)  - then drop Lasix down to 20 mg daily  - BMP recheck next Wednesday  - continue Toprol XL 25 mg daily  - continue ASA  - continue Plavix  - BP goals are ~130 -165/60 -90 mmHg.This is higher than ACC and AHA recommendations due to risk for hypotension, risk of dizziness and falls, risk of tissue/cerebral hypoperfusion and frailty.       CKD (chronic kidney disease) stage 4, GFR 15-29 ml/min (H)  Cardio-renal  Last few BMPs:   11/2/20: BUN 23, Creat 0.87, GFR 69  12/4/20: BUN 18, Creat 0.75, GFR 83  1/27/21: BUN 33, Creat 0.79, GFR 77 (K 5.0)  1/29/21: BUN 43, Creat 0.78, GFR 79 (K 4.6)    Patient is now on diuretics (after recent bolus).    PLAN;  - on Lasix  - Will avoid nephrotoxic agents (such as ACEI/ARB/NSAIDs, IV contrast) and mindful prescribing with renal dosing.       Orders written by provider at facility and transcribed by : Ciera Mogran MA  1.  Discontinue previous Lasix orders.  2.  Lasix 40 mg po Saturday, 1/30 & Sun, 1/31.  Dx: CHF  3.  Starting 2/1/21 Lasix 20 mg po Qam.  Dx: CHF  4.  BMP on Wednesday, 2/3/21.  Dx: CHF      Electronically signed by:  KILO Alcazar CNP            Alert and oriented, no focal deficits, no motor or sensory deficits.

## 2023-02-20 NOTE — PROGRESS NOTES
Encounter opened due to [employer]ulatory [employer] Update to open [facility] Program.    Aylin Link  Care Management Specialist   [facility]   [phone]

## 2023-02-20 NOTE — PROGRESS NOTES
Encounter opened due to Regulatory [employer] Samantha Update to close FVP Program.    Aylin Link  Care Management Specialist   [facility]   [phone]

## 2023-02-27 ENCOUNTER — PATIENT OUTREACH (OUTPATIENT)
Dept: GERIATRIC MEDICINE | Facility: CLINIC | Age: 68
End: 2023-02-27
Payer: COMMERCIAL

## 2023-02-27 ASSESSMENT — ACTIVITIES OF DAILY LIVING (ADL)
DEPENDENT_IADLS:: CLEANING;COOKING;LAUNDRY;SHOPPING;MEAL PREPARATION;MEDICATION MANAGEMENT;MONEY MANAGEMENT;TRANSPORTATION

## 2023-02-28 NOTE — PROGRESS NOTES
"[facility] Institutional Assessment     Institutional Assessment for Health Risk Assessment with Letty Escobar completed on February 27, 2023 at [facility]    Type of residence:: Nursing home  Current living arrangement:: I live in a nursing home     Assessment completed with:: Patient, Care Team Member, -chart review      Mental/Behavioral Health   Depression Screening:   PHQ-2 Total Score (Adult) - Positive if 3 or more points; Administer PHQ-9 if positive: 0       Mental health DX:: Yes   Mental health DX how managed:: Medication    Falls Assessment:   Fallen 2 or more times in the past year?: No   Any fall with injury in the past year?: No    ADL/IADL Dependencies:   Dependent ADLs:: Bathing, Dressing, Eating, Grooming, Wheelchair-independent, Toileting  Dependent IADLs:: Cleaning, Cooking, Laundry, Shopping, Meal Preparation, Medication Management, Money Management, Transportation      Care Plan & Recommendations: Met with Letty at [facility], introduced self and role. She was sitting in her wheelchair in the lunch room. She was eating at a table by herself.  Letty states \"I love it here\". States the staff is great and very helpful. They assist her with her ADL's and transfers. States she is in a walking program with PT to get stronger. Otherwise she gets around in her wheelchair independently. She states she likes to go to activities like Bingo, Confucianism, and music. Discussed ED visits and she states she is aware it's her anxiety that may be triggering her chest pain. States they are giving her medications that do help when she takes them.  She does see a cardiologist and had has a significant history of cardiac issues and 4 stents. Validated her concern, and advised to look for triggers for her pain.  She will continue to work with staff nurses and cardiology. Discussed options/opportunities for transitions, states she would rather be at home however, knows this is " the best place for her, no desire to move.     See Institutional Care Plan for detailed assessment information.    Obtained a copy of the facility care plan and MDS from facility electronic records. Requested of [facility] social worker to put this care coordinator on care conference attendee list.    Placed the Health Plan facility face sheet in the member's facility chart.    Follow-Up Plan: Member informed of future contact, plan to f/u with member with a 6 month assessment, attend 1 care conference annually, and will follow any hospitalizations or transitions. Care Coordinator contact information shared with member/family and facility, and encouraged to call this care coordinator with any questions or concerns at any time.     [facility] continuum providers: Please see Snapshot and Care Management Flowsheets for Specific details of care plan.    This CC note routed to PCP.    Esperanza Damon RN  Care Coordinator-Long Term Care  [address] [address]  [email or web address]   [email or web address]     Office: [phone]   Fax: [phone]

## 2023-03-14 ENCOUNTER — HOSPITAL ENCOUNTER (EMERGENCY)
Facility: CLINIC | Age: 68
Discharge: HOME | End: 2023-03-14
Attending: FAMILY MEDICINE | Admitting: FAMILY MEDICINE
Payer: COMMERCIAL

## 2023-03-14 ENCOUNTER — APPOINTMENT (OUTPATIENT)
Dept: GENERAL RADIOLOGY | Facility: CLINIC | Age: 68
End: 2023-03-14
Attending: FAMILY MEDICINE
Payer: COMMERCIAL

## 2023-03-14 VITALS
DIASTOLIC BLOOD PRESSURE: 47 MMHG | OXYGEN SATURATION: 94 % | WEIGHT: 209.9 LBS | BODY MASS INDEX: 45.42 KG/M2 | RESPIRATION RATE: 14 BRPM | SYSTOLIC BLOOD PRESSURE: 99 MMHG | TEMPERATURE: 97.9 F | HEART RATE: 67 BPM

## 2023-03-14 DIAGNOSIS — R07.89 ATYPICAL CHEST PAIN: ICD-10-CM

## 2023-03-14 LAB
ANION GAP SERPL CALCULATED.3IONS-SCNC: 10 MMOL/L (ref 7–15)
BASOPHILS # BLD AUTO: 0 10E3/UL (ref 0–0.2)
BASOPHILS NFR BLD AUTO: 0 %
BUN SERPL-MCNC: 25.1 MG/DL (ref 8–23)
CALCIUM SERPL-MCNC: 9.3 MG/DL (ref 8.8–10.2)
CHLORIDE SERPL-SCNC: 106 MMOL/L (ref 98–107)
CREAT SERPL-MCNC: 1.14 MG/DL (ref 0.51–0.95)
DEPRECATED HCO3 PLAS-SCNC: 24 MMOL/L (ref 22–29)
EOSINOPHIL # BLD AUTO: 0.2 10E3/UL (ref 0–0.7)
EOSINOPHIL NFR BLD AUTO: 3 %
ERYTHROCYTE [DISTWIDTH] IN BLOOD BY AUTOMATED COUNT: 14.3 % (ref 10–15)
GFR SERPL CREATININE-BSD FRML MDRD: 52 ML/MIN/1.73M2
GLUCOSE SERPL-MCNC: 146 MG/DL (ref 70–99)
HCT VFR BLD AUTO: 28.5 % (ref 35–47)
HGB BLD-MCNC: 8.8 G/DL (ref 11.7–15.7)
IMM GRANULOCYTES # BLD: 0.1 10E3/UL
IMM GRANULOCYTES NFR BLD: 1 %
LYMPHOCYTES # BLD AUTO: 2.3 10E3/UL (ref 0.8–5.3)
LYMPHOCYTES NFR BLD AUTO: 33 %
MCH RBC QN AUTO: 29.6 PG (ref 26.5–33)
MCHC RBC AUTO-ENTMCNC: 30.9 G/DL (ref 31.5–36.5)
MCV RBC AUTO: 96 FL (ref 78–100)
MONOCYTES # BLD AUTO: 0.4 10E3/UL (ref 0–1.3)
MONOCYTES NFR BLD AUTO: 6 %
NEUTROPHILS # BLD AUTO: 4 10E3/UL (ref 1.6–8.3)
NEUTROPHILS NFR BLD AUTO: 57 %
NRBC # BLD AUTO: 0 10E3/UL
NRBC BLD AUTO-RTO: 0 /100
PLATELET # BLD AUTO: 124 10E3/UL (ref 150–450)
POTASSIUM SERPL-SCNC: 4.3 MMOL/L (ref 3.4–5.3)
RBC # BLD AUTO: 2.97 10E6/UL (ref 3.8–5.2)
SODIUM SERPL-SCNC: 140 MMOL/L (ref 136–145)
TROPONIN T SERPL HS-MCNC: 35 NG/L
WBC # BLD AUTO: 7 10E3/UL (ref 4–11)

## 2023-03-14 PROCEDURE — 96375 TX/PRO/DX INJ NEW DRUG ADDON: CPT | Performed by: FAMILY MEDICINE

## 2023-03-14 PROCEDURE — 36415 COLL VENOUS BLD VENIPUNCTURE: CPT | Performed by: FAMILY MEDICINE

## 2023-03-14 PROCEDURE — 99284 EMERGENCY DEPT VISIT MOD MDM: CPT | Mod: 25 | Performed by: FAMILY MEDICINE

## 2023-03-14 PROCEDURE — 85025 COMPLETE CBC W/AUTO DIFF WBC: CPT | Performed by: FAMILY MEDICINE

## 2023-03-14 PROCEDURE — 84484 ASSAY OF TROPONIN QUANT: CPT | Performed by: FAMILY MEDICINE

## 2023-03-14 PROCEDURE — 71045 X-RAY EXAM CHEST 1 VIEW: CPT

## 2023-03-14 PROCEDURE — 93010 ELECTROCARDIOGRAM REPORT: CPT | Performed by: FAMILY MEDICINE

## 2023-03-14 PROCEDURE — 250N000011 HC RX IP 250 OP 636: Performed by: FAMILY MEDICINE

## 2023-03-14 PROCEDURE — 93005 ELECTROCARDIOGRAM TRACING: CPT | Performed by: FAMILY MEDICINE

## 2023-03-14 PROCEDURE — 96374 THER/PROPH/DIAG INJ IV PUSH: CPT | Performed by: FAMILY MEDICINE

## 2023-03-14 PROCEDURE — 80048 BASIC METABOLIC PNL TOTAL CA: CPT | Performed by: FAMILY MEDICINE

## 2023-03-14 PROCEDURE — 99285 EMERGENCY DEPT VISIT HI MDM: CPT | Mod: 25 | Performed by: FAMILY MEDICINE

## 2023-03-14 RX ORDER — ONDANSETRON 2 MG/ML
4 INJECTION INTRAMUSCULAR; INTRAVENOUS ONCE
Status: COMPLETED | OUTPATIENT
Start: 2023-03-14 | End: 2023-03-14

## 2023-03-14 RX ADMIN — FAMOTIDINE 20 MG: 10 INJECTION, SOLUTION INTRAVENOUS at 02:59

## 2023-03-14 RX ADMIN — ONDANSETRON 4 MG: 2 INJECTION INTRAMUSCULAR; INTRAVENOUS at 03:29

## 2023-03-14 ASSESSMENT — ACTIVITIES OF DAILY LIVING (ADL): ADLS_ACUITY_SCORE: 35

## 2023-03-14 NOTE — ED PROVIDER NOTES
History     Chief Complaint   Patient presents with     Chest Pain     HPI  Letty Escobar is a 67 year old female who well-known to this facility with frequent visits for chest pain, presents today again for recurrence of her chest pain.  Patient states because of her history she wants to get things checked out when she does have the pain.  They gave her 1 nitro at the nursing home and it really did not make much of a difference.  Patient states he is a little short of breath.  Denies any nausea any vomiting.  She is having a little bit of a headache which she thinks is from the nitro.  She is also started to get a little bit nauseated.  Nothing makes his symptoms better or worse.    Allergies:  Allergies   Allergen Reactions     Diphenhydramine Palpitations     Tolerated IV Benadryl when not pushed too fast     Isosorbide Other (See Comments) and Dizziness     Also causes syncope (has fallen before) and brain fog/mental disturbances - please do not prescribe     Nitroglycerin Dizziness, Fatigue and Other (See Comments)     Specifically the patch - please do not prescribe  Specifically the patch - please do not prescribe       Contrast Dye Hives and Itching     Other Drug Allergy (See Comments) Hives and Itching     Bee Pollen      Penicillin G      Adhesive Tape Rash     Diphenhydramine Hcl Palpitations     Liquid Adhesive Itching     Nystatin Dermatitis     Nystatin (Topical) Dermatitis     Also blisters     Penicillins Swelling and Rash     Occurred as a child - not 100% sure on specific reactions     Sulfa Drugs Other (See Comments)     Occurred as a child / patient does not remember specific reaction       Problem List:    Patient Active Problem List    Diagnosis Date Noted     Chronic kidney disease, stage 3a (H) 10/16/2022     Priority: Medium     SOB (shortness of breath) 04/07/2022     Priority: Medium     Morbid obesity (H) 12/10/2021     Priority: Medium     ICD (implantable  cardioverter-defibrillator) in place 11/17/2021     Priority: Medium     Formatting of this note is different from the original.  Date of last device in office evaluation: 5/17/2022    ?  and model: Medtronic Cobalt CRT-D.   Date of implant: 5/7/2021  Tachy therapies remain OFF: S/p downgrade from ICD to pacemaker, but her implanted system includes a DF-4 lead, so an ICD generator was placed with the tachycardia therapies disabled.     ? Indication for device: Ischemic cardiomyopathy   ? Cardiac resynchronization therapy:   no      MRI Conditional:  No  o If No:  Reason why:  Abandoned LV lead    ? Battery longevity documented as less than 3  Months: No  ? Are any of the leads less than 3 months old:  No    ? Programming              ? Pacing mode and programmed lower rate: DDDR 60 - 130 bpm              ? Rate-responsive sensor type, if programmed on: Accelerometer        Underlying rhythm and heart rate:  SR @ 60 bpm    ? What is the response of this device to magnet placement:  None - tachy therapies off  ? PM magnet pacing rate: N/A   ? Any alert status on CIED generator or lead: No    ? Last pacing threshold    Atrial   1.0 V @ 0.4 ms   Ventricular RV: 0.75 V @ 0.4 ms  LV:  2.75 V @ 1.0 ms       Chronic atrial fibrillation (H) 04/06/2021     Priority: Medium     Cardiac pacemaker in situ 04/06/2021     Priority: Medium     Major depressive disorder, recurrent severe without psychotic features (H) 01/26/2021     Priority: Medium     Panic disorder with agoraphobia 04/14/2020     Priority: Medium     Constipation 03/20/2020     Priority: Medium     Personality disorder (H) 03/05/2020     Priority: Medium     Rule out dependent personality    Formatting of this note might be different from the original.  Rule out dependent personality  Formatting of this note might be different from the original.  Rule out dependent personality       Candidiasis 03/01/2020     Priority: Medium     Posttraumatic stress  disorder 03/01/2020     Priority: Medium     Chronic obstructive pulmonary disease, unspecified (H) 01/29/2020     Priority: Medium     Long term (current) use of insulin (H) 01/29/2020     Priority: Medium     Acute on chronic systolic (congestive) heart failure (H) 01/28/2020     Priority: Medium     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, left leg (H) 01/15/2020     Priority: Medium     Atypical chest pain 11/11/2019     Priority: Medium     Polymyalgia rheumatica (H) 11/28/2018     Priority: Medium     Unstable angina (H) 05/02/2018     Priority: Medium     Coronary angiogram 05/02/2018 demonstrated 30% stenosis in the LMCA, 50% stenosis in the Proximal LAD, 50% stenosis in the Mid LAD, 95% stenosis in the Proximal Circumflex (Restenosis - Balloon Angioplasty), 100% stenosis in the 1st Marginal, 50% stenosis in the Proximal RCA, 50% stenosis in the Distal RCA, the LIMA graft from the LIMA to the Distal LAD is free of significant disease; the SVG graft from the Aorta to the 1st Marginal is free of significant disease; the SVG graft from the Aorta to the Distal RCA is free of significant disease. Intervention included a successful  2.5mm x 12mm Balloon,  2.5mm x 10mm Cutting Balloon,  3mm x 12mm Drug Eluting Stent,  3mm x 12mm Balloon, and  3mm x 8mm Balloon to Proximal Circumflex, post stenosis 0%.    Formatting of this note is different from the original.  Coronary angiogram 05/02/2018 demonstrated 30% stenosis in the LMCA, 50% stenosis in the Proximal LAD, 50% stenosis in the Mid LAD, 95% stenosis in the Proximal Circumflex (Restenosis - Balloon Angioplasty), 100% stenosis in the 1st Marginal, 50% stenosis in the Proximal RCA, 50% stenosis in the Distal RCA, the LIMA graft from the LIMA to the Distal LAD is free of significant disease; the SVG graft from the Aorta to the 1st Marginal is free of significant disease; the SVG graft from the Aorta to the Distal RCA is  free of significant disease. Intervention included a successful  2.5mm x 12mm Balloon,  2.5mm x 10mm Cutting Balloon,  3mm x 12mm Drug Eluting Stent,  3mm x 12mm Balloon, and  3mm x 8mm Balloon to Proximal Circumflex, post stenosis 0%.  Formatting of this note is different from the original.  Coronary angiogram 05/02/2018 demonstrated 30% stenosis in the LMCA, 50% stenosis in the Proximal LAD, 50% stenosis in the Mid LAD, 95% stenosis in the Proximal Circumflex (Restenosis - Balloon Angioplasty), 100% stenosis in the 1st Marginal, 50% stenosis in the Proximal RCA, 50% stenosis in the Distal RCA, the LIMA graft from the LIMA to the Distal LAD is free of significant disease; the SVG graft from the Aorta to the 1st Marginal is free of significant disease; the SVG graft from the Aorta to the Distal RCA is free of significant disease. Intervention included a successful  2.5mm x 12mm Balloon,  2.5mm x 10mm Cutting Balloon,  3mm x 12mm Drug Eluting Stent,  3mm x 12mm Balloon, and  3mm x 8mm Balloon to Proximal Circumflex, post stenosis 0%.       Vitamin B12 deficiency 02/14/2018     Priority: Medium     Chronic bilateral low back pain without sciatica 01/17/2018     Priority: Medium     Chronic pain disorder 10/01/2017     Priority: Medium     Urinary incontinence, mixed 09/24/2017     Priority: Medium     Internal carotid artery stenosis, bilateral 07/13/2016     Priority: Medium     Carotid US 05/04/2018 showed moderate plaque formation, consistent with 50 to 69% stenosis in the right internal carotid artery, not significantly changed from 8/5/2015.  Moderate plaque formation, consistent with 50 to 69% stenosis in the left internal carotid artery; there has been mild progression of the left ICA stenosis since 8/5/2015.    Formatting of this note might be different from the original.  Carotid US 05/04/2018 showed moderate plaque formation, consistent with 50 to 69% stenosis in the right internal carotid artery, not  significantly changed from 8/5/2015.  Moderate plaque formation, consistent with 50 to 69% stenosis in the left internal carotid artery; there has been mild progression of the left ICA stenosis since 8/5/2015.    Formatting of this note might be different from the original.  Carotid US 05/04/2018 showed moderate plaque formation, consistent with 50 to 69% stenosis in the right internal carotid artery, not significantly changed from 8/5/2015.  Moderate plaque formation, consistent with 50 to 69% stenosis in the left internal carotid artery; there has been mild progression of the left ICA stenosis since 8/5/2015.       Essential (primary) hypertension 10/14/2015     Priority: Medium     Ischemic cardiomyopathy 09/20/2015     Priority: Medium     EF of 40-45%, status post RV lead revision and LV epicardial lead placement via mini-thoracotomy in August 2016.    Formatting of this note might be different from the original.  EF of 40-45%, status post RV lead revision and LV epicardial lead placement via mini-thoracotomy in August 2016.  Formatting of this note might be different from the original.  EF of 40-45%, status post RV lead revision and LV epicardial lead placement via mini-thoracotomy in August 2016.       S/P CABG x 5 08/21/2015     Priority: Medium     Pain medication agreement 04/20/2013     Priority: Medium     Controlled substance agreement for percocet #30/month on file and signed 4/17/13.  Designated pharmacy: [facility] Prescribing physician: Andrea Diagnosis: Ulnar neuropathy    Formatting of this note might be different from the original.  Controlled substance agreement for percocet #30/month on file and signed 4/17/13.  Designated pharmacy: [facility] Prescribing physician: Andrea Diagnosis: Ulnar neuropathy       Anemia in other chronic diseases classified elsewhere 01/05/2013     Priority: Medium     Hypothyroidism, unspecified 12/10/2010     Priority: Medium     ACP (advance care planning) 11/03/2010     " Priority: Medium     Formatting of this note might be different from the original.  Patient has identified Health Care Agent(s): Yes  Add Health Care Agents: Yes    Health Care Agent(s):    Primary Health Care Agent:     Edwar Escobar Relationship:    Spouse Phone:     [phone]    Secondary Health Care Agent:     Chiki Oro Relationship:     Son Phone:     [phone]      Patient has Advance Care Plan Documents (Health Care Directive, POLST): Yes    Advance Care Plan Documents:  Health Care Directive--03/28/11  Resuscitation Guidelines--    Patient has identified Specific Treatment Preferences: Yes   Specific Treatment Preferences:   a.) Code Status:  DNR/ Do Not Attempt Resuscitation - Allow a Natural Death    b.) Goals of Treatment:     ii. Limited Interventions and treat reversible conditions.  Provide interventions aimed at treatment of new or reversible illness/injury or non-life threatening chronic conditions. Duration of invasive or uncomfortable interventions should generally be limited.- Trial of intubation 5 days or other instructions \"If no improvement, stop.\"  c.) Interventions and Treatments:   i.   Antibiotics:          - Aggressive antibiotics  ii.  Nutrition/Hydration:         - Offer food and liquids by mouth         - IV fluid administration         - No feeding tube under any circumstance.  iii. Transfusion:          - Blood products for comfort/relief of symptoms only  iv. Dialysis:           - Dialysis for short term \"for recovery, but not long term.\"        Last Assessment & Plan:   Advance Care Planning: Disease-specific Session    Letty Escobar is an [facility] patient of Dr. Renea Mendez of the [facility].    Advance care planning discussions were completed with Letty and her healthcare agent, spouse Edwar Escobar on Monday, March 28, 2011 at the [facility].    Understanding of Illness and Disease [place]:   Letty identifies her medical " "condition as diabetes with peripheral neuropathy, heart problems with a heart attack 2009 plus 4 stent placements; sleep apnea; depression; hypothyroid; high cholesterol; tobacco use; and describes it as needing further assistance with thyroid and diabetes management.  She identifies the following symptoms of her medical condition as being the most bothersome: some memory loss, balance problems, light headedness and dizziness, puffy eyes and lower extremity edema from time to time.    Let[place] is retired [place] .  She is independent with all cares and lives an active life style although, \"I'm not as active as I used to be.\"    Goals of Care:   Letty currently hopes to maintain independence, control pain and symptoms and delay progression of, but not cure, the illness.  \"I want to get the diabetes and thyroid under better control.\"  Letty has an appointment the first part of April for follow up.    Quality of Life:    Letty identifies quality of life as family involvement twice weekly, [employer] activities, newly assigned  , playing cards, the computer,    Letty christiano with serious challenges in her life through her ganesh in God, prayers and support of her [employer] family, spouse and son.    Letty identifies the following fears and worries about her medical care:  \"I just want the diabetes straightened out.\"    Treatment and Care Preferences:   Past experiences in dealing with family and/or friends that have  or been seriously ill include her brother who took his own life.  \"He was 53 years old and living with us.  I found him.\"   As a result of these experiences, Letty expresses these health care preferences:      Summary Letty's Treatment Preferences:  LOW SURVIVAL; HIGH TREATMENT BURDEN:  If Letty suffered a serious complication, such that she was facing a prolonged hospital stay, required ongoing medical interventions, and " "the chance of living through the complication was low (for example, only 5 out of 100 would live), Letty would choose:  to focus treatment on comfort and quality of life (\"Quality of life is more important than length of life to Letty.\")  COMMENT:  \"If I can't live a normal life, I wouldn't want to live.\"    HIGH SURVIVAL; LOW FUNCTIONAL STATUS:  If Letty had a serious complication and had a good chance of living through the complication but it was expected that she would never be able to walk or talk again and would require 24 hour nursing care, she would choose:  to focus treatment on comfort and quality of life (\"Quality of life is more important than length of life to Letty.\")  COMMENT:  \"I'd accept rehabilitation.\"    HIGH SURVIVAL; LOW COGNITIVE STATUS:  If Letty had a serious complication and had a good chance of living through the complication but it was expected that she would never know who she was or who she was with and would require 24 hour nursing care, she would choose:  to focus treatment on comfort and quality of life (\"Quality of life is more important than length of life to Letty.\")    CARDIO-PULMONARY RESUSCITATION (CPR):  The facts, risks and benefits of CPR were discussed with Letty.  If she had a sudden event that caused her heart and breathing to stop, she:  WOULD NOT want CPR attempted and instead would prefer that a natural death occur.    MECHANICAL VENTILATION: If Letty had an episode where she was unable to breathe on her own, she would choose the following:  attempt to use any appropriate non-invasive method to assist breathing, and use mechanical ventilation.  COMMENT:  \"If reversible ventilator is acceptable for 5 days.  If no improvement, stop.\"     Letty has chosen her healthcare agent to:  strictly follow her wishes.    Follow Up Plan:   Letty was encouraged to continue advance care planning discussions with her health care agents and primary care provider.   Letty and her health " care agent declined handouts.     Documents addressed during this advance care planning session:  1.  Health Care Agents identified.          .  Primary health care agent is spouse, Edwar Escobar;          .  Secondary health care agent is son, Jermaine Oro.  2.  Health Care Directive completed and scanned into medical record.  3.  Statement of Treatment Preferences for advanced illness completed and scanned into the medical record.  4.  Resuscitation Guidelines completed for DNR.  Document sent to Dr. Renea Mendez for signature and returned to the home. Letty, please place on the refrigerator.    Recommendations/Plan:   Letty and her health care agent to review Advance Care Plan.     Letty would benefit from:   Care Navigation/Care Navigation Help Desk--explained services for pending future needs.  No identified needs today.  Care Navigation brochure was given to Letty and her healthcare agent.    Advance Care Planning recommendations and Letty's concerns and questions were cc ed to her primary provider.     Thank you, Letty for the opportunity of assisting with Advance Care Planning. It was a seeing you again and meeting Edwar.  Please contact me if I can be of further assistance.    Interviewer: Pat Martin RN    Advance Care Planning Facilitator  [phone]   3/28/2011       ELI (obstructive sleep apnea) 01/31/2010     Priority: Medium     PSG on April/2008 that showed moderate Obstructive Sleep Apnea with an AHI of 23.5 . Limb movements persisted at 29 movements/hour at optimal pressures, despite carbidopa use.    Formatting of this note might be different from the original.  PSG on April/2008 that showed moderate Obstructive Sleep Apnea with an AHI of 23.5 . Limb movements persisted at 29 movements/hour at optimal pressures, despite carbidopa use.  Formatting of this note might be different from the original.  PSG on April/2008 that showed moderate Obstructive Sleep Apnea with an AHI of 23.5 . Limb  movements persisted at 29 movements/hour at optimal pressures, despite carbidopa use.       Coronary atherosclerosis 02/06/2009     Priority: Medium     Formatting of this note might be different from the original.  2/5/2009 - MI - Proximal RCA 99%, mild-mod disease elsewhere.  EF 60%.  PCI:  MYRON to pRCA.  2/12/2009 - admit CP - Widely patent RCA stent. Moderate diffuse CAD. Severe stenosis in trivial PDA branch. LVEF 45%.  1/25/2010 - admit CP - PTCA and stent of diagonal  9/8/2010 - admit CP - LAD patent stent. Moderate diffuse CAD. Medical management recommended.   4/25/2012 - NSTEMI - Acute total occlusion of the ostial Circumflex. Acute total occlusion of the ostial ramus. Acute total occlusion of the distal 1st Obtuse Marginal. Angioplasty of ostial circumflex and ostial ramus. There was distal embolization to the OM1 vessel. This vessel was small and not amendable to intervention. Indefinite: plavix and asa 81.  8/10/2015 - CABx5 - 1. LIMA to distal LAD, reverse SVG to OM1 and OM2, reverse SVG to diagonal, reverse SVG to PDA.   2. Mitral valve repair with a Medtronics 3-D full ring, 28 mm.   3. Tricuspid repair with a Medtronic 28 mm partial ring  1/12/2016 - TTE - EF 40-45%  Formatting of this note might be different from the original.  2/5/2009 - MI - Proximal RCA 99%, mild-mod disease elsewhere.  EF 60%.  PCI:  MYRON to pRCA.  2/12/2009 - admit CP - Widely patent RCA stent. Moderate diffuse CAD. Severe stenosis in trivial PDA branch. LVEF 45%.  1/25/2010 - admit CP - PTCA and stent of diagonal  9/8/2010 - admit CP - LAD patent stent. Moderate diffuse CAD. Medical management recommended.   4/25/2012 - NSTEMI - Acute total occlusion of the ostial Circumflex. Acute total occlusion of the ostial ramus. Acute total occlusion of the distal 1st Obtuse Marginal. Angioplasty of ostial circumflex and ostial ramus. There was distal embolization to the OM1 vessel. This vessel was small and not amendable to intervention.  Indefinite: plavix and asa 81.  8/10/2015 - CABx5 - 1. LIMA to distal LAD, reverse SVG to OM1 and OM2, reverse SVG to diagonal, reverse SVG to PDA.   2. Mitral valve repair with a Medtronics 3-D full ring, 28 mm.   3. Tricuspid repair with a Medtronic 28 mm partial ring  1/12/2016 - TTE - EF 40-45%       Restless legs syndrome 08/29/2007     Priority: Medium     Hyperlipidemia, unspecified 05/30/2007     Priority: Medium        Past Medical History:    Past Medical History:   Diagnosis Date     ACP (advance care planning) 11/3/2010     Acute coronary syndrome (H) 11/22/2019     Acute exacerbation of CHF (congestive heart failure) (H) 11/11/2019     Acute on chronic systolic (congestive) heart failure (H) 1/28/2020     Anemia of chronic disease 1/5/2013     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, left leg (H) 1/15/2020     BPPV (benign paroxysmal positional vertigo) 5/31/2018     Candidiasis 3/1/2020     Carotid stenosis, right 7/13/2016     Chest pain 11/11/2019     Chronic bilateral low back pain without sciatica 1/17/2018     Chronic pain disorder 10/1/2017     Constipation 3/20/2020     COPD (chronic obstructive pulmonary disease) (H) 6/24/2020     Coronary atherosclerosis 2/6/2009     Cubital tunnel syndrome on left 11/13/2012     Depressive disorder      Diabetes mellitus (H)      Diabetes mellitus type 2 in obese (H) 7/13/2006     Diabetes mellitus type 2 in obese (H) 7/13/2006     Drug-seeking behavior 4/4/2020     Failure to thrive in adult 9/3/2020     Hereditary and idiopathic peripheral neuropathy 4/24/2007     Hyperlipidemia with target low density lipoprotein (LDL) cholesterol less than 70 mg/dL 5/30/2007     Hypertension 10/14/2015     Hypothyroidism 12/10/2010     Irritable bowel syndrome 9/7/2015     Ischemic cardiomyopathy 9/20/2015     Long-term use of high-risk medication 4/14/2020     Moniliasis, 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     Mood disorder due to a general  medical condition 3/1/2020     Myocardial infarction (H)      Neuromuscular disorder (H)      Other specified postprocedural states 10/8/2015     Pain medication agreement 4/20/2013     Panic disorder with agoraphobia 4/14/2020     Paroxysmal atrial fibrillation (H) 10/2/2015     Personality disorder (H) 3/5/2020     Polymyalgia rheumatica (H) 11/28/2018     Posttraumatic stress disorder 3/1/2020     Restless legs syndrome (RLS) 8/29/2007     Restless legs syndrome (RLS) 8/29/2007     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     Serum calcium elevated 3/1/2020     Somatic dysfunction of sacral region 1/17/2018     Subacromial bursitis of left shoulder joint 8/6/2018     Suicidal ideation 4/1/2020     Thyroid disease      TIA (transient ischemic attack) 5/4/2018     TIA (transient ischemic attack) 5/4/2018     Tobacco abuse 2/17/2017     Tobacco abuse 2/17/2017     Urinary incontinence, mixed 9/24/2017     UTI (urinary tract infection) 4/17/2020     Vitamin B12 deficiency 2/14/2018     Weakness 12/17/2019       Past Surgical History:    Past Surgical History:   Procedure Laterality Date     CARDIAC SURGERY      stents x 11     CARDIAC SURGERY       CHOLECYSTECTOMY       CHOLECYSTECTOMY       GENITOURINARY SURGERY      Tubal ligation     OTHER SURGICAL HISTORY      Genitourinary surgery     RELEASE CARPAL TUNNEL       RELEASE CARPAL TUNNEL         Family History:    No family history on file.    Social History:  Marital Status:   [2]  Social History     Tobacco Use     Smoking status: Never     Smokeless tobacco: Never   Vaping Use     Vaping Use: Never used   Substance Use Topics     Alcohol use: Not Currently     Drug use: Never        Medications:    acetaminophen (TYLENOL) 325 MG tablet  acetaminophen (TYLENOL) 500 MG tablet  albuterol (PROVENTIL) (2.5 MG/3ML) 0.083% neb solution  aspirin 81 MG EC tablet  bisacodyl (DULCOLAX) 10 MG suppository  cholecalciferol (VITAMIN D3) 10 mcg (400 units) TABS tablet  cyanocobalamin  (CYANOCOBALAMIN) 1000 MCG/ML injection  divalproex sodium delayed-release (DEPAKOTE) 250 MG DR tablet  DULoxetine HCl 40 MG CPEP  fluticasone-vilanterol (BREO ELLIPTA) 200-25 MCG/INH inhaler  furosemide (LASIX) 20 MG tablet  gabapentin (NEURONTIN) 300 MG capsule  guaiFENesin (ROBITUSSIN) 100 MG/5ML liquid  levothyroxine (SYNTHROID/LEVOTHROID) 112 MCG tablet  Lidocaine (LIDOCARE) 4 % Patch  Melatonin 10 MG TABS tablet  metoprolol succinate ER (TOPROL-XL) 25 MG 24 hr tablet  miconazole (MICATIN) 2 % AERP powder  mirtazapine (REMERON) 30 MG tablet  polyethylene glycol (MIRALAX) 17 GM/Dose powder  potassium chloride ER (MICRO-K) 10 MEQ CR capsule  prazosin (MINIPRESS) 1 MG capsule  QUEtiapine (SEROQUEL) 25 MG tablet  rosuvastatin (CRESTOR) 10 MG tablet  sennosides (SENOKOT) 8.6 MG tablet  Vitamin D3 (CHOLECALCIFEROL) 25 mcg (1000 units) tablet          Review of Systems   All other systems reviewed and are negative.      Physical Exam   BP: 112/68  Pulse: 81  Temp: 97.9  F (36.6  C)  Resp: 22  Weight: 95.2 kg (209 lb 14.4 oz)  SpO2: 94 %      Physical Exam  Vitals and nursing note reviewed.   Constitutional:       General: She is not in acute distress.     Appearance: She is well-developed. She is not diaphoretic.   Eyes:      Conjunctiva/sclera: Conjunctivae normal.   Cardiovascular:      Rate and Rhythm: Normal rate and regular rhythm.      Heart sounds: Normal heart sounds. No murmur heard.    No friction rub. No gallop.   Pulmonary:      Effort: Pulmonary effort is normal. No respiratory distress.      Breath sounds: Normal breath sounds. No wheezing or rales.   Chest:      Chest wall: No tenderness.   Abdominal:      General: Bowel sounds are normal. There is no distension.      Palpations: Abdomen is soft. There is no mass.      Tenderness: There is no abdominal tenderness. There is no guarding.   Musculoskeletal:         General: No tenderness. Normal range of motion.      Cervical back: Normal range of motion  and neck supple.   Skin:     General: Skin is warm and dry.      Findings: No rash.   Neurological:      Mental Status: She is alert and oriented to person, place, and time.   Psychiatric:         Judgment: Judgment normal.         ED Course                 Procedures    EKG:  Reviewed by me  Ventricular paced rhythm  No acute changes  Impression: Ventricular paced rhythm    Results for orders placed or performed during the hospital encounter of 03/14/23 (from the past 24 hour(s))   [facility] *Canceled*    Narrative    The following orders were created for panel order [facility].  Procedure                               Abnormality         Status                     ---------                               -----------         ------                       Please view results for these tests on the individual orders.   CBC with platelets differential    Narrative    The following orders were created for panel order CBC with platelets differential.  Procedure                               Abnormality         Status                     ---------                               -----------         ------                     CBC with platelets and d...[[id number]]  Abnormal            Final result                 Please view results for these tests on the individual orders.   Basic metabolic panel   Result Value Ref Range    Sodium 140 136 - 145 mmol/L    Potassium 4.3 3.4 - 5.3 mmol/L    Chloride 106 98 - 107 mmol/L    Carbon Dioxide (CO2) 24 22 - 29 mmol/L    Anion Gap 10 7 - 15 mmol/L    Urea Nitrogen 25.1 (H) 8.0 - 23.0 mg/dL    Creatinine 1.14 (H) 0.51 - 0.95 mg/dL    Calcium 9.3 8.8 - 10.2 mg/dL    Glucose 146 (H) 70 - 99 mg/dL    GFR Estimate 52 (L) >60 mL/min/1.73m2   Troponin T, High Sensitivity   Result Value Ref Range    Troponin T, High Sensitivity 35 (H) <=14 ng/L   CBC with platelets and differential   Result Value Ref Range    WBC Count 7.0 4.0 - 11.0 10e3/uL    RBC Count 2.97 (L) 3.80 - 5.20 10e6/uL     Hemoglobin 8.8 (L) 11.7 - 15.7 g/dL    Hematocrit 28.5 (L) 35.0 - 47.0 %    MCV 96 78 - 100 fL    MCH 29.6 26.5 - 33.0 pg    MCHC 30.9 (L) 31.5 - 36.5 g/dL    RDW 14.3 10.0 - 15.0 %    Platelet Count 124 (L) 150 - 450 10e3/uL    % Neutrophils 57 %    % Lymphocytes 33 %    % Monocytes 6 %    % Eosinophils 3 %    % Basophils 0 %    % Immature Granulocytes 1 %    NRBCs per 100 WBC 0 <1 /100    Absolute Neutrophils 4.0 1.6 - 8.3 10e3/uL    Absolute Lymphocytes 2.3 0.8 - 5.3 10e3/uL    Absolute Monocytes 0.4 0.0 - 1.3 10e3/uL    Absolute Eosinophils 0.2 0.0 - 0.7 10e3/uL    Absolute Basophils 0.0 0.0 - 0.2 10e3/uL    Absolute Immature Granulocytes 0.1 <=0.4 10e3/uL    Absolute NRBCs 0.0 10e3/uL   XR Chest Port 1 View    Narrative    EXAM: XR CHEST PORT 1 VIEW  LOCATION: [facility]  DATE/TIME: 3/14/2023 3:12 AM    INDICATION: chest pain  COMPARISON: 02/12/2023      Impression    IMPRESSION: Mildly enlarged cardiac silhouette status post sternotomy and CABG. Left-sided cardiac pacer and epicardial pacer leads. Presumed Cardiomems device overlies the left lung base. No evidence of pneumonia or heart failure. No pneumothorax or   pleural effusion.       Medications   ondansetron (ZOFRAN) injection 4 mg (has no administration in time range)   famotidine (PEPCID) injection 20 mg (20 mg Intravenous $Given 3/14/23 0259)     Labs have come back and are unremarkable again.  I reassured the patient, this does not appear to be cardiac in nature.  Patient's troponin was barely up but looking back her last 5 have been in the 30s range.  This is her baseline.  At this point I feel comfortable there is no acute medical condition going on, no signs of an acute coronary syndrome.  We will discharge the patient back to the nursing home.  She does have a known history of reflux, this could be with acting up.  Patient will follow-up if there is no improvement over the next few days.    Assessments & Plan (with  Medical Decision Making)  Atypical chest pain     I have reviewed the nursing notes.    I have reviewed the findings, diagnosis, plan and need for follow up with the patient.      New Prescriptions    No medications on file       Final diagnoses:   None       3/14/2023   [facility] EMERGENCY DEPT     Roni Kellogg MD  03/14/23 0338

## 2023-03-14 NOTE — ED TRIAGE NOTES
Pt lives in the [facility], [facility] [place] [facility]\"" when she woke up a couple hours ago and felt chest pain and SOB  [facility] staff gave one dose of nitroglycerin PO.   States midsternum chest pain that radiates to her left arm.  Alert and oriented.     Triage Assessment     Row Name 03/14/23 0231       Triage Assessment (Adult)    Airway WDL WDL       Respiratory WDL    Respiratory WDL WDL       Cardiac WDL    Cardiac WDL X;chest pain       Chest Pain Assessment    Chest Pain Location midsternal;arm, left    Chest Pain Radiation arm    Character pressure    Duration since she woke up this AM    Precipitating Factors unknown;nothing    Chest Pain Intervention 12-lead ECG obtained;activity minimized;cardiac monitor placed

## 2023-03-15 ENCOUNTER — PATIENT OUTREACH (OUTPATIENT)
Dept: GERIATRIC MEDICINE | Facility: CLINIC | Age: 68
End: 2023-03-15
Payer: COMMERCIAL

## 2023-03-15 NOTE — PROGRESS NOTES
[facility] Care Coordination Contact  CC received notification of Emergency Room visit.  ER visit occurred on 3/14/2023 at [facility] with Dx of chest pain.    CC contacted [facility] LSW  States she was insistent she go to the Ed, they did not find anything cardiac wise.  She will see the NP and discuss ongoing plan of care for anxiety.     Member has a follow-up appointment with PCP: Yes: scheduled on will see NP at facility  Member has had a change in condition: No  New referrals placed: No  Home Visit Needed: No  Care plan reviewed and updated.  PCP notified of ED visit via EMR.    Esperanza Damon RN  Care Coordinator-Long Term Care  [address] [address]  [email or web address]   [email or web address]     Office: [phone]   Fax: [phone]

## 2023-03-17 ENCOUNTER — NURSING HOME VISIT (OUTPATIENT)
Dept: GERIATRICS | Facility: CLINIC | Age: 68
End: 2023-03-17
Payer: COMMERCIAL

## 2023-03-17 VITALS
DIASTOLIC BLOOD PRESSURE: 69 MMHG | RESPIRATION RATE: 18 BRPM | WEIGHT: 198.9 LBS | HEIGHT: 57 IN | OXYGEN SATURATION: 93 % | BODY MASS INDEX: 42.91 KG/M2 | SYSTOLIC BLOOD PRESSURE: 107 MMHG | HEART RATE: 69 BPM | TEMPERATURE: 97.7 F

## 2023-03-17 DIAGNOSIS — Z95.0 PACEMAKER: ICD-10-CM

## 2023-03-17 DIAGNOSIS — R07.9 CHEST PAIN, UNSPECIFIED TYPE: Primary | ICD-10-CM

## 2023-03-17 DIAGNOSIS — I10 ESSENTIAL (PRIMARY) HYPERTENSION: ICD-10-CM

## 2023-03-17 DIAGNOSIS — F33.2 MAJOR DEPRESSIVE DISORDER, RECURRENT SEVERE WITHOUT PSYCHOTIC FEATURES (H): ICD-10-CM

## 2023-03-17 PROBLEM — J44.9 COPD WITHOUT EXACERBATION (H): Status: ACTIVE | Noted: 2020-01-29

## 2023-03-17 PROBLEM — Z95.810 ICD (IMPLANTABLE CARDIOVERTER-DEFIBRILLATOR) IN PLACE: Status: ACTIVE | Noted: 2021-11-17

## 2023-03-17 PROBLEM — I48.91 ATRIAL FIBRILLATION (H): Status: ACTIVE | Noted: 2021-04-06

## 2023-03-17 PROCEDURE — 99309 SBSQ NF CARE MODERATE MDM 30: CPT | Performed by: FAMILY MEDICINE

## 2023-03-17 NOTE — LETTER
3/17/2023        RE: Letty Escobar  [facility]  [address] [facility] [address]         HEALTH GERIATRIC SERVICES    Facility:   [facility] () [[id number]]   Code Status: DNR/DNI      CHIEF COMPLAINT/REASON FOR VISIT:  Chief Complaint   Patient presents with     RECHECK       HISTORY:      HPI: Letty is a 67 year old female who I was asked to revisit with once again today not only secondary to review of her chronic medical conditions but also she again was in the emergency department on March 14, 2023 secondary atypical chest pain prior to that was in the beginning of February for atypical chest pain.  She was given 1 nitro at the nursing home did not make much of a difference and they did send her in for an evaluation.  Her EKG did show ventricular paced rhythm no acute changes.  Sodium 140, potassium 4.3, BUN 25 and creatinine 1.14 however the troponins initially were at 35.  The white cell count of 7.0 hemoglobin 8.8 with platelets 124.  The chest x-ray did show an enlarged cardiac silhouette but no evidence of pneumonia or heart failure.  They did discussed the results with her and sent her back to the nursing home.  Had a pleasant visit today with her discussing her chronic medical conditions.  Her systolic blood pressures in the month of March ranging 107-150 her weight on March 1 198 pounds.  Her appetite is good.  Is going to the dining area with the other residents to eat.  She does claim that it is secondary to anxiety we did talk about her medications including duloxetine and 40 mg, Depakote 250 mg, mirtazapine 15 mg, gabapentin 400 mg at bedtime melatonin 10 mg for sleep and Seroquel 100 mg 3 times daily at this point does not want to make any medication changes.  She denies any current discomfort.  Her appetite is good.  Does need assistance with basic ADLs.  Past Medical History:   Diagnosis Date     ACP (advance care planning) 11/3/2010     Formatting of this note might be different from the original. Patient has identified Health Care Agent(s): Yes Add Health Care Agents: Yes   Health Care Agent(s):  Primary Health Care Agent:   Edwar Escobar Relationship:  Spouse Phone:   [phone]  Secondary Health Care Agent:   Chiki Oro Relationship:   Son Phone:   [phone]  Patient has Advance Care Plan Documents (Health Care Direct     Acute coronary syndrome (H) 11/22/2019     Acute exacerbation of CHF (congestive heart failure) (H) 11/11/2019     Acute on chronic systolic (congestive) heart failure (H) 1/28/2020     Anemia of chronic disease 1/5/2013     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, left leg (H) 1/15/2020     BPPV (benign paroxysmal positional vertigo) 5/31/2018     Candidiasis 3/1/2020     Carotid stenosis, right 7/13/2016    Carotid US 05/04/2018 showed moderate plaque formation, consistent with 50 to 69% stenosis in the right internal carotid artery, not significantly changed from 8/5/2015.  Moderate plaque formation, consistent with 50 to 69% stenosis in the left internal carotid artery; there has been mild progression of the left ICA stenosis since 8/5/2015.     Chest pain 11/11/2019     Chronic bilateral low back pain without sciatica 1/17/2018     Chronic pain disorder 10/1/2017     Constipation 3/20/2020     COPD (chronic obstructive pulmonary disease) (H) 6/24/2020     Coronary atherosclerosis 2/6/2009 2/5/2009 - MI - Proximal RCA 99%, mild-mod disease elsewhere.  EF 60%.  PCI:  MYRON to pRCA. 2/12/2009 - admit CP - Widely patent RCA stent. Moderate diffuse CAD. Severe stenosis in trivial PDA branch. LVEF 45%. 1/25/2010 - admit CP - PTCA and stent of diagonal 9/8/2010 - admit CP - LAD patent stent. Moderate diffuse CAD. Medical management recommended.  4/25/2012 - NSTEMI - Acute total occlusion of     Cubital tunnel syndrome on left 11/13/2012     Depressive disorder      Diabetes  mellitus (H)      Diabetes mellitus type 2 in obese (H) 7/13/2006     Diabetes mellitus type 2 in obese (H) 7/13/2006     Drug-seeking behavior 4/4/2020     Failure to thrive in adult 9/3/2020     Hereditary and idiopathic peripheral neuropathy 4/24/2007     Hyperlipidemia with target low density lipoprotein (LDL) cholesterol less than 70 mg/dL 5/30/2007     Hypertension 10/14/2015     Hypothyroidism 12/10/2010     Irritable bowel syndrome 9/7/2015     Ischemic cardiomyopathy 9/20/2015    EF of 40-45%, status post RV lead revision and LV epicardial lead placement via mini-thoracotomy in August 2016.     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  Designated pharmacy: [facility] Prescribing physician: Andrea Diagnosis: Ulnar neuropathy     Panic disorder with agoraphobia 4/14/2020     Paroxysmal atrial fibrillation (H) 10/2/2015     Personality disorder (H) 3/5/2020    Rule out dependent personality     Polymyalgia rheumatica (H) 11/28/2018     Posttraumatic stress disorder 3/1/2020     Restless legs syndrome (RLS) 8/29/2007     Restless legs syndrome (RLS) 8/29/2007     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     Serum calcium elevated 3/1/2020     Somatic dysfunction of sacral region 1/17/2018     Subacromial bursitis of left shoulder joint 8/6/2018     Suicidal ideation 4/1/2020     Thyroid disease      TIA (transient ischemic attack) 5/4/2018     TIA (transient ischemic attack) 5/4/2018     Tobacco abuse 2/17/2017     Tobacco abuse 2/17/2017     Urinary incontinence, mixed 9/24/2017     UTI (urinary tract infection) 4/17/2020     Vitamin B12 deficiency 2/14/2018      Weakness 12/17/2019            No family history on file.   Social History     Socioeconomic History     Marital status:      Number of children: 1   Tobacco Use     Smoking status: Never     Smokeless tobacco: Never   Vaping Use     Vaping Use: Never used   Substance and Sexual Activity     Alcohol use: Not Currently     Drug use: Never     Sexual activity: Not Currently      No new changes to the review of systems or physical exam since our last visit on February 13  REVIEW OF SYSTEM:  She currently denies any new symptoms of cough or cold sore throat postnasal drip wheezing chest pain dizziness vertigo nausea vomiting diarrhea dysuria frequency or urgency.  Does have a history of dyslipidemia, COPD, CABG, depression, hypertension, posttraumatic stress disorder, panic disorder, anxiety, personality disorder       PHYSICAL EXAM:   Pleasant female in no acute distress.  Head is normocephalic.  Neck is supple without adenopathy.  Lung sounds are clear throughout.  Cardiovascular S1-S2 regular rate and rhythm and no lower extremity edema.  Pacemaker.  Gastrointestinal is protuberant nontender nondistended.  Musculoskeletal-denies discomfort.  Psychiatric: Pleasant affect    Current Outpatient Medications:      acetaminophen (TYLENOL) 325 MG tablet, Take 650 mg by mouth every 6 hours as needed for mild pain, Disp: , Rfl:      acetaminophen (TYLENOL) 500 MG tablet, Take 2 tablets (1,000 mg) by mouth 3 times daily, Disp: , Rfl:      albuterol (PROVENTIL) (2.5 MG/3ML) 0.083% neb solution, Take 1 vial (2.5 mg) by nebulization 2 times daily as needed for shortness of breath / dyspnea or wheezing, Disp: 90 mL, Rfl:      aspirin 81 MG EC tablet, Take 81 mg by mouth daily, Disp: , Rfl:      bisacodyl (DULCOLAX) 10 MG suppository, Place 10 mg rectally daily as needed for constipation, Disp: , Rfl:      cholecalciferol (VITAMIN D3) 10 mcg (400 units) TABS tablet, Take 1,000 Units by mouth, Disp: , Rfl:      cyanocobalamin  "(CYANOCOBALAMIN) 1000 MCG/ML injection, Inject 1 mL into the muscle every 30 days, Disp: , Rfl:      divalproex sodium delayed-release (DEPAKOTE) 250 MG DR tablet, Take 250 mg by mouth daily, Disp: , Rfl:      DULoxetine HCl 40 MG CPEP, Take 40 mg by mouth daily, Disp: , Rfl:      fluticasone-vilanterol (BREO ELLIPTA) 200-25 MCG/INH inhaler, Inhale 1 puff into the lungs daily, Disp: , Rfl:      furosemide (LASIX) 20 MG tablet, Take 20 mg by mouth daily, Disp: , Rfl:      gabapentin (NEURONTIN) 300 MG capsule, Take 400 mg by mouth At Bedtime, Disp: , Rfl:      levothyroxine (SYNTHROID/LEVOTHROID) 112 MCG tablet, Take 112 mcg by mouth daily, Disp: , Rfl:      Lidocaine (LIDOCARE) 4 % Patch, Place 1 patch onto the skin every 24 hours, Disp: 30 patch, Rfl: 0     Melatonin 10 MG TABS tablet, Take 10 mg by mouth At Bedtime, Disp: , Rfl:      metoprolol succinate ER (TOPROL-XL) 25 MG 24 hr tablet, Take 12.5 mg by mouth daily, Disp: , Rfl:      miconazole (MICATIN) 2 % AERP powder, Apply topically as needed , Disp: , Rfl:      mirtazapine (REMERON) 30 MG tablet, Take 15 mg by mouth daily, Disp: , Rfl:      polyethylene glycol (MIRALAX) 17 GM/Dose powder, Take 17 g by mouth daily Every other day, Disp: , Rfl:      potassium chloride ER (MICRO-K) 10 MEQ CR capsule, Take 10 mEq by mouth daily , Disp: , Rfl:      prazosin (MINIPRESS) 1 MG capsule, Take 1 mg by mouth At Bedtime, Disp: , Rfl:      QUEtiapine (SEROQUEL) 25 MG tablet, 100 mg 3 times daily, Disp: , Rfl: 0     rosuvastatin (CRESTOR) 10 MG tablet, Take 10 mg by mouth At Bedtime, Disp: , Rfl:      sennosides (SENOKOT) 8.6 MG tablet, Take 2 tablets by mouth At Bedtime, Disp: , Rfl:      Vitamin D3 (CHOLECALCIFEROL) 25 mcg (1000 units) tablet, Take 1 tablet by mouth daily, Disp: , Rfl:        /69   Pulse 69   Temp 97.7  F (36.5  C)   Resp 18   Ht 1.448 m (4' 9\")   Wt 90.2 kg (198 lb 14.4 oz)   SpO2 93%   BMI 43.04 kg/m      LABS:   Last Comprehensive " Metabolic Panel:  Lab Results   Component Value Date     03/14/2023    POTASSIUM 4.3 03/14/2023    CHLORIDE 106 03/14/2023    CO2 24 03/14/2023    ANIONGAP 10 03/14/2023     (H) 03/14/2023    BUN 25.1 (H) 03/14/2023    CR 1.14 (H) 03/14/2023    GFRESTIMATED 52 (L) 03/14/2023    ORESTES 9.3 03/14/2023       CBC RESULTS: Recent Labs   Lab Test 03/14/23  0256   WBC 7.0   RBC 2.97*   HGB 8.8*   HCT 28.5*   MCV 96   MCH 29.6   MCHC 30.9*   RDW 14.3   *     Lab Results   Component Value Date    A1C 4.6 10/30/2020     TSH   Date Value Ref Range Status   02/08/2023 2.19 0.30 - 4.20 uIU/mL Final   12/28/2022 25.51 (H) 0.40 - 4.00 mU/L Final   12/04/2020 41.34 (H) 0.40 - 4.00 mU/L Final     T4 Free   Date Value Ref Range Status   12/04/2020 1.09 0.76 - 1.46 ng/dL Final           ASSESSMENT:    Encounter Diagnoses   Name Primary?     Chest pain, unspecified type Yes     Essential (primary) hypertension      Pacemaker      Major depressive disorder, recurrent severe without psychotic features (H)        PLAN:    I went over her work-up in the hospital as well as her current medications.  She does once again claim it was secondary to anxiety.  Does not want to make any current changes to her medications.  At any rate seems to be doing fine overall.  Did have a good breakfast today.  She did not have any other new questions or problems after the visit.  Continue to manage and follow.        Electronically signed by: Jay Chapman NP          Sincerely,        Jay Chapman NP

## 2023-03-17 NOTE — PROGRESS NOTES
[facility] GERIATRIC SERVICES    Facility:   [facility] () [[id number]]   Code Status: DNR/DNI      CHIEF COMPLAINT/REASON FOR VISIT:  Chief Complaint   Patient presents with     RECHECK       HISTORY:      HPI: Letty is a 67 year old female who I was asked to revisit with once again today not only secondary to review of her chronic medical conditions but also she again was in the emergency department on March 14, 2023 secondary atypical chest pain prior to that was in the beginning of February for atypical chest pain.  She was given 1 nitro at the nursing home did not make much of a difference and they did send her in for an evaluation.  Her EKG did show ventricular paced rhythm no acute changes.  Sodium 140, potassium 4.3, BUN 25 and creatinine 1.14 however the troponins initially were at 35.  The white cell count of 7.0 hemoglobin 8.8 with platelets 124.  The chest x-ray did show an enlarged cardiac silhouette but no evidence of pneumonia or heart failure.  They did discussed the results with her and sent her back to the nursing home.  Had a pleasant visit today with her discussing her chronic medical conditions.  Her systolic blood pressures in the month of March ranging 107-150 her weight on March 1 198 pounds.  Her appetite is good.  Is going to the dining area with the other residents to eat.  She does claim that it is secondary to anxiety we did talk about her medications including duloxetine and 40 mg, Depakote 250 mg, mirtazapine 15 mg, gabapentin 400 mg at bedtime melatonin 10 mg for sleep and Seroquel 100 mg 3 times daily at this point does not want to make any medication changes.  She denies any current discomfort.  Her appetite is good.  Does need assistance with basic ADLs.  Past Medical History:   Diagnosis Date     ACP (advance care planning) 11/3/2010    Formatting of this note might be different from the original. Patient has identified Health Care Agent(s): Yes Add Health  Care Agents: Yes   Health Care Agent(s):  Primary Health Care Agent:   Edwar Escobar Relationship:  Spouse Phone:   [phone]  Secondary Health Care Agent:   Chiki Oro Relationship:   Son Phone:   [phone]  Patient has Advance Care Plan Documents (Health Care Direct     Acute coronary syndrome (H) 11/22/2019     Acute exacerbation of CHF (congestive heart failure) (H) 11/11/2019     Acute on chronic systolic (congestive) heart failure (H) 1/28/2020     Anemia of chronic disease 1/5/2013     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, left leg (H) 1/15/2020     BPPV (benign paroxysmal positional vertigo) 5/31/2018     Candidiasis 3/1/2020     Carotid stenosis, right 7/13/2016    Carotid US 05/04/2018 showed moderate plaque formation, consistent with 50 to 69% stenosis in the right internal carotid artery, not significantly changed from 8/5/2015.  Moderate plaque formation, consistent with 50 to 69% stenosis in the left internal carotid artery; there has been mild progression of the left ICA stenosis since 8/5/2015.     Chest pain 11/11/2019     Chronic bilateral low back pain without sciatica 1/17/2018     Chronic pain disorder 10/1/2017     Constipation 3/20/2020     COPD (chronic obstructive pulmonary disease) (H) 6/24/2020     Coronary atherosclerosis 2/6/2009 2/5/2009 - MI - Proximal RCA 99%, mild-mod disease elsewhere.  EF 60%.  PCI:  MYRON to pRCA. 2/12/2009 - admit CP - Widely patent RCA stent. Moderate diffuse CAD. Severe stenosis in trivial PDA branch. LVEF 45%. 1/25/2010 - admit CP - PTCA and stent of diagonal 9/8/2010 - admit CP - LAD patent stent. Moderate diffuse CAD. Medical management recommended.  4/25/2012 - NSTEMI - Acute total occlusion of     Cubital tunnel syndrome on left 11/13/2012     Depressive disorder      Diabetes mellitus (H)      Diabetes mellitus type 2 in obese (H) 7/13/2006     Diabetes mellitus type 2 in obese (H) 7/13/2006      Drug-seeking behavior 4/4/2020     Failure to thrive in adult 9/3/2020     Hereditary and idiopathic peripheral neuropathy 4/24/2007     Hyperlipidemia with target low density lipoprotein (LDL) cholesterol less than 70 mg/dL 5/30/2007     Hypertension 10/14/2015     Hypothyroidism 12/10/2010     Irritable bowel syndrome 9/7/2015     Ischemic cardiomyopathy 9/20/2015    EF of 40-45%, status post RV lead revision and LV epicardial lead placement via mini-thoracotomy in August 2016.     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  Designated pharmacy: [facility] Prescribing physician: Andrea Diagnosis: Ulnar neuropathy     Panic disorder with agoraphobia 4/14/2020     Paroxysmal atrial fibrillation (H) 10/2/2015     Personality disorder (H) 3/5/2020    Rule out dependent personality     Polymyalgia rheumatica (H) 11/28/2018     Posttraumatic stress disorder 3/1/2020     Restless legs syndrome (RLS) 8/29/2007     Restless legs syndrome (RLS) 8/29/2007     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     Serum calcium elevated 3/1/2020     Somatic dysfunction of sacral region 1/17/2018     Subacromial bursitis of left shoulder joint 8/6/2018     Suicidal ideation 4/1/2020     Thyroid disease      TIA (transient ischemic attack) 5/4/2018     TIA (transient ischemic attack) 5/4/2018     Tobacco abuse 2/17/2017     Tobacco abuse 2/17/2017     Urinary incontinence, mixed 9/24/2017     UTI (urinary tract infection) 4/17/2020     Vitamin B12 deficiency 2/14/2018     Weakness 12/17/2019            No family history on file.   Social History     Socioeconomic History     Marital status:       Number of children: 1   Tobacco Use     Smoking status: Never     Smokeless tobacco: Never   Vaping Use     Vaping Use: Never used   Substance and Sexual Activity     Alcohol use: Not Currently     Drug use: Never     Sexual activity: Not Currently      No new changes to the review of systems or physical exam since our last visit on February 13  REVIEW OF SYSTEM:  She currently denies any new symptoms of cough or cold sore throat postnasal drip wheezing chest pain dizziness vertigo nausea vomiting diarrhea dysuria frequency or urgency.  Does have a history of dyslipidemia, COPD, CABG, depression, hypertension, posttraumatic stress disorder, panic disorder, anxiety, personality disorder       PHYSICAL EXAM:   Pleasant female in no acute distress.  Head is normocephalic.  Neck is supple without adenopathy.  Lung sounds are clear throughout.  Cardiovascular S1-S2 regular rate and rhythm and no lower extremity edema.  Pacemaker.  Gastrointestinal is protuberant nontender nondistended.  Musculoskeletal-denies discomfort.  Psychiatric: Pleasant affect    Current Outpatient Medications:      acetaminophen (TYLENOL) 325 MG tablet, Take 650 mg by mouth every 6 hours as needed for mild pain, Disp: , Rfl:      acetaminophen (TYLENOL) 500 MG tablet, Take 2 tablets (1,000 mg) by mouth 3 times daily, Disp: , Rfl:      albuterol (PROVENTIL) (2.5 MG/3ML) 0.083% neb solution, Take 1 vial (2.5 mg) by nebulization 2 times daily as needed for shortness of breath / dyspnea or wheezing, Disp: 90 mL, Rfl:      aspirin 81 MG EC tablet, Take 81 mg by mouth daily, Disp: , Rfl:      bisacodyl (DULCOLAX) 10 MG suppository, Place 10 mg rectally daily as needed for constipation, Disp: , Rfl:      cholecalciferol (VITAMIN D3) 10 mcg (400 units) TABS tablet, Take 1,000 Units by mouth, Disp: , Rfl:      cyanocobalamin (CYANOCOBALAMIN) 1000 MCG/ML injection, Inject 1 mL into the muscle every 30 days, Disp: , Rfl:      divalproex sodium  "delayed-release (DEPAKOTE) 250 MG DR tablet, Take 250 mg by mouth daily, Disp: , Rfl:      DULoxetine HCl 40 MG CPEP, Take 40 mg by mouth daily, Disp: , Rfl:      fluticasone-vilanterol (BREO ELLIPTA) 200-25 MCG/INH inhaler, Inhale 1 puff into the lungs daily, Disp: , Rfl:      furosemide (LASIX) 20 MG tablet, Take 20 mg by mouth daily, Disp: , Rfl:      gabapentin (NEURONTIN) 300 MG capsule, Take 400 mg by mouth At Bedtime, Disp: , Rfl:      levothyroxine (SYNTHROID/LEVOTHROID) 112 MCG tablet, Take 112 mcg by mouth daily, Disp: , Rfl:      Lidocaine (LIDOCARE) 4 % Patch, Place 1 patch onto the skin every 24 hours, Disp: 30 patch, Rfl: 0     Melatonin 10 MG TABS tablet, Take 10 mg by mouth At Bedtime, Disp: , Rfl:      metoprolol succinate ER (TOPROL-XL) 25 MG 24 hr tablet, Take 12.5 mg by mouth daily, Disp: , Rfl:      miconazole (MICATIN) 2 % AERP powder, Apply topically as needed , Disp: , Rfl:      mirtazapine (REMERON) 30 MG tablet, Take 15 mg by mouth daily, Disp: , Rfl:      polyethylene glycol (MIRALAX) 17 GM/Dose powder, Take 17 g by mouth daily Every other day, Disp: , Rfl:      potassium chloride ER (MICRO-K) 10 MEQ CR capsule, Take 10 mEq by mouth daily , Disp: , Rfl:      prazosin (MINIPRESS) 1 MG capsule, Take 1 mg by mouth At Bedtime, Disp: , Rfl:      QUEtiapine (SEROQUEL) 25 MG tablet, 100 mg 3 times daily, Disp: , Rfl: 0     rosuvastatin (CRESTOR) 10 MG tablet, Take 10 mg by mouth At Bedtime, Disp: , Rfl:      sennosides (SENOKOT) 8.6 MG tablet, Take 2 tablets by mouth At Bedtime, Disp: , Rfl:      Vitamin D3 (CHOLECALCIFEROL) 25 mcg (1000 units) tablet, Take 1 tablet by mouth daily, Disp: , Rfl:        /69   Pulse 69   Temp 97.7  F (36.5  C)   Resp 18   Ht 1.448 m (4' 9\")   Wt 90.2 kg (198 lb 14.4 oz)   SpO2 93%   BMI 43.04 kg/m      LABS:   Last Comprehensive Metabolic Panel:  Lab Results   Component Value Date     03/14/2023    POTASSIUM 4.3 03/14/2023    CHLORIDE 106 " 03/14/2023    CO2 24 03/14/2023    ANIONGAP 10 03/14/2023     (H) 03/14/2023    BUN 25.1 (H) 03/14/2023    CR 1.14 (H) 03/14/2023    GFRESTIMATED 52 (L) 03/14/2023    ORESTES 9.3 03/14/2023       CBC RESULTS: Recent Labs   Lab Test 03/14/23  0256   WBC 7.0   RBC 2.97*   HGB 8.8*   HCT 28.5*   MCV 96   MCH 29.6   MCHC 30.9*   RDW 14.3   *     Lab Results   Component Value Date    A1C 4.6 10/30/2020     TSH   Date Value Ref Range Status   02/08/2023 2.19 0.30 - 4.20 uIU/mL Final   12/28/2022 25.51 (H) 0.40 - 4.00 mU/L Final   12/04/2020 41.34 (H) 0.40 - 4.00 mU/L Final     T4 Free   Date Value Ref Range Status   12/04/2020 1.09 0.76 - 1.46 ng/dL Final           ASSESSMENT:    Encounter Diagnoses   Name Primary?     Chest pain, unspecified type Yes     Essential (primary) hypertension      Pacemaker      Major depressive disorder, recurrent severe without psychotic features (H)        PLAN:    I went over her work-up in the hospital as well as her current medications.  She does once again claim it was secondary to anxiety.  Does not want to make any current changes to her medications.  At any rate seems to be doing fine overall.  Did have a good breakfast today.  She did not have any other new questions or problems after the visit.  Continue to manage and follow.        Electronically signed by: Jay Chapman NP

## 2023-04-03 ENCOUNTER — NURSING HOME VISIT (OUTPATIENT)
Dept: GERIATRICS | Facility: CLINIC | Age: 68
End: 2023-04-03
Payer: COMMERCIAL

## 2023-04-03 VITALS
HEART RATE: 71 BPM | SYSTOLIC BLOOD PRESSURE: 152 MMHG | TEMPERATURE: 97.5 F | RESPIRATION RATE: 16 BRPM | WEIGHT: 198.9 LBS | BODY MASS INDEX: 42.91 KG/M2 | DIASTOLIC BLOOD PRESSURE: 79 MMHG | OXYGEN SATURATION: 94 % | HEIGHT: 57 IN

## 2023-04-03 DIAGNOSIS — M35.3 POLYMYALGIA RHEUMATICA (H): ICD-10-CM

## 2023-04-03 DIAGNOSIS — F40.01 PANIC DISORDER WITH AGORAPHOBIA: ICD-10-CM

## 2023-04-03 DIAGNOSIS — Z95.0 PACEMAKER: ICD-10-CM

## 2023-04-03 DIAGNOSIS — I10 ESSENTIAL (PRIMARY) HYPERTENSION: Primary | ICD-10-CM

## 2023-04-03 PROCEDURE — 99309 SBSQ NF CARE MODERATE MDM 30: CPT | Performed by: FAMILY MEDICINE

## 2023-04-03 NOTE — LETTER
4/3/2023        RE: Letty Escobar  [facility]  [address] [facility] [address]        [facility]    Facility:   [facility] () [[id number]]   Code Status: DNR/DNI      CHIEF COMPLAINT/REASON FOR VISIT:  Chief Complaint   Patient presents with     FVP Care Coordination - Home Visit-Annual Assessment       HISTORY:      HPI: Letty is a 67 year old female who currently resides in long-term care room 249 and who I was asked to visit with secondary to an annual review of chronic medical conditions.  Our last visit together was in March secondary to her rehospitalization secondary to chest pain which was atypical as well as no current cardiovascular component was sent back to long-term care.  Her systolic blood pressures in the month of March ranging 107-152 she has been afebrile and also on room air with no weight in the month of March.  According to nursing notes she has seen [facility].  She did have a number of laboratory studies in the hospital in March and see results below.  We do not need to do any blood work at this stage.  She otherwise states she is doing well.  Moods are good.  No anxiety or current depression.  For sleep is on trazodone but also is on trazodone 25 mg 3 times daily as well as mirtazapine 15 mg gabapentin in the evening time Seroquel 100 mg 3 times daily Cymbalta 40 mg in the morning divalproex 250 mg in the evening.  Right now we went over the medications and she overall is feeling pretty good and she does not want any medication changes at this time.  She feels like she is stable.  In the meantime bowels have been regular.  Appetite is good is on a soft diet.  Does go out in the dining area with the other residents.  Good conversationalist today.  She did not have any other concerns or questions.    Past Medical History:   Diagnosis Date     ACP (advance care planning) 11/3/2010    Formatting of this note  might be different from the original. Patient has identified Health Care Agent(s): Yes Add Health Care Agents: Yes   Health Care Agent(s):  Primary Health Care Agent:   Edwar Escobar Relationship:  Spouse Phone:   [phone]  Secondary Health Care Agent:   Chiki Oro Relationship:   Son Phone:   [phone]  Patient has Advance Care Plan Documents (Health Care Direct     Acute coronary syndrome (H) 11/22/2019     Acute exacerbation of CHF (congestive heart failure) (H) 11/11/2019     Acute on chronic systolic (congestive) heart failure (H) 1/28/2020     Anemia of chronic disease 1/5/2013     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, left leg (H) 1/15/2020     BPPV (benign paroxysmal positional vertigo) 5/31/2018     Candidiasis 3/1/2020     Carotid stenosis, right 7/13/2016    Carotid US 05/04/2018 showed moderate plaque formation, consistent with 50 to 69% stenosis in the right internal carotid artery, not significantly changed from 8/5/2015.  Moderate plaque formation, consistent with 50 to 69% stenosis in the left internal carotid artery; there has been mild progression of the left ICA stenosis since 8/5/2015.     Chest pain 11/11/2019     Chronic bilateral low back pain without sciatica 1/17/2018     Chronic pain disorder 10/1/2017     Constipation 3/20/2020     COPD (chronic obstructive pulmonary disease) (H) 6/24/2020     Coronary atherosclerosis 2/6/2009 2/5/2009 - MI - Proximal RCA 99%, mild-mod disease elsewhere.  EF 60%.  PCI:  MYRON to pRCA. 2/12/2009 - admit CP - Widely patent RCA stent. Moderate diffuse CAD. Severe stenosis in trivial PDA branch. LVEF 45%. 1/25/2010 - admit CP - PTCA and stent of diagonal 9/8/2010 - admit CP - LAD patent stent. Moderate diffuse CAD. Medical management recommended.  4/25/2012 - NSTEMI - Acute total occlusion of     Cubital tunnel syndrome on left 11/13/2012     Depressive disorder      Diabetes mellitus (H)       Diabetes mellitus type 2 in obese (H) 7/13/2006     Diabetes mellitus type 2 in obese (H) 7/13/2006     Drug-seeking behavior 4/4/2020     Failure to thrive in adult 9/3/2020     Hereditary and idiopathic peripheral neuropathy 4/24/2007     Hyperlipidemia with target low density lipoprotein (LDL) cholesterol less than 70 mg/dL 5/30/2007     Hypertension 10/14/2015     Hypothyroidism 12/10/2010     Irritable bowel syndrome 9/7/2015     Ischemic cardiomyopathy 9/20/2015    EF of 40-45%, status post RV lead revision and LV epicardial lead placement via mini-thoracotomy in August 2016.     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  Designated pharmacy: [facility] Prescribing physician: Andrea Diagnosis: Ulnar neuropathy     Panic disorder with agoraphobia 4/14/2020     Paroxysmal atrial fibrillation (H) 10/2/2015     Personality disorder (H) 3/5/2020    Rule out dependent personality     Polymyalgia rheumatica (H) 11/28/2018     Posttraumatic stress disorder 3/1/2020     Restless legs syndrome (RLS) 8/29/2007     Restless legs syndrome (RLS) 8/29/2007     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     Serum calcium elevated 3/1/2020     Somatic dysfunction of sacral region 1/17/2018     Subacromial bursitis of left shoulder joint 8/6/2018     Suicidal ideation 4/1/2020     Thyroid disease      TIA (transient ischemic attack) 5/4/2018     TIA (transient ischemic attack) 5/4/2018     Tobacco abuse 2/17/2017     Tobacco abuse 2/17/2017     Urinary incontinence, mixed 9/24/2017     UTI (urinary tract infection) 4/17/2020     Vitamin B12 deficiency 2/14/2018     Weakness 12/17/2019             No family history on file.   Social History     Socioeconomic History     Marital status:      Number of children: 1   Tobacco Use     Smoking status: Never     Smokeless tobacco: Never   Vaping Use     Vaping status: Never Used   Substance and Sexual Activity     Alcohol use: Not Currently     Drug use: Never     Sexual activity: Not Currently      No new changes to the review of systems or physical exam since our last visit on March 17  REVIEW OF SYSTEM:  She currently denies any new symptoms of cough or cold sore throat postnasal drip wheezing chest pain dizziness vertigo nausea vomiting diarrhea dysuria frequency or urgency.  Does have a history of dyslipidemia, COPD, CABG, depression, hypertension, posttraumatic stress disorder, panic disorder, anxiety, personality disorder    PHYSICAL EXAM:   Pleasant female in no acute distress.  Head is normocephalic.  Neck is supple without adenopathy.  Lung sounds are clear throughout.  Cardiovascular S1-S2 regular rate and rhythm and no lower extremity edema.  Pacemaker.  Gastrointestinal is protuberant nontender nondistended.  Musculoskeletal-denies discomfort.  Psychiatric: Pleasant affect    Current Outpatient Medications:      acetaminophen (TYLENOL) 325 MG tablet, Take 650 mg by mouth every 6 hours as needed for mild pain, Disp: , Rfl:      acetaminophen (TYLENOL) 500 MG tablet, Take 2 tablets (1,000 mg) by mouth 3 times daily, Disp: , Rfl:      albuterol (PROVENTIL) (2.5 MG/3ML) 0.083% neb solution, Take 1 vial (2.5 mg) by nebulization 2 times daily as needed for shortness of breath / dyspnea or wheezing, Disp: 90 mL, Rfl:      aspirin 81 MG EC tablet, Take 81 mg by mouth daily, Disp: , Rfl:      bisacodyl (DULCOLAX) 10 MG suppository, Place 10 mg rectally daily as needed for constipation, Disp: , Rfl:      cholecalciferol (VITAMIN D3) 10 mcg (400 units) TABS tablet, Take 1,000 Units by mouth, Disp: , Rfl:      cyanocobalamin (CYANOCOBALAMIN) 1000  "MCG/ML injection, Inject 1 mL into the muscle every 30 days, Disp: , Rfl:      divalproex sodium delayed-release (DEPAKOTE) 250 MG DR tablet, Take 250 mg by mouth daily, Disp: , Rfl:      DULoxetine HCl 40 MG CPEP, Take 40 mg by mouth daily, Disp: , Rfl:      fluticasone-vilanterol (BREO ELLIPTA) 200-25 MCG/INH inhaler, Inhale 1 puff into the lungs daily, Disp: , Rfl:      furosemide (LASIX) 20 MG tablet, Take 20 mg by mouth daily, Disp: , Rfl:      gabapentin (NEURONTIN) 300 MG capsule, Take 400 mg by mouth At Bedtime, Disp: , Rfl:      levothyroxine (SYNTHROID/LEVOTHROID) 112 MCG tablet, Take 112 mcg by mouth daily, Disp: , Rfl:      Lidocaine (LIDOCARE) 4 % Patch, Place 1 patch onto the skin every 24 hours, Disp: 30 patch, Rfl: 0     Melatonin 10 MG TABS tablet, Take 10 mg by mouth At Bedtime, Disp: , Rfl:      metoprolol succinate ER (TOPROL-XL) 25 MG 24 hr tablet, Take 12.5 mg by mouth daily, Disp: , Rfl:      miconazole (MICATIN) 2 % AERP powder, Apply topically as needed , Disp: , Rfl:      mirtazapine (REMERON) 30 MG tablet, Take 15 mg by mouth daily, Disp: , Rfl:      polyethylene glycol (MIRALAX) 17 GM/Dose powder, Take 17 g by mouth daily Every other day, Disp: , Rfl:      potassium chloride ER (MICRO-K) 10 MEQ CR capsule, Take 10 mEq by mouth daily , Disp: , Rfl:      prazosin (MINIPRESS) 1 MG capsule, Take 1 mg by mouth At Bedtime, Disp: , Rfl:      QUEtiapine (SEROQUEL) 25 MG tablet, 100 mg 3 times daily, Disp: , Rfl: 0     rosuvastatin (CRESTOR) 10 MG tablet, Take 10 mg by mouth At Bedtime, Disp: , Rfl:      sennosides (SENOKOT) 8.6 MG tablet, Take 2 tablets by mouth At Bedtime, Disp: , Rfl:      Vitamin D3 (CHOLECALCIFEROL) 25 mcg (1000 units) tablet, Take 1 tablet by mouth daily, Disp: , Rfl:     BP (!) 152/79   Pulse 71   Temp 97.5  F (36.4  C)   Resp 16   Ht 1.448 m (4' 9\")   Wt 90.2 kg (198 lb 14.4 oz)   SpO2 94%   BMI 43.04 kg/m          LABS:   Last Comprehensive Metabolic Panel:  Lab " Results   Component Value Date     03/14/2023    POTASSIUM 4.3 03/14/2023    CHLORIDE 106 03/14/2023    CO2 24 03/14/2023    ANIONGAP 10 03/14/2023     (H) 03/14/2023    BUN 25.1 (H) 03/14/2023    CR 1.14 (H) 03/14/2023    GFRESTIMATED 52 (L) 03/14/2023    ORESTES 9.3 03/14/2023       CBC RESULTS: Recent Labs   Lab Test 03/14/23  0256   WBC 7.0   RBC 2.97*   HGB 8.8*   HCT 28.5*   MCV 96   MCH 29.6   MCHC 30.9*   RDW 14.3   *     TSH   Date Value Ref Range Status   02/08/2023 2.19 0.30 - 4.20 uIU/mL Final   12/28/2022 25.51 (H) 0.40 - 4.00 mU/L Final   12/04/2020 41.34 (H) 0.40 - 4.00 mU/L Final     T4 Free   Date Value Ref Range Status   12/04/2020 1.09 0.76 - 1.46 ng/dL Final     Recent Labs   Lab Test 10/19/22  0743 05/28/21  0818   CHOL 162 154   HDL 30* 42*   LDL 56 83   TRIG 382* 144         ASSESSMENT:    Encounter Diagnoses   Name Primary?     Essential (primary) hypertension Yes     Panic disorder with agoraphobia      Pacemaker      Polymyalgia rheumatica (H)        Case Management:  I have reviewed the facility/SNF care plan/MDS which was done Today, including the falls risk, nutrition and pain screening. I also reviewed the current immunizations, and preventive care.. Future cancer screening is not clinically indicated secondary to age/goals of care.   Patient's desire to return to the community is present, but is not able due to care needs .    Advance Directive Discussion:    I reviewed the current advanced directives as reflected in EPIC and the facility chart. I did not due to cognitive impairment review the advance directives with the resident.     Team Discussion:  I communicated with the appropriate disciplines involved with the Plan of Care:   Nursing      Patient Goal:  Patient's goal is pain control and comfort.    Information reviewed:  Medications, vital signs, orders, and nursing notes.    PLAN:    Had a wonderful conversation today about her chronic medical conditions.  She  does feel that she is in pretty good spirits overall.  Decreased anxiety.  Is feeling pretty positive overall.  We talked about her medications as well as pain management and her blood pressures.  Trying to confirm and reconfirm and gain some confidence in her overall care as well as her thoughts about the whole system itself and trying to avoid unnecessary hospitalization.  She did have a visit with [facility].  Continue to manage and follow.    Electronically signed by: Jay Chapman NP            Sincerely,        Jay Chapman NP

## 2023-04-04 ENCOUNTER — HOSPITAL ENCOUNTER (EMERGENCY)
Facility: CLINIC | Age: 68
Discharge: HOME | End: 2023-04-04
Attending: FAMILY MEDICINE | Admitting: FAMILY MEDICINE
Payer: COMMERCIAL

## 2023-04-04 ENCOUNTER — PATIENT OUTREACH (OUTPATIENT)
Dept: GERIATRIC MEDICINE | Facility: CLINIC | Age: 68
End: 2023-04-04

## 2023-04-04 VITALS
TEMPERATURE: 97.8 F | RESPIRATION RATE: 14 BRPM | OXYGEN SATURATION: 94 % | BODY MASS INDEX: 44.02 KG/M2 | WEIGHT: 203.4 LBS | SYSTOLIC BLOOD PRESSURE: 90 MMHG | HEART RATE: 68 BPM | DIASTOLIC BLOOD PRESSURE: 53 MMHG

## 2023-04-04 DIAGNOSIS — N39.0 URINARY TRACT INFECTION WITHOUT HEMATURIA, SITE UNSPECIFIED: ICD-10-CM

## 2023-04-04 DIAGNOSIS — R10.9 BILATERAL FLANK PAIN: ICD-10-CM

## 2023-04-04 LAB
ALBUMIN SERPL BCG-MCNC: 3.6 G/DL (ref 3.5–5.2)
ALBUMIN UR-MCNC: NEGATIVE MG/DL
ALP SERPL-CCNC: 50 U/L (ref 35–104)
ALT SERPL W P-5'-P-CCNC: 16 U/L (ref 10–35)
AMORPH CRY #/AREA URNS HPF: ABNORMAL /HPF
ANION GAP SERPL CALCULATED.3IONS-SCNC: 11 MMOL/L (ref 7–15)
APPEARANCE UR: ABNORMAL
AST SERPL W P-5'-P-CCNC: 19 U/L (ref 10–35)
BACTERIA #/AREA URNS HPF: ABNORMAL /HPF
BASOPHILS # BLD AUTO: 0 10E3/UL (ref 0–0.2)
BASOPHILS NFR BLD AUTO: 0 %
BILIRUB SERPL-MCNC: <0.2 MG/DL
BILIRUB UR QL STRIP: NEGATIVE
BUN SERPL-MCNC: 20.8 MG/DL (ref 8–23)
CALCIUM SERPL-MCNC: 9.5 MG/DL (ref 8.8–10.2)
CHLORIDE SERPL-SCNC: 103 MMOL/L (ref 98–107)
COLOR UR AUTO: YELLOW
CREAT SERPL-MCNC: 1.19 MG/DL (ref 0.51–0.95)
DEPRECATED HCO3 PLAS-SCNC: 24 MMOL/L (ref 22–29)
EOSINOPHIL # BLD AUTO: 0.2 10E3/UL (ref 0–0.7)
EOSINOPHIL NFR BLD AUTO: 2 %
ERYTHROCYTE [DISTWIDTH] IN BLOOD BY AUTOMATED COUNT: 14.2 % (ref 10–15)
GFR SERPL CREATININE-BSD FRML MDRD: 49 ML/MIN/1.73M2
GLUCOSE SERPL-MCNC: 152 MG/DL (ref 70–99)
GLUCOSE UR STRIP-MCNC: NEGATIVE MG/DL
HCT VFR BLD AUTO: 31.1 % (ref 35–47)
HGB BLD-MCNC: 9.6 G/DL (ref 11.7–15.7)
HGB UR QL STRIP: ABNORMAL
HYALINE CASTS: 2 /LPF
IMM GRANULOCYTES # BLD: 0.1 10E3/UL
IMM GRANULOCYTES NFR BLD: 1 %
KETONES UR STRIP-MCNC: 5 MG/DL
LEUKOCYTE ESTERASE UR QL STRIP: ABNORMAL
LYMPHOCYTES # BLD AUTO: 2.1 10E3/UL (ref 0.8–5.3)
LYMPHOCYTES NFR BLD AUTO: 30 %
MCH RBC QN AUTO: 29.1 PG (ref 26.5–33)
MCHC RBC AUTO-ENTMCNC: 30.9 G/DL (ref 31.5–36.5)
MCV RBC AUTO: 94 FL (ref 78–100)
MONOCYTES # BLD AUTO: 0.5 10E3/UL (ref 0–1.3)
MONOCYTES NFR BLD AUTO: 7 %
MUCOUS THREADS #/AREA URNS LPF: PRESENT /LPF
NEUTROPHILS # BLD AUTO: 4.2 10E3/UL (ref 1.6–8.3)
NEUTROPHILS NFR BLD AUTO: 60 %
NITRATE UR QL: POSITIVE
NRBC # BLD AUTO: 0 10E3/UL
NRBC BLD AUTO-RTO: 0 /100
PH UR STRIP: 5 [PH] (ref 5–7)
PLATELET # BLD AUTO: 147 10E3/UL (ref 150–450)
POTASSIUM SERPL-SCNC: 4.3 MMOL/L (ref 3.4–5.3)
PROT SERPL-MCNC: 6.7 G/DL (ref 6.4–8.3)
RBC # BLD AUTO: 3.3 10E6/UL (ref 3.8–5.2)
RBC URINE: 12 /HPF
SODIUM SERPL-SCNC: 138 MMOL/L (ref 136–145)
SP GR UR STRIP: 1.02 (ref 1–1.03)
SQUAMOUS EPITHELIAL: 1 /HPF
UROBILINOGEN UR STRIP-MCNC: NORMAL MG/DL
WBC # BLD AUTO: 7.1 10E3/UL (ref 4–11)
WBC CLUMPS #/AREA URNS HPF: PRESENT /HPF
WBC URINE: >182 /HPF

## 2023-04-04 PROCEDURE — 36415 COLL VENOUS BLD VENIPUNCTURE: CPT | Performed by: FAMILY MEDICINE

## 2023-04-04 PROCEDURE — 81001 URINALYSIS AUTO W/SCOPE: CPT | Performed by: FAMILY MEDICINE

## 2023-04-04 PROCEDURE — 250N000013 HC RX MED GY IP 250 OP 250 PS 637: Performed by: FAMILY MEDICINE

## 2023-04-04 PROCEDURE — 250N000009 HC RX 250: Performed by: FAMILY MEDICINE

## 2023-04-04 PROCEDURE — 85025 COMPLETE CBC W/AUTO DIFF WBC: CPT | Performed by: FAMILY MEDICINE

## 2023-04-04 PROCEDURE — 87086 URINE CULTURE/COLONY COUNT: CPT | Performed by: FAMILY MEDICINE

## 2023-04-04 PROCEDURE — 250N000011 HC RX IP 250 OP 636: Performed by: FAMILY MEDICINE

## 2023-04-04 PROCEDURE — 99284 EMERGENCY DEPT VISIT MOD MDM: CPT | Performed by: FAMILY MEDICINE

## 2023-04-04 PROCEDURE — 96372 THER/PROPH/DIAG INJ SC/IM: CPT | Performed by: FAMILY MEDICINE

## 2023-04-04 PROCEDURE — 80053 COMPREHEN METABOLIC PANEL: CPT | Performed by: FAMILY MEDICINE

## 2023-04-04 RX ORDER — CEFDINIR 300 MG/1
300 CAPSULE ORAL 2 TIMES DAILY
Qty: 20 CAPSULE | Refills: 0 | Status: SHIPPED | OUTPATIENT
Start: 2023-04-04 | End: 2023-04-10

## 2023-04-04 RX ORDER — OXYCODONE AND ACETAMINOPHEN 5; 325 MG/1; MG/1
1 TABLET ORAL ONCE
Status: COMPLETED | OUTPATIENT
Start: 2023-04-04 | End: 2023-04-04

## 2023-04-04 RX ADMIN — OXYCODONE HYDROCHLORIDE AND ACETAMINOPHEN 1 TABLET: 5; 325 TABLET ORAL at 00:56

## 2023-04-04 RX ADMIN — LIDOCAINE HYDROCHLORIDE 1 G: 10 INJECTION, SOLUTION EPIDURAL; INFILTRATION; INTRACAUDAL; PERINEURAL at 02:06

## 2023-04-04 ASSESSMENT — ACTIVITIES OF DAILY LIVING (ADL)
ADLS_ACUITY_SCORE: 35
ADLS_ACUITY_SCORE: 35

## 2023-04-04 NOTE — ED PROVIDER NOTES
[facility] ED Provider Note   Patient: Letty Escobar  MRN #:  [id number]  Date of Visit: April 4, 2023    CC:     Chief Complaint   Patient presents with     Flank Pain     HPI:  Letty Escobar is a 67 year old female who presented to the emergency department with cute onset of bilateral flank pain that started around 930 tonight.  Patient states that she does not have any chest pain tonight.  She has not noticed any urinary symptoms.  She had normal bowel movements.  She denies any fever, chills, nausea, vomiting.  Patient is not aware of any previous kidney stones.  Pain is rated 8 out of 10.    Problem List:  Patient Active Problem List    Diagnosis Date Noted     Chronic kidney disease, stage 3a (H) 10/16/2022     Priority: Medium     SOB (shortness of breath) 04/07/2022     Priority: Medium     Morbid obesity (H) 12/10/2021     Priority: Medium     ICD (implantable cardioverter-defibrillator) in place 11/17/2021     Priority: Medium     Formatting of this note is different from the original.  Date of last device in office evaluation: 5/17/2022    ?  and model: Medtronic Cobalt CRT-D.   Date of implant: 5/7/2021  Tachy therapies remain OFF: S/p downgrade from ICD to pacemaker, but her implanted system includes a DF-4 lead, so an ICD generator was placed with the tachycardia therapies disabled.     ? Indication for device: Ischemic cardiomyopathy   ? Cardiac resynchronization therapy:   no      MRI Conditional:  No  o If No:  Reason why:  Abandoned LV lead    ? Battery longevity documented as less than 3  Months: No  ? Are any of the leads less than 3 months old:  No    ? Programming              ? Pacing mode and programmed lower rate: DDDR 60 - 130 bpm              ? Rate-responsive sensor type, if programmed on: Accelerometer        Underlying rhythm and heart rate:  SR @ 60 bpm    ? What is the response of this device to magnet  placement:  None - tachy therapies off  ? PM magnet pacing rate: N/A   ? Any alert status on CIED generator or lead: No    ? Last pacing threshold    Atrial   1.0 V @ 0.4 ms   Ventricular RV: 0.75 V @ 0.4 ms  LV:  2.75 V @ 1.0 ms    Formatting of this note is different from the original.  Date of last device in office evaluation: 11/17/2022     ?  and model: Medtronic Cobalt CRT-D.   Date of implant: 5/7/2021  Tachy therapies remain OFF: S/p downgrade from ICD to pacemaker, but her implanted system includes a DF-4 lead, so an ICD generator was placed with the tachycardia therapies disabled.     ? Indication for device: Ischemic cardiomyopathy   ? Cardiac resynchronization therapy:   no      MRI Conditional:  No  o If No:  Reason why:  Abandoned LV lead    ? Battery longevity documented as less than 3  Months: No  ? Are any of the leads less than 3 months old:  No    ? Programming              ? Pacing mode and programmed lower rate: DDDR 60 - 130 bpm              ? Rate-responsive sensor type, if programmed on: Accelerometer        Underlying rhythm and heart rate:  Sinus rhythm 62 bpm, w/BBB    ? What is the response of this device to magnet placement:  None - tachy therapies off  ? PM magnet pacing rate: N/A   ? Any alert status on CIED generator or lead: No    ? Last pacing threshold    Atrial   1.0 V @ 0.4 ms   Ventricular RV: 0.75 V @ 0.4 ms  LV:  2.75 V @ 1.0 ms       Atrial fibrillation (H) 04/06/2021     Priority: Medium     Pacemaker 04/06/2021     Priority: Medium     Major depressive disorder, recurrent severe without psychotic features (H) 01/26/2021     Priority: Medium     Panic disorder with agoraphobia 04/14/2020     Priority: Medium     Constipation 03/20/2020     Priority: Medium     Personality disorder (H) 03/05/2020     Priority: Medium     Rule out dependent personality    Formatting of this note might be different from the original.  Rule out dependent personality  Formatting of  this note might be different from the original.  Rule out dependent personality       Candidiasis 03/01/2020     Priority: Medium     Posttraumatic stress disorder 03/01/2020     Priority: Medium     COPD without exacerbation (H) 01/29/2020     Priority: Medium     Long term (current) use of insulin (H) 01/29/2020     Priority: Medium     Acute on chronic systolic (congestive) heart failure (H) 01/28/2020     Priority: Medium     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, left leg (H) 01/15/2020     Priority: Medium     Chest pain 11/11/2019     Priority: Medium     Polymyalgia rheumatica (H) 11/28/2018     Priority: Medium     Unstable angina (H) 05/02/2018     Priority: Medium     Coronary angiogram 05/02/2018 demonstrated 30% stenosis in the LMCA, 50% stenosis in the Proximal LAD, 50% stenosis in the Mid LAD, 95% stenosis in the Proximal Circumflex (Restenosis - Balloon Angioplasty), 100% stenosis in the 1st Marginal, 50% stenosis in the Proximal RCA, 50% stenosis in the Distal RCA, the LIMA graft from the LIMA to the Distal LAD is free of significant disease; the SVG graft from the Aorta to the 1st Marginal is free of significant disease; the SVG graft from the Aorta to the Distal RCA is free of significant disease. Intervention included a successful  2.5mm x 12mm Balloon,  2.5mm x 10mm Cutting Balloon,  3mm x 12mm Drug Eluting Stent,  3mm x 12mm Balloon, and  3mm x 8mm Balloon to Proximal Circumflex, post stenosis 0%.    Formatting of this note is different from the original.  Coronary angiogram 05/02/2018 demonstrated 30% stenosis in the LMCA, 50% stenosis in the Proximal LAD, 50% stenosis in the Mid LAD, 95% stenosis in the Proximal Circumflex (Restenosis - Balloon Angioplasty), 100% stenosis in the 1st Marginal, 50% stenosis in the Proximal RCA, 50% stenosis in the Distal RCA, the LIMA graft from the LIMA to the Distal LAD is free of significant disease; the SVG  graft from the Aorta to the 1st Marginal is free of significant disease; the SVG graft from the Aorta to the Distal RCA is free of significant disease. Intervention included a successful  2.5mm x 12mm Balloon,  2.5mm x 10mm Cutting Balloon,  3mm x 12mm Drug Eluting Stent,  3mm x 12mm Balloon, and  3mm x 8mm Balloon to Proximal Circumflex, post stenosis 0%.  Formatting of this note is different from the original.  Coronary angiogram 05/02/2018 demonstrated 30% stenosis in the LMCA, 50% stenosis in the Proximal LAD, 50% stenosis in the Mid LAD, 95% stenosis in the Proximal Circumflex (Restenosis - Balloon Angioplasty), 100% stenosis in the 1st Marginal, 50% stenosis in the Proximal RCA, 50% stenosis in the Distal RCA, the LIMA graft from the LIMA to the Distal LAD is free of significant disease; the SVG graft from the Aorta to the 1st Marginal is free of significant disease; the SVG graft from the Aorta to the Distal RCA is free of significant disease. Intervention included a successful  2.5mm x 12mm Balloon,  2.5mm x 10mm Cutting Balloon,  3mm x 12mm Drug Eluting Stent,  3mm x 12mm Balloon, and  3mm x 8mm Balloon to Proximal Circumflex, post stenosis 0%.       Vitamin B12 deficiency 02/14/2018     Priority: Medium     Chronic bilateral low back pain without sciatica 01/17/2018     Priority: Medium     Chronic pain disorder 10/01/2017     Priority: Medium     Urinary incontinence, mixed 09/24/2017     Priority: Medium     Internal carotid artery stenosis, bilateral 07/13/2016     Priority: Medium     Carotid US 05/04/2018 showed moderate plaque formation, consistent with 50 to 69% stenosis in the right internal carotid artery, not significantly changed from 8/5/2015.  Moderate plaque formation, consistent with 50 to 69% stenosis in the left internal carotid artery; there has been mild progression of the left ICA stenosis since 8/5/2015.    Formatting of this note might be different from the original.  Carotid US  05/04/2018 showed moderate plaque formation, consistent with 50 to 69% stenosis in the right internal carotid artery, not significantly changed from 8/5/2015.  Moderate plaque formation, consistent with 50 to 69% stenosis in the left internal carotid artery; there has been mild progression of the left ICA stenosis since 8/5/2015.    Formatting of this note might be different from the original.  Carotid US 05/04/2018 showed moderate plaque formation, consistent with 50 to 69% stenosis in the right internal carotid artery, not significantly changed from 8/5/2015.  Moderate plaque formation, consistent with 50 to 69% stenosis in the left internal carotid artery; there has been mild progression of the left ICA stenosis since 8/5/2015.       Essential (primary) hypertension 10/14/2015     Priority: Medium     Ischemic cardiomyopathy 09/20/2015     Priority: Medium     EF of 40-45%, status post RV lead revision and LV epicardial lead placement via mini-thoracotomy in August 2016.    Formatting of this note might be different from the original.  EF of 40-45%, status post RV lead revision and LV epicardial lead placement via mini-thoracotomy in August 2016.  Formatting of this note might be different from the original.  EF of 40-45%, status post RV lead revision and LV epicardial lead placement via mini-thoracotomy in August 2016.       S/P CABG x 5 08/21/2015     Priority: Medium     Pain medication agreement 04/20/2013     Priority: Medium     Controlled substance agreement for percocet #30/month on file and signed 4/17/13.  Designated pharmacy: [facility] Prescribing physician: Andrea Diagnosis: Ulnar neuropathy    Formatting of this note might be different from the original.  Controlled substance agreement for percocet #30/month on file and signed 4/17/13.  Designated pharmacy: [facility] Prescribing physician: Andrea Diagnosis: Ulnar neuropathy       Anemia in other chronic diseases classified elsewhere 01/05/2013      "Priority: Medium     Hypothyroidism, unspecified 12/10/2010     Priority: Medium     ACP (advance care planning) 11/03/2010     Priority: Medium     Formatting of this note might be different from the original.  Patient has identified Health Care Agent(s): Yes  Add Health Care Agents: Yes    Health Care Agent(s):    Primary Health Care Agent:     Edwar Escobar Relationship:    Spouse Phone:     [phone]    Secondary Health Care Agent:     Chiki Oro Relationship:     Son Phone:     [phone]      Patient has Advance Care Plan Documents (Health Care Directive, POLST): Yes    Advance Care Plan Documents:  Health Care Directive--03/28/11  Resuscitation Guidelines--    Patient has identified Specific Treatment Preferences: Yes   Specific Treatment Preferences:   a.) Code Status:  DNR/ Do Not Attempt Resuscitation - Allow a Natural Death    b.) Goals of Treatment:     ii. Limited Interventions and treat reversible conditions.  Provide interventions aimed at treatment of new or reversible illness/injury or non-life threatening chronic conditions. Duration of invasive or uncomfortable interventions should generally be limited.- Trial of intubation 5 days or other instructions \"If no improvement, stop.\"  c.) Interventions and Treatments:   i.   Antibiotics:          - Aggressive antibiotics  ii.  Nutrition/Hydration:         - Offer food and liquids by mouth         - IV fluid administration         - No feeding tube under any circumstance.  iii. Transfusion:          - Blood products for comfort/relief of symptoms only  iv. Dialysis:           - Dialysis for short term \"for recovery, but not long term.\"        Last Assessment & Plan:   Advance Care Planning: Disease-specific Session    Letty Escobar is an [facility] patient of Dr. Renea Mendez of the [facility].    Advance care planning discussions were completed with Letty and her healthcare agent, spouse Edwar Escobar on Monday, March 28, 2011 at " "the [facility] Room.    Understanding of Illness and Disease [place]:   Letty identifies her medical condition as diabetes with peripheral neuropathy, heart problems with a heart attack 2009 plus 4 stent placements; sleep apnea; depression; hypothyroid; high cholesterol; tobacco use; and describes it as needing further assistance with thyroid and diabetes management.  She identifies the following symptoms of her medical condition as being the most bothersome: some memory loss, balance problems, light headedness and dizziness, puffy eyes and lower extremity edema from time to time.    Let[place] is retired [place] .  She is independent with all cares and lives an active life style although, \"I'm not as active as I used to be.\"    Goals of Care:   Letty currently hopes to maintain independence, control pain and symptoms and delay progression of, but not cure, the illness.  \"I want to get the diabetes and thyroid under better control.\"  Letty has an appointment the first part of April for follow up.    Quality of Life:    Letty identifies quality of life as family involvement twice weekly, Hoahaoism activities, newly assigned  , playing cards, the computer,    Letty christiano with serious challenges in her life through her ganesh in God, prayers and support of her Hoahaoism family, spouse and son.    Letty identifies the following fears and worries about her medical care:  \"I just want the diabetes straightened out.\"    Treatment and Care Preferences:   Past experiences in dealing with family and/or friends that have  or been seriously ill include her brother who took his own life.  \"He was 53 years old and living with us.  I found him.\"   As a result of these experiences, Letty expresses these health care preferences:      Summary Letty's Treatment Preferences:  LOW SURVIVAL; HIGH TREATMENT BURDEN:  If Letty " "suffered a serious complication, such that she was facing a prolonged hospital stay, required ongoing medical interventions, and the chance of living through the complication was low (for example, only 5 out of 100 would live), Letty would choose:  to focus treatment on comfort and quality of life (\"Quality of life is more important than length of life to Letty.\")  COMMENT:  \"If I can't live a normal life, I wouldn't want to live.\"    HIGH SURVIVAL; LOW FUNCTIONAL STATUS:  If Letty had a serious complication and had a good chance of living through the complication but it was expected that she would never be able to walk or talk again and would require 24 hour nursing care, she would choose:  to focus treatment on comfort and quality of life (\"Quality of life is more important than length of life to Letty.\")  COMMENT:  \"I'd accept rehabilitation.\"    HIGH SURVIVAL; LOW COGNITIVE STATUS:  If Letty had a serious complication and had a good chance of living through the complication but it was expected that she would never know who she was or who she was with and would require 24 hour nursing care, she would choose:  to focus treatment on comfort and quality of life (\"Quality of life is more important than length of life to Letty.\")    CARDIO-PULMONARY RESUSCITATION (CPR):  The facts, risks and benefits of CPR were discussed with Letty.  If she had a sudden event that caused her heart and breathing to stop, she:  WOULD NOT want CPR attempted and instead would prefer that a natural death occur.    MECHANICAL VENTILATION: If Letty had an episode where she was unable to breathe on her own, she would choose the following:  attempt to use any appropriate non-invasive method to assist breathing, and use mechanical ventilation.  COMMENT:  \"If reversible ventilator is acceptable for 5 days.  If no improvement, stop.\"     Letty has chosen her healthcare agent to:  strictly follow her wishes.    Follow Up Plan:   Letty was " encouraged to continue advance care planning discussions with her health care agents and primary care provider.   Letty and her health care agent declined handouts.     Documents addressed during this advance care planning session:  1.  Health Care Agents identified.          .  Primary health care agent is spouse, Edwar Escobar;          .  Secondary health care agent is son, Jermaine Oro.  2.  Health Care Directive completed and scanned into medical record.  3.  Statement of Treatment Preferences for advanced illness completed and scanned into the medical record.  4.  Resuscitation Guidelines completed for DNR.  Document sent to Dr. Renea Mendez for signature and returned to the home. Letty, please place on the refrigerator.    Recommendations/Plan:   Letty and her health care agent to review Advance Care Plan.     Letty would benefit from:   Care Navigation/Care Navigation Help Desk--explained services for pending future needs.  No identified needs today.  Care Navigation brochure was given to Letty and her healthcare agent.    Advance Care Planning recommendations and Letty's concerns and questions were cc ed to her primary provider.     Thank you, Letty for the opportunity of assisting with Advance Care Planning. It was a seeing you again and meeting Edwar.  Please contact me if I can be of further assistance.    Interviewer: Pat Martin RN    Advance Care Planning Facilitator  [phone]   3/28/2011       ELI (obstructive sleep apnea) 01/31/2010     Priority: Medium     PSG on April/2008 that showed moderate Obstructive Sleep Apnea with an AHI of 23.5 . Limb movements persisted at 29 movements/hour at optimal pressures, despite carbidopa use.    Formatting of this note might be different from the original.  PSG on April/2008 that showed moderate Obstructive Sleep Apnea with an AHI of 23.5 . Limb movements persisted at 29 movements/hour at optimal pressures, despite carbidopa use.  Formatting of  this note might be different from the original.  PSG on April/2008 that showed moderate Obstructive Sleep Apnea with an AHI of 23.5 . Limb movements persisted at 29 movements/hour at optimal pressures, despite carbidopa use.       Coronary atherosclerosis 02/06/2009     Priority: Medium     Formatting of this note might be different from the original.  2/5/2009 - MI - Proximal RCA 99%, mild-mod disease elsewhere.  EF 60%.  PCI:  MYRON to pRCA.  2/12/2009 - admit CP - Widely patent RCA stent. Moderate diffuse CAD. Severe stenosis in trivial PDA branch. LVEF 45%.  1/25/2010 - admit CP - PTCA and stent of diagonal  9/8/2010 - admit CP - LAD patent stent. Moderate diffuse CAD. Medical management recommended.   4/25/2012 - NSTEMI - Acute total occlusion of the ostial Circumflex. Acute total occlusion of the ostial ramus. Acute total occlusion of the distal 1st Obtuse Marginal. Angioplasty of ostial circumflex and ostial ramus. There was distal embolization to the OM1 vessel. This vessel was small and not amendable to intervention. Indefinite: plavix and asa 81.  8/10/2015 - CABx5 - 1. LIMA to distal LAD, reverse SVG to OM1 and OM2, reverse SVG to diagonal, reverse SVG to PDA.   2. Mitral valve repair with a Medtronics 3-D full ring, 28 mm.   3. Tricuspid repair with a Medtronic 28 mm partial ring  1/12/2016 - TTE - EF 40-45%  Formatting of this note might be different from the original.  2/5/2009 - MI - Proximal RCA 99%, mild-mod disease elsewhere.  EF 60%.  PCI:  MYRON to pRCA.  2/12/2009 - admit CP - Widely patent RCA stent. Moderate diffuse CAD. Severe stenosis in trivial PDA branch. LVEF 45%.  1/25/2010 - admit CP - PTCA and stent of diagonal  9/8/2010 - admit CP - LAD patent stent. Moderate diffuse CAD. Medical management recommended.   4/25/2012 - NSTEMI - Acute total occlusion of the ostial Circumflex. Acute total occlusion of the ostial ramus. Acute total occlusion of the distal 1st Obtuse Marginal. Angioplasty of  ostial circumflex and ostial ramus. There was distal embolization to the OM1 vessel. This vessel was small and not amendable to intervention. Indefinite: plavix and asa 81.  8/10/2015 - CABx5 - 1. LIMA to distal LAD, reverse SVG to OM1 and OM2, reverse SVG to diagonal, reverse SVG to PDA.   2. Mitral valve repair with a Medtronics 3-D full ring, 28 mm.   3. Tricuspid repair with a Medtronic 28 mm partial ring  1/12/2016 - TTE - EF 40-45%       Restless legs syndrome 08/29/2007     Priority: Medium     Hyperlipidemia, unspecified 05/30/2007     Priority: Medium       Past Medical History:   Diagnosis Date     ACP (advance care planning) 11/3/2010     Acute coronary syndrome (H) 11/22/2019     Acute exacerbation of CHF (congestive heart failure) (H) 11/11/2019     Acute on chronic systolic (congestive) heart failure (H) 1/28/2020     Anemia of chronic disease 1/5/2013     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, left leg (H) 1/15/2020     BPPV (benign paroxysmal positional vertigo) 5/31/2018     Candidiasis 3/1/2020     Carotid stenosis, right 7/13/2016     Chest pain 11/11/2019     Chronic bilateral low back pain without sciatica 1/17/2018     Chronic pain disorder 10/1/2017     Constipation 3/20/2020     COPD (chronic obstructive pulmonary disease) (H) 6/24/2020     Coronary atherosclerosis 2/6/2009     Cubital tunnel syndrome on left 11/13/2012     Depressive disorder      Diabetes mellitus (H)      Diabetes mellitus type 2 in obese (H) 7/13/2006     Diabetes mellitus type 2 in obese (H) 7/13/2006     Drug-seeking behavior 4/4/2020     Failure to thrive in adult 9/3/2020     Hereditary and idiopathic peripheral neuropathy 4/24/2007     Hyperlipidemia with target low density lipoprotein (LDL) cholesterol less than 70 mg/dL 5/30/2007     Hypertension 10/14/2015     Hypothyroidism 12/10/2010     Irritable bowel syndrome 9/7/2015     Ischemic cardiomyopathy 9/20/2015      Long-term use of high-risk medication 4/14/2020     Moniliasis, 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     Myocardial infarction (H)      Neuromuscular disorder (H)      Other specified postprocedural states 10/8/2015     Pain medication agreement 4/20/2013     Panic disorder with agoraphobia 4/14/2020     Paroxysmal atrial fibrillation (H) 10/2/2015     Personality disorder (H) 3/5/2020     Polymyalgia rheumatica (H) 11/28/2018     Posttraumatic stress disorder 3/1/2020     Restless legs syndrome (RLS) 8/29/2007     Restless legs syndrome (RLS) 8/29/2007     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     Serum calcium elevated 3/1/2020     Somatic dysfunction of sacral region 1/17/2018     Subacromial bursitis of left shoulder joint 8/6/2018     Suicidal ideation 4/1/2020     Thyroid disease      TIA (transient ischemic attack) 5/4/2018     TIA (transient ischemic attack) 5/4/2018     Tobacco abuse 2/17/2017     Tobacco abuse 2/17/2017     Urinary incontinence, mixed 9/24/2017     UTI (urinary tract infection) 4/17/2020     Vitamin B12 deficiency 2/14/2018     Weakness 12/17/2019       MEDS: cefdinir (OMNICEF) 300 MG capsule  acetaminophen (TYLENOL) 325 MG tablet  acetaminophen (TYLENOL) 500 MG tablet  albuterol (PROVENTIL) (2.5 MG/3ML) 0.083% neb solution  aspirin 81 MG EC tablet  bisacodyl (DULCOLAX) 10 MG suppository  cholecalciferol (VITAMIN D3) 10 mcg (400 units) TABS tablet  cyanocobalamin (CYANOCOBALAMIN) 1000 MCG/ML injection  divalproex sodium delayed-release (DEPAKOTE) 250 MG DR tablet  DULoxetine HCl 40 MG CPEP  fluticasone-vilanterol (BREO ELLIPTA) 200-25 MCG/INH inhaler  furosemide (LASIX) 20 MG tablet  gabapentin (NEURONTIN) 300 MG capsule  levothyroxine (SYNTHROID/LEVOTHROID) 112 MCG tablet  Lidocaine (LIDOCARE) 4 % Patch  Melatonin 10 MG TABS tablet  metoprolol succinate ER (TOPROL-XL) 25 MG 24 hr tablet  miconazole (MICATIN) 2 % AERP powder  mirtazapine (REMERON) 30 MG  tablet  polyethylene glycol (MIRALAX) 17 GM/Dose powder  potassium chloride ER (MICRO-K) 10 MEQ CR capsule  prazosin (MINIPRESS) 1 MG capsule  QUEtiapine (SEROQUEL) 25 MG tablet  rosuvastatin (CRESTOR) 10 MG tablet  sennosides (SENOKOT) 8.6 MG tablet  Vitamin D3 (CHOLECALCIFEROL) 25 mcg (1000 units) tablet        ALLERGIES:    Allergies   Allergen Reactions     Diphenhydramine Palpitations     Tolerated IV Benadryl when not pushed too fast     Isosorbide Other (See Comments) and Dizziness     Also causes syncope (has fallen before) and brain fog/mental disturbances - please do not prescribe     Nitroglycerin Dizziness, Fatigue and Other (See Comments)     Specifically the patch - please do not prescribe  Specifically the patch - please do not prescribe       Contrast Dye Hives and Itching     Other Drug Allergy (See Comments) Hives and Itching     Bee Pollen      Penicillin G      Adhesive Tape Rash     Diphenhydramine Hcl Palpitations     Liquid Adhesive Itching     Nystatin Dermatitis     Nystatin (Topical) Dermatitis     Also blisters     Penicillins Swelling and Rash     Occurred as a child - not 100% sure on specific reactions     Sulfa Drugs Other (See Comments)     Occurred as a child / patient does not remember specific reaction       Past Surgical History:   Procedure Laterality Date     CARDIAC SURGERY      stents x 11     CARDIAC SURGERY       CHOLECYSTECTOMY       CHOLECYSTECTOMY       GENITOURINARY SURGERY      Tubal ligation     OTHER SURGICAL HISTORY      Genitourinary surgery     RELEASE CARPAL TUNNEL       RELEASE CARPAL TUNNEL         Social History     Tobacco Use     Smoking status: Never     Smokeless tobacco: Never   Vaping Use     Vaping status: Never Used   Substance Use Topics     Alcohol use: Not Currently     Drug use: Never         Review of Systems   Except as noted in HPI, all other systems were reviewed and are negative    Physical Exam     Vitals were reviewed  Patient Vitals for the  past 8 hrs:   BP Temp Temp src Pulse Resp SpO2 Weight   04/04/23 0302 -- -- -- -- 14 -- --   04/04/23 0300 90/53 -- -- 68 -- 94 % --   04/04/23 0230 94/46 -- -- 61 -- -- --   04/04/23 0200 94/47 -- -- 61 -- -- --   04/04/23 0130 121/57 -- -- 65 -- -- --   04/04/23 0100 (!) 164/78 -- -- 70 -- -- --   04/04/23 0056 (!) 164/78 -- -- -- -- -- --   04/04/23 0009 (!) 171/87 97.8  F (36.6  C) Oral 85 20 94 % 92.3 kg (203 lb 6.4 oz)     GENERAL APPEARANCE: Alert, pleasant, no distress  FACE: normal facies  EYES: Pupils are equal  HENT: normal external exam  RESP: normal respiratory effort; clear breath sounds bilaterally  CV: regular rate and rhythm; no significant murmurs, gallops or rubs  ABD: soft, obese, bilateral CVA tenderness; no rebound or guarding; bowel sounds are normal  EXT: No calf tenderness or pitting edema  SKIN: no worrisome rash  NEURO: no facial droop; no focal deficits, speech is normal  PSYCH: normal mood and affect      Available Lab/Imaging Results     Results for orders placed or performed during the hospital encounter of 04/04/23 (from the past 24 hour(s))   CBC with platelets differential    Narrative    The following orders were created for panel order CBC with platelets differential.  Procedure                               Abnormality         Status                     ---------                               -----------         ------                     CBC with platelets and d...[[id number]]  Abnormal            Final result                 Please view results for these tests on the individual orders.   Comprehensive metabolic panel   Result Value Ref Range    Sodium 138 136 - 145 mmol/L    Potassium 4.3 3.4 - 5.3 mmol/L    Chloride 103 98 - 107 mmol/L    Carbon Dioxide (CO2) 24 22 - 29 mmol/L    Anion Gap 11 7 - 15 mmol/L    Urea Nitrogen 20.8 8.0 - 23.0 mg/dL    Creatinine 1.19 (H) 0.51 - 0.95 mg/dL    Calcium 9.5 8.8 - 10.2 mg/dL    Glucose 152 (H) 70 - 99 mg/dL    Alkaline Phosphatase 50 35  - 104 U/L    AST 19 10 - 35 U/L    ALT 16 10 - 35 U/L    Protein Total 6.7 6.4 - 8.3 g/dL    Albumin 3.6 3.5 - 5.2 g/dL    Bilirubin Total <0.2 <=1.2 mg/dL    GFR Estimate 49 (L) >60 mL/min/1.73m2   CBC with platelets and differential   Result Value Ref Range    WBC Count 7.1 4.0 - 11.0 10e3/uL    RBC Count 3.30 (L) 3.80 - 5.20 10e6/uL    Hemoglobin 9.6 (L) 11.7 - 15.7 g/dL    Hematocrit 31.1 (L) 35.0 - 47.0 %    MCV 94 78 - 100 fL    MCH 29.1 26.5 - 33.0 pg    MCHC 30.9 (L) 31.5 - 36.5 g/dL    RDW 14.2 10.0 - 15.0 %    Platelet Count 147 (L) 150 - 450 10e3/uL    % Neutrophils 60 %    % Lymphocytes 30 %    % Monocytes 7 %    % Eosinophils 2 %    % Basophils 0 %    % Immature Granulocytes 1 %    NRBCs per 100 WBC 0 <1 /100    Absolute Neutrophils 4.2 1.6 - 8.3 10e3/uL    Absolute Lymphocytes 2.1 0.8 - 5.3 10e3/uL    Absolute Monocytes 0.5 0.0 - 1.3 10e3/uL    Absolute Eosinophils 0.2 0.0 - 0.7 10e3/uL    Absolute Basophils 0.0 0.0 - 0.2 10e3/uL    Absolute Immature Granulocytes 0.1 <=0.4 10e3/uL    Absolute NRBCs 0.0 10e3/uL   UA with Microscopic reflex to Culture    Specimen: Urine, Straight Catheter   Result Value Ref Range    Color Urine Yellow Colorless, Straw, Light Yellow, Yellow    Appearance Urine Cloudy (A) Clear    Glucose Urine Negative Negative mg/dL    Bilirubin Urine Negative Negative    Ketones Urine 5 (A) Negative mg/dL    Specific Gravity Urine 1.024 1.003 - 1.035    Blood Urine Small (A) Negative    pH Urine 5.0 5.0 - 7.0    Protein Albumin Urine Negative Negative mg/dL    Urobilinogen Urine Normal Normal, 2.0 mg/dL    Nitrite Urine Positive (A) Negative    Leukocyte Esterase Urine Large (A) Negative    Bacteria Urine Many (A) None Seen /HPF    WBC Clumps Urine Present (A) None Seen /HPF    Mucus Urine Present (A) None Seen /LPF    Amorphous Crystals Urine Few (A) None Seen /HPF    RBC Urine 12 (H) <=2 /HPF    WBC Urine >182 (H) <=5 /HPF    Squamous Epithelials Urine 1 <=1 /HPF    Hyaline Casts  Urine 2 <=2 /LPF    Narrative    Urine Culture ordered based on laboratory criteria              Impression     Final diagnoses:   Bilateral flank pain   Urinary tract infection         ED Course & Medical Decision Making   Letty Escobar is a 67 year old female who presented to the emergency department by EMS with bilateral flank pain that started earlier this evening.  Patient is not sure whether she has any urinary symptoms.  She denies fever, chills, nausea, vomiting.  She does not have any significant abdominal pain.  She jokingly said that she was not coming in for chest pain.  We have seen her numerous times for noncardiac chest pains.  Vital signs reveal a temp of 97.8, blood pressure 171/87, heart rate of 85, respiratory rate of 20 with 94% oxygen saturation.  Lungs are clear, heart sounds are distant, no anterior abdominal tenderness.  She has bilateral flank tenderness that are symmetrical.  Presence of mild lower abdominal tenderness.  A quick cath was obtained.  She has 12 red blood cells, greater than 182 white blood cells with positive nitrites and many bacteria.  CBC reveals a white blood count of 7.1, hemoglobin of 9.6, platelet count of 147 with 60% neutrophils.  Comprehensive metabolic panel reveals normal electrolytes, creatinine of 1.19, glucose of 152, normal liver enzymes.  Patient's symptoms are consistent with a lower urinary tract infection.    Patient was given the following medications starting with Percocet x1 pill, followed by Rocephin 1 g IM.  Patient has a reported allergy to penicillin possibly reacting to this medication as a child but with unknown reaction.  She had no adverse reaction to Rocephin administered here in the emergency department.     She requested Dilaudid or morphine, and I explained to her that this would be an appropriate for her current condition.  Furthermore her blood pressure has been somewhat soft in the range of 90/53.  Patient expressed understanding.  We  will arrange for ambulance transport back to the nursing home.    Medications   oxyCODONE-acetaminophen (PERCOCET) 5-325 MG per tablet 1 tablet (1 tablet Oral $Given 4/4/23 0056)   cefTRIAXone (ROCEPHIN) 1 g in lidocaine injection (1 g Intramuscular $Given 4/4/23 0206)          Written after-visit summary and instructions were given at the time of discharge.    Follow up Plan:   Jay Chapman, NP  [address] [facility] [address]  [phone]    In 10 days  Recheck urinalysis      Discharge Instructions:   You have a urinary tract infection.  You received your first dose of Rocephin antibiotic in the emergency department.  Take Omnicef 300 mg twice a day for 10 days.  Push lots of water.  Recheck urinalysis in 10-14 days.       Disclaimer: This note consists of words and symbols derived from keyboarding and dictation using voice recognition software.  As a result, there may be errors that have gone undetected.  Please consider this when interpreting information found in this note.       Monie Weiss MD  04/04/23 0569

## 2023-04-04 NOTE — PROGRESS NOTES
[facility] Care Coordination Contact  CC received notification of Emergency Room visit.  ER visit occurred on 4/4/2023 at [facility] with Dx of Lower UTI.    CC contacted spouse Edwar and left a message requesting a return call.  Member has a follow-up appointment with PCP: Yes: scheduled on Will see NP at facility  Member has had a change in condition: No  New referrals placed: No  Home Visit Needed: No  Care plan reviewed and updated.  PCP notified of ED visit via EMR.    Esperanza Damon RN  Care Coordinator-Long Term Care  [address] [address]  [email or web address]   [email or web address]     Office: [phone]   Fax: [phone]

## 2023-04-04 NOTE — DISCHARGE INSTRUCTIONS
You have a urinary tract infection.  You received your first dose of Rocephin antibiotic in the emergency department.  Take Omnicef 300 mg twice a day for 10 days.  Push lots of water.  Recheck urinalysis in 10-14 days.

## 2023-04-04 NOTE — ED NOTES
"Pt was asleep when walking into room.  Woke pt up to give IM injection Abx.  Pt states that her pain is still 8/10, and she specifically requests \"dilaudid or morphine would be best.\"   MD updated.  "

## 2023-04-05 LAB
BACTERIA UR CULT: ABNORMAL

## 2023-04-10 ENCOUNTER — NURSING HOME VISIT (OUTPATIENT)
Dept: GERIATRICS | Facility: CLINIC | Age: 68
End: 2023-04-10
Payer: COMMERCIAL

## 2023-04-10 VITALS
TEMPERATURE: 97.3 F | HEIGHT: 57 IN | RESPIRATION RATE: 17 BRPM | HEART RATE: 60 BPM | OXYGEN SATURATION: 94 % | BODY MASS INDEX: 42.74 KG/M2 | DIASTOLIC BLOOD PRESSURE: 70 MMHG | SYSTOLIC BLOOD PRESSURE: 134 MMHG | WEIGHT: 198.1 LBS

## 2023-04-10 DIAGNOSIS — F40.01 PANIC DISORDER WITH AGORAPHOBIA: ICD-10-CM

## 2023-04-10 DIAGNOSIS — I10 ESSENTIAL (PRIMARY) HYPERTENSION: Primary | ICD-10-CM

## 2023-04-10 DIAGNOSIS — R53.81 PHYSICAL DEBILITY: ICD-10-CM

## 2023-04-10 DIAGNOSIS — N18.31 CHRONIC KIDNEY DISEASE, STAGE 3A (H): ICD-10-CM

## 2023-04-10 PROCEDURE — 99309 SBSQ NF CARE MODERATE MDM 30: CPT | Performed by: FAMILY MEDICINE

## 2023-04-10 NOTE — PROGRESS NOTES
[facility] GERIATRIC SERVICES    Facility:   [facility] () [[id number]]   Code Status: DNR/DNI      CHIEF COMPLAINT/REASON FOR VISIT:  Chief Complaint   Patient presents with     RECHECK       HISTORY:      HPI: Letty is a 67 year old female who currently does reside [facility] and who I was asked to visit with today secondary to her hospital emergency room visit on April 4, 2023 secondary to acute onset of bilateral flank pain.  In the past she has had a number of hospitalizations as well as the emergency room visit secondary to chest pain.  No history of kidney stones.  She did rate the pain 8 out of 10.  Her blood work did include a sodium 138 with potassium 4.3 with BUN of 20 creatinine 1.19, white cell count of 7.1 and hemoglobin 9.6 with platelets 147.  Urinalysis was performed and she was treated for urinary tract infection she did have positive nitrites and large leukocytes however the urine culture did show probable contamination as well as mixed organisms and was treated anyway to cefdinir.  Currently her systolic blood pressures been in the 130s she has been afebrile and also on room air with a weight of 198 pounds.  According to nursing notes she did not have any pain.  Her appetite is good.  Denies heartburn or reflux currently on Pepcid.  Her mood she feels is stable.  Sleeping well at night.    Past Medical History:   Diagnosis Date     ACP (advance care planning) 11/3/2010    Formatting of this note might be different from the original. Patient has identified Health Care Agent(s): Yes Add Health Care Agents: Yes   Health Care Agent(s):  Primary Health Care Agent:   Edwar Escobar Relationship:  Spouse Phone:   [phone]  Secondary Health Care Agent:   Chiki Oro Relationship:   Son Phone:   [phone]  Patient has Advance Care Plan Documents (Health Care Direct     Acute coronary syndrome (H) 11/22/2019     Acute exacerbation of CHF (congestive heart  failure) (H) 11/11/2019     Acute on chronic systolic (congestive) heart failure (H) 1/28/2020     Anemia of chronic disease 1/5/2013     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, left leg (H) 1/15/2020     BPPV (benign paroxysmal positional vertigo) 5/31/2018     Candidiasis 3/1/2020     Carotid stenosis, right 7/13/2016    Carotid US 05/04/2018 showed moderate plaque formation, consistent with 50 to 69% stenosis in the right internal carotid artery, not significantly changed from 8/5/2015.  Moderate plaque formation, consistent with 50 to 69% stenosis in the left internal carotid artery; there has been mild progression of the left ICA stenosis since 8/5/2015.     Chest pain 11/11/2019     Chronic bilateral low back pain without sciatica 1/17/2018     Chronic pain disorder 10/1/2017     Constipation 3/20/2020     COPD (chronic obstructive pulmonary disease) (H) 6/24/2020     Coronary atherosclerosis 2/6/2009 2/5/2009 - MI - Proximal RCA 99%, mild-mod disease elsewhere.  EF 60%.  PCI:  MYRON to pRCA. 2/12/2009 - admit CP - Widely patent RCA stent. Moderate diffuse CAD. Severe stenosis in trivial PDA branch. LVEF 45%. 1/25/2010 - admit CP - PTCA and stent of diagonal 9/8/2010 - admit CP - LAD patent stent. Moderate diffuse CAD. Medical management recommended.  4/25/2012 - NSTEMI - Acute total occlusion of     Cubital tunnel syndrome on left 11/13/2012     Depressive disorder      Diabetes mellitus (H)      Diabetes mellitus type 2 in obese (H) 7/13/2006     Diabetes mellitus type 2 in obese (H) 7/13/2006     Drug-seeking behavior 4/4/2020     Failure to thrive in adult 9/3/2020     Hereditary and idiopathic peripheral neuropathy 4/24/2007     Hyperlipidemia with target low density lipoprotein (LDL) cholesterol less than 70 mg/dL 5/30/2007     Hypertension 10/14/2015     Hypothyroidism 12/10/2010     Irritable bowel syndrome 9/7/2015     Ischemic cardiomyopathy 9/20/2015     EF of 40-45%, status post RV lead revision and LV epicardial lead placement via mini-thoracotomy in August 2016.     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  Designated pharmacy: [facility] Prescribing physician: Andrea Diagnosis: Ulnar neuropathy     Panic disorder with agoraphobia 4/14/2020     Paroxysmal atrial fibrillation (H) 10/2/2015     Personality disorder (H) 3/5/2020    Rule out dependent personality     Polymyalgia rheumatica (H) 11/28/2018     Posttraumatic stress disorder 3/1/2020     Restless legs syndrome (RLS) 8/29/2007     Restless legs syndrome (RLS) 8/29/2007     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     Serum calcium elevated 3/1/2020     Somatic dysfunction of sacral region 1/17/2018     Subacromial bursitis of left shoulder joint 8/6/2018     Suicidal ideation 4/1/2020     Thyroid disease      TIA (transient ischemic attack) 5/4/2018     TIA (transient ischemic attack) 5/4/2018     Tobacco abuse 2/17/2017     Tobacco abuse 2/17/2017     Urinary incontinence, mixed 9/24/2017     UTI (urinary tract infection) 4/17/2020     Vitamin B12 deficiency 2/14/2018     Weakness 12/17/2019            No family history on file.   Social History     Socioeconomic History     Marital status:      Number of children: 1   Tobacco Use     Smoking status: Never     Smokeless tobacco: Never   Vaping Use     Vaping status: Never Used   Substance and Sexual Activity     Alcohol use: Not Currently     Drug use: Never     Sexual activity: Not Currently      No new changes to the review of systems or physical exam since our last visit on April 3 however did end up  going to emergency department with a negative urine culture  REVIEW OF SYSTEM:  She currently denies any new symptoms of cough or cold sore throat postnasal drip wheezing chest pain dizziness vertigo nausea vomiting diarrhea dysuria frequency or urgency.  Does have a history of dyslipidemia, COPD, CABG, depression, hypertension, posttraumatic stress disorder, panic disorder, anxiety, personality disorder    PHYSICAL EXAM:   Pleasant female in no acute distress.  Head is normocephalic.  Neck is supple without adenopathy.  Lung sounds are clear throughout.  Cardiovascular S1-S2 regular rate and rhythm and no lower extremity edema.  Pacemaker.  Gastrointestinal is protuberant nontender nondistended.  Musculoskeletal-denies discomfort.  Psychiatric: Pleasant affect       Current Outpatient Medications:      acetaminophen (TYLENOL) 325 MG tablet, Take 650 mg by mouth every 6 hours as needed for mild pain, Disp: , Rfl:      acetaminophen (TYLENOL) 500 MG tablet, Take 2 tablets (1,000 mg) by mouth 3 times daily, Disp: , Rfl:      albuterol (PROVENTIL) (2.5 MG/3ML) 0.083% neb solution, Take 1 vial (2.5 mg) by nebulization 2 times daily as needed for shortness of breath / dyspnea or wheezing, Disp: 90 mL, Rfl:      aspirin 81 MG EC tablet, Take 81 mg by mouth daily, Disp: , Rfl:      bisacodyl (DULCOLAX) 10 MG suppository, Place 10 mg rectally daily as needed for constipation, Disp: , Rfl:      cholecalciferol (VITAMIN D3) 10 mcg (400 units) TABS tablet, Take 1,000 Units by mouth, Disp: , Rfl:      cyanocobalamin (CYANOCOBALAMIN) 1000 MCG/ML injection, Inject 1 mL into the muscle every 30 days, Disp: , Rfl:      divalproex sodium delayed-release (DEPAKOTE) 250 MG DR tablet, Take 250 mg by mouth daily, Disp: , Rfl:      DULoxetine HCl 40 MG CPEP, Take 40 mg by mouth daily, Disp: , Rfl:      fluticasone-vilanterol (BREO ELLIPTA) 200-25 MCG/INH inhaler, Inhale 1 puff into the lungs daily, Disp: , Rfl:      furosemide (LASIX) 20  "MG tablet, Take 20 mg by mouth daily, Disp: , Rfl:      gabapentin (NEURONTIN) 300 MG capsule, Take 400 mg by mouth At Bedtime, Disp: , Rfl:      levothyroxine (SYNTHROID/LEVOTHROID) 112 MCG tablet, Take 112 mcg by mouth daily, Disp: , Rfl:      Lidocaine (LIDOCARE) 4 % Patch, Place 1 patch onto the skin every 24 hours, Disp: 30 patch, Rfl: 0     Melatonin 10 MG TABS tablet, Take 10 mg by mouth At Bedtime, Disp: , Rfl:      metoprolol succinate ER (TOPROL-XL) 25 MG 24 hr tablet, Take 12.5 mg by mouth daily, Disp: , Rfl:      miconazole (MICATIN) 2 % AERP powder, Apply topically as needed , Disp: , Rfl:      mirtazapine (REMERON) 30 MG tablet, Take 15 mg by mouth daily, Disp: , Rfl:      polyethylene glycol (MIRALAX) 17 GM/Dose powder, Take 17 g by mouth daily Every other day, Disp: , Rfl:      potassium chloride ER (MICRO-K) 10 MEQ CR capsule, Take 10 mEq by mouth daily , Disp: , Rfl:      prazosin (MINIPRESS) 1 MG capsule, Take 1 mg by mouth At Bedtime, Disp: , Rfl:      QUEtiapine (SEROQUEL) 25 MG tablet, 100 mg 3 times daily, Disp: , Rfl: 0     rosuvastatin (CRESTOR) 10 MG tablet, Take 10 mg by mouth At Bedtime, Disp: , Rfl:      sennosides (SENOKOT) 8.6 MG tablet, Take 2 tablets by mouth At Bedtime, Disp: , Rfl:      Vitamin D3 (CHOLECALCIFEROL) 25 mcg (1000 units) tablet, Take 1 tablet by mouth daily, Disp: , Rfl:        /70   Pulse 60   Temp 97.3  F (36.3  C)   Resp 17   Ht 1.448 m (4' 9\")   Wt 89.9 kg (198 lb 1.6 oz)   SpO2 94%   BMI 42.87 kg/m      LABS:   Last Comprehensive Metabolic Panel:  Lab Results   Component Value Date     04/04/2023    POTASSIUM 4.3 04/04/2023    CHLORIDE 103 04/04/2023    CO2 24 04/04/2023    ANIONGAP 11 04/04/2023     (H) 04/04/2023    BUN 20.8 04/04/2023    CR 1.19 (H) 04/04/2023    GFRESTIMATED 49 (L) 04/04/2023    ORESTES 9.5 04/04/2023       Last Comprehensive Metabolic Panel:  Sodium   Date Value Ref Range Status   04/04/2023 138 136 - 145 mmol/L Final "   03/16/2021 139 133 - 144 mmol/L Final     Potassium   Date Value Ref Range Status   04/04/2023 4.3 3.4 - 5.3 mmol/L Final   01/16/2023 3.9 3.4 - 5.3 mmol/L Final   03/16/2021 4.7 3.4 - 5.3 mmol/L Final     Chloride   Date Value Ref Range Status   04/04/2023 103 98 - 107 mmol/L Final   01/16/2023 108 94 - 109 mmol/L Final   03/16/2021 111 (H) 94 - 109 mmol/L Final     Carbon Dioxide   Date Value Ref Range Status   03/16/2021 22 20 - 32 mmol/L Final     Carbon Dioxide (CO2)   Date Value Ref Range Status   04/04/2023 24 22 - 29 mmol/L Final   01/16/2023 24 20 - 32 mmol/L Final     Anion Gap   Date Value Ref Range Status   04/04/2023 11 7 - 15 mmol/L Final   01/16/2023 8 3 - 14 mmol/L Final   03/16/2021 6 3 - 14 mmol/L Final     Glucose   Date Value Ref Range Status   04/04/2023 152 (H) 70 - 99 mg/dL Final   01/16/2023 165 (H) 70 - 99 mg/dL Final   03/16/2021 114 (H) 70 - 99 mg/dL Final     Urea Nitrogen   Date Value Ref Range Status   04/04/2023 20.8 8.0 - 23.0 mg/dL Final   01/16/2023 23 7 - 30 mg/dL Final   03/16/2021 45 (H) 7 - 30 mg/dL Final     Creatinine   Date Value Ref Range Status   04/04/2023 1.19 (H) 0.51 - 0.95 mg/dL Final   03/16/2021 0.98 0.52 - 1.04 mg/dL Final     GFR Estimate   Date Value Ref Range Status   04/04/2023 49 (L) >60 mL/min/1.73m2 Final     Comment:     eGFR calculated using 2021 CKD-EPI equation.   03/16/2021 60 (L) >60 mL/min/[1.73_m2] Final     Comment:     Non  GFR Calc  Starting 12/18/2018, serum creatinine based estimated GFR (eGFR) will be   calculated using the Chronic Kidney Disease Epidemiology Collaboration   (CKD-EPI) equation.       Calcium   Date Value Ref Range Status   04/04/2023 9.5 8.8 - 10.2 mg/dL Final   03/16/2021 9.4 8.5 - 10.1 mg/dL Final     Bilirubin Total   Date Value Ref Range Status   04/04/2023 <0.2 <=1.2 mg/dL Final   03/16/2021 0.2 0.2 - 1.3 mg/dL Final     Alkaline Phosphatase   Date Value Ref Range Status   04/04/2023 50 35 - 104 U/L Final    03/16/2021 57 40 - 150 U/L Final     ALT   Date Value Ref Range Status   04/04/2023 16 10 - 35 U/L Final   03/16/2021 74 (H) 0 - 50 U/L Final     AST   Date Value Ref Range Status   04/04/2023 19 10 - 35 U/L Final   03/16/2021 23 0 - 45 U/L Final             CBC RESULTS: Recent Labs   Lab Test 04/04/23  0038   WBC 7.1   RBC 3.30*   HGB 9.6*   HCT 31.1*   MCV 94   MCH 29.1   MCHC 30.9*   RDW 14.2   *           ASSESSMENT:    Encounter Diagnoses   Name Primary?     Essential (primary) hypertension Yes     Chronic kidney disease, stage 3a (H)      Panic disorder with agoraphobia      Physical debility        PLAN:    She currently is feeling fine.  Nursing feels that she is doing well.  She did not have any other questions or concerns in the meantime we will continue to manage and follow.        Electronically signed by: Jay Chapman NP

## 2023-04-10 NOTE — LETTER
4/10/2023        RE: Letty Escobar  [facility]  [address] [facility] [address]        [facility]    Facility:   [facility] () [[id number]]   Code Status: DNR/DNI      CHIEF COMPLAINT/REASON FOR VISIT:  Chief Complaint   Patient presents with     RECHECK       HISTORY:      HPI: Letty is a 67 year old female who currently does reside in long-term care room [facility] and who I was asked to visit with today secondary to her hospital emergency room visit on April 4, 2023 secondary to acute onset of bilateral flank pain.  In the past she has had a number of hospitalizations as well as the emergency room visit secondary to chest pain.  No history of kidney stones.  She did rate the pain 8 out of 10.  Her blood work did include a sodium 138 with potassium 4.3 with BUN of 20 creatinine 1.19, white cell count of 7.1 and hemoglobin 9.6 with platelets 147.  Urinalysis was performed and she was treated for urinary tract infection she did have positive nitrites and large leukocytes however the urine culture did show probable contamination as well as mixed organisms and was treated anyway to cefdinir.  Currently her systolic blood pressures been in the 130s she has been afebrile and also on room air with a weight of 198 pounds.  According to nursing notes she did not have any pain.  Her appetite is good.  Denies heartburn or reflux currently on Pepcid.  Her mood she feels is stable.  Sleeping well at night.    Past Medical History:   Diagnosis Date     ACP (advance care planning) 11/3/2010    Formatting of this note might be different from the original. Patient has identified Health Care Agent(s): Yes Add Health Care Agents: Yes   Health Care Agent(s):  Primary Health Care Agent:   Edwar Chappellsusana Relationship:  Spouse Phone:   [phone]  Secondary Health Care Agent:   Chiki Oro Relationship:   Son Phone:   [phone]  Patient has Advance Care Plan Documents  (Health Care Direct     Acute coronary syndrome (H) 11/22/2019     Acute exacerbation of CHF (congestive heart failure) (H) 11/11/2019     Acute on chronic systolic (congestive) heart failure (H) 1/28/2020     Anemia of chronic disease 1/5/2013     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, left leg (H) 1/15/2020     BPPV (benign paroxysmal positional vertigo) 5/31/2018     Candidiasis 3/1/2020     Carotid stenosis, right 7/13/2016    Carotid US 05/04/2018 showed moderate plaque formation, consistent with 50 to 69% stenosis in the right internal carotid artery, not significantly changed from 8/5/2015.  Moderate plaque formation, consistent with 50 to 69% stenosis in the left internal carotid artery; there has been mild progression of the left ICA stenosis since 8/5/2015.     Chest pain 11/11/2019     Chronic bilateral low back pain without sciatica 1/17/2018     Chronic pain disorder 10/1/2017     Constipation 3/20/2020     COPD (chronic obstructive pulmonary disease) (H) 6/24/2020     Coronary atherosclerosis 2/6/2009 2/5/2009 - MI - Proximal RCA 99%, mild-mod disease elsewhere.  EF 60%.  PCI:  MYRON to pRCA. 2/12/2009 - admit CP - Widely patent RCA stent. Moderate diffuse CAD. Severe stenosis in trivial PDA branch. LVEF 45%. 1/25/2010 - admit CP - PTCA and stent of diagonal 9/8/2010 - admit CP - LAD patent stent. Moderate diffuse CAD. Medical management recommended.  4/25/2012 - NSTEMI - Acute total occlusion of     Cubital tunnel syndrome on left 11/13/2012     Depressive disorder      Diabetes mellitus (H)      Diabetes mellitus type 2 in obese (H) 7/13/2006     Diabetes mellitus type 2 in obese (H) 7/13/2006     Drug-seeking behavior 4/4/2020     Failure to thrive in adult 9/3/2020     Hereditary and idiopathic peripheral neuropathy 4/24/2007     Hyperlipidemia with target low density lipoprotein (LDL) cholesterol less than 70 mg/dL 5/30/2007     Hypertension  10/14/2015     Hypothyroidism 12/10/2010     Irritable bowel syndrome 9/7/2015     Ischemic cardiomyopathy 9/20/2015    EF of 40-45%, status post RV lead revision and LV epicardial lead placement via mini-thoracotomy in August 2016.     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  Designated pharmacy: [facility] Prescribing physician: Andrea Diagnosis: Ulnar neuropathy     Panic disorder with agoraphobia 4/14/2020     Paroxysmal atrial fibrillation (H) 10/2/2015     Personality disorder (H) 3/5/2020    Rule out dependent personality     Polymyalgia rheumatica (H) 11/28/2018     Posttraumatic stress disorder 3/1/2020     Restless legs syndrome (RLS) 8/29/2007     Restless legs syndrome (RLS) 8/29/2007     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     Serum calcium elevated 3/1/2020     Somatic dysfunction of sacral region 1/17/2018     Subacromial bursitis of left shoulder joint 8/6/2018     Suicidal ideation 4/1/2020     Thyroid disease      TIA (transient ischemic attack) 5/4/2018     TIA (transient ischemic attack) 5/4/2018     Tobacco abuse 2/17/2017     Tobacco abuse 2/17/2017     Urinary incontinence, mixed 9/24/2017     UTI (urinary tract infection) 4/17/2020     Vitamin B12 deficiency 2/14/2018     Weakness 12/17/2019            No family history on file.   Social History     Socioeconomic History     Marital status:      Number of children: 1   Tobacco Use     Smoking status: Never     Smokeless tobacco: Never   Vaping Use     Vaping status: Never Used   Substance and Sexual Activity     Alcohol use: Not Currently     Drug use: Never     Sexual activity: Not  Currently      No new changes to the review of systems or physical exam since our last visit on April 3 however did end up going to emergency department with a negative urine culture  REVIEW OF SYSTEM:  She currently denies any new symptoms of cough or cold sore throat postnasal drip wheezing chest pain dizziness vertigo nausea vomiting diarrhea dysuria frequency or urgency.  Does have a history of dyslipidemia, COPD, CABG, depression, hypertension, posttraumatic stress disorder, panic disorder, anxiety, personality disorder    PHYSICAL EXAM:   Pleasant female in no acute distress.  Head is normocephalic.  Neck is supple without adenopathy.  Lung sounds are clear throughout.  Cardiovascular S1-S2 regular rate and rhythm and no lower extremity edema.  Pacemaker.  Gastrointestinal is protuberant nontender nondistended.  Musculoskeletal-denies discomfort.  Psychiatric: Pleasant affect       Current Outpatient Medications:      acetaminophen (TYLENOL) 325 MG tablet, Take 650 mg by mouth every 6 hours as needed for mild pain, Disp: , Rfl:      acetaminophen (TYLENOL) 500 MG tablet, Take 2 tablets (1,000 mg) by mouth 3 times daily, Disp: , Rfl:      albuterol (PROVENTIL) (2.5 MG/3ML) 0.083% neb solution, Take 1 vial (2.5 mg) by nebulization 2 times daily as needed for shortness of breath / dyspnea or wheezing, Disp: 90 mL, Rfl:      aspirin 81 MG EC tablet, Take 81 mg by mouth daily, Disp: , Rfl:      bisacodyl (DULCOLAX) 10 MG suppository, Place 10 mg rectally daily as needed for constipation, Disp: , Rfl:      cholecalciferol (VITAMIN D3) 10 mcg (400 units) TABS tablet, Take 1,000 Units by mouth, Disp: , Rfl:      cyanocobalamin (CYANOCOBALAMIN) 1000 MCG/ML injection, Inject 1 mL into the muscle every 30 days, Disp: , Rfl:      divalproex sodium delayed-release (DEPAKOTE) 250 MG DR tablet, Take 250 mg by mouth daily, Disp: , Rfl:      DULoxetine HCl 40 MG CPEP, Take 40 mg by mouth daily, Disp: , Rfl:       "fluticasone-vilanterol (BREO ELLIPTA) 200-25 MCG/INH inhaler, Inhale 1 puff into the lungs daily, Disp: , Rfl:      furosemide (LASIX) 20 MG tablet, Take 20 mg by mouth daily, Disp: , Rfl:      gabapentin (NEURONTIN) 300 MG capsule, Take 400 mg by mouth At Bedtime, Disp: , Rfl:      levothyroxine (SYNTHROID/LEVOTHROID) 112 MCG tablet, Take 112 mcg by mouth daily, Disp: , Rfl:      Lidocaine (LIDOCARE) 4 % Patch, Place 1 patch onto the skin every 24 hours, Disp: 30 patch, Rfl: 0     Melatonin 10 MG TABS tablet, Take 10 mg by mouth At Bedtime, Disp: , Rfl:      metoprolol succinate ER (TOPROL-XL) 25 MG 24 hr tablet, Take 12.5 mg by mouth daily, Disp: , Rfl:      miconazole (MICATIN) 2 % AERP powder, Apply topically as needed , Disp: , Rfl:      mirtazapine (REMERON) 30 MG tablet, Take 15 mg by mouth daily, Disp: , Rfl:      polyethylene glycol (MIRALAX) 17 GM/Dose powder, Take 17 g by mouth daily Every other day, Disp: , Rfl:      potassium chloride ER (MICRO-K) 10 MEQ CR capsule, Take 10 mEq by mouth daily , Disp: , Rfl:      prazosin (MINIPRESS) 1 MG capsule, Take 1 mg by mouth At Bedtime, Disp: , Rfl:      QUEtiapine (SEROQUEL) 25 MG tablet, 100 mg 3 times daily, Disp: , Rfl: 0     rosuvastatin (CRESTOR) 10 MG tablet, Take 10 mg by mouth At Bedtime, Disp: , Rfl:      sennosides (SENOKOT) 8.6 MG tablet, Take 2 tablets by mouth At Bedtime, Disp: , Rfl:      Vitamin D3 (CHOLECALCIFEROL) 25 mcg (1000 units) tablet, Take 1 tablet by mouth daily, Disp: , Rfl:        /70   Pulse 60   Temp 97.3  F (36.3  C)   Resp 17   Ht 1.448 m (4' 9\")   Wt 89.9 kg (198 lb 1.6 oz)   SpO2 94%   BMI 42.87 kg/m      LABS:   Last Comprehensive Metabolic Panel:  Lab Results   Component Value Date     04/04/2023    POTASSIUM 4.3 04/04/2023    CHLORIDE 103 04/04/2023    CO2 24 04/04/2023    ANIONGAP 11 04/04/2023     (H) 04/04/2023    BUN 20.8 04/04/2023    CR 1.19 (H) 04/04/2023    GFRESTIMATED 49 (L) 04/04/2023    " ORESTES 9.5 04/04/2023       Last Comprehensive Metabolic Panel:  Sodium   Date Value Ref Range Status   04/04/2023 138 136 - 145 mmol/L Final   03/16/2021 139 133 - 144 mmol/L Final     Potassium   Date Value Ref Range Status   04/04/2023 4.3 3.4 - 5.3 mmol/L Final   01/16/2023 3.9 3.4 - 5.3 mmol/L Final   03/16/2021 4.7 3.4 - 5.3 mmol/L Final     Chloride   Date Value Ref Range Status   04/04/2023 103 98 - 107 mmol/L Final   01/16/2023 108 94 - 109 mmol/L Final   03/16/2021 111 (H) 94 - 109 mmol/L Final     Carbon Dioxide   Date Value Ref Range Status   03/16/2021 22 20 - 32 mmol/L Final     Carbon Dioxide (CO2)   Date Value Ref Range Status   04/04/2023 24 22 - 29 mmol/L Final   01/16/2023 24 20 - 32 mmol/L Final     Anion Gap   Date Value Ref Range Status   04/04/2023 11 7 - 15 mmol/L Final   01/16/2023 8 3 - 14 mmol/L Final   03/16/2021 6 3 - 14 mmol/L Final     Glucose   Date Value Ref Range Status   04/04/2023 152 (H) 70 - 99 mg/dL Final   01/16/2023 165 (H) 70 - 99 mg/dL Final   03/16/2021 114 (H) 70 - 99 mg/dL Final     Urea Nitrogen   Date Value Ref Range Status   04/04/2023 20.8 8.0 - 23.0 mg/dL Final   01/16/2023 23 7 - 30 mg/dL Final   03/16/2021 45 (H) 7 - 30 mg/dL Final     Creatinine   Date Value Ref Range Status   04/04/2023 1.19 (H) 0.51 - 0.95 mg/dL Final   03/16/2021 0.98 0.52 - 1.04 mg/dL Final     GFR Estimate   Date Value Ref Range Status   04/04/2023 49 (L) >60 mL/min/1.73m2 Final     Comment:     eGFR calculated using 2021 CKD-EPI equation.   03/16/2021 60 (L) >60 mL/min/[1.73_m2] Final     Comment:     Non  GFR Calc  Starting 12/18/2018, serum creatinine based estimated GFR (eGFR) will be   calculated using the Chronic Kidney Disease Epidemiology Collaboration   (CKD-EPI) equation.       Calcium   Date Value Ref Range Status   04/04/2023 9.5 8.8 - 10.2 mg/dL Final   03/16/2021 9.4 8.5 - 10.1 mg/dL Final     Bilirubin Total   Date Value Ref Range Status   04/04/2023 <0.2 <=1.2  mg/dL Final   03/16/2021 0.2 0.2 - 1.3 mg/dL Final     Alkaline Phosphatase   Date Value Ref Range Status   04/04/2023 50 35 - 104 U/L Final   03/16/2021 57 40 - 150 U/L Final     ALT   Date Value Ref Range Status   04/04/2023 16 10 - 35 U/L Final   03/16/2021 74 (H) 0 - 50 U/L Final     AST   Date Value Ref Range Status   04/04/2023 19 10 - 35 U/L Final   03/16/2021 23 0 - 45 U/L Final             CBC RESULTS: Recent Labs   Lab Test 04/04/23  0038   WBC 7.1   RBC 3.30*   HGB 9.6*   HCT 31.1*   MCV 94   MCH 29.1   MCHC 30.9*   RDW 14.2   *           ASSESSMENT:    Encounter Diagnoses   Name Primary?     Essential (primary) hypertension Yes     Chronic kidney disease, stage 3a (H)      Panic disorder with agoraphobia      Physical debility        PLAN:    She currently is feeling fine.  Nursing feels that she is doing well.  She did not have any other questions or concerns in the meantime we will continue to manage and follow.        Electronically signed by: Jay Chapman NP          Sincerely,        Jay Chapman NP

## 2023-04-15 ENCOUNTER — TELEPHONE (OUTPATIENT)
Dept: GERIATRICS | Facility: CLINIC | Age: 68
End: 2023-04-15
Payer: COMMERCIAL

## 2023-04-15 NOTE — TELEPHONE ENCOUNTER
Letty Escobar is a 67 year old  (1955), Nurse called today to report:  Completed antibiotics on Friday and now with vaginal itching and yeast infection given Diflucan 150 mg p.o. x1               Electronically signed by:   Abigail Armstrong CNP

## 2023-04-18 ENCOUNTER — LAB REQUISITION (OUTPATIENT)
Dept: LAB | Facility: CLINIC | Age: 68
End: 2023-04-18
Payer: COMMERCIAL

## 2023-04-18 DIAGNOSIS — M54.50 LOW BACK PAIN, UNSPECIFIED: ICD-10-CM

## 2023-04-18 LAB
ALBUMIN UR-MCNC: NEGATIVE MG/DL
AMORPH CRY #/AREA URNS HPF: ABNORMAL /HPF
APPEARANCE UR: ABNORMAL
BACTERIA #/AREA URNS HPF: ABNORMAL /HPF
BILIRUB UR QL STRIP: NEGATIVE
COLOR UR AUTO: YELLOW
GLUCOSE UR STRIP-MCNC: NEGATIVE MG/DL
HGB UR QL STRIP: NEGATIVE
HYALINE CASTS: 1 /LPF
KETONES UR STRIP-MCNC: NEGATIVE MG/DL
LEUKOCYTE ESTERASE UR QL STRIP: ABNORMAL
MUCOUS THREADS #/AREA URNS LPF: PRESENT /LPF
NITRATE UR QL: POSITIVE
PH UR STRIP: 5 [PH] (ref 5–7)
RBC URINE: 1 /HPF
SP GR UR STRIP: 1.02 (ref 1–1.03)
UROBILINOGEN UR STRIP-MCNC: NORMAL MG/DL
WBC CLUMPS #/AREA URNS HPF: PRESENT /HPF
WBC URINE: 100 /HPF

## 2023-04-18 PROCEDURE — 81001 URINALYSIS AUTO W/SCOPE: CPT | Mod: ORL | Performed by: FAMILY MEDICINE

## 2023-04-18 PROCEDURE — 87186 SC STD MICRODIL/AGAR DIL: CPT | Mod: ORL | Performed by: FAMILY MEDICINE

## 2023-04-19 ENCOUNTER — TELEPHONE (OUTPATIENT)
Dept: GERIATRICS | Facility: CLINIC | Age: 68
End: 2023-04-19
Payer: COMMERCIAL

## 2023-04-19 NOTE — TELEPHONE ENCOUNTER
[facility] Triage Nurse Telephone Encounter    Provider: CANDY Gates  Facility: [facility] Facility Type:  LTC    Caller: Suly   Call Back Number: [phone]    Allergies:    Allergies   Allergen Reactions     Diphenhydramine Palpitations     Tolerated IV Benadryl when not pushed too fast     Isosorbide Other (See Comments) and Dizziness     Also causes syncope (has fallen before) and brain fog/mental disturbances - please do not prescribe     Nitroglycerin Dizziness, Fatigue and Other (See Comments)     Specifically the patch - please do not prescribe  Specifically the patch - please do not prescribe       Contrast Dye Hives and Itching     Other Drug Allergy (See Comments) Hives and Itching     Bee Pollen      Penicillin G      Adhesive Tape Rash     Diphenhydramine Hcl Palpitations     Liquid Adhesive Itching     Nystatin Dermatitis     Nystatin (Topical) Dermatitis     Also blisters     Penicillins Swelling and Rash     Occurred as a child - not 100% sure on specific reactions     Sulfa Drugs Other (See Comments)     Occurred as a child / patient does not remember specific reaction        Reason for call: The UC was done d/t back pain per nursing, no complaints at all of pain at this time. The pt has no s/s of UTI per the pt and nursing. VS stable, afebrile.       Verbal Order/Direction given by Provider: Continue to monitor for any s/s of infection, no further orders at this time.    Provider giving Order:  Paola Pastor MD    Verbal Order given to: Suly Marley RN

## 2023-04-20 LAB — BACTERIA UR CULT: ABNORMAL

## 2023-05-03 ENCOUNTER — NURSE TRIAGE (OUTPATIENT)
Dept: NURSING | Facility: CLINIC | Age: 68
End: 2023-05-03
Payer: COMMERCIAL

## 2023-05-04 ENCOUNTER — LAB REQUISITION (OUTPATIENT)
Dept: LAB | Facility: CLINIC | Age: 68
End: 2023-05-04
Payer: COMMERCIAL

## 2023-05-04 DIAGNOSIS — Z79.2 LONG TERM (CURRENT) USE OF ANTIBIOTICS: ICD-10-CM

## 2023-05-04 DIAGNOSIS — N39.0 URINARY TRACT INFECTION, SITE NOT SPECIFIED: ICD-10-CM

## 2023-05-04 DIAGNOSIS — J44.9 CHRONIC OBSTRUCTIVE PULMONARY DISEASE, UNSPECIFIED (H): ICD-10-CM

## 2023-05-04 LAB
ANION GAP SERPL CALCULATED.3IONS-SCNC: 10 MMOL/L (ref 7–15)
BUN SERPL-MCNC: 27.2 MG/DL (ref 8–23)
CALCIUM SERPL-MCNC: 9.3 MG/DL (ref 8.8–10.2)
CHLORIDE SERPL-SCNC: 106 MMOL/L (ref 98–107)
CREAT SERPL-MCNC: 1.3 MG/DL (ref 0.51–0.95)
DEPRECATED HCO3 PLAS-SCNC: 22 MMOL/L (ref 22–29)
GFR SERPL CREATININE-BSD FRML MDRD: 44 ML/MIN/1.73M2
GLUCOSE SERPL-MCNC: 110 MG/DL (ref 70–99)
POTASSIUM SERPL-SCNC: 4.5 MMOL/L (ref 3.4–5.3)
SODIUM SERPL-SCNC: 138 MMOL/L (ref 136–145)

## 2023-05-04 PROCEDURE — 80048 BASIC METABOLIC PNL TOTAL CA: CPT | Mod: ORL | Performed by: FAMILY MEDICINE

## 2023-05-04 NOTE — TELEPHONE ENCOUNTER
Patient calling reports having Influenza. Reports she was vomiting last night and has diarrhea today. Reporting approximately 8 episodes of diarrhea today. Reports weakness but still being able to move and stand. Patient wondering if taken to the ER via EMS if she would be admitted. Advised the ER would evaluate and determine this once seen in the ER. Patient expressed understanding. Reporting she may go to the ER. Declined to further triage symptoms, disconnected the call.     Tiffanie Meier RN 05/03/23 9:29 PM    Health Triage Nurse Advisor      Reason for Disposition    Caller hangs up    Additional Information    Negative: [1] Caller continues to be verbally abusive AND [2] after triager sets BOUNDARIES AND [3] Triager ends call    Negative: Caller is requesting health information that triager feels is unethical or illegal    Negative: Difficult caller responded to triager counseling    Negative: [1] Caller mainly has complaints about past medical care, billing, etc. AND [2] has mild symptoms or is well    Negative: [1] Caller demands to speak with the PCP AND [2] about sick adult (or sick caller)    Negative: [1] Angry or rude caller AND [2] doesn't respond to 5 minutes of triager counseling AND [3] sick adult (or caller)    Negative: [1] [place] worried caller AND [2] second call within 4 hours about the same medical problem    Negative: [1] [place] worried caller AND [2] third call within 48 hours about the same medical problem    Negative: [1] [place] worried caller AND [2] can't be reassured by triager    Negative: Patient sounds very sick or weak to the triager    Negative: Child abuse suspected    Negative: Suicide thoughts, threats, attempts, or questions    Negative: Violent behavior, or threatening to physically hurt or kill someone    Negative: [1] Caller is very confused AND [2] no other adult (e.g., friend or family member) available    Negative: Sounds like a life-threatening emergency to the  triager    Protocols used: DIFFICULT CALL-A-AH

## 2023-05-05 ENCOUNTER — CLINICAL UPDATE (OUTPATIENT)
Dept: PHARMACY | Facility: CLINIC | Age: 68
End: 2023-05-05
Payer: COMMERCIAL

## 2023-05-05 DIAGNOSIS — I50.22 CHRONIC SYSTOLIC CONGESTIVE HEART FAILURE (H): ICD-10-CM

## 2023-05-05 DIAGNOSIS — Z95.1 S/P CABG X 5: ICD-10-CM

## 2023-05-05 DIAGNOSIS — E53.8 VITAMIN B12 DEFICIENCY: ICD-10-CM

## 2023-05-05 DIAGNOSIS — F43.10 POSTTRAUMATIC STRESS DISORDER: ICD-10-CM

## 2023-05-05 DIAGNOSIS — I48.20 CHRONIC ATRIAL FIBRILLATION (H): ICD-10-CM

## 2023-05-05 DIAGNOSIS — J44.9 CHRONIC OBSTRUCTIVE PULMONARY DISEASE, UNSPECIFIED COPD TYPE (H): Primary | ICD-10-CM

## 2023-05-05 DIAGNOSIS — F60.9 PERSONALITY DISORDER (H): ICD-10-CM

## 2023-05-05 DIAGNOSIS — F33.2 MAJOR DEPRESSIVE DISORDER, RECURRENT SEVERE WITHOUT PSYCHOTIC FEATURES (H): ICD-10-CM

## 2023-05-05 DIAGNOSIS — E03.9 HYPOTHYROIDISM, UNSPECIFIED TYPE: ICD-10-CM

## 2023-05-05 DIAGNOSIS — I10 ESSENTIAL HYPERTENSION: ICD-10-CM

## 2023-05-05 DIAGNOSIS — F40.01 PANIC DISORDER WITH AGORAPHOBIA: ICD-10-CM

## 2023-05-05 DIAGNOSIS — E78.5 HYPERLIPIDEMIA WITH TARGET LOW DENSITY LIPOPROTEIN (LDL) CHOLESTEROL LESS THAN 70 MG/DL: ICD-10-CM

## 2023-05-05 DIAGNOSIS — I65.21 CAROTID STENOSIS, RIGHT: ICD-10-CM

## 2023-05-05 PROCEDURE — 99207 PR NO CHARGE LOS: CPT | Performed by: PHARMACIST

## 2023-05-05 NOTE — PROGRESS NOTES
This patient's medication list and chart were reviewed as part of the service provided by [facility].    Assessment/Recommendations:    Noted patient is followed by cardiology and psychiatry.  Consider d'c prazosin.  Of note, there are postmarketing reports of prazosin contributing to angina, including exacerbation of angina (per UpToDate).  Additionally, prazosin may contribute to bradycardia, orthostasis, palpitations, syncope, edema.  May exacerbate heart failure symptoms.    Estimated Creatinine Clearance: 58 mL/min (A) (based on SCr of 1.3 mg/dL (H)).        Renea Dhaliwal, Pharm.D.,[facility]  Board Certified Geriatric Pharmacist  Medication Therapy Management Pharmacist  [phone]

## 2023-05-12 ENCOUNTER — NURSING HOME VISIT (OUTPATIENT)
Dept: GERIATRICS | Facility: CLINIC | Age: 68
End: 2023-05-12
Payer: COMMERCIAL

## 2023-05-12 VITALS
OXYGEN SATURATION: 94 % | BODY MASS INDEX: 43.58 KG/M2 | WEIGHT: 202 LBS | HEART RATE: 60 BPM | SYSTOLIC BLOOD PRESSURE: 118 MMHG | TEMPERATURE: 97 F | DIASTOLIC BLOOD PRESSURE: 59 MMHG | RESPIRATION RATE: 16 BRPM | HEIGHT: 57 IN

## 2023-05-12 DIAGNOSIS — I10 ESSENTIAL (PRIMARY) HYPERTENSION: Primary | ICD-10-CM

## 2023-05-12 DIAGNOSIS — F40.01 PANIC DISORDER WITH AGORAPHOBIA: ICD-10-CM

## 2023-05-12 DIAGNOSIS — Z95.810 ICD (IMPLANTABLE CARDIOVERTER-DEFIBRILLATOR) IN PLACE: ICD-10-CM

## 2023-05-12 DIAGNOSIS — N18.31 CHRONIC KIDNEY DISEASE, STAGE 3A (H): ICD-10-CM

## 2023-05-12 PROCEDURE — 99309 SBSQ NF CARE MODERATE MDM 30: CPT | Performed by: FAMILY MEDICINE

## 2023-05-12 NOTE — LETTER
5/12/2023        RE: Letty Escobar  C/o Edwar Escobar  [address] [facility] [id number]        [facility] GERIATRIC SERVICES    Facility[facility]   [facility] () [[id number]]   Code Status: DNR/DNI      CHIEF COMPLAINT/REASON FOR VISIT:  Chief Complaint   Patient presents with     RECHECK       HISTORY:      HPI: Letty is a 67 year old female who I was asked to visit with today secondary to medication review.  Recently did have a BMP on May 4 and had a chance to go over those results creatinine 1.30 otherwise she states she is doing well eating and drinking just fine with a good appetite.  Her blood pressure on May 3 93/47 on May 10 110/59 her weight 202 pounds she has been afebrile and also on room air.  Her anxiety pretty well controlled.  According to the pharmacist that would like to discontinue the Minipress 1 mg daily because sometimes that can cause angina bradycardia and palpitations.  Otherwise she does not feel there is any other increase in pain or problems or mood changes.  She certainly being treated for all of that.  Seems to be in really good spirits today.  Last TSH in February was 2.19 is on Synthroid 112 mcg.  Overall she is feeling better and today she is glad and comfortable that she is not going to the emergency room as often.  Sometimes she does admit to having panic disorder and goes to the emergency room because she thinks that she is having heart problems.    Past Medical History:   Diagnosis Date     ACP (advance care planning) 11/3/2010    Formatting of this note might be different from the original. Patient has identified Health Care Agent(s): Yes Add Health Care Agents: Yes   Health Care Agent(s):  Primary Health Care Agent:   Edwar Chappellvedaalec Relationship:  Spouse Phone:   [phone]  Secondary Health Care Agent:   Chiki Oro Relationship:   Son Phone:   [phone]  Patient has Advance Care Plan Documents (Health Care Direct     Acute coronary  syndrome (H) 11/22/2019     Acute exacerbation of CHF (congestive heart failure) (H) 11/11/2019     Acute on chronic systolic (congestive) heart failure (H) 1/28/2020     Anemia of chronic disease 1/5/2013     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, left leg (H) 1/15/2020     BPPV (benign paroxysmal positional vertigo) 5/31/2018     Candidiasis 3/1/2020     Carotid stenosis, right 7/13/2016    Carotid US 05/04/2018 showed moderate plaque formation, consistent with 50 to 69% stenosis in the right internal carotid artery, not significantly changed from 8/5/2015.  Moderate plaque formation, consistent with 50 to 69% stenosis in the left internal carotid artery; there has been mild progression of the left ICA stenosis since 8/5/2015.     Chest pain 11/11/2019     Chronic bilateral low back pain without sciatica 1/17/2018     Chronic pain disorder 10/1/2017     Constipation 3/20/2020     COPD (chronic obstructive pulmonary disease) (H) 6/24/2020     Coronary atherosclerosis 2/6/2009 2/5/2009 - MI - Proximal RCA 99%, mild-mod disease elsewhere.  EF 60%.  PCI:  MYRON to pRCA. 2/12/2009 - admit CP - Widely patent RCA stent. Moderate diffuse CAD. Severe stenosis in trivial PDA branch. LVEF 45%. 1/25/2010 - admit CP - PTCA and stent of diagonal 9/8/2010 - admit CP - LAD patent stent. Moderate diffuse CAD. Medical management recommended.  4/25/2012 - NSTEMI - Acute total occlusion of     Cubital tunnel syndrome on left 11/13/2012     Depressive disorder      Diabetes mellitus (H)      Diabetes mellitus type 2 in obese (H) 7/13/2006     Diabetes mellitus type 2 in obese (H) 7/13/2006     Drug-seeking behavior 4/4/2020     Failure to thrive in adult 9/3/2020     Hereditary and idiopathic peripheral neuropathy 4/24/2007     Hyperlipidemia with target low density lipoprotein (LDL) cholesterol less than 70 mg/dL 5/30/2007     Hypertension 10/14/2015     Hypothyroidism 12/10/2010      Irritable bowel syndrome 9/7/2015     Ischemic cardiomyopathy 9/20/2015    EF of 40-45%, status post RV lead revision and LV epicardial lead placement via mini-thoracotomy in August 2016.     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  Designated pharmacy: [facility] Prescribing physician: Andrea Diagnosis: Ulnar neuropathy     Panic disorder with agoraphobia 4/14/2020     Paroxysmal atrial fibrillation (H) 10/2/2015     Personality disorder (H) 3/5/2020    Rule out dependent personality     Polymyalgia rheumatica (H) 11/28/2018     Posttraumatic stress disorder 3/1/2020     Restless legs syndrome (RLS) 8/29/2007     Restless legs syndrome (RLS) 8/29/2007     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     Serum calcium elevated 3/1/2020     Somatic dysfunction of sacral region 1/17/2018     Subacromial bursitis of left shoulder joint 8/6/2018     Suicidal ideation 4/1/2020     Thyroid disease      TIA (transient ischemic attack) 5/4/2018     TIA (transient ischemic attack) 5/4/2018     Tobacco abuse 2/17/2017     Tobacco abuse 2/17/2017     Urinary incontinence, mixed 9/24/2017     UTI (urinary tract infection) 4/17/2020     Vitamin B12 deficiency 2/14/2018     Weakness 12/17/2019            No family history on file.   Social History     Socioeconomic History     Marital status:      Number of children: 1   Tobacco Use     Smoking status: Never     Smokeless tobacco: Never   Vaping Use     Vaping status: Never Used   Substance and Sexual Activity     Alcohol use: Not Currently     Drug use: Never     Sexual activity: Not Currently      No new changes to the review of  systems or physical exam since our last visit on April 10  REVIEW OF SYSTEM:  She currently denies any new symptoms of cough or cold sore throat postnasal drip wheezing chest pain dizziness vertigo nausea vomiting diarrhea dysuria frequency or urgency.  Does have a history of dyslipidemia, COPD, CABG, depression, hypertension, posttraumatic stress disorder, panic disorder, anxiety, personality disorder    PHYSICAL EXAM:   Pleasant female in no acute distress.  Head is normocephalic.  Neck is supple without adenopathy.  Lung sounds are clear throughout.  Cardiovascular S1-S2 regular rate and rhythm and no lower extremity edema.  Pacemaker.  Gastrointestinal is protuberant nontender nondistended.  Musculoskeletal-denies discomfort.  Psychiatric: Pleasant affect    Current Outpatient Medications:      acetaminophen (TYLENOL) 325 MG tablet, Take 650 mg by mouth every 6 hours as needed for mild pain, Disp: , Rfl:      acetaminophen (TYLENOL) 500 MG tablet, Take 2 tablets (1,000 mg) by mouth 3 times daily, Disp: , Rfl:      albuterol (PROVENTIL) (2.5 MG/3ML) 0.083% neb solution, Take 1 vial (2.5 mg) by nebulization 2 times daily as needed for shortness of breath / dyspnea or wheezing, Disp: 90 mL, Rfl:      aspirin 81 MG EC tablet, Take 81 mg by mouth daily, Disp: , Rfl:      bisacodyl (DULCOLAX) 10 MG suppository, Place 10 mg rectally daily as needed for constipation, Disp: , Rfl:      cholecalciferol (VITAMIN D3) 10 mcg (400 units) TABS tablet, Take 1,000 Units by mouth, Disp: , Rfl:      cyanocobalamin (CYANOCOBALAMIN) 1000 MCG/ML injection, Inject 1 mL into the muscle every 30 days, Disp: , Rfl:      divalproex sodium delayed-release (DEPAKOTE) 250 MG DR tablet, Take 250 mg by mouth daily, Disp: , Rfl:      DULoxetine HCl 40 MG CPEP, Take 40 mg by mouth daily, Disp: , Rfl:      fluticasone-vilanterol (BREO ELLIPTA) 200-25 MCG/INH inhaler, Inhale 1 puff into the lungs daily, Disp: , Rfl:      furosemide (LASIX) 20 MG  "tablet, Take 20 mg by mouth daily, Disp: , Rfl:      gabapentin (NEURONTIN) 300 MG capsule, Take 400 mg by mouth At Bedtime, Disp: , Rfl:      levothyroxine (SYNTHROID/LEVOTHROID) 112 MCG tablet, Take 112 mcg by mouth daily, Disp: , Rfl:      Lidocaine (LIDOCARE) 4 % Patch, Place 1 patch onto the skin every 24 hours, Disp: 30 patch, Rfl: 0     Melatonin 10 MG TABS tablet, Take 10 mg by mouth At Bedtime, Disp: , Rfl:      metoprolol succinate ER (TOPROL-XL) 25 MG 24 hr tablet, Take 12.5 mg by mouth daily, Disp: , Rfl:      miconazole (MICATIN) 2 % AERP powder, Apply topically as needed , Disp: , Rfl:      mirtazapine (REMERON) 30 MG tablet, Take 15 mg by mouth daily, Disp: , Rfl:      polyethylene glycol (MIRALAX) 17 GM/Dose powder, Take 17 g by mouth daily Every other day, Disp: , Rfl:      potassium chloride ER (MICRO-K) 10 MEQ CR capsule, Take 10 mEq by mouth daily , Disp: , Rfl:      QUEtiapine (SEROQUEL) 25 MG tablet, 100 mg 3 times daily, Disp: , Rfl: 0     rosuvastatin (CRESTOR) 10 MG tablet, Take 10 mg by mouth At Bedtime, Disp: , Rfl:      sennosides (SENOKOT) 8.6 MG tablet, Take 2 tablets by mouth At Bedtime, Disp: , Rfl:      Vitamin D3 (CHOLECALCIFEROL) 25 mcg (1000 units) tablet, Take 1 tablet by mouth daily, Disp: , Rfl:        /59   Pulse 60   Temp 97  F (36.1  C)   Resp 16   Ht 1.448 m (4' 9\")   Wt 91.6 kg (202 lb)   SpO2 94%   BMI 43.71 kg/m      LABS:   Last Comprehensive Metabolic Panel:  Lab Results   Component Value Date     05/04/2023    POTASSIUM 4.5 05/04/2023    CHLORIDE 106 05/04/2023    CO2 22 05/04/2023    ANIONGAP 10 05/04/2023     (H) 05/04/2023    BUN 27.2 (H) 05/04/2023    CR 1.30 (H) 05/04/2023    GFRESTIMATED 44 (L) 05/04/2023    ORESTES 9.3 05/04/2023       CBC RESULTS: Recent Labs   Lab Test 04/04/23  0038   WBC 7.1   RBC 3.30*   HGB 9.6*   HCT 31.1*   MCV 94   MCH 29.1   MCHC 30.9*   RDW 14.2   *           ASSESSMENT:    Encounter Diagnoses   Name " Primary?     Essential (primary) hypertension Yes     ICD (implantable cardioverter-defibrillator) in place      Chronic kidney disease, stage 3a (H)      Panic disorder with agoraphobia        PLAN:    Discontinue the Minipress due to potential side effects even at 1 mg her blood pressures have been stable.  She has been asymptomatic.  Otherwise she states she is doing well.  Staff also agree at that.  She will keep me updated for any new concerns or questions.        Electronically signed by: Jay Chapman NP          Sincerely,        Jay Chapman NP

## 2023-05-12 NOTE — PROGRESS NOTES
[facility] GERIATRIC SERVICES    Facility:   [facility] () [[id number]]   Code Status: DNR/DNI      CHIEF COMPLAINT/REASON FOR VISIT:  Chief Complaint   Patient presents with     RECHECK       HISTORY:      HPI: Letty is a 67 year old female who I was asked to visit with today secondary to medication review.  Recently did have a BMP on May 4 and had a chance to go over those results creatinine 1.30 otherwise she states she is doing well eating and drinking just fine with a good appetite.  Her blood pressure on May 3 93/47 on May 10 110/59 her weight 202 pounds she has been afebrile and also on room air.  Her anxiety pretty well controlled.  According to the pharmacist that would like to discontinue the Minipress 1 mg daily because sometimes that can cause angina bradycardia and palpitations.  Otherwise she does not feel there is any other increase in pain or problems or mood changes.  She certainly being treated for all of that.  Seems to be in really good spirits today.  Last TSH in February was 2.19 is on Synthroid 112 mcg.  Overall she is feeling better and today she is glad and comfortable that she is not going to the emergency room as often.  Sometimes she does admit to having panic disorder and goes to the emergency room because she thinks that she is having heart problems.    Past Medical History:   Diagnosis Date     ACP (advance care planning) 11/3/2010    Formatting of this note might be different from the original. Patient has identified Health Care Agent(s): Yes Add Health Care Agents: Yes   Health Care Agent(s):  Primary Health Care Agent:   Edwar Chappellsusana Relationship:  Spouse Phone:   [phone]  Secondary Health Care Agent:   Chiki Shorty Relationship:   Son Phone:   [phone]  Patient has Advance Care Plan Documents (Health Care Direct     Acute coronary syndrome (H) 11/22/2019     Acute exacerbation of CHF (congestive heart failure) (H) 11/11/2019     Acute on chronic  systolic (congestive) heart failure (H) 1/28/2020     Anemia of chronic disease 1/5/2013     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, left leg (H) 1/15/2020     BPPV (benign paroxysmal positional vertigo) 5/31/2018     Candidiasis 3/1/2020     Carotid stenosis, right 7/13/2016    Carotid US 05/04/2018 showed moderate plaque formation, consistent with 50 to 69% stenosis in the right internal carotid artery, not significantly changed from 8/5/2015.  Moderate plaque formation, consistent with 50 to 69% stenosis in the left internal carotid artery; there has been mild progression of the left ICA stenosis since 8/5/2015.     Chest pain 11/11/2019     Chronic bilateral low back pain without sciatica 1/17/2018     Chronic pain disorder 10/1/2017     Constipation 3/20/2020     COPD (chronic obstructive pulmonary disease) (H) 6/24/2020     Coronary atherosclerosis 2/6/2009 2/5/2009 - MI - Proximal RCA 99%, mild-mod disease elsewhere.  EF 60%.  PCI:  MYRON to pRCA. 2/12/2009 - admit CP - Widely patent RCA stent. Moderate diffuse CAD. Severe stenosis in trivial PDA branch. LVEF 45%. 1/25/2010 - admit CP - PTCA and stent of diagonal 9/8/2010 - admit CP - LAD patent stent. Moderate diffuse CAD. Medical management recommended.  4/25/2012 - NSTEMI - Acute total occlusion of     Cubital tunnel syndrome on left 11/13/2012     Depressive disorder      Diabetes mellitus (H)      Diabetes mellitus type 2 in obese (H) 7/13/2006     Diabetes mellitus type 2 in obese (H) 7/13/2006     Drug-seeking behavior 4/4/2020     Failure to thrive in adult 9/3/2020     Hereditary and idiopathic peripheral neuropathy 4/24/2007     Hyperlipidemia with target low density lipoprotein (LDL) cholesterol less than 70 mg/dL 5/30/2007     Hypertension 10/14/2015     Hypothyroidism 12/10/2010     Irritable bowel syndrome 9/7/2015     Ischemic cardiomyopathy 9/20/2015    EF of 40-45%, status post RV lead revision  and LV epicardial lead placement via mini-thoracotomy in August 2016.     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  Designated pharmacy: [facility] Prescribing physician: Andrea Diagnosis: Ulnar neuropathy     Panic disorder with agoraphobia 4/14/2020     Paroxysmal atrial fibrillation (H) 10/2/2015     Personality disorder (H) 3/5/2020    Rule out dependent personality     Polymyalgia rheumatica (H) 11/28/2018     Posttraumatic stress disorder 3/1/2020     Restless legs syndrome (RLS) 8/29/2007     Restless legs syndrome (RLS) 8/29/2007     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     Serum calcium elevated 3/1/2020     Somatic dysfunction of sacral region 1/17/2018     Subacromial bursitis of left shoulder joint 8/6/2018     Suicidal ideation 4/1/2020     Thyroid disease      TIA (transient ischemic attack) 5/4/2018     TIA (transient ischemic attack) 5/4/2018     Tobacco abuse 2/17/2017     Tobacco abuse 2/17/2017     Urinary incontinence, mixed 9/24/2017     UTI (urinary tract infection) 4/17/2020     Vitamin B12 deficiency 2/14/2018     Weakness 12/17/2019            No family history on file.   Social History     Socioeconomic History     Marital status:      Number of children: 1   Tobacco Use     Smoking status: Never     Smokeless tobacco: Never   Vaping Use     Vaping status: Never Used   Substance and Sexual Activity     Alcohol use: Not Currently     Drug use: Never     Sexual activity: Not Currently      No new changes to the review of systems or physical exam since our last visit on April 10  REVIEW OF SYSTEM:  She currently denies any new symptoms of  cough or cold sore throat postnasal drip wheezing chest pain dizziness vertigo nausea vomiting diarrhea dysuria frequency or urgency.  Does have a history of dyslipidemia, COPD, CABG, depression, hypertension, posttraumatic stress disorder, panic disorder, anxiety, personality disorder    PHYSICAL EXAM:   Pleasant female in no acute distress.  Head is normocephalic.  Neck is supple without adenopathy.  Lung sounds are clear throughout.  Cardiovascular S1-S2 regular rate and rhythm and no lower extremity edema.  Pacemaker.  Gastrointestinal is protuberant nontender nondistended.  Musculoskeletal-denies discomfort.  Psychiatric: Pleasant affect    Current Outpatient Medications:      acetaminophen (TYLENOL) 325 MG tablet, Take 650 mg by mouth every 6 hours as needed for mild pain, Disp: , Rfl:      acetaminophen (TYLENOL) 500 MG tablet, Take 2 tablets (1,000 mg) by mouth 3 times daily, Disp: , Rfl:      albuterol (PROVENTIL) (2.5 MG/3ML) 0.083% neb solution, Take 1 vial (2.5 mg) by nebulization 2 times daily as needed for shortness of breath / dyspnea or wheezing, Disp: 90 mL, Rfl:      aspirin 81 MG EC tablet, Take 81 mg by mouth daily, Disp: , Rfl:      bisacodyl (DULCOLAX) 10 MG suppository, Place 10 mg rectally daily as needed for constipation, Disp: , Rfl:      cholecalciferol (VITAMIN D3) 10 mcg (400 units) TABS tablet, Take 1,000 Units by mouth, Disp: , Rfl:      cyanocobalamin (CYANOCOBALAMIN) 1000 MCG/ML injection, Inject 1 mL into the muscle every 30 days, Disp: , Rfl:      divalproex sodium delayed-release (DEPAKOTE) 250 MG DR tablet, Take 250 mg by mouth daily, Disp: , Rfl:      DULoxetine HCl 40 MG CPEP, Take 40 mg by mouth daily, Disp: , Rfl:      fluticasone-vilanterol (BREO ELLIPTA) 200-25 MCG/INH inhaler, Inhale 1 puff into the lungs daily, Disp: , Rfl:      furosemide (LASIX) 20 MG tablet, Take 20 mg by mouth daily, Disp: , Rfl:      gabapentin (NEURONTIN) 300 MG capsule, Take 400 mg by mouth At  "Bedtime, Disp: , Rfl:      levothyroxine (SYNTHROID/LEVOTHROID) 112 MCG tablet, Take 112 mcg by mouth daily, Disp: , Rfl:      Lidocaine (LIDOCARE) 4 % Patch, Place 1 patch onto the skin every 24 hours, Disp: 30 patch, Rfl: 0     Melatonin 10 MG TABS tablet, Take 10 mg by mouth At Bedtime, Disp: , Rfl:      metoprolol succinate ER (TOPROL-XL) 25 MG 24 hr tablet, Take 12.5 mg by mouth daily, Disp: , Rfl:      miconazole (MICATIN) 2 % AERP powder, Apply topically as needed , Disp: , Rfl:      mirtazapine (REMERON) 30 MG tablet, Take 15 mg by mouth daily, Disp: , Rfl:      polyethylene glycol (MIRALAX) 17 GM/Dose powder, Take 17 g by mouth daily Every other day, Disp: , Rfl:      potassium chloride ER (MICRO-K) 10 MEQ CR capsule, Take 10 mEq by mouth daily , Disp: , Rfl:      QUEtiapine (SEROQUEL) 25 MG tablet, 100 mg 3 times daily, Disp: , Rfl: 0     rosuvastatin (CRESTOR) 10 MG tablet, Take 10 mg by mouth At Bedtime, Disp: , Rfl:      sennosides (SENOKOT) 8.6 MG tablet, Take 2 tablets by mouth At Bedtime, Disp: , Rfl:      Vitamin D3 (CHOLECALCIFEROL) 25 mcg (1000 units) tablet, Take 1 tablet by mouth daily, Disp: , Rfl:        /59   Pulse 60   Temp 97  F (36.1  C)   Resp 16   Ht 1.448 m (4' 9\")   Wt 91.6 kg (202 lb)   SpO2 94%   BMI 43.71 kg/m      LABS:   Last Comprehensive Metabolic Panel:  Lab Results   Component Value Date     05/04/2023    POTASSIUM 4.5 05/04/2023    CHLORIDE 106 05/04/2023    CO2 22 05/04/2023    ANIONGAP 10 05/04/2023     (H) 05/04/2023    BUN 27.2 (H) 05/04/2023    CR 1.30 (H) 05/04/2023    GFRESTIMATED 44 (L) 05/04/2023    ORESTES 9.3 05/04/2023       CBC RESULTS: Recent Labs   Lab Test 04/04/23  0038   WBC 7.1   RBC 3.30*   HGB 9.6*   HCT 31.1*   MCV 94   MCH 29.1   MCHC 30.9*   RDW 14.2   *           ASSESSMENT:    Encounter Diagnoses   Name Primary?     Essential (primary) hypertension Yes     ICD (implantable cardioverter-defibrillator) in place      Chronic " kidney disease, stage 3a (H)      Panic disorder with agoraphobia        PLAN:    Discontinue the Minipress due to potential side effects even at 1 mg her blood pressures have been stable.  She has been asymptomatic.  Otherwise she states she is doing well.  Staff also agree at that.  She will keep me updated for any new concerns or questions.        Electronically signed by: Jay Chapman NP

## 2023-05-23 ENCOUNTER — HOSPITAL ENCOUNTER (EMERGENCY)
Facility: CLINIC | Age: 68
Discharge: NURSING, REHAB OR LONG-TERM CARE | End: 2023-05-23
Attending: FAMILY MEDICINE | Admitting: FAMILY MEDICINE
Payer: COMMERCIAL

## 2023-05-23 ENCOUNTER — APPOINTMENT (OUTPATIENT)
Dept: GENERAL RADIOLOGY | Facility: CLINIC | Age: 68
End: 2023-05-23
Attending: FAMILY MEDICINE
Payer: COMMERCIAL

## 2023-05-23 VITALS
BODY MASS INDEX: 45.83 KG/M2 | RESPIRATION RATE: 11 BRPM | OXYGEN SATURATION: 96 % | TEMPERATURE: 98.1 F | SYSTOLIC BLOOD PRESSURE: 123 MMHG | HEART RATE: 60 BPM | WEIGHT: 211.8 LBS | DIASTOLIC BLOOD PRESSURE: 58 MMHG

## 2023-05-23 DIAGNOSIS — R07.9 CHEST PAIN, UNSPECIFIED TYPE: ICD-10-CM

## 2023-05-23 DIAGNOSIS — R51.9 ACUTE NONINTRACTABLE HEADACHE, UNSPECIFIED HEADACHE TYPE: ICD-10-CM

## 2023-05-23 LAB
ALBUMIN SERPL BCG-MCNC: 3.6 G/DL (ref 3.5–5.2)
ALP SERPL-CCNC: 47 U/L (ref 35–104)
ALT SERPL W P-5'-P-CCNC: 20 U/L (ref 10–35)
ANION GAP SERPL CALCULATED.3IONS-SCNC: 11 MMOL/L (ref 7–15)
AST SERPL W P-5'-P-CCNC: 22 U/L (ref 10–35)
BASOPHILS # BLD AUTO: 0 10E3/UL (ref 0–0.2)
BASOPHILS NFR BLD AUTO: 0 %
BILIRUB SERPL-MCNC: <0.2 MG/DL
BUN SERPL-MCNC: 24.8 MG/DL (ref 8–23)
CALCIUM SERPL-MCNC: 9.8 MG/DL (ref 8.8–10.2)
CHLORIDE SERPL-SCNC: 100 MMOL/L (ref 98–107)
CREAT SERPL-MCNC: 1.16 MG/DL (ref 0.51–0.95)
DEPRECATED HCO3 PLAS-SCNC: 26 MMOL/L (ref 22–29)
EOSINOPHIL # BLD AUTO: 0.2 10E3/UL (ref 0–0.7)
EOSINOPHIL NFR BLD AUTO: 3 %
ERYTHROCYTE [DISTWIDTH] IN BLOOD BY AUTOMATED COUNT: 14.4 % (ref 10–15)
GFR SERPL CREATININE-BSD FRML MDRD: 51 ML/MIN/1.73M2
GLUCOSE SERPL-MCNC: 153 MG/DL (ref 70–99)
HCT VFR BLD AUTO: 29.6 % (ref 35–47)
HGB BLD-MCNC: 9.3 G/DL (ref 11.7–15.7)
IMM GRANULOCYTES # BLD: 0.1 10E3/UL
IMM GRANULOCYTES NFR BLD: 1 %
LYMPHOCYTES # BLD AUTO: 2.6 10E3/UL (ref 0.8–5.3)
LYMPHOCYTES NFR BLD AUTO: 34 %
MCH RBC QN AUTO: 29.3 PG (ref 26.5–33)
MCHC RBC AUTO-ENTMCNC: 31.4 G/DL (ref 31.5–36.5)
MCV RBC AUTO: 93 FL (ref 78–100)
MONOCYTES # BLD AUTO: 0.4 10E3/UL (ref 0–1.3)
MONOCYTES NFR BLD AUTO: 6 %
NEUTROPHILS # BLD AUTO: 4.3 10E3/UL (ref 1.6–8.3)
NEUTROPHILS NFR BLD AUTO: 56 %
NRBC # BLD AUTO: 0 10E3/UL
NRBC BLD AUTO-RTO: 0 /100
NT-PROBNP SERPL-MCNC: 533 PG/ML (ref 0–900)
PLATELET # BLD AUTO: 138 10E3/UL (ref 150–450)
POTASSIUM SERPL-SCNC: 4.5 MMOL/L (ref 3.4–5.3)
PROT SERPL-MCNC: 7 G/DL (ref 6.4–8.3)
RBC # BLD AUTO: 3.17 10E6/UL (ref 3.8–5.2)
SODIUM SERPL-SCNC: 137 MMOL/L (ref 136–145)
TROPONIN T SERPL HS-MCNC: 27 NG/L
TROPONIN T SERPL HS-MCNC: 29 NG/L
WBC # BLD AUTO: 7.6 10E3/UL (ref 4–11)

## 2023-05-23 PROCEDURE — 96374 THER/PROPH/DIAG INJ IV PUSH: CPT | Performed by: FAMILY MEDICINE

## 2023-05-23 PROCEDURE — 84484 ASSAY OF TROPONIN QUANT: CPT | Mod: 91 | Performed by: FAMILY MEDICINE

## 2023-05-23 PROCEDURE — 99285 EMERGENCY DEPT VISIT HI MDM: CPT | Mod: 25 | Performed by: FAMILY MEDICINE

## 2023-05-23 PROCEDURE — 250N000013 HC RX MED GY IP 250 OP 250 PS 637: Performed by: FAMILY MEDICINE

## 2023-05-23 PROCEDURE — 93005 ELECTROCARDIOGRAM TRACING: CPT | Performed by: FAMILY MEDICINE

## 2023-05-23 PROCEDURE — 96375 TX/PRO/DX INJ NEW DRUG ADDON: CPT | Performed by: FAMILY MEDICINE

## 2023-05-23 PROCEDURE — 83880 ASSAY OF NATRIURETIC PEPTIDE: CPT | Performed by: FAMILY MEDICINE

## 2023-05-23 PROCEDURE — 93010 ELECTROCARDIOGRAM REPORT: CPT | Performed by: FAMILY MEDICINE

## 2023-05-23 PROCEDURE — 99284 EMERGENCY DEPT VISIT MOD MDM: CPT | Mod: 25 | Performed by: FAMILY MEDICINE

## 2023-05-23 PROCEDURE — 36415 COLL VENOUS BLD VENIPUNCTURE: CPT | Performed by: FAMILY MEDICINE

## 2023-05-23 PROCEDURE — 71045 X-RAY EXAM CHEST 1 VIEW: CPT

## 2023-05-23 PROCEDURE — 85025 COMPLETE CBC W/AUTO DIFF WBC: CPT | Performed by: FAMILY MEDICINE

## 2023-05-23 PROCEDURE — 84484 ASSAY OF TROPONIN QUANT: CPT | Performed by: FAMILY MEDICINE

## 2023-05-23 PROCEDURE — 80053 COMPREHEN METABOLIC PANEL: CPT | Performed by: FAMILY MEDICINE

## 2023-05-23 PROCEDURE — 250N000011 HC RX IP 250 OP 636: Performed by: FAMILY MEDICINE

## 2023-05-23 RX ORDER — FENTANYL CITRATE 50 UG/ML
50 INJECTION, SOLUTION INTRAMUSCULAR; INTRAVENOUS ONCE
Status: COMPLETED | OUTPATIENT
Start: 2023-05-23 | End: 2023-05-23

## 2023-05-23 RX ORDER — ONDANSETRON 2 MG/ML
4 INJECTION INTRAMUSCULAR; INTRAVENOUS EVERY 30 MIN PRN
Status: DISCONTINUED | OUTPATIENT
Start: 2023-05-23 | End: 2023-05-23 | Stop reason: HOSPADM

## 2023-05-23 RX ORDER — ASPIRIN 81 MG/1
324 TABLET, CHEWABLE ORAL ONCE
Status: COMPLETED | OUTPATIENT
Start: 2023-05-23 | End: 2023-05-23

## 2023-05-23 RX ADMIN — ONDANSETRON 4 MG: 2 INJECTION INTRAMUSCULAR; INTRAVENOUS at 03:42

## 2023-05-23 RX ADMIN — ASPIRIN 81 MG 324 MG: 81 TABLET ORAL at 03:55

## 2023-05-23 RX ADMIN — FENTANYL CITRATE 50 MCG: 50 INJECTION, SOLUTION INTRAMUSCULAR; INTRAVENOUS at 04:24

## 2023-05-23 ASSESSMENT — ACTIVITIES OF DAILY LIVING (ADL)
ADLS_ACUITY_SCORE: 35
ADLS_ACUITY_SCORE: 35

## 2023-05-23 NOTE — ED TRIAGE NOTES
Arrives from [facility] for chest pain that started at 2300. Got two nitroglycerin sublingual at nursing home. Then en route received one nitroglycerin spray. States no improvement, just now feeling nauseous. Pain radiates down L arm.     Triage Assessment     Row Name 05/23/23 0312       Triage Assessment (Adult)    Airway WDL WDL       Respiratory WDL    Respiratory WDL WDL       Skin Circulation/Temperature WDL    Skin Circulation/Temperature WDL WDL       Cardiac WDL    Cardiac WDL X;chest pain       Chest Pain Assessment    Chest Pain Location midsternal;arm, left    Chest Pain Radiation arm    Character pressure    Associated Signs/Symptoms anxiety    Chest Pain Intervention 12-lead ECG obtained;activity minimized;cardiac monitor placed       Peripheral/Neurovascular WDL    Peripheral Neurovascular WDL WDL       Cognitive/Neuro/Behavioral WDL    Cognitive/Neuro/Behavioral WDL WDL

## 2023-05-23 NOTE — DISCHARGE INSTRUCTIONS
Your EKG and heart tests were normal which is reassuring.  That argues against your chest pain being due to your heart.  Continue your current medications.  Tylenol as needed for pain.  Heating pad may be helpful.  Recheck with your primary physician if persistent problems.  Return to the ED if worse/concerns.  Was nice visiting with you this morning.  I am glad you are feeling at least a little bit better and hope you continue to improve.    Thank you for choosing [facility]. We appreciate the opportunity to meet your urgent medical needs. Please let us know if we could have done anything to make your stay more satisfying.    After discharge, please closely monitor for any new or worsening symptoms. Return to the Emergency Department if you develop any acute worsening signs or symptoms.    If you had lab work, cultures or imaging studies done during your stay, the final results may still be pending. We will call you if your plan of care needs to change. However, if you are not improving as expected, please follow up with your primary care provider or clinic.     Start any prescription medications that were prescribed to you and take them as directed.     Please see additional handouts that may be pertinent to your condition.

## 2023-05-23 NOTE — ED NOTES
Bed: ED07  Expected date: 5/23/23  Expected time:   Means of arrival:   Comments:  68 y/o from [place] home- CP

## 2023-05-28 NOTE — PROGRESS NOTES
"    [facility] [facility]  Chief Complaint   Patient presents with     snf Regulatory     Cassandra Regulatory     [place] Medical Record Number:  [id number]  Place of Service where encounter took place:  [facility] [place] () [[id number]]    HPI:    Letty Escobar  is 67 year old (1955), who is being seen today for a federally mandated E/M visit.  HPI information obtained from: facility chart records, facility staff, patient report and [facility] chart review.     1/18-19: for CP. \"PET CT cardiac perfusion stress test, which showed a large anterior/anterolateral infarct without significant elena-infarct ischemia.\"    Today's concerns are:    - ED visits on 2/12 (HA and cervicalgia, 3/14 (chest pain), 4/4 and     5/23: nocturnal chest pain. Pacemaker recently checked and is functioning normally.  Slept well after fentanyl. ACS ruled out with trop. Sent back.        - Resident seen and examined, chart reviewed and discussed with the nursing staff.     - Denies chest pain, SOB.     - DNP and RN have no concern today.   --------------------------------    MEDICATIONS:  Current Outpatient Medications   Medication Sig Dispense Refill     acetaminophen (TYLENOL) 325 MG tablet Take 650 mg by mouth every 6 hours as needed for mild pain       acetaminophen (TYLENOL) 500 MG tablet Take 2 tablets (1,000 mg) by mouth 3 times daily       albuterol (PROVENTIL) (2.5 MG/3ML) 0.083% neb solution Take 1 vial (2.5 mg) by nebulization 2 times daily as needed for shortness of breath / dyspnea or wheezing 90 mL      aspirin 81 MG EC tablet Take 81 mg by mouth daily       bisacodyl (DULCOLAX) 10 MG suppository Place 10 mg rectally daily as needed for constipation       cholecalciferol (VITAMIN D3) 10 mcg (400 units) TABS tablet Take 1,000 Units by mouth       cyanocobalamin (CYANOCOBALAMIN) 1000 MCG/ML injection Inject 1 mL into the muscle every 30 days       divalproex sodium delayed-release (DEPAKOTE) " "250 MG DR tablet Take 250 mg by mouth daily       DULoxetine HCl 40 MG CPEP Take 40 mg by mouth daily       fluticasone-vilanterol (BREO ELLIPTA) 200-25 MCG/INH inhaler Inhale 1 puff into the lungs daily       furosemide (LASIX) 20 MG tablet Take 20 mg by mouth daily       gabapentin (NEURONTIN) 300 MG capsule Take 400 mg by mouth At Bedtime       levothyroxine (SYNTHROID/LEVOTHROID) 112 MCG tablet Take 112 mcg by mouth daily       Lidocaine (LIDOCARE) 4 % Patch Place 1 patch onto the skin every 24 hours 30 patch 0     Melatonin 10 MG TABS tablet Take 10 mg by mouth At Bedtime       metoprolol succinate ER (TOPROL-XL) 25 MG 24 hr tablet Take 12.5 mg by mouth daily       miconazole (MICATIN) 2 % AERP powder Apply topically as needed        mirtazapine (REMERON) 30 MG tablet Take 15 mg by mouth daily       polyethylene glycol (MIRALAX) 17 GM/Dose powder Take 17 g by mouth daily Every other day       potassium chloride ER (MICRO-K) 10 MEQ CR capsule Take 10 mEq by mouth daily        QUEtiapine (SEROQUEL) 25 MG tablet 100 mg 3 times daily  0     rosuvastatin (CRESTOR) 10 MG tablet Take 10 mg by mouth At Bedtime       sennosides (SENOKOT) 8.6 MG tablet Take 2 tablets by mouth At Bedtime       Vitamin D3 (CHOLECALCIFEROL) 25 mcg (1000 units) tablet Take 1 tablet by mouth daily       ROS: 4 point ROS including Respiratory, CV, GI and , other than that noted in the HPI,  is negative    Vitals:  /55   Pulse 68   Temp 98.4  F (36.9  C)   Resp 18   Ht 1.448 m (4' 9\")   Wt 91.6 kg (202 lb)   SpO2 97%   BMI 43.71 kg/m    Body mass index is 43.71 kg/m .  Exam:  GENERAL APPEARANCE:  in no distress,   RESP:  Unlabored breathing. CTA b/l.   CV:  S1S2 audible, regular HR, no murmur appreciated.   ABDOMEN:  soft, NT/ND, BS audible.   M/S:   no joint deformity noted on observation.   SKIN:  No rash noted on observation  NEURO:   No NFD appreciated on observation.   PSYCH:  affect and mood normal    Lab/Diagnostic data: " "Resides in dementia urias      ASSESSMENT/PLAN  --------------------------------   # Chronic systolic CHF (H)  # Ischemic cardiomyopathy and Hx of S/P CABG x 5 (2015), and bi-vent ICD in place  # chronic angina pectoris at rest (H)  # Essential hypertension  # Mixed hyperlipidemia  #  PAD, internal carotid artery stenosis, bilateral (H)  # hx of stroke with ischemic CVD  -hospitalized from 4/6/22-4/7/22 with atypical chest pain. Trop was normal. Pulmonary perfusion scan was low probability and no change when compared to 2017 scan, felt to have GERD, offered PPI but decline..    - Went to ED on 5/10 and 6/1 with atypical chest pain, work up negative. started on famotidine 10 mg daily prn (5/11).   - was seen by Cardiology on 5/11/22, and no changed was made:   - 5/17: s/p downgrade from ICD to pacemaker but implanted system includes DF-4 lad, hence ICD generator was placed but the tachy disabled.   - 7/11/21:  ED visit for chest pain. AMI ruled out. Started on famotidine 20 mg daily.   *10/1/21: ED visit for chest pain with radiation to jaw and left arm, SOB and nausea.  Did not respond to NTG en route and to GI cocktail in ED. Trop x2 no changed. ECG no acute changes.  Sent back to the facility.  *  1/18-19: for CP. \"PET CT cardiac perfusion stress test, which showed a large anterior/anterolateral infarct without significant elena-infarct ischemia.\"  * ED visits on 2/12 (HA and cervicalgia, 3/14 (chest pain), 4/4 and  * 5/23: nocturnal chest pain. Pacemaker recently checked and is functioning normally.  Slept well after fentanyl. ACS ruled out with trop. Sent back.   - followed by Cardiology. Appreciate help.   - currently on multiple agents. Continue.        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 Appreciate help  - GDR when feasible.         COPD (H): at baseline. on CSI/LABA, continue meds.      Polymyalgia rheumatica (H)  Chronic pain disorder  Chronic bilateral low back pain without sciatica  Peripheral polyneuropathy  Frailty  - analgesia optimal. Continue current regimen  - Significant  Deficits requiring NH placement. Requiring extensive assistance from nursing. Up for meals only o/w spends the day resting in bed.        Obesity: Body mass index is Body mass index is Body mass index is 43.71 kg/m .  -  to reduce calories intake, but she is not interested. Will continue provide educations        CKD3a GFR 45-59 ml/min (H):  - slight reduction in GFR. - Avoid nephrotoxic  medications. Renally dose medications. Monitor electrolytes, and dehydration status     Headache, non specified: based hx. No clinical evidence. Continue to monitor. Continue current regimen.      Normocytic anemia, query ACD: off iron. Hb stable. Routine lab        Hypothyroidism:  TSH   Date Value Ref Range Status   02/08/2023 2.19 0.30 - 4.20 uIU/mL Final   12/28/2022 25.51 (H) 0.40 - 4.00 mU/L Final   12/04/2020 41.34 (H) 0.40 - 4.00 mU/L Final   On Levothyroxine. In frail Elderly adult, recommended TSH level is around 6 providing patient is asymptomatic and FT4 is wnl- preferably on the lower normal level.  - Consider checking FT4 next time TSH is checked.         Irritable bowel syndrome with diarrhea  Gastroesophageal reflux disease, esophagitis presence not specified  - stable.        Electronically signed by:  Paola Pastor MD

## 2023-05-30 ENCOUNTER — NURSING HOME VISIT (OUTPATIENT)
Dept: GERIATRICS | Facility: CLINIC | Age: 68
End: 2023-05-30
Payer: COMMERCIAL

## 2023-05-30 VITALS
HEIGHT: 57 IN | TEMPERATURE: 98.4 F | HEART RATE: 68 BPM | DIASTOLIC BLOOD PRESSURE: 55 MMHG | OXYGEN SATURATION: 97 % | SYSTOLIC BLOOD PRESSURE: 133 MMHG | BODY MASS INDEX: 43.58 KG/M2 | WEIGHT: 202 LBS | RESPIRATION RATE: 18 BRPM

## 2023-05-30 DIAGNOSIS — E66.01 MORBID OBESITY (H): ICD-10-CM

## 2023-05-30 DIAGNOSIS — I20.89 ANGINA AT REST (H): ICD-10-CM

## 2023-05-30 DIAGNOSIS — I50.22 CHRONIC SYSTOLIC CONGESTIVE HEART FAILURE (H): Primary | ICD-10-CM

## 2023-05-30 DIAGNOSIS — I48.20 CHRONIC ATRIAL FIBRILLATION (H): ICD-10-CM

## 2023-05-30 DIAGNOSIS — N18.31 CHRONIC KIDNEY DISEASE, STAGE 3A (H): ICD-10-CM

## 2023-05-30 DIAGNOSIS — I10 ESSENTIAL (PRIMARY) HYPERTENSION: ICD-10-CM

## 2023-05-30 DIAGNOSIS — Z95.810 ICD (IMPLANTABLE CARDIOVERTER-DEFIBRILLATOR) IN PLACE: ICD-10-CM

## 2023-05-30 DIAGNOSIS — M35.3 POLYMYALGIA RHEUMATICA (H): ICD-10-CM

## 2023-05-30 DIAGNOSIS — J44.9 CHRONIC OBSTRUCTIVE PULMONARY DISEASE, UNSPECIFIED COPD TYPE (H): ICD-10-CM

## 2023-05-30 DIAGNOSIS — F33.2 MAJOR DEPRESSIVE DISORDER, RECURRENT SEVERE WITHOUT PSYCHOTIC FEATURES (H): ICD-10-CM

## 2023-05-30 PROCEDURE — 99309 SBSQ NF CARE MODERATE MDM 30: CPT | Performed by: FAMILY MEDICINE

## 2023-05-30 NOTE — LETTER
"    5/30/2023        RE: Letty Escobar  C/o Edwar Escobar  [address] [address] [place] [address]            [place] [facility]  Chief Complaint   Patient presents with     long term Regulatory     Cassandra Regulatory     [place] Medical Record Number:  [id number]  Place of Service where encounter took place:  [facility] [place] () [[id number]]    HPI:    Letty Escobar  is 67 year old (1955), who is being seen today for a federally mandated E/M visit.  HPI information obtained from: facility chart records, facility staff, patient report and [facility] chart review.     1/18-19: for CP. \"PET CT cardiac perfusion stress test, which showed a large anterior/anterolateral infarct without significant elena-infarct ischemia.\"    Today's concerns are:    - ED visits on 2/12 (HA and cervicalgia, 3/14 (chest pain), 4/4 and     5/23: nocturnal chest pain. Pacemaker recently checked and is functioning normally.  Slept well after fentanyl. ACS ruled out with trop. Sent back.        - Resident seen and examined, chart reviewed and discussed with the nursing staff.     - Denies chest pain, SOB.     - DNP and RN have no concern today.   --------------------------------    MEDICATIONS:  Current Outpatient Medications   Medication Sig Dispense Refill     acetaminophen (TYLENOL) 325 MG tablet Take 650 mg by mouth every 6 hours as needed for mild pain       acetaminophen (TYLENOL) 500 MG tablet Take 2 tablets (1,000 mg) by mouth 3 times daily       albuterol (PROVENTIL) (2.5 MG/3ML) 0.083% neb solution Take 1 vial (2.5 mg) by nebulization 2 times daily as needed for shortness of breath / dyspnea or wheezing 90 mL      aspirin 81 MG EC tablet Take 81 mg by mouth daily       bisacodyl (DULCOLAX) 10 MG suppository Place 10 mg rectally daily as needed for constipation       cholecalciferol (VITAMIN D3) 10 mcg (400 units) TABS tablet Take 1,000 Units by mouth       cyanocobalamin (CYANOCOBALAMIN) " "1000 MCG/ML injection Inject 1 mL into the muscle every 30 days       divalproex sodium delayed-release (DEPAKOTE) 250 MG DR tablet Take 250 mg by mouth daily       DULoxetine HCl 40 MG CPEP Take 40 mg by mouth daily       fluticasone-vilanterol (BREO ELLIPTA) 200-25 MCG/INH inhaler Inhale 1 puff into the lungs daily       furosemide (LASIX) 20 MG tablet Take 20 mg by mouth daily       gabapentin (NEURONTIN) 300 MG capsule Take 400 mg by mouth At Bedtime       levothyroxine (SYNTHROID/LEVOTHROID) 112 MCG tablet Take 112 mcg by mouth daily       Lidocaine (LIDOCARE) 4 % Patch Place 1 patch onto the skin every 24 hours 30 patch 0     Melatonin 10 MG TABS tablet Take 10 mg by mouth At Bedtime       metoprolol succinate ER (TOPROL-XL) 25 MG 24 hr tablet Take 12.5 mg by mouth daily       miconazole (MICATIN) 2 % AERP powder Apply topically as needed        mirtazapine (REMERON) 30 MG tablet Take 15 mg by mouth daily       polyethylene glycol (MIRALAX) 17 GM/Dose powder Take 17 g by mouth daily Every other day       potassium chloride ER (MICRO-K) 10 MEQ CR capsule Take 10 mEq by mouth daily        QUEtiapine (SEROQUEL) 25 MG tablet 100 mg 3 times daily  0     rosuvastatin (CRESTOR) 10 MG tablet Take 10 mg by mouth At Bedtime       sennosides (SENOKOT) 8.6 MG tablet Take 2 tablets by mouth At Bedtime       Vitamin D3 (CHOLECALCIFEROL) 25 mcg (1000 units) tablet Take 1 tablet by mouth daily       ROS: 4 point ROS including Respiratory, CV, GI and , other than that noted in the HPI,  is negative    Vitals:  /55   Pulse 68   Temp 98.4  F (36.9  C)   Resp 18   Ht 1.448 m (4' 9\")   Wt 91.6 kg (202 lb)   SpO2 97%   BMI 43.71 kg/m    Body mass index is 43.71 kg/m .  Exam:  GENERAL APPEARANCE:  in no distress,   RESP:  Unlabored breathing. CTA b/l.   CV:  S1S2 audible, regular HR, no murmur appreciated.   ABDOMEN:  soft, NT/ND, BS audible.   M/S:   no joint deformity noted on observation.   SKIN:  No rash noted on " "observation  NEURO:   No NFD appreciated on observation.   PSYCH:  affect and mood normal    Lab/Diagnostic data: Resides in dementia urias      ASSESSMENT/PLAN  --------------------------------   # Chronic systolic CHF (H)  # Ischemic cardiomyopathy and Hx of S/P CABG x 5 (2015), and bi-vent ICD in place  # chronic angina pectoris at rest (H)  # Essential hypertension  # Mixed hyperlipidemia  #  PAD, internal carotid artery stenosis, bilateral (H)  # hx of stroke with ischemic CVD  -hospitalized from 4/6/22-4/7/22 with atypical chest pain. Trop was normal. Pulmonary perfusion scan was low probability and no change when compared to 2017 scan, felt to have GERD, offered PPI but decline..    - Went to ED on 5/10 and 6/1 with atypical chest pain, work up negative. started on famotidine 10 mg daily prn (5/11).   - was seen by Cardiology on 5/11/22, and no changed was made:   - 5/17: s/p downgrade from ICD to pacemaker but implanted system includes DF-4 lad, hence ICD generator was placed but the tachy disabled.   - 7/11/21:  ED visit for chest pain. AMI ruled out. Started on famotidine 20 mg daily.   *10/1/21: ED visit for chest pain with radiation to jaw and left arm, SOB and nausea.  Did not respond to NTG en route and to GI cocktail in ED. Trop x2 no changed. ECG no acute changes.  Sent back to the facility.  *  1/18-19: for CP. \"PET CT cardiac perfusion stress test, which showed a large anterior/anterolateral infarct without significant elena-infarct ischemia.\"  * ED visits on 2/12 (HA and cervicalgia, 3/14 (chest pain), 4/4 and  * 5/23: nocturnal chest pain. Pacemaker recently checked and is functioning normally.  Slept well after fentanyl. ACS ruled out with trop. Sent back.   - followed by Cardiology. Appreciate help.   - currently on multiple agents. Continue.        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 Appreciate help  - GDR when feasible.         COPD (H): at baseline. on CSI/LABA, continue meds.      Polymyalgia rheumatica (H)  Chronic pain disorder  Chronic bilateral low back pain without sciatica  Peripheral polyneuropathy  Frailty  - analgesia optimal. Continue current regimen  - Significant  Deficits requiring NH placement. Requiring extensive assistance from nursing. Up for meals only o/w spends the day resting in bed.        Obesity: Body mass index is Body mass index is Body mass index is 43.71 kg/m .  -  to reduce calories intake, but she is not interested. Will continue provide educations        CKD3a GFR 45-59 ml/min (H):  - slight reduction in GFR. - Avoid nephrotoxic  medications. Renally dose medications. Monitor electrolytes, and dehydration status     Headache, non specified: based hx. No clinical evidence. Continue to monitor. Continue current regimen.      Normocytic anemia, query ACD: off iron. Hb stable. Routine lab        Hypothyroidism:  TSH   Date Value Ref Range Status   02/08/2023 2.19 0.30 - 4.20 uIU/mL Final   12/28/2022 25.51 (H) 0.40 - 4.00 mU/L Final   12/04/2020 41.34 (H) 0.40 - 4.00 mU/L Final   On Levothyroxine. In frail Elderly adult, recommended TSH level is around 6 providing patient is asymptomatic and FT4 is wnl- preferably on the lower normal level.  - Consider checking FT4 next time TSH is checked.         Irritable bowel syndrome with diarrhea  Gastroesophageal reflux disease, esophagitis presence not specified  - stable.        Electronically signed by:  Paola Pastor MD        Sincerely,        Paola Pastor MD

## 2023-06-20 NOTE — PLAN OF CARE
PT: Patient discharging to rehab at 11:00    Physical Therapy Discharge Summary    Reason for therapy discharge:    Discharged to transitional care facility.    Progress towards therapy goal(s). See goals on Care Plan in [facility] electronic health record for goal details.  Goals partially met.  Barriers to achieving goals:   limited tolerance for therapy.    Therapy recommendation(s):    Continued therapy is recommended.  Rationale/Recommendations:  To progress safety and independence with functional mobility.         [Patient Intake Form Reviewed] : Patient intake form was reviewed [Sclera anicteric] : sclera anicteric [Fever] : no fever [Chest Pain] : no chest pain [Dyspnea on Exertion] : no dyspnea on exertion [Abdominal Pain] : no abdominal pain [Dysuria] : no dysuria [Hematuria] : no hematuria

## 2023-06-22 ENCOUNTER — NURSING HOME VISIT (OUTPATIENT)
Dept: GERIATRICS | Facility: CLINIC | Age: 68
End: 2023-06-22
Payer: COMMERCIAL

## 2023-06-22 DIAGNOSIS — Z95.0 PACEMAKER: ICD-10-CM

## 2023-06-22 DIAGNOSIS — M35.3 POLYMYALGIA RHEUMATICA (H): Primary | ICD-10-CM

## 2023-06-22 DIAGNOSIS — I10 ESSENTIAL (PRIMARY) HYPERTENSION: ICD-10-CM

## 2023-06-22 DIAGNOSIS — K59.01 SLOW TRANSIT CONSTIPATION: ICD-10-CM

## 2023-06-22 PROCEDURE — 99309 SBSQ NF CARE MODERATE MDM 30: CPT | Performed by: FAMILY MEDICINE

## 2023-06-22 RX ORDER — TRAZODONE HYDROCHLORIDE 50 MG/1
25 TABLET, FILM COATED ORAL 2 TIMES DAILY
Status: ON HOLD | COMMUNITY
End: 2024-04-29

## 2023-06-22 NOTE — PROGRESS NOTES
[facility] GERIATRIC SERVICES    Facility:   [facility] () [[id number]]   Code Status: DNR/DNI      CHIEF COMPLAINT/REASON FOR VISIT:  Chief Complaint   Patient presents with     RECHECK       HISTORY:      HPI: Letty is a 67 year old female who does currently reside in [facility] room [facility] and was asked to visit with today secondary to medication review chronic medical conditions.  She did have a bout of chest pain on Cassandra 15 she is given nitro as well as aspirin in the paramedics did show her EKG she decided to stay in the long-term care and the emergency department.  She does admit to anxiety disorder that she has been emergency room #secondary to chest pain.  She also did recently have a device check last on May 31.  In the month of June her systolic blood pressures ranging 104-152 and her weight on June 7 was 198 pounds.  She is not complaining of any pain she is on gabapentin in the evening time along with Tylenol scheduled.  For her mood she is on Seroquel 100 mg 3 times daily and trazodone 25 mg 3 times daily and she does feel that combination to be good for her plus she is on mirtazapine 15 mg at bedtime and Depakote 250 mg at bedtime and Cymbalta 40 mg and for sleep is also melatonin 10 mg and she does not want to make any current medication changes.  Otherwise her appetite is good.  Denies any increase in discomfort.  Denies any dysuria and also does have periods of constipation for which she also does occasionally take a stool softener or MiraLAX as needed.    Past Medical History:   Diagnosis Date     ACP (advance care planning) 11/3/2010    Formatting of this note might be different from the original. Patient has identified Health Care Agent(s): Yes Add Health Care Agents: Yes   Health Care Agent(s):  Primary Health Care Agent:   Edwar Escobar Relationship:  Spouse Phone:   [phone]  Secondary Health Care Agent:   Chiki Jeffreycomfort Relationship:   Son Phone:   [phone]   Patient has Advance Care Plan Documents (Health Care Direct     Acute coronary syndrome (H) 11/22/2019     Acute exacerbation of CHF (congestive heart failure) (H) 11/11/2019     Acute on chronic systolic (congestive) heart failure (H) 1/28/2020     Anemia of chronic disease 1/5/2013     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, left leg (H) 1/15/2020     BPPV (benign paroxysmal positional vertigo) 5/31/2018     Candidiasis 3/1/2020     Carotid stenosis, right 7/13/2016    Carotid US 05/04/2018 showed moderate plaque formation, consistent with 50 to 69% stenosis in the right internal carotid artery, not significantly changed from 8/5/2015.  Moderate plaque formation, consistent with 50 to 69% stenosis in the left internal carotid artery; there has been mild progression of the left ICA stenosis since 8/5/2015.     Chest pain 11/11/2019     Chronic bilateral low back pain without sciatica 1/17/2018     Chronic pain disorder 10/1/2017     Constipation 3/20/2020     COPD (chronic obstructive pulmonary disease) (H) 6/24/2020     Coronary atherosclerosis 2/6/2009 2/5/2009 - MI - Proximal RCA 99%, mild-mod disease elsewhere.  EF 60%.  PCI:  MYRON to pRCA. 2/12/2009 - admit CP - Widely patent RCA stent. Moderate diffuse CAD. Severe stenosis in trivial PDA branch. LVEF 45%. 1/25/2010 - admit CP - PTCA and stent of diagonal 9/8/2010 - admit CP - LAD patent stent. Moderate diffuse CAD. Medical management recommended.  4/25/2012 - NSTEMI - Acute total occlusion of     Cubital tunnel syndrome on left 11/13/2012     Depressive disorder      Diabetes mellitus (H)      Diabetes mellitus type 2 in obese (H) 7/13/2006     Diabetes mellitus type 2 in obese (H) 7/13/2006     Drug-seeking behavior 4/4/2020     Failure to thrive in adult 9/3/2020     Hereditary and idiopathic peripheral neuropathy 4/24/2007     Hyperlipidemia with target low density lipoprotein (LDL) cholesterol less than 70  mg/dL 5/30/2007     Hypertension 10/14/2015     Hypothyroidism 12/10/2010     Irritable bowel syndrome 9/7/2015     Ischemic cardiomyopathy 9/20/2015    EF of 40-45%, status post RV lead revision and LV epicardial lead placement via mini-thoracotomy in August 2016.     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  Designated pharmacy: [facility] Prescribing physician: Andrea Diagnosis: Ulnar neuropathy     Panic disorder with agoraphobia 4/14/2020     Paroxysmal atrial fibrillation (H) 10/2/2015     Personality disorder (H) 3/5/2020    Rule out dependent personality     Polymyalgia rheumatica (H) 11/28/2018     Posttraumatic stress disorder 3/1/2020     Restless legs syndrome (RLS) 8/29/2007     Restless legs syndrome (RLS) 8/29/2007     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     Serum calcium elevated 3/1/2020     Somatic dysfunction of sacral region 1/17/2018     Subacromial bursitis of left shoulder joint 8/6/2018     Suicidal ideation 4/1/2020     Thyroid disease      TIA (transient ischemic attack) 5/4/2018     TIA (transient ischemic attack) 5/4/2018     Tobacco abuse 2/17/2017     Tobacco abuse 2/17/2017     Urinary incontinence, mixed 9/24/2017     UTI (urinary tract infection) 4/17/2020     Vitamin B12 deficiency 2/14/2018     Weakness 12/17/2019            No family history on file.   Social History     Socioeconomic History     Marital status:      Number of children: 1   Tobacco Use     Smoking status: Never     Smokeless tobacco: Never   Vaping Use     Vaping Use: Never used   Substance and Sexual Activity     Alcohol use: Not Currently     Drug use: Never      Sexual activity: Not Currently      No new changes to the review of systems or the physical exam since our last visit on May 12  REVIEW OF SYSTEM:  She currently denies any new symptoms of cough or cold sore throat postnasal drip wheezing chest pain dizziness vertigo nausea vomiting diarrhea dysuria frequency or urgency.  Does have a history of dyslipidemia, COPD, CABG, depression, hypertension, posttraumatic stress disorder, panic disorder, anxiety, personality disorder    PHYSICAL EXAM:   Pleasant female in no acute distress.  Head is normocephalic.  Neck is supple without adenopathy.  Lung sounds are clear throughout.  Cardiovascular S1-S2 regular rate and rhythm and no lower extremity edema.  Pacemaker.  Gastrointestinal is protuberant nontender nondistended.  Musculoskeletal-denies discomfort.  Psychiatric: Pleasant affect    Current Outpatient Medications:      traZODone (DESYREL) 50 MG tablet, Take 25 mg by mouth 3 times daily, Disp: , Rfl:      acetaminophen (TYLENOL) 325 MG tablet, Take 650 mg by mouth every 6 hours as needed for mild pain, Disp: , Rfl:      acetaminophen (TYLENOL) 500 MG tablet, Take 2 tablets (1,000 mg) by mouth 3 times daily, Disp: , Rfl:      albuterol (PROVENTIL) (2.5 MG/3ML) 0.083% neb solution, Take 1 vial (2.5 mg) by nebulization 2 times daily as needed for shortness of breath / dyspnea or wheezing, Disp: 90 mL, Rfl:      aspirin 81 MG EC tablet, Take 81 mg by mouth daily, Disp: , Rfl:      bisacodyl (DULCOLAX) 10 MG suppository, Place 10 mg rectally daily as needed for constipation, Disp: , Rfl:      cholecalciferol (VITAMIN D3) 10 mcg (400 units) TABS tablet, Take 1,000 Units by mouth, Disp: , Rfl:      cyanocobalamin (CYANOCOBALAMIN) 1000 MCG/ML injection, Inject 1 mL into the muscle every 30 days, Disp: , Rfl:      divalproex sodium delayed-release (DEPAKOTE) 250 MG DR tablet, Take 250 mg by mouth daily, Disp: , Rfl:      DULoxetine HCl 40 MG CPEP, Take 40 mg by mouth daily,  Disp: , Rfl:      fluticasone-vilanterol (BREO ELLIPTA) 200-25 MCG/INH inhaler, Inhale 1 puff into the lungs daily, Disp: , Rfl:      furosemide (LASIX) 20 MG tablet, Take 20 mg by mouth daily, Disp: , Rfl:      gabapentin (NEURONTIN) 300 MG capsule, Take 400 mg by mouth At Bedtime, Disp: , Rfl:      levothyroxine (SYNTHROID/LEVOTHROID) 112 MCG tablet, Take 112 mcg by mouth daily, Disp: , Rfl:      Lidocaine (LIDOCARE) 4 % Patch, Place 1 patch onto the skin every 24 hours, Disp: 30 patch, Rfl: 0     Melatonin 10 MG TABS tablet, Take 10 mg by mouth At Bedtime, Disp: , Rfl:      metoprolol succinate ER (TOPROL-XL) 25 MG 24 hr tablet, Take 12.5 mg by mouth daily, Disp: , Rfl:      miconazole (MICATIN) 2 % AERP powder, Apply topically as needed , Disp: , Rfl:      mirtazapine (REMERON) 30 MG tablet, Take 15 mg by mouth daily, Disp: , Rfl:      polyethylene glycol (MIRALAX) 17 GM/Dose powder, Take 17 g by mouth daily Every other day, Disp: , Rfl:      potassium chloride ER (MICRO-K) 10 MEQ CR capsule, Take 10 mEq by mouth daily , Disp: , Rfl:      QUEtiapine (SEROQUEL) 25 MG tablet, 100 mg 3 times daily, Disp: , Rfl: 0     rosuvastatin (CRESTOR) 10 MG tablet, Take 10 mg by mouth At Bedtime, Disp: , Rfl:      sennosides (SENOKOT) 8.6 MG tablet, Take 2 tablets by mouth At Bedtime, Disp: , Rfl:      Vitamin D3 (CHOLECALCIFEROL) 25 mcg (1000 units) tablet, Take 1 tablet by mouth daily, Disp: , Rfl:        There were no vitals taken for this visit.  Vital signs on June 21 blood pressure 142/61 and her weight on June 7 was 198 pounds  LABS:   Last Comprehensive Metabolic Panel:  Lab Results   Component Value Date     05/23/2023    POTASSIUM 4.5 05/23/2023    CHLORIDE 100 05/23/2023    CO2 26 05/23/2023    ANIONGAP 11 05/23/2023     (H) 05/23/2023    BUN 24.8 (H) 05/23/2023    CR 1.16 (H) 05/23/2023    GFRESTIMATED 51 (L) 05/23/2023    ORESTES 9.8 05/23/2023       Recent Labs   Lab Test 10/19/22  0743 05/28/21  0818    CHOL 162 154   HDL 30* 42*   LDL 56 83   TRIG 382* 144     TSH   Date Value Ref Range Status   02/08/2023 2.19 0.30 - 4.20 uIU/mL Final   12/28/2022 25.51 (H) 0.40 - 4.00 mU/L Final   12/04/2020 41.34 (H) 0.40 - 4.00 mU/L Final     T4 Free   Date Value Ref Range Status   12/04/2020 1.09 0.76 - 1.46 ng/dL Final     CBC RESULTS: Recent Labs   Lab Test 05/23/23  0340   WBC 7.6   RBC 3.17*   HGB 9.3*   HCT 29.6*   MCV 93   MCH 29.3   MCHC 31.4*   RDW 14.4   *           ASSESSMENT:    Encounter Diagnoses   Name Primary?     Polymyalgia rheumatica (H) Yes     Slow transit constipation      Essential (primary) hypertension      Pacemaker        PLAN:    She does not want any medication changes with this particular visit.  We had a chance to talk about her recent complaint of chest pain on Cassandra 15 as well as her current medications as well as reassurance from the staff as well as her device check on May 31 and she did feel comfortable with the visit as well as the conversation and did not have any other questions.        Electronically signed by: Jay Chapman NP

## 2023-07-06 ENCOUNTER — PATIENT OUTREACH (OUTPATIENT)
Dept: GERIATRIC MEDICINE | Facility: CLINIC | Age: 68
End: 2023-07-06

## 2023-07-06 ENCOUNTER — HOSPITAL ENCOUNTER (EMERGENCY)
Facility: CLINIC | Age: 68
Discharge: HOME | End: 2023-07-06
Attending: FAMILY MEDICINE | Admitting: FAMILY MEDICINE
Payer: COMMERCIAL

## 2023-07-06 ENCOUNTER — APPOINTMENT (OUTPATIENT)
Dept: GENERAL RADIOLOGY | Facility: CLINIC | Age: 68
End: 2023-07-06
Attending: FAMILY MEDICINE
Payer: COMMERCIAL

## 2023-07-06 VITALS
HEIGHT: 57 IN | WEIGHT: 202 LBS | SYSTOLIC BLOOD PRESSURE: 139 MMHG | HEART RATE: 60 BPM | OXYGEN SATURATION: 97 % | RESPIRATION RATE: 20 BRPM | DIASTOLIC BLOOD PRESSURE: 67 MMHG | BODY MASS INDEX: 43.58 KG/M2 | TEMPERATURE: 98 F

## 2023-07-06 DIAGNOSIS — R07.9 CHEST PAIN, UNSPECIFIED TYPE: ICD-10-CM

## 2023-07-06 LAB
ALBUMIN SERPL BCG-MCNC: 3.7 G/DL (ref 3.5–5.2)
ALP SERPL-CCNC: 51 U/L (ref 35–104)
ALT SERPL W P-5'-P-CCNC: 18 U/L (ref 0–50)
ANION GAP SERPL CALCULATED.3IONS-SCNC: 12 MMOL/L (ref 7–15)
AST SERPL W P-5'-P-CCNC: 17 U/L (ref 0–45)
BASOPHILS # BLD AUTO: 0 10E3/UL (ref 0–0.2)
BASOPHILS NFR BLD AUTO: 0 %
BILIRUB SERPL-MCNC: <0.2 MG/DL
BUN SERPL-MCNC: 21.9 MG/DL (ref 8–23)
CALCIUM SERPL-MCNC: 9.6 MG/DL (ref 8.8–10.2)
CHLORIDE SERPL-SCNC: 105 MMOL/L (ref 98–107)
CREAT SERPL-MCNC: 1.06 MG/DL (ref 0.51–0.95)
DEPRECATED HCO3 PLAS-SCNC: 23 MMOL/L (ref 22–29)
EOSINOPHIL # BLD AUTO: 0.2 10E3/UL (ref 0–0.7)
EOSINOPHIL NFR BLD AUTO: 2 %
ERYTHROCYTE [DISTWIDTH] IN BLOOD BY AUTOMATED COUNT: 14.6 % (ref 10–15)
GFR SERPL CREATININE-BSD FRML MDRD: 57 ML/MIN/1.73M2
GLUCOSE SERPL-MCNC: 145 MG/DL (ref 70–99)
HCT VFR BLD AUTO: 29.2 % (ref 35–47)
HGB BLD-MCNC: 9.2 G/DL (ref 11.7–15.7)
IMM GRANULOCYTES # BLD: 0.1 10E3/UL
IMM GRANULOCYTES NFR BLD: 1 %
LYMPHOCYTES # BLD AUTO: 2.4 10E3/UL (ref 0.8–5.3)
LYMPHOCYTES NFR BLD AUTO: 31 %
MCH RBC QN AUTO: 29.8 PG (ref 26.5–33)
MCHC RBC AUTO-ENTMCNC: 31.5 G/DL (ref 31.5–36.5)
MCV RBC AUTO: 95 FL (ref 78–100)
MONOCYTES # BLD AUTO: 0.4 10E3/UL (ref 0–1.3)
MONOCYTES NFR BLD AUTO: 6 %
NEUTROPHILS # BLD AUTO: 4.5 10E3/UL (ref 1.6–8.3)
NEUTROPHILS NFR BLD AUTO: 60 %
NRBC # BLD AUTO: 0 10E3/UL
NRBC BLD AUTO-RTO: 0 /100
NT-PROBNP SERPL-MCNC: 1017 PG/ML (ref 0–900)
PLATELET # BLD AUTO: 142 10E3/UL (ref 150–450)
POTASSIUM SERPL-SCNC: 4.6 MMOL/L (ref 3.4–5.3)
PROT SERPL-MCNC: 6.6 G/DL (ref 6.4–8.3)
RBC # BLD AUTO: 3.09 10E6/UL (ref 3.8–5.2)
SODIUM SERPL-SCNC: 140 MMOL/L (ref 136–145)
TROPONIN T SERPL HS-MCNC: 26 NG/L
TROPONIN T SERPL HS-MCNC: 27 NG/L
WBC # BLD AUTO: 7.6 10E3/UL (ref 4–11)

## 2023-07-06 PROCEDURE — 85025 COMPLETE CBC W/AUTO DIFF WBC: CPT | Performed by: FAMILY MEDICINE

## 2023-07-06 PROCEDURE — 99285 EMERGENCY DEPT VISIT HI MDM: CPT | Mod: 25 | Performed by: FAMILY MEDICINE

## 2023-07-06 PROCEDURE — 99284 EMERGENCY DEPT VISIT MOD MDM: CPT | Mod: 25 | Performed by: FAMILY MEDICINE

## 2023-07-06 PROCEDURE — 93010 ELECTROCARDIOGRAM REPORT: CPT | Performed by: FAMILY MEDICINE

## 2023-07-06 PROCEDURE — 83880 ASSAY OF NATRIURETIC PEPTIDE: CPT | Performed by: FAMILY MEDICINE

## 2023-07-06 PROCEDURE — 84484 ASSAY OF TROPONIN QUANT: CPT | Performed by: FAMILY MEDICINE

## 2023-07-06 PROCEDURE — 96375 TX/PRO/DX INJ NEW DRUG ADDON: CPT | Performed by: FAMILY MEDICINE

## 2023-07-06 PROCEDURE — 71045 X-RAY EXAM CHEST 1 VIEW: CPT

## 2023-07-06 PROCEDURE — 93005 ELECTROCARDIOGRAM TRACING: CPT | Performed by: FAMILY MEDICINE

## 2023-07-06 PROCEDURE — 36415 COLL VENOUS BLD VENIPUNCTURE: CPT | Performed by: FAMILY MEDICINE

## 2023-07-06 PROCEDURE — 80053 COMPREHEN METABOLIC PANEL: CPT | Performed by: FAMILY MEDICINE

## 2023-07-06 PROCEDURE — 250N000013 HC RX MED GY IP 250 OP 250 PS 637: Performed by: FAMILY MEDICINE

## 2023-07-06 PROCEDURE — 96374 THER/PROPH/DIAG INJ IV PUSH: CPT | Performed by: FAMILY MEDICINE

## 2023-07-06 PROCEDURE — 250N000011 HC RX IP 250 OP 636: Performed by: FAMILY MEDICINE

## 2023-07-06 RX ORDER — ONDANSETRON 2 MG/ML
4 INJECTION INTRAMUSCULAR; INTRAVENOUS EVERY 30 MIN PRN
Status: DISCONTINUED | OUTPATIENT
Start: 2023-07-06 | End: 2023-07-06 | Stop reason: HOSPADM

## 2023-07-06 RX ORDER — FENTANYL CITRATE 50 UG/ML
25 INJECTION, SOLUTION INTRAMUSCULAR; INTRAVENOUS ONCE
Status: COMPLETED | OUTPATIENT
Start: 2023-07-06 | End: 2023-07-06

## 2023-07-06 RX ADMIN — ONDANSETRON 4 MG: 2 INJECTION INTRAMUSCULAR; INTRAVENOUS at 02:09

## 2023-07-06 RX ADMIN — FENTANYL CITRATE 25 MCG: 50 INJECTION, SOLUTION INTRAMUSCULAR; INTRAVENOUS at 02:11

## 2023-07-06 RX ADMIN — NITROGLYCERIN 15 MG: 20 OINTMENT TOPICAL at 02:13

## 2023-07-06 ASSESSMENT — ACTIVITIES OF DAILY LIVING (ADL)
ADLS_ACUITY_SCORE: 35
ADLS_ACUITY_SCORE: 35

## 2023-07-06 NOTE — DISCHARGE INSTRUCTIONS
Your heart tests were normal tonight which is reassuring.  Follow-up with your cardiologist to discuss options going forward.  Continue your current medications.  Return to the ED if worse/concerns.  It was nice visiting with you again today.  I am glad you are feeling better and hope you continue to do well.    Thank you for choosing [facility]. We appreciate the opportunity to meet your urgent medical needs. Please let us know if we could have done anything to make your stay more satisfying.    After discharge, please closely monitor for any new or worsening symptoms. Return to the Emergency Department if you develop any acute worsening signs or symptoms.    If you had lab work, cultures or imaging studies done during your stay, the final results may still be pending. We will call you if your plan of care needs to change. However, if you are not improving as expected, please follow up with your primary care provider or clinic.     Start any prescription medications that were prescribed to you and take them as directed.     Please see additional handouts that may be pertinent to your condition.

## 2023-07-06 NOTE — ED PROVIDER NOTES
History     Chief Complaint   Patient presents with     Chest Pain     HPI  Letty Escobar is a 67 year old female who presents to the ED tonight after waking up at about 12:15 AM with chest pressure that radiates down the left arm.  She took 1 nitro tablet and the ambulance gave her 2 nitro sprays.  That took her pain from an 8 down to a 6 out of 10.  Had some Zofran for nausea with that persist.  No shortness of breath.      H/o ischemic cardiomyopathy, CHF, A-fib, status post CABG x5 in 2015, currently has 21 cardiac stents.  Followed by cardiology at [facility]-Dr. Saucedo.  Resident of the [facility]        Allergies:  Allergies   Allergen Reactions     Bee Pollen Anaphylaxis     Diphenhydramine Palpitations     Tolerated IV Benadryl when not pushed too fast     Isosorbide Nitrate Other (See Comments) and Dizziness     Also causes syncope (has fallen before) and brain fog/mental disturbances - please do not prescribe     Nitroglycerin Dizziness, Fatigue and Other (See Comments)     Specifically the patch - please do not prescribe  Specifically the patch - please do not prescribe       Contrast Dye Hives and Itching     Other Drug Allergy (See Comments) Hives and Itching     Penicillin G      Adhesive Tape Rash     Diphenhydramine Hcl Palpitations     Liquid Adhesive Itching     Nystatin Dermatitis     Nystatin Dermatitis     Also blisters     Penicillins Swelling and Rash     Occurred as a child - not 100% sure on specific reactions     Sulfa Antibiotics Other (See Comments)     Occurred as a child / patient does not remember specific reaction       Problem List:    Patient Active Problem List    Diagnosis Date Noted     Chronic kidney disease, stage 3a (H) 10/16/2022     Priority: Medium     SOB (shortness of breath) 04/07/2022     Priority: Medium     Morbid obesity (H) 12/10/2021     Priority: Medium     ICD (implantable cardioverter-defibrillator) in place 11/17/2021     Priority: Medium      Formatting of this note is different from the original.  Date of last device in office evaluation: 5/17/2022    ?  and model: Medtronic Cobalt CRT-D.   Date of implant: 5/7/2021  Tachy therapies remain OFF: S/p downgrade from ICD to pacemaker, but her implanted system includes a DF-4 lead, so an ICD generator was placed with the tachycardia therapies disabled.     ? Indication for device: Ischemic cardiomyopathy   ? Cardiac resynchronization therapy:   no      MRI Conditional:  No  o If No:  Reason why:  Abandoned LV lead    ? Battery longevity documented as less than 3  Months: No  ? Are any of the leads less than 3 months old:  No    ? Programming              ? Pacing mode and programmed lower rate: DDDR 60 - 130 bpm              ? Rate-responsive sensor type, if programmed on: Accelerometer        Underlying rhythm and heart rate:  SR @ 60 bpm    ? What is the response of this device to magnet placement:  None - tachy therapies off  ? PM magnet pacing rate: N/A   ? Any alert status on CIED generator or lead: No    ? Last pacing threshold    Atrial   1.0 V @ 0.4 ms   Ventricular RV: 0.75 V @ 0.4 ms  LV:  2.75 V @ 1.0 ms    Formatting of this note is different from the original.  Date of last device in office evaluation: 11/17/2022     ?  and model: Medtronic Cobalt CRT-D.   Date of implant: 5/7/2021  Tachy therapies remain OFF: S/p downgrade from ICD to pacemaker, but her implanted system includes a DF-4 lead, so an ICD generator was placed with the tachycardia therapies disabled.     ? Indication for device: Ischemic cardiomyopathy   ? Cardiac resynchronization therapy:   no      MRI Conditional:  No  o If No:  Reason why:  Abandoned LV lead    ? Battery longevity documented as less than 3  Months: No  ? Are any of the leads less than 3 months old:  No    ? Programming              ? Pacing mode and programmed lower rate: DDDR 60 - 130 bpm              ? Rate-responsive sensor type, if  programmed on: Accelerometer        Underlying rhythm and heart rate:  Sinus rhythm 62 bpm, w/BBB    ? What is the response of this device to magnet placement:  None - tachy therapies off  ? PM magnet pacing rate: N/A   ? Any alert status on CIED generator or lead: No    ? Last pacing threshold    Atrial   1.0 V @ 0.4 ms   Ventricular RV: 0.75 V @ 0.4 ms  LV:  2.75 V @ 1.0 ms       Atrial fibrillation (H) 04/06/2021     Priority: Medium     Pacemaker 04/06/2021     Priority: Medium     Major depressive disorder, recurrent severe without psychotic features (H) 01/26/2021     Priority: Medium     Panic disorder with agoraphobia 04/14/2020     Priority: Medium     Constipation 03/20/2020     Priority: Medium     Personality disorder (H) 03/05/2020     Priority: Medium     Rule out dependent personality    Formatting of this note might be different from the original.  Rule out dependent personality  Formatting of this note might be different from the original.  Rule out dependent personality       Candidiasis 03/01/2020     Priority: Medium     Posttraumatic stress disorder 03/01/2020     Priority: Medium     COPD without exacerbation (H) 01/29/2020     Priority: Medium     Long term (current) use of insulin (H) 01/29/2020     Priority: Medium     Acute on chronic systolic (congestive) heart failure (H) 01/28/2020     Priority: Medium     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, left leg (H) 01/15/2020     Priority: Medium     Chest pain 11/11/2019     Priority: Medium     Polymyalgia rheumatica (H) 11/28/2018     Priority: Medium     Unstable angina (H) 05/02/2018     Priority: Medium     Coronary angiogram 05/02/2018 demonstrated 30% stenosis in the LMCA, 50% stenosis in the Proximal LAD, 50% stenosis in the Mid LAD, 95% stenosis in the Proximal Circumflex (Restenosis - Balloon Angioplasty), 100% stenosis in the 1st Marginal, 50% stenosis in the Proximal RCA, 50% stenosis  in the Distal RCA, the LIMA graft from the LIMA to the Distal LAD is free of significant disease; the SVG graft from the Aorta to the 1st Marginal is free of significant disease; the SVG graft from the Aorta to the Distal RCA is free of significant disease. Intervention included a successful  2.5mm x 12mm Balloon,  2.5mm x 10mm Cutting Balloon,  3mm x 12mm Drug Eluting Stent,  3mm x 12mm Balloon, and  3mm x 8mm Balloon to Proximal Circumflex, post stenosis 0%.    Formatting of this note is different from the original.  Coronary angiogram 05/02/2018 demonstrated 30% stenosis in the LMCA, 50% stenosis in the Proximal LAD, 50% stenosis in the Mid LAD, 95% stenosis in the Proximal Circumflex (Restenosis - Balloon Angioplasty), 100% stenosis in the 1st Marginal, 50% stenosis in the Proximal RCA, 50% stenosis in the Distal RCA, the LIMA graft from the LIMA to the Distal LAD is free of significant disease; the SVG graft from the Aorta to the 1st Marginal is free of significant disease; the SVG graft from the Aorta to the Distal RCA is free of significant disease. Intervention included a successful  2.5mm x 12mm Balloon,  2.5mm x 10mm Cutting Balloon,  3mm x 12mm Drug Eluting Stent,  3mm x 12mm Balloon, and  3mm x 8mm Balloon to Proximal Circumflex, post stenosis 0%.  Formatting of this note is different from the original.  Coronary angiogram 05/02/2018 demonstrated 30% stenosis in the LMCA, 50% stenosis in the Proximal LAD, 50% stenosis in the Mid LAD, 95% stenosis in the Proximal Circumflex (Restenosis - Balloon Angioplasty), 100% stenosis in the 1st Marginal, 50% stenosis in the Proximal RCA, 50% stenosis in the Distal RCA, the LIMA graft from the LIMA to the Distal LAD is free of significant disease; the SVG graft from the Aorta to the 1st Marginal is free of significant disease; the SVG graft from the Aorta to the Distal RCA is free of significant disease. Intervention included a successful  2.5mm x 12mm Balloon,   2.5mm x 10mm Cutting Balloon,  3mm x 12mm Drug Eluting Stent,  3mm x 12mm Balloon, and  3mm x 8mm Balloon to Proximal Circumflex, post stenosis 0%.       Vitamin B12 deficiency 02/14/2018     Priority: Medium     Chronic bilateral low back pain without sciatica 01/17/2018     Priority: Medium     Chronic pain disorder 10/01/2017     Priority: Medium     Urinary incontinence, mixed 09/24/2017     Priority: Medium     Internal carotid artery stenosis, bilateral 07/13/2016     Priority: Medium     Carotid US 05/04/2018 showed moderate plaque formation, consistent with 50 to 69% stenosis in the right internal carotid artery, not significantly changed from 8/5/2015.  Moderate plaque formation, consistent with 50 to 69% stenosis in the left internal carotid artery; there has been mild progression of the left ICA stenosis since 8/5/2015.    Formatting of this note might be different from the original.  Carotid US 05/04/2018 showed moderate plaque formation, consistent with 50 to 69% stenosis in the right internal carotid artery, not significantly changed from 8/5/2015.  Moderate plaque formation, consistent with 50 to 69% stenosis in the left internal carotid artery; there has been mild progression of the left ICA stenosis since 8/5/2015.    Formatting of this note might be different from the original.  Carotid US 05/04/2018 showed moderate plaque formation, consistent with 50 to 69% stenosis in the right internal carotid artery, not significantly changed from 8/5/2015.  Moderate plaque formation, consistent with 50 to 69% stenosis in the left internal carotid artery; there has been mild progression of the left ICA stenosis since 8/5/2015.       Essential (primary) hypertension 10/14/2015     Priority: Medium     Ischemic cardiomyopathy 09/20/2015     Priority: Medium     EF of 40-45%, status post RV lead revision and LV epicardial lead placement via mini-thoracotomy in August 2016.    Formatting of this note might be  different from the original.  EF of 40-45%, status post RV lead revision and LV epicardial lead placement via mini-thoracotomy in August 2016.  Formatting of this note might be different from the original.  EF of 40-45%, status post RV lead revision and LV epicardial lead placement via mini-thoracotomy in August 2016.       S/P CABG x 5 08/21/2015     Priority: Medium     Pain medication agreement 04/20/2013     Priority: Medium     Controlled substance agreement for percocet #30/month on file and signed 4/17/13.  Designated pharmacy: [facility] Prescribing physician: Andrea Diagnosis: Ulnar neuropathy    Formatting of this note might be different from the original.  Controlled substance agreement for percocet #30/month on file and signed 4/17/13.  Designated pharmacy: [facility] Prescribing physician: Andrea Diagnosis: Ulnar neuropathy       Anemia in other chronic diseases classified elsewhere 01/05/2013     Priority: Medium     Hypothyroidism, unspecified 12/10/2010     Priority: Medium     ACP (advance care planning) 11/03/2010     Priority: Medium     Formatting of this note might be different from the original.  Patient has identified Health Care Agent(s): Yes  Add Health Care Agents: Yes    Health Care Agent(s):    Primary Health Care Agent:     Edwar Escobar Relationship:    Spouse Phone:     [phone]    Secondary Health Care Agent:     Chiki Oro Relationship:     Son Phone:     [phone]      Patient has Advance Care Plan Documents (Health Care Directive, POLST): Yes    Advance Care Plan Documents:  Health Care Directive--03/28/11  Resuscitation Guidelines--    Patient has identified Specific Treatment Preferences: Yes   Specific Treatment Preferences:   a.) Code Status:  DNR/ Do Not Attempt Resuscitation - Allow a Natural Death    b.) Goals of Treatment:     ii. Limited Interventions and treat reversible conditions.  Provide interventions aimed at treatment of new or reversible illness/injury or  "non-life threatening chronic conditions. Duration of invasive or uncomfortable interventions should generally be limited.- Trial of intubation 5 days or other instructions \"If no improvement, stop.\"  c.) Interventions and Treatments:   i.   Antibiotics:          - Aggressive antibiotics  ii.  Nutrition/Hydration:         - Offer food and liquids by mouth         - IV fluid administration         - No feeding tube under any circumstance.  iii. Transfusion:          - Blood products for comfort/relief of symptoms only  iv. Dialysis:           - Dialysis for short term \"for recovery, but not long term.\"        Last Assessment & Plan:   Advance Care Planning: Disease-specific Session    Letty Escobar is an [facility] patient of Dr. Renea Mendez of the [facility].    Advance care planning discussions were completed with Letty and her healthcare agent, spouse Edwar Escobar on Monday, March 28, 2011 at the [facility] Room.    Understanding of Illness and Disease [place]:   Letty identifies her medical condition as diabetes with peripheral neuropathy, heart problems with a heart attack February 2009 plus 4 stent placements; sleep apnea; depression; hypothyroid; high cholesterol; tobacco use; and describes it as needing further assistance with thyroid and diabetes management.  She identifies the following symptoms of her medical condition as being the most bothersome: some memory loss, balance problems, light headedness and dizziness, puffy eyes and lower extremity edema from time to time.    Letty is retired [place] .  She is independent with all cares and lives an active life style although, \"I'm not as active as I used to be.\"    Goals of Care:   Letty currently hopes to maintain independence, control pain and symptoms and delay progression of, but not cure, the illness.  \"I want to get the diabetes and thyroid under better " "control.\"  Letty has an appointment the first part of April for follow up.    Quality of Life:    Letty identifies quality of life as family involvement twice weekly, Muslim activities, newly assigned  , playing cards, the computer,    Letty christiano with serious challenges in her life through her ganesh in God, prayers and support of her Muslim family, spouse and son.    Letty identifies the following fears and worries about her medical care:  \"I just want the diabetes straightened out.\"    Treatment and Care Preferences:   Past experiences in dealing with family and/or friends that have  or been seriously ill include her brother who took his own life.  \"He was 53 years old and living with us.  I found him.\"   As a result of these experiences, Letty expresses these health care preferences:      Summary Letty's Treatment Preferences:  LOW SURVIVAL; HIGH TREATMENT BURDEN:  If Letty suffered a serious complication, such that she was facing a prolonged hospital stay, required ongoing medical interventions, and the chance of living through the complication was low (for example, only 5 out of 100 would live), Letty would choose:  to focus treatment on comfort and quality of life (\"Quality of life is more important than length of life to Letty.\")  COMMENT:  \"If I can't live a normal life, I wouldn't want to live.\"    HIGH SURVIVAL; LOW FUNCTIONAL STATUS:  If Letty had a serious complication and had a good chance of living through the complication but it was expected that she would never be able to walk or talk again and would require 24 hour nursing care, she would choose:  to focus treatment on comfort and quality of life (\"Quality of life is more important than length of life to Letty.\")  COMMENT:  \"I'd accept rehabilitation.\"    HIGH SURVIVAL; LOW COGNITIVE STATUS:  If Letty had a serious complication and had a good chance of living through the complication but it was expected that she would " "never know who she was or who she was with and would require 24 hour nursing care, she would choose:  to focus treatment on comfort and quality of life (\"Quality of life is more important than length of life to Letty.\")    CARDIO-PULMONARY RESUSCITATION (CPR):  The facts, risks and benefits of CPR were discussed with Letty.  If she had a sudden event that caused her heart and breathing to stop, she:  WOULD NOT want CPR attempted and instead would prefer that a natural death occur.    MECHANICAL VENTILATION: If Letty had an episode where she was unable to breathe on her own, she would choose the following:  attempt to use any appropriate non-invasive method to assist breathing, and use mechanical ventilation.  COMMENT:  \"If reversible ventilator is acceptable for 5 days.  If no improvement, stop.\"     Letty has chosen her healthcare agent to:  strictly follow her wishes.    Follow Up Plan:   Letty was encouraged to continue advance care planning discussions with her health care agents and primary care provider.   Letty and her health care agent declined handouts.     Documents addressed during this advance care planning session:  1.  Health Care Agents identified.          .  Primary health care agent is spouse, Edwar Escobar;          .  Secondary health care agent is son, Jermaine Oro.  2.  Health Care Directive completed and scanned into medical record.  3.  Statement of Treatment Preferences for advanced illness completed and scanned into the medical record.  4.  Resuscitation Guidelines completed for DNR.  Document sent to Dr. Renea Mendez for signature and returned to the home. Letty, please place on the refrigerator.    Recommendations/Plan:   Letty and her health care agent to review Advance Care Plan.     Letty would benefit from:   Care Navigation/Care Navigation Help Desk--explained services for pending future needs.  No identified needs today.  Care Navigation brochure was given to Letty and her " healthcare agent.    Advance Care Planning recommendations and Letty's concerns and questions were cc ed to her primary provider.     Thank you, Letty for the opportunity of assisting with Advance Care Planning. It was a seeing you again and meeting Edwar.  Please contact me if I can be of further assistance.    Interviewer: Pat Martin RN    Advance Care Planning Facilitator  [phone]   3/28/2011       ELI (obstructive sleep apnea) 01/31/2010     Priority: Medium     PSG on April/2008 that showed moderate Obstructive Sleep Apnea with an AHI of 23.5 . Limb movements persisted at 29 movements/hour at optimal pressures, despite carbidopa use.    Formatting of this note might be different from the original.  PSG on April/2008 that showed moderate Obstructive Sleep Apnea with an AHI of 23.5 . Limb movements persisted at 29 movements/hour at optimal pressures, despite carbidopa use.  Formatting of this note might be different from the original.  PSG on April/2008 that showed moderate Obstructive Sleep Apnea with an AHI of 23.5 . Limb movements persisted at 29 movements/hour at optimal pressures, despite carbidopa use.       Coronary atherosclerosis 02/06/2009     Priority: Medium     Formatting of this note might be different from the original.  2/5/2009 - MI - Proximal RCA 99%, mild-mod disease elsewhere.  EF 60%.  PCI:  MYRON to pRCA.  2/12/2009 - admit CP - Widely patent RCA stent. Moderate diffuse CAD. Severe stenosis in trivial PDA branch. LVEF 45%.  1/25/2010 - admit CP - PTCA and stent of diagonal  9/8/2010 - admit CP - LAD patent stent. Moderate diffuse CAD. Medical management recommended.   4/25/2012 - NSTEMI - Acute total occlusion of the ostial Circumflex. Acute total occlusion of the ostial ramus. Acute total occlusion of the distal 1st Obtuse Marginal. Angioplasty of ostial circumflex and ostial ramus. There was distal embolization to the OM1 vessel. This vessel was small and not amendable to  intervention. Indefinite: plavix and asa 81.  8/10/2015 - CABx5 - 1. LIMA to distal LAD, reverse SVG to OM1 and OM2, reverse SVG to diagonal, reverse SVG to PDA.   2. Mitral valve repair with a Medtronics 3-D full ring, 28 mm.   3. Tricuspid repair with a Medtronic 28 mm partial ring  1/12/2016 - TTE - EF 40-45%  Formatting of this note might be different from the original.  2/5/2009 - MI - Proximal RCA 99%, mild-mod disease elsewhere.  EF 60%.  PCI:  MYRON to pRCA.  2/12/2009 - admit CP - Widely patent RCA stent. Moderate diffuse CAD. Severe stenosis in trivial PDA branch. LVEF 45%.  1/25/2010 - admit CP - PTCA and stent of diagonal  9/8/2010 - admit CP - LAD patent stent. Moderate diffuse CAD. Medical management recommended.   4/25/2012 - NSTEMI - Acute total occlusion of the ostial Circumflex. Acute total occlusion of the ostial ramus. Acute total occlusion of the distal 1st Obtuse Marginal. Angioplasty of ostial circumflex and ostial ramus. There was distal embolization to the OM1 vessel. This vessel was small and not amendable to intervention. Indefinite: plavix and asa 81.  8/10/2015 - CABx5 - 1. LIMA to distal LAD, reverse SVG to OM1 and OM2, reverse SVG to diagonal, reverse SVG to PDA.   2. Mitral valve repair with a Medtronics 3-D full ring, 28 mm.   3. Tricuspid repair with a Medtronic 28 mm partial ring  1/12/2016 - TTE - EF 40-45%       Restless legs syndrome 08/29/2007     Priority: Medium     Hyperlipidemia, unspecified 05/30/2007     Priority: Medium        Past Medical History:    Past Medical History:   Diagnosis Date     ACP (advance care planning) 11/3/2010     Acute coronary syndrome (H) 11/22/2019     Acute exacerbation of CHF (congestive heart failure) (H) 11/11/2019     Acute on chronic systolic (congestive) heart failure (H) 1/28/2020     Anemia of chronic disease 1/5/2013     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, left leg (H) 1/15/2020     BPPV  (benign paroxysmal positional vertigo) 5/31/2018     Candidiasis 3/1/2020     Carotid stenosis, right 7/13/2016     Chest pain 11/11/2019     Chronic bilateral low back pain without sciatica 1/17/2018     Chronic pain disorder 10/1/2017     Constipation 3/20/2020     COPD (chronic obstructive pulmonary disease) (H) 6/24/2020     Coronary atherosclerosis 2/6/2009     Cubital tunnel syndrome on left 11/13/2012     Depressive disorder      Diabetes mellitus (H)      Diabetes mellitus type 2 in obese (H) 7/13/2006     Diabetes mellitus type 2 in obese (H) 7/13/2006     Drug-seeking behavior 4/4/2020     Failure to thrive in adult 9/3/2020     Hereditary and idiopathic peripheral neuropathy 4/24/2007     Hyperlipidemia with target low density lipoprotein (LDL) cholesterol less than 70 mg/dL 5/30/2007     Hypertension 10/14/2015     Hypothyroidism 12/10/2010     Irritable bowel syndrome 9/7/2015     Ischemic cardiomyopathy 9/20/2015     Long-term use of high-risk medication 4/14/2020     Moniliasis, 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     Myocardial infarction (H)      Neuromuscular disorder (H)      Other specified postprocedural states 10/8/2015     Pain medication agreement 4/20/2013     Panic disorder with agoraphobia 4/14/2020     Paroxysmal atrial fibrillation (H) 10/2/2015     Personality disorder (H) 3/5/2020     Polymyalgia rheumatica (H) 11/28/2018     Posttraumatic stress disorder 3/1/2020     Restless legs syndrome (RLS) 8/29/2007     Restless legs syndrome (RLS) 8/29/2007     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     Serum calcium elevated 3/1/2020     Somatic dysfunction of sacral region 1/17/2018     Subacromial bursitis of left shoulder joint 8/6/2018     Suicidal ideation 4/1/2020     Thyroid disease      TIA (transient ischemic attack) 5/4/2018     TIA (transient ischemic attack) 5/4/2018     Tobacco abuse 2/17/2017     Tobacco abuse 2/17/2017     Urinary incontinence,  mixed 9/24/2017     UTI (urinary tract infection) 4/17/2020     Vitamin B12 deficiency 2/14/2018     Weakness 12/17/2019       Past Surgical History:    Past Surgical History:   Procedure Laterality Date     CARDIAC SURGERY      stents x 11     CARDIAC SURGERY       CHOLECYSTECTOMY       CHOLECYSTECTOMY       GENITOURINARY SURGERY      Tubal ligation     OTHER SURGICAL HISTORY      Genitourinary surgery     RELEASE CARPAL TUNNEL       RELEASE CARPAL TUNNEL         Family History:    No family history on file.    Social History:  Marital Status:   [2]  Social History     Tobacco Use     Smoking status: Never     Smokeless tobacco: Never   Vaping Use     Vaping Use: Never used   Substance Use Topics     Alcohol use: Not Currently     Drug use: Never        Medications:    acetaminophen (TYLENOL) 325 MG tablet  acetaminophen (TYLENOL) 500 MG tablet  albuterol (PROVENTIL) (2.5 MG/3ML) 0.083% neb solution  aspirin 81 MG EC tablet  bisacodyl (DULCOLAX) 10 MG suppository  cholecalciferol (VITAMIN D3) 10 mcg (400 units) TABS tablet  cyanocobalamin (CYANOCOBALAMIN) 1000 MCG/ML injection  divalproex sodium delayed-release (DEPAKOTE) 250 MG DR tablet  DULoxetine HCl 40 MG CPEP  fluticasone-vilanterol (BREO ELLIPTA) 200-25 MCG/INH inhaler  furosemide (LASIX) 20 MG tablet  gabapentin (NEURONTIN) 300 MG capsule  levothyroxine (SYNTHROID/LEVOTHROID) 112 MCG tablet  Lidocaine (LIDOCARE) 4 % Patch  Melatonin 10 MG TABS tablet  metoprolol succinate ER (TOPROL-XL) 25 MG 24 hr tablet  miconazole (MICATIN) 2 % AERP powder  mirtazapine (REMERON) 30 MG tablet  polyethylene glycol (MIRALAX) 17 GM/Dose powder  potassium chloride ER (MICRO-K) 10 MEQ CR capsule  QUEtiapine (SEROQUEL) 25 MG tablet  rosuvastatin (CRESTOR) 10 MG tablet  sennosides (SENOKOT) 8.6 MG tablet  traZODone (DESYREL) 50 MG tablet  Vitamin D3 (CHOLECALCIFEROL) 25 mcg (1000 units) tablet          Review of Systems   All other systems reviewed and are  "negative.      Physical Exam   BP: 126/70  Pulse: 78  Temp: 98  F (36.7  C)  Resp: 18  Height: 144.8 cm (4' 9\")  Weight: 91.6 kg (202 lb)  SpO2: 97 %      Physical Exam  Constitutional:       General: She is in acute distress (mild).      Appearance: She is obese.   HENT:      Mouth/Throat:      Comments: Mucosa tacky  Cardiovascular:      Rate and Rhythm: Normal rate and regular rhythm.   Pulmonary:      Effort: Pulmonary effort is normal.      Breath sounds: Normal breath sounds.   Chest:      Chest wall: Tenderness present.   Abdominal:      Tenderness: There is no abdominal tenderness.   Musculoskeletal:      Right lower leg: No edema.      Left lower leg: No edema.   Skin:     General: Skin is warm and dry.   Neurological:      General: No focal deficit present.      Mental Status: She is alert and oriented to person, place, and time.         ED Course                 Procedures              EKG Interpretation:      Interpreted by David Rust MD  Time reviewed: 0140  Symptoms at time of EKG: chest pain   Rhythm: normal sinus   Rate: 73  Axis: Left Axis Deviation  Ectopy: premature ventricular contractions (frequent)  Conduction: left bundle branch block (complete)  ST Segments/ T Waves: No acute ischemic changes  Q Waves: none  Comparison to prior: Unchanged    Clinical Impression: no acute changes                Critical Care time:  none               Results for orders placed or performed during the hospital encounter of 07/06/23 (from the past 24 hour(s))   CBC with platelets differential    Narrative    The following orders were created for panel order CBC with platelets differential.  Procedure                               Abnormality         Status                     ---------                               -----------         ------                     CBC with platelets and d...[[id number]]  Abnormal            Final result                 Please view results for these tests on the " individual orders.   Comprehensive metabolic panel   Result Value Ref Range    Sodium 140 136 - 145 mmol/L    Potassium 4.6 3.4 - 5.3 mmol/L    Chloride 105 98 - 107 mmol/L    Carbon Dioxide (CO2) 23 22 - 29 mmol/L    Anion Gap 12 7 - 15 mmol/L    Urea Nitrogen 21.9 8.0 - 23.0 mg/dL    Creatinine 1.06 (H) 0.51 - 0.95 mg/dL    Calcium 9.6 8.8 - 10.2 mg/dL    Glucose 145 (H) 70 - 99 mg/dL    Alkaline Phosphatase 51 35 - 104 U/L    AST 17 0 - 45 U/L    ALT 18 0 - 50 U/L    Protein Total 6.6 6.4 - 8.3 g/dL    Albumin 3.7 3.5 - 5.2 g/dL    Bilirubin Total <0.2 <=1.2 mg/dL    GFR Estimate 57 (L) >60 mL/min/1.73m2   Troponin T, High Sensitivity   Result Value Ref Range    Troponin T, High Sensitivity 27 (H) <=14 ng/L   Nt probnp inpatient (BNP)   Result Value Ref Range    N terminal Pro BNP Inpatient 1,017 (H) 0 - 900 pg/mL   CBC with platelets and differential   Result Value Ref Range    WBC Count 7.6 4.0 - 11.0 10e3/uL    RBC Count 3.09 (L) 3.80 - 5.20 10e6/uL    Hemoglobin 9.2 (L) 11.7 - 15.7 g/dL    Hematocrit 29.2 (L) 35.0 - 47.0 %    MCV 95 78 - 100 fL    MCH 29.8 26.5 - 33.0 pg    MCHC 31.5 31.5 - 36.5 g/dL    RDW 14.6 10.0 - 15.0 %    Platelet Count 142 (L) 150 - 450 10e3/uL    % Neutrophils 60 %    % Lymphocytes 31 %    % Monocytes 6 %    % Eosinophils 2 %    % Basophils 0 %    % Immature Granulocytes 1 %    NRBCs per 100 WBC 0 <1 /100    Absolute Neutrophils 4.5 1.6 - 8.3 10e3/uL    Absolute Lymphocytes 2.4 0.8 - 5.3 10e3/uL    Absolute Monocytes 0.4 0.0 - 1.3 10e3/uL    Absolute Eosinophils 0.2 0.0 - 0.7 10e3/uL    Absolute Basophils 0.0 0.0 - 0.2 10e3/uL    Absolute Immature Granulocytes 0.1 <=0.4 10e3/uL    Absolute NRBCs 0.0 10e3/uL   XR Chest Port 1 View    Narrative    EXAM: XR CHEST PORT 1 VIEW  LOCATION: [facility]  DATE: 7/6/2023    INDICATION: chest pain  COMPARISON: 05/23/2023      Impression    IMPRESSION: No consolidation, edema, pleural effusion, or pneumothorax.  Stable mildly enlarged cardiac silhouette. Stable left chest implanted cardiac device. Postsurgical changes from CABG. Pulmonary artery pressure monitor projects over the left   infrahilar region.   Troponin T, High Sensitivity   Result Value Ref Range    Troponin T, High Sensitivity 26 (H) <=14 ng/L       Medications   ondansetron (ZOFRAN) injection 4 mg (4 mg Intravenous $Given 7/6/23 0209)   nitroGLYcerin (NITRO-BID) 2 % ointment 15 mg (15 mg Transdermal $Patch/Med Applied 7/6/23 0213)   fentaNYL (PF) (SUBLIMAZE) injection 25 mcg (25 mcg Intravenous $Given 7/6/23 0211)       Assessments & Plan (with Medical Decision Making)  67-year-old with extensive cardiac history woke at 1215 this morning with chest pressure radiating down her left arm.  Slight relief with 3 nitro doses from 8 down to 6/10.  Some nausea but no shortness of breath.  EKG shows a sinus rhythm with left bundle branch block and frequent PVCs.  IV placed.  Fentanyl for pain.  Nitropaste applied.  Initial troponin is at her baseline slightly elevated at 27.  We will check a 2-hour delta troponin.  BNP up just slightly at 1017.  Comprehensive profile unremarkable.  Mild anemia and thrombocytopenia are both stable.  She slept soundly after the fentanyl.  Second troponin is essentially unchanged at 26.  This makes ACS less likely as a cause of her pain.  I reviewed these results with her.  She was napping comfortably.  States that her pain has resolved and she feels ready to go back home.  Unclear if there is anything more that could be done from an intervention standpoint with all of the stents that she reportedly has already.  Medical management seems to be the most prudent course.  She can follow-up with cardiology as an outpatient to discuss options going forward.  Verbal and written discharge instructions given.  She is comfortable this plan.       I have reviewed the nursing notes.    I have reviewed the findings, diagnosis, plan and need for  follow up with the patient.           Medical Decision Making  The patient's presentation was of high complexity (a chronic illness severe exacerbation, progression, or side effect of treatment).    The patient's evaluation involved:  ordering and/or review of 3+ test(s) in this encounter (see separate area of note for details)    The patient's management necessitated moderate risk (prescription drug management including medications given in the ED).        New Prescriptions    No medications on file       Final diagnoses:   Chest pain, unspecified type       7/6/2023   [facility] EMERGENCY DEPT     David Rust MD  07/06/23 0544

## 2023-07-10 NOTE — PROGRESS NOTES
[facility] Care Coordination Contact  CC received notification of Emergency Room visit.  ER visit occurred on 7/6/23 at [facility] with Dx of Chest pain.    CC contacted Nurse Practitior  and suggested possible ED care Plan. Pt also needs to follow up with Cardiology.   Member has a follow-up appointment with PCP: Yes: scheduled on will see NP at facility  Member has had a change in condition: No  New referrals placed: No  Home Visit Needed: No  Care plan reviewed and updated.  PCP notified of ED visit via EMR.    Esperanza Damon RN  Care Coordinator-Long Term Care  [address] [address]  [email or web address]   [email or web address]     Office: [phone]   Fax: [phone]

## 2023-07-12 ENCOUNTER — MEDICAL CORRESPONDENCE (OUTPATIENT)
Dept: HEALTH INFORMATION MANAGEMENT | Facility: CLINIC | Age: 68
End: 2023-07-12
Payer: COMMERCIAL

## 2023-07-13 ENCOUNTER — PATIENT OUTREACH (OUTPATIENT)
Dept: GERIATRIC MEDICINE | Facility: CLINIC | Age: 68
End: 2023-07-13

## 2023-07-13 ENCOUNTER — NURSING HOME VISIT (OUTPATIENT)
Dept: GERIATRICS | Facility: CLINIC | Age: 68
End: 2023-07-13
Payer: COMMERCIAL

## 2023-07-13 DIAGNOSIS — R07.9 CHEST PAIN, UNSPECIFIED TYPE: Primary | ICD-10-CM

## 2023-07-13 DIAGNOSIS — N18.31 CHRONIC KIDNEY DISEASE, STAGE 3A (H): ICD-10-CM

## 2023-07-13 DIAGNOSIS — I10 ESSENTIAL (PRIMARY) HYPERTENSION: ICD-10-CM

## 2023-07-13 DIAGNOSIS — Z95.0 PACEMAKER: ICD-10-CM

## 2023-07-13 PROCEDURE — 99310 SBSQ NF CARE HIGH MDM 45: CPT | Performed by: FAMILY MEDICINE

## 2023-07-13 NOTE — PROGRESS NOTES
TRANSITIONS OF CARE (ALEXANDER) LOG   ALEXANDER tasks should be completed by the CC within one (1) business day of notification of each transition. Follow up contact with member is required after return to their usual care setting.  Note:  If CC finds out about the transitions fifteen (15) days or more after the member has returned to their usual care setting, no ALEXANDER log is needed. However, the CC should check in with the member to discuss the transition process, any changes needed to the care plan and document it in a case note.    Member Name:  Letty Escobar [facility] Name:  [employer] Member ID#: [id number]    Product: [facility] Care Coordinator Contact:  MARIYA Jaramillo, RN, PHN, [facility] Agency/[facility]Care System: [facility]   Transition Communication Actions from Care Management Contact   Transition #1   Notification Date: 7/13/2023 Transition Date:   7/9/23 Transition From: Nursing Home, [facility]     Is this the member s usual care setting?               yes Transition To: Hospital, [facility]    Transition Type:  Unplanned  Reason for Admission/Comments:  Chest pain  Contact member/responsible party to offer assistance with transition Date completed: 7/13/2023  see transition #2    Notes from conversation with the member/responsible party, provider, discharging and receiving facility (as applicable):   Date na:     Shared CC contact info, care plan/services with receiving setting--Date completed: na   Name & Title of receiving setting contact: na unaware of hospitalization until after discharge.    Notified PCP of transition--Date completed:  7/13/2023     via  EMR  Name of PCP: Jay Chapman     Transition #2   Notification Date: 7/13/2023 Transition Date:   7/11/23 Transition From: Hospital, [facility]      Is this the member s usual care setting?               no Transition To: Nursing Home, [facility]   Transition Type:  Planned  Reason for  Admission/Comments:  Chest pain  Contact member/responsible party to offer assistance with transition Date completed: na contact for follow up.    Notes from conversation with the member/responsible party, provider, discharging and receiving facility (as applicable):   Date 7/13/2023:CC contacted spouse Edwar and reviewed discharge summary.  Member has a follow-up appointment with PCP in 7 days: Yes: scheduled on 7/13/23   Member has had a change in condition: No  Home visit needed: No   Care plan reviewed and updated.  The following home based services None were resumed.  New referrals placed: No  Transition log completed.   PCP, Jay Chapman, notified of transition back to home via EMR.      Esperanza Damon RN  Care Coordinator-[place] [address]  [email or web address]   [email or web address]     Office: [phone]   Fax: [phone]     Shared CC contact info, care plan/services with receiving setting--Date completed: 7/13/2023   Name & Title of receiving setting contact: Altagracia TORRES [facility]   Notified PCP of transition--Date completed:  7/13/2023     via  EMR  Name of PCP: Jay Chapman      *RETURN TO USUAL CARE SETTING: *Complete tasks below when the member is discharging TO their usual care setting within one (1) business day of notification..      For situations where the Care Coordinator is notified of the discharge prior to the date of discharge, the Care Coordinator must follow up with the member or designated representative to confirm that discharge actually occurred and discuss required ALEXANDER tasks as outlined in the ALEXANDER Instructions.  (This includes situations where it may be a  new  usual care setting for the member. (i.e., a community member who decides upon permanent nursing home placement following hospitalization and rehab).    Discuss with Member/Responsible Party:    Check  Yes  - if the member, family member and/or SNF/facility  staff manages the following:    If  No  provide explanation in the comments section.          Date completed: 7/13/2023 Communicated with member or their designated representative about the following:  care transition process; about changes to the member s health status; plan of care updates; education about transitions and how to prevent unplanned transitions/readmissions    Four Pillars for Optimal Transition:    Check  Yes  - if the member, family member and/or SNF/facility staff manages the following:    If  No  provide explanation in the comments section.          [x]  Yes     []  No Does the member have a follow-up appointment scheduled with primary care or specialist? (Mental health hospitalizations--the appt. should be w/in 7 days)              For mental health hospitalizations:  []  Yes     []  No     Does the member have a follow-up appointment scheduled with a mental health practitioner within 7 days of discharge?  [x]  Yes     []  No     Has a medication review been completed with member? If no, refer to PCP, home care nurse, MTM, pharmacist  [x]  Yes     []  No     Can the member manage their medications or is there a system in place to manage medications (e.g. home care set-up)?         [x]  Yes     []  No     Can the member verbalize warning signs and symptoms to watch for and how to respond?  [x]  Yes     []  No     Does the member have a copy of and understand their discharge instructions?  If no, assist to obtain copy of discharge instructions, review discharge instructions, and assist to contact PCP to discuss questions about their recent hospitalization.  []  Yes     []  No     Does the member have adequate food, housing and transportation?  If no, add goal and discuss additional supports available to the member                                                                                                                                                                                 [x]  Yes      []  No     Is the member safe in their home?  If no, document needs and support provided                                                                                                                                                                          []  Yes     [x]  No     Are there any concerns of vulnerability, abuse, or neglect?  If yes, document concerns and actions taken by Care Coordinator as a mandated                                                                                                                                                                              [x]  Yes     []  No     Does the member use a Personal Health Care Record?  Check  Yes  if visit summary, discharge summary, and/or healthcare summary are being used as a PHR.                                                                                                                                                                                  [x]  Yes     []  No     Have you reviewed the discharge summary with the member? If  No  provide explanation in comments.  [x]  Yes     []  No     Have you updated the member s care plan/support plan? Add new diagnosis, medications, treatments, goals & interventions, as applicable. If No, provide explanation in comments.    Comments:           Notes from conversation with the member/responsible party, provider, discharging and receiving facility (as applicable): CC contacted spouse Edwar and reviewed discharge summary.  Member has a follow-up appointment with PCP in 7 days: Yes: scheduled on 7/13/23   Member has had a change in condition: No  Home visit needed: No  Care plan reviewed and updated.  The following home based services None were resumed.  New referrals placed: No  Transition log completed.   PCP, Jay Chapman, notified of transition back to home via EMR.    Esperanza Damon, MELISSA  Care Coordinator-Long Term Care  [address]  [place] [address]  [email or web address]   [email or web address]     Office: [phone]   Fax: [phone]

## 2023-07-13 NOTE — PROGRESS NOTES
[facility] GERIATRIC SERVICES    Facility:   [facility] () [[id number]]   Code Status: DNR/DNI      CHIEF COMPLAINT/REASON FOR VISIT:  Chief Complaint   Patient presents with     RECHECK       HISTORY:      HPI: Letty is a 67 year old female who was recently in the emergency department on July 7 secondary to being up with chest pressure that radiated down her left arm and did take a nitroglycerin tablet and ambulance gave her 2 nitro sprays all Zofran for nausea but no shortness of breath.  Her EKG did show normal sinus rhythm with a history of PVCs and no acute ischemic changes.  Her sodium 140, potassium 4.6, BUN 21, creatinine 1.06.  BNP 1017, troponins 27.  White cell count 7.6 and hemoglobin 9.2.  Chest x-ray did not show any consolidation edema or pleural effusion.  She did receive fentanyl for pain.  She was napping comfortably.  Recommended cardiology from an outpatient standpoint.  She was then admitted to the hospital July 9 through July 11, 2023 secondary to NSTEMI who did report substernal chest pain that radiated up into the left shoulder and down her arm also associated lightheadedness and nausea.  The angiogram was negative.  The Toprol-XL was increased to 25 mg around 12.5 mg and the Crestor was increased 20 mg rather than 10 mg.  She is now back in the long-term care facility and currently she states that she is doing fine.  No blood pressures since her arrival.  Last noted blood pressure was on July 6 at 138/78.  According to the nursing notes her lungs have been clear no dyspnea no edema and she is ambulating.  Talk to her about her current medications as well as the recent increase of the Crestor as well as Toprol-XL.  Denies any discomfort.  Appetite improving.  Past Medical History:   Diagnosis Date     ACP (advance care planning) 11/3/2010    Formatting of this note might be different from the original. Patient has identified Health Care Agent(s): Yes Add Health Care  Agents: Yes   Health Care Agent(s):  Primary Health Care Agent:   Edwar Escobar Relationship:  Spouse Phone:   [phone]  Secondary Health Care Agent:   Chiki Oro Relationship:   Son Phone:   [phone]  Patient has Advance Care Plan Documents (Health Care Direct     Acute coronary syndrome (H) 11/22/2019     Acute exacerbation of CHF (congestive heart failure) (H) 11/11/2019     Acute on chronic systolic (congestive) heart failure (H) 1/28/2020     Anemia of chronic disease 1/5/2013     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, left leg (H) 1/15/2020     BPPV (benign paroxysmal positional vertigo) 5/31/2018     Candidiasis 3/1/2020     Carotid stenosis, right 7/13/2016    Carotid US 05/04/2018 showed moderate plaque formation, consistent with 50 to 69% stenosis in the right internal carotid artery, not significantly changed from 8/5/2015.  Moderate plaque formation, consistent with 50 to 69% stenosis in the left internal carotid artery; there has been mild progression of the left ICA stenosis since 8/5/2015.     Chest pain 11/11/2019     Chronic bilateral low back pain without sciatica 1/17/2018     Chronic pain disorder 10/1/2017     Constipation 3/20/2020     COPD (chronic obstructive pulmonary disease) (H) 6/24/2020     Coronary atherosclerosis 2/6/2009 2/5/2009 - MI - Proximal RCA 99%, mild-mod disease elsewhere.  EF 60%.  PCI:  MYRON to pRCA. 2/12/2009 - admit CP - Widely patent RCA stent. Moderate diffuse CAD. Severe stenosis in trivial PDA branch. LVEF 45%. 1/25/2010 - admit CP - PTCA and stent of diagonal 9/8/2010 - admit CP - LAD patent stent. Moderate diffuse CAD. Medical management recommended.  4/25/2012 - NSTEMI - Acute total occlusion of     Cubital tunnel syndrome on left 11/13/2012     Depressive disorder      Diabetes mellitus (H)      Diabetes mellitus type 2 in obese (H) 7/13/2006     Diabetes mellitus type 2 in obese (H) 7/13/2006      Drug-seeking behavior 4/4/2020     Failure to thrive in adult 9/3/2020     Hereditary and idiopathic peripheral neuropathy 4/24/2007     Hyperlipidemia with target low density lipoprotein (LDL) cholesterol less than 70 mg/dL 5/30/2007     Hypertension 10/14/2015     Hypothyroidism 12/10/2010     Irritable bowel syndrome 9/7/2015     Ischemic cardiomyopathy 9/20/2015    EF of 40-45%, status post RV lead revision and LV epicardial lead placement via mini-thoracotomy in August 2016.     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  Designated pharmacy: [facility] Prescribing physician: Andrea Diagnosis: Ulnar neuropathy     Panic disorder with agoraphobia 4/14/2020     Paroxysmal atrial fibrillation (H) 10/2/2015     Personality disorder (H) 3/5/2020    Rule out dependent personality     Polymyalgia rheumatica (H) 11/28/2018     Posttraumatic stress disorder 3/1/2020     Restless legs syndrome (RLS) 8/29/2007     Restless legs syndrome (RLS) 8/29/2007     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     Serum calcium elevated 3/1/2020     Somatic dysfunction of sacral region 1/17/2018     Subacromial bursitis of left shoulder joint 8/6/2018     Suicidal ideation 4/1/2020     Thyroid disease      TIA (transient ischemic attack) 5/4/2018     TIA (transient ischemic attack) 5/4/2018     Tobacco abuse 2/17/2017     Tobacco abuse 2/17/2017     Urinary incontinence, mixed 9/24/2017     UTI (urinary tract infection) 4/17/2020     Vitamin B12 deficiency 2/14/2018     Weakness 12/17/2019            No family history on file.   Social History     Socioeconomic History     Marital status:       Number of children: 1   Tobacco Use     Smoking status: Never     Smokeless tobacco: Never   Vaping Use     Vaping Use: Never used   Substance and Sexual Activity     Alcohol use: Not Currently     Drug use: Never     Sexual activity: Not Currently      No new changes to the review of systems or the physical exam since our last visit on June 22 however has been to [facility] hospital secondary to chest pain  REVIEW OF SYSTEM:  She currently denies any new symptoms of cough or cold sore throat postnasal drip wheezing chest pain dizziness vertigo nausea vomiting diarrhea dysuria frequency or urgency.  Does have a history of dyslipidemia, COPD, CABG, depression, hypertension, posttraumatic stress disorder, panic disorder, anxiety, personality disorder    PHYSICAL EXAM:   Pleasant female in no acute distress.  Head is normocephalic.  Neck is supple without adenopathy.  Lung sounds are clear throughout.  Cardiovascular S1-S2 regular rate and rhythm and no lower extremity edema.  Pacemaker.  Gastrointestinal is protuberant nontender nondistended.  Musculoskeletal-denies discomfort.  Psychiatric: Pleasant affect        There were no vitals taken for this visit.  Vital signs on July 6 blood pressure 138/78, pulse 68, respirations 16, temperature 98.7 and her weight 199 pounds in comparison to June 7 also at 198 pounds.  LABS:   CBC RESULTS: Recent Labs   Lab Test 07/06/23  0230   WBC 7.6   RBC 3.09*   HGB 9.2*   HCT 29.2*   MCV 95   MCH 29.8   MCHC 31.5   RDW 14.6   *     Recent Labs   Lab Test 10/19/22  0743 05/28/21  0818   CHOL 162 154   HDL 30* 42*   LDL 56 83   TRIG 382* 144     Last Comprehensive Metabolic Panel:  Sodium   Date Value Ref Range Status   07/06/2023 140 136 - 145 mmol/L Final   03/16/2021 139 133 - 144 mmol/L Final     Potassium   Date Value Ref Range Status   07/06/2023 4.6 3.4 - 5.3 mmol/L Final   01/16/2023 3.9 3.4 - 5.3 mmol/L Final   03/16/2021 4.7 3.4 - 5.3 mmol/L Final     Chloride   Date  Value Ref Range Status   07/06/2023 105 98 - 107 mmol/L Final   01/16/2023 108 94 - 109 mmol/L Final   03/16/2021 111 (H) 94 - 109 mmol/L Final     Carbon Dioxide   Date Value Ref Range Status   03/16/2021 22 20 - 32 mmol/L Final     Carbon Dioxide (CO2)   Date Value Ref Range Status   07/06/2023 23 22 - 29 mmol/L Final   01/16/2023 24 20 - 32 mmol/L Final     Anion Gap   Date Value Ref Range Status   07/06/2023 12 7 - 15 mmol/L Final   01/16/2023 8 3 - 14 mmol/L Final   03/16/2021 6 3 - 14 mmol/L Final     Glucose   Date Value Ref Range Status   07/06/2023 145 (H) 70 - 99 mg/dL Final   01/16/2023 165 (H) 70 - 99 mg/dL Final   03/16/2021 114 (H) 70 - 99 mg/dL Final     Urea Nitrogen   Date Value Ref Range Status   07/06/2023 21.9 8.0 - 23.0 mg/dL Final   01/16/2023 23 7 - 30 mg/dL Final   03/16/2021 45 (H) 7 - 30 mg/dL Final     Creatinine   Date Value Ref Range Status   07/06/2023 1.06 (H) 0.51 - 0.95 mg/dL Final   03/16/2021 0.98 0.52 - 1.04 mg/dL Final     GFR Estimate   Date Value Ref Range Status   07/06/2023 57 (L) >60 mL/min/1.73m2 Final   03/16/2021 60 (L) >60 mL/min/[1.73_m2] Final     Comment:     Non  GFR Calc  Starting 12/18/2018, serum creatinine based estimated GFR (eGFR) will be   calculated using the Chronic Kidney Disease Epidemiology Collaboration   (CKD-EPI) equation.       Calcium   Date Value Ref Range Status   07/06/2023 9.6 8.8 - 10.2 mg/dL Final   03/16/2021 9.4 8.5 - 10.1 mg/dL Final     Bilirubin Total   Date Value Ref Range Status   07/06/2023 <0.2 <=1.2 mg/dL Final   03/16/2021 0.2 0.2 - 1.3 mg/dL Final     Alkaline Phosphatase   Date Value Ref Range Status   07/06/2023 51 35 - 104 U/L Final   03/16/2021 57 40 - 150 U/L Final     ALT   Date Value Ref Range Status   07/06/2023 18 0 - 50 U/L Final     Comment:     Reference intervals for this test were updated on 6/12/2023 to more accurately reflect our healthy population. There may be differences in the flagging of prior  results with similar values performed with this method. Interpretation of those prior results can be made in the context of the updated reference intervals.     03/16/2021 74 (H) 0 - 50 U/L Final     AST   Date Value Ref Range Status   07/06/2023 17 0 - 45 U/L Final     Comment:     Reference intervals for this test were updated on 6/12/2023 to more accurately reflect our healthy population. There may be differences in the flagging of prior results with similar values performed with this method. Interpretation of those prior results can be made in the context of the updated reference intervals.   03/16/2021 23 0 - 45 U/L Final                   ASSESSMENT:    Encounter Diagnoses   Name Primary?     Chest pain, unspecified type Yes     Essential (primary) hypertension      Pacemaker      Chronic kidney disease, stage 3a (H)        PLAN:    No medication changes from our end.  Continue to monitor and follow her chronic medical conditions.  She did appreciate the visit and she did not have any new questions or concerns.        Electronically signed by: Jay Chapman NP

## 2023-07-21 ENCOUNTER — PATIENT OUTREACH (OUTPATIENT)
Dept: GERIATRIC MEDICINE | Facility: CLINIC | Age: 68
End: 2023-07-21
Payer: COMMERCIAL

## 2023-07-21 NOTE — PROGRESS NOTES
TRANSITIONS OF CARE (ALEXANDER) LOG   ALEXANDER tasks should be completed by the CC within one (1) business day of notification of each transition. Follow up contact with member is required after return to their usual care setting.  Note:  If CC finds out about the transitions fifteen (15) days or more after the member has returned to their usual care setting, no ALEXANDER log is needed. However, the CC should check in with the member to discuss the transition process, any changes needed to the care plan and document it in a case note.    Member Name:  Letty Escobar [facility] Name:  [employer] Member ID#: [id number]    Product: [facility] Care Coordinator Contact:  MARIYA Jaramillo, RN, PHN, [facility] Agency/County/Care System: [facility]   Transition Communication Actions from Care Management Contact   Transition #1   Notification Date: 7/21/2023 Transition Date:   7/20/2023 Transition From: Nursing Home, [facility]     Is this the member s usual care setting?               yes Transition To: Hospital, [facility]    Transition Type:  Unplanned  Reason for Admission/Comments:  Chest pain  Contact member/responsible party to offer assistance with transition Date completed: 7/21/23    Notes from conversation with the member/responsible party, provider, discharging and receiving facility (as applicable):   Date 7/21/2023:CC contacted Hospital /discharge planner   CC reached out to spouse Edwar and offered support, continues to be frustrated with no findings.  regarding transition and offered support as needed.  Reviewed and update care plan as needed.   Transition log initiated.   PCP, Jay Chapman, notified of hospitalization via EMR.    Esperanza Damon RN  Care Coordinator-Long Term Care  [address] [address]  [email or web address]   [email or web address]     Office: [phone]   Fax: [phone]     Shared CC contact info, care plan/services with  receiving setting--Date completed: ,td   Name & Title of receiving setting contact: [facility]    Notified PCP of transition--Date completed:  7/21/2023     via  EMR  Name of PCP: Jay Chapman     Transition #2   Notification Date: 7/24/2023 Transition Date:   7/21/23 Transition From: Hospital, [facility]      Is this the member s usual care setting?               no Transition To: Nursing Home, [facility]   Transition Type:  Planned  Reason for Admission/Comments:  Chest pain, CHF  Contact member/responsible party to offer assistance with transition Date completed: 7/24/2023    Notes from conversation with the member/responsible party, provider, discharging and receiving facility (as applicable):   Date 7/24/2023:CC contacted spouse Edwar, sounds like they think there may be a vessel in her heart they can't see that is causing the pain.  some medication adjustments.  and reviewed discharge summary.  Member has a follow-up appointment with PCP in 7 days: Yes: scheduled on will see NP at facility   Member has had a change in condition: No  Home visit needed: No  Care plan reviewed and updated.  The following home based services None were resumed.  New referrals placed: No  Transition log completed.   PCP, Jay Chapman, notified of transition back to home via EMR.    Esperanza Damon RN  Care Coordinator-Long Term Care  [address] [address]  [email or web address]   [email or web address]     Office: [phone]   Fax: [phone]   Shared CC contact info, care plan/services with receiving setting--Date completed: 7/24/2023   Name & Title of receiving setting contact: [facility]    Notified PCP of transition--Date completed: 7/24/2023     via  EMR  Name of PCP: Jay Chapman      *RETURN TO USUAL CARE SETTING: *Complete tasks below when the member is discharging TO their usual care setting within one (1) business day of notification..       For situations where the Care Coordinator is notified of the discharge prior to the date of discharge, the Care Coordinator must follow up with the member or designated representative to confirm that discharge actually occurred and discuss required ALEXANDER tasks as outlined in the ALEXANDER Instructions.  (This includes situations where it may be a  new  usual care setting for the member. (i.e., a community member who decides upon permanent nursing home placement following hospitalization and rehab).    Discuss with Member/Responsible Party:    Check  Yes  - if the member, family member and/or SNF/facility staff manages the following:    If  No  provide explanation in the comments section.          Date completed: 7/24/2023 Communicated with member or their designated representative about the following:  care transition process; about changes to the member s health status; plan of care updates; education about transitions and how to prevent unplanned transitions/readmissions    Four Pillars for Optimal Transition:    Check  Yes  - if the member, family member and/or SNF/facility staff manages the following:    If  No  provide explanation in the comments section.          [x]  Yes     []  No Does the member have a follow-up appointment scheduled with primary care or specialist? (Mental health hospitalizations--the appt. should be w/in 7 days)              For mental health hospitalizations:  []  Yes     []  No     Does the member have a follow-up appointment scheduled with a mental health practitioner within 7 days of discharge?  [x]  Yes     []  No     Has a medication review been completed with member? If no, refer to PCP, home care nurse, MTM, pharmacist  [x]  Yes     []  No     Can the member manage their medications or is there a system in place to manage medications (e.g. home care set-up)?         [x]  Yes     []  No     Can the member verbalize warning signs and symptoms to watch for and how to respond?  [x]  Yes      []  No     Does the member have a copy of and understand their discharge instructions?  If no, assist to obtain copy of discharge instructions, review discharge instructions, and assist to contact PCP to discuss questions about their recent hospitalization.  [x]  Yes     []  No     Does the member have adequate food, housing and transportation?  If no, add goal and discuss additional supports available to the member                                                                                                                                                                                 [x]  Yes     []  No     Is the member safe in their home?  If no, document needs and support provided                                                                                                                                                                          []  Yes     [x]  No     Are there any concerns of vulnerability, abuse, or neglect?  If yes, document concerns and actions taken by Care Coordinator as a mandated                                                                                                                                                                              [x]  Yes     []  No     Does the member use a Personal Health Care Record?  Check  Yes  if visit summary, discharge summary, and/or healthcare summary are being used as a PHR.                                                                                                                                                                                  [x]  Yes     []  No     Have you reviewed the discharge summary with the member? If  No  provide explanation in comments.  [x]  Yes     []  No     Have you updated the member s care plan/support plan? Add new diagnosis, medications, treatments, goals & interventions, as applicable. If No, provide explanation in comments.    Comments:           Notes from conversation with the  member/responsible party, provider, discharging and receiving facility (as applicable): CC contacted spouse Edwar, sounds like they think there may be a vessel in her heart they can't see that is causing the pain.  some medication adjustments.  and reviewed discharge summary.  Member has a follow-up appointment with PCP in 7 days: Yes: scheduled on will see NP at facility.   Member has had a change in condition: No  Home visit needed: No  Care plan reviewed and updated.  The following home based services None were resumed.  New referrals placed: No  Transition log completed.   PCP, Jay Chapman, notified of transition back to home via EMR.    Esperanza Damon, RN  Care Coordinator-Long Term Care  [address] [address]  [email or web address]   [email or web address]     Office: [phone]   Fax: [phone]

## 2023-07-27 ENCOUNTER — NURSING HOME VISIT (OUTPATIENT)
Dept: GERIATRICS | Facility: CLINIC | Age: 68
End: 2023-07-27
Payer: COMMERCIAL

## 2023-07-27 VITALS
SYSTOLIC BLOOD PRESSURE: 132 MMHG | DIASTOLIC BLOOD PRESSURE: 69 MMHG | HEART RATE: 65 BPM | TEMPERATURE: 98.7 F | OXYGEN SATURATION: 96 % | WEIGHT: 196.1 LBS | HEIGHT: 57 IN | BODY MASS INDEX: 42.31 KG/M2 | RESPIRATION RATE: 16 BRPM

## 2023-07-27 DIAGNOSIS — I10 ESSENTIAL (PRIMARY) HYPERTENSION: ICD-10-CM

## 2023-07-27 DIAGNOSIS — I50.23 ACUTE ON CHRONIC SYSTOLIC (CONGESTIVE) HEART FAILURE (H): ICD-10-CM

## 2023-07-27 DIAGNOSIS — R07.9 CHEST PAIN, UNSPECIFIED TYPE: Primary | ICD-10-CM

## 2023-07-27 DIAGNOSIS — Z95.0 PACEMAKER: ICD-10-CM

## 2023-07-27 PROCEDURE — 99309 SBSQ NF CARE MODERATE MDM 30: CPT | Performed by: FAMILY MEDICINE

## 2023-07-27 RX ORDER — HYDROCHLOROTHIAZIDE 25 MG/1
25 TABLET ORAL DAILY
COMMUNITY

## 2023-07-27 RX ORDER — AMLODIPINE BESYLATE 5 MG/1
5 TABLET ORAL DAILY
COMMUNITY
End: 2024-06-27

## 2023-07-27 NOTE — LETTER
7/27/2023        RE: Letty Escobar  C/o Edwar Escobar  [address] [facility] [address]        [facility] GERIATRIC SERVICES    Facility[facility]   [facility] () [[id number]]   Code Status: DNR/DNI      HPI:   Letty is a 67 year old female who was hospitalized July 20 2023 through July 21, 2023 secondary to chest pain.  She did present to emergency department chest pain in early morning on July 20.  She was afebrile and hemodynamically stable.  The troponins did trend flat she did have a BNP of 1382, magnesium 1.5, EKG did show paced rhythm and chest x-ray was unremarkable and was admitted for observation.  Cardiology evaluated and no further work-up was recommended she was given IV diuresis for acute on chronic heart failure.  Added to her medications was amlodipine 5 mg and hydrochlorothiazide 25 mg and furosemide was increased 40 mg every other day.    Currently back in the long-term care unit room 249.  Had a conversation regarding her hospitalization as well as her laboratory studies and medication adjustments.  She states that she is doing well.  She is normotensive and afebrile and also on room air weight of 197 pounds in comparison to July 16 201 pounds.  Past Medical History:  Past Medical History:   Diagnosis Date     ACP (advance care planning) 11/3/2010    Formatting of this note might be different from the original. Patient has identified Health Care Agent(s): Yes Add Health Care Agents: Yes   Health Care Agent(s):  Primary Health Care Agent:   Edwar Chappellvedaalec Relationship:  Spouse Phone:   [phone]  Secondary Health Care Agent:   Chiki Oro Relationship:   Son Phone:   [phone]  Patient has Advance Care Plan Documents (Health Care Direct     Acute coronary syndrome (H) 11/22/2019     Acute exacerbation of CHF (congestive heart failure) (H) 11/11/2019     Acute on chronic systolic (congestive) heart failure (H) 1/28/2020     Anemia of chronic disease 1/5/2013      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, left leg (H) 1/15/2020     BPPV (benign paroxysmal positional vertigo) 5/31/2018     Candidiasis 3/1/2020     Carotid stenosis, right 7/13/2016    Carotid US 05/04/2018 showed moderate plaque formation, consistent with 50 to 69% stenosis in the right internal carotid artery, not significantly changed from 8/5/2015.  Moderate plaque formation, consistent with 50 to 69% stenosis in the left internal carotid artery; there has been mild progression of the left ICA stenosis since 8/5/2015.     Chest pain 11/11/2019     Chronic bilateral low back pain without sciatica 1/17/2018     Chronic pain disorder 10/1/2017     Constipation 3/20/2020     COPD (chronic obstructive pulmonary disease) (H) 6/24/2020     Coronary atherosclerosis 2/6/2009 2/5/2009 - MI - Proximal RCA 99%, mild-mod disease elsewhere.  EF 60%.  PCI:  MYRON to pRCA. 2/12/2009 - admit CP - Widely patent RCA stent. Moderate diffuse CAD. Severe stenosis in trivial PDA branch. LVEF 45%. 1/25/2010 - admit CP - PTCA and stent of diagonal 9/8/2010 - admit CP - LAD patent stent. Moderate diffuse CAD. Medical management recommended.  4/25/2012 - NSTEMI - Acute total occlusion of     Cubital tunnel syndrome on left 11/13/2012     Depressive disorder      Diabetes mellitus (H)      Diabetes mellitus type 2 in obese (H) 7/13/2006     Diabetes mellitus type 2 in obese (H) 7/13/2006     Drug-seeking behavior 4/4/2020     Failure to thrive in adult 9/3/2020     Hereditary and idiopathic peripheral neuropathy 4/24/2007     Hyperlipidemia with target low density lipoprotein (LDL) cholesterol less than 70 mg/dL 5/30/2007     Hypertension 10/14/2015     Hypothyroidism 12/10/2010     Irritable bowel syndrome 9/7/2015     Ischemic cardiomyopathy 9/20/2015    EF of 40-45%, status post RV lead revision and LV epicardial lead placement via mini-thoracotomy in August 2016.     Long-term use of  high-risk medication 4/14/2020     Moniliasis, 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     Myocardial infarction (H)      Neuromuscular disorder (H)     ulnar nerve problem     Other specified postprocedural states 10/8/2015     Pain medication agreement 4/20/2013    Controlled substance agreement for percocet #30/month on file and signed 4/17/13.  Designated pharmacy: [facility] Prescribing physician: Andrea Diagnosis: Ulnar neuropathy     Panic disorder with agoraphobia 4/14/2020     Paroxysmal atrial fibrillation (H) 10/2/2015     Personality disorder (H) 3/5/2020    Rule out dependent personality     Polymyalgia rheumatica (H) 11/28/2018     Posttraumatic stress disorder 3/1/2020     Restless legs syndrome (RLS) 8/29/2007     Restless legs syndrome (RLS) 8/29/2007     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     Serum calcium elevated 3/1/2020     Somatic dysfunction of sacral region 1/17/2018     Subacromial bursitis of left shoulder joint 8/6/2018     Suicidal ideation 4/1/2020     Thyroid disease      TIA (transient ischemic attack) 5/4/2018     TIA (transient ischemic attack) 5/4/2018     Tobacco abuse 2/17/2017     Tobacco abuse 2/17/2017     Urinary incontinence, mixed 9/24/2017     UTI (urinary tract infection) 4/17/2020     Vitamin B12 deficiency 2/14/2018     Weakness 12/17/2019           Surgical History:  Past Surgical History:   Procedure Laterality Date     CARDIAC SURGERY      stents x 11     CARDIAC SURGERY       CHOLECYSTECTOMY       CHOLECYSTECTOMY       GENITOURINARY SURGERY      Tubal ligation     OTHER SURGICAL HISTORY      Genitourinary surgery     RELEASE CARPAL TUNNEL       RELEASE CARPAL TUNNEL         Family History:   No family history on file.    Social History:    Social History     Socioeconomic History     Marital status:      Number of children: 1   Tobacco Use     Smoking status: Never     Smokeless tobacco: Never   Vaping Use     Vaping Use: Never  "used   Substance and Sexual Activity     Alcohol use: Not Currently     Drug use: Never     Sexual activity: Not Currently   Social History Narrative    Currently living at [facility] in [place].         REVIEW OF SYSTEM:  She currently denies any new symptoms of cough or cold sore throat postnasal drip wheezing chest pain dizziness vertigo nausea vomiting diarrhea dysuria frequency or urgency.  Does have a history of dyslipidemia, COPD, CABG, depression, hypertension, posttraumatic stress disorder, panic disorder, anxiety, personality disorder       PHYSICAL EXAM:   Pleasant female in no acute distress.  Head is normocephalic.  Neck is supple without adenopathy.  Lung sounds are clear throughout.  Cardiovascular S1-S2 regular rate and rhythm and no lower extremity edema.  Pacemaker.  Gastrointestinal is protuberant nontender nondistended.  Musculoskeletal-denies discomfort.  Psychiatric: Pleasant affect    Vitals:    07/27/23 0929   BP: 132/69   Pulse: 65   Resp: 16   Temp: 98.7  F (37.1  C)   SpO2: 96%   Weight: 89 kg (196 lb 1.6 oz)   Height: 1.448 m (4' 9\")         Medication List:  Current Outpatient Medications   Medication Sig     amLODIPine (NORVASC) 5 MG tablet Take 5 mg by mouth daily     hydrochlorothiazide (HYDRODIURIL) 25 MG tablet Take 25 mg by mouth daily     acetaminophen (TYLENOL) 325 MG tablet Take 650 mg by mouth every 6 hours as needed for mild pain     acetaminophen (TYLENOL) 500 MG tablet Take 2 tablets (1,000 mg) by mouth 3 times daily     albuterol (PROVENTIL) (2.5 MG/3ML) 0.083% neb solution Take 1 vial (2.5 mg) by nebulization 2 times daily as needed for shortness of breath / dyspnea or wheezing     aspirin 81 MG EC tablet Take 81 mg by mouth daily     bisacodyl (DULCOLAX) 10 MG suppository Place 10 mg rectally daily as needed for constipation     cholecalciferol (VITAMIN D3) 10 mcg (400 units) TABS tablet Take 1,000 Units by mouth     cyanocobalamin (CYANOCOBALAMIN) 1000 " MCG/ML injection Inject 1 mL into the muscle every 30 days     divalproex sodium delayed-release (DEPAKOTE) 250 MG DR tablet Take 250 mg by mouth daily     DULoxetine HCl 40 MG CPEP Take 40 mg by mouth daily     fluticasone-vilanterol (BREO ELLIPTA) 200-25 MCG/INH inhaler Inhale 1 puff into the lungs daily     furosemide (LASIX) 20 MG tablet Take 40 mg by mouth daily     gabapentin (NEURONTIN) 300 MG capsule Take 400 mg by mouth At Bedtime     levothyroxine (SYNTHROID/LEVOTHROID) 112 MCG tablet Take 112 mcg by mouth daily     Lidocaine (LIDOCARE) 4 % Patch Place 1 patch onto the skin every 24 hours     Melatonin 10 MG TABS tablet Take 10 mg by mouth At Bedtime     metoprolol succinate ER (TOPROL-XL) 25 MG 24 hr tablet Take 25 mg by mouth daily     miconazole (MICATIN) 2 % AERP powder Apply topically as needed      mirtazapine (REMERON) 30 MG tablet Take 15 mg by mouth daily     polyethylene glycol (MIRALAX) 17 GM/Dose powder Take 17 g by mouth daily Every other day     potassium chloride ER (MICRO-K) 10 MEQ CR capsule Take 10 mEq by mouth daily      QUEtiapine (SEROQUEL) 25 MG tablet 100 mg 3 times daily     rosuvastatin (CRESTOR) 10 MG tablet Take 20 mg by mouth At Bedtime     sennosides (SENOKOT) 8.6 MG tablet Take 2 tablets by mouth At Bedtime     traZODone (DESYREL) 50 MG tablet Take 25 mg by mouth 3 times daily     Vitamin D3 (CHOLECALCIFEROL) 25 mcg (1000 units) tablet Take 1 tablet by mouth daily     No current facility-administered medications for this visit.        MED REC REQUIRED  Post Medication Reconciliation Status: discharge medications reconciled and changed, per note/orders       Labs:  Last Comprehensive Metabolic Panel:  Lab Results   Component Value Date     07/06/2023    POTASSIUM 4.6 07/06/2023    CHLORIDE 105 07/06/2023    CO2 23 07/06/2023    ANIONGAP 12 07/06/2023     (H) 07/06/2023    BUN 21.9 07/06/2023    CR 1.06 (H) 07/06/2023    GFRESTIMATED 57 (L) 07/06/2023    ORESTES 9.6  07/06/2023       CBC RESULTS: Recent Labs   Lab Test 07/06/23  0230   WBC 7.6   RBC 3.09*   HGB 9.2*   HCT 29.2*   MCV 95   MCH 29.8   MCHC 31.5   RDW 14.6   *     July 21 creatinine 1.27, potassium 4.7, and on July 20 the sodium 138, potassium 4.2, BUN of 20 and creatinine 1.08 and a hemoglobin of 9.3    Assessment:   (R07.9) Chest pain, unspecified type  (primary encounter diagnosis)  Comment: Currently asymptomatic  Plan: Continue monitor    (I50.23) Acute on chronic systolic (congestive) heart failure (H)  Comment: Doing well  Plan: Asymptomatic    (I10) Essential (primary) hypertension  Comment: Stable  Plan: Continue to monitor    (Z95.0) Pacemaker  Comment: Was checked  Plan: Stable      PLAN:    Continue to monitor and follow her chronic medical conditions.  Had a chance to talk to staff as well as charge nurse.  Patient aware of the furosemide being increased to 40 mg every other day and also the addition of hydrochlorothiazide 25 mg daily and amlodipine 5 mg daily      Electronically signed by: Jay Chapman NP          Sincerely,        Jay Chapman NP

## 2023-07-27 NOTE — PROGRESS NOTES
[facility] GERIATRIC SERVICES    Facility:   [facility] () [[id number]]   Code Status: DNR/DNI      HPI:   Letty is a 67 year old female who was hospitalized July 20 2023 through July 21, 2023 secondary to chest pain.  She did present to emergency department chest pain in early morning on July 20.  She was afebrile and hemodynamically stable.  The troponins did trend flat she did have a BNP of 1382, magnesium 1.5, EKG did show paced rhythm and chest x-ray was unremarkable and was admitted for observation.  Cardiology evaluated and no further work-up was recommended she was given IV diuresis for acute on chronic heart failure.  Added to her medications was amlodipine 5 mg and hydrochlorothiazide 25 mg and furosemide was increased 40 mg every other day.    Currently back in the long-term care unit room 249.  Had a conversation regarding her hospitalization as well as her laboratory studies and medication adjustments.  She states that she is doing well.  She is normotensive and afebrile and also on room air weight of 197 pounds in comparison to July 16 201 pounds.  Past Medical History:  Past Medical History:   Diagnosis Date     ACP (advance care planning) 11/3/2010    Formatting of this note might be different from the original. Patient has identified Health Care Agent(s): Yes Add Health Care Agents: Yes   Health Care Agent(s):  Primary Health Care Agent:   Edwar Escobar Relationship:  Spouse Phone:   [phone]  Secondary Health Care Agent:   Chiki Zunigarich Relationship:   Son Phone:   [phone]  Patient has Advance Care Plan Documents (Health Care Direct     Acute coronary syndrome (H) 11/22/2019     Acute exacerbation of CHF (congestive heart failure) (H) 11/11/2019     Acute on chronic systolic (congestive) heart failure (H) 1/28/2020     Anemia of chronic disease 1/5/2013     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, left leg (H) 1/15/2020      BPPV (benign paroxysmal positional vertigo) 5/31/2018     Candidiasis 3/1/2020     Carotid stenosis, right 7/13/2016    Carotid US 05/04/2018 showed moderate plaque formation, consistent with 50 to 69% stenosis in the right internal carotid artery, not significantly changed from 8/5/2015.  Moderate plaque formation, consistent with 50 to 69% stenosis in the left internal carotid artery; there has been mild progression of the left ICA stenosis since 8/5/2015.     Chest pain 11/11/2019     Chronic bilateral low back pain without sciatica 1/17/2018     Chronic pain disorder 10/1/2017     Constipation 3/20/2020     COPD (chronic obstructive pulmonary disease) (H) 6/24/2020     Coronary atherosclerosis 2/6/2009 2/5/2009 - MI - Proximal RCA 99%, mild-mod disease elsewhere.  EF 60%.  PCI:  MYRON to pRCA. 2/12/2009 - admit CP - Widely patent RCA stent. Moderate diffuse CAD. Severe stenosis in trivial PDA branch. LVEF 45%. 1/25/2010 - admit CP - PTCA and stent of diagonal 9/8/2010 - admit CP - LAD patent stent. Moderate diffuse CAD. Medical management recommended.  4/25/2012 - NSTEMI - Acute total occlusion of     Cubital tunnel syndrome on left 11/13/2012     Depressive disorder      Diabetes mellitus (H)      Diabetes mellitus type 2 in obese (H) 7/13/2006     Diabetes mellitus type 2 in obese (H) 7/13/2006     Drug-seeking behavior 4/4/2020     Failure to thrive in adult 9/3/2020     Hereditary and idiopathic peripheral neuropathy 4/24/2007     Hyperlipidemia with target low density lipoprotein (LDL) cholesterol less than 70 mg/dL 5/30/2007     Hypertension 10/14/2015     Hypothyroidism 12/10/2010     Irritable bowel syndrome 9/7/2015     Ischemic cardiomyopathy 9/20/2015    EF of 40-45%, status post RV lead revision and LV epicardial lead placement via mini-thoracotomy in August 2016.     Long-term use of high-risk medication 4/14/2020     Moniliasis, 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     Mood disorder due to a general medical  condition 3/1/2020     Myocardial infarction (H)      Neuromuscular disorder (H)     ulnar nerve problem     Other specified postprocedural states 10/8/2015     Pain medication agreement 4/20/2013    Controlled substance agreement for percocet #30/month on file and signed 4/17/13.  Designated pharmacy: [facility] Prescribing physician: Andrea Diagnosis: Ulnar neuropathy     Panic disorder with agoraphobia 4/14/2020     Paroxysmal atrial fibrillation (H) 10/2/2015     Personality disorder (H) 3/5/2020    Rule out dependent personality     Polymyalgia rheumatica (H) 11/28/2018     Posttraumatic stress disorder 3/1/2020     Restless legs syndrome (RLS) 8/29/2007     Restless legs syndrome (RLS) 8/29/2007     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     Serum calcium elevated 3/1/2020     Somatic dysfunction of sacral region 1/17/2018     Subacromial bursitis of left shoulder joint 8/6/2018     Suicidal ideation 4/1/2020     Thyroid disease      TIA (transient ischemic attack) 5/4/2018     TIA (transient ischemic attack) 5/4/2018     Tobacco abuse 2/17/2017     Tobacco abuse 2/17/2017     Urinary incontinence, mixed 9/24/2017     UTI (urinary tract infection) 4/17/2020     Vitamin B12 deficiency 2/14/2018     Weakness 12/17/2019           Surgical History:  Past Surgical History:   Procedure Laterality Date     CARDIAC SURGERY      stents x 11     CARDIAC SURGERY       CHOLECYSTECTOMY       CHOLECYSTECTOMY       GENITOURINARY SURGERY      Tubal ligation     OTHER SURGICAL HISTORY      Genitourinary surgery     RELEASE CARPAL TUNNEL       RELEASE CARPAL TUNNEL         Family History:   No family history on file.    Social History:    Social History     Socioeconomic History     Marital status:      Number of children: 1   Tobacco Use     Smoking status: Never     Smokeless tobacco: Never   Vaping Use     Vaping Use: Never used   Substance and Sexual Activity     Alcohol use: Not Currently     Drug use: Never     Sexual activity: Not  "Currently   Social History Narrative    Currently living at [facility] in [place].         REVIEW OF SYSTEM:  She currently denies any new symptoms of cough or cold sore throat postnasal drip wheezing chest pain dizziness vertigo nausea vomiting diarrhea dysuria frequency or urgency.  Does have a history of dyslipidemia, COPD, CABG, depression, hypertension, posttraumatic stress disorder, panic disorder, anxiety, personality disorder       PHYSICAL EXAM:   Pleasant female in no acute distress.  Head is normocephalic.  Neck is supple without adenopathy.  Lung sounds are clear throughout.  Cardiovascular S1-S2 regular rate and rhythm and no lower extremity edema.  Pacemaker.  Gastrointestinal is protuberant nontender nondistended.  Musculoskeletal-denies discomfort.  Psychiatric: Pleasant affect    Vitals:    07/27/23 0929   BP: 132/69   Pulse: 65   Resp: 16   Temp: 98.7  F (37.1  C)   SpO2: 96%   Weight: 89 kg (196 lb 1.6 oz)   Height: 1.448 m (4' 9\")         Medication List:  Current Outpatient Medications   Medication Sig     amLODIPine (NORVASC) 5 MG tablet Take 5 mg by mouth daily     hydrochlorothiazide (HYDRODIURIL) 25 MG tablet Take 25 mg by mouth daily     acetaminophen (TYLENOL) 325 MG tablet Take 650 mg by mouth every 6 hours as needed for mild pain     acetaminophen (TYLENOL) 500 MG tablet Take 2 tablets (1,000 mg) by mouth 3 times daily     albuterol (PROVENTIL) (2.5 MG/3ML) 0.083% neb solution Take 1 vial (2.5 mg) by nebulization 2 times daily as needed for shortness of breath / dyspnea or wheezing     aspirin 81 MG EC tablet Take 81 mg by mouth daily     bisacodyl (DULCOLAX) 10 MG suppository Place 10 mg rectally daily as needed for constipation     cholecalciferol (VITAMIN D3) 10 mcg (400 units) TABS tablet Take 1,000 Units by mouth     cyanocobalamin (CYANOCOBALAMIN) 1000 MCG/ML injection Inject 1 mL into the muscle every 30 days     divalproex sodium delayed-release (DEPAKOTE) 250 " MG DR tablet Take 250 mg by mouth daily     DULoxetine HCl 40 MG CPEP Take 40 mg by mouth daily     fluticasone-vilanterol (BREO ELLIPTA) 200-25 MCG/INH inhaler Inhale 1 puff into the lungs daily     furosemide (LASIX) 20 MG tablet Take 40 mg by mouth daily     gabapentin (NEURONTIN) 300 MG capsule Take 400 mg by mouth At Bedtime     levothyroxine (SYNTHROID/LEVOTHROID) 112 MCG tablet Take 112 mcg by mouth daily     Lidocaine (LIDOCARE) 4 % Patch Place 1 patch onto the skin every 24 hours     Melatonin 10 MG TABS tablet Take 10 mg by mouth At Bedtime     metoprolol succinate ER (TOPROL-XL) 25 MG 24 hr tablet Take 25 mg by mouth daily     miconazole (MICATIN) 2 % AERP powder Apply topically as needed      mirtazapine (REMERON) 30 MG tablet Take 15 mg by mouth daily     polyethylene glycol (MIRALAX) 17 GM/Dose powder Take 17 g by mouth daily Every other day     potassium chloride ER (MICRO-K) 10 MEQ CR capsule Take 10 mEq by mouth daily      QUEtiapine (SEROQUEL) 25 MG tablet 100 mg 3 times daily     rosuvastatin (CRESTOR) 10 MG tablet Take 20 mg by mouth At Bedtime     sennosides (SENOKOT) 8.6 MG tablet Take 2 tablets by mouth At Bedtime     traZODone (DESYREL) 50 MG tablet Take 25 mg by mouth 3 times daily     Vitamin D3 (CHOLECALCIFEROL) 25 mcg (1000 units) tablet Take 1 tablet by mouth daily     No current facility-administered medications for this visit.        MED REC REQUIRED  Post Medication Reconciliation Status: discharge medications reconciled and changed, per note/orders       Labs:  Last Comprehensive Metabolic Panel:  Lab Results   Component Value Date     07/06/2023    POTASSIUM 4.6 07/06/2023    CHLORIDE 105 07/06/2023    CO2 23 07/06/2023    ANIONGAP 12 07/06/2023     (H) 07/06/2023    BUN 21.9 07/06/2023    CR 1.06 (H) 07/06/2023    GFRESTIMATED 57 (L) 07/06/2023    ORESTES 9.6 07/06/2023       CBC RESULTS: Recent Labs   Lab Test 07/06/23  0230   WBC 7.6   RBC 3.09*   HGB 9.2*   HCT 29.2*    MCV 95   MCH 29.8   MCHC 31.5   RDW 14.6   *     July 21 creatinine 1.27, potassium 4.7, and on July 20 the sodium 138, potassium 4.2, BUN of 20 and creatinine 1.08 and a hemoglobin of 9.3    Assessment:   (R07.9) Chest pain, unspecified type  (primary encounter diagnosis)  Comment: Currently asymptomatic  Plan: Continue monitor    (I50.23) Acute on chronic systolic (congestive) heart failure (H)  Comment: Doing well  Plan: Asymptomatic    (I10) Essential (primary) hypertension  Comment: Stable  Plan: Continue to monitor    (Z95.0) Pacemaker  Comment: Was checked  Plan: Stable      PLAN:    Continue to monitor and follow her chronic medical conditions.  Had a chance to talk to staff as well as charge nurse.  Patient aware of the furosemide being increased to 40 mg every other day and also the addition of hydrochlorothiazide 25 mg daily and amlodipine 5 mg daily      Electronically signed by: Jay Chapman NP

## 2023-08-04 ENCOUNTER — PATIENT OUTREACH (OUTPATIENT)
Dept: GERIATRIC MEDICINE | Facility: CLINIC | Age: 68
End: 2023-08-04
Payer: COMMERCIAL

## 2023-08-04 NOTE — PROGRESS NOTES
[facility] Six-Month Assessment      6 month assessment completed on 8/4/2023 with Maia TORRES, Braydon, Curt.    ER visits: Yes -  [facility]  2/12/23-chest pain   3/13/23-  Chest pain 4/4/23-UTI  5/23/23-Chest Pain 7/6/23-Chest pain  7/9/23-Chest pain  Hospitalizations: Yes -  [facility]  7/20/23 unstable angina , CHF  TCU stays: No  Significant health status changes: none  Falls/Injuries: No  ADL/IADL changes: No  Last visit was on 7/20/23- returned with new orders for 5mg amlodipine daily, Hydrochlorothiazide 25mg daily , and furosemide to 40mg every other day. Resident wants her right thumb nail removed. Has an appt with [facility] 8/16/23.    Reviewed Institutional Assessment and updated as needed.     Will see member in 6 months for an annual health risk assessment.       Esperanza Damon RN  Care Coordinator-Long Term Care  [address] [address]  [email or web address]   [email or web address]     Office: [phone]   Fax: [phone]

## 2023-08-12 ENCOUNTER — APPOINTMENT (OUTPATIENT)
Dept: GENERAL RADIOLOGY | Facility: CLINIC | Age: 68
End: 2023-08-12
Attending: FAMILY MEDICINE
Payer: COMMERCIAL

## 2023-08-12 ENCOUNTER — HOSPITAL ENCOUNTER (EMERGENCY)
Facility: CLINIC | Age: 68
Discharge: NURSING, REHAB OR LONG-TERM CARE | End: 2023-08-12
Attending: FAMILY MEDICINE | Admitting: FAMILY MEDICINE
Payer: COMMERCIAL

## 2023-08-12 VITALS
HEART RATE: 59 BPM | SYSTOLIC BLOOD PRESSURE: 118 MMHG | TEMPERATURE: 98.2 F | DIASTOLIC BLOOD PRESSURE: 66 MMHG | RESPIRATION RATE: 26 BRPM | OXYGEN SATURATION: 100 %

## 2023-08-12 DIAGNOSIS — R07.9 CHEST PAIN, UNSPECIFIED TYPE: ICD-10-CM

## 2023-08-12 LAB
ALBUMIN SERPL BCG-MCNC: 3.7 G/DL (ref 3.5–5.2)
ALP SERPL-CCNC: 55 U/L (ref 35–104)
ALT SERPL W P-5'-P-CCNC: 21 U/L (ref 0–50)
ANION GAP SERPL CALCULATED.3IONS-SCNC: 13 MMOL/L (ref 7–15)
AST SERPL W P-5'-P-CCNC: 24 U/L (ref 0–45)
BASOPHILS # BLD AUTO: 0 10E3/UL (ref 0–0.2)
BASOPHILS NFR BLD AUTO: 0 %
BILIRUB SERPL-MCNC: 0.3 MG/DL
BUN SERPL-MCNC: 23.7 MG/DL (ref 8–23)
CALCIUM SERPL-MCNC: 9.9 MG/DL (ref 8.8–10.2)
CHLORIDE SERPL-SCNC: 100 MMOL/L (ref 98–107)
CREAT SERPL-MCNC: 1.09 MG/DL (ref 0.51–0.95)
DEPRECATED HCO3 PLAS-SCNC: 22 MMOL/L (ref 22–29)
EOSINOPHIL # BLD AUTO: 0.2 10E3/UL (ref 0–0.7)
EOSINOPHIL NFR BLD AUTO: 3 %
ERYTHROCYTE [DISTWIDTH] IN BLOOD BY AUTOMATED COUNT: 14.5 % (ref 10–15)
GFR SERPL CREATININE-BSD FRML MDRD: 55 ML/MIN/1.73M2
GLUCOSE SERPL-MCNC: 148 MG/DL (ref 70–99)
HCT VFR BLD AUTO: 29.6 % (ref 35–47)
HGB BLD-MCNC: 9.5 G/DL (ref 11.7–15.7)
IMM GRANULOCYTES # BLD: 0.1 10E3/UL
IMM GRANULOCYTES NFR BLD: 1 %
LYMPHOCYTES # BLD AUTO: 1.8 10E3/UL (ref 0.8–5.3)
LYMPHOCYTES NFR BLD AUTO: 29 %
MCH RBC QN AUTO: 30.3 PG (ref 26.5–33)
MCHC RBC AUTO-ENTMCNC: 32.1 G/DL (ref 31.5–36.5)
MCV RBC AUTO: 94 FL (ref 78–100)
MONOCYTES # BLD AUTO: 0.4 10E3/UL (ref 0–1.3)
MONOCYTES NFR BLD AUTO: 6 %
NEUTROPHILS # BLD AUTO: 3.8 10E3/UL (ref 1.6–8.3)
NEUTROPHILS NFR BLD AUTO: 61 %
NRBC # BLD AUTO: 0 10E3/UL
NRBC BLD AUTO-RTO: 0 /100
NT-PROBNP SERPL-MCNC: 628 PG/ML (ref 0–900)
PLATELET # BLD AUTO: 144 10E3/UL (ref 150–450)
POTASSIUM SERPL-SCNC: 4.6 MMOL/L (ref 3.4–5.3)
PROT SERPL-MCNC: 7.1 G/DL (ref 6.4–8.3)
RBC # BLD AUTO: 3.14 10E6/UL (ref 3.8–5.2)
SODIUM SERPL-SCNC: 135 MMOL/L (ref 136–145)
TROPONIN T SERPL HS-MCNC: 26 NG/L
TROPONIN T SERPL HS-MCNC: 27 NG/L
WBC # BLD AUTO: 6.2 10E3/UL (ref 4–11)

## 2023-08-12 PROCEDURE — 80053 COMPREHEN METABOLIC PANEL: CPT | Performed by: FAMILY MEDICINE

## 2023-08-12 PROCEDURE — 99284 EMERGENCY DEPT VISIT MOD MDM: CPT | Mod: 25 | Performed by: FAMILY MEDICINE

## 2023-08-12 PROCEDURE — 99285 EMERGENCY DEPT VISIT HI MDM: CPT | Mod: 25 | Performed by: FAMILY MEDICINE

## 2023-08-12 PROCEDURE — 250N000013 HC RX MED GY IP 250 OP 250 PS 637: Performed by: FAMILY MEDICINE

## 2023-08-12 PROCEDURE — 96375 TX/PRO/DX INJ NEW DRUG ADDON: CPT | Performed by: FAMILY MEDICINE

## 2023-08-12 PROCEDURE — 93005 ELECTROCARDIOGRAM TRACING: CPT | Performed by: FAMILY MEDICINE

## 2023-08-12 PROCEDURE — 93010 ELECTROCARDIOGRAM REPORT: CPT | Performed by: FAMILY MEDICINE

## 2023-08-12 PROCEDURE — 83880 ASSAY OF NATRIURETIC PEPTIDE: CPT | Performed by: FAMILY MEDICINE

## 2023-08-12 PROCEDURE — 71045 X-RAY EXAM CHEST 1 VIEW: CPT

## 2023-08-12 PROCEDURE — 96374 THER/PROPH/DIAG INJ IV PUSH: CPT | Performed by: FAMILY MEDICINE

## 2023-08-12 PROCEDURE — 84484 ASSAY OF TROPONIN QUANT: CPT | Performed by: FAMILY MEDICINE

## 2023-08-12 PROCEDURE — 85004 AUTOMATED DIFF WBC COUNT: CPT | Performed by: FAMILY MEDICINE

## 2023-08-12 PROCEDURE — 250N000011 HC RX IP 250 OP 636: Performed by: FAMILY MEDICINE

## 2023-08-12 PROCEDURE — 36415 COLL VENOUS BLD VENIPUNCTURE: CPT | Performed by: FAMILY MEDICINE

## 2023-08-12 RX ORDER — ONDANSETRON 2 MG/ML
4 INJECTION INTRAMUSCULAR; INTRAVENOUS EVERY 30 MIN PRN
Status: DISCONTINUED | OUTPATIENT
Start: 2023-08-12 | End: 2023-08-12 | Stop reason: HOSPADM

## 2023-08-12 RX ORDER — FENTANYL CITRATE 50 UG/ML
25 INJECTION, SOLUTION INTRAMUSCULAR; INTRAVENOUS ONCE
Status: COMPLETED | OUTPATIENT
Start: 2023-08-12 | End: 2023-08-12

## 2023-08-12 RX ADMIN — NITROGLYCERIN 15 MG: 20 OINTMENT TOPICAL at 01:28

## 2023-08-12 RX ADMIN — FENTANYL CITRATE 25 MCG: 50 INJECTION, SOLUTION INTRAMUSCULAR; INTRAVENOUS at 01:28

## 2023-08-12 RX ADMIN — ONDANSETRON 4 MG: 2 INJECTION INTRAMUSCULAR; INTRAVENOUS at 01:28

## 2023-08-12 ASSESSMENT — ACTIVITIES OF DAILY LIVING (ADL)
ADLS_ACUITY_SCORE: 35
ADLS_ACUITY_SCORE: 35

## 2023-08-12 NOTE — ED PROVIDER NOTES
History     Chief Complaint   Patient presents with    Chest Pain     HPI  Letty Escobar is a 67 year old female who presents to the ED with recurrent left-sided chest pain with radiation to the jaw and left arm.  She has been seen multiple times for this in the past month.  Her troponins have typically been slightly elevated but flat.  She had an angiogram on 7/10/2023 done at [facility].  Her CABG grafts were all patent.  They recommended medical management.  Her BNP was up a bit and they recommended an additional day of IV diuresis and transition to oral Lasix but she declined that and reported that she would not take daily Lasix.  She agreed to take it every other day but tells me that she is not taking it at all.  She thinks that she urinates plenty already.  Denies any lower extremity edema or shortness of breath.    Her pain recurred tonight at 2245 and woke her from sleep.  She took 2 nitro without relief.  Chest pain remains along with nausea.        ===== Excerpt from admission to [facility] from 7/20-21/2023 ============    Hospital Course   Patient is a 67 years old female with a history of CAD status post CABG, HFrEF, hypertension, hyperlipidemia, A-fib, status post tricuspid valve replacement, status post mitral valve replacement, status post pacemaker in situ, hypothyroidism, type 2 diabetes, and depression. Patient presented to ED with chest pain in the early mornings of 7/20/2023. In the ED, the patient was afebrile and hemodynamically stable. On laboratory, troponin trends have been flat (31-->31-->32-->30). Noted to have elevated proBNP of 1382. Magnesium of 1.5. Rest of laboratories unrevealing. EKG shows paced rhythm. Chest x-ray unremarkable. Patient was admitted to hospital service for observation. No further chest pain reported. Cardiology evaluated and no further work up recommended. However, cardiology gave IV diuresis for acute on chronic HFrEF. The patient declined additional  day of IV diuresis. Cardiology transitioned to oral furosemide. The patient declined daily oral lasix and reported she will not take daily diuretic. She did agree to take diuretic every other day. Thus, cardiology transitioned her to lasix every other day with hyrdrochlorothiazide. The patient was discharged back to her care facility in stable condition.     ================================================================      Allergies:  Allergies   Allergen Reactions    Bee Pollen Anaphylaxis    Diphenhydramine Palpitations     Tolerated IV Benadryl when not pushed too fast    Isosorbide Nitrate Other (See Comments) and Dizziness     Also causes syncope (has fallen before) and brain fog/mental disturbances - please do not prescribe    Nitroglycerin Dizziness, Fatigue and Other (See Comments)     Specifically the patch - please do not prescribe  Specifically the patch - please do not prescribe      Contrast Dye Hives and Itching    Other Drug Allergy (See Comments) Hives and Itching    Penicillin G     Adhesive Tape Rash    Diphenhydramine Hcl Palpitations    Liquid Adhesive Itching    Nystatin Dermatitis    Nystatin Dermatitis     Also blisters    Penicillins Swelling and Rash     Occurred as a child - not 100% sure on specific reactions    Sulfa Antibiotics Other (See Comments)     Occurred as a child / patient does not remember specific reaction       Problem List:    Patient Active Problem List    Diagnosis Date Noted    Chronic kidney disease, stage 3a (H) 10/16/2022     Priority: Medium    SOB (shortness of breath) 04/07/2022     Priority: Medium    Morbid obesity (H) 12/10/2021     Priority: Medium    ICD (implantable cardioverter-defibrillator) in place 11/17/2021     Priority: Medium     Formatting of this note is different from the original.  Date of last device in office evaluation: 5/17/2022    ?  and model: Medtronic Cobalt CRT-D.   Date of implant: 5/7/2021  Tachy therapies remain OFF: S/p  downgrade from ICD to pacemaker, but her implanted system includes a DF-4 lead, so an ICD generator was placed with the tachycardia therapies disabled.     ? Indication for device: Ischemic cardiomyopathy   ? Cardiac resynchronization therapy:   no     MRI Conditional:  No  o If No:  Reason why:  Abandoned LV lead    ? Battery longevity documented as less than 3  Months: No  ? Are any of the leads less than 3 months old:  No    ? Programming              ? Pacing mode and programmed lower rate: DDDR 60 - 130 bpm              ? Rate-responsive sensor type, if programmed on: Accelerometer        Underlying rhythm and heart rate:  SR @ 60 bpm    ? What is the response of this device to magnet placement:  None - tachy therapies off  ? PM magnet pacing rate: N/A   ? Any alert status on CIED generator or lead: No    ? Last pacing threshold    Atrial   1.0 V @ 0.4 ms   Ventricular RV: 0.75 V @ 0.4 ms  LV:  2.75 V @ 1.0 ms    Formatting of this note is different from the original.  Date of last device in office evaluation: 11/17/2022     ?  and model: Medtronic Cobalt CRT-D.   Date of implant: 5/7/2021  Tachy therapies remain OFF: S/p downgrade from ICD to pacemaker, but her implanted system includes a DF-4 lead, so an ICD generator was placed with the tachycardia therapies disabled.     ? Indication for device: Ischemic cardiomyopathy   ? Cardiac resynchronization therapy:   no     MRI Conditional:  No  o If No:  Reason why:  Abandoned LV lead    ? Battery longevity documented as less than 3  Months: No  ? Are any of the leads less than 3 months old:  No    ? Programming              ? Pacing mode and programmed lower rate: DDDR 60 - 130 bpm              ? Rate-responsive sensor type, if programmed on: Accelerometer        Underlying rhythm and heart rate:  Sinus rhythm 62 bpm, w/BBB    ? What is the response of this device to magnet placement:  None - tachy therapies off  ? PM magnet pacing rate: N/A   ? Any  alert status on CIED generator or lead: No    ? Last pacing threshold    Atrial   1.0 V @ 0.4 ms   Ventricular RV: 0.75 V @ 0.4 ms  LV:  2.75 V @ 1.0 ms      Atrial fibrillation (H) 04/06/2021     Priority: Medium    Pacemaker 04/06/2021     Priority: Medium    Major depressive disorder, recurrent severe without psychotic features (H) 01/26/2021     Priority: Medium    Panic disorder with agoraphobia 04/14/2020     Priority: Medium    Constipation 03/20/2020     Priority: Medium    Personality disorder (H) 03/05/2020     Priority: Medium     Rule out dependent personality    Formatting of this note might be different from the original.  Rule out dependent personality  Formatting of this note might be different from the original.  Rule out dependent personality      Candidiasis 03/01/2020     Priority: Medium    Posttraumatic stress disorder 03/01/2020     Priority: Medium    COPD without exacerbation (H) 01/29/2020     Priority: Medium    Long term (current) use of insulin (H) 01/29/2020     Priority: Medium    Acute on chronic systolic (congestive) heart failure (H) 01/28/2020     Priority: Medium    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, left leg (H) 01/15/2020     Priority: Medium    Chest pain 11/11/2019     Priority: Medium    Polymyalgia rheumatica (H) 11/28/2018     Priority: Medium    Unstable angina (H) 05/02/2018     Priority: Medium     Coronary angiogram 05/02/2018 demonstrated 30% stenosis in the LMCA, 50% stenosis in the Proximal LAD, 50% stenosis in the Mid LAD, 95% stenosis in the Proximal Circumflex (Restenosis - Balloon Angioplasty), 100% stenosis in the 1st Marginal, 50% stenosis in the Proximal RCA, 50% stenosis in the Distal RCA, the LIMA graft from the LIMA to the Distal LAD is free of significant disease; the SVG graft from the Aorta to the 1st Marginal is free of significant disease; the SVG graft from the Aorta to the Distal RCA is free of  significant disease. Intervention included a successful  2.5mm x 12mm Balloon,  2.5mm x 10mm Cutting Balloon,  3mm x 12mm Drug Eluting Stent,  3mm x 12mm Balloon, and  3mm x 8mm Balloon to Proximal Circumflex, post stenosis 0%.    Formatting of this note is different from the original.  Coronary angiogram 05/02/2018 demonstrated 30% stenosis in the LMCA, 50% stenosis in the Proximal LAD, 50% stenosis in the Mid LAD, 95% stenosis in the Proximal Circumflex (Restenosis - Balloon Angioplasty), 100% stenosis in the 1st Marginal, 50% stenosis in the Proximal RCA, 50% stenosis in the Distal RCA, the LIMA graft from the LIMA to the Distal LAD is free of significant disease; the SVG graft from the Aorta to the 1st Marginal is free of significant disease; the SVG graft from the Aorta to the Distal RCA is free of significant disease. Intervention included a successful  2.5mm x 12mm Balloon,  2.5mm x 10mm Cutting Balloon,  3mm x 12mm Drug Eluting Stent,  3mm x 12mm Balloon, and  3mm x 8mm Balloon to Proximal Circumflex, post stenosis 0%.  Formatting of this note is different from the original.  Coronary angiogram 05/02/2018 demonstrated 30% stenosis in the LMCA, 50% stenosis in the Proximal LAD, 50% stenosis in the Mid LAD, 95% stenosis in the Proximal Circumflex (Restenosis - Balloon Angioplasty), 100% stenosis in the 1st Marginal, 50% stenosis in the Proximal RCA, 50% stenosis in the Distal RCA, the LIMA graft from the LIMA to the Distal LAD is free of significant disease; the SVG graft from the Aorta to the 1st Marginal is free of significant disease; the SVG graft from the Aorta to the Distal RCA is free of significant disease. Intervention included a successful  2.5mm x 12mm Balloon,  2.5mm x 10mm Cutting Balloon,  3mm x 12mm Drug Eluting Stent,  3mm x 12mm Balloon, and  3mm x 8mm Balloon to Proximal Circumflex, post stenosis 0%.      Vitamin B12 deficiency 02/14/2018     Priority: Medium    Chronic bilateral low back  pain without sciatica 01/17/2018     Priority: Medium    Chronic pain disorder 10/01/2017     Priority: Medium    Urinary incontinence, mixed 09/24/2017     Priority: Medium    Internal carotid artery stenosis, bilateral 07/13/2016     Priority: Medium     Carotid US 05/04/2018 showed moderate plaque formation, consistent with 50 to 69% stenosis in the right internal carotid artery, not significantly changed from 8/5/2015.  Moderate plaque formation, consistent with 50 to 69% stenosis in the left internal carotid artery; there has been mild progression of the left ICA stenosis since 8/5/2015.    Formatting of this note might be different from the original.  Carotid US 05/04/2018 showed moderate plaque formation, consistent with 50 to 69% stenosis in the right internal carotid artery, not significantly changed from 8/5/2015.  Moderate plaque formation, consistent with 50 to 69% stenosis in the left internal carotid artery; there has been mild progression of the left ICA stenosis since 8/5/2015.    Formatting of this note might be different from the original.  Carotid US 05/04/2018 showed moderate plaque formation, consistent with 50 to 69% stenosis in the right internal carotid artery, not significantly changed from 8/5/2015.  Moderate plaque formation, consistent with 50 to 69% stenosis in the left internal carotid artery; there has been mild progression of the left ICA stenosis since 8/5/2015.      Essential (primary) hypertension 10/14/2015     Priority: Medium    Ischemic cardiomyopathy 09/20/2015     Priority: Medium     EF of 40-45%, status post RV lead revision and LV epicardial lead placement via mini-thoracotomy in August 2016.    Formatting of this note might be different from the original.  EF of 40-45%, status post RV lead revision and LV epicardial lead placement via mini-thoracotomy in August 2016.  Formatting of this note might be different from the original.  EF of 40-45%, status post RV lead revision  "and LV epicardial lead placement via mini-thoracotomy in August 2016.      S/P CABG x 5 08/21/2015     Priority: Medium    Pain medication agreement 04/20/2013     Priority: Medium     Controlled substance agreement for percocet #30/month on file and signed 4/17/13.  Designated pharmacy: [facility] Prescribing physician: Andrea Diagnosis: Ulnar neuropathy    Formatting of this note might be different from the original.  Controlled substance agreement for percocet #30/month on file and signed 4/17/13.  Designated pharmacy: [facility] Prescribing physician: Andrea Diagnosis: Ulnar neuropathy      Anemia in other chronic diseases classified elsewhere 01/05/2013     Priority: Medium    Hypothyroidism, unspecified 12/10/2010     Priority: Medium    ACP (advance care planning) 11/03/2010     Priority: Medium     Formatting of this note might be different from the original.  Patient has identified Health Care Agent(s): Yes  Add Health Care Agents: Yes    Health Care Agent(s):    Primary Health Care Agent:     Edwar Escobar Relationship:    Spouse Phone:     [phone]    Secondary Health Care Agent:     Chiki Oro Relationship:     Son Phone:     [phone]      Patient has Advance Care Plan Documents (Health Care Directive, POLST): Yes    Advance Care Plan Documents:  Health Care Directive--03/28/11  Resuscitation Guidelines--    Patient has identified Specific Treatment Preferences: Yes   Specific Treatment Preferences:   a.) Code Status:  DNR/ Do Not Attempt Resuscitation - Allow a Natural Death    b.) Goals of Treatment:     ii. Limited Interventions and treat reversible conditions.  Provide interventions aimed at treatment of new or reversible illness/injury or non-life threatening chronic conditions. Duration of invasive or uncomfortable interventions should generally be limited.- Trial of intubation 5 days or other instructions \"If no improvement, stop.\"  c.) Interventions and Treatments:   i.   Antibiotics:     " "     - Aggressive antibiotics  ii.  Nutrition/Hydration:         - Offer food and liquids by mouth         - IV fluid administration         - No feeding tube under any circumstance.  iii. Transfusion:          - Blood products for comfort/relief of symptoms only  iv. Dialysis:           - Dialysis for short term \"for recovery, but not long term.\"        Last Assessment & Plan:   Advance Care Planning: Disease-specific Session    Letty Escobar is an [facility] patient of Dr. Renea Mendez of the [facility].    Advance care planning discussions were completed with Letty and her healthcare agent, spouse Edwar Escobar on Monday, March 28, 2011 at the [facility] Room.    Understanding of Illness and Disease Fairdealing:   Letty identifies her medical condition as diabetes with peripheral neuropathy, heart problems with a heart attack February 2009 plus 4 stent placements; sleep apnea; depression; hypothyroid; high cholesterol; tobacco use; and describes it as needing further assistance with thyroid and diabetes management.  She identifies the following symptoms of her medical condition as being the most bothersome: some memory loss, balance problems, light headedness and dizziness, puffy eyes and lower extremity edema from time to time.    Letty is retired [place] .  She is independent with all cares and lives an active life style although, \"I'm not as active as I used to be.\"    Goals of Care:   Letty currently hopes to maintain independence, control pain and symptoms and delay progression of, but not cure, the illness.  \"I want to get the diabetes and thyroid under better control.\"  Letty has an appointment the first part of April for follow up.    Quality of Life:    Letty identifies quality of life as family involvement twice weekly, Caodaism activities, newly assigned Sunday , playing cards, the " "computer,    Letty christiano with serious challenges in her life through her ganesh in God, prayers and support of her Gnosticist family, spouse and son.    Letty identifies the following fears and worries about her medical care:  \"I just want the diabetes straightened out.\"    Treatment and Care Preferences:   Past experiences in dealing with family and/or friends that have  or been seriously ill include her brother who took his own life.  \"He was 53 years old and living with us.  I found him.\"   As a result of these experiences, Letty expresses these health care preferences:      Summary Letty's Treatment Preferences:  LOW SURVIVAL; HIGH TREATMENT BURDEN:  If Letty suffered a serious complication, such that she was facing a prolonged hospital stay, required ongoing medical interventions, and the chance of living through the complication was low (for example, only 5 out of 100 would live), Letty would choose:  to focus treatment on comfort and quality of life (\"Quality of life is more important than length of life to Letty.\")  COMMENT:  \"If I can't live a normal life, I wouldn't want to live.\"    HIGH SURVIVAL; LOW FUNCTIONAL STATUS:  If Letty had a serious complication and had a good chance of living through the complication but it was expected that she would never be able to walk or talk again and would require 24 hour nursing care, she would choose:  to focus treatment on comfort and quality of life (\"Quality of life is more important than length of life to Letty.\")  COMMENT:  \"I'd accept rehabilitation.\"    HIGH SURVIVAL; LOW COGNITIVE STATUS:  If Letty had a serious complication and had a good chance of living through the complication but it was expected that she would never know who she was or who she was with and would require 24 hour nursing care, she would choose:  to focus treatment on comfort and quality of life (\"Quality of life is more important than length of life to Letty.\")    CARDIO-PULMONARY " "RESUSCITATION (CPR):  The facts, risks and benefits of CPR were discussed with Letty.  If she had a sudden event that caused her heart and breathing to stop, she:  WOULD NOT want CPR attempted and instead would prefer that a natural death occur.    MECHANICAL VENTILATION: If Letty had an episode where she was unable to breathe on her own, she would choose the following:  attempt to use any appropriate non-invasive method to assist breathing, and use mechanical ventilation.  COMMENT:  \"If reversible ventilator is acceptable for 5 days.  If no improvement, stop.\"     Letty has chosen her healthcare agent to:  strictly follow her wishes.    Follow Up Plan:   Letty was encouraged to continue advance care planning discussions with her health care agents and primary care provider.   Letty and her health care agent declined handouts.     Documents addressed during this advance care planning session:  1.  Health Care Agents identified.          .  Primary health care agent is spouse, Edwar Escobar;          .  Secondary health care agent is son, Jermaine Oro.  2.  Health Care Directive completed and scanned into medical record.  3.  Statement of Treatment Preferences for advanced illness completed and scanned into the medical record.  4.  Resuscitation Guidelines completed for DNR.  Document sent to Dr. Renea Mendez for signature and returned to the home. Letty, please place on the refrigerator.    Recommendations/Plan:   Letty and her health care agent to review Advance Care Plan.     Letty would benefit from:   Care Navigation/Care Navigation Help Desk--explained services for pending future needs.  No identified needs today.  Care Navigation brochure was given to Letty and her healthcare agent.    Advance Care Planning recommendations and Letty's concerns and questions were cc ed to her primary provider.     Thank you, Letty for the opportunity of assisting with Advance Care Planning. It was a seeing you again and " chan Vann.  Please contact me if I can be of further assistance.    Interviewer: Pat Martin RN    Advance Care Planning Facilitator  [phone]   3/28/2011      ELI (obstructive sleep apnea) 01/31/2010     Priority: Medium     PSG on April/2008 that showed moderate Obstructive Sleep Apnea with an AHI of 23.5 . Limb movements persisted at 29 movements/hour at optimal pressures, despite carbidopa use.    Formatting of this note might be different from the original.  PSG on April/2008 that showed moderate Obstructive Sleep Apnea with an AHI of 23.5 . Limb movements persisted at 29 movements/hour at optimal pressures, despite carbidopa use.  Formatting of this note might be different from the original.  PSG on April/2008 that showed moderate Obstructive Sleep Apnea with an AHI of 23.5 . Limb movements persisted at 29 movements/hour at optimal pressures, despite carbidopa use.      Coronary atherosclerosis 02/06/2009     Priority: Medium     Formatting of this note might be different from the original.  2/5/2009 - MI - Proximal RCA 99%, mild-mod disease elsewhere.  EF 60%.  PCI:  MYRON to pRCA.  2/12/2009 - admit CP - Widely patent RCA stent. Moderate diffuse CAD. Severe stenosis in trivial PDA branch. LVEF 45%.  1/25/2010 - admit CP - PTCA and stent of diagonal  9/8/2010 - admit CP - LAD patent stent. Moderate diffuse CAD. Medical management recommended.   4/25/2012 - NSTEMI - Acute total occlusion of the ostial Circumflex. Acute total occlusion of the ostial ramus. Acute total occlusion of the distal 1st Obtuse Marginal. Angioplasty of ostial circumflex and ostial ramus. There was distal embolization to the OM1 vessel. This vessel was small and not amendable to intervention. Indefinite: plavix and asa 81.  8/10/2015 - CABx5 - 1. LIMA to distal LAD, reverse SVG to OM1 and OM2, reverse SVG to diagonal, reverse SVG to PDA.   2. Mitral valve repair with a Medtronics 3-D full ring, 28 mm.   3. Tricuspid repair with a  Medtronic 28 mm partial ring  1/12/2016 - TTE - EF 40-45%  Formatting of this note might be different from the original.  2/5/2009 - MI - Proximal RCA 99%, mild-mod disease elsewhere.  EF 60%.  PCI:  MYRON to pRCA.  2/12/2009 - admit CP - Widely patent RCA stent. Moderate diffuse CAD. Severe stenosis in trivial PDA branch. LVEF 45%.  1/25/2010 - admit CP - PTCA and stent of diagonal  9/8/2010 - admit CP - LAD patent stent. Moderate diffuse CAD. Medical management recommended.   4/25/2012 - NSTEMI - Acute total occlusion of the ostial Circumflex. Acute total occlusion of the ostial ramus. Acute total occlusion of the distal 1st Obtuse Marginal. Angioplasty of ostial circumflex and ostial ramus. There was distal embolization to the OM1 vessel. This vessel was small and not amendable to intervention. Indefinite: plavix and asa 81.  8/10/2015 - CABx5 - 1. LIMA to distal LAD, reverse SVG to OM1 and OM2, reverse SVG to diagonal, reverse SVG to PDA.   2. Mitral valve repair with a Medtronics 3-D full ring, 28 mm.   3. Tricuspid repair with a Medtronic 28 mm partial ring  1/12/2016 - TTE - EF 40-45%      Restless legs syndrome 08/29/2007     Priority: Medium    Hyperlipidemia, unspecified 05/30/2007     Priority: Medium        Past Medical History:    Past Medical History:   Diagnosis Date    ACP (advance care planning) 11/3/2010    Acute coronary syndrome (H) 11/22/2019    Acute exacerbation of CHF (congestive heart failure) (H) 11/11/2019    Acute on chronic systolic (congestive) heart failure (H) 1/28/2020    Anemia of chronic disease 1/5/2013    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, left leg (H) 1/15/2020    BPPV (benign paroxysmal positional vertigo) 5/31/2018    Candidiasis 3/1/2020    Carotid stenosis, right 7/13/2016    Chest pain 11/11/2019    Chronic bilateral low back pain without sciatica 1/17/2018    Chronic pain disorder 10/1/2017    Constipation 3/20/2020    COPD  (chronic obstructive pulmonary disease) (H) 6/24/2020    Coronary atherosclerosis 2/6/2009    Cubital tunnel syndrome on left 11/13/2012    Depressive disorder     Diabetes mellitus (H)     Diabetes mellitus type 2 in obese (H) 7/13/2006    Diabetes mellitus type 2 in obese (H) 7/13/2006    Drug-seeking behavior 4/4/2020    Failure to thrive in adult 9/3/2020    Hereditary and idiopathic peripheral neuropathy 4/24/2007    Hyperlipidemia with target low density lipoprotein (LDL) cholesterol less than 70 mg/dL 5/30/2007    Hypertension 10/14/2015    Hypothyroidism 12/10/2010    Irritable bowel syndrome 9/7/2015    Ischemic cardiomyopathy 9/20/2015    Long-term use of high-risk medication 4/14/2020    Moniliasis, 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    Myocardial infarction (H)     Neuromuscular disorder (H)     Other specified postprocedural states 10/8/2015    Pain medication agreement 4/20/2013    Panic disorder with agoraphobia 4/14/2020    Paroxysmal atrial fibrillation (H) 10/2/2015    Personality disorder (H) 3/5/2020    Polymyalgia rheumatica (H) 11/28/2018    Posttraumatic stress disorder 3/1/2020    Restless legs syndrome (RLS) 8/29/2007    Restless legs syndrome (RLS) 8/29/2007    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    Serum calcium elevated 3/1/2020    Somatic dysfunction of sacral region 1/17/2018    Subacromial bursitis of left shoulder joint 8/6/2018    Suicidal ideation 4/1/2020    Thyroid disease     TIA (transient ischemic attack) 5/4/2018    TIA (transient ischemic attack) 5/4/2018    Tobacco abuse 2/17/2017    Tobacco abuse 2/17/2017    Urinary incontinence, mixed 9/24/2017    UTI (urinary tract infection) 4/17/2020    Vitamin B12 deficiency 2/14/2018    Weakness 12/17/2019       Past Surgical History:    Past Surgical History:   Procedure Laterality Date    CARDIAC SURGERY      stents x 11    CARDIAC SURGERY      CHOLECYSTECTOMY      CHOLECYSTECTOMY      GENITOURINARY  SURGERY      Tubal ligation    OTHER SURGICAL HISTORY      Genitourinary surgery    RELEASE CARPAL TUNNEL      RELEASE CARPAL TUNNEL         Family History:    No family history on file.    Social History:  Marital Status:   [2]  Social History     Tobacco Use    Smoking status: Never    Smokeless tobacco: Never   Vaping Use    Vaping Use: Never used   Substance Use Topics    Alcohol use: Not Currently    Drug use: Never        Medications:    acetaminophen (TYLENOL) 325 MG tablet  acetaminophen (TYLENOL) 500 MG tablet  albuterol (PROVENTIL) (2.5 MG/3ML) 0.083% neb solution  amLODIPine (NORVASC) 5 MG tablet  aspirin 81 MG EC tablet  bisacodyl (DULCOLAX) 10 MG suppository  cholecalciferol (VITAMIN D3) 10 mcg (400 units) TABS tablet  cyanocobalamin (CYANOCOBALAMIN) 1000 MCG/ML injection  divalproex sodium delayed-release (DEPAKOTE) 250 MG DR tablet  DULoxetine HCl 40 MG CPEP  fluticasone-vilanterol (BREO ELLIPTA) 200-25 MCG/INH inhaler  furosemide (LASIX) 20 MG tablet  gabapentin (NEURONTIN) 300 MG capsule  hydrochlorothiazide (HYDRODIURIL) 25 MG tablet  levothyroxine (SYNTHROID/LEVOTHROID) 112 MCG tablet  Lidocaine (LIDOCARE) 4 % Patch  Melatonin 10 MG TABS tablet  metoprolol succinate ER (TOPROL-XL) 25 MG 24 hr tablet  miconazole (MICATIN) 2 % AERP powder  mirtazapine (REMERON) 30 MG tablet  polyethylene glycol (MIRALAX) 17 GM/Dose powder  potassium chloride ER (MICRO-K) 10 MEQ CR capsule  QUEtiapine (SEROQUEL) 25 MG tablet  rosuvastatin (CRESTOR) 10 MG tablet  sennosides (SENOKOT) 8.6 MG tablet  traZODone (DESYREL) 50 MG tablet  Vitamin D3 (CHOLECALCIFEROL) 25 mcg (1000 units) tablet          Review of Systems   All other systems reviewed and are negative.      Physical Exam   BP: 131/69  Pulse: 80  Temp: 98.2  F (36.8  C)  Resp: 20  SpO2: 98 %      Physical Exam  Constitutional:       General: She is in acute distress (mild).      Appearance: She is obese.   Cardiovascular:      Rate and Rhythm: Normal rate  and regular rhythm.   Pulmonary:      Effort: Pulmonary effort is normal.      Breath sounds: Normal breath sounds.   Abdominal:      Palpations: Abdomen is soft.      Tenderness: There is no abdominal tenderness.   Musculoskeletal:      Right lower leg: No edema.      Left lower leg: No edema.   Skin:     General: Skin is warm and dry.   Neurological:      General: No focal deficit present.      Mental Status: She is alert and oriented to person, place, and time.         ED Course                 Procedures         EKG:  Interpreted by David Rust MD   Normal sinus rhythm.  Rate:  87 bpm  Right axis.  Left bundle branch block  No change from 7/6/2023.      Critical Care time:  none               Results for orders placed or performed during the hospital encounter of 08/12/23 (from the past 24 hour(s))   CBC with platelets differential    Narrative    The following orders were created for panel order CBC with platelets differential.  Procedure                               Abnormality         Status                     ---------                               -----------         ------                     CBC with platelets and d...[[id number]]  Abnormal            Final result                 Please view results for these tests on the individual orders.   Comprehensive metabolic panel   Result Value Ref Range    Sodium 135 (L) 136 - 145 mmol/L    Potassium 4.6 3.4 - 5.3 mmol/L    Chloride 100 98 - 107 mmol/L    Carbon Dioxide (CO2) 22 22 - 29 mmol/L    Anion Gap 13 7 - 15 mmol/L    Urea Nitrogen 23.7 (H) 8.0 - 23.0 mg/dL    Creatinine 1.09 (H) 0.51 - 0.95 mg/dL    Calcium 9.9 8.8 - 10.2 mg/dL    Glucose 148 (H) 70 - 99 mg/dL    Alkaline Phosphatase 55 35 - 104 U/L    AST 24 0 - 45 U/L    ALT 21 0 - 50 U/L    Protein Total 7.1 6.4 - 8.3 g/dL    Albumin 3.7 3.5 - 5.2 g/dL    Bilirubin Total 0.3 <=1.2 mg/dL    GFR Estimate 55 (L) >60 mL/min/1.73m2   Troponin T, High Sensitivity   Result Value Ref Range     Troponin T, High Sensitivity 27 (H) <=14 ng/L   Nt probnp inpatient (BNP)   Result Value Ref Range    N terminal Pro BNP Inpatient 628 0 - 900 pg/mL   CBC with platelets and differential   Result Value Ref Range    WBC Count 6.2 4.0 - 11.0 10e3/uL    RBC Count 3.14 (L) 3.80 - 5.20 10e6/uL    Hemoglobin 9.5 (L) 11.7 - 15.7 g/dL    Hematocrit 29.6 (L) 35.0 - 47.0 %    MCV 94 78 - 100 fL    MCH 30.3 26.5 - 33.0 pg    MCHC 32.1 31.5 - 36.5 g/dL    RDW 14.5 10.0 - 15.0 %    Platelet Count 144 (L) 150 - 450 10e3/uL    % Neutrophils 61 %    % Lymphocytes 29 %    % Monocytes 6 %    % Eosinophils 3 %    % Basophils 0 %    % Immature Granulocytes 1 %    NRBCs per 100 WBC 0 <1 /100    Absolute Neutrophils 3.8 1.6 - 8.3 10e3/uL    Absolute Lymphocytes 1.8 0.8 - 5.3 10e3/uL    Absolute Monocytes 0.4 0.0 - 1.3 10e3/uL    Absolute Eosinophils 0.2 0.0 - 0.7 10e3/uL    Absolute Basophils 0.0 0.0 - 0.2 10e3/uL    Absolute Immature Granulocytes 0.1 <=0.4 10e3/uL    Absolute NRBCs 0.0 10e3/uL   XR Chest Port 1 View    Narrative    EXAM: XR CHEST PORT 1 VIEW  LOCATION: [facility]  DATE: 8/12/2023    INDICATION: chest pain  COMPARISON: 2 view chest radiograph 07/20/2023      Impression    IMPRESSION: Prior median sternotomy. Stable position of left chest cardiac device. Stable cardiomegaly without pulmonary vascular congestion. No focal pulmonary consolidation or effusion. Somewhat low lung volumes. No pneumothorax. No acute osseous   abnormality.   Troponin T, High Sensitivity   Result Value Ref Range    Troponin T, High Sensitivity 26 (H) <=14 ng/L       Medications   ondansetron (ZOFRAN) injection 4 mg (4 mg Intravenous $Given 8/12/23 0128)   nitroGLYcerin (NITRO-BID) 2 % ointment 15 mg (15 mg Transdermal $Patch/Med Applied 8/12/23 0128)   fentaNYL (PF) (SUBLIMAZE) injection 25 mcg (25 mcg Intravenous $Given 8/12/23 0128)       Assessments & Plan (with Medical Decision Making)  67-year-old female with  history of CAD status post CABG, HFrEF, hypertension, hyperlipidemia, A-fib, status post tricuspid valve replacement, status post mitral valve replacement, status post pacemaker in situ, hypothyroidism, type 2 diabetes, and depression, woke tonight at 2245 with chest pain radiating to her left arm and jaw.  Typical pain for her.  She has been evaluated multiple times over the past month or 2 and actually had an angiogram just a few weeks ago which showed all of her grafts to be patent.  Medical management was advised.    She subsequently decided not to follow cardiology's recommendation of daily Lasix as she feels that she urinates plenty without it.  Troponins tend to be slightly elevated and trended flat.  She did not realize any pain relief with 2 sublingual nitros at home.  Some nausea.  IV placed.  Labs drawn.  EKG showed no change from previous.  Nitropaste applied along with fentanyl for pain.   Initial troponin again up just slightly at 27.  This is the range that she typically falls in.  We will check a delta 2-hour troponin to make sure that it remains flat.  Second troponin is unchanged at 26.  Her chest pain is resolved.  She was able to rest comfortably and feels ready to go back to the nursing home.  She would like to take her Tylenol with warm water and pudding when she gets back.  She asked if I could write a note to the nursing home staff to that effect and just included in her discharge instructions which I did.  She has a follow-up appointment with cardiology this coming week on the 16th.  They can discuss further medical management then.  Unfortunately she is intolerant to long-acting nitrates.     I have reviewed the nursing notes.    I have reviewed the findings, diagnosis, plan and need for follow up with the patient.           Medical Decision Making  The patient's presentation was of high complexity (a chronic illness severe exacerbation, progression, or side effect of treatment).    The  patient's evaluation involved:  review of external note(s) from 3+ sources (recent ED notes and admissions and angiogram reports from this past month)  ordering and/or review of 3+ test(s) in this encounter (see separate area of note for details)    The patient's management necessitated moderate risk (prescription drug management including medications given in the ED).        New Prescriptions    No medications on file       Final diagnoses:   Chest pain, unspecified type       8/12/2023   [facility] EMERGENCY DEPT       David Rust MD  08/12/23 0908

## 2023-08-12 NOTE — ED TRIAGE NOTES
Triage Assessment       Row Name 08/12/23 0042       Triage Assessment (Adult)    Airway WDL WDL       Respiratory WDL    Respiratory WDL WDL                  Pt arrived via EMS.  Chest pain radiating into her trupti and left arm starting at approx 2245 04/11/2023.  Two sublingual nitroglycerin given at nursing home prior to calling ems.

## 2023-08-12 NOTE — DISCHARGE INSTRUCTIONS
Keep your appointment with cardiology this week as scheduled.  Continue your current medications.  Your heart tests were reassuring tonight.    Note to NH staff: Patient would like to take her Tylenol with warm water and pudding when she gets back.    Thank you for choosing [facility]. We appreciate the opportunity to meet your urgent medical needs. Please let us know if we could have done anything to make your stay more satisfying.    After discharge, please closely monitor for any new or worsening symptoms. Return to the Emergency Department if you develop any acute worsening signs or symptoms.    If you had lab work, cultures or imaging studies done during your stay, the final results may still be pending. We will call you if your plan of care needs to change. However, if you are not improving as expected, please follow up with your primary care provider or clinic.     Start any prescription medications that were prescribed to you and take them as directed.     Please see additional handouts that may be pertinent to your condition.

## 2023-08-14 ENCOUNTER — TRANSCRIBE ORDERS (OUTPATIENT)
Dept: OTHER | Age: 68
End: 2023-08-14

## 2023-08-14 ENCOUNTER — TELEPHONE (OUTPATIENT)
Dept: DERMATOLOGY | Facility: CLINIC | Age: 68
End: 2023-08-14
Payer: COMMERCIAL

## 2023-08-14 ENCOUNTER — PATIENT OUTREACH (OUTPATIENT)
Dept: GERIATRIC MEDICINE | Facility: CLINIC | Age: 68
End: 2023-08-14
Payer: COMMERCIAL

## 2023-08-14 DIAGNOSIS — Z71.9 ENCOUNTER FOR CONSULTATION: Primary | ICD-10-CM

## 2023-08-14 NOTE — PROGRESS NOTES
[facility] Care Coordination Contact  CC received notification of Emergency Room visit.  ER visit occurred on 8/12/23 at [facility] with Dx of chest pain.    CC contacted: chart review only due to multiple recent ED visit for same issue. Letty is not following Cardiology recommended fluid management medications.   Member has a follow-up appointment with PCP: Yes: scheduled on Will see NP at facility.   Member has had a change in condition: no  New referrals placed: No  Home Visit Needed: No  Care plan reviewed and updated.  PCP notified of ED visit via EMR.    Esperanza Damon RN  Care Coordinator-Long Term Care  [address] [address]  [email or web address]   [email or web address]     Office: [phone]   Fax: [phone]

## 2023-08-14 NOTE — TELEPHONE ENCOUNTER
This encounter is being sent to inform the clinic that this patient has a referral from Jay Chapman NP for the diagnoses of Encounter for consultation [Z71.9] / Nail removal to right thumb and has requested that this patient be seen within ASAP. Based on the availability of our provider(s), we are unable to accommodate this request.    Were all sites offered this patient?  Yes    Does scheduling algorithm request to schedule next available?  Patient has been scheduled for the first available opening with Anup on 3/11.  We have informed the patient that the clinic will review their referral and reach out if a sooner appointment is medically necessary.

## 2023-08-15 NOTE — TELEPHONE ENCOUNTER
Spoke with the patient's nurse, Chen and they do not have a preference on the location.    Schedule patient for an appointment but informed Chen that this will be a consult and not the removal of the nail. Verbal understanding.    Silvia HILL CMA

## 2023-08-15 NOTE — TELEPHONE ENCOUNTER
Hemalatha Soliman RN  [facility] [place] [facility] [place] minutes ago (10:38 AM)       [place] [facility]

## 2023-08-17 ENCOUNTER — OFFICE VISIT (OUTPATIENT)
Dept: DERMATOLOGY | Facility: CLINIC | Age: 68
End: 2023-08-17
Payer: COMMERCIAL

## 2023-08-17 DIAGNOSIS — L60.3 ONYCHODYSTROPHY: Primary | ICD-10-CM

## 2023-08-17 PROCEDURE — 99202 OFFICE O/P NEW SF 15 MIN: CPT | Performed by: DERMATOLOGY

## 2023-08-17 ASSESSMENT — PAIN SCALES - GENERAL: PAINLEVEL: NO PAIN (0)

## 2023-08-17 NOTE — LETTER
8/17/2023       RE: Letty Escobar  C/o Edwar Escobar  [address] [facility] [address]     Dear Colleague,    Thank you for referring your patient, Letty Escobar, to the [facility] [place] at [facility]. Please see a copy of my visit note below.    [facility] Dermatology Note  Encounter Date: Aug 17, 2023  Office Visit     Dermatology Problem List:  1. Nail dystrophy, R thumb   - Dermatology surgery referral placed for nail avulsion and ablation    ____________________________________________    Assessment & Plan:    1. Nail dystrophy, R thumb.   Discussed that with simple nail avulsion the nail may grow back the same or worse, and that ablating the nail matrix will help to prevent the nail from growing back as the patient desires. Will place referral to Dermatology surgery for nail avulsion with ablation.   - Dermatology surgery referral placed    Procedures Performed:   None    Follow-up: as scheduled with Dermatology Surgery for nail avulsion and ablation    Staff and Medical Student:     Patient seen and staffed with Dr. Anup Acosta, MS4      Staff Physician:  I was present with the medical student who participated in the service and in the documentation of the note. I have verified the history and personally performed the physical exam and medical decision making. I edited the assessment and plan of care as documented in the note.     Bao Hebert MD   of Dermatology  Department of Dermatology  [employer] [facility] [employer]  ____________________________________________    CC: Derm Problem (Letty would like her right thumb nail removed due to it catching on things )    HPI:  Ms. Letty Escobar is a(n) 67 year old female who presents today as a new patient for problem with her R thumbnail.    The patient reports her thumbnail is deformed and  ragged and very bothersome to her as it catches on things. This nail deformity occurred over 10 years ago as a result of having a procedure to remove it because it was ingrown on both sides, and it grew back in this way. She would like the nail removed in a way that is ideally permanent so it does not grow back.    Patient is otherwise feeling well, without additional skin concerns.    Labs:  None reviewed.    Physical Exam:  Vitals: There were no vitals taken for this visit.  SKIN: Focused examination of the hands was performed.  - On the R hand her thumbnail is dystrophic with longitudinal ridges, splitting and stepoffs present, with a ragged distal nail edge  - No other lesions of concern on areas examined.     Medications:  Current Outpatient Medications   Medication    acetaminophen (TYLENOL) 325 MG tablet    acetaminophen (TYLENOL) 500 MG tablet    albuterol (PROVENTIL) (2.5 MG/3ML) 0.083% neb solution    amLODIPine (NORVASC) 5 MG tablet    aspirin 81 MG EC tablet    bisacodyl (DULCOLAX) 10 MG suppository    cholecalciferol (VITAMIN D3) 10 mcg (400 units) TABS tablet    cyanocobalamin (CYANOCOBALAMIN) 1000 MCG/ML injection    divalproex sodium delayed-release (DEPAKOTE) 250 MG DR tablet    DULoxetine HCl 40 MG CPEP    fluticasone-vilanterol (BREO ELLIPTA) 200-25 MCG/INH inhaler    furosemide (LASIX) 20 MG tablet    gabapentin (NEURONTIN) 300 MG capsule    hydrochlorothiazide (HYDRODIURIL) 25 MG tablet    levothyroxine (SYNTHROID/LEVOTHROID) 112 MCG tablet    Lidocaine (LIDOCARE) 4 % Patch    Melatonin 10 MG TABS tablet    metoprolol succinate ER (TOPROL-XL) 25 MG 24 hr tablet    miconazole (MICATIN) 2 % AERP powder    mirtazapine (REMERON) 30 MG tablet    polyethylene glycol (MIRALAX) 17 GM/Dose powder    potassium chloride ER (MICRO-K) 10 MEQ CR capsule    QUEtiapine (SEROQUEL) 25 MG tablet    rosuvastatin (CRESTOR) 10 MG tablet    sennosides (SENOKOT) 8.6 MG tablet    traZODone (DESYREL) 50 MG tablet     Vitamin D3 (CHOLECALCIFEROL) 25 mcg (1000 units) tablet     No current facility-administered medications for this visit.      Past Medical History:   Patient Active Problem List   Diagnosis    Acute on chronic systolic (congestive) heart failure (H)    Anemia in other chronic diseases classified elsewhere    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, left leg (H)    Candidiasis    Internal carotid artery stenosis, bilateral    Chronic bilateral low back pain without sciatica    Chronic pain disorder    Polymyalgia rheumatica (H)    Constipation    COPD without exacerbation (H)    Hyperlipidemia, unspecified    Essential (primary) hypertension    Hypothyroidism, unspecified    Ischemic cardiomyopathy    ELI (obstructive sleep apnea)    Panic disorder with agoraphobia    Personality disorder (H)    Posttraumatic stress disorder    Restless legs syndrome    S/P CABG x 5    Urinary incontinence, mixed    Vitamin B12 deficiency    ACP (advance care planning)    Chest pain    Pain medication agreement    Unstable angina (H)    Major depressive disorder, recurrent severe without psychotic features (H)    Atrial fibrillation (H)    Pacemaker    Morbid obesity (H)    Coronary atherosclerosis    SOB (shortness of breath)    Long term (current) use of insulin (H)    ICD (implantable cardioverter-defibrillator) in place    Chronic kidney disease, stage 3a (H)     Past Medical History:   Diagnosis Date    ACP (advance care planning) 11/3/2010    Formatting of this note might be different from the original. Patient has identified Health Care Agent(s): Yes Add Health Care Agents: Yes   Health Care Agent(s):  Primary Health Care Agent:   Edwar Escobar Relationship:  Spouse Phone:   [phone]  Secondary Health Care Agent:   Chiki Oro Relationship:   Son Phone:   [phone]  Patient has Advance Care Plan Documents ([employer] Care [employer]    Acute coronary syndrome (H) 11/22/2019     Acute exacerbation of CHF (congestive heart failure) (H) 11/11/2019    Acute on chronic systolic (congestive) heart failure (H) 1/28/2020    Anemia of chronic disease 1/5/2013    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, left leg (H) 1/15/2020    BPPV (benign paroxysmal positional vertigo) 5/31/2018    Candidiasis 3/1/2020    Carotid stenosis, right 7/13/2016    Carotid US 05/04/2018 showed moderate plaque formation, consistent with 50 to 69% stenosis in the right internal carotid artery, not significantly changed from 8/5/2015.  Moderate plaque formation, consistent with 50 to 69% stenosis in the left internal carotid artery; there has been mild progression of the left ICA stenosis since 8/5/2015.    Chest pain 11/11/2019    Chronic bilateral low back pain without sciatica 1/17/2018    Chronic pain disorder 10/1/2017    Constipation 3/20/2020    COPD (chronic obstructive pulmonary disease) (H) 6/24/2020    Coronary atherosclerosis 2/6/2009 2/5/2009 - MI - Proximal RCA 99%, mild-mod disease elsewhere.  EF 60%.  PCI:  MYRON to pRCA. 2/12/2009 - admit CP - Widely patent RCA stent. Moderate diffuse CAD. Severe stenosis in trivial PDA branch. LVEF 45%. 1/25/2010 - admit CP - PTCA and stent of diagonal 9/8/2010 - admit CP - LAD patent stent. Moderate diffuse CAD. Medical management recommended.  4/25/2012 - NSTEMI - Acute total occlusion of    Cubital tunnel syndrome on left 11/13/2012    Depressive disorder     Diabetes mellitus (H)     Diabetes mellitus type 2 in obese (H) 7/13/2006    Diabetes mellitus type 2 in obese (H) 7/13/2006    Drug-seeking behavior 4/4/2020    Failure to thrive in adult 9/3/2020    Hereditary and idiopathic peripheral neuropathy 4/24/2007    Hyperlipidemia with target low density lipoprotein (LDL) cholesterol less than 70 mg/dL 5/30/2007    Hypertension 10/14/2015    Hypothyroidism 12/10/2010    Irritable bowel syndrome 9/7/2015    Ischemic  cardiomyopathy 9/20/2015    EF of 40-45%, status post RV lead revision and LV epicardial lead placement via mini-thoracotomy in August 2016.    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  Designated pharmacy: [facility] Prescribing physician: Andrea Diagnosis: Ulnar neuropathy    Panic disorder with agoraphobia 4/14/2020    Paroxysmal atrial fibrillation (H) 10/2/2015    Personality disorder (H) 3/5/2020    Rule out dependent personality    Polymyalgia rheumatica (H) 11/28/2018    Posttraumatic stress disorder 3/1/2020    Restless legs syndrome (RLS) 8/29/2007    Restless legs syndrome (RLS) 8/29/2007    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    Serum calcium elevated 3/1/2020    Somatic dysfunction of sacral region 1/17/2018    Subacromial bursitis of left shoulder joint 8/6/2018    Suicidal ideation 4/1/2020    Thyroid disease     TIA (transient ischemic attack) 5/4/2018    TIA (transient ischemic attack) 5/4/2018    Tobacco abuse 2/17/2017    Tobacco abuse 2/17/2017    Urinary incontinence, mixed 9/24/2017    UTI (urinary tract infection) 4/17/2020    Vitamin B12 deficiency 2/14/2018    Weakness 12/17/2019        CC No referring provider defined for this encounter. on close of this encounter.

## 2023-08-17 NOTE — PROGRESS NOTES
[facility] Dermatology Note  Encounter Date: Aug 17, 2023  Office Visit     Dermatology Problem List:  1. Nail dystrophy, R thumb   - Dermatology surgery referral placed for nail avulsion and ablation    ____________________________________________    Assessment & Plan:    1. Nail dystrophy, R thumb.   Discussed that with simple nail avulsion the nail may grow back the same or worse, and that ablating the nail matrix will help to prevent the nail from growing back as the patient desires. Will place referral to Dermatology surgery for nail avulsion with ablation.   - Dermatology surgery referral placed    Procedures Performed:   None    Follow-up: as scheduled with Dermatology Surgery for nail avulsion and ablation    Staff and Medical Student:     Patient seen and staffed with Dr. Anup Acosta, MS4      Staff Physician:  I was present with the medical student who participated in the service and in the documentation of the note. I have verified the history and personally performed the physical exam and medical decision making. I edited the assessment and plan of care as documented in the note.     Bao Hebert MD   of Dermatology  Department of Dermatology  [facility]  ____________________________________________    CC: Derm Problem (Letty would like her right thumb nail removed due to it catching on things )    HPI:  Ms. Letty Escobar is a(n) 67 year old female who presents today as a new patient for problem with her R thumbnail.    The patient reports her thumbnail is deformed and ragged and very bothersome to her as it catches on things. This nail deformity occurred over 10 years ago as a result of having a procedure to remove it because it was ingrown on both sides, and it grew back in this way. She would like the nail removed in a way that is ideally permanent so it does not grow back.    Patient is otherwise feeling  well, without additional skin concerns.    Labs:  None reviewed.    Physical Exam:  Vitals: There were no vitals taken for this visit.  SKIN: Focused examination of the hands was performed.  - On the R hand her thumbnail is dystrophic with longitudinal ridges, splitting and stepoffs present, with a ragged distal nail edge  - No other lesions of concern on areas examined.     Medications:  Current Outpatient Medications   Medication    acetaminophen (TYLENOL) 325 MG tablet    acetaminophen (TYLENOL) 500 MG tablet    albuterol (PROVENTIL) (2.5 MG/3ML) 0.083% neb solution    amLODIPine (NORVASC) 5 MG tablet    aspirin 81 MG EC tablet    bisacodyl (DULCOLAX) 10 MG suppository    cholecalciferol (VITAMIN D3) 10 mcg (400 units) TABS tablet    cyanocobalamin (CYANOCOBALAMIN) 1000 MCG/ML injection    divalproex sodium delayed-release (DEPAKOTE) 250 MG DR tablet    DULoxetine HCl 40 MG CPEP    fluticasone-vilanterol (BREO ELLIPTA) 200-25 MCG/INH inhaler    furosemide (LASIX) 20 MG tablet    gabapentin (NEURONTIN) 300 MG capsule    hydrochlorothiazide (HYDRODIURIL) 25 MG tablet    levothyroxine (SYNTHROID/LEVOTHROID) 112 MCG tablet    Lidocaine (LIDOCARE) 4 % Patch    Melatonin 10 MG TABS tablet    metoprolol succinate ER (TOPROL-XL) 25 MG 24 hr tablet    miconazole (MICATIN) 2 % AERP powder    mirtazapine (REMERON) 30 MG tablet    polyethylene glycol (MIRALAX) 17 GM/Dose powder    potassium chloride ER (MICRO-K) 10 MEQ CR capsule    QUEtiapine (SEROQUEL) 25 MG tablet    rosuvastatin (CRESTOR) 10 MG tablet    sennosides (SENOKOT) 8.6 MG tablet    traZODone (DESYREL) 50 MG tablet    Vitamin D3 (CHOLECALCIFEROL) 25 mcg (1000 units) tablet     No current facility-administered medications for this visit.      Past Medical History:   Patient Active Problem List   Diagnosis    Acute on chronic systolic (congestive) heart failure (H)    Anemia in other chronic diseases classified elsewhere    Atherosclerosis of autologous vein  bypass graft(s) of the extremities with rest pain, left leg (H)    Candidiasis    Internal carotid artery stenosis, bilateral    Chronic bilateral low back pain without sciatica    Chronic pain disorder    Polymyalgia rheumatica (H)    Constipation    COPD without exacerbation (H)    Hyperlipidemia, unspecified    Essential (primary) hypertension    Hypothyroidism, unspecified    Ischemic cardiomyopathy    ELI (obstructive sleep apnea)    Panic disorder with agoraphobia    Personality disorder (H)    Posttraumatic stress disorder    Restless legs syndrome    S/P CABG x 5    Urinary incontinence, mixed    Vitamin B12 deficiency    ACP (advance care planning)    Chest pain    Pain medication agreement    Unstable angina (H)    Major depressive disorder, recurrent severe without psychotic features (H)    Atrial fibrillation (H)    Pacemaker    Morbid obesity (H)    Coronary atherosclerosis    SOB (shortness of breath)    Long term (current) use of insulin (H)    ICD (implantable cardioverter-defibrillator) in place    Chronic kidney disease, stage 3a (H)     Past Medical History:   Diagnosis Date    ACP (advance care planning) 11/3/2010    Formatting of this note might be different from the original. Patient has identified Health Care Agent(s): Yes Add Health Care Agents: Yes   Health Care Agent(s):  Primary Health Care Agent:   Edwar Escobar Relationship:  Spouse Phone:   [phone]  Secondary Health Care Agent:   Chiki Oro Relationship:   Son Phone:   [phone]  Patient has Advance Care Plan Documents (Health Care Direct    Acute coronary syndrome (H) 11/22/2019    Acute exacerbation of CHF (congestive heart failure) (H) 11/11/2019    Acute on chronic systolic (congestive) heart failure (H) 1/28/2020    Anemia of chronic disease 1/5/2013    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, left leg (H) 1/15/2020    BPPV (benign paroxysmal positional vertigo) 5/31/2018     Candidiasis 3/1/2020    Carotid stenosis, right 7/13/2016    Carotid US 05/04/2018 showed moderate plaque formation, consistent with 50 to 69% stenosis in the right internal carotid artery, not significantly changed from 8/5/2015.  Moderate plaque formation, consistent with 50 to 69% stenosis in the left internal carotid artery; there has been mild progression of the left ICA stenosis since 8/5/2015.    Chest pain 11/11/2019    Chronic bilateral low back pain without sciatica 1/17/2018    Chronic pain disorder 10/1/2017    Constipation 3/20/2020    COPD (chronic obstructive pulmonary disease) (H) 6/24/2020    Coronary atherosclerosis 2/6/2009 2/5/2009 - MI - Proximal RCA 99%, mild-mod disease elsewhere.  EF 60%.  PCI:  MYRON to pRCA. 2/12/2009 - admit CP - Widely patent RCA stent. Moderate diffuse CAD. Severe stenosis in trivial PDA branch. LVEF 45%. 1/25/2010 - admit CP - PTCA and stent of diagonal 9/8/2010 - admit CP - LAD patent stent. Moderate diffuse CAD. Medical management recommended.  4/25/2012 - NSTEMI - Acute total occlusion of    Cubital tunnel syndrome on left 11/13/2012    Depressive disorder     Diabetes mellitus (H)     Diabetes mellitus type 2 in obese (H) 7/13/2006    Diabetes mellitus type 2 in obese (H) 7/13/2006    Drug-seeking behavior 4/4/2020    Failure to thrive in adult 9/3/2020    Hereditary and idiopathic peripheral neuropathy 4/24/2007    Hyperlipidemia with target low density lipoprotein (LDL) cholesterol less than 70 mg/dL 5/30/2007    Hypertension 10/14/2015    Hypothyroidism 12/10/2010    Irritable bowel syndrome 9/7/2015    Ischemic cardiomyopathy 9/20/2015    EF of 40-45%, status post RV lead revision and LV epicardial lead placement via mini-thoracotomy in August 2016.    Long-term use of high-risk medication 4/14/2020    Moniliasis, 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    Myocardial infarction (H)     Neuromuscular disorder (H)      ulnar nerve problem    Other specified postprocedural states 10/8/2015    Pain medication agreement 4/20/2013    Controlled substance agreement for percocet #30/month on file and signed 4/17/13.  Designated pharmacy: [facility] Prescribing physician: Andrea Diagnosis: Ulnar neuropathy    Panic disorder with agoraphobia 4/14/2020    Paroxysmal atrial fibrillation (H) 10/2/2015    Personality disorder (H) 3/5/2020    Rule out dependent personality    Polymyalgia rheumatica (H) 11/28/2018    Posttraumatic stress disorder 3/1/2020    Restless legs syndrome (RLS) 8/29/2007    Restless legs syndrome (RLS) 8/29/2007    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    Serum calcium elevated 3/1/2020    Somatic dysfunction of sacral region 1/17/2018    Subacromial bursitis of left shoulder joint 8/6/2018    Suicidal ideation 4/1/2020    Thyroid disease     TIA (transient ischemic attack) 5/4/2018    TIA (transient ischemic attack) 5/4/2018    Tobacco abuse 2/17/2017    Tobacco abuse 2/17/2017    Urinary incontinence, mixed 9/24/2017    UTI (urinary tract infection) 4/17/2020    Vitamin B12 deficiency 2/14/2018    Weakness 12/17/2019        CC No referring provider defined for this encounter. on close of this encounter.

## 2023-08-17 NOTE — NURSING NOTE
Dermatology Rooming Note    Letty Escobar's goals for this visit include:   Chief Complaint   Patient presents with    Derm Problem     Letty would like her right thumb nail removed due to it catching on things      Silvia HILL CMA

## 2023-08-18 ENCOUNTER — TRANSFERRED RECORDS (OUTPATIENT)
Dept: MULTI SPECIALTY CLINIC | Facility: CLINIC | Age: 68
End: 2023-08-18

## 2023-08-18 LAB — RETINOPATHY: NORMAL

## 2023-08-21 ENCOUNTER — TELEPHONE (OUTPATIENT)
Dept: DERMATOLOGY | Facility: CLINIC | Age: 68
End: 2023-08-21
Payer: COMMERCIAL

## 2023-08-21 NOTE — TELEPHONE ENCOUNTER
[facility] Call Center    Phone Message    May a detailed message be left on voicemail: yes     Reason for Call: Other: Referral from Dr. Hebert for Derm Surgery/ Dermatology Surgery for nail avulsion and ablation/ Thumb Nail     Action Taken: Message routed to:  [facility] DERM SURGERY    Travel Screening: Not Applicable

## 2023-08-22 ENCOUNTER — TRANSITIONAL CARE UNIT VISIT (OUTPATIENT)
Dept: GERIATRICS | Facility: CLINIC | Age: 68
End: 2023-08-22
Payer: COMMERCIAL

## 2023-08-22 VITALS
TEMPERATURE: 98.1 F | WEIGHT: 198.7 LBS | RESPIRATION RATE: 16 BRPM | DIASTOLIC BLOOD PRESSURE: 54 MMHG | HEIGHT: 57 IN | OXYGEN SATURATION: 93 % | BODY MASS INDEX: 42.87 KG/M2 | HEART RATE: 62 BPM | SYSTOLIC BLOOD PRESSURE: 105 MMHG

## 2023-08-22 DIAGNOSIS — Z95.0 PACEMAKER: ICD-10-CM

## 2023-08-22 DIAGNOSIS — N18.31 CHRONIC KIDNEY DISEASE, STAGE 3A (H): ICD-10-CM

## 2023-08-22 DIAGNOSIS — I10 ESSENTIAL (PRIMARY) HYPERTENSION: ICD-10-CM

## 2023-08-22 DIAGNOSIS — G89.4 CHRONIC PAIN DISORDER: Primary | ICD-10-CM

## 2023-08-22 PROCEDURE — 99309 SBSQ NF CARE MODERATE MDM 30: CPT | Performed by: FAMILY MEDICINE

## 2023-08-22 NOTE — TELEPHONE ENCOUNTER
Left patient a voicemail to schedule for nail avulsion with Dr. Rojo or Dr. Davis. Provided my direct phone number.    Valerie Faustin on 8/22/2023 at 10:18 AM

## 2023-08-22 NOTE — TELEPHONE ENCOUNTER
Called patient to schedule surgery with Dr. Davis    Date of Surgery: 10/23    Surgery type: nail avulsion    Consult scheduled: No    Has patient had mohs with us before? Not Applicable    Additional comments: pt can not have Wednesday appts       Valerie Faustin on 8/22/2023 at 10:57 AM

## 2023-08-22 NOTE — PROGRESS NOTES
[facility] GERIATRIC SERVICES    Facility:   [facility] () [[id number]]   Code Status: DNR/DNI      CHIEF COMPLAINT/REASON FOR VISIT:  Chief Complaint   Patient presents with    FVP Care Coordination - Regulatory       HISTORY:      HPI: Letty is a 67 year old female who currently does reside in long-term care room [facility] and who I was asked to visit with secondary to a regulatory review of chronic medical conditions.  She did have an emergency room visit on August 12 secondary to recurrent left-sided chest pain.  She has been seen multiple times over the summer she even had an angiogram July 10, 2023 and her CABG grafts were all patent..  Her blood work did show sodium 135, potassium 4.6, BUN 23 and creatinine 1.09.  Unremarkable liver function tests.  Albumin 3.7.  White cell count 6.2 with hemoglobin 9.5 and platelets 144.  Chest x-ray was negative.  No consolidation or effusion.  EKG did not show any changes from previous.  She is now currently in the long-term care and we had a chance to talk about her current medications as well as her multiple chronic medical conditions.  She recently saw dermatology for her onychodystrophy thumbnail and eventually they are going to do a nail ablation.  On October 15 she went to see the dentist.  Her blood pressure last on August 11 was 105/54 her weight on August 21 was 198 pounds and on August 19 her weight 199 pounds.  She is able to self propel her wheelchair.  She denies any current problems or concerns.    Past Medical History:   Diagnosis Date    ACP (advance care planning) 11/3/2010    Formatting of this note might be different from the original. Patient has identified Health Care Agent(s): Yes Add Health Care Agents: Yes   Health Care Agent(s):  Primary Health Care Agent:   Edwar Escobar Relationship:  Spouse Phone:   [phone]  Secondary Health Care Agent:   Chiki Oro Relationship:   Son Phone:   [phone]  Patient has Advance Care  Plan Documents (Health Care Direct    Acute coronary syndrome (H) 11/22/2019    Acute exacerbation of CHF (congestive heart failure) (H) 11/11/2019    Acute on chronic systolic (congestive) heart failure (H) 1/28/2020    Anemia of chronic disease 1/5/2013    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, left leg (H) 1/15/2020    BPPV (benign paroxysmal positional vertigo) 5/31/2018    Candidiasis 3/1/2020    Carotid stenosis, right 7/13/2016    Carotid US 05/04/2018 showed moderate plaque formation, consistent with 50 to 69% stenosis in the right internal carotid artery, not significantly changed from 8/5/2015.  Moderate plaque formation, consistent with 50 to 69% stenosis in the left internal carotid artery; there has been mild progression of the left ICA stenosis since 8/5/2015.    Chest pain 11/11/2019    Chronic bilateral low back pain without sciatica 1/17/2018    Chronic pain disorder 10/1/2017    Constipation 3/20/2020    COPD (chronic obstructive pulmonary disease) (H) 6/24/2020    Coronary atherosclerosis 2/6/2009 2/5/2009 - MI - Proximal RCA 99%, mild-mod disease elsewhere.  EF 60%.  PCI:  MYRON to pRCA. 2/12/2009 - admit CP - Widely patent RCA stent. Moderate diffuse CAD. Severe stenosis in trivial PDA branch. LVEF 45%. 1/25/2010 - admit CP - PTCA and stent of diagonal 9/8/2010 - admit CP - LAD patent stent. Moderate diffuse CAD. Medical management recommended.  4/25/2012 - NSTEMI - Acute total occlusion of    Cubital tunnel syndrome on left 11/13/2012    Depressive disorder     Diabetes mellitus (H)     Diabetes mellitus type 2 in obese (H) 7/13/2006    Diabetes mellitus type 2 in obese (H) 7/13/2006    Drug-seeking behavior 4/4/2020    Failure to thrive in adult 9/3/2020    Hereditary and idiopathic peripheral neuropathy 4/24/2007    Hyperlipidemia with target low density lipoprotein (LDL) cholesterol less than 70 mg/dL 5/30/2007    Hypertension 10/14/2015     Hypothyroidism 12/10/2010    Irritable bowel syndrome 9/7/2015    Ischemic cardiomyopathy 9/20/2015    EF of 40-45%, status post RV lead revision and LV epicardial lead placement via mini-thoracotomy in August 2016.    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  Designated pharmacy: [facility] Prescribing physician: Andrea Diagnosis: Ulnar neuropathy    Panic disorder with agoraphobia 4/14/2020    Paroxysmal atrial fibrillation (H) 10/2/2015    Personality disorder (H) 3/5/2020    Rule out dependent personality    Polymyalgia rheumatica (H) 11/28/2018    Posttraumatic stress disorder 3/1/2020    Restless legs syndrome (RLS) 8/29/2007    Restless legs syndrome (RLS) 8/29/2007    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    Serum calcium elevated 3/1/2020    Somatic dysfunction of sacral region 1/17/2018    Subacromial bursitis of left shoulder joint 8/6/2018    Suicidal ideation 4/1/2020    Thyroid disease     TIA (transient ischemic attack) 5/4/2018    TIA (transient ischemic attack) 5/4/2018    Tobacco abuse 2/17/2017    Tobacco abuse 2/17/2017    Urinary incontinence, mixed 9/24/2017    UTI (urinary tract infection) 4/17/2020    Vitamin B12 deficiency 2/14/2018    Weakness 12/17/2019            No family history on file.   Social History     Socioeconomic History    Marital status:     Number of children: 1   Tobacco Use    Smoking status: Never    Smokeless tobacco: Never   Vaping Use    Vaping Use: Never used   Substance and Sexual Activity    Alcohol use: Not Currently    Drug use: Never    Sexual activity: Not Currently   Social History Narrative    Currently living at  [facility] in [place].         REVIEW OF SYSTEM:  She currently denies any new symptoms of cough or cold sore throat postnasal drip wheezing chest pain dizziness vertigo nausea vomiting diarrhea dysuria frequency or urgency.  Does have a history of dyslipidemia, COPD, CABG, depression, hypertension, posttraumatic stress disorder, panic disorder, anxiety, personality disorder     PHYSICAL EXAM:   Pleasant female in no acute distress.  Head is normocephalic.  Neck is supple without adenopathy.  Lung sounds are clear throughout.  Cardiovascular S1-S2 regular rate and rhythm and no lower extremity edema.  Pacemaker.  Gastrointestinal is protuberant nontender nondistended.  Musculoskeletal-denies discomfort.  Psychiatric: Pleasant affect     Current Outpatient Medications:     acetaminophen (TYLENOL) 325 MG tablet, Take 650 mg by mouth every 6 hours as needed for mild pain, Disp: , Rfl:     acetaminophen (TYLENOL) 500 MG tablet, Take 2 tablets (1,000 mg) by mouth 3 times daily, Disp: , Rfl:     albuterol (PROVENTIL) (2.5 MG/3ML) 0.083% neb solution, Take 1 vial (2.5 mg) by nebulization 2 times daily as needed for shortness of breath / dyspnea or wheezing, Disp: 90 mL, Rfl:     amLODIPine (NORVASC) 5 MG tablet, Take 5 mg by mouth daily, Disp: , Rfl:     aspirin 81 MG EC tablet, Take 81 mg by mouth daily, Disp: , Rfl:     bisacodyl (DULCOLAX) 10 MG suppository, Place 10 mg rectally daily as needed for constipation, Disp: , Rfl:     cholecalciferol (VITAMIN D3) 10 mcg (400 units) TABS tablet, Take 1,000 Units by mouth, Disp: , Rfl:     cyanocobalamin (CYANOCOBALAMIN) 1000 MCG/ML injection, Inject 1 mL into the muscle every 30 days, Disp: , Rfl:     divalproex sodium delayed-release (DEPAKOTE) 250 MG DR tablet, Take 250 mg by mouth daily, Disp: , Rfl:     DULoxetine HCl 40 MG CPEP, Take 40 mg by mouth daily, Disp: , Rfl:     fluticasone-vilanterol (BREO ELLIPTA) 200-25 MCG/INH inhaler, Inhale 1 puff into  "the lungs daily, Disp: , Rfl:     furosemide (LASIX) 20 MG tablet, Take 40 mg by mouth daily, Disp: , Rfl:     gabapentin (NEURONTIN) 300 MG capsule, Take 400 mg by mouth At Bedtime, Disp: , Rfl:     hydrochlorothiazide (HYDRODIURIL) 25 MG tablet, Take 25 mg by mouth daily, Disp: , Rfl:     levothyroxine (SYNTHROID/LEVOTHROID) 112 MCG tablet, Take 112 mcg by mouth daily, Disp: , Rfl:     Lidocaine (LIDOCARE) 4 % Patch, Place 1 patch onto the skin every 24 hours, Disp: 30 patch, Rfl: 0    Melatonin 10 MG TABS tablet, Take 10 mg by mouth At Bedtime, Disp: , Rfl:     metoprolol succinate ER (TOPROL-XL) 25 MG 24 hr tablet, Take 25 mg by mouth daily, Disp: , Rfl:     miconazole (MICATIN) 2 % AERP powder, Apply topically as needed , Disp: , Rfl:     mirtazapine (REMERON) 30 MG tablet, Take 15 mg by mouth daily, Disp: , Rfl:     polyethylene glycol (MIRALAX) 17 GM/Dose powder, Take 17 g by mouth daily Every other day, Disp: , Rfl:     potassium chloride ER (MICRO-K) 10 MEQ CR capsule, Take 10 mEq by mouth daily , Disp: , Rfl:     QUEtiapine (SEROQUEL) 25 MG tablet, 100 mg 3 times daily, Disp: , Rfl: 0    rosuvastatin (CRESTOR) 10 MG tablet, Take 20 mg by mouth At Bedtime, Disp: , Rfl:     sennosides (SENOKOT) 8.6 MG tablet, Take 2 tablets by mouth At Bedtime, Disp: , Rfl:     traZODone (DESYREL) 50 MG tablet, Take 25 mg by mouth 3 times daily, Disp: , Rfl:     Vitamin D3 (CHOLECALCIFEROL) 25 mcg (1000 units) tablet, Take 1 tablet by mouth daily, Disp: , Rfl:     /54   Pulse 62   Temp 98.1  F (36.7  C)   Resp 16   Ht 1.448 m (4' 9\")   Wt 90.1 kg (198 lb 11.2 oz)   SpO2 93%   BMI 43.00 kg/m        LABS:   Last Comprehensive Metabolic Panel:  Lab Results   Component Value Date     (L) 08/12/2023    POTASSIUM 4.6 08/12/2023    CHLORIDE 100 08/12/2023    CO2 22 08/12/2023    ANIONGAP 13 08/12/2023     (H) 08/12/2023    BUN 23.7 (H) 08/12/2023    CR 1.09 (H) 08/12/2023    GFRESTIMATED 55 (L) 08/12/2023 "    ORESTES 9.9 08/12/2023       CBC RESULTS:   Recent Labs   Lab Test 08/12/23  0109   WBC 6.2   RBC 3.14*   HGB 9.5*   HCT 29.6*   MCV 94   MCH 30.3   MCHC 32.1   RDW 14.5   *         ASSESSMENT:    (G89.4) Chronic pain disorder  (primary encounter diagnosis)  Comment: Chronic  Plan: Continue to monitor    (I10) Essential (primary) hypertension  Comment: Stable  Plan: Continue to monitor    (Z95.0) Pacemaker  Comment: Has seen cardiology  Plan: Continue to monitor    (N18.31) Chronic kidney disease, stage 3a (H)  Comment: Stable  Plan: We will follow-up with cardiology      Case Management:  I have reviewed the facility/SNF care plan/MDS which was done today, including the falls risk, nutrition and pain screening. I also reviewed the current immunizations, and preventive care.. Future cancer screening is not clinically indicated secondary to age/goals of care.   Patient's desire to return to the community is present, but is not able due to care needs .    Information reviewed:  Medications, vital signs, orders, and nursing notes.  PLAN:    No new changes to her medications or other issues or concerns.  She is planning on following up with dermatology secondary to her thumb nail ablation.  Otherwise we did talk again about her hospital visit and she did not have any other new questions or concerns and she was appreciative of the visit.    Electronically signed by: Jay Chapman NP

## 2023-08-22 NOTE — LETTER
8/22/2023        RE: Letty Escobar  C/o Edwar Escobar  [address] [facility] [id number]          [facility] GERIATRIC SERVICES    Facility:   [facility] () [[id number]]   Code Status: DNR/DNI      CHIEF COMPLAINT/REASON FOR VISIT:  Chief Complaint   Patient presents with     FVP Care Coordination - Regulatory       HISTORY:      HPI: Letty is a 67 year old female who currently does reside in long-term care room [facility] and who I was asked to visit with secondary to a regulatory review of chronic medical conditions.  She did have an emergency room visit on August 12 secondary to recurrent left-sided chest pain.  She has been seen multiple times over the summer she even had an angiogram July 10, 2023 and her CABG grafts were all patent..  Her blood work did show sodium 135, potassium 4.6, BUN 23 and creatinine 1.09.  Unremarkable liver function tests.  Albumin 3.7.  White cell count 6.2 with hemoglobin 9.5 and platelets 144.  Chest x-ray was negative.  No consolidation or effusion.  EKG did not show any changes from previous.  She is now currently in the long-term care and we had a chance to talk about her current medications as well as her multiple chronic medical conditions.  She recently saw dermatology for her onychodystrophy thumbnail and eventually they are going to do a nail ablation.  On October 15 she went to see the dentist.  Her blood pressure last on August 11 was 105/54 her weight on August 21 was 198 pounds and on August 19 her weight 199 pounds.  She is able to self propel her wheelchair.  She denies any current problems or concerns.    Past Medical History:   Diagnosis Date     ACP (advance care planning) 11/3/2010    Formatting of this note might be different from the original. Patient has identified Health Care Agent(s): Yes Add Health Care Agents: Yes   Health Care Agent(s):  Primary Health Care Agent:   Edwar Tavedaalec Relationship:  Spouse Phone:   [phone]   Secondary Health Care Agent:   Chiki Oro Relationship:   Son Phone:   [phone]  Patient has Advance Care Plan Documents (Health Care Direct     Acute coronary syndrome (H) 11/22/2019     Acute exacerbation of CHF (congestive heart failure) (H) 11/11/2019     Acute on chronic systolic (congestive) heart failure (H) 1/28/2020     Anemia of chronic disease 1/5/2013     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, left leg (H) 1/15/2020     BPPV (benign paroxysmal positional vertigo) 5/31/2018     Candidiasis 3/1/2020     Carotid stenosis, right 7/13/2016    Carotid US 05/04/2018 showed moderate plaque formation, consistent with 50 to 69% stenosis in the right internal carotid artery, not significantly changed from 8/5/2015.  Moderate plaque formation, consistent with 50 to 69% stenosis in the left internal carotid artery; there has been mild progression of the left ICA stenosis since 8/5/2015.     Chest pain 11/11/2019     Chronic bilateral low back pain without sciatica 1/17/2018     Chronic pain disorder 10/1/2017     Constipation 3/20/2020     COPD (chronic obstructive pulmonary disease) (H) 6/24/2020     Coronary atherosclerosis 2/6/2009 2/5/2009 - MI - Proximal RCA 99%, mild-mod disease elsewhere.  EF 60%.  PCI:  MYRON to pRCA. 2/12/2009 - admit CP - Widely patent RCA stent. Moderate diffuse CAD. Severe stenosis in trivial PDA branch. LVEF 45%. 1/25/2010 - admit CP - PTCA and stent of diagonal 9/8/2010 - admit CP - LAD patent stent. Moderate diffuse CAD. Medical management recommended.  4/25/2012 - NSTEMI - Acute total occlusion of     Cubital tunnel syndrome on left 11/13/2012     Depressive disorder      Diabetes mellitus (H)      Diabetes mellitus type 2 in obese (H) 7/13/2006     Diabetes mellitus type 2 in obese (H) 7/13/2006     Drug-seeking behavior 4/4/2020     Failure to thrive in adult 9/3/2020     Hereditary and idiopathic peripheral neuropathy  4/24/2007     Hyperlipidemia with target low density lipoprotein (LDL) cholesterol less than 70 mg/dL 5/30/2007     Hypertension 10/14/2015     Hypothyroidism 12/10/2010     Irritable bowel syndrome 9/7/2015     Ischemic cardiomyopathy 9/20/2015    EF of 40-45%, status post RV lead revision and LV epicardial lead placement via mini-thoracotomy in August 2016.     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  Designated pharmacy: [facility] Prescribing physician: Andrea Diagnosis: Ulnar neuropathy     Panic disorder with agoraphobia 4/14/2020     Paroxysmal atrial fibrillation (H) 10/2/2015     Personality disorder (H) 3/5/2020    Rule out dependent personality     Polymyalgia rheumatica (H) 11/28/2018     Posttraumatic stress disorder 3/1/2020     Restless legs syndrome (RLS) 8/29/2007     Restless legs syndrome (RLS) 8/29/2007     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     Serum calcium elevated 3/1/2020     Somatic dysfunction of sacral region 1/17/2018     Subacromial bursitis of left shoulder joint 8/6/2018     Suicidal ideation 4/1/2020     Thyroid disease      TIA (transient ischemic attack) 5/4/2018     TIA (transient ischemic attack) 5/4/2018     Tobacco abuse 2/17/2017     Tobacco abuse 2/17/2017     Urinary incontinence, mixed 9/24/2017     UTI (urinary tract infection) 4/17/2020     Vitamin B12 deficiency 2/14/2018     Weakness 12/17/2019            No family history on file.   Social History     Socioeconomic History     Marital status:      Number of children: 1   Tobacco Use     Smoking status: Never     Smokeless tobacco: Never   Vaping Use     Vaping  Use: Never used   Substance and Sexual Activity     Alcohol use: Not Currently     Drug use: Never     Sexual activity: Not Currently   Social History Narrative    Currently living at [facility] in [place].         REVIEW OF SYSTEM:  She currently denies any new symptoms of cough or cold sore throat postnasal drip wheezing chest pain dizziness vertigo nausea vomiting diarrhea dysuria frequency or urgency.  Does have a history of dyslipidemia, COPD, CABG, depression, hypertension, posttraumatic stress disorder, panic disorder, anxiety, personality disorder     PHYSICAL EXAM:   Pleasant female in no acute distress.  Head is normocephalic.  Neck is supple without adenopathy.  Lung sounds are clear throughout.  Cardiovascular S1-S2 regular rate and rhythm and no lower extremity edema.  Pacemaker.  Gastrointestinal is protuberant nontender nondistended.  Musculoskeletal-denies discomfort.  Psychiatric: Pleasant affect     Current Outpatient Medications:      acetaminophen (TYLENOL) 325 MG tablet, Take 650 mg by mouth every 6 hours as needed for mild pain, Disp: , Rfl:      acetaminophen (TYLENOL) 500 MG tablet, Take 2 tablets (1,000 mg) by mouth 3 times daily, Disp: , Rfl:      albuterol (PROVENTIL) (2.5 MG/3ML) 0.083% neb solution, Take 1 vial (2.5 mg) by nebulization 2 times daily as needed for shortness of breath / dyspnea or wheezing, Disp: 90 mL, Rfl:      amLODIPine (NORVASC) 5 MG tablet, Take 5 mg by mouth daily, Disp: , Rfl:      aspirin 81 MG EC tablet, Take 81 mg by mouth daily, Disp: , Rfl:      bisacodyl (DULCOLAX) 10 MG suppository, Place 10 mg rectally daily as needed for constipation, Disp: , Rfl:      cholecalciferol (VITAMIN D3) 10 mcg (400 units) TABS tablet, Take 1,000 Units by mouth, Disp: , Rfl:      cyanocobalamin (CYANOCOBALAMIN) 1000 MCG/ML injection, Inject 1 mL into the muscle every 30 days, Disp: , Rfl:      divalproex sodium delayed-release (DEPAKOTE) 250 MG DR tablet, Take  "250 mg by mouth daily, Disp: , Rfl:      DULoxetine HCl 40 MG CPEP, Take 40 mg by mouth daily, Disp: , Rfl:      fluticasone-vilanterol (BREO ELLIPTA) 200-25 MCG/INH inhaler, Inhale 1 puff into the lungs daily, Disp: , Rfl:      furosemide (LASIX) 20 MG tablet, Take 40 mg by mouth daily, Disp: , Rfl:      gabapentin (NEURONTIN) 300 MG capsule, Take 400 mg by mouth At Bedtime, Disp: , Rfl:      hydrochlorothiazide (HYDRODIURIL) 25 MG tablet, Take 25 mg by mouth daily, Disp: , Rfl:      levothyroxine (SYNTHROID/LEVOTHROID) 112 MCG tablet, Take 112 mcg by mouth daily, Disp: , Rfl:      Lidocaine (LIDOCARE) 4 % Patch, Place 1 patch onto the skin every 24 hours, Disp: 30 patch, Rfl: 0     Melatonin 10 MG TABS tablet, Take 10 mg by mouth At Bedtime, Disp: , Rfl:      metoprolol succinate ER (TOPROL-XL) 25 MG 24 hr tablet, Take 25 mg by mouth daily, Disp: , Rfl:      miconazole (MICATIN) 2 % AERP powder, Apply topically as needed , Disp: , Rfl:      mirtazapine (REMERON) 30 MG tablet, Take 15 mg by mouth daily, Disp: , Rfl:      polyethylene glycol (MIRALAX) 17 GM/Dose powder, Take 17 g by mouth daily Every other day, Disp: , Rfl:      potassium chloride ER (MICRO-K) 10 MEQ CR capsule, Take 10 mEq by mouth daily , Disp: , Rfl:      QUEtiapine (SEROQUEL) 25 MG tablet, 100 mg 3 times daily, Disp: , Rfl: 0     rosuvastatin (CRESTOR) 10 MG tablet, Take 20 mg by mouth At Bedtime, Disp: , Rfl:      sennosides (SENOKOT) 8.6 MG tablet, Take 2 tablets by mouth At Bedtime, Disp: , Rfl:      traZODone (DESYREL) 50 MG tablet, Take 25 mg by mouth 3 times daily, Disp: , Rfl:      Vitamin D3 (CHOLECALCIFEROL) 25 mcg (1000 units) tablet, Take 1 tablet by mouth daily, Disp: , Rfl:     /54   Pulse 62   Temp 98.1  F (36.7  C)   Resp 16   Ht 1.448 m (4' 9\")   Wt 90.1 kg (198 lb 11.2 oz)   SpO2 93%   BMI 43.00 kg/m        LABS:   Last Comprehensive Metabolic Panel:  Lab Results   Component Value Date     (L) 08/12/2023    " POTASSIUM 4.6 08/12/2023    CHLORIDE 100 08/12/2023    CO2 22 08/12/2023    ANIONGAP 13 08/12/2023     (H) 08/12/2023    BUN 23.7 (H) 08/12/2023    CR 1.09 (H) 08/12/2023    GFRESTIMATED 55 (L) 08/12/2023    ORESTES 9.9 08/12/2023       CBC RESULTS:   Recent Labs   Lab Test 08/12/23  0109   WBC 6.2   RBC 3.14*   HGB 9.5*   HCT 29.6*   MCV 94   MCH 30.3   MCHC 32.1   RDW 14.5   *         ASSESSMENT:    (G89.4) Chronic pain disorder  (primary encounter diagnosis)  Comment: Chronic  Plan: Continue to monitor    (I10) Essential (primary) hypertension  Comment: Stable  Plan: Continue to monitor    (Z95.0) Pacemaker  Comment: Has seen cardiology  Plan: Continue to monitor    (N18.31) Chronic kidney disease, stage 3a (H)  Comment: Stable  Plan: We will follow-up with cardiology      Case Management:  I have reviewed the facility/SNF care plan/MDS which was done today, including the falls risk, nutrition and pain screening. I also reviewed the current immunizations, and preventive care.. Future cancer screening is not clinically indicated secondary to age/goals of care.   Patient's desire to return to the community is present, but is not able due to care needs .    Information reviewed:  Medications, vital signs, orders, and nursing notes.  PLAN:    No new changes to her medications or other issues or concerns.  She is planning on following up with dermatology secondary to her thumb nail ablation.  Otherwise we did talk again about her hospital visit and she did not have any other new questions or concerns and she was appreciative of the visit.    Electronically signed by: Jay Chapman NP        Sincerely,        Jay Chapman NP

## 2023-09-10 ENCOUNTER — APPOINTMENT (OUTPATIENT)
Dept: GENERAL RADIOLOGY | Facility: CLINIC | Age: 68
End: 2023-09-10
Attending: FAMILY MEDICINE
Payer: COMMERCIAL

## 2023-09-10 ENCOUNTER — HOSPITAL ENCOUNTER (EMERGENCY)
Facility: CLINIC | Age: 68
Discharge: HOME | End: 2023-09-10
Attending: FAMILY MEDICINE | Admitting: FAMILY MEDICINE
Payer: COMMERCIAL

## 2023-09-10 ENCOUNTER — TELEPHONE (OUTPATIENT)
Dept: GERIATRICS | Facility: CLINIC | Age: 68
End: 2023-09-10

## 2023-09-10 VITALS
TEMPERATURE: 98 F | SYSTOLIC BLOOD PRESSURE: 130 MMHG | RESPIRATION RATE: 12 BRPM | OXYGEN SATURATION: 98 % | DIASTOLIC BLOOD PRESSURE: 63 MMHG | HEART RATE: 60 BPM | WEIGHT: 198 LBS | HEIGHT: 60 IN | BODY MASS INDEX: 38.87 KG/M2

## 2023-09-10 DIAGNOSIS — R07.9 LEFT-SIDED CHEST PAIN: ICD-10-CM

## 2023-09-10 LAB
ALBUMIN SERPL BCG-MCNC: 3.7 G/DL (ref 3.5–5.2)
ALP SERPL-CCNC: 49 U/L (ref 35–104)
ALT SERPL W P-5'-P-CCNC: 20 U/L (ref 0–50)
ANION GAP SERPL CALCULATED.3IONS-SCNC: 10 MMOL/L (ref 7–15)
AST SERPL W P-5'-P-CCNC: 20 U/L (ref 0–45)
BASOPHILS # BLD AUTO: 0 10E3/UL (ref 0–0.2)
BASOPHILS NFR BLD AUTO: 0 %
BILIRUB SERPL-MCNC: <0.2 MG/DL
BUN SERPL-MCNC: 26.9 MG/DL (ref 8–23)
CALCIUM SERPL-MCNC: 9.4 MG/DL (ref 8.8–10.2)
CHLORIDE SERPL-SCNC: 100 MMOL/L (ref 98–107)
CREAT SERPL-MCNC: 1.14 MG/DL (ref 0.51–0.95)
DEPRECATED HCO3 PLAS-SCNC: 22 MMOL/L (ref 22–29)
EGFRCR SERPLBLD CKD-EPI 2021: 52 ML/MIN/1.73M2
EOSINOPHIL # BLD AUTO: 0.1 10E3/UL (ref 0–0.7)
EOSINOPHIL NFR BLD AUTO: 2 %
ERYTHROCYTE [DISTWIDTH] IN BLOOD BY AUTOMATED COUNT: 14.1 % (ref 10–15)
GLUCOSE SERPL-MCNC: 187 MG/DL (ref 70–99)
HCT VFR BLD AUTO: 30.7 % (ref 35–47)
HGB BLD-MCNC: 9.7 G/DL (ref 11.7–15.7)
IMM GRANULOCYTES # BLD: 0.1 10E3/UL
IMM GRANULOCYTES NFR BLD: 1 %
LIPASE SERPL-CCNC: 30 U/L (ref 13–60)
LYMPHOCYTES # BLD AUTO: 2.4 10E3/UL (ref 0.8–5.3)
LYMPHOCYTES NFR BLD AUTO: 34 %
MCH RBC QN AUTO: 29.6 PG (ref 26.5–33)
MCHC RBC AUTO-ENTMCNC: 31.6 G/DL (ref 31.5–36.5)
MCV RBC AUTO: 94 FL (ref 78–100)
MONOCYTES # BLD AUTO: 0.5 10E3/UL (ref 0–1.3)
MONOCYTES NFR BLD AUTO: 7 %
NEUTROPHILS # BLD AUTO: 3.9 10E3/UL (ref 1.6–8.3)
NEUTROPHILS NFR BLD AUTO: 56 %
NRBC # BLD AUTO: 0 10E3/UL
NRBC BLD AUTO-RTO: 0 /100
NT-PROBNP SERPL-MCNC: 667 PG/ML (ref 0–900)
PLATELET # BLD AUTO: 149 10E3/UL (ref 150–450)
POTASSIUM SERPL-SCNC: 4.7 MMOL/L (ref 3.4–5.3)
PROT SERPL-MCNC: 6.8 G/DL (ref 6.4–8.3)
RBC # BLD AUTO: 3.28 10E6/UL (ref 3.8–5.2)
SODIUM SERPL-SCNC: 132 MMOL/L (ref 136–145)
TROPONIN T SERPL HS-MCNC: 24 NG/L
TROPONIN T SERPL HS-MCNC: 27 NG/L
WBC # BLD AUTO: 7 10E3/UL (ref 4–11)

## 2023-09-10 PROCEDURE — 84484 ASSAY OF TROPONIN QUANT: CPT | Performed by: FAMILY MEDICINE

## 2023-09-10 PROCEDURE — 83880 ASSAY OF NATRIURETIC PEPTIDE: CPT | Performed by: FAMILY MEDICINE

## 2023-09-10 PROCEDURE — 71045 X-RAY EXAM CHEST 1 VIEW: CPT

## 2023-09-10 PROCEDURE — 93005 ELECTROCARDIOGRAM TRACING: CPT | Performed by: FAMILY MEDICINE

## 2023-09-10 PROCEDURE — 99285 EMERGENCY DEPT VISIT HI MDM: CPT | Mod: 25 | Performed by: FAMILY MEDICINE

## 2023-09-10 PROCEDURE — 83690 ASSAY OF LIPASE: CPT | Performed by: FAMILY MEDICINE

## 2023-09-10 PROCEDURE — 96375 TX/PRO/DX INJ NEW DRUG ADDON: CPT | Performed by: FAMILY MEDICINE

## 2023-09-10 PROCEDURE — 250N000011 HC RX IP 250 OP 636: Mod: JZ | Performed by: FAMILY MEDICINE

## 2023-09-10 PROCEDURE — 96374 THER/PROPH/DIAG INJ IV PUSH: CPT | Performed by: FAMILY MEDICINE

## 2023-09-10 PROCEDURE — 36415 COLL VENOUS BLD VENIPUNCTURE: CPT | Performed by: FAMILY MEDICINE

## 2023-09-10 PROCEDURE — 93010 ELECTROCARDIOGRAM REPORT: CPT | Performed by: FAMILY MEDICINE

## 2023-09-10 PROCEDURE — 80053 COMPREHEN METABOLIC PANEL: CPT | Performed by: FAMILY MEDICINE

## 2023-09-10 PROCEDURE — 96376 TX/PRO/DX INJ SAME DRUG ADON: CPT | Performed by: FAMILY MEDICINE

## 2023-09-10 PROCEDURE — 85014 HEMATOCRIT: CPT | Performed by: FAMILY MEDICINE

## 2023-09-10 RX ORDER — ONDANSETRON 4 MG/1
4 TABLET, ORALLY DISINTEGRATING ORAL EVERY 8 HOURS PRN
COMMUNITY
End: 2024-09-13

## 2023-09-10 RX ORDER — FENTANYL CITRATE 50 UG/ML
50 INJECTION, SOLUTION INTRAMUSCULAR; INTRAVENOUS
Status: DISCONTINUED | OUTPATIENT
Start: 2023-09-10 | End: 2023-09-10 | Stop reason: HOSPADM

## 2023-09-10 RX ORDER — FAMOTIDINE 20 MG/1
20 TABLET, FILM COATED ORAL AT BEDTIME
COMMUNITY

## 2023-09-10 RX ORDER — ONDANSETRON 2 MG/ML
4 INJECTION INTRAMUSCULAR; INTRAVENOUS ONCE
Status: COMPLETED | OUTPATIENT
Start: 2023-09-10 | End: 2023-09-10

## 2023-09-10 RX ORDER — NITROGLYCERIN 0.4 MG/1
0.4 TABLET SUBLINGUAL EVERY 5 MIN PRN
COMMUNITY

## 2023-09-10 RX ORDER — ASPIRIN 81 MG/1
324 TABLET, CHEWABLE ORAL ONCE
Status: COMPLETED | OUTPATIENT
Start: 2023-09-10 | End: 2023-09-10

## 2023-09-10 RX ADMIN — ONDANSETRON 4 MG: 2 INJECTION INTRAMUSCULAR; INTRAVENOUS at 00:44

## 2023-09-10 RX ADMIN — FENTANYL CITRATE 50 MCG: 50 INJECTION, SOLUTION INTRAMUSCULAR; INTRAVENOUS at 02:35

## 2023-09-10 RX ADMIN — FENTANYL CITRATE 50 MCG: 50 INJECTION, SOLUTION INTRAMUSCULAR; INTRAVENOUS at 00:45

## 2023-09-10 ASSESSMENT — ENCOUNTER SYMPTOMS
EYES NEGATIVE: 1
FEVER: 0
FATIGUE: 1
ENDOCRINE NEGATIVE: 1
BRUISES/BLEEDS EASILY: 1
MUSCULOSKELETAL NEGATIVE: 1
CHILLS: 0
CONFUSION: 0
GASTROINTESTINAL NEGATIVE: 1
BACK PAIN: 0
LIGHT-HEADEDNESS: 0
HEADACHES: 0
SHORTNESS OF BREATH: 0
PALPITATIONS: 0

## 2023-09-10 ASSESSMENT — ACTIVITIES OF DAILY LIVING (ADL)
ADLS_ACUITY_SCORE: 35
ADLS_ACUITY_SCORE: 35

## 2023-09-10 NOTE — TELEPHONE ENCOUNTER
Received a call last night around 1120pm as Letty was having chest pain and nitroglycerin given.  Asking to send to hospital and gave the OK to have an evaluation if desired.    KILO Abreu CNP

## 2023-09-10 NOTE — ED TRIAGE NOTES
Pt presents via EMS with chest pain that started at 1050pm. Pt had one nitroglycerin SL. Pain in left chest radiating down her arm. Pain was  a 10/10, now a 8/10.      Triage Assessment       Row Name 09/10/23 0011       Triage Assessment (Adult)    Airway WDL WDL       Respiratory WDL    Respiratory WDL WDL       Cardiac WDL    Cardiac WDL X;chest pain       Chest Pain Assessment    Chest Pain Location anterior chest, left    Chest Pain Radiation arm

## 2023-09-10 NOTE — ED PROVIDER NOTES
History     Chief Complaint   Patient presents with    Chest Pain     HPI  Letty Escobar is a 67 year old female who presented to the emergency room via ambulance from her nursing home secondary to concerns of chest pain to the anterior mid chest radiating to her left arm.  Patient had a sublingual nitro at her nursing home and also received 1 in route from the nursing home to the hospital via ambulance crew.  No significant improvement in her symptoms with the nitroglycerin.  Patient has a history for similar symptoms in the past.  Patient has an extensive past medical history which was reviewed as noted below.  Patient states that she denies any shortness of breath more than typical.  She has not had increased swelling in her extremities more than usual.  She denies any stomach upset or nausea at the time of exam.  She denies any palpitations.  Patient states that she has a pacemaker in place.  She is not aware of any reflux symptoms.    Patient with significant coronary artery disease with history as copied below:      Allergies:  Allergies   Allergen Reactions    Bee Pollen Anaphylaxis    Diphenhydramine Palpitations     Tolerated IV Benadryl when not pushed too fast    Isosorbide Nitrate Other (See Comments) and Dizziness     Also causes syncope (has fallen before) and brain fog/mental disturbances - please do not prescribe    Nitroglycerin Dizziness, Fatigue and Other (See Comments)     Specifically the patch - please do not prescribe  Specifically the patch - please do not prescribe      Contrast Dye Hives and Itching    Other Drug Allergy (See Comments) Hives and Itching    Penicillin G     Adhesive Tape Rash    Diphenhydramine Hcl Palpitations    Liquid Adhesive Itching    Nystatin Dermatitis    Nystatin Dermatitis     Also blisters    Penicillins Swelling and Rash     Occurred as a child - not 100% sure on specific reactions    Sulfa Antibiotics Other (See Comments)     Occurred as a child / patient  does not remember specific reaction       Problem List:    Patient Active Problem List    Diagnosis Date Noted    Chronic kidney disease, stage 3a (H) 10/16/2022     Priority: Medium    SOB (shortness of breath) 04/07/2022     Priority: Medium    Morbid obesity (H) 12/10/2021     Priority: Medium    ICD (implantable cardioverter-defibrillator) in place 11/17/2021     Priority: Medium     Formatting of this note is different from the original.  Date of last device in office evaluation: 5/17/2022    ?  and model: Medtronic Cobalt CRT-D.   Date of implant: 5/7/2021  Tachy therapies remain OFF: S/p downgrade from ICD to pacemaker, but her implanted system includes a DF-4 lead, so an ICD generator was placed with the tachycardia therapies disabled.     ? Indication for device: Ischemic cardiomyopathy   ? Cardiac resynchronization therapy:   no     MRI Conditional:  No  o If No:  Reason why:  Abandoned LV lead    ? Battery longevity documented as less than 3  Months: No  ? Are any of the leads less than 3 months old:  No    ? Programming              ? Pacing mode and programmed lower rate: DDDR 60 - 130 bpm              ? Rate-responsive sensor type, if programmed on: Accelerometer        Underlying rhythm and heart rate:  SR @ 60 bpm    ? What is the response of this device to magnet placement:  None - tachy therapies off  ? PM magnet pacing rate: N/A   ? Any alert status on CIED generator or lead: No    ? Last pacing threshold    Atrial   1.0 V @ 0.4 ms   Ventricular RV: 0.75 V @ 0.4 ms  LV:  2.75 V @ 1.0 ms    Formatting of this note is different from the original.  Date of last device in office evaluation: 11/17/2022     ?  and model: Medtronic Cobalt CRT-D.   Date of implant: 5/7/2021  Tachy therapies remain OFF: S/p downgrade from ICD to pacemaker, but her implanted system includes a DF-4 lead, so an ICD generator was placed with the tachycardia therapies disabled.     ? Indication for  device: Ischemic cardiomyopathy   ? Cardiac resynchronization therapy:   no     MRI Conditional:  No  o If No:  Reason why:  Abandoned LV lead    ? Battery longevity documented as less than 3  Months: No  ? Are any of the leads less than 3 months old:  No    ? Programming              ? Pacing mode and programmed lower rate: DDDR 60 - 130 bpm              ? Rate-responsive sensor type, if programmed on: Accelerometer        Underlying rhythm and heart rate:  Sinus rhythm 62 bpm, w/BBB    ? What is the response of this device to magnet placement:  None - tachy therapies off  ? PM magnet pacing rate: N/A   ? Any alert status on CIED generator or lead: No    ? Last pacing threshold    Atrial   1.0 V @ 0.4 ms   Ventricular RV: 0.75 V @ 0.4 ms  LV:  2.75 V @ 1.0 ms      Atrial fibrillation (H) 04/06/2021     Priority: Medium    Pacemaker 04/06/2021     Priority: Medium    Major depressive disorder, recurrent severe without psychotic features (H) 01/26/2021     Priority: Medium    Panic disorder with agoraphobia 04/14/2020     Priority: Medium    Constipation 03/20/2020     Priority: Medium    Personality disorder (H) 03/05/2020     Priority: Medium     Rule out dependent personality    Formatting of this note might be different from the original.  Rule out dependent personality  Formatting of this note might be different from the original.  Rule out dependent personality      Candidiasis 03/01/2020     Priority: Medium    Posttraumatic stress disorder 03/01/2020     Priority: Medium    COPD without exacerbation (H) 01/29/2020     Priority: Medium    Long term (current) use of insulin (H) 01/29/2020     Priority: Medium    Acute on chronic systolic (congestive) heart failure (H) 01/28/2020     Priority: Medium    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, left leg (H) 01/15/2020     Priority: Medium    Chest pain 11/11/2019     Priority: Medium    Polymyalgia rheumatica (H)  11/28/2018     Priority: Medium    Unstable angina (H) 05/02/2018     Priority: Medium     Coronary angiogram 05/02/2018 demonstrated 30% stenosis in the LMCA, 50% stenosis in the Proximal LAD, 50% stenosis in the Mid LAD, 95% stenosis in the Proximal Circumflex (Restenosis - Balloon Angioplasty), 100% stenosis in the 1st Marginal, 50% stenosis in the Proximal RCA, 50% stenosis in the Distal RCA, the LIMA graft from the LIMA to the Distal LAD is free of significant disease; the SVG graft from the Aorta to the 1st Marginal is free of significant disease; the SVG graft from the Aorta to the Distal RCA is free of significant disease. Intervention included a successful  2.5mm x 12mm Balloon,  2.5mm x 10mm Cutting Balloon,  3mm x 12mm Drug Eluting Stent,  3mm x 12mm Balloon, and  3mm x 8mm Balloon to Proximal Circumflex, post stenosis 0%.    Formatting of this note is different from the original.  Coronary angiogram 05/02/2018 demonstrated 30% stenosis in the LMCA, 50% stenosis in the Proximal LAD, 50% stenosis in the Mid LAD, 95% stenosis in the Proximal Circumflex (Restenosis - Balloon Angioplasty), 100% stenosis in the 1st Marginal, 50% stenosis in the Proximal RCA, 50% stenosis in the Distal RCA, the LIMA graft from the LIMA to the Distal LAD is free of significant disease; the SVG graft from the Aorta to the 1st Marginal is free of significant disease; the SVG graft from the Aorta to the Distal RCA is free of significant disease. Intervention included a successful  2.5mm x 12mm Balloon,  2.5mm x 10mm Cutting Balloon,  3mm x 12mm Drug Eluting Stent,  3mm x 12mm Balloon, and  3mm x 8mm Balloon to Proximal Circumflex, post stenosis 0%.  Formatting of this note is different from the original.  Coronary angiogram 05/02/2018 demonstrated 30% stenosis in the LMCA, 50% stenosis in the Proximal LAD, 50% stenosis in the Mid LAD, 95% stenosis in the Proximal Circumflex (Restenosis - Balloon Angioplasty), 100% stenosis in the  1st Marginal, 50% stenosis in the Proximal RCA, 50% stenosis in the Distal RCA, the LIMA graft from the LIMA to the Distal LAD is free of significant disease; the SVG graft from the Aorta to the 1st Marginal is free of significant disease; the SVG graft from the Aorta to the Distal RCA is free of significant disease. Intervention included a successful  2.5mm x 12mm Balloon,  2.5mm x 10mm Cutting Balloon,  3mm x 12mm Drug Eluting Stent,  3mm x 12mm Balloon, and  3mm x 8mm Balloon to Proximal Circumflex, post stenosis 0%.      Vitamin B12 deficiency 02/14/2018     Priority: Medium    Chronic bilateral low back pain without sciatica 01/17/2018     Priority: Medium    Chronic pain disorder 10/01/2017     Priority: Medium    Urinary incontinence, mixed 09/24/2017     Priority: Medium    Internal carotid artery stenosis, bilateral 07/13/2016     Priority: Medium     Carotid US 05/04/2018 showed moderate plaque formation, consistent with 50 to 69% stenosis in the right internal carotid artery, not significantly changed from 8/5/2015.  Moderate plaque formation, consistent with 50 to 69% stenosis in the left internal carotid artery; there has been mild progression of the left ICA stenosis since 8/5/2015.    Formatting of this note might be different from the original.  Carotid US 05/04/2018 showed moderate plaque formation, consistent with 50 to 69% stenosis in the right internal carotid artery, not significantly changed from 8/5/2015.  Moderate plaque formation, consistent with 50 to 69% stenosis in the left internal carotid artery; there has been mild progression of the left ICA stenosis since 8/5/2015.    Formatting of this note might be different from the original.  Carotid US 05/04/2018 showed moderate plaque formation, consistent with 50 to 69% stenosis in the right internal carotid artery, not significantly changed from 8/5/2015.  Moderate plaque formation, consistent with 50 to 69% stenosis in the left internal  carotid artery; there has been mild progression of the left ICA stenosis since 8/5/2015.      Essential (primary) hypertension 10/14/2015     Priority: Medium    Ischemic cardiomyopathy 09/20/2015     Priority: Medium     EF of 40-45%, status post RV lead revision and LV epicardial lead placement via mini-thoracotomy in August 2016.    Formatting of this note might be different from the original.  EF of 40-45%, status post RV lead revision and LV epicardial lead placement via mini-thoracotomy in August 2016.  Formatting of this note might be different from the original.  EF of 40-45%, status post RV lead revision and LV epicardial lead placement via mini-thoracotomy in August 2016.      S/P CABG x 5 08/21/2015     Priority: Medium    Pain medication agreement 04/20/2013     Priority: Medium     Controlled substance agreement for percocet #30/month on file and signed 4/17/13.  Designated pharmacy: [facility] Prescribing physician: Andrea Diagnosis: Ulnar neuropathy    Formatting of this note might be different from the original.  Controlled substance agreement for percocet #30/month on file and signed 4/17/13.  Designated pharmacy: [facility] Prescribing physician: Andrea Diagnosis: Ulnar neuropathy      Anemia in other chronic diseases classified elsewhere 01/05/2013     Priority: Medium    Hypothyroidism, unspecified 12/10/2010     Priority: Medium    ACP (advance care planning) 11/03/2010     Priority: Medium     Formatting of this note might be different from the original.  Patient has identified Health Care Agent(s): Yes  Add Health Care Agents: Yes    Health Care Agent(s):    Primary Health Care Agent:     Edwar Chappellvedaalec Relationship:    Spouse Phone:     [phone]    Secondary Health Care Agent:     Chiki Oro Relationship:     Son Phone:     [phone]      Patient has Advance Care Plan Documents (Health Care Directive, POLST): Yes    Advance Care Plan Documents:  Health Care  "Directive--03/28/11  Resuscitation Guidelines--    Patient has identified Specific Treatment Preferences: Yes   Specific Treatment Preferences:   a.) Code Status:  DNR/ Do Not Attempt Resuscitation - Allow a Natural Death    b.) Goals of Treatment:     ii. Limited Interventions and treat reversible conditions.  Provide interventions aimed at treatment of new or reversible illness/injury or non-life threatening chronic conditions. Duration of invasive or uncomfortable interventions should generally be limited.- Trial of intubation 5 days or other instructions \"If no improvement, stop.\"  c.) Interventions and Treatments:   i.   Antibiotics:          - Aggressive antibiotics  ii.  Nutrition/Hydration:         - Offer food and liquids by mouth         - IV fluid administration         - No feeding tube under any circumstance.  iii. Transfusion:          - Blood products for comfort/relief of symptoms only  iv. Dialysis:           - Dialysis for short term \"for recovery, but not long term.\"        Last Assessment & Plan:   Advance Care Planning: Disease-specific Session    Lettybo Escobar is an [facility] patient of Dr. Renea Mendez of the [facility].    Advance care planning discussions were completed with Letty and her healthcare agent, spouse Edwar Escobar on Monday, March 28, 2011 at the [facility] Room.    Understanding of Illness and Disease [place]:   Letty identifies her medical condition as diabetes with peripheral neuropathy, heart problems with a heart attack February 2009 plus 4 stent placements; sleep apnea; depression; hypothyroid; high cholesterol; tobacco use; and describes it as needing further assistance with thyroid and diabetes management.  She identifies the following symptoms of her medical condition as being the most bothersome: some memory loss, balance problems, light headedness and dizziness, puffy eyes and lower extremity edema from time to time.    Letty is " "retired [place] .  She is independent with all cares and lives an active life style although, \"I'm not as active as I used to be.\"    Goals of Care:   Letty currently hopes to maintain independence, control pain and symptoms and delay progression of, but not cure, the illness.  \"I want to get the diabetes and thyroid under better control.\"  Letty has an appointment the first part of April for follow up.    Quality of Life:    Letty identifies quality of life as family involvement twice weekly, [employer] activities, newly assigned  , playing cards, the computer,    Letty christiano with serious challenges in her life through her ganesh in God, prayers and support of her [employer] family, spouse and son.    Letty identifies the following fears and worries about her medical care:  \"I just want the diabetes straightened out.\"    Treatment and Care Preferences:   Past experiences in dealing with family and/or friends that have  or been seriously ill include her brother who took his own life.  \"He was 53 years old and living with us.  I found him.\"   As a result of these experiences, Letty expresses these health care preferences:      Summary Letty's Treatment Preferences:  LOW SURVIVAL; HIGH TREATMENT BURDEN:  If Letty suffered a serious complication, such that she was facing a prolonged hospital stay, required ongoing medical interventions, and the chance of living through the complication was low (for example, only 5 out of 100 would live), Letty would choose:  to focus treatment on comfort and quality of life (\"Quality of life is more important than length of life to Letty.\")  COMMENT:  \"If I can't live a normal life, I wouldn't want to live.\"    HIGH SURVIVAL; LOW FUNCTIONAL STATUS:  If Letty had a serious complication and had a good chance of living through the complication but it was expected that she would never be able to walk or talk " "again and would require 24 hour nursing care, she would choose:  to focus treatment on comfort and quality of life (\"Quality of life is more important than length of life to Letty.\")  COMMENT:  \"I'd accept rehabilitation.\"    HIGH SURVIVAL; LOW COGNITIVE STATUS:  If Letty had a serious complication and had a good chance of living through the complication but it was expected that she would never know who she was or who she was with and would require 24 hour nursing care, she would choose:  to focus treatment on comfort and quality of life (\"Quality of life is more important than length of life to Letty.\")    CARDIO-PULMONARY RESUSCITATION (CPR):  The facts, risks and benefits of CPR were discussed with Letty.  If she had a sudden event that caused her heart and breathing to stop, she:  WOULD NOT want CPR attempted and instead would prefer that a natural death occur.    MECHANICAL VENTILATION: If Letty had an episode where she was unable to breathe on her own, she would choose the following:  attempt to use any appropriate non-invasive method to assist breathing, and use mechanical ventilation.  COMMENT:  \"If reversible ventilator is acceptable for 5 days.  If no improvement, stop.\"     Letty has chosen her healthcare agent to:  strictly follow her wishes.    Follow Up Plan:   Letty was encouraged to continue advance care planning discussions with her health care agents and primary care provider.   Letty and her health care agent declined handouts.     Documents addressed during this advance care planning session:  1.  Health Care Agents identified.          .  Primary health care agent is spouse, Edwar Escobar;          .  Secondary health care agent is son, Jermaine Oro.  2.  Health Care Directive completed and scanned into medical record.  3.  Statement of Treatment Preferences for advanced illness completed and scanned into the medical record.  4.  Resuscitation Guidelines completed for DNR.  Document sent " to Dr. Renea Mendez for signature and returned to the home. Letty, please place on the refrigerator.    Recommendations/Plan:   Letty and her health care agent to review Advance Care Plan.     Letty would benefit from:   Care Navigation/Care Navigation Help Desk--explained services for pending future needs.  No identified needs today.  Care Navigation brochure was given to Letty and her healthcare agent.    Advance Care Planning recommendations and Letty's concerns and questions were cc ed to her primary provider.     Thank you, Letty for the opportunity of assisting with Advance Care Planning. It was a seeing you again and meeting Edwar.  Please contact me if I can be of further assistance.    Interviewer: Pat Martin RN    Advance Care Planning Facilitator  [phone]   3/28/2011      ELI (obstructive sleep apnea) 01/31/2010     Priority: Medium     PSG on April/2008 that showed moderate Obstructive Sleep Apnea with an AHI of 23.5 . Limb movements persisted at 29 movements/hour at optimal pressures, despite carbidopa use.    Formatting of this note might be different from the original.  PSG on April/2008 that showed moderate Obstructive Sleep Apnea with an AHI of 23.5 . Limb movements persisted at 29 movements/hour at optimal pressures, despite carbidopa use.  Formatting of this note might be different from the original.  PSG on April/2008 that showed moderate Obstructive Sleep Apnea with an AHI of 23.5 . Limb movements persisted at 29 movements/hour at optimal pressures, despite carbidopa use.      Coronary atherosclerosis 02/06/2009     Priority: Medium     Formatting of this note might be different from the original.  2/5/2009 - MI - Proximal RCA 99%, mild-mod disease elsewhere.  EF 60%.  PCI:  MYRON to pRCA.  2/12/2009 - admit CP - Widely patent RCA stent. Moderate diffuse CAD. Severe stenosis in trivial PDA branch. LVEF 45%.  1/25/2010 - admit CP - PTCA and stent of diagonal  9/8/2010 - admit CP - LAD  patent stent. Moderate diffuse CAD. Medical management recommended.   4/25/2012 - NSTEMI - Acute total occlusion of the ostial Circumflex. Acute total occlusion of the ostial ramus. Acute total occlusion of the distal 1st Obtuse Marginal. Angioplasty of ostial circumflex and ostial ramus. There was distal embolization to the OM1 vessel. This vessel was small and not amendable to intervention. Indefinite: plavix and asa 81.  8/10/2015 - CABx5 - 1. LIMA to distal LAD, reverse SVG to OM1 and OM2, reverse SVG to diagonal, reverse SVG to PDA.   2. Mitral valve repair with a Medtronics 3-D full ring, 28 mm.   3. Tricuspid repair with a Medtronic 28 mm partial ring  1/12/2016 - TTE - EF 40-45%  Formatting of this note might be different from the original.  2/5/2009 - MI - Proximal RCA 99%, mild-mod disease elsewhere.  EF 60%.  PCI:  MYRON to pRCA.  2/12/2009 - admit CP - Widely patent RCA stent. Moderate diffuse CAD. Severe stenosis in trivial PDA branch. LVEF 45%.  1/25/2010 - admit CP - PTCA and stent of diagonal  9/8/2010 - admit CP - LAD patent stent. Moderate diffuse CAD. Medical management recommended.   4/25/2012 - NSTEMI - Acute total occlusion of the ostial Circumflex. Acute total occlusion of the ostial ramus. Acute total occlusion of the distal 1st Obtuse Marginal. Angioplasty of ostial circumflex and ostial ramus. There was distal embolization to the OM1 vessel. This vessel was small and not amendable to intervention. Indefinite: plavix and asa 81.  8/10/2015 - CABx5 - 1. LIMA to distal LAD, reverse SVG to OM1 and OM2, reverse SVG to diagonal, reverse SVG to PDA.   2. Mitral valve repair with a Medtronics 3-D full ring, 28 mm.   3. Tricuspid repair with a Medtronic 28 mm partial ring  1/12/2016 - TTE - EF 40-45%      Restless legs syndrome 08/29/2007     Priority: Medium    Hyperlipidemia, unspecified 05/30/2007     Priority: Medium        Past Medical History:    Past Medical History:   Diagnosis Date    ACP  (advance care planning) 11/3/2010    Acute coronary syndrome (H) 11/22/2019    Acute exacerbation of CHF (congestive heart failure) (H) 11/11/2019    Acute on chronic systolic (congestive) heart failure (H) 1/28/2020    Anemia of chronic disease 1/5/2013    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, left leg (H) 1/15/2020    BPPV (benign paroxysmal positional vertigo) 5/31/2018    Candidiasis 3/1/2020    Carotid stenosis, right 7/13/2016    Chest pain 11/11/2019    Chronic bilateral low back pain without sciatica 1/17/2018    Chronic pain disorder 10/1/2017    Constipation 3/20/2020    COPD (chronic obstructive pulmonary disease) (H) 6/24/2020    Coronary atherosclerosis 2/6/2009    Cubital tunnel syndrome on left 11/13/2012    Depressive disorder     Diabetes mellitus (H)     Diabetes mellitus type 2 in obese (H) 7/13/2006    Diabetes mellitus type 2 in obese (H) 7/13/2006    Drug-seeking behavior 4/4/2020    Failure to thrive in adult 9/3/2020    Hereditary and idiopathic peripheral neuropathy 4/24/2007    Hyperlipidemia with target low density lipoprotein (LDL) cholesterol less than 70 mg/dL 5/30/2007    Hypertension 10/14/2015    Hypothyroidism 12/10/2010    Irritable bowel syndrome 9/7/2015    Ischemic cardiomyopathy 9/20/2015    Long-term use of high-risk medication 4/14/2020    Moniliasis, 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    Myocardial infarction (H)     Neuromuscular disorder (H)     Other specified postprocedural states 10/8/2015    Pain medication agreement 4/20/2013    Panic disorder with agoraphobia 4/14/2020    Paroxysmal atrial fibrillation (H) 10/2/2015    Personality disorder (H) 3/5/2020    Polymyalgia rheumatica (H) 11/28/2018    Posttraumatic stress disorder 3/1/2020    Restless legs syndrome (RLS) 8/29/2007    Restless legs syndrome (RLS) 8/29/2007    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    Serum calcium elevated  3/1/2020    Somatic dysfunction of sacral region 1/17/2018    Subacromial bursitis of left shoulder joint 8/6/2018    Suicidal ideation 4/1/2020    Thyroid disease     TIA (transient ischemic attack) 5/4/2018    TIA (transient ischemic attack) 5/4/2018    Tobacco abuse 2/17/2017    Tobacco abuse 2/17/2017    Urinary incontinence, mixed 9/24/2017    UTI (urinary tract infection) 4/17/2020    Vitamin B12 deficiency 2/14/2018    Weakness 12/17/2019       Past Surgical History:    Past Surgical History:   Procedure Laterality Date    CARDIAC SURGERY      stents x 11    CARDIAC SURGERY      CHOLECYSTECTOMY      CHOLECYSTECTOMY      GENITOURINARY SURGERY      Tubal ligation    OTHER SURGICAL HISTORY      Genitourinary surgery    RELEASE CARPAL TUNNEL      RELEASE CARPAL TUNNEL         Family History:    No family history on file.    Social History:  Marital Status:   [2]  Social History     Tobacco Use    Smoking status: Never    Smokeless tobacco: Never   Vaping Use    Vaping Use: Never used   Substance Use Topics    Alcohol use: Not Currently    Drug use: Never        Medications:    acetaminophen (TYLENOL) 500 MG tablet  amLODIPine (NORVASC) 5 MG tablet  aspirin 81 MG EC tablet  divalproex sodium delayed-release (DEPAKOTE) 250 MG DR tablet  DULoxetine HCl 40 MG CPEP  famotidine (PEPCID) 20 MG tablet  fluticasone-vilanterol (BREO ELLIPTA) 200-25 MCG/INH inhaler  gabapentin (NEURONTIN) 300 MG capsule  hydrochlorothiazide (HYDRODIURIL) 25 MG tablet  levothyroxine (SYNTHROID/LEVOTHROID) 112 MCG tablet  Melatonin 10 MG TABS tablet  metoprolol succinate ER (TOPROL-XL) 25 MG 24 hr tablet  mirtazapine (REMERON) 30 MG tablet  nitroGLYcerin (NITROSTAT) 0.4 MG sublingual tablet  ondansetron (ZOFRAN ODT) 4 MG ODT tab  polyethylene glycol (MIRALAX) 17 GM/Dose powder  potassium chloride ER (MICRO-K) 10 MEQ CR capsule  QUEtiapine (SEROQUEL) 25 MG tablet  rosuvastatin (CRESTOR) 10 MG tablet  sennosides (SENOKOT) 8.6 MG  tablet  traZODone (DESYREL) 50 MG tablet  acetaminophen (TYLENOL) 325 MG tablet  albuterol (PROVENTIL) (2.5 MG/3ML) 0.083% neb solution  bisacodyl (DULCOLAX) 10 MG suppository  cyanocobalamin (CYANOCOBALAMIN) 1000 MCG/ML injection          Review of Systems   Constitutional:  Positive for fatigue. Negative for chills and fever.   HENT: Negative.     Eyes: Negative.    Respiratory:  Negative for shortness of breath.    Cardiovascular:  Positive for chest pain. Negative for palpitations.   Gastrointestinal: Negative.    Endocrine: Negative.    Genitourinary: Negative.    Musculoskeletal: Negative.  Negative for back pain.   Skin: Negative.  Negative for rash.   Neurological:  Negative for light-headedness and headaches.   Hematological:  Bruises/bleeds easily.   Psychiatric/Behavioral:  Negative for confusion.    All other systems reviewed and are negative.      Physical Exam   BP: (!) 125/102  Pulse: 62  Temp: 98  F (36.7  C)  Resp: 11  Height: 152.4 cm (5')  Weight: 89.8 kg (198 lb)  SpO2: 99 %      Physical Exam  Vitals and nursing note reviewed.   Constitutional:       General: She is in acute distress.      Appearance: She is well-developed. She is obese. She is not ill-appearing, toxic-appearing or diaphoretic.   HENT:      Head: Normocephalic and atraumatic.   Eyes:      Extraocular Movements: Extraocular movements intact.      Pupils: Pupils are equal, round, and reactive to light.   Neck:      Vascular: No JVD.   Cardiovascular:      Rate and Rhythm: Normal rate and regular rhythm.      Heart sounds:      No friction rub.   Pulmonary:      Effort: Pulmonary effort is normal. No tachypnea or respiratory distress.      Breath sounds: Normal breath sounds.   Chest:      Chest wall: Mass (Subcutaneous mass consistent with pacemaker noted to the anterior chest wall.) present.   Abdominal:      Palpations: Abdomen is soft.      Tenderness: There is no guarding.   Musculoskeletal:      Right lower leg: Edema  present.      Left lower leg: Edema present.   Skin:     Capillary Refill: Capillary refill takes less than 2 seconds.      Findings: No rash.   Neurological:      Mental Status: She is alert and oriented to person, place, and time.   Psychiatric:         Behavior: Behavior normal.         ED Course              ED Course as of 09/10/23 0343   Sun Sep 10, 2023   0115 I entered the patient's exam room to recheck her and she was sleeping.  She appears to be resting comfortably with normal respirations and I did not awaken her at this time.  Still awaiting blood test results.   0152 Letty was notified of the results of her blood test and chest x-ray testing to this point.  Troponin was slightly elevated but consistent with prior troponin levels.  I did order a repeat troponin at 220 this morning and the patient was made aware that pending blood test.  She is feeling better in regards to her chest pain and is resting comfortably at this time.     Procedures              EKG Interpretation:      Interpreted by Bao Leal DO  Time reviewed: 12:30  Symptoms at time of EKG: Anterior chest pain with left arm involvement  Rhythm: paced  Rate: 50-60  Comparison to prior: Unchanged from 8/12/23    Clinical Impression: paced rhythm      Critical Care time:  none               Results for orders placed or performed during the hospital encounter of 09/10/23 (from the past 24 hour(s))   CBC with platelets differential    Narrative    The following orders were created for panel order CBC with platelets differential.  Procedure                               Abnormality         Status                     ---------                               -----------         ------                     CBC with platelets and d...[[id number]]  Abnormal            Final result                 Please view results for these tests on the individual orders.   Troponin T, High Sensitivity   Result Value Ref Range    Troponin T, High  Sensitivity 24 (H) <=14 ng/L   Comprehensive metabolic panel   Result Value Ref Range    Sodium 132 (L) 136 - 145 mmol/L    Potassium 4.7 3.4 - 5.3 mmol/L    Chloride 100 98 - 107 mmol/L    Carbon Dioxide (CO2) 22 22 - 29 mmol/L    Anion Gap 10 7 - 15 mmol/L    Urea Nitrogen 26.9 (H) 8.0 - 23.0 mg/dL    Creatinine 1.14 (H) 0.51 - 0.95 mg/dL    Calcium 9.4 8.8 - 10.2 mg/dL    Glucose 187 (H) 70 - 99 mg/dL    Alkaline Phosphatase 49 35 - 104 U/L    AST 20 0 - 45 U/L    ALT 20 0 - 50 U/L    Protein Total 6.8 6.4 - 8.3 g/dL    Albumin 3.7 3.5 - 5.2 g/dL    Bilirubin Total <0.2 <=1.2 mg/dL    GFR Estimate 52 (L) >60 mL/min/1.73m2   Lipase   Result Value Ref Range    Lipase 30 13 - 60 U/L   Nt probnp inpatient (BNP)   Result Value Ref Range    N terminal Pro BNP Inpatient 667 0 - 900 pg/mL   CBC with platelets and differential   Result Value Ref Range    WBC Count 7.0 4.0 - 11.0 10e3/uL    RBC Count 3.28 (L) 3.80 - 5.20 10e6/uL    Hemoglobin 9.7 (L) 11.7 - 15.7 g/dL    Hematocrit 30.7 (L) 35.0 - 47.0 %    MCV 94 78 - 100 fL    MCH 29.6 26.5 - 33.0 pg    MCHC 31.6 31.5 - 36.5 g/dL    RDW 14.1 10.0 - 15.0 %    Platelet Count 149 (L) 150 - 450 10e3/uL    % Neutrophils 56 %    % Lymphocytes 34 %    % Monocytes 7 %    % Eosinophils 2 %    % Basophils 0 %    % Immature Granulocytes 1 %    NRBCs per 100 WBC 0 <1 /100    Absolute Neutrophils 3.9 1.6 - 8.3 10e3/uL    Absolute Lymphocytes 2.4 0.8 - 5.3 10e3/uL    Absolute Monocytes 0.5 0.0 - 1.3 10e3/uL    Absolute Eosinophils 0.1 0.0 - 0.7 10e3/uL    Absolute Basophils 0.0 0.0 - 0.2 10e3/uL    Absolute Immature Granulocytes 0.1 <=0.4 10e3/uL    Absolute NRBCs 0.0 10e3/uL   XR Chest Port 1 View    Narrative    EXAM: XR CHEST PORT 1 VIEW  LOCATION: [facility]  DATE: 9/10/2023    INDICATION: Anterior chest pain.  COMPARISON: Chest x-ray on 08/12/2023.      Impression    IMPRESSION: Single AP view of the chest was obtained. Left chest wall dual-lead  AICD and leads are in stable position. Postsurgical changes of cardiac surgery with median sternotomy wires and surgical clips. Mild enlargement of the cardiac silhouette. No   suspicious focal pulmonary opacities. No significant pleural effusion or pneumothorax.   Troponin T, High Sensitivity   Result Value Ref Range    Troponin T, High Sensitivity 27 (H) <=14 ng/L       Medications   fentaNYL (PF) (SUBLIMAZE) injection 50 mcg (50 mcg Intravenous $Given 9/10/23 0235)   aspirin (ASA) chewable tablet 324 mg (324 mg Oral Not Given 9/10/23 0041)   ondansetron (ZOFRAN) injection 4 mg (4 mg Intravenous $Given 9/10/23 0044)       Assessments & Plan (with Medical Decision Making)      67-year-old female to the ER via ambulance from her nursing home secondary to concerns of anterior chest pain with radiation to left arm.  Patient with a history for coronary artery disease and chronic angina.  Patient with resolution of her chest pain during the ER stay.  Testing for ACS proved unremarkable for signs of ischemia.  Patient with a paced cardiac rhythm with the EKG unchanged from previous EKGs.  Her troponin levels were at her baseline with repeat troponin continued at her baseline.  Patient's pain resolved during the stay.  Patient stated that she was ready to return back to the nursing home.  Patient be transported back to the nursing home by medical transport.  To continue her current medications without change.           I have reviewed the nursing notes.    I have reviewed the findings, diagnosis, plan and need for follow up with the patient.           Medical Decision Making  The patient's presentation was of high complexity (a chronic illness severe exacerbation, progression, or side effect of treatment).    The patient's evaluation involved:  review of 3+ test result(s) ordered prior to this encounter (see separate area of note for details)  ordering and/or review of 3+ test(s) in this encounter (see separate area of  note for details)    The patient's management necessitated only low risk treatment.               I verbally discussed the findings of the evaluation today in the ER. I have verbally discussed with Letty the suggested treatment(s) as described in the discharge instructions and handouts.       I have verbally suggested she follow-up in her clinic or return to the ER for increased symptoms. See the follow-up recommendations documented  in the after visit summary in this visit's EPIC chart.      Disclaimer: This note consists of words and symbols derived from keyboarding and dictation using voice recognition software.  As a result, there may be errors that have gone undetected.  Please consider this when interpreting information found in this note.      Final diagnoses:   Left-sided chest pain       9/10/2023   [facility] EMERGENCY DEPT       Bao Leal, DO  09/10/23 0342       Bao Leal, DO  09/10/23 0343

## 2023-09-11 ENCOUNTER — PATIENT OUTREACH (OUTPATIENT)
Dept: GERIATRIC MEDICINE | Facility: CLINIC | Age: 68
End: 2023-09-11
Payer: COMMERCIAL

## 2023-09-14 NOTE — PROGRESS NOTES
[facility] Care Coordination Contact:    Pantera Aguilar, I am following up on Letty's multiple visits to ED regarding her chest pain.  Is there any change in her care plan around this given all the findings are negative. I did review Cardiology notes and appears she has been non compliant with taking her diuretics and recommended follow up of 6 months as she does not want to go back for 9 months.  She did mention she thought it was related to anxiety. I see she does see [facility] and see that she mentioned being bored and frustrated.  I have looked into her insurance regarding  programs, but unfortunately they don't offer any such programs. If you should have any thoughts on how I can assist, please let me know.     Thank you,     Esperanza Damon RN  Care Coordinator-Long Term Care  [address] [address]  [email or web address]   [email or web address]     Office: [phone]   Fax: [phone]

## 2023-09-14 NOTE — PROGRESS NOTES
[facility] Care Coordination Contact  CC received notification of Emergency Room visit.  ER visit occurred on 9/10/23 at [facility] with Dx of chest pain.    Member has a follow-up appointment with PCP: Yes: scheduled on with NP at facility  No outreach to  as there is no change in health status and member has had multiple ED visits for same reason, no change in care plan and non compliance with diuretics. . Outreached to NP for update plan of care.  Member has had a change in condition: No  New referrals placed: No  Home Visit Needed: No  Care plan reviewed and updated.  PCP notified of ED visit via EMR.    Esperanza Damon RN  Care Coordinator-Long Term Care  [address] [address]  [email or web address]   [email or web address]     Office: [phone]   Fax: [phone]

## 2023-09-18 ENCOUNTER — HOSPITAL ENCOUNTER (EMERGENCY)
Facility: CLINIC | Age: 68
Discharge: HOME | End: 2023-09-18
Attending: FAMILY MEDICINE | Admitting: FAMILY MEDICINE
Payer: COMMERCIAL

## 2023-09-18 VITALS
HEART RATE: 83 BPM | TEMPERATURE: 98 F | DIASTOLIC BLOOD PRESSURE: 59 MMHG | SYSTOLIC BLOOD PRESSURE: 128 MMHG | RESPIRATION RATE: 18 BRPM | OXYGEN SATURATION: 98 %

## 2023-09-18 DIAGNOSIS — N39.0 URINARY TRACT INFECTION WITHOUT HEMATURIA, SITE UNSPECIFIED: ICD-10-CM

## 2023-09-18 DIAGNOSIS — R07.9 CHEST PAIN, UNSPECIFIED TYPE: ICD-10-CM

## 2023-09-18 LAB
ALBUMIN UR-MCNC: NEGATIVE MG/DL
ANION GAP SERPL CALCULATED.3IONS-SCNC: 14 MMOL/L (ref 7–15)
APPEARANCE UR: CLEAR
BACTERIA #/AREA URNS HPF: ABNORMAL /HPF
BASOPHILS # BLD AUTO: 0 10E3/UL (ref 0–0.2)
BASOPHILS # BLD MANUAL: 0 10E3/UL (ref 0–0.2)
BASOPHILS NFR BLD AUTO: 0 %
BASOPHILS NFR BLD MANUAL: 0 %
BILIRUB UR QL STRIP: NEGATIVE
BUN SERPL-MCNC: 21.8 MG/DL (ref 8–23)
CALCIUM SERPL-MCNC: 9.1 MG/DL (ref 8.8–10.2)
CHLORIDE SERPL-SCNC: 103 MMOL/L (ref 98–107)
COLOR UR AUTO: YELLOW
CREAT SERPL-MCNC: 1.12 MG/DL (ref 0.51–0.95)
DEPRECATED HCO3 PLAS-SCNC: 20 MMOL/L (ref 22–29)
EGFRCR SERPLBLD CKD-EPI 2021: 53 ML/MIN/1.73M2
ELLIPTOCYTES BLD QL SMEAR: SLIGHT
EOSINOPHIL # BLD AUTO: 0.1 10E3/UL (ref 0–0.7)
EOSINOPHIL # BLD MANUAL: 0 10E3/UL (ref 0–0.7)
EOSINOPHIL NFR BLD AUTO: 2 %
EOSINOPHIL NFR BLD MANUAL: 2 %
ERYTHROCYTE [DISTWIDTH] IN BLOOD BY AUTOMATED COUNT: 14.1 % (ref 10–15)
ERYTHROCYTE [DISTWIDTH] IN BLOOD BY AUTOMATED COUNT: 14.2 % (ref 10–15)
GLUCOSE SERPL-MCNC: 148 MG/DL (ref 70–99)
GLUCOSE UR STRIP-MCNC: NEGATIVE MG/DL
HCT VFR BLD AUTO: 24.4 % (ref 35–47)
HCT VFR BLD AUTO: 27.6 % (ref 35–47)
HGB BLD-MCNC: 7.7 G/DL (ref 11.7–15.7)
HGB BLD-MCNC: 8.7 G/DL (ref 11.7–15.7)
HGB UR QL STRIP: ABNORMAL
IMM GRANULOCYTES # BLD: 0.1 10E3/UL
IMM GRANULOCYTES NFR BLD: 1 %
KETONES UR STRIP-MCNC: NEGATIVE MG/DL
LEUKOCYTE ESTERASE UR QL STRIP: ABNORMAL
LYMPHOCYTES # BLD AUTO: 2.2 10E3/UL (ref 0.8–5.3)
LYMPHOCYTES # BLD MANUAL: 1.2 10E3/UL (ref 0.8–5.3)
LYMPHOCYTES NFR BLD AUTO: 32 %
LYMPHOCYTES NFR BLD MANUAL: 58 %
MCH RBC QN AUTO: 29.6 PG (ref 26.5–33)
MCH RBC QN AUTO: 29.8 PG (ref 26.5–33)
MCHC RBC AUTO-ENTMCNC: 31.5 G/DL (ref 31.5–36.5)
MCHC RBC AUTO-ENTMCNC: 31.6 G/DL (ref 31.5–36.5)
MCV RBC AUTO: 94 FL (ref 78–100)
MCV RBC AUTO: 95 FL (ref 78–100)
MONOCYTES # BLD AUTO: 0.5 10E3/UL (ref 0–1.3)
MONOCYTES # BLD MANUAL: 0 10E3/UL (ref 0–1.3)
MONOCYTES NFR BLD AUTO: 7 %
MONOCYTES NFR BLD MANUAL: 0 %
MUCOUS THREADS #/AREA URNS LPF: PRESENT /LPF
NEUTROPHILS # BLD AUTO: 4 10E3/UL (ref 1.6–8.3)
NEUTROPHILS # BLD MANUAL: 0.8 10E3/UL (ref 1.6–8.3)
NEUTROPHILS NFR BLD AUTO: 58 %
NEUTROPHILS NFR BLD MANUAL: 40 %
NITRATE UR QL: POSITIVE
NRBC # BLD AUTO: 0 10E3/UL
NRBC BLD AUTO-RTO: 0 /100
NT-PROBNP SERPL-MCNC: 572 PG/ML (ref 0–900)
PH UR STRIP: 5 [PH] (ref 5–7)
PLAT MORPH BLD: ABNORMAL
PLATELET # BLD AUTO: 144 10E3/UL (ref 150–450)
PLATELET # BLD AUTO: 3 10E3/UL (ref 150–450)
POTASSIUM SERPL-SCNC: 4.7 MMOL/L (ref 3.4–5.3)
RBC # BLD AUTO: 2.6 10E6/UL (ref 3.8–5.2)
RBC # BLD AUTO: 2.92 10E6/UL (ref 3.8–5.2)
RBC MORPH BLD: ABNORMAL
RBC URINE: 2 /HPF
SMUDGE CELLS BLD QL SMEAR: PRESENT
SODIUM SERPL-SCNC: 137 MMOL/L (ref 136–145)
SP GR UR STRIP: 1.01 (ref 1–1.03)
TROPONIN T SERPL HS-MCNC: 24 NG/L
UROBILINOGEN UR STRIP-MCNC: NORMAL MG/DL
WBC # BLD AUTO: 2.1 10E3/UL (ref 4–11)
WBC # BLD AUTO: 6.8 10E3/UL (ref 4–11)
WBC URINE: 32 /HPF

## 2023-09-18 PROCEDURE — 84484 ASSAY OF TROPONIN QUANT: CPT | Performed by: FAMILY MEDICINE

## 2023-09-18 PROCEDURE — 96361 HYDRATE IV INFUSION ADD-ON: CPT | Performed by: FAMILY MEDICINE

## 2023-09-18 PROCEDURE — 250N000013 HC RX MED GY IP 250 OP 250 PS 637: Performed by: FAMILY MEDICINE

## 2023-09-18 PROCEDURE — 85007 BL SMEAR W/DIFF WBC COUNT: CPT | Performed by: FAMILY MEDICINE

## 2023-09-18 PROCEDURE — 83880 ASSAY OF NATRIURETIC PEPTIDE: CPT | Performed by: FAMILY MEDICINE

## 2023-09-18 PROCEDURE — 36415 COLL VENOUS BLD VENIPUNCTURE: CPT | Performed by: FAMILY MEDICINE

## 2023-09-18 PROCEDURE — 99284 EMERGENCY DEPT VISIT MOD MDM: CPT | Mod: 25 | Performed by: FAMILY MEDICINE

## 2023-09-18 PROCEDURE — 87086 URINE CULTURE/COLONY COUNT: CPT | Performed by: FAMILY MEDICINE

## 2023-09-18 PROCEDURE — 99284 EMERGENCY DEPT VISIT MOD MDM: CPT | Performed by: FAMILY MEDICINE

## 2023-09-18 PROCEDURE — 258N000003 HC RX IP 258 OP 636: Performed by: FAMILY MEDICINE

## 2023-09-18 PROCEDURE — 81001 URINALYSIS AUTO W/SCOPE: CPT | Performed by: FAMILY MEDICINE

## 2023-09-18 PROCEDURE — 80048 BASIC METABOLIC PNL TOTAL CA: CPT | Performed by: FAMILY MEDICINE

## 2023-09-18 PROCEDURE — 96374 THER/PROPH/DIAG INJ IV PUSH: CPT | Performed by: FAMILY MEDICINE

## 2023-09-18 PROCEDURE — 85027 COMPLETE CBC AUTOMATED: CPT | Performed by: FAMILY MEDICINE

## 2023-09-18 PROCEDURE — 250N000011 HC RX IP 250 OP 636: Mod: JZ | Performed by: FAMILY MEDICINE

## 2023-09-18 RX ORDER — CEFDINIR 300 MG/1
300 CAPSULE ORAL 2 TIMES DAILY
Qty: 14 CAPSULE | Refills: 0 | Status: SHIPPED | OUTPATIENT
Start: 2023-09-18 | End: 2023-09-25

## 2023-09-18 RX ORDER — CEFDINIR 300 MG/1
300 CAPSULE ORAL ONCE
Status: COMPLETED | OUTPATIENT
Start: 2023-09-18 | End: 2023-09-18

## 2023-09-18 RX ORDER — ONDANSETRON 2 MG/ML
4 INJECTION INTRAMUSCULAR; INTRAVENOUS ONCE
Status: COMPLETED | OUTPATIENT
Start: 2023-09-18 | End: 2023-09-18

## 2023-09-18 RX ADMIN — SODIUM CHLORIDE 1000 ML: 9 INJECTION, SOLUTION INTRAVENOUS at 01:39

## 2023-09-18 RX ADMIN — ONDANSETRON 4 MG: 2 INJECTION INTRAMUSCULAR; INTRAVENOUS at 01:25

## 2023-09-18 RX ADMIN — CEFDINIR 300 MG: 300 CAPSULE ORAL at 03:59

## 2023-09-18 ASSESSMENT — ACTIVITIES OF DAILY LIVING (ADL)
ADLS_ACUITY_SCORE: 35

## 2023-09-18 NOTE — ED PROVIDER NOTES
[facility] ED Provider Note   Patient: Letty Escobar  MRN #:  [id number]  Date of Visit: September 18, 2023    CC:     Chief Complaint   Patient presents with    Chest Pain     HPI:  Letty Escobar is a 67 year old female who presented to the emergency department by ambulance with chest pains that started at around 10:45 PM, almost 2 hours ago.  This pain was not relieved by nitroglycerin at the nursing home.  Patient was here in the emergency department a week ago with left-sided chest pain, with negative work-up.  She was thought to have unstable angina back in July, and was hospitalized for coronary angiography.  She was admitted a week later with symptoms of congestive heart failure.  Patient states that she feels nauseated.  She does not feel particularly short of breath and did not have diaphoresis.  She has not noticed any calf pain or ankle swelling.  Patient has significant heart history.  She had a recent coronary angiogram in July with the following results:  Coronary angiogram: July 10, 2023   Summary/Conclusions   PRESENTATION / INDICATIONS   * [place]   VASCULAR ACCESS   * Using ultrasound guidance and a percutaneous technique, the right common femoral artery was accessed. Ultrasound was used to confirm vessel patency, localizing needle into the lumen of the vessel. An image was saved for the medical record.   DIAGNOSTIC - CORONARY   * Multivessel coronary disease in a right dominant system   DIAGNOSTIC - GRAFTS   * All previous bypass grafts are patent   * The LIMA to the LAD is patent   * The SVG to the ramus intermediate is patent   * The SVG to the 1st obtuse marginal is patent.   * The SVG to the PDA is patent.   SPECIAL PROCEDURES   * Right femoral arteriotomy  was successfully closed utilizing a closure device   CASE NOTES   * Estimated blood loss during the procedure was 20 - 30 ml     RECOMMENDATIONS & PLAN   * Medical Rx   *  Compared to the previous study from 2019, there has been no significant change.         Past Medical History:   Diagnosis Date    ACP (advance care planning) 11/3/2010    Acute coronary syndrome (H) 11/22/2019    Acute exacerbation of CHF (congestive heart failure) (H) 11/11/2019    Acute on chronic systolic (congestive) heart failure (H) 1/28/2020    Anemia of chronic disease 1/5/2013    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, left leg (H) 1/15/2020    BPPV (benign paroxysmal positional vertigo) 5/31/2018    Candidiasis 3/1/2020    Carotid stenosis, right 7/13/2016    Chest pain 11/11/2019    Chronic bilateral low back pain without sciatica 1/17/2018    Chronic pain disorder 10/1/2017    Constipation 3/20/2020    COPD (chronic obstructive pulmonary disease) (H) 6/24/2020    Coronary atherosclerosis 2/6/2009    Cubital tunnel syndrome on left 11/13/2012    Depressive disorder     Diabetes mellitus (H)     Diabetes mellitus type 2 in obese (H) 7/13/2006    Diabetes mellitus type 2 in obese (H) 7/13/2006    Drug-seeking behavior 4/4/2020    Failure to thrive in adult 9/3/2020    Hereditary and idiopathic peripheral neuropathy 4/24/2007    Hyperlipidemia with target low density lipoprotein (LDL) cholesterol less than 70 mg/dL 5/30/2007    Hypertension 10/14/2015    Hypothyroidism 12/10/2010    Irritable bowel syndrome 9/7/2015    Ischemic cardiomyopathy 9/20/2015    Long-term use of high-risk medication 4/14/2020    Moniliasis, 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    Myocardial infarction (H)     Neuromuscular disorder (H)     Other specified postprocedural states 10/8/2015    Pain medication agreement 4/20/2013    Panic disorder with agoraphobia 4/14/2020    Paroxysmal atrial fibrillation (H) 10/2/2015    Personality disorder (H) 3/5/2020    Polymyalgia rheumatica (H) 11/28/2018    Posttraumatic stress disorder 3/1/2020     Restless legs syndrome (RLS) 8/29/2007    Restless legs syndrome (RLS) 8/29/2007    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    Serum calcium elevated 3/1/2020    Somatic dysfunction of sacral region 1/17/2018    Subacromial bursitis of left shoulder joint 8/6/2018    Suicidal ideation 4/1/2020    Thyroid disease     TIA (transient ischemic attack) 5/4/2018    TIA (transient ischemic attack) 5/4/2018    Tobacco abuse 2/17/2017    Tobacco abuse 2/17/2017    Urinary incontinence, mixed 9/24/2017    UTI (urinary tract infection) 4/17/2020    Vitamin B12 deficiency 2/14/2018    Weakness 12/17/2019       MEDS: cefdinir (OMNICEF) 300 MG capsule  acetaminophen (TYLENOL) 325 MG tablet  acetaminophen (TYLENOL) 500 MG tablet  albuterol (PROVENTIL) (2.5 MG/3ML) 0.083% neb solution  amLODIPine (NORVASC) 5 MG tablet  aspirin 81 MG EC tablet  bisacodyl (DULCOLAX) 10 MG suppository  cyanocobalamin (CYANOCOBALAMIN) 1000 MCG/ML injection  divalproex sodium delayed-release (DEPAKOTE) 250 MG DR tablet  DULoxetine HCl 40 MG CPEP  famotidine (PEPCID) 20 MG tablet  fluticasone-vilanterol (BREO ELLIPTA) 200-25 MCG/INH inhaler  gabapentin (NEURONTIN) 300 MG capsule  hydrochlorothiazide (HYDRODIURIL) 25 MG tablet  levothyroxine (SYNTHROID/LEVOTHROID) 112 MCG tablet  Melatonin 10 MG TABS tablet  metoprolol succinate ER (TOPROL-XL) 25 MG 24 hr tablet  mirtazapine (REMERON) 30 MG tablet  nitroGLYcerin (NITROSTAT) 0.4 MG sublingual tablet  ondansetron (ZOFRAN ODT) 4 MG ODT tab  polyethylene glycol (MIRALAX) 17 GM/Dose powder  potassium chloride ER (MICRO-K) 10 MEQ CR capsule  QUEtiapine (SEROQUEL) 25 MG tablet  rosuvastatin (CRESTOR) 10 MG tablet  sennosides (SENOKOT) 8.6 MG tablet  traZODone (DESYREL) 50 MG tablet        ALLERGIES:    Allergies   Allergen Reactions    Bee Pollen Anaphylaxis    Diphenhydramine Palpitations     Tolerated IV Benadryl when not pushed too fast    Isosorbide Nitrate Other (See Comments) and Dizziness     Also causes syncope (has  fallen before) and brain fog/mental disturbances - please do not prescribe    Nitroglycerin Dizziness, Fatigue and Other (See Comments)     Specifically the patch - please do not prescribe  Specifically the patch - please do not prescribe      Contrast Dye Hives and Itching    Other Drug Allergy (See Comments) Hives and Itching    Penicillin G     Adhesive Tape Rash    Diphenhydramine Hcl Palpitations    Liquid Adhesive Itching    Nystatin Dermatitis    Nystatin Dermatitis     Also blisters    Penicillins Swelling and Rash     Occurred as a child - not 100% sure on specific reactions    Sulfa Antibiotics Other (See Comments)     Occurred as a child / patient does not remember specific reaction       Past Surgical History:   Procedure Laterality Date    CARDIAC SURGERY      stents x 11    CARDIAC SURGERY      CHOLECYSTECTOMY      CHOLECYSTECTOMY      GENITOURINARY SURGERY      Tubal ligation    OTHER SURGICAL HISTORY      Genitourinary surgery    RELEASE CARPAL TUNNEL      RELEASE CARPAL TUNNEL           Review of Systems   Except as noted in HPI, all other systems were reviewed and are negative    Physical Exam   Vitals were reviewed  Patient Vitals for the past 12 hrs:   BP Temp Pulse Resp SpO2   09/18/23 0015 128/59 -- -- -- --   09/18/23 0012 -- 98  F (36.7  C) 83 18 98 %     GENERAL APPEARANCE: Alert and oriented x3, no acute distress  FACE: normal facies  EYES: Pupils are equal  HENT: normal external exam  NECK: no adenopathy or asymmetry  CHEST: No reproducible chest wall pain  RESP: normal respiratory effort; clear breath sounds bilaterally  CV: regular rate and rhythm; no significant murmurs, gallops or rubs  ABD: soft, no tenderness; no rebound or guarding; bowel sounds are normal  MS: no gross deformities noted; normal muscle tone.  EXT: No calf tenderness or pitting edema  SKIN: no worrisome rash  NEURO: no facial droop; no focal deficits, speech is normal        Available Lab/Imaging Results     Results  for orders placed or performed during the hospital encounter of 09/18/23 (from the past 24 hour(s))   CBC with platelets differential    Narrative    The following orders were created for panel order CBC with platelets differential.  Procedure                               Abnormality         Status                     ---------                               -----------         ------                     CBC with platelets and d...[[id number]]  Abnormal            Final result               Manual Differential[[id number]]          Abnormal            Final result                 Please view results for these tests on the individual orders.   Basic metabolic panel   Result Value Ref Range    Sodium 137 136 - 145 mmol/L    Potassium 4.7 3.4 - 5.3 mmol/L    Chloride 103 98 - 107 mmol/L    Carbon Dioxide (CO2) 20 (L) 22 - 29 mmol/L    Anion Gap 14 7 - 15 mmol/L    Urea Nitrogen 21.8 8.0 - 23.0 mg/dL    Creatinine 1.12 (H) 0.51 - 0.95 mg/dL    Calcium 9.1 8.8 - 10.2 mg/dL    Glucose 148 (H) 70 - 99 mg/dL    GFR Estimate 53 (L) >60 mL/min/1.73m2   Troponin T, High Sensitivity   Result Value Ref Range    Troponin T, High Sensitivity 24 (H) <=14 ng/L   NT pro BNP   Result Value Ref Range    N terminal Pro BNP Inpatient 572 0 - 900 pg/mL   CBC with platelets and differential   Result Value Ref Range    WBC Count 2.1 (L) 4.0 - 11.0 10e3/uL    RBC Count 2.60 (L) 3.80 - 5.20 10e6/uL    Hemoglobin 7.7 (L) 11.7 - 15.7 g/dL    Hematocrit 24.4 (L) 35.0 - 47.0 %    MCV 94 78 - 100 fL    MCH 29.6 26.5 - 33.0 pg    MCHC 31.6 31.5 - 36.5 g/dL    RDW 14.1 10.0 - 15.0 %    Platelet Count 3 (LL) 150 - 450 10e3/uL   Manual Differential   Result Value Ref Range    % Neutrophils 40 %    % Lymphocytes 58 %    % Monocytes 0 %    % Eosinophils 2 %    % Basophils 0 %    Absolute Neutrophils 0.8 (L) 1.6 - 8.3 10e3/uL    Absolute Lymphocytes 1.2 0.8 - 5.3 10e3/uL    Absolute Monocytes 0.0 0.0 - 1.3 10e3/uL    Absolute Eosinophils 0.0 0.0 - 0.7  10e3/uL    Absolute Basophils 0.0 0.0 - 0.2 10e3/uL    RBC Morphology Confirmed RBC Indices     Platelet Assessment  Automated Count Confirmed. Platelet morphology is normal.     Automated Count Confirmed. Platelet morphology is normal.    Elliptocytes Slight (A) None Seen    Smudge Cells Present (A) None Seen   CBC with platelets differential    Narrative    The following orders were created for panel order CBC with platelets differential.  Procedure                               Abnormality         Status                     ---------                               -----------         ------                     CBC with platelets and d...[[id number]]  Abnormal            Final result                 Please view results for these tests on the individual orders.   CBC with platelets and differential   Result Value Ref Range    WBC Count 6.8 4.0 - 11.0 10e3/uL    RBC Count 2.92 (L) 3.80 - 5.20 10e6/uL    Hemoglobin 8.7 (L) 11.7 - 15.7 g/dL    Hematocrit 27.6 (L) 35.0 - 47.0 %    MCV 95 78 - 100 fL    MCH 29.8 26.5 - 33.0 pg    MCHC 31.5 31.5 - 36.5 g/dL    RDW 14.2 10.0 - 15.0 %    Platelet Count 144 (L) 150 - 450 10e3/uL    % Neutrophils 58 %    % Lymphocytes 32 %    % Monocytes 7 %    % Eosinophils 2 %    % Basophils 0 %    % Immature Granulocytes 1 %    NRBCs per 100 WBC 0 <1 /100    Absolute Neutrophils 4.0 1.6 - 8.3 10e3/uL    Absolute Lymphocytes 2.2 0.8 - 5.3 10e3/uL    Absolute Monocytes 0.5 0.0 - 1.3 10e3/uL    Absolute Eosinophils 0.1 0.0 - 0.7 10e3/uL    Absolute Basophils 0.0 0.0 - 0.2 10e3/uL    Absolute Immature Granulocytes 0.1 <=0.4 10e3/uL    Absolute NRBCs 0.0 10e3/uL   UA with Microscopic reflex to Culture    Specimen: Urine, Catheter   Result Value Ref Range    Color Urine Yellow Colorless, Straw, Light Yellow, Yellow    Appearance Urine Clear Clear    Glucose Urine Negative Negative mg/dL    Bilirubin Urine Negative Negative    Ketones Urine Negative Negative mg/dL    Specific Gravity Urine 1.011  1.003 - 1.035    Blood Urine Small (A) Negative    pH Urine 5.0 5.0 - 7.0    Protein Albumin Urine Negative Negative mg/dL    Urobilinogen Urine Normal Normal, 2.0 mg/dL    Nitrite Urine Positive (A) Negative    Leukocyte Esterase Urine Small (A) Negative    Bacteria Urine Moderate (A) None Seen /HPF    Mucus Urine Present (A) None Seen /LPF    RBC Urine 2 <=2 /HPF    WBC Urine 32 (H) <=5 /HPF    Narrative    Urine Culture ordered based on laboratory criteria     EKG reviewed by me: Sinus rhythm with PVCs.  Intraventricular conduction defect.  Nonspecific ST depression and nonspecific T wave abnormality.  No change compared with previous EKG from September 10 except for ectopic ventricular beats.         Impression     Final diagnoses:   Chest pain   Urinary tract infection         ED Course & Medical Decision Making   Letty Escobar is a 67 year old female who presented to the emergency department with onset of left-sided chest pain that started at 10:45 PM.  Patient describes a pressure pain rated 8 out of 10.  She has some nausea but no vomiting.  She does not have any diaphoresis or shortness of breath.  She was here in the emergency department about a week ago with similar left-sided chest pains that was not initially responsive to nitroglycerin.  She has an extensive heart history.    Patient had a recent coronary angiogram in July 2023 revealing multivessel coronary disease in a right dominant system.  Her previous coronary grafts are all patent.    Vital signs reveal a temp of 98 degrees, blood pressure of 128/59, heart rate of 83, respiratory rate of 18 with 98% oxygen saturation.  Exam was unremarkable.    Laboratory work-up reveals a basic metabolic panel with normal sodium, potassium, glucose of 148 creatinine of 1.12.  Troponin is 24, consistent with all of her previous levels.  BNP is 572.  CBC showed a white blood count of 2.1, hemoglobin of 7.7, and platelet count of 3.  This was suspicious for  being an error, and a repeat CBC as per expected was more consistent with her previous levels.  White blood count 6.8, hemoglobin is 8.7, platelet count of 144,000.    Received Zofran for nausea.  She requested IV fluids as she felt dehydrated and had concentrated urine.    Medications   ondansetron (ZOFRAN) injection 4 mg (4 mg Intravenous $Given 9/18/23 0125)   sodium chloride 0.9% BOLUS 1,000 mL (0 mLs Intravenous Stopped 9/18/23 0325)   cefdinir (OMNICEF) capsule 300 mg (300 mg Oral $Given 9/18/23 0358)      A catheterized urinalysis reveals 2 red blood cells, and 32 white blood cells with positive nitrites.  Patient reported having strong smell from her urine.  Findings are consistent with UTI.  Patient was given a dose of Omnicef in the ED.  She states that she has trouble swallowing needs warm water and either pudding or applesauce to take with her pill.  Patient was informed that her work-up for chest pain was unremarkable.  Patient is stable for discharge back to the nursing home.      Written after-visit summary and instructions were given at the time of discharge.    Follow up Plan:   [facility] Emergency Dept  [address] [place] [address] [phone]    If symptoms worsen      Discharge Instructions:   We did not find a worrisome cause of your chest pains.  Your heart enzymes are at baseline.  You have a urinary tract infection.  A formal urine culture is pending.  Continue Omnicef 300 mg twice a day for 7 days.  Push lots of water.  Repeat the urine in 7-10 days.       Disclaimer: This note consists of words and symbols derived from keyboarding and dictation using voice recognition software.  As a result, there may be errors that have gone undetected.  Please consider this when interpreting information found in this note.       Monie Weiss MD  09/18/23 [id number]

## 2023-09-18 NOTE — ED TRIAGE NOTES
Patient having chest pain tonight and nursing home gave one nitroglycerin and no change in pain.

## 2023-09-18 NOTE — DISCHARGE INSTRUCTIONS
We did not find a worrisome cause of your chest pains.  Your heart enzymes are at baseline.  You have a urinary tract infection.  A formal urine culture is pending.  Continue Omnicef 300 mg twice a day for 7 days.  Push lots of water.  Repeat the urine in 7-10 days.

## 2023-09-20 ENCOUNTER — PATIENT OUTREACH (OUTPATIENT)
Dept: GERIATRIC MEDICINE | Facility: CLINIC | Age: 68
End: 2023-09-20
Payer: COMMERCIAL

## 2023-09-20 LAB
BACTERIA UR CULT: ABNORMAL

## 2023-09-20 NOTE — PROGRESS NOTES
[facility] Care Coordination Contact  CC received notification of Emergency Room visit.  ER visit occurred on 9/18 at [facility] with Dx of Chest pain.    CC contacted  Provider , waiting for a response.   Member has a follow-up appointment with PCP: Yes: scheduled on will see NP at facility  Member has had a change in condition: No  New referrals placed: No  Home Visit Needed: No  Care plan reviewed and updated.  PCP notified of ED visit via EMR.    MARIYA Jaramillo, RN, PHN, [facility]  Care Coordinator-Long Term Care  [address] [address]  [email or web address]   [email or web address]     Office: [phone]   Fax: [phone]

## 2023-09-21 ENCOUNTER — NURSING HOME VISIT (OUTPATIENT)
Dept: GERIATRICS | Facility: CLINIC | Age: 68
End: 2023-09-21
Payer: COMMERCIAL

## 2023-09-21 DIAGNOSIS — Z95.810 ICD (IMPLANTABLE CARDIOVERTER-DEFIBRILLATOR) IN PLACE: ICD-10-CM

## 2023-09-21 DIAGNOSIS — I10 ESSENTIAL (PRIMARY) HYPERTENSION: Primary | ICD-10-CM

## 2023-09-21 DIAGNOSIS — D63.8 ANEMIA IN OTHER CHRONIC DISEASES CLASSIFIED ELSEWHERE: ICD-10-CM

## 2023-09-21 DIAGNOSIS — N18.31 CHRONIC KIDNEY DISEASE, STAGE 3A (H): ICD-10-CM

## 2023-09-21 PROCEDURE — 99309 SBSQ NF CARE MODERATE MDM 30: CPT | Performed by: FAMILY MEDICINE

## 2023-09-21 NOTE — PROGRESS NOTES
[facility] GERIATRIC SERVICES    Facility:   [facility] () [[id number]]   Code Status: DNR/DNI      CHIEF COMPLAINT/REASON FOR VISIT:  Chief Complaint   Patient presents with    RECHECK       HISTORY:      HPI: Letty is a 67 year old female who did have an emergency room visit last on September 18, 2023 secondary to presenting with chest pain that started in the evening time.  She was given nitroglycerin without significant relief.  She did complain of left-sided chest pain.  Visit prior that was September 10, 2023 for chest pain work-up.  On July 10, 2023 she did have a coronary angiogram which did show a previous bypass grafts are patent.  She does have a pacemaker and does have a follow-up with cardiology and device check on October 19.  Her sodium initially 137 potassium 4.7, BUN 21 creatinine 1.12 with troponins of 24 and a BNP of 572.  EKG did show sinus rhythm with PVCs and no change from the previous EKG from September 10.  She did have a catheterized urinalysis and they put her on Omnicef.  The culture did show gram-negative bacilli and the recommendations were to continue until September 24.  She is now back in the long-term care unit.  Her blood pressure 2 days ago was 132/55 denies any chest pain.  Her appetite is good.  She does need assistance with ADLs.  According to nursing notes she does not have any further pain or dysuria or hematuria.  She does have a history of anemia and her last hemoglobin on July 18 was 8.7.  Recently she is on melatonin 10 mg along with mirtazapine 15 mg Seroquel 100 mg and trazodone 25 mg 3 times daily.  Past Medical History:   Diagnosis Date    ACP (advance care planning) 11/3/2010    Formatting of this note might be different from the original. Patient has identified Health Care Agent(s): Yes Add Health Care Agents: Yes   Health Care Agent(s):  Primary Health Care Agent:   Edwar Escobar Relationship:  Spouse Phone:   [phone]  [facility]  Care Agent:   Chiki Oro Relationship:   Son Phone:   [phone]  Patient has Advance Care Plan Documents (Health Care Direct    Acute coronary syndrome (H) 11/22/2019    Acute exacerbation of CHF (congestive heart failure) (H) 11/11/2019    Acute on chronic systolic (congestive) heart failure (H) 1/28/2020    Anemia of chronic disease 1/5/2013    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, left leg (H) 1/15/2020    BPPV (benign paroxysmal positional vertigo) 5/31/2018    Candidiasis 3/1/2020    Carotid stenosis, right 7/13/2016    Carotid US 05/04/2018 showed moderate plaque formation, consistent with 50 to 69% stenosis in the right internal carotid artery, not significantly changed from 8/5/2015.  Moderate plaque formation, consistent with 50 to 69% stenosis in the left internal carotid artery; there has been mild progression of the left ICA stenosis since 8/5/2015.    Chest pain 11/11/2019    Chronic bilateral low back pain without sciatica 1/17/2018    Chronic pain disorder 10/1/2017    Constipation 3/20/2020    COPD (chronic obstructive pulmonary disease) (H) 6/24/2020    Coronary atherosclerosis 2/6/2009 2/5/2009 - MI - Proximal RCA 99%, mild-mod disease elsewhere.  EF 60%.  PCI:  MYRON to pRCA. 2/12/2009 - admit CP - Widely patent RCA stent. Moderate diffuse CAD. Severe stenosis in trivial PDA branch. LVEF 45%. 1/25/2010 - admit CP - PTCA and stent of diagonal 9/8/2010 - admit CP - LAD patent stent. Moderate diffuse CAD. Medical management recommended.  4/25/2012 - NSTEMI - Acute total occlusion of    Cubital tunnel syndrome on left 11/13/2012    Depressive disorder     Diabetes mellitus (H)     Diabetes mellitus type 2 in obese (H) 7/13/2006    Diabetes mellitus type 2 in obese (H) 7/13/2006    Drug-seeking behavior 4/4/2020    Failure to thrive in adult 9/3/2020    Hereditary and idiopathic peripheral neuropathy 4/24/2007    Hyperlipidemia with target low  density lipoprotein (LDL) cholesterol less than 70 mg/dL 5/30/2007    Hypertension 10/14/2015    Hypothyroidism 12/10/2010    Irritable bowel syndrome 9/7/2015    Ischemic cardiomyopathy 9/20/2015    EF of 40-45%, status post RV lead revision and LV epicardial lead placement via mini-thoracotomy in August 2016.    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  Designated pharmacy: [facility] Prescribing physician: Andrea Diagnosis: Ulnar neuropathy    Panic disorder with agoraphobia 4/14/2020    Paroxysmal atrial fibrillation (H) 10/2/2015    Personality disorder (H) 3/5/2020    Rule out dependent personality    Polymyalgia rheumatica (H) 11/28/2018    Posttraumatic stress disorder 3/1/2020    Restless legs syndrome (RLS) 8/29/2007    Restless legs syndrome (RLS) 8/29/2007    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    Serum calcium elevated 3/1/2020    Somatic dysfunction of sacral region 1/17/2018    Subacromial bursitis of left shoulder joint 8/6/2018    Suicidal ideation 4/1/2020    Thyroid disease     TIA (transient ischemic attack) 5/4/2018    TIA (transient ischemic attack) 5/4/2018    Tobacco abuse 2/17/2017    Tobacco abuse 2/17/2017    Urinary incontinence, mixed 9/24/2017    UTI (urinary tract infection) 4/17/2020    Vitamin B12 deficiency 2/14/2018    Weakness 12/17/2019            No family history on file.   Social History     Socioeconomic History    Marital status:     Number of children: 1   Tobacco Use    Smoking status: Never    Smokeless tobacco: Never   Vaping Use    Vaping Use: Never used   Substance and Sexual Activity    Alcohol use: Not Currently    Drug  use: Never    Sexual activity: Not Currently   Social History Narrative    Currently living at [facility] in [place].       No current changes with her review of systems or physical exam since her recent hospitalization  REVIEW OF SYSTEM:  She currently denies any new symptoms of cough or cold sore throat postnasal drip wheezing chest pain dizziness vertigo nausea vomiting diarrhea dysuria frequency or urgency.  Does have a history of dyslipidemia, COPD, CABG, depression, hypertension, posttraumatic stress disorder, panic disorder, anxiety, personality disorder      PHYSICAL EXAM:   Pleasant female in no acute distress.  Head is normocephalic.  Neck is supple without adenopathy.  Lung sounds are clear throughout.  Cardiovascular S1-S2 regular rate and rhythm and no lower extremity edema.  Pacemaker.  Gastrointestinal is protuberant nontender nondistended.  Musculoskeletal-denies discomfort.  Psychiatric: Pleasant affect        Current Outpatient Medications:     acetaminophen (TYLENOL) 325 MG tablet, Take 650 mg by mouth every 6 hours as needed for mild pain, Disp: , Rfl:     acetaminophen (TYLENOL) 500 MG tablet, Take 2 tablets (1,000 mg) by mouth 3 times daily, Disp: , Rfl:     albuterol (PROVENTIL) (2.5 MG/3ML) 0.083% neb solution, Take 1 vial (2.5 mg) by nebulization 2 times daily as needed for shortness of breath / dyspnea or wheezing, Disp: 90 mL, Rfl:     amLODIPine (NORVASC) 5 MG tablet, Take 5 mg by mouth daily, Disp: , Rfl:     aspirin 81 MG EC tablet, Take 81 mg by mouth daily, Disp: , Rfl:     bisacodyl (DULCOLAX) 10 MG suppository, Place 10 mg rectally daily as needed for constipation, Disp: , Rfl:     cefdinir (OMNICEF) 300 MG capsule, Take 1 capsule (300 mg) by mouth 2 times daily for 7 days, Disp: 14 capsule, Rfl: 0    cyanocobalamin (CYANOCOBALAMIN) 1000 MCG/ML injection, Inject 1 mL into the muscle every 30 days, Disp: , Rfl:     divalproex sodium delayed-release (DEPAKOTE) 250  MG DR tablet, Take 250 mg by mouth daily, Disp: , Rfl:     DULoxetine HCl 40 MG CPEP, Take 40 mg by mouth daily, Disp: , Rfl:     famotidine (PEPCID) 20 MG tablet, Take 20 mg by mouth daily, Disp: , Rfl:     fluticasone-vilanterol (BREO ELLIPTA) 200-25 MCG/INH inhaler, Inhale 1 puff into the lungs daily, Disp: , Rfl:     gabapentin (NEURONTIN) 300 MG capsule, Take 400 mg by mouth At Bedtime, Disp: , Rfl:     hydrochlorothiazide (HYDRODIURIL) 25 MG tablet, Take 25 mg by mouth daily, Disp: , Rfl:     levothyroxine (SYNTHROID/LEVOTHROID) 112 MCG tablet, Take 112 mcg by mouth daily, Disp: , Rfl:     Melatonin 10 MG TABS tablet, Take 10 mg by mouth At Bedtime, Disp: , Rfl:     metoprolol succinate ER (TOPROL-XL) 25 MG 24 hr tablet, Take 25 mg by mouth daily, Disp: , Rfl:     mirtazapine (REMERON) 30 MG tablet, Take 15 mg by mouth daily, Disp: , Rfl:     nitroGLYcerin (NITROSTAT) 0.4 MG sublingual tablet, Place 0.4 mg under the tongue every 5 minutes as needed for chest pain For chest pain place 1 tablet under the tongue every 5 minutes for 3 doses. If symptoms persist 5 minutes after 1st dose call 911., Disp: , Rfl:     ondansetron (ZOFRAN ODT) 4 MG ODT tab, Take 4 mg by mouth every 8 hours as needed for nausea, Disp: , Rfl:     polyethylene glycol (MIRALAX) 17 GM/Dose powder, Take 17 g by mouth daily Every other day, Disp: , Rfl:     potassium chloride ER (MICRO-K) 10 MEQ CR capsule, Take 10 mEq by mouth daily , Disp: , Rfl:     QUEtiapine (SEROQUEL) 25 MG tablet, 100 mg 3 times daily, Disp: , Rfl: 0    rosuvastatin (CRESTOR) 10 MG tablet, Take 20 mg by mouth At Bedtime, Disp: , Rfl:     sennosides (SENOKOT) 8.6 MG tablet, Take 2 tablets by mouth At Bedtime, Disp: , Rfl:     traZODone (DESYREL) 50 MG tablet, Take 25 mg by mouth 3 times daily, Disp: , Rfl:        There were no vitals taken for this visit.  Her only vital signs was on September 19 with a blood pressure 132/55 apical pulse today 72  LABS:   Last  Comprehensive Metabolic Panel:  Lab Results   Component Value Date     09/18/2023    POTASSIUM 4.7 09/18/2023    CHLORIDE 103 09/18/2023    CO2 20 (L) 09/18/2023    ANIONGAP 14 09/18/2023     (H) 09/18/2023    BUN 21.8 09/18/2023    CR 1.12 (H) 09/18/2023    GFRESTIMATED 53 (L) 09/18/2023    ORESTES 9.1 09/18/2023       CBC RESULTS:   Recent Labs   Lab Test 09/18/23  0155   WBC 6.8   RBC 2.92*   HGB 8.7*   HCT 27.6*   MCV 95   MCH 29.8   MCHC 31.5   RDW 14.2   *           ASSESSMENT:    Encounter Diagnoses   Name Primary?    Essential (primary) hypertension Yes    ICD (implantable cardioverter-defibrillator) in place     Chronic kidney disease, stage 3a (H)     Anemia in other chronic diseases classified elsewhere        PLAN:    No medication changes.  Talked again about her work-up.  Some reassurance also talked about antibiotic as well as the questionable UTI.  Evaluate she feels comfortable with the information did not have any other questions.        Electronically signed by: Jay Chapman NP

## 2023-09-23 ENCOUNTER — HOSPITAL ENCOUNTER (EMERGENCY)
Facility: CLINIC | Age: 68
Discharge: HOME | End: 2023-09-23
Attending: STUDENT IN AN ORGANIZED HEALTH CARE EDUCATION/TRAINING PROGRAM | Admitting: STUDENT IN AN ORGANIZED HEALTH CARE EDUCATION/TRAINING PROGRAM
Payer: COMMERCIAL

## 2023-09-23 ENCOUNTER — APPOINTMENT (OUTPATIENT)
Dept: ULTRASOUND IMAGING | Facility: CLINIC | Age: 68
End: 2023-09-23
Attending: STUDENT IN AN ORGANIZED HEALTH CARE EDUCATION/TRAINING PROGRAM
Payer: COMMERCIAL

## 2023-09-23 VITALS
RESPIRATION RATE: 12 BRPM | TEMPERATURE: 97.3 F | HEART RATE: 61 BPM | SYSTOLIC BLOOD PRESSURE: 127 MMHG | DIASTOLIC BLOOD PRESSURE: 74 MMHG | OXYGEN SATURATION: 94 %

## 2023-09-23 DIAGNOSIS — R07.9 CHEST PAIN, UNSPECIFIED TYPE: ICD-10-CM

## 2023-09-23 DIAGNOSIS — G89.4 CHRONIC PAIN DISORDER: ICD-10-CM

## 2023-09-23 DIAGNOSIS — Z95.0 PACEMAKER: ICD-10-CM

## 2023-09-23 DIAGNOSIS — J44.9 COPD WITHOUT EXACERBATION (H): ICD-10-CM

## 2023-09-23 LAB
ANION GAP SERPL CALCULATED.3IONS-SCNC: 13 MMOL/L (ref 7–15)
BASOPHILS # BLD AUTO: 0 10E3/UL (ref 0–0.2)
BASOPHILS NFR BLD AUTO: 0 %
BUN SERPL-MCNC: 21.3 MG/DL (ref 8–23)
CALCIUM SERPL-MCNC: 9.2 MG/DL (ref 8.8–10.2)
CHLORIDE SERPL-SCNC: 102 MMOL/L (ref 98–107)
CREAT SERPL-MCNC: 1.14 MG/DL (ref 0.51–0.95)
D DIMER PPP FEU-MCNC: 0.59 UG/ML FEU (ref 0–0.5)
DEPRECATED HCO3 PLAS-SCNC: 21 MMOL/L (ref 22–29)
EGFRCR SERPLBLD CKD-EPI 2021: 52 ML/MIN/1.73M2
EOSINOPHIL # BLD AUTO: 0.2 10E3/UL (ref 0–0.7)
EOSINOPHIL NFR BLD AUTO: 2 %
ERYTHROCYTE [DISTWIDTH] IN BLOOD BY AUTOMATED COUNT: 14.1 % (ref 10–15)
GLUCOSE SERPL-MCNC: 146 MG/DL (ref 70–99)
HCT VFR BLD AUTO: 26.8 % (ref 35–47)
HGB BLD-MCNC: 8.5 G/DL (ref 11.7–15.7)
IMM GRANULOCYTES # BLD: 0.1 10E3/UL
IMM GRANULOCYTES NFR BLD: 1 %
LYMPHOCYTES # BLD AUTO: 1.9 10E3/UL (ref 0.8–5.3)
LYMPHOCYTES NFR BLD AUTO: 28 %
MCH RBC QN AUTO: 29.3 PG (ref 26.5–33)
MCHC RBC AUTO-ENTMCNC: 31.7 G/DL (ref 31.5–36.5)
MCV RBC AUTO: 92 FL (ref 78–100)
MONOCYTES # BLD AUTO: 0.5 10E3/UL (ref 0–1.3)
MONOCYTES NFR BLD AUTO: 8 %
NEUTROPHILS # BLD AUTO: 4.3 10E3/UL (ref 1.6–8.3)
NEUTROPHILS NFR BLD AUTO: 61 %
NRBC # BLD AUTO: 0 10E3/UL
NRBC BLD AUTO-RTO: 0 /100
PLATELET # BLD AUTO: 138 10E3/UL (ref 150–450)
POTASSIUM SERPL-SCNC: 4.3 MMOL/L (ref 3.4–5.3)
RBC # BLD AUTO: 2.9 10E6/UL (ref 3.8–5.2)
SODIUM SERPL-SCNC: 136 MMOL/L (ref 136–145)
TROPONIN T SERPL HS-MCNC: 26 NG/L
TROPONIN T SERPL HS-MCNC: 28 NG/L
TROPONIN T SERPL HS-MCNC: 28 NG/L
WBC # BLD AUTO: 7 10E3/UL (ref 4–11)

## 2023-09-23 PROCEDURE — 93010 ELECTROCARDIOGRAM REPORT: CPT | Performed by: STUDENT IN AN ORGANIZED HEALTH CARE EDUCATION/TRAINING PROGRAM

## 2023-09-23 PROCEDURE — 80048 BASIC METABOLIC PNL TOTAL CA: CPT | Performed by: STUDENT IN AN ORGANIZED HEALTH CARE EDUCATION/TRAINING PROGRAM

## 2023-09-23 PROCEDURE — 85379 FIBRIN DEGRADATION QUANT: CPT | Performed by: STUDENT IN AN ORGANIZED HEALTH CARE EDUCATION/TRAINING PROGRAM

## 2023-09-23 PROCEDURE — 93005 ELECTROCARDIOGRAM TRACING: CPT

## 2023-09-23 PROCEDURE — 250N000011 HC RX IP 250 OP 636: Mod: JZ | Performed by: STUDENT IN AN ORGANIZED HEALTH CARE EDUCATION/TRAINING PROGRAM

## 2023-09-23 PROCEDURE — 99284 EMERGENCY DEPT VISIT MOD MDM: CPT | Mod: 25 | Performed by: STUDENT IN AN ORGANIZED HEALTH CARE EDUCATION/TRAINING PROGRAM

## 2023-09-23 PROCEDURE — 36415 COLL VENOUS BLD VENIPUNCTURE: CPT | Performed by: STUDENT IN AN ORGANIZED HEALTH CARE EDUCATION/TRAINING PROGRAM

## 2023-09-23 PROCEDURE — 84484 ASSAY OF TROPONIN QUANT: CPT | Mod: 91 | Performed by: STUDENT IN AN ORGANIZED HEALTH CARE EDUCATION/TRAINING PROGRAM

## 2023-09-23 PROCEDURE — 85025 COMPLETE CBC W/AUTO DIFF WBC: CPT | Performed by: STUDENT IN AN ORGANIZED HEALTH CARE EDUCATION/TRAINING PROGRAM

## 2023-09-23 PROCEDURE — 93971 EXTREMITY STUDY: CPT | Mod: RT

## 2023-09-23 PROCEDURE — 99285 EMERGENCY DEPT VISIT HI MDM: CPT | Mod: 25

## 2023-09-23 PROCEDURE — 96374 THER/PROPH/DIAG INJ IV PUSH: CPT

## 2023-09-23 RX ORDER — ONDANSETRON 2 MG/ML
4 INJECTION INTRAMUSCULAR; INTRAVENOUS ONCE
Status: COMPLETED | OUTPATIENT
Start: 2023-09-23 | End: 2023-09-23

## 2023-09-23 RX ADMIN — ONDANSETRON 4 MG: 2 INJECTION INTRAMUSCULAR; INTRAVENOUS at 04:30

## 2023-09-23 ASSESSMENT — ACTIVITIES OF DAILY LIVING (ADL)
ADLS_ACUITY_SCORE: 35

## 2023-09-23 NOTE — DISCHARGE INSTRUCTIONS
Blood work today looked stable.  Do not see any acute worrisome changes.  Do not see evidence of a blood clot in your leg    You should continue all of your medicines as previously prescribed.  No changes were made today    Follow-up closely with your cardiology team as well as your primary doctor    Develop any new or worsening symptoms do not hesitate to return to the emergency room for evaluation

## 2023-09-23 NOTE — ED PROVIDER NOTES
Sign out received at change of shift from Dr. Yrn Romero. See his note for patient presenting details and work up. 67 y.o F presenting from assisted living facility for complaint of chest pain. She has been seen in this ED several times for similar complaint. Work up revealed no acute elevation in troponin, is slightly elevated but at baseline. No change in EKG.     7:20 AM patient seen and examined.  Is vitally stable.  Was drowsy but arousable to verbal stimuli.  Informed of stable work-up.  Will discharge home to assisted living facility. Requires transport via EMS due to difficulty with ambulation, is on baseline supplemental oxygen. Was transported from the department in stable condition.     Yris Curiel,   09/25/23 1222

## 2023-09-23 NOTE — ED TRIAGE NOTES
Pt reports waking up from sound sleep with chest pain rating 8/10.      Triage Assessment       Row Name 09/23/23 0222       Triage Assessment (Adult)    Airway WDL WDL       Respiratory WDL    Respiratory WDL WDL       Skin Circulation/Temperature WDL    Skin Circulation/Temperature WDL WDL       Cardiac WDL    Cardiac WDL X;chest pain       Peripheral/Neurovascular WDL    Peripheral Neurovascular WDL WDL       Cognitive/Neuro/Behavioral WDL    Cognitive/Neuro/Behavioral WDL WDL

## 2023-09-23 NOTE — ED PROVIDER NOTES
History     Chief Complaint   Patient presents with    Chest Pain     HPI  Letty Escobar is a 67 year old female with complex medical history including morbid obesity, hypertension, CKD, COPD, coronary disease with stents presents for evaluation of chest discomfort.  Patient describes feeling normal when going to bed this evening.  However, she woke up overnight around midnight with some substernal chest discomfort.  This did radiate somewhat into her left arm.  For this reason she decided to present to the emergency department for evaluation.  Patient denies recent injury or strain.  The discomfort is slightly worse when taking a deep breath but otherwise she denies clear provoking factors.  It is not similar to prior cardiac events.  Otherwise she feels well and denies any associated abdominal pain, nausea or vomiting, cough or cold symptoms, new pain or swelling of the legs, other complaints today.    Allergies:  Allergies   Allergen Reactions    Bee Pollen Anaphylaxis    Diphenhydramine Palpitations     Tolerated IV Benadryl when not pushed too fast    Isosorbide Nitrate Other (See Comments) and Dizziness     Also causes syncope (has fallen before) and brain fog/mental disturbances - please do not prescribe    Nitroglycerin Dizziness, Fatigue and Other (See Comments)     Specifically the patch - please do not prescribe  Specifically the patch - please do not prescribe      Contrast Dye Hives and Itching    Other Drug Allergy (See Comments) Hives and Itching    Penicillin G     Adhesive Tape Rash    Diphenhydramine Hcl Palpitations    Liquid Adhesive Itching    Nystatin Dermatitis    Nystatin Dermatitis     Also blisters    Penicillins Swelling and Rash     Occurred as a child - not 100% sure on specific reactions    Sulfa Antibiotics Other (See Comments)     Occurred as a child / patient does not remember specific reaction       Problem List:    Patient Active Problem List    Diagnosis Date Noted    Chronic  kidney disease, stage 3a (H) 10/16/2022     Priority: Medium    SOB (shortness of breath) 04/07/2022     Priority: Medium    Morbid obesity (H) 12/10/2021     Priority: Medium    ICD (implantable cardioverter-defibrillator) in place 11/17/2021     Priority: Medium     Formatting of this note is different from the original.  Date of last device in office evaluation: 5/17/2022    ?  and model: Medtronic Cobalt CRT-D.   Date of implant: 5/7/2021  Tachy therapies remain OFF: S/p downgrade from ICD to pacemaker, but her implanted system includes a DF-4 lead, so an ICD generator was placed with the tachycardia therapies disabled.     ? Indication for device: Ischemic cardiomyopathy   ? Cardiac resynchronization therapy:   no     MRI Conditional:  No  o If No:  Reason why:  Abandoned LV lead    ? Battery longevity documented as less than 3  Months: No  ? Are any of the leads less than 3 months old:  No    ? Programming              ? Pacing mode and programmed lower rate: DDDR 60 - 130 bpm              ? Rate-responsive sensor type, if programmed on: Accelerometer        Underlying rhythm and heart rate:  SR @ 60 bpm    ? What is the response of this device to magnet placement:  None - tachy therapies off  ? PM magnet pacing rate: N/A   ? Any alert status on CIED generator or lead: No    ? Last pacing threshold    Atrial   1.0 V @ 0.4 ms   Ventricular RV: 0.75 V @ 0.4 ms  LV:  2.75 V @ 1.0 ms    Formatting of this note is different from the original.  Date of last device in office evaluation: 11/17/2022     ?  and model: Medtronic Cobalt CRT-D.   Date of implant: 5/7/2021  Tachy therapies remain OFF: S/p downgrade from ICD to pacemaker, but her implanted system includes a DF-4 lead, so an ICD generator was placed with the tachycardia therapies disabled.     ? Indication for device: Ischemic cardiomyopathy   ? Cardiac resynchronization therapy:   no     MRI Conditional:  No  o If No:  Reason why:   Abandoned LV lead    ? Battery longevity documented as less than 3  Months: No  ? Are any of the leads less than 3 months old:  No    ? Programming              ? Pacing mode and programmed lower rate: DDDR 60 - 130 bpm              ? Rate-responsive sensor type, if programmed on: Accelerometer        Underlying rhythm and heart rate:  Sinus rhythm 62 bpm, w/BBB    ? What is the response of this device to magnet placement:  None - tachy therapies off  ? PM magnet pacing rate: N/A   ? Any alert status on CIED generator or lead: No    ? Last pacing threshold    Atrial   1.0 V @ 0.4 ms   Ventricular RV: 0.75 V @ 0.4 ms  LV:  2.75 V @ 1.0 ms      Atrial fibrillation (H) 04/06/2021     Priority: Medium    Pacemaker 04/06/2021     Priority: Medium    Major depressive disorder, recurrent severe without psychotic features (H) 01/26/2021     Priority: Medium    Panic disorder with agoraphobia 04/14/2020     Priority: Medium    Constipation 03/20/2020     Priority: Medium    Personality disorder (H) 03/05/2020     Priority: Medium     Rule out dependent personality    Formatting of this note might be different from the original.  Rule out dependent personality  Formatting of this note might be different from the original.  Rule out dependent personality      Candidiasis 03/01/2020     Priority: Medium    Posttraumatic stress disorder 03/01/2020     Priority: Medium    COPD without exacerbation (H) 01/29/2020     Priority: Medium    Long term (current) use of insulin (H) 01/29/2020     Priority: Medium    Acute on chronic systolic (congestive) heart failure (H) 01/28/2020     Priority: Medium    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, left leg (H) 01/15/2020     Priority: Medium    Chest pain 11/11/2019     Priority: Medium    Polymyalgia rheumatica (H) 11/28/2018     Priority: Medium    Unstable angina (H) 05/02/2018     Priority: Medium     Coronary angiogram 05/02/2018  demonstrated 30% stenosis in the LMCA, 50% stenosis in the Proximal LAD, 50% stenosis in the Mid LAD, 95% stenosis in the Proximal Circumflex (Restenosis - Balloon Angioplasty), 100% stenosis in the 1st Marginal, 50% stenosis in the Proximal RCA, 50% stenosis in the Distal RCA, the LIMA graft from the LIMA to the Distal LAD is free of significant disease; the SVG graft from the Aorta to the 1st Marginal is free of significant disease; the SVG graft from the Aorta to the Distal RCA is free of significant disease. Intervention included a successful  2.5mm x 12mm Balloon,  2.5mm x 10mm Cutting Balloon,  3mm x 12mm Drug Eluting Stent,  3mm x 12mm Balloon, and  3mm x 8mm Balloon to Proximal Circumflex, post stenosis 0%.    Formatting of this note is different from the original.  Coronary angiogram 05/02/2018 demonstrated 30% stenosis in the LMCA, 50% stenosis in the Proximal LAD, 50% stenosis in the Mid LAD, 95% stenosis in the Proximal Circumflex (Restenosis - Balloon Angioplasty), 100% stenosis in the 1st Marginal, 50% stenosis in the Proximal RCA, 50% stenosis in the Distal RCA, the LIMA graft from the LIMA to the Distal LAD is free of significant disease; the SVG graft from the Aorta to the 1st Marginal is free of significant disease; the SVG graft from the Aorta to the Distal RCA is free of significant disease. Intervention included a successful  2.5mm x 12mm Balloon,  2.5mm x 10mm Cutting Balloon,  3mm x 12mm Drug Eluting Stent,  3mm x 12mm Balloon, and  3mm x 8mm Balloon to Proximal Circumflex, post stenosis 0%.  Formatting of this note is different from the original.  Coronary angiogram 05/02/2018 demonstrated 30% stenosis in the LMCA, 50% stenosis in the Proximal LAD, 50% stenosis in the Mid LAD, 95% stenosis in the Proximal Circumflex (Restenosis - Balloon Angioplasty), 100% stenosis in the 1st Marginal, 50% stenosis in the Proximal RCA, 50% stenosis in the Distal RCA, the LIMA graft from the LIMA to the Distal  LAD is free of significant disease; the SVG graft from the Aorta to the 1st Marginal is free of significant disease; the SVG graft from the Aorta to the Distal RCA is free of significant disease. Intervention included a successful  2.5mm x 12mm Balloon,  2.5mm x 10mm Cutting Balloon,  3mm x 12mm Drug Eluting Stent,  3mm x 12mm Balloon, and  3mm x 8mm Balloon to Proximal Circumflex, post stenosis 0%.      Vitamin B12 deficiency 02/14/2018     Priority: Medium    Chronic bilateral low back pain without sciatica 01/17/2018     Priority: Medium    Chronic pain disorder 10/01/2017     Priority: Medium    Urinary incontinence, mixed 09/24/2017     Priority: Medium    Internal carotid artery stenosis, bilateral 07/13/2016     Priority: Medium     Carotid US 05/04/2018 showed moderate plaque formation, consistent with 50 to 69% stenosis in the right internal carotid artery, not significantly changed from 8/5/2015.  Moderate plaque formation, consistent with 50 to 69% stenosis in the left internal carotid artery; there has been mild progression of the left ICA stenosis since 8/5/2015.    Formatting of this note might be different from the original.  Carotid US 05/04/2018 showed moderate plaque formation, consistent with 50 to 69% stenosis in the right internal carotid artery, not significantly changed from 8/5/2015.  Moderate plaque formation, consistent with 50 to 69% stenosis in the left internal carotid artery; there has been mild progression of the left ICA stenosis since 8/5/2015.    Formatting of this note might be different from the original.  Carotid US 05/04/2018 showed moderate plaque formation, consistent with 50 to 69% stenosis in the right internal carotid artery, not significantly changed from 8/5/2015.  Moderate plaque formation, consistent with 50 to 69% stenosis in the left internal carotid artery; there has been mild progression of the left ICA stenosis since 8/5/2015.      Essential (primary) hypertension  10/14/2015     Priority: Medium    Ischemic cardiomyopathy 09/20/2015     Priority: Medium     EF of 40-45%, status post RV lead revision and LV epicardial lead placement via mini-thoracotomy in August 2016.    Formatting of this note might be different from the original.  EF of 40-45%, status post RV lead revision and LV epicardial lead placement via mini-thoracotomy in August 2016.  Formatting of this note might be different from the original.  EF of 40-45%, status post RV lead revision and LV epicardial lead placement via mini-thoracotomy in August 2016.      S/P CABG x 5 08/21/2015     Priority: Medium    Pain medication agreement 04/20/2013     Priority: Medium     Controlled substance agreement for percocet #30/month on file and signed 4/17/13.  Designated pharmacy: [facility] Prescribing physician: Andrea Diagnosis: Ulnar neuropathy    Formatting of this note might be different from the original.  Controlled substance agreement for percocet #30/month on file and signed 4/17/13.  Designated pharmacy: [facility] Prescribing physician: Andrea Diagnosis: Ulnar neuropathy      Anemia in other chronic diseases classified elsewhere 01/05/2013     Priority: Medium    Hypothyroidism, unspecified 12/10/2010     Priority: Medium    ACP (advance care planning) 11/03/2010     Priority: Medium     Formatting of this note might be different from the original.  Patient has identified Health Care Agent(s): Yes  Add Health Care Agents: Yes    Health Care Agent(s):    Primary Health Care Agent:     Edwar Tasusana Relationship:    Spouse Phone:     [phone]    Secondary Health Care Agent:     Chiki Oro Relationship:     Son Phone:     [phone]      Patient has Advance Care Plan Documents (Health Care Directive, POLST): Yes    Advance Care Plan Documents:  Health Care Directive--03/28/11  Resuscitation Guidelines--    Patient has identified Specific Treatment Preferences: Yes   Specific Treatment Preferences:   a.) Code  "Status:  DNR/ Do Not Attempt Resuscitation - Allow a Natural Death    b.) Goals of Treatment:     ii. Limited Interventions and treat reversible conditions.  Provide interventions aimed at treatment of new or reversible illness/injury or non-life threatening chronic conditions. Duration of invasive or uncomfortable interventions should generally be limited.- Trial of intubation 5 days or other instructions \"If no improvement, stop.\"  c.) Interventions and Treatments:   i.   Antibiotics:          - Aggressive antibiotics  ii.  Nutrition/Hydration:         - Offer food and liquids by mouth         - IV fluid administration         - No feeding tube under any circumstance.  iii. Transfusion:          - Blood products for comfort/relief of symptoms only  iv. Dialysis:           - Dialysis for short term \"for recovery, but not long term.\"        Last Assessment & Plan:   Advance Care Planning: Disease-specific Session    Letty Escobar is an [facility] patient of Dr. Renea Mendez of the [facility].    Advance care planning discussions were completed with Letty and her healthcare agent, spouse Edwar Escobar on Monday, March 28, 2011 at the [facility].    Understanding of Illness and Disease [place]:   Letty identifies her medical condition as diabetes with peripheral neuropathy, heart problems with a heart attack February 2009 plus 4 stent placements; sleep apnea; depression; hypothyroid; high cholesterol; tobacco use; and describes it as needing further assistance with thyroid and diabetes management.  She identifies the following symptoms of her medical condition as being the most bothersome: some memory loss, balance problems, light headedness and dizziness, puffy eyes and lower extremity edema from time to time.    Let[place] is retired [place] .  She is independent with all cares and lives an active life style although, " "\"I'm not as active as I used to be.\"    Goals of Care:   Letty currently hopes to maintain independence, control pain and symptoms and delay progression of, but not cure, the illness.  \"I want to get the diabetes and thyroid under better control.\"  Letty has an appointment the first part of April for follow up.    Quality of Life:    Letty identifies quality of life as family involvement twice weekly, Shinto activities, newly assigned  , playing cards, the computer,    Letty christiano with serious challenges in her life through her ganesh in God, prayers and support of her Shinto family, spouse and son.    Ltety identifies the following fears and worries about her medical care:  \"I just want the diabetes straightened out.\"    Treatment and Care Preferences:   Past experiences in dealing with family and/or friends that have  or been seriously ill include her brother who took his own life.  \"He was 53 years old and living with us.  I found him.\"   As a result of these experiences, Letty expresses these health care preferences:      Summary Letty's Treatment Preferences:  LOW SURVIVAL; HIGH TREATMENT BURDEN:  If Letty suffered a serious complication, such that she was facing a prolonged hospital stay, required ongoing medical interventions, and the chance of living through the complication was low (for example, only 5 out of 100 would live), Letty would choose:  to focus treatment on comfort and quality of life (\"Quality of life is more important than length of life to Letty.\")  COMMENT:  \"If I can't live a normal life, I wouldn't want to live.\"    HIGH SURVIVAL; LOW FUNCTIONAL STATUS:  If Letty had a serious complication and had a good chance of living through the complication but it was expected that she would never be able to walk or talk again and would require 24 hour nursing care, she would choose:  to focus treatment on comfort and quality of life (\"Quality of life is more important " "than length of life to Letty.\")  COMMENT:  \"I'd accept rehabilitation.\"    HIGH SURVIVAL; LOW COGNITIVE STATUS:  If Letty had a serious complication and had a good chance of living through the complication but it was expected that she would never know who she was or who she was with and would require 24 hour nursing care, she would choose:  to focus treatment on comfort and quality of life (\"Quality of life is more important than length of life to Letty.\")    CARDIO-PULMONARY RESUSCITATION (CPR):  The facts, risks and benefits of CPR were discussed with Letty.  If she had a sudden event that caused her heart and breathing to stop, she:  WOULD NOT want CPR attempted and instead would prefer that a natural death occur.    MECHANICAL VENTILATION: If Letty had an episode where she was unable to breathe on her own, she would choose the following:  attempt to use any appropriate non-invasive method to assist breathing, and use mechanical ventilation.  COMMENT:  \"If reversible ventilator is acceptable for 5 days.  If no improvement, stop.\"     Letty has chosen her healthcare agent to:  strictly follow her wishes.    Follow Up Plan:   Letty was encouraged to continue advance care planning discussions with her health care agents and primary care provider.   Letty and her health care agent declined handouts.     Documents addressed during this advance care planning session:  1.  Health Care Agents identified.          .  Primary health care agent is spouse, Edwar Escobar;          .  Secondary health care agent is son, Jermaine Oro.  2.  Health Care Directive completed and scanned into medical record.  3.  Statement of Treatment Preferences for advanced illness completed and scanned into the medical record.  4.  Resuscitation Guidelines completed for DNR.  Document sent to Dr. Renea Mendez for signature and returned to the home. Letty, please place on the refrigerator.    Recommendations/Plan:   Letty and her health " care agent to review Advance Care Plan.     Letty would benefit from:   Care Navigation/Care Navigation Help Desk--explained services for pending future needs.  No identified needs today.  Care Navigation brochure was given to Letty and her healthcare agent.    Advance Care Planning recommendations and Letty's concerns and questions were cc ed to her primary provider.     Thank you, Letty for the opportunity of assisting with Advance Care Planning. It was a seeing you again and meeting Edwar.  Please contact me if I can be of further assistance.    Interviewer: Pat Martin RN    Advance Care Planning Facilitator  [phone]   3/28/2011      ELI (obstructive sleep apnea) 01/31/2010     Priority: Medium     PSG on April/2008 that showed moderate Obstructive Sleep Apnea with an AHI of 23.5 . Limb movements persisted at 29 movements/hour at optimal pressures, despite carbidopa use.    Formatting of this note might be different from the original.  PSG on April/2008 that showed moderate Obstructive Sleep Apnea with an AHI of 23.5 . Limb movements persisted at 29 movements/hour at optimal pressures, despite carbidopa use.  Formatting of this note might be different from the original.  PSG on April/2008 that showed moderate Obstructive Sleep Apnea with an AHI of 23.5 . Limb movements persisted at 29 movements/hour at optimal pressures, despite carbidopa use.      Coronary atherosclerosis 02/06/2009     Priority: Medium     Formatting of this note might be different from the original.  2/5/2009 - MI - Proximal RCA 99%, mild-mod disease elsewhere.  EF 60%.  PCI:  MYRON to pRCA.  2/12/2009 - admit CP - Widely patent RCA stent. Moderate diffuse CAD. Severe stenosis in trivial PDA branch. LVEF 45%.  1/25/2010 - admit CP - PTCA and stent of diagonal  9/8/2010 - admit CP - LAD patent stent. Moderate diffuse CAD. Medical management recommended.   4/25/2012 - NSTEMI - Acute total occlusion of the ostial Circumflex. Acute total  occlusion of the ostial ramus. Acute total occlusion of the distal 1st Obtuse Marginal. Angioplasty of ostial circumflex and ostial ramus. There was distal embolization to the OM1 vessel. This vessel was small and not amendable to intervention. Indefinite: plavix and asa 81.  8/10/2015 - CABx5 - 1. LIMA to distal LAD, reverse SVG to OM1 and OM2, reverse SVG to diagonal, reverse SVG to PDA.   2. Mitral valve repair with a Medtronics 3-D full ring, 28 mm.   3. Tricuspid repair with a Medtronic 28 mm partial ring  1/12/2016 - TTE - EF 40-45%  Formatting of this note might be different from the original.  2/5/2009 - MI - Proximal RCA 99%, mild-mod disease elsewhere.  EF 60%.  PCI:  MYRON to pRCA.  2/12/2009 - admit CP - Widely patent RCA stent. Moderate diffuse CAD. Severe stenosis in trivial PDA branch. LVEF 45%.  1/25/2010 - admit CP - PTCA and stent of diagonal  9/8/2010 - admit CP - LAD patent stent. Moderate diffuse CAD. Medical management recommended.   4/25/2012 - NSTEMI - Acute total occlusion of the ostial Circumflex. Acute total occlusion of the ostial ramus. Acute total occlusion of the distal 1st Obtuse Marginal. Angioplasty of ostial circumflex and ostial ramus. There was distal embolization to the OM1 vessel. This vessel was small and not amendable to intervention. Indefinite: plavix and asa 81.  8/10/2015 - CABx5 - 1. LIMA to distal LAD, reverse SVG to OM1 and OM2, reverse SVG to diagonal, reverse SVG to PDA.   2. Mitral valve repair with a Medtronics 3-D full ring, 28 mm.   3. Tricuspid repair with a Medtronic 28 mm partial ring  1/12/2016 - TTE - EF 40-45%      Restless legs syndrome 08/29/2007     Priority: Medium    Hyperlipidemia, unspecified 05/30/2007     Priority: Medium        Past Medical History:    Past Medical History:   Diagnosis Date    ACP (advance care planning) 11/3/2010    Acute coronary syndrome (H) 11/22/2019    Acute exacerbation of CHF (congestive heart failure) (H) 11/11/2019     Acute on chronic systolic (congestive) heart failure (H) 1/28/2020    Anemia of chronic disease 1/5/2013    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, left leg (H) 1/15/2020    BPPV (benign paroxysmal positional vertigo) 5/31/2018    Candidiasis 3/1/2020    Carotid stenosis, right 7/13/2016    Chest pain 11/11/2019    Chronic bilateral low back pain without sciatica 1/17/2018    Chronic pain disorder 10/1/2017    Constipation 3/20/2020    COPD (chronic obstructive pulmonary disease) (H) 6/24/2020    Coronary atherosclerosis 2/6/2009    Cubital tunnel syndrome on left 11/13/2012    Depressive disorder     Diabetes mellitus (H)     Diabetes mellitus type 2 in obese (H) 7/13/2006    Diabetes mellitus type 2 in obese (H) 7/13/2006    Drug-seeking behavior 4/4/2020    Failure to thrive in adult 9/3/2020    Hereditary and idiopathic peripheral neuropathy 4/24/2007    Hyperlipidemia with target low density lipoprotein (LDL) cholesterol less than 70 mg/dL 5/30/2007    Hypertension 10/14/2015    Hypothyroidism 12/10/2010    Irritable bowel syndrome 9/7/2015    Ischemic cardiomyopathy 9/20/2015    Long-term use of high-risk medication 4/14/2020    Moniliasis, 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    Myocardial infarction (H)     Neuromuscular disorder (H)     Other specified postprocedural states 10/8/2015    Pain medication agreement 4/20/2013    Panic disorder with agoraphobia 4/14/2020    Paroxysmal atrial fibrillation (H) 10/2/2015    Personality disorder (H) 3/5/2020    Polymyalgia rheumatica (H) 11/28/2018    Posttraumatic stress disorder 3/1/2020    Restless legs syndrome (RLS) 8/29/2007    Restless legs syndrome (RLS) 8/29/2007    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    Serum calcium elevated 3/1/2020    Somatic dysfunction of sacral region 1/17/2018    Subacromial bursitis of left shoulder joint 8/6/2018    Suicidal ideation 4/1/2020    Thyroid  disease     TIA (transient ischemic attack) 5/4/2018    TIA (transient ischemic attack) 5/4/2018    Tobacco abuse 2/17/2017    Tobacco abuse 2/17/2017    Urinary incontinence, mixed 9/24/2017    UTI (urinary tract infection) 4/17/2020    Vitamin B12 deficiency 2/14/2018    Weakness 12/17/2019       Past Surgical History:    Past Surgical History:   Procedure Laterality Date    CARDIAC SURGERY      stents x 11    CARDIAC SURGERY      CHOLECYSTECTOMY      CHOLECYSTECTOMY      GENITOURINARY SURGERY      Tubal ligation    OTHER SURGICAL HISTORY      Genitourinary surgery    RELEASE CARPAL TUNNEL      RELEASE CARPAL TUNNEL         Family History:    No family history on file.    Social History:  Marital Status:   [2]  Social History     Tobacco Use    Smoking status: Never    Smokeless tobacco: Never   Vaping Use    Vaping Use: Never used   Substance Use Topics    Alcohol use: Not Currently    Drug use: Never        Medications:    acetaminophen (TYLENOL) 325 MG tablet  acetaminophen (TYLENOL) 500 MG tablet  albuterol (PROVENTIL) (2.5 MG/3ML) 0.083% neb solution  amLODIPine (NORVASC) 5 MG tablet  aspirin 81 MG EC tablet  bisacodyl (DULCOLAX) 10 MG suppository  cefdinir (OMNICEF) 300 MG capsule  cyanocobalamin (CYANOCOBALAMIN) 1000 MCG/ML injection  divalproex sodium delayed-release (DEPAKOTE) 250 MG DR tablet  DULoxetine HCl 40 MG CPEP  famotidine (PEPCID) 20 MG tablet  fluticasone-vilanterol (BREO ELLIPTA) 200-25 MCG/INH inhaler  gabapentin (NEURONTIN) 300 MG capsule  hydrochlorothiazide (HYDRODIURIL) 25 MG tablet  levothyroxine (SYNTHROID/LEVOTHROID) 112 MCG tablet  Melatonin 10 MG TABS tablet  metoprolol succinate ER (TOPROL-XL) 25 MG 24 hr tablet  mirtazapine (REMERON) 30 MG tablet  nitroGLYcerin (NITROSTAT) 0.4 MG sublingual tablet  ondansetron (ZOFRAN ODT) 4 MG ODT tab  polyethylene glycol (MIRALAX) 17 GM/Dose powder  potassium chloride ER (MICRO-K) 10 MEQ CR capsule  QUEtiapine (SEROQUEL) 25 MG  tablet  rosuvastatin (CRESTOR) 10 MG tablet  sennosides (SENOKOT) 8.6 MG tablet  traZODone (DESYREL) 50 MG tablet      Review of Systems   All other systems reviewed and are negative.  See HPI.    Physical Exam   BP: 117/61  Pulse: 60  Temp: 97.3  F (36.3  C)  Resp: 13  SpO2: 97 %      Physical Exam  Vitals and nursing note reviewed.   Constitutional:       General: She is not in acute distress.     Appearance: Normal appearance. She is well-developed. She is not ill-appearing or diaphoretic.      Comments: Morbidly obese.  No acute distress.   HENT:      Head: Atraumatic.      Mouth/Throat:      Mouth: Mucous membranes are moist.   Eyes:      General: No scleral icterus.     Conjunctiva/sclera: Conjunctivae normal.   Cardiovascular:      Rate and Rhythm: Normal rate and regular rhythm. No extrasystoles are present.     Heart sounds: Normal heart sounds.      Comments: Pacemaker in left upper chest wall, nontender and without overlying skin changes.  No obvious murmur appreciated.  Pulmonary:      Effort: No tachypnea or respiratory distress.      Breath sounds: Normal breath sounds. No decreased breath sounds or wheezing.      Comments: Speaking comfortably in full sentences, normal/equal breath sounds bilaterally.  Chest:      Chest wall: No tenderness.   Abdominal:      General: Abdomen is flat.   Musculoskeletal:         General: Normal range of motion.      Cervical back: Normal range of motion and neck supple.      Right lower leg: No tenderness. Edema present.      Comments: Patient had some mild to moderate right calf swelling compared to the left.  This was nontender to palpation.  DP/PT pulses are 2+ bilaterally.  Capillary refill is brisk.   Skin:     General: Skin is warm.      Capillary Refill: Capillary refill takes less than 2 seconds.      Coloration: Skin is not cyanotic or pale.      Findings: No rash.   Neurological:      General: No focal deficit present.      Mental Status: She is alert.    Psychiatric:         Mood and Affect: Mood normal. Mood is not anxious.         Behavior: Behavior normal.         ED Course             Procedures  EKG performed at 2:41 AM.  Paced rhythm, rate 60.  Wide QRS complexes with nonspecific ST changes.  No obvious acute appearing ST elevation/depression.  Exam independently interpreted by me.              Results for orders placed or performed during the hospital encounter of 09/23/23 (from the past 24 hour(s))   Troponin T, High Sensitivity   Result Value Ref Range    Troponin T, High Sensitivity 28 (H) <=14 ng/L   CBC with platelets differential    Narrative    The following orders were created for panel order CBC with platelets differential.  Procedure                               Abnormality         Status                     ---------                               -----------         ------                     CBC with platelets and d...[[id number]]  Abnormal            Final result                 Please view results for these tests on the individual orders.   Basic metabolic panel   Result Value Ref Range    Sodium 136 136 - 145 mmol/L    Potassium 4.3 3.4 - 5.3 mmol/L    Chloride 102 98 - 107 mmol/L    Carbon Dioxide (CO2) 21 (L) 22 - 29 mmol/L    Anion Gap 13 7 - 15 mmol/L    Urea Nitrogen 21.3 8.0 - 23.0 mg/dL    Creatinine 1.14 (H) 0.51 - 0.95 mg/dL    Calcium 9.2 8.8 - 10.2 mg/dL    Glucose 146 (H) 70 - 99 mg/dL    GFR Estimate 52 (L) >60 mL/min/1.73m2   D dimer quantitative   Result Value Ref Range    D-Dimer Quantitative 0.59 (H) 0.00 - 0.50 ug/mL FEU    Narrative    This D-dimer assay is intended for use in conjunction with a clinical pretest probability assessment model to exclude pulmonary embolism (PE) and deep venous thrombosis (DVT) in outpatients suspected of PE or DVT. The cut-off value is 0.50 ug/mL FEU.    For patients 50 years of age or older, the application of age-adjusted cut-off values for D-Dimer may increase the specificity without  significant effect on sensitivity. The literature suggested calculation age adjusted cut-off in ug/L = age in years x 10 ug/L. The results in this laboratory are reported as ug/mL rather than ug/L. The calculation for age adjusted cut off in ug/mL= age in years x 0.01 ug/mL. For example, the cut off for a 76 year old male is 76 x 0.01 ug/mL = 0.76 ug/mL (760 ug/L).    M Pavan et al. Age adjusted D-dimer cut-off levels to rule out pulmonary embolism: The ADJUST-PE Study. EPHRAIM 2014;311:5461-3736.; HJ Jaron et al. Diagnostic accuracy of conventional or age adjusted D-dimer cutoff values in older patients with suspected venous thromboembolism. Systemic review and meta-analysis. BMJ 2013:346:f2492.   CBC with platelets and differential   Result Value Ref Range    WBC Count 7.0 4.0 - 11.0 10e3/uL    RBC Count 2.90 (L) 3.80 - 5.20 10e6/uL    Hemoglobin 8.5 (L) 11.7 - 15.7 g/dL    Hematocrit 26.8 (L) 35.0 - 47.0 %    MCV 92 78 - 100 fL    MCH 29.3 26.5 - 33.0 pg    MCHC 31.7 31.5 - 36.5 g/dL    RDW 14.1 10.0 - 15.0 %    Platelet Count 138 (L) 150 - 450 10e3/uL    % Neutrophils 61 %    % Lymphocytes 28 %    % Monocytes 8 %    % Eosinophils 2 %    % Basophils 0 %    % Immature Granulocytes 1 %    NRBCs per 100 WBC 0 <1 /100    Absolute Neutrophils 4.3 1.6 - 8.3 10e3/uL    Absolute Lymphocytes 1.9 0.8 - 5.3 10e3/uL    Absolute Monocytes 0.5 0.0 - 1.3 10e3/uL    Absolute Eosinophils 0.2 0.0 - 0.7 10e3/uL    Absolute Basophils 0.0 0.0 - 0.2 10e3/uL    Absolute Immature Granulocytes 0.1 <=0.4 10e3/uL    Absolute NRBCs 0.0 10e3/uL   Troponin T, High Sensitivity (now)   Result Value Ref Range    Troponin T, High Sensitivity 28 (H) <=14 ng/L   US Lower Extremity Venous Duplex Right    Narrative    EXAM: ULTRASOUND LOWER EXTREMITY VENOUS DUPLEX RIGHT  LOCATION: [facility]  DATE: 09/23/2023    INDICATION: Right leg swelling, acute chest pain, rule out DVT.  COMPARISON: None.  TECHNIQUE: Venous  Duplex ultrasound of the right lower extremity with and without compression, augmentation and duplex. Color flow and spectral Doppler with waveform analysis performed.    FINDINGS: Exam includes the common femoral, femoral, popliteal, and contralateral common femoral veins as well as segmentally visualized deep calf veins and greater saphenous vein.     RIGHT: No deep vein thrombosis. No superficial thrombophlebitis. No popliteal cyst.      Impression    IMPRESSION:  1.  No deep venous thrombosis in the right lower extremity.       Troponin T, High Sensitivity (now)   Result Value Ref Range    Troponin T, High Sensitivity 26 (H) <=14 ng/L       Medications   ondansetron (ZOFRAN) injection 4 mg (4 mg Intravenous $Given 9/23/23 9918)       Assessments & Plan (with Medical Decision Making)     I have reviewed the nursing notes.    I have reviewed the findings, diagnosis, plan and need for follow up with the patient.    Medical Decision Making  Letty Escobar is a 67 year old female with complex medical history including morbid obesity, hypertension, CKD, COPD, coronary disease with stents presents for evaluation of chest discomfort.  Normal vitals on arrival.  Speaking comfortably in full sentences on room air.  Overall clear lung sounds.  Abdomen is obese but nontender.  Pulses are equal throughout.  She did have 1-2+ edema to the right leg with the calf was nontender.  She denies history of similar swelling to the leg.  Etiology of her symptoms is somewhat unclear.  She does have some chronic pain issues but also has an extensive cardiac history.  Work-up showed several abnormalities.  She had chronic stable anemia without evidence of leukocytosis.  Mild ERIC was present but electrolytes were otherwise unremarkable.  Her abdomen was entirely nontender and I do not suspect this is the source of her discomfort.  EKG showed a paced rhythm with nonspecific ST changes, no obvious evidence of ischemia.  Her troponins were  downtrending from 28-26.  These are consistent with prior values.  D-dimer was also slightly elevated but was within normal limits when adjusting for age.  In the context of her right leg swelling I did obtain a right lower extremity DVT study.  This was negative.  Patient unfortunately experienced an extended delay in the emergency department due to 2 separate critically ill patients in the department simultaneously.  She slept comfortably and reported complete resolution of chest discomfort upon multiple repeat evaluations.  She had some desaturations during sleeping but I think this is due to sleep apnea/body habitus.  She normalized immediately upon waking up each time.  Her cardiac history is certainly concerning but she had a reassuring catheterization back in July of this year based on chart review.  With downtrending troponins and resolution of chest discomfort I do not believe she requires admission at this time.  She has an existing relationship with cardiology and I recommended she schedule a follow-up again as soon as possible.  The case was handed off to Dr. Curiel at the end of my shift and she assisted in disposition/discharge paperwork.  Please see her separate note for specifics of their conversation.  Patient remained hemodynamically stable until the time of shift change/discharge.      Discharge Medication List as of 9/23/2023  7:28 AM          Final diagnoses:   Chest pain, unspecified type   COPD without exacerbation (H)   Chronic pain disorder   Pacemaker       9/23/2023   [facility] EMERGENCY DEPT       Yrn Romero MD  09/23/23 2907

## 2023-09-25 ENCOUNTER — PATIENT OUTREACH (OUTPATIENT)
Dept: GERIATRIC MEDICINE | Facility: CLINIC | Age: 68
End: 2023-09-25

## 2023-09-25 ENCOUNTER — LAB REQUISITION (OUTPATIENT)
Dept: LAB | Facility: CLINIC | Age: 68
End: 2023-09-25
Payer: COMMERCIAL

## 2023-09-25 DIAGNOSIS — N39.0 URINARY TRACT INFECTION, SITE NOT SPECIFIED: ICD-10-CM

## 2023-09-25 LAB
ALBUMIN UR-MCNC: NEGATIVE MG/DL
APPEARANCE UR: CLEAR
BILIRUB UR QL STRIP: NEGATIVE
COLOR UR AUTO: YELLOW
GLUCOSE UR STRIP-MCNC: NEGATIVE MG/DL
HGB UR QL STRIP: NEGATIVE
KETONES UR STRIP-MCNC: NEGATIVE MG/DL
LEUKOCYTE ESTERASE UR QL STRIP: NEGATIVE
MUCOUS THREADS #/AREA URNS LPF: PRESENT /LPF
NITRATE UR QL: NEGATIVE
PH UR STRIP: 5 [PH] (ref 5–7)
RBC URINE: <1 /HPF
SP GR UR STRIP: 1.01 (ref 1–1.03)
UROBILINOGEN UR STRIP-MCNC: NORMAL MG/DL
WBC URINE: <1 /HPF

## 2023-09-25 PROCEDURE — 87086 URINE CULTURE/COLONY COUNT: CPT | Mod: ORL | Performed by: FAMILY MEDICINE

## 2023-09-25 PROCEDURE — 81001 URINALYSIS AUTO W/SCOPE: CPT | Mod: ORL | Performed by: FAMILY MEDICINE

## 2023-09-26 NOTE — PROGRESS NOTES
[facility] Care Coordination Contact  CC received notification of Emergency Room visit.  ER visit occurred on 9/23/23 at [facility] with Dx of chest pain.    CC contacted spouse Edwar  and left a message requesting a return call.x 2  Member has a follow-up appointment with PCP: Yes: scheduled on will see NP at facility  Member has had a change in condition: No  Per [facility] notes nursing staff as reached out to Dr. Martell her psychiatrist regarding anxiety and multiple ED visit and waiting for a response.  No call back from  will follow up with facility as needed.   New referrals placed: No  Home Visit Needed: No  Care plan reviewed and updated.  PCP notified of ED visit via EMR.        MARIYA Jaramillo, RN, PHN, CCM  Care Coordinator-Long Term Care  [address] [address]  [email or web address]   [email or web address]     Office: [phone]   Fax: [phone]

## 2023-09-27 LAB — BACTERIA UR CULT: NO GROWTH

## 2023-09-28 ENCOUNTER — PATIENT OUTREACH (OUTPATIENT)
Dept: GERIATRIC MEDICINE | Facility: CLINIC | Age: 68
End: 2023-09-28
Payer: COMMERCIAL

## 2023-09-28 NOTE — PROGRESS NOTES
[facility] Care Coordination Contact     CHW, sent to care coordinator for follow up. Mbr due for colon screen, diabetic eye and wellness exams.      FRED Strange  [facility]  [phone]

## 2023-10-02 VITALS
HEIGHT: 57 IN | DIASTOLIC BLOOD PRESSURE: 55 MMHG | BODY MASS INDEX: 43.51 KG/M2 | OXYGEN SATURATION: 92 % | HEART RATE: 69 BPM | SYSTOLIC BLOOD PRESSURE: 132 MMHG | TEMPERATURE: 98.1 F | WEIGHT: 201.7 LBS | RESPIRATION RATE: 16 BRPM

## 2023-10-02 NOTE — PROGRESS NOTES
"[facility]  Chief Complaint   Patient presents with    USP Regulatory     [place] Medical Record Number:  [id number]  Place of Service where encounter took place:  [facility] () [[id number]]    HPI:    Letty Escobar  is 67 year old (1955), who is being seen today for a federally mandated E/M visit.  HPI information obtained from: facility chart records, facility staff, patient report and [facility] chart review.   \"    Updates:  - Ed visits on 7/6, 7/9, 7/20, 8/12, 9/10, 9/18, 9/23 for chest pain      Today:     - Resident seen and examined, chart reviewed and discussed with the nursing staff.     - Denies chest pain, SOB.  Reports started on valium and it is helping her to sleep at night.     - DNP and RN have no concern today.   --------------------------------    MEDICATIONS:  Current Outpatient Medications   Medication Sig Dispense Refill    acetaminophen (TYLENOL) 325 MG tablet Take 650 mg by mouth every 6 hours as needed for mild pain      acetaminophen (TYLENOL) 500 MG tablet Take 2 tablets (1,000 mg) by mouth 3 times daily      albuterol (PROVENTIL) (2.5 MG/3ML) 0.083% neb solution Take 1 vial (2.5 mg) by nebulization 2 times daily as needed for shortness of breath / dyspnea or wheezing 90 mL     amLODIPine (NORVASC) 5 MG tablet Take 5 mg by mouth daily      aspirin 81 MG EC tablet Take 81 mg by mouth daily      bisacodyl (DULCOLAX) 10 MG suppository Place 10 mg rectally daily as needed for constipation      cyanocobalamin (CYANOCOBALAMIN) 1000 MCG/ML injection Inject 1 mL into the muscle every 30 days      divalproex sodium delayed-release (DEPAKOTE) 250 MG DR tablet Take 250 mg by mouth daily      DULoxetine HCl 40 MG CPEP Take 40 mg by mouth daily      famotidine (PEPCID) 20 MG tablet Take 20 mg by mouth daily      fluticasone-vilanterol (BREO ELLIPTA) 200-25 MCG/INH inhaler Inhale 1 puff into the lungs daily      gabapentin (NEURONTIN) 300 MG " "capsule Take 400 mg by mouth At Bedtime      hydrochlorothiazide (HYDRODIURIL) 25 MG tablet Take 25 mg by mouth daily      levothyroxine (SYNTHROID/LEVOTHROID) 112 MCG tablet Take 112 mcg by mouth daily      Melatonin 10 MG TABS tablet Take 10 mg by mouth At Bedtime      metoprolol succinate ER (TOPROL-XL) 25 MG 24 hr tablet Take 25 mg by mouth daily      mirtazapine (REMERON) 30 MG tablet Take 15 mg by mouth daily      nitroGLYcerin (NITROSTAT) 0.4 MG sublingual tablet Place 0.4 mg under the tongue every 5 minutes as needed for chest pain For chest pain place 1 tablet under the tongue every 5 minutes for 3 doses. If symptoms persist 5 minutes after 1st dose call 911.      ondansetron (ZOFRAN ODT) 4 MG ODT tab Take 4 mg by mouth every 8 hours as needed for nausea      polyethylene glycol (MIRALAX) 17 GM/Dose powder Take 17 g by mouth daily Every other day      potassium chloride ER (MICRO-K) 10 MEQ CR capsule Take 10 mEq by mouth daily       QUEtiapine (SEROQUEL) 25 MG tablet 100 mg 3 times daily  0    rosuvastatin (CRESTOR) 10 MG tablet Take 20 mg by mouth At Bedtime      sennosides (SENOKOT) 8.6 MG tablet Take 2 tablets by mouth At Bedtime      traZODone (DESYREL) 50 MG tablet Take 25 mg by mouth 3 times daily       ROS: 4 point ROS including Respiratory, CV, GI and , other than that noted in the HPI,  is negative    Vitals:  /55   Pulse 69   Temp 98.1  F (36.7  C)   Resp 16   Ht 1.448 m (4' 9\")   Wt 91.5 kg (201 lb 11.2 oz)   SpO2 92%   BMI 43.65 kg/m    Body mass index is 43.65 kg/m .  Exam:  GENERAL APPEARANCE:  in no distress,   RESP:  Unlabored breathing. CTA b/l.   CV:  S1S2 audible, regular HR, no murmur appreciated.   ABDOMEN:  soft, NT/ND, BS audible.   M/S:   no joint deformity noted on observation.   SKIN:  No rash noted on observation  NEURO:   No NFD appreciated on observation.   PSYCH:  affect and mood normal    Lab/Diagnostic data: Resides in dementia urias      ASSESSMENT/PLAN "   --------------------------------   # Chronic systolic CHF (H)  # Ischemic cardiomyopathy and Hx of S/P CABG x 5 (2015), and bi-vent ICD in place  # chronic angina pectoris at rest (H)  # Essential hypertension  # Mixed hyperlipidemia  #  PAD, internal carotid artery stenosis, bilateral (H)  # hx of stroke with ischemic CVD  *Ed visits on 7/6, 7/9, 7/20, 8/12, 9/10, 9/18, 9/23 for chest pain. Work up negative for acute findings. Followed by cardiology.   - started on valium and this is helping her to sleep well at night.   - followed by Cardiology. Appreciate help.   - currently on multiple agents. Continue.        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 Appreciate help  - Valium is helping.   - GDR when feasible.         COPD (H): at baseline. on CSI/LABA, continue meds.      Polymyalgia rheumatica (H)  Chronic pain disorder  Chronic bilateral low back pain without sciatica  Peripheral polyneuropathy  Frailty  - analgesia optimal. Continue current regimen  - Significant  Deficits requiring NH placement. Requiring extensive assistance from nursing. Up for meals only o/w spends the day resting in bed.        Obesity: Body mass index is Body mass index is Body mass index is 43.65 kg/m .  - . Continued to provide education      CKD3a GFR 45-59 ml/min (H):   - slight reduction in GFR. - Avoid nephrotoxic  medications. Renally dose medications. Monitor electrolytes, and dehydration status     Headache, non specified: no recent concern. Continue to monitor. Continue current regimen.      Normocytic anemia, query ACD: off iron. Hb stable. Routine lab.        Hypothyroidism:  TSH   Date  Value Ref Range Status   02/08/2023 2.19 0.30 - 4.20 uIU/mL Final   12/28/2022 25.51 (H) 0.40 - 4.00 mU/L Final   12/04/2020 41.34 (H) 0.40 - 4.00 mU/L Final   On Levothyroxine. In frail Elderly adult, recommended TSH level is around 6 providing patient is asymptomatic and FT4 is wnl- preferably on the lower normal level.  - Consider checking FT4 next time TSH is checked.         Irritable bowel syndrome with diarrhea  Gastroesophageal reflux disease, esophagitis presence not specified  - stable.        Electronically signed by:  Paola Pastor MD

## 2023-10-03 ENCOUNTER — NURSING HOME VISIT (OUTPATIENT)
Dept: GERIATRICS | Facility: CLINIC | Age: 68
End: 2023-10-03
Payer: COMMERCIAL

## 2023-10-03 DIAGNOSIS — G89.4 CHRONIC PAIN DISORDER: ICD-10-CM

## 2023-10-03 DIAGNOSIS — I20.89 ANGINA AT REST (H): ICD-10-CM

## 2023-10-03 DIAGNOSIS — M35.3 POLYMYALGIA RHEUMATICA (H): ICD-10-CM

## 2023-10-03 DIAGNOSIS — F40.01 PANIC DISORDER WITH AGORAPHOBIA: ICD-10-CM

## 2023-10-03 DIAGNOSIS — J44.9 CHRONIC OBSTRUCTIVE PULMONARY DISEASE, UNSPECIFIED COPD TYPE (H): ICD-10-CM

## 2023-10-03 DIAGNOSIS — D63.8 ANEMIA IN OTHER CHRONIC DISEASES CLASSIFIED ELSEWHERE: ICD-10-CM

## 2023-10-03 DIAGNOSIS — Z95.810 ICD (IMPLANTABLE CARDIOVERTER-DEFIBRILLATOR) IN PLACE: ICD-10-CM

## 2023-10-03 DIAGNOSIS — N18.31 CHRONIC KIDNEY DISEASE, STAGE 3A (H): ICD-10-CM

## 2023-10-03 DIAGNOSIS — E66.01 MORBID OBESITY (H): ICD-10-CM

## 2023-10-03 DIAGNOSIS — I50.22 CHRONIC SYSTOLIC CONGESTIVE HEART FAILURE (H): Primary | ICD-10-CM

## 2023-10-03 DIAGNOSIS — F33.2 MAJOR DEPRESSIVE DISORDER, RECURRENT SEVERE WITHOUT PSYCHOTIC FEATURES (H): ICD-10-CM

## 2023-10-03 DIAGNOSIS — I10 ESSENTIAL (PRIMARY) HYPERTENSION: ICD-10-CM

## 2023-10-03 DIAGNOSIS — K58.9 IRRITABLE BOWEL SYNDROME, UNSPECIFIED TYPE: ICD-10-CM

## 2023-10-03 PROCEDURE — 99309 SBSQ NF CARE MODERATE MDM 30: CPT | Performed by: FAMILY MEDICINE

## 2023-10-03 NOTE — LETTER
"    10/3/2023        RE: Letty Escobar  C/o Edwar Escobar  [address] [facility] [address]        [facility]  Chief Complaint   Patient presents with     long-term Regulatory     [place] Medical Record Number:  [id number]  Place of Service where encounter took place:  [facility] () [[id number]]    HPI:    Letty Escobar  is 67 year old (1955), who is being seen today for a federally mandated E/M visit.  HPI information obtained from: facility chart records, facility staff, patient report and [facility] chart review.   \"    Updates:  - Ed visits on 7/6, 7/9, 7/20, 8/12, 9/10, 9/18, 9/23 for chest pain      Today:     - Resident seen and examined, chart reviewed and discussed with the nursing staff.     - Denies chest pain, SOB.  Reports started on valium and it is helping her to sleep at night.     - DNP and RN have no concern today.   --------------------------------    MEDICATIONS:  Current Outpatient Medications   Medication Sig Dispense Refill     acetaminophen (TYLENOL) 325 MG tablet Take 650 mg by mouth every 6 hours as needed for mild pain       acetaminophen (TYLENOL) 500 MG tablet Take 2 tablets (1,000 mg) by mouth 3 times daily       albuterol (PROVENTIL) (2.5 MG/3ML) 0.083% neb solution Take 1 vial (2.5 mg) by nebulization 2 times daily as needed for shortness of breath / dyspnea or wheezing 90 mL      amLODIPine (NORVASC) 5 MG tablet Take 5 mg by mouth daily       aspirin 81 MG EC tablet Take 81 mg by mouth daily       bisacodyl (DULCOLAX) 10 MG suppository Place 10 mg rectally daily as needed for constipation       cyanocobalamin (CYANOCOBALAMIN) 1000 MCG/ML injection Inject 1 mL into the muscle every 30 days       divalproex sodium delayed-release (DEPAKOTE) 250 MG DR tablet Take 250 mg by mouth daily       DULoxetine HCl 40 MG CPEP Take 40 mg by mouth daily       famotidine (PEPCID) 20 MG tablet Take 20 mg by mouth daily       " "fluticasone-vilanterol (BREO ELLIPTA) 200-25 MCG/INH inhaler Inhale 1 puff into the lungs daily       gabapentin (NEURONTIN) 300 MG capsule Take 400 mg by mouth At Bedtime       hydrochlorothiazide (HYDRODIURIL) 25 MG tablet Take 25 mg by mouth daily       levothyroxine (SYNTHROID/LEVOTHROID) 112 MCG tablet Take 112 mcg by mouth daily       Melatonin 10 MG TABS tablet Take 10 mg by mouth At Bedtime       metoprolol succinate ER (TOPROL-XL) 25 MG 24 hr tablet Take 25 mg by mouth daily       mirtazapine (REMERON) 30 MG tablet Take 15 mg by mouth daily       nitroGLYcerin (NITROSTAT) 0.4 MG sublingual tablet Place 0.4 mg under the tongue every 5 minutes as needed for chest pain For chest pain place 1 tablet under the tongue every 5 minutes for 3 doses. If symptoms persist 5 minutes after 1st dose call 911.       ondansetron (ZOFRAN ODT) 4 MG ODT tab Take 4 mg by mouth every 8 hours as needed for nausea       polyethylene glycol (MIRALAX) 17 GM/Dose powder Take 17 g by mouth daily Every other day       potassium chloride ER (MICRO-K) 10 MEQ CR capsule Take 10 mEq by mouth daily        QUEtiapine (SEROQUEL) 25 MG tablet 100 mg 3 times daily  0     rosuvastatin (CRESTOR) 10 MG tablet Take 20 mg by mouth At Bedtime       sennosides (SENOKOT) 8.6 MG tablet Take 2 tablets by mouth At Bedtime       traZODone (DESYREL) 50 MG tablet Take 25 mg by mouth 3 times daily       ROS: 4 point ROS including Respiratory, CV, GI and , other than that noted in the HPI,  is negative    Vitals:  /55   Pulse 69   Temp 98.1  F (36.7  C)   Resp 16   Ht 1.448 m (4' 9\")   Wt 91.5 kg (201 lb 11.2 oz)   SpO2 92%   BMI 43.65 kg/m    Body mass index is 43.65 kg/m .  Exam:  GENERAL APPEARANCE:  in no distress,   RESP:  Unlabored breathing. CTA b/l.   CV:  S1S2 audible, regular HR, no murmur appreciated.   ABDOMEN:  soft, NT/ND, BS audible.   M/S:   no joint deformity noted on observation.   SKIN:  No rash noted on observation  NEURO:   " No NFD appreciated on observation.   PSYCH:  affect and mood normal    Lab/Diagnostic data: Resides in dementia urias      ASSESSMENT/PLAN   --------------------------------   # Chronic systolic CHF (H)  # Ischemic cardiomyopathy and Hx of S/P CABG x 5 (2015), and bi-vent ICD in place  # chronic angina pectoris at rest (H)  # Essential hypertension  # Mixed hyperlipidemia  #  PAD, internal carotid artery stenosis, bilateral (H)  # hx of stroke with ischemic CVD  *Ed visits on 7/6, 7/9, 7/20, 8/12, 9/10, 9/18, 9/23 for chest pain. Work up negative for acute findings. Followed by cardiology.   - started on valium and this is helping her to sleep well at night.   - followed by Cardiology. Appreciate help.   - currently on multiple agents. Continue.        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 Appreciate help  - Valium is helping.   - GDR when feasible.         COPD (H): at baseline. on CSI/LABA, continue meds.      Polymyalgia rheumatica (H)  Chronic pain disorder  Chronic bilateral low back pain without sciatica  Peripheral polyneuropathy  Frailty  - analgesia optimal. Continue current regimen  - Significant  Deficits requiring NH placement. Requiring extensive assistance from nursing. Up for meals only o/w spends the day resting in bed.        Obesity: Body mass index is Body mass index is Body mass index is 43.65 kg/m .  - . Continued to provide education      CKD3a GFR 45-59 ml/min (H):   - slight reduction in GFR. - Avoid nephrotoxic  medications. Renally dose medications. Monitor electrolytes, and dehydration status     Headache, non specified: no recent concern. Continue to  monitor. Continue current regimen.      Normocytic anemia, query ACD: off iron. Hb stable. Routine lab.        Hypothyroidism:  TSH   Date Value Ref Range Status   02/08/2023 2.19 0.30 - 4.20 uIU/mL Final   12/28/2022 25.51 (H) 0.40 - 4.00 mU/L Final   12/04/2020 41.34 (H) 0.40 - 4.00 mU/L Final   On Levothyroxine. In frail Elderly adult, recommended TSH level is around 6 providing patient is asymptomatic and FT4 is wnl- preferably on the lower normal level.  - Consider checking FT4 next time TSH is checked.         Irritable bowel syndrome with diarrhea  Gastroesophageal reflux disease, esophagitis presence not specified  - stable.        Electronically signed by:  Paola Pastor MD      Sincerely,        Paola Pastor MD

## 2023-10-05 ENCOUNTER — TELEPHONE (OUTPATIENT)
Dept: GERIATRICS | Facility: CLINIC | Age: 68
End: 2023-10-05
Payer: COMMERCIAL

## 2023-10-05 NOTE — TELEPHONE ENCOUNTER
[facility] Geriatrics Triage Nurse Telephone Encounter    Provider: CANDY Gates  Facility: [facility] Facility Type:  LT    Caller: Maame  Call Back Number: [phone]    Allergies:    Allergies   Allergen Reactions    Bee Pollen Anaphylaxis    Diphenhydramine Palpitations     Tolerated IV Benadryl when not pushed too fast    Isosorbide Nitrate Other (See Comments) and Dizziness     Also causes syncope (has fallen before) and brain fog/mental disturbances - please do not prescribe    Nitroglycerin Dizziness, Fatigue and Other (See Comments)     Specifically the patch - please do not prescribe  Specifically the patch - please do not prescribe      Contrast Dye Hives and Itching    Other Drug Allergy (See Comments) Hives and Itching    Penicillin G     Adhesive Tape Rash    Diphenhydramine Hcl Palpitations    Liquid Adhesive Itching    Nystatin Dermatitis    Nystatin Dermatitis     Also blisters    Penicillins Swelling and Rash     Occurred as a child - not 100% sure on specific reactions    Sulfa Antibiotics Other (See Comments)     Occurred as a child / patient does not remember specific reaction        Reason for call:  Over the weekend her right knee buckled, did not fall, but is now having pain with that knee.  Knee is slightly swollen.  No redness or warmth.  Patient has pain with walking and ROM.  Patient is refusing to use ice packs.  PRN and scheduled Tylenol not effective.  Patient was just seen by Dr. Pastor on 10/3 and didn't mention anything about this.  Patient has significant arthritis issues.      Verbal Order/Direction given by Provider: Start Aspercreme 4% and apply to right knee TID x 5 days.      Provider giving Order:  CANDY Gates    Verbal Order given to: Maame Ramon RN

## 2023-10-07 ENCOUNTER — TELEPHONE (OUTPATIENT)
Dept: GERIATRICS | Facility: CLINIC | Age: 68
End: 2023-10-07
Payer: COMMERCIAL

## 2023-10-08 NOTE — TELEPHONE ENCOUNTER
[facility] TELEPHONE ENCOUNTER    Chief Complaint   Patient presents with    Pain       Letty Escobar is a 67 year old  (1955),Nurse called today to report: Increased pain complaints to right knee. Rating 10/10 of concerns. Patient reports to Registered Nurse that she is convinced she has a blood clot or damage to internal knee and is demanding for scans, even though all of her vitals are fine. No significant swelling, redness or warmth noted. History of chronically wanting to go to hospital for various concerns in recent past per Registered Nurse.     ASSESSMENT/PLAN    Start Physical therapy and Occupational therapy  US of right lower extremity  X ray 2-3 views of right knee  Continue Tylenol as directed  Continue new order of aspercreme TID as directed which was ordered on 10/5/23.     Electronically signed by:   KILO Freeman CNP

## 2023-10-19 ENCOUNTER — TELEPHONE (OUTPATIENT)
Dept: GERIATRICS | Facility: CLINIC | Age: 68
End: 2023-10-19
Payer: COMMERCIAL

## 2023-10-19 RX ORDER — LIDOCAINE 40 MG/G
CREAM TOPICAL 2 TIMES DAILY
COMMUNITY

## 2023-10-19 NOTE — TELEPHONE ENCOUNTER
[facility] Geriatrics Triage Nurse Telephone Encounter    Provider: CANDY Gates  Facility: [facility] Facility Type:  LTC    Caller: Rachael   Call Back Number: [phone]    Allergies:    Allergies   Allergen Reactions    Bee Pollen Anaphylaxis    Diphenhydramine Palpitations     Tolerated IV Benadryl when not pushed too fast    Isosorbide Nitrate Other (See Comments) and Dizziness     Also causes syncope (has fallen before) and brain fog/mental disturbances - please do not prescribe    Nitroglycerin Dizziness, Fatigue and Other (See Comments)     Specifically the patch - please do not prescribe  Specifically the patch - please do not prescribe      Contrast Dye Hives and Itching    Other Drug Allergy (See Comments) Hives and Itching    Penicillin G     Adhesive Tape Rash    Diphenhydramine Hcl Palpitations    Liquid Adhesive Itching    Nystatin Dermatitis    Nystatin Dermatitis     Also blisters    Penicillins Swelling and Rash     Occurred as a child - not 100% sure on specific reactions    Sulfa Antibiotics Other (See Comments)     Occurred as a child / patient does not remember specific reaction        Reason for call: The pt has been having on-going Rt knee pain, the pt had Aspercreme for 5 days. Nursing is requesting to re-instate Aspercreame     Verbal Order/Direction given by Provider: Aspercreame to right knee BID.     Provider giving Order:  CANDY Gates    Verbal Order given to: Rachael Marley RN

## 2023-10-23 ENCOUNTER — OFFICE VISIT (OUTPATIENT)
Dept: DERMATOLOGY | Facility: CLINIC | Age: 68
End: 2023-10-23
Payer: COMMERCIAL

## 2023-10-23 DIAGNOSIS — L60.3 ONYCHODYSTROPHY: ICD-10-CM

## 2023-10-23 DIAGNOSIS — R11.0 NAUSEA IN ADULT: Primary | ICD-10-CM

## 2023-10-23 PROCEDURE — 11750 EXCISION NAIL&NAIL MATRIX: CPT | Performed by: DERMATOLOGY

## 2023-10-23 PROCEDURE — 88312 SPECIAL STAINS GROUP 1: CPT | Performed by: DERMATOLOGY

## 2023-10-23 PROCEDURE — 88304 TISSUE EXAM BY PATHOLOGIST: CPT | Performed by: DERMATOLOGY

## 2023-10-23 RX ORDER — ONDANSETRON 4 MG/1
4 TABLET, FILM COATED ORAL EVERY 8 HOURS PRN
Qty: 3 TABLET | Refills: 0 | Status: SHIPPED | OUTPATIENT
Start: 2023-10-23 | End: 2023-11-10

## 2023-10-23 NOTE — PROGRESS NOTES
[facility] Dermatology: Nail Avulsion with Lateral Matrix destruction Procedure Note    Dermatology Surgery Clinic  [facility]  Clinics and Surgery Center  [address] [place] [address]    Procedure Date: Oct 23, 2023    Pre-operative Diagnosis: Onychodystrophy     Post-operative Diagnosis: Onychodystrophy     Surgeon: Dr. Davis    Anesthesia: Lidocaine 1% with epi 1:100,000, Both digital block and wing block were performed prior to procedure.   Preparation: Hibiclens    Location: R thumb    Indications: Sclerotic Nail Dystrophy    Description of Nail Biopsy:     The nature and purpose of the procedure, associated risks (bleeding, infection, pain, recovery time, ischemia, scar, recurrence, non diagnostic sampling, recurrence permanent nail bed damage, permanent nail plate damage), and that after healing, a second procedure or revision may be recommended in order to obtain the best cosmetic or functional result. Verbal and signed consent were obtained.     The patient was brought to the surgical suite and placed in a seated position. The surgical site was sterilely prepped and draped in the usual fashion. The right thumb was infiltrated bilaterally with a digital block and additional anesthetic was injected around the nail margin(s) and into the nail bed.    Complete nail avulsion and total matrix destruction: The nail was undermined with freer elevator and avulsed from proximal and lateral nailfolds, and removed in entirety using forceps. A Phenol swab was used to apply phenol solution to the entire breadth of the nail matrix. 1st cycle 1 minute, 2nd cycle 30 seconds.  Electrocoagulation was performed and the base of the wound for hemostasis. A sterile pressure dressing was placed.     Return of circulation was considered excellent as evidence by a slow ooze with removal of tourniquet.    The surgical site was dressed with Vasline ointment, Adaptic, gauze, and an  elastic pressure dressing. The patient was given both verbal and written instructions on wound care. The patient was discharged from the Dermatology Clinic in good condition.    Complications: The patient tolerated the procedure well and no complications were noted    Estimated Blood Loss: minimal    Plan: Discharge instructions were provided. Discharged in good condition and will return in 2 weeks for assessment.       Staff Involved:  Staff/Scribe     Scribe Disclosure:   I, Marietta Grijalva, am serving as a scribe; to document services personally performed by Dr. Arturo Davis - -based on data collection and the provider's statements to me.     Provider Disclosure:   The documentation recorded by the scribe accurately reflects the services I personally performed and the decisions made by me.  I personally performed the procedures today.    Arturo Davis DO    Department of Dermatology  [facility]: Phone: [phone], Fax:[phone]  [facility]: Phone: [phone], Fax: [phone]

## 2023-10-23 NOTE — NURSING NOTE
Letty Escobar's goals for this visit include:   Chief Complaint   Patient presents with    Derm Problem     nail avulsion and ablation/ Thumb Nail   prior right thumbnail avulsion >10 years ago, now with persistent onychodystrophy - longitudinal ridging/splitting - requests nail matrix ablation.          She requests these members of her care team be copied on today's visit information:     PCP: Jay Chapman    Referring Provider:  Bao Hebert MD  [address] [place] [id number]    There were no vitals taken for this visit.    Do you need any medication refills at today's visit?       Vanesa Mejias EMT

## 2023-10-23 NOTE — NURSING NOTE
The following medication was given:     MEDICATION:  Lidocaine with epinephrine 1% 1:[id number]  ROUTE: SQ  SITE: see procedure note  DOSE: 2.5ml  LOT #: [id number]  : [employer]  EXPIRATION DATE: 1/31/2025  NDC#: 28278-086-39  Was there drug waste? Yes 0.5ml  Multi-dose vial: Yes    Vanesa Mejias  October 23, 2023       Vaseline and pressure dressing applied to Ablation site on thumbnail.  Wound care instructions reviewed with patient and AVS provided.  Patient verbalized understanding.  Patient will follow up for suture removal: N/A.  No further questions or concerns at this time.

## 2023-10-23 NOTE — LETTER
10/23/2023         RE: Letty Escobar  C/o Edwar Escobar  [address] [facility] [address]        Dear Colleague,    Thank you for referring your patient, Ltety Escobar, to the [facility]. Please see a copy of my visit note below.    [facility] Dermatology: Nail Avulsion with Lateral Matrix destruction Procedure Note    Dermatology Surgery Clinic  [facility]  Clinics and Surgery Center  [address] [place] [address]    Procedure Date: Oct 23, 2023    Pre-operative Diagnosis: Onychodystrophy     Post-operative Diagnosis: Onychodystrophy     Surgeon: Dr. Davis    Anesthesia: Lidocaine 1% with epi 1:100,000, Both digital block and wing block were performed prior to procedure.   Preparation: Hibiclens    Location: R thumb    Indications: Sclerotic Nail Dystrophy    Description of Nail Biopsy:     The nature and purpose of the procedure, associated risks (bleeding, infection, pain, recovery time, ischemia, scar, recurrence, non diagnostic sampling, recurrence permanent nail bed damage, permanent nail plate damage), and that after healing, a second procedure or revision may be recommended in order to obtain the best cosmetic or functional result. Verbal and signed consent were obtained.     The patient was brought to the surgical suite and placed in a seated position. The surgical site was sterilely prepped and draped in the usual fashion. The right thumb was infiltrated bilaterally with a digital block and additional anesthetic was injected around the nail margin(s) and into the nail bed.    Complete nail avulsion and total matrix destruction: The nail was undermined with freer elevator and avulsed from proximal and lateral nailfolds, and removed in entirety using forceps. A Phenol swab was used to apply phenol solution to the entire breadth of the nail matrix. 1st cycle 1 minute, 2nd cycle 30 seconds.  Electrocoagulation was  performed and the base of the wound for hemostasis. A sterile pressure dressing was placed.     Return of circulation was considered excellent as evidence by a slow ooze with removal of tourniquet.    The surgical site was dressed with Vasline ointment, Adaptic, gauze, and an elastic pressure dressing. The patient was given both verbal and written instructions on wound care. The patient was discharged from the Dermatology Clinic in good condition.    Complications: The patient tolerated the procedure well and no complications were noted    Estimated Blood Loss: minimal    Plan: Discharge instructions were provided. Discharged in good condition and will return in 2 weeks for assessment.       Staff Involved:  Staff/Scribe     Scribe Disclosure:   I, Marietta Grijalva, am serving as a scribe; to document services personally performed by Dr. Arturo Davis - -based on data collection and the provider's statements to me.     Provider Disclosure:   The documentation recorded by the scribe accurately reflects the services I personally performed and the decisions made by me.  I personally performed the procedures today.    Arturo Davis DO    Department of Dermatology  [facility]: Phone: [phone], Fax:[phone]  [facility]: Phone: [phone], Fax: [phone]      Again, thank you for allowing me to participate in the care of your patient.        Sincerely,        Arturo Davis MD

## 2023-10-23 NOTE — PATIENT INSTRUCTIONS
"Post-Operative Care for Granulating Site  After your surgery, a pressure bandage will be placed over the area. This will help prevent bleeding. Please follow these instructions as they will help you to prevent complications as your wound heals.  For the First 48 hours After Surgery:  Leave the pressure bandage on and keep it dry. If it should come loose, you may retape it, but do not take it off.  Relax and take it easy. Do not do any vigorous exercise, heavy lifting, or bending forward. This could cause the wound to bleed.  Post-operative pain is usually mild. You may take plain or extra strength Tylenol every 4 hours as needed. Do not take any medicine that contains aspirin, ibuprofen or motrin.  Avoid alcohol and vitamin E as these may increase your tendency to bleed.  You may put an ice pack around the bandaged area for 20 minutes every 2-3 hours. This may help reduce swelling, bruising, and pain. Make sure the ice pack is waterproof so that the pressure bandage does not get wet.   You may see a small amount of drainage or blood on your pressure bandage. This is normal. However, if drainage or bleeding continues or saturates the bandage, you will need to apply firm pressure over the bandage with a washcloth for 15 minutes. If bleeding continues after applying pressure for 15 minutes, apply an ice pack to the bandaged area for 15 minutes. If bleeding still continues, go to the nearest emergency room.  48 Hours After Surgery:  Carefully remove the bandage and start daily wound care and dressing changes. You may also now shower and get the wound wet. Use caution when washing the wound, be gentle.  Do not use a wash cloth on the wound.  Daily Wound Care:  Wash with mild soap and water every day until wound appears well-healed.  Rinse well and pat dry.  Apply Vaseline over site with a Q-tip and re-apply a dressing until wound appears well healed.  A well healed wound is \"pinked over\" with a shiny surface.  When area " "is \"pinked over\" it is no longer necessary to apply Vaseline.  Call Us If:  You have pain that is not controlled with Tylenol.  You have signs or symptoms of an infection, such as: fever over 100 degrees F, redness, warmth, or foul-smelling or yellow/creamy drainage from the wound.  Who should I call with questions?  [facility]: [phone]  Health system: [phone]  For urgent needs outside of business hours call the [facility] at [phone] and ask for the dermatology resident on call     For bath time wear glove over the R hand with tape around the top to help prevent infection.   "

## 2023-10-26 LAB
PATH REPORT.COMMENTS IMP SPEC: NORMAL
PATH REPORT.FINAL DX SPEC: NORMAL
PATH REPORT.GROSS SPEC: NORMAL
PATH REPORT.MICROSCOPIC SPEC OTHER STN: NORMAL
PATH REPORT.RELEVANT HX SPEC: NORMAL

## 2023-10-30 ENCOUNTER — NURSE TRIAGE (OUTPATIENT)
Dept: NURSING | Facility: CLINIC | Age: 68
End: 2023-10-30
Payer: COMMERCIAL

## 2023-10-30 ENCOUNTER — TELEPHONE (OUTPATIENT)
Dept: GERIATRICS | Facility: CLINIC | Age: 68
End: 2023-10-30
Payer: COMMERCIAL

## 2023-10-30 NOTE — TELEPHONE ENCOUNTER
[facility] Geriatrics Triage Nurse Telephone Encounter    Provider: CANDY Gates  Facility: [facility] Facility Type:  LTC    Caller: Paige  Call Back Number: [phone]    Allergies:    Allergies   Allergen Reactions    Bee Pollen Anaphylaxis    Diphenhydramine Palpitations     Tolerated IV Benadryl when not pushed too fast    Isosorbide Nitrate Other (See Comments) and Dizziness     Also causes syncope (has fallen before) and brain fog/mental disturbances - please do not prescribe    Nitroglycerin Dizziness, Fatigue and Other (See Comments)     Specifically the patch - please do not prescribe  Specifically the patch - please do not prescribe      Contrast Dye Hives and Itching    Other Drug Allergy (See Comments) Hives and Itching    Penicillin G     Adhesive Tape Rash    Diphenhydramine Hcl Palpitations    Liquid Adhesive Itching    Nystatin Dermatitis    Nystatin Dermatitis     Also blisters    Penicillins Swelling and Rash     Occurred as a child - not 100% sure on specific reactions    Sulfa Antibiotics Other (See Comments)     Occurred as a child / patient does not remember specific reaction        Reason for call: c/o fatigue and feeling lethargic out of the blue. She asked the nurse to check her BG - it was 410. Family said they could barely understand her speech over the phone. No N/V, dizziness, lightheadedness. No signs of infection or urinary symptoms. She's currently laying in bed resting. /80  HR 63  RR 18  O2 92% and Temp 97.9    Verbal Order/Direction given by Provider:   - Encourage oral intake of fluids  - Recheck VS next shift and notify provider if abnormal or new symptoms accompany  - CBC and BMP tomorrow 10/31/23    Provider giving Order:  CANDY Gates    Verbal Order given to: Paige Ba RN

## 2023-10-30 NOTE — TELEPHONE ENCOUNTER
Pt is calling from her nursing home [facility].  She was confused on her age.  BS over 300 now.  An 45 minutes to an hour.  First one was over 400.    Advised to discuss her BS concerns with the nurse at the nursing home that is taking care of her.  Did not triage pt.      Pt agreed.    Renuka Tran RN on 10/30/2023 at 7:06 PM

## 2023-10-31 ENCOUNTER — LAB REQUISITION (OUTPATIENT)
Dept: LAB | Facility: CLINIC | Age: 68
End: 2023-10-31
Payer: COMMERCIAL

## 2023-10-31 DIAGNOSIS — R53.83 OTHER FATIGUE: ICD-10-CM

## 2023-10-31 DIAGNOSIS — R53.82 CHRONIC FATIGUE, UNSPECIFIED: ICD-10-CM

## 2023-10-31 LAB
ANION GAP SERPL CALCULATED.3IONS-SCNC: 12 MMOL/L (ref 7–15)
BUN SERPL-MCNC: 17.1 MG/DL (ref 8–23)
CALCIUM SERPL-MCNC: 9.4 MG/DL (ref 8.8–10.2)
CHLORIDE SERPL-SCNC: 101 MMOL/L (ref 98–107)
CREAT SERPL-MCNC: 1.07 MG/DL (ref 0.51–0.95)
DEPRECATED HCO3 PLAS-SCNC: 26 MMOL/L (ref 22–29)
EGFRCR SERPLBLD CKD-EPI 2021: 56 ML/MIN/1.73M2
ERYTHROCYTE [DISTWIDTH] IN BLOOD BY AUTOMATED COUNT: 14.9 % (ref 10–15)
GLUCOSE SERPL-MCNC: 206 MG/DL (ref 70–99)
HCT VFR BLD AUTO: 28.9 % (ref 35–47)
HGB BLD-MCNC: 8.8 G/DL (ref 11.7–15.7)
MCH RBC QN AUTO: 29.3 PG (ref 26.5–33)
MCHC RBC AUTO-ENTMCNC: 30.4 G/DL (ref 31.5–36.5)
MCV RBC AUTO: 96 FL (ref 78–100)
PLATELET # BLD AUTO: 136 10E3/UL (ref 150–450)
POTASSIUM SERPL-SCNC: 4.2 MMOL/L (ref 3.4–5.3)
RBC # BLD AUTO: 3 10E6/UL (ref 3.8–5.2)
SODIUM SERPL-SCNC: 139 MMOL/L (ref 135–145)
WBC # BLD AUTO: 6.7 10E3/UL (ref 4–11)

## 2023-10-31 PROCEDURE — 80048 BASIC METABOLIC PNL TOTAL CA: CPT | Mod: ORL | Performed by: FAMILY MEDICINE

## 2023-10-31 PROCEDURE — 85027 COMPLETE CBC AUTOMATED: CPT | Mod: ORL | Performed by: FAMILY MEDICINE

## 2023-11-01 ENCOUNTER — TELEPHONE (OUTPATIENT)
Dept: DERMATOLOGY | Facility: CLINIC | Age: 68
End: 2023-11-01
Payer: COMMERCIAL

## 2023-11-01 NOTE — TELEPHONE ENCOUNTER
I spoke with Letty and notified her of the result.  She verbalized understanding and had no concerns with wound healing.  MELISSA Watkins CMA  10/31/2023  4:35 PM CDT       2nd attempt to reach patient via her care center [facility].  Left a message for them/patient to return call to [phone].    Hemalatha Soliman RN  10/27/2023  8:42 AM CDT       Called pt, her  answered, no consent to communicate on file. He gave me pt's nursing home contact to call. Called nursing home and got voicemail. Left message for them to return our call at [phone].     Hemalatha Soliman RN on 10/27/2023 at 8:42 AM    Arturo Davis MD  10/26/2023  4:36 PM CDT       Please call the patient with pathology results.     We sent her removed nail plate to the pathologist. He didn't see anything dangerous. The nail was consistent with having damage from the first procedure years ago. As long as the wound is healing well, no additional surgery is necessary.  Thank you.

## 2023-11-10 ENCOUNTER — APPOINTMENT (OUTPATIENT)
Dept: CT IMAGING | Facility: CLINIC | Age: 68
End: 2023-11-10
Attending: EMERGENCY MEDICINE
Payer: COMMERCIAL

## 2023-11-10 ENCOUNTER — HOSPITAL ENCOUNTER (OUTPATIENT)
Facility: CLINIC | Age: 68
Setting detail: OBSERVATION
Discharge: OTHER INSTITUTION | End: 2023-11-12
Attending: EMERGENCY MEDICINE | Admitting: PEDIATRICS
Payer: COMMERCIAL

## 2023-11-10 ENCOUNTER — TELEPHONE (OUTPATIENT)
Dept: GERIATRICS | Facility: CLINIC | Age: 68
End: 2023-11-10
Payer: COMMERCIAL

## 2023-11-10 ENCOUNTER — APPOINTMENT (OUTPATIENT)
Dept: GENERAL RADIOLOGY | Facility: CLINIC | Age: 68
End: 2023-11-10
Attending: EMERGENCY MEDICINE
Payer: COMMERCIAL

## 2023-11-10 ENCOUNTER — MEDICAL CORRESPONDENCE (OUTPATIENT)
Dept: HEALTH INFORMATION MANAGEMENT | Facility: CLINIC | Age: 68
End: 2023-11-10

## 2023-11-10 DIAGNOSIS — R53.83 LETHARGY: ICD-10-CM

## 2023-11-10 DIAGNOSIS — N17.9 AKI (ACUTE KIDNEY INJURY) (H): Primary | ICD-10-CM

## 2023-11-10 DIAGNOSIS — N39.0 URINARY TRACT INFECTION WITHOUT HEMATURIA, SITE UNSPECIFIED: ICD-10-CM

## 2023-11-10 DIAGNOSIS — F40.01 PANIC DISORDER WITH AGORAPHOBIA: ICD-10-CM

## 2023-11-10 PROBLEM — R82.81 PYURIA: Status: ACTIVE | Noted: 2023-11-10

## 2023-11-10 PROBLEM — I73.9 PAD (PERIPHERAL ARTERY DISEASE) (H): Status: ACTIVE | Noted: 2023-11-10

## 2023-11-10 PROBLEM — M79.672 LEFT FOOT PAIN: Status: ACTIVE | Noted: 2023-11-10

## 2023-11-10 PROBLEM — E66.01 MORBID OBESITY (H): Status: ACTIVE | Noted: 2021-12-10

## 2023-11-10 PROBLEM — R50.9 FEVER: Status: ACTIVE | Noted: 2023-11-10

## 2023-11-10 PROBLEM — R09.02 HYPOXIA: Status: ACTIVE | Noted: 2023-11-10

## 2023-11-10 PROBLEM — D69.6 THROMBOCYTOPENIA (H): Status: ACTIVE | Noted: 2023-11-10

## 2023-11-10 PROBLEM — F13.20 BENZODIAZEPINE DEPENDENCE (H): Status: ACTIVE | Noted: 2023-11-10

## 2023-11-10 LAB
ALBUMIN SERPL BCG-MCNC: 3.7 G/DL (ref 3.5–5.2)
ALBUMIN UR-MCNC: NEGATIVE MG/DL
ALLEN'S TEST: YES
ALP SERPL-CCNC: 48 U/L (ref 35–104)
ALT SERPL W P-5'-P-CCNC: 18 U/L (ref 0–50)
ANION GAP SERPL CALCULATED.3IONS-SCNC: 11 MMOL/L (ref 7–15)
APPEARANCE UR: ABNORMAL
AST SERPL W P-5'-P-CCNC: 23 U/L (ref 0–45)
BACTERIA #/AREA URNS HPF: ABNORMAL /HPF
BASE EXCESS BLDA CALC-SCNC: 4.3 MMOL/L (ref -9–1.8)
BASE EXCESS BLDV CALC-SCNC: 4.2 MMOL/L (ref -7.7–1.9)
BASOPHILS # BLD AUTO: 0 10E3/UL (ref 0–0.2)
BASOPHILS NFR BLD AUTO: 0 %
BILIRUB SERPL-MCNC: 0.2 MG/DL
BILIRUB UR QL STRIP: NEGATIVE
BUN SERPL-MCNC: 26.6 MG/DL (ref 8–23)
CALCIUM SERPL-MCNC: 9.8 MG/DL (ref 8.8–10.2)
CHLORIDE SERPL-SCNC: 100 MMOL/L (ref 98–107)
COLOR UR AUTO: YELLOW
CREAT SERPL-MCNC: 1.44 MG/DL (ref 0.51–0.95)
DEPRECATED HCO3 PLAS-SCNC: 28 MMOL/L (ref 22–29)
EGFRCR SERPLBLD CKD-EPI 2021: 39 ML/MIN/1.73M2
EOSINOPHIL # BLD AUTO: 0.1 10E3/UL (ref 0–0.7)
EOSINOPHIL NFR BLD AUTO: 1 %
ERYTHROCYTE [DISTWIDTH] IN BLOOD BY AUTOMATED COUNT: 15 % (ref 10–15)
FLUAV RNA SPEC QL NAA+PROBE: NEGATIVE
FLUBV RNA RESP QL NAA+PROBE: NEGATIVE
GLUCOSE BLDC GLUCOMTR-MCNC: 146 MG/DL (ref 70–99)
GLUCOSE BLDC GLUCOMTR-MCNC: 151 MG/DL (ref 70–99)
GLUCOSE SERPL-MCNC: 175 MG/DL (ref 70–99)
GLUCOSE UR STRIP-MCNC: NEGATIVE MG/DL
HBA1C MFR BLD: 7.9 %
HCO3 BLD-SCNC: 30 MMOL/L (ref 21–28)
HCO3 BLDV-SCNC: 31 MMOL/L (ref 21–28)
HCT VFR BLD AUTO: 29.7 % (ref 35–47)
HGB BLD-MCNC: 9.1 G/DL (ref 11.7–15.7)
HGB UR QL STRIP: ABNORMAL
HOLD SPECIMEN: NORMAL
HYALINE CASTS: 6 /LPF
IMM GRANULOCYTES # BLD: 0.1 10E3/UL
IMM GRANULOCYTES NFR BLD: 1 %
KETONES UR STRIP-MCNC: NEGATIVE MG/DL
LACTATE SERPL-SCNC: 1.6 MMOL/L (ref 0.7–2)
LEUKOCYTE ESTERASE UR QL STRIP: ABNORMAL
LIPASE SERPL-CCNC: 16 U/L (ref 13–60)
LYMPHOCYTES # BLD AUTO: 1.3 10E3/UL (ref 0.8–5.3)
LYMPHOCYTES NFR BLD AUTO: 21 %
MCH RBC QN AUTO: 29.4 PG (ref 26.5–33)
MCHC RBC AUTO-ENTMCNC: 30.6 G/DL (ref 31.5–36.5)
MCV RBC AUTO: 96 FL (ref 78–100)
MONOCYTES # BLD AUTO: 0.7 10E3/UL (ref 0–1.3)
MONOCYTES NFR BLD AUTO: 10 %
MUCOUS THREADS #/AREA URNS LPF: PRESENT /LPF
NEUTROPHILS # BLD AUTO: 4.3 10E3/UL (ref 1.6–8.3)
NEUTROPHILS NFR BLD AUTO: 67 %
NITRATE UR QL: POSITIVE
NRBC # BLD AUTO: 0 10E3/UL
NRBC BLD AUTO-RTO: 0 /100
O2/TOTAL GAS SETTING VFR VENT: 26 %
O2/TOTAL GAS SETTING VFR VENT: 28 %
PCO2 BLD: 51 MM HG (ref 35–45)
PCO2 BLDV: 54 MM HG (ref 40–50)
PH BLD: 7.38 [PH] (ref 7.35–7.45)
PH BLDV: 7.36 [PH] (ref 7.32–7.43)
PH UR STRIP: 5 [PH] (ref 5–7)
PLATELET # BLD AUTO: 146 10E3/UL (ref 150–450)
PO2 BLD: 72 MM HG (ref 80–105)
PO2 BLDV: 47 MM HG (ref 25–47)
POTASSIUM SERPL-SCNC: 4.5 MMOL/L (ref 3.4–5.3)
PROCALCITONIN SERPL IA-MCNC: 0.23 NG/ML
PROT SERPL-MCNC: 7.2 G/DL (ref 6.4–8.3)
RBC # BLD AUTO: 3.09 10E6/UL (ref 3.8–5.2)
RBC URINE: 5 /HPF
RSV RNA SPEC NAA+PROBE: NEGATIVE
SARS-COV-2 RNA RESP QL NAA+PROBE: NEGATIVE
SODIUM SERPL-SCNC: 139 MMOL/L (ref 135–145)
SP GR UR STRIP: 1.02 (ref 1–1.03)
SQUAMOUS EPITHELIAL: <1 /HPF
T4 FREE SERPL-MCNC: 0.88 NG/DL (ref 0.9–1.7)
TROPONIN T SERPL HS-MCNC: 38 NG/L
TROPONIN T SERPL HS-MCNC: 42 NG/L
TSH SERPL DL<=0.005 MIU/L-ACNC: 13.76 UIU/ML (ref 0.3–4.2)
UROBILINOGEN UR STRIP-MCNC: NORMAL MG/DL
WBC # BLD AUTO: 6.4 10E3/UL (ref 4–11)
WBC URINE: 46 /HPF

## 2023-11-10 PROCEDURE — 80053 COMPREHEN METABOLIC PANEL: CPT | Performed by: EMERGENCY MEDICINE

## 2023-11-10 PROCEDURE — 99285 EMERGENCY DEPT VISIT HI MDM: CPT | Mod: 25 | Performed by: EMERGENCY MEDICINE

## 2023-11-10 PROCEDURE — 83690 ASSAY OF LIPASE: CPT | Performed by: EMERGENCY MEDICINE

## 2023-11-10 PROCEDURE — 93010 ELECTROCARDIOGRAM REPORT: CPT | Performed by: EMERGENCY MEDICINE

## 2023-11-10 PROCEDURE — 87040 BLOOD CULTURE FOR BACTERIA: CPT | Performed by: PEDIATRICS

## 2023-11-10 PROCEDURE — 85025 COMPLETE CBC W/AUTO DIFF WBC: CPT | Performed by: EMERGENCY MEDICINE

## 2023-11-10 PROCEDURE — 36415 COLL VENOUS BLD VENIPUNCTURE: CPT | Performed by: EMERGENCY MEDICINE

## 2023-11-10 PROCEDURE — 81001 URINALYSIS AUTO W/SCOPE: CPT | Performed by: EMERGENCY MEDICINE

## 2023-11-10 PROCEDURE — 82803 BLOOD GASES ANY COMBINATION: CPT | Mod: 91 | Performed by: PEDIATRICS

## 2023-11-10 PROCEDURE — G0378 HOSPITAL OBSERVATION PER HR: HCPCS

## 2023-11-10 PROCEDURE — 84439 ASSAY OF FREE THYROXINE: CPT | Performed by: EMERGENCY MEDICINE

## 2023-11-10 PROCEDURE — 87086 URINE CULTURE/COLONY COUNT: CPT | Performed by: EMERGENCY MEDICINE

## 2023-11-10 PROCEDURE — 250N000011 HC RX IP 250 OP 636: Mod: JZ | Performed by: EMERGENCY MEDICINE

## 2023-11-10 PROCEDURE — 96365 THER/PROPH/DIAG IV INF INIT: CPT

## 2023-11-10 PROCEDURE — 84484 ASSAY OF TROPONIN QUANT: CPT | Mod: 91 | Performed by: EMERGENCY MEDICINE

## 2023-11-10 PROCEDURE — 93005 ELECTROCARDIOGRAM TRACING: CPT | Performed by: EMERGENCY MEDICINE

## 2023-11-10 PROCEDURE — 36600 WITHDRAWAL OF ARTERIAL BLOOD: CPT

## 2023-11-10 PROCEDURE — 99223 1ST HOSP IP/OBS HIGH 75: CPT | Mod: AI | Performed by: PEDIATRICS

## 2023-11-10 PROCEDURE — 82803 BLOOD GASES ANY COMBINATION: CPT | Performed by: EMERGENCY MEDICINE

## 2023-11-10 PROCEDURE — 70450 CT HEAD/BRAIN W/O DYE: CPT

## 2023-11-10 PROCEDURE — 84443 ASSAY THYROID STIM HORMONE: CPT | Performed by: EMERGENCY MEDICINE

## 2023-11-10 PROCEDURE — 87637 SARSCOV2&INF A&B&RSV AMP PRB: CPT | Performed by: EMERGENCY MEDICINE

## 2023-11-10 PROCEDURE — 250N000013 HC RX MED GY IP 250 OP 250 PS 637: Performed by: PEDIATRICS

## 2023-11-10 PROCEDURE — 99418 PROLNG IP/OBS E/M EA 15 MIN: CPT | Performed by: PEDIATRICS

## 2023-11-10 PROCEDURE — 82962 GLUCOSE BLOOD TEST: CPT

## 2023-11-10 PROCEDURE — 84145 PROCALCITONIN (PCT): CPT | Performed by: PEDIATRICS

## 2023-11-10 PROCEDURE — 83605 ASSAY OF LACTIC ACID: CPT | Performed by: EMERGENCY MEDICINE

## 2023-11-10 PROCEDURE — 71045 X-RAY EXAM CHEST 1 VIEW: CPT

## 2023-11-10 PROCEDURE — 83036 HEMOGLOBIN GLYCOSYLATED A1C: CPT | Performed by: PEDIATRICS

## 2023-11-10 RX ORDER — QUETIAPINE FUMARATE 100 MG/1
100 TABLET, FILM COATED ORAL 3 TIMES DAILY
Status: ON HOLD | COMMUNITY
Start: 2023-10-21 | End: 2024-04-29

## 2023-11-10 RX ORDER — QUETIAPINE FUMARATE 100 MG/1
100 TABLET, FILM COATED ORAL 3 TIMES DAILY
Status: DISCONTINUED | OUTPATIENT
Start: 2023-11-10 | End: 2023-11-12 | Stop reason: HOSPADM

## 2023-11-10 RX ORDER — ASPIRIN 81 MG/1
81 TABLET ORAL DAILY
Status: DISCONTINUED | OUTPATIENT
Start: 2023-11-11 | End: 2023-11-12 | Stop reason: HOSPADM

## 2023-11-10 RX ORDER — FUROSEMIDE 20 MG
20 TABLET ORAL EVERY OTHER DAY
COMMUNITY

## 2023-11-10 RX ORDER — METOPROLOL SUCCINATE 25 MG/1
25 TABLET, EXTENDED RELEASE ORAL DAILY
Status: DISCONTINUED | OUTPATIENT
Start: 2023-11-11 | End: 2023-11-12 | Stop reason: HOSPADM

## 2023-11-10 RX ORDER — ONDANSETRON 2 MG/ML
4 INJECTION INTRAMUSCULAR; INTRAVENOUS EVERY 6 HOURS PRN
Status: DISCONTINUED | OUTPATIENT
Start: 2023-11-10 | End: 2023-11-12 | Stop reason: HOSPADM

## 2023-11-10 RX ORDER — POTASSIUM CHLORIDE 750 MG/1
10 TABLET, EXTENDED RELEASE ORAL DAILY
Status: DISCONTINUED | OUTPATIENT
Start: 2023-11-11 | End: 2023-11-12 | Stop reason: HOSPADM

## 2023-11-10 RX ORDER — LORAZEPAM 0.5 MG/1
0.5 TABLET ORAL 2 TIMES DAILY
Status: DISCONTINUED | OUTPATIENT
Start: 2023-11-10 | End: 2023-11-12 | Stop reason: HOSPADM

## 2023-11-10 RX ORDER — LORAZEPAM 1 MG/1
1 TABLET ORAL 3 TIMES DAILY
Status: ON HOLD | COMMUNITY
Start: 2023-11-06 | End: 2023-11-12

## 2023-11-10 RX ORDER — ONDANSETRON 4 MG/1
4 TABLET, ORALLY DISINTEGRATING ORAL EVERY 6 HOURS PRN
Status: DISCONTINUED | OUTPATIENT
Start: 2023-11-10 | End: 2023-11-12 | Stop reason: HOSPADM

## 2023-11-10 RX ORDER — DULOXETIN HYDROCHLORIDE 20 MG/1
40 CAPSULE, DELAYED RELEASE ORAL DAILY
Status: DISCONTINUED | OUTPATIENT
Start: 2023-11-11 | End: 2023-11-12 | Stop reason: HOSPADM

## 2023-11-10 RX ORDER — SENNOSIDES 8.6 MG
2 TABLET ORAL AT BEDTIME
Status: DISCONTINUED | OUTPATIENT
Start: 2023-11-10 | End: 2023-11-12 | Stop reason: HOSPADM

## 2023-11-10 RX ORDER — ROSUVASTATIN CALCIUM 20 MG/1
20 TABLET, COATED ORAL AT BEDTIME
Status: DISCONTINUED | OUTPATIENT
Start: 2023-11-10 | End: 2023-11-12 | Stop reason: HOSPADM

## 2023-11-10 RX ORDER — FAMOTIDINE 20 MG/1
20 TABLET, FILM COATED ORAL AT BEDTIME
Status: DISCONTINUED | OUTPATIENT
Start: 2023-11-10 | End: 2023-11-12 | Stop reason: HOSPADM

## 2023-11-10 RX ORDER — MIRTAZAPINE 15 MG/1
15 TABLET, FILM COATED ORAL DAILY
Status: DISCONTINUED | OUTPATIENT
Start: 2023-11-10 | End: 2023-11-12 | Stop reason: HOSPADM

## 2023-11-10 RX ORDER — CEFTRIAXONE 1 G/1
1 INJECTION, POWDER, FOR SOLUTION INTRAMUSCULAR; INTRAVENOUS ONCE
Status: COMPLETED | OUTPATIENT
Start: 2023-11-10 | End: 2023-11-10

## 2023-11-10 RX ORDER — LEVOTHYROXINE SODIUM 112 UG/1
112 TABLET ORAL
Status: DISCONTINUED | OUTPATIENT
Start: 2023-11-11 | End: 2023-11-12 | Stop reason: HOSPADM

## 2023-11-10 RX ORDER — DIVALPROEX SODIUM 250 MG/1
250 TABLET, DELAYED RELEASE ORAL DAILY
Status: DISCONTINUED | OUTPATIENT
Start: 2023-11-10 | End: 2023-11-12 | Stop reason: HOSPADM

## 2023-11-10 RX ORDER — GABAPENTIN 400 MG/1
400 CAPSULE ORAL AT BEDTIME
COMMUNITY
Start: 2023-10-21

## 2023-11-10 RX ADMIN — DIVALPROEX SODIUM 250 MG: 250 TABLET, DELAYED RELEASE ORAL at 18:12

## 2023-11-10 RX ADMIN — MIRTAZAPINE 15 MG: 15 TABLET, FILM COATED ORAL at 18:12

## 2023-11-10 RX ADMIN — ROSUVASTATIN CALCIUM 20 MG: 20 TABLET, FILM COATED ORAL at 22:09

## 2023-11-10 RX ADMIN — TRAZODONE HYDROCHLORIDE 25 MG: 50 TABLET ORAL at 22:09

## 2023-11-10 RX ADMIN — LORAZEPAM 0.5 MG: 0.5 TABLET ORAL at 22:09

## 2023-11-10 RX ADMIN — SENNOSIDES 2 TABLET: 8.6 TABLET, FILM COATED ORAL at 22:09

## 2023-11-10 RX ADMIN — CEFTRIAXONE SODIUM 1 G: 1 INJECTION, POWDER, FOR SOLUTION INTRAMUSCULAR; INTRAVENOUS at 14:37

## 2023-11-10 RX ADMIN — FAMOTIDINE 20 MG: 20 TABLET ORAL at 22:09

## 2023-11-10 RX ADMIN — QUETIAPINE FUMARATE 100 MG: 100 TABLET ORAL at 22:09

## 2023-11-10 ASSESSMENT — ACTIVITIES OF DAILY LIVING (ADL)
ADLS_ACUITY_SCORE: 42
ADLS_ACUITY_SCORE: 31
ADLS_ACUITY_SCORE: 35
ADLS_ACUITY_SCORE: 42
ADLS_ACUITY_SCORE: 35
ADLS_ACUITY_SCORE: 31
ADLS_ACUITY_SCORE: 35

## 2023-11-10 NOTE — ED NOTES
ED Nursing criteria listed below was addressed during verbal handoff:     Abnormal vitals: Yes  Abnormal results: Yes  Med Reconciliation completed: Yes  Meds given in ED: Yes  Any Overdue Meds: N/A  Core Measures: N/A  Isolation: N/A  Special needs: Yes  Skin assessment: Yes    Observation Patient  Education provided: Yes

## 2023-11-10 NOTE — LETTER
Transition Communication Hand-off for Care Transitions to Next Level of Care Provider    Name: Letty Escobar  : 1955  MRN #: [id number]  Primary Care Provider: Jay Chapman     Primary Clinic: [address] [place] [id number]     Reason for Hospitalization:  Lethargy [R53.83]  Urinary tract infection without hematuria, site unspecified [N39.0]  Admit Date/Time: 11/10/2023 10:41 AM  Discharge Date: ***  Payor Source: Payor: MEDICA / Plan: [employer] [facility] [employer] / Product Type: HMO /     Readmission Assessment Measure (ARIS) Risk Score/category: ***    Plan of Care Goals/Milestone Events:   Patient Concerns: ***   Patient Goals:   Short-term ***   Long-term ***   Medical Goals   Short-term ***   Long-term ***         Reason for Communication Hand-off Referral: {CCREASONCMCTNHANDOFFREFERRALCTS:95616}    Discharge Plan:  Discharge Plan:      Flowsheet Row Most Recent Value   Disposition Comments [facility]             Concern for non-adherence with plan of care:   Y/N ***  Discharge Needs Assessment:  Needs      Flowsheet Row Most Recent Value   Equipment Currently Used at Home wheelchair, manual  [she walks behind pushing her wheelchair and was doing SPT recently per pt, but is curerntly using [employer] lift.]   # of Referrals Placed by  Internal Clinic Care Coordination, Transportation            Already enrolled in Tele-monitoring program and name of program:  ***  Follow-up specialty is recommended: {YES / NO:41642}    Follow-up plan:    Future Appointments   Date Time Provider Department Center   2023  2:30 PM Arturo Davis MD [facility]       Any outstanding tests or procedures:        Referrals       Future Labs/Procedures    Medication Therapy Management Referral     Process Instructions:        This referral will be filtered to a centralized scheduling office at [facility] and the patient will receive a call to  schedule an appointment at a [place] location most convenient for them.    Scheduling Instructions:    MTM referral reason  Total Score: 2           Patient had a hospital or ED visit in last 6 months and has more than 10   PTA or Discharge medications    Patient has 5 PTA or Discharge Medications AND one of the following   diagnoses: DM,HF,COPD, or AMI DX       Comments:    This service is designed to help you get the most from your medications.  A specially trained pharmacist will work closely with you and your doctors  to solve any problems related to your medications and to help you get the   best results from taking them.      The Medication Therapy Management staff will call you to schedule an appointment.        Physical Therapy Adult Consult     Comments:    Evaluate and treat as clinically indicated.    Reason:  progressive weakness              Key Recommendations:      SHARRON RODGERS LSW    AVS/Discharge Summary is the source of truth; this is a helpful guide for improved communication of patient story

## 2023-11-10 NOTE — MEDICATION SCRIBE - ADMISSION MEDICATION HISTORY
Medication Scribe Admission Medication History    Admission medication history is complete. The information provided in this note is only as accurate as the sources available at the time of the update.    Information Source(s): Facility (U/NH/) medication list/MAR via N/A    Pertinent Information: MAR from [facility]    Changes made to PTA medication list:  Added: Lasix every other day  Deleted: non ODT zofran  Changed: gabapentin from 300mg to 400mg dose, tylenol 325mg prn directions    Medication Affordability:  Not including over the counter (OTC) medications, was there a time in the past 3 months when you did not take your medications as prescribed because of cost?: Unable to Assess    Allergies reviewed with patient and updates made in EHR: yes    Medication History Completed By: LASHA SALGUERO 11/10/2023 1:56 PM    PTA Med List   Medication Sig Last Dose    acetaminophen (TYLENOL) 325 MG tablet Take 325 mg by mouth 2 times daily as needed for pain 11/9/2023 at 0600    acetaminophen (TYLENOL) 500 MG tablet Take 2 tablets (1,000 mg) by mouth 3 times daily 11/10/2023 at 0849    amLODIPine (NORVASC) 5 MG tablet Take 5 mg by mouth daily 11/10/2023 at 0849    aspirin 81 MG EC tablet Take 81 mg by mouth daily 11/10/2023 at 0849    bisacodyl (DULCOLAX) 10 MG suppository Place 10 mg rectally daily as needed for constipation Unknown at unkn    cyanocobalamin (CYANOCOBALAMIN) 1000 MCG/ML injection Inject 1 mL into the muscle every 30 days 21st of each month 10/21/2023 at 1445    divalproex sodium delayed-release (DEPAKOTE) 250 MG DR tablet Take 250 mg by mouth daily 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 11/9/2023 at 2125    DULoxetine HCl 40 MG CPEP Take 40 mg by mouth daily 11/10/2023 at 0849    famotidine (PEPCID) 20 MG tablet Take 20 mg by mouth at bedtime 11/9/2023 at 2125    fluticasone-vilanterol (BREO ELLIPTA) 200-25 MCG/INH inhaler Inhale 1 puff into the lungs daily Rinse mouth after use 11/10/2023 at  0849    furosemide (LASIX) 20 MG tablet Take 20 mg by mouth every other day 11/9/2023 at 0927    gabapentin (NEURONTIN) 400 MG capsule Take 400 mg by mouth at bedtime RLS 11/9/2023 at 2125    hydrochlorothiazide (HYDRODIURIL) 25 MG tablet Take 25 mg by mouth daily 11/10/2023 at 0849    levothyroxine (SYNTHROID/LEVOTHROID) 112 MCG tablet Take 112 mcg by mouth daily before breakfast 11/10/2023 at 0633    lidocaine (LMX4) 4 % external cream Apply topically 2 times daily To Rt knee 11/10/2023 at 0856    LORazepam (ATIVAN) 1 MG tablet Take 1 mg by mouth 3 times daily F41.9 Anxiety disorder 11/9/2023 at 2125    Melatonin 10 MG TABS tablet Take 10 mg by mouth At Bedtime 11/9/2023 at 2125    metoprolol succinate ER (TOPROL-XL) 25 MG 24 hr tablet Take 25 mg by mouth daily 11/10/2023 at 0849    mirtazapine (REMERON) 30 MG tablet Take 15 mg by mouth daily 11/9/2023 at 2125    nitroGLYcerin (NITROSTAT) 0.4 MG sublingual tablet Place 0.4 mg under the tongue every 5 minutes as needed for chest pain For chest pain place 1 tablet under the tongue every 5 minutes for 3 doses. If symptoms persist 5 minutes after 1st dose call 911. 11/2/2023 at 1031    ondansetron (ZOFRAN ODT) 4 MG ODT tab Take 4 mg by mouth every 8 hours as needed for nausea 11/7/2023 at 1935    polyethylene glycol (MIRALAX) 17 GM/Dose powder Take 17 g by mouth every other day 11/9/2023 at 0927    potassium chloride ER (MICRO-K) 10 MEQ CR capsule Take 10 mEq by mouth daily  11/10/2023 at 0849    QUEtiapine (SEROQUEL) 100 MG tablet Take 100 mg by mouth 3 times daily 11/10/2023 at 0849    rosuvastatin (CRESTOR) 10 MG tablet Take 20 mg by mouth At Bedtime 11/9/2023 at 2125    sennosides (SENOKOT) 8.6 MG tablet Take 2 tablets by mouth At Bedtime 11/9/2023 at 2125    traZODone (DESYREL) 50 MG tablet Take 25 mg by mouth 3 times daily 11/10/2023 at 0849

## 2023-11-10 NOTE — TELEPHONE ENCOUNTER
[facility] Geriatrics Triage Nurse Telephone Encounter    Provider: CANDY Gates  Facility: [facility] Facility Type:  [facility]    Caller: Suly   Call Back Number: [phone]    Allergies:    Allergies   Allergen Reactions    Bee Pollen Anaphylaxis    Diphenhydramine Palpitations     Tolerated IV Benadryl when not pushed too fast    Isosorbide Nitrate Other (See Comments) and Dizziness     Also causes syncope (has fallen before) and brain fog/mental disturbances - please do not prescribe    Nitroglycerin Dizziness, Fatigue and Other (See Comments)     Specifically the patch - please do not prescribe  Specifically the patch - please do not prescribe      Contrast Dye Hives and Itching    Other Drug Allergy (See Comments) Hives and Itching    Penicillin G     Adhesive Tape Rash    Diphenhydramine Hcl Palpitations    Liquid Adhesive Itching    Nystatin Dermatitis    Nystatin Dermatitis     Also blisters    Penicillins Swelling and Rash     Occurred as a child - not 100% sure on specific reactions    Sulfa Antibiotics Other (See Comments)     Occurred as a child / patient does not remember specific reaction        Reason for call: Nursing is calling as they are concerned the patient is very lethargic and not responding very well, original vital signs 96/56 pulse 75 respirations 21 oxygen sats were 76% on room air, oxygen 2 L applied and now at 91%.  T99.5.  LS wheezy , no cough, blood sugar 223.  Patient is on Seroquel 100 mg 3 times a day recently on Ativan 1 mg 3 times a day on 11/6 by her psychiatrist and the Valium was discontinued.  Patient did have a wet brief this morning, morning Ativan was held.     Verbal Order/Direction given by Provider: Sent to ER for evaluation of hypoxia.     Provider giving Order:  CANDY Gates    Verbal Order given to: Suly Marley RN

## 2023-11-10 NOTE — H&P
[facility]    History and Physical - Hospitalist Service       Date of Admission:  11/10/2023    Assessment & Plan      67 year old female with complex health history notable for CAD with history of MI and CABG, ischemic cardiomyopathy with chronic systolic heart failure, indwelling cardiac pacemaker and defibrillator, type 2 diabetes, hypertension, hyperlipidemia, COPD, PAD, ELI, morbid obesity, chronic mental illness, chronic urinary incontinence, previous stroke, stage IIIa CKD, chronic anemia, chronic thrombocytopenia, and chronic debility for which she is a resident at long-[facility] who presented to ER on 11/10/2023 due to lethargy and severe hypoxia noted at her long-term care facility.  In the ER she also had fever raising concern for possible SIRS, sepsis, and infection although infection has not yet been identified definitively.  However, she also had recent increase in benzodiazepine dose increasing concern for possible increased sedation from medication which can cause hypoventilation with hypoxia, atelectasis, and fever.  She is at high risk for an adverse outcome including worsening infection and sepsis, worsening hypoxia and respiratory failure, and death, so hospitalization to expedite diagnostic evaluation and initiate an appropriate treatment is considered medically necessary.  Based on the presently available information, hospitalization for at least 24 to 48 hours is anticipated.    Principal Problem:    Lethargy  Active Problems:    Fever    Hypoxia    Chronic systolic heart failure (H)    COPD without exacerbation (H)    Essential (primary) hypertension    Ischemic cardiomyopathy    Type 2 diabetes mellitus (H)    Chronic kidney disease, stage 3a (H)    Pyuria    PAD (peripheral artery disease) (H24)    Left foot pain    Obesity hypoventilation syndrome (H)    Benzodiazepine dependence (H)    Thrombocytopenia (H24)    ERIC (acute kidney injury) (H24)     Hyperlipidemia, unspecified    Hypothyroidism, unspecified    ELI (obstructive sleep apnea)    Panic disorder with agoraphobia    Personality disorder (H)    S/P CABG x 5    Urinary incontinence, mixed    Major depressive disorder, recurrent severe without psychotic features (H)    Pacemaker    Morbid obesity (H)    ICD (implantable cardioverter-defibrillator) in place    History of stroke    Lives in [facility] facility    Fever  Pyuria  Presented with fever to 100.9 in the ER even after she had received 1000 mg acetaminophen dose at [facility] about 2 hours previously.  She has not been tachycardic but is prescribed beta-blocker chronically and currently has consistent ventricular pacing.  She does not have leukocytosis or other SIRS criteria so far and procalcitonin was low.  She does not have localizing symptoms or signs of infection.  Screening evaluation for infection is abnormal including catheterized urine specimen demonstrating pyuria, but she does not report symptoms suspicious for symptomatic UTI, so clinical significance of the pyuria is not clear.  Fever could also be associated with hypoventilation which is suspected for other reasons rather than infection.  She received 1 dose IV ceftriaxone in the ER prior to obtaining blood cultures.  -Hospitalize for observation, reconsider inpatient admission if there is increasing suspicion for infection and sepsis  -Not continuing antibiotics at this time but would reassess based on clinical course and test results  -Follow-up on results of urine and blood cultures  -Recheck procalcitonin tomorrow for trend    Lethargy  Benzodiazepine dependence  According to available records from [facility], patient had recent change in chronic diazepam dose of 5 mg daily to lorazepam 1 mg 3 times daily effective November 6 and since that time has been more lethargic.  Lethargy has progressively worsened over the last few days.  It is possible  that worsening lethargy is due to this medication change with relatively higher dose of benzodiazepine in the last few days.  She has not received a dose of Ativan since last evening about 9:30 PM.  She will be at risk for benzodiazepine withdrawal with abrupt discontinuation of benzodiazepines.  -will reduce Ativan dose to 0.5 mg 2 times daily while monitoring closely for symptoms or signs of withdrawal    Severe hypoxia  Mild respiratory acidosis with compensatory metabolic alkalosis  Suspect obesity hypoventilation syndrome  ELI  Found to be severely hypoxic at long-term care facility with oxygen saturation 73% in room air that improved to 92% on 2 L low flow oxygen supplementation.  Patient did not report dyspnea at the time although has been more lethargic.  In ER she was found to have mild respiratory acidosis with venous PCO2 54 with compensatory metabolic alkalosis.  She does not endorse symptoms suspicious for acute respiratory illness, demonstrates no physical signs of acute respiratory illness, and had no acute radiographic findings on chest x-ray.  Accordingly, given her morbidly obese body habitus, obesity hypoventilation appears most likely.  She carries previous diagnosis of ELI.  Chronic obesity hypoventilation could be exacerbated by sedation from medication including recent increase in benzodiazepine dosing.  -Monitor oxygenation and use supplemental oxygen to keep saturations 90% and higher as needed  -Ordered recheck of blood gases  -Minimize sedating medications as much as possible  -If respiratory status is deteriorating, recommend additional diagnostic evaluation such as chest CT and/or other studies depending upon clinical course  -Start incentive spirometry and begin to advance activity as soon as possible  -Consider flutter valve therapy, RT to assist    Acute kidney injury  Stage IIIa chronic kidney disease  Chronic urinary incontinence  Baseline creatinine 1-1.1 and presenting today with  creatinine 1.44 concerning for possible acute kidney injury.  She has chronic urinary incontinence.  Hypovolemia associated with recently reduced oral intake (because of sedation) and chronic diuretic therapy seems most likely.  ERIC due to pyelonephritis is possible but clinical presentation is not suspicious for pyelonephritis.  ERIC due to sepsis is also possible but sepsis is not strongly suspected so far.  -Hold chronic diuretics including hydrochlorothiazide and furosemide  -Ordered recheck of renal function  -Adjust medication doses accordingly for changing renal function  -Avoid nephrotoxic medication as able    Left foot pain  PAD with stenosis of distal left superficial femoral artery  She presents with pain in her left foot which is chronic.  She has known stenosis of distal left superficial femoral artery based on arterial ultrasound from March 2019 for which it does not appear as though she has had any intervention previously.  She does take aspirin and statin therapy chronically.  However, she has palpable pedal pulses in the left foot on exam and the left foot is pink and warm arguing against critical limb ischemia as a cause for pain in her left foot.  She also carries a diagnosis of type 2 diabetes, so peripheral neuropathy with foot pain is also possible.  She is prescribed gabapentin chronically.  -Continue chronic aspirin and statin  -Continue chronic gabapentin  -Use analgesics as needed for foot pain but trying to avoid narcotic analgesics is much as possible because of other concern for hypoventilation and use of narcotics is likely to exacerbate hypoventilation from benzodiazepines and obesity    Type 2 diabetes  She carries previous diagnosis of type 2 diabetes.  Most recent A1c from January 2023 was 6.6.  Current medication list does not include any diabetic medications including insulin.  However, blood sugars recorded at [facility] in October 2023 ranged 295-410, significantly  hyperglycemic.  Random blood sugar in the ER today was 175.  -Ordered A1c  -Ordered blood sugar monitoring  -Reconsider adding diabetic therapy if she is significantly hyperglycemic    Chronic systolic heart failure  Ischemic cardiomyopathy  CAD with previous CABG  History of MI  History of mitral and tricuspid valve repair  She has longstanding history of CAD and had 5 vessel CABG in August 2015.  She has had MI previously most recently July 2023 at which time coronary angiography demonstrated mild to moderate stenosis in one of her CABG grafts as well as native vessel disease.  She has not had recent anginal symptoms.  Troponin is mildly elevated but similar to previous levels.  EKG is not interpretable for acute ischemia because of paced rhythm.  She also has chronic systolic heart failure with EF 40 to 45% as noted on most recent echocardiogram from July 2023.  Clinically, heart failure appears compensated at this time.  She also has previous history of mitral and tricuspid valve repair in August 2015.  Radiographically, there were no signs of acute pulmonary edema.  -Not anticipating additional cardiac evaluation during this hospitalization unless there is increasing concern for acute change in cardiac status  -Anticipate outpatient follow-up with her cardiology team through [facility] cardiology    Status post pacemaker and ICD placement  She has indwelling pacemaker ICD device and EKG demonstrates paced rhythm today.  There are not clinical concerns for cardiac device malfunction at this time.  -Anticipate outpatient follow-up with cardiology for routine monitoring of her device    Hypothyroidism  She has chronic hypothyroidism treated with levothyroxine.  TSH is slightly elevated at 13.8 with slightly low free T4 of 0.88 today (0.9 is the lower limit of normal).  It seems unlikely that hypothyroidism is contributing to current lethargy.  -Continue current chronic dose of levothyroxine and recommend recheck  of thyroid function tests in 1 month    Chronic anemia  Chronic thrombocytopenia  Aspirin induced platelet function defect  She has chronic anemia with baseline hemoglobin 8-9.  She is also mildly thrombocytopenic chronically with baseline platelet 130-140.  Both chronic anemia and thrombocytopenia appear stable at this time.  Due to chronicity of her anemia, it seems unlikely that anemia is the major factor in her nonspecific symptoms of lethargy.  She takes aspirin chronically that causes platelet function defect which increases her bleeding risk.  Active bleeding is not suspected at this time.  -Monitor clinically for bleeding  -Continue chronic dose of aspirin  -Reconsider repeat hemoglobin and/or platelet count depending upon clinical course    Chronic mental illness  She has chronic mental illness with previous diagnoses of depression, panic disorder with agoraphobia, and PTSD.  She is chronically treated with multiple psychoactive medications including Ativan, valproic acid, duloxetine, mirtazapine, Seroquel, and trazodone.  Chronic mental illness and/or medication treatment thereof could contribute to symptoms of lethargy.  However, aside from the recent change in benzodiazepines, none of her other psychoactive medications appear new or appear to have been changed recently  -For now we will continue these chronic medications except for reducing the dose of benzodiazepines as outlined above    Other chronic medical problems that appear generally stable:  Hypertension  Hyperlipidemia  History of stroke  Morbid obesity  Lives in long-term care facility  -Continue chronic doses of amlodipine and rosuvastatin  -Anticipate case management will assist with disposition planning and return to long-term care facility once medically stable          Diet:  Diabetic diet  DVT Prophylaxis: Observation status  Mccallum Catheter: Not present  Lines: None     Cardiac Monitoring: None  Code Status:  DNR/DNI    Clinically  "Significant Risk Factors Present on Admission                # Drug Induced Platelet Defect: home medication list includes an antiplatelet medication   # Hypertension: Noted on problem list                 Disposition Plan      Expected Discharge Date: 11/11/2023                  Seamus Silva MD  Hospitalist Service  [facility]  Securely message with [employer] (more info)  Text page via [facility] Paging/Directory     ______________________________________________________________________    Chief Complaint   Lethargy and severe hypoxia    History is obtained from the patient, electronic health record, emergency department physician, and paper chart including records from the [facility].  Reviewed results of echocardiogram and coronary angiography from July 2023.  Reviewed report of results of arterial ultrasound of the lower extremities from March 2019.  Reviewed hospital discharge summary from August 2015 when she had her CABG and mitral and tricuspid valve repair surgeries.    History of Present Illness   Letty Escobar is a 67 year old female who is a resident at [facility] and this morning was noted to be \"very lethargic\".  This morning the patient could not feed herself and her responses to questions were \"slow and mumbled\".  Although vital signs were stable, she was found to have oxygen saturation 73% on room air at which time 2 L nasal cannula low-flow supplemental oxygen was applied with improvement in saturations to 92%.  Patient could not reliably follow instructions.  EMS was called and transported the patient to the emergency room.  According to the medication administration record from the [facility], patient had been prescribed diazepam 5 mg once daily chronically, but this was discontinued on November 5 and on November 6 she started new dose of lorazepam 1 mg 3 times daily.  Other notes from [facility] in recent " days suggest worsening weakness and sleepiness since that change in medication dose.  In the emergency room, Dr. Ramos reports that the patient continued to appear sleepy and lethargic although could answer questions.  There were no concerns for focal neurologic deficits.  However, she had temperature 100.9 upon arrival to the ER.  Due to concern for possible infection, she was given 1 g IV ceftriaxone in the ER.  Patient is now seen in the ER for evaluation.    Patient herself says that she has generally been feeling poorly for a few months.  She says she has been feeling restless for a few months.  She thinks her medication was changed recently but does not recall the details.  She endorses a number of other intermittent symptoms including intermittent but not persistent cough, intermittent but not persistent abdominal pain, intermittent but not persistent foot pain, and intermittent but not persistent diarrhea.  None of those symptoms have worsened lately although she currently is having pain in her left foot while in the ER today.  She denies any shortness of breath today or any worsening cough today.  She denies any abdominal pain today.  She is incontinent of urine and urinates frequently after doses of diuretics but otherwise has not noted any change in urinary habits.      Past Medical History    Past Medical History:   Diagnosis Date    ACP (advance care planning) 11/3/2010    Formatting of this note might be different from the original. Patient has identified Health Care Agent(s): Yes Add Health Care Agents: Yes   Health Care Agent(s):  Primary Health Care Agent:   Edwar Escobar Relationship:  Spouse Phone:   [phone]  Secondary Health Care Agent:   Chiki Oro Relationship:   Son Phone:   [phone]  Patient has Advance Care Plan Documents (Health Care Direct    Acute coronary syndrome (H) 11/22/2019    Acute exacerbation of CHF (congestive heart failure) (H) 11/11/2019    Acute on chronic  systolic (congestive) heart failure (H) 1/28/2020    Anemia of chronic disease 1/5/2013    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, left leg (H) 1/15/2020    BPPV (benign paroxysmal positional vertigo) 5/31/2018    Candidiasis 3/1/2020    Carotid stenosis, right 7/13/2016    Carotid US 05/04/2018 showed moderate plaque formation, consistent with 50 to 69% stenosis in the right internal carotid artery, not significantly changed from 8/5/2015.  Moderate plaque formation, consistent with 50 to 69% stenosis in the left internal carotid artery; there has been mild progression of the left ICA stenosis since 8/5/2015.    Chest pain 11/11/2019    Chronic bilateral low back pain without sciatica 1/17/2018    Chronic pain disorder 10/1/2017    Constipation 3/20/2020    COPD (chronic obstructive pulmonary disease) (H) 6/24/2020    Coronary atherosclerosis 2/6/2009 2/5/2009 - MI - Proximal RCA 99%, mild-mod disease elsewhere.  EF 60%.  PCI:  MYRON to pRCA. 2/12/2009 - admit CP - Widely patent RCA stent. Moderate diffuse CAD. Severe stenosis in trivial PDA branch. LVEF 45%. 1/25/2010 - admit CP - PTCA and stent of diagonal 9/8/2010 - admit CP - LAD patent stent. Moderate diffuse CAD. Medical management recommended.  4/25/2012 - NSTEMI - Acute total occlusion of    Cubital tunnel syndrome on left 11/13/2012    Depressive disorder     Diabetes mellitus (H)     Diabetes mellitus type 2 in obese (H) 7/13/2006    Diabetes mellitus type 2 in obese (H) 7/13/2006    Drug-seeking behavior 4/4/2020    Failure to thrive in adult 9/3/2020    Hereditary and idiopathic peripheral neuropathy 4/24/2007    Hyperlipidemia with target low density lipoprotein (LDL) cholesterol less than 70 mg/dL 5/30/2007    Hypertension 10/14/2015    Hypothyroidism 12/10/2010    Irritable bowel syndrome 9/7/2015    Ischemic cardiomyopathy 9/20/2015    EF of 40-45%, status post RV lead revision and LV epicardial lead  placement via mini-thoracotomy in August 2016.    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  Designated pharmacy: [facility] Prescribing physician: Andrea Diagnosis: Ulnar neuropathy    Panic disorder with agoraphobia 4/14/2020    Paroxysmal atrial fibrillation (H) 10/2/2015    Personality disorder (H) 3/5/2020    Rule out dependent personality    Polymyalgia rheumatica (H24) 11/28/2018    Posttraumatic stress disorder 3/1/2020    Restless legs syndrome (RLS) 8/29/2007    Restless legs syndrome (RLS) 8/29/2007    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    Serum calcium elevated 3/1/2020    Somatic dysfunction of sacral region 1/17/2018    Subacromial bursitis of left shoulder joint 8/6/2018    Suicidal ideation 4/1/2020    Thyroid disease     TIA (transient ischemic attack) 5/4/2018    TIA (transient ischemic attack) 5/4/2018    Tobacco abuse 2/17/2017    Tobacco abuse 2/17/2017    Urinary incontinence, mixed 9/24/2017    UTI (urinary tract infection) 4/17/2020    Vitamin B12 deficiency 2/14/2018    Weakness 12/17/2019       Past Surgical History   Past Surgical History:   Procedure Laterality Date    CARDIAC SURGERY      stents x 11    CARDIAC SURGERY      CHOLECYSTECTOMY      CHOLECYSTECTOMY      GENITOURINARY SURGERY      Tubal ligation    OTHER SURGICAL HISTORY      Genitourinary surgery    RELEASE CARPAL TUNNEL      RELEASE CARPAL TUNNEL         Prior to Admission Medications   Prior to Admission Medications   Prescriptions Last Dose Informant Patient Reported? Taking?   DULoxetine HCl 40 MG CPEP 11/10/2023 at 0849 [facility] Yes Yes   Sig: Take 40 mg by mouth daily    LORazepam (ATIVAN) 1 MG tablet 11/9/2023 at 2125  Yes Yes   Sig: Take 1 mg by mouth 3 times daily F41.9 Anxiety disorder   Melatonin 10 MG TABS tablet 11/9/2023 at 2125 [facility] Yes Yes   Sig: Take 10 mg by mouth At Bedtime   QUEtiapine (SEROQUEL) 100 MG tablet 11/10/2023 at 0849  Yes Yes   Sig: Take 100 mg by mouth 3 times daily   acetaminophen (TYLENOL) 325 MG tablet 11/9/2023 at 0600  Yes Yes   Sig: Take 325 mg by mouth 2 times daily as needed for pain   acetaminophen (TYLENOL) 500 MG tablet 11/10/2023 at 0849 Nursing Home No Yes   Sig: Take 2 tablets (1,000 mg) by mouth 3 times daily   amLODIPine (NORVASC) 5 MG tablet 11/10/2023 at 0849  Yes Yes   Sig: Take 5 mg by mouth daily   aspirin 81 MG EC tablet 11/10/2023 at 0849 [facility] Yes Yes   Sig: Take 81 mg by mouth daily   bisacodyl (DULCOLAX) 10 MG suppository Unknown at [facility]  Yes Yes   Sig: Place 10 mg rectally daily as needed for constipation   cyanocobalamin (CYANOCOBALAMIN) 1000 MCG/ML injection 10/21/2023 at 1445  Yes Yes   Sig: Inject 1 mL into the muscle every 30 days 21st of each month   divalproex sodium delayed-release (DEPAKOTE) 250 MG DR tablet 11/9/2023 at 2125 [facility] Yes Yes   Sig: Take 250 mg by mouth daily 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   famotidine (PEPCID) 20 MG tablet 11/9/2023 at 2125  Yes Yes   Sig: Take 20 mg by mouth at bedtime   fluticasone-vilanterol (BREO ELLIPTA) 200-25 MCG/INH inhaler 11/10/2023 at 0849 [facility] Yes Yes   Sig: Inhale 1 puff into the lungs daily Rinse mouth after use   furosemide (LASIX) 20 MG tablet 11/9/2023 at 0927  Yes Yes   Sig: Take 20 mg by mouth every other day   gabapentin (NEURONTIN) 400 MG capsule 11/9/2023 at 2125  Yes Yes   Sig: Take 400 mg by mouth at bedtime RLS   hydrochlorothiazide (HYDRODIURIL) 25 MG tablet 11/10/2023 at 0849  Yes Yes   Sig: Take 25 mg by mouth daily   levothyroxine (SYNTHROID/LEVOTHROID) 112 MCG tablet 11/10/2023 at 0633  Yes Yes   Sig: Take 112 mcg by  mouth daily before breakfast   lidocaine (LMX4) 4 % external cream 11/10/2023 at 0856  Yes Yes   Sig: Apply topically 2 times daily To Rt knee   metoprolol succinate ER (TOPROL-XL) 25 MG 24 hr tablet 11/10/2023 at 0849 [facility] Yes Yes   Sig: Take 25 mg by mouth daily   mirtazapine (REMERON) 30 MG tablet 11/9/2023 at 2125 [facility] Yes Yes   Sig: Take 15 mg by mouth daily   nitroGLYcerin (NITROSTAT) 0.4 MG sublingual tablet 11/2/2023 at 1031  Yes Yes   Sig: Place 0.4 mg under the tongue every 5 minutes as needed for chest pain For chest pain place 1 tablet under the tongue every 5 minutes for 3 doses. If symptoms persist 5 minutes after 1st dose call 911.   ondansetron (ZOFRAN ODT) 4 MG ODT tab 11/7/2023 at 1935  Yes Yes   Sig: Take 4 mg by mouth every 8 hours as needed for nausea   polyethylene glycol (MIRALAX) 17 GM/Dose powder 11/9/2023 at 0927  Yes Yes   Sig: Take 17 g by mouth every other day   potassium chloride ER (MICRO-K) 10 MEQ CR capsule 11/10/2023 at 0849 [facility] Yes Yes   Sig: Take 10 mEq by mouth daily    rosuvastatin (CRESTOR) 10 MG tablet 11/9/2023 at 2125 [facility] Yes Yes   Sig: Take 20 mg by mouth At Bedtime   sennosides (SENOKOT) 8.6 MG tablet 11/9/2023 at 2125 [facility] Yes Yes   Sig: Take 2 tablets by mouth At Bedtime   traZODone (DESYREL) 50 MG tablet 11/10/2023 at 0849  Yes Yes   Sig: Take 25 mg by mouth 3 times daily      Facility-Administered Medications: None        Review of Systems    The 10 point Review of Systems is negative other than noted in the HPI or here.     Social History   I have reviewed this patient's social history and updated it with pertinent information if needed.  Social History     Tobacco Use    Smoking status: Never    Smokeless tobacco: Never   Vaping Use    Vaping Use: Never used   Substance Use Topics    Alcohol use: Not Currently    Drug use: Never     Patient is a long-term care resident at [facility] in [place].  She says she is a  long-term care resident because of a previous stroke.    Allergies   Allergies   Allergen Reactions    Bee Pollen Anaphylaxis    Diphenhydramine Palpitations     Tolerated IV Benadryl when not pushed too fast    Isosorbide Nitrate Other (See Comments) and Dizziness     Also causes syncope (has fallen before) and brain fog/mental disturbances - please do not prescribe    Nitroglycerin Dizziness, Fatigue and Other (See Comments)     Specifically the patch - please do not prescribe  Specifically the patch - please do not prescribe      Contrast Dye Hives and Itching    Penicillin G     Adhesive Tape Rash    Liquid Adhesive Itching    Nystatin Dermatitis     Also blisters    Penicillins Swelling and Rash     Occurred as a child - not 100% sure on specific reactions    Sulfa Antibiotics Other (See Comments)     Occurred as a child / patient does not remember specific reaction        Physical Exam   Vital Signs: Temp: 98.8  F (37.1  C) Temp src: Oral BP: 110/56 Pulse: 61   Resp: 13 SpO2: 95 % O2 Device: Nasal cannula Oxygen Delivery: 2 LPM  Patient Vitals for the past 24 hrs:   BP Temp Temp src Pulse Resp SpO2 Weight   11/10/23 1500 108/50 -- -- 60 -- 96 % --   11/10/23 1454 -- 98.8  F (37.1  C) Oral -- -- -- --   11/10/23 1145 110/56 -- -- 61 13 95 % --   11/10/23 1115 108/54 -- -- 62 14 -- --   11/10/23 1042 114/59 (!) 100.9  F (38.3  C) Oral 64 11 93 % 98.9 kg (218 lb)     Weight: 218 lbs 0 oz Body mass index is 47.17 kg/m .    Constitutional: Morbidly obese elderly woman without signs of acute distress while resting in bed  Eyes: Anicteric sclerae, clear conjunctivae, symmetric pupils, normal extraocular movements  ENT: No signs of recent head injury, clear nares, normal oropharynx  Neck: Trachea midline, no stridor, obese, symmetric, nontender  Hematologic / Lymphatic: no cervical lymphadenopathy and no supraclavicular lymphadenopathy  Respiratory: Somewhat shallow breaths but no signs of respiratory distress,  diminished breath sounds throughout, clear lung fields  Cardiovascular: Regular rate and rhythm, palpable radial and left pedal pulses, normal capillary refill, not able to palpate femoral pulses likely due to obese body habitus  GI: Morbidly obese abdomen, normal bowel sounds, soft, no apparent tenderness  Skin: No rash, hands and feet are pink and warm  Musculoskeletal: No gross limb anomalies except that left foot appears to be held in plantarflexion asymmetric as compared with the right, trace peripheral edema in the lower legs  Neurologic: Opens eyes to voice and maintains wakefulness and attention, seems to generally answer questions appropriately although some of the information she provides differs from the information on her medical chart for example she indicates that she does not have diabetes  Neuropsychiatric: Mood appears depressed and affect is blunted, no overt suicidal ideation, hallucinations, or delusions were identified    Medical Decision Making       131 MINUTES SPENT BY ME on the date of service doing chart review, history, exam, documentation & further activities per the note.      Data     I have personally reviewed the following data over the past 24 hrs:    6.4  \   9.1 (L)   / 146 (L)     139 100 26.6 (H) /  175 (H)   4.5 28 1.44 (H) \     ALT: 18 AST: 23 AP: 48 TBILI: 0.2   ALB: 3.7 TOT PROTEIN: 7.2 LIPASE: 16     Trop: 38 (H) BNP: N/A     TSH: 13.76 (H) T4: 0.88 (L) A1C: N/A     Procal: 0.23 (H) CRP: N/A Lactic Acid: 1.6         Initial troponin had been 42 decreased to 38 on recheck  EKG reviewed: Ventricular pacing with normal rate, not able to interpret for ischemia    Venous Blood Gas  Recent Labs   Lab 11/10/23  1113   PHV 7.36   PCO2V 54*   PO2V 47   HCO3V 31*   SAVANNAH 4.2*   O2PER 28     Color Urine (no units)   Date Value   11/10/2023 Yellow   10/30/2020 Juju     Appearance Urine (no units)   Date Value   11/10/2023 Slightly Cloudy (A)   10/30/2020 Cloudy     Glucose Urine (mg/dL)    Date Value   11/10/2023 Negative   10/30/2020 Negative     Bilirubin Urine (no units)   Date Value   11/10/2023 Negative   10/30/2020 Negative     Ketones Urine (mg/dL)   Date Value   11/10/2023 Negative   10/30/2020 Negative     Specific Gravity Urine (no units)   Date Value   11/10/2023 1.025   10/30/2020 1.026     pH Urine   Date Value   11/10/2023 5.0   10/30/2020 5.0 pH     Protein Albumin Urine (mg/dL)   Date Value   11/10/2023 Negative   10/30/2020 100 (A)     Nitrite Urine (no units)   Date Value   11/10/2023 Positive (A)   10/30/2020 Negative     Leukocyte Esterase Urine (no units)   Date Value   11/10/2023 Moderate (A)   10/30/2020 Moderate (A)     Rapid testing for COVID-19, influenza A, influenza B, and RSV was negative    Imaging results reviewed over the past 24 hrs:   Recent Results (from the past 24 hour(s))   Head CT w/o contrast    Narrative    CT SCAN OF THE HEAD WITHOUT CONTRAST   11/10/2023 11:31 AM     HISTORY: weakness right facial droop    TECHNIQUE:  Axial images of the head and coronal reformations without  IV contrast material. Radiation dose for this scan was reduced using  automated exposure control, adjustment of the mA and/or kV according  to patient size, or iterative reconstruction technique.    COMPARISON: 2/12/2023.    FINDINGS: There is no evidence of intracranial hemorrhage, mass, acute  infarct or anomaly. Chronic lacunar infarcts in the bilateral caudate  nuclei. The ventricles are normal in size, shape and configuration.  Mild diffuse parenchymal volume loss. Mild patchy periventricular  white matter hypodensities which are nonspecific, but likely related  to chronic microvascular ischemic disease. Bilateral carotid siphon  atherosclerotic calcifications.    The visualized portions of the sinuses and mastoids appear normal. The  bony calvarium and bones of the skull base appear intact.       Impression    IMPRESSION:   No evidence of acute territorial infarction or  other  acute intracranial abnormality.    ROWENA NIXON MD         SYSTEM ID:  [id number]   XR Chest Port 1 View    Narrative    XR CHEST PORT 1 VIEW   11/10/2023 11:50 AM     HISTORY: sob    COMPARISON: 09/10/2023.      Impression    IMPRESSION: No significant change. Sternotomy. Stable left AICD.  Cardiac valve replacement. Cardiomegaly. Normal pulmonary vascularity.  Clear lungs.    YARA HORTON MD         SYSTEM ID:  [id number]     Recent Labs   Lab 11/10/23  1113   WBC 6.4   HGB 9.1*   MCV 96   *      POTASSIUM 4.5   CHLORIDE 100   CO2 28   BUN 26.6*   CR 1.44*   ANIONGAP 11   ORESTES 9.8   *   ALBUMIN 3.7   PROTTOTAL 7.2   BILITOTAL 0.2   ALKPHOS 48   ALT 18   AST 23   LIPASE 16

## 2023-11-10 NOTE — PLAN OF CARE
"S-(situation): Patient registered to Observation. Patient arrived to room 253 via cart from ED    B-(background):     A-(assessment): A&Ox4. LS clear. Sats upper 90s 2 L NC. Assist x2 with lift device. Took pills with mashed potatoes during dinner hour. Patient states she will take with pudding or warm water. . IV rocephin given in ED.     /54 (BP Location: Left arm)   Pulse 61   Temp 97.8  F (36.6  C) (Oral)   Resp 12   Ht 1.575 m (5' 2\")   Wt 94.1 kg (207 lb 7.3 oz)   SpO2 95%   BMI 37.94 kg/m       R-(recommendations): Orders and observation goals reviewed with patient    Nursing Observation criteria listed below was met:    Skin issues/needs documented:Yes  Isolation needs addressed and Signage up: NA  Fall Prevention: Education given and documented: Yes  Education Assessment documented:Yes  Admission Education Documented: Yes  New medication patient education completed and documented (Possible Side Effects of Common Medications handout): Yes  OBS video/handout Reviewed & Documented: Yes  Allergies Reviewed: Yes  Medication Reconciliation Complete: Yes  Home medications if not able to send immediately home with family stored here: NA  Reminder note placed in discharge instructions of home meds: NA  Patient has discharge needs (If yes, please explain): No  Patient discharge preferences addressed and charted on white board:  Yes  Provider notified that patient has arrived to the unit: Yes         "

## 2023-11-10 NOTE — ED TRIAGE NOTES
"Patient \"lethargic\" at [place] and short of breath.  EMS found her bradycardic and hypoxic.      Triage Assessment (Adult)       Row Name 11/10/23 1042          Triage Assessment    Airway WDL WDL        Respiratory WDL    Respiratory WDL WDL        Skin Circulation/Temperature WDL    Skin Circulation/Temperature WDL WDL        Cardiac WDL    Cardiac WDL X                     "

## 2023-11-10 NOTE — ED PROVIDER NOTES
History     Chief Complaint   Patient presents with    Generalized Weakness     HPI  Letty Escobar is a 67 year old female with a history of CKD, atrial fibrillation, ICD placement, who who resides in the [facility] who presents to the emergency department secondary to malaise, lethargy, sleeping more than usual, leaning to the right, low-grade fevers, bradypnea, who presents to the emergency department via EMS secondary to these symptoms of lethargy and malaise.  She has not had vomiting.  Her  is in the room states that she has been more sleepy since starting a pain medicine a couple of weeks ago.  Fever is new.  She has not been coughing a lot.  No abdominal pain or dysuria or hematuria or diarrhea or constipation.    Allergies:  Allergies   Allergen Reactions    Bee Pollen Anaphylaxis    Diphenhydramine Palpitations     Tolerated IV Benadryl when not pushed too fast    Isosorbide Nitrate Other (See Comments) and Dizziness     Also causes syncope (has fallen before) and brain fog/mental disturbances - please do not prescribe    Nitroglycerin Dizziness, Fatigue and Other (See Comments)     Specifically the patch - please do not prescribe  Specifically the patch - please do not prescribe      Contrast Dye Hives and Itching    Penicillin G     Adhesive Tape Rash    Liquid Adhesive Itching    Nystatin Dermatitis     Also blisters    Penicillins Swelling and Rash     Occurred as a child - not 100% sure on specific reactions    Sulfa Antibiotics Other (See Comments)     Occurred as a child / patient does not remember specific reaction       Problem List:    Patient Active Problem List    Diagnosis Date Noted    Lethargy 11/10/2023     Priority: Medium    Urinary tract infection without hematuria, site unspecified 11/10/2023     Priority: Medium    Fever 11/10/2023     Priority: Medium    Hypoxia 11/10/2023     Priority: Medium    Pyuria 11/10/2023     Priority: Medium    PAD (peripheral artery  disease) (H24) 11/10/2023     Priority: Medium    Left foot pain 11/10/2023     Priority: Medium    History of stroke 11/10/2023     Priority: Medium    Lives in long-term care facility 11/10/2023     Priority: Medium    Obesity hypoventilation syndrome (H) 11/10/2023     Priority: Medium    Benzodiazepine dependence (H) 11/10/2023     Priority: Medium    Thrombocytopenia (H24) 11/10/2023     Priority: Medium    ERIC (acute kidney injury) (H24) 11/10/2023     Priority: Medium    Chronic kidney disease, stage 3a (H) 10/16/2022     Priority: Medium    SOB (shortness of breath) 04/07/2022     Priority: Medium    Type 2 diabetes mellitus (H) 02/13/2022     Priority: Medium    Morbid obesity (H) 12/10/2021     Priority: Medium    ICD (implantable cardioverter-defibrillator) in place 11/17/2021     Priority: Medium     Formatting of this note is different from the original.  Date of last device in office evaluation: 5/17/2022    ?  and model: Medtronic Cobalt CRT-D.   Date of implant: 5/7/2021  Tachy therapies remain OFF: S/p downgrade from ICD to pacemaker, but her implanted system includes a DF-4 lead, so an ICD generator was placed with the tachycardia therapies disabled.     ? Indication for device: Ischemic cardiomyopathy   ? Cardiac resynchronization therapy:   no     MRI Conditional:  No  o If No:  Reason why:  Abandoned LV lead    ? Battery longevity documented as less than 3  Months: No  ? Are any of the leads less than 3 months old:  No    ? Programming              ? Pacing mode and programmed lower rate: DDDR 60 - 130 bpm              ? Rate-responsive sensor type, if programmed on: Accelerometer        Underlying rhythm and heart rate:  SR @ 60 bpm    ? What is the response of this device to magnet placement:  None - tachy therapies off  ? PM magnet pacing rate: N/A   ? Any alert status on CIED generator or lead: No    ? Last pacing threshold    Atrial   1.0 V @ 0.4 ms   Ventricular RV: 0.75 V @  0.4 ms  LV:  2.75 V @ 1.0 ms    Formatting of this note is different from the original.  Date of last device in office evaluation: 11/17/2022     ?  and model: Medtronic Cobalt CRT-D.   Date of implant: 5/7/2021  Tachy therapies remain OFF: S/p downgrade from ICD to pacemaker, but her implanted system includes a DF-4 lead, so an ICD generator was placed with the tachycardia therapies disabled.     ? Indication for device: Ischemic cardiomyopathy   ? Cardiac resynchronization therapy:   no     MRI Conditional:  No  o If No:  Reason why:  Abandoned LV lead    ? Battery longevity documented as less than 3  Months: No  ? Are any of the leads less than 3 months old:  No    ? Programming              ? Pacing mode and programmed lower rate: DDDR 60 - 130 bpm              ? Rate-responsive sensor type, if programmed on: Accelerometer        Underlying rhythm and heart rate:  Sinus rhythm 62 bpm, w/BBB    ? What is the response of this device to magnet placement:  None - tachy therapies off  ? PM magnet pacing rate: N/A   ? Any alert status on CIED generator or lead: No    ? Last pacing threshold    Atrial   1.0 V @ 0.4 ms   Ventricular RV: 0.75 V @ 0.4 ms  LV:  2.75 V @ 1.0 ms      Atrial fibrillation (H) 04/06/2021     Priority: Medium    Pacemaker 04/06/2021     Priority: Medium    Major depressive disorder, recurrent severe without psychotic features (H) 01/26/2021     Priority: Medium    Panic disorder with agoraphobia 04/14/2020     Priority: Medium    Constipation 03/20/2020     Priority: Medium    Personality disorder (H) 03/05/2020     Priority: Medium     Rule out dependent personality    Formatting of this note might be different from the original.  Rule out dependent personality  Formatting of this note might be different from the original.  Rule out dependent personality      Candidiasis 03/01/2020     Priority: Medium    Posttraumatic stress disorder 03/01/2020     Priority: Medium    COPD without  exacerbation (H) 01/29/2020     Priority: Medium    Long term (current) use of insulin (H) 01/29/2020     Priority: Medium    Chronic systolic heart failure (H) 01/28/2020     Priority: Medium    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, left leg (H) 01/15/2020     Priority: Medium    Chest pain 11/11/2019     Priority: Medium    Polymyalgia rheumatica (H24) 11/28/2018     Priority: Medium    Unstable angina (H) 05/02/2018     Priority: Medium     Coronary angiogram 05/02/2018 demonstrated 30% stenosis in the LMCA, 50% stenosis in the Proximal LAD, 50% stenosis in the Mid LAD, 95% stenosis in the Proximal Circumflex (Restenosis - Balloon Angioplasty), 100% stenosis in the 1st Marginal, 50% stenosis in the Proximal RCA, 50% stenosis in the Distal RCA, the LIMA graft from the LIMA to the Distal LAD is free of significant disease; the SVG graft from the Aorta to the 1st Marginal is free of significant disease; the SVG graft from the Aorta to the Distal RCA is free of significant disease. Intervention included a successful  2.5mm x 12mm Balloon,  2.5mm x 10mm Cutting Balloon,  3mm x 12mm Drug Eluting Stent,  3mm x 12mm Balloon, and  3mm x 8mm Balloon to Proximal Circumflex, post stenosis 0%.    Formatting of this note is different from the original.  Coronary angiogram 05/02/2018 demonstrated 30% stenosis in the LMCA, 50% stenosis in the Proximal LAD, 50% stenosis in the Mid LAD, 95% stenosis in the Proximal Circumflex (Restenosis - Balloon Angioplasty), 100% stenosis in the 1st Marginal, 50% stenosis in the Proximal RCA, 50% stenosis in the Distal RCA, the LIMA graft from the LIMA to the Distal LAD is free of significant disease; the SVG graft from the Aorta to the 1st Marginal is free of significant disease; the SVG graft from the Aorta to the Distal RCA is free of significant disease. Intervention included a successful  2.5mm x 12mm Balloon,  2.5mm x 10mm Cutting Balloon,   3mm x 12mm Drug Eluting Stent,  3mm x 12mm Balloon, and  3mm x 8mm Balloon to Proximal Circumflex, post stenosis 0%.  Formatting of this note is different from the original.  Coronary angiogram 05/02/2018 demonstrated 30% stenosis in the LMCA, 50% stenosis in the Proximal LAD, 50% stenosis in the Mid LAD, 95% stenosis in the Proximal Circumflex (Restenosis - Balloon Angioplasty), 100% stenosis in the 1st Marginal, 50% stenosis in the Proximal RCA, 50% stenosis in the Distal RCA, the LIMA graft from the LIMA to the Distal LAD is free of significant disease; the SVG graft from the Aorta to the 1st Marginal is free of significant disease; the SVG graft from the Aorta to the Distal RCA is free of significant disease. Intervention included a successful  2.5mm x 12mm Balloon,  2.5mm x 10mm Cutting Balloon,  3mm x 12mm Drug Eluting Stent,  3mm x 12mm Balloon, and  3mm x 8mm Balloon to Proximal Circumflex, post stenosis 0%.      Vitamin B12 deficiency 02/14/2018     Priority: Medium    Chronic bilateral low back pain without sciatica 01/17/2018     Priority: Medium    Chronic pain disorder 10/01/2017     Priority: Medium    Urinary incontinence, mixed 09/24/2017     Priority: Medium    Internal carotid artery stenosis, bilateral 07/13/2016     Priority: Medium     Carotid US 05/04/2018 showed moderate plaque formation, consistent with 50 to 69% stenosis in the right internal carotid artery, not significantly changed from 8/5/2015.  Moderate plaque formation, consistent with 50 to 69% stenosis in the left internal carotid artery; there has been mild progression of the left ICA stenosis since 8/5/2015.    Formatting of this note might be different from the original.  Carotid US 05/04/2018 showed moderate plaque formation, consistent with 50 to 69% stenosis in the right internal carotid artery, not significantly changed from 8/5/2015.  Moderate plaque formation, consistent with 50 to 69% stenosis in the left internal carotid  artery; there has been mild progression of the left ICA stenosis since 8/5/2015.    Formatting of this note might be different from the original.  Carotid US 05/04/2018 showed moderate plaque formation, consistent with 50 to 69% stenosis in the right internal carotid artery, not significantly changed from 8/5/2015.  Moderate plaque formation, consistent with 50 to 69% stenosis in the left internal carotid artery; there has been mild progression of the left ICA stenosis since 8/5/2015.      Essential (primary) hypertension 10/14/2015     Priority: Medium    Ischemic cardiomyopathy 09/20/2015     Priority: Medium     EF of 40-45%, status post RV lead revision and LV epicardial lead placement via mini-thoracotomy in August 2016.    Formatting of this note might be different from the original.  EF of 40-45%, status post RV lead revision and LV epicardial lead placement via mini-thoracotomy in August 2016.  Formatting of this note might be different from the original.  EF of 40-45%, status post RV lead revision and LV epicardial lead placement via mini-thoracotomy in August 2016.      S/P CABG x 5 08/21/2015     Priority: Medium    Pain medication agreement 04/20/2013     Priority: Medium     Controlled substance agreement for percocet #30/month on file and signed 4/17/13.  Designated pharmacy: [facility] Prescribing physician: Andrea Diagnosis: Ulnar neuropathy    Formatting of this note might be different from the original.  Controlled substance agreement for percocet #30/month on file and signed 4/17/13.  Designated pharmacy: [facility] Prescribing physician: Andrea Diagnosis: Ulnar neuropathy      Anemia in other chronic diseases classified elsewhere 01/05/2013     Priority: Medium    Hypothyroidism, unspecified 12/10/2010     Priority: Medium    ACP (advance care planning) 11/03/2010     Priority: Medium     Formatting of this note might be different from the original.  Patient has identified Health Care Agent(s):  "Yes  Add Health Care Agents: Yes    Health Care Agent(s):    Primary Health Care Agent:     Edwar Escobar Relationship:    Spouse Phone:     [phone]    Secondary Health Care Agent:     Chiki Oro Relationship:     Son Phone:     [phone]      Patient has Advance Care Plan Documents (Health Care Directive, POLST): Yes    Advance Care Plan Documents:  Health Care Directive--03/28/11  Resuscitation Guidelines--    Patient has identified Specific Treatment Preferences: Yes   Specific Treatment Preferences:   a.) Code Status:  DNR/ Do Not Attempt Resuscitation - Allow a Natural Death    b.) Goals of Treatment:     ii. Limited Interventions and treat reversible conditions.  Provide interventions aimed at treatment of new or reversible illness/injury or non-life threatening chronic conditions. Duration of invasive or uncomfortable interventions should generally be limited.- Trial of intubation 5 days or other instructions \"If no improvement, stop.\"  c.) Interventions and Treatments:   i.   Antibiotics:          - Aggressive antibiotics  ii.  Nutrition/Hydration:         - Offer food and liquids by mouth         - IV fluid administration         - No feeding tube under any circumstance.  iii. Transfusion:          - Blood products for comfort/relief of symptoms only  iv. Dialysis:           - Dialysis for short term \"for recovery, but not long term.\"        Last Assessment & Plan:   Advance Care Planning: Disease-specific Session    Letty Escobar is an [facility] patient of Dr. Renea Mendez of the [facility].    Advance care planning discussions were completed with Letty and her healthcare agent, spouse Edwar Escobar on Monday, March 28, 2011 at the [facility].    Understanding of Illness and Disease [place]:   Letty identifies her medical condition as diabetes with peripheral neuropathy, heart problems with a heart attack February 2009 plus 4 stent placements; sleep " "apnea; depression; hypothyroid; high cholesterol; tobacco use; and describes it as needing further assistance with thyroid and diabetes management.  She identifies the following symptoms of her medical condition as being the most bothersome: some memory loss, balance problems, light headedness and dizziness, puffy eyes and lower extremity edema from time to time.    Let[place] is [place] .  She is independent with all cares and lives an active life style although, \"I'm not as active as I used to be.\"    Goals of Care:   Letty currently hopes to maintain independence, control pain and symptoms and delay progression of, but not cure, the illness.  \"I want to get the diabetes and thyroid under better control.\"  Letty has an appointment the first part of April for follow up.    Quality of Life:    Letty identifies quality of life as family involvement twice weekly, [employer] activities, newly assigned  , playing cards, the computer,    Letty christiano with serious challenges in her life through her ganesh in God, prayers and support of her [employer] family, spouse and son.    Letty identifies the following fears and worries about her medical care:  \"I just want the diabetes straightened out.\"    Treatment and Care Preferences:   Past experiences in dealing with family and/or friends that have  or been seriously ill include her brother who took his own life.  \"He was 53 years old and living with us.  I found him.\"   As a result of these experiences, Letty expresses these health care preferences:      Summary Letty's Treatment Preferences:  LOW SURVIVAL; HIGH TREATMENT BURDEN:  If Letty suffered a serious complication, such that she was facing a prolonged hospital stay, required ongoing medical interventions, and the chance of living through the complication was low (for example, only 5 out of 100 would live), Letty would choose:  to focus " "treatment on comfort and quality of life (\"Quality of life is more important than length of life to Letty.\")  COMMENT:  \"If I can't live a normal life, I wouldn't want to live.\"    HIGH SURVIVAL; LOW FUNCTIONAL STATUS:  If Letty had a serious complication and had a good chance of living through the complication but it was expected that she would never be able to walk or talk again and would require 24 hour nursing care, she would choose:  to focus treatment on comfort and quality of life (\"Quality of life is more important than length of life to Letty.\")  COMMENT:  \"I'd accept rehabilitation.\"    HIGH SURVIVAL; LOW COGNITIVE STATUS:  If Letty had a serious complication and had a good chance of living through the complication but it was expected that she would never know who she was or who she was with and would require 24 hour nursing care, she would choose:  to focus treatment on comfort and quality of life (\"Quality of life is more important than length of life to Letty.\")    CARDIO-PULMONARY RESUSCITATION (CPR):  The facts, risks and benefits of CPR were discussed with Letty.  If she had a sudden event that caused her heart and breathing to stop, she:  WOULD NOT want CPR attempted and instead would prefer that a natural death occur.    MECHANICAL VENTILATION: If Letty had an episode where she was unable to breathe on her own, she would choose the following:  attempt to use any appropriate non-invasive method to assist breathing, and use mechanical ventilation.  COMMENT:  \"If reversible ventilator is acceptable for 5 days.  If no improvement, stop.\"     Letty has chosen her healthcare agent to:  strictly follow her wishes.    Follow Up Plan:   Letty was encouraged to continue advance care planning discussions with her health care agents and primary care provider.   Letty and her health care agent declined handouts.     Documents addressed during this advance care planning session:  1.  Health Care Agents " identified.          .  Primary health care agent is spouse, Edwar Escobar;          .  Secondary health care agent is son, Jermaine Oro.  2.  Health Care Directive completed and scanned into medical record.  3.  Statement of Treatment Preferences for advanced illness completed and scanned into the medical record.  4.  Resuscitation Guidelines completed for DNR.  Document sent to Dr. Renea Mendez for signature and returned to the home. Letty, please place on the refrigerator.    Recommendations/Plan:   Letty and her health care agent to review Advance Care Plan.     Letty would benefit from:   Care Navigation/Care Navigation Help Desk--explained services for pending future needs.  No identified needs today.  Care Navigation brochure was given to Letty and her healthcare agent.    Advance Care Planning recommendations and Letty's concerns and questions were cc ed to her primary provider.     Thank you, Letty for the opportunity of assisting with Advance Care Planning. It was a seeing you again and meeting Edwar.  Please contact me if I can be of further assistance.    Interviewer: Pat Martin RN    Advance Care Planning Facilitator  [phone]   3/28/2011      ELI (obstructive sleep apnea) 01/31/2010     Priority: Medium     PSG on April/2008 that showed moderate Obstructive Sleep Apnea with an AHI of 23.5 . Limb movements persisted at 29 movements/hour at optimal pressures, despite carbidopa use.    Formatting of this note might be different from the original.  PSG on April/2008 that showed moderate Obstructive Sleep Apnea with an AHI of 23.5 . Limb movements persisted at 29 movements/hour at optimal pressures, despite carbidopa use.  Formatting of this note might be different from the original.  PSG on April/2008 that showed moderate Obstructive Sleep Apnea with an AHI of 23.5 . Limb movements persisted at 29 movements/hour at optimal pressures, despite carbidopa use.      Coronary atherosclerosis  02/06/2009     Priority: Medium     Formatting of this note might be different from the original.  2/5/2009 - MI - Proximal RCA 99%, mild-mod disease elsewhere.  EF 60%.  PCI:  MYRON to pRCA.  2/12/2009 - admit CP - Widely patent RCA stent. Moderate diffuse CAD. Severe stenosis in trivial PDA branch. LVEF 45%.  1/25/2010 - admit CP - PTCA and stent of diagonal  9/8/2010 - admit CP - LAD patent stent. Moderate diffuse CAD. Medical management recommended.   4/25/2012 - NSTEMI - Acute total occlusion of the ostial Circumflex. Acute total occlusion of the ostial ramus. Acute total occlusion of the distal 1st Obtuse Marginal. Angioplasty of ostial circumflex and ostial ramus. There was distal embolization to the OM1 vessel. This vessel was small and not amendable to intervention. Indefinite: plavix and asa 81.  8/10/2015 - CABx5 - 1. LIMA to distal LAD, reverse SVG to OM1 and OM2, reverse SVG to diagonal, reverse SVG to PDA.   2. Mitral valve repair with a Medtronics 3-D full ring, 28 mm.   3. Tricuspid repair with a Medtronic 28 mm partial ring  1/12/2016 - TTE - EF 40-45%  Formatting of this note might be different from the original.  2/5/2009 - MI - Proximal RCA 99%, mild-mod disease elsewhere.  EF 60%.  PCI:  MYRON to pRCA.  2/12/2009 - admit CP - Widely patent RCA stent. Moderate diffuse CAD. Severe stenosis in trivial PDA branch. LVEF 45%.  1/25/2010 - admit CP - PTCA and stent of diagonal  9/8/2010 - admit CP - LAD patent stent. Moderate diffuse CAD. Medical management recommended.   4/25/2012 - NSTEMI - Acute total occlusion of the ostial Circumflex. Acute total occlusion of the ostial ramus. Acute total occlusion of the distal 1st Obtuse Marginal. Angioplasty of ostial circumflex and ostial ramus. There was distal embolization to the OM1 vessel. This vessel was small and not amendable to intervention. Indefinite: plavix and asa 81.  8/10/2015 - CABx5 - 1. LIMA to distal LAD, reverse SVG to OM1 and OM2, reverse SVG  to diagonal, reverse SVG to PDA.   2. Mitral valve repair with a Medtronics 3-D full ring, 28 mm.   3. Tricuspid repair with a Medtronic 28 mm partial ring  1/12/2016 - TTE - EF 40-45%      Restless legs syndrome 08/29/2007     Priority: Medium    Hyperlipidemia, unspecified 05/30/2007     Priority: Medium        Past Medical History:    Past Medical History:   Diagnosis Date    ACP (advance care planning) 11/3/2010    Acute coronary syndrome (H) 11/22/2019    Acute exacerbation of CHF (congestive heart failure) (H) 11/11/2019    Acute on chronic systolic (congestive) heart failure (H) 1/28/2020    Anemia of chronic disease 1/5/2013    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, left leg (H) 1/15/2020    BPPV (benign paroxysmal positional vertigo) 5/31/2018    Candidiasis 3/1/2020    Carotid stenosis, right 7/13/2016    Chest pain 11/11/2019    Chronic bilateral low back pain without sciatica 1/17/2018    Chronic pain disorder 10/1/2017    Constipation 3/20/2020    COPD (chronic obstructive pulmonary disease) (H) 6/24/2020    Coronary atherosclerosis 2/6/2009    Cubital tunnel syndrome on left 11/13/2012    Depressive disorder     Diabetes mellitus (H)     Diabetes mellitus type 2 in obese (H) 7/13/2006    Diabetes mellitus type 2 in obese (H) 7/13/2006    Drug-seeking behavior 4/4/2020    Failure to thrive in adult 9/3/2020    Hereditary and idiopathic peripheral neuropathy 4/24/2007    Hyperlipidemia with target low density lipoprotein (LDL) cholesterol less than 70 mg/dL 5/30/2007    Hypertension 10/14/2015    Hypothyroidism 12/10/2010    Irritable bowel syndrome 9/7/2015    Ischemic cardiomyopathy 9/20/2015    Long-term use of high-risk medication 4/14/2020    Moniliasis, 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    Myocardial infarction (H)     Neuromuscular disorder (H)     Other specified postprocedural states 10/8/2015    Pain  medication agreement 4/20/2013    Panic disorder with agoraphobia 4/14/2020    Paroxysmal atrial fibrillation (H) 10/2/2015    Personality disorder (H) 3/5/2020    Polymyalgia rheumatica (H24) 11/28/2018    Posttraumatic stress disorder 3/1/2020    Restless legs syndrome (RLS) 8/29/2007    Restless legs syndrome (RLS) 8/29/2007    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    Serum calcium elevated 3/1/2020    Somatic dysfunction of sacral region 1/17/2018    Subacromial bursitis of left shoulder joint 8/6/2018    Suicidal ideation 4/1/2020    Thyroid disease     TIA (transient ischemic attack) 5/4/2018    TIA (transient ischemic attack) 5/4/2018    Tobacco abuse 2/17/2017    Tobacco abuse 2/17/2017    Urinary incontinence, mixed 9/24/2017    UTI (urinary tract infection) 4/17/2020    Vitamin B12 deficiency 2/14/2018    Weakness 12/17/2019       Past Surgical History:    Past Surgical History:   Procedure Laterality Date    CARDIAC SURGERY      stents x 11    CARDIAC SURGERY      CHOLECYSTECTOMY      CHOLECYSTECTOMY      GENITOURINARY SURGERY      Tubal ligation    OTHER SURGICAL HISTORY      Genitourinary surgery    RELEASE CARPAL TUNNEL      RELEASE CARPAL TUNNEL         Family History:    No family history on file.    Social History:  Marital Status:   [2]  Social History     Tobacco Use    Smoking status: Never    Smokeless tobacco: Never   Vaping Use    Vaping Use: Never used   Substance Use Topics    Alcohol use: Not Currently    Drug use: Never        Medications:    acetaminophen (TYLENOL) 325 MG tablet  acetaminophen (TYLENOL) 500 MG tablet  amLODIPine (NORVASC) 5 MG tablet  aspirin 81 MG EC tablet  bisacodyl (DULCOLAX) 10 MG suppository  cyanocobalamin (CYANOCOBALAMIN) 1000 MCG/ML injection  divalproex sodium delayed-release (DEPAKOTE) 250 MG DR tablet  DULoxetine HCl 40 MG CPEP  famotidine (PEPCID) 20 MG tablet  fluticasone-vilanterol (BREO ELLIPTA) 200-25 MCG/INH inhaler  furosemide (LASIX) 20 MG tablet  gabapentin  (NEURONTIN) 400 MG capsule  hydrochlorothiazide (HYDRODIURIL) 25 MG tablet  levothyroxine (SYNTHROID/LEVOTHROID) 112 MCG tablet  lidocaine (LMX4) 4 % external cream  LORazepam (ATIVAN) 1 MG tablet  Melatonin 10 MG TABS tablet  metoprolol succinate ER (TOPROL-XL) 25 MG 24 hr tablet  mirtazapine (REMERON) 30 MG tablet  nitroGLYcerin (NITROSTAT) 0.4 MG sublingual tablet  ondansetron (ZOFRAN ODT) 4 MG ODT tab  polyethylene glycol (MIRALAX) 17 GM/Dose powder  potassium chloride ER (MICRO-K) 10 MEQ CR capsule  QUEtiapine (SEROQUEL) 100 MG tablet  rosuvastatin (CRESTOR) 10 MG tablet  sennosides (SENOKOT) 8.6 MG tablet  traZODone (DESYREL) 50 MG tablet          Review of Systems   All other systems reviewed and are negative.      Physical Exam   BP: 114/59  Pulse: 64  Temp: (!) 100.9  F (38.3  C)  Resp: 11  Weight: 98.9 kg (218 lb)  SpO2: 93 %      Physical Exam  Vitals and nursing note reviewed.   Constitutional:       General: She is not in acute distress.     Appearance: She is well-developed.      Comments: Appears lethargic, leaning to the right.   HENT:      Head: Normocephalic and atraumatic.      Right Ear: External ear normal.      Left Ear: External ear normal.      Nose: Nose normal.      Mouth/Throat:      Mouth: Mucous membranes are dry.      Pharynx: No oropharyngeal exudate or posterior oropharyngeal erythema.   Eyes:      General: No scleral icterus.     Extraocular Movements: Extraocular movements intact.      Conjunctiva/sclera: Conjunctivae normal.   Cardiovascular:      Rate and Rhythm: Normal rate.   Pulmonary:      Effort: Pulmonary effort is normal. No respiratory distress.   Abdominal:      General: Abdomen is flat.      Tenderness: There is no abdominal tenderness. There is no guarding or rebound.   Musculoskeletal:      Cervical back: Normal range of motion and neck supple.   Skin:     General: Skin is warm and dry.      Findings: No rash.   Neurological:      Mental Status: She is alert and  oriented to person, place, and time.   Psychiatric:      Comments: Patient is alert oriented to location and is able answer questions but falls asleep frequently during the conversation.         ED Course             EKG Interpretation:      Interpreted by Juan Ramos MD  Time reviewed:1059   Symptoms at time of EKG: lethargic   Rhythm: paced  Rate: normal  Axis: NORMAL  Ectopy: none  Conduction: paced  ST Segments/ T Waves: Non specific ST-T wave changes  Q Waves: none  Comparison to prior: Unchanged    Clinical Impression: paced            Procedures                Results for orders placed or performed during the hospital encounter of 11/10/23 (from the past 24 hour(s))   CBC with Platelets & Differential    Narrative    The following orders were created for panel order CBC with Platelets & Differential.  Procedure                               Abnormality         Status                     ---------                               -----------         ------                     CBC with platelets and d...[[id number]]  Abnormal            Final result                 Please view results for these tests on the individual orders.   Troponin T, High Sensitivity   Result Value Ref Range    Troponin T, High Sensitivity 42 (H) <=14 ng/L   [place] Draw    Narrative    The following orders were created for panel order [place] Draw.  Procedure                               Abnormality         Status                     ---------                               -----------         ------                     Extra Blue Top Tube[[id number]]                              Final result               Extra Heparinized Syringe[[id number]]                                                     Please view results for these tests on the individual orders.   Comprehensive metabolic panel   Result Value Ref Range    Sodium 139 135 - 145 mmol/L    Potassium 4.5 3.4 - 5.3 mmol/L    Carbon Dioxide (CO2) 28 22 - 29 mmol/L    Anion Gap 11 7 - 15  mmol/L    Urea Nitrogen 26.6 (H) 8.0 - 23.0 mg/dL    Creatinine 1.44 (H) 0.51 - 0.95 mg/dL    GFR Estimate 39 (L) >60 mL/min/1.73m2    Calcium 9.8 8.8 - 10.2 mg/dL    Chloride 100 98 - 107 mmol/L    Glucose 175 (H) 70 - 99 mg/dL    Alkaline Phosphatase 48 35 - 104 U/L    AST 23 0 - 45 U/L    ALT 18 0 - 50 U/L    Protein Total 7.2 6.4 - 8.3 g/dL    Albumin 3.7 3.5 - 5.2 g/dL    Bilirubin Total 0.2 <=1.2 mg/dL   Lipase   Result Value Ref Range    Lipase 16 13 - 60 U/L   Lactic acid whole blood   Result Value Ref Range    Lactic Acid 1.6 0.7 - 2.0 mmol/L   Blood gas venous   Result Value Ref Range    pH Venous 7.36 7.32 - 7.43    pCO2 Venous 54 (H) 40 - 50 mm Hg    pO2 Venous 47 25 - 47 mm Hg    Bicarbonate Venous 31 (H) 21 - 28 mmol/L    Base Excess/Deficit 4.2 (H) -7.7 - 1.9 mmol/L    FIO2 28    CBC with platelets and differential   Result Value Ref Range    WBC Count 6.4 4.0 - 11.0 10e3/uL    RBC Count 3.09 (L) 3.80 - 5.20 10e6/uL    Hemoglobin 9.1 (L) 11.7 - 15.7 g/dL    Hematocrit 29.7 (L) 35.0 - 47.0 %    MCV 96 78 - 100 fL    MCH 29.4 26.5 - 33.0 pg    MCHC 30.6 (L) 31.5 - 36.5 g/dL    RDW 15.0 10.0 - 15.0 %    Platelet Count 146 (L) 150 - 450 10e3/uL    % Neutrophils 67 %    % Lymphocytes 21 %    % Monocytes 10 %    % Eosinophils 1 %    % Basophils 0 %    % Immature Granulocytes 1 %    NRBCs per 100 WBC 0 <1 /100    Absolute Neutrophils 4.3 1.6 - 8.3 10e3/uL    Absolute Lymphocytes 1.3 0.8 - 5.3 10e3/uL    Absolute Monocytes 0.7 0.0 - 1.3 10e3/uL    Absolute Eosinophils 0.1 0.0 - 0.7 10e3/uL    Absolute Basophils 0.0 0.0 - 0.2 10e3/uL    Absolute Immature Granulocytes 0.1 <=0.4 10e3/uL    Absolute NRBCs 0.0 10e3/uL   Extra Blue Top Tube   Result Value Ref Range    Hold Specimen JIC    TSH with free T4 reflex   Result Value Ref Range    TSH 13.76 (H) 0.30 - 4.20 uIU/mL   T4 free   Result Value Ref Range    Free T4 0.88 (L) 0.90 - 1.70 ng/dL   Procalcitonin   Result Value Ref Range    Procalcitonin 0.23 (H) <0.05  ng/mL   Symptomatic Influenza A/B, RSV, & SARS-CoV2 PCR (COVID-19) Nose    Specimen: Nose; Swab   Result Value Ref Range    Influenza A PCR Negative Negative    Influenza B PCR Negative Negative    RSV PCR Negative Negative    SARS CoV2 PCR Negative Negative    Narrative    Testing was performed using the Xpert Xpress CoV2/Flu/RSV Assay on the [email or web address] GeneXpert Instrument. This test should be ordered for the detection of SARS-CoV-2, influenza, and RSV viruses in individuals who meet clinical and/or epidemiological criteria. Test performance is unknown in asymptomatic patients. This test is for in vitro diagnostic use under the FDA EUA for laboratories certified under CLIA to perform high or moderate complexity testing. This test has not been FDA cleared or approved. A negative result does not rule out the presence of PCR inhibitors in the specimen or target RNA in concentration below the limit of detection for the assay. If only one viral target is positive but coinfection with multiple targets is suspected, the sample should be re-tested with another FDA cleared, approved, or authorized test, if coinfection would change clinical management. This test was validated by the [facility] [employer]. These laboratories are certified under the Clinical Laboratory Improvement Amendments of 1988 (CLIA-88) as qualified to perform high complexity laboratory testing.   Head CT w/o contrast    Narrative    CT SCAN OF THE HEAD WITHOUT CONTRAST   11/10/2023 11:31 AM     HISTORY: weakness right facial droop    TECHNIQUE:  Axial images of the head and coronal reformations without  IV contrast material. Radiation dose for this scan was reduced using  automated exposure control, adjustment of the mA and/or kV according  to patient size, or iterative reconstruction technique.    COMPARISON: 2/12/2023.    FINDINGS: There is no evidence of intracranial hemorrhage, mass, acute  infarct or anomaly. Chronic lacunar infarcts in the  bilateral caudate  nuclei. The ventricles are normal in size, shape and configuration.  Mild diffuse parenchymal volume loss. Mild patchy periventricular  white matter hypodensities which are nonspecific, but likely related  to chronic microvascular ischemic disease. Bilateral carotid siphon  atherosclerotic calcifications.    The visualized portions of the sinuses and mastoids appear normal. The  bony calvarium and bones of the skull base appear intact.       Impression    IMPRESSION:   No evidence of acute territorial infarction or other  acute intracranial abnormality.    ROWENA NIXON MD         SYSTEM ID:  [id number]   XR Chest Port 1 View    Narrative    XR CHEST PORT 1 VIEW   11/10/2023 11:50 AM     HISTORY: sob    COMPARISON: 09/10/2023.      Impression    IMPRESSION: No significant change. Sternotomy. Stable left AICD.  Cardiac valve replacement. Cardiomegaly. Normal pulmonary vascularity.  Clear lungs.    YARA HORTON MD         SYSTEM ID:  [id number]   UA with Microscopic reflex to Culture    Specimen: Urine, Catheter   Result Value Ref Range    Color Urine Yellow Colorless, Straw, Light Yellow, Yellow    Appearance Urine Slightly Cloudy (A) Clear    Glucose Urine Negative Negative mg/dL    Bilirubin Urine Negative Negative    Ketones Urine Negative Negative mg/dL    Specific Gravity Urine 1.025 1.003 - 1.035    Blood Urine Small (A) Negative    pH Urine 5.0 5.0 - 7.0    Protein Albumin Urine Negative Negative mg/dL    Urobilinogen Urine Normal Normal, 2.0 mg/dL    Nitrite Urine Positive (A) Negative    Leukocyte Esterase Urine Moderate (A) Negative    Bacteria Urine Many (A) None Seen /HPF    Mucus Urine Present (A) None Seen /LPF    RBC Urine 5 (H) <=2 /HPF    WBC Urine 46 (H) <=5 /HPF    Squamous Epithelials Urine <1 <=1 /HPF    Hyaline Casts Urine 6 (H) <=2 /LPF    Narrative    Urine Culture ordered based on laboratory criteria       Medications   cefTRIAXone (ROCEPHIN) 1 g vial to attach to NS  100 mL bag for ADULTS or NS 50 mL bag for PEDS (1 g Intravenous $New Bag 11/10/23 2186)       Assessments & Plan (with Medical Decision Making)  67-year-old female who presented to the emergency department secondary to lethargy, low-grade fevers, nausea, sleeping a lot.  She was found to have some dehydration given her BUN and creatinine ratio is elevated compared to baseline.  Her hemoglobin is 9.1 which is actually a little bit better than her baseline.  White count is normal.  Lactate was normal.  CT scan of the head is without acute findings.  Troponin is mildly elevated at 42.  Patient has not any chest pain we will repeat that.  IV fluids were given.  Straight catheter urine was performed.  Straight cath urinalysis showed evidence for UTI.  Previous urine cultures have had mixed urogenital storm and E. coli that was resistant to Cipro.  Patient most likely has urinary tract infection as the etiology for her lethargy although her TSH is abnormal and she started new pain medicine.  Patient was given an IV dose of Rocephin.  Blood cultures were ordered by hospitalist, Dr. Silva.  I discussed case with Dr. Silva for admission and he agrees with admission given that the patient has a fever, lethargy, oxygen needs.  She needs 2 L via nasal cannula in order to maintain oxygen saturations over 90-95.  Dr. Silva agrees with admission as long as the second troponin is not excessively high.  That is in process now.  Procalcitonin ordered by Dr. Silva is elevated.     I have reviewed the nursing notes.    I have reviewed the findings, diagnosis, plan and need for follow up with the patient.          New Prescriptions    No medications on file       Final diagnoses:   Urinary tract infection without hematuria, site unspecified   Lethargy       11/10/2023   [facility] EMERGENCY DEPT       Juan Ramos MD  11/10/23 1591       Juan Ramos MD  11/10/23 4713

## 2023-11-11 ENCOUNTER — APPOINTMENT (OUTPATIENT)
Dept: PHYSICAL THERAPY | Facility: CLINIC | Age: 68
End: 2023-11-11
Payer: COMMERCIAL

## 2023-11-11 LAB
ANION GAP SERPL CALCULATED.3IONS-SCNC: 11 MMOL/L (ref 7–15)
BUN SERPL-MCNC: 24.5 MG/DL (ref 8–23)
CALCIUM SERPL-MCNC: 9.5 MG/DL (ref 8.8–10.2)
CHLORIDE SERPL-SCNC: 101 MMOL/L (ref 98–107)
CREAT SERPL-MCNC: 1.19 MG/DL (ref 0.51–0.95)
DEPRECATED HCO3 PLAS-SCNC: 28 MMOL/L (ref 22–29)
EGFRCR SERPLBLD CKD-EPI 2021: 49 ML/MIN/1.73M2
ERYTHROCYTE [DISTWIDTH] IN BLOOD BY AUTOMATED COUNT: 14.9 % (ref 10–15)
GLUCOSE BLDC GLUCOMTR-MCNC: 146 MG/DL (ref 70–99)
GLUCOSE BLDC GLUCOMTR-MCNC: 160 MG/DL (ref 70–99)
GLUCOSE BLDC GLUCOMTR-MCNC: 173 MG/DL (ref 70–99)
GLUCOSE BLDC GLUCOMTR-MCNC: 192 MG/DL (ref 70–99)
GLUCOSE SERPL-MCNC: 142 MG/DL (ref 70–99)
HCT VFR BLD AUTO: 28.1 % (ref 35–47)
HGB BLD-MCNC: 8.6 G/DL (ref 11.7–15.7)
LACTATE SERPL-SCNC: 1 MMOL/L (ref 0.7–2)
MCH RBC QN AUTO: 29.7 PG (ref 26.5–33)
MCHC RBC AUTO-ENTMCNC: 30.6 G/DL (ref 31.5–36.5)
MCV RBC AUTO: 97 FL (ref 78–100)
PLATELET # BLD AUTO: 128 10E3/UL (ref 150–450)
POTASSIUM SERPL-SCNC: 4.1 MMOL/L (ref 3.4–5.3)
PROCALCITONIN SERPL IA-MCNC: 0.19 NG/ML
RBC # BLD AUTO: 2.9 10E6/UL (ref 3.8–5.2)
SODIUM SERPL-SCNC: 140 MMOL/L (ref 135–145)
WBC # BLD AUTO: 6 10E3/UL (ref 4–11)

## 2023-11-11 PROCEDURE — G0378 HOSPITAL OBSERVATION PER HR: HCPCS

## 2023-11-11 PROCEDURE — 85018 HEMOGLOBIN: CPT | Performed by: PEDIATRICS

## 2023-11-11 PROCEDURE — 83605 ASSAY OF LACTIC ACID: CPT | Performed by: PEDIATRICS

## 2023-11-11 PROCEDURE — 250N000013 HC RX MED GY IP 250 OP 250 PS 637: Performed by: PEDIATRICS

## 2023-11-11 PROCEDURE — 36415 COLL VENOUS BLD VENIPUNCTURE: CPT | Performed by: PEDIATRICS

## 2023-11-11 PROCEDURE — 97530 THERAPEUTIC ACTIVITIES: CPT | Mod: GP | Performed by: PHYSICAL THERAPIST

## 2023-11-11 PROCEDURE — 80048 BASIC METABOLIC PNL TOTAL CA: CPT | Performed by: PEDIATRICS

## 2023-11-11 PROCEDURE — 97161 PT EVAL LOW COMPLEX 20 MIN: CPT | Mod: GP | Performed by: PHYSICAL THERAPIST

## 2023-11-11 PROCEDURE — 82962 GLUCOSE BLOOD TEST: CPT

## 2023-11-11 PROCEDURE — 250N000012 HC RX MED GY IP 250 OP 636 PS 637: Performed by: NURSE PRACTITIONER

## 2023-11-11 PROCEDURE — 99233 SBSQ HOSP IP/OBS HIGH 50: CPT | Performed by: NURSE PRACTITIONER

## 2023-11-11 PROCEDURE — 999N000111 HC STATISTIC OT IP EVAL DEFER: Performed by: SPECIALIST/TECHNOLOGIST

## 2023-11-11 PROCEDURE — 84145 PROCALCITONIN (PCT): CPT | Performed by: NURSE PRACTITIONER

## 2023-11-11 PROCEDURE — 250N000013 HC RX MED GY IP 250 OP 250 PS 637: Performed by: NURSE PRACTITIONER

## 2023-11-11 RX ORDER — ACETAMINOPHEN 325 MG/1
650 TABLET ORAL EVERY 4 HOURS PRN
Status: DISCONTINUED | OUTPATIENT
Start: 2023-11-11 | End: 2023-11-12 | Stop reason: HOSPADM

## 2023-11-11 RX ORDER — DEXTROSE MONOHYDRATE 25 G/50ML
25-50 INJECTION, SOLUTION INTRAVENOUS
Status: DISCONTINUED | OUTPATIENT
Start: 2023-11-11 | End: 2023-11-12 | Stop reason: HOSPADM

## 2023-11-11 RX ORDER — ACETAMINOPHEN 650 MG/1
650 SUPPOSITORY RECTAL EVERY 4 HOURS PRN
Status: DISCONTINUED | OUTPATIENT
Start: 2023-11-11 | End: 2023-11-12 | Stop reason: HOSPADM

## 2023-11-11 RX ORDER — LIDOCAINE 40 MG/G
CREAM TOPICAL
Status: DISCONTINUED | OUTPATIENT
Start: 2023-11-11 | End: 2023-11-12 | Stop reason: HOSPADM

## 2023-11-11 RX ORDER — NICOTINE POLACRILEX 4 MG
15-30 LOZENGE BUCCAL
Status: DISCONTINUED | OUTPATIENT
Start: 2023-11-11 | End: 2023-11-12 | Stop reason: HOSPADM

## 2023-11-11 RX ADMIN — TRAZODONE HYDROCHLORIDE 25 MG: 50 TABLET ORAL at 08:34

## 2023-11-11 RX ADMIN — LORAZEPAM 0.5 MG: 0.5 TABLET ORAL at 21:21

## 2023-11-11 RX ADMIN — LEVOTHYROXINE SODIUM 112 MCG: 112 TABLET ORAL at 06:53

## 2023-11-11 RX ADMIN — QUETIAPINE FUMARATE 100 MG: 100 TABLET ORAL at 21:21

## 2023-11-11 RX ADMIN — SENNOSIDES 2 TABLET: 8.6 TABLET, FILM COATED ORAL at 21:21

## 2023-11-11 RX ADMIN — POTASSIUM CHLORIDE 10 MEQ: 750 TABLET, EXTENDED RELEASE ORAL at 08:34

## 2023-11-11 RX ADMIN — TRAZODONE HYDROCHLORIDE 25 MG: 50 TABLET ORAL at 21:21

## 2023-11-11 RX ADMIN — ASPIRIN 81 MG: 81 TABLET ORAL at 08:34

## 2023-11-11 RX ADMIN — INSULIN ASPART 1 UNITS: 100 INJECTION, SOLUTION INTRAVENOUS; SUBCUTANEOUS at 17:52

## 2023-11-11 RX ADMIN — LORAZEPAM 0.5 MG: 0.5 TABLET ORAL at 08:33

## 2023-11-11 RX ADMIN — ACETAMINOPHEN 650 MG: 325 TABLET, FILM COATED ORAL at 11:51

## 2023-11-11 RX ADMIN — QUETIAPINE FUMARATE 100 MG: 100 TABLET ORAL at 08:33

## 2023-11-11 RX ADMIN — METOPROLOL SUCCINATE 25 MG: 25 TABLET, EXTENDED RELEASE ORAL at 08:45

## 2023-11-11 RX ADMIN — DIVALPROEX SODIUM 250 MG: 250 TABLET, DELAYED RELEASE ORAL at 08:34

## 2023-11-11 RX ADMIN — TRAZODONE HYDROCHLORIDE 25 MG: 50 TABLET ORAL at 14:23

## 2023-11-11 RX ADMIN — ROSUVASTATIN CALCIUM 20 MG: 20 TABLET, FILM COATED ORAL at 21:20

## 2023-11-11 RX ADMIN — QUETIAPINE FUMARATE 100 MG: 100 TABLET ORAL at 14:23

## 2023-11-11 RX ADMIN — MIRTAZAPINE 15 MG: 15 TABLET, FILM COATED ORAL at 08:34

## 2023-11-11 RX ADMIN — FAMOTIDINE 20 MG: 20 TABLET ORAL at 21:21

## 2023-11-11 RX ADMIN — DULOXETINE HYDROCHLORIDE 40 MG: 20 CAPSULE, DELAYED RELEASE ORAL at 08:33

## 2023-11-11 ASSESSMENT — ACTIVITIES OF DAILY LIVING (ADL)
ADLS_ACUITY_SCORE: 42
ADLS_ACUITY_SCORE: 38
ADLS_ACUITY_SCORE: 38
ADLS_ACUITY_SCORE: 42
ADLS_ACUITY_SCORE: 38
ADLS_ACUITY_SCORE: 42
ADLS_ACUITY_SCORE: 42
ADLS_ACUITY_SCORE: 38
ADLS_ACUITY_SCORE: 38
ADLS_ACUITY_SCORE: 42
ADLS_ACUITY_SCORE: 38
ADLS_ACUITY_SCORE: 38
DEPENDENT_IADLS:: CLEANING;COOKING;LAUNDRY;SHOPPING;MEAL PREPARATION;MEDICATION MANAGEMENT;MONEY MANAGEMENT;TRANSPORTATION;INCONTINENCE

## 2023-11-11 NOTE — PROGRESS NOTES
[facility]    Medicine Progress Note - Hospitalist Service    Date of Admission:  11/10/2023    Assessment & Plan   67 year old female with complex health history notable for CAD with history of MI and CABG, ischemic cardiomyopathy with chronic systolic heart failure, indwelling cardiac pacemaker and defibrillator, type 2 diabetes, hypertension, hyperlipidemia, COPD, PAD, ELI, morbid obesity, chronic mental illness, chronic urinary incontinence, previous stroke, stage IIIa CKD, chronic anemia, chronic thrombocytopenia, and chronic debility for which she is a resident at long-[facility] who presented to ER on 11/10/2023 due to lethargy and severe hypoxia noted at her long-term care facility.  In the ER she also had fever raising concern for possible SIRS, sepsis, and infection although infection has not yet been identified definitively.  However, she also had recent increase in benzodiazepine dose causing concern for possible increased sedation from medication which can cause hypoventilation with hypoxia, atelectasis, and fever.  She is at high risk for an adverse outcome including worsening infection and sepsis, worsening hypoxia and respiratory failure, and death, so hospitalization to expedite diagnostic evaluation and initiate an appropriate treatment is considered medically necessary.  Based on the presently available information, hospitalization for at least 24 to 48 hours is anticipated.     Principal Problem:    Lethargy  Active Problems:    Fever    Hypoxia    Chronic systolic heart failure (H)    COPD without exacerbation (H)    Essential (primary) hypertension    Ischemic cardiomyopathy    Type 2 diabetes mellitus (H)    Chronic kidney disease, stage 3a (H)    Pyuria    PAD (peripheral artery disease) (H24)    Left foot pain    Obesity hypoventilation syndrome (H)    Benzodiazepine dependence (H)    Thrombocytopenia (H24)    ERIC (acute kidney injury) (H24)     Hyperlipidemia, unspecified    Hypothyroidism, unspecified    ELI (obstructive sleep apnea)    Panic disorder with agoraphobia    Personality disorder (H)    S/P CABG x 5    Urinary incontinence, mixed    Major depressive disorder, recurrent severe without psychotic features (H)    Pacemaker    Morbid obesity (H)    ICD (implantable cardioverter-defibrillator) in place    History of stroke    Lives in long-term care facility     Fever  Pyuria  Presented with fever to 100.9 in the ER even after she had received 1000 mg acetaminophen dose at long-term [facility] about 2 hours previously.  She fas not been tachycardic.  She is on beta-blocker chronically and has been  consistently ventricular pacing.      There has been no leukocytosis or other SIRS criteria so far.  Lactic acid is normal.  She does not have localizing symptoms or signs of infection.  Screening evaluation for infection is abnormal including catheterized urine specimen demonstrating pyuria, but she does not report symptoms suspicious for symptomatic UTI.  Urine culture is pending.  Blood cultures negative x 1 day.  No fevers since admission.    Antibiotics not continued on admission, and will not start them unless she develops further signs/symptoms of acute infection/sirs.  -Follow-up on results of urine and blood cultures  -trend inflammatory markers     Lethargy  Benzodiazepine dependence  Patient reportedly had recent change in chronic diazepam dose of 5 mg daily to lorazepam 1 mg 3 times daily effective November 6 and since that time has been more lethargic.  Lethargy has progressively worsened over the last few days.  It is possible that worsening lethargy is due to this medication change with relatively higher dose of benzodiazepine in the last few days.  She will be at risk for benzodiazepine withdrawal with abrupt discontinuation of benzodiazepines thus her dose was reduced on admission from 1 mg three times daily to 0.5 mg 2 times  daily.  Head CT on admission was negative for acute findings.    She is awake and interactive today however still weaker than baseline.  She was able to stand and pivot to the wheel chair a few days prior to admission and currently she requires saul lift.    -continue reduced Ativan dose to 0.5 mg 2 times daily while watching for benzodiazepine withdrawal  -pt/ot     Severe hypoxia  Mild respiratory acidosis with compensatory metabolic alkalosis  Suspect obesity hypoventilation syndrome  ELI  Found to be severely hypoxic at long-term [facility] facility with oxygen saturation 73% in room air that improved to 92% on 2 L low flow oxygen supplementation.  Patient did not report dyspnea at the time although has been more lethargic.  In ER she was found to have compensated mild respiratory acidosis.    Patient has known ELI and likely obesity hypoventilation.  She does exhibit cough this am; viral panel and chest xray unremarkble.  She is not currently requiring oxygen.  ? If she had a small aspiration vs hypoventilation due to oversedation on increased benzodiazepine dose      Repeat Blood gases yesterday with corrected hypercarbia and mild hypoxemia with PaO2 of 72.      -Monitor oxygenation and use supplemental oxygen to keep saturations 90% and higher as needed  -Minimize sedating medications as much as possible  -If respiratory status is deteriorating, recommend additional diagnostic evaluation such as chest CT and/or other studies depending upon clinical course  -Start incentive spirometry and begin to advance activity as soon as possible       Acute kidney injury  Stage IIIa chronic kidney disease  Chronic urinary incontinence  Baseline creatinine 1-1.1 and admission creatinine 1.44 concerning for possible acute kidney injury.  She has chronic urinary incontinence.  Hypovolemia associated with recently reduced oral intake (because of sedation) and chronic diuretic therapy seems most likely.  ERIC due to pyelonephritis  is possible but clinical presentation is not suspicious for pyelonephritis.  ERIC due to sepsis is also possible but sepsis is not strongly suspected so far.    Her diuretics were placed on hold.  Cr today is 1.19 which is close to her baseline.    -continue to hold diuretics for one more day  -daily renal function  -Avoid nephrotoxic medication as able     Left foot pain  PAD with stenosis of distal left superficial femoral artery  She presents with pain in her left foot which is chronic.  She has known stenosis of distal left superficial femoral artery based on arterial ultrasound from March 2019 for which it does not appear as though she has had any intervention previously.  She does take aspirin and statin therapy chronically.  However, she has palpable pedal pulses in the left foot on exam and the left foot is pink and warm arguing against critical limb ischemia as a cause for pain in her left foot.  She also carries a diagnosis of type 2 diabetes, so peripheral neuropathy with foot pain is also possible.  She is prescribed gabapentin chronically.  -Continue chronic aspirin and statin  -Continue chronic gabapentin  -Use analgesics as needed for foot pain but trying to avoid narcotic analgesics is much as possible because of other concern for hypoventilation and use of narcotics is likely to exacerbate hypoventilation from benzodiazepines and obesity     Type 2 diabetes  She carries previous diagnosis of type 2 diabetes.  Most recent A1c from January 2023 was 6.6.  Current medication list does not include any diabetic medications including insulin.  However, blood sugars recorded at [facility] in October 2023 ranged 295-410, significantly hyperglycemic.  Random blood sugar in the ER today was 175.  A1c 7.9 on admission, blood sugars 140's to 200.  -will add low dose sliding scale     Chronic systolic heart failure  Ischemic cardiomyopathy  CAD with previous CABG  History of MI  History of mitral and  tricuspid valve repair  She has longstanding history of CAD and had 5 vessel CABG in August 2015.  She has had MI previously most recently July 2023 at which time coronary angiography demonstrated mild to moderate stenosis in one of her CABG grafts as well as native vessel disease.  She has not had recent anginal symptoms.  Troponin is mildly elevated but similar to previous levels.  EKG is not interpretable for acute ischemia because of paced rhythm.  She also has chronic systolic heart failure with EF 40 to 45% as noted on most recent echocardiogram from July 2023.  Clinically, heart failure appears compensated at this time.  She also has previous history of mitral and tricuspid valve repair in August 2015.  Radiographically, there were no signs of acute pulmonary edema.  -Not anticipating additional cardiac evaluation during this hospitalization unless there is increasing concern for acute change in cardiac status  -Anticipate outpatient follow-up with her cardiology team through [facility] cardiology     Status post pacemaker and ICD placement  She has indwelling pacemaker ICD device and EKG demonstrates paced rhythm today.  There are not clinical concerns for cardiac device malfunction at this time.  -Anticipate outpatient follow-up with cardiology for routine monitoring of her device     Hypothyroidism  She has chronic hypothyroidism treated with levothyroxine.  TSH is slightly elevated at 13.8 with slightly low free T4 of 0.88 today (0.9 is the lower limit of normal)  It seems unlikely that hypothyroidism is contributing to current lethargy,   -Continue current chronic dose of levothyroxine and recommend recheck of thyroid function tests in 1 month     Chronic anemia  Chronic thrombocytopenia  Aspirin induced platelet function defect  She has chronic anemia with baseline hemoglobin 8-9.  She is also mildly thrombocytopenic chronically with baseline platelet 130-140.  Both chronic anemia and thrombocytopenia  appear stable at this time.  Due to chronicity of her anemia, it seems unlikely that anemia is the major factor in her nonspecific symptoms of lethargy.  She takes aspirin chronically that causes platelet function defect which increases her bleeding risk.  Active bleeding is not suspected at this time.  11/11 platelet and hgb decrease today but still close to baseline.  -Monitor clinically for bleeding  -Continue chronic dose of aspirin       Chronic mental illness  She has chronic mental illness with previous diagnoses of depression, panic disorder with agoraphobia, and PTSD.  She is chronically treated with multiple psychoactive medications including Ativan, valproic acid, duloxetine, mirtazapine, Seroquel, and trazodone.  Chronic mental illness and/or medication treatment thereof could contribute to symptoms of lethargy.  However, aside from the recent change in benzodiazepines, none of her other psychoactive medications appear new or appear to have been changed recently  -For now we will continue these chronic medications except for reducing the dose of benzodiazepines as outlined above     Other chronic medical problems that appear generally stable:  Hypertension  Hyperlipidemia  History of stroke  Morbid obesity  Lives in long-term care facility  -Continue chronic doses of amlodipine and rosuvastatin  -Anticipate case management will assist with disposition planning and return to long-term care facility once medically stable       Observation Goals: -diagnostic tests and consults completed and resulted, -vital signs normal or at patient baseline, -tolerating oral intake to maintain hydration, -dyspnea improved and O2 sats greater than 88% on room air or prior home oxygen levels, Nurse to notify provider when observation goals have been met and patient is ready for discharge.  Diet: Combination Diet Moderate Consistent Carb (60 g CHO per Meal) Diet; 2 gm NA Diet    DVT Prophylaxis: Pneumatic Compression  "Devices  Mccallum Catheter: Not present  Lines: None     Cardiac Monitoring: None  Code Status: No CPR- Do NOT Intubate      Clinically Significant Risk Factors Present on Admission                # Drug Induced Platelet Defect: home medication list includes an antiplatelet medication   # Hypertension: Noted on problem list     # DMII: A1C = 7.9 % (Ref range: <5.7 %) within past 6 months    # Obesity: Estimated body mass index is 37.22 kg/m  as calculated from the following:    Height as of this encounter: 1.575 m (5' 2\").    Weight as of this encounter: 92.3 kg (203 lb 7.8 oz).       # Financial/Environmental Concerns: none         Disposition Plan     Expected Discharge Date: 11/11/2023      Destination: long-term care facility            The patient's care was discussed with the  primary team .    Valarie Shine CNP  Hospitalist Service  [facility]  Securely message with Vocera (more info)  Text page via [employer] Paging/Directory     Interval History       Physical Exam   Vital Signs: Temp: 97.7  F (36.5  C) Temp src: Oral BP: 110/43 Pulse: 62   Resp: 14 SpO2: 97 % O2 Device: Nasal cannula Oxygen Delivery: 1.5 LPM  Weight: 203 lbs 7.75 oz    Gen:  Sitting in chair  no acute distress, generalized weakness  HEENT:  normocephalic, atraumatic, oropharynx clear  Resp:  coarse rhonchi bilateral posteriorly  Card:  S1,S2, v paced, no murmur, rub or gallop  Abd:  Soft , non tender,  normoactive bowel sounds   Neuro:  answers questions appropriately, no focal deficits    Medical Decision Making       45 MINUTES SPENT BY ME on the date of service doing chart review, history, exam, documentation & further activities per the note.        Data     I have personally reviewed the following data over the past 24 hrs:    6.0  \   8.6 (L)   / 128 (L)     140 101 24.5 (H) /  146 (H)   4.1 28 1.19 (H) \     ALT: 18 AST: 23 AP: 48 TBILI: 0.2   ALB: 3.7 TOT PROTEIN: 7.2 LIPASE: 16     Trop: 38 (H) BNP: N/A "     TSH: 13.76 (H) T4: 0.88 (L) A1C: 7.9 (H)     Procal: 0.23 (H) CRP: N/A Lactic Acid: 1.0         Imaging results reviewed over the past 24 hrs:   Recent Results (from the past 24 hour(s))   Head CT w/o contrast    Narrative    CT SCAN OF THE HEAD WITHOUT CONTRAST   11/10/2023 11:31 AM     HISTORY: weakness right facial droop    TECHNIQUE:  Axial images of the head and coronal reformations without  IV contrast material. Radiation dose for this scan was reduced using  automated exposure control, adjustment of the mA and/or kV according  to patient size, or iterative reconstruction technique.    COMPARISON: 2/12/2023.    FINDINGS: There is no evidence of intracranial hemorrhage, mass, acute  infarct or anomaly. Chronic lacunar infarcts in the bilateral caudate  nuclei. The ventricles are normal in size, shape and configuration.  Mild diffuse parenchymal volume loss. Mild patchy periventricular  white matter hypodensities which are nonspecific, but likely related  to chronic microvascular ischemic disease. Bilateral carotid siphon  atherosclerotic calcifications.    The visualized portions of the sinuses and mastoids appear normal. The  bony calvarium and bones of the skull base appear intact.       Impression    IMPRESSION:   No evidence of acute territorial infarction or other  acute intracranial abnormality.    ROWENA NIXON MD         SYSTEM ID:  [id number]   XR Chest Port 1 View    Narrative    XR CHEST PORT 1 VIEW   11/10/2023 11:50 AM     HISTORY: sob    COMPARISON: 09/10/2023.      Impression    IMPRESSION: No significant change. Sternotomy. Stable left AICD.  Cardiac valve replacement. Cardiomegaly. Normal pulmonary vascularity.  Clear lungs.    YARA HORTON MD         SYSTEM ID:  [id number]

## 2023-11-11 NOTE — PROGRESS NOTES
"Pt remains alert to self and place, she has been sleepy throughout the shift, in the evening did know weather it was day or night.   Purwick placed for comfort and frequent brief changes.   Pt prefers medication taken with vanilla pudding.  Lactic flagged and resulted within normal limits.   Vitals have been stable.   /49 (BP Location: Left arm)   Pulse 62   Temp 97.9  F (36.6  C) (Oral)   Resp 15   Ht 1.575 m (5' 2\")   Wt 92.3 kg (203 lb 7.8 oz)   SpO2 97%   BMI 37.22 kg/m    Will continue to monitor and follow plan of care.  "

## 2023-11-11 NOTE — PROGRESS NOTES
"PRIMARY DIAGNOSIS: \"GENERIC\" NURSING  OUTPATIENT/OBSERVATION GOALS TO BE MET BEFORE DISCHARGE:  ADLs back to baseline: No    Activity and level of assistance: Up with maximum assistance. Consider SW and/or PT evaluation. PT eval today, patient was able to stand with 2 assist for a short period of time. Uses lift device prior to hospitalization    Pain status: Improved with use of alternative comfort measures i.e.: positioning    Return to near baseline physical activity: Yes Josafat lift and wheelchair bound at baseline per report     A&Ox4. Intermittent confusion. LS clear. Sats 90s on 1.5 L NC. Pain in leg managed with repositioning. Up in chair for breakfast. Patient takes pills with pudding. Purewick in place with AUOP. .     Discharge Planner Nurse   Safe discharge environment identified: Yes  Barriers to discharge: No       Entered by: Evita Horan RN 11/11/2023 10:53 AM     Please review provider order for any additional goals.   Nurse to notify provider when observation goals have been met and patient is ready for discharge.  "

## 2023-11-11 NOTE — PROGRESS NOTES
"PRIMARY DIAGNOSIS: \"GENERIC\" NURSING  OUTPATIENT/OBSERVATION GOALS TO BE MET BEFORE DISCHARGE:  ADLs back to baseline: No    Activity and level of assistance: Up with maximum assistance. Consider SW and/or PT evaluation. Assist x2 with sanjeev steady. Uses lift prior to hospitalization    Pain status: Improved-controlled with oral pain medications. Tylenol given x1 for leg pain    Return to near baseline physical activity: Yes wheelchair bound at baseline     A&Ox4. Intermittent confusion. LS clear. Sats 90s on 1.5 L NC. Pain in leg managed with repositioning. Up in chair for lunch. Patient takes pills with pudding. Purewick in place with AUOP. .     Discharge Planner Nurse   Safe discharge environment identified: No  Barriers to discharge: Yes       Entered by: Evita Horan RN 11/11/2023 5:37 PM     Please review provider order for any additional goals.   Nurse to notify provider when observation goals have been met and patient is ready for discharge.  "

## 2023-11-11 NOTE — PLAN OF CARE
Occupational Therapy: Orders received. Chart reviewed and discussed with care team.? Occupational Therapy not indicated due to per PT screen, patient is from LTC with primarily changes in mobility in recent weeks, no IP OT needs identified.? Defer discharge recommendations to PT and Care Team.? Will complete orders.     Thank you for your referral.  Aruna Callahan OTR/CHANTAL     [facility]  O: [phone]  E: [email or web address]

## 2023-11-11 NOTE — PROGRESS NOTES
11/11/23 1000   Appointment Info   Signing Clinician's Name / Credentials (PT) Joy Last, RASTA   Living Environment   Current Living Arrangements extended care facility   Transportation Anticipated agency   Self-Care   Usual Activity Tolerance fair   Current Activity Tolerance poor   Regular Exercise No   Equipment Currently Used at Home wheelchair, manual  (she walks behind pushing her wheelchair and was doing SPT recently per pt, but is curerntly using Josafat lift.)   General Information   Onset of Illness/Injury or Date of Surgery 11/10/23   Referring Physician Valarie Shine CNP   Patient/Family Therapy Goals Statement (PT) go back home (to [facility]) and do what she was doing, SPT instead of lift transfers,   Pertinent History of Current Problem (include personal factors and/or comorbidities that impact the POC) Letty presents to the emergency department secondary to malaise, lethargy, sleeping more than usual, leaning to the right, low-grade fevers, bradypnea.   Existing Precautions/Restrictions fall  (due to weakness/malaise/lethargy)   General Observations on NC O2, periwick in place.   Nurse present for transfers.   Cognition   Affect/Mental Status (Cognition) WFL  (tired)   Orientation Status (Cognition) person;place;situation;other (see comments)  (but not sure on time frames of how long she has been lift transfer)   Follows Commands (Cognition) WNL   Range of Motion (ROM)   Range of Motion ROM is WFL   Strength (Manual Muscle Testing)   Strength Comments WFL for bed mobility and general, but weak in hips and legs for functional stand and mobility   Bed Mobility   Bed Mobility rolling left;rolling right;scooting/bridging;supine-sit;sit-supine   Rolling Left [place] (Bed Mobility) modified independence   Rolling Right [place] (Bed Mobility) modified independence   Scooting/Bridging [place] (Bed Mobility) dependent (less than 25% patient effort)   Supine-Sit [place] (Bed Mobility) 2  person assist  (2 people present for first trail but does well and could do with one person)   Sit-Supine [place] (Bed Mobility) 2 person assist   Bed Mobility Limitations decreased ability to use arms for pushing/pulling;decreased ability to use legs for bridging/pushing   Impairments Contributing to Impaired Bed Mobility   (new onset lethargy/malaise)   Assistive Device (Bed Mobility) draw sheet   Transfers   Transfers sit-stand transfer   Transfer Safety Concerns Noted losing balance backward   Impairments Contributing to Impaired Transfers decreased strength   Sit-Stand Transfer   Sit-Stand [place] (Transfers) 2 person assist   Assistive Device (Sit-Stand Transfers) walker, front-wheeled   Comment, (Sit-Stand Transfer) mod-max A of 2.  LOB posterior, needing increased assist to get hips over feet   Gait/Stairs (Locomotion)   Comment, (Gait/Stairs) unable to take any steps today.   Balance   Balance Comments Standing at walker max A of 2 as pt does not get wt over feet enough and wants to brign feet forward more before completing full stand   Muscle Tone   Muscle Tone no deficits were identified   Clinical Impression   Criteria for Skilled Therapeutic Intervention Yes, treatment indicated   PT Diagnosis (PT) weakness, malaise, lethargy, impaired transfers/mobility   Influenced by the following impairments weakness   Functional limitations due to impairments transfers   Clinical Presentation (PT Evaluation Complexity) stable   Clinical Decision Making (Complexity) low complexity   Planned Therapy Interventions (PT) balance training;gait training;neuromuscular re-education;strengthening;transfer training   Risk & Benefits of therapy have been explained evaluation/treatment results reviewed;care plan/treatment goals reviewed;risks/benefits reviewed;current/potential barriers reviewed;participants voiced agreement with care plan;participants included;patient;spouse/significant other   PT Total Evaluation  "Time   PT Eval, Low Complexity Minutes (38049) 25   Physical Therapy Goals   PT Frequency 5x/week   PT Predicted Duration/Target Date for Goal Attainment 11/14/23   PT Goals Transfers   PT: Transfers Moderate assist;Sit to/from stand;Bed to/from chair;Assistive device   Interventions   Interventions Quick Adds Therapeutic Activity   Therapeutic Activity   Therapeutic Activities: dynamic activities to improve functional performance Minutes (43177) 10   Symptoms Noted During/After Treatment Fatigue   Treatment Detail/Skilled Intervention Working on transfer training with an additional sit/stand teaching pt proper foot placement and need to get hips forward, max A of 2, able to stand only 5 seconds.  Training on scooting on EOB with max A of 1 needed, x 2 scoots toward HOB.  Also trying to teach sit to supine on EOB but pt lies back into bed instead with max A of 2 for fear of being by EOB.   PT Discharge Planning   PT Plan 1/5 Sat.  Stand with walker (A of 2) and transfers sit-supine, sit-stand, A of 2.   PT Discharge Recommendation (DC Rec) Long term care facility  (back to previous \"home\" --LTC at [facility])   PT Rationale for DC Rec back to previous living situation when medically clears   PT Brief overview of current status Josafat lift tranfers but appears to have potential for SPT due to recent activity of up on feet (per pt history)   Total Session Time   Timed Code Treatment Minutes 10   Total Session Time (sum of timed and untimed services) 35     "

## 2023-11-11 NOTE — CONSULTS
Care Management Initial Consult    General Information  Assessment completed with: Patient, Caregiver, Edwar  Type of CM/SW Visit: Offer D/C Planning    Primary Care Provider verified and updated as needed: Yes   Readmission within the last 30 days:        Reason for Consult: discharge planning  Advance Care Planning: Advance Care Planning Reviewed: no concerns identified          Communication Assessment  Patient's communication style: spoken language (English or Bilingual)    Hearing Difficulty or Deaf: no   Wear Glasses or Blind: yes    Cognitive  Cognitive/Neuro/Behavioral: .WDL except  Level of Consciousness: alert, lethargic, intermittent confusion  Arousal Level: opens eyes spontaneously  Orientation: disoriented to, time  Mood/Behavior: cooperative     Speech: clear    Living Environment:   People in home: facility resident  [facility]  Current living Arrangements: extended care facility  Name of Facility: [facility]   Able to return to prior arrangements: yes       Family/Social Support:  Care provided by: other (see comments) (24 hour care from SNF staff)  Provides care for: no one, unable/limited ability to care for self  Marital Status:   , Facility resident(s)/Staff  Edwar       Description of Support System: Supportive, Involved    Support Assessment: Adequate family and caregiver support, Adequate social supports    Current Resources:   Patient receiving home care services: No     Community Resources: Skilled Nursing Facility  Equipment currently used at home:    Supplies currently used at home: Diabetic Supplies, Incontinence Supplies    Employment/Financial:  Employment Status: disabled        Financial Concerns: none, currently on MA      Does the patient's insurance plan have a 3 day qualifying hospital stay waiver?  Yes     Which insurance plan 3 day waiver is available? Alternative insurance waiver    Will the waiver be used for post-acute placement? No    Lifestyle  & Psychosocial Needs:  Social Determinants of Health     Food Insecurity: Not on file   Depression: Not at risk (2/27/2023)    PHQ-2     PHQ-2 Score: 0   Housing Stability: Not on file   Tobacco Use: Low Risk  (11/10/2023)    Patient History     Smoking Tobacco Use: Never     Smokeless Tobacco Use: Never     Passive Exposure: Not on file   Financial Resource Strain: Not on file   Alcohol Use: Not on file   Transportation Needs: Not on file   Physical Activity: Not on file   Interpersonal Safety: Not on file   Stress: Not on file   Social Connections: Not on file       Functional Status:  Prior to admission patient needed assistance:   Dependent ADLs:: Bathing, Dressing, Grooming, Incontinence, Positioning, Transfers, Eating, Wheelchair-with assist, Toileting  Dependent IADLs:: Cleaning, Cooking, Laundry, Shopping, Meal Preparation, Medication Management, Money Management, Transportation, Incontinence  Assesssment of Functional Status: At functional baseline (Currently wheelchair bound/josafat lift at LTC facility)    Mental Health Status:  Mental Health Status: Current Concern  Mental Health Management: Medication (Per EMR: Hx of Personality Disorder, Depression, Panic disorder with agoraphobia)    Chemical Dependency Status:  Chemical Dependency Status: No Current Concerns             Values/Beliefs:  Spiritual, Cultural Beliefs, Restoration Practices, Values that affect care: no               Additional Information:  Referral received to assist with discharge planning as Pt resides in a LTC facility: [facility] placed call to the [facility] to obtain an update on the pt's baseline ADL status and confirm bed hold.     RN reported the Pt has been a Josafat Lift at baseline. Has a fall some time ago and mobility declined drastically. Provides no assistance with rolling for cares. Total Assist with ADL's. Manual Wheelchair at the SNF.     Pt is her own decision maker. Alert x 4 at baseline.   RN reports the  pt to be more alert and engaged in her cares but the spouse has shown to be more confused on a regular basis.     CM faxed updated clinicals to MELISSA Love for review.     JOHNNY spoke with the Pt to introduce self/role. Discussed anticipated plan for transfer back to LTC when medically stable. Pt verbalized approval. VM left for Spouse as well per Pt's request.     MELISSA Tubbs confirmed an active bed hold.   Reported no concerns for pt's return back when medically stable.     Patient typically utilizes [facility].   # [phone]  -Call placed to intake. Staff reported no  available on Sun.   Soonest pt could be transported back to SNF would be MON 11/13  - is very familiar with pt and knows to grab the pt's manual w/c from the SNF before arrival.     [id number] # [phone]    Followed by [facility]  Esperanza Damon # [phone]  -CM will complete handoff on discharge     PLAN: JOHNNY to assist with facilitating transfer back to [facility]       FILEMON Maldonado  [facility] [phone]   [facility]  [phone]

## 2023-11-12 VITALS
OXYGEN SATURATION: 94 % | RESPIRATION RATE: 18 BRPM | WEIGHT: 206.35 LBS | SYSTOLIC BLOOD PRESSURE: 148 MMHG | BODY MASS INDEX: 37.97 KG/M2 | TEMPERATURE: 99.2 F | HEART RATE: 67 BPM | DIASTOLIC BLOOD PRESSURE: 50 MMHG | HEIGHT: 62 IN

## 2023-11-12 LAB
ANION GAP SERPL CALCULATED.3IONS-SCNC: 12 MMOL/L (ref 7–15)
BUN SERPL-MCNC: 22.1 MG/DL (ref 8–23)
CALCIUM SERPL-MCNC: 9.6 MG/DL (ref 8.8–10.2)
CHLORIDE SERPL-SCNC: 99 MMOL/L (ref 98–107)
CREAT SERPL-MCNC: 1.13 MG/DL (ref 0.51–0.95)
DEPRECATED HCO3 PLAS-SCNC: 27 MMOL/L (ref 22–29)
EGFRCR SERPLBLD CKD-EPI 2021: 52 ML/MIN/1.73M2
ERYTHROCYTE [DISTWIDTH] IN BLOOD BY AUTOMATED COUNT: 14.9 % (ref 10–15)
GLUCOSE BLDC GLUCOMTR-MCNC: 141 MG/DL (ref 70–99)
GLUCOSE BLDC GLUCOMTR-MCNC: 143 MG/DL (ref 70–99)
GLUCOSE SERPL-MCNC: 143 MG/DL (ref 70–99)
HCT VFR BLD AUTO: 27.4 % (ref 35–47)
HGB BLD-MCNC: 8.4 G/DL (ref 11.7–15.7)
MCH RBC QN AUTO: 29.2 PG (ref 26.5–33)
MCHC RBC AUTO-ENTMCNC: 30.7 G/DL (ref 31.5–36.5)
MCV RBC AUTO: 95 FL (ref 78–100)
PLATELET # BLD AUTO: 127 10E3/UL (ref 150–450)
POTASSIUM SERPL-SCNC: 4 MMOL/L (ref 3.4–5.3)
RBC # BLD AUTO: 2.88 10E6/UL (ref 3.8–5.2)
SODIUM SERPL-SCNC: 138 MMOL/L (ref 135–145)
WBC # BLD AUTO: 6.1 10E3/UL (ref 4–11)

## 2023-11-12 PROCEDURE — 80048 BASIC METABOLIC PNL TOTAL CA: CPT | Performed by: NURSE PRACTITIONER

## 2023-11-12 PROCEDURE — 82962 GLUCOSE BLOOD TEST: CPT

## 2023-11-12 PROCEDURE — 250N000013 HC RX MED GY IP 250 OP 250 PS 637: Performed by: NURSE PRACTITIONER

## 2023-11-12 PROCEDURE — 36415 COLL VENOUS BLD VENIPUNCTURE: CPT | Performed by: NURSE PRACTITIONER

## 2023-11-12 PROCEDURE — 99239 HOSP IP/OBS DSCHRG MGMT >30: CPT | Performed by: NURSE PRACTITIONER

## 2023-11-12 PROCEDURE — 85027 COMPLETE CBC AUTOMATED: CPT | Performed by: NURSE PRACTITIONER

## 2023-11-12 PROCEDURE — G0378 HOSPITAL OBSERVATION PER HR: HCPCS

## 2023-11-12 PROCEDURE — 250N000013 HC RX MED GY IP 250 OP 250 PS 637: Performed by: PEDIATRICS

## 2023-11-12 RX ORDER — LORAZEPAM 0.5 MG/1
0.5 TABLET ORAL 2 TIMES DAILY
Qty: 15 TABLET | Refills: 0 | Status: SHIPPED | OUTPATIENT
Start: 2023-11-12 | End: 2024-04-26

## 2023-11-12 RX ADMIN — TRAZODONE HYDROCHLORIDE 25 MG: 50 TABLET ORAL at 08:49

## 2023-11-12 RX ADMIN — QUETIAPINE FUMARATE 100 MG: 100 TABLET ORAL at 08:49

## 2023-11-12 RX ADMIN — LEVOTHYROXINE SODIUM 112 MCG: 112 TABLET ORAL at 06:32

## 2023-11-12 RX ADMIN — METOPROLOL SUCCINATE 25 MG: 25 TABLET, EXTENDED RELEASE ORAL at 08:49

## 2023-11-12 RX ADMIN — ASPIRIN 81 MG: 81 TABLET ORAL at 08:49

## 2023-11-12 RX ADMIN — POTASSIUM CHLORIDE 10 MEQ: 750 TABLET, EXTENDED RELEASE ORAL at 08:49

## 2023-11-12 RX ADMIN — DIVALPROEX SODIUM 250 MG: 250 TABLET, DELAYED RELEASE ORAL at 08:49

## 2023-11-12 RX ADMIN — DULOXETINE HYDROCHLORIDE 40 MG: 20 CAPSULE, DELAYED RELEASE ORAL at 08:49

## 2023-11-12 RX ADMIN — INSULIN ASPART 1 UNITS: 100 INJECTION, SOLUTION INTRAVENOUS; SUBCUTANEOUS at 08:49

## 2023-11-12 RX ADMIN — LORAZEPAM 0.5 MG: 0.5 TABLET ORAL at 08:49

## 2023-11-12 RX ADMIN — ACETAMINOPHEN 650 MG: 325 TABLET, FILM COATED ORAL at 03:25

## 2023-11-12 RX ADMIN — MIRTAZAPINE 15 MG: 15 TABLET, FILM COATED ORAL at 08:49

## 2023-11-12 ASSESSMENT — ACTIVITIES OF DAILY LIVING (ADL)
ADLS_ACUITY_SCORE: 38

## 2023-11-12 NOTE — PLAN OF CARE
"PRIMARY DIAGNOSIS: \"GENERIC\" NURSING  OUTPATIENT/OBSERVATION GOALS TO BE MET BEFORE DISCHARGE:  ADLs back to baseline: Yes    Activity and level of assistance: pt normally uses w/c at the [facility]. Using ceiling lift here to chair.    Pain status: Pain free.    Return to near baseline physical activity: Yes     Discharge Planner Nurse   Safe discharge environment identified: Yes  Barriers to discharge: No  Vss. Pt reported restless legs, Tylenol given. Pt is confused to place- thinks is at the [facility] but yet can remember events from being in the ED. Uncertain about details of situation.  Cooperative, turned and repositioned every 2 hours. YP=279.       Entered by: Maia Suggs RN 11/12/2023 6:53 AM     Please review provider order for any additional goals.   Nurse to notify provider when observation goals have been met and patient is ready for discharge.Goal Outcome Evaluation:                        "

## 2023-11-12 NOTE — PROGRESS NOTES
"PRIMARY DIAGNOSIS: \"GENERIC\" NURSING  OUTPATIENT/OBSERVATION GOALS TO BE MET BEFORE DISCHARGE:  ADLs back to baseline: No    Activity and level of assistance: Up with maximum assistance   Pain status: Pain free. Pt denied pain.    Return to near baseline physical activity: Yes, wheelchair bound at baseline.    Pt turned and repositioned. Purewick changed. O2 above 92% via NC at 1L.      Discharge Planner Nurse   Safe discharge environment identified: Yes  Barriers to discharge: Yes       Entered by: Madeline Hugo RN 11/11/2023 10:40 PM     Please review provider order for any additional goals.   Nurse to notify provider when observation goals have been met and patient is ready for discharge.  "

## 2023-11-12 NOTE — PROGRESS NOTES
Care Management Discharge Note    Discharge Date: 11/12/2023       Discharge Disposition: Long Term Care    Discharge Services: None    Discharge DME: Wheelchair    Discharge Transportation: agency    Private pay costs discussed: Not applicable    Does the patient's insurance plan have a 3 day qualifying hospital stay waiver?  Yes     Which insurance plan 3 day waiver is available? Alternative insurance waiver    Will the waiver be used for post-acute placement? Yes- Patient discharging back to [facility] with PT orders     PAS Confirmation Code: N/A    Patient/family educated on Medicare website which has current facility and service quality ratings: no (LTC return)    Education Provided on the Discharge Plan: No    Persons Notified of Discharge Plans: Patient, message left for - Edwar, spoke to RN, Chiquis, at [facility]     Patient/Family in Agreement with the Plan: yes    Handoff Referral Completed: Yes- [facility] Care Coordinator- Esperanza Damon #[phone]    Additional Information:  Per provider today, patient is ready to discharge back to long term care at [facility].      Patient's usual transport company, [employer] (that accepts [facility]'s [facility] is not available today for transports).   has called [facility] #[phone] and they are available today at 1030.  They do not accept [facility], so voucher will be provided to  to cover cost of the transport.       has spoken with RN, Chiquis #[phone], at [facility].  Discussed patient's return today with transport at 1030.  Chiquis stated she will put patient's wheelchair at the front entrance area for [facility] to  on their way to the hospital. [facility] is aware of this.   has provided nursing with phone number to call Chiquis at [facility] for nurse to nurse report.       has visited with patient and discussed discharge plan.  Patient in agreement with plan and  "stated, \"I don't care what transport company takes me.\"  Writer called , Edwar, while in patient room.  Edwar did not answer, so writer left a voice mail message.  Patient thinks that Edwar already left for [facility].      Matilda Haynes, [facility]   [phone]      "

## 2023-11-12 NOTE — PLAN OF CARE
S-(situation): Patient discharged to [facility] via wheelchair with [facility] services    B-(background): Observation goals met . yes    A-(assessment): Alert to self and place. LS clear. Sats lower 90s RA. Denies pain this shift. Assist x2 with saul lift. In chair for breakfast. , 1 unit insulin given.     R-(recommendations): Discharge instructions reviewed with patient. Listed belongings gathered and returned to patient.yes  Patient Education resolved: Yes  New medications-Pt. Has been educated about reason of use and side effects NA  Home medications returned to patient NA  Medication Bin checked and emptied on discharge Yes

## 2023-11-12 NOTE — PROGRESS NOTES
Physical Therapy Discharge Summary    Reason for therapy discharge:    Discharged to home which is long term care facility    Progress towards therapy goal(s). See goals on Care Plan in Epic electronic health record for goal details.  Goals not met.  Barriers to achieving goals:   discharge from facility.    Therapy recommendation(s):    Cont progressing transfers at LTC facility.

## 2023-11-12 NOTE — DISCHARGE SUMMARY
"[facility]  Hospitalist Discharge Summary      Date of Admission:  11/10/2023  Date of Discharge:  11/12/2023 10:30 AM  Discharging Provider: Valarie Shine CNP  Discharge Service: Hospitalist Service    Discharge Diagnoses   Lethargy    Clinically Significant Risk Factors     # DMII: A1C = 7.9 % (Ref range: <5.7 %) within past 6 months  # Obesity: Estimated body mass index is 37.74 kg/m  as calculated from the following:    Height as of this encounter: 1.575 m (5' 2\").    Weight as of this encounter: 93.6 kg (206 lb 5.6 oz).       Follow-ups Needed After Discharge      Follow up with primary care provider, Jay Chapman, within 7 days   for hospital follow- up.         Unresulted Labs Ordered in the Past 30 Days of this Admission       Date and Time Order Name Status Description    11/10/2023  1:53 PM Blood Culture Hand, Right Preliminary     11/10/2023  1:53 PM Blood Culture Hand, Left Preliminary     11/10/2023  1:06 PM Urine Culture Preliminary         These results will be followed up by Hospitalist service    Discharge Disposition   Discharged to home  Condition at discharge: Stable    Hospital Course   67 year old female with complex health history notable for CAD with history of MI and CABG, ischemic cardiomyopathy with chronic systolic heart failure, indwelling cardiac pacemaker and defibrillator, type 2 diabetes, hypertension, hyperlipidemia, COPD, PAD, ELI, morbid obesity, chronic mental illness, chronic urinary incontinence, previous stroke, stage IIIa CKD, chronic anemia, chronic thrombocytopenia, and chronic debility for which she is a resident at long-term care facility who presented to ER on 11/10/2023 due to lethargy and severe hypoxia noted at her long-term care facility.  In the ER she also had fever raising concern for possible SIRS, sepsis, and infection although infection has not yet been identified definitively.  However, she also had recent increase in " benzodiazepine dose causing concern for possible increased sedation from medication which can cause hypoventilation with hypoxia, atelectasis, and fever.       Principal Problem:    Lethargy  Active Problems:    Fever    Hypoxia    Chronic systolic heart failure (H)    COPD without exacerbation (H)    Essential (primary) hypertension    Ischemic cardiomyopathy    Type 2 diabetes mellitus (H)    Chronic kidney disease, stage 3a (H)    Pyuria    PAD (peripheral artery disease) (H24)    Left foot pain    Obesity hypoventilation syndrome (H)    Benzodiazepine dependence (H)    Thrombocytopenia (H24)    ERIC (acute kidney injury) (H24)    Hyperlipidemia, unspecified    Hypothyroidism, unspecified    ELI (obstructive sleep apnea)    Panic disorder with agoraphobia    Personality disorder (H)    S/P CABG x 5    Urinary incontinence, mixed    Major depressive disorder, recurrent severe without psychotic features (H)    Pacemaker    Morbid obesity (H)    ICD (implantable cardioverter-defibrillator) in place    History of stroke    Lives in long-term care facility     Fever  Pyuria  Presented with fever to 100.9 in the ER even after she had received 1000 mg acetaminophen dose at long-term care facility about 2 hours previously.  She fas not been tachycardic.  She is on beta-blocker chronically and has been  consistently ventricular pacing.       There has been no leukocytosis or other SIRS criteria so far.  Lactic acid is normal.  She does not have localizing symptoms or signs of infection.  Screening evaluation for infection is abnormal including catheterized urine specimen demonstrating pyuria, but she does not report symptoms suspicious for symptomatic UTI.  Urine culture ultimately resulting in E. Coli greater than 100,000 colony counts however with other negative inflammatory markers and abscence of symptoms she was not treated for this asymptomatic bacteriuria.   Blood cultures negative x 1 day.  No fevers since  admission.     Lethargy  Benzodiazepine dependence  Patient reportedly had recent change in chronic diazepam dose of 5 mg daily to lorazepam 1 mg 3 times daily effective November 6 and since that time has been more lethargic.  Head CT done  on admission was negative for acute findings. Lethargy has progressively worsened over the last few days prior to admission.  It is likely that worsening lethargy is due to this medication change with relatively higher dose of benzodiazepine in the last few days.  She will be at risk for benzodiazepine withdrawal with abrupt discontinuation of benzodiazepines thus her dose was reduced on admission from 1 mg three times daily to 0.5 mg 2 times daily.    The following am of admission she was more awake and alert and by day two of admission she is interactive and close to baseline.      We will discharge her back to [place] on the reduced dose of lorazepam.  We will order physical therapy for her as well.       Severe hypoxia  Mild respiratory acidosis with compensatory metabolic alkalosis  Suspect obesity hypoventilation syndrome  EIL  Found to be severely hypoxic at [facility] with oxygen saturation 73% in room air that improved to 92% on 2 L low flow oxygen supplementation.  Patient did not report dyspnea at the time although has been more lethargic.  In ER she was found to have compensated mild respiratory acidosis.     Patient has known ELI and likely obesity hypoventilation.  She does exhibit cough this am; viral panel and chest xray unremarkble.  She is not currently requiring oxygen.  ? If she had a small aspiration vs hypoventilation due to oversedation on increased benzodiazepine dose.    Repeat Blood gases revealed corrected hypercarbia and mild hypoxemia with PaO2 of 72 which is likely close to baseline.  She is not requiring oxygen this am.          Acute kidney injury  Stage IIIa chronic kidney disease  Chronic urinary incontinence  Admission creatinine 1.44  concerning for possible acute kidney injury.  She has chronic urinary incontinence.  Hypovolemia associated with recently reduced oral intake (because of sedation) and chronic diuretic therapy seems most likely.  Her diuretic was held on admission.  On day of discharge her creatinine is 1.13.          Left foot pain  PAD with stenosis of distal left superficial femoral artery  She presents with pain in her left foot which is chronic.  She has known stenosis of distal left superficial femoral artery based on arterial ultrasound from March 2019 for which it does not appear as though she has had any intervention previously.  She does take aspirin and statin therapy chronically.  However, she has palpable pedal pulses in the left foot on exam and the left foot is pink and warm arguing against critical limb ischemia as a cause for pain in her left foot.  She also carries a diagnosis of type 2 diabetes, so peripheral neuropathy with foot pain is also possible.  She is prescribed gabapentin chronically.       Type 2 diabetes  She carries previous diagnosis of type 2 diabetes.  Most recent A1c from January 2023 was 6.6.  Current medication list does not include any diabetic medications including insulin.  However, blood sugars recorded at [facility] in October 2023 ranged 295-410, significantly hyperglycemic.  Random blood sugar in the ER today was 175.  A1c 7.9 on admission, blood sugars 140's to 200.  She was placed on a sliding scale.  Would consider starting her on an oral medication at home.       Chronic systolic heart failure  Ischemic cardiomyopathy  CAD with previous CABG  History of MI  History of mitral and tricuspid valve repair  She has longstanding history of CAD and had 5 vessel CABG in August 2015.  She has had MI previously most recently July 2023 at which time coronary angiography demonstrated mild to moderate stenosis in one of her CABG grafts as well as native vessel disease.  She has not  had recent anginal symptoms.  Troponin is mildly elevated but similar to previous levels.  EKG is not interpretable for acute ischemia because of paced rhythm.  She also has chronic systolic heart failure with EF 40 to 45% as noted on most recent echocardiogram from July 2023.  Clinically, heart failure appears compensated at this time.  She also has previous history of mitral and tricuspid valve repair in August 2015.  Radiographically, there were no signs of acute pulmonary edema.  -Not anticipating additional cardiac evaluation during this hospitalization unless there is increasing concern for acute change in cardiac status  -Anticipate outpatient follow-up with her cardiology team through [facility] cardiology     Status post pacemaker and ICD placement  She has indwelling pacemaker ICD device and EKG demonstrates paced rhythm today.  There are not clinical concerns for cardiac device malfunction at this time.  -Anticipate outpatient follow-up with cardiology for routine monitoring of her device     Hypothyroidism  She has chronic hypothyroidism treated with levothyroxine.  TSH is slightly elevated at 13.8 with slightly low free T4 of 0.88 today (0.9 is the lower limit of normal)  It seems unlikely that hypothyroidism is contributing to current lethargy,   -Continue current chronic dose of levothyroxine and recommend recheck of thyroid function tests in 1 month     Chronic anemia  Chronic thrombocytopenia  Aspirin induced platelet function defect  She has chronic anemia with baseline hemoglobin 8-9.  She is also mildly thrombocytopenic chronically with baseline platelet 130-140.  Both chronic anemia and thrombocytopenia appear stable at this time.  Due to chronicity of her anemia, it seems unlikely that anemia is the major factor in her nonspecific symptoms of lethargy.  She takes aspirin chronically that causes platelet function defect which increases her bleeding risk.  Active bleeding is not suspected at  this time.      Chronic mental illness  She has chronic mental illness with previous diagnoses of depression, panic disorder with agoraphobia, and PTSD.  She is chronically treated with multiple psychoactive medications including Ativan, valproic acid, duloxetine, mirtazapine, Seroquel, and trazodone.  Chronic mental illness and/or medication treatment thereof could contribute to symptoms of lethargy.  However, aside from the recent change in benzodiazepines, none of her other psychoactive medications appear new or appear to have been changed recently  She was placed on her home regimen.     Other chronic medical problems that appear generally stable:  Hypertension  Hyperlipidemia  History of stroke  Morbid obesity  Lives in long-term care facility  -Continue chronic doses of amlodipine and rosuvastatin      Consultations This Hospital Stay   CARE MANAGEMENT / SOCIAL WORK IP CONSULT  PHYSICAL THERAPY ADULT IP CONSULT  OCCUPATIONAL THERAPY ADULT IP CONSULT  PHYSICAL THERAPY ADULT IP CONSULT    Code Status   No CPR- Do NOT Intubate    Time Spent on this Encounter   Valarie BLANC CNP, personally saw the patient today and spent greater than 30 minutes discharging this patient.       Valarie Shine CNP  [address] MEDICAL SURGICAL  [address] [place] [address]  Phone: [phone]  ______________________________________________________________________    Physical Exam   Vital Signs: Temp: 99.2  F (37.3  C) Temp src: Oral BP: (!) 148/50 Pulse: 67   Resp: 18 SpO2: 94 % O2 Device: Nasal cannula Oxygen Delivery: 1 LPM  Weight: 206 lbs 5.61 oz    Gen:  sitting in chair no acute distress  HEENT:  normocephalic, atraumatic, oropharynx clear  Resp:  CTA posteriorly  Card:  S1,S2, RRR no murmur, rub or gallop  Abd:  Soft per RN exam, non tender,  normoactive bowel sounds   Neuro:  Neuro exam grossly non focal         Primary Care Physician   Jay Chapman    Discharge Orders      Medication  Therapy Management Referral      General info for SNF    Length of Stay Estimate: Long Term Care  Condition at Discharge: Improving  Level of care:skilled   Rehabilitation Potential: Fair  Admission H&P remains valid and up-to-date: Yes  Recent Chemotherapy: N/A  Use Nursing Home Standing Orders: Yes     Reason for your hospital stay    lethargy     Activity - Up with assistive device     Follow-up and recommended labs and tests     Follow up with primary care provider, Jay Chapman, within 7 days for hospital follow- up.  No follow up labs or test are needed.     No CPR- Do NOT Intubate     Physical Therapy Adult Consult    Evaluate and treat as clinically indicated.    Reason:  progressive weakness     Fall precautions     Diet    Follow this diet upon discharge: Orders Placed This Encounter      Combination Diet Moderate Consistent Carb (60 g CHO per Meal) Diet; Easy to Chew (level 7); Thin Liquids (level 0); 2 gm NA Diet       Significant Results and Procedures       Discharge Medications   Discharge Medication List as of 11/12/2023  9:31 AM        CONTINUE these medications which have CHANGED    Details   LORazepam (ATIVAN) 0.5 MG tablet Take 1 tablet (0.5 mg) by mouth 2 times daily, Disp-15 tablet, R-0, Local Print           CONTINUE these medications which have NOT CHANGED    Details   !! acetaminophen (TYLENOL) 325 MG tablet Take 325 mg by mouth 2 times daily as needed for pain, Historical      !! acetaminophen (TYLENOL) 500 MG tablet Take 2 tablets (1,000 mg) by mouth 3 times daily, No Print Out      amLODIPine (NORVASC) 5 MG tablet Take 5 mg by mouth daily, Historical      aspirin 81 MG EC tablet Take 81 mg by mouth daily, Historical      bisacodyl (DULCOLAX) 10 MG suppository Place 10 mg rectally daily as needed for constipation, Historical      cyanocobalamin (CYANOCOBALAMIN) 1000 MCG/ML injection Inject 1 mL into the muscle every 30 days 21st of each month, Historical      divalproex sodium  delayed-release (DEPAKOTE) 250 MG DR tablet Take 250 mg by mouth daily 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, Historical      DULoxetine HCl 40 MG CPEP Take 40 mg by mouth daily, Historical      famotidine (PEPCID) 20 MG tablet Take 20 mg by mouth at bedtime, Historical      fluticasone-vilanterol (BREO ELLIPTA) 200-25 MCG/INH inhaler Inhale 1 puff into the lungs daily Rinse mouth after use, Historical      furosemide (LASIX) 20 MG tablet Take 20 mg by mouth every other day, Historical      gabapentin (NEURONTIN) 400 MG capsule Take 400 mg by mouth at bedtime RLS, Historical      hydrochlorothiazide (HYDRODIURIL) 25 MG tablet Take 25 mg by mouth daily, Historical      levothyroxine (SYNTHROID/LEVOTHROID) 112 MCG tablet Take 112 mcg by mouth daily before breakfast, Historical      lidocaine (LMX4) 4 % external cream Apply topically 2 times daily To Rt kneeHistorical      Melatonin 10 MG TABS tablet Take 10 mg by mouth At Bedtime, Historical      metoprolol succinate ER (TOPROL-XL) 25 MG 24 hr tablet Take 25 mg by mouth daily, Historical      mirtazapine (REMERON) 30 MG tablet Take 15 mg by mouth daily, Historical      nitroGLYcerin (NITROSTAT) 0.4 MG sublingual tablet Place 0.4 mg under the tongue every 5 minutes as needed for chest pain For chest pain place 1 tablet under the tongue every 5 minutes for 3 doses. If symptoms persist 5 minutes after 1st dose call 911., Historical      ondansetron (ZOFRAN ODT) 4 MG ODT tab Take 4 mg by mouth every 8 hours as needed for nausea, Historical      polyethylene glycol (MIRALAX) 17 GM/Dose powder Take 17 g by mouth every other day, Historical      potassium chloride ER (MICRO-K) 10 MEQ CR capsule Take 10 mEq by mouth daily , Historical      QUEtiapine (SEROQUEL) 100 MG tablet Take 100 mg by mouth 3 times daily, Historical      rosuvastatin (CRESTOR) 10 MG tablet Take 20 mg by mouth At Bedtime, Historical      sennosides (SENOKOT) 8.6 MG tablet Take 2 tablets by mouth At  Bedtime, Historical      traZODone (DESYREL) 50 MG tablet Take 25 mg by mouth 3 times daily, Historical       !! - Potential duplicate medications found. Please discuss with provider.        Allergies   Allergies   Allergen Reactions    Bee Pollen Anaphylaxis    Diphenhydramine Palpitations     Tolerated IV Benadryl when not pushed too fast    Isosorbide Nitrate Other (See Comments) and Dizziness     Also causes syncope (has fallen before) and brain fog/mental disturbances - please do not prescribe    Nitroglycerin Dizziness, Fatigue and Other (See Comments)     Specifically the patch - please do not prescribe  Specifically the patch - please do not prescribe      Contrast Dye Hives and Itching    Penicillin G     Adhesive Tape Rash    Liquid Adhesive Itching    Nystatin Dermatitis     Also blisters    Penicillins Swelling and Rash     Occurred as a child - not 100% sure on specific reactions    Sulfa Antibiotics Other (See Comments)     Occurred as a child / patient does not remember specific reaction

## 2023-11-12 NOTE — PROGRESS NOTES
"PRIMARY DIAGNOSIS: \"GENERIC\" NURSING  OUTPATIENT/OBSERVATION GOALS TO BE MET BEFORE DISCHARGE:  ADLs back to baseline: No    Activity and level of assistance: Up with maximum assistance. Consider SW and/or PT evaluation. Assist x2 with sanjeev steady. Uses lift prior to hospitalization    Pain status: Improved-controlled with oral pain medications.    Return to near baseline physical activity: Yes, wheelchair bound at baseline    A&Ox4. Intermittent confusion. LS clear. Sats lower 90s on 0.5 L NC. Takes pills with pudding. Purewick in place with AUOP.      Discharge Planner Nurse   Safe discharge environment identified: No  Barriers to discharge: Yes       Entered by: Evita Horan RN 11/11/2023 7:03 PM     Please review provider order for any additional goals.   Nurse to notify provider when observation goals have been met and patient is ready for discharge.  "

## 2023-11-13 ENCOUNTER — TELEPHONE (OUTPATIENT)
Dept: GERIATRICS | Facility: CLINIC | Age: 68
End: 2023-11-13
Payer: COMMERCIAL

## 2023-11-13 ENCOUNTER — PATIENT OUTREACH (OUTPATIENT)
Dept: GERIATRIC MEDICINE | Facility: CLINIC | Age: 68
End: 2023-11-13
Payer: COMMERCIAL

## 2023-11-13 LAB
BACTERIA UR CULT: ABNORMAL
BACTERIA UR CULT: ABNORMAL

## 2023-11-13 NOTE — PROGRESS NOTES
TRANSITIONS OF CARE (ALEXANDER) LOG    ALEXANDER tasks should be completed by the CC within one (1) business day of notification of each transition. Follow up contact with member is required after return to their usual care setting.  Note:  If CC finds out about the transitions fifteen (15) days or more after the member has returned to their usual care setting, no ALEXANDER log is needed. However, the CC should check in with the member to discuss the transition process, any changes needed to the care plan and document it in a case note.     Member Name:  Letty Escobar [facility] [place], [place] Name:  [employer] Member ID#: [id number]   Product: [facility] [place] Care Coordinator Contact:  MARIYA Jaramillo, RN, PHN, [facility] Agency/[facility]/Care System: [facility]   Transition Communication Actions from Care Management Contact   Transition #1   Notification Date: 11/13/2023 Transition Date:   11/10/23 Transition From: Nursing Home, [facility]     Is this the member s usual care setting?               yes Transition To: [facility]   Transition Type:  Unplanned    Documentation from conversation with the member/responsible party, provider, discharging and receiving facility:   Date: na: Member was admitted and discharge prior to notification.     Transition #2   Notification Date: 11/13/2023 Transition Date:   10/12/23 Transition From: [facility]     Is this the member s usual care setting?               no Transition To: Nursing Home, [facility]   Transition Type:  Planned    Documentation from conversation with the member/responsible party, provider, discharging and receiving facility:   Date: 11/13/2023: Received notification of transition to home.  CC contacted spouse Edwar  and left name and role and advised with any questions about discharge instructions.   Member has a follow-up appointment with PCP in 7 days: Yes:  scheduled on Will see NP at facility    Member has had a change in condition: No  Home visit needed: No  Care plan reviewed and updated.  The following home based services None were resumed.  New referrals placed: No  Transition log completed.   PCP, Jay Chapman, notified of transition back to home via EMR.       *RETURN TO USUAL CARE SETTING: *Complete tasks below when the member is discharging TO their usual care setting within one (1) business day of notification..      For situations where the Care Coordinator is notified of the discharge prior to the date of discharge, the Care Coordinator must follow up with the member or designated representative to confirm that discharge actually occurred and discuss required ALEXANDER tasks as outlined in the ALEXANDER Instructions.  (This includes situations where it may be a  new  usual care setting for the member. (i.e., a community member who decides upon permanent nursing home placement following hospitalization and rehab).    Discuss with Member/Responsible Party:    Check  Yes  - if the member, family member and/or SNF/facility staff manages the following:    If  No  provide explanation in the comments section.          Date completed: 11/13/2023 Communicated with member or their designated representative about the following:  care transition process; about changes to the member s health status; plan of care updates; education about transitions and how to prevent unplanned transitions/readmissions    Four Pillars for Optimal Transition:    Check  Yes  - if the member, family member and/or SNF/facility staff manages the following:    If  No  provide explanation in the comments section.          [x]  Yes     []  No Does the member have a follow-up appointment scheduled with primary care or specialist? (Mental health hospitalizations--the appt. should be w/in 7 days)              For mental health hospitalizations:  []  Yes     []  No     Does the member have a follow-up  appointment scheduled with a mental health practitioner within 7 days of discharge?  [x]  Yes     []  No     Has a medication review been completed with member? If no, refer to PCP, home care nurse, MTM, pharmacist  [x]  Yes     []  No     Can the member manage their medications or is there a system in place to manage medications (e.g. home care set-up)?         [x]  Yes     []  No     Can the member verbalize warning signs and symptoms to watch for and how to respond?  [x]  Yes     []  No     Does the member have a copy of and understand their discharge instructions?  If no, assist to obtain copy of discharge instructions, review discharge instructions, and assist to contact PCP to discuss questions about their recent hospitalization.  [x]  Yes     []  No     Does the member have adequate food, housing and transportation?  If no, add goal and discuss additional supports available to the member                                                                                                                                                                                 [x]  Yes     []  No     Is the member safe in their home?  If no, document needs and support provided                                                                                                                                                                          []  Yes     [x]  No     Are there any concerns of vulnerability, abuse, or neglect?  If yes, document concerns and actions taken by Care Coordinator as a mandated                                                                                                                                                                              [x]  Yes     []  No     Does the member use a Personal Health Care Record?  Check  Yes  if visit summary, discharge summary, and/or healthcare summary are being used as a PHR.                                                                                                                                                                                   [x]  Yes     []  No     Have you reviewed the discharge summary with the member? If  No  provide explanation in comments.  [x]  Yes     []  No     Have you updated the member s care plan/support plan? Add new diagnosis, medications, treatments, goals & interventions, as applicable. If No, provide explanation in comments.    Comments:           Notes from conversation with the member/responsible party, provider, discharging and receiving facility (as applicable):   Per Dori TORRES at facility,  she is still participating in therapy. The only change I see to her medication list is they decreased her Ativan to 0.5 mg BID, it was Ativan 1 mg TID. She was up for meals today. She appears more alert. She states that she's feeling a little better but still tired.      HALNIA JraamilloN, RN, PHN, [facility]  Care Coordinator-Long Term Care  [address] [address]  [email or web address]   [email or web address]     Office: [phone]   Fax: [phone]

## 2023-11-13 NOTE — TELEPHONE ENCOUNTER
Orders relayed to facility nurse, Awilda.       ----- Message -----  From: Jay Chapman NP  Sent: 11/13/2023   9:18 AM CST  To: Ghada Ramon, EMLISSA; Debbie Marley, MELISSA    Please add macrobid 100 mg po bid x 5 days, UTI  Thanks  Jay  ----- Message -----  From: Valarie Shine CNP  Sent: 11/13/2023   8:14 AM CST  To: Jay Chapman NP    Asymptomatic bacteruria.  No antibiotics started on this admission.

## 2023-11-15 LAB
BACTERIA BLD CULT: NO GROWTH
BACTERIA BLD CULT: NO GROWTH

## 2023-11-16 ENCOUNTER — DOCUMENTATION ONLY (OUTPATIENT)
Dept: OTHER | Facility: CLINIC | Age: 68
End: 2023-11-16
Payer: COMMERCIAL

## 2023-11-17 ENCOUNTER — TELEPHONE (OUTPATIENT)
Dept: GERIATRICS | Facility: CLINIC | Age: 68
End: 2023-11-17
Payer: COMMERCIAL

## 2023-11-17 NOTE — TELEPHONE ENCOUNTER
Prior Authorization Retail Medication Request    Medication/Dose: Lorazepam 0.5mg tablet  ICD code (if different than what is on RX):  F41.9, F43.10  Previously Tried and Failed:  Gabapentin, Seroquel, Depakote, Cymbalta, Remeron, Trazodone, Abilify, Buspar, Hydroxyzine  Rationale:  Take 0.5mg by mouth twice daily    Insurance Name:  [employer]/ [employer]  Insurance ID:  [id number]      Pharmacy Information (if different than what is on RX)  Name:  [facility] - [address] [place] [address], [address]    Phone: [phone]

## 2023-11-17 NOTE — TELEPHONE ENCOUNTER
Prior Authorization Approval    Medication: LORAZEPAM 0.5 MG PO TABS  Authorization Effective Date:    Authorization Expiration Date:    Approved Dose/Quantity: 15/8  Reference #: [id number]   Insurance Company: [employer] - Phone [phone] Fax [phone]  Expected CoPay: $    CoPay Card Available:      Financial Assistance Needed:   Which Pharmacy is filling the prescription:    Pharmacy Notified: Yes  Patient Notified: Yes

## 2023-11-23 ENCOUNTER — LAB REQUISITION (OUTPATIENT)
Dept: LAB | Facility: CLINIC | Age: 68
End: 2023-11-23
Payer: COMMERCIAL

## 2023-11-23 DIAGNOSIS — R55 SYNCOPE AND COLLAPSE: ICD-10-CM

## 2023-11-24 LAB
BASOPHILS # BLD AUTO: 0 10E3/UL (ref 0–0.2)
BASOPHILS NFR BLD AUTO: 1 %
EOSINOPHIL # BLD AUTO: 0.2 10E3/UL (ref 0–0.7)
EOSINOPHIL NFR BLD AUTO: 3 %
ERYTHROCYTE [DISTWIDTH] IN BLOOD BY AUTOMATED COUNT: 14.4 % (ref 10–15)
HCT VFR BLD AUTO: 30.6 % (ref 35–47)
HGB BLD-MCNC: 9.3 G/DL (ref 11.7–15.7)
IMM GRANULOCYTES # BLD: 0.1 10E3/UL
IMM GRANULOCYTES NFR BLD: 2 %
LYMPHOCYTES # BLD AUTO: 1.8 10E3/UL (ref 0.8–5.3)
LYMPHOCYTES NFR BLD AUTO: 28 %
MCH RBC QN AUTO: 28.8 PG (ref 26.5–33)
MCHC RBC AUTO-ENTMCNC: 30.4 G/DL (ref 31.5–36.5)
MCV RBC AUTO: 95 FL (ref 78–100)
MONOCYTES # BLD AUTO: 0.6 10E3/UL (ref 0–1.3)
MONOCYTES NFR BLD AUTO: 9 %
NEUTROPHILS # BLD AUTO: 3.9 10E3/UL (ref 1.6–8.3)
NEUTROPHILS NFR BLD AUTO: 57 %
NRBC # BLD AUTO: 0 10E3/UL
NRBC BLD AUTO-RTO: 0 /100
PLATELET # BLD AUTO: 132 10E3/UL (ref 150–450)
RBC # BLD AUTO: 3.23 10E6/UL (ref 3.8–5.2)
WBC # BLD AUTO: 6.6 10E3/UL (ref 4–11)

## 2023-11-24 PROCEDURE — P9604 ONE-WAY ALLOW PRORATED TRIP: HCPCS | Mod: ORL | Performed by: FAMILY MEDICINE

## 2023-11-24 PROCEDURE — 36415 COLL VENOUS BLD VENIPUNCTURE: CPT | Mod: ORL | Performed by: FAMILY MEDICINE

## 2023-11-24 PROCEDURE — 85025 COMPLETE CBC W/AUTO DIFF WBC: CPT | Mod: ORL | Performed by: FAMILY MEDICINE

## 2023-12-04 ENCOUNTER — OFFICE VISIT (OUTPATIENT)
Dept: DERMATOLOGY | Facility: CLINIC | Age: 68
End: 2023-12-04
Payer: COMMERCIAL

## 2023-12-04 VITALS
SYSTOLIC BLOOD PRESSURE: 145 MMHG | HEIGHT: 57 IN | BODY MASS INDEX: 46.34 KG/M2 | WEIGHT: 214.8 LBS | TEMPERATURE: 97.6 F | HEART RATE: 75 BPM | RESPIRATION RATE: 18 BRPM | DIASTOLIC BLOOD PRESSURE: 79 MMHG | OXYGEN SATURATION: 94 %

## 2023-12-04 DIAGNOSIS — L60.3 NAIL DYSTROPHY: Primary | ICD-10-CM

## 2023-12-04 PROCEDURE — 99024 POSTOP FOLLOW-UP VISIT: CPT | Performed by: DERMATOLOGY

## 2023-12-04 NOTE — NURSING NOTE
Letty Escobar's goals for this visit include:   Chief Complaint   Patient presents with    RECHECK     4 week follow up from 10/23 nail avulsion and ablation/ Thumb Nail in MG       She requests these members of her care team be copied on today's visit information:     PCP: Jay Alvarado    Referring Provider:  No referring provider defined for this encounter.    There were no vitals taken for this visit.    Do you need any medication refills at today's visit?         Vanesa Mejias EMT

## 2023-12-04 NOTE — PROGRESS NOTES
Dermatologic Surgery Post-Op Wound Check     CC: RECHECK (4 week follow up from 10/23 nail avulsion and ablation/ Thumb Nail in MG)      Dermatology Problem List:  1. Nail dystrophy, R thumb, s/p nail avulsion and ablation 10/23/23    - Will start Alpha Hydroxy Acid containing lotion for Asteatotic Eczema  __________________________________________________________      Subjective: Letty Escobar is a 68 year old female who presents today for a follow up after nail avulsion and ablation on 10/23/23.     - no other concerns today    Objective: An exam of the right hand was performed today   - There is a pink atrophic macule at the proximal nail bed and matrix on the right thumb nail unit.  -Well healed surgical scar of proximal and lateral nail folds, no evidence of nail plate regrowth.     Assessment and Plan:     # Matrix Ablation, R thumb  - Procedure appears successful. No nail plate spicules visible. Wound has healed appropriately. There is some scale consistent with asteatotic eczema bilaterally. Patient will start an alpha hydroxy acid containing lotion.  - Follow-up: PRN      Patient was discussed with and evaluated by attending physician Dr. Arturo Davis.    Scribe Disclosure:   I, Yuliya Greene, am serving as a scribe; to document services personally performed by Dr. Arturo Davis - -based on data collection and the provider's statements to me.       Provider Disclosure:   The documentation recorded by the scribe accurately reflects the services I personally performed and the decisions made by me.    Arturo Davis DO    Department of Dermatology  [facility]: Phone: [phone], Fax:[phone]  [facility]: Phone: [phone], Fax: [phone]

## 2023-12-04 NOTE — LETTER
12/4/2023         RE: Letty Escobar  C/o Edwar Escobar  [address]   [facility] [address]        Dear Colleague,    Thank you for referring your patient, Letty Escobar, to the [facility]. Please see a copy of my visit note below.    Dermatologic Surgery Post-Op Wound Check     CC: RECHECK (4 week follow up from 10/23 nail avulsion and ablation/ Thumb Nail in MG)      Dermatology Problem List:  1. Nail dystrophy, R thumb, s/p nail avulsion and ablation 10/23/23    - Will start Alpha Hydroxy Acid containing lotion for Asteatotic Eczema  __________________________________________________________      Subjective: Letty Escobar is a 68 year old female who presents today for a follow up after nail avulsion and ablation on 10/23/23.     - no other concerns today    Objective: An exam of the right hand was performed today   - There is a pink atrophic macule at the proximal nail bed and matrix on the right thumb nail unit.  -Well healed surgical scar of proximal and lateral nail folds, no evidence of nail plate regrowth.     Assessment and Plan:     # Matrix Ablation, R thumb  - Procedure appears successful. No nail plate spicules visible. Wound has healed appropriately. There is some scale consistent with asteatotic eczema bilaterally. Patient will start an alpha hydroxy acid containing lotion.  - Follow-up: PRN      Patient was discussed with and evaluated by attending physician Dr. Arturo Davis.    Scribe Disclosure:   I, Yuliya Greene, am serving as a scribe; to document services personally performed by Dr. Arturo Davis - -based on data collection and the provider's statements to me.       Provider Disclosure:   The documentation recorded by the scribe accurately reflects the services I personally performed and the decisions made by me.    Arturo Davis DO    Department of Dermatology  [facility]:  Phone: [phone], Fax:[phone]  [facility]: Phone: [phone], Fax: [phone]                Again, thank you for allowing me to participate in the care of your patient.        Sincerely,        Arturo Davis MD

## 2023-12-05 ENCOUNTER — TELEPHONE (OUTPATIENT)
Dept: GERIATRICS | Facility: CLINIC | Age: 68
End: 2023-12-05

## 2023-12-05 ENCOUNTER — NURSING HOME VISIT (OUTPATIENT)
Dept: GERIATRICS | Facility: CLINIC | Age: 68
End: 2023-12-05
Payer: COMMERCIAL

## 2023-12-05 ENCOUNTER — LAB REQUISITION (OUTPATIENT)
Dept: LAB | Facility: CLINIC | Age: 68
End: 2023-12-05
Payer: COMMERCIAL

## 2023-12-05 DIAGNOSIS — J44.9 COPD WITHOUT EXACERBATION (H): ICD-10-CM

## 2023-12-05 DIAGNOSIS — N18.31 CHRONIC KIDNEY DISEASE, STAGE 3A (H): ICD-10-CM

## 2023-12-05 DIAGNOSIS — M54.50 CHRONIC BILATERAL LOW BACK PAIN WITHOUT SCIATICA: ICD-10-CM

## 2023-12-05 DIAGNOSIS — D63.8 ANEMIA IN OTHER CHRONIC DISEASES CLASSIFIED ELSEWHERE: ICD-10-CM

## 2023-12-05 DIAGNOSIS — I50.22 CHRONIC SYSTOLIC HEART FAILURE (H): ICD-10-CM

## 2023-12-05 DIAGNOSIS — E03.9 HYPOTHYROIDISM, UNSPECIFIED: ICD-10-CM

## 2023-12-05 DIAGNOSIS — I10 ESSENTIAL (PRIMARY) HYPERTENSION: ICD-10-CM

## 2023-12-05 DIAGNOSIS — G89.29 CHRONIC BILATERAL LOW BACK PAIN WITHOUT SCIATICA: ICD-10-CM

## 2023-12-05 DIAGNOSIS — M35.3 POLYMYALGIA RHEUMATICA (H): ICD-10-CM

## 2023-12-05 DIAGNOSIS — Z95.1 S/P CABG X 5: ICD-10-CM

## 2023-12-05 DIAGNOSIS — G89.4 CHRONIC PAIN DISORDER: Primary | ICD-10-CM

## 2023-12-05 DIAGNOSIS — K59.01 SLOW TRANSIT CONSTIPATION: ICD-10-CM

## 2023-12-05 DIAGNOSIS — E66.01 MORBID OBESITY (H): ICD-10-CM

## 2023-12-05 PROCEDURE — 99309 SBSQ NF CARE MODERATE MDM 30: CPT | Performed by: NURSE PRACTITIONER

## 2023-12-05 NOTE — LETTER
12/5/2023        RE: Letty Escobar  C/o Edwar Escobar  [address]   [facility] [address]        [facility] GERIATRICS  Chief Complaint   Patient presents with     RECHECK     [place] Medical Record Number:  [id number]  Place of Service where encounter took place:  [facility] () [[id number]]    HPI:    Letty Escobar  is 68 year old (1955), who is being seen today for a federally mandated E/M visit.     This is a 67 yo female with PMHx for CAD with history of MI and CABG, ischemic cardiomyopathy with chronic systolic heart failure, indwelling cardiac pacemaker and defibrillator, type 2 diabetes, hypertension, hyperlipidemia, COPD, PAD, ELI, morbid obesity, chronic mental illness, chronic urinary incontinence, previous stroke, stage IIIa CKD, chronic anemia, chronic thrombocytopenia, and chronic debility.     Today's concerns are:  No issues today    ALLERGIES:Bee pollen, Diphenhydramine, Isosorbide nitrate, Nitroglycerin, Contrast dye, Penicillin g, Adhesive tape, Liquid adhesive, Nystatin, Penicillins, and Sulfa antibiotics  PAST MEDICAL HISTORY:   Past Medical History:   Diagnosis Date     ACP (advance care planning) 11/3/2010    Formatting of this note might be different from the original. Patient has identified Health Care Agent(s): Yes Add Health Care Agents: Yes   Health Care Agent(s):  Primary Health Care Agent:   Edwar Escobar Relationship:  Spouse Phone:   [phone]  Secondary Health Care Agent:   Chiki Oro Relationship:   Son Phone:   [phone]  Patient has Advance Care Plan Documents (Health Care Direct     Acute coronary syndrome (H) 11/22/2019     Acute exacerbation of CHF (congestive heart failure) (H) 11/11/2019     Acute on chronic systolic (congestive) heart failure (H) 1/28/2020     Anemia of chronic disease 1/5/2013     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, left leg (H) 1/15/2020      BPPV (benign paroxysmal positional vertigo) 5/31/2018     Candidiasis 3/1/2020     Carotid stenosis, right 7/13/2016    Carotid US 05/04/2018 showed moderate plaque formation, consistent with 50 to 69% stenosis in the right internal carotid artery, not significantly changed from 8/5/2015.  Moderate plaque formation, consistent with 50 to 69% stenosis in the left internal carotid artery; there has been mild progression of the left ICA stenosis since 8/5/2015.     Chest pain 11/11/2019     Chronic bilateral low back pain without sciatica 1/17/2018     Chronic pain disorder 10/1/2017     Constipation 3/20/2020     COPD (chronic obstructive pulmonary disease) (H) 6/24/2020     Coronary atherosclerosis 2/6/2009 2/5/2009 - MI - Proximal RCA 99%, mild-mod disease elsewhere.  EF 60%.  PCI:  MYRON to pRCA. 2/12/2009 - admit CP - Widely patent RCA stent. Moderate diffuse CAD. Severe stenosis in trivial PDA branch. LVEF 45%. 1/25/2010 - admit CP - PTCA and stent of diagonal 9/8/2010 - admit CP - LAD patent stent. Moderate diffuse CAD. Medical management recommended.  4/25/2012 - NSTEMI - Acute total occlusion of     Cubital tunnel syndrome on left 11/13/2012     Depressive disorder      Diabetes mellitus (H)      Diabetes mellitus type 2 in obese (H) 7/13/2006     Diabetes mellitus type 2 in obese (H) 7/13/2006     Drug-seeking behavior 4/4/2020     Failure to thrive in adult 9/3/2020     Hereditary and idiopathic peripheral neuropathy 4/24/2007     Hyperlipidemia with target low density lipoprotein (LDL) cholesterol less than 70 mg/dL 5/30/2007     Hypertension 10/14/2015     Hypothyroidism 12/10/2010     Irritable bowel syndrome 9/7/2015     Ischemic cardiomyopathy 9/20/2015    EF of 40-45%, status post RV lead revision and LV epicardial lead placement via mini-thoracotomy in August 2016.     Long-term use of high-risk medication 4/14/2020     Moniliasis, 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     Mood disorder due to a general medical  condition 3/1/2020     Myocardial infarction (H)      Neuromuscular disorder (H)     ulnar nerve problem     Other specified postprocedural states 10/8/2015     Pain medication agreement 4/20/2013    Controlled substance agreement for percocet #30/month on file and signed 4/17/13.  Designated pharmacy: [facility] Prescribing physician: Andrea Diagnosis: Ulnar neuropathy     Panic disorder with agoraphobia 4/14/2020     Paroxysmal atrial fibrillation (H) 10/2/2015     Personality disorder (H) 3/5/2020    Rule out dependent personality     Polymyalgia rheumatica (H24) 11/28/2018     Posttraumatic stress disorder 3/1/2020     Restless legs syndrome (RLS) 8/29/2007     Restless legs syndrome (RLS) 8/29/2007     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     Serum calcium elevated 3/1/2020     Somatic dysfunction of sacral region 1/17/2018     Subacromial bursitis of left shoulder joint 8/6/2018     Suicidal ideation 4/1/2020     Thyroid disease      TIA (transient ischemic attack) 5/4/2018     TIA (transient ischemic attack) 5/4/2018     Tobacco abuse 2/17/2017     Tobacco abuse 2/17/2017     Urinary incontinence, mixed 9/24/2017     UTI (urinary tract infection) 4/17/2020     Vitamin B12 deficiency 2/14/2018     Weakness 12/17/2019     PAST SURGICAL HISTORY:   has a past surgical history that includes Release carpal tunnel; Cholecystectomy; Cardiac surgery; Abdomen surgery; Release carpal tunnel; Cholecystectomy; Cardiac surgery; and other surgical history.  FAMILY HISTORY: family history is not on file.  SOCIAL HISTORY:  reports that she has never smoked. She has never used smokeless tobacco. She reports that she does not currently use alcohol. She reports that she does not use drugs.    MEDICATIONS:         Review of your medicines            Accurate as of December 5, 2023 10:45 AM. If you have any questions, ask your nurse or doctor.                CONTINUE these medicines which have NOT CHANGED        Dose / Directions   *  acetaminophen 500 MG tablet  Commonly known as: TYLENOL  Used for: Fever, unspecified fever cause      Dose: 1,000 mg  Take 2 tablets (1,000 mg) by mouth 3 times daily  Refills: 0     * acetaminophen 325 MG tablet  Commonly known as: TYLENOL      Dose: 325 mg  Take 325 mg by mouth 2 times daily as needed for pain  Refills: 0     amLODIPine 5 MG tablet  Commonly known as: NORVASC      Dose: 5 mg  Take 5 mg by mouth daily  Refills: 0     aspirin 81 MG EC tablet      Dose: 81 mg  Take 81 mg by mouth daily  Refills: 0     bisacodyl 10 MG suppository  Commonly known as: DULCOLAX      Dose: 10 mg  Place 10 mg rectally daily as needed for constipation  Refills: 0     Breo Ellipta 200-25 MCG/INH Inhaler  Generic drug: fluticasone-vilanterol      Dose: 1 puff  Inhale 1 puff into the lungs daily Rinse mouth after use  Refills: 0     cyanocobalamin 1000 MCG/ML injection  Commonly known as: CYANOCOBALAMIN      Dose: 1 mL  Inject 1 mL into the muscle every 30 days 21st of each month  Refills: 0     divalproex sodium delayed-release 250 MG DR tablet  Commonly known as: DEPAKOTE      Dose: 250 mg  Take 250 mg by mouth daily 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  Refills: 0     DULoxetine HCl 40 MG Cpep      Dose: 40 mg  Take 40 mg by mouth daily  Refills: 0     famotidine 20 MG tablet  Commonly known as: PEPCID      Dose: 20 mg  Take 20 mg by mouth at bedtime  Refills: 0     furosemide 20 MG tablet  Commonly known as: LASIX  Indication: Cardiac Failure      Dose: 20 mg  Take 20 mg by mouth every other day  Refills: 0     gabapentin 400 MG capsule  Commonly known as: NEURONTIN      Dose: 400 mg  Take 400 mg by mouth at bedtime RLS  Refills: 0     hydrochlorothiazide 25 MG tablet  Commonly known as: HYDRODIURIL      Dose: 25 mg  Take 25 mg by mouth daily  Refills: 0     levothyroxine 112 MCG tablet  Commonly known as: SYNTHROID/LEVOTHROID      Dose: 112 mcg  Take 112 mcg by mouth daily before breakfast  Refills: 0     lidocaine 4 %  external cream  Commonly known as: LMX4      Apply topically 2 times daily To Rt knee  Refills: 0     LORazepam 0.5 MG tablet  Commonly known as: ATIVAN  Used for: Panic disorder with agoraphobia      Dose: 0.5 mg  Take 1 tablet (0.5 mg) by mouth 2 times daily  Quantity: 15 tablet  Refills: 0     Melatonin 10 MG Tabs tablet      Dose: 10 mg  Take 10 mg by mouth At Bedtime  Refills: 0     metoprolol succinate ER 25 MG 24 hr tablet  Commonly known as: TOPROL XL      Dose: 25 mg  Take 25 mg by mouth daily  Refills: 0     mirtazapine 30 MG tablet  Commonly known as: REMERON      Dose: 15 mg  Take 15 mg by mouth daily  Refills: 0     nitroGLYcerin 0.4 MG sublingual tablet  Commonly known as: NITROSTAT      Dose: 0.4 mg  Place 0.4 mg under the tongue every 5 minutes as needed for chest pain For chest pain place 1 tablet under the tongue every 5 minutes for 3 doses. If symptoms persist 5 minutes after 1st dose call 911.  Refills: 0     ondansetron 4 MG ODT tab  Commonly known as: ZOFRAN ODT      Dose: 4 mg  Take 4 mg by mouth every 8 hours as needed for nausea  Refills: 0     polyethylene glycol 17 GM/Dose powder  Commonly known as: MIRALAX      Dose: 17 g  Take 17 g by mouth every other day  Refills: 0     potassium chloride ER 10 MEQ CR capsule  Commonly known as: MICRO-K      Dose: 10 mEq  Take 10 mEq by mouth daily  Refills: 0     QUEtiapine 100 MG tablet  Commonly known as: SEROquel      Dose: 100 mg  Take 100 mg by mouth 3 times daily  Refills: 0     rosuvastatin 10 MG tablet  Commonly known as: CRESTOR      Dose: 20 mg  Take 20 mg by mouth At Bedtime  Refills: 0     sennosides 8.6 MG tablet  Commonly known as: SENOKOT      Dose: 2 tablet  Take 2 tablets by mouth At Bedtime  Refills: 0     traZODone 50 MG tablet  Commonly known as: DESYREL      Dose: 25 mg  Take 25 mg by mouth 3 times daily  Refills: 0           * This list has 2 medication(s) that are the same as other medications prescribed for you. Read the  "directions carefully, and ask your doctor or other care provider to review them with you.                   Case Management:  I have reviewed the care plan and MDS and do agree with the plan. Patient's desire to return to the community is not present. Information reviewed:  Medications, vital signs, orders, and nursing notes.    ROS:  4 point ROS including Respiratory, CV, GI and , other than that noted in the HPI,  is negative    Vitals:  BP (!) 145/79   Pulse 75   Temp 97.6  F (36.4  C)   Resp 18   Ht 1.448 m (4' 9\")   Wt 97.4 kg (214 lb 12.8 oz)   SpO2 94%   BMI 46.48 kg/m    Body mass index is 46.48 kg/m .  Exam:  GENERAL APPEARANCE:  Alert, in no distress, morbidly obese, cooperative, chronic pain  RESP:  respiratory effort and palpation of chest normal, lungs clear to auscultation   CV:  regular rate and rhythm, no murmur, rub, or gallop, no edema. PPM/ICD  PSYCH:  oriented to 2/3    Lab/Diagnostic data:   Most Recent 3 CBC's:  Recent Labs   Lab Test 11/24/23  0740 11/12/23  0511 11/11/23  0526   WBC 6.6 6.1 6.0   HGB 9.3* 8.4* 8.6*   MCV 95 95 97   * 127* 128*     Most Recent 3 BMP's:  Recent Labs   Lab Test 11/12/23  0730 11/12/23  0511 11/12/23  0308 11/11/23  0720 11/11/23  0526 11/10/23  1802 11/10/23  1113   NA  --  138  --   --  140  --  139   POTASSIUM  --  4.0  --   --  4.1  --  4.5   CHLORIDE  --  99  --   --  101  --  100   CO2  --  27  --   --  28  --  28   BUN  --  22.1  --   --  24.5*  --  26.6*   CR  --  1.13*  --   --  1.19*  --  1.44*   ANIONGAP  --  12  --   --  11  --  11   ORESTES  --  9.6  --   --  9.5  --  9.8   * 143* 141*   < > 142*   < > 175*    < > = values in this interval not displayed.     Most Recent TSH and T4:  Recent Labs   Lab Test 11/10/23  1113   TSH 13.76*   T4 0.88*       ASSESSMENT/PLAN    (Z95.1) S/P CABG x 5  (I50.22) Chronic systolic heart failure (H)  (I10) Essential (primary) hypertension  EF 40-45% per 7/2023, continue amlodipine 5mg daily, lasix " 20mg EOD, hydrochlorothiazide 25mg daily and metoprolol succinate 25mg daily. Continue statin    (G89.4) Chronic pain disorder    (M35.3) Polymyalgia rheumatica (H24)  (M54.50,  G89.29) Chronic bilateral low back pain without sciatica  RLS  Continue tylenol 1000mg TID, gabapentin 400mg at HS    (J44.9) COPD without exacerbation (H)  Continue Breo 1 puff daily, RA    (E66.01) Morbid obesity (H)  Last BMI >45    Depression  Anxiety  Continue depakote 250mg daily, ativan 0.5mg BID, cymbalta 40mg daily with seroquel 100mg TID and trazodone 25mg TID    (N18.31) Chronic kidney disease, stage 3a (H)  Last Cr 1.13 with GFR 52 on 11/12    Hypokalemia per diuretic use  Continue potassium 10 mEq daily, last K 4.0 on 11/12    (D63.8) Anemia in other chronic diseases classified elsewhere  Last Hgb 9.3 on 11/24    (K59.01) Slow transit constipation  Continue MiraLAX every other day with senna 2 tabs at HS    GERD  Continue pepcid 20mg at HS    Insomnia  Continue melatonin 10 mg and Remeron 15 mg at bedtime    Electronically signed by:  Jay Alvarado NP          Sincerely,        Jay Alvarado NP

## 2023-12-05 NOTE — TELEPHONE ENCOUNTER
[place] [facility] NON-EMERGENT ENCOUNTER    Letty Escobar is a 68 year old  (1955)    ORDER given to Suly TORRES/TCU from Jay Alvarado NP:  Obtain TSH/Free T4 on 12/6/23 - hypothyroidism     Electronically signed by:   Precious Hoffman RN

## 2023-12-05 NOTE — PROGRESS NOTES
[facility] GERIATRICS  Chief Complaint   Patient presents with    MELISSA     [facility] Record Number:  [id number]  Place of Service where encounter took place:  [facility] () [[id number]]    HPI:    Letty Escobar  is 68 year old (1955), who is being seen today for a federally mandated E/M visit.     This is a 67 yo female with PMHx for CAD with history of MI and CABG, ischemic cardiomyopathy with chronic systolic heart failure, indwelling cardiac pacemaker and defibrillator, type 2 diabetes, hypertension, hyperlipidemia, COPD, PAD, ELI, morbid obesity, chronic mental illness, chronic urinary incontinence, previous stroke, stage IIIa CKD, chronic anemia, chronic thrombocytopenia, and chronic debility.     Today's concerns are:  No issues today    ALLERGIES:Bee pollen, Diphenhydramine, Isosorbide nitrate, Nitroglycerin, Contrast dye, Penicillin g, Adhesive tape, Liquid adhesive, Nystatin, Penicillins, and Sulfa antibiotics  PAST MEDICAL HISTORY:   Past Medical History:   Diagnosis Date    ACP (advance care planning) 11/3/2010    Formatting of this note might be different from the original. Patient has identified Health Care Agent(s): Yes Add Health Care Agents: Yes   Health Care Agent(s):  Primary Health Care Agent:   Edwar Escobar Relationship:  Spouse Phone:   [phone]  Secondary Health Care Agent:   Chiki Shorty Relationship:   Son Phone:   [phone]  Patient has Advance Care Plan Documents (Health Care Direct    Acute coronary syndrome (H) 11/22/2019    Acute exacerbation of CHF (congestive heart failure) (H) 11/11/2019    Acute on chronic systolic (congestive) heart failure (H) 1/28/2020    Anemia of chronic disease 1/5/2013    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, left leg (H) 1/15/2020    BPPV (benign paroxysmal positional vertigo) 5/31/2018    Candidiasis 3/1/2020    Carotid stenosis, right 7/13/2016     Carotid US 05/04/2018 showed moderate plaque formation, consistent with 50 to 69% stenosis in the right internal carotid artery, not significantly changed from 8/5/2015.  Moderate plaque formation, consistent with 50 to 69% stenosis in the left internal carotid artery; there has been mild progression of the left ICA stenosis since 8/5/2015.    Chest pain 11/11/2019    Chronic bilateral low back pain without sciatica 1/17/2018    Chronic pain disorder 10/1/2017    Constipation 3/20/2020    COPD (chronic obstructive pulmonary disease) (H) 6/24/2020    Coronary atherosclerosis 2/6/2009 2/5/2009 - MI - Proximal RCA 99%, mild-mod disease elsewhere.  EF 60%.  PCI:  MYRON to pRCA. 2/12/2009 - admit CP - Widely patent RCA stent. Moderate diffuse CAD. Severe stenosis in trivial PDA branch. LVEF 45%. 1/25/2010 - admit CP - PTCA and stent of diagonal 9/8/2010 - admit CP - LAD patent stent. Moderate diffuse CAD. Medical management recommended.  4/25/2012 - NSTEMI - Acute total occlusion of    Cubital tunnel syndrome on left 11/13/2012    Depressive disorder     Diabetes mellitus (H)     Diabetes mellitus type 2 in obese (H) 7/13/2006    Diabetes mellitus type 2 in obese (H) 7/13/2006    Drug-seeking behavior 4/4/2020    Failure to thrive in adult 9/3/2020    Hereditary and idiopathic peripheral neuropathy 4/24/2007    Hyperlipidemia with target low density lipoprotein (LDL) cholesterol less than 70 mg/dL 5/30/2007    Hypertension 10/14/2015    Hypothyroidism 12/10/2010    Irritable bowel syndrome 9/7/2015    Ischemic cardiomyopathy 9/20/2015    EF of 40-45%, status post RV lead revision and LV epicardial lead placement via mini-thoracotomy in August 2016.    Long-term use of high-risk medication 4/14/2020    Moniliasis, 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    Myocardial infarction (H)     Neuromuscular disorder (H)     ulnar nerve problem    Other specified postprocedural states  10/8/2015    Pain medication agreement 4/20/2013    Controlled substance agreement for percocet #30/month on file and signed 4/17/13.  Designated pharmacy: [facility] Prescribing physician: Andrea Diagnosis: Ulnar neuropathy    Panic disorder with agoraphobia 4/14/2020    Paroxysmal atrial fibrillation (H) 10/2/2015    Personality disorder (H) 3/5/2020    Rule out dependent personality    Polymyalgia rheumatica (H24) 11/28/2018    Posttraumatic stress disorder 3/1/2020    Restless legs syndrome (RLS) 8/29/2007    Restless legs syndrome (RLS) 8/29/2007    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    Serum calcium elevated 3/1/2020    Somatic dysfunction of sacral region 1/17/2018    Subacromial bursitis of left shoulder joint 8/6/2018    Suicidal ideation 4/1/2020    Thyroid disease     TIA (transient ischemic attack) 5/4/2018    TIA (transient ischemic attack) 5/4/2018    Tobacco abuse 2/17/2017    Tobacco abuse 2/17/2017    Urinary incontinence, mixed 9/24/2017    UTI (urinary tract infection) 4/17/2020    Vitamin B12 deficiency 2/14/2018    Weakness 12/17/2019     PAST SURGICAL HISTORY:   has a past surgical history that includes Release carpal tunnel; Cholecystectomy; Cardiac surgery; Abdomen surgery; Release carpal tunnel; Cholecystectomy; Cardiac surgery; and other surgical history.  FAMILY HISTORY: family history is not on file.  SOCIAL HISTORY:  reports that she has never smoked. She has never used smokeless tobacco. She reports that she does not currently use alcohol. She reports that she does not use drugs.    MEDICATIONS:         Review of your medicines            Accurate as of December 5, 2023 10:45 AM. If you have any questions, ask your nurse or doctor.                CONTINUE these medicines which have NOT CHANGED        Dose / Directions   * acetaminophen 500 MG tablet  Commonly known as: TYLENOL  Used for: Fever, unspecified fever cause      Dose: 1,000 mg  Take 2 tablets (1,000 mg) by mouth 3 times daily  Refills: 0      * acetaminophen 325 MG tablet  Commonly known as: TYLENOL      Dose: 325 mg  Take 325 mg by mouth 2 times daily as needed for pain  Refills: 0     amLODIPine 5 MG tablet  Commonly known as: NORVASC      Dose: 5 mg  Take 5 mg by mouth daily  Refills: 0     aspirin 81 MG EC tablet      Dose: 81 mg  Take 81 mg by mouth daily  Refills: 0     bisacodyl 10 MG suppository  Commonly known as: DULCOLAX      Dose: 10 mg  Place 10 mg rectally daily as needed for constipation  Refills: 0     Breo Ellipta 200-25 MCG/INH Inhaler  Generic drug: fluticasone-vilanterol      Dose: 1 puff  Inhale 1 puff into the lungs daily Rinse mouth after use  Refills: 0     cyanocobalamin 1000 MCG/ML injection  Commonly known as: CYANOCOBALAMIN      Dose: 1 mL  Inject 1 mL into the muscle every 30 days 21st of each month  Refills: 0     divalproex sodium delayed-release 250 MG DR tablet  Commonly known as: DEPAKOTE      Dose: 250 mg  Take 250 mg by mouth daily 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  Refills: 0     DULoxetine HCl 40 MG Cpep      Dose: 40 mg  Take 40 mg by mouth daily  Refills: 0     famotidine 20 MG tablet  Commonly known as: PEPCID      Dose: 20 mg  Take 20 mg by mouth at bedtime  Refills: 0     furosemide 20 MG tablet  Commonly known as: LASIX  Indication: Cardiac Failure      Dose: 20 mg  Take 20 mg by mouth every other day  Refills: 0     gabapentin 400 MG capsule  Commonly known as: NEURONTIN      Dose: 400 mg  Take 400 mg by mouth at bedtime RLS  Refills: 0     hydrochlorothiazide 25 MG tablet  Commonly known as: HYDRODIURIL      Dose: 25 mg  Take 25 mg by mouth daily  Refills: 0     levothyroxine 112 MCG tablet  Commonly known as: SYNTHROID/LEVOTHROID      Dose: 112 mcg  Take 112 mcg by mouth daily before breakfast  Refills: 0     lidocaine 4 % external cream  Commonly known as: LMX4      Apply topically 2 times daily To Rt knee  Refills: 0     LORazepam 0.5 MG tablet  Commonly known as: ATIVAN  Used for: Panic disorder  with agoraphobia      Dose: 0.5 mg  Take 1 tablet (0.5 mg) by mouth 2 times daily  Quantity: 15 tablet  Refills: 0     Melatonin 10 MG Tabs tablet      Dose: 10 mg  Take 10 mg by mouth At Bedtime  Refills: 0     metoprolol succinate ER 25 MG 24 hr tablet  Commonly known as: TOPROL XL      Dose: 25 mg  Take 25 mg by mouth daily  Refills: 0     mirtazapine 30 MG tablet  Commonly known as: REMERON      Dose: 15 mg  Take 15 mg by mouth daily  Refills: 0     nitroGLYcerin 0.4 MG sublingual tablet  Commonly known as: NITROSTAT      Dose: 0.4 mg  Place 0.4 mg under the tongue every 5 minutes as needed for chest pain For chest pain place 1 tablet under the tongue every 5 minutes for 3 doses. If symptoms persist 5 minutes after 1st dose call 911.  Refills: 0     ondansetron 4 MG ODT tab  Commonly known as: ZOFRAN ODT      Dose: 4 mg  Take 4 mg by mouth every 8 hours as needed for nausea  Refills: 0     polyethylene glycol 17 GM/Dose powder  Commonly known as: MIRALAX      Dose: 17 g  Take 17 g by mouth every other day  Refills: 0     potassium chloride ER 10 MEQ CR capsule  Commonly known as: MICRO-K      Dose: 10 mEq  Take 10 mEq by mouth daily  Refills: 0     QUEtiapine 100 MG tablet  Commonly known as: SEROquel      Dose: 100 mg  Take 100 mg by mouth 3 times daily  Refills: 0     rosuvastatin 10 MG tablet  Commonly known as: CRESTOR      Dose: 20 mg  Take 20 mg by mouth At Bedtime  Refills: 0     sennosides 8.6 MG tablet  Commonly known as: SENOKOT      Dose: 2 tablet  Take 2 tablets by mouth At Bedtime  Refills: 0     traZODone 50 MG tablet  Commonly known as: DESYREL      Dose: 25 mg  Take 25 mg by mouth 3 times daily  Refills: 0           * This list has 2 medication(s) that are the same as other medications prescribed for you. Read the directions carefully, and ask your doctor or other care provider to review them with you.                   Case Management:  I have reviewed the care plan and MDS and do agree with the  "plan. Patient's desire to return to the community is not present. Information reviewed:  Medications, vital signs, orders, and nursing notes.    ROS:  4 point ROS including Respiratory, CV, GI and , other than that noted in the HPI,  is negative    Vitals:  BP (!) 145/79   Pulse 75   Temp 97.6  F (36.4  C)   Resp 18   Ht 1.448 m (4' 9\")   Wt 97.4 kg (214 lb 12.8 oz)   SpO2 94%   BMI 46.48 kg/m    Body mass index is 46.48 kg/m .  Exam:  GENERAL APPEARANCE:  Alert, in no distress, morbidly obese, cooperative, chronic pain  RESP:  respiratory effort and palpation of chest normal, lungs clear to auscultation   CV:  regular rate and rhythm, no murmur, rub, or gallop, no edema. PPM/ICD  PSYCH:  oriented to 2/3    Lab/Diagnostic data:   Most Recent 3 CBC's:  Recent Labs   Lab Test 11/24/23  0740 11/12/23  0511 11/11/23  0526   WBC 6.6 6.1 6.0   HGB 9.3* 8.4* 8.6*   MCV 95 95 97   * 127* 128*     Most Recent 3 BMP's:  Recent Labs   Lab Test 11/12/23  0730 11/12/23  0511 11/12/23  0308 11/11/23  0720 11/11/23  0526 11/10/23  1802 11/10/23  1113   NA  --  138  --   --  140  --  139   POTASSIUM  --  4.0  --   --  4.1  --  4.5   CHLORIDE  --  99  --   --  101  --  100   CO2  --  27  --   --  28  --  28   BUN  --  22.1  --   --  24.5*  --  26.6*   CR  --  1.13*  --   --  1.19*  --  1.44*   ANIONGAP  --  12  --   --  11  --  11   ORESTES  --  9.6  --   --  9.5  --  9.8   * 143* 141*   < > 142*   < > 175*    < > = values in this interval not displayed.     Most Recent TSH and T4:  Recent Labs   Lab Test 11/10/23  1113   TSH 13.76*   T4 0.88*       ASSESSMENT/PLAN    (Z95.1) S/P CABG x 5  (I50.22) Chronic systolic heart failure (H)  (I10) Essential (primary) hypertension  EF 40-45% per 7/2023, continue amlodipine 5mg daily, lasix 20mg EOD, hydrochlorothiazide 25mg daily and metoprolol succinate 25mg daily. Continue statin    (G89.4) Chronic pain disorder    (M35.3) Polymyalgia rheumatica (H24)  (M54.50,  G89.29) " Chronic bilateral low back pain without sciatica  RLS  Continue tylenol 1000mg TID, gabapentin 400mg at HS    (J44.9) COPD without exacerbation (H)  Continue Breo 1 puff daily, RA    (E66.01) Morbid obesity (H)  Last BMI >45    Depression  Anxiety  Continue depakote 250mg daily, ativan 0.5mg BID, cymbalta 40mg daily with seroquel 100mg TID and trazodone 25mg TID    (N18.31) Chronic kidney disease, stage 3a (H)  Last Cr 1.13 with GFR 52 on 11/12    Hypokalemia per diuretic use  Continue potassium 10 mEq daily, last K 4.0 on 11/12    (D63.8) Anemia in other chronic diseases classified elsewhere  Last Hgb 9.3 on 11/24    (K59.01) Slow transit constipation  Continue MiraLAX every other day with senna 2 tabs at HS    GERD  Continue pepcid 20mg at HS    Insomnia  Continue melatonin 10 mg and Remeron 15 mg at bedtime    Electronically signed by:  Jay Alvarado NP

## 2023-12-06 ENCOUNTER — TELEPHONE (OUTPATIENT)
Dept: GERIATRICS | Facility: CLINIC | Age: 68
End: 2023-12-06

## 2023-12-06 LAB
T4 FREE SERPL-MCNC: 0.65 NG/DL (ref 0.9–1.7)
TSH SERPL DL<=0.005 MIU/L-ACNC: 28.98 UIU/ML (ref 0.3–4.2)

## 2023-12-06 PROCEDURE — 36415 COLL VENOUS BLD VENIPUNCTURE: CPT | Mod: ORL | Performed by: NURSE PRACTITIONER

## 2023-12-06 PROCEDURE — 84439 ASSAY OF FREE THYROXINE: CPT | Mod: ORL | Performed by: NURSE PRACTITIONER

## 2023-12-06 PROCEDURE — 84443 ASSAY THYROID STIM HORMONE: CPT | Mod: ORL | Performed by: NURSE PRACTITIONER

## 2023-12-06 PROCEDURE — P9604 ONE-WAY ALLOW PRORATED TRIP: HCPCS | Mod: ORL | Performed by: NURSE PRACTITIONER

## 2023-12-06 RX ORDER — LEVOTHYROXINE SODIUM 137 UG/1
137 TABLET ORAL DAILY
COMMUNITY

## 2023-12-06 NOTE — TELEPHONE ENCOUNTER
[facility] Geriatrics Lab Note     Provider: Jay Alvarado NP   Facility: [facility] Facility Type:  LTC    Allergies   Allergen Reactions    Bee Pollen Anaphylaxis    Diphenhydramine Palpitations     Tolerated IV Benadryl when not pushed too fast    Isosorbide Nitrate Other (See Comments) and Dizziness     Also causes syncope (has fallen before) and brain fog/mental disturbances - please do not prescribe    Nitroglycerin Dizziness, Fatigue and Other (See Comments)     Specifically the patch - please do not prescribe  Specifically the patch - please do not prescribe      Contrast Dye Hives and Itching    Penicillin G     Adhesive Tape Rash    Liquid Adhesive Itching    Nystatin Dermatitis     Also blisters    Penicillins Swelling and Rash     Occurred as a child - not 100% sure on specific reactions    Sulfa Antibiotics Other (See Comments)     Occurred as a child / patient does not remember specific reaction       Labs Reviewed by provider:   TSH and T4     Verbal Order/Direction given by Provider:   Increase to levothyroxine 137mcg daily, recheck TSH/FT4 in 6 wks     Provider giving Order:  Jay Alvarado NP     Verbal Order given to: Suly [phone]    Debbie Marley RN

## 2023-12-21 ENCOUNTER — HOSPITAL ENCOUNTER (EMERGENCY)
Facility: CLINIC | Age: 68
Discharge: HOME | End: 2023-12-22
Attending: EMERGENCY MEDICINE | Admitting: EMERGENCY MEDICINE
Payer: COMMERCIAL

## 2023-12-21 DIAGNOSIS — R07.9 CHEST PAIN, UNSPECIFIED TYPE: ICD-10-CM

## 2023-12-21 LAB
BASOPHILS # BLD AUTO: 0 10E3/UL (ref 0–0.2)
BASOPHILS NFR BLD AUTO: 1 %
EOSINOPHIL # BLD AUTO: 0.3 10E3/UL (ref 0–0.7)
EOSINOPHIL NFR BLD AUTO: 4 %
ERYTHROCYTE [DISTWIDTH] IN BLOOD BY AUTOMATED COUNT: 14.7 % (ref 10–15)
HCT VFR BLD AUTO: 28 % (ref 35–47)
HGB BLD-MCNC: 8.9 G/DL (ref 11.7–15.7)
IMM GRANULOCYTES # BLD: 0.1 10E3/UL
IMM GRANULOCYTES NFR BLD: 1 %
LYMPHOCYTES # BLD AUTO: 1.9 10E3/UL (ref 0.8–5.3)
LYMPHOCYTES NFR BLD AUTO: 27 %
MCH RBC QN AUTO: 28.9 PG (ref 26.5–33)
MCHC RBC AUTO-ENTMCNC: 31.8 G/DL (ref 31.5–36.5)
MCV RBC AUTO: 91 FL (ref 78–100)
MONOCYTES # BLD AUTO: 0.5 10E3/UL (ref 0–1.3)
MONOCYTES NFR BLD AUTO: 6 %
NEUTROPHILS # BLD AUTO: 4.4 10E3/UL (ref 1.6–8.3)
NEUTROPHILS NFR BLD AUTO: 61 %
NRBC # BLD AUTO: 0 10E3/UL
NRBC BLD AUTO-RTO: 0 /100
PLATELET # BLD AUTO: 148 10E3/UL (ref 150–450)
RBC # BLD AUTO: 3.08 10E6/UL (ref 3.8–5.2)
WBC # BLD AUTO: 7.2 10E3/UL (ref 4–11)

## 2023-12-21 PROCEDURE — 93005 ELECTROCARDIOGRAM TRACING: CPT | Performed by: EMERGENCY MEDICINE

## 2023-12-21 PROCEDURE — 99285 EMERGENCY DEPT VISIT HI MDM: CPT | Mod: 25 | Performed by: EMERGENCY MEDICINE

## 2023-12-21 PROCEDURE — 96374 THER/PROPH/DIAG INJ IV PUSH: CPT | Performed by: EMERGENCY MEDICINE

## 2023-12-21 PROCEDURE — 93010 ELECTROCARDIOGRAM REPORT: CPT | Performed by: EMERGENCY MEDICINE

## 2023-12-21 PROCEDURE — 80048 BASIC METABOLIC PNL TOTAL CA: CPT | Performed by: EMERGENCY MEDICINE

## 2023-12-21 PROCEDURE — 84484 ASSAY OF TROPONIN QUANT: CPT | Performed by: EMERGENCY MEDICINE

## 2023-12-21 PROCEDURE — 36415 COLL VENOUS BLD VENIPUNCTURE: CPT | Performed by: EMERGENCY MEDICINE

## 2023-12-21 PROCEDURE — 99284 EMERGENCY DEPT VISIT MOD MDM: CPT | Mod: 25 | Performed by: EMERGENCY MEDICINE

## 2023-12-21 PROCEDURE — 85025 COMPLETE CBC W/AUTO DIFF WBC: CPT | Performed by: EMERGENCY MEDICINE

## 2023-12-21 RX ORDER — ACETAMINOPHEN 500 MG
1000 TABLET ORAL ONCE
Status: DISCONTINUED | OUTPATIENT
Start: 2023-12-21 | End: 2023-12-22 | Stop reason: HOSPADM

## 2023-12-22 ENCOUNTER — APPOINTMENT (OUTPATIENT)
Dept: GENERAL RADIOLOGY | Facility: CLINIC | Age: 68
End: 2023-12-22
Attending: EMERGENCY MEDICINE
Payer: COMMERCIAL

## 2023-12-22 VITALS
WEIGHT: 212 LBS | BODY MASS INDEX: 45.88 KG/M2 | OXYGEN SATURATION: 91 % | DIASTOLIC BLOOD PRESSURE: 65 MMHG | HEART RATE: 60 BPM | SYSTOLIC BLOOD PRESSURE: 113 MMHG | TEMPERATURE: 97.8 F | RESPIRATION RATE: 10 BRPM

## 2023-12-22 LAB
ANION GAP SERPL CALCULATED.3IONS-SCNC: 10 MMOL/L (ref 7–15)
BUN SERPL-MCNC: 29.3 MG/DL (ref 8–23)
CALCIUM SERPL-MCNC: 9.5 MG/DL (ref 8.8–10.2)
CHLORIDE SERPL-SCNC: 96 MMOL/L (ref 98–107)
CREAT SERPL-MCNC: 1.19 MG/DL (ref 0.51–0.95)
DEPRECATED HCO3 PLAS-SCNC: 27 MMOL/L (ref 22–29)
EGFRCR SERPLBLD CKD-EPI 2021: 49 ML/MIN/1.73M2
GLUCOSE SERPL-MCNC: 307 MG/DL (ref 70–99)
POTASSIUM SERPL-SCNC: 4 MMOL/L (ref 3.4–5.3)
SODIUM SERPL-SCNC: 133 MMOL/L (ref 135–145)
TROPONIN T SERPL HS-MCNC: 34 NG/L
TROPONIN T SERPL HS-MCNC: 36 NG/L

## 2023-12-22 PROCEDURE — 84484 ASSAY OF TROPONIN QUANT: CPT | Performed by: EMERGENCY MEDICINE

## 2023-12-22 PROCEDURE — 250N000013 HC RX MED GY IP 250 OP 250 PS 637: Performed by: EMERGENCY MEDICINE

## 2023-12-22 PROCEDURE — 250N000011 HC RX IP 250 OP 636: Performed by: EMERGENCY MEDICINE

## 2023-12-22 PROCEDURE — 36415 COLL VENOUS BLD VENIPUNCTURE: CPT | Performed by: EMERGENCY MEDICINE

## 2023-12-22 PROCEDURE — 71045 X-RAY EXAM CHEST 1 VIEW: CPT

## 2023-12-22 RX ORDER — ONDANSETRON 2 MG/ML
4 INJECTION INTRAMUSCULAR; INTRAVENOUS ONCE
Status: COMPLETED | OUTPATIENT
Start: 2023-12-22 | End: 2023-12-22

## 2023-12-22 RX ORDER — LIDOCAINE 4 G/G
1 PATCH TOPICAL ONCE
Status: DISCONTINUED | OUTPATIENT
Start: 2023-12-22 | End: 2023-12-22 | Stop reason: HOSPADM

## 2023-12-22 RX ADMIN — LIDOCAINE 1 PATCH: 4 PATCH TOPICAL at 01:24

## 2023-12-22 RX ADMIN — ONDANSETRON 4 MG: 2 INJECTION INTRAMUSCULAR; INTRAVENOUS at 01:22

## 2023-12-22 ASSESSMENT — ACTIVITIES OF DAILY LIVING (ADL): ADLS_ACUITY_SCORE: 35

## 2023-12-22 NOTE — ED PROVIDER NOTES
"    History     chief complaint  HPI  Patient is a 68-year-old female with a history of CABG x 5, paroxysmal atrial fibrillation not on anticoagulation, diabetes mellitus, COPD is presenting with a chief complaint of chest pain.  This started around 10 PM.  Radiates to her left arm.  Causes numbness in her left hand, though patient states that she often has numbness in her left hand from her \"neuropathy\".  She is also feeling nauseous.  Not diaphoretic.  No dyspnea.  No cough, fevers, chills.  Nothing makes it worse or better.  She received nitroglycerin in route and this did not change her pain.    Review of Systems:  All organ systems below were reviewed and are negative unless indicated in the HPI.    Constitutional  Eyes  ENT  Respiratory  Cardiovascular  Gastrointestinal  Genitourinary  Musculoskeletal  Skin  Neuro    Allergies:  Allergies   Allergen Reactions    Bee Pollen Anaphylaxis    Diphenhydramine Palpitations     Tolerated IV Benadryl when not pushed too fast    Isosorbide Nitrate Other (See Comments) and Dizziness     Also causes syncope (has fallen before) and brain fog/mental disturbances - please do not prescribe    Nitroglycerin Dizziness, Fatigue and Other (See Comments)     Specifically the patch - please do not prescribe  Specifically the patch - please do not prescribe      Contrast Dye Hives and Itching    Penicillin G     Adhesive Tape Rash    Liquid Adhesive Itching    Nystatin Dermatitis     Also blisters    Penicillins Swelling and Rash     Occurred as a child - not 100% sure on specific reactions    Sulfa Antibiotics Other (See Comments)     Occurred as a child / patient does not remember specific reaction       Problem List:    Patient Active Problem List    Diagnosis Date Noted    Lethargy 11/10/2023     Priority: Medium    Urinary tract infection without hematuria, site unspecified 11/10/2023     Priority: Medium    Fever 11/10/2023     Priority: Medium    Hypoxia 11/10/2023     " Priority: Medium    Pyuria 11/10/2023     Priority: Medium    PAD (peripheral artery disease) (H24) 11/10/2023     Priority: Medium    Left foot pain 11/10/2023     Priority: Medium    History of stroke 11/10/2023     Priority: Medium    Lives in long-term care facility 11/10/2023     Priority: Medium    Obesity hypoventilation syndrome (H) 11/10/2023     Priority: Medium    Benzodiazepine dependence (H) 11/10/2023     Priority: Medium    Thrombocytopenia (H24) 11/10/2023     Priority: Medium    ERIC (acute kidney injury) (H24) 11/10/2023     Priority: Medium    Chronic kidney disease, stage 3a (H) 10/16/2022     Priority: Medium    SOB (shortness of breath) 04/07/2022     Priority: Medium    Type 2 diabetes mellitus (H) 02/13/2022     Priority: Medium    Morbid obesity (H) 12/10/2021     Priority: Medium    ICD (implantable cardioverter-defibrillator) in place 11/17/2021     Priority: Medium     Formatting of this note is different from the original.  Date of last device in office evaluation: 5/17/2022    ?  and model: MedAnygma Cobalt CRT-D.   Date of implant: 5/7/2021  Tachy therapies remain OFF: S/p downgrade from ICD to pacemaker, but her implanted system includes a DF-4 lead, so an ICD generator was placed with the tachycardia therapies disabled.     ? Indication for device: Ischemic cardiomyopathy   ? Cardiac resynchronization therapy:   no     MRI Conditional:  No  o If No:  Reason why:  Abandoned LV lead    ? Battery longevity documented as less than 3  Months: No  ? Are any of the leads less than 3 months old:  No    ? Programming              ? Pacing mode and programmed lower rate: DDDR 60 - 130 bpm              ? Rate-responsive sensor type, if programmed on: Accelerometer        Underlying rhythm and heart rate:  SR @ 60 bpm    ? What is the response of this device to magnet placement:  None - tachy therapies off  ? PM magnet pacing rate: N/A   ? Any alert status on CIED generator or lead:  No    ? Last pacing threshold    Atrial   1.0 V @ 0.4 ms   Ventricular RV: 0.75 V @ 0.4 ms  LV:  2.75 V @ 1.0 ms    Formatting of this note is different from the original.  Date of last device in office evaluation: 11/17/2022     ?  and model: Medtronic Cobalt CRT-D.   Date of implant: 5/7/2021  Tachy therapies remain OFF: S/p downgrade from ICD to pacemaker, but her implanted system includes a DF-4 lead, so an ICD generator was placed with the tachycardia therapies disabled.     ? Indication for device: Ischemic cardiomyopathy   ? Cardiac resynchronization therapy:   no     MRI Conditional:  No  o If No:  Reason why:  Abandoned LV lead    ? Battery longevity documented as less than 3  Months: No  ? Are any of the leads less than 3 months old:  No    ? Programming              ? Pacing mode and programmed lower rate: DDDR 60 - 130 bpm              ? Rate-responsive sensor type, if programmed on: Accelerometer        Underlying rhythm and heart rate:  Sinus rhythm 62 bpm, w/BBB    ? What is the response of this device to magnet placement:  None - tachy therapies off  ? PM magnet pacing rate: N/A   ? Any alert status on CIED generator or lead: No    ? Last pacing threshold    Atrial   1.0 V @ 0.4 ms   Ventricular RV: 0.75 V @ 0.4 ms  LV:  2.75 V @ 1.0 ms      Atrial fibrillation (H) 04/06/2021     Priority: Medium    Pacemaker 04/06/2021     Priority: Medium    Major depressive disorder, recurrent severe without psychotic features (H) 01/26/2021     Priority: Medium    Panic disorder with agoraphobia 04/14/2020     Priority: Medium    Constipation 03/20/2020     Priority: Medium    Personality disorder (H) 03/05/2020     Priority: Medium     Rule out dependent personality    Formatting of this note might be different from the original.  Rule out dependent personality  Formatting of this note might be different from the original.  Rule out dependent personality      Candidiasis 03/01/2020     Priority:  Medium    Posttraumatic stress disorder 03/01/2020     Priority: Medium    COPD without exacerbation (H) 01/29/2020     Priority: Medium    Long term (current) use of insulin (H) 01/29/2020     Priority: Medium    Chronic systolic heart failure (H) 01/28/2020     Priority: Medium    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, left leg (H) 01/15/2020     Priority: Medium    Chest pain 11/11/2019     Priority: Medium    Polymyalgia rheumatica (H24) 11/28/2018     Priority: Medium    Unstable angina (H) 05/02/2018     Priority: Medium     Coronary angiogram 05/02/2018 demonstrated 30% stenosis in the LMCA, 50% stenosis in the Proximal LAD, 50% stenosis in the Mid LAD, 95% stenosis in the Proximal Circumflex (Restenosis - Balloon Angioplasty), 100% stenosis in the 1st Marginal, 50% stenosis in the Proximal RCA, 50% stenosis in the Distal RCA, the LIMA graft from the LIMA to the Distal LAD is free of significant disease; the SVG graft from the Aorta to the 1st Marginal is free of significant disease; the SVG graft from the Aorta to the Distal RCA is free of significant disease. Intervention included a successful  2.5mm x 12mm Balloon,  2.5mm x 10mm Cutting Balloon,  3mm x 12mm Drug Eluting Stent,  3mm x 12mm Balloon, and  3mm x 8mm Balloon to Proximal Circumflex, post stenosis 0%.    Formatting of this note is different from the original.  Coronary angiogram 05/02/2018 demonstrated 30% stenosis in the LMCA, 50% stenosis in the Proximal LAD, 50% stenosis in the Mid LAD, 95% stenosis in the Proximal Circumflex (Restenosis - Balloon Angioplasty), 100% stenosis in the 1st Marginal, 50% stenosis in the Proximal RCA, 50% stenosis in the Distal RCA, the LIMA graft from the LIMA to the Distal LAD is free of significant disease; the SVG graft from the Aorta to the 1st Marginal is free of significant disease; the SVG graft from the Aorta to the Distal RCA is free of significant disease.  Intervention included a successful  2.5mm x 12mm Balloon,  2.5mm x 10mm Cutting Balloon,  3mm x 12mm Drug Eluting Stent,  3mm x 12mm Balloon, and  3mm x 8mm Balloon to Proximal Circumflex, post stenosis 0%.  Formatting of this note is different from the original.  Coronary angiogram 05/02/2018 demonstrated 30% stenosis in the LMCA, 50% stenosis in the Proximal LAD, 50% stenosis in the Mid LAD, 95% stenosis in the Proximal Circumflex (Restenosis - Balloon Angioplasty), 100% stenosis in the 1st Marginal, 50% stenosis in the Proximal RCA, 50% stenosis in the Distal RCA, the LIMA graft from the LIMA to the Distal LAD is free of significant disease; the SVG graft from the Aorta to the 1st Marginal is free of significant disease; the SVG graft from the Aorta to the Distal RCA is free of significant disease. Intervention included a successful  2.5mm x 12mm Balloon,  2.5mm x 10mm Cutting Balloon,  3mm x 12mm Drug Eluting Stent,  3mm x 12mm Balloon, and  3mm x 8mm Balloon to Proximal Circumflex, post stenosis 0%.      Vitamin B12 deficiency 02/14/2018     Priority: Medium    Chronic bilateral low back pain without sciatica 01/17/2018     Priority: Medium    Chronic pain disorder 10/01/2017     Priority: Medium    Urinary incontinence, mixed 09/24/2017     Priority: Medium    Internal carotid artery stenosis, bilateral 07/13/2016     Priority: Medium     Carotid US 05/04/2018 showed moderate plaque formation, consistent with 50 to 69% stenosis in the right internal carotid artery, not significantly changed from 8/5/2015.  Moderate plaque formation, consistent with 50 to 69% stenosis in the left internal carotid artery; there has been mild progression of the left ICA stenosis since 8/5/2015.    Formatting of this note might be different from the original.  Carotid US 05/04/2018 showed moderate plaque formation, consistent with 50 to 69% stenosis in the right internal carotid artery, not significantly changed from 8/5/2015.   Moderate plaque formation, consistent with 50 to 69% stenosis in the left internal carotid artery; there has been mild progression of the left ICA stenosis since 8/5/2015.    Formatting of this note might be different from the original.  Carotid US 05/04/2018 showed moderate plaque formation, consistent with 50 to 69% stenosis in the right internal carotid artery, not significantly changed from 8/5/2015.  Moderate plaque formation, consistent with 50 to 69% stenosis in the left internal carotid artery; there has been mild progression of the left ICA stenosis since 8/5/2015.      Essential (primary) hypertension 10/14/2015     Priority: Medium    Ischemic cardiomyopathy 09/20/2015     Priority: Medium     EF of 40-45%, status post RV lead revision and LV epicardial lead placement via mini-thoracotomy in August 2016.    Formatting of this note might be different from the original.  EF of 40-45%, status post RV lead revision and LV epicardial lead placement via mini-thoracotomy in August 2016.  Formatting of this note might be different from the original.  EF of 40-45%, status post RV lead revision and LV epicardial lead placement via mini-thoracotomy in August 2016.      S/P CABG x 5 08/21/2015     Priority: Medium    Pain medication agreement 04/20/2013     Priority: Medium     Controlled substance agreement for percocet #30/month on file and signed 4/17/13.  Designated pharmacy: [facility] Prescribing physician: Andrea Diagnosis: Ulnar neuropathy    Formatting of this note might be different from the original.  Controlled substance agreement for percocet #30/month on file and signed 4/17/13.  Designated pharmacy: [facility] Prescribing physician: Andrea Diagnosis: Ulnar neuropathy      Anemia in other chronic diseases classified elsewhere 01/05/2013     Priority: Medium    Hypothyroidism, unspecified 12/10/2010     Priority: Medium    ACP (advance care planning) 11/03/2010     Priority: Medium     Formatting of this  "note might be different from the original.  Patient has identified Health Care Agent(s): Yes  Add Health Care Agents: Yes    Health Care Agent(s):    Primary Health Care Agent:     Edwar Escobar Relationship:    Spouse Phone:     [phone]    Secondary Health Care Agent:     Chiki Oro Relationship:     Son Phone:     [phone]      Patient has Advance Care Plan Documents (Health Care Directive, POLST): Yes    Advance Care Plan Documents:  Health Care Directive--03/28/11  Resuscitation Guidelines--    Patient has identified Specific Treatment Preferences: Yes   Specific Treatment Preferences:   a.) Code Status:  DNR/ Do Not Attempt Resuscitation - Allow a Natural Death    b.) Goals of Treatment:     ii. Limited Interventions and treat reversible conditions.  Provide interventions aimed at treatment of new or reversible illness/injury or non-life threatening chronic conditions. Duration of invasive or uncomfortable interventions should generally be limited.- Trial of intubation 5 days or other instructions \"If no improvement, stop.\"  c.) Interventions and Treatments:   i.   Antibiotics:          - Aggressive antibiotics  ii.  Nutrition/Hydration:         - Offer food and liquids by mouth         - IV fluid administration         - No feeding tube under any circumstance.  iii. Transfusion:          - Blood products for comfort/relief of symptoms only  iv. Dialysis:           - Dialysis for short term \"for recovery, but not long term.\"        Last Assessment & Plan:   Advance Care Planning: Disease-specific Session    Letty Escobar is an [facility] patient of Dr. Renea Mendez of the [facility].    Advance care planning discussions were completed with Letty and her healthcare agent, spouse Edwar Escobar on Monday, March 28, 2011 at the [facility].    Understanding of Illness and Disease [place]:   Letty identifies her medical condition as diabetes with peripheral " "neuropathy, heart problems with a heart attack 2009 plus 4 stent placements; sleep apnea; depression; hypothyroid; high cholesterol; tobacco use; and describes it as needing further assistance with thyroid and diabetes management.  She identifies the following symptoms of her medical condition as being the most bothersome: some memory loss, balance problems, light headedness and dizziness, puffy eyes and lower extremity edema from time to time.    Let[place] is retired [place] .  She is independent with all cares and lives an active life style although, \"I'm not as active as I used to be.\"    Goals of Care:   Letty currently hopes to maintain independence, control pain and symptoms and delay progression of, but not cure, the illness.  \"I want to get the diabetes and thyroid under better control.\"  Letty has an appointment the first part of April for follow up.    Quality of Life:    Letty identifies quality of life as family involvement twice weekly, Gnosticist activities, newly assigned  , playing cards, the computer,    Letty christiano with serious challenges in her life through her ganesh in God, prayers and support of her Gnosticist family, spouse and son.    Letty identifies the following fears and worries about her medical care:  \"I just want the diabetes straightened out.\"    Treatment and Care Preferences:   Past experiences in dealing with family and/or friends that have  or been seriously ill include her brother who took his own life.  \"He was 53 years old and living with us.  I found him.\"   As a result of these experiences, Letty expresses these health care preferences:      Summary Letty's Treatment Preferences:  LOW SURVIVAL; HIGH TREATMENT BURDEN:  If Letty suffered a serious complication, such that she was facing a prolonged hospital stay, required ongoing medical interventions, and the chance of living through the " "complication was low (for example, only 5 out of 100 would live), Letty would choose:  to focus treatment on comfort and quality of life (\"Quality of life is more important than length of life to Letty.\")  COMMENT:  \"If I can't live a normal life, I wouldn't want to live.\"    HIGH SURVIVAL; LOW FUNCTIONAL STATUS:  If Letty had a serious complication and had a good chance of living through the complication but it was expected that she would never be able to walk or talk again and would require 24 hour nursing care, she would choose:  to focus treatment on comfort and quality of life (\"Quality of life is more important than length of life to Letty.\")  COMMENT:  \"I'd accept rehabilitation.\"    HIGH SURVIVAL; LOW COGNITIVE STATUS:  If Letty had a serious complication and had a good chance of living through the complication but it was expected that she would never know who she was or who she was with and would require 24 hour nursing care, she would choose:  to focus treatment on comfort and quality of life (\"Quality of life is more important than length of life to Letty.\")    CARDIO-PULMONARY RESUSCITATION (CPR):  The facts, risks and benefits of CPR were discussed with Letty.  If she had a sudden event that caused her heart and breathing to stop, she:  WOULD NOT want CPR attempted and instead would prefer that a natural death occur.    MECHANICAL VENTILATION: If Letty had an episode where she was unable to breathe on her own, she would choose the following:  attempt to use any appropriate non-invasive method to assist breathing, and use mechanical ventilation.  COMMENT:  \"If reversible ventilator is acceptable for 5 days.  If no improvement, stop.\"     Letty has chosen her healthcare agent to:  strictly follow her wishes.    Follow Up Plan:   Letty was encouraged to continue advance care planning discussions with her health care agents and primary care provider.   Letty and her health care agent declined handouts. "     Documents addressed during this advance care planning session:  1.  Health Care Agents identified.          .  Primary health care agent is spouse, Edwar Escobar;          .  Secondary health care agent is son, Jermaine Oro.  2.  Health Care Directive completed and scanned into medical record.  3.  Statement of Treatment Preferences for advanced illness completed and scanned into the medical record.  4.  Resuscitation Guidelines completed for DNR.  Document sent to Dr. Renea Mendez for signature and returned to the home. Letty, please place on the refrigerator.    Recommendations/Plan:   Letty and her health care agent to review Advance Care Plan.     Letty would benefit from:   Care Navigation/Care Navigation Help Desk--explained services for pending future needs.  No identified needs today.  Care Navigation brochure was given to Letty and her healthcare agent.    Advance Care Planning recommendations and Letty's concerns and questions were cc ed to her primary provider.     Thank you, Letty for the opportunity of assisting with Advance Care Planning. It was a seeing you again and meeting Edwar.  Please contact me if I can be of further assistance.    Interviewer: Pat Martin RN    Advance Care Planning Facilitator  [phone]   3/28/2011      ELI (obstructive sleep apnea) 01/31/2010     Priority: Medium     PSG on April/2008 that showed moderate Obstructive Sleep Apnea with an AHI of 23.5 . Limb movements persisted at 29 movements/hour at optimal pressures, despite carbidopa use.    Formatting of this note might be different from the original.  PSG on April/2008 that showed moderate Obstructive Sleep Apnea with an AHI of 23.5 . Limb movements persisted at 29 movements/hour at optimal pressures, despite carbidopa use.  Formatting of this note might be different from the original.  PSG on April/2008 that showed moderate Obstructive Sleep Apnea with an AHI of 23.5 . Limb movements persisted at 29  movements/hour at optimal pressures, despite carbidopa use.      Coronary atherosclerosis 02/06/2009     Priority: Medium     Formatting of this note might be different from the original.  2/5/2009 - MI - Proximal RCA 99%, mild-mod disease elsewhere.  EF 60%.  PCI:  MYRON to pRCA.  2/12/2009 - admit CP - Widely patent RCA stent. Moderate diffuse CAD. Severe stenosis in trivial PDA branch. LVEF 45%.  1/25/2010 - admit CP - PTCA and stent of diagonal  9/8/2010 - admit CP - LAD patent stent. Moderate diffuse CAD. Medical management recommended.   4/25/2012 - NSTEMI - Acute total occlusion of the ostial Circumflex. Acute total occlusion of the ostial ramus. Acute total occlusion of the distal 1st Obtuse Marginal. Angioplasty of ostial circumflex and ostial ramus. There was distal embolization to the OM1 vessel. This vessel was small and not amendable to intervention. Indefinite: plavix and asa 81.  8/10/2015 - CABx5 - 1. LIMA to distal LAD, reverse SVG to OM1 and OM2, reverse SVG to diagonal, reverse SVG to PDA.   2. Mitral valve repair with a Medtronics 3-D full ring, 28 mm.   3. Tricuspid repair with a Medtronic 28 mm partial ring  1/12/2016 - TTE - EF 40-45%  Formatting of this note might be different from the original.  2/5/2009 - MI - Proximal RCA 99%, mild-mod disease elsewhere.  EF 60%.  PCI:  MYRON to pRCA.  2/12/2009 - admit CP - Widely patent RCA stent. Moderate diffuse CAD. Severe stenosis in trivial PDA branch. LVEF 45%.  1/25/2010 - admit CP - PTCA and stent of diagonal  9/8/2010 - admit CP - LAD patent stent. Moderate diffuse CAD. Medical management recommended.   4/25/2012 - NSTEMI - Acute total occlusion of the ostial Circumflex. Acute total occlusion of the ostial ramus. Acute total occlusion of the distal 1st Obtuse Marginal. Angioplasty of ostial circumflex and ostial ramus. There was distal embolization to the OM1 vessel. This vessel was small and not amendable to intervention. Indefinite: plavix and asa  81.  8/10/2015 - CABx5 - 1. LIMA to distal LAD, reverse SVG to OM1 and OM2, reverse SVG to diagonal, reverse SVG to PDA.   2. Mitral valve repair with a Medtronics 3-D full ring, 28 mm.   3. Tricuspid repair with a Medtronic 28 mm partial ring  1/12/2016 - TTE - EF 40-45%      Restless legs syndrome 08/29/2007     Priority: Medium    Hyperlipidemia, unspecified 05/30/2007     Priority: Medium        Past Medical History:    Past Medical History:   Diagnosis Date    ACP (advance care planning) 11/3/2010    Acute coronary syndrome (H) 11/22/2019    Acute exacerbation of CHF (congestive heart failure) (H) 11/11/2019    Acute on chronic systolic (congestive) heart failure (H) 1/28/2020    Anemia of chronic disease 1/5/2013    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, left leg (H) 1/15/2020    BPPV (benign paroxysmal positional vertigo) 5/31/2018    Candidiasis 3/1/2020    Carotid stenosis, right 7/13/2016    Chest pain 11/11/2019    Chronic bilateral low back pain without sciatica 1/17/2018    Chronic pain disorder 10/1/2017    Constipation 3/20/2020    COPD (chronic obstructive pulmonary disease) (H) 6/24/2020    Coronary atherosclerosis 2/6/2009    Cubital tunnel syndrome on left 11/13/2012    Depressive disorder     Diabetes mellitus (H)     Diabetes mellitus type 2 in obese (H) 7/13/2006    Diabetes mellitus type 2 in obese (H) 7/13/2006    Drug-seeking behavior 4/4/2020    Failure to thrive in adult 9/3/2020    Hereditary and idiopathic peripheral neuropathy 4/24/2007    Hyperlipidemia with target low density lipoprotein (LDL) cholesterol less than 70 mg/dL 5/30/2007    Hypertension 10/14/2015    Hypothyroidism 12/10/2010    Irritable bowel syndrome 9/7/2015    Ischemic cardiomyopathy 9/20/2015    Long-term use of high-risk medication 4/14/2020    Moniliasis, 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    Myocardial infarction (H)      Neuromuscular disorder (H)     Other specified postprocedural states 10/8/2015    Pain medication agreement 4/20/2013    Panic disorder with agoraphobia 4/14/2020    Paroxysmal atrial fibrillation (H) 10/2/2015    Personality disorder (H) 3/5/2020    Polymyalgia rheumatica (H24) 11/28/2018    Posttraumatic stress disorder 3/1/2020    Restless legs syndrome (RLS) 8/29/2007    Restless legs syndrome (RLS) 8/29/2007    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    Serum calcium elevated 3/1/2020    Somatic dysfunction of sacral region 1/17/2018    Subacromial bursitis of left shoulder joint 8/6/2018    Suicidal ideation 4/1/2020    Thyroid disease     TIA (transient ischemic attack) 5/4/2018    TIA (transient ischemic attack) 5/4/2018    Tobacco abuse 2/17/2017    Tobacco abuse 2/17/2017    Urinary incontinence, mixed 9/24/2017    UTI (urinary tract infection) 4/17/2020    Vitamin B12 deficiency 2/14/2018    Weakness 12/17/2019       Past Surgical History:    Past Surgical History:   Procedure Laterality Date    CARDIAC SURGERY      stents x 11    CARDIAC SURGERY      CHOLECYSTECTOMY      CHOLECYSTECTOMY      GENITOURINARY SURGERY      Tubal ligation    OTHER SURGICAL HISTORY      Genitourinary surgery    RELEASE CARPAL TUNNEL      RELEASE CARPAL TUNNEL         Family History:    History reviewed. No pertinent family history.    Medications:    acetaminophen (TYLENOL) 325 MG tablet  acetaminophen (TYLENOL) 500 MG tablet  amLODIPine (NORVASC) 5 MG tablet  aspirin 81 MG EC tablet  bisacodyl (DULCOLAX) 10 MG suppository  cyanocobalamin (CYANOCOBALAMIN) 1000 MCG/ML injection  divalproex sodium delayed-release (DEPAKOTE) 250 MG DR tablet  DULoxetine HCl 40 MG CPEP  famotidine (PEPCID) 20 MG tablet  fluticasone-vilanterol (BREO ELLIPTA) 200-25 MCG/INH inhaler  furosemide (LASIX) 20 MG tablet  gabapentin (NEURONTIN) 400 MG capsule  hydrochlorothiazide (HYDRODIURIL) 25 MG tablet  levothyroxine (SYNTHROID/LEVOTHROID) 137 MCG tablet  lidocaine  (LMX4) 4 % external cream  LORazepam (ATIVAN) 0.5 MG tablet  Melatonin 10 MG TABS tablet  metoprolol succinate ER (TOPROL-XL) 25 MG 24 hr tablet  mirtazapine (REMERON) 30 MG tablet  nitroGLYcerin (NITROSTAT) 0.4 MG sublingual tablet  ondansetron (ZOFRAN ODT) 4 MG ODT tab  polyethylene glycol (MIRALAX) 17 GM/Dose powder  potassium chloride ER (MICRO-K) 10 MEQ CR capsule  QUEtiapine (SEROQUEL) 100 MG tablet  rosuvastatin (CRESTOR) 10 MG tablet  sennosides (SENOKOT) 8.6 MG tablet  traZODone (DESYREL) 50 MG tablet          Physical Exam   BP: 106/63  Pulse: 72  Temp: 97.8  F (36.6  C)  Resp: 18  Weight: 96.2 kg (212 lb)  SpO2: 92 %    Gen: Vital signs reviewed  Eyes: Sclera white, pupils round  ENT: External ears and nares normal  Card: Regular rate and rhythm  Resp: No respiratory distress. Lungs clear to auscultation bilaterally  Abd: Soft, non-distended, non-tender  Chest: Reproducible left sided anterior chest wall tenderness.  Extremities: Symmetric distal pulses.  Skin: No rashes  Neuro: Alert, speech normal.  Face symmetric.    ED Course        Procedures         EKG interpreted by myself.  EKG shows paced rhythm at a rate of 64 bpm, QTc 499 ms, QRS duration 212 ms without any new concerning ST segment changes when compared to EKG on 9/18/2023.       Results for orders placed or performed during the hospital encounter of 12/21/23 (from the past 24 hour(s))   CBC with platelets differential    Narrative    The following orders were created for panel order CBC with platelets differential.  Procedure                               Abnormality         Status                     ---------                               -----------         ------                     CBC with platelets and d...[[id number]]  Abnormal            Final result                 Please view results for these tests on the individual orders.   Basic metabolic panel   Result Value Ref Range    Sodium 133 (L) 135 - 145 mmol/L    Potassium 4.0 3.4 -  5.3 mmol/L    Chloride 96 (L) 98 - 107 mmol/L    Carbon Dioxide (CO2) 27 22 - 29 mmol/L    Anion Gap 10 7 - 15 mmol/L    Urea Nitrogen 29.3 (H) 8.0 - 23.0 mg/dL    Creatinine 1.19 (H) 0.51 - 0.95 mg/dL    GFR Estimate 49 (L) >60 mL/min/1.73m2    Calcium 9.5 8.8 - 10.2 mg/dL    Glucose 307 (H) 70 - 99 mg/dL   Troponin T, High Sensitivity   Result Value Ref Range    Troponin T, High Sensitivity 36 (H) <=14 ng/L   CBC with platelets and differential   Result Value Ref Range    WBC Count 7.2 4.0 - 11.0 10e3/uL    RBC Count 3.08 (L) 3.80 - 5.20 10e6/uL    Hemoglobin 8.9 (L) 11.7 - 15.7 g/dL    Hematocrit 28.0 (L) 35.0 - 47.0 %    MCV 91 78 - 100 fL    MCH 28.9 26.5 - 33.0 pg    MCHC 31.8 31.5 - 36.5 g/dL    RDW 14.7 10.0 - 15.0 %    Platelet Count 148 (L) 150 - 450 10e3/uL    % Neutrophils 61 %    % Lymphocytes 27 %    % Monocytes 6 %    % Eosinophils 4 %    % Basophils 1 %    % Immature Granulocytes 1 %    NRBCs per 100 WBC 0 <1 /100    Absolute Neutrophils 4.4 1.6 - 8.3 10e3/uL    Absolute Lymphocytes 1.9 0.8 - 5.3 10e3/uL    Absolute Monocytes 0.5 0.0 - 1.3 10e3/uL    Absolute Eosinophils 0.3 0.0 - 0.7 10e3/uL    Absolute Basophils 0.0 0.0 - 0.2 10e3/uL    Absolute Immature Granulocytes 0.1 <=0.4 10e3/uL    Absolute NRBCs 0.0 10e3/uL   XR Chest Port 1 View    Narrative    EXAM: XR CHEST PORT 1 VIEW  LOCATION: [facility]  DATE: 12/22/2023    INDICATION: Chest pain, left-sided.  COMPARISON: 11/10/2023.      Impression    IMPRESSION: Sternotomy with valve replacement. AICD with lead tips stable. Moderate low lung volumes. Heart size and pulmonary vascularity upper limits of normal. No focal lung infiltrates. Osseous structures grossly intact.   Troponin T, High Sensitivity   Result Value Ref Range    Troponin T, High Sensitivity 34 (H) <=14 ng/L       Medications   acetaminophen (TYLENOL) tablet 1,000 mg (0 mg Oral Hold 12/22/23 0002)   Lidocaine (LIDOCARE) 4 % Patch 1 patch (1 patch  Transdermal $Patch/Med Applied 12/22/23 0124)   ondansetron (ZOFRAN) injection 4 mg (4 mg Intravenous $Given 12/22/23 0122)         Consultations:  None    Social Determinants of Health:  Lives in a nursing facility    Assessments & Plan (with Medical Decision Making)       I have reviewed the nursing notes.    I have reviewed the findings, diagnosis, plan and need for follow up with the patient.      Medical Decision Making  On arrival, patient is well-appearing.  Slightly hypertensive but just received nitroglycerin.  Differential presentation includes acute coronary syndrome, musculoskeletal pain, pneumonia, pneumothorax, dissection, PE.    Laboratory workup sent along with a chest x-ray and an EKG.  Her EKG is reassuring and unchanged since September of this year.  Her chest x-ray does not reveal new concerning findings that would be concerning for a dissection.  Low suspicion for PE.  Her vitals remained stable while here.  She had a slightly elevated troponin that is similar to prior mild troponin elevations.  Given that the pain started at 10 PM I did do a 2-hour delta troponin which did actually went down slightly.  She has very reproducible pain in the left anterior chest wall, so in light of her negative workup here, I do suspect more of a chest wall pain.  Tried Lidoderm patches.  She already took Tylenol at her nursing home.  I suggested outpatient follow-up and discussed appropriate turn precautions with her and she was discharged back in stable condition.    Final diagnoses:   Chest pain, unspecified type         Rashawn Pichardo M.D.   [facility] Emergency Department     Rashawn Pichardo MD  12/22/23 0134

## 2023-12-22 NOTE — DISCHARGE INSTRUCTIONS
It is likely that your chest pain is due due to chest wall pain.  Your heart attack levels are stable and your EKG looks reassuring.  Return follow-up with your primary care doctor.    Return here if your symptoms worsen significantly.  Take Tylenol for your pain.  
yes

## 2023-12-22 NOTE — ED TRIAGE NOTES
Onset of chest pain at 2210 at [facility] in [place]. They administered nitrox2 SL with no improvement. Pt continues with 8/10 chest pain. EMS administered nitrox1 and no improvement. Pain midsternal to L chest and L arm is tingling.      Triage Assessment (Adult)       Row Name 12/21/23 2310          Triage Assessment    Airway WDL WDL        Respiratory WDL    Respiratory WDL WDL        Cardiac WDL    Cardiac WDL WDL

## 2023-12-22 NOTE — ED NOTES
Xray is completed. Pt has been positioned for comfort. Pt is feeling mildly nauseated and continues with persisting discomfort L chest and arm. Provider updated.

## 2024-01-02 ENCOUNTER — NURSING HOME VISIT (OUTPATIENT)
Dept: GERIATRICS | Facility: CLINIC | Age: 69
End: 2024-01-02
Payer: COMMERCIAL

## 2024-01-02 VITALS
WEIGHT: 212.6 LBS | TEMPERATURE: 97.7 F | BODY MASS INDEX: 45.86 KG/M2 | HEART RATE: 78 BPM | SYSTOLIC BLOOD PRESSURE: 140 MMHG | HEIGHT: 57 IN | RESPIRATION RATE: 19 BRPM | OXYGEN SATURATION: 92 % | DIASTOLIC BLOOD PRESSURE: 76 MMHG

## 2024-01-02 DIAGNOSIS — E11.65 TYPE 2 DIABETES MELLITUS WITH HYPERGLYCEMIA, WITHOUT LONG-TERM CURRENT USE OF INSULIN (H): Primary | ICD-10-CM

## 2024-01-02 PROCEDURE — 99308 SBSQ NF CARE LOW MDM 20: CPT | Performed by: FAMILY MEDICINE

## 2024-01-02 NOTE — PROGRESS NOTES
"[facility] GERIATR[facility]    Chief Complaint   Patient presents with    Nursing Home Acute     HPI:  Letty Escobar is a 68 year old  (1955), who is being seen today for an episodic care visit at: [facility] () [[id number]].       Updates:  - recently sent out to ED for numbness in left arm. Work up negative for acute pathology. Felt neuroskeletal in nature related to turning precaution    Today's concern is:   - RN reports pt has been running very high blood glycose in 300. Last night was 500 and 5 Units of lispro was given  - Resident endorses polydipsia and polyuria. Appetite is good.   - denies chest pain today.     Allergies, and PMH/PSH reviewed in EPIC today.    REVIEW OF SYSTEMS:  4 point ROS including Respiratory, CV, GI and , other than that noted in the HPI,  is negative    Objective:   BP (!) 140/76   Pulse 78   Temp 97.7  F (36.5  C)   Resp 19   Ht 1.448 m (4' 9\")   Wt 96.4 kg (212 lb 9.6 oz)   SpO2 92%   BMI 46.01 kg/m    General: lying in bed comfortable  Lung: CTA b/l, breathing unlabored  Heart: S1S2 audible, no adding sounds  Abd: obese, BS audible.   Psych: mood and affect appropriate    Lab: Reviewed in the chart and EHR.      Assessment/Plan:  ----------------------------    T2D with hyperglycemia   A1C 7.9 11/10/2023    A1C 4.6 10/30/2020   - BG has been running from 300-500  - Was on insulin years ago. HbA1C was checked while hospitalzied last November, and placed on SSI, but was not discharged on meds.  - Ongoing hyperglycemia, symptomatic.   - Discussed treatment options with pt, prefer to start insulin over po meds.   Will start lantus 8 Unit and SSI with lispro as follow  200-249   2  250-299 3  300-249  4  350-399 5  >- 400 7   - consider repeating lab in 3 months.         Electronically signed by: Paola Pastor MD       "

## 2024-01-02 NOTE — LETTER
"    1/2/2024        RE: Letty Escobar  C/o Edwar Escobar  [address]   [facility] [address]        [facility]    Chief Complaint   Patient presents with     Nursing Home Acute     HPI:  Letty Escobar is a 68 year old  (1955), who is being seen today for an episodic care visit at: [facility] () [[id number]].       Updates:  - recently sent out to ED for numbness in left arm. Work up negative for acute pathology. Felt neuroskeletal in nature related to turning precaution    Today's concern is:   - RN reports pt has been running very high blood glycose in 300. Last night was 500 and 5 Units of lispro was given  - Resident endorses polydipsia and polyuria. Appetite is good.   - denies chest pain today.     Allergies, and PMH/PSH reviewed in EPIC today.    REVIEW OF SYSTEMS:  4 point ROS including Respiratory, CV, GI and , other than that noted in the HPI,  is negative    Objective:   BP (!) 140/76   Pulse 78   Temp 97.7  F (36.5  C)   Resp 19   Ht 1.448 m (4' 9\")   Wt 96.4 kg (212 lb 9.6 oz)   SpO2 92%   BMI 46.01 kg/m    General: lying in bed comfortable  Lung: CTA b/l, breathing unlabored  Heart: S1S2 audible, no adding sounds  Abd: obese, BS audible.   Psych: mood and affect appropriate    Lab: Reviewed in the chart and EHR.      Assessment/Plan:  ----------------------------    T2D with hyperglycemia   A1C 7.9 11/10/2023    A1C 4.6 10/30/2020   - BG has been running from 300-500  - Was on insulin years ago. HbA1C was checked while hospitalzied last November, and placed on SSI, but was not discharged on meds.  - Ongoing hyperglycemia, symptomatic.   - Discussed treatment options with pt, prefer to start insulin over po meds.   Will start lantus 8 Unit and SSI with lispro as follow  200-249   2  250-299 3  300-249  4  350-399 5  >- 400 7   - consider repeating lab in 3 months.         Electronically signed by: Paola Pastor MD "         Sincerely,        Paola Pastor MD

## 2024-01-04 ENCOUNTER — LAB REQUISITION (OUTPATIENT)
Dept: LAB | Facility: CLINIC | Age: 69
End: 2024-01-04
Payer: COMMERCIAL

## 2024-01-04 DIAGNOSIS — Z83.3 FAMILY HISTORY OF DIABETES MELLITUS: ICD-10-CM

## 2024-01-05 LAB — HBA1C MFR BLD: 10.3 %

## 2024-01-05 PROCEDURE — 83036 HEMOGLOBIN GLYCOSYLATED A1C: CPT | Mod: ORL | Performed by: NURSE PRACTITIONER

## 2024-01-05 PROCEDURE — 36415 COLL VENOUS BLD VENIPUNCTURE: CPT | Mod: ORL | Performed by: NURSE PRACTITIONER

## 2024-01-05 PROCEDURE — P9604 ONE-WAY ALLOW PRORATED TRIP: HCPCS | Mod: ORL | Performed by: NURSE PRACTITIONER

## 2024-01-07 ENCOUNTER — NURSE TRIAGE (OUTPATIENT)
Dept: NURSING | Facility: CLINIC | Age: 69
End: 2024-01-07
Payer: COMMERCIAL

## 2024-01-07 NOTE — TELEPHONE ENCOUNTER
Patient is calling from nursing home.  She reports having pain in her lower abdomen, sharp and stabbing.  It is constant.  Rating it 10/10  Having regular bowel movements and denies vomiting.  Unable to finish triage as pt nurse was notified and pt states she doesn't need anymore help from writer.    Danielle Farmer, RN, BSN Nurse Triage Advisor 1/7/2024 12:13 AM       Additional Information   Negative: Difficult to awaken or acting confused (e.g., disoriented, slurred speech)   Negative: Shock suspected (e.g., cold/pale/clammy skin, too weak to stand, low BP, rapid pulse)   Negative: Passed out (i.e., lost consciousness, collapsed and was not responding)    Protocols used: Abdominal Pain - Female-A-AH

## 2024-01-09 ENCOUNTER — PATIENT OUTREACH (OUTPATIENT)
Dept: GERIATRIC MEDICINE | Facility: CLINIC | Age: 69
End: 2024-01-09
Payer: COMMERCIAL

## 2024-01-11 NOTE — PROGRESS NOTES
[facility] Care Coordination Contact:       Emailed Altagracia TORRES, DON at NH and Jeff Alvarado NP via Epic regarding my upcoming visit to facility on 1/18/24.    *: No falls. Been to the ER 8 times since July for complaints of chest pain. She's going to see cardiology tomorrow 01/11 as when she goes to the ER they don't find any significant findings.       RNCC will visit Member on 1/18/24      Esperanza Damon RN  Care Coordinator-Long Term Care  [address] [address]  [email or web address]   [email or web address]     Office: [phone]   Fax: [phone]

## 2024-01-15 NOTE — PROGRESS NOTES
[facility] Care Coordination Contact:       Emailed Altagracia VAZQUEZ at NH and NP via [employer] regarding my upcoming visit to facility on 1/25/24.    *decrease in ED visits, seeing ACP    RNCC will visit Member on 1/25/24      Esperanza Damon RN  Care Coordinator-Long Term Care  [address] [address]  [email or web address]   [email or web address]     Office: [phone]   Fax: [phone]

## 2024-01-16 ENCOUNTER — LAB REQUISITION (OUTPATIENT)
Dept: LAB | Facility: CLINIC | Age: 69
End: 2024-01-16
Payer: COMMERCIAL

## 2024-01-16 DIAGNOSIS — E03.9 HYPOTHYROIDISM, UNSPECIFIED: ICD-10-CM

## 2024-01-17 LAB
T4 FREE SERPL-MCNC: 1.12 NG/DL (ref 0.9–1.7)
TSH SERPL DL<=0.005 MIU/L-ACNC: 8.02 UIU/ML (ref 0.3–4.2)

## 2024-01-17 PROCEDURE — P9604 ONE-WAY ALLOW PRORATED TRIP: HCPCS | Mod: ORL | Performed by: FAMILY MEDICINE

## 2024-01-17 PROCEDURE — 84439 ASSAY OF FREE THYROXINE: CPT | Mod: ORL | Performed by: FAMILY MEDICINE

## 2024-01-17 PROCEDURE — 36415 COLL VENOUS BLD VENIPUNCTURE: CPT | Mod: ORL | Performed by: FAMILY MEDICINE

## 2024-01-17 PROCEDURE — 84443 ASSAY THYROID STIM HORMONE: CPT | Mod: ORL | Performed by: FAMILY MEDICINE

## 2024-01-25 ENCOUNTER — PATIENT OUTREACH (OUTPATIENT)
Dept: GERIATRIC MEDICINE | Facility: CLINIC | Age: 69
End: 2024-01-25
Payer: COMMERCIAL

## 2024-01-25 ASSESSMENT — ACTIVITIES OF DAILY LIVING (ADL)
DEPENDENT_IADLS:: CLEANING;COOKING;LAUNDRY;SHOPPING;MEAL PREPARATION;MEDICATION MANAGEMENT;MONEY MANAGEMENT;TRANSPORTATION;INCONTINENCE

## 2024-01-25 NOTE — PROGRESS NOTES
[facility] Institutional Assessment     Institutional Assessment for Health Risk Assessment with Letty Escobar completed on 2024 at [facility]    Type of residence:: Nursing home  Current living arrangement:: I live in a nursing home     Assessment completed with:: Patient, Care Team Member, MARTINE-chart review    Mental/Behavioral Health   Depression Screenin       Mental health DX:: Yes   Mental health DX how managed:: Medication, Psychiatrist    Falls Assessment:   Fallen 2 or more times in the past year?: No   Any fall with injury in the past year?: No    ADL/IADL Dependencies:   Dependent ADLs:: Bathing, Dressing, Eating, Grooming, Wheelchair-independent, Toileting, Incontinence, Transfers  Dependent IADLs:: Cleaning, Cooking, Laundry, Shopping, Meal Preparation, Medication Management, Money Management, Transportation, Incontinence    Care Plan & Recommendations: Met with Letty in her room, introduced self and role.   She was laying in a dark room resting.  She was easily awoken and willing to answer questions. Letty states she is doing ok, she no longer walks as she had a year ago. She states she really as no interest in doing any PT at this time. She states she is transferred with a saul lift and the staff helps her with all there cares.  She also reports she does like it here and the staff treats her very well. She also reports she likes to go to activities such as bingo, bible study and music. Letty's ED visits have also decreased which is good. She states she still needs to go sometimes due to chest pain.  She also denies seeing a counselor or psychiatrist for her anxiety. Discussed change in care system and she states she does not know why they are changing providers just that she they are. Also advised she will be getting a new Care Coordinator beginning 24.  See Institutional Care Plan for detailed assessment information.    Obtained a copy of the facility  care plan and MDS from facility electronic records.Requested of nursing home social worker to put this care coordinator on care conference attendee list.    Placed the Health Plan facility face sheet in the member's facility chart.    Follow-Up Plan: Member informed of future contact, plan to f/u with member with a 6 month assessment, attend 1 care conference annually, and will follow any hospitalizations or transitions. Care Coordinator contact information shared with member/family and facility, and encouraged to call this care coordinator with any questions or concerns at any time.     [place] care continuum providers: Please see Snapshot and Care Management Flowsheets for Specific details of care plan.    This CC note routed to PCP, No primary care provider on file..    MARIYA Jaramillo, RN, PHN, CCM  Care Coordinator-Long Term Care  [address] [address]  [email or web address]   [email or web address]     Office: [phone]   Fax: [phone]

## 2024-01-28 NOTE — PROGRESS NOTES
No longer active with [facility] community case management effective 2/1/24.  Reason for community disenrollment: Change Care System/PCC to [place]..      HALINA JaramilloN, RN, PHN, CCM  Care Coordinator-Long Term Care  [address] [address]  [email or web address]   [email or web address]     Office: [phone]   Fax: [phone]

## 2024-01-30 ENCOUNTER — PATIENT OUTREACH (OUTPATIENT)
Dept: GERIATRIC MEDICINE | Facility: CLINIC | Age: 69
End: 2024-01-30
Payer: COMMERCIAL

## 2024-01-30 NOTE — PROGRESS NOTES
[facility] Care Coordination Contact    Received after visit chart from care coordinator.  Completed following tasks: Mailed [employer] Post Visit letter to member/rep and NH facility Mailed [employer] leave behind letter.       Aylin Link  Care Management Specialist   [facility]   [phone]

## 2024-01-30 NOTE — LETTER
January 30, 2024    Important [employer] Information    TAYLOR BURRIS  C/O Edwar Chappellsusana  [address] [place] [address]                                                                              Your Care Plan      Dear Taylor,    I am your [employer] Care Coordinator and I visited you at [facility]  on 1/18/2024 to complete your [employer] Health Assessment.    [x] I reviewed your Facility Care Plan and assessment and all identified needs have goals present    [] I reviewed your Facility Care Plan and assessment and we identified these additional goal(s):                                                                                                                                                                                                                                                   I will plan to follow up:  [] Once a month  [] Every 3 months   [x] Every 6 months  [] Other      Questions?  If you have any questions or want to discuss your health care needs, call me at [phone] Monday-Friday between 8am and 5pm. TTY: [phone].     Sincerely,    MARIYA Jaramillo, RN, PHN, [facility]  [phone]  [email or web address]      cc: member record, Skilled Nursing Facility    [employer]  is an HMO D-[employer] that contracts with both Medicare and the [place] Medical Assistance (Medicaid) program to provide benefits of both programs to enrollees. Enrollment in [employer] depends on contract renewal.  [id number]    2023 [employer]

## 2024-02-01 ENCOUNTER — PATIENT OUTREACH (OUTPATIENT)
Dept: GERIATRIC MEDICINE | Facility: CLINIC | Age: 69
End: 2024-02-01
Payer: COMMERCIAL

## 2024-02-08 NOTE — ED PROVIDER NOTES
History     Chief Complaint   Patient presents with     Chest Pain     HPI  Letty Escobar is a 67 year old female with a history of ischemic cardiomyopathy, CHF, A-fib, status post CABG x5 in 2015, currently has 21 cardiac stents, is a resident at the [facility] and was awakened by chest pain at about 11 PM tonight, 4.5 hours ago.  She took 4 Tylenol and 2 nitroglycerin at the nursing home without relief.  EMS gave her 1 spray of nitroglycerin also without relief but now she has a headache and is nauseous.  Describes the pain as a pressure type pain that she rates 8/10 and radiates to her left arm.  Shortness of breath has worsened.    She is followed by Dr. Saucedo from cardiology done at [facility].    Allergies:  Allergies   Allergen Reactions     Bee Pollen Anaphylaxis     Diphenhydramine Palpitations     Tolerated IV Benadryl when not pushed too fast     Isosorbide Nitrate Other (See Comments) and Dizziness     Also causes syncope (has fallen before) and brain fog/mental disturbances - please do not prescribe     Nitroglycerin Dizziness, Fatigue and Other (See Comments)     Specifically the patch - please do not prescribe  Specifically the patch - please do not prescribe       Contrast Dye Hives and Itching     Other Drug Allergy (See Comments) Hives and Itching     Penicillin G      Adhesive Tape Rash     Diphenhydramine Hcl Palpitations     Liquid Adhesive Itching     Nystatin Dermatitis     Nystatin Dermatitis     Also blisters     Penicillins Swelling and Rash     Occurred as a child - not 100% sure on specific reactions     Sulfa Antibiotics Other (See Comments)     Occurred as a child / patient does not remember specific reaction       Problem List:    Patient Active Problem List    Diagnosis Date Noted     Chronic kidney disease, stage 3a (H) 10/16/2022     Priority: Medium     SOB (shortness of breath) 04/07/2022     Priority: Medium     Morbid obesity (H) 12/10/2021     Priority: Medium     ICD  Pt's daughter Abhijit- not on HIPAA- ok per pcp to discuss with her,   Called stating pt had a stroke in November 2023 and went into hospice care, pt is recovering and no longer in hospice, daughter states they are looking for a stroke rehab facility for pt. Hospice stopped all medication except for BP, she is asking if there are other medications she should start taking again, states she does not want Eliquis due to [facility] saying that is what caused the stroke. Previously on plavix with no problems. I sent record request to [facility] hosp[facility]. Daughter lives in [place] but will be home Monday and would like to discuss more. Asking for call back at [phone]      Please advise    Pharm- [facility] in [place]    (implantable cardioverter-defibrillator) in place 11/17/2021     Priority: Medium     Formatting of this note is different from the original.  Date of last device in office evaluation: 5/17/2022    ?  and model: Medtronic Cobalt CRT-D.   Date of implant: 5/7/2021  Tachy therapies remain OFF: S/p downgrade from ICD to pacemaker, but her implanted system includes a DF-4 lead, so an ICD generator was placed with the tachycardia therapies disabled.     ? Indication for device: Ischemic cardiomyopathy   ? Cardiac resynchronization therapy:   no      MRI Conditional:  No  o If No:  Reason why:  Abandoned LV lead    ? Battery longevity documented as less than 3  Months: No  ? Are any of the leads less than 3 months old:  No    ? Programming              ? Pacing mode and programmed lower rate: DDDR 60 - 130 bpm              ? Rate-responsive sensor type, if programmed on: Accelerometer        Underlying rhythm and heart rate:  SR @ 60 bpm    ? What is the response of this device to magnet placement:  None - tachy therapies off  ? PM magnet pacing rate: N/A   ? Any alert status on CIED generator or lead: No    ? Last pacing threshold    Atrial   1.0 V @ 0.4 ms   Ventricular RV: 0.75 V @ 0.4 ms  LV:  2.75 V @ 1.0 ms    Formatting of this note is different from the original.  Date of last device in office evaluation: 11/17/2022     ?  and model: Medtronic Cobalt CRT-D.   Date of implant: 5/7/2021  Tachy therapies remain OFF: S/p downgrade from ICD to pacemaker, but her implanted system includes a DF-4 lead, so an ICD generator was placed with the tachycardia therapies disabled.     ? Indication for device: Ischemic cardiomyopathy   ? Cardiac resynchronization therapy:   no      MRI Conditional:  No  o If No:  Reason why:  Abandoned LV lead    ? Battery longevity documented as less than 3  Months: No  ? Are any of the leads less than 3 months old:  No    ? Programming              ? Pacing mode and  programmed lower rate: DDDR 60 - 130 bpm              ? Rate-responsive sensor type, if programmed on: Accelerometer        Underlying rhythm and heart rate:  Sinus rhythm 62 bpm, w/BBB    ? What is the response of this device to magnet placement:  None - tachy therapies off  ? PM magnet pacing rate: N/A   ? Any alert status on CIED generator or lead: No    ? Last pacing threshold    Atrial   1.0 V @ 0.4 ms   Ventricular RV: 0.75 V @ 0.4 ms  LV:  2.75 V @ 1.0 ms       Atrial fibrillation (H) 04/06/2021     Priority: Medium     Pacemaker 04/06/2021     Priority: Medium     Major depressive disorder, recurrent severe without psychotic features (H) 01/26/2021     Priority: Medium     Panic disorder with agoraphobia 04/14/2020     Priority: Medium     Constipation 03/20/2020     Priority: Medium     Personality disorder (H) 03/05/2020     Priority: Medium     Rule out dependent personality    Formatting of this note might be different from the original.  Rule out dependent personality  Formatting of this note might be different from the original.  Rule out dependent personality       Candidiasis 03/01/2020     Priority: Medium     Posttraumatic stress disorder 03/01/2020     Priority: Medium     COPD without exacerbation (H) 01/29/2020     Priority: Medium     Long term (current) use of insulin (H) 01/29/2020     Priority: Medium     Acute on chronic systolic (congestive) heart failure (H) 01/28/2020     Priority: Medium     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, left leg (H) 01/15/2020     Priority: Medium     Chest pain 11/11/2019     Priority: Medium     Polymyalgia rheumatica (H) 11/28/2018     Priority: Medium     Unstable angina (H) 05/02/2018     Priority: Medium     Coronary angiogram 05/02/2018 demonstrated 30% stenosis in the LMCA, 50% stenosis in the Proximal LAD, 50% stenosis in the Mid LAD, 95% stenosis in the Proximal Circumflex (Restenosis - Balloon  Angioplasty), 100% stenosis in the 1st Marginal, 50% stenosis in the Proximal RCA, 50% stenosis in the Distal RCA, the LIMA graft from the LIMA to the Distal LAD is free of significant disease; the SVG graft from the Aorta to the 1st Marginal is free of significant disease; the SVG graft from the Aorta to the Distal RCA is free of significant disease. Intervention included a successful  2.5mm x 12mm Balloon,  2.5mm x 10mm Cutting Balloon,  3mm x 12mm Drug Eluting Stent,  3mm x 12mm Balloon, and  3mm x 8mm Balloon to Proximal Circumflex, post stenosis 0%.    Formatting of this note is different from the original.  Coronary angiogram 05/02/2018 demonstrated 30% stenosis in the LMCA, 50% stenosis in the Proximal LAD, 50% stenosis in the Mid LAD, 95% stenosis in the Proximal Circumflex (Restenosis - Balloon Angioplasty), 100% stenosis in the 1st Marginal, 50% stenosis in the Proximal RCA, 50% stenosis in the Distal RCA, the LIMA graft from the LIMA to the Distal LAD is free of significant disease; the SVG graft from the Aorta to the 1st Marginal is free of significant disease; the SVG graft from the Aorta to the Distal RCA is free of significant disease. Intervention included a successful  2.5mm x 12mm Balloon,  2.5mm x 10mm Cutting Balloon,  3mm x 12mm Drug Eluting Stent,  3mm x 12mm Balloon, and  3mm x 8mm Balloon to Proximal Circumflex, post stenosis 0%.  Formatting of this note is different from the original.  Coronary angiogram 05/02/2018 demonstrated 30% stenosis in the LMCA, 50% stenosis in the Proximal LAD, 50% stenosis in the Mid LAD, 95% stenosis in the Proximal Circumflex (Restenosis - Balloon Angioplasty), 100% stenosis in the 1st Marginal, 50% stenosis in the Proximal RCA, 50% stenosis in the Distal RCA, the LIMA graft from the LIMA to the Distal LAD is free of significant disease; the SVG graft from the Aorta to the 1st Marginal is free of significant disease; the SVG graft from the Aorta to the Distal RCA  is free of significant disease. Intervention included a successful  2.5mm x 12mm Balloon,  2.5mm x 10mm Cutting Balloon,  3mm x 12mm Drug Eluting Stent,  3mm x 12mm Balloon, and  3mm x 8mm Balloon to Proximal Circumflex, post stenosis 0%.       Vitamin B12 deficiency 02/14/2018     Priority: Medium     Chronic bilateral low back pain without sciatica 01/17/2018     Priority: Medium     Chronic pain disorder 10/01/2017     Priority: Medium     Urinary incontinence, mixed 09/24/2017     Priority: Medium     Internal carotid artery stenosis, bilateral 07/13/2016     Priority: Medium     Carotid US 05/04/2018 showed moderate plaque formation, consistent with 50 to 69% stenosis in the right internal carotid artery, not significantly changed from 8/5/2015.  Moderate plaque formation, consistent with 50 to 69% stenosis in the left internal carotid artery; there has been mild progression of the left ICA stenosis since 8/5/2015.    Formatting of this note might be different from the original.  Carotid US 05/04/2018 showed moderate plaque formation, consistent with 50 to 69% stenosis in the right internal carotid artery, not significantly changed from 8/5/2015.  Moderate plaque formation, consistent with 50 to 69% stenosis in the left internal carotid artery; there has been mild progression of the left ICA stenosis since 8/5/2015.    Formatting of this note might be different from the original.  Carotid US 05/04/2018 showed moderate plaque formation, consistent with 50 to 69% stenosis in the right internal carotid artery, not significantly changed from 8/5/2015.  Moderate plaque formation, consistent with 50 to 69% stenosis in the left internal carotid artery; there has been mild progression of the left ICA stenosis since 8/5/2015.       Essential (primary) hypertension 10/14/2015     Priority: Medium     Ischemic cardiomyopathy 09/20/2015     Priority: Medium     EF of 40-45%, status post RV lead revision and LV epicardial  lead placement via mini-thoracotomy in August 2016.    Formatting of this note might be different from the original.  EF of 40-45%, status post RV lead revision and LV epicardial lead placement via mini-thoracotomy in August 2016.  Formatting of this note might be different from the original.  EF of 40-45%, status post RV lead revision and LV epicardial lead placement via mini-thoracotomy in August 2016.       S/P CABG x 5 08/21/2015     Priority: Medium     Pain medication agreement 04/20/2013     Priority: Medium     Controlled substance agreement for percocet #30/month on file and signed 4/17/13.  Designated pharmacy: [facility] Prescribing physician: Andrea Diagnosis: Ulnar neuropathy    Formatting of this note might be different from the original.  Controlled substance agreement for percocet #30/month on file and signed 4/17/13.  Designated pharmacy: [facility] Prescribing physician: Andrea Diagnosis: Ulnar neuropathy       Anemia in other chronic diseases classified elsewhere 01/05/2013     Priority: Medium     Hypothyroidism, unspecified 12/10/2010     Priority: Medium     ACP (advance care planning) 11/03/2010     Priority: Medium     Formatting of this note might be different from the original.  Patient has identified Health Care Agent(s): Yes  Add Health Care Agents: Yes    Health Care Agent(s):    Primary Health Care Agent:     Edwar Escobar Relationship:    Spouse Phone:     [phone]    Secondary Health Care Agent:     Chiki Oro Relationship:     Son Phone:     [phone]      Patient has Advance Care Plan Documents (Health Care Directive, POLST): Yes    Advance Care Plan Documents:  Health Care Directive--03/28/11  Resuscitation Guidelines--    Patient has identified Specific Treatment Preferences: Yes   Specific Treatment Preferences:   a.) Code Status:  DNR/ Do Not Attempt Resuscitation - Allow a Natural Death    b.) Goals of Treatment:     ii. Limited Interventions and treat reversible  "conditions.  Provide interventions aimed at treatment of new or reversible illness/injury or non-life threatening chronic conditions. Duration of invasive or uncomfortable interventions should generally be limited.- Trial of intubation 5 days or other instructions \"If no improvement, stop.\"  c.) Interventions and Treatments:   i.   Antibiotics:          - Aggressive antibiotics  ii.  Nutrition/Hydration:         - Offer food and liquids by mouth         - IV fluid administration         - No feeding tube under any circumstance.  iii. Transfusion:          - Blood products for comfort/relief of symptoms only  iv. Dialysis:           - Dialysis for short term \"for recovery, but not long term.\"        Last Assessment & Plan:   Advance Care Planning: Disease-specific Session    Letty Escobar is an [facility] patient of Dr. Renea Mendez of the [facility].    Advance care planning discussions were completed with Letty and her healthcare agent, spouse Edwar Escobar on Monday, March 28, 2011 at the [facility].    Understanding of Illness and Disease [facility]:   Letty identifies her medical condition as diabetes with peripheral neuropathy, heart problems with a heart attack February 2009 plus 4 stent placements; sleep apnea; depression; hypothyroid; high cholesterol; tobacco use; and describes it as needing further assistance with thyroid and diabetes management.  She identifies the following symptoms of her medical condition as being the most bothersome: some memory loss, balance problems, light headedness and dizziness, puffy eyes and lower extremity edema from time to time.    Letty is retired [place] .  She is independent with all cares and lives an active life style although, \"I'm not as active as I used to be.\"    Goals of Care:   Letty currently hopes to maintain independence, control pain and symptoms and delay " "progression of, but not cure, the illness.  \"I want to get the diabetes and thyroid under better control.\"  Letty has an appointment the first part of April for follow up.    Quality of Life:    Letty identifies quality of life as family involvement twice weekly, Sikhism activities, newly assigned  , playing cards, the computer,    Letty christiano with serious challenges in her life through her ganesh in God, prayers and support of her Sikhism family, spouse and son.    Letty identifies the following fears and worries about her medical care:  \"I just want the diabetes straightened out.\"    Treatment and Care Preferences:   Past experiences in dealing with family and/or friends that have  or been seriously ill include her brother who took his own life.  \"He was 53 years old and living with us.  I found him.\"   As a result of these experiences, Letty expresses these health care preferences:      Summary Letty's Treatment Preferences:  LOW SURVIVAL; HIGH TREATMENT BURDEN:  If Letty suffered a serious complication, such that she was facing a prolonged hospital stay, required ongoing medical interventions, and the chance of living through the complication was low (for example, only 5 out of 100 would live), Letty would choose:  to focus treatment on comfort and quality of life (\"Quality of life is more important than length of life to Letty.\")  COMMENT:  \"If I can't live a normal life, I wouldn't want to live.\"    HIGH SURVIVAL; LOW FUNCTIONAL STATUS:  If Letty had a serious complication and had a good chance of living through the complication but it was expected that she would never be able to walk or talk again and would require 24 hour nursing care, she would choose:  to focus treatment on comfort and quality of life (\"Quality of life is more important than length of life to Letty.\")  COMMENT:  \"I'd accept rehabilitation.\"    HIGH SURVIVAL; LOW COGNITIVE STATUS:  If Letty had a serious " "complication and had a good chance of living through the complication but it was expected that she would never know who she was or who she was with and would require 24 hour nursing care, she would choose:  to focus treatment on comfort and quality of life (\"Quality of life is more important than length of life to Letty.\")    CARDIO-PULMONARY RESUSCITATION (CPR):  The facts, risks and benefits of CPR were discussed with Letty.  If she had a sudden event that caused her heart and breathing to stop, she:  WOULD NOT want CPR attempted and instead would prefer that a natural death occur.    MECHANICAL VENTILATION: If Letyt had an episode where she was unable to breathe on her own, she would choose the following:  attempt to use any appropriate non-invasive method to assist breathing, and use mechanical ventilation.  COMMENT:  \"If reversible ventilator is acceptable for 5 days.  If no improvement, stop.\"     Letty has chosen her healthcare agent to:  strictly follow her wishes.    Follow Up Plan:   Letty was encouraged to continue advance care planning discussions with her health care agents and primary care provider.   Letty and her health care agent declined handouts.     Documents addressed during this advance care planning session:  1.  Health Care Agents identified.          .  Primary health care agent is spouse, Edwar Escobar;          .  Secondary health care agent is son, Jermaine Oro.  2.  Health Care Directive completed and scanned into medical record.  3.  Statement of Treatment Preferences for advanced illness completed and scanned into the medical record.  4.  Resuscitation Guidelines completed for DNR.  Document sent to Dr. Renea Mendez for signature and returned to the home. Letty, please place on the refrigerator.    Recommendations/Plan:   Letty and her health care agent to review Advance Care Plan.     Letty would benefit from:   Care Navigation/Care Navigation Help Desk--explained services for " pending future needs.  No identified needs today.  Care Navigation brochure was given to Letty and her healthcare agent.    Advance Care Planning recommendations and Letty's concerns and questions were cc ed to her primary provider.     Thank you, Letty for the opportunity of assisting with Advance Care Planning. It was a seeing you again and meeting Edwar.  Please contact me if I can be of further assistance.    Interviewer: Pat Martin RN    Advance Care Planning Facilitator  [phone]   3/28/2011       ELI (obstructive sleep apnea) 01/31/2010     Priority: Medium     PSG on April/2008 that showed moderate Obstructive Sleep Apnea with an AHI of 23.5 . Limb movements persisted at 29 movements/hour at optimal pressures, despite carbidopa use.    Formatting of this note might be different from the original.  PSG on April/2008 that showed moderate Obstructive Sleep Apnea with an AHI of 23.5 . Limb movements persisted at 29 movements/hour at optimal pressures, despite carbidopa use.  Formatting of this note might be different from the original.  PSG on April/2008 that showed moderate Obstructive Sleep Apnea with an AHI of 23.5 . Limb movements persisted at 29 movements/hour at optimal pressures, despite carbidopa use.       Coronary atherosclerosis 02/06/2009     Priority: Medium     Formatting of this note might be different from the original.  2/5/2009 - MI - Proximal RCA 99%, mild-mod disease elsewhere.  EF 60%.  PCI:  MYRON to pRCA.  2/12/2009 - admit CP - Widely patent RCA stent. Moderate diffuse CAD. Severe stenosis in trivial PDA branch. LVEF 45%.  1/25/2010 - admit CP - PTCA and stent of diagonal  9/8/2010 - admit CP - LAD patent stent. Moderate diffuse CAD. Medical management recommended.   4/25/2012 - NSTEMI - Acute total occlusion of the ostial Circumflex. Acute total occlusion of the ostial ramus. Acute total occlusion of the distal 1st Obtuse Marginal. Angioplasty of ostial circumflex and ostial ramus.  There was distal embolization to the OM1 vessel. This vessel was small and not amendable to intervention. Indefinite: plavix and asa 81.  8/10/2015 - CABx5 - 1. LIMA to distal LAD, reverse SVG to OM1 and OM2, reverse SVG to diagonal, reverse SVG to PDA.   2. Mitral valve repair with a Medtronics 3-D full ring, 28 mm.   3. Tricuspid repair with a Medtronic 28 mm partial ring  1/12/2016 - TTE - EF 40-45%  Formatting of this note might be different from the original.  2/5/2009 - MI - Proximal RCA 99%, mild-mod disease elsewhere.  EF 60%.  PCI:  MYRON to pRCA.  2/12/2009 - admit CP - Widely patent RCA stent. Moderate diffuse CAD. Severe stenosis in trivial PDA branch. LVEF 45%.  1/25/2010 - admit CP - PTCA and stent of diagonal  9/8/2010 - admit CP - LAD patent stent. Moderate diffuse CAD. Medical management recommended.   4/25/2012 - NSTEMI - Acute total occlusion of the ostial Circumflex. Acute total occlusion of the ostial ramus. Acute total occlusion of the distal 1st Obtuse Marginal. Angioplasty of ostial circumflex and ostial ramus. There was distal embolization to the OM1 vessel. This vessel was small and not amendable to intervention. Indefinite: plavix and asa 81.  8/10/2015 - CABx5 - 1. LIMA to distal LAD, reverse SVG to OM1 and OM2, reverse SVG to diagonal, reverse SVG to PDA.   2. Mitral valve repair with a Medtronics 3-D full ring, 28 mm.   3. Tricuspid repair with a Medtronic 28 mm partial ring  1/12/2016 - TTE - EF 40-45%       Restless legs syndrome 08/29/2007     Priority: Medium     Hyperlipidemia, unspecified 05/30/2007     Priority: Medium        Past Medical History:    Past Medical History:   Diagnosis Date     ACP (advance care planning) 11/3/2010     Acute coronary syndrome (H) 11/22/2019     Acute exacerbation of CHF (congestive heart failure) (H) 11/11/2019     Acute on chronic systolic (congestive) heart failure (H) 1/28/2020     Anemia of chronic disease 1/5/2013     Atherosclerosis of  autologous vein bypass graft(s) of the extremities with rest pain, left leg (H) 1/15/2020     BPPV (benign paroxysmal positional vertigo) 5/31/2018     Candidiasis 3/1/2020     Carotid stenosis, right 7/13/2016     Chest pain 11/11/2019     Chronic bilateral low back pain without sciatica 1/17/2018     Chronic pain disorder 10/1/2017     Constipation 3/20/2020     COPD (chronic obstructive pulmonary disease) (H) 6/24/2020     Coronary atherosclerosis 2/6/2009     Cubital tunnel syndrome on left 11/13/2012     Depressive disorder      Diabetes mellitus (H)      Diabetes mellitus type 2 in obese (H) 7/13/2006     Diabetes mellitus type 2 in obese (H) 7/13/2006     Drug-seeking behavior 4/4/2020     Failure to thrive in adult 9/3/2020     Hereditary and idiopathic peripheral neuropathy 4/24/2007     Hyperlipidemia with target low density lipoprotein (LDL) cholesterol less than 70 mg/dL 5/30/2007     Hypertension 10/14/2015     Hypothyroidism 12/10/2010     Irritable bowel syndrome 9/7/2015     Ischemic cardiomyopathy 9/20/2015     Long-term use of high-risk medication 4/14/2020     Moniliasis, 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     Myocardial infarction (H)      Neuromuscular disorder (H)      Other specified postprocedural states 10/8/2015     Pain medication agreement 4/20/2013     Panic disorder with agoraphobia 4/14/2020     Paroxysmal atrial fibrillation (H) 10/2/2015     Personality disorder (H) 3/5/2020     Polymyalgia rheumatica (H) 11/28/2018     Posttraumatic stress disorder 3/1/2020     Restless legs syndrome (RLS) 8/29/2007     Restless legs syndrome (RLS) 8/29/2007     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     Serum calcium elevated 3/1/2020     Somatic dysfunction of sacral region 1/17/2018     Subacromial bursitis of left shoulder joint 8/6/2018     Suicidal ideation 4/1/2020     Thyroid disease      TIA (transient ischemic attack) 5/4/2018     TIA (transient ischemic  attack) 5/4/2018     Tobacco abuse 2/17/2017     Tobacco abuse 2/17/2017     Urinary incontinence, mixed 9/24/2017     UTI (urinary tract infection) 4/17/2020     Vitamin B12 deficiency 2/14/2018     Weakness 12/17/2019       Past Surgical History:    Past Surgical History:   Procedure Laterality Date     CARDIAC SURGERY      stents x 11     CARDIAC SURGERY       CHOLECYSTECTOMY       CHOLECYSTECTOMY       GENITOURINARY SURGERY      Tubal ligation     OTHER SURGICAL HISTORY      Genitourinary surgery     RELEASE CARPAL TUNNEL       RELEASE CARPAL TUNNEL         Family History:    No family history on file.    Social History:  Marital Status:   [2]  Social History     Tobacco Use     Smoking status: Never     Smokeless tobacco: Never   Vaping Use     Vaping status: Never Used   Substance Use Topics     Alcohol use: Not Currently     Drug use: Never        Medications:    acetaminophen (TYLENOL) 325 MG tablet  acetaminophen (TYLENOL) 500 MG tablet  albuterol (PROVENTIL) (2.5 MG/3ML) 0.083% neb solution  aspirin 81 MG EC tablet  bisacodyl (DULCOLAX) 10 MG suppository  cholecalciferol (VITAMIN D3) 10 mcg (400 units) TABS tablet  cyanocobalamin (CYANOCOBALAMIN) 1000 MCG/ML injection  divalproex sodium delayed-release (DEPAKOTE) 250 MG DR tablet  DULoxetine HCl 40 MG CPEP  fluticasone-vilanterol (BREO ELLIPTA) 200-25 MCG/INH inhaler  furosemide (LASIX) 20 MG tablet  gabapentin (NEURONTIN) 300 MG capsule  levothyroxine (SYNTHROID/LEVOTHROID) 112 MCG tablet  Lidocaine (LIDOCARE) 4 % Patch  Melatonin 10 MG TABS tablet  metoprolol succinate ER (TOPROL-XL) 25 MG 24 hr tablet  miconazole (MICATIN) 2 % AERP powder  mirtazapine (REMERON) 30 MG tablet  polyethylene glycol (MIRALAX) 17 GM/Dose powder  potassium chloride ER (MICRO-K) 10 MEQ CR capsule  QUEtiapine (SEROQUEL) 25 MG tablet  rosuvastatin (CRESTOR) 10 MG tablet  sennosides (SENOKOT) 8.6 MG tablet  Vitamin D3 (CHOLECALCIFEROL) 25 mcg (1000 units)  tablet          Review of Systems    Physical Exam   BP: 137/74  Pulse: 78  Temp: 98.1  F (36.7  C)  Resp: 16  Weight: 96.1 kg (211 lb 12.8 oz)  SpO2: 98 %      Physical Exam  Constitutional:       Appearance: She is well-developed. She is obese.   Eyes:      Extraocular Movements: Extraocular movements intact.      Pupils: Pupils are equal, round, and reactive to light.   Cardiovascular:      Rate and Rhythm: Normal rate and regular rhythm.   Pulmonary:      Breath sounds: Normal breath sounds.   Chest:      Chest wall: Tenderness present.   Musculoskeletal:      Right lower leg: No tenderness. No edema.      Left lower leg: No tenderness. No edema.   Skin:     General: Skin is warm and dry.   Neurological:      General: No focal deficit present.      Mental Status: She is alert and oriented to person, place, and time.      Cranial Nerves: No cranial nerve deficit.      Motor: No weakness (nothing focal).         ED Course              ED Course as of 05/23/23 0653   Tue May 23, 2023   0640 Hemoglobin(!): 9.3  Stable   0640 Troponin T, High Sensitivity(!): 27  Delta 2-hour troponin unchanged and at her baseline     Procedures              EKG Interpretation:      Interpreted by David Rust MD  Time reviewed: 3:40 AM   Symptoms at time of EKG: chest pain   Rhythm: Ventricular paced rhythm  Rate: 63 bpm  Axis: Right Axis Deviation  Ectopy: none  Conduction: Wide QRS secondary to ventricular pacing  ST Segments/ T Waves: No changes from previous  Q Waves: none  Comparison to prior: Unchanged from 3/14/2023 and 2/12/2023    Clinical Impression: Ventricular paced rhythm at 63 bpm.  No change from previous.                Critical Care time:  none               Results for orders placed or performed during the hospital encounter of 05/23/23 (from the past 24 hour(s))   CBC with platelets differential    Narrative    The following orders were created for panel order CBC with platelets differential.  Procedure                                Abnormality         Status                     ---------                               -----------         ------                     CBC with platelets and d...[[id number]]  Abnormal            Final result                 Please view results for these tests on the individual orders.   Comprehensive metabolic panel   Result Value Ref Range    Sodium 137 136 - 145 mmol/L    Potassium 4.5 3.4 - 5.3 mmol/L    Chloride 100 98 - 107 mmol/L    Carbon Dioxide (CO2) 26 22 - 29 mmol/L    Anion Gap 11 7 - 15 mmol/L    Urea Nitrogen 24.8 (H) 8.0 - 23.0 mg/dL    Creatinine 1.16 (H) 0.51 - 0.95 mg/dL    Calcium 9.8 8.8 - 10.2 mg/dL    Glucose 153 (H) 70 - 99 mg/dL    Alkaline Phosphatase 47 35 - 104 U/L    AST 22 10 - 35 U/L    ALT 20 10 - 35 U/L    Protein Total 7.0 6.4 - 8.3 g/dL    Albumin 3.6 3.5 - 5.2 g/dL    Bilirubin Total <0.2 <=1.2 mg/dL    GFR Estimate 51 (L) >60 mL/min/1.73m2   Troponin T, High Sensitivity   Result Value Ref Range    Troponin T, High Sensitivity 29 (H) <=14 ng/L   Nt probnp inpatient (BNP)   Result Value Ref Range    N terminal Pro BNP Inpatient 533 0 - 900 pg/mL   CBC with platelets and differential   Result Value Ref Range    WBC Count 7.6 4.0 - 11.0 10e3/uL    RBC Count 3.17 (L) 3.80 - 5.20 10e6/uL    Hemoglobin 9.3 (L) 11.7 - 15.7 g/dL    Hematocrit 29.6 (L) 35.0 - 47.0 %    MCV 93 78 - 100 fL    MCH 29.3 26.5 - 33.0 pg    MCHC 31.4 (L) 31.5 - 36.5 g/dL    RDW 14.4 10.0 - 15.0 %    Platelet Count 138 (L) 150 - 450 10e3/uL    % Neutrophils 56 %    % Lymphocytes 34 %    % Monocytes 6 %    % Eosinophils 3 %    % Basophils 0 %    % Immature Granulocytes 1 %    NRBCs per 100 WBC 0 <1 /100    Absolute Neutrophils 4.3 1.6 - 8.3 10e3/uL    Absolute Lymphocytes 2.6 0.8 - 5.3 10e3/uL    Absolute Monocytes 0.4 0.0 - 1.3 10e3/uL    Absolute Eosinophils 0.2 0.0 - 0.7 10e3/uL    Absolute Basophils 0.0 0.0 - 0.2 10e3/uL    Absolute Immature Granulocytes 0.1 <=0.4 10e3/uL    Absolute  NRBCs 0.0 10e3/uL   XR Chest Port 1 View    Narrative    EXAM: XR CHEST PORT 1 VIEW  LOCATION: [facility]  DATE/TIME: 5/23/2023 4:05 AM CDT    INDICATION: chest pressure, radiating to left arm  COMPARISON: 03/14/2023      Impression    IMPRESSION: No acute process or significant changes. Left chest AICD with leads in place. Sternotomy wires. Cardiomegaly. Lungs, costophrenic angles clear. Bones demineralized.   Troponin T, High Sensitivity   Result Value Ref Range    Troponin T, High Sensitivity 27 (H) <=14 ng/L       Medications   ondansetron (ZOFRAN) injection 4 mg (4 mg Intravenous $Given 5/23/23 7089)   aspirin (ASA) chewable tablet 324 mg (324 mg Oral $Given 5/23/23 4424)   fentaNYL (PF) (SUBLIMAZE) injection 50 mcg (50 mcg Intravenous $Given 5/23/23 8660)       Assessments & Plan (with Medical Decision Making)  67-year-old resident at the [facility] we will get 11 PM tonight with chest pressure radiating to her left arm.  This was 4.5 hours ago.  No improvement with nitroglycerin.  She has extensive coronary artery disease and states she has 21 stents and also X5 back in 2015.  Now has an ICD and pacer which was recently checked and found to be functioning normally.  Now is nauseous and has a headache she thinks from the nitro.  Similar presentation back in March of this year.  IV placed.  Labs sent.  EKG shows no change from previous.  Zofran for nausea.  Full dose aspirin given.  Fent for chest pain/headache.  Troponin up slightly at 29.  Looks like her baseline is in the 30s.  BNP was normal.  We will check a 2-hour delta troponin and then decide on disposition.  To our delta troponin is 27.  That makes ACS less likely as the cause of her pain.    She napped comfortably after the fentanyl.  Pain has lessened.  She did have some discomfort with palpation of her anterior chest wall.  She declined Maalox.  States she is on an acid medicine already.  As long as her heart test came  back normal, she feels ready to go back to the nursing home.  She will need to go back by ambulance.       I have reviewed the nursing notes.    I have reviewed the findings, diagnosis, plan and need for follow up with the patient.           Medical Decision Making  The patient's presentation was of moderate complexity (a chronic illness mild to moderate exacerbation, progression, or side effect of treatment).    The patient's evaluation involved:  ordering and/or review of 3+ test(s) in this encounter (see separate area of note for details)    The patient's management necessitated moderate risk (prescription drug management including medications given in the ED).        New Prescriptions    No medications on file       Final diagnoses:   Chest pain, unspecified type   Acute nonintractable headache, unspecified headache type - secondary to nitroglycerin       5/23/2023   [facility] EMERGENCY DEPT     David Rust MD  05/23/23 0690

## 2024-03-24 ENCOUNTER — APPOINTMENT (OUTPATIENT)
Dept: GENERAL RADIOLOGY | Facility: CLINIC | Age: 69
End: 2024-03-24
Attending: STUDENT IN AN ORGANIZED HEALTH CARE EDUCATION/TRAINING PROGRAM
Payer: COMMERCIAL

## 2024-03-24 ENCOUNTER — HOSPITAL ENCOUNTER (EMERGENCY)
Facility: CLINIC | Age: 69
Discharge: HOME | End: 2024-03-24
Attending: STUDENT IN AN ORGANIZED HEALTH CARE EDUCATION/TRAINING PROGRAM | Admitting: STUDENT IN AN ORGANIZED HEALTH CARE EDUCATION/TRAINING PROGRAM
Payer: COMMERCIAL

## 2024-03-24 VITALS
HEART RATE: 60 BPM | OXYGEN SATURATION: 100 % | SYSTOLIC BLOOD PRESSURE: 115 MMHG | TEMPERATURE: 98 F | DIASTOLIC BLOOD PRESSURE: 55 MMHG | RESPIRATION RATE: 10 BRPM

## 2024-03-24 DIAGNOSIS — R79.89 ELEVATED TROPONIN: ICD-10-CM

## 2024-03-24 DIAGNOSIS — E86.0 DEHYDRATION: ICD-10-CM

## 2024-03-24 DIAGNOSIS — R07.9 NONSPECIFIC CHEST PAIN: ICD-10-CM

## 2024-03-24 LAB
ALBUMIN SERPL BCG-MCNC: 3.6 G/DL (ref 3.5–5.2)
ALP SERPL-CCNC: 43 U/L (ref 40–150)
ALT SERPL W P-5'-P-CCNC: 32 U/L (ref 0–50)
ANION GAP SERPL CALCULATED.3IONS-SCNC: 12 MMOL/L (ref 7–15)
AST SERPL W P-5'-P-CCNC: 33 U/L (ref 0–45)
BASOPHILS # BLD AUTO: 0 10E3/UL (ref 0–0.2)
BASOPHILS NFR BLD AUTO: 0 %
BILIRUB SERPL-MCNC: 0.2 MG/DL
BUN SERPL-MCNC: 27.1 MG/DL (ref 8–23)
CALCIUM SERPL-MCNC: 10.4 MG/DL (ref 8.8–10.2)
CHLORIDE SERPL-SCNC: 99 MMOL/L (ref 98–107)
CREAT SERPL-MCNC: 0.96 MG/DL (ref 0.51–0.95)
DEPRECATED HCO3 PLAS-SCNC: 24 MMOL/L (ref 22–29)
EGFRCR SERPLBLD CKD-EPI 2021: 63 ML/MIN/1.73M2
EOSINOPHIL # BLD AUTO: 0.2 10E3/UL (ref 0–0.7)
EOSINOPHIL NFR BLD AUTO: 3 %
ERYTHROCYTE [DISTWIDTH] IN BLOOD BY AUTOMATED COUNT: 14.2 % (ref 10–15)
GLUCOSE SERPL-MCNC: 195 MG/DL (ref 70–99)
HCT VFR BLD AUTO: 28.7 % (ref 35–47)
HGB BLD-MCNC: 9.1 G/DL (ref 11.7–15.7)
IMM GRANULOCYTES # BLD: 0.1 10E3/UL
IMM GRANULOCYTES NFR BLD: 1 %
LIPASE SERPL-CCNC: 33 U/L (ref 13–60)
LYMPHOCYTES # BLD AUTO: 2.1 10E3/UL (ref 0.8–5.3)
LYMPHOCYTES NFR BLD AUTO: 29 %
MCH RBC QN AUTO: 29 PG (ref 26.5–33)
MCHC RBC AUTO-ENTMCNC: 31.7 G/DL (ref 31.5–36.5)
MCV RBC AUTO: 91 FL (ref 78–100)
MONOCYTES # BLD AUTO: 0.5 10E3/UL (ref 0–1.3)
MONOCYTES NFR BLD AUTO: 7 %
NEUTROPHILS # BLD AUTO: 4.4 10E3/UL (ref 1.6–8.3)
NEUTROPHILS NFR BLD AUTO: 60 %
NRBC # BLD AUTO: 0 10E3/UL
NRBC BLD AUTO-RTO: 0 /100
NT-PROBNP SERPL-MCNC: 657 PG/ML (ref 0–900)
PLATELET # BLD AUTO: 139 10E3/UL (ref 150–450)
POTASSIUM SERPL-SCNC: 4.2 MMOL/L (ref 3.4–5.3)
PROT SERPL-MCNC: 6.7 G/DL (ref 6.4–8.3)
RBC # BLD AUTO: 3.14 10E6/UL (ref 3.8–5.2)
SODIUM SERPL-SCNC: 135 MMOL/L (ref 135–145)
TROPONIN T SERPL HS-MCNC: 30 NG/L
TROPONIN T SERPL HS-MCNC: 31 NG/L
WBC # BLD AUTO: 7.4 10E3/UL (ref 4–11)

## 2024-03-24 PROCEDURE — 84484 ASSAY OF TROPONIN QUANT: CPT | Performed by: STUDENT IN AN ORGANIZED HEALTH CARE EDUCATION/TRAINING PROGRAM

## 2024-03-24 PROCEDURE — 80053 COMPREHEN METABOLIC PANEL: CPT | Performed by: STUDENT IN AN ORGANIZED HEALTH CARE EDUCATION/TRAINING PROGRAM

## 2024-03-24 PROCEDURE — 36415 COLL VENOUS BLD VENIPUNCTURE: CPT | Performed by: STUDENT IN AN ORGANIZED HEALTH CARE EDUCATION/TRAINING PROGRAM

## 2024-03-24 PROCEDURE — 93005 ELECTROCARDIOGRAM TRACING: CPT | Performed by: STUDENT IN AN ORGANIZED HEALTH CARE EDUCATION/TRAINING PROGRAM

## 2024-03-24 PROCEDURE — 250N000011 HC RX IP 250 OP 636: Performed by: STUDENT IN AN ORGANIZED HEALTH CARE EDUCATION/TRAINING PROGRAM

## 2024-03-24 PROCEDURE — 93010 ELECTROCARDIOGRAM REPORT: CPT | Performed by: STUDENT IN AN ORGANIZED HEALTH CARE EDUCATION/TRAINING PROGRAM

## 2024-03-24 PROCEDURE — 99285 EMERGENCY DEPT VISIT HI MDM: CPT | Mod: 25 | Performed by: STUDENT IN AN ORGANIZED HEALTH CARE EDUCATION/TRAINING PROGRAM

## 2024-03-24 PROCEDURE — 83690 ASSAY OF LIPASE: CPT | Performed by: STUDENT IN AN ORGANIZED HEALTH CARE EDUCATION/TRAINING PROGRAM

## 2024-03-24 PROCEDURE — 96375 TX/PRO/DX INJ NEW DRUG ADDON: CPT | Performed by: STUDENT IN AN ORGANIZED HEALTH CARE EDUCATION/TRAINING PROGRAM

## 2024-03-24 PROCEDURE — 83880 ASSAY OF NATRIURETIC PEPTIDE: CPT | Performed by: STUDENT IN AN ORGANIZED HEALTH CARE EDUCATION/TRAINING PROGRAM

## 2024-03-24 PROCEDURE — 96374 THER/PROPH/DIAG INJ IV PUSH: CPT | Performed by: STUDENT IN AN ORGANIZED HEALTH CARE EDUCATION/TRAINING PROGRAM

## 2024-03-24 PROCEDURE — 85025 COMPLETE CBC W/AUTO DIFF WBC: CPT | Performed by: STUDENT IN AN ORGANIZED HEALTH CARE EDUCATION/TRAINING PROGRAM

## 2024-03-24 PROCEDURE — 99284 EMERGENCY DEPT VISIT MOD MDM: CPT | Mod: 25 | Performed by: STUDENT IN AN ORGANIZED HEALTH CARE EDUCATION/TRAINING PROGRAM

## 2024-03-24 PROCEDURE — 71046 X-RAY EXAM CHEST 2 VIEWS: CPT

## 2024-03-24 RX ORDER — MORPHINE SULFATE 4 MG/ML
4 INJECTION, SOLUTION INTRAMUSCULAR; INTRAVENOUS
Status: COMPLETED | OUTPATIENT
Start: 2024-03-24 | End: 2024-03-24

## 2024-03-24 RX ORDER — ONDANSETRON 2 MG/ML
4 INJECTION INTRAMUSCULAR; INTRAVENOUS EVERY 30 MIN PRN
Status: DISCONTINUED | OUTPATIENT
Start: 2024-03-24 | End: 2024-03-24 | Stop reason: HOSPADM

## 2024-03-24 RX ADMIN — ONDANSETRON 4 MG: 2 INJECTION INTRAMUSCULAR; INTRAVENOUS at 03:29

## 2024-03-24 RX ADMIN — MORPHINE SULFATE 4 MG: 4 INJECTION, SOLUTION INTRAMUSCULAR; INTRAVENOUS at 04:10

## 2024-03-24 ASSESSMENT — COLUMBIA-SUICIDE SEVERITY RATING SCALE - C-SSRS
2. HAVE YOU ACTUALLY HAD ANY THOUGHTS OF KILLING YOURSELF IN THE PAST MONTH?: NO
6. HAVE YOU EVER DONE ANYTHING, STARTED TO DO ANYTHING, OR PREPARED TO DO ANYTHING TO END YOUR LIFE?: NO
1. IN THE PAST MONTH, HAVE YOU WISHED YOU WERE DEAD OR WISHED YOU COULD GO TO SLEEP AND NOT WAKE UP?: NO

## 2024-03-24 ASSESSMENT — ACTIVITIES OF DAILY LIVING (ADL)
ADLS_ACUITY_SCORE: 39

## 2024-03-24 NOTE — ED TRIAGE NOTES
"Presents from [facility] for chest pain that started at 2300. Woke up from sleep \"from a nightmare\" of unknown context with chest pressure that has not subsided since. Radiates to left arm and hand. Given 2 nitroglycerin at nursing home and 2 doses en route with no relief. Given 324mg of aspirin as well by EMS. Patient requesting various things during triage such as water and extra pillows, also adding that she \"usually gets fluids when here\".      Triage Assessment (Adult)       Row Name 03/24/24 0310          Triage Assessment    Airway WDL WDL        Respiratory WDL    Respiratory WDL WDL        Skin Circulation/Temperature WDL    Skin Circulation/Temperature WDL WDL        Cardiac WDL    Cardiac WDL X;chest pain        Chest Pain Assessment    Chest Pain Location anterior chest, left     Chest Pain Radiation arm;hand     Character aching;tightness;pressure     Duration started at 2300     Precipitating Factors emotional stress     Associated Signs/Symptoms dizziness;nausea     Chest Pain Intervention cardiac monitoring continued;cardiac monitor placed;12-lead ECG obtained;aspirin given        Peripheral/Neurovascular WDL    Peripheral Neurovascular WDL WDL        Cognitive/Neuro/Behavioral WDL    Cognitive/Neuro/Behavioral WDL WDL                     "

## 2024-03-24 NOTE — ED PROVIDER NOTES
"  History     Chief Complaint   Patient presents with    Chest Pain     HPI  Letty Escobar is a 68 year old female with extensive medical history including chronic pain, COPD, hypertension, anemia, CHF, coronary disease with CABG x 5, atrial fibrillation with pacemaker who presents for evaluation of chest pain.  Patient developed substernal chest discomfort around 11 PM tonight after waking up \"from a nightmare\".  This has persisted since onset and is associated with some numbness down the left arm/hand.  Patient received 2 nitroglycerin doses at her nursing home with no relief.  Aspirin and an additional dose of nitroglycerin was given by EMS with unchanged pain.  She otherwise feels well, denies any fevers, chills, headaches, lightheadedness, shortness of breath, abdominal pain, vomiting, changes in bowel or urinary habits, pain or swelling of the legs, other complaints today.    Allergies:  Allergies   Allergen Reactions    Bee Pollen Anaphylaxis    Diphenhydramine Palpitations     Tolerated IV Benadryl when not pushed too fast    Isosorbide Nitrate Other (See Comments) and Dizziness     Also causes syncope (has fallen before) and brain fog/mental disturbances - please do not prescribe    Nitroglycerin Dizziness, Fatigue and Other (See Comments)     Specifically the patch - please do not prescribe  Specifically the patch - please do not prescribe      Contrast Dye Hives and Itching    Penicillin G     Adhesive Tape Rash    Liquid Adhesive Itching    Nystatin Dermatitis     Also blisters    Penicillins Swelling and Rash     Occurred as a child - not 100% sure on specific reactions    Sulfa Antibiotics Other (See Comments)     Occurred as a child / patient does not remember specific reaction       Problem List:    Patient Active Problem List    Diagnosis Date Noted    Lethargy 11/10/2023     Priority: Medium    Urinary tract infection without hematuria, site unspecified 11/10/2023     Priority: Medium    Fever " 11/10/2023     Priority: Medium    Hypoxia 11/10/2023     Priority: Medium    Pyuria 11/10/2023     Priority: Medium    PAD (peripheral artery disease) (H24) 11/10/2023     Priority: Medium    Left foot pain 11/10/2023     Priority: Medium    History of stroke 11/10/2023     Priority: Medium    Lives in long-term care facility 11/10/2023     Priority: Medium    Obesity hypoventilation syndrome (H) 11/10/2023     Priority: Medium    Benzodiazepine dependence (H) 11/10/2023     Priority: Medium    Thrombocytopenia (H24) 11/10/2023     Priority: Medium    ERIC (acute kidney injury) (H24) 11/10/2023     Priority: Medium    Chronic kidney disease, stage 3a (H) 10/16/2022     Priority: Medium    SOB (shortness of breath) 04/07/2022     Priority: Medium    Type 2 diabetes mellitus (H) 02/13/2022     Priority: Medium    Morbid obesity (H) 12/10/2021     Priority: Medium    ICD (implantable cardioverter-defibrillator) in place 11/17/2021     Priority: Medium     Formatting of this note is different from the original.  Date of last device in office evaluation: 5/17/2022    ?  and model: Medtronic Cobalt CRT-D.   Date of implant: 5/7/2021  Tachy therapies remain OFF: S/p downgrade from ICD to pacemaker, but her implanted system includes a DF-4 lead, so an ICD generator was placed with the tachycardia therapies disabled.     ? Indication for device: Ischemic cardiomyopathy   ? Cardiac resynchronization therapy:   no     MRI Conditional:  No  o If No:  Reason why:  Abandoned LV lead    ? Battery longevity documented as less than 3  Months: No  ? Are any of the leads less than 3 months old:  No    ? Programming              ? Pacing mode and programmed lower rate: DDDR 60 - 130 bpm              ? Rate-responsive sensor type, if programmed on: Accelerometer        Underlying rhythm and heart rate:  SR @ 60 bpm    ? What is the response of this device to magnet placement:  None - tachy therapies off  ? PM magnet pacing  rate: N/A   ? Any alert status on CIED generator or lead: No    ? Last pacing threshold    Atrial   1.0 V @ 0.4 ms   Ventricular RV: 0.75 V @ 0.4 ms  LV:  2.75 V @ 1.0 ms    Formatting of this note is different from the original.  Date of last device in office evaluation: 11/17/2022     ?  and model: Medtronic Cobalt CRT-D.   Date of implant: 5/7/2021  Tachy therapies remain OFF: S/p downgrade from ICD to pacemaker, but her implanted system includes a DF-4 lead, so an ICD generator was placed with the tachycardia therapies disabled.     ? Indication for device: Ischemic cardiomyopathy   ? Cardiac resynchronization therapy:   no     MRI Conditional:  No  o If No:  Reason why:  Abandoned LV lead    ? Battery longevity documented as less than 3  Months: No  ? Are any of the leads less than 3 months old:  No    ? Programming              ? Pacing mode and programmed lower rate: DDDR 60 - 130 bpm              ? Rate-responsive sensor type, if programmed on: Accelerometer        Underlying rhythm and heart rate:  Sinus rhythm 62 bpm, w/BBB    ? What is the response of this device to magnet placement:  None - tachy therapies off  ? PM magnet pacing rate: N/A   ? Any alert status on CIED generator or lead: No    ? Last pacing threshold    Atrial   1.0 V @ 0.4 ms   Ventricular RV: 0.75 V @ 0.4 ms  LV:  2.75 V @ 1.0 ms      Atrial fibrillation (H) 04/06/2021     Priority: Medium    Pacemaker 04/06/2021     Priority: Medium    Major depressive disorder, recurrent severe without psychotic features (H) 01/26/2021     Priority: Medium    Panic disorder with agoraphobia 04/14/2020     Priority: Medium    Constipation 03/20/2020     Priority: Medium    Personality disorder (H) 03/05/2020     Priority: Medium     Rule out dependent personality    Formatting of this note might be different from the original.  Rule out dependent personality  Formatting of this note might be different from the original.  Rule out dependent  personality      Candidiasis 03/01/2020     Priority: Medium    Posttraumatic stress disorder 03/01/2020     Priority: Medium    COPD without exacerbation (H) 01/29/2020     Priority: Medium    Long term (current) use of insulin (H) 01/29/2020     Priority: Medium    Chronic systolic heart failure (H) 01/28/2020     Priority: Medium    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, left leg (H) 01/15/2020     Priority: Medium    Chest pain 11/11/2019     Priority: Medium    Polymyalgia rheumatica (H24) 11/28/2018     Priority: Medium    Unstable angina (H) 05/02/2018     Priority: Medium     Coronary angiogram 05/02/2018 demonstrated 30% stenosis in the LMCA, 50% stenosis in the Proximal LAD, 50% stenosis in the Mid LAD, 95% stenosis in the Proximal Circumflex (Restenosis - Balloon Angioplasty), 100% stenosis in the 1st Marginal, 50% stenosis in the Proximal RCA, 50% stenosis in the Distal RCA, the LIMA graft from the LIMA to the Distal LAD is free of significant disease; the SVG graft from the Aorta to the 1st Marginal is free of significant disease; the SVG graft from the Aorta to the Distal RCA is free of significant disease. Intervention included a successful  2.5mm x 12mm Balloon,  2.5mm x 10mm Cutting Balloon,  3mm x 12mm Drug Eluting Stent,  3mm x 12mm Balloon, and  3mm x 8mm Balloon to Proximal Circumflex, post stenosis 0%.    Formatting of this note is different from the original.  Coronary angiogram 05/02/2018 demonstrated 30% stenosis in the LMCA, 50% stenosis in the Proximal LAD, 50% stenosis in the Mid LAD, 95% stenosis in the Proximal Circumflex (Restenosis - Balloon Angioplasty), 100% stenosis in the 1st Marginal, 50% stenosis in the Proximal RCA, 50% stenosis in the Distal RCA, the LIMA graft from the LIMA to the Distal LAD is free of significant disease; the SVG graft from the Aorta to the 1st Marginal is free of significant disease; the SVG graft from the  Aorta to the Distal RCA is free of significant disease. Intervention included a successful  2.5mm x 12mm Balloon,  2.5mm x 10mm Cutting Balloon,  3mm x 12mm Drug Eluting Stent,  3mm x 12mm Balloon, and  3mm x 8mm Balloon to Proximal Circumflex, post stenosis 0%.  Formatting of this note is different from the original.  Coronary angiogram 05/02/2018 demonstrated 30% stenosis in the LMCA, 50% stenosis in the Proximal LAD, 50% stenosis in the Mid LAD, 95% stenosis in the Proximal Circumflex (Restenosis - Balloon Angioplasty), 100% stenosis in the 1st Marginal, 50% stenosis in the Proximal RCA, 50% stenosis in the Distal RCA, the LIMA graft from the LIMA to the Distal LAD is free of significant disease; the SVG graft from the Aorta to the 1st Marginal is free of significant disease; the SVG graft from the Aorta to the Distal RCA is free of significant disease. Intervention included a successful  2.5mm x 12mm Balloon,  2.5mm x 10mm Cutting Balloon,  3mm x 12mm Drug Eluting Stent,  3mm x 12mm Balloon, and  3mm x 8mm Balloon to Proximal Circumflex, post stenosis 0%.      Vitamin B12 deficiency 02/14/2018     Priority: Medium    Chronic bilateral low back pain without sciatica 01/17/2018     Priority: Medium    Chronic pain disorder 10/01/2017     Priority: Medium    Urinary incontinence, mixed 09/24/2017     Priority: Medium    Internal carotid artery stenosis, bilateral 07/13/2016     Priority: Medium     Carotid US 05/04/2018 showed moderate plaque formation, consistent with 50 to 69% stenosis in the right internal carotid artery, not significantly changed from 8/5/2015.  Moderate plaque formation, consistent with 50 to 69% stenosis in the left internal carotid artery; there has been mild progression of the left ICA stenosis since 8/5/2015.    Formatting of this note might be different from the original.  Carotid US 05/04/2018 showed moderate plaque formation, consistent with 50 to 69% stenosis in the right internal  carotid artery, not significantly changed from 8/5/2015.  Moderate plaque formation, consistent with 50 to 69% stenosis in the left internal carotid artery; there has been mild progression of the left ICA stenosis since 8/5/2015.    Formatting of this note might be different from the original.  Carotid US 05/04/2018 showed moderate plaque formation, consistent with 50 to 69% stenosis in the right internal carotid artery, not significantly changed from 8/5/2015.  Moderate plaque formation, consistent with 50 to 69% stenosis in the left internal carotid artery; there has been mild progression of the left ICA stenosis since 8/5/2015.      Essential (primary) hypertension 10/14/2015     Priority: Medium    Ischemic cardiomyopathy 09/20/2015     Priority: Medium     EF of 40-45%, status post RV lead revision and LV epicardial lead placement via mini-thoracotomy in August 2016.    Formatting of this note might be different from the original.  EF of 40-45%, status post RV lead revision and LV epicardial lead placement via mini-thoracotomy in August 2016.  Formatting of this note might be different from the original.  EF of 40-45%, status post RV lead revision and LV epicardial lead placement via mini-thoracotomy in August 2016.      S/P CABG x 5 08/21/2015     Priority: Medium    Pain medication agreement 04/20/2013     Priority: Medium     Controlled substance agreement for percocet #30/month on file and signed 4/17/13.  Designated pharmacy: [facility] Prescribing physician: Andrea Diagnosis: Ulnar neuropathy    Formatting of this note might be different from the original.  Controlled substance agreement for percocet #30/month on file and signed 4/17/13.  Designated pharmacy: [facility] Prescribing physician: Andrea Diagnosis: Ulnar neuropathy      Anemia in other chronic diseases classified elsewhere 01/05/2013     Priority: Medium    Hypothyroidism, unspecified 12/10/2010     Priority: Medium    ACP (advance care planning)  "11/03/2010     Priority: Medium     Formatting of this note might be different from the original.  Patient has identified Health Care Agent(s): Yes  Add Health Care Agents: Yes    Health Care Agent(s):    Primary Health Care Agent:     Edwar Escobar Relationship:    Spouse Phone:     [phone]    Secondary Health Care Agent:     Chiki Oro Relationship:     Son Phone:     [phone]      Patient has Advance Care Plan Documents (Health Care Directive, POLST): Yes    Advance Care Plan Documents:  Health Care Directive--03/28/11  Resuscitation Guidelines--    Patient has identified Specific Treatment Preferences: Yes   Specific Treatment Preferences:   a.) Code Status:  DNR/ Do Not Attempt Resuscitation - Allow a Natural Death    b.) Goals of Treatment:     ii. Limited Interventions and treat reversible conditions.  Provide interventions aimed at treatment of new or reversible illness/injury or non-life threatening chronic conditions. Duration of invasive or uncomfortable interventions should generally be limited.- Trial of intubation 5 days or other instructions \"If no improvement, stop.\"  c.) Interventions and Treatments:   i.   Antibiotics:          - Aggressive antibiotics  ii.  Nutrition/Hydration:         - Offer food and liquids by mouth         - IV fluid administration         - No feeding tube under any circumstance.  iii. Transfusion:          - Blood products for comfort/relief of symptoms only  iv. Dialysis:           - Dialysis for short term \"for recovery, but not long term.\"        Last Assessment & Plan:   Advance Care Planning: Disease-specific Session    Letty Escobar is an [facility] patient of Dr. Renea Mendez of the [facility].    Advance care planning discussions were completed with Letty and her healthcare agent, spouse Edwar Escobar on Monday, March 28, 2011 at the [facility] Room.    Understanding of Illness and Disease [place]:   Letty identifies " "her medical condition as diabetes with peripheral neuropathy, heart problems with a heart attack 2009 plus 4 stent placements; sleep apnea; depression; hypothyroid; high cholesterol; tobacco use; and describes it as needing further assistance with thyroid and diabetes management.  She identifies the following symptoms of her medical condition as being the most bothersome: some memory loss, balance problems, light headedness and dizziness, puffy eyes and lower extremity edema from time to time.    Let[place] is retired [place] .  She is independent with all cares and lives an active life style although, \"I'm not as active as I used to be.\"    Goals of Care:   Letty currently hopes to maintain independence, control pain and symptoms and delay progression of, but not cure, the illness.  \"I want to get the diabetes and thyroid under better control.\"  Letty has an appointment the first part of April for follow up.    Quality of Life:    Letty identifies quality of life as family involvement twice weekly, [employer] activities, newly assigned  , playing cards, the computer,    Letty christiano with serious challenges in her life through her ganesh in God, prayers and support of her Orthodox family, spouse and son.    Letty identifies the following fears and worries about her medical care:  \"I just want the diabetes straightened out.\"    Treatment and Care Preferences:   Past experiences in dealing with family and/or friends that have  or been seriously ill include her brother who took his own life.  \"He was 53 years old and living with us.  I found him.\"   As a result of these experiences, Letty expresses these health care preferences:      Summary Letty's Treatment Preferences:  LOW SURVIVAL; HIGH TREATMENT BURDEN:  If Letty suffered a serious complication, such that she was facing a prolonged hospital stay, required ongoing medical " "interventions, and the chance of living through the complication was low (for example, only 5 out of 100 would live), Letty would choose:  to focus treatment on comfort and quality of life (\"Quality of life is more important than length of life to Letty.\")  COMMENT:  \"If I can't live a normal life, I wouldn't want to live.\"    HIGH SURVIVAL; LOW FUNCTIONAL STATUS:  If Letty had a serious complication and had a good chance of living through the complication but it was expected that she would never be able to walk or talk again and would require 24 hour nursing care, she would choose:  to focus treatment on comfort and quality of life (\"Quality of life is more important than length of life to Letty.\")  COMMENT:  \"I'd accept rehabilitation.\"    HIGH SURVIVAL; LOW COGNITIVE STATUS:  If Letty had a serious complication and had a good chance of living through the complication but it was expected that she would never know who she was or who she was with and would require 24 hour nursing care, she would choose:  to focus treatment on comfort and quality of life (\"Quality of life is more important than length of life to Letty.\")    CARDIO-PULMONARY RESUSCITATION (CPR):  The facts, risks and benefits of CPR were discussed with Letty.  If she had a sudden event that caused her heart and breathing to stop, she:  WOULD NOT want CPR attempted and instead would prefer that a natural death occur.    MECHANICAL VENTILATION: If Letty had an episode where she was unable to breathe on her own, she would choose the following:  attempt to use any appropriate non-invasive method to assist breathing, and use mechanical ventilation.  COMMENT:  \"If reversible ventilator is acceptable for 5 days.  If no improvement, stop.\"     Letty has chosen her healthcare agent to:  strictly follow her wishes.    Follow Up Plan:   Letty was encouraged to continue advance care planning discussions with her health care agents and primary care provider.   " Letty and her health care agent declined handouts.     Documents addressed during this advance care planning session:  1.  Health Care Agents identified.          .  Primary health care agent is spouse, Edwar Escobar;          .  Secondary health care agent is son, Jermaine Oro.  2.  Health Care Directive completed and scanned into medical record.  3.  Statement of Treatment Preferences for advanced illness completed and scanned into the medical record.  4.  Resuscitation Guidelines completed for DNR.  Document sent to Dr. Renea Mendez for signature and returned to the home. Letty, please place on the refrigerator.    Recommendations/Plan:   Letty and her health care agent to review Advance Care Plan.     Letty would benefit from:   Care Navigation/Care Navigation Help Desk--explained services for pending future needs.  No identified needs today.  Care Navigation brochure was given to Letty and her healthcare agent.    Advance Care Planning recommendations and Letty's concerns and questions were cc ed to her primary provider.     Thank you, Letty for the opportunity of assisting with Advance Care Planning. It was a seeing you again and meeting Edwar.  Please contact me if I can be of further assistance.    Interviewer: Pat Martin RN    Advance Care Planning Facilitator  [phone]   3/28/2011      ELI (obstructive sleep apnea) 01/31/2010     Priority: Medium     PSG on April/2008 that showed moderate Obstructive Sleep Apnea with an AHI of 23.5 . Limb movements persisted at 29 movements/hour at optimal pressures, despite carbidopa use.    Formatting of this note might be different from the original.  PSG on April/2008 that showed moderate Obstructive Sleep Apnea with an AHI of 23.5 . Limb movements persisted at 29 movements/hour at optimal pressures, despite carbidopa use.  Formatting of this note might be different from the original.  PSG on April/2008 that showed moderate Obstructive Sleep Apnea with  an AHI of 23.5 . Limb movements persisted at 29 movements/hour at optimal pressures, despite carbidopa use.      Coronary atherosclerosis 02/06/2009     Priority: Medium     Formatting of this note might be different from the original.  2/5/2009 - MI - Proximal RCA 99%, mild-mod disease elsewhere.  EF 60%.  PCI:  MYRON to pRCA.  2/12/2009 - admit CP - Widely patent RCA stent. Moderate diffuse CAD. Severe stenosis in trivial PDA branch. LVEF 45%.  1/25/2010 - admit CP - PTCA and stent of diagonal  9/8/2010 - admit CP - LAD patent stent. Moderate diffuse CAD. Medical management recommended.   4/25/2012 - NSTEMI - Acute total occlusion of the ostial Circumflex. Acute total occlusion of the ostial ramus. Acute total occlusion of the distal 1st Obtuse Marginal. Angioplasty of ostial circumflex and ostial ramus. There was distal embolization to the OM1 vessel. This vessel was small and not amendable to intervention. Indefinite: plavix and asa 81.  8/10/2015 - CABx5 - 1. LIMA to distal LAD, reverse SVG to OM1 and OM2, reverse SVG to diagonal, reverse SVG to PDA.   2. Mitral valve repair with a Medtronics 3-D full ring, 28 mm.   3. Tricuspid repair with a Medtronic 28 mm partial ring  1/12/2016 - TTE - EF 40-45%  Formatting of this note might be different from the original.  2/5/2009 - MI - Proximal RCA 99%, mild-mod disease elsewhere.  EF 60%.  PCI:  MYRON to pRCA.  2/12/2009 - admit CP - Widely patent RCA stent. Moderate diffuse CAD. Severe stenosis in trivial PDA branch. LVEF 45%.  1/25/2010 - admit CP - PTCA and stent of diagonal  9/8/2010 - admit CP - LAD patent stent. Moderate diffuse CAD. Medical management recommended.   4/25/2012 - NSTEMI - Acute total occlusion of the ostial Circumflex. Acute total occlusion of the ostial ramus. Acute total occlusion of the distal 1st Obtuse Marginal. Angioplasty of ostial circumflex and ostial ramus. There was distal embolization to the OM1 vessel. This vessel was small and not  amendable to intervention. Indefinite: plavix and asa 81.  8/10/2015 - CABx5 - 1. LIMA to distal LAD, reverse SVG to OM1 and OM2, reverse SVG to diagonal, reverse SVG to PDA.   2. Mitral valve repair with a Medtronics 3-D full ring, 28 mm.   3. Tricuspid repair with a Medtronic 28 mm partial ring  1/12/2016 - TTE - EF 40-45%      Restless legs syndrome 08/29/2007     Priority: Medium    Hyperlipidemia, unspecified 05/30/2007     Priority: Medium        Past Medical History:    Past Medical History:   Diagnosis Date    ACP (advance care planning) 11/3/2010    Acute coronary syndrome (H) 11/22/2019    Acute exacerbation of CHF (congestive heart failure) (H) 11/11/2019    Acute on chronic systolic (congestive) heart failure (H) 1/28/2020    Anemia of chronic disease 1/5/2013    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, left leg (H) 1/15/2020    BPPV (benign paroxysmal positional vertigo) 5/31/2018    Candidiasis 3/1/2020    Carotid stenosis, right 7/13/2016    Chest pain 11/11/2019    Chronic bilateral low back pain without sciatica 1/17/2018    Chronic pain disorder 10/1/2017    Constipation 3/20/2020    COPD (chronic obstructive pulmonary disease) (H) 6/24/2020    Coronary atherosclerosis 2/6/2009    Cubital tunnel syndrome on left 11/13/2012    Depressive disorder     Diabetes mellitus (H)     Diabetes mellitus type 2 in obese (H) 7/13/2006    Diabetes mellitus type 2 in obese (H) 7/13/2006    Drug-seeking behavior 4/4/2020    Failure to thrive in adult 9/3/2020    Hereditary and idiopathic peripheral neuropathy 4/24/2007    Hyperlipidemia with target low density lipoprotein (LDL) cholesterol less than 70 mg/dL 5/30/2007    Hypertension 10/14/2015    Hypothyroidism 12/10/2010    Irritable bowel syndrome 9/7/2015    Ischemic cardiomyopathy 9/20/2015    Long-term use of high-risk medication 4/14/2020    Moniliasis, 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    Mood disorder due to a general  medical condition 3/1/2020    Myocardial infarction (H)     Neuromuscular disorder (H)     Other specified postprocedural states 10/8/2015    Pain medication agreement 4/20/2013    Panic disorder with agoraphobia 4/14/2020    Paroxysmal atrial fibrillation (H) 10/2/2015    Personality disorder (H) 3/5/2020    Polymyalgia rheumatica (H24) 11/28/2018    Posttraumatic stress disorder 3/1/2020    Restless legs syndrome (RLS) 8/29/2007    Restless legs syndrome (RLS) 8/29/2007    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    Serum calcium elevated 3/1/2020    Somatic dysfunction of sacral region 1/17/2018    Subacromial bursitis of left shoulder joint 8/6/2018    Suicidal ideation 4/1/2020    Thyroid disease     TIA (transient ischemic attack) 5/4/2018    TIA (transient ischemic attack) 5/4/2018    Tobacco abuse 2/17/2017    Tobacco abuse 2/17/2017    Urinary incontinence, mixed 9/24/2017    UTI (urinary tract infection) 4/17/2020    Vitamin B12 deficiency 2/14/2018    Weakness 12/17/2019       Past Surgical History:    Past Surgical History:   Procedure Laterality Date    CARDIAC SURGERY      stents x 11    CARDIAC SURGERY      CHOLECYSTECTOMY      CHOLECYSTECTOMY      GENITOURINARY SURGERY      Tubal ligation    OTHER SURGICAL HISTORY      Genitourinary surgery    RELEASE CARPAL TUNNEL      RELEASE CARPAL TUNNEL         Family History:    No family history on file.    Social History:  Marital Status:   [2]  Social History     Tobacco Use    Smoking status: Never    Smokeless tobacco: Never   Vaping Use    Vaping Use: Never used   Substance Use Topics    Alcohol use: Not Currently    Drug use: Never        Medications:    acetaminophen (TYLENOL) 325 MG tablet  acetaminophen (TYLENOL) 500 MG tablet  amLODIPine (NORVASC) 5 MG tablet  aspirin 81 MG EC tablet  bisacodyl (DULCOLAX) 10 MG suppository  cyanocobalamin (CYANOCOBALAMIN) 1000 MCG/ML injection  divalproex sodium delayed-release (DEPAKOTE) 250 MG DR tablet  DULoxetine HCl 40 MG  CPEP  famotidine (PEPCID) 20 MG tablet  fluticasone-vilanterol (BREO ELLIPTA) 200-25 MCG/INH inhaler  furosemide (LASIX) 20 MG tablet  gabapentin (NEURONTIN) 400 MG capsule  hydrochlorothiazide (HYDRODIURIL) 25 MG tablet  levothyroxine (SYNTHROID/LEVOTHROID) 137 MCG tablet  lidocaine (LMX4) 4 % external cream  LORazepam (ATIVAN) 0.5 MG tablet  Melatonin 10 MG TABS tablet  metoprolol succinate ER (TOPROL-XL) 25 MG 24 hr tablet  mirtazapine (REMERON) 30 MG tablet  nitroGLYcerin (NITROSTAT) 0.4 MG sublingual tablet  ondansetron (ZOFRAN ODT) 4 MG ODT tab  polyethylene glycol (MIRALAX) 17 GM/Dose powder  potassium chloride ER (MICRO-K) 10 MEQ CR capsule  QUEtiapine (SEROQUEL) 100 MG tablet  rosuvastatin (CRESTOR) 10 MG tablet  sennosides (SENOKOT) 8.6 MG tablet  traZODone (DESYREL) 50 MG tablet      Review of Systems   All other systems reviewed and are negative.  See HPI.    Physical Exam   BP: 95/58  Pulse: 67  Temp: 98  F (36.7  C)  Resp: 20  SpO2: 96 %      Physical Exam  Vitals and nursing note reviewed.   Constitutional:       General: She is not in acute distress.     Appearance: Normal appearance. She is obese. She is not ill-appearing, toxic-appearing or diaphoretic.      Comments: Sitting comfortably on bed.  Making numerous requests for pillows, blankets, water, among other things despite describing ongoing chest pain.   HENT:      Head: Normocephalic and atraumatic.      Mouth/Throat:      Mouth: Mucous membranes are moist.   Eyes:      General: No scleral icterus.     Extraocular Movements: Extraocular movements intact.      Conjunctiva/sclera: Conjunctivae normal.      Pupils: Pupils are equal, round, and reactive to light.   Neck:      Vascular: No JVD.   Cardiovascular:      Rate and Rhythm: Normal rate and regular rhythm.      Pulses:           Radial pulses are 2+ on the right side and 2+ on the left side.        Dorsalis pedis pulses are 2+ on the right side and 2+ on the left side.      Heart sounds:  Normal heart sounds. No murmur heard.  Pulmonary:      Effort: No respiratory distress.      Breath sounds: Normal breath sounds. No decreased breath sounds or wheezing.      Comments: Speaking comfortably in full sentences.  No acute distress.  Lungs clear to auscultation.  Chest:      Chest wall: No tenderness or edema.   Abdominal:      General: Abdomen is flat.      Palpations: Abdomen is soft.      Tenderness: There is no abdominal tenderness. There is no guarding or rebound.   Musculoskeletal:         General: Normal range of motion.      Cervical back: Normal range of motion and neck supple.      Right lower leg: No tenderness. No edema.      Left lower leg: No tenderness. No edema.   Skin:     General: Skin is warm.      Capillary Refill: Capillary refill takes less than 2 seconds.      Coloration: Skin is pale.      Findings: No rash.   Neurological:      General: No focal deficit present.      Mental Status: She is alert and oriented to person, place, and time.      Cranial Nerves: No cranial nerve deficit.   Psychiatric:         Mood and Affect: Mood normal.         ED Course        Procedures         EKG performed at 3:17 AM.  Paced rhythm with occasional ectopic beats.  Left bundle branch block pattern.  Nonspecific ST and T wave changes.  Exam independently interpreted by me.         Results for orders placed or performed during the hospital encounter of 03/24/24 (from the past 24 hour(s))   CBC with platelets differential    Narrative    The following orders were created for panel order CBC with platelets differential.  Procedure                               Abnormality         Status                     ---------                               -----------         ------                     CBC with platelets and d...[[id number]]  Abnormal            Final result                 Please view results for these tests on the individual orders.   Troponin T, High Sensitivity   Result Value Ref Range     Troponin T, High Sensitivity 30 (H) <=14 ng/L   Comprehensive metabolic panel   Result Value Ref Range    Sodium 135 135 - 145 mmol/L    Potassium 4.2 3.4 - 5.3 mmol/L    Carbon Dioxide (CO2) 24 22 - 29 mmol/L    Anion Gap 12 7 - 15 mmol/L    Urea Nitrogen 27.1 (H) 8.0 - 23.0 mg/dL    Creatinine 0.96 (H) 0.51 - 0.95 mg/dL    GFR Estimate 63 >60 mL/min/1.73m2    Calcium 10.4 (H) 8.8 - 10.2 mg/dL    Chloride 99 98 - 107 mmol/L    Glucose 195 (H) 70 - 99 mg/dL    Alkaline Phosphatase 43 40 - 150 U/L    AST 33 0 - 45 U/L    ALT 32 0 - 50 U/L    Protein Total 6.7 6.4 - 8.3 g/dL    Albumin 3.6 3.5 - 5.2 g/dL    Bilirubin Total 0.2 <=1.2 mg/dL   Lipase   Result Value Ref Range    Lipase 33 13 - 60 U/L   Nt probnp inpatient (BNP)   Result Value Ref Range    N terminal Pro BNP Inpatient 657 0 - 900 pg/mL   CBC with platelets and differential   Result Value Ref Range    WBC Count 7.4 4.0 - 11.0 10e3/uL    RBC Count 3.14 (L) 3.80 - 5.20 10e6/uL    Hemoglobin 9.1 (L) 11.7 - 15.7 g/dL    Hematocrit 28.7 (L) 35.0 - 47.0 %    MCV 91 78 - 100 fL    MCH 29.0 26.5 - 33.0 pg    MCHC 31.7 31.5 - 36.5 g/dL    RDW 14.2 10.0 - 15.0 %    Platelet Count 139 (L) 150 - 450 10e3/uL    % Neutrophils 60 %    % Lymphocytes 29 %    % Monocytes 7 %    % Eosinophils 3 %    % Basophils 0 %    % Immature Granulocytes 1 %    NRBCs per 100 WBC 0 <1 /100    Absolute Neutrophils 4.4 1.6 - 8.3 10e3/uL    Absolute Lymphocytes 2.1 0.8 - 5.3 10e3/uL    Absolute Monocytes 0.5 0.0 - 1.3 10e3/uL    Absolute Eosinophils 0.2 0.0 - 0.7 10e3/uL    Absolute Basophils 0.0 0.0 - 0.2 10e3/uL    Absolute Immature Granulocytes 0.1 <=0.4 10e3/uL    Absolute NRBCs 0.0 10e3/uL   XR Chest 2 Views    Narrative    EXAM: XR CHEST 2 VIEWS  LOCATION: [facility]  DATE: 3/24/2024    INDICATION: Chest pain with numbness to the left arm.  COMPARISON: Chest x-ray on 12/22/2023.      Impression    IMPRESSION: AP and lateral views of the chest were  obtained. Left chest wall AICD and leads are in stable position. Postsurgical changes of cardiac surgery with median sternotomy wires and surgical clips. Cardiomediastinal silhouette is within normal   limits. No suspicious focal pulmonary opacities. No significant pleural effusion or pneumothorax.   Troponin T, High Sensitivity   Result Value Ref Range    Troponin T, High Sensitivity 31 (H) <=14 ng/L       Medications   ondansetron (ZOFRAN) injection 4 mg (4 mg Intravenous $Given 3/24/24 5115)   morphine (PF) injection 4 mg (4 mg Intravenous $Given 3/24/24 8989)       Assessments & Plan (with Medical Decision Making)     I have reviewed the nursing notes.    I have reviewed the findings, diagnosis, plan and need for follow up with the patient.    Medical Decision Making  Letty Escobar is a 68 year old female with extensive medical history including chronic pain, COPD, hypertension, anemia, CHF, coronary disease with CABG x 5, atrial fibrillation with pacemaker who presents for evaluation of chest pain.  Borderline hypotensive on arrival, 95/58.  This is after 3 separate doses of nitroglycerin.  Vitals otherwise normal.  She appears quite comfortable on exam despite reports of unchanged chest pain.  Paced rhythm on the monitor.  Lungs are clear to auscultation.  Pulses are equal in all extremities and she has no lower extremity edema or calf tenderness.  Aspirin was given by EMS and she has not had significant improvement of pain despite 3 doses of nitroglycerin.  Initial EKG again showed a paced rhythm with wide QRS/left bundle branch block appearance.  No obvious ischemic changes present.  Labs showed chronic anemia at baseline.  No leukocytosis.  BUN to creatinine ratio was increased.  Electrolytes were otherwise unremarkable.  Troponin was modestly elevated in the low 30s, but this is consistent with chronic values.  Repeat was stable at 31.  Chest x-ray showed several chronic findings but no acute infiltrate,  volume overload, evidence of aortic pathology/mediastinal widening, or other abnormalities.  I suspect her discomfort is most likely musculoskeletal in nature.  Despite elevated troponin and extensive cardiac history, this appears to be her baseline and I have low suspicion for a new ischemic event.  Advised discharge home with Tylenol for discomfort, cardiology follow-up for reevaluation.  Patient agrees to return sooner for any new or acutely worsening symptoms.    Discharge Medication List as of 3/24/2024  5:52 AM          Final diagnoses:   Nonspecific chest pain   Elevated troponin   Dehydration       3/24/2024   [facility] EMERGENCY DEPT       Yrn Romero MD  03/24/24 [id number]

## 2024-03-24 NOTE — ED NOTES
Bed: ED12  Expected date: 3/24/24  Expected time: 2:52 AM  Means of arrival: Ambulance  Comments:  EMS: 69YO F

## 2024-03-24 NOTE — DISCHARGE INSTRUCTIONS
The cause of your chest pain is unclear.  You did have some abnormal labs today, but they are unchanged from what is normal for you.  You are also showing some signs of dehydration.  Use Tylenol for discomfort.  Drink plenty of fluids over the next several days.  Follow-up with your primary care doctor for recheck.  Return to the emergency department sooner for any new or acutely worsening symptoms.

## 2024-04-12 ENCOUNTER — APPOINTMENT (OUTPATIENT)
Dept: GENERAL RADIOLOGY | Facility: CLINIC | Age: 69
End: 2024-04-12
Attending: STUDENT IN AN ORGANIZED HEALTH CARE EDUCATION/TRAINING PROGRAM
Payer: COMMERCIAL

## 2024-04-12 ENCOUNTER — HOSPITAL ENCOUNTER (EMERGENCY)
Facility: CLINIC | Age: 69
Discharge: HOME | End: 2024-04-12
Attending: STUDENT IN AN ORGANIZED HEALTH CARE EDUCATION/TRAINING PROGRAM | Admitting: STUDENT IN AN ORGANIZED HEALTH CARE EDUCATION/TRAINING PROGRAM
Payer: COMMERCIAL

## 2024-04-12 VITALS
OXYGEN SATURATION: 98 % | HEART RATE: 60 BPM | RESPIRATION RATE: 10 BRPM | HEIGHT: 60 IN | BODY MASS INDEX: 39.27 KG/M2 | DIASTOLIC BLOOD PRESSURE: 60 MMHG | WEIGHT: 200 LBS | TEMPERATURE: 97.7 F | SYSTOLIC BLOOD PRESSURE: 120 MMHG

## 2024-04-12 DIAGNOSIS — R07.89 CHEST DISCOMFORT: ICD-10-CM

## 2024-04-12 LAB
ANION GAP SERPL CALCULATED.3IONS-SCNC: 12 MMOL/L (ref 7–15)
BASOPHILS # BLD AUTO: 0 10E3/UL (ref 0–0.2)
BASOPHILS NFR BLD AUTO: 1 %
BUN SERPL-MCNC: 28.1 MG/DL (ref 8–23)
CALCIUM SERPL-MCNC: 10.7 MG/DL (ref 8.8–10.2)
CHLORIDE SERPL-SCNC: 100 MMOL/L (ref 98–107)
CREAT SERPL-MCNC: 0.97 MG/DL (ref 0.51–0.95)
DEPRECATED HCO3 PLAS-SCNC: 24 MMOL/L (ref 22–29)
EGFRCR SERPLBLD CKD-EPI 2021: 63 ML/MIN/1.73M2
EOSINOPHIL # BLD AUTO: 0.2 10E3/UL (ref 0–0.7)
EOSINOPHIL NFR BLD AUTO: 3 %
ERYTHROCYTE [DISTWIDTH] IN BLOOD BY AUTOMATED COUNT: 14.2 % (ref 10–15)
GLUCOSE SERPL-MCNC: 243 MG/DL (ref 70–99)
HCT VFR BLD AUTO: 28 % (ref 35–47)
HGB BLD-MCNC: 9.1 G/DL (ref 11.7–15.7)
HOLD SPECIMEN: NORMAL
IMM GRANULOCYTES # BLD: 0.1 10E3/UL
IMM GRANULOCYTES NFR BLD: 1 %
LYMPHOCYTES # BLD AUTO: 2.1 10E3/UL (ref 0.8–5.3)
LYMPHOCYTES NFR BLD AUTO: 28 %
MCH RBC QN AUTO: 29.5 PG (ref 26.5–33)
MCHC RBC AUTO-ENTMCNC: 32.5 G/DL (ref 31.5–36.5)
MCV RBC AUTO: 91 FL (ref 78–100)
MONOCYTES # BLD AUTO: 0.4 10E3/UL (ref 0–1.3)
MONOCYTES NFR BLD AUTO: 6 %
NEUTROPHILS # BLD AUTO: 4.5 10E3/UL (ref 1.6–8.3)
NEUTROPHILS NFR BLD AUTO: 62 %
NRBC # BLD AUTO: 0 10E3/UL
NRBC BLD AUTO-RTO: 0 /100
PLATELET # BLD AUTO: 150 10E3/UL (ref 150–450)
POTASSIUM SERPL-SCNC: 4.1 MMOL/L (ref 3.4–5.3)
RBC # BLD AUTO: 3.08 10E6/UL (ref 3.8–5.2)
SODIUM SERPL-SCNC: 136 MMOL/L (ref 135–145)
TROPONIN T SERPL HS-MCNC: 31 NG/L
TROPONIN T SERPL HS-MCNC: 33 NG/L
WBC # BLD AUTO: 7.3 10E3/UL (ref 4–11)

## 2024-04-12 PROCEDURE — 93005 ELECTROCARDIOGRAM TRACING: CPT | Performed by: STUDENT IN AN ORGANIZED HEALTH CARE EDUCATION/TRAINING PROGRAM

## 2024-04-12 PROCEDURE — 84484 ASSAY OF TROPONIN QUANT: CPT | Performed by: STUDENT IN AN ORGANIZED HEALTH CARE EDUCATION/TRAINING PROGRAM

## 2024-04-12 PROCEDURE — 85025 COMPLETE CBC W/AUTO DIFF WBC: CPT | Performed by: STUDENT IN AN ORGANIZED HEALTH CARE EDUCATION/TRAINING PROGRAM

## 2024-04-12 PROCEDURE — 36415 COLL VENOUS BLD VENIPUNCTURE: CPT | Performed by: STUDENT IN AN ORGANIZED HEALTH CARE EDUCATION/TRAINING PROGRAM

## 2024-04-12 PROCEDURE — 250N000011 HC RX IP 250 OP 636: Performed by: STUDENT IN AN ORGANIZED HEALTH CARE EDUCATION/TRAINING PROGRAM

## 2024-04-12 PROCEDURE — 99285 EMERGENCY DEPT VISIT HI MDM: CPT | Mod: 25 | Performed by: STUDENT IN AN ORGANIZED HEALTH CARE EDUCATION/TRAINING PROGRAM

## 2024-04-12 PROCEDURE — 71045 X-RAY EXAM CHEST 1 VIEW: CPT

## 2024-04-12 PROCEDURE — 80048 BASIC METABOLIC PNL TOTAL CA: CPT | Performed by: STUDENT IN AN ORGANIZED HEALTH CARE EDUCATION/TRAINING PROGRAM

## 2024-04-12 PROCEDURE — 96374 THER/PROPH/DIAG INJ IV PUSH: CPT | Performed by: STUDENT IN AN ORGANIZED HEALTH CARE EDUCATION/TRAINING PROGRAM

## 2024-04-12 PROCEDURE — 93010 ELECTROCARDIOGRAM REPORT: CPT | Performed by: STUDENT IN AN ORGANIZED HEALTH CARE EDUCATION/TRAINING PROGRAM

## 2024-04-12 PROCEDURE — 96375 TX/PRO/DX INJ NEW DRUG ADDON: CPT | Performed by: STUDENT IN AN ORGANIZED HEALTH CARE EDUCATION/TRAINING PROGRAM

## 2024-04-12 RX ORDER — MORPHINE SULFATE 4 MG/ML
4 INJECTION, SOLUTION INTRAMUSCULAR; INTRAVENOUS ONCE
Status: COMPLETED | OUTPATIENT
Start: 2024-04-12 | End: 2024-04-12

## 2024-04-12 RX ORDER — ONDANSETRON 2 MG/ML
4 INJECTION INTRAMUSCULAR; INTRAVENOUS ONCE
Status: COMPLETED | OUTPATIENT
Start: 2024-04-12 | End: 2024-04-12

## 2024-04-12 RX ADMIN — MORPHINE SULFATE 4 MG: 4 INJECTION, SOLUTION INTRAMUSCULAR; INTRAVENOUS at 01:53

## 2024-04-12 RX ADMIN — ONDANSETRON 4 MG: 2 INJECTION INTRAMUSCULAR; INTRAVENOUS at 01:49

## 2024-04-12 ASSESSMENT — ENCOUNTER SYMPTOMS
DIZZINESS: 0
SHORTNESS OF BREATH: 0
COUGH: 1
HEADACHES: 0
DYSURIA: 0
DIARRHEA: 0
CHILLS: 0
NAUSEA: 0
FEVER: 0
HEMATURIA: 0
ARTHRALGIAS: 0
APPETITE CHANGE: 0
MYALGIAS: 0
ACTIVITY CHANGE: 0
NECK STIFFNESS: 0
RHINORRHEA: 0
EYE REDNESS: 0
FATIGUE: 0
SORE THROAT: 0
VOMITING: 0
ABDOMINAL PAIN: 0

## 2024-04-12 ASSESSMENT — ACTIVITIES OF DAILY LIVING (ADL)
ADLS_ACUITY_SCORE: 39

## 2024-04-12 ASSESSMENT — COLUMBIA-SUICIDE SEVERITY RATING SCALE - C-SSRS
1. IN THE PAST MONTH, HAVE YOU WISHED YOU WERE DEAD OR WISHED YOU COULD GO TO SLEEP AND NOT WAKE UP?: NO
2. HAVE YOU ACTUALLY HAD ANY THOUGHTS OF KILLING YOURSELF IN THE PAST MONTH?: NO
6. HAVE YOU EVER DONE ANYTHING, STARTED TO DO ANYTHING, OR PREPARED TO DO ANYTHING TO END YOUR LIFE?: NO

## 2024-04-12 NOTE — ED PROVIDER NOTES
History     Chief Complaint   Patient presents with    Chest Pain     HPI  Letty Escobar is a 68 year old female who presents with extensive medical history including chronic pain, COPD, hypertension, anemia, CHF, coronary disease with CABG x 5, atrial fibrillation with ventricular paced.  Patient was seen on the 24th with similar symptoms.  Again similar episode tonight 7-8 o'clock she started having left-sided chest pain rating to her left arm with numbness or tingling.  They provided with morphine and nitro with no improvement.  On arrival she is provide with aspirin and nitro pain is unchanged.  She denies fevers chills headaches lightheadedness shortness of breath abdominal pain vomiting however has had intermittent coughing for the past month.  She has no new leg swelling or changes in her bowel or urinary habits.    Allergies:  Allergies   Allergen Reactions    Bee Pollen Anaphylaxis    Diphenhydramine Palpitations     Tolerated IV Benadryl when not pushed too fast    Isosorbide Nitrate Other (See Comments) and Dizziness     Also causes syncope (has fallen before) and brain fog/mental disturbances - please do not prescribe    Nitroglycerin Dizziness, Fatigue and Other (See Comments)     Specifically the patch - please do not prescribe  Specifically the patch - please do not prescribe      Contrast Dye Hives and Itching    Penicillin G     Adhesive Tape Rash    Liquid Adhesive Itching    Nystatin Dermatitis     Also blisters    Penicillins Swelling and Rash     Occurred as a child - not 100% sure on specific reactions    Sulfa Antibiotics Other (See Comments)     Occurred as a child / patient does not remember specific reaction       Problem List:    Patient Active Problem List    Diagnosis Date Noted    Lethargy 11/10/2023     Priority: Medium    Urinary tract infection without hematuria, site unspecified 11/10/2023     Priority: Medium    Fever 11/10/2023     Priority: Medium    Hypoxia 11/10/2023      Priority: Medium    Pyuria 11/10/2023     Priority: Medium    PAD (peripheral artery disease) (H24) 11/10/2023     Priority: Medium    Left foot pain 11/10/2023     Priority: Medium    History of stroke 11/10/2023     Priority: Medium    Lives in long-term care facility 11/10/2023     Priority: Medium    Obesity hypoventilation syndrome (H) 11/10/2023     Priority: Medium    Benzodiazepine dependence (H) 11/10/2023     Priority: Medium    Thrombocytopenia (H24) 11/10/2023     Priority: Medium    ERIC (acute kidney injury) (H24) 11/10/2023     Priority: Medium    Chronic kidney disease, stage 3a (H) 10/16/2022     Priority: Medium    SOB (shortness of breath) 04/07/2022     Priority: Medium    Type 2 diabetes mellitus (H) 02/13/2022     Priority: Medium    Morbid obesity (H) 12/10/2021     Priority: Medium    ICD (implantable cardioverter-defibrillator) in place 11/17/2021     Priority: Medium     Formatting of this note is different from the original.  Date of last device in office evaluation: 5/17/2022    ?  and model: [employer] Cobalt CRT-D.   Date of implant: 5/7/2021  Tachy therapies remain OFF: S/p downgrade from ICD to pacemaker, but her implanted system includes a DF-4 lead, so an ICD generator was placed with the tachycardia therapies disabled.     ? Indication for device: Ischemic cardiomyopathy   ? Cardiac resynchronization therapy:   no     MRI Conditional:  No  o If No:  Reason why:  Abandoned LV lead    ? Battery longevity documented as less than 3  Months: No  ? Are any of the leads less than 3 months old:  No    ? Programming              ? Pacing mode and programmed lower rate: DDDR 60 - 130 bpm              ? Rate-responsive sensor type, if programmed on: Accelerometer        Underlying rhythm and heart rate:  SR @ 60 bpm    ? What is the response of this device to magnet placement:  None - tachy therapies off  ? PM magnet pacing rate: N/A   ? Any alert status on CIED generator or lead:  No    ? Last pacing threshold    Atrial   1.0 V @ 0.4 ms   Ventricular RV: 0.75 V @ 0.4 ms  LV:  2.75 V @ 1.0 ms    Formatting of this note is different from the original.  Date of last device in office evaluation: 11/17/2022     ?  and model: Med[employer] Cobalt CRT-D.   Date of implant: 5/7/2021  Tachy therapies remain OFF: S/p downgrade from ICD to pacemaker, but her implanted system includes a DF-4 lead, so an ICD generator was placed with the tachycardia therapies disabled.     ? Indication for device: Ischemic cardiomyopathy   ? Cardiac resynchronization therapy:   no     MRI Conditional:  No  o If No:  Reason why:  Abandoned LV lead    ? Battery longevity documented as less than 3  Months: No  ? Are any of the leads less than 3 months old:  No    ? Programming              ? Pacing mode and programmed lower rate: DDDR 60 - 130 bpm              ? Rate-responsive sensor type, if programmed on: Accelerometer        Underlying rhythm and heart rate:  Sinus rhythm 62 bpm, w/BBB    ? What is the response of this device to magnet placement:  None - tachy therapies off  ? PM magnet pacing rate: N/A   ? Any alert status on CIED generator or lead: No    ? Last pacing threshold    Atrial   1.0 V @ 0.4 ms   Ventricular RV: 0.75 V @ 0.4 ms  LV:  2.75 V @ 1.0 ms      Atrial fibrillation (H) 04/06/2021     Priority: Medium    Pacemaker 04/06/2021     Priority: Medium    Major depressive disorder, recurrent severe without psychotic features (H) 01/26/2021     Priority: Medium    Panic disorder with agoraphobia 04/14/2020     Priority: Medium    Constipation 03/20/2020     Priority: Medium    Personality disorder (H) 03/05/2020     Priority: Medium     Rule out dependent personality    Formatting of this note might be different from the original.  Rule out dependent personality  Formatting of this note might be different from the original.  Rule out dependent personality      Candidiasis 03/01/2020     Priority:  Medium    Posttraumatic stress disorder 03/01/2020     Priority: Medium    COPD without exacerbation (H) 01/29/2020     Priority: Medium    Long term (current) use of insulin (H) 01/29/2020     Priority: Medium    Chronic systolic heart failure (H) 01/28/2020     Priority: Medium    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, left leg (H) 01/15/2020     Priority: Medium    Chest pain 11/11/2019     Priority: Medium    Polymyalgia rheumatica (H24) 11/28/2018     Priority: Medium    Unstable angina (H) 05/02/2018     Priority: Medium     Coronary angiogram 05/02/2018 demonstrated 30% stenosis in the LMCA, 50% stenosis in the Proximal LAD, 50% stenosis in the Mid LAD, 95% stenosis in the Proximal Circumflex (Restenosis - Balloon Angioplasty), 100% stenosis in the 1st Marginal, 50% stenosis in the Proximal RCA, 50% stenosis in the Distal RCA, the LIMA graft from the LIMA to the Distal LAD is free of significant disease; the SVG graft from the Aorta to the 1st Marginal is free of significant disease; the SVG graft from the Aorta to the Distal RCA is free of significant disease. Intervention included a successful  2.5mm x 12mm Balloon,  2.5mm x 10mm Cutting Balloon,  3mm x 12mm Drug Eluting Stent,  3mm x 12mm Balloon, and  3mm x 8mm Balloon to Proximal Circumflex, post stenosis 0%.    Formatting of this note is different from the original.  Coronary angiogram 05/02/2018 demonstrated 30% stenosis in the LMCA, 50% stenosis in the Proximal LAD, 50% stenosis in the Mid LAD, 95% stenosis in the Proximal Circumflex (Restenosis - Balloon Angioplasty), 100% stenosis in the 1st Marginal, 50% stenosis in the Proximal RCA, 50% stenosis in the Distal RCA, the LIMA graft from the LIMA to the Distal LAD is free of significant disease; the SVG graft from the Aorta to the 1st Marginal is free of significant disease; the SVG graft from the Aorta to the Distal RCA is free of significant disease.  Intervention included a successful  2.5mm x 12mm Balloon,  2.5mm x 10mm Cutting Balloon,  3mm x 12mm Drug Eluting Stent,  3mm x 12mm Balloon, and  3mm x 8mm Balloon to Proximal Circumflex, post stenosis 0%.  Formatting of this note is different from the original.  Coronary angiogram 05/02/2018 demonstrated 30% stenosis in the LMCA, 50% stenosis in the Proximal LAD, 50% stenosis in the Mid LAD, 95% stenosis in the Proximal Circumflex (Restenosis - Balloon Angioplasty), 100% stenosis in the 1st Marginal, 50% stenosis in the Proximal RCA, 50% stenosis in the Distal RCA, the LIMA graft from the LIMA to the Distal LAD is free of significant disease; the SVG graft from the Aorta to the 1st Marginal is free of significant disease; the SVG graft from the Aorta to the Distal RCA is free of significant disease. Intervention included a successful  2.5mm x 12mm Balloon,  2.5mm x 10mm Cutting Balloon,  3mm x 12mm Drug Eluting Stent,  3mm x 12mm Balloon, and  3mm x 8mm Balloon to Proximal Circumflex, post stenosis 0%.      Vitamin B12 deficiency 02/14/2018     Priority: Medium    Chronic bilateral low back pain without sciatica 01/17/2018     Priority: Medium    Chronic pain disorder 10/01/2017     Priority: Medium    Urinary incontinence, mixed 09/24/2017     Priority: Medium    Internal carotid artery stenosis, bilateral 07/13/2016     Priority: Medium     Carotid US 05/04/2018 showed moderate plaque formation, consistent with 50 to 69% stenosis in the right internal carotid artery, not significantly changed from 8/5/2015.  Moderate plaque formation, consistent with 50 to 69% stenosis in the left internal carotid artery; there has been mild progression of the left ICA stenosis since 8/5/2015.    Formatting of this note might be different from the original.  Carotid US 05/04/2018 showed moderate plaque formation, consistent with 50 to 69% stenosis in the right internal carotid artery, not significantly changed from 8/5/2015.   Moderate plaque formation, consistent with 50 to 69% stenosis in the left internal carotid artery; there has been mild progression of the left ICA stenosis since 8/5/2015.    Formatting of this note might be different from the original.  Carotid US 05/04/2018 showed moderate plaque formation, consistent with 50 to 69% stenosis in the right internal carotid artery, not significantly changed from 8/5/2015.  Moderate plaque formation, consistent with 50 to 69% stenosis in the left internal carotid artery; there has been mild progression of the left ICA stenosis since 8/5/2015.      Essential (primary) hypertension 10/14/2015     Priority: Medium    Ischemic cardiomyopathy 09/20/2015     Priority: Medium     EF of 40-45%, status post RV lead revision and LV epicardial lead placement via mini-thoracotomy in August 2016.    Formatting of this note might be different from the original.  EF of 40-45%, status post RV lead revision and LV epicardial lead placement via mini-thoracotomy in August 2016.  Formatting of this note might be different from the original.  EF of 40-45%, status post RV lead revision and LV epicardial lead placement via mini-thoracotomy in August 2016.      S/P CABG x 5 08/21/2015     Priority: Medium    Pain medication agreement 04/20/2013     Priority: Medium     Controlled substance agreement for percocet #30/month on file and signed 4/17/13.  Designated pharmacy: [facility] Prescribing physician: Andrea Diagnosis: Ulnar neuropathy    Formatting of this note might be different from the original.  Controlled substance agreement for percocet #30/month on file and signed 4/17/13.  Designated pharmacy: [facility] Prescribing physician: Andrea Diagnosis: Ulnar neuropathy      Anemia in other chronic diseases classified elsewhere 01/05/2013     Priority: Medium    Hypothyroidism, unspecified 12/10/2010     Priority: Medium    ACP (advance care planning) 11/03/2010     Priority: Medium     Formatting of this  "note might be different from the original.  Patient has identified Health Care Agent(s): Yes  Add Health Care Agents: Yes    Health Care Agent(s):    Primary Health Care Agent:     Edwar Escobar Relationship:    Spouse Phone:     [phone]    Secondary Health Care Agent:     Chiki Oro Relationship:     Son Phone:     [phone]      Patient has Advance Care Plan Documents (Health Care Directive, POLST): Yes    Advance Care Plan Documents:  Health Care Directive--03/28/11  Resuscitation Guidelines--    Patient has identified Specific Treatment Preferences: Yes   Specific Treatment Preferences:   a.) Code Status:  DNR/ Do Not Attempt Resuscitation - Allow a Natural Death    b.) Goals of Treatment:     ii. Limited Interventions and treat reversible conditions.  Provide interventions aimed at treatment of new or reversible illness/injury or non-life threatening chronic conditions. Duration of invasive or uncomfortable interventions should generally be limited.- Trial of intubation 5 days or other instructions \"If no improvement, stop.\"  c.) Interventions and Treatments:   i.   Antibiotics:          - Aggressive antibiotics  ii.  Nutrition/Hydration:         - Offer food and liquids by mouth         - IV fluid administration         - No feeding tube under any circumstance.  iii. Transfusion:          - Blood products for comfort/relief of symptoms only  iv. Dialysis:           - Dialysis for short term \"for recovery, but not long term.\"        Last Assessment & Plan:   Advance Care Planning: Disease-specific Session    Letty Escobar is an [facility] patient of Dr. Renea Mendez of the .    Advance care planning discussions were completed with Letty and her healthcare agent, spouse Edwar Escobar on Monday, March 28, 2011 at the [facility].    Understanding of Illness and Disease [place]:   Letty identifies her medical condition as diabetes with peripheral " "neuropathy, heart problems with a heart attack 2009 plus 4 stent placements; sleep apnea; depression; hypothyroid; high cholesterol; tobacco use; and describes it as needing further assistance with thyroid and diabetes management.  She identifies the following symptoms of her medical condition as being the most bothersome: some memory loss, balance problems, light headedness and dizziness, puffy eyes and lower extremity edema from time to time.    [place] is retired [place] .  She is independent with all cares and lives an active life style although, \"I'm not as active as I used to be.\"    Goals of Care:   Letty currently hopes to maintain independence, control pain and symptoms and delay progression of, but not cure, the illness.  \"I want to get the diabetes and thyroid under better control.\"  Letty has an appointment the first part of April for follow up.    Quality of Life:    Letty identifies quality of life as family involvement twice weekly, Voodoo activities, newly assigned  , playing cards, the computer,    Letty christiano with serious challenges in her life through her ganesh in God, prayers and support of her Voodoo family, spouse and son.    Letty identifies the following fears and worries about her medical care:  \"I just want the diabetes straightened out.\"    Treatment and Care Preferences:   Past experiences in dealing with family and/or friends that have  or been seriously ill include her brother who took his own life.  \"He was 53 years old and living with us.  I found him.\"   As a result of these experiences, Letty expresses these health care preferences:      Summary Letty's Treatment Preferences:  LOW SURVIVAL; HIGH TREATMENT BURDEN:  If Letty suffered a serious complication, such that she was facing a prolonged hospital stay, required ongoing medical interventions, and the chance of living through the " "complication was low (for example, only 5 out of 100 would live), Letty would choose:  to focus treatment on comfort and quality of life (\"Quality of life is more important than length of life to Letty.\")  COMMENT:  \"If I can't live a normal life, I wouldn't want to live.\"    HIGH SURVIVAL; LOW FUNCTIONAL STATUS:  If Letty had a serious complication and had a good chance of living through the complication but it was expected that she would never be able to walk or talk again and would require 24 hour nursing care, she would choose:  to focus treatment on comfort and quality of life (\"Quality of life is more important than length of life to Letty.\")  COMMENT:  \"I'd accept rehabilitation.\"    HIGH SURVIVAL; LOW COGNITIVE STATUS:  If Letty had a serious complication and had a good chance of living through the complication but it was expected that she would never know who she was or who she was with and would require 24 hour nursing care, she would choose:  to focus treatment on comfort and quality of life (\"Quality of life is more important than length of life to Letty.\")    CARDIO-PULMONARY RESUSCITATION (CPR):  The facts, risks and benefits of CPR were discussed with Letty.  If she had a sudden event that caused her heart and breathing to stop, she:  WOULD NOT want CPR attempted and instead would prefer that a natural death occur.    MECHANICAL VENTILATION: If Letty had an episode where she was unable to breathe on her own, she would choose the following:  attempt to use any appropriate non-invasive method to assist breathing, and use mechanical ventilation.  COMMENT:  \"If reversible ventilator is acceptable for 5 days.  If no improvement, stop.\"     Letty has chosen her healthcare agent to:  strictly follow her wishes.    Follow Up Plan:   Letty was encouraged to continue advance care planning discussions with her health care agents and primary care provider.   Letty and her health care agent declined handouts. "     Documents addressed during this advance care planning session:  1.  Health Care Agents identified.          .  Primary health care agent is spouse, Edwar Escobar;          .  Secondary health care agent is son, Jermaine Oro.  2.  Health Care Directive completed and scanned into medical record.  3.  Statement of Treatment Preferences for advanced illness completed and scanned into the medical record.  4.  Resuscitation Guidelines completed for DNR.  Document sent to Dr. Renea Mendez for signature and returned to the home. Letty, please place on the refrigerator.    Recommendations/Plan:   Letty and her health care agent to review Advance Care Plan.     Letty would benefit from:   Care Navigation/Care Navigation Help Desk--explained services for pending future needs.  No identified needs today.  Care Navigation brochure was given to Letty and her healthcare agent.    Advance Care Planning recommendations and Letty's concerns and questions were cc ed to her primary provider.     Thank you, Letty for the opportunity of assisting with Advance Care Planning. It was a seeing you again and meeting Edwar.  Please contact me if I can be of further assistance.    Interviewer: Pat Martin RN    Advance Care Planning Facilitator  [phone]   3/28/2011      ELI (obstructive sleep apnea) 01/31/2010     Priority: Medium     PSG on April/2008 that showed moderate Obstructive Sleep Apnea with an AHI of 23.5 . Limb movements persisted at 29 movements/hour at optimal pressures, despite carbidopa use.    Formatting of this note might be different from the original.  PSG on April/2008 that showed moderate Obstructive Sleep Apnea with an AHI of 23.5 . Limb movements persisted at 29 movements/hour at optimal pressures, despite carbidopa use.  Formatting of this note might be different from the original.  PSG on April/2008 that showed moderate Obstructive Sleep Apnea with an AHI of 23.5 . Limb movements persisted at 29  movements/hour at optimal pressures, despite carbidopa use.      Coronary atherosclerosis 02/06/2009     Priority: Medium     Formatting of this note might be different from the original.  2/5/2009 - MI - Proximal RCA 99%, mild-mod disease elsewhere.  EF 60%.  PCI:  MYRON to pRCA.  2/12/2009 - admit CP - Widely patent RCA stent. Moderate diffuse CAD. Severe stenosis in trivial PDA branch. LVEF 45%.  1/25/2010 - admit CP - PTCA and stent of diagonal  9/8/2010 - admit CP - LAD patent stent. Moderate diffuse CAD. Medical management recommended.   4/25/2012 - NSTEMI - Acute total occlusion of the ostial Circumflex. Acute total occlusion of the ostial ramus. Acute total occlusion of the distal 1st Obtuse Marginal. Angioplasty of ostial circumflex and ostial ramus. There was distal embolization to the OM1 vessel. This vessel was small and not amendable to intervention. Indefinite: plavix and asa 81.  8/10/2015 - CABx5 - 1. LIMA to distal LAD, reverse SVG to OM1 and OM2, reverse SVG to diagonal, reverse SVG to PDA.   2. Mitral valve repair with a [employer]tronics 3-D full ring, 28 mm.   3. Tricuspid repair with a Medtronic 28 mm partial ring  1/12/2016 - TTE - EF 40-45%  Formatting of this note might be different from the original.  2/5/2009 - MI - Proximal RCA 99%, mild-mod disease elsewhere.  EF 60%.  PCI:  MYRON to pRCA.  2/12/2009 - admit CP - Widely patent RCA stent. Moderate diffuse CAD. Severe stenosis in trivial PDA branch. LVEF 45%.  1/25/2010 - admit CP - PTCA and stent of diagonal  9/8/2010 - admit CP - LAD patent stent. Moderate diffuse CAD. Medical management recommended.   4/25/2012 - NSTEMI - Acute total occlusion of the ostial Circumflex. Acute total occlusion of the ostial ramus. Acute total occlusion of the distal 1st Obtuse Marginal. Angioplasty of ostial circumflex and ostial ramus. There was distal embolization to the OM1 vessel. This vessel was small and not amendable to intervention. Indefinite: plavix and asa  81.  8/10/2015 - CABx5 - 1. LIMA to distal LAD, reverse SVG to OM1 and OM2, reverse SVG to diagonal, reverse SVG to PDA.   2. Mitral valve repair with a [employer]tron[employer] 3-D full ring, 28 mm.   3. Tricuspid repair with a Medtronic 28 mm partial ring  1/12/2016 - TTE - EF 40-45%      Restless legs syndrome 08/29/2007     Priority: Medium    Hyperlipidemia, unspecified 05/30/2007     Priority: Medium        Past Medical History:    Past Medical History:   Diagnosis Date    ACP (advance care planning) 11/3/2010    Acute coronary syndrome (H) 11/22/2019    Acute exacerbation of CHF (congestive heart failure) (H) 11/11/2019    Acute on chronic systolic (congestive) heart failure (H) 1/28/2020    Anemia of chronic disease 1/5/2013    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, left leg (H) 1/15/2020    BPPV (benign paroxysmal positional vertigo) 5/31/2018    Candidiasis 3/1/2020    Carotid stenosis, right 7/13/2016    Chest pain 11/11/2019    Chronic bilateral low back pain without sciatica 1/17/2018    Chronic pain disorder 10/1/2017    Constipation 3/20/2020    COPD (chronic obstructive pulmonary disease) (H) 6/24/2020    Coronary atherosclerosis 2/6/2009    Cubital tunnel syndrome on left 11/13/2012    Depressive disorder     Diabetes mellitus (H)     Diabetes mellitus type 2 in obese (H) 7/13/2006    Diabetes mellitus type 2 in obese (H) 7/13/2006    Drug-seeking behavior 4/4/2020    Failure to thrive in adult 9/3/2020    Hereditary and idiopathic peripheral neuropathy 4/24/2007    Hyperlipidemia with target low density lipoprotein (LDL) cholesterol less than 70 mg/dL 5/30/2007    Hypertension 10/14/2015    Hypothyroidism 12/10/2010    Irritable bowel syndrome 9/7/2015    Ischemic cardiomyopathy 9/20/2015    Long-term use of high-risk medication 4/14/2020    Moniliasis, 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    Myocardial infarction (H)      Neuromuscular disorder (H)     Other specified postprocedural states 10/8/2015    Pain medication agreement 4/20/2013    Panic disorder with agoraphobia 4/14/2020    Paroxysmal atrial fibrillation (H) 10/2/2015    Personality disorder (H) 3/5/2020    Polymyalgia rheumatica (H24) 11/28/2018    Posttraumatic stress disorder 3/1/2020    Restless legs syndrome (RLS) 8/29/2007    Restless legs syndrome (RLS) 8/29/2007    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    Serum calcium elevated 3/1/2020    Somatic dysfunction of sacral region 1/17/2018    Subacromial bursitis of left shoulder joint 8/6/2018    Suicidal ideation 4/1/2020    Thyroid disease     TIA (transient ischemic attack) 5/4/2018    TIA (transient ischemic attack) 5/4/2018    Tobacco abuse 2/17/2017    Tobacco abuse 2/17/2017    Urinary incontinence, mixed 9/24/2017    UTI (urinary tract infection) 4/17/2020    Vitamin B12 deficiency 2/14/2018    Weakness 12/17/2019       Past Surgical History:    Past Surgical History:   Procedure Laterality Date    CARDIAC SURGERY      stents x 11    CARDIAC SURGERY      CHOLECYSTECTOMY      CHOLECYSTECTOMY      GENITOURINARY SURGERY      Tubal ligation    OTHER SURGICAL HISTORY      Genitourinary surgery    RELEASE CARPAL TUNNEL      RELEASE CARPAL TUNNEL         Family History:    No family history on file.    Social History:  Marital Status:   [2]  Social History     Tobacco Use    Smoking status: Never    Smokeless tobacco: Never   Vaping Use    Vaping status: Never Used   Substance Use Topics    Alcohol use: Not Currently    Drug use: Never        Medications:    acetaminophen (TYLENOL) 325 MG tablet  acetaminophen (TYLENOL) 500 MG tablet  amLODIPine (NORVASC) 5 MG tablet  aspirin 81 MG EC tablet  bisacodyl (DULCOLAX) 10 MG suppository  cyanocobalamin (CYANOCOBALAMIN) 1000 MCG/ML injection  divalproex sodium delayed-release (DEPAKOTE) 250 MG DR tablet  DULoxetine HCl 40 MG CPEP  famotidine (PEPCID) 20 MG  tablet  fluticasone-vilanterol (BREO ELLIPTA) 200-25 MCG/INH inhaler  furosemide (LASIX) 20 MG tablet  gabapentin (NEURONTIN) 400 MG capsule  hydrochlorothiazide (HYDRODIURIL) 25 MG tablet  levothyroxine (SYNTHROID/LEVOTHROID) 137 MCG tablet  lidocaine (LMX4) 4 % external cream  LORazepam (ATIVAN) 0.5 MG tablet  Melatonin 10 MG TABS tablet  metoprolol succinate ER (TOPROL-XL) 25 MG 24 hr tablet  mirtazapine (REMERON) 30 MG tablet  nitroGLYcerin (NITROSTAT) 0.4 MG sublingual tablet  ondansetron (ZOFRAN ODT) 4 MG ODT tab  polyethylene glycol (MIRALAX) 17 GM/Dose powder  potassium chloride ER (MICRO-K) 10 MEQ CR capsule  QUEtiapine (SEROQUEL) 100 MG tablet  rosuvastatin (CRESTOR) 10 MG tablet  sennosides (SENOKOT) 8.6 MG tablet  traZODone (DESYREL) 50 MG tablet          Review of Systems   Constitutional:  Negative for activity change, appetite change, chills, fatigue and fever.   HENT:  Negative for congestion, rhinorrhea and sore throat.    Eyes:  Negative for redness.   Respiratory:  Positive for cough. Negative for shortness of breath.    Cardiovascular:  Positive for chest pain.   Gastrointestinal:  Negative for abdominal pain, diarrhea, nausea and vomiting.   Genitourinary:  Negative for dysuria and hematuria.   Musculoskeletal:  Negative for arthralgias, myalgias and neck stiffness.   Skin:  Negative for rash.   Neurological:  Negative for dizziness and headaches.   All other systems reviewed and are negative.      Physical Exam   BP: 111/62  Pulse: 64  Temp: 97.7  F (36.5  C)  Resp: 16  Weight: 90.7 kg (200 lb)  SpO2: 94 %      Physical Exam  Vitals and nursing note reviewed.   Constitutional:       General: She is not in acute distress.     Appearance: Normal appearance. She is well-developed.   HENT:      Head: Normocephalic and atraumatic.   Eyes:      General: No scleral icterus.     Conjunctiva/sclera: Conjunctivae normal.   Cardiovascular:      Rate and Rhythm: Normal rate and regular rhythm.      Heart  sounds: Normal heart sounds.   Pulmonary:      Effort: Pulmonary effort is normal. No tachypnea.      Breath sounds: Normal breath sounds. No decreased breath sounds, wheezing or rales.   Chest:      Chest wall: Tenderness present.      Comments: Palpation patient has tenderness to the costochondral joints bilaterally.  She notes this is similar to her pain that she is feeling.  Abdominal:      General: Abdomen is flat.      Palpations: Abdomen is soft.   Musculoskeletal:      Cervical back: Normal range of motion and neck supple.   Skin:     General: Skin is warm and dry.      Findings: No rash.   Neurological:      Mental Status: She is alert and oriented to person, place, and time.         ED Course        Procedures         EKG self interpreted at 12:43 AM.  Ventricular paced rhythm.  No acute signs of ischemia.  No arrhythmias.  Rate of 69 bpm QTc at 481 and QRS at 210.  Compared to EKG on 3/20/2024 no acute changes.        Results for orders placed or performed during the hospital encounter of 04/12/24 (from the past 24 hour(s))   [place] Draw    Narrative    The following orders were created for panel order [place] Draw.  Procedure                               Abnormality         Status                     ---------                               -----------         ------                     Extra Blue Top Tube[[id number]]                              Final result                 Please view results for these tests on the individual orders.   Troponin T, High Sensitivity   Result Value Ref Range    Troponin T, High Sensitivity 31 (H) <=14 ng/L   CBC with platelets differential    Narrative    The following orders were created for panel order CBC with platelets differential.  Procedure                               Abnormality         Status                     ---------                               -----------         ------                     CBC with platelets and d...[[id number]]  Abnormal             Final result                 Please view results for these tests on the individual orders.   Basic metabolic panel   Result Value Ref Range    Sodium 136 135 - 145 mmol/L    Potassium 4.1 3.4 - 5.3 mmol/L    Chloride 100 98 - 107 mmol/L    Carbon Dioxide (CO2) 24 22 - 29 mmol/L    Anion Gap 12 7 - 15 mmol/L    Urea Nitrogen 28.1 (H) 8.0 - 23.0 mg/dL    Creatinine 0.97 (H) 0.51 - 0.95 mg/dL    GFR Estimate 63 >60 mL/min/1.73m2    Calcium 10.7 (H) 8.8 - 10.2 mg/dL    Glucose 243 (H) 70 - 99 mg/dL   Extra Blue Top Tube   Result Value Ref Range    Hold Specimen     CBC with platelets and differential   Result Value Ref Range    WBC Count 7.3 4.0 - 11.0 10e3/uL    RBC Count 3.08 (L) 3.80 - 5.20 10e6/uL    Hemoglobin 9.1 (L) 11.7 - 15.7 g/dL    Hematocrit 28.0 (L) 35.0 - 47.0 %    MCV 91 78 - 100 fL    MCH 29.5 26.5 - 33.0 pg    MCHC 32.5 31.5 - 36.5 g/dL    RDW 14.2 10.0 - 15.0 %    Platelet Count 150 150 - 450 10e3/uL    % Neutrophils 62 %    % Lymphocytes 28 %    % Monocytes 6 %    % Eosinophils 3 %    % Basophils 1 %    % Immature Granulocytes 1 %    NRBCs per 100 WBC 0 <1 /100    Absolute Neutrophils 4.5 1.6 - 8.3 10e3/uL    Absolute Lymphocytes 2.1 0.8 - 5.3 10e3/uL    Absolute Monocytes 0.4 0.0 - 1.3 10e3/uL    Absolute Eosinophils 0.2 0.0 - 0.7 10e3/uL    Absolute Basophils 0.0 0.0 - 0.2 10e3/uL    Absolute Immature Granulocytes 0.1 <=0.4 10e3/uL    Absolute NRBCs 0.0 10e3/uL   XR Chest Port 1 View    Narrative    EXAM: XR CHEST PORT 1 VIEW  LOCATION: [facility]  DATE: 4/12/2024    INDICATION: Cough and chest pain.  COMPARISON: 03/24/2024.      Impression    IMPRESSION: No focal airspace consolidation. No pleural effusion or pneumothorax.    Unchanged cardiomegaly. Median sternotomy and cardiac postsurgical change. Left chest wall cardiac implantable electronic device, with notable epicardial leads.   Troponin T, High Sensitivity   Result Value Ref Range    Troponin T, High  Sensitivity 33 (H) <=14 ng/L       Medications   morphine (PF) injection 4 mg (4 mg Intravenous $Given 4/12/24 015)   ondansetron (ZOFRAN) injection 4 mg (4 mg Intravenous $Given 4/12/24 9190)       Assessments & Plan (with Medical Decision Making)     I have reviewed the nursing notes.    I have reviewed the findings, diagnosis, plan and need for follow up with the patient.        Medical Decision Making    68-year-old female with extensive medical history presenting with chest pain.  She has had various visits for the same complaints.  She has reproducible pain on palpation of the left anterior aspect of her chest.  Her vitals are stable on arrival.  At this time will workup for ACS versus pneumonia versus pneumothorax and overlying skin changes which is not present on evaluation.  Imaging negative for acute signs of pneumonia pneumothorax or pleural effusions.  Her vitals have remained stable.  Her EKG was unchanged from previous.  Her BMP and CBC is unremarkable for any changes.  Her troponins are stable at 31-33 similar to previous.  At this time no acute signs of ACS or other underlying cardiopulmonary pathology and likely MSK in nature.  Discussed outpatient follow-up measures and return precautions patient discharged home    New Prescriptions    No medications on file       Final diagnoses:   Chest discomfort       4/12/2024   [facility] EMERGENCY DEPT       Whitney Barger MD  04/12/24 [id number]

## 2024-04-12 NOTE — ED TRIAGE NOTES
Pt arrives via EMS from [facility] with c/o CP that started at 8:30 pm. Pt received 1 NTG SL at 9 pm and morphine at 10pm with no relief from pain    Left side CP that goes down left arm. Pt c/o numbness in the LUE now    324 ASA and 1 spray of NTG by EMS.

## 2024-04-12 NOTE — ED NOTES
Call placed to [facility], spoke with nursing staff and reviewed discharge instructions.   Arranging transport now with STANLEY DEVI.

## 2024-04-12 NOTE — DISCHARGE INSTRUCTIONS
No acute signs of cardiac pathology and patient symptoms resolved with patient sleeping comfortably in bed.  Vitals are stable.  This is likely muscle skeletal in nature as it was reproducible.  Please continue to do supportive care measures at the facility.  Follow-up with your primary care doctor as needed.

## 2024-04-26 ENCOUNTER — MEDICAL CORRESPONDENCE (OUTPATIENT)
Dept: HEALTH INFORMATION MANAGEMENT | Facility: CLINIC | Age: 69
End: 2024-04-26

## 2024-04-26 ENCOUNTER — APPOINTMENT (OUTPATIENT)
Dept: GENERAL RADIOLOGY | Facility: CLINIC | Age: 69
DRG: 193 | End: 2024-04-26
Attending: FAMILY MEDICINE
Payer: COMMERCIAL

## 2024-04-26 ENCOUNTER — HOSPITAL ENCOUNTER (INPATIENT)
Facility: CLINIC | Age: 69
LOS: 2 days | Discharge: NURSING, REHAB OR LONG-TERM CARE | DRG: 193 | End: 2024-04-29
Attending: FAMILY MEDICINE | Admitting: INTERNAL MEDICINE
Payer: COMMERCIAL

## 2024-04-26 DIAGNOSIS — E66.2 OBESITY HYPOVENTILATION SYNDROME (H): ICD-10-CM

## 2024-04-26 DIAGNOSIS — Z95.0 PACEMAKER: ICD-10-CM

## 2024-04-26 DIAGNOSIS — J44.1 COPD WITH ACUTE EXACERBATION (H): ICD-10-CM

## 2024-04-26 DIAGNOSIS — J96.02 ACUTE RESPIRATORY FAILURE WITH HYPOXIA AND HYPERCAPNIA (H): Primary | ICD-10-CM

## 2024-04-26 DIAGNOSIS — I70.422 ATHEROSCLEROSIS OF AUTOLOGOUS VEIN BYPASS GRAFT(S) OF THE EXTREMITIES WITH REST PAIN, LEFT LEG (H): ICD-10-CM

## 2024-04-26 DIAGNOSIS — J96.01 ACUTE RESPIRATORY FAILURE WITH HYPOXIA AND HYPERCAPNIA (H): Primary | ICD-10-CM

## 2024-04-26 DIAGNOSIS — Z95.1 S/P CABG X 5: ICD-10-CM

## 2024-04-26 DIAGNOSIS — Z86.73 HISTORY OF STROKE: ICD-10-CM

## 2024-04-26 DIAGNOSIS — N17.9 AKI (ACUTE KIDNEY INJURY) (H): ICD-10-CM

## 2024-04-26 DIAGNOSIS — I50.22 CHRONIC SYSTOLIC HEART FAILURE (H): ICD-10-CM

## 2024-04-26 DIAGNOSIS — R06.02 SHORTNESS OF BREATH: ICD-10-CM

## 2024-04-26 DIAGNOSIS — Z78.9 LIVES IN LONG-TERM CARE FACILITY: ICD-10-CM

## 2024-04-26 DIAGNOSIS — R09.02 HYPOXIA: ICD-10-CM

## 2024-04-26 DIAGNOSIS — J96.01 ACUTE RESPIRATORY FAILURE WITH HYPOXIA (H): ICD-10-CM

## 2024-04-26 DIAGNOSIS — R50.81 FEVER IN OTHER DISEASES: ICD-10-CM

## 2024-04-26 LAB
ALBUMIN SERPL BCG-MCNC: 3.8 G/DL (ref 3.5–5.2)
ALP SERPL-CCNC: 43 U/L (ref 40–150)
ALT SERPL W P-5'-P-CCNC: 23 U/L (ref 0–50)
ANION GAP SERPL CALCULATED.3IONS-SCNC: 14 MMOL/L (ref 7–15)
AST SERPL W P-5'-P-CCNC: 40 U/L (ref 0–45)
BASE EXCESS BLDV CALC-SCNC: -1.6 MMOL/L (ref -3–3)
BASOPHILS # BLD AUTO: 0 10E3/UL (ref 0–0.2)
BASOPHILS NFR BLD AUTO: 0 %
BILIRUB SERPL-MCNC: 0.2 MG/DL
BUN SERPL-MCNC: 29.7 MG/DL (ref 8–23)
CALCIUM SERPL-MCNC: 10.3 MG/DL (ref 8.8–10.2)
CHLORIDE SERPL-SCNC: 98 MMOL/L (ref 98–107)
CREAT SERPL-MCNC: 1.25 MG/DL (ref 0.51–0.95)
CRP SERPL-MCNC: 26.31 MG/L
D DIMER PPP FEU-MCNC: 0.89 UG/ML FEU (ref 0–0.5)
DEPRECATED HCO3 PLAS-SCNC: 23 MMOL/L (ref 22–29)
EGFRCR SERPLBLD CKD-EPI 2021: 46 ML/MIN/1.73M2
EOSINOPHIL # BLD AUTO: 0.1 10E3/UL (ref 0–0.7)
EOSINOPHIL NFR BLD AUTO: 1 %
ERYTHROCYTE [DISTWIDTH] IN BLOOD BY AUTOMATED COUNT: 14.7 % (ref 10–15)
ETHANOL SERPL-MCNC: <0.01 G/DL
FLUAV RNA SPEC QL NAA+PROBE: NEGATIVE
FLUBV RNA RESP QL NAA+PROBE: NEGATIVE
GLUCOSE SERPL-MCNC: 228 MG/DL (ref 70–99)
HCO3 BLDV-SCNC: 25 MMOL/L (ref 21–28)
HCT VFR BLD AUTO: 28.2 % (ref 35–47)
HGB BLD-MCNC: 9 G/DL (ref 11.7–15.7)
HOLD SPECIMEN: NORMAL
IMM GRANULOCYTES # BLD: 0 10E3/UL
IMM GRANULOCYTES NFR BLD: 0 %
LACTATE SERPL-SCNC: 1.7 MMOL/L (ref 0.7–2)
LIPASE SERPL-CCNC: 18 U/L (ref 13–60)
LYMPHOCYTES # BLD AUTO: 1.1 10E3/UL (ref 0.8–5.3)
LYMPHOCYTES NFR BLD AUTO: 16 %
MCH RBC QN AUTO: 29.2 PG (ref 26.5–33)
MCHC RBC AUTO-ENTMCNC: 31.9 G/DL (ref 31.5–36.5)
MCV RBC AUTO: 92 FL (ref 78–100)
MONOCYTES # BLD AUTO: 0.6 10E3/UL (ref 0–1.3)
MONOCYTES NFR BLD AUTO: 8 %
NEUTROPHILS # BLD AUTO: 5 10E3/UL (ref 1.6–8.3)
NEUTROPHILS NFR BLD AUTO: 74 %
NRBC # BLD AUTO: 0 10E3/UL
NRBC BLD AUTO-RTO: 0 /100
NT-PROBNP SERPL-MCNC: 1008 PG/ML (ref 0–900)
O2/TOTAL GAS SETTING VFR VENT: 36 %
OXYHGB MFR BLDV: 83 % (ref 70–75)
PCO2 BLDV: 51 MM HG (ref 40–50)
PH BLDV: 7.31 [PH] (ref 7.32–7.43)
PLATELET # BLD AUTO: 142 10E3/UL (ref 150–450)
PO2 BLDV: 55 MM HG (ref 25–47)
POTASSIUM SERPL-SCNC: 4.1 MMOL/L (ref 3.4–5.3)
PROCALCITONIN SERPL IA-MCNC: 0.24 NG/ML
PROT SERPL-MCNC: 7.8 G/DL (ref 6.4–8.3)
RBC # BLD AUTO: 3.08 10E6/UL (ref 3.8–5.2)
RSV RNA SPEC NAA+PROBE: NEGATIVE
SAO2 % BLDV: 84.9 % (ref 70–75)
SARS-COV-2 RNA RESP QL NAA+PROBE: NEGATIVE
SODIUM SERPL-SCNC: 135 MMOL/L (ref 135–145)
TROPONIN T SERPL HS-MCNC: 34 NG/L
TSH SERPL DL<=0.005 MIU/L-ACNC: 7.12 UIU/ML (ref 0.3–4.2)
WBC # BLD AUTO: 6.8 10E3/UL (ref 4–11)

## 2024-04-26 PROCEDURE — 250N000009 HC RX 250

## 2024-04-26 PROCEDURE — 83880 ASSAY OF NATRIURETIC PEPTIDE: CPT | Performed by: FAMILY MEDICINE

## 2024-04-26 PROCEDURE — 84443 ASSAY THYROID STIM HORMONE: CPT | Performed by: FAMILY MEDICINE

## 2024-04-26 PROCEDURE — 82805 BLOOD GASES W/O2 SATURATION: CPT | Performed by: FAMILY MEDICINE

## 2024-04-26 PROCEDURE — 82077 ASSAY SPEC XCP UR&BREATH IA: CPT | Performed by: FAMILY MEDICINE

## 2024-04-26 PROCEDURE — 85379 FIBRIN DEGRADATION QUANT: CPT | Performed by: FAMILY MEDICINE

## 2024-04-26 PROCEDURE — 93005 ELECTROCARDIOGRAM TRACING: CPT

## 2024-04-26 PROCEDURE — 83036 HEMOGLOBIN GLYCOSYLATED A1C: CPT | Performed by: INTERNAL MEDICINE

## 2024-04-26 PROCEDURE — 86140 C-REACTIVE PROTEIN: CPT | Performed by: FAMILY MEDICINE

## 2024-04-26 PROCEDURE — 85025 COMPLETE CBC W/AUTO DIFF WBC: CPT | Performed by: FAMILY MEDICINE

## 2024-04-26 PROCEDURE — 96374 THER/PROPH/DIAG INJ IV PUSH: CPT

## 2024-04-26 PROCEDURE — 84484 ASSAY OF TROPONIN QUANT: CPT | Performed by: FAMILY MEDICINE

## 2024-04-26 PROCEDURE — 250N000013 HC RX MED GY IP 250 OP 250 PS 637: Performed by: FAMILY MEDICINE

## 2024-04-26 PROCEDURE — 99285 EMERGENCY DEPT VISIT HI MDM: CPT | Mod: 25 | Performed by: FAMILY MEDICINE

## 2024-04-26 PROCEDURE — 87637 SARSCOV2&INF A&B&RSV AMP PRB: CPT | Performed by: FAMILY MEDICINE

## 2024-04-26 PROCEDURE — 36415 COLL VENOUS BLD VENIPUNCTURE: CPT | Performed by: FAMILY MEDICINE

## 2024-04-26 PROCEDURE — 96361 HYDRATE IV INFUSION ADD-ON: CPT

## 2024-04-26 PROCEDURE — 83605 ASSAY OF LACTIC ACID: CPT | Performed by: FAMILY MEDICINE

## 2024-04-26 PROCEDURE — 84145 PROCALCITONIN (PCT): CPT | Performed by: FAMILY MEDICINE

## 2024-04-26 PROCEDURE — 250N000011 HC RX IP 250 OP 636: Performed by: FAMILY MEDICINE

## 2024-04-26 PROCEDURE — 94640 AIRWAY INHALATION TREATMENT: CPT

## 2024-04-26 PROCEDURE — 87040 BLOOD CULTURE FOR BACTERIA: CPT | Performed by: FAMILY MEDICINE

## 2024-04-26 PROCEDURE — 84439 ASSAY OF FREE THYROXINE: CPT | Performed by: FAMILY MEDICINE

## 2024-04-26 PROCEDURE — 80053 COMPREHEN METABOLIC PANEL: CPT | Performed by: FAMILY MEDICINE

## 2024-04-26 PROCEDURE — 96375 TX/PRO/DX INJ NEW DRUG ADDON: CPT

## 2024-04-26 PROCEDURE — 93010 ELECTROCARDIOGRAM REPORT: CPT | Performed by: FAMILY MEDICINE

## 2024-04-26 PROCEDURE — 99285 EMERGENCY DEPT VISIT HI MDM: CPT | Mod: 25

## 2024-04-26 PROCEDURE — 71045 X-RAY EXAM CHEST 1 VIEW: CPT

## 2024-04-26 PROCEDURE — 258N000003 HC RX IP 258 OP 636: Performed by: FAMILY MEDICINE

## 2024-04-26 PROCEDURE — 83690 ASSAY OF LIPASE: CPT | Performed by: FAMILY MEDICINE

## 2024-04-26 RX ORDER — IPRATROPIUM BROMIDE AND ALBUTEROL SULFATE 2.5; .5 MG/3ML; MG/3ML
3 SOLUTION RESPIRATORY (INHALATION)
Status: DISCONTINUED | OUTPATIENT
Start: 2024-04-27 | End: 2024-04-27

## 2024-04-26 RX ORDER — MORPHINE SULFATE 20 MG/ML
SOLUTION ORAL
Status: ON HOLD | COMMUNITY
Start: 2024-04-11 | End: 2024-04-29

## 2024-04-26 RX ORDER — ACETAMINOPHEN 325 MG/1
975 TABLET ORAL ONCE
Status: COMPLETED | OUTPATIENT
Start: 2024-04-26 | End: 2024-04-26

## 2024-04-26 RX ORDER — LORAZEPAM 1 MG/1
1 TABLET ORAL 2 TIMES DAILY
Status: ON HOLD | COMMUNITY
Start: 2024-04-24 | End: 2024-04-29

## 2024-04-26 RX ORDER — MIRTAZAPINE 15 MG/1
15 TABLET, FILM COATED ORAL AT BEDTIME
Status: ON HOLD | COMMUNITY
Start: 2024-04-11 | End: 2024-04-29

## 2024-04-26 RX ORDER — INSULIN GLARGINE 100 [IU]/ML
22 INJECTION, SOLUTION SUBCUTANEOUS AT BEDTIME
COMMUNITY
Start: 2024-04-13

## 2024-04-26 RX ORDER — INSULIN ASPART 100 [IU]/ML
INJECTION, SOLUTION INTRAVENOUS; SUBCUTANEOUS
COMMUNITY
Start: 2024-01-03

## 2024-04-26 RX ORDER — ALBUTEROL SULFATE 0.83 MG/ML
1.25 SOLUTION RESPIRATORY (INHALATION) 2 TIMES DAILY
COMMUNITY

## 2024-04-26 RX ORDER — IPRATROPIUM BROMIDE AND ALBUTEROL SULFATE 2.5; .5 MG/3ML; MG/3ML
SOLUTION RESPIRATORY (INHALATION)
Status: COMPLETED
Start: 2024-04-26 | End: 2024-04-26

## 2024-04-26 RX ORDER — ALBUTEROL SULFATE 0.83 MG/ML
2.5 SOLUTION RESPIRATORY (INHALATION) EVERY 6 HOURS PRN
COMMUNITY
Start: 2024-04-10

## 2024-04-26 RX ORDER — ONDANSETRON 2 MG/ML
4 INJECTION INTRAMUSCULAR; INTRAVENOUS EVERY 30 MIN PRN
Status: DISCONTINUED | OUTPATIENT
Start: 2024-04-26 | End: 2024-04-27

## 2024-04-26 RX ADMIN — IPRATROPIUM BROMIDE AND ALBUTEROL SULFATE 3 ML: .5; 3 SOLUTION RESPIRATORY (INHALATION) at 22:56

## 2024-04-26 RX ADMIN — ONDANSETRON 4 MG: 2 INJECTION INTRAMUSCULAR; INTRAVENOUS at 23:33

## 2024-04-26 RX ADMIN — SODIUM CHLORIDE 1000 ML: 9 INJECTION, SOLUTION INTRAVENOUS at 23:33

## 2024-04-26 ASSESSMENT — COLUMBIA-SUICIDE SEVERITY RATING SCALE - C-SSRS
6. HAVE YOU EVER DONE ANYTHING, STARTED TO DO ANYTHING, OR PREPARED TO DO ANYTHING TO END YOUR LIFE?: NO
1. IN THE PAST MONTH, HAVE YOU WISHED YOU WERE DEAD OR WISHED YOU COULD GO TO SLEEP AND NOT WAKE UP?: NO
2. HAVE YOU ACTUALLY HAD ANY THOUGHTS OF KILLING YOURSELF IN THE PAST MONTH?: NO

## 2024-04-26 ASSESSMENT — ACTIVITIES OF DAILY LIVING (ADL): ADLS_ACUITY_SCORE: 39

## 2024-04-27 ENCOUNTER — APPOINTMENT (OUTPATIENT)
Dept: CT IMAGING | Facility: CLINIC | Age: 69
DRG: 193 | End: 2024-04-27
Attending: INTERNAL MEDICINE
Payer: COMMERCIAL

## 2024-04-27 PROBLEM — A41.9 SEPSIS (H): Status: ACTIVE | Noted: 2024-04-27

## 2024-04-27 PROBLEM — E66.01 MORBID OBESITY (H): Chronic | Status: ACTIVE | Noted: 2021-12-10

## 2024-04-27 PROBLEM — I70.422: Status: ACTIVE | Noted: 2020-01-15

## 2024-04-27 PROBLEM — J96.01 ACUTE RESPIRATORY FAILURE WITH HYPOXIA (H): Status: ACTIVE | Noted: 2024-04-27

## 2024-04-27 PROBLEM — J18.9 BILATERAL PNEUMONIA: Status: ACTIVE | Noted: 2024-04-27

## 2024-04-27 PROBLEM — R50.81 FEVER IN OTHER DISEASES: Status: ACTIVE | Noted: 2024-04-27

## 2024-04-27 PROBLEM — Z86.73 HISTORY OF STROKE: Status: ACTIVE | Noted: 2023-11-10

## 2024-04-27 PROBLEM — E11.65 TYPE 2 DIABETES MELLITUS WITH HYPERGLYCEMIA, WITH LONG-TERM CURRENT USE OF INSULIN (H): Status: ACTIVE | Noted: 2022-02-13

## 2024-04-27 PROBLEM — R06.02 SHORTNESS OF BREATH: Status: ACTIVE | Noted: 2024-04-27

## 2024-04-27 PROBLEM — N17.9 AKI (ACUTE KIDNEY INJURY) (H): Status: ACTIVE | Noted: 2023-11-10

## 2024-04-27 PROBLEM — J44.1 COPD EXACERBATION (H): Status: ACTIVE | Noted: 2024-04-27

## 2024-04-27 PROBLEM — J96.02 ACUTE RESPIRATORY FAILURE WITH HYPOXIA AND HYPERCAPNIA (H): Status: ACTIVE | Noted: 2024-04-27

## 2024-04-27 PROBLEM — J44.1 COPD WITH ACUTE EXACERBATION (H): Status: ACTIVE | Noted: 2024-04-27

## 2024-04-27 PROBLEM — F40.01 PANIC DISORDER WITH AGORAPHOBIA: Chronic | Status: ACTIVE | Noted: 2020-04-14

## 2024-04-27 PROBLEM — Z78.9 LIVES IN LONG-TERM CARE FACILITY: Status: ACTIVE | Noted: 2023-11-10

## 2024-04-27 PROBLEM — E66.2 OBESITY HYPOVENTILATION SYNDROME (H): Status: ACTIVE | Noted: 2023-11-10

## 2024-04-27 LAB
ALBUMIN UR-MCNC: NEGATIVE MG/DL
ANION GAP SERPL CALCULATED.3IONS-SCNC: 17 MMOL/L (ref 7–15)
APPEARANCE UR: ABNORMAL
BACTERIA #/AREA URNS HPF: ABNORMAL /HPF
BASE EXCESS BLDV CALC-SCNC: -1.5 MMOL/L (ref -3–3)
BASE EXCESS BLDV CALC-SCNC: -2.6 MMOL/L (ref -3–3)
BASE EXCESS BLDV CALC-SCNC: -3.9 MMOL/L (ref -3–3)
BASE EXCESS BLDV CALC-SCNC: 0 MMOL/L (ref -3–3)
BILIRUB UR QL STRIP: NEGATIVE
BUN SERPL-MCNC: 31.2 MG/DL (ref 8–23)
CALCIUM SERPL-MCNC: 9.9 MG/DL (ref 8.8–10.2)
CHLORIDE SERPL-SCNC: 97 MMOL/L (ref 98–107)
COLOR UR AUTO: YELLOW
CREAT SERPL-MCNC: 1.24 MG/DL (ref 0.51–0.95)
DEPRECATED HCO3 PLAS-SCNC: 21 MMOL/L (ref 22–29)
EGFRCR SERPLBLD CKD-EPI 2021: 47 ML/MIN/1.73M2
GLUCOSE BLDC GLUCOMTR-MCNC: 300 MG/DL (ref 70–99)
GLUCOSE BLDC GLUCOMTR-MCNC: 356 MG/DL (ref 70–99)
GLUCOSE BLDC GLUCOMTR-MCNC: 356 MG/DL (ref 70–99)
GLUCOSE BLDC GLUCOMTR-MCNC: 375 MG/DL (ref 70–99)
GLUCOSE BLDC GLUCOMTR-MCNC: 432 MG/DL (ref 70–99)
GLUCOSE BLDC GLUCOMTR-MCNC: 460 MG/DL (ref 70–99)
GLUCOSE SERPL-MCNC: 484 MG/DL (ref 70–99)
GLUCOSE UR STRIP-MCNC: NEGATIVE MG/DL
HBA1C MFR BLD: 8.1 %
HCO3 BLDV-SCNC: 24 MMOL/L (ref 21–28)
HCO3 BLDV-SCNC: 24 MMOL/L (ref 21–28)
HCO3 BLDV-SCNC: 26 MMOL/L (ref 21–28)
HCO3 BLDV-SCNC: 26 MMOL/L (ref 21–28)
HGB UR QL STRIP: NEGATIVE
HOLD SPECIMEN: NORMAL
KETONES UR STRIP-MCNC: NEGATIVE MG/DL
LACTATE SERPL-SCNC: 1.8 MMOL/L (ref 0.7–2)
LEUKOCYTE ESTERASE UR QL STRIP: ABNORMAL
NITRATE UR QL: NEGATIVE
O2/TOTAL GAS SETTING VFR VENT: 21 %
O2/TOTAL GAS SETTING VFR VENT: 21 %
O2/TOTAL GAS SETTING VFR VENT: 24 %
O2/TOTAL GAS SETTING VFR VENT: 36 %
OXYHGB MFR BLDV: 50 % (ref 70–75)
OXYHGB MFR BLDV: 67 % (ref 70–75)
OXYHGB MFR BLDV: 87 % (ref 70–75)
OXYHGB MFR BLDV: 91 % (ref 70–75)
PCO2 BLDV: 50 MM HG (ref 40–50)
PCO2 BLDV: 51 MM HG (ref 40–50)
PCO2 BLDV: 56 MM HG (ref 40–50)
PCO2 BLDV: 58 MM HG (ref 40–50)
PH BLDV: 7.24 [PH] (ref 7.32–7.43)
PH BLDV: 7.26 [PH] (ref 7.32–7.43)
PH BLDV: 7.29 [PH] (ref 7.32–7.43)
PH BLDV: 7.33 [PH] (ref 7.32–7.43)
PH UR STRIP: 5 [PH] (ref 5–7)
PO2 BLDV: 31 MM HG (ref 25–47)
PO2 BLDV: 37 MM HG (ref 25–47)
PO2 BLDV: 62 MM HG (ref 25–47)
PO2 BLDV: 67 MM HG (ref 25–47)
POTASSIUM SERPL-SCNC: 4.7 MMOL/L (ref 3.4–5.3)
RBC URINE: <1 /HPF
SAO2 % BLDV: 50.6 % (ref 70–75)
SAO2 % BLDV: 68.5 % (ref 70–75)
SAO2 % BLDV: 88.7 % (ref 70–75)
SAO2 % BLDV: 92.6 % (ref 70–75)
SODIUM SERPL-SCNC: 135 MMOL/L (ref 135–145)
SP GR UR STRIP: 1.01 (ref 1–1.03)
SQUAMOUS EPITHELIAL: 3 /HPF
T3 SERPL-MCNC: 38 NG/DL (ref 85–202)
T4 FREE SERPL-MCNC: 0.91 NG/DL (ref 0.9–1.7)
TROPONIN T SERPL HS-MCNC: 35 NG/L
UROBILINOGEN UR STRIP-MCNC: NORMAL MG/DL
WBC URINE: 1 /HPF

## 2024-04-27 PROCEDURE — 99223 1ST HOSP IP/OBS HIGH 75: CPT | Mod: AI | Performed by: INTERNAL MEDICINE

## 2024-04-27 PROCEDURE — 250N000009 HC RX 250

## 2024-04-27 PROCEDURE — 36415 COLL VENOUS BLD VENIPUNCTURE: CPT | Performed by: INTERNAL MEDICINE

## 2024-04-27 PROCEDURE — 96375 TX/PRO/DX INJ NEW DRUG ADDON: CPT

## 2024-04-27 PROCEDURE — 250N000009 HC RX 250: Performed by: FAMILY MEDICINE

## 2024-04-27 PROCEDURE — 258N000003 HC RX IP 258 OP 636: Performed by: INTERNAL MEDICINE

## 2024-04-27 PROCEDURE — 250N000012 HC RX MED GY IP 250 OP 636 PS 637: Performed by: INTERNAL MEDICINE

## 2024-04-27 PROCEDURE — 82962 GLUCOSE BLOOD TEST: CPT

## 2024-04-27 PROCEDURE — 250N000013 HC RX MED GY IP 250 OP 250 PS 637: Performed by: INTERNAL MEDICINE

## 2024-04-27 PROCEDURE — 83605 ASSAY OF LACTIC ACID: CPT | Performed by: INTERNAL MEDICINE

## 2024-04-27 PROCEDURE — 250N000009 HC RX 250: Performed by: INTERNAL MEDICINE

## 2024-04-27 PROCEDURE — 250N000011 HC RX IP 250 OP 636: Performed by: INTERNAL MEDICINE

## 2024-04-27 PROCEDURE — 84484 ASSAY OF TROPONIN QUANT: CPT | Performed by: FAMILY MEDICINE

## 2024-04-27 PROCEDURE — 250N000011 HC RX IP 250 OP 636: Performed by: FAMILY MEDICINE

## 2024-04-27 PROCEDURE — 80048 BASIC METABOLIC PNL TOTAL CA: CPT | Performed by: INTERNAL MEDICINE

## 2024-04-27 PROCEDURE — 82805 BLOOD GASES W/O2 SATURATION: CPT | Performed by: FAMILY MEDICINE

## 2024-04-27 PROCEDURE — 94640 AIRWAY INHALATION TREATMENT: CPT

## 2024-04-27 PROCEDURE — G0378 HOSPITAL OBSERVATION PER HR: HCPCS

## 2024-04-27 PROCEDURE — 96365 THER/PROPH/DIAG IV INF INIT: CPT

## 2024-04-27 PROCEDURE — 120N000001 HC R&B MED SURG/OB

## 2024-04-27 PROCEDURE — 81001 URINALYSIS AUTO W/SCOPE: CPT | Performed by: FAMILY MEDICINE

## 2024-04-27 PROCEDURE — 250N000013 HC RX MED GY IP 250 OP 250 PS 637: Performed by: FAMILY MEDICINE

## 2024-04-27 PROCEDURE — 999N000157 HC STATISTIC RCP TIME EA 10 MIN

## 2024-04-27 PROCEDURE — 82805 BLOOD GASES W/O2 SATURATION: CPT | Performed by: INTERNAL MEDICINE

## 2024-04-27 PROCEDURE — 71275 CT ANGIOGRAPHY CHEST: CPT

## 2024-04-27 PROCEDURE — 84480 ASSAY TRIIODOTHYRONINE (T3): CPT | Performed by: INTERNAL MEDICINE

## 2024-04-27 PROCEDURE — 36415 COLL VENOUS BLD VENIPUNCTURE: CPT | Performed by: FAMILY MEDICINE

## 2024-04-27 RX ORDER — AMLODIPINE BESYLATE 5 MG/1
5 TABLET ORAL DAILY
Status: DISCONTINUED | OUTPATIENT
Start: 2024-04-27 | End: 2024-04-29 | Stop reason: HOSPADM

## 2024-04-27 RX ORDER — ONDANSETRON 4 MG/1
4 TABLET, ORALLY DISINTEGRATING ORAL EVERY 8 HOURS PRN
Status: DISCONTINUED | OUTPATIENT
Start: 2024-04-27 | End: 2024-04-29 | Stop reason: HOSPADM

## 2024-04-27 RX ORDER — ACETAMINOPHEN 500 MG
1000 TABLET ORAL 3 TIMES DAILY
Status: DISCONTINUED | OUTPATIENT
Start: 2024-04-27 | End: 2024-04-29 | Stop reason: HOSPADM

## 2024-04-27 RX ORDER — AMOXICILLIN 250 MG
2 CAPSULE ORAL 2 TIMES DAILY PRN
Status: DISCONTINUED | OUTPATIENT
Start: 2024-04-27 | End: 2024-04-27

## 2024-04-27 RX ORDER — DEXTROSE MONOHYDRATE 25 G/50ML
25-50 INJECTION, SOLUTION INTRAVENOUS
Status: DISCONTINUED | OUTPATIENT
Start: 2024-04-27 | End: 2024-04-27

## 2024-04-27 RX ORDER — IOPAMIDOL 755 MG/ML
500 INJECTION, SOLUTION INTRAVASCULAR ONCE
Status: COMPLETED | OUTPATIENT
Start: 2024-04-27 | End: 2024-04-27

## 2024-04-27 RX ORDER — INSULIN ASPART 100 [IU]/ML
INJECTION, SOLUTION INTRAVENOUS; SUBCUTANEOUS
Status: DISCONTINUED | OUTPATIENT
Start: 2024-04-27 | End: 2024-04-27

## 2024-04-27 RX ORDER — LEVOTHYROXINE SODIUM 75 UG/1
150 TABLET ORAL
Status: DISCONTINUED | OUTPATIENT
Start: 2024-04-28 | End: 2024-04-29 | Stop reason: HOSPADM

## 2024-04-27 RX ORDER — NICOTINE POLACRILEX 4 MG
15-30 LOZENGE BUCCAL
Status: DISCONTINUED | OUTPATIENT
Start: 2024-04-27 | End: 2024-04-29 | Stop reason: HOSPADM

## 2024-04-27 RX ORDER — METHYLPREDNISOLONE SODIUM SUCCINATE 125 MG/2ML
125 INJECTION, POWDER, LYOPHILIZED, FOR SOLUTION INTRAMUSCULAR; INTRAVENOUS ONCE
Status: COMPLETED | OUTPATIENT
Start: 2024-04-27 | End: 2024-04-27

## 2024-04-27 RX ORDER — POLYETHYLENE GLYCOL 3350 17 G/17G
17 POWDER, FOR SOLUTION ORAL EVERY OTHER DAY
Status: DISCONTINUED | OUTPATIENT
Start: 2024-04-27 | End: 2024-04-29 | Stop reason: HOSPADM

## 2024-04-27 RX ORDER — ASPIRIN 81 MG/1
81 TABLET ORAL DAILY
Status: DISCONTINUED | OUTPATIENT
Start: 2024-04-27 | End: 2024-04-29 | Stop reason: HOSPADM

## 2024-04-27 RX ORDER — ROSUVASTATIN CALCIUM 20 MG/1
20 TABLET, COATED ORAL AT BEDTIME
Status: DISCONTINUED | OUTPATIENT
Start: 2024-04-27 | End: 2024-04-29 | Stop reason: HOSPADM

## 2024-04-27 RX ORDER — LIDOCAINE 40 MG/G
CREAM TOPICAL 2 TIMES DAILY
Status: DISCONTINUED | OUTPATIENT
Start: 2024-04-27 | End: 2024-04-27

## 2024-04-27 RX ORDER — IPRATROPIUM BROMIDE AND ALBUTEROL SULFATE 2.5; .5 MG/3ML; MG/3ML
SOLUTION RESPIRATORY (INHALATION)
Status: COMPLETED
Start: 2024-04-27 | End: 2024-04-27

## 2024-04-27 RX ORDER — LORAZEPAM 1 MG/1
1 TABLET ORAL 2 TIMES DAILY
Status: DISCONTINUED | OUTPATIENT
Start: 2024-04-27 | End: 2024-04-29 | Stop reason: HOSPADM

## 2024-04-27 RX ORDER — SODIUM CHLORIDE 9 MG/ML
INJECTION, SOLUTION INTRAVENOUS CONTINUOUS
Status: ACTIVE | OUTPATIENT
Start: 2024-04-27 | End: 2024-04-28

## 2024-04-27 RX ORDER — MIRTAZAPINE 15 MG/1
15 TABLET, FILM COATED ORAL AT BEDTIME
Status: DISCONTINUED | OUTPATIENT
Start: 2024-04-27 | End: 2024-04-29 | Stop reason: HOSPADM

## 2024-04-27 RX ORDER — IPRATROPIUM BROMIDE AND ALBUTEROL SULFATE 2.5; .5 MG/3ML; MG/3ML
3 SOLUTION RESPIRATORY (INHALATION) EVERY 4 HOURS PRN
Status: DISCONTINUED | OUTPATIENT
Start: 2024-04-27 | End: 2024-04-29 | Stop reason: HOSPADM

## 2024-04-27 RX ORDER — SENNOSIDES 8.6 MG
2 TABLET ORAL AT BEDTIME
Status: DISCONTINUED | OUTPATIENT
Start: 2024-04-27 | End: 2024-04-29 | Stop reason: HOSPADM

## 2024-04-27 RX ORDER — LIDOCAINE 40 MG/G
CREAM TOPICAL
Status: DISCONTINUED | OUTPATIENT
Start: 2024-04-27 | End: 2024-04-29 | Stop reason: HOSPADM

## 2024-04-27 RX ORDER — CEFTRIAXONE 2 G/1
2 INJECTION, POWDER, FOR SOLUTION INTRAMUSCULAR; INTRAVENOUS EVERY 24 HOURS
Status: DISCONTINUED | OUTPATIENT
Start: 2024-04-27 | End: 2024-04-29 | Stop reason: HOSPADM

## 2024-04-27 RX ORDER — DEXTROSE MONOHYDRATE 25 G/50ML
25-50 INJECTION, SOLUTION INTRAVENOUS
Status: DISCONTINUED | OUTPATIENT
Start: 2024-04-27 | End: 2024-04-29 | Stop reason: HOSPADM

## 2024-04-27 RX ORDER — QUETIAPINE FUMARATE 100 MG/1
100 TABLET, FILM COATED ORAL 3 TIMES DAILY
Status: DISCONTINUED | OUTPATIENT
Start: 2024-04-27 | End: 2024-04-29 | Stop reason: HOSPADM

## 2024-04-27 RX ORDER — FAMOTIDINE 20 MG/1
20 TABLET, FILM COATED ORAL AT BEDTIME
Status: DISCONTINUED | OUTPATIENT
Start: 2024-04-27 | End: 2024-04-29 | Stop reason: HOSPADM

## 2024-04-27 RX ORDER — AZITHROMYCIN 500 MG/1
500 INJECTION, POWDER, LYOPHILIZED, FOR SOLUTION INTRAVENOUS EVERY 24 HOURS
Status: DISCONTINUED | OUTPATIENT
Start: 2024-04-27 | End: 2024-04-29

## 2024-04-27 RX ORDER — CALCIUM CARBONATE 500 MG/1
1000 TABLET, CHEWABLE ORAL 4 TIMES DAILY PRN
Status: DISCONTINUED | OUTPATIENT
Start: 2024-04-27 | End: 2024-04-29 | Stop reason: HOSPADM

## 2024-04-27 RX ORDER — METOPROLOL SUCCINATE 25 MG/1
25 TABLET, EXTENDED RELEASE ORAL DAILY
Status: DISCONTINUED | OUTPATIENT
Start: 2024-04-27 | End: 2024-04-27

## 2024-04-27 RX ORDER — AMOXICILLIN 250 MG
1 CAPSULE ORAL 2 TIMES DAILY PRN
Status: DISCONTINUED | OUTPATIENT
Start: 2024-04-27 | End: 2024-04-27

## 2024-04-27 RX ORDER — POTASSIUM CHLORIDE 750 MG/1
10 TABLET, EXTENDED RELEASE ORAL DAILY
Status: DISCONTINUED | OUTPATIENT
Start: 2024-04-27 | End: 2024-04-29 | Stop reason: HOSPADM

## 2024-04-27 RX ORDER — CALCIUM CARBONATE 500 MG/1
1000 TABLET, CHEWABLE ORAL 4 TIMES DAILY PRN
Status: DISCONTINUED | OUTPATIENT
Start: 2024-04-27 | End: 2024-04-27

## 2024-04-27 RX ORDER — FUROSEMIDE 20 MG
20 TABLET ORAL EVERY OTHER DAY
Status: DISCONTINUED | OUTPATIENT
Start: 2024-04-28 | End: 2024-04-29 | Stop reason: HOSPADM

## 2024-04-27 RX ORDER — FLUTICASONE FUROATE AND VILANTEROL 200; 25 UG/1; UG/1
1 POWDER RESPIRATORY (INHALATION) DAILY
Status: DISCONTINUED | OUTPATIENT
Start: 2024-04-27 | End: 2024-04-29 | Stop reason: HOSPADM

## 2024-04-27 RX ORDER — DULOXETIN HYDROCHLORIDE 20 MG/1
40 CAPSULE, DELAYED RELEASE ORAL DAILY
Status: DISCONTINUED | OUTPATIENT
Start: 2024-04-27 | End: 2024-04-29 | Stop reason: HOSPADM

## 2024-04-27 RX ORDER — FUROSEMIDE 10 MG/ML
40 INJECTION INTRAMUSCULAR; INTRAVENOUS ONCE
Status: COMPLETED | OUTPATIENT
Start: 2024-04-27 | End: 2024-04-27

## 2024-04-27 RX ORDER — NITROGLYCERIN 0.4 MG/1
0.4 TABLET SUBLINGUAL EVERY 5 MIN PRN
Status: DISCONTINUED | OUTPATIENT
Start: 2024-04-27 | End: 2024-04-29 | Stop reason: HOSPADM

## 2024-04-27 RX ORDER — NICOTINE POLACRILEX 4 MG
15-30 LOZENGE BUCCAL
Status: DISCONTINUED | OUTPATIENT
Start: 2024-04-27 | End: 2024-04-27

## 2024-04-27 RX ORDER — CEFTRIAXONE 2 G/1
2 INJECTION, POWDER, FOR SOLUTION INTRAMUSCULAR; INTRAVENOUS ONCE
Status: COMPLETED | OUTPATIENT
Start: 2024-04-27 | End: 2024-04-27

## 2024-04-27 RX ORDER — GABAPENTIN 400 MG/1
400 CAPSULE ORAL AT BEDTIME
Status: DISCONTINUED | OUTPATIENT
Start: 2024-04-27 | End: 2024-04-29 | Stop reason: HOSPADM

## 2024-04-27 RX ADMIN — SODIUM CHLORIDE 60 ML: 9 INJECTION, SOLUTION INTRAVENOUS at 11:02

## 2024-04-27 RX ADMIN — QUETIAPINE 100 MG: 100 TABLET ORAL at 14:07

## 2024-04-27 RX ADMIN — IOPAMIDOL 68 ML: 755 INJECTION, SOLUTION INTRAVENOUS at 11:02

## 2024-04-27 RX ADMIN — IPRATROPIUM BROMIDE AND ALBUTEROL SULFATE 3 ML: .5; 3 SOLUTION RESPIRATORY (INHALATION) at 07:55

## 2024-04-27 RX ADMIN — INSULIN GLARGINE 22 UNITS: 100 INJECTION, SOLUTION SUBCUTANEOUS at 21:59

## 2024-04-27 RX ADMIN — QUETIAPINE 100 MG: 100 TABLET ORAL at 10:07

## 2024-04-27 RX ADMIN — FLUTICASONE FUROATE AND VILANTEROL TRIFENATATE 1 PUFF: 200; 25 POWDER RESPIRATORY (INHALATION) at 10:03

## 2024-04-27 RX ADMIN — ACETAMINOPHEN 1000 MG: 500 TABLET ORAL at 20:04

## 2024-04-27 RX ADMIN — ROSUVASTATIN CALCIUM 20 MG: 20 TABLET, FILM COATED ORAL at 20:04

## 2024-04-27 RX ADMIN — TRAZODONE HYDROCHLORIDE 75 MG: 50 TABLET ORAL at 20:04

## 2024-04-27 RX ADMIN — LORAZEPAM 1 MG: 1 TABLET ORAL at 20:04

## 2024-04-27 RX ADMIN — IPRATROPIUM BROMIDE AND ALBUTEROL SULFATE 3 ML: .5; 3 SOLUTION RESPIRATORY (INHALATION) at 03:31

## 2024-04-27 RX ADMIN — AZITHROMYCIN MONOHYDRATE 500 MG: 500 INJECTION, POWDER, LYOPHILIZED, FOR SOLUTION INTRAVENOUS at 05:37

## 2024-04-27 RX ADMIN — LEVOTHYROXINE SODIUM 137 MCG: 25 TABLET ORAL at 10:06

## 2024-04-27 RX ADMIN — INSULIN ASPART 6 UNITS: 100 INJECTION, SOLUTION INTRAVENOUS; SUBCUTANEOUS at 12:27

## 2024-04-27 RX ADMIN — METHYLPREDNISOLONE SODIUM SUCCINATE 125 MG: 125 INJECTION, POWDER, FOR SOLUTION INTRAMUSCULAR; INTRAVENOUS at 00:28

## 2024-04-27 RX ADMIN — DULOXETINE HYDROCHLORIDE 40 MG: 20 CAPSULE, DELAYED RELEASE ORAL at 10:06

## 2024-04-27 RX ADMIN — METOPROLOL SUCCINATE 37.5 MG: 25 TABLET, EXTENDED RELEASE ORAL at 10:36

## 2024-04-27 RX ADMIN — CEFTRIAXONE SODIUM 2 G: 2 INJECTION, POWDER, FOR SOLUTION INTRAMUSCULAR; INTRAVENOUS at 20:05

## 2024-04-27 RX ADMIN — INSULIN ASPART 5 UNITS: 100 INJECTION, SOLUTION INTRAVENOUS; SUBCUTANEOUS at 17:08

## 2024-04-27 RX ADMIN — AMLODIPINE BESYLATE 5 MG: 5 TABLET ORAL at 10:07

## 2024-04-27 RX ADMIN — SODIUM CHLORIDE: 9 INJECTION, SOLUTION INTRAVENOUS at 17:56

## 2024-04-27 RX ADMIN — NITROGLYCERIN 0.4 MG: 0.4 TABLET SUBLINGUAL at 12:34

## 2024-04-27 RX ADMIN — INSULIN ASPART 7 UNITS: 100 INJECTION, SOLUTION INTRAVENOUS; SUBCUTANEOUS at 10:12

## 2024-04-27 RX ADMIN — GABAPENTIN 400 MG: 400 CAPSULE ORAL at 20:04

## 2024-04-27 RX ADMIN — FAMOTIDINE 20 MG: 20 TABLET, FILM COATED ORAL at 20:04

## 2024-04-27 RX ADMIN — LORAZEPAM 1 MG: 1 TABLET ORAL at 10:06

## 2024-04-27 RX ADMIN — ASPIRIN 81 MG: 81 TABLET, COATED ORAL at 10:06

## 2024-04-27 RX ADMIN — SENNOSIDES 2 TABLET: 8.6 TABLET, FILM COATED ORAL at 20:04

## 2024-04-27 RX ADMIN — QUETIAPINE 100 MG: 100 TABLET ORAL at 20:04

## 2024-04-27 RX ADMIN — POTASSIUM CHLORIDE 10 MEQ: 750 TABLET, EXTENDED RELEASE ORAL at 10:07

## 2024-04-27 RX ADMIN — ACETAMINOPHEN 1000 MG: 500 TABLET ORAL at 14:07

## 2024-04-27 RX ADMIN — TRAZODONE HYDROCHLORIDE 25 MG: 50 TABLET ORAL at 17:09

## 2024-04-27 RX ADMIN — CEFTRIAXONE SODIUM 2 G: 2 INJECTION, POWDER, FOR SOLUTION INTRAMUSCULAR; INTRAVENOUS at 05:01

## 2024-04-27 RX ADMIN — MIRTAZAPINE 15 MG: 15 TABLET, FILM COATED ORAL at 20:04

## 2024-04-27 RX ADMIN — FUROSEMIDE 40 MG: 10 INJECTION, SOLUTION INTRAVENOUS at 00:29

## 2024-04-27 RX ADMIN — ACETAMINOPHEN 1000 MG: 500 TABLET ORAL at 10:07

## 2024-04-27 ASSESSMENT — ACTIVITIES OF DAILY LIVING (ADL)
ADLS_ACUITY_SCORE: 56
CONCENTRATING,_REMEMBERING_OR_MAKING_DECISIONS_DIFFICULTY: YES
ADLS_ACUITY_SCORE: 56
HEARING_DIFFICULTY_OR_DEAF: NO
ADLS_ACUITY_SCORE: 39
ADLS_ACUITY_SCORE: 48
DEPENDENT_IADLS:: CLEANING;COOKING;LAUNDRY;SHOPPING;MEAL PREPARATION;MEDICATION MANAGEMENT;MONEY MANAGEMENT;TRANSPORTATION;INCONTINENCE
FALL_HISTORY_WITHIN_LAST_SIX_MONTHS: NO
ADLS_ACUITY_SCORE: 55
ADLS_ACUITY_SCORE: 56
ADLS_ACUITY_SCORE: 39
EATING/SWALLOWING: EATING
WALKING_OR_CLIMBING_STAIRS_DIFFICULTY: YES
ADLS_ACUITY_SCORE: 56
DRESSING/BATHING: BATHING DIFFICULTY, ASSISTANCE 1 PERSON
TOILETING_MANAGEMENT: INCONTINENT
ADLS_ACUITY_SCORE: 48
VISION_MANAGEMENT: GLASSES
TOILETING: 1-->ASSISTANCE (EQUIPMENT/PERSON) NEEDED (NOT DEVELOPMENTALLY APPROPRIATE)
ADLS_ACUITY_SCORE: 56
TOILETING: 1-->ASSISTANCE (EQUIPMENT/PERSON) NEEDED
WALKING_OR_CLIMBING_STAIRS: AMBULATION DIFFICULTY, DEPENDENT;STAIR CLIMBING DIFFICULTY, DEPENDENT;TRANSFERRING DIFFICULTY, DEPENDENT
WEAR_GLASSES_OR_BLIND: YES
EQUIPMENT_CURRENTLY_USED_AT_HOME: LIFT DEVICE
TOILETING_ISSUES: YES
DRESSING/BATHING_DIFFICULTY: YES
CHANGE_IN_FUNCTIONAL_STATUS_SINCE_ONSET_OF_CURRENT_ILLNESS/INJURY: NO
DOING_ERRANDS_INDEPENDENTLY_DIFFICULTY: YES
ADLS_ACUITY_SCORE: 56
ADLS_ACUITY_SCORE: 46
ADLS_ACUITY_SCORE: 56
ADLS_ACUITY_SCORE: 39
ADLS_ACUITY_SCORE: 56
TOILETING_ASSISTANCE: TOILETING DIFFICULTY, ASSISTANCE 1 PERSON
DIFFICULTY_EATING/SWALLOWING: YES
ADLS_ACUITY_SCORE: 56
ADLS_ACUITY_SCORE: 39
ADLS_ACUITY_SCORE: 39
EATING/SWALLOWING_MANAGEMENT: SOFT DIET
DIFFICULTY_COMMUNICATING: NO

## 2024-04-27 ASSESSMENT — ENCOUNTER SYMPTOMS
COUGH: 1
SHORTNESS OF BREATH: 1
FEVER: 1
EYES NEGATIVE: 1
GASTROINTESTINAL NEGATIVE: 1

## 2024-04-27 NOTE — H&P
Formerly [facility]    History and Physical - Hospitalist Service       Date of Admission:  4/26/2024    Assessment & Plan      Letty Escobar is a 68 year old female admitted on 4/26/2024 who presented from [facility] with new fever of 101.8 F, shortness of breath, chest discomfort across anterior chest and left arm discomfort. She has home oxygen and uses 1 LPM at night. She has history of CHF, CAD, HTN, COPD, atrial fibrillation, pacemaker.    Principal problem:  Bilateral pneumonia  Acute respiratory failure with hypoxia and hypercapnia  COPD with exacerbation  Assessment: Patient hemodynamically stable. Normal lactate, procalcitonin and leukocytosis. CT scan showed bilateral infiltrates, no PE. Treatment in the ED: ceftriaxone and azithromycin IV, methylprednisolone IV. She was initially retaining CO2 after admission but gradually improved with oxygen titration down to keep O2 saturation above 89%.  Plan:    1. Admit to inpatient care.   2. Continue daily IV antibiotics: azithromycin and ceftriaxone.   3. Start prednisone on 4/28/24.   4. Continue Breo Ellipta inhaler.   5. Duo Nebs q4h prn.   6. Oxygen as needed to keep O2 saturation >89%.   7. VBG in am.    Active problems:  Type 2 diabetes mellitus  Assessment: Marked hyperglycemia due to IV steroids. On Lantus at home. Last Hgb A1c 10.3 1/5/24.  Plan:   1. Continue outpatient dose of Lantus.   2. Add short-acting insulin sliding scale for hyperglycemia.   3. Monitor BGM's QID.   4. Hypoglycemia treatment protocol.   5. Check Hgb A1c.    Coronary artery disease  S/P CABG x 5  Chronic systolic heart failure  Hypertension  Assessment: Minimal troponin elevation. No ACS suspected. Compensated heart failure. Controlled BP. At home on amlodipine, aspirin, furosemide, hydrochlorothiazide, metoprolol.  Plan:   1. Continue amlodipine, aspirin, metoprolol.   2. Hold furosemide (ERIC).   3. Not ordering hydrochlorothiazide (unusual  "combination of diuretics).    Acute kidney injury  Assessment: Recent Cr 0.97, here 1.24.  Plan:   1. IV fluids overnight at 75 ml/hr.   2. Repeat BMP in am.    Hypothyroidism  Assessment: On levothyroxine 137 mcg daily. It looks like the dose was increased in 12/24 from 112 mcg. TSH is till elevated, although less. FT4 is borderline low.  Plan:   1. Increase levothyroxine to 150 mcg daily.   2. Recheck TSH as outpatient in 8 weeks.    Panic disorder  Assessment: Stable on duloxetine, lorazepam, quetiapine, trazodone.  Plan: Continue.    Morbid obesity  Obesity hypoventilation syndrome  Assessment: Increasing patient's morbidity.  Plan: Oxygen as above.          Diet: Consistent Carbohydrate Diet Moderate Consistent Carb (60 g CHO per Meal) Diet    DVT Prophylaxis: Pneumatic Compression Devices  Mccallum Catheter: Not present  Lines: None     Cardiac Monitoring: ACTIVE order. Indication: SOB  Code Status: No CPR- Do NOT Intubate      Clinically Significant Risk Factors Present on Admission           # Hypercalcemia: Highest Ca = 10.3 mg/dL in last 2 days, will monitor as appropriate      # Drug Induced Platelet Defect: home medication list includes an antiplatelet medication   # Hypertension: Noted on problem list   # Acute Respiratory Failure: based on blood gas results.  Continue supplemental oxygen as needed    # DMII: A1C = N/A within past 6 months   # Obesity: Estimated body mass index is 34.44 kg/m  as calculated from the following:    Height as of this encounter: 1.6 m (5' 3\").    Weight as of this encounter: 88.2 kg (194 lb 7.1 oz).       # Financial/Environmental Concerns:           Disposition Plan     Medically Ready for Discharge: Anticipated in 2-4 Days           Brayan Velásquez MD  Hospitalist Service  [facility]  Securely message with [employer] (more info)  Text page via [facility] Paging/Directory "     ______________________________________________________________________    Chief Complaint   Fever, shortness of breath, chest pain.    History is obtained from the patient    History of Present Illness   Letty Escobar is a 68 year old female who was brought to the ED with reported new fever, with complaints of shortness of breath and chest discomfort.       Past Medical History    Past Medical History:   Diagnosis Date    ACP (advance care planning) 11/3/2010    Formatting of this note might be different from the original. Patient has identified Health Care Agent(s): Yes Add Health Care Agents: Yes   Health Care Agent(s):  Primary Health Care Agent:   Edwar Escobar Relationship:  Spouse Phone:   [phone]  Secondary Health Care Agent:   Chiki Oro Relationship:   Son Phone:   [phone]  Patient has Advance Care Plan Documents (Health Care Direct    Acute coronary syndrome (H) 11/22/2019    Acute exacerbation of CHF (congestive heart failure) (H) 11/11/2019    Acute on chronic systolic (congestive) heart failure (H) 1/28/2020    Anemia of chronic disease 1/5/2013    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, left leg (H) 1/15/2020    BPPV (benign paroxysmal positional vertigo) 5/31/2018    Candidiasis 3/1/2020    Carotid stenosis, right 7/13/2016    Carotid US 05/04/2018 showed moderate plaque formation, consistent with 50 to 69% stenosis in the right internal carotid artery, not significantly changed from 8/5/2015.  Moderate plaque formation, consistent with 50 to 69% stenosis in the left internal carotid artery; there has been mild progression of the left ICA stenosis since 8/5/2015.    Chest pain 11/11/2019    Chronic bilateral low back pain without sciatica 1/17/2018    Chronic pain disorder 10/1/2017    Constipation 3/20/2020    COPD (chronic obstructive pulmonary disease) (H) 6/24/2020    Coronary atherosclerosis 2/6/2009 2/5/2009 - MI - Proximal RCA  99%, mild-mod disease elsewhere.  EF 60%.  PCI:  MYRON to pRCA. 2/12/2009 - admit CP - Widely patent RCA stent. Moderate diffuse CAD. Severe stenosis in trivial PDA branch. LVEF 45%. 1/25/2010 - admit CP - PTCA and stent of diagonal 9/8/2010 - admit CP - LAD patent stent. Moderate diffuse CAD. Medical management recommended.  4/25/2012 - NSTEMI - Acute total occlusion of    Cubital tunnel syndrome on left 11/13/2012    Depressive disorder     Diabetes mellitus (H)     Diabetes mellitus type 2 in obese (H) 7/13/2006    Diabetes mellitus type 2 in obese (H) 7/13/2006    Drug-seeking behavior 4/4/2020    Failure to thrive in adult 9/3/2020    Hereditary and idiopathic peripheral neuropathy 4/24/2007    Hyperlipidemia with target low density lipoprotein (LDL) cholesterol less than 70 mg/dL 5/30/2007    Hypertension 10/14/2015    Hypothyroidism 12/10/2010    Irritable bowel syndrome 9/7/2015    Ischemic cardiomyopathy 9/20/2015    EF of 40-45%, status post RV lead revision and LV epicardial lead placement via mini-thoracotomy in August 2016.    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  Designated pharmacy: [facility] Prescribing physician: Andrea Diagnosis: Ulnar neuropathy    Panic disorder with agoraphobia 4/14/2020    Paroxysmal atrial fibrillation (H) 10/2/2015    Personality disorder (H) 3/5/2020    Rule out dependent personality    Polymyalgia rheumatica (H24) 11/28/2018    Posttraumatic stress disorder 3/1/2020    Restless legs syndrome (RLS) 8/29/2007    Restless legs syndrome (RLS) 8/29/2007    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    Serum calcium elevated 3/1/2020     Somatic dysfunction of sacral region 1/17/2018    Subacromial bursitis of left shoulder joint 8/6/2018    Suicidal ideation 4/1/2020    Thyroid disease     TIA (transient ischemic attack) 5/4/2018    TIA (transient ischemic attack) 5/4/2018    Tobacco abuse 2/17/2017    Tobacco abuse 2/17/2017    Urinary incontinence, mixed 9/24/2017    UTI (urinary tract infection) 4/17/2020    Vitamin B12 deficiency 2/14/2018    Weakness 12/17/2019       Past Surgical History   Past Surgical History:   Procedure Laterality Date    CARDIAC SURGERY      stents x 11    CARDIAC SURGERY      CHOLECYSTECTOMY      CHOLECYSTECTOMY      GENITOURINARY SURGERY      Tubal ligation    OTHER SURGICAL HISTORY      Genitourinary surgery    RELEASE CARPAL TUNNEL      RELEASE CARPAL TUNNEL         Prior to Admission Medications   Prior to Admission Medications   Prescriptions Last Dose Informant Patient Reported? Taking?   DULoxetine HCl 40 MG CPEP 4/26/2024 at 0826 Nursing Home Yes Yes   Sig: Take 40 mg by mouth daily   Insulin Aspart FlexPen 100 UNIT/ML SOPN 4/26/2024 at 1641  Yes Yes   Sig: Inject Subcutaneous 3 times daily (before meals) Sliding scale   GO=622-427, 2 units  BG= 250-299, 3 units  GY=327-609, 4 units  ED=344-556, 5 Units  BG>399, 6 units  BG>450 Call MD GEOVANNY CARRANZAAR 100 UNIT/ML soln 4/26/2024 at 2017  Yes Yes   Sig: Inject 22 Units Subcutaneous at bedtime   LORazepam (ATIVAN) 1 MG tablet 4/26/2024 at 2127  Yes Yes   Sig: Take 1 mg by mouth 2 times daily   Melatonin 10 MG TABS tablet 4/26/2024 at 2124 Nursing Home Yes Yes   Sig: Take 10 mg by mouth At Bedtime   QUEtiapine (SEROQUEL) 100 MG tablet 4/26/2024 at 2124  Yes Yes   Sig: Take 100 mg by mouth 3 times daily   acetaminophen (TYLENOL) 325 MG tablet 4/24/2024 at 0001  Yes Yes   Sig: Take 325 mg by mouth 2 times daily as needed for pain   acetaminophen (TYLENOL) 500 MG tablet 4/25/2024 at 2124 Nursing Home No Yes   Sig: Take 2 tablets (1,000 mg) by mouth 3 times daily    albuterol (PROVENTIL) (2.5 MG/3ML) 0.083% neb solution 4/26/2024 at 0526  Yes Yes   Sig: Take 2.5 mg by nebulization every 6 hours as needed for cough or wheezing   albuterol (PROVENTIL) (2.5 MG/3ML) 0.083% neb solution 4/26/2024 at 1805  Yes Yes   Sig: Take 1.25 mg by nebulization 2 times daily   amLODIPine (NORVASC) 5 MG tablet 4/26/2024 at 0800  Yes Yes   Sig: Take 5 mg by mouth daily   aspirin 81 MG EC tablet 4/26/2024 at 0836 [facility] Yes Yes   Sig: Take 81 mg by mouth daily   bisacodyl (DULCOLAX) 10 MG suppository 4/26/2024 at 2124  Yes Yes   Sig: Place 10 mg rectally daily as needed for constipation   cyanocobalamin (CYANOCOBALAMIN) 1000 MCG/ML injection 4/21/2024 at 2241  Yes No   Sig: Inject 1 mL into the muscle every 30 days 21st of each month   famotidine (PEPCID) 20 MG tablet 4/26/2024 at 2124  Yes Yes   Sig: Take 20 mg by mouth at bedtime   fluticasone-vilanterol (BREO ELLIPTA) 200-25 MCG/INH inhaler 4/26/2024 at 0830 [facility] Yes Yes   Sig: Inhale 1 puff into the lungs daily Rinse mouth after use   furosemide (LASIX) 20 MG tablet 4/25/2024 at 0841  Yes Yes   Sig: Take 20 mg by mouth every other day   gabapentin (NEURONTIN) 400 MG capsule 4/26/2024 at 2126  Yes Yes   Sig: Take 400 mg by mouth at bedtime RLS   hydrochlorothiazide (HYDRODIURIL) 25 MG tablet 4/26/2024 at 0843  Yes Yes   Sig: Take 25 mg by mouth daily   levothyroxine (SYNTHROID/LEVOTHROID) 137 MCG tablet 4/26/2024 at 0525  Yes Yes   Sig: Take 137 mcg by mouth daily   lidocaine (LMX4) 4 % external cream   Yes No   Sig: Apply topically 2 times daily To Rt knee   metoprolol succinate ER (TOPROL-XL) 25 MG 24 hr tablet 4/26/2024 at 0836 [facility] Yes Yes   Sig: Take 37.5 mg by mouth daily   mirtazapine (REMERON) 15 MG tablet 4/26/2024 at 2124  Yes Yes   Sig: Take 15 mg by mouth at bedtime   morphine sulfate (ROXANOL) 20 mg/mL (HIGH CONC) soln 4/7/2024  Yes Yes   nitroGLYcerin (NITROSTAT) 0.4 MG sublingual tablet Unknown  Yes Yes    Sig: Place 0.4 mg under the tongue every 5 minutes as needed for chest pain For chest pain place 1 tablet under the tongue every 5 minutes for 3 doses. If symptoms persist 5 minutes after 1st dose call 911.   ondansetron (ZOFRAN ODT) 4 MG ODT tab 2/6/2024  Yes No   Sig: Take 4 mg by mouth every 8 hours as needed for nausea   polyethylene glycol (MIRALAX) 17 GM/Dose powder 4/25/2024 at 0841  Yes Yes   Sig: Take 17 g by mouth every other day   potassium chloride ER (MICRO-K) 10 MEQ CR capsule 4/26/2024 at 0836 [facility] Yes Yes   Sig: Take 10 mEq by mouth daily    rosuvastatin (CRESTOR) 10 MG tablet 4/26/2024 at 2124 [facility] Yes Yes   Sig: Take 20 mg by mouth At Bedtime   sennosides (SENOKOT) 8.6 MG tablet 4/26/2024 at 2124 [facility] Yes Yes   Sig: Take 2 tablets by mouth At Bedtime   traZODone (DESYREL) 50 MG tablet 4/26/2024 at 2124  Yes Yes   Sig: Take 25 mg by mouth 2 times daily Patient can have 25 mg once a day and 75 mg once a day.      Facility-Administered Medications: None        Review of Systems    The 10 point Review of Systems is negative other than noted in the HPI or here.     Social History   I have reviewed this patient's social history and updated it with pertinent information if needed.  Social History     Tobacco Use    Smoking status: Never    Smokeless tobacco: Never   Vaping Use    Vaping status: Never Used   Substance Use Topics    Alcohol use: Not Currently    Drug use: Never         Family History     No significant family history contributing to current illness.      Allergies   Allergies   Allergen Reactions    Bee Pollen Anaphylaxis    Diphenhydramine Palpitations     Tolerated IV Benadryl when not pushed too fast    Isosorbide Nitrate Other (See Comments) and Dizziness     Also causes syncope (has fallen before) and brain fog/mental disturbances - please do not prescribe    Nitroglycerin Dizziness, Fatigue and Other (See Comments)     Specifically the patch - please do not  prescribe  Specifically the patch - please do not prescribe      Contrast Dye Hives and Itching    Penicillin G     Adhesive Tape Rash    Liquid Adhesive Itching    Nystatin Dermatitis     Also blisters    Penicillins Swelling and Rash     Occurred as a child - not 100% sure on specific reactions    Sulfa Antibiotics Other (See Comments)     Occurred as a child / patient does not remember specific reaction        Physical Exam   Vital Signs: Temp: 98.8  F (37.1  C) Temp src: Oral BP: 106/52 Pulse: 70   Resp: 14 SpO2: 93 % O2 Device: Nasal cannula Oxygen Delivery: 1 LPM  Weight: 194 lbs 7.13 oz    Constitutional: awake, alert, cooperative, no apparent distress, and appears stated age and moderately obese  Eyes: Lids and lashes normal, pupils equal, round and reactive to light, extra ocular muscles intact, sclera clear, conjunctiva normal  ENT: normocephalic, without obvious abnormality, atraumatic  Respiratory: No increased work of breathing, good air exchange, clear to auscultation bilaterally, no crackles or wheezing  Cardiovascular: Normal apical impulse, regular rate and rhythm, normal S1 and S2, no S3 or S4, and no murmur noted  GI: normal bowel sounds, soft, non-distended, and non-tender  Skin: normal skin color, texture, turgor and no rashes  Musculoskeletal: no lower extremity pitting edema present  there is no redness, warmth, or swelling of the joints  tone is normal  Neurologic: Awake, alert, oriented to name, place and time.  Cranial nerves II-XII are grossly intact.  Motor is 5 out of 5 bilaterally.  Sensory is intact.     Medical Decision Making       70 MINUTES SPENT BY ME on the date of service doing chart review, history, exam, documentation & further activities per the note.  MANAGEMENT DISCUSSED with the following over the past 24 hours: the patient, nurses, care coordination team   NOTE(S)/MEDICAL RECORDS REVIEWED over the past 24 hours: ED notes, primary care clinic notes       Data   EKG:  Electronic ventricular pacemaker, rate 77.    Imaging results reviewed over the past 24 hrs:   Recent Results (from the past 24 hour(s))   XR Chest Port 1 View    Narrative    EXAM: XR CHEST PORT 1 VIEW  LOCATION: [facility]  DATE: 4/26/2024    INDICATION: SOB, Cough  COMPARISON: 04/12/2024.    FINDINGS: Sternal wires and mediastinal clips. Left subclavian cardiac device. Heart valve prosthesis. There is no pneumothorax. The heart is enlarged. There is no pulmonary edema. The lungs are clear.      Impression    IMPRESSION: No acute abnormality.   CT Chest Pulmonary Embolism w Contrast    Narrative    EXAM: CT CHEST PULMONARY EMBOLISM W CONTRAST  LOCATION: [facility]  DATE: 4/27/2024    INDICATION: chest pain, shortness of breath, elevated d dimer  COMPARISON: Chest radiograph 04/26/2024.  TECHNIQUE: CT chest pulmonary angiogram during arterial phase injection of IV contrast. Multiplanar reformats and MIP reconstructions were performed. Dose reduction techniques were used.   CONTRAST: Isovue 370, 68mL    FINDINGS:  ANGIOGRAM CHEST: Dilatation of the central pulmonary arteries with the pulmonary trunk measuring up to 4.0 cm. A CardioMEMS implant is seen within a left lower lobe subsegmental pulmonary artery. No other pulmonary arterial filling defect identified.   Thoracic aorta is negative for dissection.     LUNGS AND PLEURA: Patent central airways. Bronchial wall thickening with areas of mucous plugging. Mild bilateral lower lobe consolidation. Clustered nodular opacities within the right lower lobe (series 6, image 150) and left upper lobe (series 6, image   114). No significant pleural effusion. No pneumothorax. Scattered calcified granulomas.    MEDIASTINUM/AXILLAE: Cardiomegaly. Mild mediastinal lymphadenopathy, for example a subcarinal lymph node measuring 1.6 cm short axis (series 4, image 124). Left chest wall cardiac conduction device with  right atrial and right ventricular leads. Prior   mitral valve replacement and tricuspid valve replacement.    CORONARY ARTERY CALCIFICATION: Previous intervention (stents or CABG).    UPPER ABDOMEN: Hepatic steatosis. Prior cholecystectomy. Nonobstructive right renal calculus.    MUSCULOSKELETAL: Thoracic spondylosis. No destructive osseous lesion. Prior median sternotomy.      Impression    IMPRESSION:  1.  No evidence of acute pulmonary malignancy.  2.  Clustered nodular opacities in the right lower and left upper lobes, likely infectious/inflammatory. Mild bilateral lower lobe consolidation which could represent atelectasis and/or pneumonia.  3.  Dilatation of the central pulmonary arteries, which can be seen with pulmonary hypertension.  4.  Mild mediastinal lymphadenopathy, likely reactive.  5.  Hepatic steatosis.     Most Recent 3 CBC's:  Recent Labs   Lab Test 04/26/24 2252 04/12/24 0046 03/24/24 0329   WBC 6.8 7.3 7.4   HGB 9.0* 9.1* 9.1*   MCV 92 91 91   * 150 139*     Most Recent 3 BMP's:  Recent Labs   Lab Test 04/27/24  1653 04/27/24  1201 04/27/24  1011 04/27/24  0933 04/27/24  0620 04/26/24 2252 04/12/24  0046   NA  --   --   --  135  --  135 136   POTASSIUM  --   --   --  4.7  --  4.1 4.1   CHLORIDE  --   --   --  97*  --  98 100   CO2  --   --   --  21*  --  23 24   BUN  --   --   --  31.2*  --  29.7* 28.1*   CR  --   --   --  1.24*  --  1.25* 0.97*   ANIONGAP  --   --   --  17*  --  14 12   ORESTES  --   --   --  9.9  --  10.3* 10.7*   * 432* 460* 484*   < > 228* 243*    < > = values in this interval not displayed.     Most Recent 2 LFT's:  Recent Labs   Lab Test 04/26/24 2252 03/24/24 0329   AST 40 33   ALT 23 32   ALKPHOS 43 43   BILITOTAL 0.2 0.2     Most Recent 3 Troponin's: 34, 35  Most Recent 3 BNP's:  Recent Labs   Lab Test 04/26/24 2252 03/24/24 0329 09/18/23  0117 03/16/21  1434 01/29/21  0725   NTBNPI 1,008* 657 572   < >  --    NTBNP  --   --   --   --  2,197*    < > =  values in this interval not displayed.     Most Recent TSH and T4:  Recent Labs   Lab Test 04/26/24 2252   TSH 7.12*   T4 0.91     Most Recent ESR & CRP:  Recent Labs   Lab Test 04/26/24 2252   CRPI 26.31*     Component  Ref Range & Units 04/26/24 2252   pH Venous  7.32 - 7.43 7.31 Low    pCO2 Venous  40 - 50 mm Hg 51 High    pO2 Venous  25 - 47 mm Hg 55 High    Bicarbonate Venous  21 - 28 mmol/L 25   Base Excess/Deficit Venous  -3.0 - 3.0 mmol/L -1.6   FIO2 36   Oxyhemoglobin Venous  70 - 75 % 83 High    O2 Sat, Venous  70.0 - 75.0 % 84.9 High        Component  Ref Range & Units 04/27/24 1415 04/27/24 1147 04/27/24 0933   pH Venous  7.32 - 7.43 7.33 7.29 Low  7.24 Low    pCO2 Venous  40 - 50 mm Hg 50 51 High  56 High    pO2 Venous  25 - 47 mm Hg 37 67 High  31   Bicarbonate Venous  21 - 28 mmol/L 26 24 24   Base Excess/Deficit Venous  -3.0 - 3.0 mmol/L 0.0 -2.6 -3.9 Low    FIO2 21 24 21   Oxyhemoglobin Venous  70 - 75 % 67 Low  91 High  50 Low    O2 Sat, Venous  70.0 - 75.0 % 68.5 Low  92.6 High  50.6 Low

## 2024-04-27 NOTE — CONSULTS
Care Management Initial ConsultCare Management Initial Consult      General Information  Assessment completed with: Patient, Caregiver-at SNF    Type of CM/SW Visit: Offer D/C Planning     Primary Care Provider verified and updated as needed: Yes   Readmission within the last 30 days:   No         Reason for Consult: discharge planning  Advance Care Planning: Advance Care Planning Reviewed: no concerns identified           Communication Assessment  Patient's communication style: spoken language (English or Bilingual)    Hearing Difficulty or Deaf: no   Wear Glasses or Blind: yes     Cognitive  Cognitive/Neuro/Behavioral: .WDL except  Level of Consciousness: alert, lethargic, intermittent confusion  Arousal Level: opens eyes spontaneously  Orientation: disoriented to, time  Mood/Behavior: cooperative     Speech: clear     Living Environment:   People in home: facility resident  [facility]  Current living Arrangements: extended care facility  Name of Facility: [facility]   Able to return to prior arrangements: yes        Family/Social Support:  Care provided by: 24 hour care from SNF staff  Provides care for: no one, unable/limited ability to care for self  Marital Status:   , Facility resident(s)/Staff  [place]       Description of Support System: Supportive, Involved    Support Assessment: Adequate family and caregiver support, Adequate social supports     Current Resources:   Patient receiving home care services: No    Community Resources: Skilled Nursing Facility  Equipment currently used at home:    Supplies currently used at home: Diabetic Supplies, Incontinence Supplies     Employment/Financial:  Employment Status: disabled        Financial Concerns: none, currently on MA      Does the patient's insurance plan have a 3 day qualifying hospital stay waiver?  Yes      Which insurance plan 3 day waiver is available? Alternative insurance waiver     Will the waiver be used for post-acute  placement? No     Lifestyle & Psychosocial Needs:  Social Determinants of Health      Functional Status:  Prior to admission patient needed assistance:   Dependent ADLs:: Bathing, Dressing, Grooming, Incontinence, Positioning, Transfers, Eating, Wheelchair-with assist, Toileting  Dependent IADLs:: Cleaning, Cooking, Laundry, Shopping, Meal Preparation, Medication Management, Money Management, Transportation, Incontinence  Assesssment of Functional Status: At functional baseline (Currently wheelchair bound/josafat lift at LTC facility)     Mental Health Status:  Mental Health Status: Current Concern  Mental Health Management: Medication (Per EMR: Hx of Personality Disorder, Depression, Panic disorder with agoraphobia)     Chemical Dependency Status:  Chemical Dependency Status: No Current Concerns              Values/Beliefs:  Spiritual, Cultural Beliefs, Yazidi Practices, Values that affect care: no                Additional Information:  Referral received to assist with discharge planning as Pt resides in a LTC facility: [facility]     CM placed call to the RN-at [facility] to obtain an update on the pt's baseline ADL status and confirm bed hold.      RN reported the Pt has been a Josafat Lift at baseline.       At baseline, pt has been her own decision maker. Alert x 4 at baseline.   RN reports the pt to be more alert and engaged in her cares but the spouse has shown to be more confused on a regular basis.     CM was unable to complete assessment with the Patient today due to continue lethargy.   Will attempt to visit with patient tomorrow.       Patient typically utilizes [employer].   # [phone]  - is very familiar with pt and knows to grab the pt's manual w/c from the SNF before arrival.      [id number] # [phone]       PLAN: CM to assist with facilitating transfer back to [facility] when medically stable          FILEMON Maldonado  [place]  [phone]   [facility]  [phone]

## 2024-04-27 NOTE — ED PROVIDER NOTES
History     Chief Complaint   Patient presents with    Shortness of Breath     HPI  Letty Escobar is a 68 year old female with extensive medical history including chronic pain, COPD, hypertension, anemia, CHF, coronary disease with CABG x 5, atrial fibrillation with pacemaker who presents to the ER via ambulance from the [facility] secondary to concerns of increasing shortness of breath symptoms.  Patient states that the symptoms started getting worse on Tuesday this week.  EMS reported that the patient called the ambulance herself from the [facility].  It was reported that the patient had a chest x-ray done earlier in the day at the [facility] and it was reported to be negative for any signs of pneumonia.  Patient is on chronic oxygen therapy at the [facility] typically at 2 L/min but had increased it to 4 L/min by the EMS crew y because of increasing shortness of breath symptoms and inability keep her oxygen saturations above 90% on the 2 L./min.  She denies any significant chest pain but was found to be actively wheezing on presentation to the ER.  She was also found to have evidence of a fever on presentation to the .8.  DuoNeb treatments initiated by respiratory therapist with some improvement in her shortness of breath symptoms.    Allergies:  Allergies   Allergen Reactions    Bee Pollen Anaphylaxis    Diphenhydramine Palpitations     Tolerated IV Benadryl when not pushed too fast    Isosorbide Nitrate Other (See Comments) and Dizziness     Also causes syncope (has fallen before) and brain fog/mental disturbances - please do not prescribe    Nitroglycerin Dizziness, Fatigue and Other (See Comments)     Specifically the patch - please do not prescribe  Specifically the patch - please do not prescribe      Contrast Dye Hives and Itching    Penicillin G     Adhesive Tape Rash    Liquid Adhesive Itching    Nystatin Dermatitis     Also blisters    Penicillins Swelling and Rash     Occurred as  a child - not 100% sure on specific reactions    Sulfa Antibiotics Other (See Comments)     Occurred as a child / patient does not remember specific reaction       Problem List:    Patient Active Problem List    Diagnosis Date Noted    COPD exacerbation (H) 04/27/2024     Priority: Medium    Acute respiratory failure with hypoxia (H) 04/27/2024     Priority: Medium    Sepsis (H) 04/27/2024     Priority: Medium    COPD with acute exacerbation (H) 04/27/2024     Priority: Medium    Shortness of breath 04/27/2024     Priority: Medium    Fever in other diseases 04/27/2024     Priority: Medium    Lethargy 11/10/2023     Priority: Medium    Urinary tract infection without hematuria, site unspecified 11/10/2023     Priority: Medium    Fever 11/10/2023     Priority: Medium    Hypoxia 11/10/2023     Priority: Medium    Pyuria 11/10/2023     Priority: Medium    PAD (peripheral artery disease) (H24) 11/10/2023     Priority: Medium    Left foot pain 11/10/2023     Priority: Medium    History of stroke 11/10/2023     Priority: Medium    Lives in long-term care facility 11/10/2023     Priority: Medium    Obesity hypoventilation syndrome (H) 11/10/2023     Priority: Medium    Benzodiazepine dependence (H) 11/10/2023     Priority: Medium    Thrombocytopenia (H24) 11/10/2023     Priority: Medium    ERIC (acute kidney injury) (H24) 11/10/2023     Priority: Medium    Chronic kidney disease, stage 3a (H) 10/16/2022     Priority: Medium    SOB (shortness of breath) 04/07/2022     Priority: Medium    Type 2 diabetes mellitus (H) 02/13/2022     Priority: Medium    Morbid obesity (H) 12/10/2021     Priority: Medium    ICD (implantable cardioverter-defibrillator) in place 11/17/2021     Priority: Medium     Formatting of this note is different from the original.  Date of last device in office evaluation: 5/17/2022    ?  and model: Medtronic Cobalt CRT-D.   Date of implant: 5/7/2021  Tachy therapies remain OFF: S/p downgrade from  ICD to pacemaker, but her implanted system includes a DF-4 lead, so an ICD generator was placed with the tachycardia therapies disabled.     ? Indication for device: Ischemic cardiomyopathy   ? Cardiac resynchronization therapy:   no     MRI Conditional:  No  o If No:  Reason why:  Abandoned LV lead    ? Battery longevity documented as less than 3  Months: No  ? Are any of the leads less than 3 months old:  No    ? Programming              ? Pacing mode and programmed lower rate: DDDR 60 - 130 bpm              ? Rate-responsive sensor type, if programmed on: Accelerometer        Underlying rhythm and heart rate:  SR @ 60 bpm    ? What is the response of this device to magnet placement:  None - tachy therapies off  ? PM magnet pacing rate: N/A   ? Any alert status on CIED generator or lead: No    ? Last pacing threshold    Atrial   1.0 V @ 0.4 ms   Ventricular RV: 0.75 V @ 0.4 ms  LV:  2.75 V @ 1.0 ms    Formatting of this note is different from the original.  Date of last device in office evaluation: 11/17/2022     ?  and model: Medtronic Cobalt CRT-D.   Date of implant: 5/7/2021  Tachy therapies remain OFF: S/p downgrade from ICD to pacemaker, but her implanted system includes a DF-4 lead, so an ICD generator was placed with the tachycardia therapies disabled.     ? Indication for device: Ischemic cardiomyopathy   ? Cardiac resynchronization therapy:   no     MRI Conditional:  No  o If No:  Reason why:  Abandoned LV lead    ? Battery longevity documented as less than 3  Months: No  ? Are any of the leads less than 3 months old:  No    ? Programming              ? Pacing mode and programmed lower rate: DDDR 60 - 130 bpm              ? Rate-responsive sensor type, if programmed on: Accelerometer        Underlying rhythm and heart rate:  Sinus rhythm 62 bpm, w/BBB    ? What is the response of this device to magnet placement:  None - tachy therapies off  ? PM magnet pacing rate: N/A   ? Any alert status on  CIED generator or lead: No    ? Last pacing threshold    Atrial   1.0 V @ 0.4 ms   Ventricular RV: 0.75 V @ 0.4 ms  LV:  2.75 V @ 1.0 ms      Atrial fibrillation (H) 04/06/2021     Priority: Medium    Pacemaker 04/06/2021     Priority: Medium    Major depressive disorder, recurrent severe without psychotic features (H) 01/26/2021     Priority: Medium    Panic disorder with agoraphobia 04/14/2020     Priority: Medium    Constipation 03/20/2020     Priority: Medium    Personality disorder (H) 03/05/2020     Priority: Medium     Rule out dependent personality    Formatting of this note might be different from the original.  Rule out dependent personality  Formatting of this note might be different from the original.  Rule out dependent personality      Candidiasis 03/01/2020     Priority: Medium    Posttraumatic stress disorder 03/01/2020     Priority: Medium    COPD without exacerbation (H) 01/29/2020     Priority: Medium    Long term (current) use of insulin (H) 01/29/2020     Priority: Medium    Chronic systolic heart failure (H) 01/28/2020     Priority: Medium    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, left leg (H) 01/15/2020     Priority: Medium    Chest pain 11/11/2019     Priority: Medium    Polymyalgia rheumatica (H24) 11/28/2018     Priority: Medium    Unstable angina (H) 05/02/2018     Priority: Medium     Coronary angiogram 05/02/2018 demonstrated 30% stenosis in the LMCA, 50% stenosis in the Proximal LAD, 50% stenosis in the Mid LAD, 95% stenosis in the Proximal Circumflex (Restenosis - Balloon Angioplasty), 100% stenosis in the 1st Marginal, 50% stenosis in the Proximal RCA, 50% stenosis in the Distal RCA, the LIMA graft from the LIMA to the Distal LAD is free of significant disease; the SVG graft from the Aorta to the 1st Marginal is free of significant disease; the SVG graft from the Aorta to the Distal RCA is free of significant disease. Intervention  included a successful  2.5mm x 12mm Balloon,  2.5mm x 10mm Cutting Balloon,  3mm x 12mm Drug Eluting Stent,  3mm x 12mm Balloon, and  3mm x 8mm Balloon to Proximal Circumflex, post stenosis 0%.    Formatting of this note is different from the original.  Coronary angiogram 05/02/2018 demonstrated 30% stenosis in the LMCA, 50% stenosis in the Proximal LAD, 50% stenosis in the Mid LAD, 95% stenosis in the Proximal Circumflex (Restenosis - Balloon Angioplasty), 100% stenosis in the 1st Marginal, 50% stenosis in the Proximal RCA, 50% stenosis in the Distal RCA, the LIMA graft from the LIMA to the Distal LAD is free of significant disease; the SVG graft from the Aorta to the 1st Marginal is free of significant disease; the SVG graft from the Aorta to the Distal RCA is free of significant disease. Intervention included a successful  2.5mm x 12mm Balloon,  2.5mm x 10mm Cutting Balloon,  3mm x 12mm Drug Eluting Stent,  3mm x 12mm Balloon, and  3mm x 8mm Balloon to Proximal Circumflex, post stenosis 0%.  Formatting of this note is different from the original.  Coronary angiogram 05/02/2018 demonstrated 30% stenosis in the LMCA, 50% stenosis in the Proximal LAD, 50% stenosis in the Mid LAD, 95% stenosis in the Proximal Circumflex (Restenosis - Balloon Angioplasty), 100% stenosis in the 1st Marginal, 50% stenosis in the Proximal RCA, 50% stenosis in the Distal RCA, the LIMA graft from the LIMA to the Distal LAD is free of significant disease; the SVG graft from the Aorta to the 1st Marginal is free of significant disease; the SVG graft from the Aorta to the Distal RCA is free of significant disease. Intervention included a successful  2.5mm x 12mm Balloon,  2.5mm x 10mm Cutting Balloon,  3mm x 12mm Drug Eluting Stent,  3mm x 12mm Balloon, and  3mm x 8mm Balloon to Proximal Circumflex, post stenosis 0%.      Vitamin B12 deficiency 02/14/2018     Priority: Medium    Chronic bilateral low back pain without sciatica 01/17/2018      Priority: Medium    Chronic pain disorder 10/01/2017     Priority: Medium    Urinary incontinence, mixed 09/24/2017     Priority: Medium    Internal carotid artery stenosis, bilateral 07/13/2016     Priority: Medium     Carotid US 05/04/2018 showed moderate plaque formation, consistent with 50 to 69% stenosis in the right internal carotid artery, not significantly changed from 8/5/2015.  Moderate plaque formation, consistent with 50 to 69% stenosis in the left internal carotid artery; there has been mild progression of the left ICA stenosis since 8/5/2015.    Formatting of this note might be different from the original.  Carotid US 05/04/2018 showed moderate plaque formation, consistent with 50 to 69% stenosis in the right internal carotid artery, not significantly changed from 8/5/2015.  Moderate plaque formation, consistent with 50 to 69% stenosis in the left internal carotid artery; there has been mild progression of the left ICA stenosis since 8/5/2015.    Formatting of this note might be different from the original.  Carotid US 05/04/2018 showed moderate plaque formation, consistent with 50 to 69% stenosis in the right internal carotid artery, not significantly changed from 8/5/2015.  Moderate plaque formation, consistent with 50 to 69% stenosis in the left internal carotid artery; there has been mild progression of the left ICA stenosis since 8/5/2015.      Essential (primary) hypertension 10/14/2015     Priority: Medium    Ischemic cardiomyopathy 09/20/2015     Priority: Medium     EF of 40-45%, status post RV lead revision and LV epicardial lead placement via mini-thoracotomy in August 2016.    Formatting of this note might be different from the original.  EF of 40-45%, status post RV lead revision and LV epicardial lead placement via mini-thoracotomy in August 2016.  Formatting of this note might be different from the original.  EF of 40-45%, status post RV lead revision and LV epicardial lead placement via  "mini-thoracotomy in August 2016.      S/P CABG x 5 08/21/2015     Priority: Medium    Pain medication agreement 04/20/2013     Priority: Medium     Controlled substance agreement for percocet #30/month on file and signed 4/17/13.  Designated pharmacy: [facility] Prescribing physician: Andrea Diagnosis: Ulnar neuropathy    Formatting of this note might be different from the original.  Controlled substance agreement for percocet #30/month on file and signed 4/17/13.  Designated pharmacy: [facility] Prescribing physician: Andrea Diagnosis: Ulnar neuropathy      Anemia in other chronic diseases classified elsewhere 01/05/2013     Priority: Medium    Hypothyroidism, unspecified 12/10/2010     Priority: Medium    ACP (advance care planning) 11/03/2010     Priority: Medium     Formatting of this note might be different from the original.  Patient has identified Health Care Agent(s): Yes  Add Health Care Agents: Yes    Health Care Agent(s):    Primary Health Care Agent:     Edwar Escobar Relationship:    Spouse Phone:     [phone]    Secondary Health Care Agent:     Chiki Oro Relationship:     Son Phone:     [phone]      Patient has Advance Care Plan Documents (Health Care Directive, POLST): Yes    Advance Care Plan Documents:  Health Care Directive--03/28/11  Resuscitation Guidelines--    Patient has identified Specific Treatment Preferences: Yes   Specific Treatment Preferences:   a.) Code Status:  DNR/ Do Not Attempt Resuscitation - Allow a Natural Death    b.) Goals of Treatment:     ii. Limited Interventions and treat reversible conditions.  Provide interventions aimed at treatment of new or reversible illness/injury or non-life threatening chronic conditions. Duration of invasive or uncomfortable interventions should generally be limited.- Trial of intubation 5 days or other instructions \"If no improvement, stop.\"  c.) Interventions and Treatments:   i.   Antibiotics:          - Aggressive antibiotics  ii.  " "Nutrition/Hydration:         - Offer food and liquids by mouth         - IV fluid administration         - No feeding tube under any circumstance.  iii. Transfusion:          - Blood products for comfort/relief of symptoms only  iv. Dialysis:           - Dialysis for short term \"for recovery, but not long term.\"        Last Assessment & Plan:   Advance Care Planning: Disease-specific Session    Letty Escobar is an [facility] patient of Dr. Renea Mendez of the [facility].    Advance care planning discussions were completed with Letty and her healthcare agent, spouse Edwar Escobar on Monday, March 28, 2011 at the [facility] Room.    Understanding of Illness and Disease [place]:   Letty identifies her medical condition as diabetes with peripheral neuropathy, heart problems with a heart attack February 2009 plus 4 stent placements; sleep apnea; depression; hypothyroid; high cholesterol; tobacco use; and describes it as needing further assistance with thyroid and diabetes management.  She identifies the following symptoms of her medical condition as being the most bothersome: some memory loss, balance problems, light headedness and dizziness, puffy eyes and lower extremity edema from time to time.    Let[place] is retired [place] .  She is independent with all cares and lives an active life style although, \"I'm not as active as I used to be.\"    Goals of Care:   Letty currently hopes to maintain independence, control pain and symptoms and delay progression of, but not cure, the illness.  \"I want to get the diabetes and thyroid under better control.\"  Letty has an appointment the first part of April for follow up.    Quality of Life:    Letty identifies quality of life as family involvement twice weekly, Yazdanism activities, newly assigned Sunday , playing cards, the computer,    Letty christiano with serious " "challenges in her life through her ganesh in God, prayers and support of her Rastafari family, spouse and son.    Letty identifies the following fears and worries about her medical care:  \"I just want the diabetes straightened out.\"    Treatment and Care Preferences:   Past experiences in dealing with family and/or friends that have  or been seriously ill include her brother who took his own life.  \"He was 53 years old and living with us.  I found him.\"   As a result of these experiences, Letty expresses these health care preferences:      Summary Letty's Treatment Preferences:  LOW SURVIVAL; HIGH TREATMENT BURDEN:  If Letty suffered a serious complication, such that she was facing a prolonged hospital stay, required ongoing medical interventions, and the chance of living through the complication was low (for example, only 5 out of 100 would live), Letty would choose:  to focus treatment on comfort and quality of life (\"Quality of life is more important than length of life to Letty.\")  COMMENT:  \"If I can't live a normal life, I wouldn't want to live.\"    HIGH SURVIVAL; LOW FUNCTIONAL STATUS:  If Letty had a serious complication and had a good chance of living through the complication but it was expected that she would never be able to walk or talk again and would require 24 hour nursing care, she would choose:  to focus treatment on comfort and quality of life (\"Quality of life is more important than length of life to Letty.\")  COMMENT:  \"I'd accept rehabilitation.\"    HIGH SURVIVAL; LOW COGNITIVE STATUS:  If Letty had a serious complication and had a good chance of living through the complication but it was expected that she would never know who she was or who she was with and would require 24 hour nursing care, she would choose:  to focus treatment on comfort and quality of life (\"Quality of life is more important than length of life to Letty.\")    CARDIO-PULMONARY RESUSCITATION (CPR):  The facts, risks and " "benefits of CPR were discussed with Letty.  If she had a sudden event that caused her heart and breathing to stop, she:  WOULD NOT want CPR attempted and instead would prefer that a natural death occur.    MECHANICAL VENTILATION: If Letty had an episode where she was unable to breathe on her own, she would choose the following:  attempt to use any appropriate non-invasive method to assist breathing, and use mechanical ventilation.  COMMENT:  \"If reversible ventilator is acceptable for 5 days.  If no improvement, stop.\"     Letty has chosen her healthcare agent to:  strictly follow her wishes.    Follow Up Plan:   Letty was encouraged to continue advance care planning discussions with her health care agents and primary care provider.   Letty and her health care agent declined handouts.     Documents addressed during this advance care planning session:  1.  Health Care Agents identified.          .  Primary health care agent is spouse, Edwar Escobar;          .  Secondary health care agent is son, Jermaine Oro.  2.  Health Care Directive completed and scanned into medical record.  3.  Statement of Treatment Preferences for advanced illness completed and scanned into the medical record.  4.  Resuscitation Guidelines completed for DNR.  Document sent to Dr. Renea Mendez for signature and returned to the home. Letty, please place on the refrigerator.    Recommendations/Plan:   Letty and her health care agent to review Advance Care Plan.     Letty would benefit from:   Care Navigation/Care Navigation Help Desk--explained services for pending future needs.  No identified needs today.  Care Navigation brochure was given to Letty and her healthcare agent.    Advance Care Planning recommendations and Letty's concerns and questions were cc ed to her primary provider.     Thank you, Letty for the opportunity of assisting with Advance Care Planning. It was a seeing you again and meeting Edwar.  Please contact me if I can " be of further assistance.    Interviewer: Pat Martin RN    Advance Care Planning Facilitator  [phone]   3/28/2011      ELI (obstructive sleep apnea) 01/31/2010     Priority: Medium     PSG on April/2008 that showed moderate Obstructive Sleep Apnea with an AHI of 23.5 . Limb movements persisted at 29 movements/hour at optimal pressures, despite carbidopa use.    Formatting of this note might be different from the original.  PSG on April/2008 that showed moderate Obstructive Sleep Apnea with an AHI of 23.5 . Limb movements persisted at 29 movements/hour at optimal pressures, despite carbidopa use.  Formatting of this note might be different from the original.  PSG on April/2008 that showed moderate Obstructive Sleep Apnea with an AHI of 23.5 . Limb movements persisted at 29 movements/hour at optimal pressures, despite carbidopa use.      Coronary atherosclerosis 02/06/2009     Priority: Medium     Formatting of this note might be different from the original.  2/5/2009 - MI - Proximal RCA 99%, mild-mod disease elsewhere.  EF 60%.  PCI:  MYRON to pRCA.  2/12/2009 - admit CP - Widely patent RCA stent. Moderate diffuse CAD. Severe stenosis in trivial PDA branch. LVEF 45%.  1/25/2010 - admit CP - PTCA and stent of diagonal  9/8/2010 - admit CP - LAD patent stent. Moderate diffuse CAD. Medical management recommended.   4/25/2012 - NSTEMI - Acute total occlusion of the ostial Circumflex. Acute total occlusion of the ostial ramus. Acute total occlusion of the distal 1st Obtuse Marginal. Angioplasty of ostial circumflex and ostial ramus. There was distal embolization to the OM1 vessel. This vessel was small and not amendable to intervention. Indefinite: plavix and asa 81.  8/10/2015 - CABx5 - 1. LIMA to distal LAD, reverse SVG to OM1 and OM2, reverse SVG to diagonal, reverse SVG to PDA.   2. Mitral valve repair with a Medtronics 3-D full ring, 28 mm.   3. Tricuspid repair with a Medtronic 28 mm partial ring  1/12/2016 -  TTE - EF 40-45%  Formatting of this note might be different from the original.  2/5/2009 - MI - Proximal RCA 99%, mild-mod disease elsewhere.  EF 60%.  PCI:  MYRON to pRCA.  2/12/2009 - admit CP - Widely patent RCA stent. Moderate diffuse CAD. Severe stenosis in trivial PDA branch. LVEF 45%.  1/25/2010 - admit CP - PTCA and stent of diagonal  9/8/2010 - admit CP - LAD patent stent. Moderate diffuse CAD. Medical management recommended.   4/25/2012 - NSTEMI - Acute total occlusion of the ostial Circumflex. Acute total occlusion of the ostial ramus. Acute total occlusion of the distal 1st Obtuse Marginal. Angioplasty of ostial circumflex and ostial ramus. There was distal embolization to the OM1 vessel. This vessel was small and not amendable to intervention. Indefinite: plavix and asa 81.  8/10/2015 - CABx5 - 1. LIMA to distal LAD, reverse SVG to OM1 and OM2, reverse SVG to diagonal, reverse SVG to PDA.   2. Mitral valve repair with a Medtronics 3-D full ring, 28 mm.   3. Tricuspid repair with a Medtronic 28 mm partial ring  1/12/2016 - TTE - EF 40-45%      Restless legs syndrome 08/29/2007     Priority: Medium    Hyperlipidemia, unspecified 05/30/2007     Priority: Medium        Past Medical History:    Past Medical History:   Diagnosis Date    ACP (advance care planning) 11/3/2010    Acute coronary syndrome (H) 11/22/2019    Acute exacerbation of CHF (congestive heart failure) (H) 11/11/2019    Acute on chronic systolic (congestive) heart failure (H) 1/28/2020    Anemia of chronic disease 1/5/2013    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, left leg (H) 1/15/2020    BPPV (benign paroxysmal positional vertigo) 5/31/2018    Candidiasis 3/1/2020    Carotid stenosis, right 7/13/2016    Chest pain 11/11/2019    Chronic bilateral low back pain without sciatica 1/17/2018    Chronic pain disorder 10/1/2017    Constipation 3/20/2020    COPD (chronic obstructive pulmonary disease)  (H) 6/24/2020    Coronary atherosclerosis 2/6/2009    Cubital tunnel syndrome on left 11/13/2012    Depressive disorder     Diabetes mellitus (H)     Diabetes mellitus type 2 in obese (H) 7/13/2006    Diabetes mellitus type 2 in obese (H) 7/13/2006    Drug-seeking behavior 4/4/2020    Failure to thrive in adult 9/3/2020    Hereditary and idiopathic peripheral neuropathy 4/24/2007    Hyperlipidemia with target low density lipoprotein (LDL) cholesterol less than 70 mg/dL 5/30/2007    Hypertension 10/14/2015    Hypothyroidism 12/10/2010    Irritable bowel syndrome 9/7/2015    Ischemic cardiomyopathy 9/20/2015    Long-term use of high-risk medication 4/14/2020    Moniliasis, 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    Myocardial infarction (H)     Neuromuscular disorder (H)     Other specified postprocedural states 10/8/2015    Pain medication agreement 4/20/2013    Panic disorder with agoraphobia 4/14/2020    Paroxysmal atrial fibrillation (H) 10/2/2015    Personality disorder (H) 3/5/2020    Polymyalgia rheumatica (H24) 11/28/2018    Posttraumatic stress disorder 3/1/2020    Restless legs syndrome (RLS) 8/29/2007    Restless legs syndrome (RLS) 8/29/2007    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    Serum calcium elevated 3/1/2020    Somatic dysfunction of sacral region 1/17/2018    Subacromial bursitis of left shoulder joint 8/6/2018    Suicidal ideation 4/1/2020    Thyroid disease     TIA (transient ischemic attack) 5/4/2018    TIA (transient ischemic attack) 5/4/2018    Tobacco abuse 2/17/2017    Tobacco abuse 2/17/2017    Urinary incontinence, mixed 9/24/2017    UTI (urinary tract infection) 4/17/2020    Vitamin B12 deficiency 2/14/2018    Weakness 12/17/2019       Past Surgical History:    Past Surgical History:   Procedure Laterality Date    CARDIAC SURGERY      stents x 11    CARDIAC SURGERY      CHOLECYSTECTOMY      CHOLECYSTECTOMY      GENITOURINARY SURGERY      Tubal ligation    OTHER  SURGICAL HISTORY      Genitourinary surgery    RELEASE CARPAL TUNNEL      RELEASE CARPAL TUNNEL         Family History:    No family history on file.    Social History:  Marital Status:   [2]  Social History     Tobacco Use    Smoking status: Never    Smokeless tobacco: Never   Vaping Use    Vaping status: Never Used   Substance Use Topics    Alcohol use: Not Currently    Drug use: Never        Medications:    No current outpatient medications on file.        Review of Systems   Constitutional:  Positive for fever.        Review of symptoms was difficult to obtain from the patient as she is actively receiving a DuoNeb treatment at time and had significant work of breathing making it difficult for her to speak in more than 1 or 2 word sentences.   HENT:  Positive for congestion.    Eyes: Negative.    Respiratory:  Positive for cough and shortness of breath.    Cardiovascular:  Negative for chest pain.   Gastrointestinal: Negative.    All other systems reviewed and are negative.      Physical Exam   BP: 127/57  Pulse: 89  Temp: (!) 101.8  F (38.8  C)  Resp: 24  Weight: 90.7 kg (200 lb)  SpO2: (!) 89 %      Physical Exam  Vitals and nursing note reviewed.   Constitutional:       Appearance: She is ill-appearing.   HENT:      Head: Atraumatic.   Eyes:      Extraocular Movements: Extraocular movements intact.      Pupils: Pupils are equal, round, and reactive to light.   Neck:      Vascular: No JVD.   Cardiovascular:      Rate and Rhythm: Normal rate.      Pulses: No decreased pulses.   Pulmonary:      Effort: Tachypnea, accessory muscle usage and respiratory distress present.      Breath sounds: Examination of the right-upper field reveals wheezing. Examination of the left-upper field reveals wheezing. Examination of the right-middle field reveals wheezing. Examination of the left-middle field reveals wheezing. Examination of the right-lower field reveals wheezing and rales. Examination of the left-lower field  reveals wheezing and rales. Wheezing and rales present.   Chest:      Chest wall: No tenderness.   Abdominal:      Palpations: Abdomen is soft.      Comments: Obese abdomen but patient did not have guarding or significant tenderness with palpation of the abdomen today.   Musculoskeletal:      Right lower leg: Edema present.      Left lower leg: Edema present.   Skin:     Capillary Refill: Capillary refill takes less than 2 seconds.      Findings: No erythema.   Neurological:      Mental Status: She is oriented to person, place, and time. She is lethargic.   Psychiatric:         Mood and Affect: Mood normal.         Behavior: Behavior normal. Behavior is cooperative.         ED Course        Procedures              EKG Interpretation:      Interpreted by Bao Leal DO  Time reviewed: 11:19PM  Symptoms at time of EKG: Dyspnea, wheeze   Rhythm: paced  Rate: Normal      Clinical Impression: paced rhythm            Critical Care time:  none               Results for orders placed or performed during the hospital encounter of 04/26/24 (from the past 24 hour(s))   Symptomatic Influenza A/B, RSV, & SARS-CoV2 PCR (COVID-19) Nose    Specimen: Nose; Swab   Result Value Ref Range    Influenza A PCR Negative Negative    Influenza B PCR Negative Negative    RSV PCR Negative Negative    SARS CoV2 PCR Negative Negative    Narrative    Testing was performed using the Xpert Xpress CoV2/Flu/RSV Assay on the [employer] GeneXpert Instrument. This test should be ordered for the detection of SARS-CoV-2, influenza, and RSV viruses in individuals who meet clinical and/or epidemiological criteria. Test performance is unknown in asymptomatic patients. This test is for in vitro diagnostic use under the FDA EUA for laboratories certified under CLIA to perform high or moderate complexity testing. This test has not been FDA cleared or approved. A negative result does not rule out the presence of PCR inhibitors in the specimen or target  RNA in concentration below the limit of detection for the assay. If only one viral target is positive but coinfection with multiple targets is suspected, the sample should be re-tested with another FDA cleared, approved, or authorized test, if coinfection would change clinical management. This test was validated by the [facility]. These laboratories are certified under the Clinical Laboratory Improvement Amendments of 1988 (CLIA-88) as qualified to perform high complexity laboratory testing.   [place] Draw    Narrative    The following orders were created for panel order [place] Draw.  Procedure                               Abnormality         Status                     ---------                               -----------         ------                     Extra Blood Culture Bottle[[id number]]                       Final result               Extra Green Top (Lithium...[[id number]]                      Final result               Extra Purple Top Tube[[id number]]                            Final result               Extra Heparinized Syringe[[id number]]                        Final result                 Please view results for these tests on the individual orders.   Extra Blood Culture Bottle   Result Value Ref Range    Hold Specimen JIC    Extra Green Top (Lithium Heparin) Tube   Result Value Ref Range    Hold Specimen JIC    Extra Purple Top Tube   Result Value Ref Range    Hold Specimen JIC    Extra Heparinized Syringe   Result Value Ref Range    Hold Specimen     CBC with platelets differential    Narrative    The following orders were created for panel order CBC with platelets differential.  Procedure                               Abnormality         Status                     ---------                               -----------         ------                     CBC with platelets and d...[[id number]]  Abnormal            Final result                 Please view results for these tests on the  individual orders.   D dimer quantitative   Result Value Ref Range    D-Dimer Quantitative 0.89 (H) 0.00 - 0.50 ug/mL FEU    Narrative    This D-dimer assay is intended for use in conjunction with a clinical pretest probability assessment model to exclude pulmonary embolism (PE) and deep venous thrombosis (DVT) in outpatients suspected of PE or DVT. The cut-off value is 0.50 ug/mL FEU.    For patients 50 years of age or older, the application of age-adjusted cut-off values for D-Dimer may increase the specificity without significant effect on sensitivity. The literature suggested calculation age adjusted cut-off in ug/L = age in years x 10 ug/L. The results in this laboratory are reported as ug/mL rather than ug/L. The calculation for age adjusted cut off in ug/mL= age in years x 0.01 ug/mL. For example, the cut off for a 76 year old male is 74 x 0.01 ug/mL = 0.76 ug/mL (760 ug/L).    M Pavan et al. Age adjusted D-dimer cut-off levels to rule out pulmonary embolism: The ADJUST-PE Study. EPHRAIM 2014;311:5475-0343.; HJ Jaron et al. Diagnostic accuracy of conventional or age adjusted D-dimer cutoff values in older patients with suspected venous thromboembolism. Systemic review and meta-analysis. BMJ 2013:346:f2492.   Comprehensive metabolic panel   Result Value Ref Range    Sodium 135 135 - 145 mmol/L    Potassium 4.1 3.4 - 5.3 mmol/L    Carbon Dioxide (CO2) 23 22 - 29 mmol/L    Anion Gap 14 7 - 15 mmol/L    Urea Nitrogen 29.7 (H) 8.0 - 23.0 mg/dL    Creatinine 1.25 (H) 0.51 - 0.95 mg/dL    GFR Estimate 46 (L) >60 mL/min/1.73m2    Calcium 10.3 (H) 8.8 - 10.2 mg/dL    Chloride 98 98 - 107 mmol/L    Glucose 228 (H) 70 - 99 mg/dL    Alkaline Phosphatase 43 40 - 150 U/L    AST 40 0 - 45 U/L    ALT 23 0 - 50 U/L    Protein Total 7.8 6.4 - 8.3 g/dL    Albumin 3.8 3.5 - 5.2 g/dL    Bilirubin Total 0.2 <=1.2 mg/dL   Lipase   Result Value Ref Range    Lipase 18 13 - 60 U/L   Lactic acid whole blood w/ reflex x1 in 3 Hr when  >2   Result Value Ref Range    Lactic Acid, Initial 1.7 0.7 - 2.0 mmol/L   Procalcitonin   Result Value Ref Range    Procalcitonin 0.24 <0.50 ng/mL   CRP inflammation   Result Value Ref Range    CRP Inflammation 26.31 (H) <5.00 mg/L   Blood gas venous   Result Value Ref Range    pH Venous 7.31 (L) 7.32 - 7.43    pCO2 Venous 51 (H) 40 - 50 mm Hg    pO2 Venous 55 (H) 25 - 47 mm Hg    Bicarbonate Venous 25 21 - 28 mmol/L    Base Excess/Deficit Venous -1.6 -3.0 - 3.0 mmol/L    FIO2 36     Oxyhemoglobin Venous 83 (H) 70 - 75 %    O2 Sat, Venous 84.9 (H) 70.0 - 75.0 %    Narrative    In healthy individuals, oxyhemoglobin (O2Hb) and oxygen saturation (SO2) are approximately equal. In the presence of dyshemoglobins, oxyhemoglobin can be considerably lower than oxygen saturation.   TSH with free T4 reflex   Result Value Ref Range    TSH 7.12 (H) 0.30 - 4.20 uIU/mL   Ethyl Alcohol Level   Result Value Ref Range    Alcohol ethyl <0.01 <=0.01 g/dL   Nt probnp inpatient (BNP)   Result Value Ref Range    N terminal Pro BNP Inpatient 1,008 (H) 0 - 900 pg/mL   Troponin T, High Sensitivity   Result Value Ref Range    Troponin T, High Sensitivity 34 (H) <=14 ng/L   CBC with platelets and differential   Result Value Ref Range    WBC Count 6.8 4.0 - 11.0 10e3/uL    RBC Count 3.08 (L) 3.80 - 5.20 10e6/uL    Hemoglobin 9.0 (L) 11.7 - 15.7 g/dL    Hematocrit 28.2 (L) 35.0 - 47.0 %    MCV 92 78 - 100 fL    MCH 29.2 26.5 - 33.0 pg    MCHC 31.9 31.5 - 36.5 g/dL    RDW 14.7 10.0 - 15.0 %    Platelet Count 142 (L) 150 - 450 10e3/uL    % Neutrophils 74 %    % Lymphocytes 16 %    % Monocytes 8 %    % Eosinophils 1 %    % Basophils 0 %    % Immature Granulocytes 0 %    NRBCs per 100 WBC 0 <1 /100    Absolute Neutrophils 5.0 1.6 - 8.3 10e3/uL    Absolute Lymphocytes 1.1 0.8 - 5.3 10e3/uL    Absolute Monocytes 0.6 0.0 - 1.3 10e3/uL    Absolute Eosinophils 0.1 0.0 - 0.7 10e3/uL    Absolute Basophils 0.0 0.0 - 0.2 10e3/uL    Absolute Immature  Granulocytes 0.0 <=0.4 10e3/uL    Absolute NRBCs 0.0 10e3/uL   T4 free   Result Value Ref Range    Free T4 0.91 0.90 - 1.70 ng/dL   [place] Draw    Narrative    The following orders were created for panel order [place] Draw.  Procedure                               Abnormality         Status                     ---------                               -----------         ------                     Extra Blue Top Tube[[id number]]                              Final result                 Please view results for these tests on the individual orders.   Extra Blue Top Tube   Result Value Ref Range    Hold Specimen JIC    [place] Draw *Canceled*    Narrative    The following orders were created for panel order [place] Draw.  Procedure                               Abnormality         Status                     ---------                               -----------         ------                       Please view results for these tests on the individual orders.   XR Chest Port 1 View    Narrative    EXAM: XR CHEST PORT 1 VIEW  LOCATION: [facility]  DATE: 4/26/2024    INDICATION: SOB, Cough  COMPARISON: 04/12/2024.    FINDINGS: Sternal wires and mediastinal clips. Left subclavian cardiac device. Heart valve prosthesis. There is no pneumothorax. The heart is enlarged. There is no pulmonary edema. The lungs are clear.      Impression    IMPRESSION: No acute abnormality.   Blood gas venous   Result Value Ref Range    pH Venous 7.26 (L) 7.32 - 7.43    pCO2 Venous 58 (H) 40 - 50 mm Hg    pO2 Venous 62 (H) 25 - 47 mm Hg    Bicarbonate Venous 26 21 - 28 mmol/L    Base Excess/Deficit Venous -1.5 -3.0 - 3.0 mmol/L    FIO2 36     Oxyhemoglobin Venous 87 (H) 70 - 75 %    O2 Sat, Venous 88.7 (H) 70.0 - 75.0 %    Narrative    In healthy individuals, oxyhemoglobin (O2Hb) and oxygen saturation (SO2) are approximately equal. In the presence of dyshemoglobins, oxyhemoglobin can be considerably lower than oxygen  saturation.   Troponin T, High Sensitivity   Result Value Ref Range    Troponin T, High Sensitivity 35 (H) <=14 ng/L   UA with Microscopic reflex to Culture    Specimen: Urine, Midstream   Result Value Ref Range    Color Urine Yellow Colorless, Straw, Light Yellow, Yellow    Appearance Urine Slightly Cloudy (A) Clear    Glucose Urine Negative Negative mg/dL    Bilirubin Urine Negative Negative    Ketones Urine Negative Negative mg/dL    Specific Gravity Urine 1.011 1.003 - 1.035    Blood Urine Negative Negative    pH Urine 5.0 5.0 - 7.0    Protein Albumin Urine Negative Negative mg/dL    Urobilinogen Urine Normal Normal, 2.0 mg/dL    Nitrite Urine Negative Negative    Leukocyte Esterase Urine Small (A) Negative    Bacteria Urine Few (A) None Seen /HPF    RBC Urine <1 <=2 /HPF    WBC Urine 1 <=5 /HPF    Squamous Epithelials Urine 3 (H) <=1 /HPF    Narrative    Urine Culture not indicated   Glucose by meter   Result Value Ref Range    GLUCOSE BY METER POCT 356 (H) 70 - 99 mg/dL       Medications   ipratropium - albuterol 0.5 mg/2.5 mg/3 mL (DUONEB) neb solution 3 mL (3 mLs Nebulization $Given 4/27/24 0331)   ondansetron (ZOFRAN) injection 4 mg (4 mg Intravenous $Given 4/26/24 2333)   azithromycin (ZITHROMAX) 500 mg vial to attach to  mL bag (500 mg Intravenous $New Bag 4/27/24 0537)   Insulin Aspart FlexPen SOPN (has no administration in time range)   nitroGLYcerin (NITROSTAT) sublingual tablet 0.4 mg (has no administration in time range)   calcium carbonate (TUMS) chewable tablet 1,000 mg (has no administration in time range)   glucose gel 15-30 g (has no administration in time range)     Or   dextrose 50 % injection 25-50 mL (has no administration in time range)     Or   glucagon injection 1 mg (has no administration in time range)   sodium chloride 0.9% BOLUS 1,000 mL (0 mLs Intravenous Stopped 4/27/24 0033)   acetaminophen (TYLENOL) tablet 975 mg (975 mg Oral Not Given 4/26/24 2334)   methylPREDNISolone  sodium succinate (solu-MEDROL) injection 125 mg (125 mg Intravenous $Given 4/27/24 0028)   furosemide (LASIX) injection 40 mg (40 mg Intravenous $Given 4/27/24 0029)   cefTRIAXone (ROCEPHIN) 2 g vial to attach to  ml bag for ADULTS or NS 50 ml bag for PEDS (0 g Intravenous Stopped 4/27/24 0531)       Assessments & Plan (with Medical Decision Making)  68-year-old female to the ER via ambulance from her nursing home secondary concerns of increasing shortness of breath.  Patient with significant hypoxia needing increased oxygen than her normal prescribed oxygen dosing of 2 L/min associated with significant wheeze and cough symptoms on arrival to the ER.  Patient treated with 2 DuoNeb treatments with improvement in her symptoms.  Exam findings most consistent with acute exacerbation of COPD/asthma.  Chest x-ray was otherwise reassuring without signs of significant CHF or infiltrate.  Patient treated with IV Solu-Medrol along with increased oxygen supplementation to keep oxygen sats above 92%.  She was also initiated on antibiotic therapy given the evidence of fever on presentation to the ER.  Blood cultures were set up with results pending.  Urinalysis is still pending.  Given her increased needs for oxygen supplementation and fever symptoms of needing further evaluation for occult infection, was recommended the patient be observed for further period time in the hospital setting.  I spoke to Dr. Padilla, hospitalist, who agreed except the patient to his hospital service.  He asked that I place some initial transitional admission orders into the epic EMR to facilitate her transfer to the hospital floor and this was done by myself.     I have reviewed the nursing notes.    I have reviewed the findings, diagnosis, plan and need for follow up with the patient.             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 EMERGENCY DEPT       Elvis, Bao Vines, DO  04/27/24 0650

## 2024-04-27 NOTE — PROGRESS NOTES
4 hour shift note:     VSS at 1LPM NC O2 sat >90%. A&Ox4 able to answer orientation questions right but forgetful and keeps asking the same question and request repeatedly.  With infrequent non productive cough. Still reports of having chest pain, tele showed v-paced BBB HR-67. NS running at 75. Purewick in place with good uo. BS- 375 novolog given per MAR.

## 2024-04-27 NOTE — ED NOTES
Bed: ED01  Expected date: 4/26/24  Expected time: 10:22 PM  Means of arrival:   Comments:  Hold EMS

## 2024-04-27 NOTE — ED TRIAGE NOTES
Patient seen multiple times in the past week for this cough and general malaise. She call 911 tonight because she could not catch her breath. Increased use of O2 ( she is usually on 2L and has needed up to 4L to stay above 90%.     Triage Assessment (Adult)       Row Name 04/26/24 3302          Triage Assessment    Additional Documentation Breath Sounds (Group)        Respiratory WDL    Respiratory WDL X;cough     Cough Frequency incessant     Cough Type weak;congested        Skin Circulation/Temperature WDL    Skin Circulation/Temperature WDL X;circulation;temperature     Skin Circulation no cyanosis     Skin Temperature warm        Cardiac WDL    Cardiac WDL WDL        Peripheral/Neurovascular WDL    Peripheral Neurovascular WDL WDL        Cognitive/Neuro/Behavioral WDL    Cognitive/Neuro/Behavioral WDL WDL

## 2024-04-27 NOTE — PROGRESS NOTES
S-(situation): Patient changed to inpatient status    B-(background): Patient status change due to observation goals not being met.     A-(assessment): Pt lethargic, abnormal blood gases, 1 L NC, elevated blood sugars    R-(recommendations): Will monitor patient per MD orders.       Inpatient nursing criteria listed below were met:    Adult Profile completedYes  Health care directives status obtained and documented: Yes  VTE prophylaxis orders: SCD  (FYI: Asprin is not an approved anticoagulation for DVT prophylaxis)  SCD's Documented: Yes  Vaccine assessment done and vaccine ordered if needed. Not Applicable  My Chart patient sign up addressed and documented: No  Care Plan initiated: Yes  Discharge planning review completed (admission navigator) Yes

## 2024-04-27 NOTE — PLAN OF CARE
Goal Outcome Evaluation:      Plan of Care Reviewed With: patient    Overall Patient Progress: no changeOverall Patient Progress: no change    Outcome Evaluation: Pt is able to state name/, month/year, but thought she was still at [facility]. Has been more lethargic and confused as day goes on. Frequently asking same questions repeatedly after writer answers. VSS, afebrile, on 1 L NC. Tried RA per MD and sats 88%. Pt reports shortness of breathe, crackles to BLL. Pt complaining of chest pain today. Nitro given but not effective per pt however pt sleeping before writer leaves room. EKG done. MD ordered CT for PE ruleout. Scheduled tylenol given per MAR. Purewick in place with good urine output. Blood sugars elevated today in 400s, MD notified. Sliding scale insulin given per MAR. Pt refusing repositioning and will only lay on right side. [place] redness to top/tip of left great toe, pt refusing to take socks off. x 2 assist with lift. IV is saline locked. Pt takes pills one at a time whole in vanilla pudding followed by sips of warm water. On telemetry, v-paced with BBB, HR in 60s.

## 2024-04-27 NOTE — PLAN OF CARE
S-(situation): Patient registered to Observation. Patient arrived to room 251 via cart from ED.    B-(background): Patient with increased oxygen needs from baseline 2 LPM nasal cannula presented to ED with weakness and cough, on 4 Lpm.     A-(assessment): Patient lethargic, per ED report had her HS trazodone, melatonin etc before coming in and sleepy during time in ED. Patient came in with home lift sling, and transferred from ED bed to hospital bed with air lift device. Patient able to turn with assistance to roll back and forth, no obvious deficits noted. PERRLA. Numbness on left hand fingers per patient report. Dentures at home ([place]), patient reports taking [facility] soft diet with dentures in,pills whole in pudding, thin liquids. Incontinent at baseline per report, utilizing external catheter now. Placed mepilex on coccyx prophylactic. Some redness on toes, and skin moist. Changed gown, and brief, placed interdry under bilateral breasts. Patient looks edematous, most visibly puffy in neck/cheeks. Blood sugar elevated on arrival 356.    R-(recommendations): Orders and observation goals reviewed with Patient.     Nursing Observation criteria listed below was met:    Skin issues/needs documented:Yes  Isolation needs addressed and Signage up: NA  Fall Prevention: Education given and documented: Yes  Education Assessment documented:Yes  Admission Education Documented: Yes  New medication patient education completed and documented (Possible Side Effects of Common Medications handout): Yes  OBS video/handout Reviewed & Documented: Yes  Allergies Reviewed: Yes  Medication Reconciliation Complete: Yes  Home medications if not able to send immediately home with family stored here: NA  Reminder note placed in discharge instructions of home meds: No  Patient has discharge needs (If yes, please explain): Yes likely needs increased dosng/frequency for lasix,   Patient discharge preferences addressed and charted on white board:   Yes  Provider notified that patient has arrived to the unit: No, handoff report given to oncoming RN Kanika, patient came up with basic observation orders to settle patient. Patient still needs to be seen by admitting provider.

## 2024-04-28 LAB
ANION GAP SERPL CALCULATED.3IONS-SCNC: 11 MMOL/L (ref 7–15)
BASE EXCESS BLDV CALC-SCNC: 0.7 MMOL/L (ref -3–3)
BUN SERPL-MCNC: 31.1 MG/DL (ref 8–23)
CALCIUM SERPL-MCNC: 9.9 MG/DL (ref 8.8–10.2)
CHLORIDE SERPL-SCNC: 103 MMOL/L (ref 98–107)
CREAT SERPL-MCNC: 1 MG/DL (ref 0.51–0.95)
DEPRECATED HCO3 PLAS-SCNC: 23 MMOL/L (ref 22–29)
EGFRCR SERPLBLD CKD-EPI 2021: 60 ML/MIN/1.73M2
GLUCOSE BLDC GLUCOMTR-MCNC: 221 MG/DL (ref 70–99)
GLUCOSE BLDC GLUCOMTR-MCNC: 249 MG/DL (ref 70–99)
GLUCOSE BLDC GLUCOMTR-MCNC: 273 MG/DL (ref 70–99)
GLUCOSE BLDC GLUCOMTR-MCNC: 302 MG/DL (ref 70–99)
GLUCOSE BLDC GLUCOMTR-MCNC: 339 MG/DL (ref 70–99)
GLUCOSE SERPL-MCNC: 240 MG/DL (ref 70–99)
HCO3 BLDV-SCNC: 27 MMOL/L (ref 21–28)
HOLD SPECIMEN: NORMAL
O2/TOTAL GAS SETTING VFR VENT: 24 %
OXYHGB MFR BLDV: 87 % (ref 70–75)
PCO2 BLDV: 52 MM HG (ref 40–50)
PH BLDV: 7.33 [PH] (ref 7.32–7.43)
PO2 BLDV: 58 MM HG (ref 25–47)
POTASSIUM SERPL-SCNC: 4.1 MMOL/L (ref 3.4–5.3)
SAO2 % BLDV: 89 % (ref 70–75)
SODIUM SERPL-SCNC: 137 MMOL/L (ref 135–145)

## 2024-04-28 PROCEDURE — 99233 SBSQ HOSP IP/OBS HIGH 50: CPT | Performed by: INTERNAL MEDICINE

## 2024-04-28 PROCEDURE — 36415 COLL VENOUS BLD VENIPUNCTURE: CPT | Performed by: INTERNAL MEDICINE

## 2024-04-28 PROCEDURE — 82805 BLOOD GASES W/O2 SATURATION: CPT | Performed by: INTERNAL MEDICINE

## 2024-04-28 PROCEDURE — 250N000009 HC RX 250: Performed by: INTERNAL MEDICINE

## 2024-04-28 PROCEDURE — 250N000013 HC RX MED GY IP 250 OP 250 PS 637: Performed by: INTERNAL MEDICINE

## 2024-04-28 PROCEDURE — 250N000011 HC RX IP 250 OP 636: Performed by: INTERNAL MEDICINE

## 2024-04-28 PROCEDURE — 80048 BASIC METABOLIC PNL TOTAL CA: CPT | Performed by: INTERNAL MEDICINE

## 2024-04-28 PROCEDURE — 120N000001 HC R&B MED SURG/OB

## 2024-04-28 PROCEDURE — 250N000012 HC RX MED GY IP 250 OP 636 PS 637: Performed by: INTERNAL MEDICINE

## 2024-04-28 RX ORDER — ENOXAPARIN SODIUM 100 MG/ML
40 INJECTION SUBCUTANEOUS EVERY 24 HOURS
Status: DISCONTINUED | OUTPATIENT
Start: 2024-04-28 | End: 2024-04-29 | Stop reason: HOSPADM

## 2024-04-28 RX ORDER — GUAIFENESIN/DEXTROMETHORPHAN 100-10MG/5
10 SYRUP ORAL EVERY 4 HOURS PRN
Status: DISCONTINUED | OUTPATIENT
Start: 2024-04-28 | End: 2024-04-28

## 2024-04-28 RX ADMIN — INSULIN GLARGINE 22 UNITS: 100 INJECTION, SOLUTION SUBCUTANEOUS at 20:31

## 2024-04-28 RX ADMIN — ACETAMINOPHEN 1000 MG: 500 TABLET ORAL at 14:00

## 2024-04-28 RX ADMIN — QUETIAPINE 100 MG: 100 TABLET ORAL at 08:05

## 2024-04-28 RX ADMIN — METOPROLOL SUCCINATE 37.5 MG: 25 TABLET, EXTENDED RELEASE ORAL at 08:05

## 2024-04-28 RX ADMIN — CEFTRIAXONE SODIUM 2 G: 2 INJECTION, POWDER, FOR SOLUTION INTRAMUSCULAR; INTRAVENOUS at 20:34

## 2024-04-28 RX ADMIN — AMLODIPINE BESYLATE 5 MG: 5 TABLET ORAL at 08:06

## 2024-04-28 RX ADMIN — LORAZEPAM 1 MG: 1 TABLET ORAL at 20:21

## 2024-04-28 RX ADMIN — ACETAMINOPHEN 1000 MG: 500 TABLET ORAL at 20:21

## 2024-04-28 RX ADMIN — GABAPENTIN 400 MG: 400 CAPSULE ORAL at 20:21

## 2024-04-28 RX ADMIN — INSULIN ASPART 2 UNITS: 100 INJECTION, SOLUTION INTRAVENOUS; SUBCUTANEOUS at 08:03

## 2024-04-28 RX ADMIN — LEVOTHYROXINE SODIUM 150 MCG: 75 TABLET ORAL at 08:05

## 2024-04-28 RX ADMIN — AZITHROMYCIN MONOHYDRATE 500 MG: 500 INJECTION, POWDER, LYOPHILIZED, FOR SOLUTION INTRAVENOUS at 05:03

## 2024-04-28 RX ADMIN — QUETIAPINE 100 MG: 100 TABLET ORAL at 20:21

## 2024-04-28 RX ADMIN — ASPIRIN 81 MG: 81 TABLET, COATED ORAL at 08:05

## 2024-04-28 RX ADMIN — INSULIN ASPART 3 UNITS: 100 INJECTION, SOLUTION INTRAVENOUS; SUBCUTANEOUS at 12:04

## 2024-04-28 RX ADMIN — INSULIN ASPART 4 UNITS: 100 INJECTION, SOLUTION INTRAVENOUS; SUBCUTANEOUS at 17:26

## 2024-04-28 RX ADMIN — ENOXAPARIN SODIUM 40 MG: 40 INJECTION SUBCUTANEOUS at 20:42

## 2024-04-28 RX ADMIN — TRAZODONE HYDROCHLORIDE 75 MG: 50 TABLET ORAL at 20:21

## 2024-04-28 RX ADMIN — TRAZODONE HYDROCHLORIDE 25 MG: 50 TABLET ORAL at 17:26

## 2024-04-28 RX ADMIN — LORAZEPAM 1 MG: 1 TABLET ORAL at 08:05

## 2024-04-28 RX ADMIN — ROSUVASTATIN CALCIUM 20 MG: 20 TABLET, FILM COATED ORAL at 20:21

## 2024-04-28 RX ADMIN — ACETAMINOPHEN 1000 MG: 500 TABLET ORAL at 08:05

## 2024-04-28 RX ADMIN — IPRATROPIUM BROMIDE AND ALBUTEROL SULFATE 3 ML: .5; 3 SOLUTION RESPIRATORY (INHALATION) at 09:00

## 2024-04-28 RX ADMIN — DULOXETINE HYDROCHLORIDE 40 MG: 20 CAPSULE, DELAYED RELEASE ORAL at 08:05

## 2024-04-28 RX ADMIN — POTASSIUM CHLORIDE 10 MEQ: 750 TABLET, EXTENDED RELEASE ORAL at 08:05

## 2024-04-28 RX ADMIN — FAMOTIDINE 20 MG: 20 TABLET, FILM COATED ORAL at 20:21

## 2024-04-28 RX ADMIN — FLUTICASONE FUROATE AND VILANTEROL TRIFENATATE 1 PUFF: 200; 25 POWDER RESPIRATORY (INHALATION) at 08:04

## 2024-04-28 RX ADMIN — MIRTAZAPINE 15 MG: 15 TABLET, FILM COATED ORAL at 20:21

## 2024-04-28 RX ADMIN — SENNOSIDES 2 TABLET: 8.6 TABLET, FILM COATED ORAL at 20:21

## 2024-04-28 RX ADMIN — QUETIAPINE 100 MG: 100 TABLET ORAL at 14:00

## 2024-04-28 RX ADMIN — PREDNISONE 30 MG: 5 TABLET ORAL at 08:05

## 2024-04-28 ASSESSMENT — ACTIVITIES OF DAILY LIVING (ADL)
ADLS_ACUITY_SCORE: 56

## 2024-04-28 NOTE — PLAN OF CARE
Goal Outcome Evaluation:      Plan of Care Reviewed With: patient    Overall Patient Progress: improvingOverall Patient Progress: improving      VSS, O2 at 0.5LPM NC. A&O x4 but forgetful and pt slept most of the shift. LS wheezy and crackles noted, PRN neb given. C/o chest pain when coughing, pt gets scheduled tylenol. Up in recliner for lunch via lift. BS-221 and 273, insulin given per MAR. Takes pills whole with vanilla pudding with warm water.

## 2024-04-28 NOTE — PLAN OF CARE
Goal Outcome Evaluation:      Plan of Care Reviewed With: patient    Overall Patient Progress: no changeOverall Patient Progress: no change    Outcome Evaluation: A&Ox4, forgetful at times. Repeats questions. Crackles heard in bases of lungs. Ax2 with lift. Denies SOB at rest. VSS on 1/2 L NC. , 249. IV rocephin and zithro continued as ordered. NS @ 75/hr. Purewick in place with AUOP. No BM this shift. Takes pills with warm water and vanilla pudding.

## 2024-04-29 VITALS
HEIGHT: 63 IN | SYSTOLIC BLOOD PRESSURE: 89 MMHG | WEIGHT: 194.89 LBS | OXYGEN SATURATION: 92 % | TEMPERATURE: 97.8 F | RESPIRATION RATE: 20 BRPM | HEART RATE: 65 BPM | BODY MASS INDEX: 34.53 KG/M2 | DIASTOLIC BLOOD PRESSURE: 42 MMHG

## 2024-04-29 LAB
GLUCOSE BLDC GLUCOMTR-MCNC: 213 MG/DL (ref 70–99)
GLUCOSE BLDC GLUCOMTR-MCNC: 259 MG/DL (ref 70–99)
GLUCOSE BLDC GLUCOMTR-MCNC: 269 MG/DL (ref 70–99)

## 2024-04-29 PROCEDURE — 250N000013 HC RX MED GY IP 250 OP 250 PS 637: Performed by: INTERNAL MEDICINE

## 2024-04-29 PROCEDURE — 99239 HOSP IP/OBS DSCHRG MGMT >30: CPT | Performed by: INTERNAL MEDICINE

## 2024-04-29 PROCEDURE — 250N000012 HC RX MED GY IP 250 OP 636 PS 637: Performed by: INTERNAL MEDICINE

## 2024-04-29 RX ORDER — NALOXONE HYDROCHLORIDE 0.4 MG/ML
0.2 INJECTION, SOLUTION INTRAMUSCULAR; INTRAVENOUS; SUBCUTANEOUS
Status: DISCONTINUED | OUTPATIENT
Start: 2024-04-29 | End: 2024-04-29 | Stop reason: HOSPADM

## 2024-04-29 RX ORDER — TRAMADOL HYDROCHLORIDE 50 MG/1
50 TABLET ORAL EVERY 6 HOURS PRN
Status: DISCONTINUED | OUTPATIENT
Start: 2024-04-29 | End: 2024-04-29 | Stop reason: HOSPADM

## 2024-04-29 RX ORDER — NALOXONE HYDROCHLORIDE 0.4 MG/ML
0.4 INJECTION, SOLUTION INTRAMUSCULAR; INTRAVENOUS; SUBCUTANEOUS
Status: DISCONTINUED | OUTPATIENT
Start: 2024-04-29 | End: 2024-04-29 | Stop reason: HOSPADM

## 2024-04-29 RX ORDER — LORAZEPAM 1 MG/1
1 TABLET ORAL 2 TIMES DAILY
Status: SHIPPED | DISCHARGE
Start: 2024-04-29 | End: 2024-04-29

## 2024-04-29 RX ORDER — PREDNISONE 10 MG/1
TABLET ORAL
DISCHARGE
Start: 2024-04-29

## 2024-04-29 RX ORDER — MIRTAZAPINE 15 MG/1
15 TABLET, FILM COATED ORAL AT BEDTIME
Qty: 5 TABLET | Refills: 0 | Status: SHIPPED | OUTPATIENT
Start: 2024-04-29

## 2024-04-29 RX ORDER — QUETIAPINE FUMARATE 100 MG/1
100 TABLET, FILM COATED ORAL 3 TIMES DAILY
Qty: 5 TABLET | Refills: 0 | Status: SHIPPED | OUTPATIENT
Start: 2024-04-29

## 2024-04-29 RX ORDER — TRAZODONE HYDROCHLORIDE 50 MG/1
25 TABLET, FILM COATED ORAL 2 TIMES DAILY
Qty: 5 TABLET | Refills: 0 | Status: SHIPPED | OUTPATIENT
Start: 2024-04-29

## 2024-04-29 RX ORDER — AZITHROMYCIN 250 MG/1
500 TABLET, FILM COATED ORAL ONCE
Status: COMPLETED | OUTPATIENT
Start: 2024-04-29 | End: 2024-04-29

## 2024-04-29 RX ORDER — LEVOFLOXACIN 750 MG/1
750 TABLET, FILM COATED ORAL DAILY
Qty: 4 TABLET | Refills: 0 | Status: SHIPPED | OUTPATIENT
Start: 2024-04-29 | End: 2024-05-03

## 2024-04-29 RX ORDER — LORAZEPAM 1 MG/1
1 TABLET ORAL 2 TIMES DAILY
Qty: 5 TABLET | Refills: 0 | Status: SHIPPED | OUTPATIENT
Start: 2024-04-29

## 2024-04-29 RX ADMIN — LEVOTHYROXINE SODIUM 150 MCG: 75 TABLET ORAL at 06:46

## 2024-04-29 RX ADMIN — AZITHROMYCIN DIHYDRATE 500 MG: 250 TABLET, FILM COATED ORAL at 10:37

## 2024-04-29 RX ADMIN — DULOXETINE HYDROCHLORIDE 40 MG: 20 CAPSULE, DELAYED RELEASE ORAL at 08:42

## 2024-04-29 RX ADMIN — LORAZEPAM 1 MG: 1 TABLET ORAL at 08:42

## 2024-04-29 RX ADMIN — METOPROLOL SUCCINATE 37.5 MG: 25 TABLET, EXTENDED RELEASE ORAL at 08:41

## 2024-04-29 RX ADMIN — QUETIAPINE 100 MG: 100 TABLET ORAL at 08:42

## 2024-04-29 RX ADMIN — PREDNISONE 30 MG: 5 TABLET ORAL at 08:42

## 2024-04-29 RX ADMIN — INSULIN ASPART 3 UNITS: 100 INJECTION, SOLUTION INTRAVENOUS; SUBCUTANEOUS at 08:44

## 2024-04-29 RX ADMIN — FLUTICASONE FUROATE AND VILANTEROL TRIFENATATE 1 PUFF: 200; 25 POWDER RESPIRATORY (INHALATION) at 08:40

## 2024-04-29 RX ADMIN — AMLODIPINE BESYLATE 5 MG: 5 TABLET ORAL at 08:42

## 2024-04-29 RX ADMIN — POTASSIUM CHLORIDE 10 MEQ: 750 TABLET, EXTENDED RELEASE ORAL at 08:42

## 2024-04-29 RX ADMIN — TRAMADOL HYDROCHLORIDE 50 MG: 50 TABLET, COATED ORAL at 02:43

## 2024-04-29 RX ADMIN — ASPIRIN 81 MG: 81 TABLET, COATED ORAL at 08:42

## 2024-04-29 RX ADMIN — ACETAMINOPHEN 1000 MG: 500 TABLET ORAL at 08:42

## 2024-04-29 ASSESSMENT — ACTIVITIES OF DAILY LIVING (ADL)
ADLS_ACUITY_SCORE: 56

## 2024-04-29 NOTE — PLAN OF CARE
"Goal Outcome Evaluation:      Plan of Care Reviewed With: patient    Overall Patient Progress: no changeOverall Patient Progress: no change         Pt A&Ox4, but forgetful.  Tele discontinued. Purewick in place. Up in chair for dinner. Blood glucoses this shift, 339 and 302. Insulin given per sliding scale. Denied pain.     BP (!) 145/49 (BP Location: Right arm)   Pulse 69   Temp 97.6  F (36.4  C) (Oral)   Resp 18   Ht 1.6 m (5' 3\")   Wt 90.7 kg (199 lb 15.3 oz)   SpO2 91%   BMI 35.42 kg/m        "

## 2024-04-29 NOTE — PROGRESS NOTES
Care Management Discharge Note    Discharge Date: 04/29/2024       Discharge Disposition:  Return to long term care at [facility]     Discharge Services:  PT/OT have been ordered     Discharge DME: Wheelchair, Oxygen    Discharge Transportation: agency- non-emergency ambulance- per patient    Private pay costs discussed: Not applicable    Does the patient's insurance plan have a 3 day qualifying hospital stay waiver?  No     Patient/family educated on Medicare website which has current facility and service quality ratings:  No     Education Provided on the Discharge Plan:  Yes     Persons Notified of Discharge Plans: Patient, [facility] (Robert in admissions)    Patient/Family in Agreement with the Plan:  Yes     Handoff Referral Completed: yes     Additional Information:  Patient is medically ready for discharge back to long term care at [facility] today.    Patient is adamant about needing to transport via ambulance.  Nursing/[facility]UC are setting this up.  No time indicated yet.    Writer has called and spoken with Robert in admissions at [facility].  He is aware of patient returning today and is waiting for the discharge time.      Matilda Haynes, FILEMON  [facility]   [phone]

## 2024-04-29 NOTE — PROGRESS NOTES
[facility]    Medicine Progress Note - Hospitalist Service    Date of Admission:  4/26/2024    Assessment & Plan         Letty Escobar is a 68 year old female admitted on 4/26/2024 who presented from [facility] with new fever of 101.8 F, shortness of breath, chest discomfort across anterior chest and left arm discomfort. She has home oxygen and uses 1 LPM at night. She has history of CHF, CAD, HTN, COPD, atrial fibrillation, pacemaker.      4/28 :     On 0.5 liters of oxygen, on oxygen at night at home  Clinically improving  Continue iv antibiotics    Likely discharge in am      A/p :      Principal problem:  Bilateral pneumonia  Acute respiratory failure with hypoxia and hypercapnia  COPD with exacerbation  Assessment: Patient hemodynamically stable. Normal lactate, procalcitonin and leukocytosis. CT scan showed bilateral infiltrates, no PE. Treatment in the ED: ceftriaxone and azithromycin IV, methylprednisolone IV. She was initially retaining CO2 after admission but gradually improved with oxygen titration down to keep O2 saturation above 89%.  Plan:    1. Admit to inpatient care.              2. Continue daily IV antibiotics: azithromycin and ceftriaxone.              3. Start prednisone on 4/28/24.              4. Continue Breo Ellipta inhaler.              5. Duo Nebs q4h prn.              6. Oxygen as needed to keep O2 saturation >89%.              7. VBG in am.     Active problems:  Type 2 diabetes mellitus  Assessment: Marked hyperglycemia due to IV steroids. On Lantus at home. Last Hgb A1c 10.3 1/5/24.  Plan:   1. Continue outpatient dose of Lantus.              2. Add short-acting insulin sliding scale for hyperglycemia.              3. Monitor BGM's QID.              4. Hypoglycemia treatment protocol.              5. Check Hgb A1c.     Coronary artery disease  S/P CABG x 5  Chronic systolic heart failure  Hypertension  Assessment: Minimal troponin elevation. No  "ACS suspected. Compensated heart failure. Controlled BP. At home on amlodipine, aspirin, furosemide, hydrochlorothiazide, metoprolol.  Plan:   1. Continue amlodipine, aspirin, metoprolol.              2. Hold furosemide (ERIC).              3. Not ordering hydrochlorothiazide (unusual combination of diuretics).     Acute kidney injury  Assessment: Recent Cr 0.97, here 1.24.  Plan:   1. IV fluids overnight at 75 ml/hr.              2. Repeat BMP in am.     Hypothyroidism  Assessment: On levothyroxine 137 mcg daily. It looks like the dose was increased in 12/24 from 112 mcg. TSH is till elevated, although less. FT4 is borderline low.  Plan:   1. Increase levothyroxine to 150 mcg daily.              2. Recheck TSH as outpatient in 8 weeks.     Panic disorder  Assessment: Stable on duloxetine, lorazepam, quetiapine, trazodone.  Plan: Continue.     Morbid obesity  Obesity hypoventilation syndrome  Assessment: Increasing patient's morbidity.  Plan: Oxygen as above.             Diet: Consistent Carbohydrate Diet Moderate Consistent Carb (60 g CHO per Meal) Diet    DVT Prophylaxis: Enoxaparin (Lovenox) SQ  Mccallum Catheter: Not present  Lines: None     Cardiac Monitoring: None  Code Status: No CPR- Do NOT Intubate      Clinically Significant Risk Factors           # Hypercalcemia: Highest Ca = 10.3 mg/dL in last 2 days, will monitor as appropriate        # Hypertension: Noted on problem list       # DMII: A1C = 8.1 % (Ref range: <5.7 %) within past 6 months   # Obesity: Estimated body mass index is 35.42 kg/m  as calculated from the following:    Height as of this encounter: 1.6 m (5' 3\").    Weight as of this encounter: 90.7 kg (199 lb 15.3 oz)., PRESENT ON ADMISSION     # Financial/Environmental Concerns:           Disposition Plan     Medically Ready for Discharge: Anticipated in 2-4 Days             Aleks Sepulveda MD  Hospitalist Service  [facility]  Securely message with [employer] (more " info)  Text page via [facility] Paging/Directory   ______________________________________________________________________        Physical Exam   Vital Signs: Temp: 97.6  F (36.4  C) Temp src: Oral BP: (!) 145/49 Pulse: 69   Resp: 18 SpO2: 91 % O2 Device: None (Room air) Oxygen Delivery: 1/2 LPM  Weight: 199 lbs 15.32 oz       GENERAL: The patient is not in any acute distressed. Awake and alert.  HEENT: Nonicteric sclerae, PERRLA, EOMI. Oropharynx clear. Moist mucous membranes. Conjunctivae appear well perfused.  HEART: Regular rate and rhythm without murmurs.  LUNGS: Clear to auscultation bilaterally. No wheezing or crackles.  ABDOMEN: Soft, positive bowel sounds, nontender.  SKIN: No rash, no excessive bruising, petechiae, or purpura.  EXTREMITIES : no rashes, no swelling in legs.  NEUROLOGIC: conscious and oriented, follows commands, no obvious focal deficits.  ROS: All other systems negative       Medical Decision Making       60 MINUTES SPENT BY ME on the date of service doing chart review, history, exam, documentation & further activities per the note.  MANAGEMENT DISCUSSED with the following over the past 24 hours: rn, patient       Data     I have personally reviewed the following data over the past 24 hrs:    N/A  \   N/A   / N/A     137 103 31.1 (H) /  339 (H)   4.1 23 1.00 (H) \

## 2024-04-29 NOTE — PROGRESS NOTES
S-(situation): [facility]     B-(background): Bilateral pneumonia.     A-(assessment): Patient alert and oriented. Vital signs stable. Lift at baseline. Room air. Refused meals today because she wanted to eat at [employer].   Last bowel movement:  4/26/2024    R-(recommendations):Report called to [facility]. Listed belongings gathered and sent with patient.     Discharge Nursing Criteria:     Care Plan and Patient education resolved: Yes    Vaccines  Influenza status verified at discharge:  Not Applicable    [facility]  Home medications returned to patient: NA  Medication Bin checked and emptied on discharge Yes  All paperwork sent with patient/Copy of AVS given to patient or family Yes.

## 2024-04-29 NOTE — PLAN OF CARE
Goal Outcome Evaluation:      Plan of Care Reviewed With: patient    Overall Patient Progress: no change    Outcome Evaluation: Pt A&O but forgetful with repetitive requests and concerns. Pt VSS on 1/2 LPM overnight, weaned to RA this AM. Pt c/o significant abdominal pain overnight related to muscle strain due to coughing. Nurse notified provider who ordered tramadol. Pt reported some relief an hour after getting it, but an hour after that stated pain back up to 9. Pt IV dislodged this morning and was not replaced.

## 2024-05-02 LAB
BACTERIA BLD CULT: NO GROWTH
BACTERIA BLD CULT: NO GROWTH

## 2024-05-03 ENCOUNTER — DOCUMENTATION ONLY (OUTPATIENT)
Dept: OTHER | Facility: CLINIC | Age: 69
End: 2024-05-03
Payer: COMMERCIAL

## 2024-05-23 ENCOUNTER — HOSPITAL ENCOUNTER (EMERGENCY)
Facility: CLINIC | Age: 69
Discharge: HOME | End: 2024-05-24
Attending: EMERGENCY MEDICINE | Admitting: EMERGENCY MEDICINE
Payer: COMMERCIAL

## 2024-05-23 DIAGNOSIS — T38.3X1A INSULIN OVERDOSE, ACCIDENTAL OR UNINTENTIONAL, INITIAL ENCOUNTER: ICD-10-CM

## 2024-05-23 DIAGNOSIS — B37.49 CANDIDA INFECTION OF GENITAL REGION: ICD-10-CM

## 2024-05-23 LAB
ANION GAP SERPL CALCULATED.3IONS-SCNC: 11 MMOL/L (ref 7–15)
BUN SERPL-MCNC: 25.9 MG/DL (ref 8–23)
CALCIUM SERPL-MCNC: 10.3 MG/DL (ref 8.8–10.2)
CHLORIDE SERPL-SCNC: 99 MMOL/L (ref 98–107)
CREAT SERPL-MCNC: 1.02 MG/DL (ref 0.51–0.95)
DEPRECATED HCO3 PLAS-SCNC: 22 MMOL/L (ref 22–29)
EGFRCR SERPLBLD CKD-EPI 2021: 59 ML/MIN/1.73M2
GLUCOSE BLDC GLUCOMTR-MCNC: 279 MG/DL (ref 70–99)
GLUCOSE SERPL-MCNC: 287 MG/DL (ref 70–99)
HOLD SPECIMEN: NORMAL
POTASSIUM SERPL-SCNC: 4.1 MMOL/L (ref 3.4–5.3)
SODIUM SERPL-SCNC: 132 MMOL/L (ref 135–145)

## 2024-05-23 PROCEDURE — 99283 EMERGENCY DEPT VISIT LOW MDM: CPT | Performed by: EMERGENCY MEDICINE

## 2024-05-23 PROCEDURE — 36415 COLL VENOUS BLD VENIPUNCTURE: CPT | Performed by: EMERGENCY MEDICINE

## 2024-05-23 PROCEDURE — 80048 BASIC METABOLIC PNL TOTAL CA: CPT | Performed by: EMERGENCY MEDICINE

## 2024-05-23 PROCEDURE — 82962 GLUCOSE BLOOD TEST: CPT

## 2024-05-23 PROCEDURE — 99284 EMERGENCY DEPT VISIT MOD MDM: CPT | Performed by: EMERGENCY MEDICINE

## 2024-05-23 ASSESSMENT — COLUMBIA-SUICIDE SEVERITY RATING SCALE - C-SSRS
2. HAVE YOU ACTUALLY HAD ANY THOUGHTS OF KILLING YOURSELF IN THE PAST MONTH?: NO
1. IN THE PAST MONTH, HAVE YOU WISHED YOU WERE DEAD OR WISHED YOU COULD GO TO SLEEP AND NOT WAKE UP?: NO
6. HAVE YOU EVER DONE ANYTHING, STARTED TO DO ANYTHING, OR PREPARED TO DO ANYTHING TO END YOUR LIFE?: NO

## 2024-05-23 ASSESSMENT — ACTIVITIES OF DAILY LIVING (ADL): ADLS_ACUITY_SCORE: 39

## 2024-05-24 ENCOUNTER — MEDICAL CORRESPONDENCE (OUTPATIENT)
Dept: HEALTH INFORMATION MANAGEMENT | Facility: CLINIC | Age: 69
End: 2024-05-24
Payer: COMMERCIAL

## 2024-05-24 VITALS
TEMPERATURE: 97.3 F | RESPIRATION RATE: 16 BRPM | BODY MASS INDEX: 34.37 KG/M2 | DIASTOLIC BLOOD PRESSURE: 55 MMHG | WEIGHT: 194 LBS | HEART RATE: 61 BPM | OXYGEN SATURATION: 94 % | SYSTOLIC BLOOD PRESSURE: 103 MMHG

## 2024-05-24 LAB
GLUCOSE BLDC GLUCOMTR-MCNC: 202 MG/DL (ref 70–99)
GLUCOSE BLDC GLUCOMTR-MCNC: 214 MG/DL (ref 70–99)
GLUCOSE BLDC GLUCOMTR-MCNC: 256 MG/DL (ref 70–99)

## 2024-05-24 PROCEDURE — 82962 GLUCOSE BLOOD TEST: CPT

## 2024-05-24 RX ORDER — CLOTRIMAZOLE 1 %
CREAM (GRAM) TOPICAL 2 TIMES DAILY
Qty: 28 G | Refills: 0 | Status: SHIPPED | OUTPATIENT
Start: 2024-05-24

## 2024-05-24 ASSESSMENT — ACTIVITIES OF DAILY LIVING (ADL)
ADLS_ACUITY_SCORE: 39

## 2024-05-24 NOTE — ED TRIAGE NOTES
Patient presents via EMS from [place] after staff accidentally gave 22 units of novolog instead of 22 units of lantus around 2115 tonight. Patient states that the provider wanted her to come in to be monitored and that staff gave her a large glass of orange juice after the incident. Per EMS blood sugars are in the 300s.

## 2024-05-24 NOTE — DISCHARGE INSTRUCTIONS
You are medically cleared from the accidental overdose that happened tonight.  You can continue on with your normal insulin management.

## 2024-05-24 NOTE — ED NOTES
Patient resting in bed, appears asleep. Arouses when spoken to. Able to obtain POCT glucose recheck per order, 256. No other concerns at this time.

## 2024-05-24 NOTE — ED NOTES
POCT glucose recheck performed, 214mg/dL at this time. Patient alert, resting in bed. Arouses to writer speaking to her. Answers questions appropriately.

## 2024-05-24 NOTE — ED PROVIDER NOTES
History     chief complaint  HPI  Patient is a 68-year-old female with a history of insulin-dependent diabetes presenting with a chief complaint of an accidental medication error.  She lives at the [facility].  She was supposed to get 22 units of Lantus insulin and instead given 22 units of NovoLog.  This happened at 9:15 PM tonight.  She was given orange juice and sent here.  Blood sugar was in the low 300s when they checked it there.  Patient still feels fine and has no other issues except itching to the skin around her vagina for the last week.    Review of Systems:  All organ systems below were reviewed and are negative unless indicated in the HPI.    Constitutional  Eyes  ENT  Respiratory  Cardiovascular  Gastrointestinal  Genitourinary  Musculoskeletal  Skin  Neuro    Allergies:  Allergies   Allergen Reactions    Bee Pollen Anaphylaxis    Diphenhydramine Palpitations     Tolerated IV Benadryl when not pushed too fast    Isosorbide Nitrate Other (See Comments) and Dizziness     Also causes syncope (has fallen before) and brain fog/mental disturbances - please do not prescribe    Nitroglycerin Dizziness, Fatigue and Other (See Comments)     Specifically the patch - please do not prescribe  Specifically the patch - please do not prescribe      Contrast Dye Hives and Itching    Penicillin G     Adhesive Tape Rash    Liquid Adhesive Itching    Nystatin Dermatitis     Also blisters    Penicillins Swelling and Rash     Occurred as a child - not 100% sure on specific reactions    Sulfa Antibiotics Other (See Comments)     Occurred as a child / patient does not remember specific reaction       Problem List:    Patient Active Problem List    Diagnosis Date Noted    Acute respiratory failure with hypoxia and hypercapnia (H) 04/27/2024     Priority: Medium    COPD with acute exacerbation (H) 04/27/2024     Priority: Medium    Bilateral pneumonia 04/27/2024     Priority: Medium    Lethargy 11/10/2023      Priority: Medium    Urinary tract infection without hematuria, site unspecified 11/10/2023     Priority: Medium    Fever 11/10/2023     Priority: Medium    Hypoxia 11/10/2023     Priority: Medium    Pyuria 11/10/2023     Priority: Medium    PAD (peripheral artery disease) (H24) 11/10/2023     Priority: Medium    Left foot pain 11/10/2023     Priority: Medium    History of stroke 11/10/2023     Priority: Medium    Lives in long-term care facility 11/10/2023     Priority: Medium    Obesity hypoventilation syndrome (H) 11/10/2023     Priority: Medium    Benzodiazepine dependence (H) 11/10/2023     Priority: Medium    Thrombocytopenia (H24) 11/10/2023     Priority: Medium    ERIC (acute kidney injury) (H24) 11/10/2023     Priority: Medium    Chronic kidney disease, stage 3a (H) 10/16/2022     Priority: Medium    SOB (shortness of breath) 04/07/2022     Priority: Medium    Type 2 diabetes mellitus with hyperglycemia, with long-term current use of insulin (H) 02/13/2022     Priority: Medium    Morbid obesity (H) 12/10/2021     Priority: Medium    ICD (implantable cardioverter-defibrillator) in place 11/17/2021     Priority: Medium     Formatting of this note is different from the original.  Date of last device in office evaluation: 5/17/2022    ?  and model: Medtronic Cobalt CRT-D.   Date of implant: 5/7/2021  Tachy therapies remain OFF: S/p downgrade from ICD to pacemaker, but her implanted system includes a DF-4 lead, so an ICD generator was placed with the tachycardia therapies disabled.     ? Indication for device: Ischemic cardiomyopathy   ? Cardiac resynchronization therapy:   no     MRI Conditional:  No  o If No:  Reason why:  Abandoned LV lead    ? Battery longevity documented as less than 3  Months: No  ? Are any of the leads less than 3 months old:  No    ? Programming              ? Pacing mode and programmed lower rate: DDDR 60 - 130 bpm              ? Rate-responsive sensor type, if programmed on:  Accelerometer        Underlying rhythm and heart rate:  SR @ 60 bpm    ? What is the response of this device to magnet placement:  None - tachy therapies off  ? PM magnet pacing rate: N/A   ? Any alert status on CIED generator or lead: No    ? Last pacing threshold    Atrial   1.0 V @ 0.4 ms   Ventricular RV: 0.75 V @ 0.4 ms  LV:  2.75 V @ 1.0 ms    Formatting of this note is different from the original.  Date of last device in office evaluation: 11/17/2022     ?  and model: Medtronic Cobalt CRT-D.   Date of implant: 5/7/2021  Tachy therapies remain OFF: S/p downgrade from ICD to pacemaker, but her implanted system includes a DF-4 lead, so an ICD generator was placed with the tachycardia therapies disabled.     ? Indication for device: Ischemic cardiomyopathy   ? Cardiac resynchronization therapy:   no     MRI Conditional:  No  o If No:  Reason why:  Abandoned LV lead    ? Battery longevity documented as less than 3  Months: No  ? Are any of the leads less than 3 months old:  No    ? Programming              ? Pacing mode and programmed lower rate: DDDR 60 - 130 bpm              ? Rate-responsive sensor type, if programmed on: Accelerometer        Underlying rhythm and heart rate:  Sinus rhythm 62 bpm, w/BBB    ? What is the response of this device to magnet placement:  None - tachy therapies off  ? PM magnet pacing rate: N/A   ? Any alert status on CIED generator or lead: No    ? Last pacing threshold    Atrial   1.0 V @ 0.4 ms   Ventricular RV: 0.75 V @ 0.4 ms  LV:  2.75 V @ 1.0 ms      Atrial fibrillation (H) 04/06/2021     Priority: Medium    Pacemaker 04/06/2021     Priority: Medium    Major depressive disorder, recurrent severe without psychotic features (H) 01/26/2021     Priority: Medium    Panic disorder with agoraphobia 04/14/2020     Priority: Medium    Constipation 03/20/2020     Priority: Medium    Personality disorder (H) 03/05/2020     Priority: Medium     Rule out dependent  personality    Formatting of this note might be different from the original.  Rule out dependent personality  Formatting of this note might be different from the original.  Rule out dependent personality      Candidiasis 03/01/2020     Priority: Medium    Posttraumatic stress disorder 03/01/2020     Priority: Medium    COPD without exacerbation (H) 01/29/2020     Priority: Medium    Long term (current) use of insulin (H) 01/29/2020     Priority: Medium    Chronic systolic heart failure (H) 01/28/2020     Priority: Medium    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, left leg (H) 01/15/2020     Priority: Medium    Chest pain 11/11/2019     Priority: Medium    Polymyalgia rheumatica (H24) 11/28/2018     Priority: Medium    Unstable angina (H) 05/02/2018     Priority: Medium     Coronary angiogram 05/02/2018 demonstrated 30% stenosis in the LMCA, 50% stenosis in the Proximal LAD, 50% stenosis in the Mid LAD, 95% stenosis in the Proximal Circumflex (Restenosis - Balloon Angioplasty), 100% stenosis in the 1st Marginal, 50% stenosis in the Proximal RCA, 50% stenosis in the Distal RCA, the LIMA graft from the LIMA to the Distal LAD is free of significant disease; the SVG graft from the Aorta to the 1st Marginal is free of significant disease; the SVG graft from the Aorta to the Distal RCA is free of significant disease. Intervention included a successful  2.5mm x 12mm Balloon,  2.5mm x 10mm Cutting Balloon,  3mm x 12mm Drug Eluting Stent,  3mm x 12mm Balloon, and  3mm x 8mm Balloon to Proximal Circumflex, post stenosis 0%.    Formatting of this note is different from the original.  Coronary angiogram 05/02/2018 demonstrated 30% stenosis in the LMCA, 50% stenosis in the Proximal LAD, 50% stenosis in the Mid LAD, 95% stenosis in the Proximal Circumflex (Restenosis - Balloon Angioplasty), 100% stenosis in the 1st Marginal, 50% stenosis in the Proximal RCA, 50% stenosis in the Distal  RCA, the LIMA graft from the LIMA to the Distal LAD is free of significant disease; the SVG graft from the Aorta to the 1st Marginal is free of significant disease; the SVG graft from the Aorta to the Distal RCA is free of significant disease. Intervention included a successful  2.5mm x 12mm Balloon,  2.5mm x 10mm Cutting Balloon,  3mm x 12mm Drug Eluting Stent,  3mm x 12mm Balloon, and  3mm x 8mm Balloon to Proximal Circumflex, post stenosis 0%.  Formatting of this note is different from the original.  Coronary angiogram 05/02/2018 demonstrated 30% stenosis in the LMCA, 50% stenosis in the Proximal LAD, 50% stenosis in the Mid LAD, 95% stenosis in the Proximal Circumflex (Restenosis - Balloon Angioplasty), 100% stenosis in the 1st Marginal, 50% stenosis in the Proximal RCA, 50% stenosis in the Distal RCA, the LIMA graft from the LIMA to the Distal LAD is free of significant disease; the SVG graft from the Aorta to the 1st Marginal is free of significant disease; the SVG graft from the Aorta to the Distal RCA is free of significant disease. Intervention included a successful  2.5mm x 12mm Balloon,  2.5mm x 10mm Cutting Balloon,  3mm x 12mm Drug Eluting Stent,  3mm x 12mm Balloon, and  3mm x 8mm Balloon to Proximal Circumflex, post stenosis 0%.      Vitamin B12 deficiency 02/14/2018     Priority: Medium    Chronic bilateral low back pain without sciatica 01/17/2018     Priority: Medium    Chronic pain disorder 10/01/2017     Priority: Medium    Urinary incontinence, mixed 09/24/2017     Priority: Medium    Internal carotid artery stenosis, bilateral 07/13/2016     Priority: Medium     Carotid US 05/04/2018 showed moderate plaque formation, consistent with 50 to 69% stenosis in the right internal carotid artery, not significantly changed from 8/5/2015.  Moderate plaque formation, consistent with 50 to 69% stenosis in the left internal carotid artery; there has been mild progression of the left ICA stenosis since  8/5/2015.    Formatting of this note might be different from the original.  Carotid US 05/04/2018 showed moderate plaque formation, consistent with 50 to 69% stenosis in the right internal carotid artery, not significantly changed from 8/5/2015.  Moderate plaque formation, consistent with 50 to 69% stenosis in the left internal carotid artery; there has been mild progression of the left ICA stenosis since 8/5/2015.    Formatting of this note might be different from the original.  Carotid US 05/04/2018 showed moderate plaque formation, consistent with 50 to 69% stenosis in the right internal carotid artery, not significantly changed from 8/5/2015.  Moderate plaque formation, consistent with 50 to 69% stenosis in the left internal carotid artery; there has been mild progression of the left ICA stenosis since 8/5/2015.      Essential (primary) hypertension 10/14/2015     Priority: Medium    Ischemic cardiomyopathy 09/20/2015     Priority: Medium     EF of 40-45%, status post RV lead revision and LV epicardial lead placement via mini-thoracotomy in August 2016.    Formatting of this note might be different from the original.  EF of 40-45%, status post RV lead revision and LV epicardial lead placement via mini-thoracotomy in August 2016.  Formatting of this note might be different from the original.  EF of 40-45%, status post RV lead revision and LV epicardial lead placement via mini-thoracotomy in August 2016.      S/P CABG x 5 08/21/2015     Priority: Medium    Pain medication agreement 04/20/2013     Priority: Medium     Controlled substance agreement for percocet #30/month on file and signed 4/17/13.  Designated pharmacy: [facility] Prescribing physician: Andrea Diagnosis: Ulnar neuropathy    Formatting of this note might be different from the original.  Controlled substance agreement for percocet #30/month on file and signed 4/17/13.  Designated pharmacy: [facility] Prescribing physician: Andrea Diagnosis: Ulnar  "neuropathy      Anemia in other chronic diseases classified elsewhere 01/05/2013     Priority: Medium    Hypothyroidism, unspecified 12/10/2010     Priority: Medium    ACP (advance care planning) 11/03/2010     Priority: Medium     Formatting of this note might be different from the original.  Patient has identified Health Care Agent(s): Yes  Add Health Care Agents: Yes    Health Care Agent(s):    Primary Health Care Agent:     Edwar Escobar Relationship:    Spouse Phone:     [phone]    Secondary Health Care Agent:     Chiki Oro Relationship:     Son Phone:     [phone]      Patient has Advance Care Plan Documents (Health Care Directive, POLST): Yes    Advance Care Plan Documents:  Health Care Directive--03/28/11  Resuscitation Guidelines--    Patient has identified Specific Treatment Preferences: Yes   Specific Treatment Preferences:   a.) Code Status:  DNR/ Do Not Attempt Resuscitation - Allow a Natural Death    b.) Goals of Treatment:     ii. Limited Interventions and treat reversible conditions.  Provide interventions aimed at treatment of new or reversible illness/injury or non-life threatening chronic conditions. Duration of invasive or uncomfortable interventions should generally be limited.- Trial of intubation 5 days or other instructions \"If no improvement, stop.\"  c.) Interventions and Treatments:   i.   Antibiotics:          - Aggressive antibiotics  ii.  Nutrition/Hydration:         - Offer food and liquids by mouth         - IV fluid administration         - No feeding tube under any circumstance.  iii. Transfusion:          - Blood products for comfort/relief of symptoms only  iv. Dialysis:           - Dialysis for short term \"for recovery, but not long term.\"        Last Assessment & Plan:   Advance Care Planning: Disease-specific Session    Letty Escobar is an [facility] patient of Dr. Renea Mendez of the [facility].    Advance care planning discussions were completed " "with Letty and her healthcare agent, spouse Edwar Escobar on 2011 at the [facility].    Understanding of Illness and Disease [place]:   Letty identifies her medical condition as diabetes with peripheral neuropathy, heart problems with a heart attack 2009 plus 4 stent placements; sleep apnea; depression; hypothyroid; high cholesterol; tobacco use; and describes it as needing further assistance with thyroid and diabetes management.  She identifies the following symptoms of her medical condition as being the most bothersome: some memory loss, balance problems, light headedness and dizziness, puffy eyes and lower extremity edema from time to time.    Let[place] is retired [place] .  She is independent with all cares and lives an active life style although, \"I'm not as active as I used to be.\"    Goals of Care:   Letty currently hopes to maintain independence, control pain and symptoms and delay progression of, but not cure, the illness.  \"I want to get the diabetes and thyroid under better control.\"  Letty has an appointment the first part of April for follow up.    Quality of Life:    Letty identifies quality of life as family involvement twice weekly, [employer] activities, newly assigned  , playing cards, the computer,    Letty christiano with serious challenges in her life through her ganesh in God, prayers and support of her [employer] family, spouse and son.    Letty identifies the following fears and worries about her medical care:  \"I just want the diabetes straightened out.\"    Treatment and Care Preferences:   Past experiences in dealing with family and/or friends that have  or been seriously ill include her brother who took his own life.  \"He was 53 years old and living with us.  I found him.\"   As a result of these experiences, Letty expresses these health care preferences:  " "    Summary Letty's Treatment Preferences:  LOW SURVIVAL; HIGH TREATMENT BURDEN:  If Letty suffered a serious complication, such that she was facing a prolonged hospital stay, required ongoing medical interventions, and the chance of living through the complication was low (for example, only 5 out of 100 would live), Letty would choose:  to focus treatment on comfort and quality of life (\"Quality of life is more important than length of life to Letty.\")  COMMENT:  \"If I can't live a normal life, I wouldn't want to live.\"    HIGH SURVIVAL; LOW FUNCTIONAL STATUS:  If Letty had a serious complication and had a good chance of living through the complication but it was expected that she would never be able to walk or talk again and would require 24 hour nursing care, she would choose:  to focus treatment on comfort and quality of life (\"Quality of life is more important than length of life to Letty.\")  COMMENT:  \"I'd accept rehabilitation.\"    HIGH SURVIVAL; LOW COGNITIVE STATUS:  If Letty had a serious complication and had a good chance of living through the complication but it was expected that she would never know who she was or who she was with and would require 24 hour nursing care, she would choose:  to focus treatment on comfort and quality of life (\"Quality of life is more important than length of life to Letty.\")    CARDIO-PULMONARY RESUSCITATION (CPR):  The facts, risks and benefits of CPR were discussed with Letty.  If she had a sudden event that caused her heart and breathing to stop, she:  WOULD NOT want CPR attempted and instead would prefer that a natural death occur.    MECHANICAL VENTILATION: If Letty had an episode where she was unable to breathe on her own, she would choose the following:  attempt to use any appropriate non-invasive method to assist breathing, and use mechanical ventilation.  COMMENT:  \"If reversible ventilator is acceptable for 5 days.  If no improvement, stop.\"     Letty has chosen " her healthcare agent to:  strictly follow her wishes.    Follow Up Plan:   Letty was encouraged to continue advance care planning discussions with her health care agents and primary care provider.   Letty and her health care agent declined handouts.     Documents addressed during this advance care planning session:  1.  Health Care Agents identified.          .  Primary health care agent is spouse, Edwar Escobar;          .  Secondary health care agent is son, Jermaine Oro.  2.  Health Care Directive completed and scanned into medical record.  3.  Statement of Treatment Preferences for advanced illness completed and scanned into the medical record.  4.  Resuscitation Guidelines completed for DNR.  Document sent to Dr. Renea Mendez for signature and returned to the home. Letty, please place on the refrigerator.    Recommendations/Plan:   Letty and her health care agent to review Advance Care Plan.     Letty would benefit from:   Care Navigation/Care Navigation Help Desk--explained services for pending future needs.  No identified needs today.  Care Navigation brochure was given to Letty and her healthcare agent.    Advance Care Planning recommendations and Letty's concerns and questions were cc ed to her primary provider.     Thank you, Letty for the opportunity of assisting with Advance Care Planning. It was a seeing you again and meeting Edwar.  Please contact me if I can be of further assistance.    Interviewer: Pat Martin RN    Advance Care Planning Facilitator  [phone]   3/28/2011      ELI (obstructive sleep apnea) 01/31/2010     Priority: Medium     PSG on April/2008 that showed moderate Obstructive Sleep Apnea with an AHI of 23.5 . Limb movements persisted at 29 movements/hour at optimal pressures, despite carbidopa use.    Formatting of this note might be different from the original.  PSG on April/2008 that showed moderate Obstructive Sleep Apnea with an AHI of 23.5 . Limb movements persisted  at 29 movements/hour at optimal pressures, despite carbidopa use.  Formatting of this note might be different from the original.  PSG on April/2008 that showed moderate Obstructive Sleep Apnea with an AHI of 23.5 . Limb movements persisted at 29 movements/hour at optimal pressures, despite carbidopa use.      Coronary atherosclerosis 02/06/2009     Priority: Medium     Formatting of this note might be different from the original.  2/5/2009 - MI - Proximal RCA 99%, mild-mod disease elsewhere.  EF 60%.  PCI:  MYRON to pRCA.  2/12/2009 - admit CP - Widely patent RCA stent. Moderate diffuse CAD. Severe stenosis in trivial PDA branch. LVEF 45%.  1/25/2010 - admit CP - PTCA and stent of diagonal  9/8/2010 - admit CP - LAD patent stent. Moderate diffuse CAD. Medical management recommended.   4/25/2012 - NSTEMI - Acute total occlusion of the ostial Circumflex. Acute total occlusion of the ostial ramus. Acute total occlusion of the distal 1st Obtuse Marginal. Angioplasty of ostial circumflex and ostial ramus. There was distal embolization to the OM1 vessel. This vessel was small and not amendable to intervention. Indefinite: plavix and asa 81.  8/10/2015 - CABx5 - 1. LIMA to distal LAD, reverse SVG to OM1 and OM2, reverse SVG to diagonal, reverse SVG to PDA.   2. Mitral valve repair with a Medtronics 3-D full ring, 28 mm.   3. Tricuspid repair with a Medtronic 28 mm partial ring  1/12/2016 - TTE - EF 40-45%  Formatting of this note might be different from the original.  2/5/2009 - MI - Proximal RCA 99%, mild-mod disease elsewhere.  EF 60%.  PCI:  MYRON to pRCA.  2/12/2009 - admit CP - Widely patent RCA stent. Moderate diffuse CAD. Severe stenosis in trivial PDA branch. LVEF 45%.  1/25/2010 - admit CP - PTCA and stent of diagonal  9/8/2010 - admit CP - LAD patent stent. Moderate diffuse CAD. Medical management recommended.   4/25/2012 - NSTEMI - Acute total occlusion of the ostial Circumflex. Acute total occlusion of the ostial  ramus. Acute total occlusion of the distal 1st Obtuse Marginal. Angioplasty of ostial circumflex and ostial ramus. There was distal embolization to the OM1 vessel. This vessel was small and not amendable to intervention. Indefinite: plavix and asa 81.  8/10/2015 - CABx5 - 1. LIMA to distal LAD, reverse SVG to OM1 and OM2, reverse SVG to diagonal, reverse SVG to PDA.   2. Mitral valve repair with a Medtronics 3-D full ring, 28 mm.   3. Tricuspid repair with a Medtronic 28 mm partial ring  1/12/2016 - TTE - EF 40-45%      Restless legs syndrome 08/29/2007     Priority: Medium    Hyperlipidemia, unspecified 05/30/2007     Priority: Medium        Past Medical History:    Past Medical History:   Diagnosis Date    ACP (advance care planning) 11/3/2010    Acute coronary syndrome (H) 11/22/2019    Acute exacerbation of CHF (congestive heart failure) (H) 11/11/2019    Acute on chronic systolic (congestive) heart failure (H) 1/28/2020    Anemia of chronic disease 1/5/2013    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, left leg (H) 1/15/2020    BPPV (benign paroxysmal positional vertigo) 5/31/2018    Candidiasis 3/1/2020    Carotid stenosis, right 7/13/2016    Chest pain 11/11/2019    Chronic bilateral low back pain without sciatica 1/17/2018    Chronic pain disorder 10/1/2017    Constipation 3/20/2020    COPD (chronic obstructive pulmonary disease) (H) 6/24/2020    Coronary atherosclerosis 2/6/2009    Cubital tunnel syndrome on left 11/13/2012    Depressive disorder     Diabetes mellitus (H)     Diabetes mellitus type 2 in obese (H) 7/13/2006    Diabetes mellitus type 2 in obese (H) 7/13/2006    Drug-seeking behavior 4/4/2020    Failure to thrive in adult 9/3/2020    Hereditary and idiopathic peripheral neuropathy 4/24/2007    Hyperlipidemia with target low density lipoprotein (LDL) cholesterol less than 70 mg/dL 5/30/2007    Hypertension 10/14/2015    Hypothyroidism 12/10/2010     Irritable bowel syndrome 9/7/2015    Ischemic cardiomyopathy 9/20/2015    Long-term use of high-risk medication 4/14/2020    Moniliasis, 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    Myocardial infarction (H)     Neuromuscular disorder (H)     Other specified postprocedural states 10/8/2015    Pain medication agreement 4/20/2013    Panic disorder with agoraphobia 4/14/2020    Paroxysmal atrial fibrillation (H) 10/2/2015    Personality disorder (H) 3/5/2020    Polymyalgia rheumatica (H24) 11/28/2018    Posttraumatic stress disorder 3/1/2020    Restless legs syndrome (RLS) 8/29/2007    Restless legs syndrome (RLS) 8/29/2007    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    Serum calcium elevated 3/1/2020    Somatic dysfunction of sacral region 1/17/2018    Subacromial bursitis of left shoulder joint 8/6/2018    Suicidal ideation 4/1/2020    Thyroid disease     TIA (transient ischemic attack) 5/4/2018    TIA (transient ischemic attack) 5/4/2018    Tobacco abuse 2/17/2017    Tobacco abuse 2/17/2017    Urinary incontinence, mixed 9/24/2017    UTI (urinary tract infection) 4/17/2020    Vitamin B12 deficiency 2/14/2018    Weakness 12/17/2019       Past Surgical History:    Past Surgical History:   Procedure Laterality Date    CARDIAC SURGERY      stents x 11    CARDIAC SURGERY      CHOLECYSTECTOMY      CHOLECYSTECTOMY      GENITOURINARY SURGERY      Tubal ligation    OTHER SURGICAL HISTORY      Genitourinary surgery    RELEASE CARPAL TUNNEL      RELEASE CARPAL TUNNEL         Family History:    No family history on file.    Medications:    clotrimazole (LOTRIMIN) 1 % external cream  acetaminophen (TYLENOL) 325 MG tablet  acetaminophen (TYLENOL) 500 MG tablet  albuterol (PROVENTIL) (2.5 MG/3ML) 0.083% neb solution  albuterol (PROVENTIL) (2.5 MG/3ML) 0.083% neb solution  amLODIPine (NORVASC) 5 MG tablet  aspirin 81 MG EC tablet  bisacodyl (DULCOLAX) 10 MG suppository  cyanocobalamin (CYANOCOBALAMIN) 1000  MCG/ML injection  DULoxetine HCl 40 MG CPEP  famotidine (PEPCID) 20 MG tablet  fluticasone-vilanterol (BREO ELLIPTA) 200-25 MCG/INH inhaler  furosemide (LASIX) 20 MG tablet  gabapentin (NEURONTIN) 400 MG capsule  hydrochlorothiazide (HYDRODIURIL) 25 MG tablet  Insulin Aspart FlexPen 100 UNIT/ML SOPN  LANTUS SOLOSTAR 100 UNIT/ML soln  levothyroxine (SYNTHROID/LEVOTHROID) 137 MCG tablet  lidocaine (LMX4) 4 % external cream  LORazepam (ATIVAN) 1 MG tablet  Melatonin 10 MG TABS tablet  metoprolol succinate ER (TOPROL-XL) 25 MG 24 hr tablet  mirtazapine (REMERON) 15 MG tablet  nitroGLYcerin (NITROSTAT) 0.4 MG sublingual tablet  ondansetron (ZOFRAN ODT) 4 MG ODT tab  polyethylene glycol (MIRALAX) 17 GM/Dose powder  potassium chloride ER (MICRO-K) 10 MEQ CR capsule  predniSONE (DELTASONE) 10 MG tablet  QUEtiapine (SEROQUEL) 100 MG tablet  rosuvastatin (CRESTOR) 10 MG tablet  sennosides (SENOKOT) 8.6 MG tablet  traZODone (DESYREL) 50 MG tablet          Physical Exam   BP: (!) 116/92  Pulse: 84  Temp: 97.3  F (36.3  C)  Resp: 20  Weight: 88 kg (194 lb)  SpO2: 96 %    Gen: Vital signs reviewed  Eyes: Sclera white, pupils round  ENT: External ears and nares normal  Card: Regular rate and rhythm  Resp: No respiratory distress.   : Marla RN present; erythematous rash to the external labia bilaterally.  Extremities: Symmetric distal pulses.  Neuro: Alert    ED Course        Procedures           Results for orders placed or performed during the hospital encounter of 05/23/24 (from the past 24 hour(s))   Glucose by meter   Result Value Ref Range    GLUCOSE BY METER POCT 279 (H) 70 - 99 mg/dL   Basic metabolic panel   Result Value Ref Range    Sodium 132 (L) 135 - 145 mmol/L    Potassium 4.1 3.4 - 5.3 mmol/L    Chloride 99 98 - 107 mmol/L    Carbon Dioxide (CO2) 22 22 - 29 mmol/L    Anion Gap 11 7 - 15 mmol/L    Urea Nitrogen 25.9 (H) 8.0 - 23.0 mg/dL    Creatinine 1.02 (H) 0.51 - 0.95 mg/dL    GFR Estimate 59 (L) >60 mL/min/1.73m2     Calcium 10.3 (H) 8.8 - 10.2 mg/dL    Glucose 287 (H) 70 - 99 mg/dL   Extra Tube    Narrative    The following orders were created for panel order Extra Tube.  Procedure                               Abnormality         Status                     ---------                               -----------         ------                     Extra Purple Top Tube[[id number]]                            Final result                 Please view results for these tests on the individual orders.   Extra Purple Top Tube   Result Value Ref Range    Hold Specimen     Glucose by meter   Result Value Ref Range    GLUCOSE BY METER POCT 256 (H) 70 - 99 mg/dL   Glucose by meter   Result Value Ref Range    GLUCOSE BY METER POCT 214 (H) 70 - 99 mg/dL   Glucose by meter   Result Value Ref Range    GLUCOSE BY METER POCT 202 (H) 70 - 99 mg/dL       Medications - No data to display      Consultations:  Poison center    Social Determinants of Health:  Lives at [facility] nursing home and gets around by wheelchair.    Independent Review of Notes:  None  Assessments & Plan (with Medical Decision Making)       I have reviewed the nursing notes.    I have reviewed the findings, diagnosis, plan and need for follow up with the patient.      Medical Decision Making  On arrival, patient well-appearing.  Currently asymptomatic.  Discussed case with poison control.  Recommended q1hour glucose for 5-6 hours post overdose.  BMP ordered to evaluate kidney function.  She has a stable creatinine elevation without acidosis.  Glucoses were stable and did not reach dangerous lows.  Patient medically cleared.  Did prescribe clotrimazole cream for her rash.  Discharged in stable condition.    Final diagnoses:   Insulin overdose, accidental or unintentional, initial encounter   Candida infection of genital region         Rashawn Pichardo M.D.   [facility] Emergency Department     Rashawn Pichardo MD  05/24/24 [id number]

## 2024-05-24 NOTE — ED NOTES
Attempted to call [facility] in [place] where patient resides. Phone number for assisted living did not have answering machine, had number for the on call nurse instead. Called that number, no one answered there either. Unable to give report that patient is returning to facility.

## 2024-05-27 NOTE — DISCHARGE SUMMARY
[facility]  Hospitalist Discharge Summary      Date of Admission:  4/26/2024  Date of Discharge:  4/29/2024 12:00 PM  Discharging Provider: Aleks Sepulveda MD  Discharge Service: Hospitalist Service    Discharge Diagnoses           Letty Escobar is a 68 year old female admitted on 4/26/2024 who presented from [facility] with new fever of 101.8 F, shortness of breath, chest discomfort across anterior chest and left arm discomfort. She has home oxygen and uses 1 LPM at night. She has history of CHF, CAD, HTN, COPD, atrial fibrillation, pacemaker.            4/29 :     Medically ready   Discharge today        A/p :      Principal problem:        Bilateral pneumonia  Acute respiratory failure with hypoxia and hypercapnia  COPD with exacerbation  Assessment: Patient hemodynamically stable. Normal lactate, procalcitonin and leukocytosis. CT scan showed bilateral infiltrates, no PE. Treatment in the ED: ceftriaxone and azithromycin IV, methylprednisolone IV. She was initially retaining CO2 after admission but gradually improved with oxygen titration down to keep O2 saturation above 89%.  .              Continue daily IV antibiotics: azithromycin and ceftriaxone.              Start prednisone on 4/28/24.              Continue Breo Ellipta inhaler.              Duo Nebs q4h prn.              Oxygen as needed to keep O2 saturation >89%.           Clinically improved  Discharge today on oral levaquin - total 7 days treatment and steroid taper           Active problems:  Type 2 diabetes mellitus  Assessment: Marked hyperglycemia due to IV steroids. On Lantus at home. Last Hgb A1c 10.3 1/5/24.  Plan:   1. Continue outpatient dose of Lantus.              2. Add short-acting insulin sliding scale for hyperglycemia.              3. Monitor BGM's QID.              4. Hypoglycemia treatment protocol.              5. Check Hgb A1c.    stable     Coronary artery disease  S/P CABG x 5  Chronic  systolic heart failure  Hypertension  Assessment: Minimal troponin elevation. No ACS suspected. Compensated heart failure. Controlled BP. At home on amlodipine, aspirin, furosemide, hydrochlorothiazide, metoprolol.  Plan:   1. Continue amlodipine, aspirin, metoprolol.              2. Hold furosemide (ERIC).              3. Not ordering hydrochlorothiazide (unusual combination of diuretics).     Acute kidney injury  Assessment: Recent Cr 0.97, here 1.24.  IV fluids overnight at 75 ml/hr.   Repeat BMP in am.  stable           Hypothyroidism  Assessment: On levothyroxine 137 mcg daily. It looks like the dose was increased in 12/24 from 112 mcg. TSH is till elevated, although less. FT4 is borderline low.  Plan:   1. Increase levothyroxine to 150 mcg daily.              2. Recheck TSH as outpatient in 8 weeks.     Panic disorder  Assessment: Stable on duloxetine, lorazepam, quetiapine, trazodone.  Plan: Continue.     Morbid obesity  Obesity hypoventilation syndrome  Assessment: Increasing patient's morbidity.  Plan: Oxygen as above.       Clinically Significant Risk Factors     # DMII: A1C = 8.1 % (Ref range: <5.7 %) within past 6 months       Follow-ups Needed After Discharge   Follow-up Appointments     Follow Up and recommended labs and tests      Follow up with Nursing home physician.  No follow up labs or test are   needed.        {Additional follow-up instructions/to-do's for PCP    :BMP    Unresulted Labs Ordered in the Past 30 Days of this Admission       No orders found from 3/27/2024 to 4/27/2024.        These results will be followed up by PCP    Discharge Disposition   Discharged to nursing home  Condition at discharge: Stable        Consultations This Hospital Stay   CARE MANAGEMENT / SOCIAL WORK IP CONSULT  PHYSICAL THERAPY ADULT IP CONSULT  OCCUPATIONAL THERAPY ADULT IP CONSULT    Code Status   Prior    Time Spent on this Encounter   Aleks BLANC MD, personally saw the patient today and spent greater  than 30 minutes discharging this patient.       Aleks Sepulveda MD  [facility] 2A MEDICAL SURGICAL  [address]   [place] [address]  Phone: [phone]  ______________________________________________________________________    Physical Exam   Vital Signs:                    Weight: 194 lbs 14.19 oz         GENERAL: The patient is not in any acute distressed. Awake and alert.  HEENT: Nonicteric sclerae, PERRLA, EOMI. Oropharynx clear. Moist mucous membranes. Conjunctivae appear well perfused.  HEART: Regular rate and rhythm without murmurs.  LUNGS: Clear to auscultation bilaterally. No wheezing or crackles.  ABDOMEN: Soft, positive bowel sounds, nontender.  SKIN: No rash, no excessive bruising, petechiae, or purpura.  EXTREMITIES : no rashes, no swelling in legs.  NEUROLOGIC: conscious and oriented, follows commands, no obvious focal deficits.  ROS: All other systems negative          Primary Care Physician   Paola Pastor    Discharge Orders      Medication Therapy Management Referral      Primary Care - Care Coordination Referral      General info for SNF    Length of Stay Estimate: Short Term Care: Estimated # of Days <30  Condition at Discharge: Stable  Level of care:skilled   Rehabilitation Potential: Fair  Admission H&P remains valid and up-to-date: Yes  Recent Chemotherapy: N/A  Use Nursing Home Standing Orders: Yes     Mantoux instructions    Give two-step Mantoux (PPD) Per Facility Policy Yes     Follow Up and recommended labs and tests    Follow up with Nursing home physician.  No follow up labs or test are needed.     Reason for your hospital stay    sob     Glucose monitor nursing POCT    Before meals and at bedtime     Activity - Up with nursing assistance     Physical Therapy Adult Consult    Evaluate and treat as clinically indicated.    Reason:  deconditioning     Occupational Therapy Adult Consult    Evaluate and treat as clinically indicated.    Reason:  deconditioning      Diet    Follow this diet upon discharge: Orders Placed This Encounter      Consistent Carbohydrate Diet Moderate Consistent Carb (60 g CHO per Meal) Diet       Significant Results and Procedures   Most Recent 3 CBC's:  Recent Labs   Lab Test 04/26/24 2252 04/12/24  0046 03/24/24  0329   WBC 6.8 7.3 7.4   HGB 9.0* 9.1* 9.1*   MCV 92 91 91   * 150 139*     Most Recent 3 BMP's:  Recent Labs   Lab Test 05/24/24  0200 05/24/24  0102 05/24/24  0001 05/23/24  2321 04/28/24  0752 04/28/24  0539 04/27/24  1011 04/27/24  0933   NA  --   --   --  132*  --  137  --  135   POTASSIUM  --   --   --  4.1  --  4.1  --  4.7   CHLORIDE  --   --   --  99  --  103  --  97*   CO2  --   --   --  22  --  23  --  21*   BUN  --   --   --  25.9*  --  31.1*  --  31.2*   CR  --   --   --  1.02*  --  1.00*  --  1.24*   ANIONGAP  --   --   --  11  --  11  --  17*   ORESTES  --   --   --  10.3*  --  9.9  --  9.9   * 214* 256* 287*   < > 240*   < > 484*    < > = values in this interval not displayed.     Most Recent 2 LFT's:  Recent Labs   Lab Test 04/26/24 2252 03/24/24  0329   AST 40 33   ALT 23 32   ALKPHOS 43 43   BILITOTAL 0.2 0.2     Most Recent 3 INR's:No lab results found.    Discharge Medications   Discharge Medication List as of 4/29/2024 11:36 AM        START taking these medications    Details   levofloxacin (LEVAQUIN) 750 MG tablet Take 1 tablet (750 mg) by mouth daily for 4 days, Disp-4 tablet, R-0, E-Prescribe      predniSONE (DELTASONE) 10 MG tablet Take prednisone, 10 mg tab - 2 tabs  daily for 3 days, then 1 tab daily for 3 days, then stop., Transitional           CONTINUE these medications which have CHANGED    Details   LORazepam (ATIVAN) 1 MG tablet Take 1 tablet (1 mg) by mouth 2 times daily, Transitional           CONTINUE these medications which have NOT CHANGED    Details   !! acetaminophen (TYLENOL) 325 MG tablet Take 325 mg by mouth 2 times daily as needed for pain, Historical      !! acetaminophen  (TYLENOL) 500 MG tablet Take 2 tablets (1,000 mg) by mouth 3 times daily, No Print Out      !! albuterol (PROVENTIL) (2.5 MG/3ML) 0.083% neb solution Take 2.5 mg by nebulization every 6 hours as needed for cough or wheezing, Historical      !! albuterol (PROVENTIL) (2.5 MG/3ML) 0.083% neb solution Take 1.25 mg by nebulization 2 times daily, Historical      amLODIPine (NORVASC) 5 MG tablet Take 5 mg by mouth daily, Historical      aspirin 81 MG EC tablet Take 81 mg by mouth daily, Historical      bisacodyl (DULCOLAX) 10 MG suppository Place 10 mg rectally daily as needed for constipation, Historical      DULoxetine HCl 40 MG CPEP Take 40 mg by mouth daily, Historical      famotidine (PEPCID) 20 MG tablet Take 20 mg by mouth at bedtime, Historical      fluticasone-vilanterol (BREO ELLIPTA) 200-25 MCG/INH inhaler Inhale 1 puff into the lungs daily Rinse mouth after use, Historical      furosemide (LASIX) 20 MG tablet Take 20 mg by mouth every other day, Historical      gabapentin (NEURONTIN) 400 MG capsule Take 400 mg by mouth at bedtime RLS, Historical      hydrochlorothiazide (HYDRODIURIL) 25 MG tablet Take 25 mg by mouth daily, Historical      Insulin Aspart FlexPen 100 UNIT/ML SOPN Inject Subcutaneous 3 times daily (before meals) Sliding scale   AK=021-918, 2 units  BG= 250-299, 3 units  DJ=389-258, 4 units  EB=317-596, 5 Units  BG>399, 6 units  BG>450 Call MD, Historical      LANTUS SOLOSTAR 100 UNIT/ML soln Inject 22 Units Subcutaneous at bedtime, ALESSANDRO, Historical      levothyroxine (SYNTHROID/LEVOTHROID) 137 MCG tablet Take 137 mcg by mouth daily, Historical      Melatonin 10 MG TABS tablet Take 10 mg by mouth At Bedtime, Historical      metoprolol succinate ER (TOPROL-XL) 25 MG 24 hr tablet Take 37.5 mg by mouth daily, Historical      nitroGLYcerin (NITROSTAT) 0.4 MG sublingual tablet Place 0.4 mg under the tongue every 5 minutes as needed for chest pain For chest pain place 1 tablet under the tongue every 5  minutes for 3 doses. If symptoms persist 5 minutes after 1st dose call 911., Historical      polyethylene glycol (MIRALAX) 17 GM/Dose powder Take 17 g by mouth every other day, Historical      potassium chloride ER (MICRO-K) 10 MEQ CR capsule Take 10 mEq by mouth daily , Historical      rosuvastatin (CRESTOR) 10 MG tablet Take 20 mg by mouth At Bedtime, Historical      sennosides (SENOKOT) 8.6 MG tablet Take 2 tablets by mouth At Bedtime, Historical      cyanocobalamin (CYANOCOBALAMIN) 1000 MCG/ML injection Inject 1 mL into the muscle every 30 days 21st of each month, Historical      lidocaine (LMX4) 4 % external cream Apply topically 2 times daily To Rt kneeHistorical      ondansetron (ZOFRAN ODT) 4 MG ODT tab Take 4 mg by mouth every 8 hours as needed for nausea, Historical      mirtazapine (REMERON) 15 MG tablet Take 15 mg by mouth at bedtime, Historical      QUEtiapine (SEROQUEL) 100 MG tablet Take 100 mg by mouth 3 times daily, Historical      traZODone (DESYREL) 50 MG tablet Take 25 mg by mouth 2 times daily Patient can have 25 mg once a day and 75 mg once a day., Historical       !! - Potential duplicate medications found. Please discuss with provider.        STOP taking these medications       morphine sulfate (ROXANOL) 20 mg/mL (HIGH CONC) soln Comments:   Reason for Stopping:             Allergies   Allergies   Allergen Reactions    Bee Pollen Anaphylaxis    Diphenhydramine Palpitations     Tolerated IV Benadryl when not pushed too fast    Isosorbide Nitrate Other (See Comments) and Dizziness     Also causes syncope (has fallen before) and brain fog/mental disturbances - please do not prescribe    Nitroglycerin Dizziness, Fatigue and Other (See Comments)     Specifically the patch - please do not prescribe  Specifically the patch - please do not prescribe      Contrast Dye Hives and Itching    Penicillin G     Adhesive Tape Rash    Liquid Adhesive Itching    Nystatin Dermatitis     Also blisters     Penicillins Swelling and Rash     Occurred as a child - not 100% sure on specific reactions    Sulfa Antibiotics Other (See Comments)     Occurred as a child / patient does not remember specific reaction

## 2024-05-29 ENCOUNTER — MEDICAL CORRESPONDENCE (OUTPATIENT)
Dept: HEALTH INFORMATION MANAGEMENT | Facility: CLINIC | Age: 69
End: 2024-05-29
Payer: COMMERCIAL

## 2024-05-30 DIAGNOSIS — R07.9 CHEST PAIN, UNSPECIFIED TYPE: Primary | ICD-10-CM

## 2024-05-30 NOTE — PROGRESS NOTES
Received fax order from [facility] for cardiology consult.   Referral to cardiology for ischemic workup for on going chest pain v.o. Cooper Nieves NP/Claudia Pan RN on 5/29/24    Orders faxed to HIM to be scanned into chart.   Patient is already scheduled for cardiology consult with Dr. Holland on 6/27/24 at 10:15am and device in clinic check same day 6/27/24 at 11:15am.     [facility] Phone: [phone]

## 2024-06-06 ENCOUNTER — HOSPITAL ENCOUNTER (EMERGENCY)
Facility: CLINIC | Age: 69
Discharge: HOME | End: 2024-06-06
Attending: FAMILY MEDICINE | Admitting: FAMILY MEDICINE
Payer: COMMERCIAL

## 2024-06-06 VITALS
DIASTOLIC BLOOD PRESSURE: 52 MMHG | HEART RATE: 60 BPM | BODY MASS INDEX: 33.13 KG/M2 | OXYGEN SATURATION: 97 % | WEIGHT: 180 LBS | SYSTOLIC BLOOD PRESSURE: 107 MMHG | HEIGHT: 62 IN | RESPIRATION RATE: 11 BRPM | TEMPERATURE: 97.8 F

## 2024-06-06 DIAGNOSIS — R07.9 CHEST PAIN, UNSPECIFIED TYPE: ICD-10-CM

## 2024-06-06 DIAGNOSIS — M62.838 TRAPEZIUS MUSCLE SPASM: ICD-10-CM

## 2024-06-06 LAB
ALBUMIN SERPL BCG-MCNC: 3.7 G/DL (ref 3.5–5.2)
ALP SERPL-CCNC: 49 U/L (ref 40–150)
ALT SERPL W P-5'-P-CCNC: 21 U/L (ref 0–50)
ANION GAP SERPL CALCULATED.3IONS-SCNC: 9 MMOL/L (ref 7–15)
AST SERPL W P-5'-P-CCNC: 23 U/L (ref 0–45)
BASOPHILS # BLD AUTO: 0.1 10E3/UL (ref 0–0.2)
BASOPHILS NFR BLD AUTO: 1 %
BILIRUB SERPL-MCNC: 0.3 MG/DL
BUN SERPL-MCNC: 20.1 MG/DL (ref 8–23)
CALCIUM SERPL-MCNC: 10 MG/DL (ref 8.8–10.2)
CHLORIDE SERPL-SCNC: 104 MMOL/L (ref 98–107)
CREAT SERPL-MCNC: 1.08 MG/DL (ref 0.51–0.95)
DEPRECATED HCO3 PLAS-SCNC: 23 MMOL/L (ref 22–29)
EGFRCR SERPLBLD CKD-EPI 2021: 55 ML/MIN/1.73M2
EOSINOPHIL # BLD AUTO: 0.2 10E3/UL (ref 0–0.7)
EOSINOPHIL NFR BLD AUTO: 2 %
ERYTHROCYTE [DISTWIDTH] IN BLOOD BY AUTOMATED COUNT: 16.6 % (ref 10–15)
GLUCOSE SERPL-MCNC: 178 MG/DL (ref 70–99)
HCT VFR BLD AUTO: 30.9 % (ref 35–47)
HGB BLD-MCNC: 9.7 G/DL (ref 11.7–15.7)
IMM GRANULOCYTES # BLD: 0.1 10E3/UL
IMM GRANULOCYTES NFR BLD: 1 %
LYMPHOCYTES # BLD AUTO: 1.9 10E3/UL (ref 0.8–5.3)
LYMPHOCYTES NFR BLD AUTO: 21 %
MCH RBC QN AUTO: 29.3 PG (ref 26.5–33)
MCHC RBC AUTO-ENTMCNC: 31.4 G/DL (ref 31.5–36.5)
MCV RBC AUTO: 93 FL (ref 78–100)
MONOCYTES # BLD AUTO: 0.6 10E3/UL (ref 0–1.3)
MONOCYTES NFR BLD AUTO: 6 %
NEUTROPHILS # BLD AUTO: 6.4 10E3/UL (ref 1.6–8.3)
NEUTROPHILS NFR BLD AUTO: 70 %
NRBC # BLD AUTO: 0 10E3/UL
NRBC BLD AUTO-RTO: 0 /100
PLATELET # BLD AUTO: 136 10E3/UL (ref 150–450)
POTASSIUM SERPL-SCNC: 4.6 MMOL/L (ref 3.4–5.3)
PROT SERPL-MCNC: 7.3 G/DL (ref 6.4–8.3)
RBC # BLD AUTO: 3.31 10E6/UL (ref 3.8–5.2)
SODIUM SERPL-SCNC: 136 MMOL/L (ref 135–145)
TROPONIN T SERPL HS-MCNC: 26 NG/L
TROPONIN T SERPL HS-MCNC: 27 NG/L
WBC # BLD AUTO: 9.3 10E3/UL (ref 4–11)

## 2024-06-06 PROCEDURE — 250N000011 HC RX IP 250 OP 636: Performed by: FAMILY MEDICINE

## 2024-06-06 PROCEDURE — 36415 COLL VENOUS BLD VENIPUNCTURE: CPT | Performed by: FAMILY MEDICINE

## 2024-06-06 PROCEDURE — 96374 THER/PROPH/DIAG INJ IV PUSH: CPT

## 2024-06-06 PROCEDURE — 85025 COMPLETE CBC W/AUTO DIFF WBC: CPT | Performed by: FAMILY MEDICINE

## 2024-06-06 PROCEDURE — 93005 ELECTROCARDIOGRAM TRACING: CPT

## 2024-06-06 PROCEDURE — 80053 COMPREHEN METABOLIC PANEL: CPT | Performed by: FAMILY MEDICINE

## 2024-06-06 PROCEDURE — 96375 TX/PRO/DX INJ NEW DRUG ADDON: CPT

## 2024-06-06 PROCEDURE — 93010 ELECTROCARDIOGRAM REPORT: CPT | Performed by: FAMILY MEDICINE

## 2024-06-06 PROCEDURE — 99284 EMERGENCY DEPT VISIT MOD MDM: CPT | Mod: 25

## 2024-06-06 PROCEDURE — 250N000013 HC RX MED GY IP 250 OP 250 PS 637: Performed by: FAMILY MEDICINE

## 2024-06-06 PROCEDURE — 99284 EMERGENCY DEPT VISIT MOD MDM: CPT | Performed by: FAMILY MEDICINE

## 2024-06-06 PROCEDURE — 84484 ASSAY OF TROPONIN QUANT: CPT | Performed by: FAMILY MEDICINE

## 2024-06-06 RX ORDER — MORPHINE SULFATE 4 MG/ML
4 INJECTION, SOLUTION INTRAMUSCULAR; INTRAVENOUS
Status: COMPLETED | OUTPATIENT
Start: 2024-06-06 | End: 2024-06-06

## 2024-06-06 RX ORDER — ASPIRIN 81 MG/1
324 TABLET, CHEWABLE ORAL ONCE
Status: COMPLETED | OUTPATIENT
Start: 2024-06-06 | End: 2024-06-06

## 2024-06-06 RX ORDER — ONDANSETRON 2 MG/ML
4 INJECTION INTRAMUSCULAR; INTRAVENOUS EVERY 30 MIN PRN
Status: DISCONTINUED | OUTPATIENT
Start: 2024-06-06 | End: 2024-06-06 | Stop reason: HOSPADM

## 2024-06-06 RX ORDER — ASPIRIN 300 MG/1
300 SUPPOSITORY RECTAL ONCE
Status: DISCONTINUED | OUTPATIENT
Start: 2024-06-06 | End: 2024-06-06

## 2024-06-06 RX ADMIN — ASPIRIN 324 MG: 81 TABLET, CHEWABLE ORAL at 01:49

## 2024-06-06 RX ADMIN — MORPHINE SULFATE 4 MG: 4 INJECTION, SOLUTION INTRAMUSCULAR; INTRAVENOUS at 02:10

## 2024-06-06 RX ADMIN — ONDANSETRON 4 MG: 2 INJECTION INTRAMUSCULAR; INTRAVENOUS at 02:10

## 2024-06-06 ASSESSMENT — ACTIVITIES OF DAILY LIVING (ADL)
ADLS_ACUITY_SCORE: 39

## 2024-06-06 NOTE — DISCHARGE INSTRUCTIONS
Call cardiology in the morning to set up an appointment.  They wanted to see you as an outpatient.  Your heart tests were reassuring tonight.  Your left trapezius muscle was in spasm and that was probably causing a lot of your pain.  It calmed down with some trigger point injections using a local anesthetic.  Ice or heat may also be helpful.  Continue the rest of your current medications.  It was nice visiting with you tonight.  I am glad you are feeling better and hope you continue to do well.    Thank you for choosing [facility]. We appreciate the opportunity to meet your urgent medical needs. Please let us know if we could have done anything to make your stay more satisfying.    After discharge, please closely monitor for any new or worsening symptoms. Return to the Emergency Department if you develop any acute worsening signs or symptoms.    If you had lab work, cultures or imaging studies done during your stay, the final results may still be pending. We will call you if your plan of care needs to change. However, if you are not improving as expected, please follow up with your primary care provider or clinic.     Start any prescription medications that were prescribed to you and take them as directed.     Please see additional handouts that may be pertinent to your condition.

## 2024-06-06 NOTE — ED PROVIDER NOTES
History     Chief Complaint   Patient presents with    Chest Pain     HPI  Letty Escobar is a 68 year old female who presents to the ED from the nursing home with chest pain that woke her from sleep at about 11 PM tonight.  Pain is in her chest and radiates to the left posterior shoulder and trap and then down the left arm.  She took 2 nitro at the nursing home and EMS gave her another nitro spray and she had slight relief.  Some nausea but no vomiting.     She was hospitalized at [facility] from May 9 to 12, 2024 with chest pain.  TTE showed reduction in her LV function with an EF of 25-30%.  It was 45-50% in July 2023.  She was anemic with a hemoglobin of 7 and was transfused.  Cardiology said if she continued to have chest pain and spite of management of her anemia, they would consider angiography to look for ischemic causes.      She was seen again in the [facility] emergency department on 5/22/2024 and had stable troponins of 28 and 28.  Cardiology felt she could go home and be followed up as an outpatient but that has not happened yet.  She states she is still waiting to hear.  Follows with [facility] in [place].        Allergies:  Allergies   Allergen Reactions    Bee Pollen Anaphylaxis    Diphenhydramine Palpitations     Tolerated IV Benadryl when not pushed too fast    Isosorbide Nitrate Other (See Comments) and Dizziness     Also causes syncope (has fallen before) and brain fog/mental disturbances - please do not prescribe    Nitroglycerin Dizziness, Fatigue and Other (See Comments)     Specifically the patch - please do not prescribe  Specifically the patch - please do not prescribe      Contrast Dye Hives and Itching    Penicillin G     Adhesive Tape Rash    Liquid Adhesive Itching    Nystatin Dermatitis     Also blisters    Penicillins Swelling and Rash     Occurred as a child - not 100% sure on specific reactions    Sulfa Antibiotics Other (See Comments)     Occurred as a child / patient  does not remember specific reaction       Problem List:    Patient Active Problem List    Diagnosis Date Noted    Acute respiratory failure with hypoxia and hypercapnia (H) 04/27/2024     Priority: Medium    COPD with acute exacerbation (H) 04/27/2024     Priority: Medium    Bilateral pneumonia 04/27/2024     Priority: Medium    Lethargy 11/10/2023     Priority: Medium    Urinary tract infection without hematuria, site unspecified 11/10/2023     Priority: Medium    Fever 11/10/2023     Priority: Medium    Hypoxia 11/10/2023     Priority: Medium    Pyuria 11/10/2023     Priority: Medium    PAD (peripheral artery disease) (H24) 11/10/2023     Priority: Medium    Left foot pain 11/10/2023     Priority: Medium    History of stroke 11/10/2023     Priority: Medium    Lives in long-term care facility 11/10/2023     Priority: Medium    Obesity hypoventilation syndrome (H) 11/10/2023     Priority: Medium    Benzodiazepine dependence (H) 11/10/2023     Priority: Medium    Thrombocytopenia (H24) 11/10/2023     Priority: Medium    ERIC (acute kidney injury) (H24) 11/10/2023     Priority: Medium    Chronic kidney disease, stage 3a (H) 10/16/2022     Priority: Medium    SOB (shortness of breath) 04/07/2022     Priority: Medium    Type 2 diabetes mellitus with hyperglycemia, with long-term current use of insulin (H) 02/13/2022     Priority: Medium    Morbid obesity (H) 12/10/2021     Priority: Medium    ICD (implantable cardioverter-defibrillator) in place 11/17/2021     Priority: Medium     Formatting of this note is different from the original.  Date of last device in office evaluation: 5/17/2022    ?  and model: Medtronic Cobalt CRT-D.   Date of implant: 5/7/2021  Tachy therapies remain OFF: S/p downgrade from ICD to pacemaker, but her implanted system includes a DF-4 lead, so an ICD generator was placed with the tachycardia therapies disabled.     ? Indication for device: Ischemic cardiomyopathy   ? Cardiac  resynchronization therapy:   no     MRI Conditional:  No  o If No:  Reason why:  Abandoned LV lead    ? Battery longevity documented as less than 3  Months: No  ? Are any of the leads less than 3 months old:  No    ? Programming              ? Pacing mode and programmed lower rate: DDDR 60 - 130 bpm              ? Rate-responsive sensor type, if programmed on: Accelerometer        Underlying rhythm and heart rate:  SR @ 60 bpm    ? What is the response of this device to magnet placement:  None - tachy therapies off  ? PM magnet pacing rate: N/A   ? Any alert status on CIED generator or lead: No    ? Last pacing threshold    Atrial   1.0 V @ 0.4 ms   Ventricular RV: 0.75 V @ 0.4 ms  LV:  2.75 V @ 1.0 ms    Formatting of this note is different from the original.  Date of last device in office evaluation: 11/17/2022     ?  and model: Medtronic Cobalt CRT-D.   Date of implant: 5/7/2021  Tachy therapies remain OFF: S/p downgrade from ICD to pacemaker, but her implanted system includes a DF-4 lead, so an ICD generator was placed with the tachycardia therapies disabled.     ? Indication for device: Ischemic cardiomyopathy   ? Cardiac resynchronization therapy:   no     MRI Conditional:  No  o If No:  Reason why:  Abandoned LV lead    ? Battery longevity documented as less than 3  Months: No  ? Are any of the leads less than 3 months old:  No    ? Programming              ? Pacing mode and programmed lower rate: DDDR 60 - 130 bpm              ? Rate-responsive sensor type, if programmed on: Accelerometer        Underlying rhythm and heart rate:  Sinus rhythm 62 bpm, w/BBB    ? What is the response of this device to magnet placement:  None - tachy therapies off  ? PM magnet pacing rate: N/A   ? Any alert status on CIED generator or lead: No    ? Last pacing threshold    Atrial   1.0 V @ 0.4 ms   Ventricular RV: 0.75 V @ 0.4 ms  LV:  2.75 V @ 1.0 ms      Atrial fibrillation (H) 04/06/2021     Priority: Medium     Pacemaker 04/06/2021     Priority: Medium    Major depressive disorder, recurrent severe without psychotic features (H) 01/26/2021     Priority: Medium    Panic disorder with agoraphobia 04/14/2020     Priority: Medium    Constipation 03/20/2020     Priority: Medium    Personality disorder (H) 03/05/2020     Priority: Medium     Rule out dependent personality    Formatting of this note might be different from the original.  Rule out dependent personality  Formatting of this note might be different from the original.  Rule out dependent personality      Candidiasis 03/01/2020     Priority: Medium    Posttraumatic stress disorder 03/01/2020     Priority: Medium    COPD without exacerbation (H) 01/29/2020     Priority: Medium    Long term (current) use of insulin (H) 01/29/2020     Priority: Medium    Chronic systolic heart failure (H) 01/28/2020     Priority: Medium    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, left leg (H) 01/15/2020     Priority: Medium    Chest pain 11/11/2019     Priority: Medium    Polymyalgia rheumatica (H24) 11/28/2018     Priority: Medium    Unstable angina (H) 05/02/2018     Priority: Medium     Coronary angiogram 05/02/2018 demonstrated 30% stenosis in the LMCA, 50% stenosis in the Proximal LAD, 50% stenosis in the Mid LAD, 95% stenosis in the Proximal Circumflex (Restenosis - Balloon Angioplasty), 100% stenosis in the 1st Marginal, 50% stenosis in the Proximal RCA, 50% stenosis in the Distal RCA, the LIMA graft from the LIMA to the Distal LAD is free of significant disease; the SVG graft from the Aorta to the 1st Marginal is free of significant disease; the SVG graft from the Aorta to the Distal RCA is free of significant disease. Intervention included a successful  2.5mm x 12mm Balloon,  2.5mm x 10mm Cutting Balloon,  3mm x 12mm Drug Eluting Stent,  3mm x 12mm Balloon, and  3mm x 8mm Balloon to Proximal Circumflex, post stenosis 0%.    Formatting  of this note is different from the original.  Coronary angiogram 05/02/2018 demonstrated 30% stenosis in the LMCA, 50% stenosis in the Proximal LAD, 50% stenosis in the Mid LAD, 95% stenosis in the Proximal Circumflex (Restenosis - Balloon Angioplasty), 100% stenosis in the 1st Marginal, 50% stenosis in the Proximal RCA, 50% stenosis in the Distal RCA, the LIMA graft from the LIMA to the Distal LAD is free of significant disease; the SVG graft from the Aorta to the 1st Marginal is free of significant disease; the SVG graft from the Aorta to the Distal RCA is free of significant disease. Intervention included a successful  2.5mm x 12mm Balloon,  2.5mm x 10mm Cutting Balloon,  3mm x 12mm Drug Eluting Stent,  3mm x 12mm Balloon, and  3mm x 8mm Balloon to Proximal Circumflex, post stenosis 0%.  Formatting of this note is different from the original.  Coronary angiogram 05/02/2018 demonstrated 30% stenosis in the LMCA, 50% stenosis in the Proximal LAD, 50% stenosis in the Mid LAD, 95% stenosis in the Proximal Circumflex (Restenosis - Balloon Angioplasty), 100% stenosis in the 1st Marginal, 50% stenosis in the Proximal RCA, 50% stenosis in the Distal RCA, the LIMA graft from the LIMA to the Distal LAD is free of significant disease; the SVG graft from the Aorta to the 1st Marginal is free of significant disease; the SVG graft from the Aorta to the Distal RCA is free of significant disease. Intervention included a successful  2.5mm x 12mm Balloon,  2.5mm x 10mm Cutting Balloon,  3mm x 12mm Drug Eluting Stent,  3mm x 12mm Balloon, and  3mm x 8mm Balloon to Proximal Circumflex, post stenosis 0%.      Vitamin B12 deficiency 02/14/2018     Priority: Medium    Chronic bilateral low back pain without sciatica 01/17/2018     Priority: Medium    Chronic pain disorder 10/01/2017     Priority: Medium    Urinary incontinence, mixed 09/24/2017     Priority: Medium    Internal carotid artery stenosis, bilateral 07/13/2016     Priority:  Medium     Carotid US 05/04/2018 showed moderate plaque formation, consistent with 50 to 69% stenosis in the right internal carotid artery, not significantly changed from 8/5/2015.  Moderate plaque formation, consistent with 50 to 69% stenosis in the left internal carotid artery; there has been mild progression of the left ICA stenosis since 8/5/2015.    Formatting of this note might be different from the original.  Carotid US 05/04/2018 showed moderate plaque formation, consistent with 50 to 69% stenosis in the right internal carotid artery, not significantly changed from 8/5/2015.  Moderate plaque formation, consistent with 50 to 69% stenosis in the left internal carotid artery; there has been mild progression of the left ICA stenosis since 8/5/2015.    Formatting of this note might be different from the original.  Carotid US 05/04/2018 showed moderate plaque formation, consistent with 50 to 69% stenosis in the right internal carotid artery, not significantly changed from 8/5/2015.  Moderate plaque formation, consistent with 50 to 69% stenosis in the left internal carotid artery; there has been mild progression of the left ICA stenosis since 8/5/2015.      Essential (primary) hypertension 10/14/2015     Priority: Medium    Ischemic cardiomyopathy 09/20/2015     Priority: Medium     EF of 40-45%, status post RV lead revision and LV epicardial lead placement via mini-thoracotomy in August 2016.    Formatting of this note might be different from the original.  EF of 40-45%, status post RV lead revision and LV epicardial lead placement via mini-thoracotomy in August 2016.  Formatting of this note might be different from the original.  EF of 40-45%, status post RV lead revision and LV epicardial lead placement via mini-thoracotomy in August 2016.      S/P CABG x 5 08/21/2015     Priority: Medium    Pain medication agreement 04/20/2013     Priority: Medium     Controlled substance agreement for percocet #30/month on  "file and signed 4/17/13.  Designated pharmacy: [facility] Prescribing physician: Andrea Diagnosis: Ulnar neuropathy    Formatting of this note might be different from the original.  Controlled substance agreement for percocet #30/month on file and signed 4/17/13.  Designated pharmacy: [facility] Prescribing physician: Andrea Diagnosis: Ulnar neuropathy      Anemia in other chronic diseases classified elsewhere 01/05/2013     Priority: Medium    Hypothyroidism, unspecified 12/10/2010     Priority: Medium    ACP (advance care planning) 11/03/2010     Priority: Medium     Formatting of this note might be different from the original.  Patient has identified Health Care Agent(s): Yes  Add Health Care Agents: Yes    Health Care Agent(s):    Primary Health Care Agent:     Edwar Escobar Relationship:    Spouse Phone:     [phone]    Secondary Health Care Agent:     Chiki Oro Relationship:     Son Phone:     [phone]      Patient has Advance Care Plan Documents (Health Care Directive, POLST): Yes    Advance Care Plan Documents:  Health Care Directive--03/28/11  Resuscitation Guidelines--    Patient has identified Specific Treatment Preferences: Yes   Specific Treatment Preferences:   a.) Code Status:  DNR/ Do Not Attempt Resuscitation - Allow a Natural Death    b.) Goals of Treatment:     ii. Limited Interventions and treat reversible conditions.  Provide interventions aimed at treatment of new or reversible illness/injury or non-life threatening chronic conditions. Duration of invasive or uncomfortable interventions should generally be limited.- Trial of intubation 5 days or other instructions \"If no improvement, stop.\"  c.) Interventions and Treatments:   i.   Antibiotics:          - Aggressive antibiotics  ii.  Nutrition/Hydration:         - Offer food and liquids by mouth         - IV fluid administration         - No feeding tube under any circumstance.  iii. Transfusion:          - Blood products for " "comfort/relief of symptoms only  iv. Dialysis:           - Dialysis for short term \"for recovery, but not long term.\"        Last Assessment & Plan:   Advance Care Planning: Disease-specific Session    Letty Escobar is an [facility] patient of Dr. Renea Mendez of the [facility].    Advance care planning discussions were completed with Letty and her healthcare agent, spouse Edwar Escobar on Monday, March 28, 2011 at the [facility].    Understanding of Illness and Disease [place]:   Letty identifies her medical condition as diabetes with peripheral neuropathy, heart problems with a heart attack February 2009 plus 4 stent placements; sleep apnea; depression; hypothyroid; high cholesterol; tobacco use; and describes it as needing further assistance with thyroid and diabetes management.  She identifies the following symptoms of her medical condition as being the most bothersome: some memory loss, balance problems, light headedness and dizziness, puffy eyes and lower extremity edema from time to time.    Let[place] is [place] .  She is independent with all cares and lives an active life style although, \"I'm not as active as I used to be.\"    Goals of Care:   Letty currently hopes to maintain independence, control pain and symptoms and delay progression of, but not cure, the illness.  \"I want to get the diabetes and thyroid under better control.\"  Letty has an appointment the first part of April for follow up.    Quality of Life:    Letty identifies quality of life as family involvement twice weekly, Confucianist activities, newly assigned Sunday , playing cards, the computer,    Letty christiano with serious challenges in her life through her ganesh in God, prayers and support of her Confucianist family, spouse and son.    Letty identifies the following fears and worries about her medical care:  \"I just want the " "diabetes straightened out.\"    Treatment and Care Preferences:   Past experiences in dealing with family and/or friends that have  or been seriously ill include her brother who took his own life.  \"He was 53 years old and living with us.  I found him.\"   As a result of these experiences, Letty expresses these health care preferences:      Summary Letty's Treatment Preferences:  LOW SURVIVAL; HIGH TREATMENT BURDEN:  If Letty suffered a serious complication, such that she was facing a prolonged hospital stay, required ongoing medical interventions, and the chance of living through the complication was low (for example, only 5 out of 100 would live), Letty would choose:  to focus treatment on comfort and quality of life (\"Quality of life is more important than length of life to Letty.\")  COMMENT:  \"If I can't live a normal life, I wouldn't want to live.\"    HIGH SURVIVAL; LOW FUNCTIONAL STATUS:  If Letty had a serious complication and had a good chance of living through the complication but it was expected that she would never be able to walk or talk again and would require 24 hour nursing care, she would choose:  to focus treatment on comfort and quality of life (\"Quality of life is more important than length of life to Letty.\")  COMMENT:  \"I'd accept rehabilitation.\"    HIGH SURVIVAL; LOW COGNITIVE STATUS:  If Letty had a serious complication and had a good chance of living through the complication but it was expected that she would never know who she was or who she was with and would require 24 hour nursing care, she would choose:  to focus treatment on comfort and quality of life (\"Quality of life is more important than length of life to Letty.\")    CARDIO-PULMONARY RESUSCITATION (CPR):  The facts, risks and benefits of CPR were discussed with Ltety.  If she had a sudden event that caused her heart and breathing to stop, she:  WOULD NOT want CPR attempted and instead would prefer that a natural death " "occur.    MECHANICAL VENTILATION: If Letty had an episode where she was unable to breathe on her own, she would choose the following:  attempt to use any appropriate non-invasive method to assist breathing, and use mechanical ventilation.  COMMENT:  \"If reversible ventilator is acceptable for 5 days.  If no improvement, stop.\"     Letty has chosen her healthcare agent to:  strictly follow her wishes.    Follow Up Plan:   Letty was encouraged to continue advance care planning discussions with her health care agents and primary care provider.   Letty and her health care agent declined handouts.     Documents addressed during this advance care planning session:  1.  Health Care Agents identified.          .  Primary health care agent is spouse, Edwar Escobar;          .  Secondary health care agent is son, Jermaine Oro.  2.  Health Care Directive completed and scanned into medical record.  3.  Statement of Treatment Preferences for advanced illness completed and scanned into the medical record.  4.  Resuscitation Guidelines completed for DNR.  Document sent to Dr. Renea Mendez for signature and returned to the home. Letty, please place on the refrigerator.    Recommendations/Plan:   Letty and her health care agent to review Advance Care Plan.     Letty would benefit from:   Care Navigation/Care Navigation Help Desk--explained services for pending future needs.  No identified needs today.  Care Navigation brochure was given to Letty and her healthcare agent.    Advance Care Planning recommendations and Letty's concerns and questions were cc ed to her primary provider.     Thank you, Letty for the opportunity of assisting with Advance Care Planning. It was a seeing you again and meeting Edwar.  Please contact me if I can be of further assistance.    Interviewer: Pat Martin RN    Advance Care Planning Facilitator  [phone]   3/28/2011      ELI (obstructive sleep apnea) 01/31/2010     Priority: Medium     " PSG on April/2008 that showed moderate Obstructive Sleep Apnea with an AHI of 23.5 . Limb movements persisted at 29 movements/hour at optimal pressures, despite carbidopa use.    Formatting of this note might be different from the original.  PSG on April/2008 that showed moderate Obstructive Sleep Apnea with an AHI of 23.5 . Limb movements persisted at 29 movements/hour at optimal pressures, despite carbidopa use.  Formatting of this note might be different from the original.  PSG on April/2008 that showed moderate Obstructive Sleep Apnea with an AHI of 23.5 . Limb movements persisted at 29 movements/hour at optimal pressures, despite carbidopa use.      Coronary atherosclerosis 02/06/2009     Priority: Medium     Formatting of this note might be different from the original.  2/5/2009 - MI - Proximal RCA 99%, mild-mod disease elsewhere.  EF 60%.  PCI:  MYRON to pRCA.  2/12/2009 - admit CP - Widely patent RCA stent. Moderate diffuse CAD. Severe stenosis in trivial PDA branch. LVEF 45%.  1/25/2010 - admit CP - PTCA and stent of diagonal  9/8/2010 - admit CP - LAD patent stent. Moderate diffuse CAD. Medical management recommended.   4/25/2012 - NSTEMI - Acute total occlusion of the ostial Circumflex. Acute total occlusion of the ostial ramus. Acute total occlusion of the distal 1st Obtuse Marginal. Angioplasty of ostial circumflex and ostial ramus. There was distal embolization to the OM1 vessel. This vessel was small and not amendable to intervention. Indefinite: plavix and asa 81.  8/10/2015 - CABx5 - 1. LIMA to distal LAD, reverse SVG to OM1 and OM2, reverse SVG to diagonal, reverse SVG to PDA.   2. Mitral valve repair with a Medtronics 3-D full ring, 28 mm.   3. Tricuspid repair with a Medtronic 28 mm partial ring  1/12/2016 - TTE - EF 40-45%  Formatting of this note might be different from the original.  2/5/2009 - MI - Proximal RCA 99%, mild-mod disease elsewhere.  EF 60%.  PCI:  MYRON to pRCA.  2/12/2009 - admit CP  - Widely patent RCA stent. Moderate diffuse CAD. Severe stenosis in trivial PDA branch. LVEF 45%.  1/25/2010 - admit CP - PTCA and stent of diagonal  9/8/2010 - admit CP - LAD patent stent. Moderate diffuse CAD. Medical management recommended.   4/25/2012 - NSTEMI - Acute total occlusion of the ostial Circumflex. Acute total occlusion of the ostial ramus. Acute total occlusion of the distal 1st Obtuse Marginal. Angioplasty of ostial circumflex and ostial ramus. There was distal embolization to the OM1 vessel. This vessel was small and not amendable to intervention. Indefinite: plavix and asa 81.  8/10/2015 - CABx5 - 1. LIMA to distal LAD, reverse SVG to OM1 and OM2, reverse SVG to diagonal, reverse SVG to PDA.   2. Mitral valve repair with a Medtronics 3-D full ring, 28 mm.   3. Tricuspid repair with a Medtronic 28 mm partial ring  1/12/2016 - TTE - EF 40-45%      Restless legs syndrome 08/29/2007     Priority: Medium    Hyperlipidemia, unspecified 05/30/2007     Priority: Medium        Past Medical History:    Past Medical History:   Diagnosis Date    ACP (advance care planning) 11/3/2010    Acute coronary syndrome (H) 11/22/2019    Acute exacerbation of CHF (congestive heart failure) (H) 11/11/2019    Acute on chronic systolic (congestive) heart failure (H) 1/28/2020    Anemia of chronic disease 1/5/2013    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, left leg (H) 1/15/2020    BPPV (benign paroxysmal positional vertigo) 5/31/2018    Candidiasis 3/1/2020    Carotid stenosis, right 7/13/2016    Chest pain 11/11/2019    Chronic bilateral low back pain without sciatica 1/17/2018    Chronic pain disorder 10/1/2017    Constipation 3/20/2020    COPD (chronic obstructive pulmonary disease) (H) 6/24/2020    Coronary atherosclerosis 2/6/2009    Cubital tunnel syndrome on left 11/13/2012    Depressive disorder     Diabetes mellitus (H)     Diabetes mellitus type 2 in obese (H)  7/13/2006    Diabetes mellitus type 2 in obese (H) 7/13/2006    Drug-seeking behavior 4/4/2020    Failure to thrive in adult 9/3/2020    Hereditary and idiopathic peripheral neuropathy 4/24/2007    Hyperlipidemia with target low density lipoprotein (LDL) cholesterol less than 70 mg/dL 5/30/2007    Hypertension 10/14/2015    Hypothyroidism 12/10/2010    Irritable bowel syndrome 9/7/2015    Ischemic cardiomyopathy 9/20/2015    Long-term use of high-risk medication 4/14/2020    Moniliasis, 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    Myocardial infarction (H)     Neuromuscular disorder (H)     Other specified postprocedural states 10/8/2015    Pain medication agreement 4/20/2013    Panic disorder with agoraphobia 4/14/2020    Paroxysmal atrial fibrillation (H) 10/2/2015    Personality disorder (H) 3/5/2020    Polymyalgia rheumatica (H24) 11/28/2018    Posttraumatic stress disorder 3/1/2020    Restless legs syndrome (RLS) 8/29/2007    Restless legs syndrome (RLS) 8/29/2007    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    Serum calcium elevated 3/1/2020    Somatic dysfunction of sacral region 1/17/2018    Subacromial bursitis of left shoulder joint 8/6/2018    Suicidal ideation 4/1/2020    Thyroid disease     TIA (transient ischemic attack) 5/4/2018    TIA (transient ischemic attack) 5/4/2018    Tobacco abuse 2/17/2017    Tobacco abuse 2/17/2017    Urinary incontinence, mixed 9/24/2017    UTI (urinary tract infection) 4/17/2020    Vitamin B12 deficiency 2/14/2018    Weakness 12/17/2019       Past Surgical History:    Past Surgical History:   Procedure Laterality Date    CARDIAC SURGERY      stents x 11    CARDIAC SURGERY      CHOLECYSTECTOMY      CHOLECYSTECTOMY      GENITOURINARY SURGERY      Tubal ligation    OTHER SURGICAL HISTORY      Genitourinary surgery    RELEASE CARPAL TUNNEL      RELEASE CARPAL TUNNEL         Family History:    No family history on file.    Social History:  Marital Status:   " [2]  Social History     Tobacco Use    Smoking status: Never    Smokeless tobacco: Never   Vaping Use    Vaping status: Never Used   Substance Use Topics    Alcohol use: Not Currently    Drug use: Never        Medications:    acetaminophen (TYLENOL) 325 MG tablet  acetaminophen (TYLENOL) 500 MG tablet  albuterol (PROVENTIL) (2.5 MG/3ML) 0.083% neb solution  albuterol (PROVENTIL) (2.5 MG/3ML) 0.083% neb solution  amLODIPine (NORVASC) 5 MG tablet  aspirin 81 MG EC tablet  bisacodyl (DULCOLAX) 10 MG suppository  clotrimazole (LOTRIMIN) 1 % external cream  cyanocobalamin (CYANOCOBALAMIN) 1000 MCG/ML injection  DULoxetine HCl 40 MG CPEP  famotidine (PEPCID) 20 MG tablet  fluticasone-vilanterol (BREO ELLIPTA) 200-25 MCG/INH inhaler  furosemide (LASIX) 20 MG tablet  gabapentin (NEURONTIN) 400 MG capsule  hydrochlorothiazide (HYDRODIURIL) 25 MG tablet  Insulin Aspart FlexPen 100 UNIT/ML SOPN  LANTUS SOLOSTAR 100 UNIT/ML soln  levothyroxine (SYNTHROID/LEVOTHROID) 137 MCG tablet  lidocaine (LMX4) 4 % external cream  LORazepam (ATIVAN) 1 MG tablet  Melatonin 10 MG TABS tablet  metoprolol succinate ER (TOPROL-XL) 25 MG 24 hr tablet  mirtazapine (REMERON) 15 MG tablet  nitroGLYcerin (NITROSTAT) 0.4 MG sublingual tablet  ondansetron (ZOFRAN ODT) 4 MG ODT tab  polyethylene glycol (MIRALAX) 17 GM/Dose powder  potassium chloride ER (MICRO-K) 10 MEQ CR capsule  predniSONE (DELTASONE) 10 MG tablet  QUEtiapine (SEROQUEL) 100 MG tablet  rosuvastatin (CRESTOR) 10 MG tablet  sennosides (SENOKOT) 8.6 MG tablet  traZODone (DESYREL) 50 MG tablet          Review of Systems   All other systems reviewed and are negative.      Physical Exam   BP: 109/66  Pulse: 80  Temp: 97.8  F (36.6  C)  Resp: 12  Height: 157.5 cm (5' 2\")  Weight: 81.6 kg (180 lb)  SpO2: 93 %      Physical Exam  Constitutional:       General: She is not in acute distress.     Appearance: She is well-developed. She is obese.   Cardiovascular:      Rate and Rhythm: " Normal rate and regular rhythm.   Pulmonary:      Effort: Pulmonary effort is normal.      Breath sounds: Normal breath sounds.   Abdominal:      Palpations: Abdomen is soft.      Tenderness: There is no abdominal tenderness.   Musculoskeletal:      Left shoulder: Tenderness (left trap tender/spasm) present.        Arms:       Right lower leg: No edema.      Left lower leg: No edema.   Neurological:      General: No focal deficit present.      Mental Status: She is alert and oriented to person, place, and time.         ED Course        Procedures              EKG Interpretation:      Interpreted by David Rust MD  Time reviewed: 1:18 AM   Symptoms at time of EKG: chest pain   Rhythm: paced  Rate: 68  Axis: -116  Ectopy: none  Conduction: wide complex/ paced  ST Segments/ T Waves: No acute ischemic change, no change from previous  Q Waves: none  Comparison to prior: Unchanged    Clinical Impression: no acute changes            Critical Care time:  none               Results for orders placed or performed during the hospital encounter of 06/06/24 (from the past 24 hour(s))   CBC with platelets differential    Narrative    The following orders were created for panel order CBC with platelets differential.  Procedure                               Abnormality         Status                     ---------                               -----------         ------                     CBC with platelets and d...[[id number]]  Abnormal            Final result                 Please view results for these tests on the individual orders.   Troponin T, High Sensitivity   Result Value Ref Range    Troponin T, High Sensitivity 26 (H) <=14 ng/L   Comprehensive metabolic panel   Result Value Ref Range    Sodium 136 135 - 145 mmol/L    Potassium 4.6 3.4 - 5.3 mmol/L    Carbon Dioxide (CO2) 23 22 - 29 mmol/L    Anion Gap 9 7 - 15 mmol/L    Urea Nitrogen 20.1 8.0 - 23.0 mg/dL    Creatinine 1.08 (H) 0.51 - 0.95 mg/dL    GFR  Estimate 55 (L) >60 mL/min/1.73m2    Calcium 10.0 8.8 - 10.2 mg/dL    Chloride 104 98 - 107 mmol/L    Glucose 178 (H) 70 - 99 mg/dL    Alkaline Phosphatase 49 40 - 150 U/L    AST 23 0 - 45 U/L    ALT 21 0 - 50 U/L    Protein Total 7.3 6.4 - 8.3 g/dL    Albumin 3.7 3.5 - 5.2 g/dL    Bilirubin Total 0.3 <=1.2 mg/dL   CBC with platelets and differential   Result Value Ref Range    WBC Count 9.3 4.0 - 11.0 10e3/uL    RBC Count 3.31 (L) 3.80 - 5.20 10e6/uL    Hemoglobin 9.7 (L) 11.7 - 15.7 g/dL    Hematocrit 30.9 (L) 35.0 - 47.0 %    MCV 93 78 - 100 fL    MCH 29.3 26.5 - 33.0 pg    MCHC 31.4 (L) 31.5 - 36.5 g/dL    RDW 16.6 (H) 10.0 - 15.0 %    Platelet Count 136 (L) 150 - 450 10e3/uL    % Neutrophils 70 %    % Lymphocytes 21 %    % Monocytes 6 %    % Eosinophils 2 %    % Basophils 1 %    % Immature Granulocytes 1 %    NRBCs per 100 WBC 0 <1 /100    Absolute Neutrophils 6.4 1.6 - 8.3 10e3/uL    Absolute Lymphocytes 1.9 0.8 - 5.3 10e3/uL    Absolute Monocytes 0.6 0.0 - 1.3 10e3/uL    Absolute Eosinophils 0.2 0.0 - 0.7 10e3/uL    Absolute Basophils 0.1 0.0 - 0.2 10e3/uL    Absolute Immature Granulocytes 0.1 <=0.4 10e3/uL    Absolute NRBCs 0.0 10e3/uL   Troponin T, High Sensitivity   Result Value Ref Range    Troponin T, High Sensitivity 27 (H) <=14 ng/L       Medications   ondansetron (ZOFRAN) injection 4 mg (4 mg Intravenous $Given 6/6/24 0210)   morphine (PF) injection 4 mg (4 mg Intravenous $Given 6/6/24 0210)   aspirin (ASA) chewable tablet 324 mg (324 mg Oral $Given 6/6/24 0149)       Assessments & Plan (with Medical Decision Making)  68-year-old female with history of coronary disease woke at 11 PM tonight at the nursing home with chest pain radiating to the left posterior shoulder and down her left arm.  Slight improvement with nitro.  Hard for her to take aspirin because she has no teeth.  She was given rectal aspirin in the ED.  EKG shows paced rhythm.  No change from previous.  Hemoglobin is improved up to 9.7.   Creatinine up slightly at 1.08, GFR 55, glucose 178.  Otherwise her comprehensive profile was unremarkable.  Nothing real concerning in that regard.  Initial troponin was slightly elevated at 26.  This is right where she was at in the [facility] emergency department on 5/22/2024 when she had 2 troponins of 28.  She is quite tight in the left trap.  That is where most of her pain is coming from.  I prepped the area with ChloraPrep and did 3 trigger point injections with Marcaine 0.5% plain which gave her excellent relief.  She was able to take a nap while waiting for her 2-hour delta troponin which came back unchanged at 27.  She feels much better and thinks she can make it at the nursing home.  Ice or heat may be helpful.    She will have the staff call cardiology tomorrow to arrange an outpatient appointment as they did want to see her in clinic.  We discussed reportable signs when to return.  Verbal and written discharge instructions given.  She is comfortable with this plan.     I have reviewed the nursing notes.    I have reviewed the findings, diagnosis, plan and need for follow up with the patient.           Medical Decision Making  The patient's presentation was of high complexity (an acute health issue posing potential threat to life or bodily function).    The patient's evaluation involved:  review of external note(s) from 3+ sources (see separate area of note for details)  ordering and/or review of 3+ test(s) in this encounter (see separate area of note for details)    The patient's management necessitated high risk (a parenteral controlled substance).        New Prescriptions    No medications on file       Final diagnoses:   Chest pain, unspecified type   Trapezius muscle spasm - left       6/6/2024   [facility] EMERGENCY DEPT       David Rust MD  06/06/24 2808

## 2024-06-06 NOTE — ED TRIAGE NOTES
Pt comes in today via EMS from the [facility] with c/o chest pain and SOB. Onset this evening around 2300. Pt had taken two nitroglycerin prior to EMS arrival. EMS administered one dose internasal nitroglycerin with limited effects. Pain 8/10     Triage Assessment (Adult)       Row Name 06/06/24 0051          Triage Assessment    Airway WDL WDL        Respiratory WDL    Respiratory WDL X;all     Rhythm/Pattern, Respiratory shortness of breath        Skin Circulation/Temperature WDL    Skin Circulation/Temperature WDL WDL        Cardiac WDL    Cardiac WDL all;chest pain        Chest Pain Assessment    Chest Pain Location anterior chest, left;arm, left     Chest Pain Radiation arm     Character pressure     Chest Pain Intervention cardiac monitoring continued;cardiac monitor placed;12-lead ECG obtained        Peripheral/Neurovascular WDL    Peripheral Neurovascular WDL WDL        Cognitive/Neuro/Behavioral WDL    Cognitive/Neuro/Behavioral WDL WDL

## 2024-06-14 ENCOUNTER — LAB REQUISITION (OUTPATIENT)
Dept: LAB | Facility: CLINIC | Age: 69
End: 2024-06-14
Payer: COMMERCIAL

## 2024-06-14 DIAGNOSIS — I50.22 CHRONIC SYSTOLIC (CONGESTIVE) HEART FAILURE (H): ICD-10-CM

## 2024-06-17 LAB
ANION GAP SERPL CALCULATED.3IONS-SCNC: 11 MMOL/L (ref 7–15)
BUN SERPL-MCNC: 18.7 MG/DL (ref 8–23)
CALCIUM SERPL-MCNC: 10.1 MG/DL (ref 8.8–10.2)
CHLORIDE SERPL-SCNC: 105 MMOL/L (ref 98–107)
CREAT SERPL-MCNC: 1.06 MG/DL (ref 0.51–0.95)
DEPRECATED HCO3 PLAS-SCNC: 22 MMOL/L (ref 22–29)
EGFRCR SERPLBLD CKD-EPI 2021: 56 ML/MIN/1.73M2
ERYTHROCYTE [DISTWIDTH] IN BLOOD BY AUTOMATED COUNT: 17.1 % (ref 10–15)
GLUCOSE SERPL-MCNC: 150 MG/DL (ref 70–99)
HBA1C MFR BLD: 6.8 %
HCT VFR BLD AUTO: 31.6 % (ref 35–47)
HGB BLD-MCNC: 9.8 G/DL (ref 11.7–15.7)
MCH RBC QN AUTO: 29.3 PG (ref 26.5–33)
MCHC RBC AUTO-ENTMCNC: 31 G/DL (ref 31.5–36.5)
MCV RBC AUTO: 95 FL (ref 78–100)
PLATELET # BLD AUTO: 148 10E3/UL (ref 150–450)
POTASSIUM SERPL-SCNC: 4.5 MMOL/L (ref 3.4–5.3)
RBC # BLD AUTO: 3.34 10E6/UL (ref 3.8–5.2)
SODIUM SERPL-SCNC: 138 MMOL/L (ref 135–145)
TSH SERPL DL<=0.005 MIU/L-ACNC: 11.43 UIU/ML (ref 0.3–4.2)
WBC # BLD AUTO: 6.3 10E3/UL (ref 4–11)

## 2024-06-17 PROCEDURE — 85027 COMPLETE CBC AUTOMATED: CPT | Mod: ORL | Performed by: NURSE PRACTITIONER

## 2024-06-17 PROCEDURE — 36415 COLL VENOUS BLD VENIPUNCTURE: CPT | Mod: ORL | Performed by: NURSE PRACTITIONER

## 2024-06-17 PROCEDURE — 80048 BASIC METABOLIC PNL TOTAL CA: CPT | Mod: ORL | Performed by: NURSE PRACTITIONER

## 2024-06-17 PROCEDURE — 84443 ASSAY THYROID STIM HORMONE: CPT | Performed by: NURSE PRACTITIONER

## 2024-06-17 PROCEDURE — P9604 ONE-WAY ALLOW PRORATED TRIP: HCPCS | Performed by: NURSE PRACTITIONER

## 2024-06-17 PROCEDURE — 83036 HEMOGLOBIN GLYCOSYLATED A1C: CPT | Mod: ORL | Performed by: NURSE PRACTITIONER

## 2024-06-24 ENCOUNTER — LAB REQUISITION (OUTPATIENT)
Dept: LAB | Facility: CLINIC | Age: 69
End: 2024-06-24
Payer: COMMERCIAL

## 2024-06-24 DIAGNOSIS — B00.9 HERPESVIRAL INFECTION, UNSPECIFIED: ICD-10-CM

## 2024-06-24 PROCEDURE — 87529 HSV DNA AMP PROBE: CPT | Mod: ORL | Performed by: NURSE PRACTITIONER

## 2024-06-25 LAB
HSV1 DNA SPEC QL NAA+PROBE: NOT DETECTED
HSV2 DNA SPEC QL NAA+PROBE: DETECTED

## 2024-06-27 ENCOUNTER — OFFICE VISIT (OUTPATIENT)
Dept: CARDIOLOGY | Facility: CLINIC | Age: 69
End: 2024-06-27
Payer: COMMERCIAL

## 2024-06-27 ENCOUNTER — ANCILLARY PROCEDURE (OUTPATIENT)
Dept: CARDIOLOGY | Facility: CLINIC | Age: 69
End: 2024-06-27
Attending: INTERNAL MEDICINE
Payer: COMMERCIAL

## 2024-06-27 VITALS
DIASTOLIC BLOOD PRESSURE: 58 MMHG | HEART RATE: 62 BPM | RESPIRATION RATE: 16 BRPM | OXYGEN SATURATION: 93 % | HEIGHT: 62 IN | BODY MASS INDEX: 35.85 KG/M2 | WEIGHT: 194.8 LBS | SYSTOLIC BLOOD PRESSURE: 116 MMHG

## 2024-06-27 DIAGNOSIS — I25.5 ISCHEMIC CARDIOMYOPATHY: ICD-10-CM

## 2024-06-27 DIAGNOSIS — I49.5 SSS (SICK SINUS SYNDROME) (H): ICD-10-CM

## 2024-06-27 DIAGNOSIS — Z95.1 S/P CABG X 5: ICD-10-CM

## 2024-06-27 DIAGNOSIS — I05.9 MITRAL VALVE DISEASE: ICD-10-CM

## 2024-06-27 DIAGNOSIS — I48.91 ATRIAL FIBRILLATION (H): ICD-10-CM

## 2024-06-27 DIAGNOSIS — Z95.810 ICD (IMPLANTABLE CARDIOVERTER-DEFIBRILLATOR) IN PLACE: Primary | ICD-10-CM

## 2024-06-27 DIAGNOSIS — I50.22 CHRONIC SYSTOLIC HEART FAILURE (H): Primary | ICD-10-CM

## 2024-06-27 DIAGNOSIS — R07.9 NONSPECIFIC CHEST PAIN: ICD-10-CM

## 2024-06-27 DIAGNOSIS — Z95.0 PACEMAKER: ICD-10-CM

## 2024-06-27 DIAGNOSIS — I50.22 CHRONIC SYSTOLIC HEART FAILURE (H): ICD-10-CM

## 2024-06-27 LAB
MDC_IDC_LEAD_CONNECTION_STATUS: NORMAL
MDC_IDC_LEAD_IMPLANT_DT: NORMAL
MDC_IDC_LEAD_LOCATION: NORMAL
MDC_IDC_LEAD_LOCATION_DETAIL_1: NORMAL
MDC_IDC_LEAD_MFG: NORMAL
MDC_IDC_LEAD_MODEL: NORMAL
MDC_IDC_LEAD_POLARITY_TYPE: NORMAL
MDC_IDC_LEAD_SERIAL: NORMAL
MDC_IDC_MSMT_BATTERY_DTM: NORMAL
MDC_IDC_MSMT_BATTERY_REMAINING_LONGEVITY: 26 MO
MDC_IDC_MSMT_BATTERY_RRT_TRIGGER: NORMAL
MDC_IDC_MSMT_BATTERY_VOLTAGE: 2.94 V
MDC_IDC_MSMT_CAP_CHARGE_DTM: NORMAL
MDC_IDC_MSMT_CAP_CHARGE_ENERGY: 18 J
MDC_IDC_MSMT_CAP_CHARGE_TIME: 4 S
MDC_IDC_MSMT_CAP_CHARGE_TYPE: NORMAL
MDC_IDC_MSMT_LEADCHNL_LV_IMPEDANCE_VALUE: 3000 OHM
MDC_IDC_MSMT_LEADCHNL_LV_IMPEDANCE_VALUE: 3000 OHM
MDC_IDC_MSMT_LEADCHNL_LV_IMPEDANCE_VALUE: 342 OHM
MDC_IDC_MSMT_LEADCHNL_LV_PACING_THRESHOLD_AMPLITUDE: 3 V
MDC_IDC_MSMT_LEADCHNL_LV_PACING_THRESHOLD_AMPLITUDE: 3 V
MDC_IDC_MSMT_LEADCHNL_LV_PACING_THRESHOLD_PULSEWIDTH: 1 MS
MDC_IDC_MSMT_LEADCHNL_LV_PACING_THRESHOLD_PULSEWIDTH: 1 MS
MDC_IDC_MSMT_LEADCHNL_RA_IMPEDANCE_VALUE: 456 OHM
MDC_IDC_MSMT_LEADCHNL_RA_PACING_THRESHOLD_AMPLITUDE: 1 V
MDC_IDC_MSMT_LEADCHNL_RA_PACING_THRESHOLD_AMPLITUDE: 1.12 V
MDC_IDC_MSMT_LEADCHNL_RA_PACING_THRESHOLD_PULSEWIDTH: 0.4 MS
MDC_IDC_MSMT_LEADCHNL_RA_PACING_THRESHOLD_PULSEWIDTH: 0.4 MS
MDC_IDC_MSMT_LEADCHNL_RA_SENSING_INTR_AMPL: 0.8 MV
MDC_IDC_MSMT_LEADCHNL_RV_IMPEDANCE_VALUE: 228 OHM
MDC_IDC_MSMT_LEADCHNL_RV_IMPEDANCE_VALUE: 285 OHM
MDC_IDC_MSMT_LEADCHNL_RV_PACING_THRESHOLD_AMPLITUDE: 0.62 V
MDC_IDC_MSMT_LEADCHNL_RV_PACING_THRESHOLD_AMPLITUDE: 0.75 V
MDC_IDC_MSMT_LEADCHNL_RV_PACING_THRESHOLD_PULSEWIDTH: 0.4 MS
MDC_IDC_MSMT_LEADCHNL_RV_PACING_THRESHOLD_PULSEWIDTH: 0.4 MS
MDC_IDC_MSMT_LEADCHNL_RV_SENSING_INTR_AMPL: 7.5 MV
MDC_IDC_PG_IMPLANT_DTM: NORMAL
MDC_IDC_PG_MFG: NORMAL
MDC_IDC_PG_MODEL: NORMAL
MDC_IDC_PG_SERIAL: NORMAL
MDC_IDC_PG_TYPE: NORMAL
MDC_IDC_SESS_CLINIC_NAME: NORMAL
MDC_IDC_SESS_DTM: NORMAL
MDC_IDC_SESS_TYPE: NORMAL
MDC_IDC_SET_BRADY_AT_MODE_SWITCH_RATE: 171 {BEATS}/MIN
MDC_IDC_SET_BRADY_LOWRATE: 60 {BEATS}/MIN
MDC_IDC_SET_BRADY_MAX_SENSOR_RATE: 130 {BEATS}/MIN
MDC_IDC_SET_BRADY_MAX_TRACKING_RATE: 130 {BEATS}/MIN
MDC_IDC_SET_BRADY_MODE: NORMAL
MDC_IDC_SET_BRADY_PAV_DELAY_LOW: 170 MS
MDC_IDC_SET_BRADY_SAV_DELAY_LOW: 110 MS
MDC_IDC_SET_CRT_LVRV_DELAY: 0 MS
MDC_IDC_SET_CRT_PACED_CHAMBERS: NORMAL
MDC_IDC_SET_LEADCHNL_LV_PACING_AMPLITUDE: 3.75 V
MDC_IDC_SET_LEADCHNL_LV_PACING_ANODE_ELECTRODE_1: NORMAL
MDC_IDC_SET_LEADCHNL_LV_PACING_ANODE_LOCATION_1: NORMAL
MDC_IDC_SET_LEADCHNL_LV_PACING_CAPTURE_MODE: NORMAL
MDC_IDC_SET_LEADCHNL_LV_PACING_CATHODE_ELECTRODE_1: NORMAL
MDC_IDC_SET_LEADCHNL_LV_PACING_CATHODE_LOCATION_1: NORMAL
MDC_IDC_SET_LEADCHNL_LV_PACING_POLARITY: NORMAL
MDC_IDC_SET_LEADCHNL_LV_PACING_PULSEWIDTH: 1 MS
MDC_IDC_SET_LEADCHNL_RA_PACING_AMPLITUDE: 1.75 V
MDC_IDC_SET_LEADCHNL_RA_PACING_ANODE_ELECTRODE_1: NORMAL
MDC_IDC_SET_LEADCHNL_RA_PACING_ANODE_LOCATION_1: NORMAL
MDC_IDC_SET_LEADCHNL_RA_PACING_CAPTURE_MODE: NORMAL
MDC_IDC_SET_LEADCHNL_RA_PACING_CATHODE_ELECTRODE_1: NORMAL
MDC_IDC_SET_LEADCHNL_RA_PACING_CATHODE_LOCATION_1: NORMAL
MDC_IDC_SET_LEADCHNL_RA_PACING_POLARITY: NORMAL
MDC_IDC_SET_LEADCHNL_RA_PACING_PULSEWIDTH: 0.4 MS
MDC_IDC_SET_LEADCHNL_RA_SENSING_ANODE_ELECTRODE_1: NORMAL
MDC_IDC_SET_LEADCHNL_RA_SENSING_ANODE_LOCATION_1: NORMAL
MDC_IDC_SET_LEADCHNL_RA_SENSING_CATHODE_ELECTRODE_1: NORMAL
MDC_IDC_SET_LEADCHNL_RA_SENSING_CATHODE_LOCATION_1: NORMAL
MDC_IDC_SET_LEADCHNL_RA_SENSING_POLARITY: NORMAL
MDC_IDC_SET_LEADCHNL_RA_SENSING_SENSITIVITY: 0.3 MV
MDC_IDC_SET_LEADCHNL_RV_PACING_AMPLITUDE: 2 V
MDC_IDC_SET_LEADCHNL_RV_PACING_ANODE_ELECTRODE_1: NORMAL
MDC_IDC_SET_LEADCHNL_RV_PACING_ANODE_LOCATION_1: NORMAL
MDC_IDC_SET_LEADCHNL_RV_PACING_CAPTURE_MODE: NORMAL
MDC_IDC_SET_LEADCHNL_RV_PACING_CATHODE_ELECTRODE_1: NORMAL
MDC_IDC_SET_LEADCHNL_RV_PACING_CATHODE_LOCATION_1: NORMAL
MDC_IDC_SET_LEADCHNL_RV_PACING_POLARITY: NORMAL
MDC_IDC_SET_LEADCHNL_RV_PACING_PULSEWIDTH: 0.4 MS
MDC_IDC_SET_LEADCHNL_RV_SENSING_ANODE_ELECTRODE_1: NORMAL
MDC_IDC_SET_LEADCHNL_RV_SENSING_ANODE_LOCATION_1: NORMAL
MDC_IDC_SET_LEADCHNL_RV_SENSING_CATHODE_ELECTRODE_1: NORMAL
MDC_IDC_SET_LEADCHNL_RV_SENSING_CATHODE_LOCATION_1: NORMAL
MDC_IDC_SET_LEADCHNL_RV_SENSING_POLARITY: NORMAL
MDC_IDC_SET_LEADCHNL_RV_SENSING_SENSITIVITY: 0.3 MV
MDC_IDC_SET_ZONE_DETECTION_BEATS_DENOMINATOR: 16 {BEATS}
MDC_IDC_SET_ZONE_DETECTION_BEATS_NUMERATOR: 12 {BEATS}
MDC_IDC_SET_ZONE_DETECTION_BEATS_NUMERATOR: 16 {BEATS}
MDC_IDC_SET_ZONE_DETECTION_BEATS_NUMERATOR: 16 {BEATS}
MDC_IDC_SET_ZONE_DETECTION_INTERVAL: 300 MS
MDC_IDC_SET_ZONE_DETECTION_INTERVAL: 350 MS
MDC_IDC_SET_ZONE_DETECTION_INTERVAL: 350 MS
MDC_IDC_SET_ZONE_DETECTION_INTERVAL: 360 MS
MDC_IDC_SET_ZONE_STATUS: NORMAL
MDC_IDC_SET_ZONE_TYPE: NORMAL
MDC_IDC_SET_ZONE_VENDOR_TYPE: NORMAL
MDC_IDC_STAT_AT_BURDEN_PERCENT: 0.04 %
MDC_IDC_STAT_AT_DTM_END: NORMAL
MDC_IDC_STAT_AT_DTM_START: NORMAL
MDC_IDC_STAT_BRADY_AP_VP_PERCENT: 36.24 %
MDC_IDC_STAT_BRADY_AP_VS_PERCENT: 0.07 %
MDC_IDC_STAT_BRADY_AS_VP_PERCENT: 61.79 %
MDC_IDC_STAT_BRADY_AS_VS_PERCENT: 1.91 %
MDC_IDC_STAT_BRADY_DTM_END: NORMAL
MDC_IDC_STAT_BRADY_DTM_START: NORMAL
MDC_IDC_STAT_BRADY_RA_PERCENT_PACED: 36.66 %
MDC_IDC_STAT_BRADY_RV_PERCENT_PACED: 94.86 %
MDC_IDC_STAT_CRT_DTM_END: NORMAL
MDC_IDC_STAT_CRT_DTM_START: NORMAL
MDC_IDC_STAT_CRT_LV_PERCENT_PACED: 97.98 %
MDC_IDC_STAT_CRT_PERCENT_PACED: 94.84 %
MDC_IDC_STAT_EPISODE_RECENT_COUNT: 0
MDC_IDC_STAT_EPISODE_RECENT_COUNT: 9
MDC_IDC_STAT_EPISODE_RECENT_COUNT_DTM_END: NORMAL
MDC_IDC_STAT_EPISODE_RECENT_COUNT_DTM_START: NORMAL
MDC_IDC_STAT_EPISODE_TOTAL_COUNT: 0
MDC_IDC_STAT_EPISODE_TOTAL_COUNT: 9
MDC_IDC_STAT_EPISODE_TOTAL_COUNT_DTM_END: NORMAL
MDC_IDC_STAT_EPISODE_TOTAL_COUNT_DTM_START: NORMAL
MDC_IDC_STAT_EPISODE_TYPE: NORMAL
MDC_IDC_STAT_TACHYTHERAPY_ATP_DELIVERED_RECENT: 0
MDC_IDC_STAT_TACHYTHERAPY_ATP_DELIVERED_TOTAL: 0
MDC_IDC_STAT_TACHYTHERAPY_RECENT_DTM_END: NORMAL
MDC_IDC_STAT_TACHYTHERAPY_RECENT_DTM_START: NORMAL
MDC_IDC_STAT_TACHYTHERAPY_SHOCKS_ABORTED_RECENT: 0
MDC_IDC_STAT_TACHYTHERAPY_SHOCKS_ABORTED_TOTAL: 0
MDC_IDC_STAT_TACHYTHERAPY_SHOCKS_DELIVERED_RECENT: 0
MDC_IDC_STAT_TACHYTHERAPY_SHOCKS_DELIVERED_TOTAL: 0
MDC_IDC_STAT_TACHYTHERAPY_TOTAL_DTM_END: NORMAL
MDC_IDC_STAT_TACHYTHERAPY_TOTAL_DTM_START: NORMAL

## 2024-06-27 PROCEDURE — 93284 PRGRMG EVAL IMPLANTABLE DFB: CPT | Performed by: INTERNAL MEDICINE

## 2024-06-27 PROCEDURE — G2211 COMPLEX E/M VISIT ADD ON: HCPCS | Performed by: INTERNAL MEDICINE

## 2024-06-27 PROCEDURE — 99204 OFFICE O/P NEW MOD 45 MIN: CPT | Performed by: INTERNAL MEDICINE

## 2024-06-27 RX ORDER — GUAIFENESIN 600 MG/1
TABLET, EXTENDED RELEASE ORAL
COMMUNITY
Start: 2024-06-05

## 2024-06-27 RX ORDER — PANTOPRAZOLE SODIUM 40 MG/1
40 TABLET, DELAYED RELEASE ORAL
COMMUNITY
Start: 2024-05-12

## 2024-06-27 RX ORDER — LOSARTAN POTASSIUM 25 MG/1
25 TABLET ORAL DAILY
Qty: 90 TABLET | Refills: 3 | Status: SHIPPED | OUTPATIENT
Start: 2024-06-27

## 2024-06-27 RX ORDER — FLUCONAZOLE 150 MG/1
TABLET ORAL
COMMUNITY
Start: 2024-06-23

## 2024-06-27 ASSESSMENT — PAIN SCALES - GENERAL: PAINLEVEL: NO PAIN (0)

## 2024-06-27 NOTE — PROGRESS NOTES
General Cardiology Clinic Progress Note  Letty Escobar MRN# [id number]   YOB: 1955 Age: 68 year old       Reason for visit: Ischemic cardiomyopathy and chronic systolic heart failure    History of presenting illness:    I had the opportunity to see Letty Escobar at [facility] today for consultation for evaluation of coronary artery disease, ischemic cardiomyopathy, chronic systolic heart failure.  She is transferring care from [facility] where she has been seen for many many years for her complex heart disease.    She has a history of coronary artery bypass surgery x 5 with multiple coronary angiograms since then.  She has had stenting of the circumflex, LAD, vein graft to OM1, and vein graft to right coronary artery.  Her last coronary angiogram in July 2023 demonstrated patent bypass grafts and no other obstructive coronary artery disease amenable to revascularization.    Her latest echocardiogram demonstrated an ejection fraction of 25 to 30%.  She has an ICD in place with an epicardial LV lead placed during thoracotomy on 8/20/2016.  She has elected to be DNR/DNI and have the ICD tachycardia therapies disabled.  She also has a CardioMEMS implanted but not currently in use.  She has had a history of mitral valve repair, tricuspid valve repair, CKD stage IIIa, COPD, and history of stroke which has left her unable to walk and wheelchair-bound.  On her most recent echo on 5/10/2024, her mitral valve repair continued to function normally.    She was recently admitted to [facility] between 5/9 and 5/12/2024 for chest pain.  A very small peripheral pulmonary embolism was identified at that time and she was started on anticoagulation with Eliquis.  She then developed sudden anemia and was identified as having a rectus sheath hematoma by CT scan.  Now her hemoglobin levels appear stable and she has stayed on Eliquis.    She has had multiple hospitalizations for chest pains and she tells me  that much of that is due to her anxiety problems.    Today she reports no concerning chest pain or shortness of breath symptoms.  She is by herself in a wheelchair from the [facility] home and I am not sure about the validity of her history.    Her cardiac risk factors include diabetes, type II, dyslipidemia, hypertension.    Her blood pressure today is 116/58, heart rate 62, and weight 194 pounds.  Her lungs are clear.  Heart rhythm is regular.              Assessment and Plan:     ASSESSMENT:    Ms. Letty Escobar is a 68-year-old woman with a long and complex cardiac history including coronary artery disease, bypass surgery, mitral valve repair, tricuspid valve repair, ischemic cardiomyopathy with chronic systolic heart failure, chronic kidney disease stage IIIa, and biventricular ICD implanted, currently with tachycardia therapies disabled.  She has an inactive CardioMEMS device in place as well.  She has multiple cardiac risk factors including type 2 diabetes, dyslipidemia, and hypertension.  She has a chronic chest pain syndrome, probably related in part to her anxiety as she reports herself.  She has been seen in the emergency room and hospitalized numerous times for this chest pain syndrome and often demonstrates no evidence for myocardial ischemia or infarction.    She seems to doing well today and we will continue her medical therapy.  I am not sure why she is on amlodipine instead of an ACE inhibitor or ARB, but I will stop her amlodipine and start her on losartan 25 mg a day.  Her blood pressures tend to be low and I am not sure that she would tolerate Entresto at this time.  She is already on Jardiance 10 mg a day and metoprolol succinate.  I am not confident that she would tolerate spironolactone due to her chronic kidney disease but that can be considered at a later time.    I will have her follow-up with us in core clinic care in [place] in 6 months.    Gary Holland MD           Orders this Visit:  Orders  "Placed This Encounter   Procedures    Basic metabolic panel    CBC with platelets    Lipid Profile    ALT    Basic metabolic panel    Hemoglobin    Follow-Up with Cardiology    C.[facility]JUL[facility]. Enrollment - [place]    Echocardiogram Complete    Echocardiogram Complete     Orders Placed This Encounter   Medications    apixaban ANTICOAGULANT (ELIQUIS) 5 MG tablet     Sig: Take 5 mg by mouth 2 times daily    empagliflozin (JARDIANCE) 10 MG TABS tablet     Sig: Take 1 tablet by mouth daily    fluconazole (DIFLUCAN) 150 MG tablet    MUCUS RELIEF 600 MG 12 hr tablet    pantoprazole (PROTONIX) 40 MG EC tablet     Sig: Take 40 mg by mouth    losartan (COZAAR) 25 MG tablet     Sig: Take 1 tablet (25 mg) by mouth daily     Dispense:  90 tablet     Refill:  3     Medications Discontinued During This Encounter   Medication Reason    amLODIPine (NORVASC) 5 MG tablet        Today's clinic visit entailed:    45 minutes spent by me on the date of the encounter doing chart review, history and exam, documentation and further activities per the note  Provider  Link to [employer] Help Grid     The level of medical decision making during this visit was of high complexity.           Review of Systems:     Review of Systems:  Skin:        Eyes:       ENT:       Respiratory:  Positive for cough  Cardiovascular:  Negative;palpitations;chest pain;edema;lightheadedness;dizziness;syncope or near-syncope    Gastroenterology:      Genitourinary:       Musculoskeletal:       Neurologic:  Positive for numbness or tingling of feet;numbness or tingling of hands  Psychiatric:       Heme/Lymph/Imm:  Positive for allergies  Endocrine:                 Physical Exam:     Vitals: /58 (BP Location: Right arm, Patient Position: Sitting, Cuff Size: Adult Regular)   Pulse 62   Resp 16   Ht 1.575 m (5' 2\")   Wt 88.4 kg (194 lb 12.8 oz)   SpO2 93%   BMI 35.63 kg/m    Constitutional: Well nourished and in no apparent distress.  Eyes: Pupils equal, round. Sclerae " anicteric.   HEENT: Normocephalic, atraumatic.   Neck: Supple. JVD   Respiratory: Breathing non-labored. Lungs clear to auscultation bilaterally. No crackles, wheezes, rhonchi, or rales.  Cardiovascular:  Regular rate and rhythm, normal S1 and S2. No murmur, rub, or gallop.  Skin: Warm, dry. No rashes, cyanosis, or xanthelasma.  Extremities: No edema.  Neurologic: No gross motor deficits. Alert, awake, and oriented to person, place and time.  Psychiatric: Affect appropriate.             Medications:     Current Outpatient Medications   Medication Sig Dispense Refill    acetaminophen (TYLENOL) 325 MG tablet Take 325 mg by mouth 2 times daily as needed for pain      acetaminophen (TYLENOL) 500 MG tablet Take 2 tablets (1,000 mg) by mouth 3 times daily      albuterol (PROVENTIL) (2.5 MG/3ML) 0.083% neb solution Take 2.5 mg by nebulization every 6 hours as needed for cough or wheezing      albuterol (PROVENTIL) (2.5 MG/3ML) 0.083% neb solution Take 1.25 mg by nebulization 2 times daily      apixaban ANTICOAGULANT (ELIQUIS) 5 MG tablet Take 5 mg by mouth 2 times daily      bisacodyl (DULCOLAX) 10 MG suppository Place 10 mg rectally daily as needed for constipation      clotrimazole (LOTRIMIN) 1 % external cream Apply topically 2 times daily Until rash on external vagina resolves 28 g 0    cyanocobalamin (CYANOCOBALAMIN) 1000 MCG/ML injection Inject 1 mL into the muscle every 30 days 21st of each month      DULoxetine HCl 40 MG CPEP Take 40 mg by mouth daily      empagliflozin (JARDIANCE) 10 MG TABS tablet Take 1 tablet by mouth daily      famotidine (PEPCID) 20 MG tablet Take 20 mg by mouth at bedtime      fluconazole (DIFLUCAN) 150 MG tablet       fluticasone-vilanterol (BREO ELLIPTA) 200-25 MCG/INH inhaler Inhale 1 puff into the lungs daily Rinse mouth after use      furosemide (LASIX) 20 MG tablet Take 20 mg by mouth every other day      gabapentin (NEURONTIN) 400 MG capsule Take 400 mg by mouth at bedtime RLS       Insulin Aspart FlexPen 100 UNIT/ML SOPN Inject Subcutaneous 3 times daily (before meals) Sliding scale   YM=058-014, 2 units  BG= 250-299, 3 units  FJ=104-287, 4 units  LM=398-747, 5 Units  BG>399, 6 units  BG>450 Call MD GEOVANNY BOYLE 100 UNIT/ML soln Inject 22 Units Subcutaneous at bedtime      levothyroxine (SYNTHROID/LEVOTHROID) 137 MCG tablet Take 137 mcg by mouth daily      lidocaine (LMX4) 4 % external cream Apply topically 2 times daily To Rt knee      LORazepam (ATIVAN) 1 MG tablet Take 1 tablet (1 mg) by mouth 2 times daily 5 tablet 0    losartan (COZAAR) 25 MG tablet Take 1 tablet (25 mg) by mouth daily 90 tablet 3    Melatonin 10 MG TABS tablet Take 10 mg by mouth At Bedtime      metoprolol succinate ER (TOPROL-XL) 25 MG 24 hr tablet Take 37.5 mg by mouth daily      mirtazapine (REMERON) 15 MG tablet Take 1 tablet (15 mg) by mouth at bedtime 5 tablet 0    MUCUS RELIEF 600 MG 12 hr tablet       ondansetron (ZOFRAN ODT) 4 MG ODT tab Take 4 mg by mouth every 8 hours as needed for nausea      pantoprazole (PROTONIX) 40 MG EC tablet Take 40 mg by mouth      polyethylene glycol (MIRALAX) 17 GM/Dose powder Take 17 g by mouth every other day      potassium chloride ER (MICRO-K) 10 MEQ CR capsule Take 10 mEq by mouth daily       QUEtiapine (SEROQUEL) 100 MG tablet Take 1 tablet (100 mg) by mouth 3 times daily 5 tablet 0    rosuvastatin (CRESTOR) 10 MG tablet Take 20 mg by mouth At Bedtime      sennosides (SENOKOT) 8.6 MG tablet Take 2 tablets by mouth At Bedtime      traZODone (DESYREL) 50 MG tablet Take 0.5 tablets (25 mg) by mouth 2 times daily Patient can have 25 mg once a day and 75 mg once a day. 5 tablet 0    aspirin 81 MG EC tablet Take 81 mg by mouth daily (Patient not taking: Reported on 6/27/2024)      hydrochlorothiazide (HYDRODIURIL) 25 MG tablet Take 25 mg by mouth daily (Patient not taking: Reported on 6/27/2024)      nitroGLYcerin (NITROSTAT) 0.4 MG sublingual tablet Place 0.4 mg under the  tongue every 5 minutes as needed for chest pain For chest pain place 1 tablet under the tongue every 5 minutes for 3 doses. If symptoms persist 5 minutes after 1st dose call 911. (Patient not taking: Reported on 6/27/2024)      predniSONE (DELTASONE) 10 MG tablet Take prednisone, 10 mg tab - 2 tabs  daily for 3 days, then 1 tab daily for 3 days, then stop. (Patient not taking: Reported on 6/27/2024)         No family history on file.    Social History     Socioeconomic History    Marital status:      Spouse name: Edwar    Number of children: 1    Years of education: Not on file    Highest education level: Not on file   Occupational History    Not on file   Tobacco Use    Smoking status: Never    Smokeless tobacco: Never   Vaping Use    Vaping status: Never Used   Substance and Sexual Activity    Alcohol use: Not Currently    Drug use: Never    Sexual activity: Not Currently   Other Topics Concern    Not on file   Social History Narrative    Currently living at [facility] in [place].      Social Determinants of Health     Financial Resource Strain: Low Risk  (5/10/2024)    [email or web address]    Financial Resource Strain     Difficulty of Paying Living Expenses: 3     Difficulty of Paying Living Expenses: Not on file   Food Insecurity: No Food Insecurity (5/10/2024)    Received from [facility]    Food Insecurity     Worried About Running Out of Food in the Last Year: 1   Transportation Needs: No Transportation Needs (5/10/2024)    Received from [facility]    Transportation Needs     Lack of Transportation (Medical): 1   Physical Activity: Not on file   Stress: Not on file   Social Connections: Socially Integrated (5/10/2024)    Received from [facility]    Social Connections     Frequency of Communication with Friends and Family: 0   Interpersonal  Safety: Not on file   Housing Stability: Low Risk  (5/10/2024)    Received from [facility]    Housing Stability     Unable to Pay for Housing in the Last Year: 1            Past Medical History:     Past Medical History:   Diagnosis Date    ACP (advance care planning) 11/3/2010    Formatting of this note might be different from the original. Patient has identified Health Care Agent(s): Yes Add Health Care Agents: Yes   Health Care Agent(s):  Primary Health Care Agent:   Edwar Escobar Relationship:  Spouse Phone:   [phone]  Secondary Health Care Agent:   Chiki Oro Relationship:   Son Phone:   [phone]  Patient has Advance Care Plan Documents (Health Care Direct    Acute coronary syndrome (H) 11/22/2019    Acute exacerbation of CHF (congestive heart failure) (H) 11/11/2019    Acute on chronic systolic (congestive) heart failure (H) 1/28/2020    Anemia of chronic disease 1/5/2013    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, left leg (H) 1/15/2020    BPPV (benign paroxysmal positional vertigo) 5/31/2018    Candidiasis 3/1/2020    Carotid stenosis, right 7/13/2016    Carotid US 05/04/2018 showed moderate plaque formation, consistent with 50 to 69% stenosis in the right internal carotid artery, not significantly changed from 8/5/2015.  Moderate plaque formation, consistent with 50 to 69% stenosis in the left internal carotid artery; there has been mild progression of the left ICA stenosis since 8/5/2015.    Chest pain 11/11/2019    Chronic bilateral low back pain without sciatica 1/17/2018    Chronic pain disorder 10/1/2017    Constipation 3/20/2020    COPD (chronic obstructive pulmonary disease) (H) 6/24/2020    Coronary atherosclerosis 2/6/2009 2/5/2009 - MI - Proximal RCA 99%, mild-mod disease elsewhere.  EF 60%.  PCI:  MYRON to pRCA. 2/12/2009 - admit CP - Widely patent RCA stent. Moderate diffuse CAD. Severe stenosis in  trivial PDA branch. LVEF 45%. 1/25/2010 - admit CP - PTCA and stent of diagonal 9/8/2010 - admit CP - LAD patent stent. Moderate diffuse CAD. Medical management recommended.  4/25/2012 - NSTEMI - Acute total occlusion of    Cubital tunnel syndrome on left 11/13/2012    Depressive disorder     Diabetes mellitus (H)     Diabetes mellitus type 2 in obese 7/13/2006    Diabetes mellitus type 2 in obese 7/13/2006    Drug-seeking behavior 4/4/2020    Failure to thrive in adult 9/3/2020    Hereditary and idiopathic peripheral neuropathy 4/24/2007    Hyperlipidemia with target low density lipoprotein (LDL) cholesterol less than 70 mg/dL 5/30/2007    Hypertension 10/14/2015    Hypothyroidism 12/10/2010    Irritable bowel syndrome 9/7/2015    Ischemic cardiomyopathy 9/20/2015    EF of 40-45%, status post RV lead revision and LV epicardial lead placement via mini-thoracotomy in August 2016.    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  Designated pharmacy: [facility] Prescribing physician: Andrea Diagnosis: Ulnar neuropathy    Panic disorder with agoraphobia 4/14/2020    Paroxysmal atrial fibrillation (H) 10/2/2015    Personality disorder (H) 3/5/2020    Rule out dependent personality    Polymyalgia rheumatica (H24) 11/28/2018    Posttraumatic stress disorder 3/1/2020    Restless legs syndrome (RLS) 8/29/2007    Restless legs syndrome (RLS) 8/29/2007    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    Serum calcium elevated 3/1/2020    Somatic dysfunction of sacral region 1/17/2018    Subacromial bursitis of left shoulder joint 8/6/2018    Suicidal ideation 4/1/2020    Thyroid disease     TIA  (transient ischemic attack) 5/4/2018    TIA (transient ischemic attack) 5/4/2018    Tobacco abuse 2/17/2017    Tobacco abuse 2/17/2017    Urinary incontinence, mixed 9/24/2017    UTI (urinary tract infection) 4/17/2020    Vitamin B12 deficiency 2/14/2018    Weakness 12/17/2019              Past Surgical History:     Past Surgical History:   Procedure Laterality Date    CARDIAC SURGERY      stents x 11    CARDIAC SURGERY      CHOLECYSTECTOMY      CHOLECYSTECTOMY      GENITOURINARY SURGERY      Tubal ligation    OTHER SURGICAL HISTORY      Genitourinary surgery    RELEASE CARPAL TUNNEL      RELEASE CARPAL TUNNEL                Allergies:   Bee pollen, Diphenhydramine, Isosorbide nitrate, Nitroglycerin, Contrast dye, Penicillin g, Adhesive tape, Liquid adhesive, Nystatin, Penicillins, and Sulfa antibiotics       Data:   All laboratory data reviewed:    Recent Labs   Lab Test 06/17/24  0740 04/26/24  2252 01/17/24  0746 12/28/22  0747 10/19/22  0743 03/28/22  0726 10/18/21  0738 08/04/21  0801 05/28/21  0818 01/29/21  0725 08/17/20  0738 06/29/20  0740   LDL  --   --   --   --  56  --   --   --  83  --   --   --    HDL  --   --   --   --  30*  --   --   --  42*  --   --   --    NHDL  --   --   --   --  132*  --   --   --  112  --   --   --    CHOL  --   --   --   --  162  --   --   --  154  --   --   --    TRIG  --   --   --   --  382*  --   --   --  144  --   --   --    TSH 11.43* 7.12* 8.02*   < >  --    < >  --    < >  --   --    < > 0.02*   NTBNP  --   --   --   --   --   --   --   --   --  2,197*  --   --    IRON  --   --   --   --   --   --  86  --   --   --   --   --    RITU  --   --   --   --   --   --   --   --   --   --   --  236    < > = values in this interval not displayed.       Lab Results   Component Value Date    WBC 6.3 06/17/2024    WBC 6.9 05/28/2021    RBC 3.34 (L) 06/17/2024    RBC 4.06 05/28/2021    HGB 9.8 (L) 06/17/2024    HGB 12.5 05/28/2021    HCT 31.6 (L) 06/17/2024    HCT 38.3 05/28/2021     "MCV 95 06/17/2024    MCV 94 05/28/2021    MCH 29.3 06/17/2024    MCH 30.8 05/28/2021    MCHC 31.0 (L) 06/17/2024    MCHC 32.6 05/28/2021    RDW 17.1 (H) 06/17/2024    RDW 12.5 05/28/2021     (L) 06/17/2024     05/28/2021       Lab Results   Component Value Date     06/17/2024     03/16/2021    POTASSIUM 4.5 06/17/2024    POTASSIUM 3.9 01/16/2023    POTASSIUM 4.7 03/16/2021    CHLORIDE 105 06/17/2024    CHLORIDE 108 01/16/2023    CHLORIDE 111 (H) 03/16/2021    CO2 22 06/17/2024    CO2 24 01/16/2023    CO2 22 03/16/2021    ANIONGAP 11 06/17/2024    ANIONGAP 8 01/16/2023    ANIONGAP 6 03/16/2021     (H) 06/17/2024     (H) 05/24/2024     (H) 01/16/2023     (H) 03/16/2021    BUN 18.7 06/17/2024    BUN 23 01/16/2023    BUN 45 (H) 03/16/2021    CR 1.06 (H) 06/17/2024    CR 0.98 03/16/2021    GFRESTIMATED 56 (L) 06/17/2024    GFRESTIMATED 60 (L) 03/16/2021    GFRESTBLACK 69 03/16/2021    ORESTES 10.1 06/17/2024    ORESTES 9.4 03/16/2021      Lab Results   Component Value Date    AST 23 06/06/2024    AST 23 03/16/2021    ALT 21 06/06/2024    ALT 74 (H) 03/16/2021       Lab Results   Component Value Date    A1C 6.8 (H) 06/17/2024    A1C 4.6 10/30/2020       No results found for: \"INR\"      FRANCHESCA WOLF MD  [facility]  "

## 2024-06-27 NOTE — LETTER
6/27/2024    Chantal Pierce MD  [facility] [address] [place] [address]    RE: Letty Escobar       Dear Colleague,     I had the pleasure of seeing Letty Escobar in the [facility].    General Cardiology Clinic Progress Note  Letty Escobar MRN# [id number]   YOB: 1955 Age: 68 year old       Reason for visit: Ischemic cardiomyopathy and chronic systolic heart failure    History of presenting illness:    I had the opportunity to see Letty Escobar at [facility] today for consultation for evaluation of coronary artery disease, ischemic cardiomyopathy, chronic systolic heart failure.  She is transferring care from [facility] where she has been seen for many many years for her complex heart disease.    She has a history of coronary artery bypass surgery x 5 with multiple coronary angiograms since then.  She has had stenting of the circumflex, LAD, vein graft to OM1, and vein graft to right coronary artery.  Her last coronary angiogram in July 2023 demonstrated patent bypass grafts and no other obstructive coronary artery disease amenable to revascularization.    Her latest echocardiogram demonstrated an ejection fraction of 25 to 30%.  She has an ICD in place with an epicardial LV lead placed during thoracotomy on 8/20/2016.  She has elected to be DNR/DNI and have the ICD tachycardia therapies disabled.  She also has a CardioMEMS implanted but not currently in use.  She has had a history of mitral valve repair, tricuspid valve repair, CKD stage IIIa, COPD, and history of stroke which has left her unable to walk and wheelchair-bound.  On her most recent echo on 5/10/2024, her mitral valve repair continued to function normally.    She was recently admitted to [facility] between 5/9 and 5/12/2024 for chest pain.  A very small peripheral pulmonary embolism was identified at that time and she was started on anticoagulation with Eliquis.  She then developed sudden  anemia and was identified as having a rectus sheath hematoma by CT scan.  Now her hemoglobin levels appear stable and she has stayed on Eliquis.    She has had multiple hospitalizations for chest pains and she tells me that much of that is due to her anxiety problems.    Today she reports no concerning chest pain or shortness of breath symptoms.  She is by herself in a wheelchair from the [facility] and I am not sure about the validity of her history.    Her cardiac risk factors include diabetes, type II, dyslipidemia, hypertension.    Her blood pressure today is 116/58, heart rate 62, and weight 194 pounds.  Her lungs are clear.  Heart rhythm is regular.              Assessment and Plan:     ASSESSMENT:    Ms. Letty Escobar is a 68-year-old woman with a long and complex cardiac history including coronary artery disease, bypass surgery, mitral valve repair, tricuspid valve repair, ischemic cardiomyopathy with chronic systolic heart failure, chronic kidney disease stage IIIa, and biventricular ICD implanted, currently with tachycardia therapies disabled.  She has an inactive CardioMEMS device in place as well.  She has multiple cardiac risk factors including type 2 diabetes, dyslipidemia, and hypertension.  She has a chronic chest pain syndrome, probably related in part to her anxiety as she reports herself.  She has been seen in the emergency room and hospitalized numerous times for this chest pain syndrome and often demonstrates no evidence for myocardial ischemia or infarction.    She seems to doing well today and we will continue her medical therapy.  I am not sure why she is on amlodipine instead of an ACE inhibitor or ARB, but I will stop her amlodipine and start her on losartan 25 mg a day.  Her blood pressures tend to be low and I am not sure that she would tolerate Entresto at this time.  She is already on Jardiance 10 mg a day and metoprolol succinate.  I am not confident that she would tolerate  spironolactone due to her chronic kidney disease but that can be considered at a later time.    I will have her follow-up with us in core clinic care in [place] in 6 months.    Gary Holland MD           Orders this Visit:  Orders Placed This Encounter   Procedures    Basic metabolic panel    CBC with platelets    Lipid Profile    ALT    Basic metabolic panel    Hemoglobin    Follow-Up with Cardiology    CElvieOElvieRSANTIAGO. Enrollment - CONCEPCIÓN    Echocardiogram Complete    Echocardiogram Complete     Orders Placed This Encounter   Medications    apixaban ANTICOAGULANT (ELIQUIS) 5 MG tablet     Sig: Take 5 mg by mouth 2 times daily    empagliflozin (JARDIANCE) 10 MG TABS tablet     Sig: Take 1 tablet by mouth daily    fluconazole (DIFLUCAN) 150 MG tablet    MUCUS RELIEF 600 MG 12 hr tablet    pantoprazole (PROTONIX) 40 MG EC tablet     Sig: Take 40 mg by mouth    losartan (COZAAR) 25 MG tablet     Sig: Take 1 tablet (25 mg) by mouth daily     Dispense:  90 tablet     Refill:  3     Medications Discontinued During This Encounter   Medication Reason    amLODIPine (NORVASC) 5 MG tablet        Today's clinic visit entailed:    45 minutes spent by me on the date of the encounter doing chart review, history and exam, documentation and further activities per the note  Provider  Link to [facility] Help Grid     The level of medical decision making during this visit was of high complexity.           Review of Systems:     Review of Systems:  Skin:        Eyes:       ENT:       Respiratory:  Positive for cough  Cardiovascular:  Negative;palpitations;chest pain;edema;lightheadedness;dizziness;syncope or near-syncope    Gastroenterology:      Genitourinary:       Musculoskeletal:       Neurologic:  Positive for numbness or tingling of feet;numbness or tingling of hands  Psychiatric:       Heme/Lymph/Imm:  Positive for allergies  Endocrine:                 Physical Exam:     Vitals: /58 (BP Location: Right arm, Patient Position: Sitting, Cuff  "Size: Adult Regular)   Pulse 62   Resp 16   Ht 1.575 m (5' 2\")   Wt 88.4 kg (194 lb 12.8 oz)   SpO2 93%   BMI 35.63 kg/m    Constitutional: Well nourished and in no apparent distress.  Eyes: Pupils equal, round. Sclerae anicteric.   HEENT: Normocephalic, atraumatic.   Neck: Supple. JVD   Respiratory: Breathing non-labored. Lungs clear to auscultation bilaterally. No crackles, wheezes, rhonchi, or rales.  Cardiovascular:  Regular rate and rhythm, normal S1 and S2. No murmur, rub, or gallop.  Skin: Warm, dry. No rashes, cyanosis, or xanthelasma.  Extremities: No edema.  Neurologic: No gross motor deficits. Alert, awake, and oriented to person, place and time.  Psychiatric: Affect appropriate.             Medications:     Current Outpatient Medications   Medication Sig Dispense Refill    acetaminophen (TYLENOL) 325 MG tablet Take 325 mg by mouth 2 times daily as needed for pain      acetaminophen (TYLENOL) 500 MG tablet Take 2 tablets (1,000 mg) by mouth 3 times daily      albuterol (PROVENTIL) (2.5 MG/3ML) 0.083% neb solution Take 2.5 mg by nebulization every 6 hours as needed for cough or wheezing      albuterol (PROVENTIL) (2.5 MG/3ML) 0.083% neb solution Take 1.25 mg by nebulization 2 times daily      apixaban ANTICOAGULANT (ELIQUIS) 5 MG tablet Take 5 mg by mouth 2 times daily      bisacodyl (DULCOLAX) 10 MG suppository Place 10 mg rectally daily as needed for constipation      clotrimazole (LOTRIMIN) 1 % external cream Apply topically 2 times daily Until rash on external vagina resolves 28 g 0    cyanocobalamin (CYANOCOBALAMIN) 1000 MCG/ML injection Inject 1 mL into the muscle every 30 days 21st of each month      DULoxetine HCl 40 MG CPEP Take 40 mg by mouth daily      empagliflozin (JARDIANCE) 10 MG TABS tablet Take 1 tablet by mouth daily      famotidine (PEPCID) 20 MG tablet Take 20 mg by mouth at bedtime      fluconazole (DIFLUCAN) 150 MG tablet       fluticasone-vilanterol (BREO ELLIPTA) 200-25 " MCG/INH inhaler Inhale 1 puff into the lungs daily Rinse mouth after use      furosemide (LASIX) 20 MG tablet Take 20 mg by mouth every other day      gabapentin (NEURONTIN) 400 MG capsule Take 400 mg by mouth at bedtime RLS      Insulin Aspart FlexPen 100 UNIT/ML SOPN Inject Subcutaneous 3 times daily (before meals) Sliding scale   YF=824-273, 2 units  BG= 250-299, 3 units  MC=429-627, 4 units  JF=727-765, 5 Units  BG>399, 6 units  BG>450 Call MD SMALL SOLOSTAR 100 UNIT/ML soln Inject 22 Units Subcutaneous at bedtime      levothyroxine (SYNTHROID/LEVOTHROID) 137 MCG tablet Take 137 mcg by mouth daily      lidocaine (LMX4) 4 % external cream Apply topically 2 times daily To Rt knee      LORazepam (ATIVAN) 1 MG tablet Take 1 tablet (1 mg) by mouth 2 times daily 5 tablet 0    losartan (COZAAR) 25 MG tablet Take 1 tablet (25 mg) by mouth daily 90 tablet 3    Melatonin 10 MG TABS tablet Take 10 mg by mouth At Bedtime      metoprolol succinate ER (TOPROL-XL) 25 MG 24 hr tablet Take 37.5 mg by mouth daily      mirtazapine (REMERON) 15 MG tablet Take 1 tablet (15 mg) by mouth at bedtime 5 tablet 0    MUCUS RELIEF 600 MG 12 hr tablet       ondansetron (ZOFRAN ODT) 4 MG ODT tab Take 4 mg by mouth every 8 hours as needed for nausea      pantoprazole (PROTONIX) 40 MG EC tablet Take 40 mg by mouth      polyethylene glycol (MIRALAX) 17 GM/Dose powder Take 17 g by mouth every other day      potassium chloride ER (MICRO-K) 10 MEQ CR capsule Take 10 mEq by mouth daily       QUEtiapine (SEROQUEL) 100 MG tablet Take 1 tablet (100 mg) by mouth 3 times daily 5 tablet 0    rosuvastatin (CRESTOR) 10 MG tablet Take 20 mg by mouth At Bedtime      sennosides (SENOKOT) 8.6 MG tablet Take 2 tablets by mouth At Bedtime      traZODone (DESYREL) 50 MG tablet Take 0.5 tablets (25 mg) by mouth 2 times daily Patient can have 25 mg once a day and 75 mg once a day. 5 tablet 0    aspirin 81 MG EC tablet Take 81 mg by mouth daily (Patient not  taking: Reported on 6/27/2024)      hydrochlorothiazide (HYDRODIURIL) 25 MG tablet Take 25 mg by mouth daily (Patient not taking: Reported on 6/27/2024)      nitroGLYcerin (NITROSTAT) 0.4 MG sublingual tablet Place 0.4 mg under the tongue every 5 minutes as needed for chest pain For chest pain place 1 tablet under the tongue every 5 minutes for 3 doses. If symptoms persist 5 minutes after 1st dose call 911. (Patient not taking: Reported on 6/27/2024)      predniSONE (DELTASONE) 10 MG tablet Take prednisone, 10 mg tab - 2 tabs  daily for 3 days, then 1 tab daily for 3 days, then stop. (Patient not taking: Reported on 6/27/2024)         No family history on file.    Social History     Socioeconomic History    Marital status:      Spouse name: Edwar    Number of children: 1    Years of education: Not on file    Highest education level: Not on file   Occupational History    Not on file   Tobacco Use    Smoking status: Never    Smokeless tobacco: Never   Vaping Use    Vaping status: Never Used   Substance and Sexual Activity    Alcohol use: Not Currently    Drug use: Never    Sexual activity: Not Currently   Other Topics Concern    Not on file   Social History Narrative    Currently living at [facility] in [place].      Social Determinants of Health     Financial Resource Strain: Low Risk  (5/10/2024)    Received from [email or web address]    Financial Resource Strain     Difficulty of Paying Living Expenses: 3     Difficulty of Paying Living Expenses: Not on file   Food Insecurity: No Food Insecurity (5/10/2024)    Received from [email or web address]    Food Insecurity     Worried About Running Out of Food in the Last Year: 1   Transportation Needs: No Transportation Needs (5/10/2024)    Received from [email or web address]    Transportation Needs     Lack of Transportation (Medical): 1   Physical Activity: Not on file    Stress: Not on file   Social Connections: Socially Integrated (5/10/2024)    Received from [facility]    Social Connections     Frequency of Communication with Friends and Family: 0   Interpersonal Safety: Not on file   Housing Stability: Low Risk  (5/10/2024)    Received from [facility]    Housing Stability     Unable to Pay for Housing in the Last Year: 1            Past Medical History:     Past Medical History:   Diagnosis Date    ACP (advance care planning) 11/3/2010    Formatting of this note might be different from the original. Patient has identified Health Care Agent(s): Yes Add Health Care Agents: Yes   Health Care Agent(s):  Primary Health Care Agent:   Edwar Escobar Relationship:  Spouse Phone:   [phone]  Secondary Health Care Agent:   Chiki Oro Relationship:   Son Phone:   [phone]  Patient has Advance Care Plan Documents ([employer]    Acute coronary syndrome (H) 11/22/2019    Acute exacerbation of CHF (congestive heart failure) (H) 11/11/2019    Acute on chronic systolic (congestive) heart failure (H) 1/28/2020    Anemia of chronic disease 1/5/2013    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, left leg (H) 1/15/2020    BPPV (benign paroxysmal positional vertigo) 5/31/2018    Candidiasis 3/1/2020    Carotid stenosis, right 7/13/2016    Carotid US 05/04/2018 showed moderate plaque formation, consistent with 50 to 69% stenosis in the right internal carotid artery, not significantly changed from 8/5/2015.  Moderate plaque formation, consistent with 50 to 69% stenosis in the left internal carotid artery; there has been mild progression of the left ICA stenosis since 8/5/2015.    Chest pain 11/11/2019    Chronic bilateral low back pain without sciatica 1/17/2018    Chronic pain disorder 10/1/2017    Constipation 3/20/2020    COPD (chronic obstructive pulmonary disease) (H)  6/24/2020    Coronary atherosclerosis 2/6/2009 2/5/2009 - MI - Proximal RCA 99%, mild-mod disease elsewhere.  EF 60%.  PCI:  MYRON to pRCA. 2/12/2009 - admit CP - Widely patent RCA stent. Moderate diffuse CAD. Severe stenosis in trivial PDA branch. LVEF 45%. 1/25/2010 - admit CP - PTCA and stent of diagonal 9/8/2010 - admit CP - LAD patent stent. Moderate diffuse CAD. Medical management recommended.  4/25/2012 - NSTEMI - Acute total occlusion of    Cubital tunnel syndrome on left 11/13/2012    Depressive disorder     Diabetes mellitus (H)     Diabetes mellitus type 2 in obese 7/13/2006    Diabetes mellitus type 2 in obese 7/13/2006    Drug-seeking behavior 4/4/2020    Failure to thrive in adult 9/3/2020    Hereditary and idiopathic peripheral neuropathy 4/24/2007    Hyperlipidemia with target low density lipoprotein (LDL) cholesterol less than 70 mg/dL 5/30/2007    Hypertension 10/14/2015    Hypothyroidism 12/10/2010    Irritable bowel syndrome 9/7/2015    Ischemic cardiomyopathy 9/20/2015    EF of 40-45%, status post RV lead revision and LV epicardial lead placement via mini-thoracotomy in August 2016.    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  Designated pharmacy: [facility] Prescribing physician: Andrea Diagnosis: Ulnar neuropathy    Panic disorder with agoraphobia 4/14/2020    Paroxysmal atrial fibrillation (H) 10/2/2015    Personality disorder (H) 3/5/2020    Rule out dependent personality    Polymyalgia rheumatica (H24) 11/28/2018    Posttraumatic stress disorder 3/1/2020    Restless legs syndrome (RLS) 8/29/2007    Restless legs syndrome (RLS) 8/29/2007     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    Serum calcium elevated 3/1/2020    Somatic dysfunction of sacral region 1/17/2018    Subacromial bursitis of left shoulder joint 8/6/2018    Suicidal ideation 4/1/2020    Thyroid disease     TIA (transient ischemic attack) 5/4/2018    TIA (transient ischemic attack) 5/4/2018    Tobacco abuse 2/17/2017    Tobacco abuse 2/17/2017    Urinary incontinence, mixed 9/24/2017    UTI (urinary tract infection) 4/17/2020    Vitamin B12 deficiency 2/14/2018    Weakness 12/17/2019              Past Surgical History:     Past Surgical History:   Procedure Laterality Date    CARDIAC SURGERY      stents x 11    CARDIAC SURGERY      CHOLECYSTECTOMY      CHOLECYSTECTOMY      GENITOURINARY SURGERY      Tubal ligation    OTHER SURGICAL HISTORY      Genitourinary surgery    RELEASE CARPAL TUNNEL      RELEASE CARPAL TUNNEL                Allergies:   Bee pollen, Diphenhydramine, Isosorbide nitrate, Nitroglycerin, Contrast dye, Penicillin g, Adhesive tape, Liquid adhesive, Nystatin, Penicillins, and Sulfa antibiotics       Data:   All laboratory data reviewed:    Recent Labs   Lab Test 06/17/24  0740 04/26/24  2252 01/17/24  0746 12/28/22  0747 10/19/22  0743 03/28/22  0726 10/18/21  0738 08/04/21  0801 05/28/21  0818 01/29/21  0725 08/17/20  0738 06/29/20  0740   LDL  --   --   --   --  56  --   --   --  83  --   --   --    HDL  --   --   --   --  30*  --   --   --  42*  --   --   --    NHDL  --   --   --   --  132*  --   --   --  112  --   --   --    CHOL  --   --   --   --  162  --   --   --  154  --   --   --    TRIG  --   --   --   --  382*  --   --   --  144  --   --   --    TSH 11.43* 7.12* 8.02*   < >  --    < >  --    < >  --   --    < > 0.02*   NTBNP  --   --   --   --   --   --   --   --   --  2,197*  --   --    IRON  --   --   --   --   --   --  86  --   --   --   --   --    RITU  --   --   --   --   --   --   --   --   --   --   --  236    < > = values in this interval not displayed.       Lab  "Results   Component Value Date    WBC 6.3 06/17/2024    WBC 6.9 05/28/2021    RBC 3.34 (L) 06/17/2024    RBC 4.06 05/28/2021    HGB 9.8 (L) 06/17/2024    HGB 12.5 05/28/2021    HCT 31.6 (L) 06/17/2024    HCT 38.3 05/28/2021    MCV 95 06/17/2024    MCV 94 05/28/2021    MCH 29.3 06/17/2024    MCH 30.8 05/28/2021    MCHC 31.0 (L) 06/17/2024    MCHC 32.6 05/28/2021    RDW 17.1 (H) 06/17/2024    RDW 12.5 05/28/2021     (L) 06/17/2024     05/28/2021       Lab Results   Component Value Date     06/17/2024     03/16/2021    POTASSIUM 4.5 06/17/2024    POTASSIUM 3.9 01/16/2023    POTASSIUM 4.7 03/16/2021    CHLORIDE 105 06/17/2024    CHLORIDE 108 01/16/2023    CHLORIDE 111 (H) 03/16/2021    CO2 22 06/17/2024    CO2 24 01/16/2023    CO2 22 03/16/2021    ANIONGAP 11 06/17/2024    ANIONGAP 8 01/16/2023    ANIONGAP 6 03/16/2021     (H) 06/17/2024     (H) 05/24/2024     (H) 01/16/2023     (H) 03/16/2021    BUN 18.7 06/17/2024    BUN 23 01/16/2023    BUN 45 (H) 03/16/2021    CR 1.06 (H) 06/17/2024    CR 0.98 03/16/2021    GFRESTIMATED 56 (L) 06/17/2024    GFRESTIMATED 60 (L) 03/16/2021    GFRESTBLACK 69 03/16/2021    ORESTES 10.1 06/17/2024    ORESTES 9.4 03/16/2021      Lab Results   Component Value Date    AST 23 06/06/2024    AST 23 03/16/2021    ALT 21 06/06/2024    ALT 74 (H) 03/16/2021       Lab Results   Component Value Date    A1C 6.8 (H) 06/17/2024    A1C 4.6 10/30/2020       No results found for: \"INR\"      FRANCHESCA WOLF MD  [facility]  Thank you for allowing me to participate in the care of your patient.      Sincerely,     FRANCHESCA WOLF MD     [facility]  cc:   Chantal Pierce MD  [address] [address]      "

## 2024-07-11 ENCOUNTER — LAB REQUISITION (OUTPATIENT)
Dept: LAB | Facility: CLINIC | Age: 69
End: 2024-07-11
Payer: COMMERCIAL

## 2024-07-11 DIAGNOSIS — I50.22 CHRONIC SYSTOLIC (CONGESTIVE) HEART FAILURE (H): ICD-10-CM

## 2024-07-11 LAB
ANION GAP SERPL CALCULATED.3IONS-SCNC: 11 MMOL/L (ref 7–15)
BUN SERPL-MCNC: 14.5 MG/DL (ref 8–23)
CALCIUM SERPL-MCNC: 10.1 MG/DL (ref 8.8–10.2)
CHLORIDE SERPL-SCNC: 105 MMOL/L (ref 98–107)
CREAT SERPL-MCNC: 1.02 MG/DL (ref 0.51–0.95)
DEPRECATED HCO3 PLAS-SCNC: 22 MMOL/L (ref 22–29)
EGFRCR SERPLBLD CKD-EPI 2021: 59 ML/MIN/1.73M2
GLUCOSE SERPL-MCNC: 150 MG/DL (ref 70–99)
POTASSIUM SERPL-SCNC: 4 MMOL/L (ref 3.4–5.3)
SODIUM SERPL-SCNC: 138 MMOL/L (ref 135–145)

## 2024-07-11 PROCEDURE — 80048 BASIC METABOLIC PNL TOTAL CA: CPT | Mod: ORL | Performed by: FAMILY MEDICINE

## 2024-07-15 ENCOUNTER — HOSPITAL ENCOUNTER (EMERGENCY)
Facility: CLINIC | Age: 69
Discharge: HOME | End: 2024-07-16
Attending: NURSE PRACTITIONER | Admitting: NURSE PRACTITIONER
Payer: COMMERCIAL

## 2024-07-15 ENCOUNTER — APPOINTMENT (OUTPATIENT)
Dept: GENERAL RADIOLOGY | Facility: CLINIC | Age: 69
End: 2024-07-15
Attending: NURSE PRACTITIONER
Payer: COMMERCIAL

## 2024-07-15 DIAGNOSIS — G89.29 CHRONIC CHEST PAIN: ICD-10-CM

## 2024-07-15 DIAGNOSIS — R07.9 CHRONIC CHEST PAIN: ICD-10-CM

## 2024-07-15 LAB
ALBUMIN SERPL BCG-MCNC: 3.6 G/DL (ref 3.5–5.2)
ALP SERPL-CCNC: 47 U/L (ref 40–150)
ALT SERPL W P-5'-P-CCNC: 18 U/L (ref 0–50)
ANION GAP SERPL CALCULATED.3IONS-SCNC: 11 MMOL/L (ref 7–15)
AST SERPL W P-5'-P-CCNC: 28 U/L (ref 0–45)
BASOPHILS # BLD AUTO: 0 10E3/UL (ref 0–0.2)
BASOPHILS NFR BLD AUTO: 1 %
BILIRUB SERPL-MCNC: <0.2 MG/DL
BUN SERPL-MCNC: 17.5 MG/DL (ref 8–23)
CALCIUM SERPL-MCNC: 9.8 MG/DL (ref 8.8–10.2)
CHLORIDE SERPL-SCNC: 108 MMOL/L (ref 98–107)
CREAT SERPL-MCNC: 0.91 MG/DL (ref 0.51–0.95)
EGFRCR SERPLBLD CKD-EPI 2021: 68 ML/MIN/1.73M2
EOSINOPHIL # BLD AUTO: 0.3 10E3/UL (ref 0–0.7)
EOSINOPHIL NFR BLD AUTO: 4 %
ERYTHROCYTE [DISTWIDTH] IN BLOOD BY AUTOMATED COUNT: 15.1 % (ref 10–15)
GLUCOSE SERPL-MCNC: 227 MG/DL (ref 70–99)
HCO3 SERPL-SCNC: 20 MMOL/L (ref 22–29)
HCT VFR BLD AUTO: 32.2 % (ref 35–47)
HGB BLD-MCNC: 10.2 G/DL (ref 11.7–15.7)
IMM GRANULOCYTES # BLD: 0.1 10E3/UL
IMM GRANULOCYTES NFR BLD: 1 %
LYMPHOCYTES # BLD AUTO: 1.8 10E3/UL (ref 0.8–5.3)
LYMPHOCYTES NFR BLD AUTO: 21 %
MCH RBC QN AUTO: 30.1 PG (ref 26.5–33)
MCHC RBC AUTO-ENTMCNC: 31.7 G/DL (ref 31.5–36.5)
MCV RBC AUTO: 95 FL (ref 78–100)
MONOCYTES # BLD AUTO: 0.5 10E3/UL (ref 0–1.3)
MONOCYTES NFR BLD AUTO: 6 %
NEUTROPHILS # BLD AUTO: 5.6 10E3/UL (ref 1.6–8.3)
NEUTROPHILS NFR BLD AUTO: 68 %
NRBC # BLD AUTO: 0 10E3/UL
NRBC BLD AUTO-RTO: 0 /100
PLATELET # BLD AUTO: 168 10E3/UL (ref 150–450)
POTASSIUM SERPL-SCNC: 4.6 MMOL/L (ref 3.4–5.3)
PROT SERPL-MCNC: 6.8 G/DL (ref 6.4–8.3)
RBC # BLD AUTO: 3.39 10E6/UL (ref 3.8–5.2)
SODIUM SERPL-SCNC: 139 MMOL/L (ref 135–145)
TROPONIN T SERPL HS-MCNC: 27 NG/L
WBC # BLD AUTO: 8.3 10E3/UL (ref 4–11)

## 2024-07-15 PROCEDURE — 93010 ELECTROCARDIOGRAM REPORT: CPT | Performed by: NURSE PRACTITIONER

## 2024-07-15 PROCEDURE — 85004 AUTOMATED DIFF WBC COUNT: CPT | Performed by: NURSE PRACTITIONER

## 2024-07-15 PROCEDURE — 80053 COMPREHEN METABOLIC PANEL: CPT | Performed by: NURSE PRACTITIONER

## 2024-07-15 PROCEDURE — 99284 EMERGENCY DEPT VISIT MOD MDM: CPT | Performed by: NURSE PRACTITIONER

## 2024-07-15 PROCEDURE — 36415 COLL VENOUS BLD VENIPUNCTURE: CPT | Performed by: NURSE PRACTITIONER

## 2024-07-15 PROCEDURE — 93005 ELECTROCARDIOGRAM TRACING: CPT | Performed by: NURSE PRACTITIONER

## 2024-07-15 PROCEDURE — 96374 THER/PROPH/DIAG INJ IV PUSH: CPT | Performed by: NURSE PRACTITIONER

## 2024-07-15 PROCEDURE — 84484 ASSAY OF TROPONIN QUANT: CPT | Performed by: NURSE PRACTITIONER

## 2024-07-15 PROCEDURE — 250N000011 HC RX IP 250 OP 636: Performed by: NURSE PRACTITIONER

## 2024-07-15 PROCEDURE — 71045 X-RAY EXAM CHEST 1 VIEW: CPT

## 2024-07-15 PROCEDURE — 99285 EMERGENCY DEPT VISIT HI MDM: CPT | Mod: 25 | Performed by: NURSE PRACTITIONER

## 2024-07-15 PROCEDURE — 96375 TX/PRO/DX INJ NEW DRUG ADDON: CPT | Performed by: NURSE PRACTITIONER

## 2024-07-15 RX ORDER — ONDANSETRON 2 MG/ML
4 INJECTION INTRAMUSCULAR; INTRAVENOUS ONCE
Status: COMPLETED | OUTPATIENT
Start: 2024-07-15 | End: 2024-07-15

## 2024-07-15 RX ORDER — MORPHINE SULFATE 4 MG/ML
4 INJECTION, SOLUTION INTRAMUSCULAR; INTRAVENOUS ONCE
Status: COMPLETED | OUTPATIENT
Start: 2024-07-15 | End: 2024-07-15

## 2024-07-15 RX ADMIN — ONDANSETRON 4 MG: 2 INJECTION INTRAMUSCULAR; INTRAVENOUS at 22:33

## 2024-07-15 RX ADMIN — MORPHINE SULFATE 4 MG: 4 INJECTION, SOLUTION INTRAMUSCULAR; INTRAVENOUS at 22:35

## 2024-07-15 ASSESSMENT — ACTIVITIES OF DAILY LIVING (ADL)
ADLS_ACUITY_SCORE: 39

## 2024-07-16 VITALS
RESPIRATION RATE: 10 BRPM | HEART RATE: 59 BPM | OXYGEN SATURATION: 96 % | TEMPERATURE: 98.4 F | DIASTOLIC BLOOD PRESSURE: 57 MMHG | SYSTOLIC BLOOD PRESSURE: 113 MMHG

## 2024-07-16 LAB — TROPONIN T SERPL HS-MCNC: 27 NG/L

## 2024-07-16 PROCEDURE — 36415 COLL VENOUS BLD VENIPUNCTURE: CPT | Performed by: FAMILY MEDICINE

## 2024-07-16 PROCEDURE — 84484 ASSAY OF TROPONIN QUANT: CPT | Performed by: FAMILY MEDICINE

## 2024-07-16 ASSESSMENT — ACTIVITIES OF DAILY LIVING (ADL)
ADLS_ACUITY_SCORE: 39
ADLS_ACUITY_SCORE: 39

## 2024-07-16 NOTE — ED PROVIDER NOTES
History     Chief Complaint   Patient presents with    Chest Pain     HPI  Letty Escobar is a 68 year old female with history of hypertension, CHFrEF (25-30% EF), hypothyroidism, T2DM, CAD (multiple cardiac stents, CABG x 5), unstable angina, atrial fibrillation (s/p ICD/pacemaker),  and PE (on apixiban) who presents for evaluation of chest pain.  Patient lives at [facility]. Symptoms started at approximately 8 PM this evening.  She was given 2 baby aspirin and arrives via EMS. She repeatedly is asking for pain medication/morphine and intermittently screams out.  Reports feeling short of breath.  Denies increased swelling in her legs.  Denies fever or chills.  Denies cough or congestion.    Last pacemaker interrogation was 6/27/2024, device functioning within normal parameters.    Patient transferred her cardiology care from [facility] to [facility] cardiology here in [place].  Last visit was 6/27/2024 and I have reviewed the notes from that visit.     Notes as follows from cardiology 6/27/2024:      She has a history of coronary artery bypass surgery x 5 with multiple coronary angiograms since then.  She has had stenting of the circumflex, LAD, vein graft to OM1, and vein graft to right coronary artery.  Her last coronary angiogram in July 2023 demonstrated patent bypass grafts and no other obstructive coronary artery disease amenable to revascularizatio   Her latest echocardiogram demonstrated an ejection fraction of 25 to 30%. She has an ICD in place with an epicardial LV lead placed during thoracotomy on 8/20/2016. She has elected to be DNR/DNI and have the ICD tachycardia therapies disabled. She also has a CardioMEMS implanted but not currently in use. She has had a history of mitral valve repair, tricuspid valve repair, CKD stage IIIa, COPD, and history of stroke which has left her unable to walk and wheelchair-bound. On her most recent echo on 5/10/2024, her mitral valve repair continued to  function normally.   ASSESSMENT:     Ms. Taylor Escobar is a 68-year-old woman with a long and complex cardiac history including coronary artery disease, bypass surgery, mitral valve repair, tricuspid valve repair, ischemic cardiomyopathy with chronic systolic heart failure, chronic kidney disease stage IIIa, and biventricular ICD implanted, currently with tachycardia therapies disabled.  She has an inactive CardioMEMS device in place as well.  She has multiple cardiac risk factors including type 2 diabetes, dyslipidemia, and hypertension.  She has a chronic chest pain syndrome, probably related in part to her anxiety as she reports herself.  She has been seen in the emergency room and hospitalized numerous times for this chest pain syndrome and often demonstrates no evidence for myocardial ischemia or infarction.     She seems to doing well today and we will continue her medical therapy.  I am not sure why she is on amlodipine instead of an ACE inhibitor or ARB, but I will stop her amlodipine and start her on losartan 25 mg a day.  Her blood pressures tend to be low and I am not sure that she would tolerate Entresto at this time.  She is already on Jardiance 10 mg a day and metoprolol succinate.  I am not confident that she would tolerate spironolactone due to her chronic kidney disease but that can be considered at a later time.     I will have her follow-up with us in core clinic care in [place] in 6 months.     Gary Holland MD       [facility] [id number], [address], [place] [address]    Main: [phone]  [email or web address]                                                  Transthoracic Echo Report    TAYLOR ESCOBAR    Excellian ID: [id number] Age: 68 : 1955 Ordering Provider: ROBIN YEH    Exam Date: 05/10/2024 08:46 Gender: F Sonographer: DARON    Accession #: [id number] Height: 62 in BSA: 1.9 m  BP: 129 / 57    Weight: 197 lbs BMI: 36  kg/m  HR: 61      Site: [facility]    Location: Inpatient (Portable)    Procedure Components: Limited 2D imaging with contrast, Color Doppler, Limited Spectral   Doppler    Indications: cad, CAD unspecified    Technical Quality: Contrast: Definity    NDC#: 48736-139-87      Final Conclusion Previous Study: 7/20/2023    Visually Estimated EF: 25-30%    Technically difficult study - contrast was used to enhance endocardial definition due to   suboptimal image quality.    Moderate left ventricular enlargement. Severe abnormal left ventricular systolic function.   Visually estimated ejection fraction is 25-    30%.    Severe global hypokinesis. Abnormal (paradoxical) septal motion consistent with postoperative   state. Possible inferior hypokinesis.    28mm Medtronic 3D annuloplasty ring across the mitral [id number] Medtronic 3D annuloplasty   ring around the tricuspid annulus    (partial).     (full).  Gradient across the mitral valve is 2 mm HG  heart rate of 60 bpm    Compared to prior study dated 7/20/2023, there has been a significant decline in LV function..     Allergies:  Allergies   Allergen Reactions    Bee Pollen Anaphylaxis    Diphenhydramine Palpitations     Tolerated IV Benadryl when not pushed too fast    Isosorbide Nitrate Other (See Comments) and Dizziness     Also causes syncope (has fallen before) and brain fog/mental disturbances - please do not prescribe    Nitroglycerin Dizziness, Fatigue and Other (See Comments)     Specifically the patch - please do not prescribe  Specifically the patch - please do not prescribe      Contrast Dye Hives and Itching    Penicillin G     Adhesive Tape Rash    Liquid Adhesive Itching    Nystatin Dermatitis     Also blisters    Penicillins Swelling and Rash     Occurred as a child - not 100% sure on specific reactions    Sulfa Antibiotics Other (See Comments)     Occurred as a child / patient does not remember specific reaction       Problem List:    Patient  Active Problem List    Diagnosis Date Noted    Acute respiratory failure with hypoxia and hypercapnia (H) 04/27/2024     Priority: Medium    COPD with acute exacerbation (H) 04/27/2024     Priority: Medium    Bilateral pneumonia 04/27/2024     Priority: Medium    Lethargy 11/10/2023     Priority: Medium    Urinary tract infection without hematuria, site unspecified 11/10/2023     Priority: Medium    Fever 11/10/2023     Priority: Medium    Hypoxia 11/10/2023     Priority: Medium    Pyuria 11/10/2023     Priority: Medium    PAD (peripheral artery disease) (H24) 11/10/2023     Priority: Medium    Left foot pain 11/10/2023     Priority: Medium    History of stroke 11/10/2023     Priority: Medium    Lives in long-term care facility 11/10/2023     Priority: Medium    Obesity hypoventilation syndrome (H) 11/10/2023     Priority: Medium    Benzodiazepine dependence (H) 11/10/2023     Priority: Medium    Thrombocytopenia (H24) 11/10/2023     Priority: Medium    ERIC (acute kidney injury) (H24) 11/10/2023     Priority: Medium    Chronic kidney disease, stage 3a (H) 10/16/2022     Priority: Medium    SOB (shortness of breath) 04/07/2022     Priority: Medium    Type 2 diabetes mellitus with hyperglycemia, with long-term current use of insulin (H) 02/13/2022     Priority: Medium    Morbid obesity (H) 12/10/2021     Priority: Medium    ICD (implantable cardioverter-defibrillator) in place 11/17/2021     Priority: Medium     Formatting of this note is different from the original.  Date of last device in office evaluation: 5/17/2022    ?  and model: Medtronic Cobalt CRT-D.   Date of implant: 5/7/2021  Tachy therapies remain OFF: S/p downgrade from ICD to pacemaker, but her implanted system includes a DF-4 lead, so an ICD generator was placed with the tachycardia therapies disabled.     ? Indication for device: Ischemic cardiomyopathy   ? Cardiac resynchronization therapy:   no     MRI Conditional:  No  o If No:  Reason  why:  Abandoned LV lead    ? Battery longevity documented as less than 3  Months: No  ? Are any of the leads less than 3 months old:  No    ? Programming              ? Pacing mode and programmed lower rate: DDDR 60 - 130 bpm              ? Rate-responsive sensor type, if programmed on: Accelerometer        Underlying rhythm and heart rate:  SR @ 60 bpm    ? What is the response of this device to magnet placement:  None - tachy therapies off  ? PM magnet pacing rate: N/A   ? Any alert status on CIED generator or lead: No    ? Last pacing threshold    Atrial   1.0 V @ 0.4 ms   Ventricular RV: 0.75 V @ 0.4 ms  LV:  2.75 V @ 1.0 ms    Formatting of this note is different from the original.  Date of last device in office evaluation: 11/17/2022     ?  and model: [employer] Cobalt CRT-D.   Date of implant: 5/7/2021  Tachy therapies remain OFF: S/p downgrade from ICD to pacemaker, but her implanted system includes a DF-4 lead, so an ICD generator was placed with the tachycardia therapies disabled.     ? Indication for device: Ischemic cardiomyopathy   ? Cardiac resynchronization therapy:   no     MRI Conditional:  No  o If No:  Reason why:  Abandoned LV lead    ? Battery longevity documented as less than 3  Months: No  ? Are any of the leads less than 3 months old:  No    ? Programming              ? Pacing mode and programmed lower rate: DDDR 60 - 130 bpm              ? Rate-responsive sensor type, if programmed on: Accelerometer        Underlying rhythm and heart rate:  Sinus rhythm 62 bpm, w/BBB    ? What is the response of this device to magnet placement:  None - tachy therapies off  ? PM magnet pacing rate: N/A   ? Any alert status on CIED generator or lead: No    ? Last pacing threshold    Atrial   1.0 V @ 0.4 ms   Ventricular RV: 0.75 V @ 0.4 ms  LV:  2.75 V @ 1.0 ms      Atrial fibrillation (H) 04/06/2021     Priority: Medium    Pacemaker 04/06/2021     Priority: Medium    Major depressive disorder,  recurrent severe without psychotic features (H) 01/26/2021     Priority: Medium    Panic disorder with agoraphobia 04/14/2020     Priority: Medium    Constipation 03/20/2020     Priority: Medium    Personality disorder (H) 03/05/2020     Priority: Medium     Rule out dependent personality    Formatting of this note might be different from the original.  Rule out dependent personality  Formatting of this note might be different from the original.  Rule out dependent personality      Candidiasis 03/01/2020     Priority: Medium    Posttraumatic stress disorder 03/01/2020     Priority: Medium    COPD without exacerbation (H) 01/29/2020     Priority: Medium    Long term (current) use of insulin (H) 01/29/2020     Priority: Medium    Chronic systolic heart failure (H) 01/28/2020     Priority: Medium    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, left leg (H) 01/15/2020     Priority: Medium    Chest pain 11/11/2019     Priority: Medium    Polymyalgia rheumatica (H24) 11/28/2018     Priority: Medium    Unstable angina (H) 05/02/2018     Priority: Medium     Coronary angiogram 05/02/2018 demonstrated 30% stenosis in the LMCA, 50% stenosis in the Proximal LAD, 50% stenosis in the Mid LAD, 95% stenosis in the Proximal Circumflex (Restenosis - Balloon Angioplasty), 100% stenosis in the 1st Marginal, 50% stenosis in the Proximal RCA, 50% stenosis in the Distal RCA, the LIMA graft from the LIMA to the Distal LAD is free of significant disease; the SVG graft from the Aorta to the 1st Marginal is free of significant disease; the SVG graft from the Aorta to the Distal RCA is free of significant disease. Intervention included a successful  2.5mm x 12mm Balloon,  2.5mm x 10mm Cutting Balloon,  3mm x 12mm Drug Eluting Stent,  3mm x 12mm Balloon, and  3mm x 8mm Balloon to Proximal Circumflex, post stenosis 0%.    Formatting of this note is different from the original.  Coronary angiogram  05/02/2018 demonstrated 30% stenosis in the LMCA, 50% stenosis in the Proximal LAD, 50% stenosis in the Mid LAD, 95% stenosis in the Proximal Circumflex (Restenosis - Balloon Angioplasty), 100% stenosis in the 1st Marginal, 50% stenosis in the Proximal RCA, 50% stenosis in the Distal RCA, the LIMA graft from the LIMA to the Distal LAD is free of significant disease; the SVG graft from the Aorta to the 1st Marginal is free of significant disease; the SVG graft from the Aorta to the Distal RCA is free of significant disease. Intervention included a successful  2.5mm x 12mm Balloon,  2.5mm x 10mm Cutting Balloon,  3mm x 12mm Drug Eluting Stent,  3mm x 12mm Balloon, and  3mm x 8mm Balloon to Proximal Circumflex, post stenosis 0%.  Formatting of this note is different from the original.  Coronary angiogram 05/02/2018 demonstrated 30% stenosis in the LMCA, 50% stenosis in the Proximal LAD, 50% stenosis in the Mid LAD, 95% stenosis in the Proximal Circumflex (Restenosis - Balloon Angioplasty), 100% stenosis in the 1st Marginal, 50% stenosis in the Proximal RCA, 50% stenosis in the Distal RCA, the LIMA graft from the LIMA to the Distal LAD is free of significant disease; the SVG graft from the Aorta to the 1st Marginal is free of significant disease; the SVG graft from the Aorta to the Distal RCA is free of significant disease. Intervention included a successful  2.5mm x 12mm Balloon,  2.5mm x 10mm Cutting Balloon,  3mm x 12mm Drug Eluting Stent,  3mm x 12mm Balloon, and  3mm x 8mm Balloon to Proximal Circumflex, post stenosis 0%.      Vitamin B12 deficiency 02/14/2018     Priority: Medium    Chronic bilateral low back pain without sciatica 01/17/2018     Priority: Medium    Chronic pain disorder 10/01/2017     Priority: Medium    Urinary incontinence, mixed 09/24/2017     Priority: Medium    Internal carotid artery stenosis, bilateral 07/13/2016     Priority: Medium     Carotid US 05/04/2018 showed moderate plaque  formation, consistent with 50 to 69% stenosis in the right internal carotid artery, not significantly changed from 8/5/2015.  Moderate plaque formation, consistent with 50 to 69% stenosis in the left internal carotid artery; there has been mild progression of the left ICA stenosis since 8/5/2015.    Formatting of this note might be different from the original.  Carotid US 05/04/2018 showed moderate plaque formation, consistent with 50 to 69% stenosis in the right internal carotid artery, not significantly changed from 8/5/2015.  Moderate plaque formation, consistent with 50 to 69% stenosis in the left internal carotid artery; there has been mild progression of the left ICA stenosis since 8/5/2015.    Formatting of this note might be different from the original.  Carotid US 05/04/2018 showed moderate plaque formation, consistent with 50 to 69% stenosis in the right internal carotid artery, not significantly changed from 8/5/2015.  Moderate plaque formation, consistent with 50 to 69% stenosis in the left internal carotid artery; there has been mild progression of the left ICA stenosis since 8/5/2015.      Essential (primary) hypertension 10/14/2015     Priority: Medium    Ischemic cardiomyopathy 09/20/2015     Priority: Medium     EF of 40-45%, status post RV lead revision and LV epicardial lead placement via mini-thoracotomy in August 2016.    Formatting of this note might be different from the original.  EF of 40-45%, status post RV lead revision and LV epicardial lead placement via mini-thoracotomy in August 2016.  Formatting of this note might be different from the original.  EF of 40-45%, status post RV lead revision and LV epicardial lead placement via mini-thoracotomy in August 2016.      S/P CABG x 5 08/21/2015     Priority: Medium    Pain medication agreement 04/20/2013     Priority: Medium     Controlled substance agreement for percocet #30/month on file and signed 4/17/13.  Designated pharmacy: [facility]  "Prescribing physician: Andrea Diagnosis: Ulnar neuropathy    Formatting of this note might be different from the original.  Controlled substance agreement for percocet #30/month on file and signed 4/17/13.  Designated pharmacy: [facility] Prescribing physician: Andrea Diagnosis: Ulnar neuropathy      Anemia in other chronic diseases classified elsewhere 01/05/2013     Priority: Medium    Hypothyroidism, unspecified 12/10/2010     Priority: Medium    ACP (advance care planning) 11/03/2010     Priority: Medium     Formatting of this note might be different from the original.  Patient has identified Health Care Agent(s): Yes  Add Health Care Agents: Yes    Health Care Agent(s):    Primary Health Care Agent:     Edwar Escobar Relationship:    Spouse Phone:     [phone]    Secondary Health Care Agent:     Chiki Oro Relationship:     Son Phone:     [phone]      Patient has Advance Care Plan Documents (Health Care Directive, POLST): Yes    Advance Care Plan Documents:  Health Care Directive--03/28/11  Resuscitation Guidelines--    Patient has identified Specific Treatment Preferences: Yes   Specific Treatment Preferences:   a.) Code Status:  DNR/ Do Not Attempt Resuscitation - Allow a Natural Death    b.) Goals of Treatment:     ii. Limited Interventions and treat reversible conditions.  Provide interventions aimed at treatment of new or reversible illness/injury or non-life threatening chronic conditions. Duration of invasive or uncomfortable interventions should generally be limited.- Trial of intubation 5 days or other instructions \"If no improvement, stop.\"  c.) Interventions and Treatments:   i.   Antibiotics:          - Aggressive antibiotics  ii.  Nutrition/Hydration:         - Offer food and liquids by mouth         - IV fluid administration         - No feeding tube under any circumstance.  iii. Transfusion:          - Blood products for comfort/relief of symptoms only  iv. Dialysis:           - " "Dialysis for short term \"for recovery, but not long term.\"        Last Assessment & Plan:   Advance Care Planning: Disease-specific Session    Letty Escobar is an [facility] patient of Dr. Renea Mendez of the [facility].    Advance care planning discussions were completed with Letty and her healthcare agent, spouse Edwar Escobar on Monday, March 28, 2011 at the [facility] Room.    Understanding of Illness and Disease [place]:   Letty identifies her medical condition as diabetes with peripheral neuropathy, heart problems with a heart attack February 2009 plus 4 stent placements; sleep apnea; depression; hypothyroid; high cholesterol; tobacco use; and describes it as needing further assistance with thyroid and diabetes management.  She identifies the following symptoms of her medical condition as being the most bothersome: some memory loss, balance problems, light headedness and dizziness, puffy eyes and lower extremity edema from time to time.    Let[place] is retired [place] .  She is independent with all cares and lives an active life style although, \"I'm not as active as I used to be.\"    Goals of Care:   Letty currently hopes to maintain independence, control pain and symptoms and delay progression of, but not cure, the illness.  \"I want to get the diabetes and thyroid under better control.\"  Letty has an appointment the first part of April for follow up.    Quality of Life:    Letty identifies quality of life as family involvement twice weekly, Yazidi activities, newly assigned Sunday , playing cards, the computer,    Letty christiano with serious challenges in her life through her ganesh in God, prayers and support of her Yazidi family, spouse and son.    Letty identifies the following fears and worries about her medical care:  \"I just want the diabetes straightened out.\"    Treatment and Care Preferences: " "  Past experiences in dealing with family and/or friends that have  or been seriously ill include her brother who took his own life.  \"He was 53 years old and living with us.  I found him.\"   As a result of these experiences, Letty expresses these health care preferences:      Summary Letty's Treatment Preferences:  LOW SURVIVAL; HIGH TREATMENT BURDEN:  If Letty suffered a serious complication, such that she was facing a prolonged hospital stay, required ongoing medical interventions, and the chance of living through the complication was low (for example, only 5 out of 100 would live), Letty would choose:  to focus treatment on comfort and quality of life (\"Quality of life is more important than length of life to Letty.\")  COMMENT:  \"If I can't live a normal life, I wouldn't want to live.\"    HIGH SURVIVAL; LOW FUNCTIONAL STATUS:  If Letty had a serious complication and had a good chance of living through the complication but it was expected that she would never be able to walk or talk again and would require 24 hour nursing care, she would choose:  to focus treatment on comfort and quality of life (\"Quality of life is more important than length of life to Letty.\")  COMMENT:  \"I'd accept rehabilitation.\"    HIGH SURVIVAL; LOW COGNITIVE STATUS:  If Letty had a serious complication and had a good chance of living through the complication but it was expected that she would never know who she was or who she was with and would require 24 hour nursing care, she would choose:  to focus treatment on comfort and quality of life (\"Quality of life is more important than length of life to Letty.\")    CARDIO-PULMONARY RESUSCITATION (CPR):  The facts, risks and benefits of CPR were discussed with Letty.  If she had a sudden event that caused her heart and breathing to stop, she:  WOULD NOT want CPR attempted and instead would prefer that a natural death occur.    MECHANICAL VENTILATION: If Letty had an episode where she was " "unable to breathe on her own, she would choose the following:  attempt to use any appropriate non-invasive method to assist breathing, and use mechanical ventilation.  COMMENT:  \"If reversible ventilator is acceptable for 5 days.  If no improvement, stop.\"     Letty has chosen her healthcare agent to:  strictly follow her wishes.    Follow Up Plan:   Letty was encouraged to continue advance care planning discussions with her health care agents and primary care provider.   Letty and her health care agent declined handouts.     Documents addressed during this advance care planning session:  1.  Health Care Agents identified.          .  Primary health care agent is spouse, Edwar Escobar;          .  Secondary health care agent is son, Jermaine Oro.  2.  Health Care Directive completed and scanned into medical record.  3.  Statement of Treatment Preferences for advanced illness completed and scanned into the medical record.  4.  Resuscitation Guidelines completed for DNR.  Document sent to Dr. Renea Mendez for signature and returned to the home. Letty, please place on the refrigerator.    Recommendations/Plan:   Letty and her health care agent to review Advance Care Plan.     Letty would benefit from:   Care Navigation/Care Navigation Help Desk--explained services for pending future needs.  No identified needs today.  Care Navigation brochure was given to Letty and her healthcare agent.    Advance Care Planning recommendations and Letty's concerns and questions were cc ed to her primary provider.     Thank you, Letty for the opportunity of assisting with Advance Care Planning. It was a seeing you again and meeting Edwar.  Please contact me if I can be of further assistance.    Interviewer: Pat Martin RN    Advance Care Planning Facilitator  [phone]   3/28/2011      ELI (obstructive sleep apnea) 01/31/2010     Priority: Medium     PSG on April/2008 that showed moderate Obstructive Sleep Apnea with an AHI " of 23.5 . Limb movements persisted at 29 movements/hour at optimal pressures, despite carbidopa use.    Formatting of this note might be different from the original.  PSG on April/2008 that showed moderate Obstructive Sleep Apnea with an AHI of 23.5 . Limb movements persisted at 29 movements/hour at optimal pressures, despite carbidopa use.  Formatting of this note might be different from the original.  PSG on April/2008 that showed moderate Obstructive Sleep Apnea with an AHI of 23.5 . Limb movements persisted at 29 movements/hour at optimal pressures, despite carbidopa use.      Coronary atherosclerosis 02/06/2009     Priority: Medium     Formatting of this note might be different from the original.  2/5/2009 - MI - Proximal RCA 99%, mild-mod disease elsewhere.  EF 60%.  PCI:  MYRON to pRCA.  2/12/2009 - admit CP - Widely patent RCA stent. Moderate diffuse CAD. Severe stenosis in trivial PDA branch. LVEF 45%.  1/25/2010 - admit CP - PTCA and stent of diagonal  9/8/2010 - admit CP - LAD patent stent. Moderate diffuse CAD. Medical management recommended.   4/25/2012 - NSTEMI - Acute total occlusion of the ostial Circumflex. Acute total occlusion of the ostial ramus. Acute total occlusion of the distal 1st Obtuse Marginal. Angioplasty of ostial circumflex and ostial ramus. There was distal embolization to the OM1 vessel. This vessel was small and not amendable to intervention. Indefinite: plavix and asa 81.  8/10/2015 - CABx5 - 1. LIMA to distal LAD, reverse SVG to OM1 and OM2, reverse SVG to diagonal, reverse SVG to PDA.   2. Mitral valve repair with a Medtronics 3-D full ring, 28 mm.   3. Tricuspid repair with a Medtronic 28 mm partial ring  1/12/2016 - TTE - EF 40-45%  Formatting of this note might be different from the original.  2/5/2009 - MI - Proximal RCA 99%, mild-mod disease elsewhere.  EF 60%.  PCI:  MYRON to pRCA.  2/12/2009 - admit CP - Widely patent RCA stent. Moderate diffuse CAD. Severe stenosis in trivial  PDA branch. LVEF 45%.  1/25/2010 - admit CP - PTCA and stent of diagonal  9/8/2010 - admit CP - LAD patent stent. Moderate diffuse CAD. Medical management recommended.   4/25/2012 - NSTEMI - Acute total occlusion of the ostial Circumflex. Acute total occlusion of the ostial ramus. Acute total occlusion of the distal 1st Obtuse Marginal. Angioplasty of ostial circumflex and ostial ramus. There was distal embolization to the OM1 vessel. This vessel was small and not amendable to intervention. Indefinite: plavix and asa 81.  8/10/2015 - CABx5 - 1. LIMA to distal LAD, reverse SVG to OM1 and OM2, reverse SVG to diagonal, reverse SVG to PDA.   2. Mitral valve repair with a Medtronics 3-D full ring, 28 mm.   3. Tricuspid repair with a Medtronic 28 mm partial ring  1/12/2016 - TTE - EF 40-45%      Restless legs syndrome 08/29/2007     Priority: Medium    Hyperlipidemia, unspecified 05/30/2007     Priority: Medium        Past Medical History:    Past Medical History:   Diagnosis Date    ACP (advance care planning) 11/3/2010    Acute coronary syndrome (H) 11/22/2019    Acute exacerbation of CHF (congestive heart failure) (H) 11/11/2019    Acute on chronic systolic (congestive) heart failure (H) 1/28/2020    Anemia of chronic disease 1/5/2013    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, left leg (H) 1/15/2020    BPPV (benign paroxysmal positional vertigo) 5/31/2018    Candidiasis 3/1/2020    Carotid stenosis, right 7/13/2016    Chest pain 11/11/2019    Chronic bilateral low back pain without sciatica 1/17/2018    Chronic pain disorder 10/1/2017    Constipation 3/20/2020    COPD (chronic obstructive pulmonary disease) (H) 6/24/2020    Coronary atherosclerosis 2/6/2009    Cubital tunnel syndrome on left 11/13/2012    Depressive disorder     Diabetes mellitus (H)     Diabetes mellitus type 2 in obese 7/13/2006    Diabetes mellitus type 2 in obese 7/13/2006    Drug-seeking behavior  4/4/2020    Failure to thrive in adult 9/3/2020    Hereditary and idiopathic peripheral neuropathy 4/24/2007    Hyperlipidemia with target low density lipoprotein (LDL) cholesterol less than 70 mg/dL 5/30/2007    Hypertension 10/14/2015    Hypothyroidism 12/10/2010    Irritable bowel syndrome 9/7/2015    Ischemic cardiomyopathy 9/20/2015    Long-term use of high-risk medication 4/14/2020    Moniliasis, 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    Myocardial infarction (H)     Neuromuscular disorder (H)     Other specified postprocedural states 10/8/2015    Pain medication agreement 4/20/2013    Panic disorder with agoraphobia 4/14/2020    Paroxysmal atrial fibrillation (H) 10/2/2015    Personality disorder (H) 3/5/2020    Polymyalgia rheumatica (H24) 11/28/2018    Posttraumatic stress disorder 3/1/2020    Restless legs syndrome (RLS) 8/29/2007    Restless legs syndrome (RLS) 8/29/2007    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    Serum calcium elevated 3/1/2020    Somatic dysfunction of sacral region 1/17/2018    Subacromial bursitis of left shoulder joint 8/6/2018    Suicidal ideation 4/1/2020    Thyroid disease     TIA (transient ischemic attack) 5/4/2018    TIA (transient ischemic attack) 5/4/2018    Tobacco abuse 2/17/2017    Tobacco abuse 2/17/2017    Urinary incontinence, mixed 9/24/2017    UTI (urinary tract infection) 4/17/2020    Vitamin B12 deficiency 2/14/2018    Weakness 12/17/2019       Past Surgical History:    Past Surgical History:   Procedure Laterality Date    CARDIAC SURGERY      stents x 11    CARDIAC SURGERY      CHOLECYSTECTOMY      CHOLECYSTECTOMY      GENITOURINARY SURGERY      Tubal ligation    OTHER SURGICAL HISTORY      Genitourinary surgery    RELEASE CARPAL TUNNEL      RELEASE CARPAL TUNNEL         Family History:    No family history on file.    Social History:  Marital Status:   [2]  Social History     Tobacco Use    Smoking status: Never    Smokeless  tobacco: Never   Vaping Use    Vaping status: Never Used   Substance Use Topics    Alcohol use: Not Currently    Drug use: Never        Medications:    acetaminophen (TYLENOL) 325 MG tablet  acetaminophen (TYLENOL) 500 MG tablet  albuterol (PROVENTIL) (2.5 MG/3ML) 0.083% neb solution  albuterol (PROVENTIL) (2.5 MG/3ML) 0.083% neb solution  apixaban ANTICOAGULANT (ELIQUIS) 5 MG tablet  aspirin 81 MG EC tablet  bisacodyl (DULCOLAX) 10 MG suppository  clotrimazole (LOTRIMIN) 1 % external cream  cyanocobalamin (CYANOCOBALAMIN) 1000 MCG/ML injection  DULoxetine HCl 40 MG CPEP  empagliflozin (JARDIANCE) 10 MG TABS tablet  famotidine (PEPCID) 20 MG tablet  fluconazole (DIFLUCAN) 150 MG tablet  fluticasone-vilanterol (BREO ELLIPTA) 200-25 MCG/INH inhaler  furosemide (LASIX) 20 MG tablet  gabapentin (NEURONTIN) 400 MG capsule  hydrochlorothiazide (HYDRODIURIL) 25 MG tablet  Insulin Aspart FlexPen 100 UNIT/ML SOPN  LANTUS SOLOSTAR 100 UNIT/ML soln  levothyroxine (SYNTHROID/LEVOTHROID) 137 MCG tablet  lidocaine (LMX4) 4 % external cream  LORazepam (ATIVAN) 1 MG tablet  losartan (COZAAR) 25 MG tablet  Melatonin 10 MG TABS tablet  metoprolol succinate ER (TOPROL-XL) 25 MG 24 hr tablet  mirtazapine (REMERON) 15 MG tablet  MUCUS RELIEF 600 MG 12 hr tablet  nitroGLYcerin (NITROSTAT) 0.4 MG sublingual tablet  ondansetron (ZOFRAN ODT) 4 MG ODT tab  pantoprazole (PROTONIX) 40 MG EC tablet  polyethylene glycol (MIRALAX) 17 GM/Dose powder  potassium chloride ER (MICRO-K) 10 MEQ CR capsule  predniSONE (DELTASONE) 10 MG tablet  QUEtiapine (SEROQUEL) 100 MG tablet  rosuvastatin (CRESTOR) 10 MG tablet  sennosides (SENOKOT) 8.6 MG tablet  traZODone (DESYREL) 50 MG tablet          Review of Systems  As mentioned above in the history present illness. All other systems were reviewed and are negative.    Physical Exam   BP: 120/62  Pulse: 78  Temp: 98.4  F (36.9  C)  Resp: 20  SpO2: 96 %      Physical Exam  Constitutional:       General: She is  in acute distress (appears anxious. intermittently screaming out.).      Appearance: She is well-developed. She is obese. She is not ill-appearing.   HENT:      Head: Normocephalic and atraumatic.      Right Ear: External ear normal.      Left Ear: External ear normal.      Nose: Nose normal.      Mouth/Throat:      Mouth: Mucous membranes are moist.   Eyes:      Conjunctiva/sclera: Conjunctivae normal.   Cardiovascular:      Rate and Rhythm: Normal rate and regular rhythm.      Heart sounds: Normal heart sounds. No murmur heard.  Pulmonary:      Effort: Pulmonary effort is normal. No respiratory distress.      Breath sounds: Examination of the right-lower field reveals decreased breath sounds. Examination of the left-lower field reveals decreased breath sounds. Decreased breath sounds (Possibly related to body habitus and lack of respiratory effort) present.   Musculoskeletal:         General: Normal range of motion.   Skin:     General: Skin is warm and dry.      Findings: No rash.   Neurological:      General: No focal deficit present.      Mental Status: She is alert and oriented to person, place, and time.         ED Course        Procedures              EKG Interpretation:      Interpreted by KILO Henry CNP  Time reviewed:9:07 pm  Symptoms at time of EKG: chest pain   Rhythm: Paced  Rate: 70 bpm  Clinical Impression: ventricular paced rhythm         Results for orders placed or performed during the hospital encounter of 07/15/24 (from the past 24 hour(s))   XR Chest Port 1 View    Narrative    EXAM: XR CHEST PORT 1 VIEW  LOCATION: [facility]  DATE: 7/15/2024    INDICATION: Chest pain.  COMPARISON: CTA chest, abdomen and pelvis aortic dissection 5/22/2024.      Impression    IMPRESSION: Sternotomy, CABG and mitral valve repair. Left chest pacer, leads in the heart. Shallow inspiration accentuates cardiac size which is mildly enlarged. Pulmonary vascularity is  normal. Lungs are clear. No adenopathy or effusion. Degenerative   changes both shoulders and the thoracic spine. Monitoring leads overlying the chest.   CBC with platelets differential    Narrative    The following orders were created for panel order CBC with platelets differential.  Procedure                               Abnormality         Status                     ---------                               -----------         ------                     CBC with platelets and d...[[id number]]  Abnormal            Final result                 Please view results for these tests on the individual orders.   Comprehensive metabolic panel   Result Value Ref Range    Sodium 139 135 - 145 mmol/L    Potassium 4.6 3.4 - 5.3 mmol/L    Carbon Dioxide (CO2) 20 (L) 22 - 29 mmol/L    Anion Gap 11 7 - 15 mmol/L    Urea Nitrogen 17.5 8.0 - 23.0 mg/dL    Creatinine 0.91 0.51 - 0.95 mg/dL    GFR Estimate 68 >60 mL/min/1.73m2    Calcium 9.8 8.8 - 10.2 mg/dL    Chloride 108 (H) 98 - 107 mmol/L    Glucose 227 (H) 70 - 99 mg/dL    Alkaline Phosphatase 47 40 - 150 U/L    AST 28 0 - 45 U/L    ALT 18 0 - 50 U/L    Protein Total 6.8 6.4 - 8.3 g/dL    Albumin 3.6 3.5 - 5.2 g/dL    Bilirubin Total <0.2 <=1.2 mg/dL   CBC with platelets and differential   Result Value Ref Range    WBC Count 8.3 4.0 - 11.0 10e3/uL    RBC Count 3.39 (L) 3.80 - 5.20 10e6/uL    Hemoglobin 10.2 (L) 11.7 - 15.7 g/dL    Hematocrit 32.2 (L) 35.0 - 47.0 %    MCV 95 78 - 100 fL    MCH 30.1 26.5 - 33.0 pg    MCHC 31.7 31.5 - 36.5 g/dL    RDW 15.1 (H) 10.0 - 15.0 %    Platelet Count 168 150 - 450 10e3/uL    % Neutrophils 68 %    % Lymphocytes 21 %    % Monocytes 6 %    % Eosinophils 4 %    % Basophils 1 %    % Immature Granulocytes 1 %    NRBCs per 100 WBC 0 <1 /100    Absolute Neutrophils 5.6 1.6 - 8.3 10e3/uL    Absolute Lymphocytes 1.8 0.8 - 5.3 10e3/uL    Absolute Monocytes 0.5 0.0 - 1.3 10e3/uL    Absolute Eosinophils 0.3 0.0 - 0.7 10e3/uL    Absolute Basophils 0.0  0.0 - 0.2 10e3/uL    Absolute Immature Granulocytes 0.1 <=0.4 10e3/uL    Absolute NRBCs 0.0 10e3/uL       Medications   morphine (PF) injection 4 mg (4 mg Intravenous $Given 7/15/24 2235)   ondansetron (ZOFRAN) injection 4 mg (4 mg Intravenous $Given 7/15/24 2233)       Assessments & Plan (with Medical Decision Making)     68 year old female with significant cardiac history (multiple stents, CABG x5) as noted in her HPI of above who presents from  [facility] [place] for evaluation of chest pain that started at 8pm this evening. She has a ICD/pacemaker, last interrogation normal on 6/27/2024. History of CHFrEF (25-30% EF).  She saw cardiology on 6/27/2024, and reported chronic chest pain syndrome.   On exam she is alert and oriented.  She intermittently screaming due to left sided chest pain and into her upper arm.  Lung sounds are diminished in bilateral bases, possibly related to her body habitus and lack of respiratory effort.  No lower extremity edema.  Normal vital signs. Afebrile. No infectious symptoms.    EKG shows ventricular paced rhythm.  No leukocytosis.  Hemoglobin 10.2, chronic anemia with prior hemoglobins 9.8 and 9.7.  Electrolytes are normal.  Kidney function labs are normal.  LFTs are normal.  Glucose 227 in setting of her T2DM.    I have signed patient out to Dr. Kellogg pending troponin and may need serial troponin.          New Prescriptions    No medications on file       Final diagnoses:   Chest pain, unspecified type       7/15/2024   [facility] EMERGENCY DEPT       Christiana Haynes, KILO CNP  07/15/24 1871

## 2024-07-16 NOTE — ED PROVIDER NOTES
Transfer of Care Note  This patient was signed out to me and I assumed care from shannan trap, NP at change of shift.  Letty Escobar is a 68 year old female who presented to the emergency department with a chief complaint of Chest Pain  .  Please see the original providers history and physical for complete details.    The following issues were signed out to me to follow up on:  disposition      Pertant Lab/Imaging Findings During this Visit was     Results for orders placed or performed during the hospital encounter of 07/15/24 (from the past 24 hour(s))   XR Chest Port 1 View    Narrative    EXAM: XR CHEST PORT 1 VIEW  LOCATION: [facility]  DATE: 7/15/2024    INDICATION: Chest pain.  COMPARISON: CTA chest, abdomen and pelvis aortic dissection 5/22/2024.      Impression    IMPRESSION: Sternotomy, CABG and mitral valve repair. Left chest pacer, leads in the heart. Shallow inspiration accentuates cardiac size which is mildly enlarged. Pulmonary vascularity is normal. Lungs are clear. No adenopathy or effusion. Degenerative   changes both shoulders and the thoracic spine. Monitoring leads overlying the chest.   CBC with platelets differential    Narrative    The following orders were created for panel order CBC with platelets differential.  Procedure                               Abnormality         Status                     ---------                               -----------         ------                     CBC with platelets and d...[[id number]]  Abnormal            Final result                 Please view results for these tests on the individual orders.   Comprehensive metabolic panel   Result Value Ref Range    Sodium 139 135 - 145 mmol/L    Potassium 4.6 3.4 - 5.3 mmol/L    Carbon Dioxide (CO2) 20 (L) 22 - 29 mmol/L    Anion Gap 11 7 - 15 mmol/L    Urea Nitrogen 17.5 8.0 - 23.0 mg/dL    Creatinine 0.91 0.51 - 0.95 mg/dL    GFR Estimate 68 >60 mL/min/1.73m2    Calcium 9.8 8.8 -  10.2 mg/dL    Chloride 108 (H) 98 - 107 mmol/L    Glucose 227 (H) 70 - 99 mg/dL    Alkaline Phosphatase 47 40 - 150 U/L    AST 28 0 - 45 U/L    ALT 18 0 - 50 U/L    Protein Total 6.8 6.4 - 8.3 g/dL    Albumin 3.6 3.5 - 5.2 g/dL    Bilirubin Total <0.2 <=1.2 mg/dL   CBC with platelets and differential   Result Value Ref Range    WBC Count 8.3 4.0 - 11.0 10e3/uL    RBC Count 3.39 (L) 3.80 - 5.20 10e6/uL    Hemoglobin 10.2 (L) 11.7 - 15.7 g/dL    Hematocrit 32.2 (L) 35.0 - 47.0 %    MCV 95 78 - 100 fL    MCH 30.1 26.5 - 33.0 pg    MCHC 31.7 31.5 - 36.5 g/dL    RDW 15.1 (H) 10.0 - 15.0 %    Platelet Count 168 150 - 450 10e3/uL    % Neutrophils 68 %    % Lymphocytes 21 %    % Monocytes 6 %    % Eosinophils 4 %    % Basophils 1 %    % Immature Granulocytes 1 %    NRBCs per 100 WBC 0 <1 /100    Absolute Neutrophils 5.6 1.6 - 8.3 10e3/uL    Absolute Lymphocytes 1.8 0.8 - 5.3 10e3/uL    Absolute Monocytes 0.5 0.0 - 1.3 10e3/uL    Absolute Eosinophils 0.3 0.0 - 0.7 10e3/uL    Absolute Basophils 0.0 0.0 - 0.2 10e3/uL    Absolute Immature Granulocytes 0.1 <=0.4 10e3/uL    Absolute NRBCs 0.0 10e3/uL   Troponin T, High Sensitivity   Result Value Ref Range    Troponin T, High Sensitivity 27 (H) <=14 ng/L   Troponin T, High Sensitivity   Result Value Ref Range    Troponin T, High Sensitivity 27 (H) <=14 ng/L         My focused follow up physical exam shows:   Vitals:  B/P: 114/56, T: 98.4, P: 61, R: 10  Gen:  Pt appears stable and no apparent distress      ED Course & Medical Decision Making:  Initial troponin came back negative, second 2-hour was completely unchanged.  Patient has a history of chronic chest pain which does not appear to be secondary to her heart.  This was reassuring and I informed patient of the results and we will go ahead and send the patient back to the nursing home.  Patient will be discharged at this time.         Impression and Disposition:  Chronic chest pain           This note was completed in part using  Dragon voice recognition, and may contain word and grammatical errors.        Roni Kellogg MD  07/16/24 0145

## 2024-07-26 ENCOUNTER — LAB REQUISITION (OUTPATIENT)
Dept: LAB | Facility: CLINIC | Age: 69
End: 2024-07-26
Payer: COMMERCIAL

## 2024-07-26 DIAGNOSIS — E03.9 HYPOTHYROIDISM, UNSPECIFIED: ICD-10-CM

## 2024-07-27 ENCOUNTER — APPOINTMENT (OUTPATIENT)
Dept: GENERAL RADIOLOGY | Facility: CLINIC | Age: 69
End: 2024-07-27
Attending: FAMILY MEDICINE
Payer: COMMERCIAL

## 2024-07-27 ENCOUNTER — HOSPITAL ENCOUNTER (EMERGENCY)
Facility: CLINIC | Age: 69
Discharge: HOME | End: 2024-07-27
Attending: FAMILY MEDICINE | Admitting: FAMILY MEDICINE
Payer: COMMERCIAL

## 2024-07-27 VITALS
RESPIRATION RATE: 18 BRPM | TEMPERATURE: 97.1 F | SYSTOLIC BLOOD PRESSURE: 84 MMHG | OXYGEN SATURATION: 94 % | DIASTOLIC BLOOD PRESSURE: 46 MMHG | HEART RATE: 62 BPM

## 2024-07-27 DIAGNOSIS — R07.89 CHEST WALL PAIN: ICD-10-CM

## 2024-07-27 LAB
ANION GAP SERPL CALCULATED.3IONS-SCNC: 12 MMOL/L (ref 7–15)
BASOPHILS # BLD AUTO: 0 10E3/UL (ref 0–0.2)
BASOPHILS NFR BLD AUTO: 0 %
BUN SERPL-MCNC: 16.5 MG/DL (ref 8–23)
CALCIUM SERPL-MCNC: 9.7 MG/DL (ref 8.8–10.4)
CHLORIDE SERPL-SCNC: 105 MMOL/L (ref 98–107)
CREAT SERPL-MCNC: 0.99 MG/DL (ref 0.51–0.95)
EGFRCR SERPLBLD CKD-EPI 2021: 61 ML/MIN/1.73M2
EOSINOPHIL # BLD AUTO: 0.3 10E3/UL (ref 0–0.7)
EOSINOPHIL NFR BLD AUTO: 4 %
ERYTHROCYTE [DISTWIDTH] IN BLOOD BY AUTOMATED COUNT: 14.5 % (ref 10–15)
GLUCOSE SERPL-MCNC: 153 MG/DL (ref 70–99)
HCO3 SERPL-SCNC: 21 MMOL/L (ref 22–29)
HCT VFR BLD AUTO: 31.4 % (ref 35–47)
HGB BLD-MCNC: 10.1 G/DL (ref 11.7–15.7)
IMM GRANULOCYTES # BLD: 0.1 10E3/UL
IMM GRANULOCYTES NFR BLD: 1 %
LYMPHOCYTES # BLD AUTO: 2.1 10E3/UL (ref 0.8–5.3)
LYMPHOCYTES NFR BLD AUTO: 27 %
MCH RBC QN AUTO: 30.2 PG (ref 26.5–33)
MCHC RBC AUTO-ENTMCNC: 32.2 G/DL (ref 31.5–36.5)
MCV RBC AUTO: 94 FL (ref 78–100)
MONOCYTES # BLD AUTO: 0.5 10E3/UL (ref 0–1.3)
MONOCYTES NFR BLD AUTO: 7 %
NEUTROPHILS # BLD AUTO: 4.9 10E3/UL (ref 1.6–8.3)
NEUTROPHILS NFR BLD AUTO: 62 %
NRBC # BLD AUTO: 0 10E3/UL
NRBC BLD AUTO-RTO: 0 /100
PLATELET # BLD AUTO: 146 10E3/UL (ref 150–450)
POTASSIUM SERPL-SCNC: 4.1 MMOL/L (ref 3.4–5.3)
RBC # BLD AUTO: 3.34 10E6/UL (ref 3.8–5.2)
SODIUM SERPL-SCNC: 138 MMOL/L (ref 135–145)
TROPONIN T SERPL HS-MCNC: 28 NG/L
WBC # BLD AUTO: 8 10E3/UL (ref 4–11)

## 2024-07-27 PROCEDURE — 36415 COLL VENOUS BLD VENIPUNCTURE: CPT | Performed by: FAMILY MEDICINE

## 2024-07-27 PROCEDURE — 99285 EMERGENCY DEPT VISIT HI MDM: CPT | Mod: 25 | Performed by: FAMILY MEDICINE

## 2024-07-27 PROCEDURE — 96375 TX/PRO/DX INJ NEW DRUG ADDON: CPT | Performed by: FAMILY MEDICINE

## 2024-07-27 PROCEDURE — 250N000011 HC RX IP 250 OP 636: Performed by: FAMILY MEDICINE

## 2024-07-27 PROCEDURE — 93010 ELECTROCARDIOGRAM REPORT: CPT | Performed by: FAMILY MEDICINE

## 2024-07-27 PROCEDURE — 96374 THER/PROPH/DIAG INJ IV PUSH: CPT | Performed by: FAMILY MEDICINE

## 2024-07-27 PROCEDURE — 85025 COMPLETE CBC W/AUTO DIFF WBC: CPT | Performed by: FAMILY MEDICINE

## 2024-07-27 PROCEDURE — 84484 ASSAY OF TROPONIN QUANT: CPT | Performed by: FAMILY MEDICINE

## 2024-07-27 PROCEDURE — 80048 BASIC METABOLIC PNL TOTAL CA: CPT | Performed by: FAMILY MEDICINE

## 2024-07-27 PROCEDURE — 93005 ELECTROCARDIOGRAM TRACING: CPT | Performed by: FAMILY MEDICINE

## 2024-07-27 PROCEDURE — 99284 EMERGENCY DEPT VISIT MOD MDM: CPT | Mod: 25 | Performed by: FAMILY MEDICINE

## 2024-07-27 PROCEDURE — 71045 X-RAY EXAM CHEST 1 VIEW: CPT

## 2024-07-27 RX ORDER — ONDANSETRON 2 MG/ML
4 INJECTION INTRAMUSCULAR; INTRAVENOUS EVERY 30 MIN PRN
Status: DISCONTINUED | OUTPATIENT
Start: 2024-07-27 | End: 2024-07-27 | Stop reason: HOSPADM

## 2024-07-27 RX ORDER — MORPHINE SULFATE 4 MG/ML
4 INJECTION, SOLUTION INTRAMUSCULAR; INTRAVENOUS
Status: COMPLETED | OUTPATIENT
Start: 2024-07-27 | End: 2024-07-27

## 2024-07-27 RX ADMIN — ONDANSETRON 4 MG: 2 INJECTION INTRAMUSCULAR; INTRAVENOUS at 02:57

## 2024-07-27 RX ADMIN — MORPHINE SULFATE 4 MG: 4 INJECTION, SOLUTION INTRAMUSCULAR; INTRAVENOUS at 02:58

## 2024-07-27 ASSESSMENT — ACTIVITIES OF DAILY LIVING (ADL)
ADLS_ACUITY_SCORE: 39
ADLS_ACUITY_SCORE: 39

## 2024-07-27 NOTE — ED PROVIDER NOTES
History     Chief Complaint   Patient presents with    Chest Pain     HPI  Letty Escobar is a 68 year old female who presents via EMS with chest pain again.  Patient has been in a number of times for chest pain that she states only is relieved with morphine.  This pain woke her up from her sleep a number of hours ago.  She has been unable to get back to sleep.  EMS tried a couple sprays of nitro and some aspirin and then brought the patient here.  She states this feels like her previous episodes when she is coming before.  She does have an extensive cardiac history so she does not play around.  Denies feeling dizzy or lightheaded.  She is nauseated.  Denies feeling short of breath.  She recently had an echocardiogram done by her cardiologist that looked pretty stable.  Denies any recent trauma.    Allergies:  Allergies   Allergen Reactions    Bee Pollen Anaphylaxis    Diphenhydramine Palpitations     Tolerated IV Benadryl when not pushed too fast    Isosorbide Nitrate Other (See Comments) and Dizziness     Also causes syncope (has fallen before) and brain fog/mental disturbances - please do not prescribe    Nitroglycerin Dizziness, Fatigue and Other (See Comments)     Specifically the patch - please do not prescribe  Specifically the patch - please do not prescribe      Contrast Dye Hives and Itching    Penicillin G     Adhesive Tape Rash    Liquid Adhesive Itching    Nystatin Dermatitis     Also blisters    Penicillins Swelling and Rash     Occurred as a child - not 100% sure on specific reactions    Sulfa Antibiotics Other (See Comments)     Occurred as a child / patient does not remember specific reaction       Problem List:    Patient Active Problem List    Diagnosis Date Noted    Acute respiratory failure with hypoxia and hypercapnia (H) 04/27/2024     Priority: Medium    COPD with acute exacerbation (H) 04/27/2024     Priority: Medium    Bilateral pneumonia 04/27/2024     Priority: Medium    Lethargy  11/10/2023     Priority: Medium    Urinary tract infection without hematuria, site unspecified 11/10/2023     Priority: Medium    Fever 11/10/2023     Priority: Medium    Hypoxia 11/10/2023     Priority: Medium    Pyuria 11/10/2023     Priority: Medium    PAD (peripheral artery disease) (H24) 11/10/2023     Priority: Medium    Left foot pain 11/10/2023     Priority: Medium    History of stroke 11/10/2023     Priority: Medium    Lives in long-term care facility 11/10/2023     Priority: Medium    Obesity hypoventilation syndrome (H) 11/10/2023     Priority: Medium    Benzodiazepine dependence (H) 11/10/2023     Priority: Medium    Thrombocytopenia (H24) 11/10/2023     Priority: Medium    ERIC (acute kidney injury) (H24) 11/10/2023     Priority: Medium    Chronic kidney disease, stage 3a (H) 10/16/2022     Priority: Medium    SOB (shortness of breath) 04/07/2022     Priority: Medium    Type 2 diabetes mellitus with hyperglycemia, with long-term current use of insulin (H) 02/13/2022     Priority: Medium    Morbid obesity (H) 12/10/2021     Priority: Medium    ICD (implantable cardioverter-defibrillator) in place 11/17/2021     Priority: Medium     Formatting of this note is different from the original.  Date of last device in office evaluation: 5/17/2022    ?  and model: Medtronic Cobalt CRT-D.   Date of implant: 5/7/2021  Tachy therapies remain OFF: S/p downgrade from ICD to pacemaker, but her implanted system includes a DF-4 lead, so an ICD generator was placed with the tachycardia therapies disabled.     ? Indication for device: Ischemic cardiomyopathy   ? Cardiac resynchronization therapy:   no     MRI Conditional:  No  o If No:  Reason why:  Abandoned LV lead    ? Battery longevity documented as less than 3  Months: No  ? Are any of the leads less than 3 months old:  No    ? Programming              ? Pacing mode and programmed lower rate: DDDR 60 - 130 bpm              ? Rate-responsive sensor type, if  programmed on: Accelerometer        Underlying rhythm and heart rate:  SR @ 60 bpm    ? What is the response of this device to magnet placement:  None - tachy therapies off  ? PM magnet pacing rate: N/A   ? Any alert status on CIED generator or lead: No    ? Last pacing threshold    Atrial   1.0 V @ 0.4 ms   Ventricular RV: 0.75 V @ 0.4 ms  LV:  2.75 V @ 1.0 ms    Formatting of this note is different from the original.  Date of last device in office evaluation: 11/17/2022     ?  and model: Medtronic Cobalt CRT-D.   Date of implant: 5/7/2021  Tachy therapies remain OFF: S/p downgrade from ICD to pacemaker, but her implanted system includes a DF-4 lead, so an ICD generator was placed with the tachycardia therapies disabled.     ? Indication for device: Ischemic cardiomyopathy   ? Cardiac resynchronization therapy:   no     MRI Conditional:  No  o If No:  Reason why:  Abandoned LV lead    ? Battery longevity documented as less than 3  Months: No  ? Are any of the leads less than 3 months old:  No    ? Programming              ? Pacing mode and programmed lower rate: DDDR 60 - 130 bpm              ? Rate-responsive sensor type, if programmed on: Accelerometer        Underlying rhythm and heart rate:  Sinus rhythm 62 bpm, w/BBB    ? What is the response of this device to magnet placement:  None - tachy therapies off  ? PM magnet pacing rate: N/A   ? Any alert status on CIED generator or lead: No    ? Last pacing threshold    Atrial   1.0 V @ 0.4 ms   Ventricular RV: 0.75 V @ 0.4 ms  LV:  2.75 V @ 1.0 ms      Atrial fibrillation (H) 04/06/2021     Priority: Medium    Pacemaker 04/06/2021     Priority: Medium    Major depressive disorder, recurrent severe without psychotic features (H) 01/26/2021     Priority: Medium    Panic disorder with agoraphobia 04/14/2020     Priority: Medium    Constipation 03/20/2020     Priority: Medium    Personality disorder (H) 03/05/2020     Priority: Medium     Rule out dependent  personality    Formatting of this note might be different from the original.  Rule out dependent personality  Formatting of this note might be different from the original.  Rule out dependent personality      Candidiasis 03/01/2020     Priority: Medium    Posttraumatic stress disorder 03/01/2020     Priority: Medium    COPD without exacerbation (H) 01/29/2020     Priority: Medium    Long term (current) use of insulin (H) 01/29/2020     Priority: Medium    Chronic systolic heart failure (H) 01/28/2020     Priority: Medium    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, left leg (H) 01/15/2020     Priority: Medium    Chest pain 11/11/2019     Priority: Medium    Polymyalgia rheumatica (H24) 11/28/2018     Priority: Medium    Unstable angina (H) 05/02/2018     Priority: Medium     Coronary angiogram 05/02/2018 demonstrated 30% stenosis in the LMCA, 50% stenosis in the Proximal LAD, 50% stenosis in the Mid LAD, 95% stenosis in the Proximal Circumflex (Restenosis - Balloon Angioplasty), 100% stenosis in the 1st Marginal, 50% stenosis in the Proximal RCA, 50% stenosis in the Distal RCA, the LIMA graft from the LIMA to the Distal LAD is free of significant disease; the SVG graft from the Aorta to the 1st Marginal is free of significant disease; the SVG graft from the Aorta to the Distal RCA is free of significant disease. Intervention included a successful  2.5mm x 12mm Balloon,  2.5mm x 10mm Cutting Balloon,  3mm x 12mm Drug Eluting Stent,  3mm x 12mm Balloon, and  3mm x 8mm Balloon to Proximal Circumflex, post stenosis 0%.    Formatting of this note is different from the original.  Coronary angiogram 05/02/2018 demonstrated 30% stenosis in the LMCA, 50% stenosis in the Proximal LAD, 50% stenosis in the Mid LAD, 95% stenosis in the Proximal Circumflex (Restenosis - Balloon Angioplasty), 100% stenosis in the 1st Marginal, 50% stenosis in the Proximal RCA, 50% stenosis in the Distal  RCA, the LIMA graft from the LIMA to the Distal LAD is free of significant disease; the SVG graft from the Aorta to the 1st Marginal is free of significant disease; the SVG graft from the Aorta to the Distal RCA is free of significant disease. Intervention included a successful  2.5mm x 12mm Balloon,  2.5mm x 10mm Cutting Balloon,  3mm x 12mm Drug Eluting Stent,  3mm x 12mm Balloon, and  3mm x 8mm Balloon to Proximal Circumflex, post stenosis 0%.  Formatting of this note is different from the original.  Coronary angiogram 05/02/2018 demonstrated 30% stenosis in the LMCA, 50% stenosis in the Proximal LAD, 50% stenosis in the Mid LAD, 95% stenosis in the Proximal Circumflex (Restenosis - Balloon Angioplasty), 100% stenosis in the 1st Marginal, 50% stenosis in the Proximal RCA, 50% stenosis in the Distal RCA, the LIMA graft from the LIMA to the Distal LAD is free of significant disease; the SVG graft from the Aorta to the 1st Marginal is free of significant disease; the SVG graft from the Aorta to the Distal RCA is free of significant disease. Intervention included a successful  2.5mm x 12mm Balloon,  2.5mm x 10mm Cutting Balloon,  3mm x 12mm Drug Eluting Stent,  3mm x 12mm Balloon, and  3mm x 8mm Balloon to Proximal Circumflex, post stenosis 0%.      Vitamin B12 deficiency 02/14/2018     Priority: Medium    Chronic bilateral low back pain without sciatica 01/17/2018     Priority: Medium    Chronic pain disorder 10/01/2017     Priority: Medium    Urinary incontinence, mixed 09/24/2017     Priority: Medium    Internal carotid artery stenosis, bilateral 07/13/2016     Priority: Medium     Carotid US 05/04/2018 showed moderate plaque formation, consistent with 50 to 69% stenosis in the right internal carotid artery, not significantly changed from 8/5/2015.  Moderate plaque formation, consistent with 50 to 69% stenosis in the left internal carotid artery; there has been mild progression of the left ICA stenosis since  8/5/2015.    Formatting of this note might be different from the original.  Carotid US 05/04/2018 showed moderate plaque formation, consistent with 50 to 69% stenosis in the right internal carotid artery, not significantly changed from 8/5/2015.  Moderate plaque formation, consistent with 50 to 69% stenosis in the left internal carotid artery; there has been mild progression of the left ICA stenosis since 8/5/2015.    Formatting of this note might be different from the original.  Carotid US 05/04/2018 showed moderate plaque formation, consistent with 50 to 69% stenosis in the right internal carotid artery, not significantly changed from 8/5/2015.  Moderate plaque formation, consistent with 50 to 69% stenosis in the left internal carotid artery; there has been mild progression of the left ICA stenosis since 8/5/2015.      Essential (primary) hypertension 10/14/2015     Priority: Medium    Ischemic cardiomyopathy 09/20/2015     Priority: Medium     EF of 40-45%, status post RV lead revision and LV epicardial lead placement via mini-thoracotomy in August 2016.    Formatting of this note might be different from the original.  EF of 40-45%, status post RV lead revision and LV epicardial lead placement via mini-thoracotomy in August 2016.  Formatting of this note might be different from the original.  EF of 40-45%, status post RV lead revision and LV epicardial lead placement via mini-thoracotomy in August 2016.      S/P CABG x 5 08/21/2015     Priority: Medium    Pain medication agreement 04/20/2013     Priority: Medium     Controlled substance agreement for percocet #30/month on file and signed 4/17/13.  Designated pharmacy: [facility] Prescribing physician: Andrea Diagnosis: Ulnar neuropathy    Formatting of this note might be different from the original.  Controlled substance agreement for percocet #30/month on file and signed 4/17/13.  Designated pharmacy: [facility] Prescribing physician: Andrea Diagnosis: Ulnar  "neuropathy      Anemia in other chronic diseases classified elsewhere 01/05/2013     Priority: Medium    Hypothyroidism, unspecified 12/10/2010     Priority: Medium    ACP (advance care planning) 11/03/2010     Priority: Medium     Formatting of this note might be different from the original.  Patient has identified Health Care Agent(s): Yes  Add Health Care Agents: Yes    Health Care Agent(s):    Primary Health Care Agent:     Edwar Escobar Relationship:    Spouse Phone:     [phone]    Secondary Health Care Agent:     Chiki Oro Relationship:     Son Phone:     [phone]      Patient has Advance Care Plan Documents (Health Care Directive, POLST): Yes    Advance Care Plan Documents:  Health Care Directive--03/28/11  Resuscitation Guidelines--    Patient has identified Specific Treatment Preferences: Yes   Specific Treatment Preferences:   a.) Code Status:  DNR/ Do Not Attempt Resuscitation - Allow a Natural Death    b.) Goals of Treatment:     ii. Limited Interventions and treat reversible conditions.  Provide interventions aimed at treatment of new or reversible illness/injury or non-life threatening chronic conditions. Duration of invasive or uncomfortable interventions should generally be limited.- Trial of intubation 5 days or other instructions \"If no improvement, stop.\"  c.) Interventions and Treatments:   i.   Antibiotics:          - Aggressive antibiotics  ii.  Nutrition/Hydration:         - Offer food and liquids by mouth         - IV fluid administration         - No feeding tube under any circumstance.  iii. Transfusion:          - Blood products for comfort/relief of symptoms only  iv. Dialysis:           - Dialysis for short term \"for recovery, but not long term.\"        Last Assessment & Plan:   Advance Care Planning: Disease-specific Session    Letty Escobar is an [facility] patient of Dr. Renea Mendez of the [facility].    Advance care planning discussions were completed " "with Letty and her healthcare agent, spouse Edwar Escobar on 2011 at the [facility].    Understanding of Illness and Disease [place]:   Letty identifies her medical condition as diabetes with peripheral neuropathy, heart problems with a heart attack 2009 plus 4 stent placements; sleep apnea; depression; hypothyroid; high cholesterol; tobacco use; and describes it as needing further assistance with thyroid and diabetes management.  She identifies the following symptoms of her medical condition as being the most bothersome: some memory loss, balance problems, light headedness and dizziness, puffy eyes and lower extremity edema from time to time.    Let[place] is retired [place] .  She is independent with all cares and lives an active life style although, \"I'm not as active as I used to be.\"    Goals of Care:   Letty currently hopes to maintain independence, control pain and symptoms and delay progression of, but not cure, the illness.  \"I want to get the diabetes and thyroid under better control.\"  Letty has an appointment the first part of April for follow up.    Quality of Life:    Letty identifies quality of life as family involvement twice weekly, Catholic activities, newly assigned  , playing cards, the computer,    Letty christiano with serious challenges in her life through her ganesh in God, prayers and support of her Catholic family, spouse and son.    Letty identifies the following fears and worries about her medical care:  \"I just want the diabetes straightened out.\"    Treatment and Care Preferences:   Past experiences in dealing with family and/or friends that have  or been seriously ill include her brother who took his own life.  \"He was 53 years old and living with us.  I found him.\"   As a result of these experiences, Letty expresses these health care preferences:  " "    Summary Letty's Treatment Preferences:  LOW SURVIVAL; HIGH TREATMENT BURDEN:  If Letty suffered a serious complication, such that she was facing a prolonged hospital stay, required ongoing medical interventions, and the chance of living through the complication was low (for example, only 5 out of 100 would live), Letty would choose:  to focus treatment on comfort and quality of life (\"Quality of life is more important than length of life to Letty.\")  COMMENT:  \"If I can't live a normal life, I wouldn't want to live.\"    HIGH SURVIVAL; LOW FUNCTIONAL STATUS:  If Letty had a serious complication and had a good chance of living through the complication but it was expected that she would never be able to walk or talk again and would require 24 hour nursing care, she would choose:  to focus treatment on comfort and quality of life (\"Quality of life is more important than length of life to Letty.\")  COMMENT:  \"I'd accept rehabilitation.\"    HIGH SURVIVAL; LOW COGNITIVE STATUS:  If Letty had a serious complication and had a good chance of living through the complication but it was expected that she would never know who she was or who she was with and would require 24 hour nursing care, she would choose:  to focus treatment on comfort and quality of life (\"Quality of life is more important than length of life to Letty.\")    CARDIO-PULMONARY RESUSCITATION (CPR):  The facts, risks and benefits of CPR were discussed with Letty.  If she had a sudden event that caused her heart and breathing to stop, she:  WOULD NOT want CPR attempted and instead would prefer that a natural death occur.    MECHANICAL VENTILATION: If Letty had an episode where she was unable to breathe on her own, she would choose the following:  attempt to use any appropriate non-invasive method to assist breathing, and use mechanical ventilation.  COMMENT:  \"If reversible ventilator is acceptable for 5 days.  If no improvement, stop.\"     Letty has chosen " her healthcare agent to:  strictly follow her wishes.    Follow Up Plan:   Letty was encouraged to continue advance care planning discussions with her health care agents and primary care provider.   Letty and her health care agent declined handouts.     Documents addressed during this advance care planning session:  1.  Health Care Agents identified.          .  Primary health care agent is spouse, Edwar Escobar;          .  Secondary health care agent is son, Jermaine Oro.  2.  Health Care Directive completed and scanned into medical record.  3.  Statement of Treatment Preferences for advanced illness completed and scanned into the medical record.  4.  Resuscitation Guidelines completed for DNR.  Document sent to Dr. Renea Mendez for signature and returned to the home. Letty, please place on the refrigerator.    Recommendations/Plan:   Letty and her health care agent to review Advance Care Plan.     Letty would benefit from:   Care Navigation/Care Navigation Help Desk--explained services for pending future needs.  No identified needs today.  Care Navigation brochure was given to Letty and her healthcare agent.    Advance Care Planning recommendations and Letty's concerns and questions were cc ed to her primary provider.     Thank you, Letty for the opportunity of assisting with Advance Care Planning. It was a seeing you again and meeting Edwar.  Please contact me if I can be of further assistance.    Interviewer: Pat Martin RN    Advance Care Planning Facilitator  [phone]   3/28/2011      ELI (obstructive sleep apnea) 01/31/2010     Priority: Medium     PSG on April/2008 that showed moderate Obstructive Sleep Apnea with an AHI of 23.5 . Limb movements persisted at 29 movements/hour at optimal pressures, despite carbidopa use.    Formatting of this note might be different from the original.  PSG on April/2008 that showed moderate Obstructive Sleep Apnea with an AHI of 23.5 . Limb movements persisted  at 29 movements/hour at optimal pressures, despite carbidopa use.  Formatting of this note might be different from the original.  PSG on April/2008 that showed moderate Obstructive Sleep Apnea with an AHI of 23.5 . Limb movements persisted at 29 movements/hour at optimal pressures, despite carbidopa use.      Coronary atherosclerosis 02/06/2009     Priority: Medium     Formatting of this note might be different from the original.  2/5/2009 - MI - Proximal RCA 99%, mild-mod disease elsewhere.  EF 60%.  PCI:  MYRON to pRCA.  2/12/2009 - admit CP - Widely patent RCA stent. Moderate diffuse CAD. Severe stenosis in trivial PDA branch. LVEF 45%.  1/25/2010 - admit CP - PTCA and stent of diagonal  9/8/2010 - admit CP - LAD patent stent. Moderate diffuse CAD. Medical management recommended.   4/25/2012 - NSTEMI - Acute total occlusion of the ostial Circumflex. Acute total occlusion of the ostial ramus. Acute total occlusion of the distal 1st Obtuse Marginal. Angioplasty of ostial circumflex and ostial ramus. There was distal embolization to the OM1 vessel. This vessel was small and not amendable to intervention. Indefinite: plavix and asa 81.  8/10/2015 - CABx5 - 1. LIMA to distal LAD, reverse SVG to OM1 and OM2, reverse SVG to diagonal, reverse SVG to PDA.   2. Mitral valve repair with a Medtronics 3-D full ring, 28 mm.   3. Tricuspid repair with a Medtronic 28 mm partial ring  1/12/2016 - TTE - EF 40-45%  Formatting of this note might be different from the original.  2/5/2009 - MI - Proximal RCA 99%, mild-mod disease elsewhere.  EF 60%.  PCI:  MYRON to pRCA.  2/12/2009 - admit CP - Widely patent RCA stent. Moderate diffuse CAD. Severe stenosis in trivial PDA branch. LVEF 45%.  1/25/2010 - admit CP - PTCA and stent of diagonal  9/8/2010 - admit CP - LAD patent stent. Moderate diffuse CAD. Medical management recommended.   4/25/2012 - NSTEMI - Acute total occlusion of the ostial Circumflex. Acute total occlusion of the ostial  ramus. Acute total occlusion of the distal 1st Obtuse Marginal. Angioplasty of ostial circumflex and ostial ramus. There was distal embolization to the OM1 vessel. This vessel was small and not amendable to intervention. Indefinite: plavix and asa 81.  8/10/2015 - CABx5 - 1. LIMA to distal LAD, reverse SVG to OM1 and OM2, reverse SVG to diagonal, reverse SVG to PDA.   2. Mitral valve repair with a Medtronics 3-D full ring, 28 mm.   3. Tricuspid repair with a Medtronic 28 mm partial ring  1/12/2016 - TTE - EF 40-45%      Restless legs syndrome 08/29/2007     Priority: Medium    Hyperlipidemia, unspecified 05/30/2007     Priority: Medium        Past Medical History:    Past Medical History:   Diagnosis Date    ACP (advance care planning) 11/3/2010    Acute coronary syndrome (H) 11/22/2019    Acute exacerbation of CHF (congestive heart failure) (H) 11/11/2019    Acute on chronic systolic (congestive) heart failure (H) 1/28/2020    Anemia of chronic disease 1/5/2013    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, left leg (H) 1/15/2020    BPPV (benign paroxysmal positional vertigo) 5/31/2018    Candidiasis 3/1/2020    Carotid stenosis, right 7/13/2016    Chest pain 11/11/2019    Chronic bilateral low back pain without sciatica 1/17/2018    Chronic pain disorder 10/1/2017    Constipation 3/20/2020    COPD (chronic obstructive pulmonary disease) (H) 6/24/2020    Coronary atherosclerosis 2/6/2009    Cubital tunnel syndrome on left 11/13/2012    Depressive disorder     Diabetes mellitus (H)     Diabetes mellitus type 2 in obese 7/13/2006    Diabetes mellitus type 2 in obese 7/13/2006    Drug-seeking behavior 4/4/2020    Failure to thrive in adult 9/3/2020    Hereditary and idiopathic peripheral neuropathy 4/24/2007    Hyperlipidemia with target low density lipoprotein (LDL) cholesterol less than 70 mg/dL 5/30/2007    Hypertension 10/14/2015    Hypothyroidism 12/10/2010    Irritable  bowel syndrome 9/7/2015    Ischemic cardiomyopathy 9/20/2015    Long-term use of high-risk medication 4/14/2020    Moniliasis, 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    Myocardial infarction (H)     Neuromuscular disorder (H)     Other specified postprocedural states 10/8/2015    Pain medication agreement 4/20/2013    Panic disorder with agoraphobia 4/14/2020    Paroxysmal atrial fibrillation (H) 10/2/2015    Personality disorder (H) 3/5/2020    Polymyalgia rheumatica (H24) 11/28/2018    Posttraumatic stress disorder 3/1/2020    Restless legs syndrome (RLS) 8/29/2007    Restless legs syndrome (RLS) 8/29/2007    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    Serum calcium elevated 3/1/2020    Somatic dysfunction of sacral region 1/17/2018    Subacromial bursitis of left shoulder joint 8/6/2018    Suicidal ideation 4/1/2020    Thyroid disease     TIA (transient ischemic attack) 5/4/2018    TIA (transient ischemic attack) 5/4/2018    Tobacco abuse 2/17/2017    Tobacco abuse 2/17/2017    Urinary incontinence, mixed 9/24/2017    UTI (urinary tract infection) 4/17/2020    Vitamin B12 deficiency 2/14/2018    Weakness 12/17/2019       Past Surgical History:    Past Surgical History:   Procedure Laterality Date    CARDIAC SURGERY      stents x 11    CARDIAC SURGERY      CHOLECYSTECTOMY      CHOLECYSTECTOMY      GENITOURINARY SURGERY      Tubal ligation    OTHER SURGICAL HISTORY      Genitourinary surgery    RELEASE CARPAL TUNNEL      RELEASE CARPAL TUNNEL         Family History:    No family history on file.    Social History:  Marital Status:   [2]  Social History     Tobacco Use    Smoking status: Never    Smokeless tobacco: Never   Vaping Use    Vaping status: Never Used   Substance Use Topics    Alcohol use: Not Currently    Drug use: Never        Medications:    acetaminophen (TYLENOL) 325 MG tablet  acetaminophen (TYLENOL) 500 MG tablet  albuterol (PROVENTIL) (2.5 MG/3ML) 0.083% neb  solution  albuterol (PROVENTIL) (2.5 MG/3ML) 0.083% neb solution  apixaban ANTICOAGULANT (ELIQUIS) 5 MG tablet  aspirin 81 MG EC tablet  bisacodyl (DULCOLAX) 10 MG suppository  clotrimazole (LOTRIMIN) 1 % external cream  cyanocobalamin (CYANOCOBALAMIN) 1000 MCG/ML injection  DULoxetine HCl 40 MG CPEP  empagliflozin (JARDIANCE) 10 MG TABS tablet  famotidine (PEPCID) 20 MG tablet  fluconazole (DIFLUCAN) 150 MG tablet  fluticasone-vilanterol (BREO ELLIPTA) 200-25 MCG/INH inhaler  furosemide (LASIX) 20 MG tablet  gabapentin (NEURONTIN) 400 MG capsule  hydrochlorothiazide (HYDRODIURIL) 25 MG tablet  Insulin Aspart FlexPen 100 UNIT/ML SOPN  LANTUS SOLOSTAR 100 UNIT/ML soln  levothyroxine (SYNTHROID/LEVOTHROID) 137 MCG tablet  lidocaine (LMX4) 4 % external cream  LORazepam (ATIVAN) 1 MG tablet  losartan (COZAAR) 25 MG tablet  Melatonin 10 MG TABS tablet  metoprolol succinate ER (TOPROL-XL) 25 MG 24 hr tablet  mirtazapine (REMERON) 15 MG tablet  MUCUS RELIEF 600 MG 12 hr tablet  nitroGLYcerin (NITROSTAT) 0.4 MG sublingual tablet  ondansetron (ZOFRAN ODT) 4 MG ODT tab  pantoprazole (PROTONIX) 40 MG EC tablet  polyethylene glycol (MIRALAX) 17 GM/Dose powder  potassium chloride ER (MICRO-K) 10 MEQ CR capsule  predniSONE (DELTASONE) 10 MG tablet  QUEtiapine (SEROQUEL) 100 MG tablet  rosuvastatin (CRESTOR) 10 MG tablet  sennosides (SENOKOT) 8.6 MG tablet  traZODone (DESYREL) 50 MG tablet          Review of Systems   All other systems reviewed and are negative.      Physical Exam   BP: 112/58  Pulse: 63  Temp: 97.1  F (36.2  C)  Resp: 18  SpO2: 93 %      Physical Exam  Vitals and nursing note reviewed.   Constitutional:       General: She is not in acute distress.     Appearance: She is well-developed. She is not diaphoretic.   HENT:      Head: Normocephalic and atraumatic.      Nose: Nose normal.      Mouth/Throat:      Pharynx: No oropharyngeal exudate.   Eyes:      Conjunctiva/sclera: Conjunctivae normal.   Cardiovascular:       Rate and Rhythm: Normal rate and regular rhythm.      Heart sounds: Normal heart sounds. No murmur heard.     No friction rub.   Pulmonary:      Effort: Pulmonary effort is normal. No respiratory distress.      Breath sounds: Normal breath sounds. No stridor. No wheezing or rales.   Chest:      Chest wall: Tenderness present.   Abdominal:      General: Bowel sounds are normal. There is no distension.      Palpations: Abdomen is soft. There is no mass.      Tenderness: There is no abdominal tenderness. There is no guarding.   Musculoskeletal:         General: No tenderness. Normal range of motion.      Cervical back: Normal range of motion and neck supple.   Skin:     General: Skin is warm and dry.      Capillary Refill: Capillary refill takes less than 2 seconds.      Findings: No erythema.   Neurological:      Mental Status: She is alert and oriented to person, place, and time.   Psychiatric:         Judgment: Judgment normal.         ED Course        Procedures         EKG Interpretation:      Interpreted by Roni Kellogg  Time reviewed: now   Symptoms at time of EKG: now   Rhythm: normal sinus   Rate: normal  Axis: NORMAL  Ectopy: none  Conduction: Left bundle branch block  ST Segments/ T Waves: No ST-T wave changes  Q Waves: none  Comparison to prior: No old EKG available    Clinical Impression: Left bundle branch block      Results for orders placed or performed during the hospital encounter of 07/27/24 (from the past 24 hour(s))   XR Chest Port 1 View    Narrative    EXAM: XR CHEST PORT 1 VIEW  LOCATION: [facility]  DATE: 7/27/2024    INDICATION: chest pain\  COMPARISON: 7/15/2024.    FINDINGS: Sternal wires and heart valve prostheses. Left subclavian cardiac device. No pneumothorax. The heart size is normal. The lungs are clear.      Impression    IMPRESSION: No acute abnormality.   CBC with platelets differential    Narrative    The following orders were created for  panel order CBC with platelets differential.  Procedure                               Abnormality         Status                     ---------                               -----------         ------                     CBC with platelets and d...[[id number]]  Abnormal            Final result                 Please view results for these tests on the individual orders.   Basic metabolic panel   Result Value Ref Range    Sodium 138 135 - 145 mmol/L    Potassium 4.1 3.4 - 5.3 mmol/L    Chloride 105 98 - 107 mmol/L    Carbon Dioxide (CO2) 21 (L) 22 - 29 mmol/L    Anion Gap 12 7 - 15 mmol/L    Urea Nitrogen 16.5 8.0 - 23.0 mg/dL    Creatinine 0.99 (H) 0.51 - 0.95 mg/dL    GFR Estimate 61 >60 mL/min/1.73m2    Calcium 9.7 8.8 - 10.4 mg/dL    Glucose 153 (H) 70 - 99 mg/dL   Troponin T, High Sensitivity   Result Value Ref Range    Troponin T, High Sensitivity 28 (H) <=14 ng/L   CBC with platelets and differential   Result Value Ref Range    WBC Count 8.0 4.0 - 11.0 10e3/uL    RBC Count 3.34 (L) 3.80 - 5.20 10e6/uL    Hemoglobin 10.1 (L) 11.7 - 15.7 g/dL    Hematocrit 31.4 (L) 35.0 - 47.0 %    MCV 94 78 - 100 fL    MCH 30.2 26.5 - 33.0 pg    MCHC 32.2 31.5 - 36.5 g/dL    RDW 14.5 10.0 - 15.0 %    Platelet Count 146 (L) 150 - 450 10e3/uL    % Neutrophils 62 %    % Lymphocytes 27 %    % Monocytes 7 %    % Eosinophils 4 %    % Basophils 0 %    % Immature Granulocytes 1 %    NRBCs per 100 WBC 0 <1 /100    Absolute Neutrophils 4.9 1.6 - 8.3 10e3/uL    Absolute Lymphocytes 2.1 0.8 - 5.3 10e3/uL    Absolute Monocytes 0.5 0.0 - 1.3 10e3/uL    Absolute Eosinophils 0.3 0.0 - 0.7 10e3/uL    Absolute Basophils 0.0 0.0 - 0.2 10e3/uL    Absolute Immature Granulocytes 0.1 <=0.4 10e3/uL    Absolute NRBCs 0.0 10e3/uL       Medications   ondansetron (ZOFRAN) injection 4 mg (4 mg Intravenous $Given 7/27/24 4478)   morphine (PF) injection 4 mg (4 mg Intravenous $Given 7/27/24 8684)     Labs of come back and were normal, patient's troponin was  slightly elevated but she is chronically elevated and this is within her normal range.  Patient has reproducible chest pain and has been in a number of times for this chest pain that seems to be resolving more with morphine.  This seems to be more muscular.  At this point I think it is safe to discharge the patient back home.  Recommend that she try ice or heat to her chest wall.  Patient was told to follow-up with her doctor this coming week since she has had so many visits for this atypical muscular chest pain to discuss better options for management at home.    Assessments & Plan (with Medical Decision Making)  Chest wall pain     I have reviewed the nursing notes.    I have reviewed the findings, diagnosis, plan and need for follow up with the patient.        7/27/2024   [facility] EMERGENCY DEPT       Roni Kellogg MD  07/27/24 0355

## 2024-07-27 NOTE — ED TRIAGE NOTES
Patient presents via ems from [facility] for chest pain. States she was woken up with it since 11PM. Was given 2 sublingual nitroglycerin at the home, EMS was called, given 2 nitroglycerin sprays, and 324mg of aspirin. Has long history of cardiac issues, 21 stents. States nothing helps her chest pain except for morphine which she was not given en route.     Triage Assessment (Adult)       Row Name 07/27/24 0219          Triage Assessment    Airway WDL WDL        Respiratory WDL    Respiratory WDL WDL        Skin Circulation/Temperature WDL    Skin Circulation/Temperature WDL WDL        Cardiac WDL    Cardiac WDL X;chest pain     Cardiac Rhythm --  paced        Chest Pain Assessment    Chest Pain Location anterior chest, left     Chest Pain Radiation arm     Character pressure     Duration started tonight around 11PM, awoke her from sleep     Precipitating Factors at rest;nothing     Associated Signs/Symptoms anxiety     Chest Pain Intervention 12-lead ECG obtained;cardiac monitor placed;aspirin given;nitroglycerin SL given        Peripheral/Neurovascular WDL    Peripheral Neurovascular WDL WDL        Cognitive/Neuro/Behavioral WDL    Cognitive/Neuro/Behavioral WDL WDL                     
<<----- Click to add NO significant Past Surgical History

## 2024-07-29 LAB — TSH SERPL DL<=0.005 MIU/L-ACNC: 1.55 UIU/ML (ref 0.3–4.2)

## 2024-07-29 PROCEDURE — 36415 COLL VENOUS BLD VENIPUNCTURE: CPT | Mod: ORL | Performed by: NURSE PRACTITIONER

## 2024-07-29 PROCEDURE — 84443 ASSAY THYROID STIM HORMONE: CPT | Mod: ORL | Performed by: NURSE PRACTITIONER

## 2024-07-29 PROCEDURE — P9604 ONE-WAY ALLOW PRORATED TRIP: HCPCS | Mod: ORL | Performed by: NURSE PRACTITIONER

## 2024-08-01 ENCOUNTER — APPOINTMENT (OUTPATIENT)
Dept: GENERAL RADIOLOGY | Facility: CLINIC | Age: 69
End: 2024-08-01
Attending: FAMILY MEDICINE
Payer: COMMERCIAL

## 2024-08-01 ENCOUNTER — HOSPITAL ENCOUNTER (EMERGENCY)
Facility: CLINIC | Age: 69
Discharge: HOME | End: 2024-08-01
Attending: FAMILY MEDICINE | Admitting: FAMILY MEDICINE
Payer: COMMERCIAL

## 2024-08-01 VITALS
WEIGHT: 194.89 LBS | SYSTOLIC BLOOD PRESSURE: 109 MMHG | DIASTOLIC BLOOD PRESSURE: 53 MMHG | RESPIRATION RATE: 15 BRPM | HEART RATE: 60 BPM | BODY MASS INDEX: 35.65 KG/M2 | OXYGEN SATURATION: 90 % | TEMPERATURE: 98.6 F

## 2024-08-01 DIAGNOSIS — R11.0 NAUSEA: ICD-10-CM

## 2024-08-01 DIAGNOSIS — D64.9 CHRONIC ANEMIA: ICD-10-CM

## 2024-08-01 DIAGNOSIS — R07.9 NONSPECIFIC CHEST PAIN: ICD-10-CM

## 2024-08-01 LAB
ANION GAP SERPL CALCULATED.3IONS-SCNC: 10 MMOL/L (ref 7–15)
BASOPHILS # BLD AUTO: 0 10E3/UL (ref 0–0.2)
BASOPHILS NFR BLD AUTO: 0 %
BUN SERPL-MCNC: 18.5 MG/DL (ref 8–23)
CALCIUM SERPL-MCNC: 10.1 MG/DL (ref 8.8–10.4)
CHLORIDE SERPL-SCNC: 106 MMOL/L (ref 98–107)
CREAT SERPL-MCNC: 0.97 MG/DL (ref 0.51–0.95)
CRP SERPL-MCNC: 4.55 MG/L
EGFRCR SERPLBLD CKD-EPI 2021: 63 ML/MIN/1.73M2
EOSINOPHIL # BLD AUTO: 0.3 10E3/UL (ref 0–0.7)
EOSINOPHIL NFR BLD AUTO: 3 %
ERYTHROCYTE [DISTWIDTH] IN BLOOD BY AUTOMATED COUNT: 14.4 % (ref 10–15)
GLUCOSE SERPL-MCNC: 151 MG/DL (ref 70–99)
HCO3 SERPL-SCNC: 24 MMOL/L (ref 22–29)
HCT VFR BLD AUTO: 31.4 % (ref 35–47)
HGB BLD-MCNC: 9.9 G/DL (ref 11.7–15.7)
IMM GRANULOCYTES # BLD: 0.1 10E3/UL
IMM GRANULOCYTES NFR BLD: 1 %
LYMPHOCYTES # BLD AUTO: 2.1 10E3/UL (ref 0.8–5.3)
LYMPHOCYTES NFR BLD AUTO: 28 %
MCH RBC QN AUTO: 29.6 PG (ref 26.5–33)
MCHC RBC AUTO-ENTMCNC: 31.5 G/DL (ref 31.5–36.5)
MCV RBC AUTO: 94 FL (ref 78–100)
MONOCYTES # BLD AUTO: 0.5 10E3/UL (ref 0–1.3)
MONOCYTES NFR BLD AUTO: 6 %
NEUTROPHILS # BLD AUTO: 4.6 10E3/UL (ref 1.6–8.3)
NEUTROPHILS NFR BLD AUTO: 61 %
NRBC # BLD AUTO: 0 10E3/UL
NRBC BLD AUTO-RTO: 0 /100
PLATELET # BLD AUTO: 162 10E3/UL (ref 150–450)
POTASSIUM SERPL-SCNC: 4.5 MMOL/L (ref 3.4–5.3)
RBC # BLD AUTO: 3.35 10E6/UL (ref 3.8–5.2)
SODIUM SERPL-SCNC: 140 MMOL/L (ref 135–145)
TROPONIN T SERPL HS-MCNC: 28 NG/L
WBC # BLD AUTO: 7.5 10E3/UL (ref 4–11)

## 2024-08-01 PROCEDURE — 85025 COMPLETE CBC W/AUTO DIFF WBC: CPT | Performed by: FAMILY MEDICINE

## 2024-08-01 PROCEDURE — 93010 ELECTROCARDIOGRAM REPORT: CPT | Performed by: FAMILY MEDICINE

## 2024-08-01 PROCEDURE — 96374 THER/PROPH/DIAG INJ IV PUSH: CPT

## 2024-08-01 PROCEDURE — 250N000011 HC RX IP 250 OP 636: Performed by: FAMILY MEDICINE

## 2024-08-01 PROCEDURE — 80048 BASIC METABOLIC PNL TOTAL CA: CPT | Performed by: FAMILY MEDICINE

## 2024-08-01 PROCEDURE — 84484 ASSAY OF TROPONIN QUANT: CPT | Performed by: FAMILY MEDICINE

## 2024-08-01 PROCEDURE — 99285 EMERGENCY DEPT VISIT HI MDM: CPT | Mod: 25

## 2024-08-01 PROCEDURE — 71045 X-RAY EXAM CHEST 1 VIEW: CPT

## 2024-08-01 PROCEDURE — 36415 COLL VENOUS BLD VENIPUNCTURE: CPT | Performed by: FAMILY MEDICINE

## 2024-08-01 PROCEDURE — 96375 TX/PRO/DX INJ NEW DRUG ADDON: CPT

## 2024-08-01 PROCEDURE — 99285 EMERGENCY DEPT VISIT HI MDM: CPT | Performed by: FAMILY MEDICINE

## 2024-08-01 PROCEDURE — 86140 C-REACTIVE PROTEIN: CPT | Performed by: FAMILY MEDICINE

## 2024-08-01 PROCEDURE — 93005 ELECTROCARDIOGRAM TRACING: CPT

## 2024-08-01 RX ORDER — MORPHINE SULFATE 4 MG/ML
4 INJECTION, SOLUTION INTRAMUSCULAR; INTRAVENOUS
Status: DISCONTINUED | OUTPATIENT
Start: 2024-08-01 | End: 2024-08-01 | Stop reason: HOSPADM

## 2024-08-01 RX ORDER — ONDANSETRON 2 MG/ML
4 INJECTION INTRAMUSCULAR; INTRAVENOUS EVERY 30 MIN PRN
Status: DISCONTINUED | OUTPATIENT
Start: 2024-08-01 | End: 2024-08-01 | Stop reason: HOSPADM

## 2024-08-01 RX ADMIN — MORPHINE SULFATE 4 MG: 4 INJECTION, SOLUTION INTRAMUSCULAR; INTRAVENOUS at 02:37

## 2024-08-01 RX ADMIN — ONDANSETRON 4 MG: 2 INJECTION INTRAMUSCULAR; INTRAVENOUS at 02:38

## 2024-08-01 ASSESSMENT — ACTIVITIES OF DAILY LIVING (ADL)
ADLS_ACUITY_SCORE: 39

## 2024-08-01 ASSESSMENT — ENCOUNTER SYMPTOMS
CHEST TIGHTNESS: 1
CHOKING: 0
COUGH: 0
CHILLS: 0
FEVER: 0
SHORTNESS OF BREATH: 0

## 2024-08-01 ASSESSMENT — COLUMBIA-SUICIDE SEVERITY RATING SCALE - C-SSRS
1. IN THE PAST MONTH, HAVE YOU WISHED YOU WERE DEAD OR WISHED YOU COULD GO TO SLEEP AND NOT WAKE UP?: NO
6. HAVE YOU EVER DONE ANYTHING, STARTED TO DO ANYTHING, OR PREPARED TO DO ANYTHING TO END YOUR LIFE?: NO
2. HAVE YOU ACTUALLY HAD ANY THOUGHTS OF KILLING YOURSELF IN THE PAST MONTH?: NO

## 2024-08-01 NOTE — ED TRIAGE NOTES
"Patient presents from [facility] for chest pain that started tonight at 2145. Reports it is medial and radiates into the back. Was given 2 doses of nitroglycerin and ativan prior to arrival of EMS, then one more dose of nitroglycerin en route without relief. Was recently prescribed morphine PRN to avoid the frequent ED visits but she declined it stating she \"just wants to be seen in ED\". Reports pain is 9/10.     Triage Assessment (Adult)       Row Name 08/01/24 0129          Triage Assessment    Airway WDL WDL        Respiratory WDL    Respiratory WDL WDL        Skin Circulation/Temperature WDL    Skin Circulation/Temperature WDL WDL        Cardiac WDL    Cardiac WDL X;chest pain     Cardiac Rhythm A-V Sequential paced        Chest Pain Assessment    Chest Pain Location midsternal     Chest Pain Radiation back     Duration since 2145 tonight     Precipitating Factors at rest     Alleviating Factors medications     Associated Signs/Symptoms anxiety     Chest Pain Intervention 12-lead ECG obtained;cardiac monitor placed;nitroglycerin SL given        Peripheral/Neurovascular WDL    Peripheral Neurovascular WDL WDL        Cognitive/Neuro/Behavioral WDL    Cognitive/Neuro/Behavioral WDL WDL                     "

## 2024-08-01 NOTE — DISCHARGE INSTRUCTIONS
Please read and follow the handout(s) instructions. Return, if needed, for increased or worsening symptoms and as directed by the handout(s).    In the future should she have similar symptoms you may want to trial the oral morphine to see if this helps control her pain.  The oral morphine is just as potent as the IV form and also has an advantage that it lasts longer in your system.

## 2024-08-01 NOTE — ED PROVIDER NOTES
History     Chief Complaint   Patient presents with    Chest Pain     HPI  Letty Escobar is a 68 year old female who presents to the emergency room via ambulance from her nursing home secondary to concerns of anterior chest pain.  Pain started approximately 10:00 last evening.  In review of her chart this is the patient's 10th visit since the end of March of this she does bring to the ER with concerns of chest pain symptoms.  Patient states she usually gets some IV morphine and some Zofran and this usually helps control her pain symptoms.  EMS reported that she does have morphine available for this type of episode at her nursing home and the oral form but apparently the patient refused to take that this episode of pain.  Patient denies any fever or chills symptoms.  She denies shortness of breath more than typical.  She denies any cough or congestion more than her usual.  She denies any recent fall or injury.  The nursing home staff stated that they think she might be anxious because her family is on a fishing trip and not available this week to her.    Allergies:  Allergies   Allergen Reactions    Bee Pollen Anaphylaxis    Diphenhydramine Palpitations     Tolerated IV Benadryl when not pushed too fast    Isosorbide Nitrate Other (See Comments) and Dizziness     Also causes syncope (has fallen before) and brain fog/mental disturbances - please do not prescribe    Nitroglycerin Dizziness, Fatigue and Other (See Comments)     Specifically the patch - please do not prescribe  Specifically the patch - please do not prescribe      Contrast Dye Hives and Itching    Penicillin G     Adhesive Tape Rash    Liquid Adhesive Itching    Nystatin Dermatitis     Also blisters    Penicillins Swelling and Rash     Occurred as a child - not 100% sure on specific reactions    Sulfa Antibiotics Other (See Comments)     Occurred as a child / patient does not remember specific reaction       Problem List:    Patient Active Problem  List    Diagnosis Date Noted    Acute respiratory failure with hypoxia and hypercapnia (H) 04/27/2024     Priority: Medium    COPD with acute exacerbation (H) 04/27/2024     Priority: Medium    Bilateral pneumonia 04/27/2024     Priority: Medium    Lethargy 11/10/2023     Priority: Medium    Urinary tract infection without hematuria, site unspecified 11/10/2023     Priority: Medium    Fever 11/10/2023     Priority: Medium    Hypoxia 11/10/2023     Priority: Medium    Pyuria 11/10/2023     Priority: Medium    PAD (peripheral artery disease) (H24) 11/10/2023     Priority: Medium    Left foot pain 11/10/2023     Priority: Medium    History of stroke 11/10/2023     Priority: Medium    Lives in long-term care facility 11/10/2023     Priority: Medium    Obesity hypoventilation syndrome (H) 11/10/2023     Priority: Medium    Benzodiazepine dependence (H) 11/10/2023     Priority: Medium    Thrombocytopenia (H24) 11/10/2023     Priority: Medium    ERIC (acute kidney injury) (H24) 11/10/2023     Priority: Medium    Chronic kidney disease, stage 3a (H) 10/16/2022     Priority: Medium    SOB (shortness of breath) 04/07/2022     Priority: Medium    Type 2 diabetes mellitus with hyperglycemia, with long-term current use of insulin (H) 02/13/2022     Priority: Medium    Morbid obesity (H) 12/10/2021     Priority: Medium    ICD (implantable cardioverter-defibrillator) in place 11/17/2021     Priority: Medium     Formatting of this note is different from the original.  Date of last device in office evaluation: 5/17/2022    ?  and model: Medtronic Cobalt CRT-D.   Date of implant: 5/7/2021  Tachy therapies remain OFF: S/p downgrade from ICD to pacemaker, but her implanted system includes a DF-4 lead, so an ICD generator was placed with the tachycardia therapies disabled.     ? Indication for device: Ischemic cardiomyopathy   ? Cardiac resynchronization therapy:   no     MRI Conditional:  No  o If No:  Reason why:  Abandoned  LV lead    ? Battery longevity documented as less than 3  Months: No  ? Are any of the leads less than 3 months old:  No    ? Programming              ? Pacing mode and programmed lower rate: DDDR 60 - 130 bpm              ? Rate-responsive sensor type, if programmed on: Accelerometer        Underlying rhythm and heart rate:  SR @ 60 bpm    ? What is the response of this device to magnet placement:  None - tachy therapies off  ? PM magnet pacing rate: N/A   ? Any alert status on CIED generator or lead: No    ? Last pacing threshold    Atrial   1.0 V @ 0.4 ms   Ventricular RV: 0.75 V @ 0.4 ms  LV:  2.75 V @ 1.0 ms    Formatting of this note is different from the original.  Date of last device in office evaluation: 11/17/2022     ?  and model: Medtronic Cobalt CRT-D.   Date of implant: 5/7/2021  Tachy therapies remain OFF: S/p downgrade from ICD to pacemaker, but her implanted system includes a DF-4 lead, so an ICD generator was placed with the tachycardia therapies disabled.     ? Indication for device: Ischemic cardiomyopathy   ? Cardiac resynchronization therapy:   no     MRI Conditional:  No  o If No:  Reason why:  Abandoned LV lead    ? Battery longevity documented as less than 3  Months: No  ? Are any of the leads less than 3 months old:  No    ? Programming              ? Pacing mode and programmed lower rate: DDDR 60 - 130 bpm              ? Rate-responsive sensor type, if programmed on: Accelerometer        Underlying rhythm and heart rate:  Sinus rhythm 62 bpm, w/BBB    ? What is the response of this device to magnet placement:  None - tachy therapies off  ? PM magnet pacing rate: N/A   ? Any alert status on CIED generator or lead: No    ? Last pacing threshold    Atrial   1.0 V @ 0.4 ms   Ventricular RV: 0.75 V @ 0.4 ms  LV:  2.75 V @ 1.0 ms      Atrial fibrillation (H) 04/06/2021     Priority: Medium    Pacemaker 04/06/2021     Priority: Medium    Major depressive disorder, recurrent severe  without psychotic features (H) 01/26/2021     Priority: Medium    Panic disorder with agoraphobia 04/14/2020     Priority: Medium    Constipation 03/20/2020     Priority: Medium    Personality disorder (H) 03/05/2020     Priority: Medium     Rule out dependent personality    Formatting of this note might be different from the original.  Rule out dependent personality  Formatting of this note might be different from the original.  Rule out dependent personality      Candidiasis 03/01/2020     Priority: Medium    Posttraumatic stress disorder 03/01/2020     Priority: Medium    COPD without exacerbation (H) 01/29/2020     Priority: Medium    Long term (current) use of insulin (H) 01/29/2020     Priority: Medium    Chronic systolic heart failure (H) 01/28/2020     Priority: Medium    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, left leg (H) 01/15/2020     Priority: Medium    Chest pain 11/11/2019     Priority: Medium    Polymyalgia rheumatica (H24) 11/28/2018     Priority: Medium    Unstable angina (H) 05/02/2018     Priority: Medium     Coronary angiogram 05/02/2018 demonstrated 30% stenosis in the LMCA, 50% stenosis in the Proximal LAD, 50% stenosis in the Mid LAD, 95% stenosis in the Proximal Circumflex (Restenosis - Balloon Angioplasty), 100% stenosis in the 1st Marginal, 50% stenosis in the Proximal RCA, 50% stenosis in the Distal RCA, the LIMA graft from the LIMA to the Distal LAD is free of significant disease; the SVG graft from the Aorta to the 1st Marginal is free of significant disease; the SVG graft from the Aorta to the Distal RCA is free of significant disease. Intervention included a successful  2.5mm x 12mm Balloon,  2.5mm x 10mm Cutting Balloon,  3mm x 12mm Drug Eluting Stent,  3mm x 12mm Balloon, and  3mm x 8mm Balloon to Proximal Circumflex, post stenosis 0%.    Formatting of this note is different from the original.  Coronary angiogram 05/02/2018 demonstrated  30% stenosis in the LMCA, 50% stenosis in the Proximal LAD, 50% stenosis in the Mid LAD, 95% stenosis in the Proximal Circumflex (Restenosis - Balloon Angioplasty), 100% stenosis in the 1st Marginal, 50% stenosis in the Proximal RCA, 50% stenosis in the Distal RCA, the LIMA graft from the LIMA to the Distal LAD is free of significant disease; the SVG graft from the Aorta to the 1st Marginal is free of significant disease; the SVG graft from the Aorta to the Distal RCA is free of significant disease. Intervention included a successful  2.5mm x 12mm Balloon,  2.5mm x 10mm Cutting Balloon,  3mm x 12mm Drug Eluting Stent,  3mm x 12mm Balloon, and  3mm x 8mm Balloon to Proximal Circumflex, post stenosis 0%.  Formatting of this note is different from the original.  Coronary angiogram 05/02/2018 demonstrated 30% stenosis in the LMCA, 50% stenosis in the Proximal LAD, 50% stenosis in the Mid LAD, 95% stenosis in the Proximal Circumflex (Restenosis - Balloon Angioplasty), 100% stenosis in the 1st Marginal, 50% stenosis in the Proximal RCA, 50% stenosis in the Distal RCA, the LIMA graft from the LIMA to the Distal LAD is free of significant disease; the SVG graft from the Aorta to the 1st Marginal is free of significant disease; the SVG graft from the Aorta to the Distal RCA is free of significant disease. Intervention included a successful  2.5mm x 12mm Balloon,  2.5mm x 10mm Cutting Balloon,  3mm x 12mm Drug Eluting Stent,  3mm x 12mm Balloon, and  3mm x 8mm Balloon to Proximal Circumflex, post stenosis 0%.      Vitamin B12 deficiency 02/14/2018     Priority: Medium    Chronic bilateral low back pain without sciatica 01/17/2018     Priority: Medium    Chronic pain disorder 10/01/2017     Priority: Medium    Urinary incontinence, mixed 09/24/2017     Priority: Medium    Internal carotid artery stenosis, bilateral 07/13/2016     Priority: Medium     Carotid US 05/04/2018 showed moderate plaque formation, consistent with 50 to  69% stenosis in the right internal carotid artery, not significantly changed from 8/5/2015.  Moderate plaque formation, consistent with 50 to 69% stenosis in the left internal carotid artery; there has been mild progression of the left ICA stenosis since 8/5/2015.    Formatting of this note might be different from the original.  Carotid US 05/04/2018 showed moderate plaque formation, consistent with 50 to 69% stenosis in the right internal carotid artery, not significantly changed from 8/5/2015.  Moderate plaque formation, consistent with 50 to 69% stenosis in the left internal carotid artery; there has been mild progression of the left ICA stenosis since 8/5/2015.    Formatting of this note might be different from the original.  Carotid US 05/04/2018 showed moderate plaque formation, consistent with 50 to 69% stenosis in the right internal carotid artery, not significantly changed from 8/5/2015.  Moderate plaque formation, consistent with 50 to 69% stenosis in the left internal carotid artery; there has been mild progression of the left ICA stenosis since 8/5/2015.      Essential (primary) hypertension 10/14/2015     Priority: Medium    Ischemic cardiomyopathy 09/20/2015     Priority: Medium     EF of 40-45%, status post RV lead revision and LV epicardial lead placement via mini-thoracotomy in August 2016.    Formatting of this note might be different from the original.  EF of 40-45%, status post RV lead revision and LV epicardial lead placement via mini-thoracotomy in August 2016.  Formatting of this note might be different from the original.  EF of 40-45%, status post RV lead revision and LV epicardial lead placement via mini-thoracotomy in August 2016.      S/P CABG x 5 08/21/2015     Priority: Medium    Pain medication agreement 04/20/2013     Priority: Medium     Controlled substance agreement for percocet #30/month on file and signed 4/17/13.  Designated pharmacy: [facility] Prescribing physician: Andrea  "Diagnosis: Ulnar neuropathy    Formatting of this note might be different from the original.  Controlled substance agreement for percocet #30/month on file and signed 4/17/13.  Designated pharmacy: [facility] Prescribing physician: Andrea Diagnosis: Ulnar neuropathy      Anemia in other chronic diseases classified elsewhere 01/05/2013     Priority: Medium    Hypothyroidism, unspecified 12/10/2010     Priority: Medium    ACP (advance care planning) 11/03/2010     Priority: Medium     Formatting of this note might be different from the original.  Patient has identified Health Care Agent(s): Yes  Add Health Care Agents: Yes    Health Care Agent(s):    Primary Health Care Agent:     Edwar Escobar Relationship:    Spouse Phone:     [phone]    Secondary Health Care Agent:     Chiki Oro Relationship:     Son Phone:     [phone]      Patient has Advance Care Plan Documents (Health Care Directive, POLST): Yes    Advance Care Plan Documents:  Health Care Directive--03/28/11  Resuscitation Guidelines--    Patient has identified Specific Treatment Preferences: Yes   Specific Treatment Preferences:   a.) Code Status:  DNR/ Do Not Attempt Resuscitation - Allow a Natural Death    b.) Goals of Treatment:     ii. Limited Interventions and treat reversible conditions.  Provide interventions aimed at treatment of new or reversible illness/injury or non-life threatening chronic conditions. Duration of invasive or uncomfortable interventions should generally be limited.- Trial of intubation 5 days or other instructions \"If no improvement, stop.\"  c.) Interventions and Treatments:   i.   Antibiotics:          - Aggressive antibiotics  ii.  Nutrition/Hydration:         - Offer food and liquids by mouth         - IV fluid administration         - No feeding tube under any circumstance.  iii. Transfusion:          - Blood products for comfort/relief of symptoms only  iv. Dialysis:           - Dialysis for short term \"for " "recovery, but not long term.\"        Last Assessment & Plan:   Advance Care Planning: Disease-specific Session    Letty Escobar is an [facility] patient of Dr. Renea Mendez of the [facility].    Advance care planning discussions were completed with Letty and her healthcare agent, spouse Edwar Escobar on Monday, March 28, 2011 at the [facility] Room.    Understanding of Illness and Disease [place]:   Letty identifies her medical condition as diabetes with peripheral neuropathy, heart problems with a heart attack February 2009 plus 4 stent placements; sleep apnea; depression; hypothyroid; high cholesterol; tobacco use; and describes it as needing further assistance with thyroid and diabetes management.  She identifies the following symptoms of her medical condition as being the most bothersome: some memory loss, balance problems, light headedness and dizziness, puffy eyes and lower extremity edema from time to time.    Letty is retired [place] .  She is independent with all cares and lives an active life style although, \"I'm not as active as I used to be.\"    Goals of Care:   Letty currently hopes to maintain independence, control pain and symptoms and delay progression of, but not cure, the illness.  \"I want to get the diabetes and thyroid under better control.\"  Letty has an appointment the first part of April for follow up.    Quality of Life:    Letty identifies quality of life as family involvement twice weekly, [employer] activities, newly assigned Sunday , playing cards, the computer,    Letty christiano with serious challenges in her life through her ganesh in God, prayers and support of her [employer] family, spouse and son.    Letty identifies the following fears and worries about her medical care:  \"I just want the diabetes straightened out.\"    Treatment and Care Preferences:   Past experiences in dealing " "with family and/or friends that have  or been seriously ill include her brother who took his own life.  \"He was 53 years old and living with us.  I found him.\"   As a result of these experiences, Letty expresses these health care preferences:      Summary Letty's Treatment Preferences:  LOW SURVIVAL; HIGH TREATMENT BURDEN:  If Letty suffered a serious complication, such that she was facing a prolonged hospital stay, required ongoing medical interventions, and the chance of living through the complication was low (for example, only 5 out of 100 would live), Letty would choose:  to focus treatment on comfort and quality of life (\"Quality of life is more important than length of life to Letty.\")  COMMENT:  \"If I can't live a normal life, I wouldn't want to live.\"    HIGH SURVIVAL; LOW FUNCTIONAL STATUS:  If Letty had a serious complication and had a good chance of living through the complication but it was expected that she would never be able to walk or talk again and would require 24 hour nursing care, she would choose:  to focus treatment on comfort and quality of life (\"Quality of life is more important than length of life to Letty.\")  COMMENT:  \"I'd accept rehabilitation.\"    HIGH SURVIVAL; LOW COGNITIVE STATUS:  If Letty had a serious complication and had a good chance of living through the complication but it was expected that she would never know who she was or who she was with and would require 24 hour nursing care, she would choose:  to focus treatment on comfort and quality of life (\"Quality of life is more important than length of life to Letty.\")    CARDIO-PULMONARY RESUSCITATION (CPR):  The facts, risks and benefits of CPR were discussed with Letty.  If she had a sudden event that caused her heart and breathing to stop, she:  WOULD NOT want CPR attempted and instead would prefer that a natural death occur.    MECHANICAL VENTILATION: If Letty had an episode where she was unable to breathe on her own, " "she would choose the following:  attempt to use any appropriate non-invasive method to assist breathing, and use mechanical ventilation.  COMMENT:  \"If reversible ventilator is acceptable for 5 days.  If no improvement, stop.\"     Letty has chosen her healthcare agent to:  strictly follow her wishes.    Follow Up Plan:   Letty was encouraged to continue advance care planning discussions with her health care agents and primary care provider.   Letty and her health care agent declined handouts.     Documents addressed during this advance care planning session:  1.  Health Care Agents identified.          .  Primary health care agent is spouse, Edwar Escobar;          .  Secondary health care agent is son, Jermaine Oro.  2.  Health Care Directive completed and scanned into medical record.  3.  Statement of Treatment Preferences for advanced illness completed and scanned into the medical record.  4.  Resuscitation Guidelines completed for DNR.  Document sent to Dr. Renea Mendez for signature and returned to the home. Letty, please place on the refrigerator.    Recommendations/Plan:   Letty and her health care agent to review Advance Care Plan.     Letty would benefit from:   Care Navigation/Care Navigation Help Desk--explained services for pending future needs.  No identified needs today.  Care Navigation brochure was given to Letty and her healthcare agent.    Advance Care Planning recommendations and Letty's concerns and questions were cc ed to her primary provider.     Thank you, Letty for the opportunity of assisting with Advance Care Planning. It was a seeing you again and meeting Edwar.  Please contact me if I can be of further assistance.    Interviewer: Pat Martin RN    Advance Care Planning Facilitator  [phone]   3/28/2011      ELI (obstructive sleep apnea) 01/31/2010     Priority: Medium     PSG on April/2008 that showed moderate Obstructive Sleep Apnea with an AHI of 23.5 . Limb movements " persisted at 29 movements/hour at optimal pressures, despite carbidopa use.    Formatting of this note might be different from the original.  PSG on April/2008 that showed moderate Obstructive Sleep Apnea with an AHI of 23.5 . Limb movements persisted at 29 movements/hour at optimal pressures, despite carbidopa use.  Formatting of this note might be different from the original.  PSG on April/2008 that showed moderate Obstructive Sleep Apnea with an AHI of 23.5 . Limb movements persisted at 29 movements/hour at optimal pressures, despite carbidopa use.      Coronary atherosclerosis 02/06/2009     Priority: Medium     Formatting of this note might be different from the original.  2/5/2009 - MI - Proximal RCA 99%, mild-mod disease elsewhere.  EF 60%.  PCI:  MYRON to pRCA.  2/12/2009 - admit CP - Widely patent RCA stent. Moderate diffuse CAD. Severe stenosis in trivial PDA branch. LVEF 45%.  1/25/2010 - admit CP - PTCA and stent of diagonal  9/8/2010 - admit CP - LAD patent stent. Moderate diffuse CAD. Medical management recommended.   4/25/2012 - NSTEMI - Acute total occlusion of the ostial Circumflex. Acute total occlusion of the ostial ramus. Acute total occlusion of the distal 1st Obtuse Marginal. Angioplasty of ostial circumflex and ostial ramus. There was distal embolization to the OM1 vessel. This vessel was small and not amendable to intervention. Indefinite: plavix and asa 81.  8/10/2015 - CABx5 - 1. LIMA to distal LAD, reverse SVG to OM1 and OM2, reverse SVG to diagonal, reverse SVG to PDA.   2. Mitral valve repair with a Medtronics 3-D full ring, 28 mm.   3. Tricuspid repair with a Medtronic 28 mm partial ring  1/12/2016 - TTE - EF 40-45%  Formatting of this note might be different from the original.  2/5/2009 - MI - Proximal RCA 99%, mild-mod disease elsewhere.  EF 60%.  PCI:  MYRON to pRCA.  2/12/2009 - admit CP - Widely patent RCA stent. Moderate diffuse CAD. Severe stenosis in trivial PDA branch. LVEF  45%.  1/25/2010 - admit CP - PTCA and stent of diagonal  9/8/2010 - admit CP - LAD patent stent. Moderate diffuse CAD. Medical management recommended.   4/25/2012 - NSTEMI - Acute total occlusion of the ostial Circumflex. Acute total occlusion of the ostial ramus. Acute total occlusion of the distal 1st Obtuse Marginal. Angioplasty of ostial circumflex and ostial ramus. There was distal embolization to the OM1 vessel. This vessel was small and not amendable to intervention. Indefinite: plavix and asa 81.  8/10/2015 - CABx5 - 1. LIMA to distal LAD, reverse SVG to OM1 and OM2, reverse SVG to diagonal, reverse SVG to PDA.   2. Mitral valve repair with a Medtronics 3-D full ring, 28 mm.   3. Tricuspid repair with a Medtronic 28 mm partial ring  1/12/2016 - TTE - EF 40-45%      Restless legs syndrome 08/29/2007     Priority: Medium    Hyperlipidemia, unspecified 05/30/2007     Priority: Medium        Past Medical History:    Past Medical History:   Diagnosis Date    ACP (advance care planning) 11/3/2010    Acute coronary syndrome (H) 11/22/2019    Acute exacerbation of CHF (congestive heart failure) (H) 11/11/2019    Acute on chronic systolic (congestive) heart failure (H) 1/28/2020    Anemia of chronic disease 1/5/2013    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, left leg (H) 1/15/2020    BPPV (benign paroxysmal positional vertigo) 5/31/2018    Candidiasis 3/1/2020    Carotid stenosis, right 7/13/2016    Chest pain 11/11/2019    Chronic bilateral low back pain without sciatica 1/17/2018    Chronic pain disorder 10/1/2017    Constipation 3/20/2020    COPD (chronic obstructive pulmonary disease) (H) 6/24/2020    Coronary atherosclerosis 2/6/2009    Cubital tunnel syndrome on left 11/13/2012    Depressive disorder     Diabetes mellitus (H)     Diabetes mellitus type 2 in obese 7/13/2006    Diabetes mellitus type 2 in obese 7/13/2006    Drug-seeking behavior 4/4/2020    Failure to  thrive in adult 9/3/2020    Hereditary and idiopathic peripheral neuropathy 4/24/2007    Hyperlipidemia with target low density lipoprotein (LDL) cholesterol less than 70 mg/dL 5/30/2007    Hypertension 10/14/2015    Hypothyroidism 12/10/2010    Irritable bowel syndrome 9/7/2015    Ischemic cardiomyopathy 9/20/2015    Long-term use of high-risk medication 4/14/2020    Moniliasis, 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    Myocardial infarction (H)     Neuromuscular disorder (H)     Other specified postprocedural states 10/8/2015    Pain medication agreement 4/20/2013    Panic disorder with agoraphobia 4/14/2020    Paroxysmal atrial fibrillation (H) 10/2/2015    Personality disorder (H) 3/5/2020    Polymyalgia rheumatica (H24) 11/28/2018    Posttraumatic stress disorder 3/1/2020    Restless legs syndrome (RLS) 8/29/2007    Restless legs syndrome (RLS) 8/29/2007    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    Serum calcium elevated 3/1/2020    Somatic dysfunction of sacral region 1/17/2018    Subacromial bursitis of left shoulder joint 8/6/2018    Suicidal ideation 4/1/2020    Thyroid disease     TIA (transient ischemic attack) 5/4/2018    TIA (transient ischemic attack) 5/4/2018    Tobacco abuse 2/17/2017    Tobacco abuse 2/17/2017    Urinary incontinence, mixed 9/24/2017    UTI (urinary tract infection) 4/17/2020    Vitamin B12 deficiency 2/14/2018    Weakness 12/17/2019       Past Surgical History:    Past Surgical History:   Procedure Laterality Date    CARDIAC SURGERY      stents x 11    CARDIAC SURGERY      CHOLECYSTECTOMY      CHOLECYSTECTOMY      GENITOURINARY SURGERY      Tubal ligation    OTHER SURGICAL HISTORY      Genitourinary surgery    RELEASE CARPAL TUNNEL      RELEASE CARPAL TUNNEL         Family History:    No family history on file.    Social History:  Marital Status:   [2]  Social History     Tobacco Use    Smoking status: Never    Smokeless tobacco: Never   Vaping Use     Vaping status: Never Used   Substance Use Topics    Alcohol use: Not Currently    Drug use: Never        Medications:    acetaminophen (TYLENOL) 325 MG tablet  acetaminophen (TYLENOL) 500 MG tablet  albuterol (PROVENTIL) (2.5 MG/3ML) 0.083% neb solution  albuterol (PROVENTIL) (2.5 MG/3ML) 0.083% neb solution  apixaban ANTICOAGULANT (ELIQUIS) 5 MG tablet  aspirin 81 MG EC tablet  bisacodyl (DULCOLAX) 10 MG suppository  clotrimazole (LOTRIMIN) 1 % external cream  cyanocobalamin (CYANOCOBALAMIN) 1000 MCG/ML injection  DULoxetine HCl 40 MG CPEP  empagliflozin (JARDIANCE) 10 MG TABS tablet  famotidine (PEPCID) 20 MG tablet  fluconazole (DIFLUCAN) 150 MG tablet  fluticasone-vilanterol (BREO ELLIPTA) 200-25 MCG/INH inhaler  furosemide (LASIX) 20 MG tablet  gabapentin (NEURONTIN) 400 MG capsule  hydrochlorothiazide (HYDRODIURIL) 25 MG tablet  Insulin Aspart FlexPen 100 UNIT/ML SOPN  LANTUS SOLOSTAR 100 UNIT/ML soln  levothyroxine (SYNTHROID/LEVOTHROID) 137 MCG tablet  lidocaine (LMX4) 4 % external cream  LORazepam (ATIVAN) 1 MG tablet  losartan (COZAAR) 25 MG tablet  Melatonin 10 MG TABS tablet  metoprolol succinate ER (TOPROL-XL) 25 MG 24 hr tablet  mirtazapine (REMERON) 15 MG tablet  MUCUS RELIEF 600 MG 12 hr tablet  nitroGLYcerin (NITROSTAT) 0.4 MG sublingual tablet  ondansetron (ZOFRAN ODT) 4 MG ODT tab  pantoprazole (PROTONIX) 40 MG EC tablet  polyethylene glycol (MIRALAX) 17 GM/Dose powder  potassium chloride ER (MICRO-K) 10 MEQ CR capsule  predniSONE (DELTASONE) 10 MG tablet  QUEtiapine (SEROQUEL) 100 MG tablet  rosuvastatin (CRESTOR) 10 MG tablet  sennosides (SENOKOT) 8.6 MG tablet  traZODone (DESYREL) 50 MG tablet          Review of Systems   Constitutional:  Negative for chills and fever.   HENT: Negative.     Respiratory:  Positive for chest tightness. Negative for cough, choking and shortness of breath.    Cardiovascular:  Positive for chest pain.   All other systems reviewed and are negative.      Physical  Exam   BP: 111/60  Pulse: 64  Temp: 98.6  F (37  C)  Resp: 20  Weight: 88.4 kg (194 lb 14.2 oz)  SpO2: 95 %      Physical Exam  Vitals and nursing note reviewed.   Constitutional:       General: She is in acute distress.      Appearance: She is obese. She is not ill-appearing, toxic-appearing or diaphoretic.   HENT:      Head: Atraumatic.   Eyes:      Extraocular Movements: Extraocular movements intact.      Pupils: Pupils are equal, round, and reactive to light.   Neck:      Vascular: No JVD.   Cardiovascular:      Rate and Rhythm: Bradycardia present.      Heart sounds: Normal heart sounds.   Pulmonary:      Effort: Pulmonary effort is normal.      Breath sounds: Normal breath sounds. No wheezing.   Chest:      Chest wall: No mass, deformity or crepitus.   Abdominal:      General: Bowel sounds are normal.      Palpations: Abdomen is soft.      Tenderness: There is no guarding or rebound.   Musculoskeletal:      Cervical back: Normal range of motion and neck supple.   Skin:     Capillary Refill: Capillary refill takes less than 2 seconds.      Findings: No rash.   Neurological:      Mental Status: She is alert and oriented to person, place, and time.   Psychiatric:         Behavior: Behavior normal. Behavior is not agitated.         ED Course        Procedures              EKG Interpretation:      Interpreted by Bao Leal DO  Time reviewed: 0140  Symptoms at time of EKG: Anterior chest pain   Rhythm: paced  Rate: 60  Axis: Other (paced)  Ectopy: paced  Conduction: paced  ST Segments/ T Waves: paced  Q Waves: paced  Comparison to prior: Unchanged from 7/27/24    Clinical Impression: paced rhythm      Critical Care time:  none               Results for orders placed or performed during the hospital encounter of 08/01/24 (from the past 24 hour(s))   CBC with platelets differential    Narrative    The following orders were created for panel order CBC with platelets differential.  Procedure                                Abnormality         Status                     ---------                               -----------         ------                     CBC with platelets and d...[[id number]]  Abnormal            Final result                 Please view results for these tests on the individual orders.   Basic metabolic panel   Result Value Ref Range    Sodium 140 135 - 145 mmol/L    Potassium 4.5 3.4 - 5.3 mmol/L    Chloride 106 98 - 107 mmol/L    Carbon Dioxide (CO2) 24 22 - 29 mmol/L    Anion Gap 10 7 - 15 mmol/L    Urea Nitrogen 18.5 8.0 - 23.0 mg/dL    Creatinine 0.97 (H) 0.51 - 0.95 mg/dL    GFR Estimate 63 >60 mL/min/1.73m2    Calcium 10.1 8.8 - 10.4 mg/dL    Glucose 151 (H) 70 - 99 mg/dL   Troponin T, High Sensitivity   Result Value Ref Range    Troponin T, High Sensitivity 28 (H) <=14 ng/L   CRP inflammation   Result Value Ref Range    CRP Inflammation 4.55 <5.00 mg/L   CBC with platelets and differential   Result Value Ref Range    WBC Count 7.5 4.0 - 11.0 10e3/uL    RBC Count 3.35 (L) 3.80 - 5.20 10e6/uL    Hemoglobin 9.9 (L) 11.7 - 15.7 g/dL    Hematocrit 31.4 (L) 35.0 - 47.0 %    MCV 94 78 - 100 fL    MCH 29.6 26.5 - 33.0 pg    MCHC 31.5 31.5 - 36.5 g/dL    RDW 14.4 10.0 - 15.0 %    Platelet Count 162 150 - 450 10e3/uL    % Neutrophils 61 %    % Lymphocytes 28 %    % Monocytes 6 %    % Eosinophils 3 %    % Basophils 0 %    % Immature Granulocytes 1 %    NRBCs per 100 WBC 0 <1 /100    Absolute Neutrophils 4.6 1.6 - 8.3 10e3/uL    Absolute Lymphocytes 2.1 0.8 - 5.3 10e3/uL    Absolute Monocytes 0.5 0.0 - 1.3 10e3/uL    Absolute Eosinophils 0.3 0.0 - 0.7 10e3/uL    Absolute Basophils 0.0 0.0 - 0.2 10e3/uL    Absolute Immature Granulocytes 0.1 <=0.4 10e3/uL    Absolute NRBCs 0.0 10e3/uL   XR Chest Port 1 View    Narrative    EXAM: XR CHEST PORT 1 VIEW  LOCATION: [facility]  DATE: 8/1/2024    INDICATION: chest pain  COMPARISON: 7/27/2024      Impression    IMPRESSION: Sternotomy  with aortic valve replacement. AICD with lead tips in stable position. Mild low lung volumes. Heart size and pulmonary vascularity within normal limits. No focal lung infiltrates. Osseous structures grossly intact. Visualized upper   abdomen unremarkable.       Medications   ondansetron (ZOFRAN) injection 4 mg (4 mg Intravenous $Given 8/1/24 0238)   morphine (PF) injection 4 mg (4 mg Intravenous $Given 8/1/24 0237)       Assessments & Plan (with Medical Decision Making)  68-year-old to the ER via ambulance from her nursing home secondary to concerns of anterior chest pain.  Patient has had repeated episodes of this type of pain over the last several months.  This is her 10th visit since the end of March for this type of pain.  She has been recently prescribed oral morphine to trial before coming to the ER for this pain but apparently did not want to take that last evening when the pain started.  Fortunately, the patient's exam findings did not reveal evidence for ACS as reason for her symptoms.  Patient had resolution of her pain symptoms with medication given as listed above.  EKG was remarkable for paced rhythm but no signs of change from her previous EKG.  Troponin level and screening blood tests also reassuring without significant change from her previous.  Decision made to discharge back to the nursing home in improved condition.  Patient to be transferred by medical transport back to her nursing home.  Patient is encouraged to trial her oral morphine as needed dose should her symptoms recur.     I have reviewed the nursing notes.    I have reviewed the findings, diagnosis, plan and need for follow up with the patient.       Final diagnoses:   Nonspecific chest pain   Nausea   Chronic anemia       8/1/2024   [facility] EMERGENCY DEPT       Bao Leal,   08/01/24 0337

## 2024-08-13 ENCOUNTER — HOSPITAL ENCOUNTER (EMERGENCY)
Facility: CLINIC | Age: 69
Discharge: NURSING, REHAB OR LONG-TERM CARE | End: 2024-08-13
Attending: STUDENT IN AN ORGANIZED HEALTH CARE EDUCATION/TRAINING PROGRAM | Admitting: STUDENT IN AN ORGANIZED HEALTH CARE EDUCATION/TRAINING PROGRAM
Payer: COMMERCIAL

## 2024-08-13 VITALS
WEIGHT: 200.84 LBS | OXYGEN SATURATION: 92 % | SYSTOLIC BLOOD PRESSURE: 116 MMHG | RESPIRATION RATE: 20 BRPM | HEART RATE: 60 BPM | BODY MASS INDEX: 36.73 KG/M2 | DIASTOLIC BLOOD PRESSURE: 43 MMHG

## 2024-08-13 DIAGNOSIS — G89.29 CHRONIC CHEST PAIN: ICD-10-CM

## 2024-08-13 DIAGNOSIS — R07.9 CHRONIC CHEST PAIN: ICD-10-CM

## 2024-08-13 LAB
ANION GAP SERPL CALCULATED.3IONS-SCNC: 11 MMOL/L (ref 7–15)
BASOPHILS # BLD AUTO: 0 10E3/UL (ref 0–0.2)
BASOPHILS NFR BLD AUTO: 0 %
BUN SERPL-MCNC: 21.6 MG/DL (ref 8–23)
CALCIUM SERPL-MCNC: 9.7 MG/DL (ref 8.8–10.4)
CHLORIDE SERPL-SCNC: 103 MMOL/L (ref 98–107)
CREAT SERPL-MCNC: 1.07 MG/DL (ref 0.51–0.95)
EGFRCR SERPLBLD CKD-EPI 2021: 56 ML/MIN/1.73M2
EOSINOPHIL # BLD AUTO: 0.3 10E3/UL (ref 0–0.7)
EOSINOPHIL NFR BLD AUTO: 4 %
ERYTHROCYTE [DISTWIDTH] IN BLOOD BY AUTOMATED COUNT: 14.4 % (ref 10–15)
GLUCOSE SERPL-MCNC: 161 MG/DL (ref 70–99)
HCO3 SERPL-SCNC: 23 MMOL/L (ref 22–29)
HCT VFR BLD AUTO: 32 % (ref 35–47)
HGB BLD-MCNC: 10.1 G/DL (ref 11.7–15.7)
IMM GRANULOCYTES # BLD: 0.1 10E3/UL
IMM GRANULOCYTES NFR BLD: 1 %
LYMPHOCYTES # BLD AUTO: 1.9 10E3/UL (ref 0.8–5.3)
LYMPHOCYTES NFR BLD AUTO: 25 %
MCH RBC QN AUTO: 29.8 PG (ref 26.5–33)
MCHC RBC AUTO-ENTMCNC: 31.6 G/DL (ref 31.5–36.5)
MCV RBC AUTO: 94 FL (ref 78–100)
MONOCYTES # BLD AUTO: 0.5 10E3/UL (ref 0–1.3)
MONOCYTES NFR BLD AUTO: 7 %
NEUTROPHILS # BLD AUTO: 4.9 10E3/UL (ref 1.6–8.3)
NEUTROPHILS NFR BLD AUTO: 63 %
PLATELET # BLD AUTO: 148 10E3/UL (ref 150–450)
POTASSIUM SERPL-SCNC: 4.3 MMOL/L (ref 3.4–5.3)
RBC # BLD AUTO: 3.39 10E6/UL (ref 3.8–5.2)
SODIUM SERPL-SCNC: 137 MMOL/L (ref 135–145)
TROPONIN T SERPL HS-MCNC: 22 NG/L
WBC # BLD AUTO: 7.7 10E3/UL (ref 4–11)

## 2024-08-13 PROCEDURE — 80048 BASIC METABOLIC PNL TOTAL CA: CPT | Performed by: STUDENT IN AN ORGANIZED HEALTH CARE EDUCATION/TRAINING PROGRAM

## 2024-08-13 PROCEDURE — 84484 ASSAY OF TROPONIN QUANT: CPT | Performed by: STUDENT IN AN ORGANIZED HEALTH CARE EDUCATION/TRAINING PROGRAM

## 2024-08-13 PROCEDURE — 96374 THER/PROPH/DIAG INJ IV PUSH: CPT

## 2024-08-13 PROCEDURE — 250N000011 HC RX IP 250 OP 636: Performed by: STUDENT IN AN ORGANIZED HEALTH CARE EDUCATION/TRAINING PROGRAM

## 2024-08-13 PROCEDURE — 99284 EMERGENCY DEPT VISIT MOD MDM: CPT | Performed by: STUDENT IN AN ORGANIZED HEALTH CARE EDUCATION/TRAINING PROGRAM

## 2024-08-13 PROCEDURE — 36415 COLL VENOUS BLD VENIPUNCTURE: CPT | Performed by: STUDENT IN AN ORGANIZED HEALTH CARE EDUCATION/TRAINING PROGRAM

## 2024-08-13 PROCEDURE — 93010 ELECTROCARDIOGRAM REPORT: CPT | Performed by: STUDENT IN AN ORGANIZED HEALTH CARE EDUCATION/TRAINING PROGRAM

## 2024-08-13 PROCEDURE — 93005 ELECTROCARDIOGRAM TRACING: CPT

## 2024-08-13 PROCEDURE — 99284 EMERGENCY DEPT VISIT MOD MDM: CPT | Mod: 25

## 2024-08-13 PROCEDURE — 85025 COMPLETE CBC W/AUTO DIFF WBC: CPT | Performed by: STUDENT IN AN ORGANIZED HEALTH CARE EDUCATION/TRAINING PROGRAM

## 2024-08-13 RX ORDER — ONDANSETRON 2 MG/ML
4 INJECTION INTRAMUSCULAR; INTRAVENOUS ONCE
Status: COMPLETED | OUTPATIENT
Start: 2024-08-13 | End: 2024-08-13

## 2024-08-13 RX ORDER — MORPHINE SULFATE 4 MG/ML
4 INJECTION, SOLUTION INTRAMUSCULAR; INTRAVENOUS ONCE
Status: DISCONTINUED | OUTPATIENT
Start: 2024-08-13 | End: 2024-08-13 | Stop reason: HOSPADM

## 2024-08-13 RX ADMIN — ONDANSETRON 4 MG: 2 INJECTION INTRAMUSCULAR; INTRAVENOUS at 00:49

## 2024-08-13 ASSESSMENT — COLUMBIA-SUICIDE SEVERITY RATING SCALE - C-SSRS
6. HAVE YOU EVER DONE ANYTHING, STARTED TO DO ANYTHING, OR PREPARED TO DO ANYTHING TO END YOUR LIFE?: NO
2. HAVE YOU ACTUALLY HAD ANY THOUGHTS OF KILLING YOURSELF IN THE PAST MONTH?: NO
1. IN THE PAST MONTH, HAVE YOU WISHED YOU WERE DEAD OR WISHED YOU COULD GO TO SLEEP AND NOT WAKE UP?: NO

## 2024-08-13 ASSESSMENT — ACTIVITIES OF DAILY LIVING (ADL)
ADLS_ACUITY_SCORE: 39

## 2024-08-13 NOTE — ED NOTES
Patient has had some low oxygen saturations while sleeping, when woken and repositioned they come up quickly. She is having some apnea episodes while she sleeps. Snoring loudly.

## 2024-08-13 NOTE — ED TRIAGE NOTES
Patient here with chest pain that started at 2100. She had Morphine and Ativan at her car facility.     Triage Assessment (Adult)       Row Name 08/13/24 0015          Triage Assessment    Airway WDL WDL        Respiratory WDL    Respiratory WDL WDL        Skin Circulation/Temperature WDL    Skin Circulation/Temperature WDL WDL        Cardiac WDL    Cardiac WDL WDL     Cardiac Rhythm Ventricular paced        Peripheral/Neurovascular WDL    Peripheral Neurovascular WDL WDL        Cognitive/Neuro/Behavioral WDL    Cognitive/Neuro/Behavioral WDL WDL

## 2024-08-13 NOTE — ED PROVIDER NOTES
History     Chief Complaint   Patient presents with    Chest Pain     HPI  Letty Escobar is a 68 year old female who presenting to the emergency room chest pain via ambulance.  Pain started around 9:00 this evening.  This is now the 11th visit since March with the same findings.  Patient does have a history of a pacemaker and has been seen from it and worked up numerous times for patient's chest pain that was unremarkable.  On arrival patient wants morphine and Zofran.  She is already received this on route.  She has this oral formula present at her facility but notes that she may have been refusing this.  She denies any other symptoms denies that she has any coughing episodes but notes that she does have some mild shortness of breath with this chest pain.  She notes the pain is sharp.  It is localized to the center of her chest.  There are some possible radiations to her shoulders and bilateral arms as well as her back.  No noted falls recently present.    Allergies:  Allergies   Allergen Reactions    Bee Pollen Anaphylaxis    Diphenhydramine Palpitations     Tolerated IV Benadryl when not pushed too fast    Isosorbide Nitrate Other (See Comments) and Dizziness     Also causes syncope (has fallen before) and brain fog/mental disturbances - please do not prescribe    Nitroglycerin Dizziness, Fatigue and Other (See Comments)     Specifically the patch - please do not prescribe  Specifically the patch - please do not prescribe      Contrast Dye Hives and Itching     Tolerated CT with IV contrast 5/9/2024 with 50 mg IV Benadryl prior    Penicillin G     Adhesive Tape Rash    Liquid Adhesive Itching    Nystatin Dermatitis     Also blisters    Penicillins Swelling and Rash     Occurred as a child - not 100% sure on specific reactions    Sulfa Antibiotics Other (See Comments)     Occurred as a child / patient does not remember specific reaction       Problem List:    Patient Active Problem List    Diagnosis Date Noted     Acute respiratory failure with hypoxia and hypercapnia (H) 04/27/2024     Priority: Medium    COPD with acute exacerbation (H) 04/27/2024     Priority: Medium    Bilateral pneumonia 04/27/2024     Priority: Medium    Lethargy 11/10/2023     Priority: Medium    Urinary tract infection without hematuria, site unspecified 11/10/2023     Priority: Medium    Fever 11/10/2023     Priority: Medium    Hypoxia 11/10/2023     Priority: Medium    Pyuria 11/10/2023     Priority: Medium    PAD (peripheral artery disease) (H24) 11/10/2023     Priority: Medium    Left foot pain 11/10/2023     Priority: Medium    History of stroke 11/10/2023     Priority: Medium    Lives in long-term care facility 11/10/2023     Priority: Medium    Obesity hypoventilation syndrome (H) 11/10/2023     Priority: Medium    Benzodiazepine dependence (H) 11/10/2023     Priority: Medium    Thrombocytopenia (H24) 11/10/2023     Priority: Medium    ERIC (acute kidney injury) (H24) 11/10/2023     Priority: Medium    Chronic kidney disease, stage 3a (H) 10/16/2022     Priority: Medium    SOB (shortness of breath) 04/07/2022     Priority: Medium    Type 2 diabetes mellitus with hyperglycemia, with long-term current use of insulin (H) 02/13/2022     Priority: Medium    Morbid obesity (H) 12/10/2021     Priority: Medium    ICD (implantable cardioverter-defibrillator) in place 11/17/2021     Priority: Medium     Formatting of this note is different from the original.  Date of last device in office evaluation: 5/17/2022    ?  and model: Medtronic Cobalt CRT-D.   Date of implant: 5/7/2021  Tachy therapies remain OFF: S/p downgrade from ICD to pacemaker, but her implanted system includes a DF-4 lead, so an ICD generator was placed with the tachycardia therapies disabled.     ? Indication for device: Ischemic cardiomyopathy   ? Cardiac resynchronization therapy:   no     MRI Conditional:  No  o If No:  Reason why:  Abandoned LV lead    ? Battery  longevity documented as less than 3  Months: No  ? Are any of the leads less than 3 months old:  No    ? Programming              ? Pacing mode and programmed lower rate: DDDR 60 - 130 bpm              ? Rate-responsive sensor type, if programmed on: Accelerometer        Underlying rhythm and heart rate:  SR @ 60 bpm    ? What is the response of this device to magnet placement:  None - tachy therapies off  ? PM magnet pacing rate: N/A   ? Any alert status on CIED generator or lead: No    ? Last pacing threshold    Atrial   1.0 V @ 0.4 ms   Ventricular RV: 0.75 V @ 0.4 ms  LV:  2.75 V @ 1.0 ms    Formatting of this note is different from the original.  Date of last device in office evaluation: 11/17/2022     ?  and model: Medtronic Cobalt CRT-D.   Date of implant: 5/7/2021  Tachy therapies remain OFF: S/p downgrade from ICD to pacemaker, but her implanted system includes a DF-4 lead, so an ICD generator was placed with the tachycardia therapies disabled.     ? Indication for device: Ischemic cardiomyopathy   ? Cardiac resynchronization therapy:   no     MRI Conditional:  No  o If No:  Reason why:  Abandoned LV lead    ? Battery longevity documented as less than 3  Months: No  ? Are any of the leads less than 3 months old:  No    ? Programming              ? Pacing mode and programmed lower rate: DDDR 60 - 130 bpm              ? Rate-responsive sensor type, if programmed on: Accelerometer        Underlying rhythm and heart rate:  Sinus rhythm 62 bpm, w/BBB    ? What is the response of this device to magnet placement:  None - tachy therapies off  ? PM magnet pacing rate: N/A   ? Any alert status on CIED generator or lead: No    ? Last pacing threshold    Atrial   1.0 V @ 0.4 ms   Ventricular RV: 0.75 V @ 0.4 ms  LV:  2.75 V @ 1.0 ms      Atrial fibrillation (H) 04/06/2021     Priority: Medium    Pacemaker 04/06/2021     Priority: Medium    Major depressive disorder, recurrent severe without psychotic  features (H) 01/26/2021     Priority: Medium    Panic disorder with agoraphobia 04/14/2020     Priority: Medium    Constipation 03/20/2020     Priority: Medium    Personality disorder (H) 03/05/2020     Priority: Medium     Rule out dependent personality    Formatting of this note might be different from the original.  Rule out dependent personality  Formatting of this note might be different from the original.  Rule out dependent personality      Candidiasis 03/01/2020     Priority: Medium    Posttraumatic stress disorder 03/01/2020     Priority: Medium    COPD without exacerbation (H) 01/29/2020     Priority: Medium    Long term (current) use of insulin (H) 01/29/2020     Priority: Medium    Chronic systolic heart failure (H) 01/28/2020     Priority: Medium    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, left leg (H) 01/15/2020     Priority: Medium    Chest pain 11/11/2019     Priority: Medium    Polymyalgia rheumatica (H24) 11/28/2018     Priority: Medium    Unstable angina (H) 05/02/2018     Priority: Medium     Coronary angiogram 05/02/2018 demonstrated 30% stenosis in the LMCA, 50% stenosis in the Proximal LAD, 50% stenosis in the Mid LAD, 95% stenosis in the Proximal Circumflex (Restenosis - Balloon Angioplasty), 100% stenosis in the 1st Marginal, 50% stenosis in the Proximal RCA, 50% stenosis in the Distal RCA, the LIMA graft from the LIMA to the Distal LAD is free of significant disease; the SVG graft from the Aorta to the 1st Marginal is free of significant disease; the SVG graft from the Aorta to the Distal RCA is free of significant disease. Intervention included a successful  2.5mm x 12mm Balloon,  2.5mm x 10mm Cutting Balloon,  3mm x 12mm Drug Eluting Stent,  3mm x 12mm Balloon, and  3mm x 8mm Balloon to Proximal Circumflex, post stenosis 0%.    Formatting of this note is different from the original.  Coronary angiogram 05/02/2018 demonstrated 30% stenosis in  the LMCA, 50% stenosis in the Proximal LAD, 50% stenosis in the Mid LAD, 95% stenosis in the Proximal Circumflex (Restenosis - Balloon Angioplasty), 100% stenosis in the 1st Marginal, 50% stenosis in the Proximal RCA, 50% stenosis in the Distal RCA, the LIMA graft from the LIMA to the Distal LAD is free of significant disease; the SVG graft from the Aorta to the 1st Marginal is free of significant disease; the SVG graft from the Aorta to the Distal RCA is free of significant disease. Intervention included a successful  2.5mm x 12mm Balloon,  2.5mm x 10mm Cutting Balloon,  3mm x 12mm Drug Eluting Stent,  3mm x 12mm Balloon, and  3mm x 8mm Balloon to Proximal Circumflex, post stenosis 0%.  Formatting of this note is different from the original.  Coronary angiogram 05/02/2018 demonstrated 30% stenosis in the LMCA, 50% stenosis in the Proximal LAD, 50% stenosis in the Mid LAD, 95% stenosis in the Proximal Circumflex (Restenosis - Balloon Angioplasty), 100% stenosis in the 1st Marginal, 50% stenosis in the Proximal RCA, 50% stenosis in the Distal RCA, the LIMA graft from the LIMA to the Distal LAD is free of significant disease; the SVG graft from the Aorta to the 1st Marginal is free of significant disease; the SVG graft from the Aorta to the Distal RCA is free of significant disease. Intervention included a successful  2.5mm x 12mm Balloon,  2.5mm x 10mm Cutting Balloon,  3mm x 12mm Drug Eluting Stent,  3mm x 12mm Balloon, and  3mm x 8mm Balloon to Proximal Circumflex, post stenosis 0%.      Vitamin B12 deficiency 02/14/2018     Priority: Medium    Chronic bilateral low back pain without sciatica 01/17/2018     Priority: Medium    Chronic pain disorder 10/01/2017     Priority: Medium    Urinary incontinence, mixed 09/24/2017     Priority: Medium    Internal carotid artery stenosis, bilateral 07/13/2016     Priority: Medium     Carotid US 05/04/2018 showed moderate plaque formation, consistent with 50 to 69% stenosis in  the right internal carotid artery, not significantly changed from 8/5/2015.  Moderate plaque formation, consistent with 50 to 69% stenosis in the left internal carotid artery; there has been mild progression of the left ICA stenosis since 8/5/2015.    Formatting of this note might be different from the original.  Carotid US 05/04/2018 showed moderate plaque formation, consistent with 50 to 69% stenosis in the right internal carotid artery, not significantly changed from 8/5/2015.  Moderate plaque formation, consistent with 50 to 69% stenosis in the left internal carotid artery; there has been mild progression of the left ICA stenosis since 8/5/2015.    Formatting of this note might be different from the original.  Carotid US 05/04/2018 showed moderate plaque formation, consistent with 50 to 69% stenosis in the right internal carotid artery, not significantly changed from 8/5/2015.  Moderate plaque formation, consistent with 50 to 69% stenosis in the left internal carotid artery; there has been mild progression of the left ICA stenosis since 8/5/2015.      Essential (primary) hypertension 10/14/2015     Priority: Medium    Ischemic cardiomyopathy 09/20/2015     Priority: Medium     EF of 40-45%, status post RV lead revision and LV epicardial lead placement via mini-thoracotomy in August 2016.    Formatting of this note might be different from the original.  EF of 40-45%, status post RV lead revision and LV epicardial lead placement via mini-thoracotomy in August 2016.  Formatting of this note might be different from the original.  EF of 40-45%, status post RV lead revision and LV epicardial lead placement via mini-thoracotomy in August 2016.      S/P CABG x 5 08/21/2015     Priority: Medium    Pain medication agreement 04/20/2013     Priority: Medium     Controlled substance agreement for percocet #30/month on file and signed 4/17/13.  Designated pharmacy: [facility] Prescribing physician: Andrea Diagnosis: Ulnar  "neuropathy    Formatting of this note might be different from the original.  Controlled substance agreement for percocet #30/month on file and signed 4/17/13.  Designated pharmacy: [facility] Prescribing physician: Andrea Diagnosis: Ulnar neuropathy      Anemia in other chronic diseases classified elsewhere 01/05/2013     Priority: Medium    Hypothyroidism, unspecified 12/10/2010     Priority: Medium    ACP (advance care planning) 11/03/2010     Priority: Medium     Formatting of this note might be different from the original.  Patient has identified Health Care Agent(s): Yes  Add Health Care Agents: Yes    Health Care Agent(s):    Primary Health Care Agent:     Edwar Escobar Relationship:    Spouse Phone:     [phone]    Secondary Health Care Agent:     Chiki Oro Relationship:     Son Phone:     [phone]      Patient has Advance Care Plan Documents (Health Care Directive, POLST): Yes    Advance Care Plan Documents:  Health Care Directive--03/28/11  Resuscitation Guidelines--    Patient has identified Specific Treatment Preferences: Yes   Specific Treatment Preferences:   a.) Code Status:  DNR/ Do Not Attempt Resuscitation - Allow a Natural Death    b.) Goals of Treatment:     ii. Limited Interventions and treat reversible conditions.  Provide interventions aimed at treatment of new or reversible illness/injury or non-life threatening chronic conditions. Duration of invasive or uncomfortable interventions should generally be limited.- Trial of intubation 5 days or other instructions \"If no improvement, stop.\"  c.) Interventions and Treatments:   i.   Antibiotics:          - Aggressive antibiotics  ii.  Nutrition/Hydration:         - Offer food and liquids by mouth         - IV fluid administration         - No feeding tube under any circumstance.  iii. Transfusion:          - Blood products for comfort/relief of symptoms only  iv. Dialysis:           - Dialysis for short term \"for recovery, but not long " "term.\"        Last Assessment & Plan:   Advance Care Planning: Disease-specific Session    Letty Escobar is an [facility] patient of Dr. Renea Mendez of the [facility].    Advance care planning discussions were completed with Letty and her healthcare agent, spouse Edwar Escobar on Monday, March 28, 2011 at the [facility].    Understanding of Illness and Disease Pioneer:   Letty identifies her medical condition as diabetes with peripheral neuropathy, heart problems with a heart attack February 2009 plus 4 stent placements; sleep apnea; depression; hypothyroid; high cholesterol; tobacco use; and describes it as needing further assistance with thyroid and diabetes management.  She identifies the following symptoms of her medical condition as being the most bothersome: some memory loss, balance problems, light headedness and dizziness, puffy eyes and lower extremity edema from time to time.    Let[place] is retired [place] .  She is independent with all cares and lives an active life style although, \"I'm not as active as I used to be.\"    Goals of Care:   Letty currently hopes to maintain independence, control pain and symptoms and delay progression of, but not cure, the illness.  \"I want to get the diabetes and thyroid under better control.\"  Letty has an appointment the first part of April for follow up.    Quality of Life:    Letty identifies quality of life as family involvement twice weekly, Yarsanism activities, newly assigned Sunday , playing cards, the computer,    Letty christiano with serious challenges in her life through her ganesh in God, prayers and support of her Yarsanism family, spouse and son.    Letty identifies the following fears and worries about her medical care:  \"I just want the diabetes straightened out.\"    Treatment and Care Preferences:   Past experiences in dealing with family and/or " "friends that have  or been seriously ill include her brother who took his own life.  \"He was 53 years old and living with us.  I found him.\"   As a result of these experiences, Letty expresses these health care preferences:      Summary Letty's Treatment Preferences:  LOW SURVIVAL; HIGH TREATMENT BURDEN:  If Letty suffered a serious complication, such that she was facing a prolonged hospital stay, required ongoing medical interventions, and the chance of living through the complication was low (for example, only 5 out of 100 would live), Letty would choose:  to focus treatment on comfort and quality of life (\"Quality of life is more important than length of life to Letty.\")  COMMENT:  \"If I can't live a normal life, I wouldn't want to live.\"    HIGH SURVIVAL; LOW FUNCTIONAL STATUS:  If Letty had a serious complication and had a good chance of living through the complication but it was expected that she would never be able to walk or talk again and would require 24 hour nursing care, she would choose:  to focus treatment on comfort and quality of life (\"Quality of life is more important than length of life to Letty.\")  COMMENT:  \"I'd accept rehabilitation.\"    HIGH SURVIVAL; LOW COGNITIVE STATUS:  If Letty had a serious complication and had a good chance of living through the complication but it was expected that she would never know who she was or who she was with and would require 24 hour nursing care, she would choose:  to focus treatment on comfort and quality of life (\"Quality of life is more important than length of life to Letty.\")    CARDIO-PULMONARY RESUSCITATION (CPR):  The facts, risks and benefits of CPR were discussed with Letty.  If she had a sudden event that caused her heart and breathing to stop, she:  WOULD NOT want CPR attempted and instead would prefer that a natural death occur.    MECHANICAL VENTILATION: If Letty had an episode where she was unable to breathe on her own, she would choose the " "following:  attempt to use any appropriate non-invasive method to assist breathing, and use mechanical ventilation.  COMMENT:  \"If reversible ventilator is acceptable for 5 days.  If no improvement, stop.\"     Letty has chosen her healthcare agent to:  strictly follow her wishes.    Follow Up Plan:   Letty was encouraged to continue advance care planning discussions with her health care agents and primary care provider.   Letty and her health care agent declined handouts.     Documents addressed during this advance care planning session:  1.  Health Care Agents identified.          .  Primary health care agent is spouse, Edwar Escobar;          .  Secondary health care agent is son, Jermaine Oro.  2.  Health Care Directive completed and scanned into medical record.  3.  Statement of Treatment Preferences for advanced illness completed and scanned into the medical record.  4.  Resuscitation Guidelines completed for DNR.  Document sent to Dr. Renea Mendez for signature and returned to the home. Letty, please place on the refrigerator.    Recommendations/Plan:   Letty and her health care agent to review Advance Care Plan.     Letty would benefit from:   Care Navigation/Care Navigation Help Desk--explained services for pending future needs.  No identified needs today.  Care Navigation brochure was given to Letty and her healthcare agent.    Advance Care Planning recommendations and Letty's concerns and questions were cc ed to her primary provider.     Thank you, Letty for the opportunity of assisting with Advance Care Planning. It was a seeing you again and meeting Edwar.  Please contact me if I can be of further assistance.    Interviewer: Pat Martin RN    Advance Care Planning Facilitator  [phone]   3/28/2011      ELI (obstructive sleep apnea) 01/31/2010     Priority: Medium     PSG on April/2008 that showed moderate Obstructive Sleep Apnea with an AHI of 23.5 . Limb movements persisted at 29 " movements/hour at optimal pressures, despite carbidopa use.    Formatting of this note might be different from the original.  PSG on April/2008 that showed moderate Obstructive Sleep Apnea with an AHI of 23.5 . Limb movements persisted at 29 movements/hour at optimal pressures, despite carbidopa use.  Formatting of this note might be different from the original.  PSG on April/2008 that showed moderate Obstructive Sleep Apnea with an AHI of 23.5 . Limb movements persisted at 29 movements/hour at optimal pressures, despite carbidopa use.      Coronary atherosclerosis 02/06/2009     Priority: Medium     Formatting of this note might be different from the original.  2/5/2009 - MI - Proximal RCA 99%, mild-mod disease elsewhere.  EF 60%.  PCI:  MYRON to pRCA.  2/12/2009 - admit CP - Widely patent RCA stent. Moderate diffuse CAD. Severe stenosis in trivial PDA branch. LVEF 45%.  1/25/2010 - admit CP - PTCA and stent of diagonal  9/8/2010 - admit CP - LAD patent stent. Moderate diffuse CAD. Medical management recommended.   4/25/2012 - NSTEMI - Acute total occlusion of the ostial Circumflex. Acute total occlusion of the ostial ramus. Acute total occlusion of the distal 1st Obtuse Marginal. Angioplasty of ostial circumflex and ostial ramus. There was distal embolization to the OM1 vessel. This vessel was small and not amendable to intervention. Indefinite: plavix and asa 81.  8/10/2015 - CABx5 - 1. LIMA to distal LAD, reverse SVG to OM1 and OM2, reverse SVG to diagonal, reverse SVG to PDA.   2. Mitral valve repair with a Medtronics 3-D full ring, 28 mm.   3. Tricuspid repair with a Medtronic 28 mm partial ring  1/12/2016 - TTE - EF 40-45%  Formatting of this note might be different from the original.  2/5/2009 - MI - Proximal RCA 99%, mild-mod disease elsewhere.  EF 60%.  PCI:  MYRON to pRCA.  2/12/2009 - admit CP - Widely patent RCA stent. Moderate diffuse CAD. Severe stenosis in trivial PDA branch. LVEF 45%.  1/25/2010 - admit  CP - PTCA and stent of diagonal  9/8/2010 - admit CP - LAD patent stent. Moderate diffuse CAD. Medical management recommended.   4/25/2012 - NSTEMI - Acute total occlusion of the ostial Circumflex. Acute total occlusion of the ostial ramus. Acute total occlusion of the distal 1st Obtuse Marginal. Angioplasty of ostial circumflex and ostial ramus. There was distal embolization to the OM1 vessel. This vessel was small and not amendable to intervention. Indefinite: plavix and asa 81.  8/10/2015 - CABx5 - 1. LIMA to distal LAD, reverse SVG to OM1 and OM2, reverse SVG to diagonal, reverse SVG to PDA.   2. Mitral valve repair with a Medtronics 3-D full ring, 28 mm.   3. Tricuspid repair with a Medtronic 28 mm partial ring  1/12/2016 - TTE - EF 40-45%      Restless legs syndrome 08/29/2007     Priority: Medium    Hyperlipidemia, unspecified 05/30/2007     Priority: Medium        Past Medical History:    Past Medical History:   Diagnosis Date    ACP (advance care planning) 11/3/2010    Acute coronary syndrome (H) 11/22/2019    Acute exacerbation of CHF (congestive heart failure) (H) 11/11/2019    Acute on chronic systolic (congestive) heart failure (H) 1/28/2020    Anemia of chronic disease 1/5/2013    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, left leg (H) 1/15/2020    BPPV (benign paroxysmal positional vertigo) 5/31/2018    Candidiasis 3/1/2020    Carotid stenosis, right 7/13/2016    Chest pain 11/11/2019    Chronic bilateral low back pain without sciatica 1/17/2018    Chronic pain disorder 10/1/2017    Constipation 3/20/2020    COPD (chronic obstructive pulmonary disease) (H) 6/24/2020    Coronary atherosclerosis 2/6/2009    Cubital tunnel syndrome on left 11/13/2012    Depressive disorder     Diabetes mellitus (H)     Diabetes mellitus type 2 in obese 7/13/2006    Diabetes mellitus type 2 in obese 7/13/2006    Drug-seeking behavior 4/4/2020    Failure to thrive in adult  9/3/2020    Hereditary and idiopathic peripheral neuropathy 4/24/2007    Hyperlipidemia with target low density lipoprotein (LDL) cholesterol less than 70 mg/dL 5/30/2007    Hypertension 10/14/2015    Hypothyroidism 12/10/2010    Irritable bowel syndrome 9/7/2015    Ischemic cardiomyopathy 9/20/2015    Long-term use of high-risk medication 4/14/2020    Moniliasis, 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    Myocardial infarction (H)     Neuromuscular disorder (H)     Other specified postprocedural states 10/8/2015    Pain medication agreement 4/20/2013    Panic disorder with agoraphobia 4/14/2020    Paroxysmal atrial fibrillation (H) 10/2/2015    Personality disorder (H) 3/5/2020    Polymyalgia rheumatica (H24) 11/28/2018    Posttraumatic stress disorder 3/1/2020    Restless legs syndrome (RLS) 8/29/2007    Restless legs syndrome (RLS) 8/29/2007    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    Serum calcium elevated 3/1/2020    Somatic dysfunction of sacral region 1/17/2018    Subacromial bursitis of left shoulder joint 8/6/2018    Suicidal ideation 4/1/2020    Thyroid disease     TIA (transient ischemic attack) 5/4/2018    TIA (transient ischemic attack) 5/4/2018    Tobacco abuse 2/17/2017    Tobacco abuse 2/17/2017    Urinary incontinence, mixed 9/24/2017    UTI (urinary tract infection) 4/17/2020    Vitamin B12 deficiency 2/14/2018    Weakness 12/17/2019       Past Surgical History:    Past Surgical History:   Procedure Laterality Date    CARDIAC SURGERY      stents x 11    CARDIAC SURGERY      CHOLECYSTECTOMY      CHOLECYSTECTOMY      GENITOURINARY SURGERY      Tubal ligation    OTHER SURGICAL HISTORY      Genitourinary surgery    RELEASE CARPAL TUNNEL      RELEASE CARPAL TUNNEL         Family History:    No family history on file.    Social History:  Marital Status:   [2]  Social History     Tobacco Use    Smoking status: Never    Smokeless tobacco: Never   Vaping Use    Vaping  status: Never Used   Substance Use Topics    Alcohol use: Not Currently    Drug use: Never        Medications:    acetaminophen (TYLENOL) 325 MG tablet  acetaminophen (TYLENOL) 500 MG tablet  albuterol (PROVENTIL) (2.5 MG/3ML) 0.083% neb solution  albuterol (PROVENTIL) (2.5 MG/3ML) 0.083% neb solution  apixaban ANTICOAGULANT (ELIQUIS) 5 MG tablet  aspirin 81 MG EC tablet  bisacodyl (DULCOLAX) 10 MG suppository  clotrimazole (LOTRIMIN) 1 % external cream  cyanocobalamin (CYANOCOBALAMIN) 1000 MCG/ML injection  DULoxetine HCl 40 MG CPEP  empagliflozin (JARDIANCE) 10 MG TABS tablet  famotidine (PEPCID) 20 MG tablet  fluconazole (DIFLUCAN) 150 MG tablet  fluticasone-vilanterol (BREO ELLIPTA) 200-25 MCG/INH inhaler  furosemide (LASIX) 20 MG tablet  gabapentin (NEURONTIN) 400 MG capsule  hydrochlorothiazide (HYDRODIURIL) 25 MG tablet  Insulin Aspart FlexPen 100 UNIT/ML SOPN  LANTUS SOLOSTAR 100 UNIT/ML soln  levothyroxine (SYNTHROID/LEVOTHROID) 137 MCG tablet  lidocaine (LMX4) 4 % external cream  LORazepam (ATIVAN) 1 MG tablet  losartan (COZAAR) 25 MG tablet  Melatonin 10 MG TABS tablet  metoprolol succinate ER (TOPROL-XL) 25 MG 24 hr tablet  mirtazapine (REMERON) 15 MG tablet  MUCUS RELIEF 600 MG 12 hr tablet  nitroGLYcerin (NITROSTAT) 0.4 MG sublingual tablet  ondansetron (ZOFRAN ODT) 4 MG ODT tab  pantoprazole (PROTONIX) 40 MG EC tablet  polyethylene glycol (MIRALAX) 17 GM/Dose powder  potassium chloride ER (MICRO-K) 10 MEQ CR capsule  predniSONE (DELTASONE) 10 MG tablet  QUEtiapine (SEROQUEL) 100 MG tablet  rosuvastatin (CRESTOR) 10 MG tablet  sennosides (SENOKOT) 8.6 MG tablet  traZODone (DESYREL) 50 MG tablet          Review of Systems   Cardiovascular:  Positive for chest pain.   All other systems reviewed and are negative.      Physical Exam   BP: 129/55  Pulse: 78  Resp: (!) 7  Weight: 91.1 kg (200 lb 13.4 oz)  SpO2: 95 %      Physical Exam  Vitals and nursing note reviewed.   Constitutional:       General: She is  not in acute distress.     Appearance: Normal appearance. She is obese. She is not ill-appearing, toxic-appearing or diaphoretic.   HENT:      Head: Normocephalic and atraumatic.      Mouth/Throat:      Mouth: Mucous membranes are moist.   Eyes:      General: No scleral icterus.     Conjunctiva/sclera: Conjunctivae normal.   Cardiovascular:      Rate and Rhythm: Normal rate.      Heart sounds: Normal heart sounds.   Pulmonary:      Effort: No respiratory distress.      Breath sounds: Normal breath sounds. No wheezing or rales.   Chest:      Chest wall: Tenderness present.   Abdominal:      General: Abdomen is flat.   Musculoskeletal:      Cervical back: Neck supple.   Skin:     General: Skin is warm.      Findings: No rash.   Neurological:      General: No focal deficit present.      Mental Status: She is alert and oriented to person, place, and time.   Psychiatric:         Mood and Affect: Mood normal.         Behavior: Behavior normal.         ED Course        Procedures         EKG self interpreted at 12:24 AM.  Wide-complex rhythm at 63 bpm.  QTc at 503 and a QRS of 210 consistent with paced rhythm no signs positive Sgarbossa criteria at this time.    Results for orders placed or performed during the hospital encounter of 08/13/24 (from the past 24 hour(s))   CBC with platelets differential    Narrative    The following orders were created for panel order CBC with platelets differential.  Procedure                               Abnormality         Status                     ---------                               -----------         ------                     CBC with platelets and d...[[id number]]  Abnormal            Final result                 Please view results for these tests on the individual orders.   Basic metabolic panel   Result Value Ref Range    Sodium 137 135 - 145 mmol/L    Potassium 4.3 3.4 - 5.3 mmol/L    Chloride 103 98 - 107 mmol/L    Carbon Dioxide (CO2) 23 22 - 29 mmol/L    Anion Gap 11 7 -  15 mmol/L    Urea Nitrogen 21.6 8.0 - 23.0 mg/dL    Creatinine 1.07 (H) 0.51 - 0.95 mg/dL    GFR Estimate 56 (L) >60 mL/min/1.73m2    Calcium 9.7 8.8 - 10.4 mg/dL    Glucose 161 (H) 70 - 99 mg/dL   Troponin T, High Sensitivity   Result Value Ref Range    Troponin T, High Sensitivity 22 (H) <=14 ng/L   CBC with platelets and differential   Result Value Ref Range    WBC Count 7.7 4.0 - 11.0 10e3/uL    RBC Count 3.39 (L) 3.80 - 5.20 10e6/uL    Hemoglobin 10.1 (L) 11.7 - 15.7 g/dL    Hematocrit 32.0 (L) 35.0 - 47.0 %    MCV 94 78 - 100 fL    MCH 29.8 26.5 - 33.0 pg    MCHC 31.6 31.5 - 36.5 g/dL    RDW 14.4 10.0 - 15.0 %    Platelet Count 148 (L) 150 - 450 10e3/uL    % Neutrophils 63 %    % Lymphocytes 25 %    % Monocytes 7 %    % Eosinophils 4 %    % Basophils 0 %    % Immature Granulocytes 1 %    Absolute Neutrophils 4.9 1.6 - 8.3 10e3/uL    Absolute Lymphocytes 1.9 0.8 - 5.3 10e3/uL    Absolute Monocytes 0.5 0.0 - 1.3 10e3/uL    Absolute Eosinophils 0.3 0.0 - 0.7 10e3/uL    Absolute Basophils 0.0 0.0 - 0.2 10e3/uL    Absolute Immature Granulocytes 0.1 <=0.4 10e3/uL       Medications   morphine (PF) injection 4 mg (has no administration in time range)   ondansetron (ZOFRAN) injection 4 mg (has no administration in time range)   ondansetron (ZOFRAN) injection 4 mg (4 mg Intravenous $Given 8/13/24 0046)       Assessments & Plan (with Medical Decision Making)     I have reviewed the nursing notes.    I have reviewed the findings, diagnosis, plan and need for follow up with the patient.      Medical Decision Making  68-year-old female presenting for 12 visits since March for the same presentation of chronic centralized chest pain.  Asked for morphine and Zofran on arrival EMS noted that she received this on arrival.  Pain show and has pain with palpation of her costochondral joints.  Sharp in nature consistent with patient's noted pain.  EKG reviewed without acute changes from previous and is a chronically paced rhythm.   No sweats seen or any other symptoms.  Vitals are stable on arrival with heart rate being 78, blood pressure of 129/55 oxygen saturation between 8 and 12 and oxygen saturation 95%.  No noted recent cough cold congestions rashes belly pains nausea vomiting diarrhea.  Will hold imaging at this time and repeat lab work to ensure no acute ACS or symptomatic treatments.  Her lung examination is otherwise benign for any acute signs of pneumonia or viral infections therefore we will hold viral swab as well.  Do not believe imaging is necessary at this time as patient's pain is consistent with previous evaluations.  Lab work interpreted and reviewed similar to previous with no changes in patient's troponin reveals mild elevated.  No acute findings present today and feels patient stable for discharge back to care facility.    New Prescriptions    No medications on file       Final diagnoses:   Chronic chest pain       8/13/2024   [facility] EMERGENCY DEPT       Whitney Barger MD  08/13/24 0112

## 2024-09-03 ENCOUNTER — HOSPITAL ENCOUNTER (EMERGENCY)
Facility: CLINIC | Age: 69
Discharge: HOME | End: 2024-09-03
Attending: FAMILY MEDICINE | Admitting: FAMILY MEDICINE
Payer: COMMERCIAL

## 2024-09-03 VITALS
DIASTOLIC BLOOD PRESSURE: 48 MMHG | OXYGEN SATURATION: 92 % | HEART RATE: 65 BPM | RESPIRATION RATE: 16 BRPM | SYSTOLIC BLOOD PRESSURE: 114 MMHG | TEMPERATURE: 97.3 F

## 2024-09-03 DIAGNOSIS — F41.1 ANXIETY STATE: ICD-10-CM

## 2024-09-03 DIAGNOSIS — R07.9 CHEST PAIN, UNSPECIFIED TYPE: ICD-10-CM

## 2024-09-03 LAB
ANION GAP SERPL CALCULATED.3IONS-SCNC: 10 MMOL/L (ref 7–15)
BASOPHILS # BLD AUTO: 0 10E3/UL (ref 0–0.2)
BASOPHILS NFR BLD AUTO: 0 %
BUN SERPL-MCNC: 16.1 MG/DL (ref 8–23)
CALCIUM SERPL-MCNC: 9.8 MG/DL (ref 8.8–10.4)
CHLORIDE SERPL-SCNC: 104 MMOL/L (ref 98–107)
CREAT SERPL-MCNC: 0.98 MG/DL (ref 0.51–0.95)
EGFRCR SERPLBLD CKD-EPI 2021: 62 ML/MIN/1.73M2
EOSINOPHIL # BLD AUTO: 0.3 10E3/UL (ref 0–0.7)
EOSINOPHIL NFR BLD AUTO: 4 %
ERYTHROCYTE [DISTWIDTH] IN BLOOD BY AUTOMATED COUNT: 14 % (ref 10–15)
GLUCOSE SERPL-MCNC: 149 MG/DL (ref 70–99)
HCO3 SERPL-SCNC: 23 MMOL/L (ref 22–29)
HCT VFR BLD AUTO: 31.3 % (ref 35–47)
HGB BLD-MCNC: 10.1 G/DL (ref 11.7–15.7)
IMM GRANULOCYTES # BLD: 0 10E3/UL
IMM GRANULOCYTES NFR BLD: 1 %
LYMPHOCYTES # BLD AUTO: 1.8 10E3/UL (ref 0.8–5.3)
LYMPHOCYTES NFR BLD AUTO: 22 %
MCH RBC QN AUTO: 30.2 PG (ref 26.5–33)
MCHC RBC AUTO-ENTMCNC: 32.3 G/DL (ref 31.5–36.5)
MCV RBC AUTO: 94 FL (ref 78–100)
MONOCYTES # BLD AUTO: 0.5 10E3/UL (ref 0–1.3)
MONOCYTES NFR BLD AUTO: 6 %
NEUTROPHILS # BLD AUTO: 5.5 10E3/UL (ref 1.6–8.3)
NEUTROPHILS NFR BLD AUTO: 68 %
NRBC # BLD AUTO: 0 10E3/UL
NRBC BLD AUTO-RTO: 0 /100
PLATELET # BLD AUTO: 155 10E3/UL (ref 150–450)
POTASSIUM SERPL-SCNC: 4.3 MMOL/L (ref 3.4–5.3)
RBC # BLD AUTO: 3.34 10E6/UL (ref 3.8–5.2)
SODIUM SERPL-SCNC: 137 MMOL/L (ref 135–145)
TROPONIN T SERPL HS-MCNC: 19 NG/L
WBC # BLD AUTO: 8 10E3/UL (ref 4–11)

## 2024-09-03 PROCEDURE — 250N000011 HC RX IP 250 OP 636: Performed by: FAMILY MEDICINE

## 2024-09-03 PROCEDURE — 80048 BASIC METABOLIC PNL TOTAL CA: CPT | Performed by: FAMILY MEDICINE

## 2024-09-03 PROCEDURE — 85025 COMPLETE CBC W/AUTO DIFF WBC: CPT | Performed by: FAMILY MEDICINE

## 2024-09-03 PROCEDURE — 36415 COLL VENOUS BLD VENIPUNCTURE: CPT | Performed by: FAMILY MEDICINE

## 2024-09-03 PROCEDURE — 96375 TX/PRO/DX INJ NEW DRUG ADDON: CPT | Performed by: FAMILY MEDICINE

## 2024-09-03 PROCEDURE — 93005 ELECTROCARDIOGRAM TRACING: CPT | Performed by: FAMILY MEDICINE

## 2024-09-03 PROCEDURE — 93010 ELECTROCARDIOGRAM REPORT: CPT | Performed by: FAMILY MEDICINE

## 2024-09-03 PROCEDURE — 84484 ASSAY OF TROPONIN QUANT: CPT | Performed by: FAMILY MEDICINE

## 2024-09-03 PROCEDURE — 99284 EMERGENCY DEPT VISIT MOD MDM: CPT | Performed by: FAMILY MEDICINE

## 2024-09-03 PROCEDURE — 99284 EMERGENCY DEPT VISIT MOD MDM: CPT | Mod: 25 | Performed by: FAMILY MEDICINE

## 2024-09-03 PROCEDURE — 96374 THER/PROPH/DIAG INJ IV PUSH: CPT | Performed by: FAMILY MEDICINE

## 2024-09-03 RX ORDER — MORPHINE SULFATE 4 MG/ML
4 INJECTION, SOLUTION INTRAMUSCULAR; INTRAVENOUS ONCE
Status: COMPLETED | OUTPATIENT
Start: 2024-09-03 | End: 2024-09-03

## 2024-09-03 RX ORDER — ONDANSETRON 2 MG/ML
4 INJECTION INTRAMUSCULAR; INTRAVENOUS ONCE
Status: COMPLETED | OUTPATIENT
Start: 2024-09-03 | End: 2024-09-03

## 2024-09-03 RX ADMIN — MORPHINE SULFATE 4 MG: 4 INJECTION, SOLUTION INTRAMUSCULAR; INTRAVENOUS at 02:37

## 2024-09-03 RX ADMIN — ONDANSETRON 4 MG: 2 INJECTION INTRAMUSCULAR; INTRAVENOUS at 02:36

## 2024-09-03 ASSESSMENT — ACTIVITIES OF DAILY LIVING (ADL)
ADLS_ACUITY_SCORE: 39

## 2024-09-03 NOTE — ED PROVIDER NOTES
"                                                            [facility] ED Provider Note   Patient: Letty Escobar  MRN #:  [id number]  Date of Visit: September 3, 2024    CC:     Chief Complaint   Patient presents with    Chest Pain     HPI:  eLtty Escobar is a 68 year old female resident at [facility] who presented to the emergency department by EMS with acute recurrent chest pain that started around 9 PM last night.  Patient states that the pain awakened her from sleep.  The pain is persistent involving the left side of the chest radiating to just below the ribs.  Patient states that the pain is similar to pain she has had in the past..  Patient had a cardiology follow-up on August 27, less than a week ago for hospital follow-up of her chronic chest pains.  Patient has a \"longstanding history of chest pains prompting myriad of angiograms and ED visits.\"    Patient tells me that she was told by her cardiology provider last week that she needs to be evaluated every time she develops chest pain.  I questioned this statement as this was not mentioned in the cardiology assessment and plan on review of the electronic medical records.  Patient had a left heart cath in July 2023 with patent grafts.  She was \"asymptomatic without anginal symptoms.\"  Plan from her recent cardiology visit was to continue with rosuvastatin, antianginals including Ranexa and Toprol.  Recommended follow-up in 8 to 10 months.  According to the residents progress notes from the [facility], patient had been complaining of chest pains on August 28, August 29, August 30, and again this morning September 3.  Most of these episodes of chest pains are occurring between 10 PM  and 11:30 PM.  On several occasions, patient had stated that she wanted to come to the emergency department but staff was able to reassure the patient, as well as administer Ativan 1 mg and morphine 7.5 mg.  Patient's cardiology history includes the " following:  CAD s/p PCI, s/p CABG 08/2015  A. Inferior STEMI, [facility] 02/2009 with MYRON pRCA. Residual 40% mLAD. Repeat [facility] days later with patetn stent, severely stenotic PDA (trivial branch)  B. [facility] 04/2009 with MYRON Cx, PTCA PDA  C. [facility] 09/2009 with patent stents, mild-moderate disease  D. [facility] 01/2010 with MYRON mD1. Residual 70% mLAD (neg FFR), 70% mRI (neg FFR)  E. [facility] 09/2010 without acute change, no PCI  F. [facility] 05/2011 with 65-70% mLAD, 75% D1, 70% oCx, OM1, RPAV. No PCI, CABG vs medical therapy  G. [facility] 06/2011 with MYRON LAD/D1. Borderline pLAD (FFR 0.87), not ideal for PCI  H. [facility] 09/2011 with MYRON pLAD, pCx  I. [facility] 2012 with 95% oCx. No PCI to pause for heart team approach. Intervened on a day later  J. [facility] 04/2012 with acute total occlusion of oCx/Marcelo/dOM1 s/p PTCA to all  K. [facility] 06/2012 with MYRON dLAD.   L. [facility] 06/2013 with MYRON x 3 RCA  M. [facility] 08/2013 with MYRON OM1, OM2  N. [facility] 02/2014 with PTCA LAD  O. [facility] 03/2014 with MYRON mLAD, D1  P. [facility] 06/2014 without change  Q. [facility] 2015 with multivessel disease prompting CABG (LIMA-LAD, SVG-PDA, SVG-D1, Y branch to OM2 and OM1)  R. [facility] 2018 with MYRON pCx.   S. [facility] 07/2023 with patent grafts   ICM s/p CRT-D 2016  A. S/p Cardiomems 2018, generator changeout 05/2021. Note, ICD therapies have been turned off  B. EF 45-55% [id number], 25-30% 2016, 40-50% [id number], 25-30% 05/2024, 30% 07/2024  S/p MV/TV Repair       Problem List:  Patient Active Problem List    Diagnosis Date Noted    Acute respiratory failure with hypoxia and hypercapnia (H) 04/27/2024     Priority: Medium    COPD with acute exacerbation (H) 04/27/2024     Priority: Medium    Bilateral pneumonia 04/27/2024     Priority: Medium    Lethargy 11/10/2023     Priority: Medium    Urinary tract infection without hematuria, site unspecified 11/10/2023     Priority: Medium    Fever 11/10/2023     Priority: Medium    Hypoxia 11/10/2023     Priority: Medium    Pyuria 11/10/2023     Priority: Medium    PAD (peripheral artery  disease) (H24) 11/10/2023     Priority: Medium    Left foot pain 11/10/2023     Priority: Medium    History of stroke 11/10/2023     Priority: Medium    Lives in long-term care facility 11/10/2023     Priority: Medium    Obesity hypoventilation syndrome (H) 11/10/2023     Priority: Medium    Benzodiazepine dependence (H) 11/10/2023     Priority: Medium    Thrombocytopenia (H24) 11/10/2023     Priority: Medium    ERIC (acute kidney injury) (H24) 11/10/2023     Priority: Medium    Chronic kidney disease, stage 3a (H) 10/16/2022     Priority: Medium    SOB (shortness of breath) 04/07/2022     Priority: Medium    Type 2 diabetes mellitus with hyperglycemia, with long-term current use of insulin (H) 02/13/2022     Priority: Medium    Morbid obesity (H) 12/10/2021     Priority: Medium    ICD (implantable cardioverter-defibrillator) in place 11/17/2021     Priority: Medium     Formatting of this note is different from the original.  Date of last device in office evaluation: 5/17/2022    ?  and model: Medtronic Cobalt CRT-D.   Date of implant: 5/7/2021  Tachy therapies remain OFF: S/p downgrade from ICD to pacemaker, but her implanted system includes a DF-4 lead, so an ICD generator was placed with the tachycardia therapies disabled.     ? Indication for device: Ischemic cardiomyopathy   ? Cardiac resynchronization therapy:   no     MRI Conditional:  No  o If No:  Reason why:  Abandoned LV lead    ? Battery longevity documented as less than 3  Months: No  ? Are any of the leads less than 3 months old:  No    ? Programming              ? Pacing mode and programmed lower rate: DDDR 60 - 130 bpm              ? Rate-responsive sensor type, if programmed on: Accelerometer        Underlying rhythm and heart rate:  SR @ 60 bpm    ? What is the response of this device to magnet placement:  None - tachy therapies off  ? PM magnet pacing rate: N/A   ? Any alert status on CIED generator or lead: No    ? Last pacing  threshold    Atrial   1.0 V @ 0.4 ms   Ventricular RV: 0.75 V @ 0.4 ms  LV:  2.75 V @ 1.0 ms    Formatting of this note is different from the original.  Date of last device in office evaluation: 11/17/2022     ?  and model: Medtronic Cobalt CRT-D.   Date of implant: 5/7/2021  Tachy therapies remain OFF: S/p downgrade from ICD to pacemaker, but her implanted system includes a DF-4 lead, so an ICD generator was placed with the tachycardia therapies disabled.     ? Indication for device: Ischemic cardiomyopathy   ? Cardiac resynchronization therapy:   no     MRI Conditional:  No  o If No:  Reason why:  Abandoned LV lead    ? Battery longevity documented as less than 3  Months: No  ? Are any of the leads less than 3 months old:  No    ? Programming              ? Pacing mode and programmed lower rate: DDDR 60 - 130 bpm              ? Rate-responsive sensor type, if programmed on: Accelerometer        Underlying rhythm and heart rate:  Sinus rhythm 62 bpm, w/BBB    ? What is the response of this device to magnet placement:  None - tachy therapies off  ? PM magnet pacing rate: N/A   ? Any alert status on CIED generator or lead: No    ? Last pacing threshold    Atrial   1.0 V @ 0.4 ms   Ventricular RV: 0.75 V @ 0.4 ms  LV:  2.75 V @ 1.0 ms      Atrial fibrillation (H) 04/06/2021     Priority: Medium    Pacemaker 04/06/2021     Priority: Medium    Major depressive disorder, recurrent severe without psychotic features (H) 01/26/2021     Priority: Medium    Panic disorder with agoraphobia 04/14/2020     Priority: Medium    Constipation 03/20/2020     Priority: Medium    Personality disorder (H) 03/05/2020     Priority: Medium     Rule out dependent personality    Formatting of this note might be different from the original.  Rule out dependent personality  Formatting of this note might be different from the original.  Rule out dependent personality      Candidiasis 03/01/2020     Priority: Medium    Posttraumatic  stress disorder 03/01/2020     Priority: Medium    COPD without exacerbation (H) 01/29/2020     Priority: Medium    Long term (current) use of insulin (H) 01/29/2020     Priority: Medium    Chronic systolic heart failure (H) 01/28/2020     Priority: Medium    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, left leg (H) 01/15/2020     Priority: Medium    Chest pain 11/11/2019     Priority: Medium    Polymyalgia rheumatica (H24) 11/28/2018     Priority: Medium    Unstable angina (H) 05/02/2018     Priority: Medium     Coronary angiogram 05/02/2018 demonstrated 30% stenosis in the LMCA, 50% stenosis in the Proximal LAD, 50% stenosis in the Mid LAD, 95% stenosis in the Proximal Circumflex (Restenosis - Balloon Angioplasty), 100% stenosis in the 1st Marginal, 50% stenosis in the Proximal RCA, 50% stenosis in the Distal RCA, the LIMA graft from the LIMA to the Distal LAD is free of significant disease; the SVG graft from the Aorta to the 1st Marginal is free of significant disease; the SVG graft from the Aorta to the Distal RCA is free of significant disease. Intervention included a successful  2.5mm x 12mm Balloon,  2.5mm x 10mm Cutting Balloon,  3mm x 12mm Drug Eluting Stent,  3mm x 12mm Balloon, and  3mm x 8mm Balloon to Proximal Circumflex, post stenosis 0%.    Formatting of this note is different from the original.  Coronary angiogram 05/02/2018 demonstrated 30% stenosis in the LMCA, 50% stenosis in the Proximal LAD, 50% stenosis in the Mid LAD, 95% stenosis in the Proximal Circumflex (Restenosis - Balloon Angioplasty), 100% stenosis in the 1st Marginal, 50% stenosis in the Proximal RCA, 50% stenosis in the Distal RCA, the LIMA graft from the LIMA to the Distal LAD is free of significant disease; the SVG graft from the Aorta to the 1st Marginal is free of significant disease; the SVG graft from the Aorta to the Distal RCA is free of significant disease. Intervention included a  successful  2.5mm x 12mm Balloon,  2.5mm x 10mm Cutting Balloon,  3mm x 12mm Drug Eluting Stent,  3mm x 12mm Balloon, and  3mm x 8mm Balloon to Proximal Circumflex, post stenosis 0%.  Formatting of this note is different from the original.  Coronary angiogram 05/02/2018 demonstrated 30% stenosis in the LMCA, 50% stenosis in the Proximal LAD, 50% stenosis in the Mid LAD, 95% stenosis in the Proximal Circumflex (Restenosis - Balloon Angioplasty), 100% stenosis in the 1st Marginal, 50% stenosis in the Proximal RCA, 50% stenosis in the Distal RCA, the LIMA graft from the LIMA to the Distal LAD is free of significant disease; the SVG graft from the Aorta to the 1st Marginal is free of significant disease; the SVG graft from the Aorta to the Distal RCA is free of significant disease. Intervention included a successful  2.5mm x 12mm Balloon,  2.5mm x 10mm Cutting Balloon,  3mm x 12mm Drug Eluting Stent,  3mm x 12mm Balloon, and  3mm x 8mm Balloon to Proximal Circumflex, post stenosis 0%.      Vitamin B12 deficiency 02/14/2018     Priority: Medium    Chronic bilateral low back pain without sciatica 01/17/2018     Priority: Medium    Chronic pain disorder 10/01/2017     Priority: Medium    Urinary incontinence, mixed 09/24/2017     Priority: Medium    Internal carotid artery stenosis, bilateral 07/13/2016     Priority: Medium     Carotid US 05/04/2018 showed moderate plaque formation, consistent with 50 to 69% stenosis in the right internal carotid artery, not significantly changed from 8/5/2015.  Moderate plaque formation, consistent with 50 to 69% stenosis in the left internal carotid artery; there has been mild progression of the left ICA stenosis since 8/5/2015.    Formatting of this note might be different from the original.  Carotid US 05/04/2018 showed moderate plaque formation, consistent with 50 to 69% stenosis in the right internal carotid artery, not significantly changed from 8/5/2015.  Moderate plaque formation,  consistent with 50 to 69% stenosis in the left internal carotid artery; there has been mild progression of the left ICA stenosis since 8/5/2015.    Formatting of this note might be different from the original.  Carotid US 05/04/2018 showed moderate plaque formation, consistent with 50 to 69% stenosis in the right internal carotid artery, not significantly changed from 8/5/2015.  Moderate plaque formation, consistent with 50 to 69% stenosis in the left internal carotid artery; there has been mild progression of the left ICA stenosis since 8/5/2015.      Essential (primary) hypertension 10/14/2015     Priority: Medium    Ischemic cardiomyopathy 09/20/2015     Priority: Medium     EF of 40-45%, status post RV lead revision and LV epicardial lead placement via mini-thoracotomy in August 2016.    Formatting of this note might be different from the original.  EF of 40-45%, status post RV lead revision and LV epicardial lead placement via mini-thoracotomy in August 2016.  Formatting of this note might be different from the original.  EF of 40-45%, status post RV lead revision and LV epicardial lead placement via mini-thoracotomy in August 2016.      S/P CABG x 5 08/21/2015     Priority: Medium    Pain medication agreement 04/20/2013     Priority: Medium     Controlled substance agreement for percocet #30/month on file and signed 4/17/13.  Designated pharmacy: [facility] Prescribing physician: Andrea Diagnosis: Ulnar neuropathy    Formatting of this note might be different from the original.  Controlled substance agreement for percocet #30/month on file and signed 4/17/13.  Designated pharmacy: [facility] Prescribing physician: Andrea Diagnosis: Ulnar neuropathy      Anemia in other chronic diseases classified elsewhere 01/05/2013     Priority: Medium    Hypothyroidism, unspecified 12/10/2010     Priority: Medium    ACP (advance care planning) 11/03/2010     Priority: Medium     Formatting of this note might be different from  "the original.  Patient has identified Health Care Agent(s): Yes  Add Health Care Agents: Yes    Health Care Agent(s):    Primary Health Care Agent:     Edwar Escobar Relationship:    Spouse Phone:     [phone]    Secondary Health Care Agent:     Chiki Oro Relationship:     Son Phone:     [phone]      Patient has Advance Care Plan Documents (Health Care Directive, POLST): Yes    Advance Care Plan Documents:  Health Care Directive--03/28/11  Resuscitation Guidelines--    Patient has identified Specific Treatment Preferences: Yes   Specific Treatment Preferences:   a.) Code Status:  DNR/ Do Not Attempt Resuscitation - Allow a Natural Death    b.) Goals of Treatment:     ii. Limited Interventions and treat reversible conditions.  Provide interventions aimed at treatment of new or reversible illness/injury or non-life threatening chronic conditions. Duration of invasive or uncomfortable interventions should generally be limited.- Trial of intubation 5 days or other instructions \"If no improvement, stop.\"  c.) Interventions and Treatments:   i.   Antibiotics:          - Aggressive antibiotics  ii.  Nutrition/Hydration:         - Offer food and liquids by mouth         - IV fluid administration         - No feeding tube under any circumstance.  iii. Transfusion:          - Blood products for comfort/relief of symptoms only  iv. Dialysis:           - Dialysis for short term \"for recovery, but not long term.\"        Last Assessment & Plan:   Advance Care Planning: Disease-specific Session    Letty Escobar is an [facility] patient of Dr. Renea Mendez of the [facility].    Advance care planning discussions were completed with Letty and her healthcare agent, spouse Edwar Escobar on Monday, March 28, 2011 at the [facility].    Understanding of Illness and Disease Elnora:   Letty identifies her medical condition as diabetes with peripheral neuropathy, heart problems with a " "heart attack 2009 plus 4 stent placements; sleep apnea; depression; hypothyroid; high cholesterol; tobacco use; and describes it as needing further assistance with thyroid and diabetes management.  She identifies the following symptoms of her medical condition as being the most bothersome: some memory loss, balance problems, light headedness and dizziness, puffy eyes and lower extremity edema from time to time.    Let[place] is retired [place] .  She is independent with all cares and lives an active life style although, \"I'm not as active as I used to be.\"    Goals of Care:   Letty currently hopes to maintain independence, control pain and symptoms and delay progression of, but not cure, the illness.  \"I want to get the diabetes and thyroid under better control.\"  Letty has an appointment the first part of April for follow up.    Quality of Life:    Letty identifies quality of life as family involvement twice weekly, Shinto activities, newly assigned  , playing cards, the computer,    Letty christiano with serious challenges in her life through her ganesh in God, prayers and support of her Shinto family, spouse and son.    Letty identifies the following fears and worries about her medical care:  \"I just want the diabetes straightened out.\"    Treatment and Care Preferences:   Past experiences in dealing with family and/or friends that have  or been seriously ill include her brother who took his own life.  \"He was 53 years old and living with us.  I found him.\"   As a result of these experiences, Letty expresses these health care preferences:      Summary Letty's Treatment Preferences:  LOW SURVIVAL; HIGH TREATMENT BURDEN:  If Letty suffered a serious complication, such that she was facing a prolonged hospital stay, required ongoing medical interventions, and the chance of living through the complication was low (for example, only " "5 out of 100 would live), Letty would choose:  to focus treatment on comfort and quality of life (\"Quality of life is more important than length of life to Letty.\")  COMMENT:  \"If I can't live a normal life, I wouldn't want to live.\"    HIGH SURVIVAL; LOW FUNCTIONAL STATUS:  If Letty had a serious complication and had a good chance of living through the complication but it was expected that she would never be able to walk or talk again and would require 24 hour nursing care, she would choose:  to focus treatment on comfort and quality of life (\"Quality of life is more important than length of life to Letty.\")  COMMENT:  \"I'd accept rehabilitation.\"    HIGH SURVIVAL; LOW COGNITIVE STATUS:  If Letty had a serious complication and had a good chance of living through the complication but it was expected that she would never know who she was or who she was with and would require 24 hour nursing care, she would choose:  to focus treatment on comfort and quality of life (\"Quality of life is more important than length of life to Letty.\")    CARDIO-PULMONARY RESUSCITATION (CPR):  The facts, risks and benefits of CPR were discussed with Letty.  If she had a sudden event that caused her heart and breathing to stop, she:  WOULD NOT want CPR attempted and instead would prefer that a natural death occur.    MECHANICAL VENTILATION: If Letty had an episode where she was unable to breathe on her own, she would choose the following:  attempt to use any appropriate non-invasive method to assist breathing, and use mechanical ventilation.  COMMENT:  \"If reversible ventilator is acceptable for 5 days.  If no improvement, stop.\"     Letty has chosen her healthcare agent to:  strictly follow her wishes.    Follow Up Plan:   Letty was encouraged to continue advance care planning discussions with her health care agents and primary care provider.   Letty and her health care agent declined handouts.     Documents addressed during this advance " care planning session:  1.  Health Care Agents identified.          .  Primary health care agent is spouse, Edwar Escobar;          .  Secondary health care agent is son, Jermaine Oro.  2.  Health Care Directive completed and scanned into medical record.  3.  Statement of Treatment Preferences for advanced illness completed and scanned into the medical record.  4.  Resuscitation Guidelines completed for DNR.  Document sent to Dr. Renea Mendez for signature and returned to the home. Letty, please place on the refrigerator.    Recommendations/Plan:   Letty and her health care agent to review Advance Care Plan.     Letty would benefit from:   Care Navigation/Care Navigation Help Desk--explained services for pending future needs.  No identified needs today.  Care Navigation brochure was given to Letty and her healthcare agent.    Advance Care Planning recommendations and Letty's concerns and questions were cc ed to her primary provider.     Thank you, Letty for the opportunity of assisting with Advance Care Planning. It was a seeing you again and meeting Edwar.  Please contact me if I can be of further assistance.    Interviewer: Pat Martin RN    Advance Care Planning Facilitator  [phone]   3/28/2011      ELI (obstructive sleep apnea) 01/31/2010     Priority: Medium     PSG on April/2008 that showed moderate Obstructive Sleep Apnea with an AHI of 23.5 . Limb movements persisted at 29 movements/hour at optimal pressures, despite carbidopa use.    Formatting of this note might be different from the original.  PSG on April/2008 that showed moderate Obstructive Sleep Apnea with an AHI of 23.5 . Limb movements persisted at 29 movements/hour at optimal pressures, despite carbidopa use.  Formatting of this note might be different from the original.  PSG on April/2008 that showed moderate Obstructive Sleep Apnea with an AHI of 23.5 . Limb movements persisted at 29 movements/hour at optimal pressures, despite  carbidopa use.      Coronary atherosclerosis 02/06/2009     Priority: Medium     Formatting of this note might be different from the original.  2/5/2009 - MI - Proximal RCA 99%, mild-mod disease elsewhere.  EF 60%.  PCI:  MYRON to pRCA.  2/12/2009 - admit CP - Widely patent RCA stent. Moderate diffuse CAD. Severe stenosis in trivial PDA branch. LVEF 45%.  1/25/2010 - admit CP - PTCA and stent of diagonal  9/8/2010 - admit CP - LAD patent stent. Moderate diffuse CAD. Medical management recommended.   4/25/2012 - NSTEMI - Acute total occlusion of the ostial Circumflex. Acute total occlusion of the ostial ramus. Acute total occlusion of the distal 1st Obtuse Marginal. Angioplasty of ostial circumflex and ostial ramus. There was distal embolization to the OM1 vessel. This vessel was small and not amendable to intervention. Indefinite: plavix and asa 81.  8/10/2015 - CABx5 - 1. LIMA to distal LAD, reverse SVG to OM1 and OM2, reverse SVG to diagonal, reverse SVG to PDA.   2. Mitral valve repair with a Medtronics 3-D full ring, 28 mm.   3. Tricuspid repair with a Medtronic 28 mm partial ring  1/12/2016 - TTE - EF 40-45%  Formatting of this note might be different from the original.  2/5/2009 - MI - Proximal RCA 99%, mild-mod disease elsewhere.  EF 60%.  PCI:  MYRON to pRCA.  2/12/2009 - admit CP - Widely patent RCA stent. Moderate diffuse CAD. Severe stenosis in trivial PDA branch. LVEF 45%.  1/25/2010 - admit CP - PTCA and stent of diagonal  9/8/2010 - admit CP - LAD patent stent. Moderate diffuse CAD. Medical management recommended.   4/25/2012 - NSTEMI - Acute total occlusion of the ostial Circumflex. Acute total occlusion of the ostial ramus. Acute total occlusion of the distal 1st Obtuse Marginal. Angioplasty of ostial circumflex and ostial ramus. There was distal embolization to the OM1 vessel. This vessel was small and not amendable to intervention. Indefinite: plavix and asa 81.  8/10/2015 - CABx5 - 1. LIMA to distal  LAD, reverse SVG to OM1 and OM2, reverse SVG to diagonal, reverse SVG to PDA.   2. Mitral valve repair with a [employer] 3-D full ring, 28 mm.   3. Tricuspid repair with a [employer] 28 mm partial ring  1/12/2016 - TTE - EF 40-45%      Restless legs syndrome 08/29/2007     Priority: Medium    Hyperlipidemia, unspecified 05/30/2007     Priority: Medium       Past Medical History:   Diagnosis Date    ACP (advance care planning) 11/3/2010    Acute coronary syndrome (H) 11/22/2019    Acute exacerbation of CHF (congestive heart failure) (H) 11/11/2019    Acute on chronic systolic (congestive) heart failure (H) 1/28/2020    Anemia of chronic disease 1/5/2013    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, left leg (H) 1/15/2020    BPPV (benign paroxysmal positional vertigo) 5/31/2018    Candidiasis 3/1/2020    Carotid stenosis, right 7/13/2016    Chest pain 11/11/2019    Chronic bilateral low back pain without sciatica 1/17/2018    Chronic pain disorder 10/1/2017    Constipation 3/20/2020    COPD (chronic obstructive pulmonary disease) (H) 6/24/2020    Coronary atherosclerosis 2/6/2009    Cubital tunnel syndrome on left 11/13/2012    Depressive disorder     Diabetes mellitus (H)     Diabetes mellitus type 2 in obese 7/13/2006    Diabetes mellitus type 2 in obese 7/13/2006    Drug-seeking behavior 4/4/2020    Failure to thrive in adult 9/3/2020    Hereditary and idiopathic peripheral neuropathy 4/24/2007    Hyperlipidemia with target low density lipoprotein (LDL) cholesterol less than 70 mg/dL 5/30/2007    Hypertension 10/14/2015    Hypothyroidism 12/10/2010    Irritable bowel syndrome 9/7/2015    Ischemic cardiomyopathy 9/20/2015    Long-term use of high-risk medication 4/14/2020    Moniliasis, 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    Myocardial infarction (H)     Neuromuscular disorder (H)     Other specified postprocedural states  10/8/2015    Pain medication agreement 4/20/2013    Panic disorder with agoraphobia 4/14/2020    Paroxysmal atrial fibrillation (H) 10/2/2015    Personality disorder (H) 3/5/2020    Polymyalgia rheumatica (H24) 11/28/2018    Posttraumatic stress disorder 3/1/2020    Restless legs syndrome (RLS) 8/29/2007    Restless legs syndrome (RLS) 8/29/2007    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    Serum calcium elevated 3/1/2020    Somatic dysfunction of sacral region 1/17/2018    Subacromial bursitis of left shoulder joint 8/6/2018    Suicidal ideation 4/1/2020    Thyroid disease     TIA (transient ischemic attack) 5/4/2018    TIA (transient ischemic attack) 5/4/2018    Tobacco abuse 2/17/2017    Tobacco abuse 2/17/2017    Urinary incontinence, mixed 9/24/2017    UTI (urinary tract infection) 4/17/2020    Vitamin B12 deficiency 2/14/2018    Weakness 12/17/2019       MEDS: acetaminophen (TYLENOL) 325 MG tablet  acetaminophen (TYLENOL) 500 MG tablet  albuterol (PROVENTIL) (2.5 MG/3ML) 0.083% neb solution  albuterol (PROVENTIL) (2.5 MG/3ML) 0.083% neb solution  apixaban ANTICOAGULANT (ELIQUIS) 5 MG tablet  aspirin 81 MG EC tablet  bisacodyl (DULCOLAX) 10 MG suppository  clotrimazole (LOTRIMIN) 1 % external cream  cyanocobalamin (CYANOCOBALAMIN) 1000 MCG/ML injection  DULoxetine HCl 40 MG CPEP  empagliflozin (JARDIANCE) 10 MG TABS tablet  famotidine (PEPCID) 20 MG tablet  fluconazole (DIFLUCAN) 150 MG tablet  fluticasone-vilanterol (BREO ELLIPTA) 200-25 MCG/INH inhaler  furosemide (LASIX) 20 MG tablet  gabapentin (NEURONTIN) 400 MG capsule  hydrochlorothiazide (HYDRODIURIL) 25 MG tablet  Insulin Aspart FlexPen 100 UNIT/ML SOPN  LANTUS SOLOSTAR 100 UNIT/ML soln  levothyroxine (SYNTHROID/LEVOTHROID) 137 MCG tablet  lidocaine (LMX4) 4 % external cream  LORazepam (ATIVAN) 1 MG tablet  losartan (COZAAR) 25 MG tablet  Melatonin 10 MG TABS tablet  metoprolol succinate ER (TOPROL-XL) 25 MG 24 hr tablet  mirtazapine (REMERON) 15 MG tablet  MUCUS  RELIEF 600 MG 12 hr tablet  nitroGLYcerin (NITROSTAT) 0.4 MG sublingual tablet  ondansetron (ZOFRAN ODT) 4 MG ODT tab  pantoprazole (PROTONIX) 40 MG EC tablet  polyethylene glycol (MIRALAX) 17 GM/Dose powder  potassium chloride ER (MICRO-K) 10 MEQ CR capsule  predniSONE (DELTASONE) 10 MG tablet  QUEtiapine (SEROQUEL) 100 MG tablet  rosuvastatin (CRESTOR) 10 MG tablet  sennosides (SENOKOT) 8.6 MG tablet  traZODone (DESYREL) 50 MG tablet        ALLERGIES:    Allergies   Allergen Reactions    Bee Pollen Anaphylaxis    Diphenhydramine Palpitations     Tolerated IV Benadryl when not pushed too fast    Isosorbide Nitrate Other (See Comments) and Dizziness     Also causes syncope (has fallen before) and brain fog/mental disturbances - please do not prescribe    Nitroglycerin Dizziness, Fatigue and Other (See Comments)     Specifically the patch - please do not prescribe  Specifically the patch - please do not prescribe      Contrast Dye Hives and Itching     Tolerated CT with IV contrast 5/9/2024 with 50 mg IV Benadryl prior    Penicillin G     Adhesive Tape Rash    Liquid Adhesive Itching    Nystatin Dermatitis     Also blisters    Penicillins Swelling and Rash     Occurred as a child - not 100% sure on specific reactions    Sulfa Antibiotics Other (See Comments)     Occurred as a child / patient does not remember specific reaction       Past Surgical History:   Procedure Laterality Date    CARDIAC SURGERY      stents x 11    CARDIAC SURGERY      CHOLECYSTECTOMY      CHOLECYSTECTOMY      GENITOURINARY SURGERY      Tubal ligation    OTHER SURGICAL HISTORY      Genitourinary surgery    RELEASE CARPAL TUNNEL      RELEASE CARPAL TUNNEL         Social History     Tobacco Use    Smoking status: Never    Smokeless tobacco: Never   Vaping Use    Vaping status: Never Used   Substance Use Topics    Alcohol use: Not Currently    Drug use: Never         Review of Systems   Except as noted in HPI, all other systems were reviewed and  are negative    Physical Exam   Vitals were reviewed  GENERAL APPEARANCE: Alert, no acute distress; patient appears slightly groggy  FACE: normal facies  EYES: Pupils are equal  HENT: normal external exam  NECK: no adenopathy or asymmetry  CHEST: Left-sided chest wall tenderness  RESP: normal respiratory effort; clear breath sounds bilaterally  CV: Heart sounds are distant  ABD: soft, obese, no reported tenderness; no rebound or guarding; bowel sounds are normal  MS: no gross deformities noted; normal muscle tone.  SKIN: no worrisome rash  NEURO: no facial droop; no focal deficits, speech is normal  PSYCH: Flat affect      Available Lab/Imaging Results     Results for orders placed or performed during the hospital encounter of 09/03/24 (from the past 24 hour(s))   CBC with platelets differential    Narrative    The following orders were created for panel order CBC with platelets differential.  Procedure                               Abnormality         Status                     ---------                               -----------         ------                     CBC with platelets and d...[[id number]]  Abnormal            Final result                 Please view results for these tests on the individual orders.   Basic metabolic panel   Result Value Ref Range    Sodium 137 135 - 145 mmol/L    Potassium 4.3 3.4 - 5.3 mmol/L    Chloride 104 98 - 107 mmol/L    Carbon Dioxide (CO2) 23 22 - 29 mmol/L    Anion Gap 10 7 - 15 mmol/L    Urea Nitrogen 16.1 8.0 - 23.0 mg/dL    Creatinine 0.98 (H) 0.51 - 0.95 mg/dL    GFR Estimate 62 >60 mL/min/1.73m2    Calcium 9.8 8.8 - 10.4 mg/dL    Glucose 149 (H) 70 - 99 mg/dL   Troponin T, High Sensitivity   Result Value Ref Range    Troponin T, High Sensitivity 19 (H) <=14 ng/L   CBC with platelets and differential   Result Value Ref Range    WBC Count 8.0 4.0 - 11.0 10e3/uL    RBC Count 3.34 (L) 3.80 - 5.20 10e6/uL    Hemoglobin 10.1 (L) 11.7 - 15.7 g/dL    Hematocrit 31.3 (L) 35.0 -  "47.0 %    MCV 94 78 - 100 fL    MCH 30.2 26.5 - 33.0 pg    MCHC 32.3 31.5 - 36.5 g/dL    RDW 14.0 10.0 - 15.0 %    Platelet Count 155 150 - 450 10e3/uL    % Neutrophils 68 %    % Lymphocytes 22 %    % Monocytes 6 %    % Eosinophils 4 %    % Basophils 0 %    % Immature Granulocytes 1 %    NRBCs per 100 WBC 0 <1 /100    Absolute Neutrophils 5.5 1.6 - 8.3 10e3/uL    Absolute Lymphocytes 1.8 0.8 - 5.3 10e3/uL    Absolute Monocytes 0.5 0.0 - 1.3 10e3/uL    Absolute Eosinophils 0.3 0.0 - 0.7 10e3/uL    Absolute Basophils 0.0 0.0 - 0.2 10e3/uL    Absolute Immature Granulocytes 0.0 <=0.4 10e3/uL    Absolute NRBCs 0.0 10e3/uL     EKG reviewed by me: Electronically paced rhythm.  Old anterior infarct.  Nonspecific T wave abnormality.  Compared with previous EKG on August 13, 2024, no acute change.            Impression     Final diagnoses:   Chest pains (chronic recurrent)   Anxiety state         ED Course & Medical Decision Making   Letty Escobar is a 68 year old female with ischemic cardiomyopathy, history of coronary disease, status post CABG, chronic chest pain, on chronic anticoagulation for history of PE, anxiety/depression who presented to the emergency department by EMS with acute recurrent chest pains.  Patient appears to have frequent episodes of these chest pains that occur late at night and into the early morning.  She has had multiple ER visits for these chest pains.  Her troponin enzymes have remained stable, and the pattern of the chest pains are unchanged.  Patient states that she was awakened by the chest pain last night around 9 PM.  The pain persisted.  Resident records from the nursing home indicate that she was given Ativan 1 mg p.o. and morphine 7.5 mg p.o.  She did not want nitroglycerin.  She wanted to come to the emergency department.    Patient received a nitro spray by EMS.  She informed me that she was instructed to come to the emergency department \"every time I have chest pain.\"  I questioned " this recommendation as she has already had numerous workups over the past year without any signs of acute myocardial injury.    Vital signs reveal a temp of 97.3, blood pressure 114/48, heart rate of 65, respiration 16, oxygen saturations of 91-95%.  Patient appears slightly groggy.  She states that she is still having a lot of chest pain on the left side of the chest, radiating to the left upper quadrant.  There is some pain radiating into the left arm.  Patient is requesting morphine for pain, and something for nausea.    I reviewed the patient's medical records, and her heart history is summarized above.  Patient's workup reveals a normal CBC with white blood count of 8.0, hemoglobin of 10.1 which is baseline for her.  Platelet count of 155.  Basic metabolic panel reveals normal electrolytes, creatinine of 0.98, glucose of 149.  Troponin enzyme is 19.  Looking back on her previous troponin enzymes, there has been a trend from the low 30s 6 months ago to a level of 19 tonight.  Patient's previous chest x-rays have all been stable.  I intentionally did not order chest x-ray.    Patient was informed that her EKG, and lab results were all reassuring.  She should resist the urge to come to the emergency department if she has ongoing chest pains.  Allow her medications of Ativan and morphine to work.  Follow-up with her cardiologist in 6-8 months as planned.  Patient expressed understanding and agreement with discharge instructions below.        Written after-visit summary and instructions were given at the time of discharge.    Follow up Plan:   Chantal Pierce MD  [address] [address]  [phone]    In 1 week        Discharge Instructions:   Your EKG and blood work were again reassuring.  Your troponin enzyme has been stable and trending more and more toward normal level over the past several months.  If you have ongoing chest pains, please allow the Ativan and morphine to work  before returning to the emergency department.  Keep your appointment with your cardiology team in 6-8 months.       Disclaimer: This note consists of words and symbols derived from keyboarding and dictation using voice recognition software.  As a result, there may be errors that have gone undetected.  Please consider this when interpreting information found in this note.       Monie Weiss MD  09/03/24 0315

## 2024-09-03 NOTE — ED TRIAGE NOTES
Chest pain beginning at 2000. Rating 9/10.      Triage Assessment (Adult)       Row Name 09/03/24 0205          Triage Assessment    Airway WDL WDL        Respiratory WDL    Respiratory WDL WDL        Skin Circulation/Temperature WDL    Skin Circulation/Temperature WDL WDL        Cardiac WDL    Cardiac WDL X;chest pain        Chest Pain Assessment    Chest Pain Location midsternal        Peripheral/Neurovascular WDL    Peripheral Neurovascular WDL WDL        Cognitive/Neuro/Behavioral WDL    Cognitive/Neuro/Behavioral WDL WDL

## 2024-09-03 NOTE — DISCHARGE INSTRUCTIONS
Your EKG and blood work were again reassuring.  Your troponin enzyme has been stable and trending more and more toward normal level over the past several months.  If you have ongoing chest pains, please allow the Ativan and morphine to work before returning to the emergency department.  Keep your appointment with your cardiology team in 6-8 months.

## 2024-09-10 ENCOUNTER — LAB REQUISITION (OUTPATIENT)
Dept: LAB | Facility: CLINIC | Age: 69
End: 2024-09-10
Payer: COMMERCIAL

## 2024-09-10 DIAGNOSIS — I50.22 CHRONIC SYSTOLIC (CONGESTIVE) HEART FAILURE (H): ICD-10-CM

## 2024-09-11 LAB
ANION GAP SERPL CALCULATED.3IONS-SCNC: 11 MMOL/L (ref 7–15)
BUN SERPL-MCNC: 16.3 MG/DL (ref 8–23)
CALCIUM SERPL-MCNC: 9.9 MG/DL (ref 8.8–10.4)
CHLORIDE SERPL-SCNC: 107 MMOL/L (ref 98–107)
CREAT SERPL-MCNC: 0.94 MG/DL (ref 0.51–0.95)
EGFRCR SERPLBLD CKD-EPI 2021: 65 ML/MIN/1.73M2
GLUCOSE SERPL-MCNC: 112 MG/DL (ref 70–99)
HCO3 SERPL-SCNC: 22 MMOL/L (ref 22–29)
POTASSIUM SERPL-SCNC: 4.5 MMOL/L (ref 3.4–5.3)
SODIUM SERPL-SCNC: 140 MMOL/L (ref 135–145)

## 2024-09-11 PROCEDURE — 80048 BASIC METABOLIC PNL TOTAL CA: CPT | Mod: ORL | Performed by: FAMILY MEDICINE

## 2024-09-11 PROCEDURE — 36415 COLL VENOUS BLD VENIPUNCTURE: CPT | Mod: ORL | Performed by: FAMILY MEDICINE

## 2024-09-11 PROCEDURE — P9604 ONE-WAY ALLOW PRORATED TRIP: HCPCS | Mod: ORL | Performed by: FAMILY MEDICINE

## 2024-09-13 ENCOUNTER — HOSPITAL ENCOUNTER (EMERGENCY)
Facility: CLINIC | Age: 69
Discharge: NURSING, REHAB OR LONG-TERM CARE | End: 2024-09-13
Attending: FAMILY MEDICINE | Admitting: FAMILY MEDICINE
Payer: COMMERCIAL

## 2024-09-13 ENCOUNTER — APPOINTMENT (OUTPATIENT)
Dept: GENERAL RADIOLOGY | Facility: CLINIC | Age: 69
End: 2024-09-13
Attending: FAMILY MEDICINE
Payer: COMMERCIAL

## 2024-09-13 VITALS
RESPIRATION RATE: 10 BRPM | DIASTOLIC BLOOD PRESSURE: 65 MMHG | TEMPERATURE: 98.9 F | OXYGEN SATURATION: 96 % | WEIGHT: 201.4 LBS | SYSTOLIC BLOOD PRESSURE: 137 MMHG | BODY MASS INDEX: 36.84 KG/M2 | HEART RATE: 60 BPM

## 2024-09-13 DIAGNOSIS — R07.89 LEFT-SIDED CHEST WALL PAIN: ICD-10-CM

## 2024-09-13 LAB
ANION GAP SERPL CALCULATED.3IONS-SCNC: 10 MMOL/L (ref 7–15)
BASOPHILS # BLD AUTO: 0.1 10E3/UL (ref 0–0.2)
BASOPHILS NFR BLD AUTO: 1 %
BUN SERPL-MCNC: 18.7 MG/DL (ref 8–23)
CALCIUM SERPL-MCNC: 9.8 MG/DL (ref 8.8–10.4)
CHLORIDE SERPL-SCNC: 106 MMOL/L (ref 98–107)
CREAT SERPL-MCNC: 1.15 MG/DL (ref 0.51–0.95)
D DIMER PPP FEU-MCNC: 0.38 UG/ML FEU (ref 0–0.5)
EGFRCR SERPLBLD CKD-EPI 2021: 51 ML/MIN/1.73M2
EOSINOPHIL # BLD AUTO: 0.2 10E3/UL (ref 0–0.7)
EOSINOPHIL NFR BLD AUTO: 3 %
ERYTHROCYTE [DISTWIDTH] IN BLOOD BY AUTOMATED COUNT: 14.1 % (ref 10–15)
GLUCOSE SERPL-MCNC: 126 MG/DL (ref 70–99)
HCO3 SERPL-SCNC: 21 MMOL/L (ref 22–29)
HCT VFR BLD AUTO: 32.1 % (ref 35–47)
HGB BLD-MCNC: 10.3 G/DL (ref 11.7–15.7)
HOLD SPECIMEN: NORMAL
HOLD SPECIMEN: NORMAL
IMM GRANULOCYTES # BLD: 0.1 10E3/UL
IMM GRANULOCYTES NFR BLD: 1 %
LYMPHOCYTES # BLD AUTO: 2 10E3/UL (ref 0.8–5.3)
LYMPHOCYTES NFR BLD AUTO: 24 %
MCH RBC QN AUTO: 30 PG (ref 26.5–33)
MCHC RBC AUTO-ENTMCNC: 32.1 G/DL (ref 31.5–36.5)
MCV RBC AUTO: 94 FL (ref 78–100)
MONOCYTES # BLD AUTO: 0.4 10E3/UL (ref 0–1.3)
MONOCYTES NFR BLD AUTO: 5 %
NEUTROPHILS # BLD AUTO: 5.4 10E3/UL (ref 1.6–8.3)
NEUTROPHILS NFR BLD AUTO: 66 %
NRBC # BLD AUTO: 0 10E3/UL
NRBC BLD AUTO-RTO: 0 /100
PLATELET # BLD AUTO: 142 10E3/UL (ref 150–450)
POTASSIUM SERPL-SCNC: 4.4 MMOL/L (ref 3.4–5.3)
RBC # BLD AUTO: 3.43 10E6/UL (ref 3.8–5.2)
SODIUM SERPL-SCNC: 137 MMOL/L (ref 135–145)
TROPONIN T SERPL HS-MCNC: 22 NG/L
WBC # BLD AUTO: 8.1 10E3/UL (ref 4–11)

## 2024-09-13 PROCEDURE — 84484 ASSAY OF TROPONIN QUANT: CPT | Performed by: FAMILY MEDICINE

## 2024-09-13 PROCEDURE — 96374 THER/PROPH/DIAG INJ IV PUSH: CPT | Performed by: FAMILY MEDICINE

## 2024-09-13 PROCEDURE — 250N000011 HC RX IP 250 OP 636: Performed by: FAMILY MEDICINE

## 2024-09-13 PROCEDURE — 80048 BASIC METABOLIC PNL TOTAL CA: CPT | Performed by: FAMILY MEDICINE

## 2024-09-13 PROCEDURE — 85379 FIBRIN DEGRADATION QUANT: CPT | Performed by: FAMILY MEDICINE

## 2024-09-13 PROCEDURE — 85025 COMPLETE CBC W/AUTO DIFF WBC: CPT | Performed by: FAMILY MEDICINE

## 2024-09-13 PROCEDURE — 99284 EMERGENCY DEPT VISIT MOD MDM: CPT | Performed by: FAMILY MEDICINE

## 2024-09-13 PROCEDURE — 93005 ELECTROCARDIOGRAM TRACING: CPT | Performed by: FAMILY MEDICINE

## 2024-09-13 PROCEDURE — 93010 ELECTROCARDIOGRAM REPORT: CPT | Performed by: FAMILY MEDICINE

## 2024-09-13 PROCEDURE — 99285 EMERGENCY DEPT VISIT HI MDM: CPT | Mod: 25 | Performed by: FAMILY MEDICINE

## 2024-09-13 PROCEDURE — 36415 COLL VENOUS BLD VENIPUNCTURE: CPT | Performed by: FAMILY MEDICINE

## 2024-09-13 PROCEDURE — 71045 X-RAY EXAM CHEST 1 VIEW: CPT

## 2024-09-13 RX ORDER — ONDANSETRON 4 MG/1
4 TABLET, ORALLY DISINTEGRATING ORAL EVERY 8 HOURS PRN
Qty: 10 TABLET | Refills: 0 | Status: SHIPPED | OUTPATIENT
Start: 2024-09-13

## 2024-09-13 RX ORDER — ONDANSETRON 4 MG/1
4 TABLET, ORALLY DISINTEGRATING ORAL ONCE
Status: COMPLETED | OUTPATIENT
Start: 2024-09-13 | End: 2024-09-13

## 2024-09-13 RX ORDER — MORPHINE SULFATE 2 MG/ML
1 INJECTION, SOLUTION INTRAMUSCULAR; INTRAVENOUS
Status: COMPLETED | OUTPATIENT
Start: 2024-09-13 | End: 2024-09-13

## 2024-09-13 RX ADMIN — MORPHINE SULFATE 1 MG: 2 INJECTION, SOLUTION INTRAMUSCULAR; INTRAVENOUS at 04:08

## 2024-09-13 RX ADMIN — ONDANSETRON 4 MG: 4 TABLET, ORALLY DISINTEGRATING ORAL at 03:11

## 2024-09-13 ASSESSMENT — ENCOUNTER SYMPTOMS
GASTROINTESTINAL NEGATIVE: 1
CHEST TIGHTNESS: 1
MUSCULOSKELETAL NEGATIVE: 1
FEVER: 0
EYES NEGATIVE: 1
NEUROLOGICAL NEGATIVE: 1
SHORTNESS OF BREATH: 0
CHILLS: 0
PALPITATIONS: 0
PSYCHIATRIC NEGATIVE: 1
COUGH: 0
ENDOCRINE NEGATIVE: 1

## 2024-09-13 ASSESSMENT — ACTIVITIES OF DAILY LIVING (ADL)
ADLS_ACUITY_SCORE: 39

## 2024-09-13 NOTE — ED NOTES
Patient wanted her urine checked; canister changed and the ambulance arrived; patient unable to urinate.

## 2024-09-13 NOTE — ED PROVIDER NOTES
History     Chief Complaint   Patient presents with    Chest Pain     HPI  Letty Escobar is a 68 year old female who presents emergency room via ambulance from her nursing home secondary concerns of left anterior chest pain with associated hiccups.  Patient states that symptoms started several hours ago.  Hiccups have now resolved but her pain continues to the anterior chest on the left.  Patient is well-known to me from multiple similar ER visits.  Patient states that the pain was not usual today because it was associated with hiccups but now the pain is more typical of her usual chest pain issues now that the hiccups have resolved.  She denies any fall or injury that might of caused the pain.  She denies fever or chills symptoms.  She denies cough or congestion more than typical.  Patient states that she has peripheral neuropathies especially involving her upper arms bilaterally.  This causes some tingling into her left hand.  Patient states that she waited so long for the nursing staff to come to radiate at the nursing home that she started crying and woke up her neighbor resident at the nursing home.  Patient received Ativan, morphine, and nitroglycerin at the nursing home but had no improvement in her symptoms.  She refused oral aspirin as offered by the EMS staff.  Patient states that she has a pacemaker in place.  Patient denies any reflux symptoms.    Allergies:  Allergies   Allergen Reactions    Bee Pollen Anaphylaxis    Diphenhydramine Palpitations     Tolerated IV Benadryl when not pushed too fast    Isosorbide Nitrate Other (See Comments) and Dizziness     Also causes syncope (has fallen before) and brain fog/mental disturbances - please do not prescribe    Nitroglycerin Dizziness, Fatigue and Other (See Comments)     Specifically the patch - please do not prescribe  Specifically the patch - please do not prescribe      Contrast Dye Hives and Itching     Tolerated CT with IV contrast 5/9/2024 with 50  mg IV Benadryl prior    Isosorbide      Other Reaction(s): foggy head    Penicillin G     Adhesive Tape Rash    Liquid Adhesive Itching    Nystatin Dermatitis     Also blisters    Penicillins Swelling and Rash     Occurred as a child - not 100% sure on specific reactions    Sulfa Antibiotics Other (See Comments)     Occurred as a child / patient does not remember specific reaction       Problem List:    Patient Active Problem List    Diagnosis Date Noted    Acute respiratory failure with hypoxia and hypercapnia (H) 04/27/2024     Priority: Medium    COPD with acute exacerbation (H) 04/27/2024     Priority: Medium    Bilateral pneumonia 04/27/2024     Priority: Medium    Lethargy 11/10/2023     Priority: Medium    Urinary tract infection without hematuria, site unspecified 11/10/2023     Priority: Medium    Fever 11/10/2023     Priority: Medium    Hypoxia 11/10/2023     Priority: Medium    Pyuria 11/10/2023     Priority: Medium    PAD (peripheral artery disease) (H24) 11/10/2023     Priority: Medium    Left foot pain 11/10/2023     Priority: Medium    History of stroke 11/10/2023     Priority: Medium    Lives in long-term care facility 11/10/2023     Priority: Medium    Obesity hypoventilation syndrome (H) 11/10/2023     Priority: Medium    Benzodiazepine dependence (H) 11/10/2023     Priority: Medium    Thrombocytopenia (H24) 11/10/2023     Priority: Medium    ERIC (acute kidney injury) (H24) 11/10/2023     Priority: Medium    Chronic kidney disease, stage 3a (H) 10/16/2022     Priority: Medium    SOB (shortness of breath) 04/07/2022     Priority: Medium    Type 2 diabetes mellitus with hyperglycemia, with long-term current use of insulin (H) 02/13/2022     Priority: Medium    Morbid obesity (H) 12/10/2021     Priority: Medium    ICD (implantable cardioverter-defibrillator) in place 11/17/2021     Priority: Medium     Formatting of this note is different from the original.  Date of last device in office evaluation:  5/17/2022    ?  and model: Medtronic Cobalt CRT-D.   Date of implant: 5/7/2021  Tachy therapies remain OFF: S/p downgrade from ICD to pacemaker, but her implanted system includes a DF-4 lead, so an ICD generator was placed with the tachycardia therapies disabled.     ? Indication for device: Ischemic cardiomyopathy   ? Cardiac resynchronization therapy:   no     MRI Conditional:  No  o If No:  Reason why:  Abandoned LV lead    ? Battery longevity documented as less than 3  Months: No  ? Are any of the leads less than 3 months old:  No    ? Programming              ? Pacing mode and programmed lower rate: DDDR 60 - 130 bpm              ? Rate-responsive sensor type, if programmed on: Accelerometer        Underlying rhythm and heart rate:  SR @ 60 bpm    ? What is the response of this device to magnet placement:  None - tachy therapies off  ? PM magnet pacing rate: N/A   ? Any alert status on CIED generator or lead: No    ? Last pacing threshold    Atrial   1.0 V @ 0.4 ms   Ventricular RV: 0.75 V @ 0.4 ms  LV:  2.75 V @ 1.0 ms    Formatting of this note is different from the original.  Date of last device in office evaluation: 11/17/2022     ?  and model: Medtronic Cobalt CRT-D.   Date of implant: 5/7/2021  Tachy therapies remain OFF: S/p downgrade from ICD to pacemaker, but her implanted system includes a DF-4 lead, so an ICD generator was placed with the tachycardia therapies disabled.     ? Indication for device: Ischemic cardiomyopathy   ? Cardiac resynchronization therapy:   no     MRI Conditional:  No  o If No:  Reason why:  Abandoned LV lead    ? Battery longevity documented as less than 3  Months: No  ? Are any of the leads less than 3 months old:  No    ? Programming              ? Pacing mode and programmed lower rate: DDDR 60 - 130 bpm              ? Rate-responsive sensor type, if programmed on: Accelerometer        Underlying rhythm and heart rate:  Sinus rhythm 62 bpm, w/BBB    ?  What is the response of this device to magnet placement:  None - tachy therapies off  ? PM magnet pacing rate: N/A   ? Any alert status on CIED generator or lead: No    ? Last pacing threshold    Atrial   1.0 V @ 0.4 ms   Ventricular RV: 0.75 V @ 0.4 ms  LV:  2.75 V @ 1.0 ms      Atrial fibrillation (H) 04/06/2021     Priority: Medium    Pacemaker 04/06/2021     Priority: Medium    Major depressive disorder, recurrent severe without psychotic features (H) 01/26/2021     Priority: Medium    Panic disorder with agoraphobia 04/14/2020     Priority: Medium    Constipation 03/20/2020     Priority: Medium    Personality disorder (H) 03/05/2020     Priority: Medium     Rule out dependent personality    Formatting of this note might be different from the original.  Rule out dependent personality  Formatting of this note might be different from the original.  Rule out dependent personality      Candidiasis 03/01/2020     Priority: Medium    Posttraumatic stress disorder 03/01/2020     Priority: Medium    COPD without exacerbation (H) 01/29/2020     Priority: Medium    Long term (current) use of insulin (H) 01/29/2020     Priority: Medium    Chronic systolic heart failure (H) 01/28/2020     Priority: Medium    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, left leg (H) 01/15/2020     Priority: Medium    Chest pain 11/11/2019     Priority: Medium    Polymyalgia rheumatica (H24) 11/28/2018     Priority: Medium    Unstable angina (H) 05/02/2018     Priority: Medium     Coronary angiogram 05/02/2018 demonstrated 30% stenosis in the LMCA, 50% stenosis in the Proximal LAD, 50% stenosis in the Mid LAD, 95% stenosis in the Proximal Circumflex (Restenosis - Balloon Angioplasty), 100% stenosis in the 1st Marginal, 50% stenosis in the Proximal RCA, 50% stenosis in the Distal RCA, the LIMA graft from the LIMA to the Distal LAD is free of significant disease; the SVG graft from the Aorta to the 1st  Marginal is free of significant disease; the SVG graft from the Aorta to the Distal RCA is free of significant disease. Intervention included a successful  2.5mm x 12mm Balloon,  2.5mm x 10mm Cutting Balloon,  3mm x 12mm Drug Eluting Stent,  3mm x 12mm Balloon, and  3mm x 8mm Balloon to Proximal Circumflex, post stenosis 0%.    Formatting of this note is different from the original.  Coronary angiogram 05/02/2018 demonstrated 30% stenosis in the LMCA, 50% stenosis in the Proximal LAD, 50% stenosis in the Mid LAD, 95% stenosis in the Proximal Circumflex (Restenosis - Balloon Angioplasty), 100% stenosis in the 1st Marginal, 50% stenosis in the Proximal RCA, 50% stenosis in the Distal RCA, the LIMA graft from the LIMA to the Distal LAD is free of significant disease; the SVG graft from the Aorta to the 1st Marginal is free of significant disease; the SVG graft from the Aorta to the Distal RCA is free of significant disease. Intervention included a successful  2.5mm x 12mm Balloon,  2.5mm x 10mm Cutting Balloon,  3mm x 12mm Drug Eluting Stent,  3mm x 12mm Balloon, and  3mm x 8mm Balloon to Proximal Circumflex, post stenosis 0%.  Formatting of this note is different from the original.  Coronary angiogram 05/02/2018 demonstrated 30% stenosis in the LMCA, 50% stenosis in the Proximal LAD, 50% stenosis in the Mid LAD, 95% stenosis in the Proximal Circumflex (Restenosis - Balloon Angioplasty), 100% stenosis in the 1st Marginal, 50% stenosis in the Proximal RCA, 50% stenosis in the Distal RCA, the LIMA graft from the LIMA to the Distal LAD is free of significant disease; the SVG graft from the Aorta to the 1st Marginal is free of significant disease; the SVG graft from the Aorta to the Distal RCA is free of significant disease. Intervention included a successful  2.5mm x 12mm Balloon,  2.5mm x 10mm Cutting Balloon,  3mm x 12mm Drug Eluting Stent,  3mm x 12mm Balloon, and  3mm x 8mm Balloon to Proximal Circumflex, post  stenosis 0%.      Vitamin B12 deficiency 02/14/2018     Priority: Medium    Chronic bilateral low back pain without sciatica 01/17/2018     Priority: Medium    Chronic pain disorder 10/01/2017     Priority: Medium    Urinary incontinence, mixed 09/24/2017     Priority: Medium    Internal carotid artery stenosis, bilateral 07/13/2016     Priority: Medium     Carotid US 05/04/2018 showed moderate plaque formation, consistent with 50 to 69% stenosis in the right internal carotid artery, not significantly changed from 8/5/2015.  Moderate plaque formation, consistent with 50 to 69% stenosis in the left internal carotid artery; there has been mild progression of the left ICA stenosis since 8/5/2015.    Formatting of this note might be different from the original.  Carotid US 05/04/2018 showed moderate plaque formation, consistent with 50 to 69% stenosis in the right internal carotid artery, not significantly changed from 8/5/2015.  Moderate plaque formation, consistent with 50 to 69% stenosis in the left internal carotid artery; there has been mild progression of the left ICA stenosis since 8/5/2015.    Formatting of this note might be different from the original.  Carotid US 05/04/2018 showed moderate plaque formation, consistent with 50 to 69% stenosis in the right internal carotid artery, not significantly changed from 8/5/2015.  Moderate plaque formation, consistent with 50 to 69% stenosis in the left internal carotid artery; there has been mild progression of the left ICA stenosis since 8/5/2015.      Essential (primary) hypertension 10/14/2015     Priority: Medium    Ischemic cardiomyopathy 09/20/2015     Priority: Medium     EF of 40-45%, status post RV lead revision and LV epicardial lead placement via mini-thoracotomy in August 2016.    Formatting of this note might be different from the original.  EF of 40-45%, status post RV lead revision and LV epicardial lead placement via mini-thoracotomy in August  2016.  Formatting of this note might be different from the original.  EF of 40-45%, status post RV lead revision and LV epicardial lead placement via mini-thoracotomy in August 2016.      S/P CABG x 5 08/21/2015     Priority: Medium    Pain medication agreement 04/20/2013     Priority: Medium     Controlled substance agreement for percocet #30/month on file and signed 4/17/13.  Designated pharmacy: [facility] Prescribing physician: Andrea Diagnosis: Ulnar neuropathy    Formatting of this note might be different from the original.  Controlled substance agreement for percocet #30/month on file and signed 4/17/13.  Designated pharmacy: [facility] Prescribing physician: Andrea Diagnosis: Ulnar neuropathy      Anemia in other chronic diseases classified elsewhere 01/05/2013     Priority: Medium    Hypothyroidism, unspecified 12/10/2010     Priority: Medium    ACP (advance care planning) 11/03/2010     Priority: Medium     Formatting of this note might be different from the original.  Patient has identified Health Care Agent(s): Yes  Add Health Care Agents: Yes    Health Care Agent(s):    Primary Health Care Agent:     Edwar Escobar Relationship:    Spouse Phone:     [phone]    Secondary Health Care Agent:     Chiki Oro Relationship:     Son Phone:     [phone]      Patient has Advance Care Plan Documents (Health Care Directive, POLST): Yes    Advance Care Plan Documents:  Health Care Directive--03/28/11  Resuscitation Guidelines--    Patient has identified Specific Treatment Preferences: Yes   Specific Treatment Preferences:   a.) Code Status:  DNR/ Do Not Attempt Resuscitation - Allow a Natural Death    b.) Goals of Treatment:     ii. Limited Interventions and treat reversible conditions.  Provide interventions aimed at treatment of new or reversible illness/injury or non-life threatening chronic conditions. Duration of invasive or uncomfortable interventions should generally be limited.- Trial of intubation 5  "days or other instructions \"If no improvement, stop.\"  c.) Interventions and Treatments:   i.   Antibiotics:          - Aggressive antibiotics  ii.  Nutrition/Hydration:         - Offer food and liquids by mouth         - IV fluid administration         - No feeding tube under any circumstance.  iii. Transfusion:          - Blood products for comfort/relief of symptoms only  iv. Dialysis:           - Dialysis for short term \"for recovery, but not long term.\"        Last Assessment & Plan:   Advance Care Planning: Disease-specific Session    Letty Escobar is an [facility] patient of Dr. Renea Mendez of the [facility].    Advance care planning discussions were completed with Letty and her healthcare agent, spouse Edwar Escobar on Monday, March 28, 2011 at the [facility].    Understanding of Illness and Disease [place]:   Letty identifies her medical condition as diabetes with peripheral neuropathy, heart problems with a heart attack February 2009 plus 4 stent placements; sleep apnea; depression; hypothyroid; high cholesterol; tobacco use; and describes it as needing further assistance with thyroid and diabetes management.  She identifies the following symptoms of her medical condition as being the most bothersome: some memory loss, balance problems, light headedness and dizziness, puffy eyes and lower extremity edema from time to time.    Let[place] is [place] .  She is independent with all cares and lives an active life style although, \"I'm not as active as I used to be.\"    Goals of Care:   Letty currently hopes to maintain independence, control pain and symptoms and delay progression of, but not cure, the illness.  \"I want to get the diabetes and thyroid under better control.\"  Letty has an appointment the first part of April for follow up.    Quality of Life:    Letty identifies quality of life as family " "involvement twice weekly, Buddhist activities, newly assigned  , playing cards, the computer,    Letty christiano with serious challenges in her life through her ganesh in God, prayers and support of her Buddhist family, spouse and son.    Letty identifies the following fears and worries about her medical care:  \"I just want the diabetes straightened out.\"    Treatment and Care Preferences:   Past experiences in dealing with family and/or friends that have  or been seriously ill include her brother who took his own life.  \"He was 53 years old and living with us.  I found him.\"   As a result of these experiences, Letty expresses these health care preferences:      Summary Letty's Treatment Preferences:  LOW SURVIVAL; HIGH TREATMENT BURDEN:  If Letty suffered a serious complication, such that she was facing a prolonged hospital stay, required ongoing medical interventions, and the chance of living through the complication was low (for example, only 5 out of 100 would live), Letty would choose:  to focus treatment on comfort and quality of life (\"Quality of life is more important than length of life to Letty.\")  COMMENT:  \"If I can't live a normal life, I wouldn't want to live.\"    HIGH SURVIVAL; LOW FUNCTIONAL STATUS:  If Letty had a serious complication and had a good chance of living through the complication but it was expected that she would never be able to walk or talk again and would require 24 hour nursing care, she would choose:  to focus treatment on comfort and quality of life (\"Quality of life is more important than length of life to Letty.\")  COMMENT:  \"I'd accept rehabilitation.\"    HIGH SURVIVAL; LOW COGNITIVE STATUS:  If Letty had a serious complication and had a good chance of living through the complication but it was expected that she would never know who she was or who she was with and would require 24 hour nursing care, she would choose:  to focus treatment on comfort and " "quality of life (\"Quality of life is more important than length of life to Letty.\")    CARDIO-PULMONARY RESUSCITATION (CPR):  The facts, risks and benefits of CPR were discussed with Letty.  If she had a sudden event that caused her heart and breathing to stop, she:  WOULD NOT want CPR attempted and instead would prefer that a natural death occur.    MECHANICAL VENTILATION: If Letty had an episode where she was unable to breathe on her own, she would choose the following:  attempt to use any appropriate non-invasive method to assist breathing, and use mechanical ventilation.  COMMENT:  \"If reversible ventilator is acceptable for 5 days.  If no improvement, stop.\"     Letty has chosen her healthcare agent to:  strictly follow her wishes.    Follow Up Plan:   Letty was encouraged to continue advance care planning discussions with her health care agents and primary care provider.   Letty and her health care agent declined handouts.     Documents addressed during this advance care planning session:  1.  Health Care Agents identified.          .  Primary health care agent is spouse, Edwar Escobar;          .  Secondary health care agent is son, Jermaine Oro.  2.  Health Care Directive completed and scanned into medical record.  3.  Statement of Treatment Preferences for advanced illness completed and scanned into the medical record.  4.  Resuscitation Guidelines completed for DNR.  Document sent to Dr. Renea Mendez for signature and returned to the home. Letty, please place on the refrigerator.    Recommendations/Plan:   Letty and her health care agent to review Advance Care Plan.     Letty would benefit from:   Care Navigation/Care Navigation Help Desk--explained services for pending future needs.  No identified needs today.  Care Navigation brochure was given to Letty and her healthcare agent.    Advance Care Planning recommendations and Letty's concerns and questions were cc ed to her primary provider. "     Thank you, Letty for the opportunity of assisting with Advance Care Planning. It was a seeing you again and meeting Edwar.  Please contact me if I can be of further assistance.    Interviewer: Pat Martin RN    Advance Care Planning Facilitator  [phone]   3/28/2011      ELI (obstructive sleep apnea) 01/31/2010     Priority: Medium     PSG on April/2008 that showed moderate Obstructive Sleep Apnea with an AHI of 23.5 . Limb movements persisted at 29 movements/hour at optimal pressures, despite carbidopa use.    Formatting of this note might be different from the original.  PSG on April/2008 that showed moderate Obstructive Sleep Apnea with an AHI of 23.5 . Limb movements persisted at 29 movements/hour at optimal pressures, despite carbidopa use.  Formatting of this note might be different from the original.  PSG on April/2008 that showed moderate Obstructive Sleep Apnea with an AHI of 23.5 . Limb movements persisted at 29 movements/hour at optimal pressures, despite carbidopa use.      Coronary atherosclerosis 02/06/2009     Priority: Medium     Formatting of this note might be different from the original.  2/5/2009 - MI - Proximal RCA 99%, mild-mod disease elsewhere.  EF 60%.  PCI:  MYRON to pRCA.  2/12/2009 - admit CP - Widely patent RCA stent. Moderate diffuse CAD. Severe stenosis in trivial PDA branch. LVEF 45%.  1/25/2010 - admit CP - PTCA and stent of diagonal  9/8/2010 - admit CP - LAD patent stent. Moderate diffuse CAD. Medical management recommended.   4/25/2012 - NSTEMI - Acute total occlusion of the ostial Circumflex. Acute total occlusion of the ostial ramus. Acute total occlusion of the distal 1st Obtuse Marginal. Angioplasty of ostial circumflex and ostial ramus. There was distal embolization to the OM1 vessel. This vessel was small and not amendable to intervention. Indefinite: plavix and asa 81.  8/10/2015 - CABx5 - 1. LIMA to distal LAD, reverse SVG to OM1 and OM2, reverse SVG to diagonal,  reverse SVG to PDA.   2. Mitral valve repair with a Medtronics 3-D full ring, 28 mm.   3. Tricuspid repair with a Medtronic 28 mm partial ring  1/12/2016 - TTE - EF 40-45%  Formatting of this note might be different from the original.  2/5/2009 - MI - Proximal RCA 99%, mild-mod disease elsewhere.  EF 60%.  PCI:  MYRON to pRCA.  2/12/2009 - admit CP - Widely patent RCA stent. Moderate diffuse CAD. Severe stenosis in trivial PDA branch. LVEF 45%.  1/25/2010 - admit CP - PTCA and stent of diagonal  9/8/2010 - admit CP - LAD patent stent. Moderate diffuse CAD. Medical management recommended.   4/25/2012 - NSTEMI - Acute total occlusion of the ostial Circumflex. Acute total occlusion of the ostial ramus. Acute total occlusion of the distal 1st Obtuse Marginal. Angioplasty of ostial circumflex and ostial ramus. There was distal embolization to the OM1 vessel. This vessel was small and not amendable to intervention. Indefinite: plavix and asa 81.  8/10/2015 - CABx5 - 1. LIMA to distal LAD, reverse SVG to OM1 and OM2, reverse SVG to diagonal, reverse SVG to PDA.   2. Mitral valve repair with a Medtronics 3-D full ring, 28 mm.   3. Tricuspid repair with a Medtronic 28 mm partial ring  1/12/2016 - TTE - EF 40-45%      Restless legs syndrome 08/29/2007     Priority: Medium    Hyperlipidemia, unspecified 05/30/2007     Priority: Medium        Past Medical History:    Past Medical History:   Diagnosis Date    ACP (advance care planning) 11/3/2010    Acute coronary syndrome (H) 11/22/2019    Acute exacerbation of CHF (congestive heart failure) (H) 11/11/2019    Acute on chronic systolic (congestive) heart failure (H) 1/28/2020    Anemia of chronic disease 1/5/2013    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, left leg (H) 1/15/2020    BPPV (benign paroxysmal positional vertigo) 5/31/2018    Candidiasis 3/1/2020    Carotid stenosis, right 7/13/2016    Chest pain 11/11/2019    Chronic  bilateral low back pain without sciatica 1/17/2018    Chronic pain disorder 10/1/2017    Constipation 3/20/2020    COPD (chronic obstructive pulmonary disease) (H) 6/24/2020    Coronary atherosclerosis 2/6/2009    Cubital tunnel syndrome on left 11/13/2012    Depressive disorder     Diabetes mellitus (H)     Diabetes mellitus type 2 in obese 7/13/2006    Diabetes mellitus type 2 in obese 7/13/2006    Drug-seeking behavior 4/4/2020    Failure to thrive in adult 9/3/2020    Hereditary and idiopathic peripheral neuropathy 4/24/2007    Hyperlipidemia with target low density lipoprotein (LDL) cholesterol less than 70 mg/dL 5/30/2007    Hypertension 10/14/2015    Hypothyroidism 12/10/2010    Irritable bowel syndrome 9/7/2015    Ischemic cardiomyopathy 9/20/2015    Long-term use of high-risk medication 4/14/2020    Moniliasis, 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    Myocardial infarction (H)     Neuromuscular disorder (H)     Other specified postprocedural states 10/8/2015    Pain medication agreement 4/20/2013    Panic disorder with agoraphobia 4/14/2020    Paroxysmal atrial fibrillation (H) 10/2/2015    Personality disorder (H) 3/5/2020    Polymyalgia rheumatica (H24) 11/28/2018    Posttraumatic stress disorder 3/1/2020    Restless legs syndrome (RLS) 8/29/2007    Restless legs syndrome (RLS) 8/29/2007    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    Serum calcium elevated 3/1/2020    Somatic dysfunction of sacral region 1/17/2018    Subacromial bursitis of left shoulder joint 8/6/2018    Suicidal ideation 4/1/2020    Thyroid disease     TIA (transient ischemic attack) 5/4/2018    TIA (transient ischemic attack) 5/4/2018    Tobacco abuse 2/17/2017    Tobacco abuse 2/17/2017    Urinary incontinence, mixed 9/24/2017    UTI (urinary tract infection) 4/17/2020    Vitamin B12 deficiency 2/14/2018    Weakness 12/17/2019       Past Surgical History:    Past Surgical History:   Procedure Laterality Date     CARDIAC SURGERY      stents x 11    CARDIAC SURGERY      CHOLECYSTECTOMY      CHOLECYSTECTOMY      GENITOURINARY SURGERY      Tubal ligation    OTHER SURGICAL HISTORY      Genitourinary surgery    RELEASE CARPAL TUNNEL      RELEASE CARPAL TUNNEL         Family History:    No family history on file.    Social History:  Marital Status:   [2]  Social History     Tobacco Use    Smoking status: Never    Smokeless tobacco: Never   Vaping Use    Vaping status: Never Used   Substance Use Topics    Alcohol use: Not Currently    Drug use: Never        Medications:    ondansetron (ZOFRAN ODT) 4 MG ODT tab  acetaminophen (TYLENOL) 325 MG tablet  acetaminophen (TYLENOL) 500 MG tablet  albuterol (PROVENTIL) (2.5 MG/3ML) 0.083% neb solution  albuterol (PROVENTIL) (2.5 MG/3ML) 0.083% neb solution  apixaban ANTICOAGULANT (ELIQUIS) 5 MG tablet  aspirin 81 MG EC tablet  bisacodyl (DULCOLAX) 10 MG suppository  clotrimazole (LOTRIMIN) 1 % external cream  cyanocobalamin (CYANOCOBALAMIN) 1000 MCG/ML injection  DULoxetine HCl 40 MG CPEP  empagliflozin (JARDIANCE) 10 MG TABS tablet  famotidine (PEPCID) 20 MG tablet  fluconazole (DIFLUCAN) 150 MG tablet  fluticasone-vilanterol (BREO ELLIPTA) 200-25 MCG/INH inhaler  furosemide (LASIX) 20 MG tablet  gabapentin (NEURONTIN) 400 MG capsule  hydrochlorothiazide (HYDRODIURIL) 25 MG tablet  Insulin Aspart FlexPen 100 UNIT/ML SOPN  LANTUS SOLOSTAR 100 UNIT/ML soln  levothyroxine (SYNTHROID/LEVOTHROID) 137 MCG tablet  lidocaine (LMX4) 4 % external cream  LORazepam (ATIVAN) 1 MG tablet  losartan (COZAAR) 25 MG tablet  Melatonin 10 MG TABS tablet  metoprolol succinate ER (TOPROL-XL) 25 MG 24 hr tablet  mirtazapine (REMERON) 15 MG tablet  MUCUS RELIEF 600 MG 12 hr tablet  nitroGLYcerin (NITROSTAT) 0.4 MG sublingual tablet  pantoprazole (PROTONIX) 40 MG EC tablet  polyethylene glycol (MIRALAX) 17 GM/Dose powder  potassium chloride ER (MICRO-K) 10 MEQ CR capsule  predniSONE (DELTASONE) 10 MG  tablet  QUEtiapine (SEROQUEL) 100 MG tablet  rosuvastatin (CRESTOR) 10 MG tablet  sennosides (SENOKOT) 8.6 MG tablet  traZODone (DESYREL) 50 MG tablet          Review of Systems   Constitutional:  Negative for chills and fever.   HENT: Negative.     Eyes: Negative.    Respiratory:  Positive for chest tightness. Negative for cough and shortness of breath.    Cardiovascular:  Positive for chest pain. Negative for palpitations.   Gastrointestinal: Negative.    Endocrine: Negative.    Genitourinary: Negative.    Musculoskeletal: Negative.    Skin: Negative.    Neurological: Negative.    Psychiatric/Behavioral: Negative.     All other systems reviewed and are negative.      Physical Exam   BP: 115/63  Pulse: 81  Temp: 98.9  F (37.2  C)  Resp: 14  Weight: 91.4 kg (201 lb 6.4 oz)  SpO2: 91 %      Physical Exam  Vitals and nursing note reviewed.   Constitutional:       General: She is in acute distress.      Appearance: She is not ill-appearing, toxic-appearing or diaphoretic.   HENT:      Head: Normocephalic and atraumatic.   Eyes:      Extraocular Movements: Extraocular movements intact.      Pupils: Pupils are equal, round, and reactive to light.   Neck:      Vascular: No JVD.   Cardiovascular:      Rate and Rhythm: Normal rate and regular rhythm.   Pulmonary:      Effort: Pulmonary effort is normal. No tachypnea, accessory muscle usage or respiratory distress.      Breath sounds: Normal breath sounds. No stridor.   Abdominal:      General: Abdomen is protuberant.      Palpations: Abdomen is soft. There is no mass.      Tenderness: There is no abdominal tenderness. There is no guarding or rebound.   Musculoskeletal:      Cervical back: Normal range of motion.   Neurological:      Mental Status: She is alert.         ED Course        Procedures              EKG Interpretation:      Interpreted by Bao Leal DO  Time reviewed:3:22 AM   Symptoms at time of EKG: Left sided anterior chest pain  Rhythm: Normal  sinus  and Paced  Rate: Normal - paced  Comparison to prior: Unchanged    Clinical Impression: paced rhythm         Critical Care time:  none               Results for orders placed or performed during the hospital encounter of 09/13/24 (from the past 24 hour(s))   CBC with platelets differential    Narrative    The following orders were created for panel order CBC with platelets differential.  Procedure                               Abnormality         Status                     ---------                               -----------         ------                     CBC with platelets and d...[[id number]]  Abnormal            Final result                 Please view results for these tests on the individual orders.   CBC with platelets and differential   Result Value Ref Range    WBC Count 8.1 4.0 - 11.0 10e3/uL    RBC Count 3.43 (L) 3.80 - 5.20 10e6/uL    Hemoglobin 10.3 (L) 11.7 - 15.7 g/dL    Hematocrit 32.1 (L) 35.0 - 47.0 %    MCV 94 78 - 100 fL    MCH 30.0 26.5 - 33.0 pg    MCHC 32.1 31.5 - 36.5 g/dL    RDW 14.1 10.0 - 15.0 %    Platelet Count 142 (L) 150 - 450 10e3/uL    % Neutrophils 66 %    % Lymphocytes 24 %    % Monocytes 5 %    % Eosinophils 3 %    % Basophils 1 %    % Immature Granulocytes 1 %    NRBCs per 100 WBC 0 <1 /100    Absolute Neutrophils 5.4 1.6 - 8.3 10e3/uL    Absolute Lymphocytes 2.0 0.8 - 5.3 10e3/uL    Absolute Monocytes 0.4 0.0 - 1.3 10e3/uL    Absolute Eosinophils 0.2 0.0 - 0.7 10e3/uL    Absolute Basophils 0.1 0.0 - 0.2 10e3/uL    Absolute Immature Granulocytes 0.1 <=0.4 10e3/uL    Absolute NRBCs 0.0 10e3/uL   D dimer quantitative   Result Value Ref Range    D-Dimer Quantitative 0.38 0.00 - 0.50 ug/mL FEU    Narrative    This D-dimer assay is intended for use in conjunction with a clinical pretest probability assessment model to exclude pulmonary embolism (PE) and deep venous thrombosis (DVT) in outpatients suspected of PE or DVT. The cut-off value is 0.50 ug/mL FEU.    For patients 50  years of age or older, the application of age-adjusted cut-off values for D-Dimer may increase the specificity without significant effect on sensitivity. The literature suggested calculation age adjusted cut-off in ug/L = age in years x 10 ug/L. The results in this laboratory are reported as ug/mL rather than ug/L. The calculation for age adjusted cut off in ug/mL= age in years x 0.01 ug/mL. For example, the cut off for a 76 year old male is 74 x 0.01 ug/mL = 0.76 ug/mL (760 ug/L).    M Pavan et al. Age adjusted D-dimer cut-off levels to rule out pulmonary embolism: The ADJUST-PE Study. EPHRAIM 2014;311:8739-7610.; HJ Jaron et al. Diagnostic accuracy of conventional or age adjusted D-dimer cutoff values in older patients with suspected venous thromboembolism. Systemic review and meta-analysis. BMJ 2013:346:f2492.   Basic metabolic panel   Result Value Ref Range    Sodium 137 135 - 145 mmol/L    Potassium 4.4 3.4 - 5.3 mmol/L    Chloride 106 98 - 107 mmol/L    Carbon Dioxide (CO2) 21 (L) 22 - 29 mmol/L    Anion Gap 10 7 - 15 mmol/L    Urea Nitrogen 18.7 8.0 - 23.0 mg/dL    Creatinine 1.15 (H) 0.51 - 0.95 mg/dL    GFR Estimate 51 (L) >60 mL/min/1.73m2    Calcium 9.8 8.8 - 10.4 mg/dL    Glucose 126 (H) 70 - 99 mg/dL   Troponin T, High Sensitivity   Result Value Ref Range    Troponin T, High Sensitivity 22 (H) <=14 ng/L   Extra Tube    Narrative    The following orders were created for panel order Extra Tube.  Procedure                               Abnormality         Status                     ---------                               -----------         ------                     Extra Green Top (Lithium...[[id number]]                      Final result               Extra Purple Top Tube[[id number]]                            Final result                 Please view results for these tests on the individual orders.   Extra Green Top (Lithium Heparin) Tube   Result Value Ref Range    Hold Specimen JIC    Extra Purple  Top Tube   Result Value Ref Range    Hold Specimen JIC    XR Chest Port 1 View    Narrative    EXAM: XR CHEST PORT 1 VIEW  LOCATION: [facility]  DATE: 9/13/2024    INDICATION: Left chest pain  COMPARISON: 8/1/2024      Impression    IMPRESSION: Sternotomy. Mediastinal clips. Mild cardiac enlargement. Normal pulmonary vascularity. No pulmonary alveolar infiltrates or pneumothorax. Left-sided cardiac pacemaker/AICD with leads over the RA and RV. Epicardial pacer wires.       Medications   ondansetron (ZOFRAN ODT) ODT tab 4 mg (4 mg Oral $Given 9/13/24 0311)   morphine (PF) injection 1 mg (1 mg Intravenous $Given 9/13/24 5109)       Assessments & Plan (with Medical Decision Making)  68-year-old female to the ER via ambulance from a nursing home secondary to left-sided anterior chest pain symptoms associated with hiccups.  Patient states she had several hours of symptoms at the nursing home.  She received Ativan, morphine, and nitroglycerin without improvement in her pain.  Patient is well-known to me from multiple prior ER visits.  Patient's pain was reproducible with palpation across the left anterior chest and shoulder area.  No erythema or inflammation identified on exam.  Screening blood and chest x-ray examination unremarkable for abnormality.  Patient was given oral Zofran and 1 mg of morphine and subsequently slept for several hours.  Decision made to discharge to home.  Chest pain likely chest wall pain and muscle skeletal in origin.  Plan discharge back to the nursing home.  Oral Zofran prescribed to use as needed for nausea symptoms.     I have reviewed the nursing notes.    I have reviewed the findings, diagnosis, plan and need for follow up with the patient.       Current Discharge Medication List              Final diagnoses:   Left-sided chest wall pain       9/13/2024   [facility] EMERGENCY DEPT       Bao Leal, DO  09/13/24 0595

## 2024-09-13 NOTE — ED TRIAGE NOTES
Patient here with chest pain. She took Morphine, Ativan and nitroglycerin at her care facility without any change. She reports the pain is different than her chronic chest pain.     Triage Assessment (Adult)       Row Name 09/13/24 0249          Triage Assessment    Airway WDL WDL        Respiratory WDL    Respiratory WDL WDL        Skin Circulation/Temperature WDL    Skin Circulation/Temperature WDL WDL        Cardiac WDL    Cardiac WDL X;chest pain        Chest Pain Assessment    Chest Pain Location midsternal     Chest Pain Radiation shoulder;neck;hand     Chest Pain Intervention nitroglycerin SL given;cardiac monitor placed;activity minimized  Morphine and Nitro given at facility. She refused ASA enroute.        Peripheral/Neurovascular WDL    Peripheral Neurovascular WDL WDL        Cognitive/Neuro/Behavioral WDL    Cognitive/Neuro/Behavioral WDL WDL

## 2024-09-22 ENCOUNTER — HOSPITAL ENCOUNTER (EMERGENCY)
Facility: CLINIC | Age: 69
Discharge: NURSING, REHAB OR LONG-TERM CARE | End: 2024-09-22
Attending: FAMILY MEDICINE | Admitting: FAMILY MEDICINE
Payer: COMMERCIAL

## 2024-09-22 VITALS
OXYGEN SATURATION: 94 % | DIASTOLIC BLOOD PRESSURE: 63 MMHG | BODY MASS INDEX: 36.1 KG/M2 | WEIGHT: 197.4 LBS | SYSTOLIC BLOOD PRESSURE: 136 MMHG | HEART RATE: 60 BPM | TEMPERATURE: 97.8 F | RESPIRATION RATE: 17 BRPM

## 2024-09-22 DIAGNOSIS — G89.29 CHRONIC CHEST PAIN: ICD-10-CM

## 2024-09-22 DIAGNOSIS — R07.9 CHRONIC CHEST PAIN: ICD-10-CM

## 2024-09-22 LAB
BASOPHILS # BLD AUTO: 0 10E3/UL (ref 0–0.2)
BASOPHILS NFR BLD AUTO: 1 %
EOSINOPHIL # BLD AUTO: 0.3 10E3/UL (ref 0–0.7)
EOSINOPHIL NFR BLD AUTO: 4 %
IMM GRANULOCYTES # BLD: 0 10E3/UL
IMM GRANULOCYTES NFR BLD: 1 %
LYMPHOCYTES # BLD AUTO: 1.8 10E3/UL (ref 0.8–5.3)
LYMPHOCYTES NFR BLD AUTO: 23 %
MONOCYTES # BLD AUTO: 0.5 10E3/UL (ref 0–1.3)
MONOCYTES NFR BLD AUTO: 6 %
NEUTROPHILS # BLD AUTO: 5.2 10E3/UL (ref 1.6–8.3)
NEUTROPHILS NFR BLD AUTO: 66 %
NRBC # BLD AUTO: 0 10E3/UL
NRBC BLD AUTO-RTO: 0 /100
TROPONIN T SERPL HS-MCNC: 19 NG/L
WBC # BLD AUTO: 7.8 10E3/UL (ref 4–11)

## 2024-09-22 PROCEDURE — 93010 ELECTROCARDIOGRAM REPORT: CPT | Performed by: FAMILY MEDICINE

## 2024-09-22 PROCEDURE — 84484 ASSAY OF TROPONIN QUANT: CPT | Performed by: FAMILY MEDICINE

## 2024-09-22 PROCEDURE — 36415 COLL VENOUS BLD VENIPUNCTURE: CPT | Performed by: FAMILY MEDICINE

## 2024-09-22 PROCEDURE — 93005 ELECTROCARDIOGRAM TRACING: CPT | Performed by: FAMILY MEDICINE

## 2024-09-22 PROCEDURE — 99284 EMERGENCY DEPT VISIT MOD MDM: CPT | Performed by: FAMILY MEDICINE

## 2024-09-22 PROCEDURE — 85048 AUTOMATED LEUKOCYTE COUNT: CPT | Performed by: FAMILY MEDICINE

## 2024-09-22 PROCEDURE — 93005 ELECTROCARDIOGRAM TRACING: CPT | Mod: 76 | Performed by: FAMILY MEDICINE

## 2024-09-22 ASSESSMENT — ACTIVITIES OF DAILY LIVING (ADL)
ADLS_ACUITY_SCORE: 39
ADLS_ACUITY_SCORE: 39

## 2024-09-22 NOTE — DISCHARGE INSTRUCTIONS
Your troponin enzyme and white blood count are normal.  Your chronic chest pain is not related to coronary disease.  There are no signs that you have any systemic infection.  Follow-up with your primary care provider for further management of your chronic pain.

## 2024-09-22 NOTE — ED NOTES
"Patient had request for us to the check for \"blood poisoning\" from a boil under her breast. Patient denies fever. The boil is not growing nor is it painful. We are checking a CBC for a WBC count. Patient no longer complaining of chest pain and she can barely keep her eyes opened when she is talking to nurse.  "

## 2024-09-22 NOTE — ED TRIAGE NOTES
Patient here with chronic chest pain. Had her oral MS04 and Ativan at the nursing facility.     Triage Assessment (Adult)       Row Name 09/22/24 0109          Triage Assessment    Airway WDL WDL        Respiratory WDL    Respiratory WDL WDL        Skin Circulation/Temperature WDL    Skin Circulation/Temperature WDL WDL        Cardiac WDL    Cardiac WDL X;chest pain     Cardiac Rhythm Atrial paced        Chest Pain Assessment    Chest Pain Location anterior chest, left        Peripheral/Neurovascular WDL    Peripheral Neurovascular WDL WDL        Cognitive/Neuro/Behavioral WDL    Cognitive/Neuro/Behavioral WDL WDL

## 2024-09-22 NOTE — ED PROVIDER NOTES
[facility] ED Provider Note   Patient: Letty Escobar  MRN #:  [id number]  Date of Visit: September 22, 2024    CC:     Chief Complaint   Patient presents with    Chest Pain     HPI:  Letty Escobar is a 68 year old female resident at [facility] who presented to the emergency department by EMS with recurrent chronic chest pain.  Patient is well-known to me due to her previous nighttime visits for chest pains.  At her last visit with me at the beginning of September, I reviewed the patient's chart including her cardiology consultation visit from August 27.  Patient has been reassured multiple times that her chest pains are noncardiac and most likely chest wall pain accompanied by anxiety.  Patient has frequent episodes of chest pain that occur in the evenings around 10 PM to 11:30 PM.  Patient states that the pain started last night around 10:00.  She was given 15 mg of morphine, and 1 mg of Ativan.  She states that the pain has not gone away and it is still rated 8 out of 10.  She appears somewhat groggy.  She states that this pain is similar to what she has had before occurring on the left side with radiation into the left arm.  She has some mild shortness of breath.  She has no fever or chills.  She is concerned that she may have a blood infection as she has a small sore under the left breast.  Please refer to her previous ER visits including her cardiology visit from August 27.  She had a previous heart cath in July 2023 with patent coronary grafts.  Patient's chest pains are not associated with exertion or activity.  Numerous troponin enzymes dating back at least 4 to 6 months have been all mildly elevated in the range of 22-35.    Problem List:  Patient Active Problem List    Diagnosis Date Noted    Acute respiratory failure with hypoxia and hypercapnia (H) 04/27/2024     Priority: Medium    COPD with acute exacerbation (H) 04/27/2024      Priority: Medium    Bilateral pneumonia 04/27/2024     Priority: Medium    Lethargy 11/10/2023     Priority: Medium    Urinary tract infection without hematuria, site unspecified 11/10/2023     Priority: Medium    Fever 11/10/2023     Priority: Medium    Hypoxia 11/10/2023     Priority: Medium    Pyuria 11/10/2023     Priority: Medium    PAD (peripheral artery disease) (H24) 11/10/2023     Priority: Medium    Left foot pain 11/10/2023     Priority: Medium    History of stroke 11/10/2023     Priority: Medium    Lives in long-term care facility 11/10/2023     Priority: Medium    Obesity hypoventilation syndrome (H) 11/10/2023     Priority: Medium    Benzodiazepine dependence (H) 11/10/2023     Priority: Medium    Thrombocytopenia (H24) 11/10/2023     Priority: Medium    ERIC (acute kidney injury) (H24) 11/10/2023     Priority: Medium    Chronic kidney disease, stage 3a (H) 10/16/2022     Priority: Medium    SOB (shortness of breath) 04/07/2022     Priority: Medium    Type 2 diabetes mellitus with hyperglycemia, with long-term current use of insulin (H) 02/13/2022     Priority: Medium    Morbid obesity (H) 12/10/2021     Priority: Medium    ICD (implantable cardioverter-defibrillator) in place 11/17/2021     Priority: Medium     Formatting of this note is different from the original.  Date of last device in office evaluation: 5/17/2022    ?  and model: Medtronic Cobalt CRT-D.   Date of implant: 5/7/2021  Tachy therapies remain OFF: S/p downgrade from ICD to pacemaker, but her implanted system includes a DF-4 lead, so an ICD generator was placed with the tachycardia therapies disabled.     ? Indication for device: Ischemic cardiomyopathy   ? Cardiac resynchronization therapy:   no     MRI Conditional:  No  o If No:  Reason why:  Abandoned LV lead    ? Battery longevity documented as less than 3  Months: No  ? Are any of the leads less than 3 months old:  No    ? Programming              ? Pacing mode and  programmed lower rate: DDDR 60 - 130 bpm              ? Rate-responsive sensor type, if programmed on: Accelerometer        Underlying rhythm and heart rate:  SR @ 60 bpm    ? What is the response of this device to magnet placement:  None - tachy therapies off  ? PM magnet pacing rate: N/A   ? Any alert status on CIED generator or lead: No    ? Last pacing threshold    Atrial   1.0 V @ 0.4 ms   Ventricular RV: 0.75 V @ 0.4 ms  LV:  2.75 V @ 1.0 ms    Formatting of this note is different from the original.  Date of last device in office evaluation: 11/17/2022     ?  and model: Medtronic Cobalt CRT-D.   Date of implant: 5/7/2021  Tachy therapies remain OFF: S/p downgrade from ICD to pacemaker, but her implanted system includes a DF-4 lead, so an ICD generator was placed with the tachycardia therapies disabled.     ? Indication for device: Ischemic cardiomyopathy   ? Cardiac resynchronization therapy:   no     MRI Conditional:  No  o If No:  Reason why:  Abandoned LV lead    ? Battery longevity documented as less than 3  Months: No  ? Are any of the leads less than 3 months old:  No    ? Programming              ? Pacing mode and programmed lower rate: DDDR 60 - 130 bpm              ? Rate-responsive sensor type, if programmed on: Accelerometer        Underlying rhythm and heart rate:  Sinus rhythm 62 bpm, w/BBB    ? What is the response of this device to magnet placement:  None - tachy therapies off  ? PM magnet pacing rate: N/A   ? Any alert status on CIED generator or lead: No    ? Last pacing threshold    Atrial   1.0 V @ 0.4 ms   Ventricular RV: 0.75 V @ 0.4 ms  LV:  2.75 V @ 1.0 ms      Atrial fibrillation (H) 04/06/2021     Priority: Medium    Pacemaker 04/06/2021     Priority: Medium    Major depressive disorder, recurrent severe without psychotic features (H) 01/26/2021     Priority: Medium    Panic disorder with agoraphobia 04/14/2020     Priority: Medium    Constipation 03/20/2020     Priority:  Medium    Personality disorder (H) 03/05/2020     Priority: Medium     Rule out dependent personality    Formatting of this note might be different from the original.  Rule out dependent personality  Formatting of this note might be different from the original.  Rule out dependent personality      Candidiasis 03/01/2020     Priority: Medium    Posttraumatic stress disorder 03/01/2020     Priority: Medium    COPD without exacerbation (H) 01/29/2020     Priority: Medium    Long term (current) use of insulin (H) 01/29/2020     Priority: Medium    Chronic systolic heart failure (H) 01/28/2020     Priority: Medium    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, left leg (H) 01/15/2020     Priority: Medium    Chest pain 11/11/2019     Priority: Medium    Polymyalgia rheumatica (H24) 11/28/2018     Priority: Medium    Unstable angina (H) 05/02/2018     Priority: Medium     Coronary angiogram 05/02/2018 demonstrated 30% stenosis in the LMCA, 50% stenosis in the Proximal LAD, 50% stenosis in the Mid LAD, 95% stenosis in the Proximal Circumflex (Restenosis - Balloon Angioplasty), 100% stenosis in the 1st Marginal, 50% stenosis in the Proximal RCA, 50% stenosis in the Distal RCA, the LIMA graft from the LIMA to the Distal LAD is free of significant disease; the SVG graft from the Aorta to the 1st Marginal is free of significant disease; the SVG graft from the Aorta to the Distal RCA is free of significant disease. Intervention included a successful  2.5mm x 12mm Balloon,  2.5mm x 10mm Cutting Balloon,  3mm x 12mm Drug Eluting Stent,  3mm x 12mm Balloon, and  3mm x 8mm Balloon to Proximal Circumflex, post stenosis 0%.    Formatting of this note is different from the original.  Coronary angiogram 05/02/2018 demonstrated 30% stenosis in the LMCA, 50% stenosis in the Proximal LAD, 50% stenosis in the Mid LAD, 95% stenosis in the Proximal Circumflex (Restenosis - Balloon Angioplasty), 100%  stenosis in the 1st Marginal, 50% stenosis in the Proximal RCA, 50% stenosis in the Distal RCA, the LIMA graft from the LIMA to the Distal LAD is free of significant disease; the SVG graft from the Aorta to the 1st Marginal is free of significant disease; the SVG graft from the Aorta to the Distal RCA is free of significant disease. Intervention included a successful  2.5mm x 12mm Balloon,  2.5mm x 10mm Cutting Balloon,  3mm x 12mm Drug Eluting Stent,  3mm x 12mm Balloon, and  3mm x 8mm Balloon to Proximal Circumflex, post stenosis 0%.  Formatting of this note is different from the original.  Coronary angiogram 05/02/2018 demonstrated 30% stenosis in the LMCA, 50% stenosis in the Proximal LAD, 50% stenosis in the Mid LAD, 95% stenosis in the Proximal Circumflex (Restenosis - Balloon Angioplasty), 100% stenosis in the 1st Marginal, 50% stenosis in the Proximal RCA, 50% stenosis in the Distal RCA, the LIMA graft from the LIMA to the Distal LAD is free of significant disease; the SVG graft from the Aorta to the 1st Marginal is free of significant disease; the SVG graft from the Aorta to the Distal RCA is free of significant disease. Intervention included a successful  2.5mm x 12mm Balloon,  2.5mm x 10mm Cutting Balloon,  3mm x 12mm Drug Eluting Stent,  3mm x 12mm Balloon, and  3mm x 8mm Balloon to Proximal Circumflex, post stenosis 0%.      Vitamin B12 deficiency 02/14/2018     Priority: Medium    Chronic bilateral low back pain without sciatica 01/17/2018     Priority: Medium    Chronic pain disorder 10/01/2017     Priority: Medium    Urinary incontinence, mixed 09/24/2017     Priority: Medium    Internal carotid artery stenosis, bilateral 07/13/2016     Priority: Medium     Carotid US 05/04/2018 showed moderate plaque formation, consistent with 50 to 69% stenosis in the right internal carotid artery, not significantly changed from 8/5/2015.  Moderate plaque formation, consistent with 50 to 69% stenosis in the left  internal carotid artery; there has been mild progression of the left ICA stenosis since 8/5/2015.    Formatting of this note might be different from the original.  Carotid US 05/04/2018 showed moderate plaque formation, consistent with 50 to 69% stenosis in the right internal carotid artery, not significantly changed from 8/5/2015.  Moderate plaque formation, consistent with 50 to 69% stenosis in the left internal carotid artery; there has been mild progression of the left ICA stenosis since 8/5/2015.    Formatting of this note might be different from the original.  Carotid US 05/04/2018 showed moderate plaque formation, consistent with 50 to 69% stenosis in the right internal carotid artery, not significantly changed from 8/5/2015.  Moderate plaque formation, consistent with 50 to 69% stenosis in the left internal carotid artery; there has been mild progression of the left ICA stenosis since 8/5/2015.      Essential (primary) hypertension 10/14/2015     Priority: Medium    Ischemic cardiomyopathy 09/20/2015     Priority: Medium     EF of 40-45%, status post RV lead revision and LV epicardial lead placement via mini-thoracotomy in August 2016.    Formatting of this note might be different from the original.  EF of 40-45%, status post RV lead revision and LV epicardial lead placement via mini-thoracotomy in August 2016.  Formatting of this note might be different from the original.  EF of 40-45%, status post RV lead revision and LV epicardial lead placement via mini-thoracotomy in August 2016.      S/P CABG x 5 08/21/2015     Priority: Medium    Pain medication agreement 04/20/2013     Priority: Medium     Controlled substance agreement for percocet #30/month on file and signed 4/17/13.  Designated pharmacy: [facility] Prescribing physician: Andrea Diagnosis: Ulnar neuropathy    Formatting of this note might be different from the original.  Controlled substance agreement for percocet #30/month on file and signed  "4/17/13.  Designated pharmacy: [facility] Prescribing physician: Andrea Diagnosis: Ulnar neuropathy      Anemia in other chronic diseases classified elsewhere 01/05/2013     Priority: Medium    Hypothyroidism, unspecified 12/10/2010     Priority: Medium    ACP (advance care planning) 11/03/2010     Priority: Medium     Formatting of this note might be different from the original.  Patient has identified Health Care Agent(s): Yes  Add Health Care Agents: Yes    Health Care Agent(s):    Primary Health Care Agent:     Edwar Escobar Relationship:    Spouse Phone:     [phone]    Secondary Health Care Agent:     Chiki Oro Relationship:     Son Phone:     [phone]      Patient has Advance Care Plan Documents (Health Care Directive, POLST): Yes    Advance Care Plan Documents:  Health Care Directive--03/28/11  Resuscitation Guidelines--    Patient has identified Specific Treatment Preferences: Yes   Specific Treatment Preferences:   a.) Code Status:  DNR/ Do Not Attempt Resuscitation - Allow a Natural Death    b.) Goals of Treatment:     ii. Limited Interventions and treat reversible conditions.  Provide interventions aimed at treatment of new or reversible illness/injury or non-life threatening chronic conditions. Duration of invasive or uncomfortable interventions should generally be limited.- Trial of intubation 5 days or other instructions \"If no improvement, stop.\"  c.) Interventions and Treatments:   i.   Antibiotics:          - Aggressive antibiotics  ii.  Nutrition/Hydration:         - Offer food and liquids by mouth         - IV fluid administration         - No feeding tube under any circumstance.  iii. Transfusion:          - Blood products for comfort/relief of symptoms only  iv. Dialysis:           - Dialysis for short term \"for recovery, but not long term.\"        Last Assessment & Plan:   Advance Care Planning: Disease-specific Session    Letty Escobar is an [facility] patient of Dr. Renea Mendez " "of the [facility].    Advance care planning discussions were completed with Letty and her healthcare agent, spouse Edwar Escobar on 2011 at the [facility].    Understanding of Illness and Disease [place]:   Letty identifies her medical condition as diabetes with peripheral neuropathy, heart problems with a heart attack 2009 plus 4 stent placements; sleep apnea; depression; hypothyroid; high cholesterol; tobacco use; and describes it as needing further assistance with thyroid and diabetes management.  She identifies the following symptoms of her medical condition as being the most bothersome: some memory loss, balance problems, light headedness and dizziness, puffy eyes and lower extremity edema from time to time.    Let[place] is retired [place] .  She is independent with all cares and lives an active life style although, \"I'm not as active as I used to be.\"    Goals of Care:   Letty currently hopes to maintain independence, control pain and symptoms and delay progression of, but not cure, the illness.  \"I want to get the diabetes and thyroid under better control.\"  Letty has an appointment the first part of April for follow up.    Quality of Life:    Letty identifies quality of life as family involvement twice weekly, Spiritism activities, newly assigned  , playing cards, the computer,    Letty christiano with serious challenges in her life through her ganesh in God, prayers and support of her Spiritism family, spouse and son.    Letty identifies the following fears and worries about her medical care:  \"I just want the diabetes straightened out.\"    Treatment and Care Preferences:   Past experiences in dealing with family and/or friends that have  or been seriously ill include her brother who took his own life.  \"He was 53 years old and living with us.  I found him.\"   As a " "result of these experiences, Letty expresses these health care preferences:      Summary Letty's Treatment Preferences:  LOW SURVIVAL; HIGH TREATMENT BURDEN:  If Letty suffered a serious complication, such that she was facing a prolonged hospital stay, required ongoing medical interventions, and the chance of living through the complication was low (for example, only 5 out of 100 would live), Letty would choose:  to focus treatment on comfort and quality of life (\"Quality of life is more important than length of life to Letty.\")  COMMENT:  \"If I can't live a normal life, I wouldn't want to live.\"    HIGH SURVIVAL; LOW FUNCTIONAL STATUS:  If Letty had a serious complication and had a good chance of living through the complication but it was expected that she would never be able to walk or talk again and would require 24 hour nursing care, she would choose:  to focus treatment on comfort and quality of life (\"Quality of life is more important than length of life to Letty.\")  COMMENT:  \"I'd accept rehabilitation.\"    HIGH SURVIVAL; LOW COGNITIVE STATUS:  If Letty had a serious complication and had a good chance of living through the complication but it was expected that she would never know who she was or who she was with and would require 24 hour nursing care, she would choose:  to focus treatment on comfort and quality of life (\"Quality of life is more important than length of life to Letty.\")    CARDIO-PULMONARY RESUSCITATION (CPR):  The facts, risks and benefits of CPR were discussed with Letty.  If she had a sudden event that caused her heart and breathing to stop, she:  WOULD NOT want CPR attempted and instead would prefer that a natural death occur.    MECHANICAL VENTILATION: If Letty had an episode where she was unable to breathe on her own, she would choose the following:  attempt to use any appropriate non-invasive method to assist breathing, and use mechanical ventilation.  COMMENT:  \"If reversible " "ventilator is acceptable for 5 days.  If no improvement, stop.\"     Letty has chosen her healthcare agent to:  strictly follow her wishes.    Follow Up Plan:   Letty was encouraged to continue advance care planning discussions with her health care agents and primary care provider.   Letty and her health care agent declined handouts.     Documents addressed during this advance care planning session:  1.  Health Care Agents identified.          .  Primary health care agent is spouse, Edwar Escobar;          .  Secondary health care agent is son, Jermaine Oro.  2.  Health Care Directive completed and scanned into medical record.  3.  Statement of Treatment Preferences for advanced illness completed and scanned into the medical record.  4.  Resuscitation Guidelines completed for DNR.  Document sent to Dr. Renea Mendez for signature and returned to the home. Letty, please place on the refrigerator.    Recommendations/Plan:   Letty and her health care agent to review Advance Care Plan.     Letty would benefit from:   Care Navigation/Care Navigation Help Desk--explained services for pending future needs.  No identified needs today.  Care Navigation brochure was given to Letty and her healthcare agent.    Advance Care Planning recommendations and Letty's concerns and questions were cc ed to her primary provider.     Thank you, Letty for the opportunity of assisting with Advance Care Planning. It was a seeing you again and meeting Edwar.  Please contact me if I can be of further assistance.    Interviewer: Pat Martin RN    Advance Care Planning Facilitator  [phone]   3/28/2011      ELI (obstructive sleep apnea) 01/31/2010     Priority: Medium     PSG on April/2008 that showed moderate Obstructive Sleep Apnea with an AHI of 23.5 . Limb movements persisted at 29 movements/hour at optimal pressures, despite carbidopa use.    Formatting of this note might be different from the original.  PSG on April/2008 that " showed moderate Obstructive Sleep Apnea with an AHI of 23.5 . Limb movements persisted at 29 movements/hour at optimal pressures, despite carbidopa use.  Formatting of this note might be different from the original.  PSG on April/2008 that showed moderate Obstructive Sleep Apnea with an AHI of 23.5 . Limb movements persisted at 29 movements/hour at optimal pressures, despite carbidopa use.      Coronary atherosclerosis 02/06/2009     Priority: Medium     Formatting of this note might be different from the original.  2/5/2009 - MI - Proximal RCA 99%, mild-mod disease elsewhere.  EF 60%.  PCI:  MYRON to pRCA.  2/12/2009 - admit CP - Widely patent RCA stent. Moderate diffuse CAD. Severe stenosis in trivial PDA branch. LVEF 45%.  1/25/2010 - admit CP - PTCA and stent of diagonal  9/8/2010 - admit CP - LAD patent stent. Moderate diffuse CAD. Medical management recommended.   4/25/2012 - NSTEMI - Acute total occlusion of the ostial Circumflex. Acute total occlusion of the ostial ramus. Acute total occlusion of the distal 1st Obtuse Marginal. Angioplasty of ostial circumflex and ostial ramus. There was distal embolization to the OM1 vessel. This vessel was small and not amendable to intervention. Indefinite: plavix and asa 81.  8/10/2015 - CABx5 - 1. LIMA to distal LAD, reverse SVG to OM1 and OM2, reverse SVG to diagonal, reverse SVG to PDA.   2. Mitral valve repair with a Medtronics 3-D full ring, 28 mm.   3. Tricuspid repair with a Medtronic 28 mm partial ring  1/12/2016 - TTE - EF 40-45%  Formatting of this note might be different from the original.  2/5/2009 - MI - Proximal RCA 99%, mild-mod disease elsewhere.  EF 60%.  PCI:  MYRON to pRCA.  2/12/2009 - admit CP - Widely patent RCA stent. Moderate diffuse CAD. Severe stenosis in trivial PDA branch. LVEF 45%.  1/25/2010 - admit CP - PTCA and stent of diagonal  9/8/2010 - admit CP - LAD patent stent. Moderate diffuse CAD. Medical management recommended.   4/25/2012 - NSTEMI  - Acute total occlusion of the ostial Circumflex. Acute total occlusion of the ostial ramus. Acute total occlusion of the distal 1st Obtuse Marginal. Angioplasty of ostial circumflex and ostial ramus. There was distal embolization to the OM1 vessel. This vessel was small and not amendable to intervention. Indefinite: plavix and asa 81.  8/10/2015 - CABx5 - 1. LIMA to distal LAD, reverse SVG to OM1 and OM2, reverse SVG to diagonal, reverse SVG to PDA.   2. Mitral valve repair with a Medtronics 3-D full ring, 28 mm.   3. Tricuspid repair with a Medtronic 28 mm partial ring  1/12/2016 - TTE - EF 40-45%      Restless legs syndrome 08/29/2007     Priority: Medium    Hyperlipidemia, unspecified 05/30/2007     Priority: Medium       Past Medical History:   Diagnosis Date    ACP (advance care planning) 11/3/2010    Acute coronary syndrome (H) 11/22/2019    Acute exacerbation of CHF (congestive heart failure) (H) 11/11/2019    Acute on chronic systolic (congestive) heart failure (H) 1/28/2020    Anemia of chronic disease 1/5/2013    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, left leg (H) 1/15/2020    BPPV (benign paroxysmal positional vertigo) 5/31/2018    Candidiasis 3/1/2020    Carotid stenosis, right 7/13/2016    Chest pain 11/11/2019    Chronic bilateral low back pain without sciatica 1/17/2018    Chronic pain disorder 10/1/2017    Constipation 3/20/2020    COPD (chronic obstructive pulmonary disease) (H) 6/24/2020    Coronary atherosclerosis 2/6/2009    Cubital tunnel syndrome on left 11/13/2012    Depressive disorder     Diabetes mellitus (H)     Diabetes mellitus type 2 in obese 7/13/2006    Diabetes mellitus type 2 in obese 7/13/2006    Drug-seeking behavior 4/4/2020    Failure to thrive in adult 9/3/2020    Hereditary and idiopathic peripheral neuropathy 4/24/2007    Hyperlipidemia with target low density lipoprotein (LDL) cholesterol less than 70 mg/dL 5/30/2007     Hypertension 10/14/2015    Hypothyroidism 12/10/2010    Irritable bowel syndrome 9/7/2015    Ischemic cardiomyopathy 9/20/2015    Long-term use of high-risk medication 4/14/2020    Moniliasis, 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    Myocardial infarction (H)     Neuromuscular disorder (H)     Other specified postprocedural states 10/8/2015    Pain medication agreement 4/20/2013    Panic disorder with agoraphobia 4/14/2020    Paroxysmal atrial fibrillation (H) 10/2/2015    Personality disorder (H) 3/5/2020    Polymyalgia rheumatica (H24) 11/28/2018    Posttraumatic stress disorder 3/1/2020    Restless legs syndrome (RLS) 8/29/2007    Restless legs syndrome (RLS) 8/29/2007    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    Serum calcium elevated 3/1/2020    Somatic dysfunction of sacral region 1/17/2018    Subacromial bursitis of left shoulder joint 8/6/2018    Suicidal ideation 4/1/2020    Thyroid disease     TIA (transient ischemic attack) 5/4/2018    TIA (transient ischemic attack) 5/4/2018    Tobacco abuse 2/17/2017    Tobacco abuse 2/17/2017    Urinary incontinence, mixed 9/24/2017    UTI (urinary tract infection) 4/17/2020    Vitamin B12 deficiency 2/14/2018    Weakness 12/17/2019       MEDS: acetaminophen (TYLENOL) 325 MG tablet  acetaminophen (TYLENOL) 500 MG tablet  albuterol (PROVENTIL) (2.5 MG/3ML) 0.083% neb solution  albuterol (PROVENTIL) (2.5 MG/3ML) 0.083% neb solution  apixaban ANTICOAGULANT (ELIQUIS) 5 MG tablet  aspirin 81 MG EC tablet  bisacodyl (DULCOLAX) 10 MG suppository  clotrimazole (LOTRIMIN) 1 % external cream  cyanocobalamin (CYANOCOBALAMIN) 1000 MCG/ML injection  DULoxetine HCl 40 MG CPEP  empagliflozin (JARDIANCE) 10 MG TABS tablet  famotidine (PEPCID) 20 MG tablet  fluconazole (DIFLUCAN) 150 MG tablet  fluticasone-vilanterol (BREO ELLIPTA) 200-25 MCG/INH inhaler  furosemide (LASIX) 20 MG tablet  gabapentin (NEURONTIN) 400 MG capsule  hydrochlorothiazide (HYDRODIURIL)  25 MG tablet  Insulin Aspart FlexPen 100 UNIT/ML SOPN  LANTUS SOLOSTAR 100 UNIT/ML soln  levothyroxine (SYNTHROID/LEVOTHROID) 137 MCG tablet  lidocaine (LMX4) 4 % external cream  LORazepam (ATIVAN) 1 MG tablet  losartan (COZAAR) 25 MG tablet  Melatonin 10 MG TABS tablet  metoprolol succinate ER (TOPROL-XL) 25 MG 24 hr tablet  mirtazapine (REMERON) 15 MG tablet  MUCUS RELIEF 600 MG 12 hr tablet  nitroGLYcerin (NITROSTAT) 0.4 MG sublingual tablet  ondansetron (ZOFRAN ODT) 4 MG ODT tab  pantoprazole (PROTONIX) 40 MG EC tablet  polyethylene glycol (MIRALAX) 17 GM/Dose powder  potassium chloride ER (MICRO-K) 10 MEQ CR capsule  predniSONE (DELTASONE) 10 MG tablet  QUEtiapine (SEROQUEL) 100 MG tablet  rosuvastatin (CRESTOR) 10 MG tablet  sennosides (SENOKOT) 8.6 MG tablet  traZODone (DESYREL) 50 MG tablet        ALLERGIES:    Allergies   Allergen Reactions    Bee Pollen Anaphylaxis    Diphenhydramine Palpitations     Tolerated IV Benadryl when not pushed too fast    Isosorbide Nitrate Other (See Comments) and Dizziness     Also causes syncope (has fallen before) and brain fog/mental disturbances - please do not prescribe    Nitroglycerin Dizziness, Fatigue and Other (See Comments)     Specifically the patch - please do not prescribe  Specifically the patch - please do not prescribe      Contrast Dye Hives and Itching     Tolerated CT with IV contrast 5/9/2024 with 50 mg IV Benadryl prior    Isosorbide      Other Reaction(s): foggy head    Penicillin G     Adhesive Tape Rash    Liquid Adhesive Itching    Nystatin Dermatitis     Also blisters    Penicillins Swelling and Rash     Occurred as a child - not 100% sure on specific reactions    Sulfa Antibiotics Other (See Comments)     Occurred as a child / patient does not remember specific reaction       Past Surgical History:   Procedure Laterality Date    CARDIAC SURGERY      stents x 11    CARDIAC SURGERY      CHOLECYSTECTOMY      CHOLECYSTECTOMY      GENITOURINARY SURGERY       Tubal ligation    OTHER SURGICAL HISTORY      Genitourinary surgery    RELEASE CARPAL TUNNEL      RELEASE CARPAL TUNNEL         Social History     Tobacco Use    Smoking status: Never    Smokeless tobacco: Never   Vaping Use    Vaping status: Never Used   Substance Use Topics    Alcohol use: Not Currently    Drug use: Never         Review of Systems   Except as noted in HPI, all other systems were reviewed and are negative    Physical Exam   Vitals were reviewed  Patient Vitals for the past 8 hrs:   BP Temp Temp src Pulse Resp SpO2 Weight   09/22/24 0230 136/63 -- -- 60 -- 94 % --   09/22/24 0215 132/62 -- -- 60 -- 97 % --   09/22/24 0200 123/68 -- -- 60 -- (!) 89 % --   09/22/24 0145 122/62 -- -- 60 -- 90 % --   09/22/24 0130 (!) 141/66 -- -- 60 17 93 % --   09/22/24 0120 134/72 -- -- 64 22 (!) 86 % --   09/22/24 0115 134/72 -- -- -- -- 95 % --   09/22/24 0112 (!) 153/78 -- -- -- -- -- --   09/22/24 0111 -- 97.8  F (36.6  C) Oral 82 16 96 % 89.5 kg (197 lb 6.4 oz)     GENERAL APPEARANCE: Alert, patient appears somnolent, no acute respiratory distress  FACE: normal facies  EYES: Pupils are equal  HENT: normal external exam  NECK: no adenopathy or asymmetry  RESP: normal respiratory effort; clear breath sounds bilaterally  CHEST: Chest wall tenderness  CV: regular rate and rhythm; no significant murmurs, gallops or rubs  ABD: soft, obese, no tenderness; no rebound or guarding; bowel sounds are normal  EXT: No calf tenderness or pitting edema  SKIN: Small tender nonfluctuant skin lesion under the left breast.  Skin is intact without any drainage.  Mild area of erythema.  NEURO: no facial droop; no focal deficits, speech is normal  PSYCH: Flat affect      Available Lab/Imaging Results     Results for orders placed or performed during the hospital encounter of 09/22/24 (from the past 24 hour(s))   WBC and differential    Narrative    The following orders were created for panel order WBC and differential.  Procedure                                Abnormality         Status                     ---------                               -----------         ------                     WBC and Differential[[id number]]                             Final result                 Please view results for these tests on the individual orders.   Troponin T, High Sensitivity   Result Value Ref Range    Troponin T, High Sensitivity 19 (H) <=14 ng/L   WBC and Differential   Result Value Ref Range    WBC Count 7.8 4.0 - 11.0 10e3/uL    % Neutrophils 66 %    % Lymphocytes 23 %    % Monocytes 6 %    % Eosinophils 4 %    % Basophils 1 %    % Immature Granulocytes 1 %    NRBCs per 100 WBC 0 <1 /100    Absolute Neutrophils 5.2 1.6 - 8.3 10e3/uL    Absolute Lymphocytes 1.8 0.8 - 5.3 10e3/uL    Absolute Monocytes 0.5 0.0 - 1.3 10e3/uL    Absolute Eosinophils 0.3 0.0 - 0.7 10e3/uL    Absolute Basophils 0.0 0.0 - 0.2 10e3/uL    Absolute Immature Granulocytes 0.0 <=0.4 10e3/uL    Absolute NRBCs 0.0 10e3/uL       EKG reviewed by me: Poor quality with wandering baseline.  Left bundle branch block.  Nonspecific T wave abnormality.  Similar appearance to previous EKG dated September 13, 2024.             Impression     Final diagnoses:   Chronic chest pain         ED Course & Medical Decision Making   Letty Escobar is a 68 year old female who presented to the emergency department by EMS with acute recurrent chronic chest pain.  Patient has had numerous visits to the emergency department and numerous negative workups.  She has had a previous heart cath in 2023 with no signs of any coronary occlusions in her graft sites.  Patient had a cardiology follow-up at the end of August, and her next cardiology appointment will not be for another 6 months.  Patient is receiving as needed medications of morphine and Ativan and it sounds like she is having continued episodes.  She is having episodes several times a week and not always coming into the ED.  I asked whether there  is anything particular that made her come in and her answer was not clear.  This pain is similar to what she has had in the past.  Patient was somewhat somnolent during the history and physical but was still complaining of pain level of 8 out of 10.  She is asking for testing but her troponin levels, EKGs, and chest x-rays have all been reassuring throughout multiple visits.    Vital signs reveal a temp of 97.8, blood pressure 123/60, heart rate of 82, respiration 16, 96% oxygen saturation.  Patient has chest wall tenderness.  Lung and heart exam is clear.  Patient's EKG reveals low quality with wandering baseline.  There is no significant change compared with previous EKG.  Patient wanted me to check blood cultures because of a skin lesion that she has on the left breast.  I offered her a white blood count with differential.  Her troponin enzyme is 19 consistent with previous levels.    Patient was reassured that her chest pain is noncardiac.  Patient will be transported back to the nursing home.  Follow-up with her primary care provider for further management of her chronic pain.  No additional pain medications were given as she has already received 15 mg of morphine at the nursing home and she appears somnolent.        Written after-visit summary and instructions were given at the time of discharge.        Discharge Instructions:   Your troponin enzyme and white blood count are normal.  Your chronic chest pain is not related to coronary disease.  There are no signs that you have any systemic infection.  Follow-up with your primary care provider for further management of your chronic pain.       Disclaimer: This note consists of words and symbols derived from keyboarding and dictation using voice recognition software.  As a result, there may be errors that have gone undetected.  Please consider this when interpreting information found in this note.       Monie Weiss MD  09/22/24 [id number]

## 2024-09-22 NOTE — ED NOTES
Patient sleeping soundly when I entered room. Oxygen saturations hovering between 90-89%. When she was woken by provider she states her pain level is still 8/10. Plan is to discharge her home

## 2024-09-30 ENCOUNTER — ANCILLARY PROCEDURE (OUTPATIENT)
Dept: CARDIOLOGY | Facility: CLINIC | Age: 69
End: 2024-09-30
Attending: INTERNAL MEDICINE
Payer: COMMERCIAL

## 2024-09-30 DIAGNOSIS — I25.5 ISCHEMIC CARDIOMYOPATHY: ICD-10-CM

## 2024-09-30 DIAGNOSIS — Z95.0 PACEMAKER: ICD-10-CM

## 2024-09-30 DIAGNOSIS — Z95.810 ICD (IMPLANTABLE CARDIOVERTER-DEFIBRILLATOR) IN PLACE: ICD-10-CM

## 2024-09-30 DIAGNOSIS — I49.5 SSS (SICK SINUS SYNDROME) (H): ICD-10-CM

## 2024-09-30 PROCEDURE — 93296 REM INTERROG EVL PM/IDS: CPT | Performed by: INTERNAL MEDICINE

## 2024-09-30 PROCEDURE — 93295 DEV INTERROG REMOTE 1/2/MLT: CPT | Performed by: INTERNAL MEDICINE

## 2024-10-04 LAB
MDC_IDC_LEAD_CONNECTION_STATUS: NORMAL
MDC_IDC_LEAD_IMPLANT_DT: NORMAL
MDC_IDC_LEAD_LOCATION: NORMAL
MDC_IDC_LEAD_LOCATION_DETAIL_1: NORMAL
MDC_IDC_LEAD_MFG: NORMAL
MDC_IDC_LEAD_MODEL: NORMAL
MDC_IDC_LEAD_POLARITY_TYPE: NORMAL
MDC_IDC_LEAD_SERIAL: NORMAL
MDC_IDC_MSMT_BATTERY_DTM: NORMAL
MDC_IDC_MSMT_BATTERY_REMAINING_LONGEVITY: 21 MO
MDC_IDC_MSMT_BATTERY_RRT_TRIGGER: NORMAL
MDC_IDC_MSMT_BATTERY_VOLTAGE: 2.93 V
MDC_IDC_MSMT_CAP_CHARGE_DTM: NORMAL
MDC_IDC_MSMT_CAP_CHARGE_ENERGY: 18 J
MDC_IDC_MSMT_CAP_CHARGE_TIME: 4 S
MDC_IDC_MSMT_CAP_CHARGE_TYPE: NORMAL
MDC_IDC_MSMT_LEADCHNL_LV_IMPEDANCE_VALUE: 3000 OHM
MDC_IDC_MSMT_LEADCHNL_LV_IMPEDANCE_VALUE: 3000 OHM
MDC_IDC_MSMT_LEADCHNL_LV_IMPEDANCE_VALUE: 342 OHM
MDC_IDC_MSMT_LEADCHNL_LV_PACING_THRESHOLD_AMPLITUDE: 3 V
MDC_IDC_MSMT_LEADCHNL_LV_PACING_THRESHOLD_PULSEWIDTH: 1 MS
MDC_IDC_MSMT_LEADCHNL_RA_IMPEDANCE_VALUE: 456 OHM
MDC_IDC_MSMT_LEADCHNL_RA_PACING_THRESHOLD_AMPLITUDE: 0.88 V
MDC_IDC_MSMT_LEADCHNL_RA_PACING_THRESHOLD_PULSEWIDTH: 0.4 MS
MDC_IDC_MSMT_LEADCHNL_RA_SENSING_INTR_AMPL: 0.8 MV
MDC_IDC_MSMT_LEADCHNL_RV_IMPEDANCE_VALUE: 247 OHM
MDC_IDC_MSMT_LEADCHNL_RV_IMPEDANCE_VALUE: 285 OHM
MDC_IDC_MSMT_LEADCHNL_RV_PACING_THRESHOLD_AMPLITUDE: 0.62 V
MDC_IDC_MSMT_LEADCHNL_RV_PACING_THRESHOLD_PULSEWIDTH: 0.4 MS
MDC_IDC_MSMT_LEADCHNL_RV_SENSING_INTR_AMPL: 7.5 MV
MDC_IDC_PG_IMPLANT_DTM: NORMAL
MDC_IDC_PG_MFG: NORMAL
MDC_IDC_PG_MODEL: NORMAL
MDC_IDC_PG_SERIAL: NORMAL
MDC_IDC_PG_TYPE: NORMAL
MDC_IDC_SESS_CLINIC_NAME: NORMAL
MDC_IDC_SESS_DTM: NORMAL
MDC_IDC_SESS_TYPE: NORMAL
MDC_IDC_SET_BRADY_AT_MODE_SWITCH_RATE: 171 {BEATS}/MIN
MDC_IDC_SET_BRADY_LOWRATE: 60 {BEATS}/MIN
MDC_IDC_SET_BRADY_MAX_SENSOR_RATE: 130 {BEATS}/MIN
MDC_IDC_SET_BRADY_MAX_TRACKING_RATE: 130 {BEATS}/MIN
MDC_IDC_SET_BRADY_MODE: NORMAL
MDC_IDC_SET_BRADY_PAV_DELAY_LOW: 170 MS
MDC_IDC_SET_BRADY_SAV_DELAY_LOW: 120 MS
MDC_IDC_SET_CRT_LVRV_DELAY: 0 MS
MDC_IDC_SET_CRT_PACED_CHAMBERS: NORMAL
MDC_IDC_SET_LEADCHNL_LV_PACING_AMPLITUDE: 3.75 V
MDC_IDC_SET_LEADCHNL_LV_PACING_ANODE_ELECTRODE_1: NORMAL
MDC_IDC_SET_LEADCHNL_LV_PACING_ANODE_LOCATION_1: NORMAL
MDC_IDC_SET_LEADCHNL_LV_PACING_CAPTURE_MODE: NORMAL
MDC_IDC_SET_LEADCHNL_LV_PACING_CATHODE_ELECTRODE_1: NORMAL
MDC_IDC_SET_LEADCHNL_LV_PACING_CATHODE_LOCATION_1: NORMAL
MDC_IDC_SET_LEADCHNL_LV_PACING_POLARITY: NORMAL
MDC_IDC_SET_LEADCHNL_LV_PACING_PULSEWIDTH: 1 MS
MDC_IDC_SET_LEADCHNL_RA_PACING_AMPLITUDE: 1.5 V
MDC_IDC_SET_LEADCHNL_RA_PACING_ANODE_ELECTRODE_1: NORMAL
MDC_IDC_SET_LEADCHNL_RA_PACING_ANODE_LOCATION_1: NORMAL
MDC_IDC_SET_LEADCHNL_RA_PACING_CAPTURE_MODE: NORMAL
MDC_IDC_SET_LEADCHNL_RA_PACING_CATHODE_ELECTRODE_1: NORMAL
MDC_IDC_SET_LEADCHNL_RA_PACING_CATHODE_LOCATION_1: NORMAL
MDC_IDC_SET_LEADCHNL_RA_PACING_POLARITY: NORMAL
MDC_IDC_SET_LEADCHNL_RA_PACING_PULSEWIDTH: 0.4 MS
MDC_IDC_SET_LEADCHNL_RA_SENSING_ANODE_ELECTRODE_1: NORMAL
MDC_IDC_SET_LEADCHNL_RA_SENSING_ANODE_LOCATION_1: NORMAL
MDC_IDC_SET_LEADCHNL_RA_SENSING_CATHODE_ELECTRODE_1: NORMAL
MDC_IDC_SET_LEADCHNL_RA_SENSING_CATHODE_LOCATION_1: NORMAL
MDC_IDC_SET_LEADCHNL_RA_SENSING_POLARITY: NORMAL
MDC_IDC_SET_LEADCHNL_RA_SENSING_SENSITIVITY: 0.3 MV
MDC_IDC_SET_LEADCHNL_RV_PACING_AMPLITUDE: 2 V
MDC_IDC_SET_LEADCHNL_RV_PACING_ANODE_ELECTRODE_1: NORMAL
MDC_IDC_SET_LEADCHNL_RV_PACING_ANODE_LOCATION_1: NORMAL
MDC_IDC_SET_LEADCHNL_RV_PACING_CAPTURE_MODE: NORMAL
MDC_IDC_SET_LEADCHNL_RV_PACING_CATHODE_ELECTRODE_1: NORMAL
MDC_IDC_SET_LEADCHNL_RV_PACING_CATHODE_LOCATION_1: NORMAL
MDC_IDC_SET_LEADCHNL_RV_PACING_POLARITY: NORMAL
MDC_IDC_SET_LEADCHNL_RV_PACING_PULSEWIDTH: 0.4 MS
MDC_IDC_SET_LEADCHNL_RV_SENSING_ANODE_ELECTRODE_1: NORMAL
MDC_IDC_SET_LEADCHNL_RV_SENSING_ANODE_LOCATION_1: NORMAL
MDC_IDC_SET_LEADCHNL_RV_SENSING_CATHODE_ELECTRODE_1: NORMAL
MDC_IDC_SET_LEADCHNL_RV_SENSING_CATHODE_LOCATION_1: NORMAL
MDC_IDC_SET_LEADCHNL_RV_SENSING_POLARITY: NORMAL
MDC_IDC_SET_LEADCHNL_RV_SENSING_SENSITIVITY: 0.3 MV
MDC_IDC_SET_ZONE_DETECTION_BEATS_DENOMINATOR: 16 {BEATS}
MDC_IDC_SET_ZONE_DETECTION_BEATS_NUMERATOR: 12 {BEATS}
MDC_IDC_SET_ZONE_DETECTION_BEATS_NUMERATOR: 16 {BEATS}
MDC_IDC_SET_ZONE_DETECTION_BEATS_NUMERATOR: 16 {BEATS}
MDC_IDC_SET_ZONE_DETECTION_INTERVAL: 300 MS
MDC_IDC_SET_ZONE_DETECTION_INTERVAL: 350 MS
MDC_IDC_SET_ZONE_DETECTION_INTERVAL: 350 MS
MDC_IDC_SET_ZONE_DETECTION_INTERVAL: 360 MS
MDC_IDC_SET_ZONE_STATUS: NORMAL
MDC_IDC_SET_ZONE_TYPE: NORMAL
MDC_IDC_SET_ZONE_VENDOR_TYPE: NORMAL
MDC_IDC_STAT_AT_BURDEN_PERCENT: 0.04 %
MDC_IDC_STAT_AT_DTM_END: NORMAL
MDC_IDC_STAT_AT_DTM_START: NORMAL
MDC_IDC_STAT_BRADY_AP_VP_PERCENT: 61.53 %
MDC_IDC_STAT_BRADY_AP_VS_PERCENT: 0.06 %
MDC_IDC_STAT_BRADY_AS_VP_PERCENT: 35.9 %
MDC_IDC_STAT_BRADY_AS_VS_PERCENT: 2.51 %
MDC_IDC_STAT_BRADY_DTM_END: NORMAL
MDC_IDC_STAT_BRADY_DTM_START: NORMAL
MDC_IDC_STAT_BRADY_RA_PERCENT_PACED: 62.41 %
MDC_IDC_STAT_BRADY_RV_PERCENT_PACED: 96.44 %
MDC_IDC_STAT_CRT_DTM_END: NORMAL
MDC_IDC_STAT_CRT_DTM_START: NORMAL
MDC_IDC_STAT_CRT_LV_PERCENT_PACED: 97.41 %
MDC_IDC_STAT_CRT_PERCENT_PACED: 96.41 %
MDC_IDC_STAT_EPISODE_RECENT_COUNT: 0
MDC_IDC_STAT_EPISODE_RECENT_COUNT_DTM_END: NORMAL
MDC_IDC_STAT_EPISODE_RECENT_COUNT_DTM_START: NORMAL
MDC_IDC_STAT_EPISODE_TOTAL_COUNT: 0
MDC_IDC_STAT_EPISODE_TOTAL_COUNT: 9
MDC_IDC_STAT_EPISODE_TOTAL_COUNT_DTM_END: NORMAL
MDC_IDC_STAT_EPISODE_TOTAL_COUNT_DTM_START: NORMAL
MDC_IDC_STAT_EPISODE_TYPE: NORMAL
MDC_IDC_STAT_TACHYTHERAPY_ATP_DELIVERED_RECENT: 0
MDC_IDC_STAT_TACHYTHERAPY_ATP_DELIVERED_TOTAL: 0
MDC_IDC_STAT_TACHYTHERAPY_RECENT_DTM_END: NORMAL
MDC_IDC_STAT_TACHYTHERAPY_RECENT_DTM_START: NORMAL
MDC_IDC_STAT_TACHYTHERAPY_SHOCKS_ABORTED_RECENT: 0
MDC_IDC_STAT_TACHYTHERAPY_SHOCKS_ABORTED_TOTAL: 0
MDC_IDC_STAT_TACHYTHERAPY_SHOCKS_DELIVERED_RECENT: 0
MDC_IDC_STAT_TACHYTHERAPY_SHOCKS_DELIVERED_TOTAL: 0
MDC_IDC_STAT_TACHYTHERAPY_TOTAL_DTM_END: NORMAL
MDC_IDC_STAT_TACHYTHERAPY_TOTAL_DTM_START: NORMAL

## 2024-12-12 ENCOUNTER — TELEPHONE (OUTPATIENT)
Dept: DERMATOLOGY | Facility: CLINIC | Age: 69
End: 2024-12-12
Payer: COMMERCIAL

## 2024-12-12 NOTE — TELEPHONE ENCOUNTER
Juju returned our call at pt scheduled for 2/26 at 1pm. Confirmed it is the same nail she had removed last time: right thumb nail    Hemalatha Soliman RN on 12/12/2024 at 2:04 PM

## 2024-12-12 NOTE — TELEPHONE ENCOUNTER
[facility] Call Center    Phone Message    May a detailed message be left on voicemail: yes     Reason for Call: Other: Pt is wanting her thumb nail removed. This has been done before with Dr. Davis. The nail is causing her pain. Please call Juju her health unit coordinator at [facility].      Action Taken: TE SENT    Travel Screening: Not Applicable     Date of Service:             Thank you!  [facility]

## 2024-12-12 NOTE — TELEPHONE ENCOUNTER
Called Juju, no answer LVM for her to return our call. Please clarify if its the same nail she already treated and schedule pt for nail avulsion with Dr Davis in procedure slot.    Hemalatha Soliman RN on 12/12/2024 at 1:28 PM

## 2025-01-03 ENCOUNTER — LAB REQUISITION (OUTPATIENT)
Dept: LAB | Facility: CLINIC | Age: 70
End: 2025-01-03
Payer: COMMERCIAL

## 2025-01-03 DIAGNOSIS — I50.22 CHRONIC SYSTOLIC (CONGESTIVE) HEART FAILURE (H): ICD-10-CM

## 2025-01-06 LAB
ANION GAP SERPL CALCULATED.3IONS-SCNC: 11 MMOL/L (ref 7–15)
BUN SERPL-MCNC: 17.6 MG/DL (ref 8–23)
CALCIUM SERPL-MCNC: 9.7 MG/DL (ref 8.8–10.4)
CHLORIDE SERPL-SCNC: 109 MMOL/L (ref 98–107)
CREAT SERPL-MCNC: 1.07 MG/DL (ref 0.51–0.95)
EGFRCR SERPLBLD CKD-EPI 2021: 55 ML/MIN/1.73M2
GLUCOSE SERPL-MCNC: 99 MG/DL (ref 70–99)
HCO3 SERPL-SCNC: 21 MMOL/L (ref 22–29)
POTASSIUM SERPL-SCNC: 4.9 MMOL/L (ref 3.4–5.3)
SODIUM SERPL-SCNC: 141 MMOL/L (ref 135–145)

## 2025-01-06 PROCEDURE — P9604 ONE-WAY ALLOW PRORATED TRIP: HCPCS | Mod: ORL | Performed by: NURSE PRACTITIONER

## 2025-01-06 PROCEDURE — 80048 BASIC METABOLIC PNL TOTAL CA: CPT | Mod: ORL | Performed by: NURSE PRACTITIONER

## 2025-01-06 PROCEDURE — 36415 COLL VENOUS BLD VENIPUNCTURE: CPT | Mod: ORL | Performed by: NURSE PRACTITIONER

## 2025-01-09 ENCOUNTER — ANCILLARY PROCEDURE (OUTPATIENT)
Dept: CARDIOLOGY | Facility: CLINIC | Age: 70
End: 2025-01-09
Attending: INTERNAL MEDICINE
Payer: COMMERCIAL

## 2025-01-09 DIAGNOSIS — Z95.810 ICD (IMPLANTABLE CARDIOVERTER-DEFIBRILLATOR) IN PLACE: ICD-10-CM

## 2025-01-09 DIAGNOSIS — I25.5 ISCHEMIC CARDIOMYOPATHY: ICD-10-CM

## 2025-01-09 DIAGNOSIS — Z95.0 PACEMAKER: ICD-10-CM

## 2025-01-09 DIAGNOSIS — I49.5 SSS (SICK SINUS SYNDROME) (H): ICD-10-CM

## 2025-01-09 PROCEDURE — 93296 REM INTERROG EVL PM/IDS: CPT | Performed by: INTERNAL MEDICINE

## 2025-01-09 PROCEDURE — 93295 DEV INTERROG REMOTE 1/2/MLT: CPT | Performed by: INTERNAL MEDICINE

## 2025-01-13 LAB
MDC_IDC_LEAD_CONNECTION_STATUS: NORMAL
MDC_IDC_LEAD_IMPLANT_DT: NORMAL
MDC_IDC_LEAD_LOCATION: NORMAL
MDC_IDC_LEAD_LOCATION_DETAIL_1: NORMAL
MDC_IDC_LEAD_MFG: NORMAL
MDC_IDC_LEAD_MODEL: NORMAL
MDC_IDC_LEAD_POLARITY_TYPE: NORMAL
MDC_IDC_LEAD_SERIAL: NORMAL
MDC_IDC_MSMT_BATTERY_DTM: NORMAL
MDC_IDC_MSMT_BATTERY_REMAINING_LONGEVITY: 19 MO
MDC_IDC_MSMT_BATTERY_RRT_TRIGGER: NORMAL
MDC_IDC_MSMT_BATTERY_VOLTAGE: 2.92 V
MDC_IDC_MSMT_CAP_CHARGE_DTM: NORMAL
MDC_IDC_MSMT_CAP_CHARGE_ENERGY: 18 J
MDC_IDC_MSMT_CAP_CHARGE_TIME: 4 S
MDC_IDC_MSMT_CAP_CHARGE_TYPE: NORMAL
MDC_IDC_MSMT_LEADCHNL_LV_IMPEDANCE_VALUE: 3000 OHM
MDC_IDC_MSMT_LEADCHNL_LV_IMPEDANCE_VALUE: 3000 OHM
MDC_IDC_MSMT_LEADCHNL_LV_IMPEDANCE_VALUE: 323 OHM
MDC_IDC_MSMT_LEADCHNL_LV_PACING_THRESHOLD_AMPLITUDE: 3 V
MDC_IDC_MSMT_LEADCHNL_LV_PACING_THRESHOLD_PULSEWIDTH: 1 MS
MDC_IDC_MSMT_LEADCHNL_RA_IMPEDANCE_VALUE: 437 OHM
MDC_IDC_MSMT_LEADCHNL_RA_PACING_THRESHOLD_AMPLITUDE: 0.75 V
MDC_IDC_MSMT_LEADCHNL_RA_PACING_THRESHOLD_PULSEWIDTH: 0.4 MS
MDC_IDC_MSMT_LEADCHNL_RA_SENSING_INTR_AMPL: 0.8 MV
MDC_IDC_MSMT_LEADCHNL_RV_IMPEDANCE_VALUE: 266 OHM
MDC_IDC_MSMT_LEADCHNL_RV_IMPEDANCE_VALUE: 304 OHM
MDC_IDC_MSMT_LEADCHNL_RV_PACING_THRESHOLD_AMPLITUDE: 0.62 V
MDC_IDC_MSMT_LEADCHNL_RV_PACING_THRESHOLD_PULSEWIDTH: 0.4 MS
MDC_IDC_MSMT_LEADCHNL_RV_SENSING_INTR_AMPL: 8.6 MV
MDC_IDC_PG_IMPLANT_DTM: NORMAL
MDC_IDC_PG_MFG: NORMAL
MDC_IDC_PG_MODEL: NORMAL
MDC_IDC_PG_SERIAL: NORMAL
MDC_IDC_PG_TYPE: NORMAL
MDC_IDC_SESS_CLINIC_NAME: NORMAL
MDC_IDC_SESS_DTM: NORMAL
MDC_IDC_SESS_TYPE: NORMAL
MDC_IDC_SET_BRADY_AT_MODE_SWITCH_RATE: 171 {BEATS}/MIN
MDC_IDC_SET_BRADY_LOWRATE: 60 {BEATS}/MIN
MDC_IDC_SET_BRADY_MAX_SENSOR_RATE: 130 {BEATS}/MIN
MDC_IDC_SET_BRADY_MAX_TRACKING_RATE: 130 {BEATS}/MIN
MDC_IDC_SET_BRADY_MODE: NORMAL
MDC_IDC_SET_BRADY_PAV_DELAY_LOW: 170 MS
MDC_IDC_SET_BRADY_SAV_DELAY_LOW: 120 MS
MDC_IDC_SET_CRT_LVRV_DELAY: 0 MS
MDC_IDC_SET_CRT_PACED_CHAMBERS: NORMAL
MDC_IDC_SET_LEADCHNL_LV_PACING_AMPLITUDE: 3.75 V
MDC_IDC_SET_LEADCHNL_LV_PACING_ANODE_ELECTRODE_1: NORMAL
MDC_IDC_SET_LEADCHNL_LV_PACING_ANODE_LOCATION_1: NORMAL
MDC_IDC_SET_LEADCHNL_LV_PACING_CAPTURE_MODE: NORMAL
MDC_IDC_SET_LEADCHNL_LV_PACING_CATHODE_ELECTRODE_1: NORMAL
MDC_IDC_SET_LEADCHNL_LV_PACING_CATHODE_LOCATION_1: NORMAL
MDC_IDC_SET_LEADCHNL_LV_PACING_POLARITY: NORMAL
MDC_IDC_SET_LEADCHNL_LV_PACING_PULSEWIDTH: 1 MS
MDC_IDC_SET_LEADCHNL_RA_PACING_AMPLITUDE: 1.5 V
MDC_IDC_SET_LEADCHNL_RA_PACING_ANODE_ELECTRODE_1: NORMAL
MDC_IDC_SET_LEADCHNL_RA_PACING_ANODE_LOCATION_1: NORMAL
MDC_IDC_SET_LEADCHNL_RA_PACING_CAPTURE_MODE: NORMAL
MDC_IDC_SET_LEADCHNL_RA_PACING_CATHODE_ELECTRODE_1: NORMAL
MDC_IDC_SET_LEADCHNL_RA_PACING_CATHODE_LOCATION_1: NORMAL
MDC_IDC_SET_LEADCHNL_RA_PACING_POLARITY: NORMAL
MDC_IDC_SET_LEADCHNL_RA_PACING_PULSEWIDTH: 0.4 MS
MDC_IDC_SET_LEADCHNL_RA_SENSING_ANODE_ELECTRODE_1: NORMAL
MDC_IDC_SET_LEADCHNL_RA_SENSING_ANODE_LOCATION_1: NORMAL
MDC_IDC_SET_LEADCHNL_RA_SENSING_CATHODE_ELECTRODE_1: NORMAL
MDC_IDC_SET_LEADCHNL_RA_SENSING_CATHODE_LOCATION_1: NORMAL
MDC_IDC_SET_LEADCHNL_RA_SENSING_POLARITY: NORMAL
MDC_IDC_SET_LEADCHNL_RA_SENSING_SENSITIVITY: 0.3 MV
MDC_IDC_SET_LEADCHNL_RV_PACING_AMPLITUDE: 2 V
MDC_IDC_SET_LEADCHNL_RV_PACING_ANODE_ELECTRODE_1: NORMAL
MDC_IDC_SET_LEADCHNL_RV_PACING_ANODE_LOCATION_1: NORMAL
MDC_IDC_SET_LEADCHNL_RV_PACING_CAPTURE_MODE: NORMAL
MDC_IDC_SET_LEADCHNL_RV_PACING_CATHODE_ELECTRODE_1: NORMAL
MDC_IDC_SET_LEADCHNL_RV_PACING_CATHODE_LOCATION_1: NORMAL
MDC_IDC_SET_LEADCHNL_RV_PACING_POLARITY: NORMAL
MDC_IDC_SET_LEADCHNL_RV_PACING_PULSEWIDTH: 0.4 MS
MDC_IDC_SET_LEADCHNL_RV_SENSING_ANODE_ELECTRODE_1: NORMAL
MDC_IDC_SET_LEADCHNL_RV_SENSING_ANODE_LOCATION_1: NORMAL
MDC_IDC_SET_LEADCHNL_RV_SENSING_CATHODE_ELECTRODE_1: NORMAL
MDC_IDC_SET_LEADCHNL_RV_SENSING_CATHODE_LOCATION_1: NORMAL
MDC_IDC_SET_LEADCHNL_RV_SENSING_POLARITY: NORMAL
MDC_IDC_SET_LEADCHNL_RV_SENSING_SENSITIVITY: 0.3 MV
MDC_IDC_SET_ZONE_DETECTION_BEATS_DENOMINATOR: 16 {BEATS}
MDC_IDC_SET_ZONE_DETECTION_BEATS_NUMERATOR: 12 {BEATS}
MDC_IDC_SET_ZONE_DETECTION_BEATS_NUMERATOR: 16 {BEATS}
MDC_IDC_SET_ZONE_DETECTION_BEATS_NUMERATOR: 16 {BEATS}
MDC_IDC_SET_ZONE_DETECTION_INTERVAL: 300 MS
MDC_IDC_SET_ZONE_DETECTION_INTERVAL: 350 MS
MDC_IDC_SET_ZONE_DETECTION_INTERVAL: 350 MS
MDC_IDC_SET_ZONE_DETECTION_INTERVAL: 360 MS
MDC_IDC_SET_ZONE_STATUS: NORMAL
MDC_IDC_SET_ZONE_TYPE: NORMAL
MDC_IDC_SET_ZONE_VENDOR_TYPE: NORMAL
MDC_IDC_STAT_AT_BURDEN_PERCENT: 0.04 %
MDC_IDC_STAT_AT_DTM_END: NORMAL
MDC_IDC_STAT_AT_DTM_START: NORMAL
MDC_IDC_STAT_BRADY_DTM_END: NORMAL
MDC_IDC_STAT_BRADY_DTM_START: NORMAL
MDC_IDC_STAT_BRADY_RV_PERCENT_PACED: 95.48 %
MDC_IDC_STAT_CRT_DTM_END: NORMAL
MDC_IDC_STAT_CRT_DTM_START: NORMAL
MDC_IDC_STAT_CRT_LV_PERCENT_PACED: 97.16 %
MDC_IDC_STAT_CRT_PERCENT_PACED: 95.45 %
MDC_IDC_STAT_EPISODE_RECENT_COUNT: 0
MDC_IDC_STAT_EPISODE_RECENT_COUNT_DTM_END: NORMAL
MDC_IDC_STAT_EPISODE_RECENT_COUNT_DTM_START: NORMAL
MDC_IDC_STAT_EPISODE_TOTAL_COUNT: 0
MDC_IDC_STAT_EPISODE_TOTAL_COUNT: 9
MDC_IDC_STAT_EPISODE_TOTAL_COUNT_DTM_END: NORMAL
MDC_IDC_STAT_EPISODE_TOTAL_COUNT_DTM_START: NORMAL
MDC_IDC_STAT_EPISODE_TYPE: NORMAL
MDC_IDC_STAT_TACHYTHERAPY_ATP_DELIVERED_RECENT: 0
MDC_IDC_STAT_TACHYTHERAPY_ATP_DELIVERED_TOTAL: 0
MDC_IDC_STAT_TACHYTHERAPY_RECENT_DTM_END: NORMAL
MDC_IDC_STAT_TACHYTHERAPY_RECENT_DTM_START: NORMAL
MDC_IDC_STAT_TACHYTHERAPY_SHOCKS_ABORTED_RECENT: 0
MDC_IDC_STAT_TACHYTHERAPY_SHOCKS_ABORTED_TOTAL: 0
MDC_IDC_STAT_TACHYTHERAPY_SHOCKS_DELIVERED_RECENT: 0
MDC_IDC_STAT_TACHYTHERAPY_SHOCKS_DELIVERED_TOTAL: 0
MDC_IDC_STAT_TACHYTHERAPY_TOTAL_DTM_END: NORMAL
MDC_IDC_STAT_TACHYTHERAPY_TOTAL_DTM_START: NORMAL

## 2025-01-30 ENCOUNTER — LAB REQUISITION (OUTPATIENT)
Dept: LAB | Facility: CLINIC | Age: 70
End: 2025-01-30
Payer: COMMERCIAL

## 2025-01-30 DIAGNOSIS — I50.22 CHRONIC SYSTOLIC (CONGESTIVE) HEART FAILURE (H): ICD-10-CM

## 2025-01-31 LAB
ANION GAP SERPL CALCULATED.3IONS-SCNC: 13 MMOL/L (ref 7–15)
BUN SERPL-MCNC: 25 MG/DL (ref 8–23)
CALCIUM SERPL-MCNC: 9.6 MG/DL (ref 8.8–10.4)
CHLORIDE SERPL-SCNC: 107 MMOL/L (ref 98–107)
CREAT SERPL-MCNC: 1.19 MG/DL (ref 0.51–0.95)
EGFRCR SERPLBLD CKD-EPI 2021: 49 ML/MIN/1.73M2
GLUCOSE SERPL-MCNC: 123 MG/DL (ref 70–99)
HCO3 SERPL-SCNC: 21 MMOL/L (ref 22–29)
POTASSIUM SERPL-SCNC: 4.4 MMOL/L (ref 3.4–5.3)
SODIUM SERPL-SCNC: 141 MMOL/L (ref 135–145)

## 2025-01-31 PROCEDURE — 80048 BASIC METABOLIC PNL TOTAL CA: CPT | Mod: ORL | Performed by: NURSE PRACTITIONER

## 2025-01-31 PROCEDURE — P9604 ONE-WAY ALLOW PRORATED TRIP: HCPCS | Mod: ORL | Performed by: NURSE PRACTITIONER

## 2025-01-31 PROCEDURE — 36415 COLL VENOUS BLD VENIPUNCTURE: CPT | Mod: ORL | Performed by: NURSE PRACTITIONER

## 2025-02-09 ENCOUNTER — HOSPITAL ENCOUNTER (EMERGENCY)
Facility: CLINIC | Age: 70
Discharge: ANOTHER HOSPITAL | End: 2025-02-09
Attending: EMERGENCY MEDICINE | Admitting: EMERGENCY MEDICINE
Payer: COMMERCIAL

## 2025-02-09 ENCOUNTER — APPOINTMENT (OUTPATIENT)
Dept: CT IMAGING | Facility: CLINIC | Age: 70
End: 2025-02-09
Attending: EMERGENCY MEDICINE
Payer: COMMERCIAL

## 2025-02-09 ENCOUNTER — APPOINTMENT (OUTPATIENT)
Dept: GENERAL RADIOLOGY | Facility: CLINIC | Age: 70
End: 2025-02-09
Attending: EMERGENCY MEDICINE
Payer: COMMERCIAL

## 2025-02-09 ENCOUNTER — ANESTHESIA EVENT (OUTPATIENT)
Dept: EMERGENCY MEDICINE | Facility: CLINIC | Age: 70
End: 2025-02-09
Payer: COMMERCIAL

## 2025-02-09 ENCOUNTER — ANESTHESIA (OUTPATIENT)
Dept: EMERGENCY MEDICINE | Facility: CLINIC | Age: 70
End: 2025-02-09
Payer: COMMERCIAL

## 2025-02-09 VITALS
WEIGHT: 194.67 LBS | HEART RATE: 87 BPM | OXYGEN SATURATION: 95 % | RESPIRATION RATE: 14 BRPM | DIASTOLIC BLOOD PRESSURE: 51 MMHG | SYSTOLIC BLOOD PRESSURE: 110 MMHG | TEMPERATURE: 99.3 F | BODY MASS INDEX: 35.6 KG/M2

## 2025-02-09 DIAGNOSIS — R79.89 ELEVATED TROPONIN: ICD-10-CM

## 2025-02-09 DIAGNOSIS — A41.9 SEPSIS WITH ENCEPHALOPATHY AND SEPTIC SHOCK, DUE TO UNSPECIFIED ORGANISM (H): ICD-10-CM

## 2025-02-09 DIAGNOSIS — R65.21 SEPSIS WITH ENCEPHALOPATHY AND SEPTIC SHOCK, DUE TO UNSPECIFIED ORGANISM (H): ICD-10-CM

## 2025-02-09 DIAGNOSIS — N17.9 ACUTE KIDNEY INJURY: ICD-10-CM

## 2025-02-09 DIAGNOSIS — G93.41 SEPSIS WITH ENCEPHALOPATHY AND SEPTIC SHOCK, DUE TO UNSPECIFIED ORGANISM (H): ICD-10-CM

## 2025-02-09 DIAGNOSIS — A49.9 UTI (URINARY TRACT INFECTION), BACTERIAL: ICD-10-CM

## 2025-02-09 DIAGNOSIS — N39.0 UTI (URINARY TRACT INFECTION), BACTERIAL: ICD-10-CM

## 2025-02-09 LAB
ALBUMIN SERPL BCG-MCNC: 3.8 G/DL (ref 3.5–5.2)
ALBUMIN UR-MCNC: >=300 MG/DL
ALP SERPL-CCNC: 77 U/L (ref 40–150)
ALT SERPL W P-5'-P-CCNC: 48 U/L (ref 0–50)
ANION GAP SERPL CALCULATED.3IONS-SCNC: 22 MMOL/L (ref 7–15)
APPEARANCE UR: ABNORMAL
APTT PPP: 38 SECONDS (ref 22–38)
AST SERPL W P-5'-P-CCNC: 74 U/L (ref 0–45)
ATRIAL RATE - MUSE: 91 BPM
BACTERIA #/AREA URNS HPF: ABNORMAL /HPF
BASOPHILS # BLD AUTO: 0.1 10E3/UL (ref 0–0.2)
BASOPHILS NFR BLD AUTO: 0 %
BILIRUB SERPL-MCNC: 0.3 MG/DL
BILIRUB UR QL STRIP: ABNORMAL
BUN SERPL-MCNC: 39.3 MG/DL (ref 8–23)
CALCIUM SERPL-MCNC: 9.7 MG/DL (ref 8.8–10.4)
CHLORIDE SERPL-SCNC: 105 MMOL/L (ref 98–107)
COLOR UR AUTO: ABNORMAL
CREAT SERPL-MCNC: 3.29 MG/DL (ref 0.51–0.95)
DIASTOLIC BLOOD PRESSURE - MUSE: NORMAL MMHG
EGFRCR SERPLBLD CKD-EPI 2021: 14 ML/MIN/1.73M2
EOSINOPHIL # BLD AUTO: 0.1 10E3/UL (ref 0–0.7)
EOSINOPHIL NFR BLD AUTO: 0 %
ERYTHROCYTE [DISTWIDTH] IN BLOOD BY AUTOMATED COUNT: 13.9 % (ref 10–15)
FLUAV RNA SPEC QL NAA+PROBE: NEGATIVE
FLUBV RNA RESP QL NAA+PROBE: NEGATIVE
GLUCOSE SERPL-MCNC: 146 MG/DL (ref 70–99)
GLUCOSE UR STRIP-MCNC: 100 MG/DL
HCO3 SERPL-SCNC: 16 MMOL/L (ref 22–29)
HCT VFR BLD AUTO: 34.2 % (ref 35–47)
HGB BLD-MCNC: 10.9 G/DL (ref 11.7–15.7)
HGB UR QL STRIP: ABNORMAL
IMM GRANULOCYTES # BLD: 1.5 10E3/UL
IMM GRANULOCYTES NFR BLD: 5 %
INR PPP: 2.16 (ref 0.85–1.15)
INTERPRETATION ECG - MUSE: NORMAL
KETONES UR STRIP-MCNC: ABNORMAL MG/DL
LACTATE SERPL-SCNC: 5.2 MMOL/L (ref 0.7–2)
LACTATE SERPL-SCNC: 5.9 MMOL/L (ref 0.7–2)
LEUKOCYTE ESTERASE UR QL STRIP: ABNORMAL
LYMPHOCYTES # BLD AUTO: 1.7 10E3/UL (ref 0.8–5.3)
LYMPHOCYTES NFR BLD AUTO: 6 %
MCH RBC QN AUTO: 29.6 PG (ref 26.5–33)
MCHC RBC AUTO-ENTMCNC: 31.9 G/DL (ref 31.5–36.5)
MCV RBC AUTO: 93 FL (ref 78–100)
MONOCYTES # BLD AUTO: 1.3 10E3/UL (ref 0–1.3)
MONOCYTES NFR BLD AUTO: 5 %
MUCOUS THREADS #/AREA URNS LPF: PRESENT /LPF
NEUTROPHILS # BLD AUTO: 24.5 10E3/UL (ref 1.6–8.3)
NEUTROPHILS NFR BLD AUTO: 84 %
NITRATE UR QL: POSITIVE
NRBC # BLD AUTO: 0.1 10E3/UL
NRBC BLD AUTO-RTO: 0 /100
P AXIS - MUSE: 26 DEGREES
PH UR STRIP: 5.5 [PH] (ref 5–7)
PLAT MORPH BLD: NORMAL
PLATELET # BLD AUTO: 114 10E3/UL (ref 150–450)
POTASSIUM SERPL-SCNC: 5.1 MMOL/L (ref 3.4–5.3)
PR INTERVAL - MUSE: 98 MS
PROT SERPL-MCNC: 7.3 G/DL (ref 6.4–8.3)
QRS DURATION - MUSE: 198 MS
QT - MUSE: 442 MS
QTC - MUSE: 543 MS
R AXIS - MUSE: 149 DEGREES
RBC # BLD AUTO: 3.68 10E6/UL (ref 3.8–5.2)
RBC MORPH BLD: NORMAL
RBC URINE: 32 /HPF
RSV RNA SPEC NAA+PROBE: NEGATIVE
SARS-COV-2 RNA RESP QL NAA+PROBE: NEGATIVE
SODIUM SERPL-SCNC: 143 MMOL/L (ref 135–145)
SP GR UR STRIP: 1.02 (ref 1–1.03)
SYSTOLIC BLOOD PRESSURE - MUSE: NORMAL MMHG
T AXIS - MUSE: 270 DEGREES
TROPONIN T SERPL HS-MCNC: 260 NG/L
UROBILINOGEN UR STRIP-MCNC: NORMAL MG/DL
VENTRICULAR RATE- MUSE: 91 BPM
WBC # BLD AUTO: 29.2 10E3/UL (ref 4–11)
WBC CLUMPS #/AREA URNS HPF: PRESENT /HPF
WBC URINE: >182 /HPF

## 2025-02-09 PROCEDURE — 96361 HYDRATE IV INFUSION ADD-ON: CPT

## 2025-02-09 PROCEDURE — 250N000011 HC RX IP 250 OP 636: Performed by: EMERGENCY MEDICINE

## 2025-02-09 PROCEDURE — 250N000009 HC RX 250: Performed by: EMERGENCY MEDICINE

## 2025-02-09 PROCEDURE — 250N000013 HC RX MED GY IP 250 OP 250 PS 637: Performed by: EMERGENCY MEDICINE

## 2025-02-09 PROCEDURE — 370N000003 HC ANESTHESIA WARD SERVICE: Performed by: NURSE ANESTHETIST, CERTIFIED REGISTERED

## 2025-02-09 PROCEDURE — 250N000011 HC RX IP 250 OP 636: Performed by: NURSE ANESTHETIST, CERTIFIED REGISTERED

## 2025-02-09 PROCEDURE — 96360 HYDRATION IV INFUSION INIT: CPT

## 2025-02-09 PROCEDURE — 83605 ASSAY OF LACTIC ACID: CPT | Performed by: EMERGENCY MEDICINE

## 2025-02-09 PROCEDURE — 99292 CRITICAL CARE ADDL 30 MIN: CPT

## 2025-02-09 PROCEDURE — 36556 INSERT NON-TUNNEL CV CATH: CPT

## 2025-02-09 PROCEDURE — 80053 COMPREHEN METABOLIC PANEL: CPT | Performed by: EMERGENCY MEDICINE

## 2025-02-09 PROCEDURE — 99291 CRITICAL CARE FIRST HOUR: CPT | Mod: 25 | Performed by: EMERGENCY MEDICINE

## 2025-02-09 PROCEDURE — 84484 ASSAY OF TROPONIN QUANT: CPT | Performed by: EMERGENCY MEDICINE

## 2025-02-09 PROCEDURE — 87086 URINE CULTURE/COLONY COUNT: CPT | Performed by: EMERGENCY MEDICINE

## 2025-02-09 PROCEDURE — 70450 CT HEAD/BRAIN W/O DYE: CPT

## 2025-02-09 PROCEDURE — 36415 COLL VENOUS BLD VENIPUNCTURE: CPT | Performed by: EMERGENCY MEDICINE

## 2025-02-09 PROCEDURE — 82040 ASSAY OF SERUM ALBUMIN: CPT | Performed by: EMERGENCY MEDICINE

## 2025-02-09 PROCEDURE — 70496 CT ANGIOGRAPHY HEAD: CPT

## 2025-02-09 PROCEDURE — 85730 THROMBOPLASTIN TIME PARTIAL: CPT | Performed by: EMERGENCY MEDICINE

## 2025-02-09 PROCEDURE — 85610 PROTHROMBIN TIME: CPT | Performed by: EMERGENCY MEDICINE

## 2025-02-09 PROCEDURE — 258N000003 HC RX IP 258 OP 636: Performed by: EMERGENCY MEDICINE

## 2025-02-09 PROCEDURE — 93010 ELECTROCARDIOGRAM REPORT: CPT | Mod: 59 | Performed by: EMERGENCY MEDICINE

## 2025-02-09 PROCEDURE — 93005 ELECTROCARDIOGRAM TRACING: CPT

## 2025-02-09 PROCEDURE — 71250 CT THORAX DX C-: CPT

## 2025-02-09 PROCEDURE — 87149 DNA/RNA DIRECT PROBE: CPT | Performed by: EMERGENCY MEDICINE

## 2025-02-09 PROCEDURE — 99292 CRITICAL CARE ADDL 30 MIN: CPT | Performed by: EMERGENCY MEDICINE

## 2025-02-09 PROCEDURE — 85025 COMPLETE CBC W/AUTO DIFF WBC: CPT | Performed by: EMERGENCY MEDICINE

## 2025-02-09 PROCEDURE — 81001 URINALYSIS AUTO W/SCOPE: CPT | Performed by: EMERGENCY MEDICINE

## 2025-02-09 PROCEDURE — 999N000065 XR CHEST PORT 1 VIEW

## 2025-02-09 PROCEDURE — 250N000011 HC RX IP 250 OP 636: Mod: JZ | Performed by: EMERGENCY MEDICINE

## 2025-02-09 PROCEDURE — 96375 TX/PRO/DX INJ NEW DRUG ADDON: CPT

## 2025-02-09 PROCEDURE — 96365 THER/PROPH/DIAG IV INF INIT: CPT | Mod: 59

## 2025-02-09 PROCEDURE — 96366 THER/PROPH/DIAG IV INF ADDON: CPT

## 2025-02-09 PROCEDURE — 36556 INSERT NON-TUNNEL CV CATH: CPT | Performed by: EMERGENCY MEDICINE

## 2025-02-09 PROCEDURE — 87040 BLOOD CULTURE FOR BACTERIA: CPT | Performed by: EMERGENCY MEDICINE

## 2025-02-09 PROCEDURE — 99291 CRITICAL CARE FIRST HOUR: CPT | Mod: 25

## 2025-02-09 PROCEDURE — 87637 SARSCOV2&INF A&B&RSV AMP PRB: CPT | Performed by: EMERGENCY MEDICINE

## 2025-02-09 RX ORDER — RANOLAZINE 500 MG/1
500 TABLET, EXTENDED RELEASE ORAL 2 TIMES DAILY
COMMUNITY
Start: 2024-05-12

## 2025-02-09 RX ORDER — HYDROXYZINE HYDROCHLORIDE 25 MG/1
25 TABLET, FILM COATED ORAL AT BEDTIME
COMMUNITY
Start: 2025-01-06

## 2025-02-09 RX ORDER — LEVOTHYROXINE SODIUM 150 UG/1
150 TABLET ORAL
COMMUNITY
Start: 2025-01-16

## 2025-02-09 RX ORDER — MORPHINE SULFATE 15 MG/1
15 TABLET ORAL EVERY 4 HOURS PRN
COMMUNITY
Start: 2024-07-29

## 2025-02-09 RX ORDER — NOREPINEPHRINE BITARTRATE 0.02 MG/ML
.01-.6 INJECTION, SOLUTION INTRAVENOUS CONTINUOUS
Status: DISCONTINUED | OUTPATIENT
Start: 2025-02-09 | End: 2025-02-09

## 2025-02-09 RX ORDER — NOREPINEPHRINE BITARTRATE 0.06 MG/ML
.01-.6 INJECTION, SOLUTION INTRAVENOUS CONTINUOUS
Status: DISCONTINUED | OUTPATIENT
Start: 2025-02-09 | End: 2025-02-09 | Stop reason: HOSPADM

## 2025-02-09 RX ORDER — DIPHENHYDRAMINE HYDROCHLORIDE 50 MG/ML
25 INJECTION INTRAMUSCULAR; INTRAVENOUS ONCE
Status: COMPLETED | OUTPATIENT
Start: 2025-02-09 | End: 2025-02-09

## 2025-02-09 RX ORDER — SODIUM CHLORIDE 9 MG/ML
1000 INJECTION, SOLUTION INTRAVENOUS CONTINUOUS
Status: DISCONTINUED | OUTPATIENT
Start: 2025-02-09 | End: 2025-02-09 | Stop reason: HOSPADM

## 2025-02-09 RX ORDER — ACETAMINOPHEN 650 MG/1
650 SUPPOSITORY RECTAL ONCE
Status: COMPLETED | OUTPATIENT
Start: 2025-02-09 | End: 2025-02-09

## 2025-02-09 RX ORDER — ESTRADIOL 0.1 MG/G
CREAM VAGINAL
COMMUNITY
Start: 2025-01-15

## 2025-02-09 RX ORDER — DIAPER,BRIEF,INFANT-TODD,DISP
EACH MISCELLANEOUS AT BEDTIME
COMMUNITY
Start: 2025-01-22

## 2025-02-09 RX ORDER — LORAZEPAM 0.5 MG/1
0.5 TABLET ORAL 2 TIMES DAILY
COMMUNITY
Start: 2025-01-23

## 2025-02-09 RX ORDER — IOPAMIDOL 755 MG/ML
67 INJECTION, SOLUTION INTRAVASCULAR ONCE
Status: COMPLETED | OUTPATIENT
Start: 2025-02-09 | End: 2025-02-09

## 2025-02-09 RX ORDER — CARBIDOPA AND LEVODOPA 25; 100 MG/1; MG/1
1 TABLET ORAL AT BEDTIME
COMMUNITY
Start: 2024-08-26

## 2025-02-09 RX ORDER — CEFAZOLIN SODIUM 1 G/50ML
1750 SOLUTION INTRAVENOUS
Status: DISCONTINUED | OUTPATIENT
Start: 2025-02-09 | End: 2025-02-09 | Stop reason: HOSPADM

## 2025-02-09 RX ORDER — METOLAZONE 2.5 MG/1
2.5 TABLET ORAL
COMMUNITY
Start: 2025-01-29

## 2025-02-09 RX ORDER — CEFEPIME HYDROCHLORIDE 2 G/1
2 INJECTION, POWDER, FOR SOLUTION INTRAVENOUS ONCE
Status: COMPLETED | OUTPATIENT
Start: 2025-02-09 | End: 2025-02-09

## 2025-02-09 RX ORDER — TRAZODONE HYDROCHLORIDE 50 MG/1
75 TABLET ORAL AT BEDTIME
COMMUNITY

## 2025-02-09 RX ORDER — BUSPIRONE HYDROCHLORIDE 10 MG/1
10 TABLET ORAL EVERY EVENING
COMMUNITY

## 2025-02-09 RX ORDER — ROPIVACAINE IN 0.9% SOD CHL/PF 0.1 %
.01-.125 PLASTIC BAG, INJECTION (ML) EPIDURAL CONTINUOUS
Status: DISCONTINUED | OUTPATIENT
Start: 2025-02-09 | End: 2025-02-09

## 2025-02-09 RX ORDER — METHYLPREDNISOLONE SODIUM SUCCINATE 125 MG/2ML
125 INJECTION INTRAMUSCULAR; INTRAVENOUS ONCE
Status: COMPLETED | OUTPATIENT
Start: 2025-02-09 | End: 2025-02-09

## 2025-02-09 RX ADMIN — SODIUM CHLORIDE 1000 ML: 0.9 INJECTION, SOLUTION INTRAVENOUS at 10:57

## 2025-02-09 RX ADMIN — SODIUM CHLORIDE 1000 ML: 9 INJECTION, SOLUTION INTRAVENOUS at 11:42

## 2025-02-09 RX ADMIN — SODIUM CHLORIDE 1000 ML: 0.9 INJECTION, SOLUTION INTRAVENOUS at 11:26

## 2025-02-09 RX ADMIN — VANCOMYCIN HYDROCHLORIDE 1750 MG: 1 INJECTION, POWDER, LYOPHILIZED, FOR SOLUTION INTRAVENOUS at 11:49

## 2025-02-09 RX ADMIN — ACETAMINOPHEN 650 MG: 650 SUPPOSITORY RECTAL at 10:59

## 2025-02-09 RX ADMIN — SODIUM CHLORIDE 1000 ML: 9 INJECTION, SOLUTION INTRAVENOUS at 13:10

## 2025-02-09 RX ADMIN — MIDAZOLAM 2 MG: 1 INJECTION INTRAMUSCULAR; INTRAVENOUS at 13:54

## 2025-02-09 RX ADMIN — CEFEPIME 2 G: 2 INJECTION, POWDER, FOR SOLUTION INTRAVENOUS at 11:11

## 2025-02-09 RX ADMIN — NOREPINEPHRINE BITARTRATE 0.27 MCG/KG/MIN: 16 SOLUTION INTRAVENOUS at 14:18

## 2025-02-09 RX ADMIN — SODIUM CHLORIDE 100 ML: 9 INJECTION, SOLUTION INTRAVENOUS at 10:39

## 2025-02-09 RX ADMIN — NOREPINEPHRINE BITARTRATE 0.03 MCG/KG/MIN: 16 SOLUTION INTRAVENOUS at 11:29

## 2025-02-09 RX ADMIN — IOPAMIDOL 67 ML: 755 INJECTION, SOLUTION INTRAVENOUS at 10:38

## 2025-02-09 RX ADMIN — DIPHENHYDRAMINE HYDROCHLORIDE 25 MG: 50 INJECTION, SOLUTION INTRAMUSCULAR; INTRAVENOUS at 10:25

## 2025-02-09 RX ADMIN — NOREPINEPHRINE BITARTRATE 0.4 MCG/KG/MIN: 0.06 INJECTION, SOLUTION INTRAVENOUS at 15:03

## 2025-02-09 RX ADMIN — METHYLPREDNISOLONE SODIUM SUCCINATE 125 MG: 125 INJECTION, POWDER, FOR SOLUTION INTRAMUSCULAR; INTRAVENOUS at 11:06

## 2025-02-09 ASSESSMENT — ACTIVITIES OF DAILY LIVING (ADL)
ADLS_ACUITY_SCORE: 59
ADLS_ACUITY_SCORE: 61
ADLS_ACUITY_SCORE: 59
ADLS_ACUITY_SCORE: 59

## 2025-02-09 ASSESSMENT — COLUMBIA-SUICIDE SEVERITY RATING SCALE - C-SSRS
IS THE PATIENT NOT ABLE TO COMPLETE C-SSRS: UNABLE TO VERBALIZE
2. HAVE YOU ACTUALLY HAD ANY THOUGHTS OF KILLING YOURSELF IN THE PAST MONTH?: NO
1. IN THE PAST MONTH, HAVE YOU WISHED YOU WERE DEAD OR WISHED YOU COULD GO TO SLEEP AND NOT WAKE UP?: NO
6. HAVE YOU EVER DONE ANYTHING, STARTED TO DO ANYTHING, OR PREPARED TO DO ANYTHING TO END YOUR LIFE?: NO

## 2025-02-09 ASSESSMENT — COPD QUESTIONNAIRES: COPD: 1

## 2025-02-09 ASSESSMENT — ENCOUNTER SYMPTOMS: DYSRHYTHMIAS: 1

## 2025-02-09 NOTE — ANESTHESIA PROCEDURE NOTES
Central Line/PA Catheter Placement    Pre-Procedure   Staff -        CRNA: Caitlyn Rodríguez APRN CRNA       Performed By: CRNA       Referred By: Dr. Segal       Location: ICU       Pre-Anesthestic Checklist: patient identified, risks and benefits discussed, informed consent, monitors and equipment checked, pre-op evaluation and at physician/surgeon's request  Timeout:       Correct Patient: Yes        Correct Procedure: Yes        Correct Site: Yes        Correct Position: Yes        Correct Laterality: Yes   Line Placement:   This line was placed Pre Induction starting at 2/9/2025 1:00 PM and ending at 2/9/2025 2:00 PM    Procedure   Procedure: central line, new line and elective       Diagnosis: ureosepsis       Laterality: right       Insertion Site: internal jugular.       Patient Position: supine  Sterile Prep        All elements of maximal sterile barrier technique followed       Patient Prep/Sterile Barriers: draped, patient draped, hand hygiene, gloves , hat , mask , draped, gown, sterile gel and probe cover       Skin prep: Chloraprep  Insertion/Injection        Local skin infiltrated with 1 mL of 1% lidocaine.        Technique: ultrasound guided and Seldinger Technique        1. Ultrasound was used to evaluate the access site.       2. Vein evaluated via ultrasound for patency/adequacy.       3. Using real-time ultrasound the needle/catheter was observed entering the artery/vein.       5. The visualized structures were anatomically normal.       6. There were no apparent abnormal pathologic findings.       Type: CVC       Catheter Length: 20       Number of Lumens: triple lumen  Narrative         Secured by: suture       Tegaderm and Biopatch dressing used.       Complications: None apparent,        blood aspirated from all lumens,        All lumens flushed: Yes       Verification method: X-ray and Placement to be verified post-op   Comments:  Confirmed placement per Radiology in the right SVC

## 2025-02-09 NOTE — CONSULTS
"  [facility]    Stroke Telephone Note    I was called by Rena Segal on 02/09/25 regarding patient Letty Escobar. The patient is a 69 year old female with past medical Hx of neuropathy; Hyperlipidemia,  Restless legs syndrome; Coronary artery disease; ELI; Hypothyroidism;  Anemia of chronic disease; COPD , CAD S/P CABG x 5 and ICD;  atrial fibrillation on Eliquis; S/P tricuspid valve repair; S/P mitral valve repair; Hypertension; Carotid stenosis, right; Tobacco abuse; Urinary incontinence, TIA; Internal carotid artery stenosis, HF,  Mood disorder due to a general medical condition; Posttraumatic stress disorder; Personality disorder; Panic disorder with agoraphobia;Cardiac pacemaker in situ; Major depressive disorder who present for sudden limited mobility. \" Patient not squeezing with hands as usual. Per local ED provider patient is grimacing to pain , not following commands  and general limited mobility but no actual focal neurological deficits on motor exam. Per chart review patient is dependent for DLAs    Vitals      Pulse: 93   Resp: 22   Temp: 100.2  F (37.9  C)   Weight: 88.3 kg (194 lb 10.7 oz)    Stroke Code Data (for stroke code without tele)  Stroke code activated 02/09/25  1020   Stroke provider first response 02/09/25  1022   Last known normal 02/09/25  0830      Time of discovery (or onset of symptoms) 02/09/25  1000   Head CT read by Stroke Neuro Provider 02/09/25  1033   Was stroke code de-escalated? Yes  02/09/25  1100     Imaging Findings  CT head: No bleeding  CTA head/neck: No LVO    Intravenous Thrombolysis  Not given due to:   - On Eliquis. No clear focality    Endovascular Treatment  Not initiated due to absence of proximal vessel occlusion    Impression  Sudden non focal Decreased mobility and less verbal responsiveness: ED provider suspecting infectious encephalopathy    Recommendations   Toxic-metabolic work up  MRI brain would rule out stroke. Please " "notify stroke neurology if further concerns         My recommendations are based on the information provided over the phone by Letty Escobar's in-person providers. They are not intended to replace the clinical judgment of her in-person providers. I was not requested to personally see or examine the patient at this time.     Silvano Louis MD  Vascular Neurology    To page me or covering stroke neurology team member, click here: [facility]  Choose \"On Call\" tab at top, then select \"NEUROLOGY/ALL SITES\" from middle drop-down box, press Enter, then look for \"stroke\" or \"telestroke\" for your site.   "

## 2025-02-09 NOTE — CONSULTS
"Pharmacy Vancomycin Initial Note  Date of Service 2025  Patient's  1955  69 year old, female    Indication: Sepsis    Current estimated CrCl = Estimated Creatinine Clearance: 16.2 mL/min (A) (based on SCr of 3.29 mg/dL (H)).    Creatinine for last 3 days  2025: 10:28 AM Creatinine 3.29 mg/dL    Recent Vancomycin Level(s) for last 3 days  No results found for requested labs within last 3 days.      Vancomycin IV Administrations (past 72 hours)        No vancomycin orders with administrations in past 72 hours.                    Nephrotoxins and other renal medications (From now, onward)      Start     Dose/Rate Route Frequency Ordered Stop    25 1200  vancomycin (VANCOCIN) 1,750 mg in sodium chloride 0.9 % 517.5 mL intermittent infusion         1,750 mg  over 2 Hours Intravenous EVERY 48 HOURS 25 1102              Contrast Orders - past 72 hours (72h ago, onward)      Start     Dose/Rate Route Frequency Stop    25 1025  iopamidol (ISOVUE-370) solution 67 mL        Placed in \"And\" Linked Group    67 mL Intravenous ONCE 25 1038                Plan:  Start vancomycin  1750 mg IV q48h.   Vancomycin monitoring method: Trough (Method 1 = dosing nomogram)  Vancomycin therapeutic monitoring goal: 15-20 mg/L  Pharmacy will check vancomycin levels as appropriate in 1-3 Days.    Serum creatinine levels will be ordered daily for the first week of therapy and at least twice weekly for subsequent weeks.      Christopher Boo, [facility]    "

## 2025-02-09 NOTE — ANESTHESIA POSTPROCEDURE EVALUATION
Patient: Letty Escobar    Procedure: * No procedures listed *       Anesthesia Type:  No value filed.    Note:  Disposition: Outpatient (Patient remains in the ER, preparing for transport to [facility])   Postop Pain Control: Uneventful            Sign Out: Well controlled pain   PONV: No   Neuro/Psych: Uneventful            Sign Out: Acceptable/Baseline neuro status   Airway/Respiratory: Uneventful            Sign Out: Acceptable/Baseline resp. status   CV/Hemodynamics: Uneventful            Sign Out: Patient remains hypotensive.   Other NRE: NONE   DID A NON-ROUTINE EVENT OCCUR? No    Event details/Postop Comments:  Pt was happy with anesthesia care.  No complications.  I will follow up with the pt if needed.       Last vitals:  Vitals:    02/09/25 1500 02/09/25 1505 02/09/25 1522   BP: 92/40 (!) 84/39 105/44   Pulse: 86 84 87   Resp: 17 16 14   Temp:      SpO2: 96% 95% 96%       Electronically Signed By: KILO Galeana CRNA  February 9, 2025  3:24 PM

## 2025-02-09 NOTE — ED PROVIDER NOTES
"  History     Chief Complaint   Patient presents with    Stroke Symptoms     HPI  History per EMS, nursing home, medical records    This is a 69-year-old female, history of atrial fibrillation on Eliquis, pacemaker, coronary artery disease with CABG x 5, ELI, hypertension, hyperlipidemia, COPD, morbid obesity, hypothyroidism, type 2 diabetes, CKD, other history as below presenting with concerns for stroke.  Per EMS, patient was noted at 8:30 AM by her care provider to have \"garbled speech\" and was not following commands.  She was brought to the ED by EMS, blood sugar 156.  Nursing talked with the nursing home.  Patient apparently started complaining of stomachache yesterday afternoon.  She was calling out overnight and at 830 this morning was asking for help.  They checked in on her at 9:15 AM and she was \"leaning to the right\".  Per patient's family member, he did not visit her yesterday but the day prior she was \"normal\".    Patient arrives as a \"code stroke\".  Review of POLST shows that patient is DNR/DNI.      Allergies:  Allergies   Allergen Reactions    Bee Venom Anaphylaxis    Diphenhydramine Palpitations     Tolerated IV Benadryl when not pushed too fast    Isosorbide Nitrate Other (See Comments) and Dizziness     Also causes syncope (has fallen before) and brain fog/mental disturbances - please do not prescribe    Nitroglycerin Dizziness, Fatigue and Other (See Comments)     Specifically the patch - please do not prescribe  Specifically the patch - please do not prescribe      Contrast Dye Hives and Itching     Tolerated CT with IV contrast 5/9/2024 with 50 mg IV Benadryl prior    Adhesive Tape Rash    Liquid Adhesive Itching    Nystatin Dermatitis     Also blisters    Penicillins Swelling and Rash     Occurred as a child - not 100% sure on specific reactions    Sulfa Antibiotics Other (See Comments)     Occurred as a child / patient does not remember specific reaction       Problem List:    Patient Active " Problem List    Diagnosis Date Noted    Acute respiratory failure with hypoxia and hypercapnia (H) 04/27/2024     Priority: Medium    COPD with acute exacerbation (H) 04/27/2024     Priority: Medium    Bilateral pneumonia 04/27/2024     Priority: Medium    Lethargy 11/10/2023     Priority: Medium    Urinary tract infection without hematuria, site unspecified 11/10/2023     Priority: Medium    Fever 11/10/2023     Priority: Medium    Hypoxia 11/10/2023     Priority: Medium    Pyuria 11/10/2023     Priority: Medium    PAD (peripheral artery disease) 11/10/2023     Priority: Medium    Left foot pain 11/10/2023     Priority: Medium    History of stroke 11/10/2023     Priority: Medium    Lives in long-term care facility 11/10/2023     Priority: Medium    Obesity hypoventilation syndrome (H) 11/10/2023     Priority: Medium    Benzodiazepine dependence (H) 11/10/2023     Priority: Medium    Thrombocytopenia 11/10/2023     Priority: Medium    ERIC (acute kidney injury) 11/10/2023     Priority: Medium    Chronic kidney disease, stage 3a (H) 10/16/2022     Priority: Medium    SOB (shortness of breath) 04/07/2022     Priority: Medium    Type 2 diabetes mellitus with hyperglycemia, with long-term current use of insulin (H) 02/13/2022     Priority: Medium    Morbid obesity (H) 12/10/2021     Priority: Medium    ICD (implantable cardioverter-defibrillator) in place 11/17/2021     Priority: Medium     Formatting of this note is different from the original.  Date of last device in office evaluation: 5/17/2022    ?  and model: Medtronic Cobalt CRT-D.   Date of implant: 5/7/2021  Tachy therapies remain OFF: S/p downgrade from ICD to pacemaker, but her implanted system includes a DF-4 lead, so an ICD generator was placed with the tachycardia therapies disabled.     ? Indication for device: Ischemic cardiomyopathy   ? Cardiac resynchronization therapy:   no     MRI Conditional:  No  o If No:  Reason why:  Abandoned LV  lead    ? Battery longevity documented as less than 3  Months: No  ? Are any of the leads less than 3 months old:  No    ? Programming              ? Pacing mode and programmed lower rate: DDDR 60 - 130 bpm              ? Rate-responsive sensor type, if programmed on: Accelerometer        Underlying rhythm and heart rate:  SR @ 60 bpm    ? What is the response of this device to magnet placement:  None - tachy therapies off  ? PM magnet pacing rate: N/A   ? Any alert status on CIED generator or lead: No    ? Last pacing threshold    Atrial   1.0 V @ 0.4 ms   Ventricular RV: 0.75 V @ 0.4 ms  LV:  2.75 V @ 1.0 ms    Formatting of this note is different from the original.  Date of last device in office evaluation: 11/17/2022     ?  and model: Medtronic Cobalt CRT-D.   Date of implant: 5/7/2021  Tachy therapies remain OFF: S/p downgrade from ICD to pacemaker, but her implanted system includes a DF-4 lead, so an ICD generator was placed with the tachycardia therapies disabled.     ? Indication for device: Ischemic cardiomyopathy   ? Cardiac resynchronization therapy:   no     MRI Conditional:  No  o If No:  Reason why:  Abandoned LV lead    ? Battery longevity documented as less than 3  Months: No  ? Are any of the leads less than 3 months old:  No    ? Programming              ? Pacing mode and programmed lower rate: DDDR 60 - 130 bpm              ? Rate-responsive sensor type, if programmed on: Accelerometer        Underlying rhythm and heart rate:  Sinus rhythm 62 bpm, w/BBB    ? What is the response of this device to magnet placement:  None - tachy therapies off  ? PM magnet pacing rate: N/A   ? Any alert status on CIED generator or lead: No    ? Last pacing threshold    Atrial   1.0 V @ 0.4 ms   Ventricular RV: 0.75 V @ 0.4 ms  LV:  2.75 V @ 1.0 ms      Atrial fibrillation (H) 04/06/2021     Priority: Medium    Pacemaker 04/06/2021     Priority: Medium    Major depressive disorder, recurrent severe  without psychotic features (H) 01/26/2021     Priority: Medium    Panic disorder with agoraphobia 04/14/2020     Priority: Medium    Constipation 03/20/2020     Priority: Medium    Personality disorder (H) 03/05/2020     Priority: Medium     Rule out dependent personality    Formatting of this note might be different from the original.  Rule out dependent personality  Formatting of this note might be different from the original.  Rule out dependent personality      Candidiasis 03/01/2020     Priority: Medium    Posttraumatic stress disorder 03/01/2020     Priority: Medium    COPD without exacerbation (H) 01/29/2020     Priority: Medium    Long term (current) use of insulin (H) 01/29/2020     Priority: Medium    Chronic systolic heart failure (H) 01/28/2020     Priority: Medium    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, left leg (H) 01/15/2020     Priority: Medium    Chest pain 11/11/2019     Priority: Medium    Polymyalgia rheumatica 11/28/2018     Priority: Medium    Unstable angina (H) 05/02/2018     Priority: Medium     Coronary angiogram 05/02/2018 demonstrated 30% stenosis in the LMCA, 50% stenosis in the Proximal LAD, 50% stenosis in the Mid LAD, 95% stenosis in the Proximal Circumflex (Restenosis - Balloon Angioplasty), 100% stenosis in the 1st Marginal, 50% stenosis in the Proximal RCA, 50% stenosis in the Distal RCA, the LIMA graft from the LIMA to the Distal LAD is free of significant disease; the SVG graft from the Aorta to the 1st Marginal is free of significant disease; the SVG graft from the Aorta to the Distal RCA is free of significant disease. Intervention included a successful  2.5mm x 12mm Balloon,  2.5mm x 10mm Cutting Balloon,  3mm x 12mm Drug Eluting Stent,  3mm x 12mm Balloon, and  3mm x 8mm Balloon to Proximal Circumflex, post stenosis 0%.    Formatting of this note is different from the original.  Coronary angiogram 05/02/2018 demonstrated 30%  stenosis in the LMCA, 50% stenosis in the Proximal LAD, 50% stenosis in the Mid LAD, 95% stenosis in the Proximal Circumflex (Restenosis - Balloon Angioplasty), 100% stenosis in the 1st Marginal, 50% stenosis in the Proximal RCA, 50% stenosis in the Distal RCA, the LIMA graft from the LIMA to the Distal LAD is free of significant disease; the SVG graft from the Aorta to the 1st Marginal is free of significant disease; the SVG graft from the Aorta to the Distal RCA is free of significant disease. Intervention included a successful  2.5mm x 12mm Balloon,  2.5mm x 10mm Cutting Balloon,  3mm x 12mm Drug Eluting Stent,  3mm x 12mm Balloon, and  3mm x 8mm Balloon to Proximal Circumflex, post stenosis 0%.  Formatting of this note is different from the original.  Coronary angiogram 05/02/2018 demonstrated 30% stenosis in the LMCA, 50% stenosis in the Proximal LAD, 50% stenosis in the Mid LAD, 95% stenosis in the Proximal Circumflex (Restenosis - Balloon Angioplasty), 100% stenosis in the 1st Marginal, 50% stenosis in the Proximal RCA, 50% stenosis in the Distal RCA, the LIMA graft from the LIMA to the Distal LAD is free of significant disease; the SVG graft from the Aorta to the 1st Marginal is free of significant disease; the SVG graft from the Aorta to the Distal RCA is free of significant disease. Intervention included a successful  2.5mm x 12mm Balloon,  2.5mm x 10mm Cutting Balloon,  3mm x 12mm Drug Eluting Stent,  3mm x 12mm Balloon, and  3mm x 8mm Balloon to Proximal Circumflex, post stenosis 0%.      Vitamin B12 deficiency 02/14/2018     Priority: Medium    Chronic bilateral low back pain without sciatica 01/17/2018     Priority: Medium    Chronic pain disorder 10/01/2017     Priority: Medium    Urinary incontinence, mixed 09/24/2017     Priority: Medium    Internal carotid artery stenosis, bilateral 07/13/2016     Priority: Medium     Carotid US 05/04/2018 showed moderate plaque formation, consistent with 50 to 69%  stenosis in the right internal carotid artery, not significantly changed from 8/5/2015.  Moderate plaque formation, consistent with 50 to 69% stenosis in the left internal carotid artery; there has been mild progression of the left ICA stenosis since 8/5/2015.    Formatting of this note might be different from the original.  Carotid US 05/04/2018 showed moderate plaque formation, consistent with 50 to 69% stenosis in the right internal carotid artery, not significantly changed from 8/5/2015.  Moderate plaque formation, consistent with 50 to 69% stenosis in the left internal carotid artery; there has been mild progression of the left ICA stenosis since 8/5/2015.    Formatting of this note might be different from the original.  Carotid US 05/04/2018 showed moderate plaque formation, consistent with 50 to 69% stenosis in the right internal carotid artery, not significantly changed from 8/5/2015.  Moderate plaque formation, consistent with 50 to 69% stenosis in the left internal carotid artery; there has been mild progression of the left ICA stenosis since 8/5/2015.      Essential (primary) hypertension 10/14/2015     Priority: Medium    Ischemic cardiomyopathy 09/20/2015     Priority: Medium     EF of 40-45%, status post RV lead revision and LV epicardial lead placement via mini-thoracotomy in August 2016.    Formatting of this note might be different from the original.  EF of 40-45%, status post RV lead revision and LV epicardial lead placement via mini-thoracotomy in August 2016.  Formatting of this note might be different from the original.  EF of 40-45%, status post RV lead revision and LV epicardial lead placement via mini-thoracotomy in August 2016.      S/P CABG x 5 08/21/2015     Priority: Medium    Pain medication agreement 04/20/2013     Priority: Medium     Controlled substance agreement for percocet #30/month on file and signed 4/17/13.  Designated pharmacy: [facility] Prescribing physician: Andrea Diagnosis:  "Ulnar neuropathy    Formatting of this note might be different from the original.  Controlled substance agreement for percocet #30/month on file and signed 4/17/13.  Designated pharmacy: [facility] Prescribing physician: Andrea Diagnosis: Ulnar neuropathy      Anemia in other chronic diseases classified elsewhere 01/05/2013     Priority: Medium    Hypothyroidism, unspecified 12/10/2010     Priority: Medium    ACP (advance care planning) 11/03/2010     Priority: Medium     Formatting of this note might be different from the original.  Patient has identified Health Care Agent(s): Yes  Add Health Care Agents: Yes    Health Care Agent(s):    Primary Health Care Agent:     Edwar Escobar Relationship:    Spouse Phone:     [phone]    Secondary Health Care Agent:     Chiki Oro Relationship:     Son Phone:     [phone]      Patient has Advance Care Plan Documents (Health Care Directive, POLST): Yes    Advance Care Plan Documents:  Health Care Directive--03/28/11  Resuscitation Guidelines--    Patient has identified Specific Treatment Preferences: Yes   Specific Treatment Preferences:   a.) Code Status:  DNR/ Do Not Attempt Resuscitation - Allow a Natural Death    b.) Goals of Treatment:     ii. Limited Interventions and treat reversible conditions.  Provide interventions aimed at treatment of new or reversible illness/injury or non-life threatening chronic conditions. Duration of invasive or uncomfortable interventions should generally be limited.- Trial of intubation 5 days or other instructions \"If no improvement, stop.\"  c.) Interventions and Treatments:   i.   Antibiotics:          - Aggressive antibiotics  ii.  Nutrition/Hydration:         - Offer food and liquids by mouth         - IV fluid administration         - No feeding tube under any circumstance.  iii. Transfusion:          - Blood products for comfort/relief of symptoms only  iv. Dialysis:           - Dialysis for short term \"for recovery, but not " "long term.\"        Last Assessment & Plan:   Advance Care Planning: Disease-specific Session    Letty Escobar is an [facility] patient of Dr. Renea Mendez of the [facility].    Advance care planning discussions were completed with Letty and her healthcare agent, spouse Edwar Escobar on Monday, March 28, 2011 at the [facility].    Understanding of Illness and Disease Gates:   Letty identifies her medical condition as diabetes with peripheral neuropathy, heart problems with a heart attack February 2009 plus 4 stent placements; sleep apnea; depression; hypothyroid; high cholesterol; tobacco use; and describes it as needing further assistance with thyroid and diabetes management.  She identifies the following symptoms of her medical condition as being the most bothersome: some memory loss, balance problems, light headedness and dizziness, puffy eyes and lower extremity edema from time to time.    Let[place] is retired [place] .  She is independent with all cares and lives an active life style although, \"I'm not as active as I used to be.\"    Goals of Care:   Letty currently hopes to maintain independence, control pain and symptoms and delay progression of, but not cure, the illness.  \"I want to get the diabetes and thyroid under better control.\"  Letty has an appointment the first part of April for follow up.    Quality of Life:    Letty identifies quality of life as family involvement twice weekly, Jehovah's witness activities, newly assigned Sunday , playing cards, the computer,    Letty christiano with serious challenges in her life through her ganesh in God, prayers and support of her Jehovah's witness family, spouse and son.    Letty identifies the following fears and worries about her medical care:  \"I just want the diabetes straightened out.\"    Treatment and Care Preferences:   Past experiences in dealing with family " "and/or friends that have  or been seriously ill include her brother who took his own life.  \"He was 53 years old and living with us.  I found him.\"   As a result of these experiences, Letty expresses these health care preferences:      Summary Letty's Treatment Preferences:  LOW SURVIVAL; HIGH TREATMENT BURDEN:  If Letty suffered a serious complication, such that she was facing a prolonged hospital stay, required ongoing medical interventions, and the chance of living through the complication was low (for example, only 5 out of 100 would live), Letty would choose:  to focus treatment on comfort and quality of life (\"Quality of life is more important than length of life to Letty.\")  COMMENT:  \"If I can't live a normal life, I wouldn't want to live.\"    HIGH SURVIVAL; LOW FUNCTIONAL STATUS:  If Letty had a serious complication and had a good chance of living through the complication but it was expected that she would never be able to walk or talk again and would require 24 hour nursing care, she would choose:  to focus treatment on comfort and quality of life (\"Quality of life is more important than length of life to Letty.\")  COMMENT:  \"I'd accept rehabilitation.\"    HIGH SURVIVAL; LOW COGNITIVE STATUS:  If Letty had a serious complication and had a good chance of living through the complication but it was expected that she would never know who she was or who she was with and would require 24 hour nursing care, she would choose:  to focus treatment on comfort and quality of life (\"Quality of life is more important than length of life to Letty.\")    CARDIO-PULMONARY RESUSCITATION (CPR):  The facts, risks and benefits of CPR were discussed with eLtty.  If she had a sudden event that caused her heart and breathing to stop, she:  WOULD NOT want CPR attempted and instead would prefer that a natural death occur.    MECHANICAL VENTILATION: If Letty had an episode where she was unable to breathe on her own, she would " "choose the following:  attempt to use any appropriate non-invasive method to assist breathing, and use mechanical ventilation.  COMMENT:  \"If reversible ventilator is acceptable for 5 days.  If no improvement, stop.\"     Letty has chosen her healthcare agent to:  strictly follow her wishes.    Follow Up Plan:   Letty was encouraged to continue advance care planning discussions with her health care agents and primary care provider.   Letty and her health care agent declined handouts.     Documents addressed during this advance care planning session:  1.  Health Care Agents identified.          .  Primary health care agent is spouse, Edwar Escobar;          .  Secondary health care agent is son, Jermaine Oro.  2.  Health Care Directive completed and scanned into medical record.  3.  Statement of Treatment Preferences for advanced illness completed and scanned into the medical record.  4.  Resuscitation Guidelines completed for DNR.  Document sent to Dr. Renea Mendez for signature and returned to the home. Letty, please place on the refrigerator.    Recommendations/Plan:   Letty and her health care agent to review Advance Care Plan.     Letty would benefit from:   Care Navigation/Care Navigation Help Desk--explained services for pending future needs.  No identified needs today.  Care Navigation brochure was given to Letty and her healthcare agent.    Advance Care Planning recommendations and Letty's concerns and questions were cc ed to her primary provider.     Thank you, Letty for the opportunity of assisting with Advance Care Planning. It was a seeing you again and meeting Edwra.  Please contact me if I can be of further assistance.    Interviewer: Pat Martin RN    Advance Care Planning Facilitator  [phone]   3/28/2011      ELI (obstructive sleep apnea) 01/31/2010     Priority: Medium     PSG on April/2008 that showed moderate Obstructive Sleep Apnea with an AHI of 23.5 . Limb movements persisted at " 29 movements/hour at optimal pressures, despite carbidopa use.    Formatting of this note might be different from the original.  PSG on April/2008 that showed moderate Obstructive Sleep Apnea with an AHI of 23.5 . Limb movements persisted at 29 movements/hour at optimal pressures, despite carbidopa use.  Formatting of this note might be different from the original.  PSG on April/2008 that showed moderate Obstructive Sleep Apnea with an AHI of 23.5 . Limb movements persisted at 29 movements/hour at optimal pressures, despite carbidopa use.      Coronary atherosclerosis 02/06/2009     Priority: Medium     Formatting of this note might be different from the original.  2/5/2009 - MI - Proximal RCA 99%, mild-mod disease elsewhere.  EF 60%.  PCI:  MYRON to pRCA.  2/12/2009 - admit CP - Widely patent RCA stent. Moderate diffuse CAD. Severe stenosis in trivial PDA branch. LVEF 45%.  1/25/2010 - admit CP - PTCA and stent of diagonal  9/8/2010 - admit CP - LAD patent stent. Moderate diffuse CAD. Medical management recommended.   4/25/2012 - NSTEMI - Acute total occlusion of the ostial Circumflex. Acute total occlusion of the ostial ramus. Acute total occlusion of the distal 1st Obtuse Marginal. Angioplasty of ostial circumflex and ostial ramus. There was distal embolization to the OM1 vessel. This vessel was small and not amendable to intervention. Indefinite: plavix and asa 81.  8/10/2015 - CABx5 - 1. LIMA to distal LAD, reverse SVG to OM1 and OM2, reverse SVG to diagonal, reverse SVG to PDA.   2. Mitral valve repair with a Medtronics 3-D full ring, 28 mm.   3. Tricuspid repair with a Medtronic 28 mm partial ring  1/12/2016 - TTE - EF 40-45%  Formatting of this note might be different from the original.  2/5/2009 - MI - Proximal RCA 99%, mild-mod disease elsewhere.  EF 60%.  PCI:  MYRON to pRCA.  2/12/2009 - admit CP - Widely patent RCA stent. Moderate diffuse CAD. Severe stenosis in trivial PDA branch. LVEF 45%.  1/25/2010 -  admit CP - PTCA and stent of diagonal  9/8/2010 - admit CP - LAD patent stent. Moderate diffuse CAD. Medical management recommended.   4/25/2012 - NSTEMI - Acute total occlusion of the ostial Circumflex. Acute total occlusion of the ostial ramus. Acute total occlusion of the distal 1st Obtuse Marginal. Angioplasty of ostial circumflex and ostial ramus. There was distal embolization to the OM1 vessel. This vessel was small and not amendable to intervention. Indefinite: plavix and asa 81.  8/10/2015 - CABx5 - 1. LIMA to distal LAD, reverse SVG to OM1 and OM2, reverse SVG to diagonal, reverse SVG to PDA.   2. Mitral valve repair with a Medtronics 3-D full ring, 28 mm.   3. Tricuspid repair with a Medtronic 28 mm partial ring  1/12/2016 - TTE - EF 40-45%      Restless legs syndrome 08/29/2007     Priority: Medium    Hyperlipidemia, unspecified 05/30/2007     Priority: Medium        Past Medical History:    Past Medical History:   Diagnosis Date    ACP (advance care planning) 11/3/2010    Acute coronary syndrome (H) 11/22/2019    Acute exacerbation of CHF (congestive heart failure) (H) 11/11/2019    Acute on chronic systolic (congestive) heart failure (H) 1/28/2020    Anemia of chronic disease 1/5/2013    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, left leg (H) 1/15/2020    BPPV (benign paroxysmal positional vertigo) 5/31/2018    Candidiasis 3/1/2020    Carotid stenosis, right 7/13/2016    Chest pain 11/11/2019    Chronic bilateral low back pain without sciatica 1/17/2018    Chronic pain disorder 10/1/2017    Constipation 3/20/2020    COPD (chronic obstructive pulmonary disease) (H) 6/24/2020    Coronary atherosclerosis 2/6/2009    Cubital tunnel syndrome on left 11/13/2012    Depressive disorder     Diabetes mellitus (H)     Diabetes mellitus type 2 in obese 7/13/2006    Diabetes mellitus type 2 in obese 7/13/2006    Drug-seeking behavior 4/4/2020    Failure to thrive in adult  9/3/2020    Hereditary and idiopathic peripheral neuropathy 4/24/2007    Hyperlipidemia with target low density lipoprotein (LDL) cholesterol less than 70 mg/dL 5/30/2007    Hypertension 10/14/2015    Hypothyroidism 12/10/2010    Irritable bowel syndrome 9/7/2015    Ischemic cardiomyopathy 9/20/2015    Long-term use of high-risk medication 4/14/2020    Moniliasis, 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    Myocardial infarction (H)     Neuromuscular disorder (H)     Other specified postprocedural states 10/8/2015    Pain medication agreement 4/20/2013    Panic disorder with agoraphobia 4/14/2020    Paroxysmal atrial fibrillation (H) 10/2/2015    Personality disorder (H) 3/5/2020    Polymyalgia rheumatica 11/28/2018    Posttraumatic stress disorder 3/1/2020    Restless legs syndrome (RLS) 8/29/2007    Restless legs syndrome (RLS) 8/29/2007    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    Serum calcium elevated 3/1/2020    Somatic dysfunction of sacral region 1/17/2018    Subacromial bursitis of left shoulder joint 8/6/2018    Suicidal ideation 4/1/2020    Thyroid disease     TIA (transient ischemic attack) 5/4/2018    TIA (transient ischemic attack) 5/4/2018    Tobacco abuse 2/17/2017    Tobacco abuse 2/17/2017    Urinary incontinence, mixed 9/24/2017    UTI (urinary tract infection) 4/17/2020    Vitamin B12 deficiency 2/14/2018    Weakness 12/17/2019       Past Surgical History:    Past Surgical History:   Procedure Laterality Date    CARDIAC SURGERY      stents x 11    CARDIAC SURGERY      CHOLECYSTECTOMY      CHOLECYSTECTOMY      GENITOURINARY SURGERY      Tubal ligation    OTHER SURGICAL HISTORY      Genitourinary surgery    RELEASE CARPAL TUNNEL      RELEASE CARPAL TUNNEL         Family History:    No family history on file.    Social History:  Marital Status:   [2]  Social History     Tobacco Use    Smoking status: Never    Smokeless tobacco: Never   Vaping Use    Vaping status:  Never Used   Substance Use Topics    Alcohol use: Not Currently    Drug use: Never        Medications:    acetaminophen (TYLENOL) 325 MG tablet  acetaminophen (TYLENOL) 500 MG tablet  albuterol (PROVENTIL) (2.5 MG/3ML) 0.083% neb solution  apixaban ANTICOAGULANT (ELIQUIS) 5 MG tablet  bisacodyl (DULCOLAX) 10 MG suppository  busPIRone (BUSPAR) 10 MG tablet  carbidopa-levodopa (SINEMET)  MG tablet  Continuous Glucose  (FREESTYLE TEDDY 2 READER) ZION  Continuous Glucose Sensor (FREESTYLE TEDDY 2 SENSOR) MISC  cyanocobalamin (CYANOCOBALAMIN) 1000 MCG/ML injection  DULoxetine HCl 40 MG CPEP  empagliflozin (JARDIANCE) 10 MG TABS tablet  estradiol (ESTRACE) 0.1 MG/GM vaginal cream  famotidine (PEPCID) 20 MG tablet  fluticasone-vilanterol (BREO ELLIPTA) 200-25 MCG/INH inhaler  gabapentin (NEURONTIN) 400 MG capsule  hydrocortisone (CORTAID) 1 % external ointment  hydrOXYzine HCl (ATARAX) 25 MG tablet  LANTUS SOLOSTAR 100 UNIT/ML soln  levothyroxine (SYNTHROID/LEVOTHROID) 150 MCG tablet  lidocaine (LMX4) 4 % external cream  LORazepam (ATIVAN) 0.5 MG tablet  losartan (COZAAR) 25 MG tablet  Melatonin 10 MG TABS tablet  metolazone (ZAROXOLYN) 2.5 MG tablet  metoprolol succinate ER (TOPROL-XL) 25 MG 24 hr tablet  mirtazapine (REMERON) 15 MG tablet  morphine (MSIR) 15 MG IR tablet  nitroGLYcerin (NITROSTAT) 0.4 MG sublingual tablet  ondansetron (ZOFRAN ODT) 4 MG ODT tab  pantoprazole (PROTONIX) 40 MG EC tablet  polyethylene glycol (MIRALAX) 17 GM/Dose powder  QUEtiapine (SEROQUEL) 100 MG tablet  ranolazine (RANEXA) 500 MG 12 hr tablet  rosuvastatin (CRESTOR) 10 MG tablet  sennosides (SENOKOT) 8.6 MG tablet  traZODone (DESYREL) 50 MG tablet  traZODone (DESYREL) 50 MG tablet  VAGINAL MOISTURIZER VA          Review of Systems  All other ROS reviewed and are negative or non-contributory except as stated in HPI.     Physical Exam   BP: (!) 63/39  Pulse: 93  Temp: 100.2  F (37.9  C)  Resp: 22  Weight: 88.3 kg (194 lb 10.7  "oz)  SpO2: 94 %      Physical Exam  Vitals and nursing note reviewed.   Constitutional:       Appearance: She is obese.      Comments: Morbidly obese female lying in the bed.  As I approached her and said \"hi\" she responded with \"hi\" but afterwards no intelligible speech.  Followed commands to wiggle toes on the right and stated \"ouch\" to pressing on toes on the left.   HENT:      Head: Normocephalic.      Mouth/Throat:      Mouth: Mucous membranes are dry.   Eyes:      Pupils: Pupils are equal, round, and reactive to light.   Cardiovascular:      Rate and Rhythm: Regular rhythm.      Comments: Palpable and equal radial pulse bilaterally.  Thready pedal pulses bilaterally  Borderline tachycardia  Pacemaker.  Sternal scar  Pulmonary:      Comments: Patient with nasal cannula in place, O2 sats 94%  Equal chest rise.  Decreased breath sounds posterior laterally  Abdominal:      Comments: Obese abdomen, soft.  Patient groans with palpation of the abdomen   Musculoskeletal:         General: No deformity.   Skin:     Coloration: Skin is pale.      Findings: No rash.      Comments: Warm to the touch   Neurological:      Comments: Encephalopathic, not answering questions, not following commands         ED Course (with Medical Decision Making)    Pt seen and examined by me.  RN and EPIC notes reviewed.       Patient presenting as a \"code stroke\".  Although she could have strokelike symptoms I am also concerned for possible underlying encephalopathy.  She feels warm to the touch, thready pulses.  We did send her right away for CT/CTA and stroke neurology has been involved.    On return to the ED from CT scan, I did order rectal temp and Tylenol as she has low-grade temperature.  There was equipment issues, blood pressures finally tracked and patient significantly hypotensive.  2 IVs in place and she is receiving normal saline.         EKG Interpretation:      Interpreted by Rena Segal MD  Time reviewed:1128   Symptoms " at time of EKG: sepsis   Rhythm: Normal sinus  and Paced  Rate: 91  Axis: Normal  Ectopy: None  Conduction: Pacemaker  ST Segments/ T Waves: Pacer  Q Waves: None  Comparison to prior: Unchanged    Clinical Impression: paced rhythm      Lactic acid 5.9  Elevated white blood cell count at 29.2.  Hemoglobin 10.9.  Platelets 114  CMP with sodium 143, testing 5.1, bicarb 16, anion gap 22.  BUN/creatinine 39.3/3.29.  Patient has baseline creatinine around 1.1.  Glucose 146.  LFTs basically normal except for very slightly elevated AST at 74.  Initial troponin high at 260  INR 2.16  COVID/influenza/RSV negative    Patient with sepsis.  Blood cultures drawn, antibiotics cefepime/vancomycin ordered.    We also checked a rectal temp.  This was high, 102.9.  She was given rectal Tylenol.    CT of the head shows no significant acute intracranial process.  She has old infarctions and chronic small vessel ischemic changes.  CTA of the head with no significant abnormal findings.  CTA of the neck does show moderate stenosis bilateral proximal internal carotids.  Neurology has signed off.    Mccallum catheter placed for strict I's/O.  Urine noted to be extremely thick and dark.  Urinalysis grossly abnormal with white cells, white cell clumps, bacteria, leukocyte esterase, nitrites, glucose and bilirubin and protein.  Urine culture sent.    Patient continues to have hypotension and Levophed ordered.  Unfortunately Levophed dose is maximized peripherally so central line required.  This was placed by anesthesia at bedside.    Patient will need ICU care.  No beds available here.  No beds available within the [facility] system.  No beds available in [facility], placed on a wait list and both of these systems.  I spoke with Clementina intensivist to get her on the list.  I also spoke with Kaveh intensivist who accepted the patient provisionally if a bed became available.  I also spoke with the ED provider through [facility].  Apparently they would have a  "bed available and she has been accepted for transfer ED to ED.    In total, patient received 3 L NS, she is on a Levophed drip.  Central line placed.  She had CT and CT with contrast and apparently has contrast allergy so she was given Solu-Medrol and Benadryl prior to the CT.  She received 650 mg acetaminophen rectally  Maxipime 2 g  Vancomycin 1750 mg  Midazolam 1 mg prior to placement of central line    Central line placement verified for use.  X-ray reviewed.  Also, because of her fever, complaints of abdominal pain, mild hypoxia I did do a CT of the chest/abdomen/pelvis.  This did not show any obvious infectious source.  Trace pulmonary edema with small bilateral pleural effusions.  Left adnexal mass that was apparently seen on a previous CT scan    Patient's blood pressure improved.  She was verbal and able to answer some questions but also is calling out consistently \"I need to go to a hospital\".    Patient transferred via EMS to [facility]        Procedures    Critical Care time:  was 120 minutes for this patient excluding procedures.  The patient has signs of sepsis   Sepsis ED evaluation   The patient has signs of sepsis as evidenced by:  1. Presence of 2 SIRS criteria, suspected infection, AND  2. Organ dysfunction: Initial hypotension due to sepsis, Lactic Acidosis with value >2.0 due to sepsis, Acute encephalopathy due to sepsis, ERIC with Cr >2 or Urine output <0.5/kg/hr for more than 2 hours despite fluid resuscitation due to sepsis, and Acute coagulopathy with INR >1.5 due to sepsis    Sepsis Care Initiation: Starting on 02/09/25 at  1033  , until specified. Prior to this documentation, sepsis, severe sepsis, or septic shock was NOT thought to be a significant cause of illness. This order represents the first time infection was seriously considered to be affecting the patient.    Lactic Acid Results:  Recent Labs   Lab Test 02/09/25  1239 02/09/25  1028 04/27/24  1415   LACT 5.2* 5.9* 1.8       3 Hour " Bundle 6 Hour Bundle (Reassessment)   Blood Cultures before IV Antibiotics: Yes  Antibiotics given: see below  Prehospital fluid volume (mL):                     Total fluids given (ED +Pre-hospital):  Full 30 mL/kg bolus given based on weight: 2,650 mL   Repeat Lactic Acid Level: Ordered by reflex for 2 hours after initial lactic acid collection.  Vasopressors: Vasopressors started for septic shock due to persistent hypotension.  Repeat perfusion exam: I attest to having performed a repeat sepsis exam and assessment of perfusion at  1245 .   BMI Readings from Last 1 Encounters:   02/09/25 35.60 kg/m        Anti-infectives (From admission through now)      Start     Dose/Rate Route Frequency Ordered Stop    02/09/25 1200  vancomycin (VANCOCIN) 1,750 mg in sodium chloride 0.9 % 567.5 mL intermittent infusion         1,750 mg  over 2 Hours Intravenous EVERY 48 HOURS 02/09/25 1102      02/09/25 1055  ceFEPIme (MAXIPIME) 2 g vial to attach to  mL bag for ADULTS or NS 50 mL bag for PEDS         2 g  over 30 Minutes Intravenous ONCE 02/09/25 1054 02/09/25 1355                     Results for orders placed or performed during the hospital encounter of 02/09/25 (from the past 24 hours)   CBC with Platelets & Differential    Narrative    The following orders were created for panel order CBC with Platelets & Differential.  Procedure                               Abnormality         Status                     ---------                               -----------         ------                     CBC with platelets and d...[[id number]]  Abnormal            Final result               RBC and Platelet Morphology[[id number]]                      Final result                 Please view results for these tests on the individual orders.   INR   Result Value Ref Range    INR 2.16 (H) 0.85 - 1.15   Partial thromboplastin time   Result Value Ref Range    aPTT 38 22 - 38 Seconds   Troponin T, High Sensitivity   Result Value Ref Range     Troponin T, High Sensitivity 260 (HH) <=14 ng/L   Comprehensive metabolic panel   Result Value Ref Range    Sodium 143 135 - 145 mmol/L    Potassium 5.1 3.4 - 5.3 mmol/L    Carbon Dioxide (CO2) 16 (L) 22 - 29 mmol/L    Anion Gap 22 (H) 7 - 15 mmol/L    Urea Nitrogen 39.3 (H) 8.0 - 23.0 mg/dL    Creatinine 3.29 (H) 0.51 - 0.95 mg/dL    GFR Estimate 14 (L) >60 mL/min/1.73m2    Calcium 9.7 8.8 - 10.4 mg/dL    Chloride 105 98 - 107 mmol/L    Glucose 146 (H) 70 - 99 mg/dL    Alkaline Phosphatase 77 40 - 150 U/L    AST 74 (H) 0 - 45 U/L    ALT 48 0 - 50 U/L    Protein Total 7.3 6.4 - 8.3 g/dL    Albumin 3.8 3.5 - 5.2 g/dL    Bilirubin Total 0.3 <=1.2 mg/dL   Lactic Acid Whole Blood with 1X Repeat in 2 HR when >2   Result Value Ref Range    Lactic Acid, Initial 5.9 (HH) 0.7 - 2.0 mmol/L   CBC with platelets and differential   Result Value Ref Range    WBC Count 29.2 (H) 4.0 - 11.0 10e3/uL    RBC Count 3.68 (L) 3.80 - 5.20 10e6/uL    Hemoglobin 10.9 (L) 11.7 - 15.7 g/dL    Hematocrit 34.2 (L) 35.0 - 47.0 %    MCV 93 78 - 100 fL    MCH 29.6 26.5 - 33.0 pg    MCHC 31.9 31.5 - 36.5 g/dL    RDW 13.9 10.0 - 15.0 %    Platelet Count 114 (L) 150 - 450 10e3/uL    % Neutrophils 84 %    % Lymphocytes 6 %    % Monocytes 5 %    % Eosinophils 0 %    % Basophils 0 %    % Immature Granulocytes 5 %    NRBCs per 100 WBC 0 <1 /100    Absolute Neutrophils 24.5 (H) 1.6 - 8.3 10e3/uL    Absolute Lymphocytes 1.7 0.8 - 5.3 10e3/uL    Absolute Monocytes 1.3 0.0 - 1.3 10e3/uL    Absolute Eosinophils 0.1 0.0 - 0.7 10e3/uL    Absolute Basophils 0.1 0.0 - 0.2 10e3/uL    Absolute Immature Granulocytes 1.5 (H) <=0.4 10e3/uL    Absolute NRBCs 0.1 10e3/uL   RBC and Platelet Morphology   Result Value Ref Range    RBC Morphology Confirmed RBC Indices     Platelet Assessment  Automated Count Confirmed. Platelet morphology is normal.     Automated Count Confirmed. Platelet morphology is normal.   CT Head w/o Contrast    Narrative    EXAM: CT HEAD W/O  CONTRAST, CTA HEAD NECK W CONTRAST  LOCATION: [facility]  DATE: 2/9/2025    INDICATION: Code Stroke to evaluate for potential thrombolysis and thrombectomy. PLEASE READ IMMEDIATELY.  COMPARISON: None.  CONTRAST: 67mL, isovue 370  TECHNIQUE: Head and neck CT angiogram with IV contrast. Noncontrast head CT followed by axial helical CT images of the head and neck vessels obtained during the arterial phase of intravenous contrast administration. Axial 2D reconstructed images and   multiplanar 3D MIP reconstructed images of the head and neck vessels were performed by the technologist. Dose reduction techniques were used. All stenosis measurements made according to NASCET criteria unless otherwise specified.    FINDINGS:   NONCONTRAST HEAD CT:   INTRACRANIAL CONTENTS: No intracranial hemorrhage, extraaxial collection, or mass effect.  No CT evidence of acute infarct. Old infarctions in the bilateral caudate nuclei, unchanged. Mild presumed chronic small vessel ischemic changes. Mild generalized   volume loss. No hydrocephalus.     VISUALIZED ORBITS/SINUSES/MASTOIDS: No intraorbital abnormality. No paranasal sinus mucosal disease. No middle ear or mastoid effusion.    BONES/SOFT TISSUES: No acute abnormality.    HEAD CTA:  ANTERIOR CIRCULATION: No stenosis/occlusion, aneurysm, or high flow vascular malformation. Fetal origin of the left posterior cerebral artery from the anterior circulation.    POSTERIOR CIRCULATION: No stenosis/occlusion, aneurysm, or high flow vascular malformation. Dominant left and smaller right vertebral artery contribute to a normal basilar artery.     DURAL VENOUS SINUSES: Not well evaluated on a technical basis.    NECK CTA:  RIGHT CAROTID: Calcified atherosclerotic plaque about the carotid bifurcation and proximal right internal carotid results in approximately 60% stenosis in the proximal right internal carotid artery. No dissection.    LEFT CAROTID: Calcified  atherosclerotic plaque is proximal right internal carotid results in approximately 60% stenosis in the proximal left internal carotid artery. No dissection.    VERTEBRAL ARTERIES: No focal stenosis or dissection. Dominant left and smaller right vertebral arteries.    AORTIC ARCH: Classic aortic arch anatomy with no significant stenosis at the origin of the great vessels.    NONVASCULAR STRUCTURES: Unremarkable.      Impression    IMPRESSION:   HEAD CT:  1.  No acute intracranial process.  2.  Old infarctions in the bilateral clinically.  3.  Mild presumed chronic small vessel ischemic changes.    HEAD CTA:   1.  No significant stenosis, aneurysm, or high flow vascular malformation identified.    NECK CTA:  1.  Calcified atherosclerosis causes moderate stenosis of the proximal internal carotid arteries bilaterally.  2.  No additional significant stenosis or dissection.    Findings discussed with Dr. Segal at 10:55 AM CST on 2/9/2025   CTA Head Neck with Contrast    Narrative    EXAM: CT HEAD W/O CONTRAST, CTA HEAD NECK W CONTRAST  LOCATION: [facility]  DATE: 2/9/2025    INDICATION: Code Stroke to evaluate for potential thrombolysis and thrombectomy. PLEASE READ IMMEDIATELY.  COMPARISON: None.  CONTRAST: 67mL, isovue 370  TECHNIQUE: Head and neck CT angiogram with IV contrast. Noncontrast head CT followed by axial helical CT images of the head and neck vessels obtained during the arterial phase of intravenous contrast administration. Axial 2D reconstructed images and   multiplanar 3D MIP reconstructed images of the head and neck vessels were performed by the technologist. Dose reduction techniques were used. All stenosis measurements made according to NASCET criteria unless otherwise specified.    FINDINGS:   NONCONTRAST HEAD CT:   INTRACRANIAL CONTENTS: No intracranial hemorrhage, extraaxial collection, or mass effect.  No CT evidence of acute infarct. Old infarctions in the  bilateral caudate nuclei, unchanged. Mild presumed chronic small vessel ischemic changes. Mild generalized   volume loss. No hydrocephalus.     VISUALIZED ORBITS/SINUSES/MASTOIDS: No intraorbital abnormality. No paranasal sinus mucosal disease. No middle ear or mastoid effusion.    BONES/SOFT TISSUES: No acute abnormality.    HEAD CTA:  ANTERIOR CIRCULATION: No stenosis/occlusion, aneurysm, or high flow vascular malformation. Fetal origin of the left posterior cerebral artery from the anterior circulation.    POSTERIOR CIRCULATION: No stenosis/occlusion, aneurysm, or high flow vascular malformation. Dominant left and smaller right vertebral artery contribute to a normal basilar artery.     DURAL VENOUS SINUSES: Not well evaluated on a technical basis.    NECK CTA:  RIGHT CAROTID: Calcified atherosclerotic plaque about the carotid bifurcation and proximal right internal carotid results in approximately 60% stenosis in the proximal right internal carotid artery. No dissection.    LEFT CAROTID: Calcified atherosclerotic plaque is proximal right internal carotid results in approximately 60% stenosis in the proximal left internal carotid artery. No dissection.    VERTEBRAL ARTERIES: No focal stenosis or dissection. Dominant left and smaller right vertebral arteries.    AORTIC ARCH: Classic aortic arch anatomy with no significant stenosis at the origin of the great vessels.    NONVASCULAR STRUCTURES: Unremarkable.      Impression    IMPRESSION:   HEAD CT:  1.  No acute intracranial process.  2.  Old infarctions in the bilateral clinically.  3.  Mild presumed chronic small vessel ischemic changes.    HEAD CTA:   1.  No significant stenosis, aneurysm, or high flow vascular malformation identified.    NECK CTA:  1.  Calcified atherosclerosis causes moderate stenosis of the proximal internal carotid arteries bilaterally.  2.  No additional significant stenosis or dissection.    Findings discussed with Dr. Segal at 10:55 AM  CST on 2/9/2025   Influenza A/B, RSV and SARS-CoV2 PCR (COVID-19) Nasopharyngeal    Specimen: Nasopharyngeal; Swab   Result Value Ref Range    Influenza A PCR Negative Negative    Influenza B PCR Negative Negative    RSV PCR Negative Negative    SARS CoV2 PCR Negative Negative    Narrative    Testing was performed using the Xpert Xpress CoV2/Flu/RSV Assay on the [employer] GeneXpert Instrument. This test should be ordered for the detection of SARS-CoV2, influenza, and RSV viruses in individuals with signs and symptoms of respiratory tract infection. This test is for in vitro diagnostic use under the [place] FDA for laboratories certified under CLIA to perform high or moderate complexity testing. This test has been [place] FDA cleared. A negative result does not rule out the presence of PCR inhibitors in the specimen or target RNA in concentration below the limit of detection for the assay. If only one viral target is positive but coinfection with multiple targets is suspected, the sample should be re-tested with another FDA cleared, approved, or authorized test, if coninfection would change clinical management. This test was validated by the [facility]. These laboratories are certified under the Clinical Laboratory Improvement Amendments of 1988 (CLIA-88) as qualified to perfom high complexity laboratory testing.   EKG 12-lead, tracing only   Result Value Ref Range    Systolic Blood Pressure  mmHg    Diastolic Blood Pressure  mmHg    Ventricular Rate 91 BPM    Atrial Rate 91 BPM    NH Interval 98 ms    QRS Duration 198 ms     ms    QTc 543 ms    P Axis 26 degrees    R AXIS 149 degrees    T Axis 270 degrees    Interpretation ECG       Atrial-sensed ventricular-paced rhythm  Biventricular pacemaker detected  Abnormal ECG  When compared with ECG of 03-Sep-2020 08:51,  Vent. rate has increased by  25 bpm  Confirmed by SEE ED PROVIDER NOTE FOR, ECG INTERPRETATION ([id number]),  Bayron Vaca ([id number]) on  2/9/2025 11:34:34 AM     UA with Microscopic reflex to Culture    Specimen: Urine, Mccallum Catheter   Result Value Ref Range    Color Urine Juju (A) Colorless, Straw, Light Yellow, Yellow    Appearance Urine Turbid (A) Clear    Glucose Urine 100 (A) Negative mg/dL    Bilirubin Urine Moderate (A) Negative    Ketones Urine Trace (A) Negative mg/dL    Specific Gravity Urine 1.025 1.003 - 1.035    Blood Urine Large (A) Negative    pH Urine 5.5 5.0 - 7.0    Protein Albumin Urine >=300 (A) Negative mg/dL    Urobilinogen Urine Normal Normal, 2.0 mg/dL    Nitrite Urine Positive (A) Negative    Leukocyte Esterase Urine Large (A) Negative    Bacteria Urine Many (A) None Seen /HPF    WBC Clumps Urine Present (A) None Seen /HPF    Mucus Urine Present (A) None Seen /LPF    RBC Urine 32 (H) <=2 /HPF    WBC Urine >182 (H) <=5 /HPF    Narrative    Urine Culture ordered based on laboratory criteria   Lactic acid whole blood   Result Value Ref Range    Lactic Acid 5.2 (HH) 0.7 - 2.0 mmol/L   XR Chest Port 1 View    Narrative    EXAM: XR CHEST PORT 1 VIEW  LOCATION: [facility]  DATE: 2/9/2025    INDICATION: Central line placement  COMPARISON: 9/13/2024      Impression    IMPRESSION: Right-sided central venous catheter terminates over the mid to distal SVC. Heart size is stable status post valve repair and median sternotomy. Cardiac pacemaker/ICD remains in place. Lung volumes are mildly diminished. No postprocedural   pneumothorax.   CT Chest Abdomen Pelvis w/o Contrast    Narrative    EXAM: CT CHEST ABDOMEN PELVIS W/O CONTRAST  LOCATION: [facility]  DATE: 2/9/2025    INDICATION: Sepsis, hypoxia, hypotension, abdominal pain  COMPARISON: Same day chest radiograph, CT angiogram 5/22/  TECHNIQUE: CT scan of the chest, abdomen, and pelvis was performed without IV contrast. Multiplanar reformats were obtained. Dose reduction techniques were used.   CONTRAST: None.    FINDINGS:    LUNGS AND PLEURA: Subtle interlobular septal thickening throughout both lungs. Trace bilateral pleural effusions with associated bibasilar atelectasis. No lit airspace consolidation or pneumothorax. Interval resolution of previously described airspace   disease in the right upper and lower lobes. Scattered calcified granulomas are again noted.    MEDIASTINUM/AXILLAE: Prior median sternotomy. Enlarged heart. Pacemaker/AICD leads are noted in the right atrium and right ventricle. Right internal jugular approach central venous catheter with tip in the superior vena cava. No suspicious   lymphadenopathy. No pericardial effusion.    CORONARY ARTERY CALCIFICATION: Previous intervention (CABG).    HEPATOBILIARY: Cholecystectomy. No evidence of biliary obstruction.    PANCREAS: Normal.    SPLEEN: Normal.    ADRENAL GLANDS: Normal.    KIDNEYS/BLADDER: Excreted contrast in the renal collecting system bilaterally. No hydronephrosis. Urinary bladder is decompressed by Mccallum catheter.    BOWEL: No obstruction or inflammatory change. Normal appendix.    LYMPH NODES: No suspicious lymphadenopathy. No abdominopelvic free fluid.    VASCULATURE: Severe calcified atherosclerosis.    PELVIC ORGANS: 3.4 cm solid-appearing mass in the region of the left adnexa, appears grossly stable from prior CT angiogram, uncertain if it is pedunculated fibroid or ovarian lesion.    MUSCULOSKELETAL: No acute bony abnormality. Mild body wall edema. Interval resolution of previously described left rectus sheath hematoma.      Impression    IMPRESSION:  1.  Likely trace pulmonary edema with small bilateral pleural effusions and associated compressive atelectasis.  2.  No definite infectious source visualized in the chest, abdomen or pelvis.  3.  Stable size of left pelvic lesion as described above, uncertain if this is a pedunculated fibroid or ovarian/adnexal lesion, consider further evaluation with pelvic ultrasound on a nonemergent/outpatient  basis.       Medications   vancomycin (VANCOCIN) 1,750 mg in sodium chloride 0.9 % 567.5 mL intermittent infusion (1,750 mg Intravenous $Given 2/9/25 1149)   sodium chloride 0.9 % infusion (1,000 mLs Intravenous Handoff 2/9/25 1551)   norepinephrine (LEVOPHED) 16 mg in  mL infusion MAX CONC CENTRAL LINE (0.4 mcg/kg/min × 88.3 kg Intravenous Handoff 2/9/25 1550)   iopamidol (ISOVUE-370) solution 67 mL (67 mLs Intravenous $Given 2/9/25 1038)     And   sodium chloride 0.9 % bag for CT scan flush use (100 mLs As instructed $Given 2/9/25 1039)   methylPREDNISolone Na Suc (solu-MEDROL) injection 125 mg (125 mg Intravenous $Given 2/9/25 1106)   diphenhydrAMINE (BENADRYL) injection 25 mg (25 mg Intravenous $Given 2/9/25 1025)   acetaminophen (TYLENOL) Suppository 650 mg (650 mg Rectal $Given 2/9/25 1059)   sodium chloride 0.9% BOLUS 1,000 mL (0 mLs Intravenous Stopped 2/9/25 1356)   ceFEPIme (MAXIPIME) 2 g vial to attach to  mL bag for ADULTS or NS 50 mL bag for PEDS (0 g Intravenous Stopped 2/9/25 1355)   sodium chloride 0.9% BOLUS 1,000 mL (0 mLs Intravenous Stopped 2/9/25 1356)   sodium chloride 0.9% BOLUS 1,000 mL (0 mLs Intravenous Stopped 2/9/25 1355)   midazolam (VERSED) injection 1 mg (2 mg Intravenous $Given 2/9/25 1354)       Assessments & Plan   I have reviewed the findings, diagnosis, plan with the patient's family member    New Prescriptions    No medications on file       Final diagnoses:   Sepsis with encephalopathy and septic shock, due to unspecified organism (H)   UTI (urinary tract infection), bacterial   Elevated troponin   Acute kidney injury     [place] [facility] [place]    Note: Chart documentation done in part with Dragon Voice Recognition software. Although reviewed after completion, some word and grammatical errors may remain.     2/9/2025   [facility] EMERGENCY DEPT       Rena Segal MD  02/09/25 6712

## 2025-02-09 NOTE — ANESTHESIA CARE TRANSFER NOTE
Patient: Letty Escobar    Procedure: * No procedures listed *       Diagnosis: * No pre-op diagnosis entered *  Diagnosis Additional Information: No value filed.    Anesthesia Type:   No value filed.     Note:    Oropharynx: oropharynx clear of all foreign objects and spontaneously breathing  Level of Consciousness: awake  Oxygen Supplementation: nasal cannula  Level of Supplemental Oxygen (L/min / FiO2): patient remains hypotensive  Independent Airway: airway patency satisfactory and stable  Dentition: dentition unchanged    Report to RN Given: handoff report given  Patient transferred to: Emergency Department    Handoff Report: Identifed the Patient, Identified the Reponsible Provider, Reviewed the pertinent medical history, Discussed the surgical course, Reviewed Intra-OP anesthesia mangement and issues during anesthesia, Set expectations for post-procedure period and Allowed opportunity for questions and acknowledgement of understanding  Vitals:  Vitals Value Taken Time   /44 02/09/25 1522   Temp     Pulse 87 02/09/25 1525   Resp 24 02/09/25 1525   SpO2 96 % 02/09/25 1524   Vitals shown include unfiled device data.    Electronically Signed By: KILO Galeana CRNA  February 9, 2025  3:26 PM

## 2025-02-09 NOTE — ED TRIAGE NOTES
Triage Assessment (Adult)       Row Name 02/09/25 1029          Triage Assessment    Airway WDL WDL        Respiratory WDL    Respiratory WDL WDL        Skin Circulation/Temperature WDL    Skin Circulation/Temperature WDL WDL        Cardiac WDL    Cardiac WDL X  hypotensive        Peripheral/Neurovascular WDL    Peripheral Neurovascular WDL X        Cognitive/Neuro/Behavioral WDL    Cognitive/Neuro/Behavioral WDL X     Arousal Level arouses to touch/gentle shaking     Orientation disoriented x 4;other (see comments)  no response

## 2025-02-09 NOTE — MEDICATION SCRIBE - ADMISSION MEDICATION HISTORY
Medication Scribe Admission Medication History    Admission medication history is complete. The information provided in this note is only as accurate as the sources available at the time of the update.    Information Source(s): Facility (U/NH/) medication list/MAR and CareEverywhere/SureScripts via N/A    Pertinent Information: MAR arrived with patient from [facility], medication history completed.    Changes made to PTA medication list:  Added: buspar, estradiol crm, freestyle CGR system, hydrocortisone ointment, hydroxyzine, metolazone, morphine, ranolazine, refresh vag, sinemet  Deleted: proventil half-neb, aspirin 81mg, lotrimin crm, diflucan, lasix, hydrochlorothiazide, insulin Aspart, guaifenesin, potassium, prednisone  Changed: Lantus dose from 22 units to 18 units, levothyroxine from 137mcg to 150mcg, ativan from 1mg to 0.5mg, crestor from 20mg to 10mg    Allergies reviewed with patient and updates made in EHR: yes    Medication History Completed By: LASHA SALGUERO 2/9/2025 12:15 PM    PTA Med List   Medication Sig Note Last Dose/Taking    acetaminophen (TYLENOL) 325 MG tablet Take 325 mg by mouth 2 times daily as needed for pain  2/4/2025 at  2:45 AM    acetaminophen (TYLENOL) 500 MG tablet Take 2 tablets (1,000 mg) by mouth 3 times daily  2/8/2025 at 12:59 PM    albuterol (PROVENTIL) (2.5 MG/3ML) 0.083% neb solution Take 2.5 mg by nebulization every 6 hours as needed for cough or wheezing  6/4/2024 at  8:33 PM    apixaban ANTICOAGULANT (ELIQUIS) 5 MG tablet Take 5 mg by mouth 2 times daily  2/8/2025 at  9:04 AM    bisacodyl (DULCOLAX) 10 MG suppository Place 10 mg rectally daily as needed for constipation  11/29/2024 at  8:30 PM    busPIRone (BUSPAR) 10 MG tablet Take 10 mg by mouth every evening.  2/7/2025 at  9:11 PM    carbidopa-levodopa (SINEMET)  MG tablet Take 1 tablet by mouth at bedtime.  2/7/2025 at  9:11 PM    Continuous Glucose  (FREESTYLE TEDDY 2 READER) [facility]  2/9/2025: On  MAR no dispense date Taking    Continuous Glucose Sensor (FREESTYLE TEDDY 2 SENSOR) [facility] every 14 days. 2/9/2025: On MAR no dispense date Taking    cyanocobalamin (CYANOCOBALAMIN) 1000 MCG/ML injection Inject 1 mL into the muscle every 30 days 21st of each month  12/21/2024 at 11:22 AM    DULoxetine HCl 40 MG CPEP Take 40 mg by mouth daily  2/8/2025 at  9:04 AM    empagliflozin (JARDIANCE) 10 MG TABS tablet Take 1 tablet by mouth daily  2/8/2025 at  9:04 AM    estradiol (ESTRACE) 0.1 MG/GM vaginal cream Place vaginally. Mondays, Wednesdays and Fridays at bedtime  2/7/2025 at  9:02 PM    famotidine (PEPCID) 20 MG tablet Take 20 mg by mouth at bedtime  2/7/2025 at  9:11 PM    fluticasone-vilanterol (BREO ELLIPTA) 200-25 MCG/INH inhaler Inhale 1 puff into the lungs daily Rinse mouth after use  2/8/2025 at  9:04 AM    gabapentin (NEURONTIN) 400 MG capsule Take 400 mg by mouth at bedtime RLS  2/7/2025 at  9:11 PM    hydrocortisone (CORTAID) 1 % external ointment Apply topically at bedtime. To clean, dry elena-area  2/8/2025 at 10:20 PM    hydrOXYzine HCl (ATARAX) 25 MG tablet Take 25 mg by mouth at bedtime.  2/7/2025 at  9:11 PM    LANTUS SOLOSTAR 100 UNIT/ML soln Inject 18 Units subcutaneously at bedtime.  2/8/2025 at 10:20 PM    levothyroxine (SYNTHROID/LEVOTHROID) 150 MCG tablet Take 150 mcg by mouth every morning (before breakfast).  2/9/2025 at  7:44 AM    lidocaine (LMX4) 4 % external cream Apply topically 2 times daily To Rt knee  2/8/2025 at 10:20 PM    LORazepam (ATIVAN) 0.5 MG tablet Take 0.5 mg by mouth 2 times daily.  2/8/2025 at 12:59 PM    losartan (COZAAR) 25 MG tablet Take 1 tablet (25 mg) by mouth daily  2/8/2025 at  9:04 AM    Melatonin 10 MG TABS tablet Take 10 mg by mouth At Bedtime  2/7/2025 at  9:11 PM    metolazone (ZAROXOLYN) 2.5 MG tablet Take 2.5 mg by mouth every 7 days. Mondays  2/3/2025 at  8:17 AM    metoprolol succinate ER (TOPROL-XL) 25 MG 24 hr tablet Take 37.5 mg by mouth daily  2/8/2025  at  9:04 AM    mirtazapine (REMERON) 15 MG tablet Take 1 tablet (15 mg) by mouth at bedtime  2/7/2025 at  9:11 PM    morphine (MSIR) 15 MG IR tablet Take 15 mg by mouth every 4 hours as needed for pain. 2/9/2025: From [facility] Dispense Report  Last Dispensed: 01/03/2025  Quantity/Days supply: 8/2  Ordering Provider: Hemalatha Cruz CNP  Pharmacy: [facility]   2/8/2025 at 12:56 PM    nitroGLYcerin (NITROSTAT) 0.4 MG sublingual tablet Place 0.4 mg under the tongue every 5 minutes as needed for chest pain. For chest pain place 1 tablet under the tongue every 5 minutes for 3 doses. If symptoms persist 5 minutes after 1st dose call 911.  12/11/2024 at  1:55 AM    ondansetron (ZOFRAN ODT) 4 MG ODT tab Take 1 tablet (4 mg) by mouth every 8 hours as needed for vomiting or nausea.  2/8/2025 at 12:44 PM    pantoprazole (PROTONIX) 40 MG EC tablet Take 40 mg by mouth daily.  2/9/2025 at  7:44 AM    polyethylene glycol (MIRALAX) 17 GM/Dose powder Take 17 g by mouth every other day  2/7/2025 at  8:27 AM    QUEtiapine (SEROQUEL) 100 MG tablet Take 1 tablet (100 mg) by mouth 3 times daily  2/8/2025 at 12:59 PM    ranolazine (RANEXA) 500 MG 12 hr tablet Take 500 mg by mouth 2 times daily.  2/8/2025 at  9:04 AM    rosuvastatin (CRESTOR) 10 MG tablet Take 10 mg by mouth at bedtime.  2/7/2025 at  9:11 PM    sennosides (SENOKOT) 8.6 MG tablet Take 2 tablets by mouth at bedtime.  Hold for loose stools  2/7/2025 at  9:11 PM    traZODone (DESYREL) 50 MG tablet Take 75 mg by mouth at bedtime. And 25mg at 4pm  2/7/2025 at  9:11 PM    traZODone (DESYREL) 50 MG tablet Take 0.5 tablets (25 mg) by mouth 2 times daily Patient can have 25 mg once a day and 75 mg once a day. (Patient taking differently: Take 25 mg by mouth daily at 4pm. And 75mg at bedtime)  2/8/2025 at  4:51 PM

## 2025-02-09 NOTE — ED TRIAGE NOTES
Patient presens at [facility] code with garbled speech, bilateral arm weakness startng at 0830 LNK. Blood sugar 156.     Triage Assessment (Adult)       Row Name 02/09/25 1029          Triage Assessment    Airway WDL WDL        Respiratory WDL    Respiratory WDL WDL        Skin Circulation/Temperature WDL    Skin Circulation/Temperature WDL WDL        Cardiac WDL    Cardiac WDL X  hypotensive        Peripheral/Neurovascular WDL    Peripheral Neurovascular WDL X        Cognitive/Neuro/Behavioral WDL    Cognitive/Neuro/Behavioral WDL X     Arousal Level arouses to touch/gentle shaking     Orientation disoriented x 4;other (see comments)  no response

## 2025-02-09 NOTE — ANESTHESIA PREPROCEDURE EVALUATION
Anesthesia Pre-Procedure Evaluation    Patient: Letty Escobar   MRN: [id number] : 1955        Procedure : * No procedures listed *          Past Medical History:   Diagnosis Date     ACP (advance care planning) 11/3/2010    Formatting of this note might be different from the original. Patient has identified Health Care Agent(s): Yes Add Health Care Agents: Yes   Health Care Agent(s):  Primary Health Care Agent:   Edwar Escobar Relationship:  Spouse Phone:   [phone]  Secondary Health Care Agent:   Chiki Oro Relationship:   Son Phone:   [phone]  Patient has Advance Care Plan Documents (Health Care Direct     Acute coronary syndrome (H) 2019     Acute exacerbation of CHF (congestive heart failure) (H) 2019     Acute on chronic systolic (congestive) heart failure (H) 2020     Anemia of chronic disease 2013     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, left leg (H) 1/15/2020     BPPV (benign paroxysmal positional vertigo) 2018     Candidiasis 3/1/2020     Carotid stenosis, right 2016    Carotid US 2018 showed moderate plaque formation, consistent with 50 to 69% stenosis in the right internal carotid artery, not significantly changed from 2015.  Moderate plaque formation, consistent with 50 to 69% stenosis in the left internal carotid artery; there has been mild progression of the left ICA stenosis since 2015.     Chest pain 2019     Chronic bilateral low back pain without sciatica 2018     Chronic pain disorder 10/1/2017     Constipation 3/20/2020     COPD (chronic obstructive pulmonary disease) (H) 2020     Coronary atherosclerosis 2009 - MI - Proximal RCA 99%, mild-mod disease elsewhere.  EF 60%.  PCI:  MYRON to pRCA. 2009 - admit CP - Widely patent RCA stent. Moderate diffuse CAD. Severe stenosis in trivial PDA branch. LVEF 45%. 2010 - admit CP - PTCA and stent of  diagonal 9/8/2010 - admit CP - LAD patent stent. Moderate diffuse CAD. Medical management recommended.  4/25/2012 - NSTEMI - Acute total occlusion of     Cubital tunnel syndrome on left 11/13/2012     Depressive disorder      Diabetes mellitus (H)      Diabetes mellitus type 2 in obese 7/13/2006     Diabetes mellitus type 2 in obese 7/13/2006     Drug-seeking behavior 4/4/2020     Failure to thrive in adult 9/3/2020     Hereditary and idiopathic peripheral neuropathy 4/24/2007     Hyperlipidemia with target low density lipoprotein (LDL) cholesterol less than 70 mg/dL 5/30/2007     Hypertension 10/14/2015     Hypothyroidism 12/10/2010     Irritable bowel syndrome 9/7/2015     Ischemic cardiomyopathy 9/20/2015    EF of 40-45%, status post RV lead revision and LV epicardial lead placement via mini-thoracotomy in August 2016.     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  Designated pharmacy: [facility] Prescribing physician: Andrea Diagnosis: Ulnar neuropathy     Panic disorder with agoraphobia 4/14/2020     Paroxysmal atrial fibrillation (H) 10/2/2015     Personality disorder (H) 3/5/2020    Rule out dependent personality     Polymyalgia rheumatica 11/28/2018     Posttraumatic stress disorder 3/1/2020     Restless legs syndrome (RLS) 8/29/2007     Restless legs syndrome (RLS) 8/29/2007     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     Serum calcium elevated 3/1/2020     Somatic dysfunction of sacral region 1/17/2018     Subacromial bursitis of left shoulder joint 8/6/2018     Suicidal ideation 4/1/2020     Thyroid disease      TIA (transient ischemic attack) 5/4/2018     TIA  (transient ischemic attack) 5/4/2018     Tobacco abuse 2/17/2017     Tobacco abuse 2/17/2017     Urinary incontinence, mixed 9/24/2017     UTI (urinary tract infection) 4/17/2020     Vitamin B12 deficiency 2/14/2018     Weakness 12/17/2019      Past Surgical History:   Procedure Laterality Date     CARDIAC SURGERY      stents x 11     CARDIAC SURGERY       CHOLECYSTECTOMY       CHOLECYSTECTOMY       GENITOURINARY SURGERY      Tubal ligation     OTHER SURGICAL HISTORY      Genitourinary surgery     RELEASE CARPAL TUNNEL       RELEASE CARPAL TUNNEL        Allergies   Allergen Reactions     Bee Venom Anaphylaxis     Diphenhydramine Palpitations     Tolerated IV Benadryl when not pushed too fast     Isosorbide Nitrate Other (See Comments) and Dizziness     Also causes syncope (has fallen before) and brain fog/mental disturbances - please do not prescribe     Nitroglycerin Dizziness, Fatigue and Other (See Comments)     Specifically the patch - please do not prescribe  Specifically the patch - please do not prescribe       Contrast Dye Hives and Itching     Tolerated CT with IV contrast 5/9/2024 with 50 mg IV Benadryl prior     Adhesive Tape Rash     Liquid Adhesive Itching     Nystatin Dermatitis     Also blisters     Penicillins Swelling and Rash     Occurred as a child - not 100% sure on specific reactions     Sulfa Antibiotics Other (See Comments)     Occurred as a child / patient does not remember specific reaction      Social History     Tobacco Use     Smoking status: Never     Smokeless tobacco: Never   Substance Use Topics     Alcohol use: Not Currently      Wt Readings from Last 1 Encounters:   02/09/25 88.3 kg (194 lb 10.7 oz)        Anesthesia Evaluation   Pt has had prior anesthetic. Type: General and MAC.        ROS/MED HX  ENT/Pulmonary: Comment: SOB    (+) sleep apnea,                         COPD,              Neurologic:     (+)          CVA,    TIA,                  Cardiovascular: Comment:   Chronic  systolic heart failure       Internal carotid artery stenosis, bilateral    Ischemic cardiomyopathy    S/P CABG x 5    (+) Dyslipidemia hypertension- -  CAD angina-  CABG- -   Taking blood thinners   CHF        pacemaker,          dysrhythmias, a-fib,        Previous cardiac testing   Echo: Date: Results:    Stress Test:  Date: Results:    ECG Reviewed:  Date: 2/9/2025 Results:  Atrial-sensed ventricular-paced rhythm biventricular pacemaker detected. Abnormal ECG when compared with ECG from 2020  Cath:  Date: Results:      METS/Exercise Tolerance:     Hematologic: Comments: Thrombocytopenia    Vitamin B 12 deficiency     (+)      anemia,          Musculoskeletal: Comment: Polymyalgia rheumatica    Chronic pain disorder    Restless leg syndrome      GI/Hepatic:       Renal/Genitourinary: Comment: H/O Chronic kidney disease, stage 3a    Urosepsis     (+) renal disease, type: ARF, Pt does not require dialysis,           Endo:     (+)          thyroid problem, hypothyroidism,    Obesity,       Psychiatric/Substance Use: Comment: Panic disorder  PTSD   (-) psychiatric history   Infectious Disease: Comment: Urosepsis this visit      Malignancy:       Other: Comment:   Lives in long-term care facility             Physical Exam    Airway  airway exam normal      Mallampati: III   TM distance: < 3 FB   Neck ROM: limited   Mouth opening: > 3 cm    Respiratory Devices and Support     Nasal Canula 4  l/min.     Dental  no notable dental history     (+) Removable bridges or other hardware      Cardiovascular   cardiovascular exam normal       Rhythm and rate: regular and normal   (+) weak pulses       Pulmonary   pulmonary exam normal        breath sounds clear to auscultation   (+) decreased breath sounds       OUTSIDE LABS:  CBC:   Lab Results   Component Value Date    WBC 29.2 (H) 02/09/2025    WBC 7.8 09/22/2024    HGB 10.9 (L) 02/09/2025    HGB 10.3 (L) 09/13/2024    HCT 34.2 (L) 02/09/2025    HCT 32.1 (L) 09/13/2024    PLT  114 (L) 02/09/2025     (L) 09/13/2024     BMP:   Lab Results   Component Value Date     02/09/2025     01/31/2025    POTASSIUM 5.1 02/09/2025    POTASSIUM 4.4 01/31/2025    CHLORIDE 105 02/09/2025    CHLORIDE 107 01/31/2025    CO2 16 (L) 02/09/2025    CO2 21 (L) 01/31/2025    BUN 39.3 (H) 02/09/2025    BUN 25.0 (H) 01/31/2025    CR 3.29 (H) 02/09/2025    CR 1.19 (H) 01/31/2025     (H) 02/09/2025     (H) 01/31/2025     COAGS:   Lab Results   Component Value Date    PTT 38 02/09/2025    INR 2.16 (H) 02/09/2025     POC:   Lab Results   Component Value Date     (H) 09/15/2020     HEPATIC:   Lab Results   Component Value Date    ALBUMIN 3.8 02/09/2025    PROTTOTAL 7.3 02/09/2025    ALT 48 02/09/2025    AST 74 (H) 02/09/2025    ALKPHOS 77 02/09/2025    BILITOTAL 0.3 02/09/2025     OTHER:   Lab Results   Component Value Date    PH 7.38 11/10/2023    LACT 5.2 (HH) 02/09/2025    A1C 6.8 (H) 06/17/2024    ORESTES 9.7 02/09/2025    LIPASE 18 04/26/2024    TSH 1.55 07/29/2024    T4 0.91 04/26/2024    T3 38 (L) 04/27/2024       Anesthesia Plan    ASA Status:  4, emergent    - Procedure: Procedure only, no anesthetic delivered                    Consents          - Extended Intubation/Ventilatory Support Discussed: No.      Use of blood products discussed: No .     Postoperative Care    Pain management: IV analgesics.        Comments:    Other Comments: The risks and benefits of anesthesia, and the alternatives where applicable, have been discussed with the patient and her  and they wish to proceed.            KILO Galeana CRNA    I have reviewed the pertinent notes and labs in the chart from the past 30 days and (re)examined the patient.  Any updates or changes from those notes are reflected in this note.    Clinically Significant Risk Factors Present on Admission              # Anion Gap Metabolic Acidosis: Highest Anion Gap = 22 mmol/L in last 2 days, will monitor and treat as  appropriate   # Drug Induced Coagulation Defect: home medication list includes an anticoagulant medication  # Thrombocytopenia: Lowest platelets = 114 in last 2 days, will monitor for bleeding  # Acute Kidney Injury, unspecified: based on a >150% or 0.3 mg/dL increase in last creatinine compared to past 90 day average, will monitor renal function  # Hypertension: Noted on problem list   # Circulatory Shock: required vasopressors within past 24 hours       # Anemia: based on hgb <11           # Financial/Environmental Concerns:     # ICD device present

## 2025-02-09 NOTE — ANESTHESIA CARE TRANSFER NOTE
Patient: Letty Escobar    Procedure: * No procedures listed *       Diagnosis: * No pre-op diagnosis entered *  Diagnosis Additional Information: No value filed.    Anesthesia Type:   No value filed.     Note:    Oropharynx: oropharynx clear of all foreign objects and spontaneously breathing  Level of Consciousness: drowsy  Oxygen Supplementation: nasal cannula  Level of Supplemental Oxygen (L/min / FiO2): BP reamains hypotensive  Independent Airway: airway patency satisfactory and stable  Dentition: dentition unchanged    Report to RN Given: handoff report given  Patient transferred to: ICU    ICU Handoff: Call for PAUSE to initiate/utilize ICU HANDOFF, Identified Patient, Identified Responsible Provider, Reviewed the Pertinent Medical History, Discussed Surgical Course, Reviewed Intra-OP Anesthesia Management and Issues during Anesthesia, Set Expectations for Post Procedure Period and Allowed Opportunity for Questions and Acknowledgement of Understanding  Vitals:  Vitals Value Taken Time   BP 89/39 02/09/25 1350   Temp     Pulse 89 02/09/25 1353   Resp 17 02/09/25 1353   SpO2 96 % 02/09/25 1341   Vitals shown include unfiled device data.    Electronically Signed By: KILO Galeana CRNA  February 9, 2025  1:54 PM

## 2025-02-10 LAB
ACINETOBACTER SPECIES: NOT DETECTED
BACTERIA UR CULT: NORMAL
CITROBACTER SPECIES: NOT DETECTED
CTX-M: NOT DETECTED
ENTEROBACTER SPECIES: NOT DETECTED
ESCHERICHIA COLI: NOT DETECTED
IMP: NOT DETECTED
KLEBSIELLA OXYTOCA: NOT DETECTED
KLEBSIELLA PNEUMONIAE: NOT DETECTED
KPC: NOT DETECTED
NDM: NOT DETECTED
OXA (DETECTED/NOT DETECTED): NOT DETECTED
PROTEUS SPECIES: DETECTED
PSEUDOMONAS AERUGINOSA: NOT DETECTED
VIM: NOT DETECTED

## 2025-02-12 LAB
BACTERIA BLD CULT: ABNORMAL

## 2025-02-13 ENCOUNTER — TELEPHONE (OUTPATIENT)
Dept: CARDIOLOGY | Facility: CLINIC | Age: 70
End: 2025-02-13
Payer: COMMERCIAL

## 2025-02-13 NOTE — TELEPHONE ENCOUNTER
Reason for Call:  Other appointment    Detailed comments: 1 mo follow up MI - patient is currently an inpatient at [facility]. Renuka, cardiology nurse unit coordinator is requesting we call her with an appointment time / date for Letty.    Phone Number Patient can be reached at: Other phone number:  [phone] *    Best Time: 1 month hospital follow up for MI    Can we leave a detailed message on this number? YES    Call taken on 2/13/2025 at 9:20 AM by Latasha Hillman

## 2025-02-13 NOTE — TELEPHONE ENCOUNTER
Left message on Renuka's voicemail to call back, so we can schedule Letty with Dr Holland.  Able to use Urgent spot and add to wait list, per Karlee.    Tish Guo

## 2025-02-13 NOTE — TELEPHONE ENCOUNTER
Renuka returned call, she stated that this patient is in a facility in [place] and is not able to travel any other facilities. Letty was added to the wait list for Dr Holland, Renuka would like Letty to be scheduled with Skyler for the next available we can schedule even if it's not within the requested time frame. Can we use an urgent new spot in June? Renuka requested we schedule an appointment in [place] even if it's as far out as June.

## 2025-02-13 NOTE — TELEPHONE ENCOUNTER
Okay to schedule with next avail visit with cuong Zimmerman to use urgent and add to wait list. Thank you    There are no Wet Read(s) to document.

## 2025-02-13 NOTE — TELEPHONE ENCOUNTER
Left a detailed message on secure voicemail informing Renuka, Cardiology unit care coordinator,  we do not have any appointments available with Dr Erickson in the time frame needed and [facility]

## 2025-02-13 NOTE — TELEPHONE ENCOUNTER
Unfortunately we do not have any cardiology visit available with cardiologist here in [place] within that time frame. May have to check another location to be seen within one month.

## 2025-02-18 NOTE — TELEPHONE ENCOUNTER
Appointment scheduled by Renuka from [facility] for 6/23/25 with Dr Holland at the [facility]

## 2025-03-03 ENCOUNTER — TELEPHONE (OUTPATIENT)
Dept: DERMATOLOGY | Facility: CLINIC | Age: 70
End: 2025-03-03
Payer: COMMERCIAL

## 2025-03-03 NOTE — TELEPHONE ENCOUNTER
I left a message for patient to call [facility].    She is scheduled for a nail avulsion with Dr. Davis.  Please complete checklist.  Alma Nichols RN

## 2025-03-04 NOTE — TELEPHONE ENCOUNTER
2nd attempt to reach pt, no answer. Unable to leave VM due to mailbox being full.    Hemalatha Soliman RN on 3/4/2025 at 12:58 PM

## 2025-03-06 ENCOUNTER — OFFICE VISIT (OUTPATIENT)
Dept: DERMATOLOGY | Facility: CLINIC | Age: 70
End: 2025-03-06
Payer: COMMERCIAL

## 2025-03-06 DIAGNOSIS — L60.3 ONYCHODYSTROPHY: Primary | ICD-10-CM

## 2025-03-06 ASSESSMENT — PAIN SCALES - GENERAL: PAINLEVEL_OUTOF10: NO PAIN (0)

## 2025-03-06 NOTE — PATIENT INSTRUCTIONS
Caring for your skin after surgery    For the first 48 hours after your surgery:  Leave the pressure dressing on and keep it dry. If it should come loose, you may re-tape it, but do not take it off.  Relax and take it easy.  Do not do any vigorous exercise, heavy lifting or bending forward. This could cause the wound to bleed.  If the wound is on your head, sleeping with your head elevated for the first few nights will help with swelling and bleeding. (Use linens/pillow cases that would be ok to get blood on in the event there is some oozing from the bandage).  Post-operative pain is usually mild.  You may alternate between 1000 mg of Tylenol (acetaminophen) and 400 mg of Ibuprofen every 4 hours.  This means, for example, that you could take the followin,000 mg of Tylenol, followed 4 hours later by 400 mg Ibuprofen, followed 4 hours later with 1,000 mg of Tylenol, and so forth.  Do not take more than 4,000 mg of acetaminophen in a 24-hour period or 3200 mg of Ibuprofen in a 24-hr period.    Avoid alcohol and vitamin E as these may increase your tendency to bleed.  Apply an ice pack for 20 min every 2-3 hours while awake.  This may help reduce swelling, bruising, and pain.  Make sure the ice pack is waterproof so that the pressure bandage doesn't get wet.  You may see a small amount of drainage or blood on your pressure bandage. This is normal. However:  If drainage or bleeding continues or saturates the bandage, you will need to apply firm pressure over the bandage with a clean washcloth for 15 minutes.    Remove the saturated bandage.  If bleeding has stopped, apply Vaseline over the suture line and cover with a non-stick bandage.  To add some pressure over the wound, fold a piece of gauze and tape over the area.  If bleeding continues after applying pressure for 15 minutes, apply an ice pack with gentle pressure to the bandaged area for another 15 minutes.  If bleeding still continues, call our office or go to  the nearest emergency room.    48 Hours After Surgery:  Remove bandage.  Wash the wound gently with mild soap and water.  Do not use a washcloth, just your hands.  Let the wound be the last thing you wash before getting out of the shower.  Rinse well and pat dry.  Apply Vaseline or Aquaphor ointment (from a new container or tube) over the wound and the surrounding tissue with a Q-tip  Cover with a bandage.  Do this daily until the wound is healed.    What to expect:  The first couple of days your wound may be tender and may bleed slightly when doing wound care.  There may be swelling and bruising around the wound. For your comfort, you may apply ice or cold compresses to the area.  The area around your wound may be numb for several weeks or even months.  You may experience periodic sharp pain or mild itching around the wound as it heals.    Call Us If:  You have bleeding that will not stop after applying pressure and ice.  You have pain that is not controlled with Tylenol and Ibuprofen.  You have signs or symptoms of an infection such as fever over 100 degrees Fahrenheit, redness, swelling, or warmth spreading from the wound, increasing pain after the first 48 hours, or white/yellow/green drainage from the wound (may or may not have a foul odor).    [facility], Dermatology: [phone].  Available M-F 8-5. Ask for [facility]  For urgent needs outside of business hours, call the [facility] at [phone] and ask to speak with/page the dermatology resident on call.

## 2025-03-06 NOTE — TELEPHONE ENCOUNTER
3rd attempt to reach pt, called her personal line and VM box was full also called [place] and left VM on the nurses line for pt to call us back at [phone].    Hemalatha Soliman RN on 3/6/2025 at 8:46 AM

## 2025-03-06 NOTE — PROGRESS NOTES
[facility] Dermatology: Nail Avulsion with lateral Matrix destruction  Procedure Note    Dermatology Problem List:  1. Nail dystrophy, R thumb, s/p nail avulsion and ablation 10/23/23        - residual nail plate spicules in each lateral matrix, Repeat Matrix Ablation 3/6/25    - Will start Alpha Hydroxy Acid containing lotion for Asteatotic Eczema  ____________________________________________________________________________    Subjective: Letty Escobar is a 69 year old female who presents today for a follow up.  Pt was last seen on 10/23/23 for nail avulsion and ablation. Today, he reports that it has come back on the same spot as last time and that she is interested in getting rid of it again. She states that it is bothersome because it catches on things.      Objective: An exam of the right hand was performed today   - bilateral growth on the nail plate that is dystrophic and yellow rising from the nail matrix     Procedure Date: March 6, 2025    Pre-operative Diagnosis: Onychodystrophy     Post-operative Diagnosis: Onychodystrophy     Surgeon:     Fellow Surgeon:     Assistants: Staci Vizcaino CMA    Anesthesia: Lidocaine 1% with epi 1:100,000, Both digital block and wing block were performed prior to procedure.     Preparation: Hibiclens    Locations: R thumb    Indications:  Sclerotic Nail Dystrophy      Description of Operation/Procedure:    The nature and purpose of the procedure, associated risks (bleeding, infection, pain, recovery time, ischemia, scar, recurrence, non diagnostic sampling, recurrence permanent nail bed damage, permanent nail plate damage), and that after healing, a second procedure or revision may be recommended in order to obtain the best cosmetic or functional result. Verbal and signed consent were obtained.     The patient was brought to the surgical suite and placed in aseated position. The surgical site was sterilely prepped and draped in the  usual fashion. The right thumb was infiltrated with 1% lidocaine with epinephrine injected around the nail margin(s) and into the nail bed.    Complete nail avulsion and total matrix destruction: The nail was undermined with freer elevator and avulsed from proximal and lateral nailfolds, and removed in entirety using forceps. A Phenol swab was used to apply phenol solution to the residual areas of matrix producing nail plate. 1st cycle 1 minute, 2nd cycle 30 seconds.  Electrocoagulation was performed and the base of the wound for hemostasis. A sterile pressure dressing was placed.      The surgical site was dressed with Vasline ointment, Adaptic, gauze, and an elastic pressure dressing. The patient was given both verbal and written instructions on wound care.  The patient was discharged from the Dermatology Clinic in good condition.      Complications: The patient tolerated the procedure well and no complications were noted.     Estimated Blood Loss: Minimal    Plan: Discharge instructions were provided.    Staff Involved:   Scribe/Fellow/Staff     Scribe Disclosure:   ICRISPIN, am serving as a scribe; to document services personally performed by Artuor Davis MD -based on data collection and the provider's statements to me.    Provider Disclosure:   The documentation recorded by the scribe accurately reflects the services I personally performed and the decisions made by me. I personally performed the procedures today.    Arturo Davis DO    Department of Dermatology  [facility]: Phone: [phone], Fax:[phone]  [facility]: Phone: [phone], Fax: [phone]

## 2025-03-06 NOTE — LETTER
3/6/2025      Letty Escobar  [address]  [facility] [address]      Dear Colleague,    Thank you for referring your patient, Letty Escobar, to the [facility]. Please see a copy of my visit note below.    [facility] Dermatology: Nail Avulsion with lateral Matrix destruction  Procedure Note    Dermatology Problem List:  1. Nail dystrophy, R thumb, s/p nail avulsion and ablation 10/23/23        - residual nail plate spicules in each lateral matrix, Repeat Matrix Ablation 3/6/25    - Will start Alpha Hydroxy Acid containing lotion for Asteatotic Eczema  ____________________________________________________________________________    Subjective: Letty Escobar is a 69 year old female who presents today for a follow up.  Pt was last seen on 10/23/23 for nail avulsion and ablation. Today, he reports that it has come back on the same spot as last time and that she is interested in getting rid of it again. She states that it is bothersome because it catches on things.      Objective: An exam of the right hand was performed today   - bilateral growth on the nail plate that is dystrophic and yellow rising from the nail matrix     Procedure Date: March 6, 2025    Pre-operative Diagnosis: Onychodystrophy     Post-operative Diagnosis: Onychodystrophy     Surgeon:     Fellow Surgeon:     Assistants: Staci Vizcaino CMA    Anesthesia: Lidocaine 1% with epi 1:100,000, Both digital block and wing block were performed prior to procedure.     Preparation: Hibiclens    Locations: R thumb    Indications:  Sclerotic Nail Dystrophy      Description of Operation/Procedure:    The nature and purpose of the procedure, associated risks (bleeding, infection, pain, recovery time, ischemia, scar, recurrence, non diagnostic sampling, recurrence permanent nail bed damage, permanent nail plate damage), and that after healing, a second procedure or revision may be  recommended in order to obtain the best cosmetic or functional result. Verbal and signed consent were obtained.     The patient was brought to the surgical suite and placed in aseated position. The surgical site was sterilely prepped and draped in the usual fashion. The right thumb was infiltrated with 1% lidocaine with epinephrine injected around the nail margin(s) and into the nail bed.    Complete nail avulsion and total matrix destruction: The nail was undermined with freer elevator and avulsed from proximal and lateral nailfolds, and removed in entirety using forceps. A Phenol swab was used to apply phenol solution to the residual areas of matrix producing nail plate. 1st cycle 1 minute, 2nd cycle 30 seconds.  Electrocoagulation was performed and the base of the wound for hemostasis. A sterile pressure dressing was placed.      The surgical site was dressed with Vasline ointment, Adaptic, gauze, and an elastic pressure dressing. The patient was given both verbal and written instructions on wound care.  The patient was discharged from the Dermatology Clinic in good condition.      Complications: The patient tolerated the procedure well and no complications were noted.     Estimated Blood Loss: Minimal    Plan: Discharge instructions were provided.    Staff Involved:   Scribe/Fellow/Staff     Scribe Disclosure:   CRISPIN BLANC, am serving as a scribe; to document services personally performed by Arturo Davis MD -based on data collection and the provider's statements to me.    Provider Disclosure:   The documentation recorded by the scribe accurately reflects the services I personally performed and the decisions made by me. I personally performed the procedures today.    Arturo Davis DO    Department of Dermatology  [facility]: Phone: [phone], Fax:[phone]  [facility] [place]: Phone:  [phone], Fax: [phone]      Again, thank you for allowing me to participate in the care of your patient.        Sincerely,        Arturo Davis MD    Electronically signed

## 2025-03-14 ENCOUNTER — APPOINTMENT (OUTPATIENT)
Dept: GENERAL RADIOLOGY | Facility: CLINIC | Age: 70
End: 2025-03-14
Attending: STUDENT IN AN ORGANIZED HEALTH CARE EDUCATION/TRAINING PROGRAM
Payer: COMMERCIAL

## 2025-03-14 ENCOUNTER — HOSPITAL ENCOUNTER (EMERGENCY)
Facility: CLINIC | Age: 70
Discharge: HOME | End: 2025-03-14
Attending: STUDENT IN AN ORGANIZED HEALTH CARE EDUCATION/TRAINING PROGRAM | Admitting: STUDENT IN AN ORGANIZED HEALTH CARE EDUCATION/TRAINING PROGRAM
Payer: COMMERCIAL

## 2025-03-14 VITALS
HEART RATE: 59 BPM | DIASTOLIC BLOOD PRESSURE: 47 MMHG | WEIGHT: 194 LBS | SYSTOLIC BLOOD PRESSURE: 101 MMHG | RESPIRATION RATE: 17 BRPM | BODY MASS INDEX: 35.48 KG/M2 | OXYGEN SATURATION: 100 %

## 2025-03-14 DIAGNOSIS — I20.0 UNSTABLE ANGINA (H): ICD-10-CM

## 2025-03-14 LAB
ANION GAP SERPL CALCULATED.3IONS-SCNC: 12 MMOL/L (ref 7–15)
ATRIAL RATE - MUSE: 73 BPM
BASOPHILS # BLD AUTO: 0 10E3/UL (ref 0–0.2)
BASOPHILS NFR BLD AUTO: 0 %
BUN SERPL-MCNC: 30.7 MG/DL (ref 8–23)
CALCIUM SERPL-MCNC: 9.6 MG/DL (ref 8.8–10.4)
CHLORIDE SERPL-SCNC: 99 MMOL/L (ref 98–107)
CREAT SERPL-MCNC: 1.26 MG/DL (ref 0.51–0.95)
DIASTOLIC BLOOD PRESSURE - MUSE: NORMAL MMHG
EGFRCR SERPLBLD CKD-EPI 2021: 45 ML/MIN/1.73M2
EOSINOPHIL # BLD AUTO: 0.2 10E3/UL (ref 0–0.7)
EOSINOPHIL NFR BLD AUTO: 2 %
ERYTHROCYTE [DISTWIDTH] IN BLOOD BY AUTOMATED COUNT: 15.7 % (ref 10–15)
GLUCOSE SERPL-MCNC: 138 MG/DL (ref 70–99)
HCO3 SERPL-SCNC: 23 MMOL/L (ref 22–29)
HCT VFR BLD AUTO: 29.1 % (ref 35–47)
HGB BLD-MCNC: 9.2 G/DL (ref 11.7–15.7)
HOLD SPECIMEN: NORMAL
HOLD SPECIMEN: NORMAL
IMM GRANULOCYTES # BLD: 0.1 10E3/UL
IMM GRANULOCYTES NFR BLD: 1 %
INTERPRETATION ECG - MUSE: NORMAL
LYMPHOCYTES # BLD AUTO: 2.5 10E3/UL (ref 0.8–5.3)
LYMPHOCYTES NFR BLD AUTO: 27 %
MCH RBC QN AUTO: 29.4 PG (ref 26.5–33)
MCHC RBC AUTO-ENTMCNC: 31.6 G/DL (ref 31.5–36.5)
MCV RBC AUTO: 93 FL (ref 78–100)
MONOCYTES # BLD AUTO: 0.6 10E3/UL (ref 0–1.3)
MONOCYTES NFR BLD AUTO: 6 %
NEUTROPHILS # BLD AUTO: 6.1 10E3/UL (ref 1.6–8.3)
NEUTROPHILS NFR BLD AUTO: 64 %
NRBC # BLD AUTO: 0 10E3/UL
NRBC BLD AUTO-RTO: 0 /100
P AXIS - MUSE: NORMAL DEGREES
PLATELET # BLD AUTO: 175 10E3/UL (ref 150–450)
POTASSIUM SERPL-SCNC: 4 MMOL/L (ref 3.4–5.3)
PR INTERVAL - MUSE: 168 MS
QRS DURATION - MUSE: 192 MS
QT - MUSE: 478 MS
QTC - MUSE: 526 MS
R AXIS - MUSE: 151 DEGREES
RBC # BLD AUTO: 3.13 10E6/UL (ref 3.8–5.2)
SODIUM SERPL-SCNC: 134 MMOL/L (ref 135–145)
SYSTOLIC BLOOD PRESSURE - MUSE: NORMAL MMHG
T AXIS - MUSE: 117 DEGREES
TROPONIN T SERPL HS-MCNC: 33 NG/L
TROPONIN T SERPL HS-MCNC: 33 NG/L
VENTRICULAR RATE- MUSE: 73 BPM
WBC # BLD AUTO: 9.5 10E3/UL (ref 4–11)

## 2025-03-14 PROCEDURE — 250N000011 HC RX IP 250 OP 636: Performed by: STUDENT IN AN ORGANIZED HEALTH CARE EDUCATION/TRAINING PROGRAM

## 2025-03-14 PROCEDURE — 93005 ELECTROCARDIOGRAM TRACING: CPT | Performed by: STUDENT IN AN ORGANIZED HEALTH CARE EDUCATION/TRAINING PROGRAM

## 2025-03-14 PROCEDURE — 96361 HYDRATE IV INFUSION ADD-ON: CPT | Performed by: STUDENT IN AN ORGANIZED HEALTH CARE EDUCATION/TRAINING PROGRAM

## 2025-03-14 PROCEDURE — 71045 X-RAY EXAM CHEST 1 VIEW: CPT

## 2025-03-14 PROCEDURE — 93010 ELECTROCARDIOGRAM REPORT: CPT | Performed by: STUDENT IN AN ORGANIZED HEALTH CARE EDUCATION/TRAINING PROGRAM

## 2025-03-14 PROCEDURE — 258N000003 HC RX IP 258 OP 636: Performed by: STUDENT IN AN ORGANIZED HEALTH CARE EDUCATION/TRAINING PROGRAM

## 2025-03-14 PROCEDURE — 85025 COMPLETE CBC W/AUTO DIFF WBC: CPT | Performed by: STUDENT IN AN ORGANIZED HEALTH CARE EDUCATION/TRAINING PROGRAM

## 2025-03-14 PROCEDURE — 99285 EMERGENCY DEPT VISIT HI MDM: CPT | Performed by: STUDENT IN AN ORGANIZED HEALTH CARE EDUCATION/TRAINING PROGRAM

## 2025-03-14 PROCEDURE — 84484 ASSAY OF TROPONIN QUANT: CPT | Performed by: STUDENT IN AN ORGANIZED HEALTH CARE EDUCATION/TRAINING PROGRAM

## 2025-03-14 PROCEDURE — 36415 COLL VENOUS BLD VENIPUNCTURE: CPT | Performed by: STUDENT IN AN ORGANIZED HEALTH CARE EDUCATION/TRAINING PROGRAM

## 2025-03-14 PROCEDURE — 96374 THER/PROPH/DIAG INJ IV PUSH: CPT | Performed by: STUDENT IN AN ORGANIZED HEALTH CARE EDUCATION/TRAINING PROGRAM

## 2025-03-14 PROCEDURE — 99285 EMERGENCY DEPT VISIT HI MDM: CPT | Mod: 25 | Performed by: STUDENT IN AN ORGANIZED HEALTH CARE EDUCATION/TRAINING PROGRAM

## 2025-03-14 PROCEDURE — 80048 BASIC METABOLIC PNL TOTAL CA: CPT | Performed by: STUDENT IN AN ORGANIZED HEALTH CARE EDUCATION/TRAINING PROGRAM

## 2025-03-14 RX ORDER — ONDANSETRON 2 MG/ML
4 INJECTION INTRAMUSCULAR; INTRAVENOUS ONCE
Status: COMPLETED | OUTPATIENT
Start: 2025-03-14 | End: 2025-03-14

## 2025-03-14 RX ADMIN — ONDANSETRON 4 MG: 2 INJECTION INTRAMUSCULAR; INTRAVENOUS at 02:27

## 2025-03-14 RX ADMIN — SODIUM CHLORIDE 500 ML: 9 INJECTION, SOLUTION INTRAVENOUS at 02:57

## 2025-03-14 ASSESSMENT — ACTIVITIES OF DAILY LIVING (ADL)
ADLS_ACUITY_SCORE: 59

## 2025-03-14 NOTE — DISCHARGE INSTRUCTIONS
He was seen today for chest pain.  Your heart enzymes were reassuring.  EKG was reassuring.  Lab work was otherwise unremarkable and your vitals were stable.  We provided you with a short course of fluids.  It seems that you likely continue to have your chronic angina which seems to be not interventional.  Continue to treat this medically as discussed with cardiology during her last hospitalization.

## 2025-03-14 NOTE — ED NOTES
Bed: ED10  Expected date:   Expected time:   Means of arrival:   Comments:  [facility]  70 y/o F  Chest Pain

## 2025-03-14 NOTE — ED TRIAGE NOTES
Pt arrives via EMS from [facility] with c/o chest pain and left arm pain that started last night. Last NTG at 0014 hrs. Additional report of nausea.    Initial EKG for EMS showed v paced rhythm, second EKG atrial tachycardia with rate in the 110s    99/60 BP

## 2025-03-14 NOTE — ED PROVIDER NOTES
History     Chief Complaint   Patient presents with    Chest Pain     HPI  Letty Escobar is a 69 year old female who presenting with chest pain.  She is from [facility].  She has a very complex medical history includingatrial fibrillation on Eliquis, pacemaker, coronary artery disease with CABG x 5, ELI, hypertension, hyperlipidemia, COPD, morbid obesity, hypothyroidism, type 2 diabetes, CKD amongst recurrent ER evaluations for chest pain as well as anxiety and recurrent UTI related to sepsis.  She has not any new changes in her bladder habits and his complaints about chest pain.  This started sometime around midnight was provided with a dose of nitro unfortunately this did decrease her blood pressures shortly after being provided as patient was initially low to begin with..  She notes.  Most recently returned clavicular dual-lumen arm with associated mild nausea.  She has a history of chronic angina.  She was seen at [facility] cardiology on 2/9 for admission secondary to bacteremia/UTI associated sepsis.  At that location she was diagnosed with severe coronary artery disease with status post CABG and numerous stents as well as ischemic cardiomyopathy and type II ischemia versus NSTEMI however after cardiology assessment at this point patient is not a candidate for intervention and plan was to manage symptomatic control of the angina.  Patient is also not wanting to pursue intervention.  There has not been any noted recent cough cold congestion fevers or respiratory concerns.  Denies any recent trauma or injury.     Review of POLST shows that patient is DNR/DNI.     Allergies:  Allergies   Allergen Reactions    Bee Venom Anaphylaxis    Diphenhydramine Palpitations     Tolerated IV Benadryl when not pushed too fast    Isosorbide Nitrate Other (See Comments) and Dizziness     Also causes syncope (has fallen before) and brain fog/mental disturbances - please do not prescribe    Nitroglycerin Dizziness,  Fatigue and Other (See Comments)     Specifically the patch - please do not prescribe  Specifically the patch - please do not prescribe      Contrast Dye Hives and Itching     Tolerated CT with IV contrast 5/9/2024 with 50 mg IV Benadryl prior    Adhesive Tape Rash    Liquid Adhesive Itching    Nystatin Dermatitis     Also blisters    Penicillins Swelling and Rash     Occurred as a child - not 100% sure on specific reactions    Sulfa Antibiotics Other (See Comments)     Occurred as a child / patient does not remember specific reaction       Problem List:    Patient Active Problem List    Diagnosis Date Noted    Acute respiratory failure with hypoxia and hypercapnia (H) 04/27/2024     Priority: Medium    COPD with acute exacerbation (H) 04/27/2024     Priority: Medium    Bilateral pneumonia 04/27/2024     Priority: Medium    Lethargy 11/10/2023     Priority: Medium    Urinary tract infection without hematuria, site unspecified 11/10/2023     Priority: Medium    Fever 11/10/2023     Priority: Medium    Hypoxia 11/10/2023     Priority: Medium    Pyuria 11/10/2023     Priority: Medium    PAD (peripheral artery disease) 11/10/2023     Priority: Medium    Left foot pain 11/10/2023     Priority: Medium    History of stroke 11/10/2023     Priority: Medium    Lives in long-term care facility 11/10/2023     Priority: Medium    Obesity hypoventilation syndrome (H) 11/10/2023     Priority: Medium    Benzodiazepine dependence (H) 11/10/2023     Priority: Medium    Thrombocytopenia 11/10/2023     Priority: Medium    ERIC (acute kidney injury) 11/10/2023     Priority: Medium    Chronic kidney disease, stage 3a (H) 10/16/2022     Priority: Medium    SOB (shortness of breath) 04/07/2022     Priority: Medium    Type 2 diabetes mellitus with hyperglycemia, with long-term current use of insulin (H) 02/13/2022     Priority: Medium    Morbid obesity (H) 12/10/2021     Priority: Medium    ICD (implantable cardioverter-defibrillator) in  place 11/17/2021     Priority: Medium     Formatting of this note is different from the original.  Date of last device in office evaluation: 5/17/2022    ?  and model: Medtronic Cobalt CRT-D.   Date of implant: 5/7/2021  Tachy therapies remain OFF: S/p downgrade from ICD to pacemaker, but her implanted system includes a DF-4 lead, so an ICD generator was placed with the tachycardia therapies disabled.     ? Indication for device: Ischemic cardiomyopathy   ? Cardiac resynchronization therapy:   no     MRI Conditional:  No  o If No:  Reason why:  Abandoned LV lead    ? Battery longevity documented as less than 3  Months: No  ? Are any of the leads less than 3 months old:  No    ? Programming              ? Pacing mode and programmed lower rate: DDDR 60 - 130 bpm              ? Rate-responsive sensor type, if programmed on: Accelerometer        Underlying rhythm and heart rate:  SR @ 60 bpm    ? What is the response of this device to magnet placement:  None - tachy therapies off  ? PM magnet pacing rate: N/A   ? Any alert status on CIED generator or lead: No    ? Last pacing threshold    Atrial   1.0 V @ 0.4 ms   Ventricular RV: 0.75 V @ 0.4 ms  LV:  2.75 V @ 1.0 ms    Formatting of this note is different from the original.  Date of last device in office evaluation: 11/17/2022     ?  and model: Medtronic Cobalt CRT-D.   Date of implant: 5/7/2021  Tachy therapies remain OFF: S/p downgrade from ICD to pacemaker, but her implanted system includes a DF-4 lead, so an ICD generator was placed with the tachycardia therapies disabled.     ? Indication for device: Ischemic cardiomyopathy   ? Cardiac resynchronization therapy:   no     MRI Conditional:  No  o If No:  Reason why:  Abandoned LV lead    ? Battery longevity documented as less than 3  Months: No  ? Are any of the leads less than 3 months old:  No    ? Programming              ? Pacing mode and programmed lower rate: DDDR 60 - 130 bpm               ? Rate-responsive sensor type, if programmed on: Accelerometer        Underlying rhythm and heart rate:  Sinus rhythm 62 bpm, w/BBB    ? What is the response of this device to magnet placement:  None - tachy therapies off  ? PM magnet pacing rate: N/A   ? Any alert status on CIED generator or lead: No    ? Last pacing threshold    Atrial   1.0 V @ 0.4 ms   Ventricular RV: 0.75 V @ 0.4 ms  LV:  2.75 V @ 1.0 ms      Atrial fibrillation (H) 04/06/2021     Priority: Medium    Pacemaker 04/06/2021     Priority: Medium    Major depressive disorder, recurrent severe without psychotic features (H) 01/26/2021     Priority: Medium    Panic disorder with agoraphobia 04/14/2020     Priority: Medium    Constipation 03/20/2020     Priority: Medium    Personality disorder (H) 03/05/2020     Priority: Medium     Rule out dependent personality    Formatting of this note might be different from the original.  Rule out dependent personality  Formatting of this note might be different from the original.  Rule out dependent personality      Candidiasis 03/01/2020     Priority: Medium    Posttraumatic stress disorder 03/01/2020     Priority: Medium    COPD without exacerbation (H) 01/29/2020     Priority: Medium    Long term (current) use of insulin (H) 01/29/2020     Priority: Medium    Chronic systolic heart failure (H) 01/28/2020     Priority: Medium    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, left leg (H) 01/15/2020     Priority: Medium    Chest pain 11/11/2019     Priority: Medium    Polymyalgia rheumatica 11/28/2018     Priority: Medium    Unstable angina (H) 05/02/2018     Priority: Medium     Coronary angiogram 05/02/2018 demonstrated 30% stenosis in the LMCA, 50% stenosis in the Proximal LAD, 50% stenosis in the Mid LAD, 95% stenosis in the Proximal Circumflex (Restenosis - Balloon Angioplasty), 100% stenosis in the 1st Marginal, 50% stenosis in the Proximal RCA, 50% stenosis in  the Distal RCA, the LIMA graft from the LIMA to the Distal LAD is free of significant disease; the SVG graft from the Aorta to the 1st Marginal is free of significant disease; the SVG graft from the Aorta to the Distal RCA is free of significant disease. Intervention included a successful  2.5mm x 12mm Balloon,  2.5mm x 10mm Cutting Balloon,  3mm x 12mm Drug Eluting Stent,  3mm x 12mm Balloon, and  3mm x 8mm Balloon to Proximal Circumflex, post stenosis 0%.    Formatting of this note is different from the original.  Coronary angiogram 05/02/2018 demonstrated 30% stenosis in the LMCA, 50% stenosis in the Proximal LAD, 50% stenosis in the Mid LAD, 95% stenosis in the Proximal Circumflex (Restenosis - Balloon Angioplasty), 100% stenosis in the 1st Marginal, 50% stenosis in the Proximal RCA, 50% stenosis in the Distal RCA, the LIMA graft from the LIMA to the Distal LAD is free of significant disease; the SVG graft from the Aorta to the 1st Marginal is free of significant disease; the SVG graft from the Aorta to the Distal RCA is free of significant disease. Intervention included a successful  2.5mm x 12mm Balloon,  2.5mm x 10mm Cutting Balloon,  3mm x 12mm Drug Eluting Stent,  3mm x 12mm Balloon, and  3mm x 8mm Balloon to Proximal Circumflex, post stenosis 0%.  Formatting of this note is different from the original.  Coronary angiogram 05/02/2018 demonstrated 30% stenosis in the LMCA, 50% stenosis in the Proximal LAD, 50% stenosis in the Mid LAD, 95% stenosis in the Proximal Circumflex (Restenosis - Balloon Angioplasty), 100% stenosis in the 1st Marginal, 50% stenosis in the Proximal RCA, 50% stenosis in the Distal RCA, the LIMA graft from the LIMA to the Distal LAD is free of significant disease; the SVG graft from the Aorta to the 1st Marginal is free of significant disease; the SVG graft from the Aorta to the Distal RCA is free of significant disease. Intervention included a successful  2.5mm x 12mm Balloon,  2.5mm x  10mm Cutting Balloon,  3mm x 12mm Drug Eluting Stent,  3mm x 12mm Balloon, and  3mm x 8mm Balloon to Proximal Circumflex, post stenosis 0%.      Vitamin B12 deficiency 02/14/2018     Priority: Medium    Chronic bilateral low back pain without sciatica 01/17/2018     Priority: Medium    Chronic pain disorder 10/01/2017     Priority: Medium    Urinary incontinence, mixed 09/24/2017     Priority: Medium    Internal carotid artery stenosis, bilateral 07/13/2016     Priority: Medium     Carotid US 05/04/2018 showed moderate plaque formation, consistent with 50 to 69% stenosis in the right internal carotid artery, not significantly changed from 8/5/2015.  Moderate plaque formation, consistent with 50 to 69% stenosis in the left internal carotid artery; there has been mild progression of the left ICA stenosis since 8/5/2015.    Formatting of this note might be different from the original.  Carotid US 05/04/2018 showed moderate plaque formation, consistent with 50 to 69% stenosis in the right internal carotid artery, not significantly changed from 8/5/2015.  Moderate plaque formation, consistent with 50 to 69% stenosis in the left internal carotid artery; there has been mild progression of the left ICA stenosis since 8/5/2015.    Formatting of this note might be different from the original.  Carotid US 05/04/2018 showed moderate plaque formation, consistent with 50 to 69% stenosis in the right internal carotid artery, not significantly changed from 8/5/2015.  Moderate plaque formation, consistent with 50 to 69% stenosis in the left internal carotid artery; there has been mild progression of the left ICA stenosis since 8/5/2015.      Essential (primary) hypertension 10/14/2015     Priority: Medium    Ischemic cardiomyopathy 09/20/2015     Priority: Medium     EF of 40-45%, status post RV lead revision and LV epicardial lead placement via mini-thoracotomy in August 2016.    Formatting of this note might be different from the  original.  EF of 40-45%, status post RV lead revision and LV epicardial lead placement via mini-thoracotomy in August 2016.  Formatting of this note might be different from the original.  EF of 40-45%, status post RV lead revision and LV epicardial lead placement via mini-thoracotomy in August 2016.      S/P CABG x 5 08/21/2015     Priority: Medium    Pain medication agreement 04/20/2013     Priority: Medium     Controlled substance agreement for percocet #30/month on file and signed 4/17/13.  Designated pharmacy: [facility] Prescribing physician: Andrea Diagnosis: Ulnar neuropathy    Formatting of this note might be different from the original.  Controlled substance agreement for percocet #30/month on file and signed 4/17/13.  Designated pharmacy: [facility] Prescribing physician: Andrea Diagnosis: Ulnar neuropathy      Anemia in other chronic diseases classified elsewhere 01/05/2013     Priority: Medium    Hypothyroidism, unspecified 12/10/2010     Priority: Medium    ACP (advance care planning) 11/03/2010     Priority: Medium     Formatting of this note might be different from the original.  Patient has identified Health Care Agent(s): Yes  Add Health Care Agents: Yes    Health Care Agent(s):    Primary Health Care Agent:     Edwar Chappellvedaalec Relationship:    Spouse Phone:     [phone]    Secondary Health Care Agent:     Chiki Oro Relationship:     Son Phone:     [phone]      Patient has Advance Care Plan Documents (Health Care Directive, POLST): Yes    Advance Care Plan Documents:  Health Care Directive--03/28/11  Resuscitation Guidelines--    Patient has identified Specific Treatment Preferences: Yes   Specific Treatment Preferences:   a.) Code Status:  DNR/ Do Not Attempt Resuscitation - Allow a Natural Death    b.) Goals of Treatment:     ii. Limited Interventions and treat reversible conditions.  Provide interventions aimed at treatment of new or reversible illness/injury or non-life threatening  "chronic conditions. Duration of invasive or uncomfortable interventions should generally be limited.- Trial of intubation 5 days or other instructions \"If no improvement, stop.\"  c.) Interventions and Treatments:   i.   Antibiotics:          - Aggressive antibiotics  ii.  Nutrition/Hydration:         - Offer food and liquids by mouth         - IV fluid administration         - No feeding tube under any circumstance.  iii. Transfusion:          - Blood products for comfort/relief of symptoms only  iv. Dialysis:           - Dialysis for short term \"for recovery, but not long term.\"        Last Assessment & Plan:   Advance Care Planning: Disease-specific Session    Letty Escobar is an [facility] patient of Dr. Renea Mendez of the [facility].    Advance care planning discussions were completed with Letty and her healthcare agent, spouse Edwar Escobar on Monday, March 28, 2011 at the [facility] Room.    Understanding of Illness and Disease Benavides:   Letty identifies her medical condition as diabetes with peripheral neuropathy, heart problems with a heart attack February 2009 plus 4 stent placements; sleep apnea; depression; hypothyroid; high cholesterol; tobacco use; and describes it as needing further assistance with thyroid and diabetes management.  She identifies the following symptoms of her medical condition as being the most bothersome: some memory loss, balance problems, light headedness and dizziness, puffy eyes and lower extremity edema from time to time.    Let[place] is retired [place] .  She is independent with all cares and lives an active life style although, \"I'm not as active as I used to be.\"    Goals of Care:   Letty currently hopes to maintain independence, control pain and symptoms and delay progression of, but not cure, the illness.  \"I want to get the diabetes and thyroid under better control.\"  Letty has an " "appointment the first part of April for follow up.    Quality of Life:    Letty identifies quality of life as family involvement twice weekly, Nondenominational activities, newly assigned  , playing cards, the computer,    Letty christiano with serious challenges in her life through her ganesh in God, prayers and support of her Nondenominational family, spouse and son.    Letty identifies the following fears and worries about her medical care:  \"I just want the diabetes straightened out.\"    Treatment and Care Preferences:   Past experiences in dealing with family and/or friends that have  or been seriously ill include her brother who took his own life.  \"He was 53 years old and living with us.  I found him.\"   As a result of these experiences, Letty expresses these health care preferences:      Summary Letty's Treatment Preferences:  LOW SURVIVAL; HIGH TREATMENT BURDEN:  If Letty suffered a serious complication, such that she was facing a prolonged hospital stay, required ongoing medical interventions, and the chance of living through the complication was low (for example, only 5 out of 100 would live), Letty would choose:  to focus treatment on comfort and quality of life (\"Quality of life is more important than length of life to Letty.\")  COMMENT:  \"If I can't live a normal life, I wouldn't want to live.\"    HIGH SURVIVAL; LOW FUNCTIONAL STATUS:  If Letty had a serious complication and had a good chance of living through the complication but it was expected that she would never be able to walk or talk again and would require 24 hour nursing care, she would choose:  to focus treatment on comfort and quality of life (\"Quality of life is more important than length of life to Letty.\")  COMMENT:  \"I'd accept rehabilitation.\"    HIGH SURVIVAL; LOW COGNITIVE STATUS:  If Letty had a serious complication and had a good chance of living through the complication but it was expected that she would never know who she was " "or who she was with and would require 24 hour nursing care, she would choose:  to focus treatment on comfort and quality of life (\"Quality of life is more important than length of life to Letty.\")    CARDIO-PULMONARY RESUSCITATION (CPR):  The facts, risks and benefits of CPR were discussed with Letty.  If she had a sudden event that caused her heart and breathing to stop, she:  WOULD NOT want CPR attempted and instead would prefer that a natural death occur.    MECHANICAL VENTILATION: If Letty had an episode where she was unable to breathe on her own, she would choose the following:  attempt to use any appropriate non-invasive method to assist breathing, and use mechanical ventilation.  COMMENT:  \"If reversible ventilator is acceptable for 5 days.  If no improvement, stop.\"     Letty has chosen her healthcare agent to:  strictly follow her wishes.    Follow Up Plan:   Letty was encouraged to continue advance care planning discussions with her health care agents and primary care provider.   Letty and her health care agent declined handouts.     Documents addressed during this advance care planning session:  1.  Health Care Agents identified.          .  Primary health care agent is spouse, Edwar Escobar;          .  Secondary health care agent is son, Jermaine Oro.  2.  Health Care Directive completed and scanned into medical record.  3.  Statement of Treatment Preferences for advanced illness completed and scanned into the medical record.  4.  Resuscitation Guidelines completed for DNR.  Document sent to Dr. Renea Mendez for signature and returned to the home. eLtty, please place on the refrigerator.    Recommendations/Plan:   Letty and her health care agent to review Advance Care Plan.     Letty would benefit from:   Care Navigation/Care Navigation Help Desk--explained services for pending future needs.  No identified needs today.  Care Navigation brochure was given to Letty and her healthcare " agent.    Advance Care Planning recommendations and Letty's concerns and questions were cc ed to her primary provider.     Thank you, Letty for the opportunity of assisting with Advance Care Planning. It was a seeing you again and meeting Edwar.  Please contact me if I can be of further assistance.    Interviewer: Pat Martin RN    Advance Care Planning Facilitator  [phone]   3/28/2011      ELI (obstructive sleep apnea) 01/31/2010     Priority: Medium     PSG on April/2008 that showed moderate Obstructive Sleep Apnea with an AHI of 23.5 . Limb movements persisted at 29 movements/hour at optimal pressures, despite carbidopa use.    Formatting of this note might be different from the original.  PSG on April/2008 that showed moderate Obstructive Sleep Apnea with an AHI of 23.5 . Limb movements persisted at 29 movements/hour at optimal pressures, despite carbidopa use.  Formatting of this note might be different from the original.  PSG on April/2008 that showed moderate Obstructive Sleep Apnea with an AHI of 23.5 . Limb movements persisted at 29 movements/hour at optimal pressures, despite carbidopa use.      Coronary atherosclerosis 02/06/2009     Priority: Medium     Formatting of this note might be different from the original.  2/5/2009 - MI - Proximal RCA 99%, mild-mod disease elsewhere.  EF 60%.  PCI:  MYRON to pRCA.  2/12/2009 - admit CP - Widely patent RCA stent. Moderate diffuse CAD. Severe stenosis in trivial PDA branch. LVEF 45%.  1/25/2010 - admit CP - PTCA and stent of diagonal  9/8/2010 - admit CP - LAD patent stent. Moderate diffuse CAD. Medical management recommended.   4/25/2012 - NSTEMI - Acute total occlusion of the ostial Circumflex. Acute total occlusion of the ostial ramus. Acute total occlusion of the distal 1st Obtuse Marginal. Angioplasty of ostial circumflex and ostial ramus. There was distal embolization to the OM1 vessel. This vessel was small and not amendable to intervention.  Indefinite: plavix and asa 81.  8/10/2015 - CABx5 - 1. LIMA to distal LAD, reverse SVG to OM1 and OM2, reverse SVG to diagonal, reverse SVG to PDA.   2. Mitral valve repair with a Medtronics 3-D full ring, 28 mm.   3. Tricuspid repair with a Medtronic 28 mm partial ring  1/12/2016 - TTE - EF 40-45%  Formatting of this note might be different from the original.  2/5/2009 - MI - Proximal RCA 99%, mild-mod disease elsewhere.  EF 60%.  PCI:  MYRON to pRCA.  2/12/2009 - admit CP - Widely patent RCA stent. Moderate diffuse CAD. Severe stenosis in trivial PDA branch. LVEF 45%.  1/25/2010 - admit CP - PTCA and stent of diagonal  9/8/2010 - admit CP - LAD patent stent. Moderate diffuse CAD. Medical management recommended.   4/25/2012 - NSTEMI - Acute total occlusion of the ostial Circumflex. Acute total occlusion of the ostial ramus. Acute total occlusion of the distal 1st Obtuse Marginal. Angioplasty of ostial circumflex and ostial ramus. There was distal embolization to the OM1 vessel. This vessel was small and not amendable to intervention. Indefinite: plavix and asa 81.  8/10/2015 - CABx5 - 1. LIMA to distal LAD, reverse SVG to OM1 and OM2, reverse SVG to diagonal, reverse SVG to PDA.   2. Mitral valve repair with a Medtronics 3-D full ring, 28 mm.   3. Tricuspid repair with a Medtronic 28 mm partial ring  1/12/2016 - TTE - EF 40-45%      Restless legs syndrome 08/29/2007     Priority: Medium    Hyperlipidemia, unspecified 05/30/2007     Priority: Medium        Past Medical History:    Past Medical History:   Diagnosis Date    ACP (advance care planning) 11/3/2010    Acute coronary syndrome (H) 11/22/2019    Acute exacerbation of CHF (congestive heart failure) (H) 11/11/2019    Acute on chronic systolic (congestive) heart failure (H) 1/28/2020    Anemia of chronic disease 1/5/2013    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, left leg (H) 1/15/2020    BPPV (benign paroxysmal  positional vertigo) 5/31/2018    Candidiasis 3/1/2020    Carotid stenosis, right 7/13/2016    Chest pain 11/11/2019    Chronic bilateral low back pain without sciatica 1/17/2018    Chronic pain disorder 10/1/2017    Constipation 3/20/2020    COPD (chronic obstructive pulmonary disease) (H) 6/24/2020    Coronary atherosclerosis 2/6/2009    Cubital tunnel syndrome on left 11/13/2012    Depressive disorder     Diabetes mellitus (H)     Diabetes mellitus type 2 in obese 7/13/2006    Diabetes mellitus type 2 in obese 7/13/2006    Drug-seeking behavior 4/4/2020    Failure to thrive in adult 9/3/2020    Hereditary and idiopathic peripheral neuropathy 4/24/2007    Hyperlipidemia with target low density lipoprotein (LDL) cholesterol less than 70 mg/dL 5/30/2007    Hypertension 10/14/2015    Hypothyroidism 12/10/2010    Irritable bowel syndrome 9/7/2015    Ischemic cardiomyopathy 9/20/2015    Long-term use of high-risk medication 4/14/2020    Moniliasis, 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    Myocardial infarction (H)     Neuromuscular disorder (H)     Other specified postprocedural states 10/8/2015    Pain medication agreement 4/20/2013    Panic disorder with agoraphobia 4/14/2020    Paroxysmal atrial fibrillation (H) 10/2/2015    Personality disorder (H) 3/5/2020    Polymyalgia rheumatica 11/28/2018    Posttraumatic stress disorder 3/1/2020    Restless legs syndrome (RLS) 8/29/2007    Restless legs syndrome (RLS) 8/29/2007    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    Serum calcium elevated 3/1/2020    Somatic dysfunction of sacral region 1/17/2018    Subacromial bursitis of left shoulder joint 8/6/2018    Suicidal ideation 4/1/2020    Thyroid disease     TIA (transient ischemic attack) 5/4/2018    TIA (transient ischemic attack) 5/4/2018    Tobacco abuse 2/17/2017    Tobacco abuse 2/17/2017    Urinary incontinence, mixed 9/24/2017    UTI (urinary tract infection) 4/17/2020    Vitamin B12  deficiency 2/14/2018    Weakness 12/17/2019       Past Surgical History:    Past Surgical History:   Procedure Laterality Date    CARDIAC SURGERY      stents x 11    CARDIAC SURGERY      CHOLECYSTECTOMY      CHOLECYSTECTOMY      GENITOURINARY SURGERY      Tubal ligation    OTHER SURGICAL HISTORY      Genitourinary surgery    RELEASE CARPAL TUNNEL      RELEASE CARPAL TUNNEL         Family History:    No family history on file.    Social History:  Marital Status:   [2]  Social History     Tobacco Use    Smoking status: Former     Types: Cigarettes    Smokeless tobacco: Never   Vaping Use    Vaping status: Never Used   Substance Use Topics    Alcohol use: Not Currently    Drug use: Never        Medications:    acetaminophen (TYLENOL) 325 MG tablet  acetaminophen (TYLENOL) 500 MG tablet  albuterol (PROVENTIL) (2.5 MG/3ML) 0.083% neb solution  apixaban ANTICOAGULANT (ELIQUIS) 5 MG tablet  bisacodyl (DULCOLAX) 10 MG suppository  busPIRone (BUSPAR) 10 MG tablet  carbidopa-levodopa (SINEMET)  MG tablet  Continuous Glucose  (FREESTYLE TEDDY 2 READER) ZION  Continuous Glucose Sensor (FREESTYLE TEDDY 2 SENSOR) MISC  cyanocobalamin (CYANOCOBALAMIN) 1000 MCG/ML injection  DULoxetine HCl 40 MG CPEP  empagliflozin (JARDIANCE) 10 MG TABS tablet  estradiol (ESTRACE) 0.1 MG/GM vaginal cream  famotidine (PEPCID) 20 MG tablet  fluticasone-vilanterol (BREO ELLIPTA) 200-25 MCG/INH inhaler  gabapentin (NEURONTIN) 400 MG capsule  hydrocortisone (CORTAID) 1 % external ointment  hydrOXYzine HCl (ATARAX) 25 MG tablet  LANTUS SOLOSTAR 100 UNIT/ML soln  levothyroxine (SYNTHROID/LEVOTHROID) 150 MCG tablet  lidocaine (LMX4) 4 % external cream  LORazepam (ATIVAN) 0.5 MG tablet  losartan (COZAAR) 25 MG tablet  Melatonin 10 MG TABS tablet  metolazone (ZAROXOLYN) 2.5 MG tablet  metoprolol succinate ER (TOPROL-XL) 25 MG 24 hr tablet  mirtazapine (REMERON) 15 MG tablet  morphine (MSIR) 15 MG IR tablet  nitroGLYcerin (NITROSTAT) 0.4  MG sublingual tablet  ondansetron (ZOFRAN ODT) 4 MG ODT tab  pantoprazole (PROTONIX) 40 MG EC tablet  polyethylene glycol (MIRALAX) 17 GM/Dose powder  QUEtiapine (SEROQUEL) 100 MG tablet  ranolazine (RANEXA) 500 MG 12 hr tablet  rosuvastatin (CRESTOR) 10 MG tablet  sennosides (SENOKOT) 8.6 MG tablet  traZODone (DESYREL) 50 MG tablet  traZODone (DESYREL) 50 MG tablet  VAGINAL MOISTURIZER VA          Review of Systems   Cardiovascular:  Positive for chest pain.   All other systems reviewed and are negative.      Physical Exam   BP: 106/63  Pulse: 79  Resp: 18  Weight: 88 kg (194 lb)  SpO2: 98 %      Physical Exam  Vitals and nursing note reviewed.   Constitutional:       General: She is not in acute distress.     Appearance: Normal appearance. She is well-developed. She is obese. She is not diaphoretic.   HENT:      Head: Normocephalic and atraumatic.      Mouth/Throat:      Mouth: Mucous membranes are moist.   Eyes:      General: No scleral icterus.     Conjunctiva/sclera: Conjunctivae normal.   Cardiovascular:      Rate and Rhythm: Normal rate and regular rhythm.      Heart sounds: Normal heart sounds. No murmur heard.  Pulmonary:      Effort: No respiratory distress.      Breath sounds: Normal breath sounds. No wheezing or rales.   Chest:      Chest wall: Tenderness present.   Abdominal:      General: Abdomen is flat. Bowel sounds are normal.      Palpations: Abdomen is soft.   Musculoskeletal:      Cervical back: Neck supple.   Skin:     General: Skin is warm.      Findings: No rash.   Neurological:      General: No focal deficit present.      Mental Status: She is alert and oriented to person, place, and time.   Psychiatric:         Mood and Affect: Mood normal.         Behavior: Behavior normal.         ED Course        Procedures         EKG self interpreted at 1:38 AM.  Ventricular rate 73 bpm with appearance of a 168 with a QRS of 192 and a QTc mildly prolonged at 526 consistent with an AV paced rhythm.  No  significant signs of ST segment elevations.       Results for orders placed or performed during the hospital encounter of 03/14/25 (from the past 24 hours)   CBC with platelets differential    Narrative    The following orders were created for panel order CBC with platelets differential.  Procedure                               Abnormality         Status                     ---------                               -----------         ------                     CBC with platelets and ...[[id number]]  Abnormal            Final result                 Please view results for these tests on the individual orders.   Basic metabolic panel   Result Value Ref Range    Sodium 134 (L) 135 - 145 mmol/L    Potassium 4.0 3.4 - 5.3 mmol/L    Chloride 99 98 - 107 mmol/L    Carbon Dioxide (CO2) 23 22 - 29 mmol/L    Anion Gap 12 7 - 15 mmol/L    Urea Nitrogen 30.7 (H) 8.0 - 23.0 mg/dL    Creatinine 1.26 (H) 0.51 - 0.95 mg/dL    GFR Estimate 45 (L) >60 mL/min/1.73m2    Calcium 9.6 8.8 - 10.4 mg/dL    Glucose 138 (H) 70 - 99 mg/dL   Troponin T, High Sensitivity   Result Value Ref Range    Troponin T, High Sensitivity 33 (H) <=14 ng/L   [facility] Draw    Narrative    The following orders were created for panel order [place] Draw.  Procedure                               Abnormality         Status                     ---------                               -----------         ------                     Extra Blue Top Tube[[id number]]                             Final result               Extra Heparinized Syringe[[id number]]                       Final result                 Please view results for these tests on the individual orders.   CBC with platelets and differential   Result Value Ref Range    WBC Count 9.5 4.0 - 11.0 10e3/uL    RBC Count 3.13 (L) 3.80 - 5.20 10e6/uL    Hemoglobin 9.2 (L) 11.7 - 15.7 g/dL    Hematocrit 29.1 (L) 35.0 - 47.0 %    MCV 93 78 - 100 fL    MCH 29.4 26.5 - 33.0 pg    MCHC 31.6 31.5 - 36.5 g/dL    RDW 15.7  (H) 10.0 - 15.0 %    Platelet Count 175 150 - 450 10e3/uL    % Neutrophils 64 %    % Lymphocytes 27 %    % Monocytes 6 %    % Eosinophils 2 %    % Basophils 0 %    % Immature Granulocytes 1 %    NRBCs per 100 WBC 0 <1 /100    Absolute Neutrophils 6.1 1.6 - 8.3 10e3/uL    Absolute Lymphocytes 2.5 0.8 - 5.3 10e3/uL    Absolute Monocytes 0.6 0.0 - 1.3 10e3/uL    Absolute Eosinophils 0.2 0.0 - 0.7 10e3/uL    Absolute Basophils 0.0 0.0 - 0.2 10e3/uL    Absolute Immature Granulocytes 0.1 <=0.4 10e3/uL    Absolute NRBCs 0.0 10e3/uL   Extra Blue Top Tube   Result Value Ref Range    Hold Specimen     Extra Heparinized Syringe   Result Value Ref Range    Hold Specimen     EKG 12 lead   Result Value Ref Range    Systolic Blood Pressure  mmHg    Diastolic Blood Pressure  mmHg    Ventricular Rate 73 BPM    Atrial Rate 73 BPM    VT Interval 168 ms    QRS Duration 192 ms     ms    QTc 526 ms    P Axis  degrees    R AXIS 151 degrees    T Axis 117 degrees    Interpretation ECG       AV dual-paced rhythm  Abnormal ECG  When compared with ECG of 09-Feb-2025 11:26,  Vent. rate has decreased by  18 bpm     XR Chest Port 1 View    Narrative    EXAM: XR CHEST PORT 1 VIEW  LOCATION: [facility]  DATE: 3/14/2025    INDICATION: chest pain  COMPARISON: 2/9/2025.    FINDINGS: Sternal wires and heart valve prosthesis. Left subclavian cardiac device. The heart size is normal. The lungs are clear. There is no pneumothorax.      Impression    IMPRESSION: No acute abnormality.   Troponin T, High Sensitivity   Result Value Ref Range    Troponin T, High Sensitivity 33 (H) <=14 ng/L       Medications   ondansetron (ZOFRAN) injection 4 mg (4 mg Intravenous $Given 3/14/25 7258)   sodium chloride 0.9% BOLUS 500 mL (0 mLs Intravenous Stopped 3/14/25 7419)       Assessments & Plan (with Medical Decision Making)     I have reviewed the nursing notes.    I have reviewed the findings, diagnosis, plan and need for follow up  with the patient.      Medical Decision Making  Letty Escobar is a 69 year old female who presenting with chest pain.  She is from [facility].  She has a very complex medical history includingatrial fibrillation on Eliquis, pacemaker, coronary artery disease with CABG x 5, ELI, hypertension, hyperlipidemia, COPD, morbid obesity, hypothyroidism, type 2 diabetes, CKD amongst recurrent ER evaluations for chest pain as well as anxiety and recurrent UTI related to sepsis.  She has not any new changes in her bladder habits and his complaints about chest pain.  This started sometime around midnight was provided with a dose of nitro unfortunately this did decrease her blood pressures shortly after being provided as patient was initially low to begin with..  She notes.  Most recently returned clavicular dual-lumen arm with associated mild nausea.  She has a history of chronic angina.  She was seen at [facility] cardiology on 2/9 for admission secondary to bacteremia/UTI associated sepsis.  At that location she was diagnosed with severe coronary artery disease with status post CABG and numerous stents as well as ischemic cardiomyopathy and type II ischemia versus NSTEMI however after cardiology assessment at this point patient is not a candidate for intervention and plan was to manage symptomatic control of the angina.  Patient is also not wanting to pursue intervention.  There has not been any noted recent cough cold congestion fevers or respiratory concerns.  Denies any recent trauma or injury.     Review of POLST shows that patient is DNR/DNI.     On arrival patient blood pressure is reassuring at 106/63.  Her pulse is at 79 and a respirations of 98% on room air.  Her EKG is within patient's previous EKGs however difficult to assess for any ischemia due to patient's paced rhythm.  On arrival in the room patient is in no acute distress.  She is sleeping comfortably.  Awakes easily with lights being turned on.  Her  physical exam shows some tenderness to palpation of the costochondral joints.  Her lung sounds are clear bilaterally.  His abdomen is soft throughout.    Initial troponin measured at 33 significantly lower than previous however within patient's previous prior to that.  Her CBC shows a mild anemia of 9.2 with no leukocytosis.  Her BMP is reassuring with chronic renal injury and a sodium of 134 and a glucose of 138.  Repeat troponin pending.  Extensive chart review completed.    Repeat troponin reassuring at 33 again.  Patient is been sleeping throughout the entire evaluation.  Denies any acute pathology currently present however patient does have an extensive medical history but with her noted unstable angina/chronic angina that is currently being managed medically and patient is DNR status within normal workup believe patient is stable for transfer back to her facility at this point without obvious need to be admitted.    New Prescriptions    No medications on file       Final diagnoses:   Unstable angina (H)       3/14/2025   [facility] EMERGENCY DEPT       Whitney Barger MD  03/14/25 0418

## 2025-04-14 ENCOUNTER — ANCILLARY PROCEDURE (OUTPATIENT)
Dept: CARDIOLOGY | Facility: CLINIC | Age: 70
End: 2025-04-14
Attending: INTERNAL MEDICINE
Payer: COMMERCIAL

## 2025-04-14 DIAGNOSIS — I25.5 ISCHEMIC CARDIOMYOPATHY: ICD-10-CM

## 2025-04-14 DIAGNOSIS — I49.5 SSS (SICK SINUS SYNDROME) (H): ICD-10-CM

## 2025-04-14 DIAGNOSIS — Z95.0 PACEMAKER: ICD-10-CM

## 2025-04-14 DIAGNOSIS — Z95.810 ICD (IMPLANTABLE CARDIOVERTER-DEFIBRILLATOR) IN PLACE: ICD-10-CM

## 2025-04-14 PROCEDURE — 93296 REM INTERROG EVL PM/IDS: CPT | Performed by: INTERNAL MEDICINE

## 2025-04-14 PROCEDURE — 93295 DEV INTERROG REMOTE 1/2/MLT: CPT | Performed by: INTERNAL MEDICINE

## 2025-04-17 LAB
MDC_IDC_LEAD_CONNECTION_STATUS: NORMAL
MDC_IDC_LEAD_IMPLANT_DT: NORMAL
MDC_IDC_LEAD_LOCATION: NORMAL
MDC_IDC_LEAD_LOCATION_DETAIL_1: NORMAL
MDC_IDC_LEAD_MFG: NORMAL
MDC_IDC_LEAD_MODEL: NORMAL
MDC_IDC_LEAD_POLARITY_TYPE: NORMAL
MDC_IDC_LEAD_SERIAL: NORMAL
MDC_IDC_MSMT_BATTERY_DTM: NORMAL
MDC_IDC_MSMT_BATTERY_REMAINING_LONGEVITY: 16 MO
MDC_IDC_MSMT_BATTERY_RRT_TRIGGER: NORMAL
MDC_IDC_MSMT_BATTERY_VOLTAGE: 2.92 V
MDC_IDC_MSMT_CAP_CHARGE_DTM: NORMAL
MDC_IDC_MSMT_CAP_CHARGE_ENERGY: 18 J
MDC_IDC_MSMT_CAP_CHARGE_TIME: 4.1 S
MDC_IDC_MSMT_CAP_CHARGE_TYPE: NORMAL
MDC_IDC_MSMT_LEADCHNL_LV_IMPEDANCE_VALUE: 3000 OHM
MDC_IDC_MSMT_LEADCHNL_LV_IMPEDANCE_VALUE: 3000 OHM
MDC_IDC_MSMT_LEADCHNL_LV_IMPEDANCE_VALUE: 342 OHM
MDC_IDC_MSMT_LEADCHNL_LV_PACING_THRESHOLD_AMPLITUDE: 3 V
MDC_IDC_MSMT_LEADCHNL_LV_PACING_THRESHOLD_PULSEWIDTH: 1 MS
MDC_IDC_MSMT_LEADCHNL_RA_IMPEDANCE_VALUE: 456 OHM
MDC_IDC_MSMT_LEADCHNL_RA_PACING_THRESHOLD_AMPLITUDE: 1.12 V
MDC_IDC_MSMT_LEADCHNL_RA_PACING_THRESHOLD_PULSEWIDTH: 0.4 MS
MDC_IDC_MSMT_LEADCHNL_RA_SENSING_INTR_AMPL: 0.8 MV
MDC_IDC_MSMT_LEADCHNL_RV_IMPEDANCE_VALUE: 285 OHM
MDC_IDC_MSMT_LEADCHNL_RV_IMPEDANCE_VALUE: 304 OHM
MDC_IDC_MSMT_LEADCHNL_RV_PACING_THRESHOLD_AMPLITUDE: 0.62 V
MDC_IDC_MSMT_LEADCHNL_RV_PACING_THRESHOLD_PULSEWIDTH: 0.4 MS
MDC_IDC_MSMT_LEADCHNL_RV_SENSING_INTR_AMPL: 5.6 MV
MDC_IDC_PG_IMPLANT_DTM: NORMAL
MDC_IDC_PG_MFG: NORMAL
MDC_IDC_PG_MODEL: NORMAL
MDC_IDC_PG_SERIAL: NORMAL
MDC_IDC_PG_TYPE: NORMAL
MDC_IDC_SESS_CLINIC_NAME: NORMAL
MDC_IDC_SESS_DTM: NORMAL
MDC_IDC_SESS_TYPE: NORMAL
MDC_IDC_SET_BRADY_AT_MODE_SWITCH_RATE: 171 {BEATS}/MIN
MDC_IDC_SET_BRADY_LOWRATE: 60 {BEATS}/MIN
MDC_IDC_SET_BRADY_MAX_SENSOR_RATE: 130 {BEATS}/MIN
MDC_IDC_SET_BRADY_MAX_TRACKING_RATE: 130 {BEATS}/MIN
MDC_IDC_SET_BRADY_MODE: NORMAL
MDC_IDC_SET_BRADY_PAV_DELAY_LOW: 170 MS
MDC_IDC_SET_BRADY_SAV_DELAY_LOW: 110 MS
MDC_IDC_SET_CRT_LVRV_DELAY: 0 MS
MDC_IDC_SET_CRT_PACED_CHAMBERS: NORMAL
MDC_IDC_SET_LEADCHNL_LV_PACING_AMPLITUDE: 3.75 V
MDC_IDC_SET_LEADCHNL_LV_PACING_ANODE_ELECTRODE_1: NORMAL
MDC_IDC_SET_LEADCHNL_LV_PACING_ANODE_LOCATION_1: NORMAL
MDC_IDC_SET_LEADCHNL_LV_PACING_CAPTURE_MODE: NORMAL
MDC_IDC_SET_LEADCHNL_LV_PACING_CATHODE_ELECTRODE_1: NORMAL
MDC_IDC_SET_LEADCHNL_LV_PACING_CATHODE_LOCATION_1: NORMAL
MDC_IDC_SET_LEADCHNL_LV_PACING_POLARITY: NORMAL
MDC_IDC_SET_LEADCHNL_LV_PACING_PULSEWIDTH: 1 MS
MDC_IDC_SET_LEADCHNL_RA_PACING_AMPLITUDE: 1.75 V
MDC_IDC_SET_LEADCHNL_RA_PACING_ANODE_ELECTRODE_1: NORMAL
MDC_IDC_SET_LEADCHNL_RA_PACING_ANODE_LOCATION_1: NORMAL
MDC_IDC_SET_LEADCHNL_RA_PACING_CAPTURE_MODE: NORMAL
MDC_IDC_SET_LEADCHNL_RA_PACING_CATHODE_ELECTRODE_1: NORMAL
MDC_IDC_SET_LEADCHNL_RA_PACING_CATHODE_LOCATION_1: NORMAL
MDC_IDC_SET_LEADCHNL_RA_PACING_POLARITY: NORMAL
MDC_IDC_SET_LEADCHNL_RA_PACING_PULSEWIDTH: 0.4 MS
MDC_IDC_SET_LEADCHNL_RA_SENSING_ANODE_ELECTRODE_1: NORMAL
MDC_IDC_SET_LEADCHNL_RA_SENSING_ANODE_LOCATION_1: NORMAL
MDC_IDC_SET_LEADCHNL_RA_SENSING_CATHODE_ELECTRODE_1: NORMAL
MDC_IDC_SET_LEADCHNL_RA_SENSING_CATHODE_LOCATION_1: NORMAL
MDC_IDC_SET_LEADCHNL_RA_SENSING_POLARITY: NORMAL
MDC_IDC_SET_LEADCHNL_RA_SENSING_SENSITIVITY: 0.3 MV
MDC_IDC_SET_LEADCHNL_RV_PACING_AMPLITUDE: 2 V
MDC_IDC_SET_LEADCHNL_RV_PACING_ANODE_ELECTRODE_1: NORMAL
MDC_IDC_SET_LEADCHNL_RV_PACING_ANODE_LOCATION_1: NORMAL
MDC_IDC_SET_LEADCHNL_RV_PACING_CAPTURE_MODE: NORMAL
MDC_IDC_SET_LEADCHNL_RV_PACING_CATHODE_ELECTRODE_1: NORMAL
MDC_IDC_SET_LEADCHNL_RV_PACING_CATHODE_LOCATION_1: NORMAL
MDC_IDC_SET_LEADCHNL_RV_PACING_POLARITY: NORMAL
MDC_IDC_SET_LEADCHNL_RV_PACING_PULSEWIDTH: 0.4 MS
MDC_IDC_SET_LEADCHNL_RV_SENSING_ANODE_ELECTRODE_1: NORMAL
MDC_IDC_SET_LEADCHNL_RV_SENSING_ANODE_LOCATION_1: NORMAL
MDC_IDC_SET_LEADCHNL_RV_SENSING_CATHODE_ELECTRODE_1: NORMAL
MDC_IDC_SET_LEADCHNL_RV_SENSING_CATHODE_LOCATION_1: NORMAL
MDC_IDC_SET_LEADCHNL_RV_SENSING_POLARITY: NORMAL
MDC_IDC_SET_LEADCHNL_RV_SENSING_SENSITIVITY: 0.3 MV
MDC_IDC_SET_ZONE_DETECTION_BEATS_DENOMINATOR: 16 {BEATS}
MDC_IDC_SET_ZONE_DETECTION_BEATS_NUMERATOR: 12 {BEATS}
MDC_IDC_SET_ZONE_DETECTION_BEATS_NUMERATOR: 16 {BEATS}
MDC_IDC_SET_ZONE_DETECTION_BEATS_NUMERATOR: 16 {BEATS}
MDC_IDC_SET_ZONE_DETECTION_INTERVAL: 300 MS
MDC_IDC_SET_ZONE_DETECTION_INTERVAL: 350 MS
MDC_IDC_SET_ZONE_DETECTION_INTERVAL: 350 MS
MDC_IDC_SET_ZONE_DETECTION_INTERVAL: 360 MS
MDC_IDC_SET_ZONE_STATUS: NORMAL
MDC_IDC_SET_ZONE_TYPE: NORMAL
MDC_IDC_SET_ZONE_VENDOR_TYPE: NORMAL
MDC_IDC_STAT_AT_BURDEN_PERCENT: 0.21 %
MDC_IDC_STAT_AT_DTM_END: NORMAL
MDC_IDC_STAT_AT_DTM_START: NORMAL
MDC_IDC_STAT_BRADY_DTM_END: NORMAL
MDC_IDC_STAT_BRADY_DTM_START: NORMAL
MDC_IDC_STAT_BRADY_RV_PERCENT_PACED: 93.9 %
MDC_IDC_STAT_CRT_DTM_END: NORMAL
MDC_IDC_STAT_CRT_DTM_START: NORMAL
MDC_IDC_STAT_CRT_LV_PERCENT_PACED: 95.51 %
MDC_IDC_STAT_CRT_PERCENT_PACED: 93.88 %
MDC_IDC_STAT_EPISODE_RECENT_COUNT: 0
MDC_IDC_STAT_EPISODE_RECENT_COUNT_DTM_END: NORMAL
MDC_IDC_STAT_EPISODE_RECENT_COUNT_DTM_START: NORMAL
MDC_IDC_STAT_EPISODE_TOTAL_COUNT: 0
MDC_IDC_STAT_EPISODE_TOTAL_COUNT: 29
MDC_IDC_STAT_EPISODE_TOTAL_COUNT_DTM_END: NORMAL
MDC_IDC_STAT_EPISODE_TOTAL_COUNT_DTM_START: NORMAL
MDC_IDC_STAT_EPISODE_TYPE: NORMAL
MDC_IDC_STAT_TACHYTHERAPY_ATP_DELIVERED_RECENT: 0
MDC_IDC_STAT_TACHYTHERAPY_ATP_DELIVERED_TOTAL: 0
MDC_IDC_STAT_TACHYTHERAPY_RECENT_DTM_END: NORMAL
MDC_IDC_STAT_TACHYTHERAPY_RECENT_DTM_START: NORMAL
MDC_IDC_STAT_TACHYTHERAPY_SHOCKS_ABORTED_RECENT: 0
MDC_IDC_STAT_TACHYTHERAPY_SHOCKS_ABORTED_TOTAL: 0
MDC_IDC_STAT_TACHYTHERAPY_SHOCKS_DELIVERED_RECENT: 0
MDC_IDC_STAT_TACHYTHERAPY_SHOCKS_DELIVERED_TOTAL: 0
MDC_IDC_STAT_TACHYTHERAPY_TOTAL_DTM_END: NORMAL
MDC_IDC_STAT_TACHYTHERAPY_TOTAL_DTM_START: NORMAL

## 2025-06-03 ENCOUNTER — HOSPITAL ENCOUNTER (OUTPATIENT)
Dept: CARDIOLOGY | Facility: CLINIC | Age: 70
Discharge: HOME | End: 2025-06-03
Attending: INTERNAL MEDICINE
Payer: COMMERCIAL

## 2025-06-03 ENCOUNTER — RESULTS FOLLOW-UP (OUTPATIENT)
Dept: CARDIOLOGY | Facility: CLINIC | Age: 70
End: 2025-06-03

## 2025-06-03 DIAGNOSIS — I50.22 CHRONIC SYSTOLIC HEART FAILURE (H): ICD-10-CM

## 2025-06-03 LAB — LVEF ECHO: NORMAL

## 2025-06-03 PROCEDURE — 93306 TTE W/DOPPLER COMPLETE: CPT | Mod: 26 | Performed by: INTERNAL MEDICINE

## 2025-06-03 PROCEDURE — 93306 TTE W/DOPPLER COMPLETE: CPT

## 2025-06-17 ENCOUNTER — LAB REQUISITION (OUTPATIENT)
Dept: LAB | Facility: CLINIC | Age: 70
End: 2025-06-17
Payer: COMMERCIAL

## 2025-06-17 DIAGNOSIS — I10 ESSENTIAL (PRIMARY) HYPERTENSION: ICD-10-CM

## 2025-06-18 ENCOUNTER — TELEPHONE (OUTPATIENT)
Dept: CARDIOLOGY | Facility: CLINIC | Age: 70
End: 2025-06-18

## 2025-06-18 LAB
ANION GAP SERPL CALCULATED.3IONS-SCNC: 10 MMOL/L (ref 7–15)
BUN SERPL-MCNC: 34.5 MG/DL (ref 8–23)
CALCIUM SERPL-MCNC: 10 MG/DL (ref 8.8–10.4)
CHLORIDE SERPL-SCNC: 102 MMOL/L (ref 98–107)
CREAT SERPL-MCNC: 1.3 MG/DL (ref 0.51–0.95)
EGFRCR SERPLBLD CKD-EPI 2021: 44 ML/MIN/1.73M2
EST. AVERAGE GLUCOSE BLD GHB EST-MCNC: 134 MG/DL
GLUCOSE SERPL-MCNC: 101 MG/DL (ref 70–99)
HBA1C MFR BLD: 6.3 %
HCO3 SERPL-SCNC: 26 MMOL/L (ref 22–29)
POTASSIUM SERPL-SCNC: 4.5 MMOL/L (ref 3.4–5.3)
SODIUM SERPL-SCNC: 138 MMOL/L (ref 135–145)
TSH SERPL DL<=0.005 MIU/L-ACNC: 7.82 UIU/ML (ref 0.3–4.2)

## 2025-06-18 PROCEDURE — 84443 ASSAY THYROID STIM HORMONE: CPT | Mod: ORL | Performed by: NURSE PRACTITIONER

## 2025-06-18 PROCEDURE — 80048 BASIC METABOLIC PNL TOTAL CA: CPT | Mod: ORL | Performed by: NURSE PRACTITIONER

## 2025-06-18 PROCEDURE — 36415 COLL VENOUS BLD VENIPUNCTURE: CPT | Mod: ORL | Performed by: NURSE PRACTITIONER

## 2025-06-18 PROCEDURE — 83036 HEMOGLOBIN GLYCOSYLATED A1C: CPT | Mod: ORL | Performed by: NURSE PRACTITIONER

## 2025-06-18 PROCEDURE — P9604 ONE-WAY ALLOW PRORATED TRIP: HCPCS | Mod: ORL | Performed by: NURSE PRACTITIONER

## 2025-06-18 NOTE — TELEPHONE ENCOUNTER
Called patients HUC at [facility] to schedule patient for fasting labs prior to Dr. Holland appt at 11:30 AM on 6/23/25. Patient will arrive by 11/11:15 AM to do fasting labs the same day, prior to her [place] appt with Cardiology.    Justa mentioned they will need fasting orders faxed to them with a note of how long patient needs to be fasting for/any other information on the order for the [facility]. Please make fax out to Attn: Justa and send the fasting orders to fax number: [phone].

## 2025-06-20 ENCOUNTER — HOSPITAL ENCOUNTER (INPATIENT)
Facility: CLINIC | Age: 70
LOS: 2 days | Discharge: NURSING, REHAB OR LONG-TERM CARE | DRG: 314 | End: 2025-06-22
Attending: EMERGENCY MEDICINE | Admitting: INTERNAL MEDICINE
Payer: COMMERCIAL

## 2025-06-20 ENCOUNTER — APPOINTMENT (OUTPATIENT)
Dept: CARDIOLOGY | Facility: CLINIC | Age: 70
DRG: 314 | End: 2025-06-20
Attending: EMERGENCY MEDICINE
Payer: COMMERCIAL

## 2025-06-20 ENCOUNTER — APPOINTMENT (OUTPATIENT)
Dept: GENERAL RADIOLOGY | Facility: CLINIC | Age: 70
DRG: 314 | End: 2025-06-20
Attending: EMERGENCY MEDICINE
Payer: COMMERCIAL

## 2025-06-20 DIAGNOSIS — I50.22 CHRONIC SYSTOLIC HEART FAILURE (H): ICD-10-CM

## 2025-06-20 DIAGNOSIS — I95.9 HYPOTENSION, UNSPECIFIED HYPOTENSION TYPE: ICD-10-CM

## 2025-06-20 DIAGNOSIS — N30.00 ACUTE CYSTITIS WITHOUT HEMATURIA: ICD-10-CM

## 2025-06-20 DIAGNOSIS — J44.1 COPD WITH ACUTE EXACERBATION (H): Primary | ICD-10-CM

## 2025-06-20 DIAGNOSIS — N18.31 CHRONIC KIDNEY DISEASE, STAGE 3A (H): ICD-10-CM

## 2025-06-20 DIAGNOSIS — N17.9 AKI (ACUTE KIDNEY INJURY): ICD-10-CM

## 2025-06-20 DIAGNOSIS — I50.9 CONGESTIVE HEART FAILURE, UNSPECIFIED HF CHRONICITY, UNSPECIFIED HEART FAILURE TYPE (H): ICD-10-CM

## 2025-06-20 LAB
ALBUMIN SERPL BCG-MCNC: 3.3 G/DL (ref 3.5–5.2)
ALBUMIN UR-MCNC: NEGATIVE MG/DL
ALP SERPL-CCNC: 54 U/L (ref 40–150)
ALT SERPL W P-5'-P-CCNC: 36 U/L (ref 0–50)
ANION GAP SERPL CALCULATED.3IONS-SCNC: 14 MMOL/L (ref 7–15)
APPEARANCE UR: ABNORMAL
AST SERPL W P-5'-P-CCNC: 45 U/L (ref 0–45)
ATRIAL RATE - MUSE: 62 BPM
BACTERIA #/AREA URNS HPF: ABNORMAL /HPF
BASE EXCESS BLDV CALC-SCNC: -1.3 MMOL/L (ref -3–3)
BASOPHILS # BLD AUTO: 0 10E3/UL (ref 0–0.2)
BASOPHILS NFR BLD AUTO: 0 %
BI-PLANE LVEF ECHO: NORMAL
BILIRUB SERPL-MCNC: 0.2 MG/DL
BILIRUB UR QL STRIP: NEGATIVE
BUN SERPL-MCNC: 47.6 MG/DL (ref 8–23)
CALCIUM SERPL-MCNC: 9.2 MG/DL (ref 8.8–10.4)
CHLORIDE SERPL-SCNC: 102 MMOL/L (ref 98–107)
COLOR UR AUTO: ABNORMAL
CREAT SERPL-MCNC: 1.67 MG/DL (ref 0.51–0.95)
DIASTOLIC BLOOD PRESSURE - MUSE: NORMAL MMHG
EGFRCR SERPLBLD CKD-EPI 2021: 32 ML/MIN/1.73M2
EOSINOPHIL # BLD AUTO: 0.2 10E3/UL (ref 0–0.7)
EOSINOPHIL NFR BLD AUTO: 2 %
ERYTHROCYTE [DISTWIDTH] IN BLOOD BY AUTOMATED COUNT: 14 % (ref 10–15)
GLUCOSE BLDC GLUCOMTR-MCNC: 101 MG/DL (ref 70–99)
GLUCOSE BLDC GLUCOMTR-MCNC: 98 MG/DL (ref 70–99)
GLUCOSE SERPL-MCNC: 187 MG/DL (ref 70–99)
GLUCOSE UR STRIP-MCNC: 150 MG/DL
HCO3 BLDV-SCNC: 26 MMOL/L (ref 21–28)
HCO3 SERPL-SCNC: 19 MMOL/L (ref 22–29)
HCT VFR BLD AUTO: 27.5 % (ref 35–47)
HGB BLD-MCNC: 8.7 G/DL (ref 11.7–15.7)
HGB UR QL STRIP: ABNORMAL
HOLD SPECIMEN: NORMAL
HOLD SPECIMEN: NORMAL
IMM GRANULOCYTES # BLD: 0.1 10E3/UL
IMM GRANULOCYTES NFR BLD: 1 %
INTERPRETATION ECG - MUSE: NORMAL
KETONES UR STRIP-MCNC: NEGATIVE MG/DL
LACTATE SERPL-SCNC: 0.7 MMOL/L (ref 0.7–2)
LACTATE SERPL-SCNC: 1.3 MMOL/L (ref 0.7–2)
LEUKOCYTE ESTERASE UR QL STRIP: ABNORMAL
LYMPHOCYTES # BLD AUTO: 2.1 10E3/UL (ref 0.8–5.3)
LYMPHOCYTES NFR BLD AUTO: 23 %
MAGNESIUM SERPL-MCNC: 1.9 MG/DL (ref 1.7–2.3)
MCH RBC QN AUTO: 29.3 PG (ref 26.5–33)
MCHC RBC AUTO-ENTMCNC: 31.6 G/DL (ref 31.5–36.5)
MCV RBC AUTO: 93 FL (ref 78–100)
MONOCYTES # BLD AUTO: 0.6 10E3/UL (ref 0–1.3)
MONOCYTES NFR BLD AUTO: 7 %
MUCOUS THREADS #/AREA URNS LPF: PRESENT /LPF
NEUTROPHILS # BLD AUTO: 6 10E3/UL (ref 1.6–8.3)
NEUTROPHILS NFR BLD AUTO: 67 %
NITRATE UR QL: POSITIVE
NRBC # BLD AUTO: 0 10E3/UL
NRBC BLD AUTO-RTO: 0 /100
NT-PROBNP SERPL-MCNC: 1640 PG/ML (ref 0–353)
O2/TOTAL GAS SETTING VFR VENT: 24 %
OXYHGB MFR BLDV: 43 % (ref 70–75)
P AXIS - MUSE: NORMAL DEGREES
PCO2 BLDV: 57 MM HG (ref 40–50)
PH BLDV: 7.26 [PH] (ref 7.32–7.43)
PH UR STRIP: 6 [PH] (ref 5–7)
PHOSPHATE SERPL-MCNC: 3.7 MG/DL (ref 2.5–4.5)
PLATELET # BLD AUTO: 128 10E3/UL (ref 150–450)
PO2 BLDV: 27 MM HG (ref 25–47)
POTASSIUM SERPL-SCNC: 4.5 MMOL/L (ref 3.4–5.3)
PR INTERVAL - MUSE: 168 MS
PROCALCITONIN SERPL IA-MCNC: 0.36 NG/ML
PROT SERPL-MCNC: 6.6 G/DL (ref 6.4–8.3)
QRS DURATION - MUSE: 210 MS
QT - MUSE: 520 MS
QTC - MUSE: 527 MS
R AXIS - MUSE: 153 DEGREES
RBC # BLD AUTO: 2.97 10E6/UL (ref 3.8–5.2)
RBC URINE: 1 /HPF
SAO2 % BLDV: 44.5 % (ref 70–75)
SODIUM SERPL-SCNC: 135 MMOL/L (ref 135–145)
SP GR UR STRIP: 1.01 (ref 1–1.03)
SQUAMOUS EPITHELIAL: 1 /HPF
SYSTOLIC BLOOD PRESSURE - MUSE: NORMAL MMHG
T AXIS - MUSE: 140 DEGREES
T4 FREE SERPL-MCNC: 0.8 NG/DL (ref 0.9–1.7)
TROPONIN T SERPL HS-MCNC: 32 NG/L
TROPONIN T SERPL HS-MCNC: 39 NG/L
UROBILINOGEN UR STRIP-MCNC: NORMAL MG/DL
VENTRICULAR RATE- MUSE: 62 BPM
WBC # BLD AUTO: 9 10E3/UL (ref 4–11)
WBC URINE: 117 /HPF

## 2025-06-20 PROCEDURE — 258N000003 HC RX IP 258 OP 636: Performed by: EMERGENCY MEDICINE

## 2025-06-20 PROCEDURE — 84100 ASSAY OF PHOSPHORUS: CPT | Performed by: INTERNAL MEDICINE

## 2025-06-20 PROCEDURE — 84484 ASSAY OF TROPONIN QUANT: CPT | Performed by: INTERNAL MEDICINE

## 2025-06-20 PROCEDURE — 96365 THER/PROPH/DIAG IV INF INIT: CPT

## 2025-06-20 PROCEDURE — 85004 AUTOMATED DIFF WBC COUNT: CPT | Performed by: EMERGENCY MEDICINE

## 2025-06-20 PROCEDURE — 84439 ASSAY OF FREE THYROXINE: CPT | Performed by: INTERNAL MEDICINE

## 2025-06-20 PROCEDURE — 82962 GLUCOSE BLOOD TEST: CPT

## 2025-06-20 PROCEDURE — 93325 DOPPLER ECHO COLOR FLOW MAPG: CPT | Mod: 26 | Performed by: INTERNAL MEDICINE

## 2025-06-20 PROCEDURE — 250N000009 HC RX 250: Performed by: EMERGENCY MEDICINE

## 2025-06-20 PROCEDURE — 83880 ASSAY OF NATRIURETIC PEPTIDE: CPT | Performed by: EMERGENCY MEDICINE

## 2025-06-20 PROCEDURE — 71046 X-RAY EXAM CHEST 2 VIEWS: CPT

## 2025-06-20 PROCEDURE — 83605 ASSAY OF LACTIC ACID: CPT | Performed by: INTERNAL MEDICINE

## 2025-06-20 PROCEDURE — 99285 EMERGENCY DEPT VISIT HI MDM: CPT | Performed by: EMERGENCY MEDICINE

## 2025-06-20 PROCEDURE — 255N000002 HC RX 255 OP 636: Performed by: EMERGENCY MEDICINE

## 2025-06-20 PROCEDURE — 93325 DOPPLER ECHO COLOR FLOW MAPG: CPT

## 2025-06-20 PROCEDURE — 93308 TTE F-UP OR LMTD: CPT | Mod: 26 | Performed by: INTERNAL MEDICINE

## 2025-06-20 PROCEDURE — 83605 ASSAY OF LACTIC ACID: CPT | Performed by: EMERGENCY MEDICINE

## 2025-06-20 PROCEDURE — 81003 URINALYSIS AUTO W/O SCOPE: CPT | Performed by: INTERNAL MEDICINE

## 2025-06-20 PROCEDURE — 93321 DOPPLER ECHO F-UP/LMTD STD: CPT | Mod: 26 | Performed by: INTERNAL MEDICINE

## 2025-06-20 PROCEDURE — 87086 URINE CULTURE/COLONY COUNT: CPT | Performed by: INTERNAL MEDICINE

## 2025-06-20 PROCEDURE — 83735 ASSAY OF MAGNESIUM: CPT | Performed by: INTERNAL MEDICINE

## 2025-06-20 PROCEDURE — 36415 COLL VENOUS BLD VENIPUNCTURE: CPT | Performed by: EMERGENCY MEDICINE

## 2025-06-20 PROCEDURE — 200N000001 HC R&B ICU

## 2025-06-20 PROCEDURE — 93005 ELECTROCARDIOGRAM TRACING: CPT

## 2025-06-20 PROCEDURE — 250N000013 HC RX MED GY IP 250 OP 250 PS 637: Performed by: INTERNAL MEDICINE

## 2025-06-20 PROCEDURE — 96360 HYDRATION IV INFUSION INIT: CPT

## 2025-06-20 PROCEDURE — 82310 ASSAY OF CALCIUM: CPT | Performed by: EMERGENCY MEDICINE

## 2025-06-20 PROCEDURE — 82805 BLOOD GASES W/O2 SATURATION: CPT | Performed by: INTERNAL MEDICINE

## 2025-06-20 PROCEDURE — 84145 PROCALCITONIN (PCT): CPT | Performed by: INTERNAL MEDICINE

## 2025-06-20 PROCEDURE — 36415 COLL VENOUS BLD VENIPUNCTURE: CPT | Performed by: INTERNAL MEDICINE

## 2025-06-20 PROCEDURE — 250N000011 HC RX IP 250 OP 636: Performed by: EMERGENCY MEDICINE

## 2025-06-20 PROCEDURE — 99285 EMERGENCY DEPT VISIT HI MDM: CPT | Mod: 25

## 2025-06-20 PROCEDURE — 99223 1ST HOSP IP/OBS HIGH 75: CPT | Performed by: INTERNAL MEDICINE

## 2025-06-20 PROCEDURE — 93010 ELECTROCARDIOGRAM REPORT: CPT | Performed by: EMERGENCY MEDICINE

## 2025-06-20 RX ORDER — ROPIVACAINE IN 0.9% SOD CHL/PF 0.1 %
.01-.2 PLASTIC BAG, INJECTION (ML) EPIDURAL CONTINUOUS
Status: DISCONTINUED | OUTPATIENT
Start: 2025-06-20 | End: 2025-06-20

## 2025-06-20 RX ORDER — LIDOCAINE 50 MG/G
OINTMENT TOPICAL 2 TIMES DAILY
Status: DISCONTINUED | OUTPATIENT
Start: 2025-06-20 | End: 2025-06-22 | Stop reason: HOSPADM

## 2025-06-20 RX ORDER — CARBIDOPA AND LEVODOPA 25; 100 MG/1; MG/1
1 TABLET ORAL AT BEDTIME
Status: DISCONTINUED | OUTPATIENT
Start: 2025-06-20 | End: 2025-06-22 | Stop reason: HOSPADM

## 2025-06-20 RX ORDER — ONDANSETRON 4 MG/1
4 TABLET, ORALLY DISINTEGRATING ORAL EVERY 6 HOURS PRN
Status: DISCONTINUED | OUTPATIENT
Start: 2025-06-20 | End: 2025-06-22 | Stop reason: HOSPADM

## 2025-06-20 RX ORDER — ROPIVACAINE IN 0.9% SOD CHL/PF 0.1 %
.01-.2 PLASTIC BAG, INJECTION (ML) EPIDURAL CONTINUOUS
Status: DISCONTINUED | OUTPATIENT
Start: 2025-06-20 | End: 2025-06-21

## 2025-06-20 RX ORDER — LOSARTAN POTASSIUM 25 MG/1
25 TABLET ORAL DAILY
Status: DISCONTINUED | OUTPATIENT
Start: 2025-06-21 | End: 2025-06-22 | Stop reason: HOSPADM

## 2025-06-20 RX ORDER — ROSUVASTATIN CALCIUM 5 MG/1
10 TABLET, COATED ORAL AT BEDTIME
Status: DISCONTINUED | OUTPATIENT
Start: 2025-06-20 | End: 2025-06-22 | Stop reason: HOSPADM

## 2025-06-20 RX ORDER — NICOTINE POLACRILEX 4 MG
15-30 LOZENGE BUCCAL
Status: DISCONTINUED | OUTPATIENT
Start: 2025-06-20 | End: 2025-06-20

## 2025-06-20 RX ORDER — RANOLAZINE 500 MG/1
500 TABLET, EXTENDED RELEASE ORAL 2 TIMES DAILY
Status: DISCONTINUED | OUTPATIENT
Start: 2025-06-20 | End: 2025-06-22 | Stop reason: HOSPADM

## 2025-06-20 RX ORDER — LIDOCAINE 40 MG/G
CREAM TOPICAL
Status: DISCONTINUED | OUTPATIENT
Start: 2025-06-20 | End: 2025-06-20

## 2025-06-20 RX ORDER — FUROSEMIDE 10 MG/ML
20 INJECTION INTRAMUSCULAR; INTRAVENOUS ONCE
Status: COMPLETED | OUTPATIENT
Start: 2025-06-20 | End: 2025-06-20

## 2025-06-20 RX ORDER — DEXTROSE MONOHYDRATE 25 G/50ML
25-50 INJECTION, SOLUTION INTRAVENOUS
Status: DISCONTINUED | OUTPATIENT
Start: 2025-06-20 | End: 2025-06-22 | Stop reason: HOSPADM

## 2025-06-20 RX ORDER — TRIAMCINOLONE ACETONIDE 1 MG/G
CREAM TOPICAL 2 TIMES DAILY
COMMUNITY
Start: 2025-06-11

## 2025-06-20 RX ORDER — LEVOTHYROXINE SODIUM 75 UG/1
150 TABLET ORAL
Status: DISCONTINUED | OUTPATIENT
Start: 2025-06-21 | End: 2025-06-22 | Stop reason: HOSPADM

## 2025-06-20 RX ORDER — DULOXETIN HYDROCHLORIDE 20 MG/1
40 CAPSULE, DELAYED RELEASE ORAL DAILY
COMMUNITY
Start: 2025-06-11

## 2025-06-20 RX ORDER — AMOXICILLIN 250 MG
1 CAPSULE ORAL 2 TIMES DAILY PRN
Status: DISCONTINUED | OUTPATIENT
Start: 2025-06-20 | End: 2025-06-22 | Stop reason: HOSPADM

## 2025-06-20 RX ORDER — FAMOTIDINE 20 MG/1
20 TABLET, FILM COATED ORAL DAILY
Status: DISCONTINUED | OUTPATIENT
Start: 2025-06-21 | End: 2025-06-22 | Stop reason: HOSPADM

## 2025-06-20 RX ORDER — ACETAMINOPHEN 325 MG/1
650 TABLET ORAL EVERY 4 HOURS PRN
Status: DISCONTINUED | OUTPATIENT
Start: 2025-06-20 | End: 2025-06-22 | Stop reason: HOSPADM

## 2025-06-20 RX ORDER — PANTOPRAZOLE SODIUM 40 MG/1
40 TABLET, DELAYED RELEASE ORAL DAILY
Status: DISCONTINUED | OUTPATIENT
Start: 2025-06-21 | End: 2025-06-22 | Stop reason: HOSPADM

## 2025-06-20 RX ORDER — BUSPIRONE HYDROCHLORIDE 5 MG/1
10 TABLET ORAL EVERY EVENING
Status: DISCONTINUED | OUTPATIENT
Start: 2025-06-20 | End: 2025-06-22 | Stop reason: HOSPADM

## 2025-06-20 RX ORDER — ALBUTEROL SULFATE 0.83 MG/ML
2.5 SOLUTION RESPIRATORY (INHALATION) EVERY 6 HOURS PRN
Status: DISCONTINUED | OUTPATIENT
Start: 2025-06-20 | End: 2025-06-22 | Stop reason: HOSPADM

## 2025-06-20 RX ORDER — CALCIUM CARBONATE 500 MG/1
1000 TABLET, CHEWABLE ORAL 4 TIMES DAILY PRN
Status: DISCONTINUED | OUTPATIENT
Start: 2025-06-20 | End: 2025-06-22 | Stop reason: HOSPADM

## 2025-06-20 RX ORDER — ACETAMINOPHEN 650 MG/1
650 SUPPOSITORY RECTAL EVERY 4 HOURS PRN
Status: DISCONTINUED | OUTPATIENT
Start: 2025-06-20 | End: 2025-06-21

## 2025-06-20 RX ORDER — QUETIAPINE FUMARATE 100 MG/1
100 TABLET, FILM COATED ORAL 3 TIMES DAILY
Status: DISCONTINUED | OUTPATIENT
Start: 2025-06-20 | End: 2025-06-22 | Stop reason: HOSPADM

## 2025-06-20 RX ORDER — ONDANSETRON 2 MG/ML
4 INJECTION INTRAMUSCULAR; INTRAVENOUS EVERY 6 HOURS PRN
Status: DISCONTINUED | OUTPATIENT
Start: 2025-06-20 | End: 2025-06-22 | Stop reason: HOSPADM

## 2025-06-20 RX ORDER — MIRTAZAPINE 15 MG/1
15 TABLET, FILM COATED ORAL AT BEDTIME
Status: DISCONTINUED | OUTPATIENT
Start: 2025-06-20 | End: 2025-06-22 | Stop reason: HOSPADM

## 2025-06-20 RX ORDER — TRAZODONE HYDROCHLORIDE 150 MG/1
75 TABLET ORAL AT BEDTIME
COMMUNITY
Start: 2025-05-02

## 2025-06-20 RX ORDER — LORAZEPAM 0.5 MG/1
0.5 TABLET ORAL 2 TIMES DAILY
Status: DISCONTINUED | OUTPATIENT
Start: 2025-06-20 | End: 2025-06-21

## 2025-06-20 RX ORDER — AMOXICILLIN 250 MG
2 CAPSULE ORAL 2 TIMES DAILY PRN
Status: DISCONTINUED | OUTPATIENT
Start: 2025-06-20 | End: 2025-06-22 | Stop reason: HOSPADM

## 2025-06-20 RX ORDER — GABAPENTIN 300 MG/1
300 CAPSULE ORAL AT BEDTIME
Status: DISCONTINUED | OUTPATIENT
Start: 2025-06-20 | End: 2025-06-22 | Stop reason: HOSPADM

## 2025-06-20 RX ORDER — DEXTROSE MONOHYDRATE 25 G/50ML
25-50 INJECTION, SOLUTION INTRAVENOUS
Status: DISCONTINUED | OUTPATIENT
Start: 2025-06-20 | End: 2025-06-20

## 2025-06-20 RX ORDER — DULOXETIN HYDROCHLORIDE 20 MG/1
40 CAPSULE, DELAYED RELEASE ORAL DAILY
Status: DISCONTINUED | OUTPATIENT
Start: 2025-06-21 | End: 2025-06-22 | Stop reason: HOSPADM

## 2025-06-20 RX ORDER — NICOTINE POLACRILEX 4 MG
15-30 LOZENGE BUCCAL
Status: DISCONTINUED | OUTPATIENT
Start: 2025-06-20 | End: 2025-06-22 | Stop reason: HOSPADM

## 2025-06-20 RX ADMIN — FUROSEMIDE 20 MG: 10 INJECTION, SOLUTION INTRAMUSCULAR; INTRAVENOUS at 15:08

## 2025-06-20 RX ADMIN — ACETAMINOPHEN 650 MG: 325 TABLET, FILM COATED ORAL at 23:59

## 2025-06-20 RX ADMIN — NOREPINEPHRINE BITARTRATE 0.03 MCG/KG/MIN: 0.02 INJECTION, SOLUTION INTRAVENOUS at 14:29

## 2025-06-20 RX ADMIN — SODIUM CHLORIDE 1000 ML: 0.9 INJECTION, SOLUTION INTRAVENOUS at 12:57

## 2025-06-20 RX ADMIN — HUMAN ALBUMIN MICROSPHERES AND PERFLUTREN 2 ML (DILUTED): 10; .22 INJECTION, SOLUTION INTRAVENOUS at 14:58

## 2025-06-20 ASSESSMENT — ACTIVITIES OF DAILY LIVING (ADL)
ADLS_ACUITY_SCORE: 61
ADLS_ACUITY_SCORE: 59
ADLS_ACUITY_SCORE: 61
ADLS_ACUITY_SCORE: 59
ADLS_ACUITY_SCORE: 61
ADLS_ACUITY_SCORE: 59
ADLS_ACUITY_SCORE: 61
ADLS_ACUITY_SCORE: 59
ADLS_ACUITY_SCORE: 61

## 2025-06-20 NOTE — ED TRIAGE NOTES
Patient presents via EMS from [facility] after staff noted that her BP was low. Per EMS patient had a systolic BP of 50 and a paced rhythm of 24? VS more stable upon arrival. Patient is a&o x4 upon arrival.

## 2025-06-20 NOTE — MEDICATION SCRIBE - ADMISSION MEDICATION HISTORY
Medication Scribe Admission Medication History    Admission medication history is complete. The information provided in this note is only as accurate as the sources available at the time of the update.    Information Source(s): Facility (U/NH/) medication list/FREDA BARBA    Pertinent Information:     Changes made to PTA medication list:  Added: Triamcinolone Cream   Deleted: Hydrocortisone Ointment   Changed: Trazodone 50 mg tablet changed to 150 mg tablets     Allergies reviewed with patient and updates made in EHR: no    Medication History Completed By: ADORE ROBERTO 6/20/2025 4:09 PM    PTA Med List   Medication Sig Last Dose/Taking    acetaminophen (TYLENOL) 500 MG tablet Take 2 tablets (1,000 mg) by mouth 3 times daily 6/20/2025 at  9:02 AM    albuterol (PROVENTIL) (2.5 MG/3ML) 0.083% neb solution Take 2.5 mg by nebulization every 6 hours as needed for cough or wheezing More than a month    apixaban ANTICOAGULANT (ELIQUIS) 5 MG tablet Take 5 mg by mouth 2 times daily. 6/20/2025 at  9:02 AM    bisacodyl (DULCOLAX) 10 MG suppository Place 10 mg rectally daily as needed for constipation. 6/18/2025 at  1:21 PM    busPIRone (BUSPAR) 10 MG tablet Take 10 mg by mouth every evening. 6/19/2025 at  8:57 PM    carbidopa-levodopa (SINEMET)  MG tablet Take 1 tablet by mouth at bedtime. 6/19/2025 at  8:57 PM    Continuous Glucose  (FREESTYLE TEDDY 2 READER) ZION  Taking    Continuous Glucose Sensor (FREESTYLE TEDDY 2 SENSOR) [facility] every 14 days. Past Month    DULoxetine (CYMBALTA) 20 MG capsule Take 40 mg by mouth daily. 6/20/2025 at  9:02 AM    empagliflozin (JARDIANCE) 10 MG TABS tablet Take 1 tablet by mouth daily 6/20/2025 at  9:02 AM    estradiol (ESTRACE) 0.1 MG/GM vaginal cream Place vaginally. Mondays, Wednesdays and Fridays at bedtime 6/18/2025 at  9:27 PM    famotidine (PEPCID) 20 MG tablet Take 20 mg by mouth daily. 6/20/2025 at  9:02 AM    fluticasone-vilanterol (BREO  ELLIPTA) 200-25 MCG/INH inhaler Inhale 1 puff into the lungs daily Rinse mouth after use 6/20/2025 at  9:02 AM    gabapentin (NEURONTIN) 400 MG capsule Take 400 mg by mouth at bedtime RLS 6/19/2025 at  8:57 PM    hydrOXYzine HCl (ATARAX) 25 MG tablet Take 25 mg by mouth at bedtime. 6/19/2025 at  8:57 PM    LANTUS SOLOSTAR 100 UNIT/ML soln Inject 16 Units subcutaneously at bedtime. 6/19/2025 at  9:17 PM    levothyroxine (SYNTHROID/LEVOTHROID) 150 MCG tablet Take 150 mcg by mouth every morning (before breakfast). 6/20/2025 at  7:05 AM    lidocaine (LMX4) 4 % external cream Apply topically 2 times daily To Rt knee 6/20/2025 at 10:59 AM    LORazepam (ATIVAN) 0.5 MG tablet Take 0.5 mg by mouth 2 times daily. 6/19/2025 at  8:51 PM    losartan (COZAAR) 25 MG tablet Take 1 tablet (25 mg) by mouth daily 6/20/2025 at  9:02 AM    Melatonin 10 MG TABS tablet Take 10 mg by mouth At Bedtime 6/19/2025 at  8:57 PM    metolazone (ZAROXOLYN) 2.5 MG tablet Take 2.5 mg by mouth every 7 days. Mondays 6/20/2025 at  9:02 AM    metoprolol succinate ER (TOPROL-XL) 25 MG 24 hr tablet Take 37.5 mg by mouth daily 6/19/2025 at  8:58 AM    mirtazapine (REMERON) 15 MG tablet Take 1 tablet (15 mg) by mouth at bedtime 6/19/2025 at  8:57 PM    morphine (MSIR) 15 MG IR tablet Take 15 mg by mouth every 4 hours as needed for pain. 6/20/2025 at 12:32 PM    nitroGLYcerin (NITROSTAT) 0.4 MG sublingual tablet Place 0.4 mg under the tongue every 5 minutes as needed for chest pain. For chest pain place 1 tablet under the tongue every 5 minutes for 3 doses. If symptoms persist 5 minutes after 1st dose call 911. 5/14/2025 at 12:16 PM    ondansetron (ZOFRAN ODT) 4 MG ODT tab Take 1 tablet (4 mg) by mouth every 8 hours as needed for vomiting or nausea. (Patient taking differently: Take 4 mg by mouth 3 times daily as needed for vomiting or nausea.) 6/19/2025 at 10:49 PM    pantoprazole (PROTONIX) 40 MG EC tablet Take 40 mg by mouth daily. 6/20/2025 at  7:05 AM     "polyethylene glycol (MIRALAX) 17 GM/Dose powder Take 17 g by mouth every other day 6/19/2025 at  8:58 AM    QUEtiapine (SEROQUEL) 100 MG tablet Take 1 tablet (100 mg) by mouth 3 times daily 6/20/2025 at  9:02 AM    ranolazine (RANEXA) 500 MG 12 hr tablet Take 500 mg by mouth 2 times daily. 6/20/2025 at  9:02 AM    rosuvastatin (CRESTOR) 10 MG tablet Take 10 mg by mouth at bedtime. 6/19/2025 at  8:57 PM    sennosides (SENOKOT) 8.6 MG tablet Take 2 tablets by mouth at bedtime.  Hold for loose stools 6/19/2025 at  8:57 PM    traZODone (DESYREL) 150 MG tablet Take 75 mg by mouth at bedtime. Take 1/2 tablet (75 mg) every night at bedtime 6/19/2025 at  8:57 PM    traZODone (DESYREL) 50 MG tablet Take 0.5 tablets (25 mg) by mouth 2 times daily Patient can have 25 mg once a day and 75 mg once a day. (Patient taking differently: Take 25 mg by mouth every evening. Take 1/2 tablet (25 mg) every afternoon at 4 PM) 6/19/2025 at  4:21 PM    triamcinolone (KENALOG) 0.1 % external cream Apply topically 2 times daily. 6/19/2025 at  9:17 PM    VAGINAL MOISTURIZER [facility] Place 1 g vaginally twice a week. \"REFRESH\" Sundays and Thursdays at bedtime 6/19/2025 at  9:17 PM      "

## 2025-06-20 NOTE — PROGRESS NOTES
At 5:30pm I contacted the Tele-ICU physician, Dr. Cortez, at [phone].  Case was reviewed with Dr. Cortez who advised transfer to the [facility] ICU.  He was going to contact the SOC and arrange patient transfer.  Appreciate Dr. Cortez assistance.    Addendum (5:51PM):  I d/w patient regarding transfer.  She requested I check [facility].  I called Ileana with [facility] at [phone].  The ICU there is closed.

## 2025-06-20 NOTE — H&P
[facility]    History and Physical - Hospitalist Service       Date of Admission:  6/20/2025    Assessment & Plan      Letty Escobar is a 69 year old female who has a PMHx of with past medical history including T2DM, Afib on eliquis, Neuromuscular Disorder, Chronic CHF, CKD, COPD, Right Carotid Stenosis, Ischemic Cardiomyopathy (LVEF 35-40%), Hypothyroidism, BPPV, Polymyalgia Rheumatica, CABG, PTSD who resides at NH and brought to ED this morning due to a reported low BP at the nursing home.  Patient reports a single episode last night of nausea, emesis followed by dry heaves and an episode of diarrhea.  No vomiting or diarrhea today.  Reports appetite has been reasonably OK. Denies any fevers, chills, sweats. No CP, SOB, abd pain. No melena, hematochezia, dysuria. In ED, afebrile, hypotensive with SBP's in 70's. Given 1.5L IVF x 1 with minimal improvement in BP.  ED provider then started low dose Levophed and contacted Cardiology at [facility] who advised stopping the Levophed and give a single dose of IV Lasix 20mg which was done.  Most recent SBP 99.  Baseline SBP 90-low 100's. Her BMI is 34.79 which is down from 37 in 4/2025.  Last hospital admission in 2/2025 for septic shock due to proteus UTI with bacteremia.  Admitted for further evaluation and management.    # Hypotension, improved  - Hold cardiac meds and monitor closely  - UA/UC, BC's x 2, procalcitonin  - No fever and nml WBC and normal lactic acid argue against sepsis or bacterial infectious process at present  - Repeat TTE pending  - Her Cr is up higher than baseline, she had nausea, vomiting and diarrhea.  Certainly an element of volume depletion could be contributory; However, with her extensive cardiac history and cardiomyopathy underlying CRS not excluded  - Requested ED provider to consult Tele-Intensivist    # HFrEF, LVEF 35-40% which it has remained compared to past  # H/O CABG x5  # Atrial fibrillation on  Eliquis  # S/P PPM and CardioMEMS (ICD therapies were turned off per patient request)  - most recent cath in 2023 showed all grafts to be patent (LIMA to the LAD, vein graft to the PDA, vein graft to the D1 and Y branch to OM2 and OM1)   - Repeat TTE from today pending  - nt-BNP 1,640 down from prior value at 2,197  - Troponin x 2  - Previously on   - await PharmD admission med rec completion    # ERIC on CKD stage III  - Baseline Cr around 1.3.  Admission Cr 1.6.  - Refer to above  - Avoid nephrotoxic meds  - Stric I/O, daily weights  - Renal panel a.m.    # Hypothyroidism  - TSH 7.82 (6/18/25)  - Check FT4  - await PharmD admission med rec completion    # DM 2  - A1C (6/18/25) 6.3%  - sliding scale insulin/Glucose checks  - await PharmD admission med rec completion    # AOCD  # Mild chronic intermittent thrombocytopenia  - Hgb 8.7  - CBC a.m.    # Bilateral carotid artery stenosis  - await PharmD admission med rec completion    # PMR  - await PharmD admission med rec completion            Diet: Combination Diet 2 gm NA Diet; No Caffeine Diet    DVT Prophylaxis: DOAC  Mccallum Catheter: Not present  Lines: PRESENT             Cardiac Monitoring: ACTIVE order. Indication: QTc prolonging medication (48 hours)  Code Status: No CPR- Do NOT Intubate      Clinically Significant Risk Factors Present on Admission               # Hypoalbuminemia: Lowest albumin = 3.3 g/dL at 6/20/2025 12:50 PM, will monitor as appropriate  # Drug Induced Coagulation Defect: home medication list includes an anticoagulant medication  # Thrombocytopenia: Lowest platelets = 128 in last 2 days, will monitor for bleeding   # Hypertension: Noted on problem list  # Acute heart failure with reduced ejection fraction: last echo with EF <40% and receiving IV diuretics  # Circulatory Shock: required vasopressors within past 24 hours       # Anemia: based on hgb <11           # Financial/Environmental Concerns:     # ICD device present       Disposition  Plan     Medically Ready for Discharge: Anticipated in 2-4 Days           Ronal Owens DO, DO  Hospitalist Service  [facility]  Securely message with [employer] (more info)  Text page via [employer] Paging/Directory     ______________________________________________________________________    Chief Complaint     Low BP    History of Present Illness   Letty Escobar is a 69 year old female who has a PMHx of with past medical history including T2DM, Afib on eliquis, Neuromuscular Disorder, Chronic CHF, CKD, COPD, Right Carotid Stenosis, Ischemic Cardiomyopathy (LVEF 35-40%), Hypothyroidism, BPPV, Polymyalgia Rheumatica, CABG, PTSD who resides at NH and brought to ED this morning due to a reported low BP at the nursing home.  Patient reports a single episode last night of nausea, emesis followed by dry heaves and an episode of diarrhea.  No vomiting or diarrhea today.  Reports appetite has been reasonably OK. Denies any fevers, chills, sweats. No CP, SOB, abd pain. No melena, hematochezia, dysuria. In ED, afebrile, hypotensive with SBP's in 70's. Given 1.5L IVF x 1 with minimal improvement in BP.  ED provider then started low dose Levophed and contacted Cardiology at [facility] who advised stopping the Levophed and give a single dose of IV Lasix 20mg which was done.  Most recent SBP 99.  Baseline SBP 90-low 100's. Her BMI is 34.79 which is down from 37 in 4/2025.  Last hospital admission in 2/2025 for septic shock due to proteus UTI with bacteremia.  Admitted for further evaluation and management.      Past Medical History    Past Medical History:   Diagnosis Date    ACP (advance care planning) 11/3/2010    Formatting of this note might be different from the original. Patient has identified Health Care Agent(s): Yes Add Health Care Agents: Yes   Health Care Agent(s):  Primary Health Care Agent:   Edwar Chappellsusana Relationship:  Spouse Phone:   [phone]  Secondary Health Care Agent:    Chiki Oro Relationship:   Son Phone:   [phone]  Patient has Advance Care Plan Documents ([employer]    Acute coronary syndrome (H) 11/22/2019    Acute exacerbation of CHF (congestive heart failure) (H) 11/11/2019    Acute on chronic systolic (congestive) heart failure (H) 1/28/2020    Anemia of chronic disease 1/5/2013    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, left leg (H) 1/15/2020    BPPV (benign paroxysmal positional vertigo) 5/31/2018    Candidiasis 3/1/2020    Carotid stenosis, right 7/13/2016    Carotid US 05/04/2018 showed moderate plaque formation, consistent with 50 to 69% stenosis in the right internal carotid artery, not significantly changed from 8/5/2015.  Moderate plaque formation, consistent with 50 to 69% stenosis in the left internal carotid artery; there has been mild progression of the left ICA stenosis since 8/5/2015.    Chest pain 11/11/2019    Chronic bilateral low back pain without sciatica 1/17/2018    Chronic pain disorder 10/1/2017    Constipation 3/20/2020    COPD (chronic obstructive pulmonary disease) (H) 6/24/2020    Coronary atherosclerosis 2/6/2009 2/5/2009 - MI - Proximal RCA 99%, mild-mod disease elsewhere.  EF 60%.  PCI:  MYRON to pRCA. 2/12/2009 - admit CP - Widely patent RCA stent. Moderate diffuse CAD. Severe stenosis in trivial PDA branch. LVEF 45%. 1/25/2010 - admit CP - PTCA and stent of diagonal 9/8/2010 - admit CP - LAD patent stent. Moderate diffuse CAD. Medical management recommended.  4/25/2012 - NSTEMI - Acute total occlusion of    Cubital tunnel syndrome on left 11/13/2012    Depressive disorder     Diabetes mellitus (H)     Diabetes mellitus type 2 in obese 7/13/2006    Diabetes mellitus type 2 in obese 7/13/2006    Drug-seeking behavior 4/4/2020    Failure to thrive in adult 9/3/2020    Hereditary and idiopathic peripheral neuropathy 4/24/2007    Hyperlipidemia with target low density lipoprotein (LDL)  cholesterol less than 70 mg/dL 5/30/2007    Hypertension 10/14/2015    Hypothyroidism 12/10/2010    Irritable bowel syndrome 9/7/2015    Ischemic cardiomyopathy 9/20/2015    EF of 40-45%, status post RV lead revision and LV epicardial lead placement via mini-thoracotomy in August 2016.    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  Designated pharmacy: [facility] Prescribing physician: Andrea Diagnosis: Ulnar neuropathy    Panic disorder with agoraphobia 4/14/2020    Paroxysmal atrial fibrillation (H) 10/2/2015    Personality disorder (H) 3/5/2020    Rule out dependent personality    Polymyalgia rheumatica 11/28/2018    Posttraumatic stress disorder 3/1/2020    Restless legs syndrome (RLS) 8/29/2007    Restless legs syndrome (RLS) 8/29/2007    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    Serum calcium elevated 3/1/2020    Somatic dysfunction of sacral region 1/17/2018    Subacromial bursitis of left shoulder joint 8/6/2018    Suicidal ideation 4/1/2020    Thyroid disease     TIA (transient ischemic attack) 5/4/2018    TIA (transient ischemic attack) 5/4/2018    Tobacco abuse 2/17/2017    Tobacco abuse 2/17/2017    Urinary incontinence, mixed 9/24/2017    UTI (urinary tract infection) 4/17/2020    Vitamin B12 deficiency 2/14/2018    Weakness 12/17/2019       Past Surgical History   Past Surgical History:   Procedure Laterality Date    CARDIAC SURGERY      stents x 11    CARDIAC SURGERY      CHOLECYSTECTOMY      CHOLECYSTECTOMY      GENITOURINARY SURGERY      Tubal ligation    OTHER SURGICAL HISTORY      Genitourinary surgery    RELEASE CARPAL TUNNEL      RELEASE CARPAL TUNNEL          Prior to Admission Medications     - await PharmD admission med rec completion    Physical Exam   Vital Signs:     BP: 125/51 Pulse: 60   Resp: 11 SpO2: 98 %      Weight: 188 lbs 0 oz    Gen nad  Eyes anicteric  Cv rrr. No edema  Lungs decreased bases, non-labored  Abd bs+, nd  Neuro non-focal    Lab/imaging reviewed

## 2025-06-20 NOTE — ED NOTES
Patient is requesting transfer to [facility] instead of [facility]. Dr Ramos updated.

## 2025-06-20 NOTE — ED PROVIDER NOTES
HPI    69 yr old female with a history of CHF, pacemaker, HTN, obesity, CAD, polymyalgia rheumatica, COPD, hyperlipidemia, obstructive sleep apnea, PTSD, restless legs who presents to the ER via EMS secondary to hypotension and diarrhea, having experienced three loose bowel movements over the last three days.  She vomited once yesterday.  No abdominal pain.  The patient reports feeling tired but denies any fever, recent illness, nausea, vomiting, bloody stools, or shortness of breath beyond baseline. The patient mentions vomiting once yesterday. The patient's blood pressure was recorded at 52 systolic, and the heart rate was noted to be in the 20s on a paced rhythm. The patient is diabetic with a blood sugar level of 200. The patient does not use oxygen regularly.    I independently reviewed the rhythm strip from paramedics which showed a rate of 66, paced.  I did not see any rhythm strips with the rate down into the 20s.    MEDICATIONS/ALLEVIATING FACTORS    Not provided.    ROS: 10 point review of systems performed and is otherwise negative    Positive for diarrhea, hypotension, and fatigue. Denies fever, nausea, vomiting, bloody stools, and shortness of breath beyond baseline.    EXAM    -Vitals reviewed: Blood pressure 77/50, heart rate in the 60s on a paced rhythm.  Afebrile.  -GEN: Appears tired, weak.  -HEENT: Normocephalic, atraumatic, no nasal drip, OP clear.  -Neck: Full range of motion, no masses.  -CV: Regular rhythm, heart rate noted to be in the 20s on paced rhythm.  -Chest: CTA bilaterally without wheezes, crackles, rhonchi.  -Abdomen: Non-tender, no distension, no guarding or rebound.  -Neurologic: Alert and oriented x 3, motor and sensation grossly intact.  -Psychiatric: Normal mood, no obvious psychiatric disturbance.    PERTINENT PAST MEDICAL HISTORY     Diabetes mellitus.    DIAGNOSTICS     Labs Ordered and Resulted from Time of ED Arrival to Time of ED Departure   COMPREHENSIVE METABOLIC PANEL -  Abnormal       Result Value    Sodium 135      Potassium 4.5      Carbon Dioxide (CO2) 19 (*)     Anion Gap 14      Urea Nitrogen 47.6 (*)     Creatinine 1.67 (*)     GFR Estimate 32 (*)     Calcium 9.2      Chloride 102      Glucose 187 (*)     Alkaline Phosphatase 54      AST 45      ALT 36      Protein Total 6.6      Albumin 3.3 (*)     Bilirubin Total 0.2     CBC WITH PLATELETS AND DIFFERENTIAL - Abnormal    WBC Count 9.0      RBC Count 2.97 (*)     Hemoglobin 8.7 (*)     Hematocrit 27.5 (*)     MCV 93      MCH 29.3      MCHC 31.6      RDW 14.0      Platelet Count 128 (*)     % Neutrophils 67      % Lymphocytes 23      % Monocytes 7      % Eosinophils 2      % Basophils 0      % Immature Granulocytes 1      NRBCs per 100 WBC 0      Absolute Neutrophils 6.0      Absolute Lymphocytes 2.1      Absolute Monocytes 0.6      Absolute Eosinophils 0.2      Absolute Basophils 0.0      Absolute Immature Granulocytes 0.1      Absolute NRBCs 0.0     NT-PROBNP - Abnormal    NT-proBNP 1,640 (*)    LACTIC ACID WHOLE BLOOD WITH 1X REPEAT IN 2 HR WHEN >2 - Normal    Lactic Acid, Initial 1.3       XR Chest 2 Views   Final Result   IMPRESSION: Interval increase of vascular and interstitial prominence that may represent mild pulmonary edema. Cardiomegaly appears slightly more prominent. Stable left chest pacer and sternotomy.      Echo Limited    (Results Pending)          EKG Interpretation:      Interpreted by Juan Ramos MD  Time reviewed:1343   Symptoms at time of EKG: hypotension   Rhythm: paced   Rate: normal  Axis: NORMAL  Ectopy: none  Conduction: normal  ST Segments/ T Waves: No ST-T wave changes  Q Waves: none  Comparison to prior: Unchanged    Clinical Impression: paced rhythm      MDM    Suspected etiology: Possible dehydration secondary to diarrhea leading to hypotension. Supporting evidence includes the patient's report of three loose bowel movements over the last three days and hypotension with a systolic blood  pressure of 52. Differential diagnosis includes dehydration, pacemaker malfunction, or other cardiac issues.    I reviewed the patient's most recent echocardiogram which demonstrates an ejection fraction of 35 to 40%.    Reviewing the labs the patient has anemia with a hemoglobin 8.7 which is somewhat down over the last few months.  GFR is also lower than baseline.  It is at 32 today and previously it was in the 40s.    Lactate is negative.    A/P    1. Hypotension - ddx includes congestive heart failure, sepsis, dehydration amongst other possible etiology.  Lactate is negative.  Patient's blood pressure was quite low.  She is not tachycardic and is paced.  1 L normal saline given.  GFR is below baseline.  Will consider Levophed if blood pressure does not improve.  Will get BNP EKG and chest x-ray.  Levophed was briefly initiated and I obtain a cardiology consult.  I spoke with Dr. Javed who recommended stopping the Levophed and giving the patient 20 mg of Lasix IV and getting her admitted and awaiting her blood pressure meds to wear off.  She felt that decreasing afterload with diuretics would improve cardiac output and improve her blood pressure.  That was ordered.    D/w hospitalist Dr. Owens who accepts the patient for admission.     2. Diarrhea -resulting in ERIC.  Patient repleted with 1 L normal saline in the emergency department.  No diarrhea now.     - Plan: Monitor bowel movements and consider stool studies if symptoms persist.    DISPOSITION    The patient is admitted for further monitoring and management due to significant hypotension and potential cardiac concerns.    Juan Ramos MD  06/20/25 [id number]      Addendum: Patient was initially accepted here for admission.  No beds were available until 7 PM.  While the patient was awaiting hospitalization here in the emergency department Dr. Owens asked me to consult telemetry ICU.  I tried to call them but did not get a call back.  Apparently he spoke  with Dr. Cortez from telemetry ICU who reportedly said they had beds at the [facility] in the ICU and the patient could be accepted and transferred to the ICU there.  I spoke with the ICU physician nurse practitioner at the [facility], Dr. Dodd who was happy to accept the patient in the ICU at [facility].  In discussing it further with the ICU team there as whether or not the patient would require transfer to higher level of care they did not think that that was necessary if our hospitalist was comfortable excepting the patient here.  We do have beds available here.  Meanwhile blood pressure has stabilized quite well here without any Levophed.  Last blood pressure was 109/48.  At this point the night hospitalist had taken over.  I spoke with Dr. Guerra the night hospitalist who reviewed the case.  He wanted me to touch base with the patient regarding her CODE STATUS.  At this point she remains DNR DNI.  I confirmed that in a discussion with the patient.  She would like to stay here if at all possible.  [facility] she refuses to go to the [facility].  If she needs to be transferred she would like to go to [facility] or [place].  [facility] did not have any beds in [place] had a delay of up to 4 hours.  After further discussion with Dr. Guerra patient was excepted here for admission.  Blood pressure has remained somewhat stable last check was 98/46.     Juan Ramos MD  06/20/25 2125       Juan Ramos MD  06/20/25 2138

## 2025-06-21 LAB
ALBUMIN SERPL BCG-MCNC: 3.3 G/DL (ref 3.5–5.2)
ALP SERPL-CCNC: 50 U/L (ref 40–150)
ALT SERPL W P-5'-P-CCNC: 10 U/L (ref 0–50)
ANION GAP SERPL CALCULATED.3IONS-SCNC: 9 MMOL/L (ref 7–15)
AST SERPL W P-5'-P-CCNC: 28 U/L (ref 0–45)
BASOPHILS # BLD AUTO: 0 10E3/UL (ref 0–0.2)
BASOPHILS NFR BLD AUTO: 0 %
BILIRUB DIRECT SERPL-MCNC: 0.09 MG/DL (ref 0–0.3)
BILIRUB SERPL-MCNC: 0.3 MG/DL
BUN SERPL-MCNC: 39.9 MG/DL (ref 8–23)
CALCIUM SERPL-MCNC: 9.1 MG/DL (ref 8.8–10.4)
CHLORIDE SERPL-SCNC: 104 MMOL/L (ref 98–107)
CORTIS SERPL-MCNC: 4.7 UG/DL
CREAT SERPL-MCNC: 1.39 MG/DL (ref 0.51–0.95)
EGFRCR SERPLBLD CKD-EPI 2021: 40 ML/MIN/1.73M2
EOSINOPHIL # BLD AUTO: 0.2 10E3/UL (ref 0–0.7)
EOSINOPHIL NFR BLD AUTO: 2 %
ERYTHROCYTE [DISTWIDTH] IN BLOOD BY AUTOMATED COUNT: 14 % (ref 10–15)
GLUCOSE BLDC GLUCOMTR-MCNC: 108 MG/DL (ref 70–99)
GLUCOSE BLDC GLUCOMTR-MCNC: 108 MG/DL (ref 70–99)
GLUCOSE BLDC GLUCOMTR-MCNC: 114 MG/DL (ref 70–99)
GLUCOSE BLDC GLUCOMTR-MCNC: 160 MG/DL (ref 70–99)
GLUCOSE SERPL-MCNC: 119 MG/DL (ref 70–99)
HCO3 SERPL-SCNC: 26 MMOL/L (ref 22–29)
HCT VFR BLD AUTO: 28.7 % (ref 35–47)
HGB BLD-MCNC: 9.2 G/DL (ref 11.7–15.7)
IMM GRANULOCYTES # BLD: 0.1 10E3/UL
IMM GRANULOCYTES NFR BLD: 1 %
LYMPHOCYTES # BLD AUTO: 2.3 10E3/UL (ref 0.8–5.3)
LYMPHOCYTES NFR BLD AUTO: 28 %
MAGNESIUM SERPL-MCNC: 1.8 MG/DL (ref 1.7–2.3)
MCH RBC QN AUTO: 29.6 PG (ref 26.5–33)
MCHC RBC AUTO-ENTMCNC: 32.1 G/DL (ref 31.5–36.5)
MCV RBC AUTO: 92 FL (ref 78–100)
MONOCYTES # BLD AUTO: 0.6 10E3/UL (ref 0–1.3)
MONOCYTES NFR BLD AUTO: 7 %
NEUTROPHILS # BLD AUTO: 5 10E3/UL (ref 1.6–8.3)
NEUTROPHILS NFR BLD AUTO: 62 %
NRBC # BLD AUTO: 0 10E3/UL
NRBC BLD AUTO-RTO: 0 /100
PHOSPHATE SERPL-MCNC: 3.4 MG/DL (ref 2.5–4.5)
PLATELET # BLD AUTO: 99 10E3/UL (ref 150–450)
POTASSIUM SERPL-SCNC: 4 MMOL/L (ref 3.4–5.3)
PROT SERPL-MCNC: 6.3 G/DL (ref 6.4–8.3)
RBC # BLD AUTO: 3.11 10E6/UL (ref 3.8–5.2)
SODIUM SERPL-SCNC: 139 MMOL/L (ref 135–145)
WBC # BLD AUTO: 8.1 10E3/UL (ref 4–11)

## 2025-06-21 PROCEDURE — 250N000013 HC RX MED GY IP 250 OP 250 PS 637: Performed by: STUDENT IN AN ORGANIZED HEALTH CARE EDUCATION/TRAINING PROGRAM

## 2025-06-21 PROCEDURE — 83735 ASSAY OF MAGNESIUM: CPT | Performed by: INTERNAL MEDICINE

## 2025-06-21 PROCEDURE — 82247 BILIRUBIN TOTAL: CPT | Performed by: INTERNAL MEDICINE

## 2025-06-21 PROCEDURE — 85004 AUTOMATED DIFF WBC COUNT: CPT | Performed by: INTERNAL MEDICINE

## 2025-06-21 PROCEDURE — 99233 SBSQ HOSP IP/OBS HIGH 50: CPT | Performed by: INTERNAL MEDICINE

## 2025-06-21 PROCEDURE — 3E033XZ INTRODUCTION OF VASOPRESSOR INTO PERIPHERAL VEIN, PERCUTANEOUS APPROACH: ICD-10-PCS | Performed by: INTERNAL MEDICINE

## 2025-06-21 PROCEDURE — 120N000001 HC R&B MED SURG/OB

## 2025-06-21 PROCEDURE — 87040 BLOOD CULTURE FOR BACTERIA: CPT | Performed by: INTERNAL MEDICINE

## 2025-06-21 PROCEDURE — 82248 BILIRUBIN DIRECT: CPT | Performed by: INTERNAL MEDICINE

## 2025-06-21 PROCEDURE — 82533 TOTAL CORTISOL: CPT | Performed by: INTERNAL MEDICINE

## 2025-06-21 PROCEDURE — 999N000111 HC STATISTIC OT IP EVAL DEFER

## 2025-06-21 PROCEDURE — 84450 TRANSFERASE (AST) (SGOT): CPT | Performed by: INTERNAL MEDICINE

## 2025-06-21 PROCEDURE — 84460 ALANINE AMINO (ALT) (SGPT): CPT | Performed by: INTERNAL MEDICINE

## 2025-06-21 PROCEDURE — 250N000011 HC RX IP 250 OP 636: Performed by: INTERNAL MEDICINE

## 2025-06-21 PROCEDURE — 250N000012 HC RX MED GY IP 250 OP 636 PS 637: Performed by: STUDENT IN AN ORGANIZED HEALTH CARE EDUCATION/TRAINING PROGRAM

## 2025-06-21 PROCEDURE — 36415 COLL VENOUS BLD VENIPUNCTURE: CPT | Performed by: INTERNAL MEDICINE

## 2025-06-21 PROCEDURE — 250N000013 HC RX MED GY IP 250 OP 250 PS 637: Performed by: INTERNAL MEDICINE

## 2025-06-21 PROCEDURE — 84075 ASSAY ALKALINE PHOSPHATASE: CPT | Performed by: INTERNAL MEDICINE

## 2025-06-21 PROCEDURE — 250N000009 HC RX 250: Performed by: INTERNAL MEDICINE

## 2025-06-21 PROCEDURE — 82310 ASSAY OF CALCIUM: CPT | Performed by: INTERNAL MEDICINE

## 2025-06-21 RX ORDER — LORAZEPAM 0.5 MG/1
0.25 TABLET ORAL AT BEDTIME
Status: DISCONTINUED | OUTPATIENT
Start: 2025-06-21 | End: 2025-06-22 | Stop reason: HOSPADM

## 2025-06-21 RX ORDER — FLUCONAZOLE 150 MG/1
150 TABLET ORAL ONCE
Status: COMPLETED | OUTPATIENT
Start: 2025-06-21 | End: 2025-06-21

## 2025-06-21 RX ORDER — CEFTRIAXONE 2 G/1
2 INJECTION, POWDER, FOR SOLUTION INTRAMUSCULAR; INTRAVENOUS EVERY 24 HOURS
Status: DISCONTINUED | OUTPATIENT
Start: 2025-06-21 | End: 2025-06-22 | Stop reason: HOSPADM

## 2025-06-21 RX ORDER — ROPIVACAINE IN 0.9% SOD CHL/PF 0.1 %
.01-.2 PLASTIC BAG, INJECTION (ML) EPIDURAL CONTINUOUS
Status: DISCONTINUED | OUTPATIENT
Start: 2025-06-21 | End: 2025-06-21

## 2025-06-21 RX ADMIN — LEVOTHYROXINE SODIUM 150 MCG: 75 TABLET ORAL at 08:09

## 2025-06-21 RX ADMIN — RANOLAZINE 500 MG: 500 TABLET, FILM COATED, EXTENDED RELEASE ORAL at 20:48

## 2025-06-21 RX ADMIN — FLUCONAZOLE 150 MG: 150 TABLET ORAL at 12:29

## 2025-06-21 RX ADMIN — LIDOCAINE: 50 OINTMENT TOPICAL at 09:01

## 2025-06-21 RX ADMIN — GABAPENTIN 300 MG: 300 CAPSULE ORAL at 00:00

## 2025-06-21 RX ADMIN — DULOXETINE HYDROCHLORIDE 40 MG: 20 CAPSULE, DELAYED RELEASE ORAL at 08:12

## 2025-06-21 RX ADMIN — APIXABAN 5 MG: 5 TABLET, FILM COATED ORAL at 08:13

## 2025-06-21 RX ADMIN — CEFTRIAXONE SODIUM 2 G: 2 INJECTION, POWDER, FOR SOLUTION INTRAMUSCULAR; INTRAVENOUS at 12:29

## 2025-06-21 RX ADMIN — QUETIAPINE 100 MG: 100 TABLET ORAL at 08:11

## 2025-06-21 RX ADMIN — ACETAMINOPHEN 650 MG: 325 TABLET, FILM COATED ORAL at 12:58

## 2025-06-21 RX ADMIN — ROSUVASTATIN CALCIUM 10 MG: 5 TABLET, FILM COATED ORAL at 20:26

## 2025-06-21 RX ADMIN — QUETIAPINE 100 MG: 100 TABLET ORAL at 20:27

## 2025-06-21 RX ADMIN — LIDOCAINE: 50 OINTMENT TOPICAL at 20:26

## 2025-06-21 RX ADMIN — ACETAMINOPHEN 650 MG: 325 TABLET, FILM COATED ORAL at 09:32

## 2025-06-21 RX ADMIN — ACETAMINOPHEN 650 MG: 325 TABLET, FILM COATED ORAL at 20:27

## 2025-06-21 RX ADMIN — BUSPIRONE HYDROCHLORIDE 10 MG: 5 TABLET ORAL at 00:01

## 2025-06-21 RX ADMIN — RANOLAZINE 500 MG: 500 TABLET, FILM COATED, EXTENDED RELEASE ORAL at 00:17

## 2025-06-21 RX ADMIN — INSULIN GLARGINE 10 UNITS: 100 INJECTION, SOLUTION SUBCUTANEOUS at 20:51

## 2025-06-21 RX ADMIN — CARBIDOPA AND LEVODOPA 1 TABLET: 25; 100 TABLET ORAL at 20:26

## 2025-06-21 RX ADMIN — APIXABAN 5 MG: 5 TABLET, FILM COATED ORAL at 00:01

## 2025-06-21 RX ADMIN — FAMOTIDINE 20 MG: 20 TABLET, FILM COATED ORAL at 08:12

## 2025-06-21 RX ADMIN — APIXABAN 5 MG: 5 TABLET, FILM COATED ORAL at 20:27

## 2025-06-21 RX ADMIN — MIRTAZAPINE 15 MG: 15 TABLET, FILM COATED ORAL at 20:26

## 2025-06-21 RX ADMIN — ROSUVASTATIN CALCIUM 10 MG: 5 TABLET, FILM COATED ORAL at 00:00

## 2025-06-21 RX ADMIN — QUETIAPINE 100 MG: 100 TABLET ORAL at 12:58

## 2025-06-21 RX ADMIN — LIDOCAINE: 50 OINTMENT TOPICAL at 00:17

## 2025-06-21 RX ADMIN — CARBIDOPA AND LEVODOPA 1 TABLET: 25; 100 TABLET ORAL at 00:01

## 2025-06-21 RX ADMIN — RANOLAZINE 500 MG: 500 TABLET, FILM COATED, EXTENDED RELEASE ORAL at 09:43

## 2025-06-21 RX ADMIN — PANTOPRAZOLE SODIUM 40 MG: 40 TABLET, DELAYED RELEASE ORAL at 08:12

## 2025-06-21 RX ADMIN — NOREPINEPHRINE BITARTRATE 0.03 MCG/KG/MIN: 0.02 INJECTION, SOLUTION INTRAVENOUS at 01:37

## 2025-06-21 RX ADMIN — LORAZEPAM 0.25 MG: 0.5 TABLET ORAL at 20:27

## 2025-06-21 RX ADMIN — BUSPIRONE HYDROCHLORIDE 10 MG: 5 TABLET ORAL at 20:27

## 2025-06-21 RX ADMIN — GABAPENTIN 300 MG: 300 CAPSULE ORAL at 20:27

## 2025-06-21 RX ADMIN — QUETIAPINE 100 MG: 100 TABLET ORAL at 00:00

## 2025-06-21 ASSESSMENT — ACTIVITIES OF DAILY LIVING (ADL)
EQUIPMENT_CURRENTLY_USED_AT_HOME: OTHER (SEE COMMENTS)
CONCENTRATING,_REMEMBERING_OR_MAKING_DECISIONS_DIFFICULTY: NO
ADLS_ACUITY_SCORE: 67
DEPENDENT_IADLS:: CLEANING;COOKING;LAUNDRY;SHOPPING;MEAL PREPARATION;MEDICATION MANAGEMENT;MONEY MANAGEMENT;TRANSPORTATION;INCONTINENCE
HEARING_DIFFICULTY_OR_DEAF: NO
FALL_HISTORY_WITHIN_LAST_SIX_MONTHS: NO
ADLS_ACUITY_SCORE: 67
ADLS_ACUITY_SCORE: 58
ADLS_ACUITY_SCORE: 67
ADLS_ACUITY_SCORE: 67
DIFFICULTY_EATING/SWALLOWING: YES
ADLS_ACUITY_SCORE: 67
DOING_ERRANDS_INDEPENDENTLY_DIFFICULTY: YES
CHANGE_IN_FUNCTIONAL_STATUS_SINCE_ONSET_OF_CURRENT_ILLNESS/INJURY: NO
TOILETING_MANAGEMENT: INCONTINENT
ADLS_ACUITY_SCORE: 67
ADLS_ACUITY_SCORE: 58
ADLS_ACUITY_SCORE: 67
DIFFICULTY_COMMUNICATING: NO
ADLS_ACUITY_SCORE: 67
ADLS_ACUITY_SCORE: 58
TOILETING: 2-->COMPLETELY DEPENDENT
ADLS_ACUITY_SCORE: 58
TOILETING: 2-->COMPLETELY DEPENDENT (NOT DEVELOPMENTALLY APPROPRIATE)
ADLS_ACUITY_SCORE: 58
WALKING_OR_CLIMBING_STAIRS_DIFFICULTY: YES
DRESSING/BATHING: DRESSING DIFFICULTY, ASSISTANCE 1 PERSON
TOILETING_ASSISTANCE: TOILETING DIFFICULTY, DEPENDENT
WEAR_GLASSES_OR_BLIND: NO
DRESSING/BATHING_DIFFICULTY: YES
ADLS_ACUITY_SCORE: 67
TOILETING_ISSUES: YES
EATING/SWALLOWING: EATING;SWALLOWING LIQUIDS;SWALLOWING SOLID FOOD
ADLS_ACUITY_SCORE: 67
ADLS_ACUITY_SCORE: 67

## 2025-06-21 NOTE — ED NOTES
ED Nursing criteria listed below was addressed during verbal handoff:     Abnormal vitals: Yes  Abnormal results: Yes  Med Reconciliation completed: Yes  Meds given in ED: Yes  Any Overdue Meds: Yes  Core Measures: Yes  Isolation: N/A  Special needs: Yes  Skin assessment: Yes    Observation Patient  Education provided: N/A

## 2025-06-21 NOTE — PLAN OF CARE
Goal Outcome Evaluation:                 Outcome Evaluation: Patient resides at [facility]. Has active bed hold in place. Plans to return back to LTC when medically stable.

## 2025-06-21 NOTE — PLAN OF CARE
Occupational Therapy: Orders received. Chart reviewed and discussed with care team.? Occupational Therapy not indicated due to no IP OT needs identified per communication with medical care team.? Defer discharge recommendations to medical care team.? Will complete orders.     Seamus Lovelace OTR/L  Occupational Therapist

## 2025-06-21 NOTE — PROGRESS NOTES
Pt admitted to room 218 from ED via cart. Pt denies pain. Afebrile. BP soft but MAP above 65. Orders reviewed with patient. Patient called  and son.

## 2025-06-21 NOTE — CONSULTS
Care Management Initial Consult    General Information  Assessment completed with: Patient, Care Team Member,  Spouse- Edwar    Type of CM/SW Visit: Offer D/C Planning    Primary Care Provider verified and updated as needed: Yes   Readmission within the last 30 days:        Reason for Consult: discharge planning  Advance Care Planning:            Communication Assessment  Patient's communication style: spoken language (English or Bilingual)    Hearing Difficulty or Deaf: no   Wear Glasses or Blind: no    Cognitive  Cognitive/Neuro/Behavioral: WDL                      Living Environment:   People in home: facility resident     Current living Arrangements: extended care facility  Name of Facility: [facility]   Able to return to prior arrangements: yes  Living Arrangement Comments: Has active Bed Hold at [facility]    Family/Social Support:  Care provided by:  (24 hour care by SNF)  Provides care for: no one, unable/limited ability to care for self  Marital Status:   Support system:   Edwar       Description of Support System: Supportive, Involved    Support Assessment: Adequate family and caregiver support, Adequate social supports    Current Resources:   Patient receiving home care services: No        Community Resources: Skilled Nursing Facility  Equipment currently used at home: other (see comments) (in [facility] facility)  Supplies currently used at home: Diabetic Supplies, Incontinence Supplies    Employment/Financial:  Employment Status: retired        Financial Concerns:  No concerns. Has Medical Assistance      Does the patient's insurance plan have a 3 day qualifying hospital stay waiver?  Yes     Which insurance plan 3 day waiver is available? Alternative insurance waiver    Will the waiver be used for post-acute placement? No    Lifestyle & Psychosocial Needs:  Social Drivers of Health     Food Insecurity: Low Risk  (6/21/2025)    Food Insecurity     Within the past 12  months, did you worry that your food would run out before you got money to buy more?: No     Within the past 12 months, did the food you bought just not last and you didn t have money to get more?: No   Depression: Not at risk (2/27/2023)    PHQ-2     PHQ-2 Score: 0   Housing Stability: High Risk (6/21/2025)    Housing Stability     Do you have housing? : No     Are you worried about losing your housing?: No   Tobacco Use: Medium Risk (4/17/2025)    Received from [facility]    Patient History     Smoking Tobacco Use: Former     Smokeless Tobacco Use: Never     Passive Exposure: Not on file   Financial Resource Strain: Low Risk  (6/21/2025)    Financial Resource Strain     Within the past 12 months, have you or your family members you live with been unable to get utilities (heat, electricity) when it was really needed?: No   Alcohol Use: Not on file   Transportation Needs: Low Risk  (6/21/2025)    Transportation Needs     Within the past 12 months, has lack of transportation kept you from medical appointments, getting your medicines, non-medical meetings or appointments, work, or from getting things that you need?: No   Physical Activity: Not on file   Interpersonal Safety: High Risk (6/21/2025)    Interpersonal Safety     Do you feel physically and emotionally safe where you currently live?: No     Within the past 12 months, have you been hit, slapped, kicked or otherwise physically hurt by someone?: No     Within the past 12 months, have you been humiliated or emotionally abused in other ways by your partner or ex-partner?: No   Stress: Not on file   Social Connections: Socially Integrated (5/10/2024)    Received from [facility]    Social Connections     Do you often feel lonely or isolated from those around you?: 0   Health Literacy: Not on file       Functional Status:  Prior to admission patient needed assistance:   Dependent ADLs:: Bathing,  "Dressing, Eating, Grooming, Wheelchair-independent, Toileting, Incontinence, Transfers  Dependent IADLs:: Cleaning, Cooking, Laundry, Shopping, Meal Preparation, Medication Management, Money Management, Transportation, Incontinence  Assesssment of Functional Status: At functional baseline (Wheelchair bound)    Mental Health Status:  Mental Health Status: No Current Concerns       Chemical Dependency Status:  Chemical Dependency Status: No Current Concerns             Values/Beliefs:  Spiritual, Cultural Beliefs, Lutheran Practices, Values that affect care: no               Discussed  Partnership in Safe Discharge Planning  document with patient/family: Yes      Additional Information:  Referral received to assist with discharge planning and services.     Patient resides at the [facility]. Has an Active Bed hold in place.     Call placed to the Station RN. Patient is typically wheelchair bound at baseline.   Facility reports no concerns to accept the patient back when medically stable for discharge.     Call placed to the Spouse Edwar to introduce self/role.   Spouse plans to have the patient return back to her care center on discharge.     Requested an Ambulance transport - \"as usual\"   Patient has a [employer] product for coverage.           Next Steps: CM to follow as condition evolves.     Planning return back to [facility] on discharge   (Main Phone: [phone] Admissions Phone: [phone] Fax: [phone])         FILEMON Maldonado  [facility] [phone]   [facility]  [phone]          "

## 2025-06-21 NOTE — PROGRESS NOTES
S-(situation): Patient arrives to room 218 via cart from ED    B-(background): low blood pressure    A-(assessment): patient arrived from ED she is cared for in a skilled nursing facility that noted that her blood pressures were running low. Vitals stable with blood pressures boarder line low with MAPs 65 or greater. She received lasix in the ED with good response. Lungs clear but diminished cardiac monitor 100% v paced. She is on room air with oxygen saturation 94-97% she denied any complaints of shortness of breath. She stated that she is wheelchair bound and gets out of bed via lift at the nursing home.     R-(recommendations): Orders reviewed with patient. Will monitor patient per MD orders.     Inpatient nursing criteria listed below were met:    Health care directives status obtained and documented: Yes  VTE ordered/documented: Yes  Skin issues/needs documented: yes  Isolation addressed and Signage used: Yes  Fall Prevention: Care plan updated Yes Education given and documented Yes  Care Plan initiated and Co-Morbidities added: Yes  Education Assessment documented:Yes  Admission Education Documented: Yes  If present CAUTI/CLABI Education done: NA  New medication patient education completed and documented (Possible Side Effects of Common Medications handout): Yes  Allergies Reviewed: Yes  Admission Medication Reconciliation completed: Yes  Home medications if not able to send immediately home with family stored here: NA  Reminder note placed in discharge instructions regarding home meds: NA  Individualized care needs/preferences addressed and charted: Yes  Provider Notified that patient has arrived to the unit: Yes

## 2025-06-21 NOTE — PROGRESS NOTES
Called to admit patient patient who was seen earlier in ED by day shift Hospitalist. Chart reviewed    Letty Escobar is a 69 year old female who has a PMHx of with past medical history including T2DM, Afib on eliquis, Neuromuscular Disorder, Chronic CHF, CKD, COPD, Right Carotid Stenosis, Ischemic Cardiomyopathy (LVEF 35-40%), Hypothyroidism, BPPV, Polymyalgia Rheumatica, CABG, PTSD who resides at NH and brought to ED this morning due to a reported low BP at the nursing home.  Patient reports a single episode last night of nausea, emesis followed by dry heaves and an episode of diarrhea.  No vomiting or diarrhea today.  Reports appetite has been reasonably OK. Denies any fevers, chills, sweats. No CP, SOB, abd pain. No melena, hematochezia, dysuria. In ED, afebrile, hypotensive with SBP's in 70's. Given 1.5L IVF x 1 with minimal improvement in BP.  ED provider then started low dose Levophed and contacted Cardiology at [facility] who advised stopping the Levophed and give a single dose of IV Lasix 20mg which was done.  Most recent SBP 99.  Baseline SBP 90-low 100's. Her BMI is 34.79 which is down from 37 in 4/2025.  Last hospital admission in 2/2025 for septic shock due to proteus UTI with bacteremia.  Admitted for further evaluation and management.     # Hypotension, improved and Levophed has been weaned off.  -# HFrEF, LVEF 35-40% which it has remained compared to past  # H/O CABG x5  # Atrial fibrillation on Eliquis  # S/P PPM and CardioMEMS (ICD therapies were turned off per patient request)  -      # Bilateral carotid artery stenosis  - await PharmD admission med rec completion     # PMR  - await PharmD admission med rec completion      D/w ED-- and also on call Tele Intensivist--.  Patient has declined transfer to [facility] and no ICU beds at [facility]. Evaluated patient by video communication device in ED. She has requested to stay at [facility] and focus on conservative management. Code status  revisited and confirmed DNR. Given that hypotension has resolved/ weaned off levophed and after discussion with [facility] ICU, admit here at [facility]

## 2025-06-21 NOTE — PLAN OF CARE
Physical Therapy: Orders received. Chart reviewed and discussed with care team.? Physical Therapy not indicated due to no inpatient PT needs per communication with medical team.? Defer discharge recommendations to medical staff.? Will complete orders.     Thank you for your referral.  Val Curiel PT, DPT    [facility]  O: [phone]  E: [email or web address]

## 2025-06-21 NOTE — PROGRESS NOTES
[facility]    Medicine Progress Note - Hospitalist Service    Date of Admission:  6/20/2025    Assessment & Plan      Letty Escobar is a 69 year old female who has a PMHx of with past medical history including T2DM, Afib on eliquis, Neuromuscular Disorder, Chronic CHF, CKD, COPD, Right Carotid Stenosis, Ischemic Cardiomyopathy (LVEF 35-40%), Hypothyroidism, BPPV, Polymyalgia Rheumatica, CABG, PTSD who resides at NH and brought to ED this morning due to a reported low BP at the nursing home.  Patient reports a single episode last night of nausea, emesis followed by dry heaves and an episode of diarrhea.  No vomiting or diarrhea today.  Reports appetite has been reasonably OK. Denies any fevers, chills, sweats. No CP, SOB, abd pain. No melena, hematochezia, dysuria. In ED, afebrile, hypotensive with SBP's in 70's. Given 1.5L IVF x 1 with minimal improvement in BP.  ED provider then started low dose Levophed and contacted Cardiology at [facility] who advised stopping the Levophed and give a single dose of IV Lasix 20mg which was done.  Most recent SBP 99.  Baseline SBP 90-low 100's. Her BMI is 34.79 which is down from 37 in 4/2025.  Last hospital admission in 2/2025 for septic shock due to proteus UTI with bacteremia.  Admitted for further evaluation and management.    # Hypotension, resolved  - Off Vasopressors since 8am this morning  - Hold BP meds today  - Close hemodynamic monitoring  - OK to transfer out of ICU  - TTE as below    # HFrEF, LVEF 35-40% which it has remained compared to past  # H/O CABG x5  # Atrial fibrillation on Eliquis  # S/P PPM and CardioMEMS (ICD therapies were turned off per patient request)  - most recent cath in 2023 showed all grafts to be patent (LIMA to the LAD, vein graft to the PDA, vein graft to the D1 and Y branch to OM2 and OM1)   - Repeat TTE showed similar LVEF 35% which is slightly reduced, mild RVSD, ~RVSP 42 mmHg + RAP, trace-mild MR.  -  nt-BNP 1,640 down from prior value at 2,197  - Troponin T-HS 39 -> 32  - Hold Lasix, Toprol XL, Losartan today given came off vasopressor this morning.  - Restart Toprol XL 12.5mg every day in a.m. (PTA dose 37.5mg)  - Eliquis, Statin, Ranexa    # ERIC (improved)   # CKD stage III  - Baseline Cr around 1.3.  Admission Cr 1.6 -> 1.39.  - Maintain MAP > 65  - Hold Lasix, Losartan, Jardiance  - Avoid nephrotoxic meds  - Stric I/O, daily weights  - Cr/K/Mg/Phos in a.m.    # UTI  - Await UC  - Previous UC proteus mirabilis, pansensitive  - Follow BC's, NGTD  - IV CTX 2g q24  - Fluconazole 150mg PO x 1 as candidal proph given reported h/o yeast infxn of abx's    # Hypothyroidism  - TSH 7.82 (6/18/25)  - FT4 low at 0.8  - ? Compliance  - Continue Synthroid at PTA dose. Recheck TFT's in 4 weeks as outpatient with PCP.    # DM 2  - A1C (6/18/25) 6.3%  - sliding scale insulin/Glucose checks  - Lantus 10U qhs    # AOCD  # Mild chronic intermittent thrombocytopenia  - Hgb 8.7 -> 9.2  - No ABLA    # Bilateral carotid artery stenosis  - Eliquis, Statin    # PMR  - On no prednisone chronically    # Mood disorder  - Restart Ativan at reduced dose 0.25 mg at bedtime (PTA dose 0.5mg BID)  - Seroquel 100mg TID  - Remeron 15 mg at bedtime  - Cymbalta  - Gabapentin 300 mg at bedtime ( mg) dose reduced for renal fxn.    # Dispo: home 1-2 days. Wheelchair bound at baseline. Declines informational meeting with Hospice          Diet: Combination Diet Minced and Moist Diet (level 5); Mildly Thick (level 2); 2 gm NA Diet; Mildly Thick (level 2); No Caffeine Diet    DVT Prophylaxis: DOAC  Mccallum Catheter: Not present  Lines: PRESENT             Cardiac Monitoring: ACTIVE order. Indication: QTc prolonging medication (48 hours)  Code Status: No CPR- Do NOT Intubate      Clinically Significant Risk Factors Present on Admission               # Hypoalbuminemia: Lowest albumin = 3.3 g/dL at 6/21/2025  5:35 AM, will monitor as appropriate  #  "Drug Induced Coagulation Defect: home medication list includes an anticoagulant medication  # Thrombocytopenia: Lowest platelets = 99 in last 2 days, will monitor for bleeding   # Hypertension: Noted on problem list  # Acute heart failure with reduced ejection fraction: last echo with EF <40% and receiving IV diuretics  # Circulatory Shock: required vasopressors within past 24 hours      # Acute Hypercapnic Respiratory Failure: based on venous blood gas results.  Continue supplemental oxygen and ventilatory support as indicated.   # Anemia: based on hgb <11       # Overweight: Estimated body mass index is 29.52 kg/m  as calculated from the following:    Height as of this encounter: 1.575 m (5' 2\").    Weight as of this encounter: 73.2 kg (161 lb 6.4 oz).       # Financial/Environmental Concerns:     # ICD device present       Social Drivers of Health    Housing Stability: High Risk (6/21/2025)    Housing Stability     Do you have housing? : No     Are you worried about losing your housing?: No   Tobacco Use: Medium Risk (4/17/2025)    [facility] from [facility]    Patient History     Smoking Tobacco Use: Former     Smokeless Tobacco Use: Never   Interpersonal Safety: High Risk (6/21/2025)    Interpersonal Safety     Do you feel physically and emotionally safe where you currently live?: No     Within the past 12 months, have you been hit, slapped, kicked or otherwise physically hurt by someone?: No     Within the past 12 months, have you been humiliated or emotionally abused in other ways by your partner or ex-partner?: No          Disposition Plan     Medically Ready for Discharge: Anticipated in 2-4 Days             Ronal Owens DO,   Hospitalist Service  [facility]  Securely message with [place] (more info)  Text page via [facility] Paging/Directory   ______________________________________________________________________    Interval History "   Afebrile. Off vasopressors at 8am this morning. Hemodynamically stable. Eating lunch. No CP, SOB, abd pain, n/v.    Physical Exam   Vital Signs: Temp: 98.2  F (36.8  C) Temp src: Oral BP: 115/61 Pulse: 60   Resp: 16 SpO2: 95 % O2 Device: None (Room air)    Weight: 161 lbs 6.4 oz    Gen nad  Eyes anicteric  Cv rrr. No edema. No JVD. Extremities dp/pt pulses 2+ BLE   Lungs decreased bases, non-labored  Abd bs+, nd  Neuro non-focal     Lab/imaging reviewed

## 2025-06-21 NOTE — PROGRESS NOTES
S- Transfer to 265 from 218.    B- Hypotension, UTI    A- Brief systems assessment: Pt is alert and oriented. Repetitive and frequent requests. BP WNL.    R- Transfer to 265 per physician orders. Continue to monitor pt and update physician as needed.      Code status: DNR / DNI  Skin: Pale, intact  Fall Risk: Yes- Department fall risk interventions implemented.  Isolation and Signage: Contact  Medication drips upon transfer: None  Blue Bin checked and medications transfer out with patientYes

## 2025-06-21 NOTE — PLAN OF CARE
Goal Outcome Evaluation:           Overall Patient Progress: improvingOverall Patient Progress: improving    Outcome Evaluation: Levophed has remained off with BP's soft but WNL. She is alert and oriented. Has frequent and repetative requests from staff. Pt refused to get up into a chair stating that she doesn't do that. She only gets up for meals and immediately has staff lay her back down at the NH after. Despite stating that, pt still refused to get up for lunch. She reports chronic knee pain. Tylenol given.

## 2025-06-21 NOTE — PLAN OF CARE
Goal Outcome Evaluation:      Plan of Care Reviewed With: patient    Overall Patient Progress: no changeOverall Patient Progress: no change    Outcome Evaluation: Patient slept most of night. lungs clear but diminised and she remains of room air. blood pressure down but at 0130 she was placed on levophed for low blood pressure with Map's < 65. she is currently on levophed at 0.01 mcg/kg/min which keeps her Maps > 65. she utilizes a pure wick for urine relief. she had 400 our for 8 hours. will continue to monitor blood pressure and titrate Levophed as ordered.

## 2025-06-21 NOTE — PROVIDER NOTIFICATION
Patient on lantus 10 units every night blood sugar on admit was 101 will hold this pm's lantus per MD's instructions and will follow blood sugars

## 2025-06-21 NOTE — PLAN OF CARE
"Goal Outcome Evaluation:      Plan of Care Reviewed With: patient    Overall Patient Progress: improvingOverall Patient Progress: improving    Soft Bp but MAP above 65. Purewick in place. Refused to be turned, pt only want to stay on her R side despite education on preventing bed sores. Refused to get up for dinner. BG-114, no insulin coverage. SL. On RA.     Blood pressure 109/46, pulse 60, temperature 98.2  F (36.8  C), temperature source Oral, resp. rate 16, height 1.575 m (5' 2\"), weight 73.2 kg (161 lb 6.4 oz), SpO2 95%.           "

## 2025-06-22 VITALS
BODY MASS INDEX: 29.7 KG/M2 | WEIGHT: 161.4 LBS | RESPIRATION RATE: 18 BRPM | TEMPERATURE: 98.7 F | SYSTOLIC BLOOD PRESSURE: 141 MMHG | OXYGEN SATURATION: 96 % | HEART RATE: 61 BPM | HEIGHT: 62 IN | DIASTOLIC BLOOD PRESSURE: 51 MMHG

## 2025-06-22 LAB
BACTERIA UR CULT: NORMAL
CREAT SERPL-MCNC: 1.14 MG/DL (ref 0.51–0.95)
EGFRCR SERPLBLD CKD-EPI 2021: 51 ML/MIN/1.73M2
GLUCOSE BLDC GLUCOMTR-MCNC: 123 MG/DL (ref 70–99)
GLUCOSE BLDC GLUCOMTR-MCNC: 129 MG/DL (ref 70–99)
MAGNESIUM SERPL-MCNC: 1.8 MG/DL (ref 1.7–2.3)
PHOSPHATE SERPL-MCNC: 2.8 MG/DL (ref 2.5–4.5)
POTASSIUM SERPL-SCNC: 4.4 MMOL/L (ref 3.4–5.3)

## 2025-06-22 PROCEDURE — 250N000011 HC RX IP 250 OP 636: Performed by: INTERNAL MEDICINE

## 2025-06-22 PROCEDURE — 84132 ASSAY OF SERUM POTASSIUM: CPT | Performed by: INTERNAL MEDICINE

## 2025-06-22 PROCEDURE — 82565 ASSAY OF CREATININE: CPT | Performed by: INTERNAL MEDICINE

## 2025-06-22 PROCEDURE — 84100 ASSAY OF PHOSPHORUS: CPT | Performed by: INTERNAL MEDICINE

## 2025-06-22 PROCEDURE — 250N000013 HC RX MED GY IP 250 OP 250 PS 637: Performed by: INTERNAL MEDICINE

## 2025-06-22 PROCEDURE — 250N000013 HC RX MED GY IP 250 OP 250 PS 637: Performed by: STUDENT IN AN ORGANIZED HEALTH CARE EDUCATION/TRAINING PROGRAM

## 2025-06-22 PROCEDURE — 83735 ASSAY OF MAGNESIUM: CPT | Performed by: INTERNAL MEDICINE

## 2025-06-22 PROCEDURE — 36415 COLL VENOUS BLD VENIPUNCTURE: CPT | Performed by: INTERNAL MEDICINE

## 2025-06-22 RX ORDER — FUROSEMIDE 20 MG/1
20 TABLET ORAL DAILY
DISCHARGE
Start: 2025-06-23

## 2025-06-22 RX ORDER — METOPROLOL SUCCINATE 25 MG/1
12.5 TABLET, EXTENDED RELEASE ORAL DAILY
COMMUNITY
Start: 2025-06-22

## 2025-06-22 RX ORDER — FUROSEMIDE 20 MG/1
20 TABLET ORAL DAILY
Status: DISCONTINUED | OUTPATIENT
Start: 2025-06-22 | End: 2025-06-22 | Stop reason: HOSPADM

## 2025-06-22 RX ORDER — CEFUROXIME AXETIL 500 MG/1
500 TABLET ORAL 2 TIMES DAILY
DISCHARGE
Start: 2025-06-23 | End: 2025-06-26

## 2025-06-22 RX ORDER — LOSARTAN POTASSIUM 25 MG/1
25 TABLET ORAL DAILY
COMMUNITY
Start: 2025-06-22

## 2025-06-22 RX ADMIN — FAMOTIDINE 20 MG: 20 TABLET, FILM COATED ORAL at 08:04

## 2025-06-22 RX ADMIN — LEVOTHYROXINE SODIUM 150 MCG: 75 TABLET ORAL at 07:46

## 2025-06-22 RX ADMIN — QUETIAPINE 100 MG: 100 TABLET ORAL at 08:03

## 2025-06-22 RX ADMIN — FUROSEMIDE 20 MG: 20 TABLET ORAL at 12:33

## 2025-06-22 RX ADMIN — RANOLAZINE 500 MG: 500 TABLET, FILM COATED, EXTENDED RELEASE ORAL at 08:04

## 2025-06-22 RX ADMIN — LIDOCAINE: 50 OINTMENT TOPICAL at 08:04

## 2025-06-22 RX ADMIN — METOPROLOL SUCCINATE 12.5 MG: 25 TABLET, EXTENDED RELEASE ORAL at 08:03

## 2025-06-22 RX ADMIN — APIXABAN 5 MG: 5 TABLET, FILM COATED ORAL at 08:04

## 2025-06-22 RX ADMIN — PANTOPRAZOLE SODIUM 40 MG: 40 TABLET, DELAYED RELEASE ORAL at 08:04

## 2025-06-22 RX ADMIN — DULOXETINE HYDROCHLORIDE 40 MG: 20 CAPSULE, DELAYED RELEASE ORAL at 08:04

## 2025-06-22 RX ADMIN — QUETIAPINE 100 MG: 100 TABLET ORAL at 12:32

## 2025-06-22 RX ADMIN — CEFTRIAXONE SODIUM 2 G: 2 INJECTION, POWDER, FOR SOLUTION INTRAMUSCULAR; INTRAVENOUS at 12:32

## 2025-06-22 ASSESSMENT — ACTIVITIES OF DAILY LIVING (ADL)
ADLS_ACUITY_SCORE: 67
ADLS_ACUITY_SCORE: 63
ADLS_ACUITY_SCORE: 67

## 2025-06-22 NOTE — PLAN OF CARE
Goal Outcome Evaluation:      Plan of Care Reviewed With: patient    Overall Patient Progress: no changeOverall Patient Progress: no change    Outcome Evaluation: A&Ox4 but forgetful. Pt increasingly frustrated about many things throughout shift. Uses call light frequently for requests. Attempted q2h turns and repositioning because pt stated she was in a painful position, but then got upset when trying to reposition and requested we put her back to right side. Pt refused all further repositioning throughout shift, educated on importance of repositioning and shifting weight. Prefers pills with pudding and warm thickened water. Tele v-paced. Pt stated many times that she wants to go back to her nursing home today.

## 2025-06-22 NOTE — PROGRESS NOTES
S-(situation): Patient discharged to [facility] via EMS at 1644    B-(background): Low Bp and UTI    A-(assessment): VSS on RA. Pt up in chair for meals. Tele paced. Pills given with pudding and warm water. Incontinent, purewick in place with good urine output.       R-(recommendations):Report called to MELISSA Isabel. Listed belongings gathered and sent with patient.     Discharge Nursing Criteria:     Care Plan and Patient education resolved: Yes    Vaccines  Influenza status verified at discharge:  Yes    MISC  Home medications returned to patient: Yes  Medication Bin checked and emptied on discharge Yes  All paperwork sent with patient/Copy of AVS given to patient or family Yes.

## 2025-06-22 NOTE — PROGRESS NOTES
Care Management Discharge Note    Discharge Date: 06/22/2025     Discharge Disposition: Long Term Care: [facility]    Discharge Services: Long Term     Discharge DME: Wheelchair    Discharge Transportation: health plan transportation    Private pay costs discussed: Not applicable-- Has medical assistance     Does the patient's insurance plan have a 3 day qualifying hospital stay waiver?  No    PAS Confirmation Code: N/A--SNF return   Patient/family educated on Medicare website which has current facility and service quality ratings: no (Return to LT)    Education Provided on the Discharge Plan: No  Persons Notified of Discharge Plans: Spouse   Patient/Family in Agreement with the Plan: yes    Handoff Referral Completed: No, handoff not indicated or clinically appropriate    Additional Information:  Update received from MD during IDT rounds. Informed Pt is medically appropriate to discharge back to her Long Term Care facility.     JOHNNY placed call to MELISSA Silva at [facility]. Informed of discharge plan. Facility aware of Pt's transfer plan.   Provided Fax # for orders [phone]-[facility]     Call placed to the Pt's spouse Edwar. Discussed the discharge plan.   Edwar expressed being pleased with the discharge. Reported no concerns     Requested the Patient be transported via Ambulance Stretcher   States she has not been sitting in a wheelchair much.   JOHNNY alerted the Charge RN and MD.         [facility]   Main Phone: [phone]   Admissions : [phone]   Fax: [phone]      FILEMON Maldonado  [facility] [place] [phone]   [facility]  [phone]

## 2025-06-22 NOTE — DISCHARGE SUMMARY
"[facility]  Hospitalist Discharge Summary      Date of Admission:  6/20/2025  Date of Discharge:  6/22/2025  Discharging Provider: Umm Whitmore MD  Discharge Service: Hospitalist Service    Discharge Diagnoses     Hypotension, resolved  HFrEF, LVEF 35-40% which it has remained compared to past  H/O CABG x5  Atrial fibrillation on Eliquis  S/P PPM and CardioMEMS (ICD therapies were turned off per patient request)  ERIC (improved)   CKD stage III  UTI  Hypothyroidism  DM 2  AOCD  Mild chronic intermittent thrombocytopenia  Bilateral carotid artery stenosis  PMR  Mood disorder    Clinically Significant Risk Factors     # Overweight: Estimated body mass index is 29.52 kg/m  as calculated from the following:    Height as of this encounter: 1.575 m (5' 2\").    Weight as of this encounter: 73.2 kg (161 lb 6.4 oz).       Follow-ups Needed After Discharge   Follow-up Appointments       Follow Up and recommended labs and tests      Follow up with Nursing home physician.            {Additional follow-up instructions/to-do's for PCP    :***}    Unresulted Labs Ordered in the Past 30 Days of this Admission       Date and Time Order Name Status Description    6/20/2025  3:31 PM Blood Culture Peripheral blood (BC) Hand, Left Preliminary     6/20/2025  3:31 PM Blood Culture Peripheral blood (BC) Hand, Right Preliminary         These results will be followed up by hospitalist     Discharge Disposition   Discharge to [facility]   Condition at discharge: Stable    Hospital Course     Letty Escobar is a 69 year old female who has a PMHx of with past medical history including T2DM, Afib on eliquis, Neuromuscular Disorder, Chronic CHF, CKD, COPD, Right Carotid Stenosis, Ischemic Cardiomyopathy (LVEF 35-40%), Hypothyroidism, BPPV, Polymyalgia Rheumatica, CABG, PTSD who resides at NH and brought to ED this morning due to a reported low BP at the nursing home.  Patient reports a single " episode last night of nausea, emesis followed by dry heaves and an episode of diarrhea.  No vomiting or diarrhea today.  Reports appetite has been reasonably OK. Denies any fevers, chills, sweats. No CP, SOB, abd pain. No melena, hematochezia, dysuria. In ED, afebrile, hypotensive with SBP's in 70's. Given 1.5L IVF x 1 with minimal improvement in BP.  ED provider then started low dose Levophed and contacted Cardiology at [facility] who advised stopping the Levophed and give a single dose of IV Lasix 20mg which was done.  Most recent SBP 99.  Baseline SBP 90-low 100's. Her BMI is 34.79 which is down from 37 in 4/2025.  Last hospital admission in 2/2025 for septic shock due to proteus UTI with bacteremia.  Admitted for further evaluation and management.     # Hypotension, resolved  - Off Vasopressors since 8am this morning  - Hold BP meds today  - Close hemodynamic monitoring  - OK to transfer out of ICU  - TTE as below     # HFrEF, LVEF 35-40% which it has remained compared to past  # H/O CABG x5  # Atrial fibrillation on Eliquis  # S/P PPM and CardioMEMS (ICD therapies were turned off per patient request)  - most recent cath in 2023 showed all grafts to be patent (LIMA to the LAD, vein graft to the PDA, vein graft to the D1 and Y branch to OM2 and OM1)   - Repeat TTE showed similar LVEF 35% which is slightly reduced, mild RVSD, ~RVSP 42 mmHg + RAP, trace-mild MR.  - nt-BNP 1,640 down from prior value at 2,197  - Troponin T-HS 39 -> 32  - Hold Lasix, Toprol XL, Losartan today given came off vasopressor this morning.  - Restart Toprol XL 12.5mg every day in a.m. (PTA dose 37.5mg)  - Eliquis, Statin, Ranexa     # ERIC (improved)   # CKD stage III  - Baseline Cr around 1.3.  Admission Cr 1.6 -> 1.39.  - Maintain MAP > 65  - Hold Lasix, Losartan, Jardiance  - Avoid nephrotoxic meds  - Stric I/O, daily weights  - Cr/K/Mg/Phos in a.m.     # UTI  - Await UC  - Previous  proteus mirabilis, pansensitive  - Follow BC's,  NGTD  - IV CTX 2g q24  - Fluconazole 150mg PO x 1 as candidal proph given reported h/o yeast infxn of abx's     # Hypothyroidism  - TSH 7.82 (6/18/25)  - FT4 low at 0.8  - ? Compliance  - Continue Synthroid at PTA dose. Recheck TFT's in 4 weeks as outpatient with PCP.     # DM 2  - A1C (6/18/25) 6.3%  - sliding scale insulin/Glucose checks  - Lantus 10U qhs     # AOCD  # Mild chronic intermittent thrombocytopenia  - Hgb 8.7 -> 9.2  - No ABLA     # Bilateral carotid artery stenosis  - Eliquis, Statin     # PMR  - On no prednisone chronically     # Mood disorder  - Restart Ativan at reduced dose 0.25 mg at bedtime (PTA dose 0.5mg BID)  - Seroquel 100mg TID  - Remeron 15 mg at bedtime  - Cymbalta  - Gabapentin 300 mg at bedtime ( mg) dose reduced for renal fxn.      Consultations This Hospital Stay   PHYSICAL THERAPY ADULT IP CONSULT  OCCUPATIONAL THERAPY ADULT IP CONSULT  CARE MANAGEMENT / SOCIAL WORK IP CONSULT  CARE MANAGEMENT / SOCIAL WORK IP CONSULT    Code Status   No CPR- Do NOT Intubate    Time Spent on this Encounter   I, Umm Whitmore MD, personally saw the patient today and spent greater than 30 minutes discharging this patient.       Umm Whitmore MD  [facility] 2A MEDICAL SURGICAL  [address] [place] [address]  Phone: [phone]  ______________________________________________________________________    Physical Exam   Vital Signs: Temp: 98.7  F (37.1  C) Temp src: Oral BP: (!) 141/51 Pulse: 61   Resp: 18 SpO2: 96 % O2 Device: None (Room air)    Weight: 161 lbs 6.4 oz    Gen nad  Eyes anicteric  Cv rrr. No edema. No JVD. Extremities dp/pt pulses 2+ BLE   Lungs decreased bases, non-labored  Abd bs+, nd  Neuro non-focal       Primary Care Physician   Chantal Pierce    Discharge Orders      Med Therapy Management Referral      Follow Up and recommended labs and tests    Follow up with Nursing home physician.     Activity - Up with assistive device     Reason  for your hospital stay    Hypotensive, likely due to a UTI     No CPR- Do NOT Intubate     Diet    Follow this diet upon discharge: Current Diet:Orders Placed This Encounter      Combination Diet Minced and Moist Diet (level 5); Mildly Thick (level 2); 2 gm NA Diet; Mildly Thick (level 2); No Caffeine Diet       Significant Results and Procedures   Results for orders placed or performed during the hospital encounter of 25   XR Chest 2 Views    Narrative    EXAM: XR CHEST 2 VIEWS  LOCATION: [facility]  DATE: 2025    INDICATION: Hypotension.  COMPARISON: Chest x-ray 3/14/2025.      Impression    IMPRESSION: Interval increase of vascular and interstitial prominence that may represent mild pulmonary edema. Cardiomegaly appears slightly more prominent. Stable left chest pacer and sternotomy.   Echocardiogram Complete *Canceled*    Narrative    The following orders were created for panel order Echocardiogram Complete.  Procedure                               Abnormality         Status                     ---------                               -----------         ------                       Please view results for these tests on the individual orders.   Echocardiogram Complete *Canceled*    Narrative    The following orders were created for panel order Echocardiogram Complete.  Procedure                               Abnormality         Status                     ---------                               -----------         ------                       Please view results for these tests on the individual orders.   Echo Limited     Value    Biplane LVEF 35%    Narrative    [id number]  [id number]  [id number]  [id number]^JOHNATHAN^OTTONIEL^PABLITO     [facility]  Echocardiography Laboratory  [address] [place] [address]     Name: TAYLOR BURRIS  MRN: [id number]  : 1955  Study Date: 2025 02:37 PM  Age: 69 yrs  Gender: Female  Patient Location:  [facility]  Reason For Study: CHF  History: HTN, CHF, COPD, ELI, CAD, CKD, Diabetes, ICD/Pacer  Ordering Physician: OTTONIEL MANN  Performed By: Jenniffer Pitt     BSA: 1.9 m2  Height: 62 in  Weight: 194 lb  HR: 61  BP: 83/42 mmHg  ______________________________________________________________________________  Procedure  Limited Echocardiogram with two-dimensional, color and spectral Doppler.  Optison (NDC #3625-0701) given intravenously.  ______________________________________________________________________________  Interpretation Summary     Limited study     Left ventricular systolic function is moderately reduced.Biplane LVEF is  35%.The left ventricle is mildly dilated.Global hypokinesia with some  regionality with apical and distal septal and distal anterior wall candy  the worst  The right ventricular systolic function is mildly reduced.  There is trace to mild mitral regurgitation.  Pulmonary hypertension- RVSP 42 mm hg +RA.     Compared to prior study dated 06/3/2025 there is increase in LV size, LV  systolic function overall appears similar to slightly reduced ,there is  interval increase in estimated PA systolic pressure  ______________________________________________________________________________  Left Ventricle  The left ventricle is mildly dilated. There is normal left ventricular wall  thickness. Left ventricular systolic function is moderately reduced. Biplane  LVEF is 35%. Global hypokinesia with some regionality with apical and distal  septal and distal anterior wall candy the worst.     Right Ventricle  The right ventricle is normal size. The right ventricular systolic function is  mildly reduced. There is a pacemaker lead in the right ventricle.     Atria  The left atrium is mildly dilated. Right atrial size is normal.     Mitral Valve  There is mild to moderate mitral annular calcification. There is trace to mild  mitral regurgitation.     Tricuspid Valve  There is trace  tricuspid regurgitation. The right ventricular systolic  pressure is approximated at 42.8 mmHg plus the right atrial pressure.  Pulmonary hypertension.     Aortic Valve  The aortic valve is not well visualized. Aortic valve sclerosis noted. No  aortic stenosis is present.     Vessels  Normal size IVC.     Pericardium  There is no pericardial effusion.     ______________________________________________________________________________  MMode/2D Measurements & Calculations  IVSd: 1.1 cm  LVIDd: 6.2 cm  LVIDs: 5.0 cm  LVPWd: 0.92 cm  FS: 18.6 %  LV mass(C)d: 266.8 grams  LV mass(C)dI: 141.4 grams/m2  EF Biplane: 35.2 %  RWT: 0.30     Doppler Measurements & Calculations  LV V1 max P.0 mmHg  LV V1 max: 100.3 cm/sec  LV V1 VTI: 23.8 cm  TR max stan: 327.0 cm/sec  TR max P.8 mmHg     ______________________________________________________________________________  Report approved by: Adarsh Anderson MD on 2025 03:28 PM             Discharge Medications      Review of your medicines        START taking        Dose / Directions   cefuroxime 500 MG tablet  Commonly known as: CEFTIN  Indication: Urinary Tract Infection  Used for: Acute cystitis without hematuria      Dose: 500 mg  Start taking on: 2025  Take 1 tablet (500 mg) by mouth 2 times daily for 3 days.  Refills: 0     furosemide 20 MG tablet  Commonly known as: LASIX  Indication: Cardiac Failure  Used for: Chronic systolic heart failure (H)      Dose: 20 mg  Start taking on: 2025  Take 1 tablet (20 mg) by mouth daily.  Refills: 0            CHANGE how you take these medications        Dose / Directions   losartan 25 MG tablet  Commonly known as: COZAAR  Indication: High Blood Pressure  This may have changed: additional instructions  Used for: Chronic systolic heart failure (H)      Dose: 25 mg  Take 1 tablet (25 mg) by mouth daily. Hold for blood pressure less than 110 systolic  Refills: 0     metoprolol succinate ER 25 MG 24 hr  tablet  Commonly known as: TOPROL XL  This may have changed: how much to take      Dose: 12.5 mg  Take 0.5 tablets (12.5 mg) by mouth daily.  Refills: 0     ondansetron 4 MG ODT tab  Commonly known as: ZOFRAN ODT  Indication: Nausea and Vomiting  This may have changed: when to take this      Dose: 4 mg  Take 1 tablet (4 mg) by mouth every 8 hours as needed for vomiting or nausea.  Quantity: 10 tablet  Refills: 0            CONTINUE these medicines which have NOT CHANGED        Dose / Directions   acetaminophen 500 MG tablet  Commonly known as: TYLENOL  Indication: Pain  Used for: Fever, unspecified fever cause      Dose: 1,000 mg  Take 2 tablets (1,000 mg) by mouth 3 times daily  Refills: 0     albuterol (2.5 MG/3ML) 0.083% neb solution  Commonly known as: PROVENTIL  Indication: Spasm of Lung Air Passages      Dose: 2.5 mg  Take 2.5 mg by nebulization every 6 hours as needed for cough or wheezing  Refills: 0     apixaban ANTICOAGULANT 5 MG tablet  Commonly known as: ELIQUIS  Indication: Atrial Fibrillation Not Caused By A Heart Valve Problem      Dose: 5 mg  Take 5 mg by mouth 2 times daily.  Refills: 0     bisacodyl 10 MG suppository  Commonly known as: DULCOLAX  Indication: Constipation      Dose: 10 mg  Place 10 mg rectally daily as needed for constipation.  Refills: 0     Breo Ellipta 200-25 MCG/INH Inhaler  Indication: Asthma  Generic drug: fluticasone-vilanterol      Dose: 1 puff  Inhale 1 puff into the lungs daily Rinse mouth after use  Refills: 0     busPIRone 10 MG tablet  Commonly known as: BUSPAR  Indication: mood disorder      Dose: 10 mg  Take 10 mg by mouth every evening.  Refills: 0     carbidopa-levodopa  MG tablet  Commonly known as: SINEMET  Indication: Neuromuscular Disorder      Dose: 1 tablet  Take 1 tablet by mouth at bedtime.  Refills: 0     cyanocobalamin 1000 mcg/mL injection  Commonly known as: CYANOCOBALAMIN  Indication: Inadequate Vitamin B12      Dose: 1 mL  Inject 1 mL into the  muscle every 30 days 21st of each month  Refills: 0     DULoxetine 20 MG capsule  Commonly known as: CYMBALTA  Indication: mood disorder      Dose: 40 mg  Take 40 mg by mouth daily.  Refills: 0     empagliflozin 10 MG Tabs tablet  Commonly known as: JARDIANCE  Indication: Cardiac Failure      Dose: 1 tablet  Take 1 tablet by mouth daily  Refills: 0     estradiol 0.1 MG/GM vaginal cream  Commonly known as: ESTRACE  Indication: Vulvovaginal Atrophy      Place vaginally. Mondays, Wednesdays and Fridays at bedtime  Refills: 0     famotidine 20 MG tablet  Commonly known as: PEPCID  Indication: Heartburn      Dose: 20 mg  Take 20 mg by mouth daily.  Refills: 0     FreeStyle Pardeep 2 [place] Cynthia      Refills: 0     FreeStyle Pardeep 2 Sensor Misc      every 14 days.  Refills: 0     gabapentin 400 MG capsule  Commonly known as: NEURONTIN  Indication: Restless Leg Syndrome      Dose: 400 mg  Take 400 mg by mouth at bedtime RLS  Refills: 0     hydrOXYzine HCl 25 MG tablet  Commonly known as: ATARAX  Indication: Feeling Anxious      Dose: 25 mg  Take 25 mg by mouth at bedtime.  Refills: 0     Lantus SoloStar 100 UNIT/ML soln  Indication: Type 2 Diabetes  Generic drug: insulin glargine      Dose: 16 Units  Inject 16 Units subcutaneously at bedtime.  Refills: 0     levothyroxine 150 MCG tablet  Commonly known as: SYNTHROID/LEVOTHROID  Indication: Underactive Thyroid      Dose: 150 mcg  Take 150 mcg by mouth every morning (before breakfast).  Refills: 0     lidocaine 4 % external cream  Commonly known as: LMX4  Indication: Osteoarthritis      Apply topically 2 times daily To Rt knee  Refills: 0     LORazepam 0.5 MG tablet  Commonly known as: ATIVAN  Indication: Mood disorder      Dose: 0.5 mg  Take 0.5 mg by mouth 2 times daily.  Refills: 0     Melatonin 10 MG Tabs tablet      Dose: 10 mg  Take 10 mg by mouth At Bedtime  Refills: 0     metolazone 2.5 MG tablet  Commonly known as: ZAROXOLYN  Indication: Edema      Dose: 2.5 mg  Take  2.5 mg by mouth every 7 days. Mondays  Refills: 0     mirtazapine 15 MG tablet  Commonly known as: REMERON  Indication: mood disorder  Used for: ERIC (acute kidney injury)      Dose: 15 mg  Take 1 tablet (15 mg) by mouth at bedtime  Quantity: 5 tablet  Refills: 0     morphine 15 MG IR tablet  Commonly known as: MSIR  Indication: Pain      Dose: 15 mg  Take 15 mg by mouth every 4 hours as needed for pain.  Refills: 0     nitroGLYcerin 0.4 MG sublingual tablet  Commonly known as: NITROSTAT  Indication: Acute Angina Pectoris      Dose: 0.4 mg  Place 0.4 mg under the tongue every 5 minutes as needed for chest pain. For chest pain place 1 tablet under the tongue every 5 minutes for 3 doses. If symptoms persist 5 minutes after 1st dose call 911.  Refills: 0     pantoprazole 40 MG EC tablet  Commonly known as: PROTONIX  Indication: Heartburn      Dose: 40 mg  Take 40 mg by mouth daily.  Refills: 0     polyethylene glycol 17 GM/Dose powder  Commonly known as: MIRALAX  Indication: Constipation      Dose: 17 g  Take 17 g by mouth every other day  Refills: 0     QUEtiapine 100 MG tablet  Commonly known as: SEROquel  Indication: Mood disorder  Used for: ERIC (acute kidney injury)      Dose: 100 mg  Take 1 tablet (100 mg) by mouth 3 times daily  Quantity: 5 tablet  Refills: 0     ranolazine 500 MG 12 hr tablet  Commonly known as: RANEXA  Indication: Stable Angina Pectoris      Dose: 500 mg  Take 500 mg by mouth 2 times daily.  Refills: 0     rosuvastatin 10 MG tablet  Commonly known as: CRESTOR  Indication: High Amount of Fats in the Blood      Dose: 10 mg  Take 10 mg by mouth at bedtime.  Refills: 0     sennosides 8.6 MG tablet  Commonly known as: SENOKOT  Indication: Constipation      Dose: 2 tablet  Take 2 tablets by mouth at bedtime.  Hold for loose stools  Refills: 0     * traZODone 50 MG tablet  Commonly known as: DESYREL  Indication: Anxiety Disorder  Used for: ERIC (acute kidney injury)      Dose: 25 mg  Take 0.5 tablets  "(25 mg) by mouth 2 times daily Patient can have 25 mg once a day and 75 mg once a day.  Quantity: 5 tablet  Refills: 0     * traZODone 150 MG tablet  Commonly known as: DESYREL  Indication: Trouble Sleeping      Dose: 75 mg  Take 75 mg by mouth at bedtime. Take 1/2 tablet (75 mg) every night at bedtime  Refills: 0     triamcinolone 0.1 % external cream  Commonly known as: KENALOG  Indication: Skin Inflammation      Apply topically 2 times daily.  Refills: 0     VAGINAL MOISTURIZER VA  Indication: vaginal atrophy      Dose: 1 g  Place 1 g vaginally twice a week. \"REFRESH\" Sundays and Thursdays at bedtime  Refills: 0           * This list has 2 medication(s) that are the same as other medications prescribed for you. Read the directions carefully, and ask your doctor or other care provider to review them with you.                Allergies   Allergies   Allergen Reactions    Bee Venom Anaphylaxis    Diphenhydramine Palpitations     Tolerated IV Benadryl when not pushed too fast    Isosorbide Nitrate Other (See Comments) and Dizziness     Also causes syncope (has fallen before) and brain fog/mental disturbances - please do not prescribe    Nitroglycerin Dizziness, Fatigue and Other (See Comments)     Specifically the patch - please do not prescribe  Specifically the patch - please do not prescribe      Contrast Dye Hives and Itching     Tolerated CT with IV contrast 5/9/2024 with 50 mg IV Benadryl prior    Adhesive Tape Rash    Liquid Adhesive Itching    Nystatin Dermatitis     Also blisters    Penicillins Swelling and Rash     Occurred as a child - not 100% sure on specific reactions    Sulfa Antibiotics Other (See Comments)     Occurred as a child / patient does not remember specific reaction     "

## 2025-06-23 ENCOUNTER — OFFICE VISIT (OUTPATIENT)
Dept: CARDIOLOGY | Facility: CLINIC | Age: 70
End: 2025-06-23
Attending: INTERNAL MEDICINE
Payer: COMMERCIAL

## 2025-06-23 ENCOUNTER — LAB (OUTPATIENT)
Dept: LAB | Facility: CLINIC | Age: 70
End: 2025-06-23
Payer: COMMERCIAL

## 2025-06-23 ENCOUNTER — PATIENT OUTREACH (OUTPATIENT)
Dept: CARE COORDINATION | Facility: CLINIC | Age: 70
End: 2025-06-23

## 2025-06-23 VITALS
WEIGHT: 182.6 LBS | DIASTOLIC BLOOD PRESSURE: 56 MMHG | RESPIRATION RATE: 18 BRPM | BODY MASS INDEX: 33.6 KG/M2 | HEIGHT: 62 IN | SYSTOLIC BLOOD PRESSURE: 106 MMHG | OXYGEN SATURATION: 96 % | HEART RATE: 60 BPM

## 2025-06-23 DIAGNOSIS — I50.22 CHRONIC SYSTOLIC HEART FAILURE (H): ICD-10-CM

## 2025-06-23 DIAGNOSIS — I25.5 ISCHEMIC CARDIOMYOPATHY: Primary | ICD-10-CM

## 2025-06-23 DIAGNOSIS — Z95.810 ICD (IMPLANTABLE CARDIOVERTER-DEFIBRILLATOR) IN PLACE: ICD-10-CM

## 2025-06-23 DIAGNOSIS — I05.9 MITRAL VALVE DISEASE: ICD-10-CM

## 2025-06-23 DIAGNOSIS — D63.8 ANEMIA IN OTHER CHRONIC DISEASES CLASSIFIED ELSEWHERE: ICD-10-CM

## 2025-06-23 DIAGNOSIS — Z95.1 S/P CABG X 5: ICD-10-CM

## 2025-06-23 DIAGNOSIS — I48.0 PAROXYSMAL ATRIAL FIBRILLATION (H): ICD-10-CM

## 2025-06-23 LAB
ALT SERPL W P-5'-P-CCNC: 29 U/L (ref 0–50)
ANION GAP SERPL CALCULATED.3IONS-SCNC: 14 MMOL/L (ref 7–15)
BUN SERPL-MCNC: 25.6 MG/DL (ref 8–23)
CALCIUM SERPL-MCNC: 10.2 MG/DL (ref 8.8–10.4)
CHLORIDE SERPL-SCNC: 100 MMOL/L (ref 98–107)
CHOLEST SERPL-MCNC: 122 MG/DL
CREAT SERPL-MCNC: 1.24 MG/DL (ref 0.51–0.95)
EGFRCR SERPLBLD CKD-EPI 2021: 46 ML/MIN/1.73M2
ELLIPTOCYTES BLD QL SMEAR: SLIGHT
ERYTHROCYTE [DISTWIDTH] IN BLOOD BY AUTOMATED COUNT: 13.6 % (ref 10–15)
FASTING STATUS PATIENT QL REPORTED: YES
FASTING STATUS PATIENT QL REPORTED: YES
GLUCOSE SERPL-MCNC: 144 MG/DL (ref 70–99)
HCO3 SERPL-SCNC: 24 MMOL/L (ref 22–29)
HCT VFR BLD AUTO: 31 % (ref 35–47)
HDLC SERPL-MCNC: 22 MG/DL
HGB BLD-MCNC: 10 G/DL (ref 11.7–15.7)
LDLC SERPL CALC-MCNC: 44 MG/DL
MCH RBC QN AUTO: 29.2 PG (ref 26.5–33)
MCHC RBC AUTO-ENTMCNC: 32.3 G/DL (ref 31.5–36.5)
MCV RBC AUTO: 91 FL (ref 78–100)
NONHDLC SERPL-MCNC: 100 MG/DL
PLAT MORPH BLD: ABNORMAL
PLATELET # BLD AUTO: 160 10E3/UL (ref 150–450)
POLYCHROMASIA BLD QL SMEAR: SLIGHT
POTASSIUM SERPL-SCNC: 4.2 MMOL/L (ref 3.4–5.3)
RBC # BLD AUTO: 3.42 10E6/UL (ref 3.8–5.2)
RBC MORPH BLD: ABNORMAL
SODIUM SERPL-SCNC: 138 MMOL/L (ref 135–145)
TRIGL SERPL-MCNC: 280 MG/DL
WBC # BLD AUTO: 7.5 10E3/UL (ref 4–11)

## 2025-06-23 PROCEDURE — 36415 COLL VENOUS BLD VENIPUNCTURE: CPT

## 2025-06-23 PROCEDURE — G2211 COMPLEX E/M VISIT ADD ON: HCPCS | Performed by: INTERNAL MEDICINE

## 2025-06-23 PROCEDURE — 99215 OFFICE O/P EST HI 40 MIN: CPT | Performed by: INTERNAL MEDICINE

## 2025-06-23 PROCEDURE — 3078F DIAST BP <80 MM HG: CPT | Performed by: INTERNAL MEDICINE

## 2025-06-23 PROCEDURE — 85027 COMPLETE CBC AUTOMATED: CPT

## 2025-06-23 PROCEDURE — 84460 ALANINE AMINO (ALT) (SGPT): CPT

## 2025-06-23 PROCEDURE — 80061 LIPID PANEL: CPT

## 2025-06-23 PROCEDURE — 1126F AMNT PAIN NOTED NONE PRSNT: CPT | Performed by: INTERNAL MEDICINE

## 2025-06-23 PROCEDURE — 3074F SYST BP LT 130 MM HG: CPT | Performed by: INTERNAL MEDICINE

## 2025-06-23 PROCEDURE — 80048 BASIC METABOLIC PNL TOTAL CA: CPT

## 2025-06-23 PROCEDURE — 3048F LDL-C <100 MG/DL: CPT

## 2025-06-23 ASSESSMENT — PAIN SCALES - GENERAL: PAINLEVEL_OUTOF10: NO PAIN (0)

## 2025-06-23 NOTE — PROGRESS NOTES
[facility]cted [facility]    Background: Transitional Care Management program identified per system criteria and reviewed by [facility] team for possible outreach.    Assessment: Upon chart review, [facility] Team member will not proceed with patient outreach related to this episode of Transitional Care Management program due to reason below:    Patient has discharged to a Memory Care, Long-term Care, Assisted Living or Group Home where patient is receiving on-site support with their daily cares, including support with hospital follow up plan.    Plan: Transitional Care Management episode addressed appropriately per reason noted above.      Kianna Stoddard MA  Connected Care Resource [place], [facility]    *Connected Care Resource Team does NOT follow patient ongoing. Referrals are identified based on internal discharge reports and the outreach is to ensure patient has an understanding of their discharge instructions.

## 2025-06-23 NOTE — LETTER
6/23/2025    Chantal Pierce MD  [facility] [address] [address]    RE: Letty Escobar       Dear Colleague,     I had the pleasure of seeing Letty Escobar in the [facility].    General Cardiology Clinic Progress Note  Letty Escobar MRN# [id number]   YOB: 1955 Age: 69 year old       Reason for visit: Posthospital follow-up visit, chronic heart disease    History of presenting illness:    She has a history of coronary artery bypass surgery x 5 with multiple coronary angiograms since then.  She has had stenting of the circumflex, LAD, vein graft to OM1, and vein graft to right coronary artery.  Her coronary angiogram in July 2023 demonstrated patent bypass grafts and no other obstructive coronary artery disease amenable to revascularization.     She has chronic ischemic cardiomyopathy with moderate to severe left ventricular dysfunction.  She has an ICD in place with an epicardial LV lead placed during thoracotomy on 8/20/2016.  She has elected to be DNR/DNI and have the ICD tachycardia therapies disabled.  She also has a CardioMEMS implanted but not currently in use.  She has had a history of mitral valve repair, tricuspid valve repair, CKD stage IIIa, COPD, and history of stroke which has left her unable to walk and wheelchair-bound.     Since I saw her a year ago, she has had many emergency room visits for chest pain and other medical issues.  She was hospitalized several times as well, including UTI with sepsis at [facility].  At that time she underwent coronary angiography which demonstrated stable coronary disease and no options for revascularization.  She was hospitalized most recently here in [place] for hypotension and just discharged yesterday.  It looks like she probably had another UTI and was briefly on pressors.  Medications were held and then restarted at lower doses.  She was started back on Jardiance with a lower dose  of losartan.  She continues on antibiotics.    She denies having any cardiac symptoms.  She had no shortness of breath, chest pain, or syncope.  She does not really recall what they told her in the hospital.  She claims to be feeling back to normal now.    She had another echocardiogram during her recent hospitalization, which demonstrated moderate left ventricular dysfunction with an ejection fraction of 35% with mild LV dilation and global hypokinesis.  She has trace to mild mitral regurgitation, moderate pulmonary hypertension, but overall no significant changes since the previous echo.    Her laboratory studies showed improvement in her creatinine to 1.14, GFR 51.  Normal ALT.  Hemoglobin 9.2.  Platelets 99,000.    On examination here today her blood pressure is 106/56, heart rate 60, and weight 182 pounds.  Heart tones were distant, rhythm is regular, no cardiac murmurs are heard.  Lungs are clear.            Assessment and Plan:     ASSESSMENT:    Ms. Letty Escobar is a 69-year-old woman with type 2 diabetes, hypertension, dyslipidemia, and chronic ischemic heart disease including bypass surgery and multiple stenting procedures in the past.  She has had mitral and tricuspid valve repairs and has an ICD in place.  She has stage IIIa chronic kidney disease, COPD, and a history of disabling stroke.    She has had several recent hospitalizations for UTI with sepsis.  She is also had multiple emergency room visits for chest pain without any objective findings to support ischemic heart disease.  She had a recent coronary angiogram in February which did not show any targets for revascularization.  She has moderate left ventricular dysfunction with an ejection fraction of 35% which appears stable and no symptoms of active congestive heart failure.    I suspect the Jardiance is contributing to her UTIs and recurrent sepsis issues.  I will stop that.  Her hemoglobin A1c was 6.3 recently indicating excellent diabetes  "control.  She probably does not need Jardiance for diabetes control at this point.  I agree with lowering her losartan dose to avoid hypotension.  She remains on a low-dose of metoprolol succinate as well.  The mainstay of her heart failure treatment is diuretic therapy which she continues.  Her kidney function looks stable.    Besides stopping Jardiance, I will not make any other medication changes at this time.    Gary Holland MD    The longitudinal plan of care for the diagnosis(es)/condition(s) as documented were addressed during this visit. Due to the added complexity in care, I will continue to support Letty in the subsequent management and with ongoing continuity of care.        Orders this Visit:  No orders of the defined types were placed in this encounter.    No orders of the defined types were placed in this encounter.    Medications Discontinued During This Encounter   Medication Reason     empagliflozin (JARDIANCE) 10 MG TABS tablet        Today's clinic visit entailed:    50 minutes spent by me on the date of the encounter doing chart review, history and exam, documentation and further activities per the note  Provider  Link to [facility] Help Grid     The level of medical decision making during this visit was of high complexity.           Review of Systems:     Review of Systems:  Skin:        Eyes:       ENT:       Respiratory:  Negative shortness of breath, cough, wheezing  Cardiovascular:  Negative, palpitations, chest pain, edema, lightheadedness, dizziness, syncope or near-syncope    Gastroenterology:      Genitourinary:       Musculoskeletal:       Neurologic:  Positive for numbness or tingling of feet, numbness or tingling of hands  Psychiatric:       Heme/Lymph/Imm:  Positive for allergies  Endocrine:                 Physical Exam:     Vitals: /56 (BP Location: Left arm, Patient Position: Sitting, Cuff Size: Adult Regular)   Pulse 60   Resp 18   Ht 1.575 m (5' 2\")   Wt 82.8 kg (182 lb 9.6 " oz)   SpO2 96%   BMI 33.40 kg/m    Constitutional: Well nourished and in no apparent distress.  Eyes: Pupils equal, round. Sclerae anicteric.   HEENT: Normocephalic, atraumatic.   Neck: Supple. JVD   Respiratory: Breathing non-labored. Lungs clear to auscultation bilaterally. No crackles, wheezes, rhonchi, or rales.  Cardiovascular:  Regular rate and rhythm, normal S1 and S2. No murmur, rub, or gallop.  Skin: Warm, dry. No rashes, cyanosis, or xanthelasma.  Extremities: No edema.  Neurologic: No gross motor deficits. Alert, awake, and oriented to person, place and time.  Psychiatric: Affect appropriate.             Medications:     Current Outpatient Medications   Medication Sig Dispense Refill     acetaminophen (TYLENOL) 500 MG tablet Take 2 tablets (1,000 mg) by mouth 3 times daily       albuterol (PROVENTIL) (2.5 MG/3ML) 0.083% neb solution Take 2.5 mg by nebulization every 6 hours as needed for cough or wheezing       apixaban ANTICOAGULANT (ELIQUIS) 5 MG tablet Take 5 mg by mouth 2 times daily.       bisacodyl (DULCOLAX) 10 MG suppository Place 10 mg rectally daily as needed for constipation.       busPIRone (BUSPAR) 10 MG tablet Take 10 mg by mouth every evening.       carbidopa-levodopa (SINEMET)  MG tablet Take 1 tablet by mouth at bedtime.       cefuroxime (CEFTIN) 500 MG tablet Take 1 tablet (500 mg) by mouth 2 times daily for 3 days.       Continuous Glucose  (FREESTYLE TEDDY 2 READER) ZION        Continuous Glucose Sensor (FREESTYLE TEDDY 2 SENSOR) MISC every 14 days.       cyanocobalamin (CYANOCOBALAMIN) 1000 MCG/ML injection Inject 1 mL into the muscle every 30 days 21st of each month       DULoxetine (CYMBALTA) 20 MG capsule Take 40 mg by mouth daily.       estradiol (ESTRACE) 0.1 MG/GM vaginal cream Place vaginally. Mondays, Wednesdays and Fridays at bedtime       famotidine (PEPCID) 20 MG tablet Take 20 mg by mouth daily.       fluticasone-vilanterol (BREO ELLIPTA) 200-25 MCG/INH  inhaler Inhale 1 puff into the lungs daily Rinse mouth after use       furosemide (LASIX) 20 MG tablet Take 1 tablet (20 mg) by mouth daily.       gabapentin (NEURONTIN) 400 MG capsule Take 400 mg by mouth at bedtime RLS       hydrOXYzine HCl (ATARAX) 25 MG tablet Take 25 mg by mouth at bedtime.       LANTUS SOLOSTAR 100 UNIT/ML soln Inject 16 Units subcutaneously at bedtime.       levothyroxine (SYNTHROID/LEVOTHROID) 150 MCG tablet Take 150 mcg by mouth every morning (before breakfast).       lidocaine (LMX4) 4 % external cream Apply topically 2 times daily To Rt knee       LORazepam (ATIVAN) 0.5 MG tablet Take 0.5 mg by mouth 2 times daily.       losartan (COZAAR) 25 MG tablet Take 1 tablet (25 mg) by mouth daily. Hold for blood pressure less than 110 systolic       Melatonin 10 MG TABS tablet Take 10 mg by mouth At Bedtime       metolazone (ZAROXOLYN) 2.5 MG tablet Take 2.5 mg by mouth every 7 days. Mondays       metoprolol succinate ER (TOPROL XL) 25 MG 24 hr tablet Take 0.5 tablets (12.5 mg) by mouth daily.       mirtazapine (REMERON) 15 MG tablet Take 1 tablet (15 mg) by mouth at bedtime 5 tablet 0     morphine (MSIR) 15 MG IR tablet Take 15 mg by mouth every 4 hours as needed for pain.       nitroGLYcerin (NITROSTAT) 0.4 MG sublingual tablet Place 0.4 mg under the tongue every 5 minutes as needed for chest pain. For chest pain place 1 tablet under the tongue every 5 minutes for 3 doses. If symptoms persist 5 minutes after 1st dose call 911.       ondansetron (ZOFRAN ODT) 4 MG ODT tab Take 1 tablet (4 mg) by mouth every 8 hours as needed for vomiting or nausea. (Patient taking differently: Take 4 mg by mouth 3 times daily as needed for vomiting or nausea.) 10 tablet 0     pantoprazole (PROTONIX) 40 MG EC tablet Take 40 mg by mouth daily.       polyethylene glycol (MIRALAX) 17 GM/Dose powder Take 17 g by mouth every other day       QUEtiapine (SEROQUEL) 100 MG tablet Take 1 tablet (100 mg) by mouth 3 times  "daily 5 tablet 0     ranolazine (RANEXA) 500 MG 12 hr tablet Take 500 mg by mouth 2 times daily.       rosuvastatin (CRESTOR) 10 MG tablet Take 10 mg by mouth at bedtime.       sennosides (SENOKOT) 8.6 MG tablet Take 2 tablets by mouth at bedtime.  Hold for loose stools       traZODone (DESYREL) 150 MG tablet Take 75 mg by mouth at bedtime. Take 1/2 tablet (75 mg) every night at bedtime       traZODone (DESYREL) 50 MG tablet Take 0.5 tablets (25 mg) by mouth 2 times daily Patient can have 25 mg once a day and 75 mg once a day. 5 tablet 0     triamcinolone (KENALOG) 0.1 % external cream Apply topically 2 times daily.       VAGINAL MOISTURIZER VA Place 1 g vaginally twice a week. \"REFRESH\" Sundays and Thursdays at bedtime         No family history on file.    Social History     Socioeconomic History     Marital status:      Spouse name: Edwar     Number of children: 1     Years of education: Not on file     Highest education level: Not on file   Occupational History     Not on file   Tobacco Use     Smoking status: Former     Types: Cigarettes     Smokeless tobacco: Never   Vaping Use     Vaping status: Never Used   Substance and Sexual Activity     Alcohol use: Not Currently     Drug use: Never     Sexual activity: Not Currently   Other Topics Concern     Not on file   Social History Narrative    Currently living at [facility] in [place].      Social Drivers of Health     Financial Resource Strain: Low Risk  (6/21/2025)    Financial Resource Strain      Within the past 12 months, have you or your family members you live with been unable to get utilities (heat, electricity) when it was really needed?: No   Food Insecurity: Low Risk  (6/21/2025)    Food Insecurity      Within the past 12 months, did you worry that your food would run out before you got money to buy more?: No      Within the past 12 months, did the food you bought just not last and you didn t have money to get more?: No "   Transportation Needs: Low Risk  (6/21/2025)    Transportation Needs      Within the past 12 months, has lack of transportation kept you from medical appointments, getting your medicines, non-medical meetings or appointments, work, or from getting things that you need?: No   Physical Activity: Not on file   Stress: Not on file   Social Connections: Socially Integrated (5/10/2024)    Received from [facility]    Social Connections      Do you often feel lonely or isolated from those around you?: 0   Interpersonal Safety: Low Risk  (6/21/2025)    Interpersonal Safety      Do you feel physically and emotionally safe where you currently live?: Yes      Within the past 12 months, have you been hit, slapped, kicked or otherwise physically hurt by someone?: No      Within the past 12 months, have you been humiliated or emotionally abused in other ways by your partner or ex-partner?: No   Recent Concern: Interpersonal Safety - High Risk (6/21/2025)    Interpersonal Safety      Do you feel physically and emotionally safe where you currently live?: No      Within the past 12 months, have you been hit, slapped, kicked or otherwise physically hurt by someone?: No      Within the past 12 months, have you been humiliated or emotionally abused in other ways by your partner or ex-partner?: No   Housing Stability: Low Risk  (6/21/2025)    Housing Stability      Do you have housing? : Yes      Are you worried about losing your housing?: No   Recent Concern: Housing Stability - High Risk (6/21/2025)    Housing Stability      Do you have housing? : No      Are you worried about losing your housing?: No            Past Medical History:     Past Medical History:   Diagnosis Date     ACP (advance care planning) 11/3/2010    Formatting of this note might be different from the original. Patient has identified Health Care Agent(s): Yes Add Health Care Agents: Yes   Health Care Agent(s):  Primary Health Care  Agent:   Edwar Escobar Relationship:  Spouse Phone:   [phone]  Secondary Health Care Agent:   Chiki Oro Relationship:   Son Phone:   [phone]  Patient has Advance Care Plan Documents (Health Care Direct     Acute coronary syndrome (H) 11/22/2019     Acute exacerbation of CHF (congestive heart failure) (H) 11/11/2019     Acute on chronic systolic (congestive) heart failure (H) 1/28/2020     Anemia of chronic disease 1/5/2013     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, left leg (H) 1/15/2020     BPPV (benign paroxysmal positional vertigo) 5/31/2018     Candidiasis 3/1/2020     Carotid stenosis, right 7/13/2016    Carotid US 05/04/2018 showed moderate plaque formation, consistent with 50 to 69% stenosis in the right internal carotid artery, not significantly changed from 8/5/2015.  Moderate plaque formation, consistent with 50 to 69% stenosis in the left internal carotid artery; there has been mild progression of the left ICA stenosis since 8/5/2015.     Chest pain 11/11/2019     Chronic bilateral low back pain without sciatica 1/17/2018     Chronic pain disorder 10/1/2017     Constipation 3/20/2020     COPD (chronic obstructive pulmonary disease) (H) 6/24/2020     Coronary atherosclerosis 2/6/2009 2/5/2009 - MI - Proximal RCA 99%, mild-mod disease elsewhere.  EF 60%.  PCI:  MYRON to pRCA. 2/12/2009 - admit CP - Widely patent RCA stent. Moderate diffuse CAD. Severe stenosis in trivial PDA branch. LVEF 45%. 1/25/2010 - admit CP - PTCA and stent of diagonal 9/8/2010 - admit CP - LAD patent stent. Moderate diffuse CAD. Medical management recommended.  4/25/2012 - NSTEMI - Acute total occlusion of     Cubital tunnel syndrome on left 11/13/2012     Depressive disorder      Diabetes mellitus (H)      Diabetes mellitus type 2 in obese 7/13/2006     Diabetes mellitus type 2 in obese 7/13/2006     Drug-seeking behavior 4/4/2020     Failure to thrive in adult 9/3/2020      Hereditary and idiopathic peripheral neuropathy 4/24/2007     Hyperlipidemia with target low density lipoprotein (LDL) cholesterol less than 70 mg/dL 5/30/2007     Hypertension 10/14/2015     Hypothyroidism 12/10/2010     Irritable bowel syndrome 9/7/2015     Ischemic cardiomyopathy 9/20/2015    EF of 40-45%, status post RV lead revision and LV epicardial lead placement via mini-thoracotomy in August 2016.     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  Designated pharmacy: [facility] Prescribing physician: Andrea Diagnosis: Ulnar neuropathy     Panic disorder with agoraphobia 4/14/2020     Paroxysmal atrial fibrillation (H) 10/2/2015     Personality disorder (H) 3/5/2020    Rule out dependent personality     Polymyalgia rheumatica 11/28/2018     Posttraumatic stress disorder 3/1/2020     Restless legs syndrome (RLS) 8/29/2007     Restless legs syndrome (RLS) 8/29/2007     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     Serum calcium elevated 3/1/2020     Somatic dysfunction of sacral region 1/17/2018     Subacromial bursitis of left shoulder joint 8/6/2018     Suicidal ideation 4/1/2020     Thyroid disease      TIA (transient ischemic attack) 5/4/2018     TIA (transient ischemic attack) 5/4/2018     Tobacco abuse 2/17/2017     Tobacco abuse 2/17/2017     Urinary incontinence, mixed 9/24/2017     UTI (urinary tract infection) 4/17/2020     Vitamin B12 deficiency 2/14/2018     Weakness 12/17/2019              Past Surgical History:     Past Surgical History:   Procedure Laterality Date     CARDIAC SURGERY      stents x 11     CARDIAC SURGERY       CHOLECYSTECTOMY        CHOLECYSTECTOMY       GENITOURINARY SURGERY      Tubal ligation     OTHER SURGICAL HISTORY      Genitourinary surgery     RELEASE CARPAL TUNNEL       RELEASE CARPAL TUNNEL                Allergies:   Bee venom, Diphenhydramine, Isosorbide nitrate, Nitroglycerin, Contrast dye, Adhesive tape, Liquid adhesive, Nystatin, Penicillins, and Sulfa antibiotics       Data:   All laboratory data reviewed:    Recent Labs   Lab Test 06/23/25  1115 06/20/25  1310 06/18/25  0747 07/29/24  0736 06/17/24  0740 12/28/22  0747 10/19/22  0743 03/28/22  0726 10/18/21  0738 08/04/21  0801 05/28/21  0818 01/29/21  0725 08/17/20  0738 06/29/20  0740   LDL 44  --   --   --   --   --  56  --   --   --  83  --   --   --    HDL 22*  --   --   --   --   --  30*  --   --   --  42*  --   --   --    NHDL 100  --   --   --   --   --  132*  --   --   --  112  --   --   --    CHOL 122  --   --   --   --   --  162  --   --   --  154  --   --   --    TRIG 280*  --   --   --   --   --  382*  --   --   --  144  --   --   --    TSH  --   --  7.82* 1.55 11.43*   < >  --    < >  --    < >  --   --    < > 0.02*   NTBNP  --  1,640*  --   --   --   --   --   --   --   --   --  2,197*  --   --    IRON  --   --   --   --   --   --   --   --  86  --   --   --   --   --    RITU  --   --   --   --   --   --   --   --   --   --   --   --   --  236    < > = values in this interval not displayed.       Lab Results   Component Value Date    WBC 8.1 06/21/2025    WBC 6.9 05/28/2021    RBC 3.11 (L) 06/21/2025    RBC 4.06 05/28/2021    HGB 9.2 (L) 06/21/2025    HGB 12.5 05/28/2021    HCT 28.7 (L) 06/21/2025    HCT 38.3 05/28/2021    MCV 92 06/21/2025    MCV 94 05/28/2021    MCH 29.6 06/21/2025    MCH 30.8 05/28/2021    MCHC 32.1 06/21/2025    MCHC 32.6 05/28/2021    RDW 14.0 06/21/2025    RDW 12.5 05/28/2021    PLT 99 (L) 06/21/2025     05/28/2021       Lab Results   Component Value Date     06/23/2025     03/16/2021    POTASSIUM 4.2 06/23/2025     POTASSIUM 3.9 01/16/2023    POTASSIUM 4.7 03/16/2021    CHLORIDE 100 06/23/2025    CHLORIDE 108 01/16/2023    CHLORIDE 111 (H) 03/16/2021    CO2 24 06/23/2025    CO2 24 01/16/2023    CO2 22 03/16/2021    ANIONGAP 14 06/23/2025    ANIONGAP 8 01/16/2023    ANIONGAP 6 03/16/2021     (H) 06/23/2025     (H) 06/22/2025     (H) 01/16/2023     (H) 03/16/2021    BUN 25.6 (H) 06/23/2025    BUN 23 01/16/2023    BUN 45 (H) 03/16/2021    CR 1.24 (H) 06/23/2025    CR 0.98 03/16/2021    GFRESTIMATED 46 (L) 06/23/2025    GFRESTIMATED 60 (L) 03/16/2021    GFRESTBLACK 69 03/16/2021    ORESTES 10.2 06/23/2025    ORESTES 9.4 03/16/2021      Lab Results   Component Value Date    AST 28 06/21/2025    AST 23 03/16/2021    ALT 29 06/23/2025    ALT 74 (H) 03/16/2021       Lab Results   Component Value Date    A1C 6.3 (H) 06/18/2025    A1C 4.6 10/30/2020       Lab Results   Component Value Date    INR 2.16 (H) 02/09/2025         GARY HOLLAND MD  [facility]    Thank you for allowing me to participate in the care of your patient.      Sincerely,     GARY HOLLAND MD     [facility]  cc:   Gary Holland MD  [address]  [place] [address]

## 2025-06-23 NOTE — PROGRESS NOTES
General Cardiology Clinic Progress Note  Letty Escobar MRN# [id number]   YOB: 1955 Age: 69 year old       Reason for visit: Posthospital follow-up visit, chronic heart disease    History of presenting illness:    She has a history of coronary artery bypass surgery x 5 with multiple coronary angiograms since then.  She has had stenting of the circumflex, LAD, vein graft to OM1, and vein graft to right coronary artery.  Her coronary angiogram in July 2023 demonstrated patent bypass grafts and no other obstructive coronary artery disease amenable to revascularization.     She has chronic ischemic cardiomyopathy with moderate to severe left ventricular dysfunction.  She has an ICD in place with an epicardial LV lead placed during thoracotomy on 8/20/2016.  She has elected to be DNR/DNI and have the ICD tachycardia therapies disabled.  She also has a CardioMEMS implanted but not currently in use.  She has had a history of mitral valve repair, tricuspid valve repair, CKD stage IIIa, COPD, and history of stroke which has left her unable to walk and wheelchair-bound.     Since I saw her a year ago, she has had many emergency room visits for chest pain and other medical issues.  She was hospitalized several times as well, including UTI with sepsis at [facility].  At that time she underwent coronary angiography which demonstrated stable coronary disease and no options for revascularization.  She was hospitalized most recently here in [place] for hypotension and just discharged yesterday.  It looks like she probably had another UTI and was briefly on pressors.  Medications were held and then restarted at lower doses.  She was started back on Jardiance with a lower dose of losartan.  She continues on antibiotics.    She denies having any cardiac symptoms.  She had no shortness of breath, chest pain, or syncope.  She does not really recall what they told her in the hospital.  She claims  to be feeling back to normal now.    She had another echocardiogram during her recent hospitalization, which demonstrated moderate left ventricular dysfunction with an ejection fraction of 35% with mild LV dilation and global hypokinesis.  She has trace to mild mitral regurgitation, moderate pulmonary hypertension, but overall no significant changes since the previous echo.    Her laboratory studies showed improvement in her creatinine to 1.14, GFR 51.  Normal ALT.  Hemoglobin 9.2.  Platelets 99,000.    On examination here today her blood pressure is 106/56, heart rate 60, and weight 182 pounds.  Heart tones were distant, rhythm is regular, no cardiac murmurs are heard.  Lungs are clear.            Assessment and Plan:     ASSESSMENT:    Ms. Letty Escobar is a 69-year-old woman with type 2 diabetes, hypertension, dyslipidemia, and chronic ischemic heart disease including bypass surgery and multiple stenting procedures in the past.  She has had mitral and tricuspid valve repairs and has an ICD in place.  She has stage IIIa chronic kidney disease, COPD, and a history of disabling stroke.    She has had several recent hospitalizations for UTI with sepsis.  She is also had multiple emergency room visits for chest pain without any objective findings to support ischemic heart disease.  She had a recent coronary angiogram in February which did not show any targets for revascularization.  She has moderate left ventricular dysfunction with an ejection fraction of 35% which appears stable and no symptoms of active congestive heart failure.    I suspect the Jardiance is contributing to her UTIs and recurrent sepsis issues.  I will stop that.  Her hemoglobin A1c was 6.3 recently indicating excellent diabetes control.  She probably does not need Jardiance for diabetes control at this point.  I agree with lowering her losartan dose to avoid hypotension.  She remains on a low-dose of metoprolol succinate as well.  The mainstay of  "her heart failure treatment is diuretic therapy which she continues.  Her kidney function looks stable.    Besides stopping Jardiance, I will not make any other medication changes at this time.    Gary Holland MD    The longitudinal plan of care for the diagnosis(es)/condition(s) as documented were addressed during this visit. Due to the added complexity in care, I will continue to support Letty in the subsequent management and with ongoing continuity of care.        Orders this Visit:  No orders of the defined types were placed in this encounter.    No orders of the defined types were placed in this encounter.    Medications Discontinued During This Encounter   Medication Reason    empagliflozin (JARDIANCE) 10 MG TABS tablet        Today's clinic visit entailed:    50 minutes spent by me on the date of the encounter doing chart review, history and exam, documentation and further activities per the note  Provider  Link to [facility] Help Grid     The level of medical decision making during this visit was of high complexity.           Review of Systems:     Review of Systems:  Skin:        Eyes:       ENT:       Respiratory:  Negative shortness of breath, cough, wheezing  Cardiovascular:  Negative, palpitations, chest pain, edema, lightheadedness, dizziness, syncope or near-syncope    Gastroenterology:      Genitourinary:       Musculoskeletal:       Neurologic:  Positive for numbness or tingling of feet, numbness or tingling of hands  Psychiatric:       Heme/Lymph/Imm:  Positive for allergies  Endocrine:                 Physical Exam:     Vitals: /56 (BP Location: Left arm, Patient Position: Sitting, Cuff Size: Adult Regular)   Pulse 60   Resp 18   Ht 1.575 m (5' 2\")   Wt 82.8 kg (182 lb 9.6 oz)   SpO2 96%   BMI 33.40 kg/m    Constitutional: Well nourished and in no apparent distress.  Eyes: Pupils equal, round. Sclerae anicteric.   HEENT: Normocephalic, atraumatic.   Neck: Supple. JVD   Respiratory: Breathing " non-labored. Lungs clear to auscultation bilaterally. No crackles, wheezes, rhonchi, or rales.  Cardiovascular:  Regular rate and rhythm, normal S1 and S2. No murmur, rub, or gallop.  Skin: Warm, dry. No rashes, cyanosis, or xanthelasma.  Extremities: No edema.  Neurologic: No gross motor deficits. Alert, awake, and oriented to person, place and time.  Psychiatric: Affect appropriate.             Medications:     Current Outpatient Medications   Medication Sig Dispense Refill    acetaminophen (TYLENOL) 500 MG tablet Take 2 tablets (1,000 mg) by mouth 3 times daily      albuterol (PROVENTIL) (2.5 MG/3ML) 0.083% neb solution Take 2.5 mg by nebulization every 6 hours as needed for cough or wheezing      apixaban ANTICOAGULANT (ELIQUIS) 5 MG tablet Take 5 mg by mouth 2 times daily.      bisacodyl (DULCOLAX) 10 MG suppository Place 10 mg rectally daily as needed for constipation.      busPIRone (BUSPAR) 10 MG tablet Take 10 mg by mouth every evening.      carbidopa-levodopa (SINEMET)  MG tablet Take 1 tablet by mouth at bedtime.      cefuroxime (CEFTIN) 500 MG tablet Take 1 tablet (500 mg) by mouth 2 times daily for 3 days.      Continuous Glucose  (FREESTYLE TEDDY 2 READER) ZION       Continuous Glucose Sensor (FREESTYLE TEDDY 2 SENSOR) MISC every 14 days.      cyanocobalamin (CYANOCOBALAMIN) 1000 MCG/ML injection Inject 1 mL into the muscle every 30 days 21st of each month      DULoxetine (CYMBALTA) 20 MG capsule Take 40 mg by mouth daily.      estradiol (ESTRACE) 0.1 MG/GM vaginal cream Place vaginally. Mondays, Wednesdays and Fridays at bedtime      famotidine (PEPCID) 20 MG tablet Take 20 mg by mouth daily.      fluticasone-vilanterol (BREO ELLIPTA) 200-25 MCG/INH inhaler Inhale 1 puff into the lungs daily Rinse mouth after use      furosemide (LASIX) 20 MG tablet Take 1 tablet (20 mg) by mouth daily.      gabapentin (NEURONTIN) 400 MG capsule Take 400 mg by mouth at bedtime RLS      hydrOXYzine HCl  (ATARAX) 25 MG tablet Take 25 mg by mouth at bedtime.      LANTUS SOLOSTAR 100 UNIT/ML soln Inject 16 Units subcutaneously at bedtime.      levothyroxine (SYNTHROID/LEVOTHROID) 150 MCG tablet Take 150 mcg by mouth every morning (before breakfast).      lidocaine (LMX4) 4 % external cream Apply topically 2 times daily To Rt knee      LORazepam (ATIVAN) 0.5 MG tablet Take 0.5 mg by mouth 2 times daily.      losartan (COZAAR) 25 MG tablet Take 1 tablet (25 mg) by mouth daily. Hold for blood pressure less than 110 systolic      Melatonin 10 MG TABS tablet Take 10 mg by mouth At Bedtime      metolazone (ZAROXOLYN) 2.5 MG tablet Take 2.5 mg by mouth every 7 days. Mondays      metoprolol succinate ER (TOPROL XL) 25 MG 24 hr tablet Take 0.5 tablets (12.5 mg) by mouth daily.      mirtazapine (REMERON) 15 MG tablet Take 1 tablet (15 mg) by mouth at bedtime 5 tablet 0    morphine (MSIR) 15 MG IR tablet Take 15 mg by mouth every 4 hours as needed for pain.      nitroGLYcerin (NITROSTAT) 0.4 MG sublingual tablet Place 0.4 mg under the tongue every 5 minutes as needed for chest pain. For chest pain place 1 tablet under the tongue every 5 minutes for 3 doses. If symptoms persist 5 minutes after 1st dose call 911.      ondansetron (ZOFRAN ODT) 4 MG ODT tab Take 1 tablet (4 mg) by mouth every 8 hours as needed for vomiting or nausea. (Patient taking differently: Take 4 mg by mouth 3 times daily as needed for vomiting or nausea.) 10 tablet 0    pantoprazole (PROTONIX) 40 MG EC tablet Take 40 mg by mouth daily.      polyethylene glycol (MIRALAX) 17 GM/Dose powder Take 17 g by mouth every other day      QUEtiapine (SEROQUEL) 100 MG tablet Take 1 tablet (100 mg) by mouth 3 times daily 5 tablet 0    ranolazine (RANEXA) 500 MG 12 hr tablet Take 500 mg by mouth 2 times daily.      rosuvastatin (CRESTOR) 10 MG tablet Take 10 mg by mouth at bedtime.      sennosides (SENOKOT) 8.6 MG tablet Take 2 tablets by mouth at bedtime.  Hold for loose  "stools      traZODone (DESYREL) 150 MG tablet Take 75 mg by mouth at bedtime. Take 1/2 tablet (75 mg) every night at bedtime      traZODone (DESYREL) 50 MG tablet Take 0.5 tablets (25 mg) by mouth 2 times daily Patient can have 25 mg once a day and 75 mg once a day. 5 tablet 0    triamcinolone (KENALOG) 0.1 % external cream Apply topically 2 times daily.      VAGINAL MOISTURIZER VA Place 1 g vaginally twice a week. \"REFRESH\" Sundays and Thursdays at bedtime         No family history on file.    Social History     Socioeconomic History    Marital status:      Spouse name: Edwar    Number of children: 1    Years of education: Not on file    Highest education level: Not on file   Occupational History    Not on file   Tobacco Use    Smoking status: Former     Types: Cigarettes    Smokeless tobacco: Never   Vaping Use    Vaping status: Never Used   Substance and Sexual Activity    Alcohol use: Not Currently    Drug use: Never    Sexual activity: Not Currently   Other Topics Concern    Not on file   Social History Narrative    Currently living at [facility] in [place].      Social Drivers of Health     Financial Resource Strain: Low Risk  (6/21/2025)    Financial Resource Strain     Within the past 12 months, have you or your family members you live with been unable to get utilities (heat, electricity) when it was really needed?: No   Food Insecurity: Low Risk  (6/21/2025)    Food Insecurity     Within the past 12 months, did you worry that your food would run out before you got money to buy more?: No     Within the past 12 months, did the food you bought just not last and you didn t have money to get more?: No   Transportation Needs: Low Risk  (6/21/2025)    Transportation Needs     Within the past 12 months, has lack of transportation kept you from medical appointments, getting your medicines, non-medical meetings or appointments, work, or from getting things that you need?: No   Physical " Activity: Not on file   Stress: Not on file   Social Connections: Socially Integrated (5/10/2024)    Received from [employer] [facility]    Social Connections     Do you often feel lonely or isolated from those around you?: 0   Interpersonal Safety: Low Risk  (6/21/2025)    Interpersonal Safety     Do you feel physically and emotionally safe where you currently live?: Yes     Within the past 12 months, have you been hit, slapped, kicked or otherwise physically hurt by someone?: No     Within the past 12 months, have you been humiliated or emotionally abused in other ways by your partner or ex-partner?: No   Recent Concern: Interpersonal Safety - High Risk (6/21/2025)    Interpersonal Safety     Do you feel physically and emotionally safe where you currently live?: No     Within the past 12 months, have you been hit, slapped, kicked or otherwise physically hurt by someone?: No     Within the past 12 months, have you been humiliated or emotionally abused in other ways by your partner or ex-partner?: No   Housing Stability: Low Risk  (6/21/2025)    Housing Stability     Do you have housing? : Yes     Are you worried about losing your housing?: No   Recent Concern: Housing Stability - High Risk (6/21/2025)    Housing Stability     Do you have housing? : No     Are you worried about losing your housing?: No            Past Medical History:     Past Medical History:   Diagnosis Date    ACP (advance care planning) 11/3/2010    Formatting of this note might be different from the original. Patient has identified Health Care Agent(s): Yes Add Health Care Agents: Yes   Health Care Agent(s):  Primary Health Care Agent:   Edwar Escobar Relationship:  Spouse Phone:   [phone]  Secondary Health Care Agent:   Chiki Pricekarla Relationship:   Son Phone:   [phone]  Patient has Advance Care Plan Documents ([employer] Care [employer]    Acute coronary syndrome (H) 11/22/2019    Acute exacerbation of CHF  (congestive heart failure) (H) 11/11/2019    Acute on chronic systolic (congestive) heart failure (H) 1/28/2020    Anemia of chronic disease 1/5/2013    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, left leg (H) 1/15/2020    BPPV (benign paroxysmal positional vertigo) 5/31/2018    Candidiasis 3/1/2020    Carotid stenosis, right 7/13/2016    Carotid US 05/04/2018 showed moderate plaque formation, consistent with 50 to 69% stenosis in the right internal carotid artery, not significantly changed from 8/5/2015.  Moderate plaque formation, consistent with 50 to 69% stenosis in the left internal carotid artery; there has been mild progression of the left ICA stenosis since 8/5/2015.    Chest pain 11/11/2019    Chronic bilateral low back pain without sciatica 1/17/2018    Chronic pain disorder 10/1/2017    Constipation 3/20/2020    COPD (chronic obstructive pulmonary disease) (H) 6/24/2020    Coronary atherosclerosis 2/6/2009 2/5/2009 - MI - Proximal RCA 99%, mild-mod disease elsewhere.  EF 60%.  PCI:  MYRON to pRCA. 2/12/2009 - admit CP - Widely patent RCA stent. Moderate diffuse CAD. Severe stenosis in trivial PDA branch. LVEF 45%. 1/25/2010 - admit CP - PTCA and stent of diagonal 9/8/2010 - admit CP - LAD patent stent. Moderate diffuse CAD. Medical management recommended.  4/25/2012 - NSTEMI - Acute total occlusion of    Cubital tunnel syndrome on left 11/13/2012    Depressive disorder     Diabetes mellitus (H)     Diabetes mellitus type 2 in obese 7/13/2006    Diabetes mellitus type 2 in obese 7/13/2006    Drug-seeking behavior 4/4/2020    Failure to thrive in adult 9/3/2020    Hereditary and idiopathic peripheral neuropathy 4/24/2007    Hyperlipidemia with target low density lipoprotein (LDL) cholesterol less than 70 mg/dL 5/30/2007    Hypertension 10/14/2015    Hypothyroidism 12/10/2010    Irritable bowel syndrome 9/7/2015    Ischemic cardiomyopathy 9/20/2015    EF of 40-45%,  status post RV lead revision and LV epicardial lead placement via mini-thoracotomy in August 2016.    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  Designated pharmacy: [facility] Prescribing physician: nAdrea Diagnosis: Ulnar neuropathy    Panic disorder with agoraphobia 4/14/2020    Paroxysmal atrial fibrillation (H) 10/2/2015    Personality disorder (H) 3/5/2020    Rule out dependent personality    Polymyalgia rheumatica 11/28/2018    Posttraumatic stress disorder 3/1/2020    Restless legs syndrome (RLS) 8/29/2007    Restless legs syndrome (RLS) 8/29/2007    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    Serum calcium elevated 3/1/2020    Somatic dysfunction of sacral region 1/17/2018    Subacromial bursitis of left shoulder joint 8/6/2018    Suicidal ideation 4/1/2020    Thyroid disease     TIA (transient ischemic attack) 5/4/2018    TIA (transient ischemic attack) 5/4/2018    Tobacco abuse 2/17/2017    Tobacco abuse 2/17/2017    Urinary incontinence, mixed 9/24/2017    UTI (urinary tract infection) 4/17/2020    Vitamin B12 deficiency 2/14/2018    Weakness 12/17/2019              Past Surgical History:     Past Surgical History:   Procedure Laterality Date    CARDIAC SURGERY      stents x 11    CARDIAC SURGERY      CHOLECYSTECTOMY      CHOLECYSTECTOMY      GENITOURINARY SURGERY      Tubal ligation    OTHER SURGICAL HISTORY      Genitourinary surgery    RELEASE CARPAL TUNNEL      RELEASE CARPAL TUNNEL                Allergies:   Bee venom, Diphenhydramine, Isosorbide nitrate, Nitroglycerin, Contrast dye, Adhesive tape, Liquid adhesive, Nystatin, Penicillins, and Sulfa antibiotics        Data:   All laboratory data reviewed:    Recent Labs   Lab Test 06/23/25  1115 06/20/25  1310 06/18/25  0747 07/29/24  0736 06/17/24  0740 12/28/22  0747 10/19/22  0743 03/28/22  0726 10/18/21  0738 08/04/21  0801 05/28/21  0818 01/29/21  0725 08/17/20  0738 06/29/20  0740   LDL 44  --   --   --   --   --  56  --   --   --  83  --   --   --    HDL 22*  --   --   --   --   --  30*  --   --   --  42*  --   --   --    NHDL 100  --   --   --   --   --  132*  --   --   --  112  --   --   --    CHOL 122  --   --   --   --   --  162  --   --   --  154  --   --   --    TRIG 280*  --   --   --   --   --  382*  --   --   --  144  --   --   --    TSH  --   --  7.82* 1.55 11.43*   < >  --    < >  --    < >  --   --    < > 0.02*   NTBNP  --  1,640*  --   --   --   --   --   --   --   --   --  2,197*  --   --    IRON  --   --   --   --   --   --   --   --  86  --   --   --   --   --    RITU  --   --   --   --   --   --   --   --   --   --   --   --   --  236    < > = values in this interval not displayed.       Lab Results   Component Value Date    WBC 8.1 06/21/2025    WBC 6.9 05/28/2021    RBC 3.11 (L) 06/21/2025    RBC 4.06 05/28/2021    HGB 9.2 (L) 06/21/2025    HGB 12.5 05/28/2021    HCT 28.7 (L) 06/21/2025    HCT 38.3 05/28/2021    MCV 92 06/21/2025    MCV 94 05/28/2021    MCH 29.6 06/21/2025    MCH 30.8 05/28/2021    MCHC 32.1 06/21/2025    MCHC 32.6 05/28/2021    RDW 14.0 06/21/2025    RDW 12.5 05/28/2021    PLT 99 (L) 06/21/2025     05/28/2021       Lab Results   Component Value Date     06/23/2025     03/16/2021    POTASSIUM 4.2 06/23/2025    POTASSIUM 3.9 01/16/2023    POTASSIUM 4.7 03/16/2021    CHLORIDE 100 06/23/2025    CHLORIDE 108 01/16/2023    CHLORIDE 111 (H) 03/16/2021    CO2 24 06/23/2025    CO2 24 01/16/2023    CO2 22 03/16/2021    ANIONGAP 14 06/23/2025    ANIONGAP 8 01/16/2023    ANIONGAP 6 03/16/2021     (H) 06/23/2025     (H) 06/22/2025     (H) 01/16/2023      (H) 03/16/2021    BUN 25.6 (H) 06/23/2025    BUN 23 01/16/2023    BUN 45 (H) 03/16/2021    CR 1.24 (H) 06/23/2025    CR 0.98 03/16/2021    GFRESTIMATED 46 (L) 06/23/2025    GFRESTIMATED 60 (L) 03/16/2021    GFRESTBLACK 69 03/16/2021    ORESTES 10.2 06/23/2025    ORESTES 9.4 03/16/2021      Lab Results   Component Value Date    AST 28 06/21/2025    AST 23 03/16/2021    ALT 29 06/23/2025    ALT 74 (H) 03/16/2021       Lab Results   Component Value Date    A1C 6.3 (H) 06/18/2025    A1C 4.6 10/30/2020       Lab Results   Component Value Date    INR 2.16 (H) 02/09/2025         FRANCHESCA WOLF MD  [facility]

## 2025-06-24 ENCOUNTER — CARE COORDINATION (OUTPATIENT)
Dept: CARDIOLOGY | Facility: CLINIC | Age: 70
End: 2025-06-24
Payer: COMMERCIAL

## 2025-06-24 NOTE — PROGRESS NOTES
Labs resulted post OV 6/23/25. Will route to Dr Holland to review and confirm whether any changes to the plan need to be made or not.   Ghada Monterroso RN on 6/24/2025 at 3:31 PM

## 2025-06-24 NOTE — LETTER
July 1, 2025     TO: Letty Escobar   [address] [facility] [id number]         Dear Ms. Tasusana,    The results of your recent Laboratory tests.    Dr. Holland said the labs have been reviewed from date of your office visit, 6/23/25. The results came in after your office visit. Mostly stable. Platelet morphology was normal. RBC morphology was abnormal but nonspecific. In other words, the shape of the platelets are normal and the shape of some of the RBC's were abnormal but this is non-specific. Hemoglobin came up a little but still with mild anemia. No changes recommended based on lab results.      Sincerely,    [facility]      Component      Latest Ref Rng 6/23/2025  11:15 AM   WBC      4.0 - 11.0 10e3/uL 7.5    RBC Count      3.80 - 5.20 10e6/uL 3.42 (L)    Hemoglobin      11.7 - 15.7 g/dL 10.0 (L)    Hematocrit      35.0 - 47.0 % 31.0 (L)    MCV      78 - 100 fL 91    MCH      26.5 - 33.0 pg 29.2    MCHC      31.5 - 36.5 g/dL 32.3    RDW      10.0 - 15.0 % 13.6    Platelet Count      150 - 450 10e3/uL 160    RBC Morphology Confirmed RBC Indices    Platelet Morphology      Automated Count Confirmed. Platelet morphology is normal.  Automated Count Confirmed. Platelet morphology is normal.    Elliptocytes      None Seen  Slight !    Polychromasia      None Seen  Slight !       Legend:  (L) Low  ! Abnormal      Component      Latest Ref Rng 6/23/2025  11:15 AM   Cholesterol      <200 mg/dL 122    Triglycerides      <150 mg/dL 280 (H)    HDL Cholesterol      >=50 mg/dL 22 (L)    LDL Cholesterol Calculated      <100 mg/dL 44    Non HDL Cholesterol      <130 mg/dL 100    Patient Fasting? Yes    ALT      0 - 50 U/L 29       Legend:  (H) High  (L) Low    Component      Latest Ref Rng 6/23/2025  11:15 AM   Sodium      135 - 145 mmol/L 138    Potassium      3.4 - 5.3 mmol/L 4.2    Chloride      98 - 107 mmol/L 100    Carbon Dioxide (CO2)      22 - 29  mmol/L 24    Anion Gap      7 - 15 mmol/L 14    Urea Nitrogen      8.0 - 23.0 mg/dL 25.6 (H)    Creatinine      0.51 - 0.95 mg/dL 1.24 (H)    GFR Estimate      >60 mL/min/1.73m2 46 (L)    Calcium      8.8 - 10.4 mg/dL 10.2    Glucose      70 - 99 mg/dL 144 (H)    Patient Fasting? Yes       Legend:  (H) High  (L) Low

## 2025-06-26 LAB
BACTERIA SPEC CULT: NO GROWTH
BACTERIA SPEC CULT: NO GROWTH

## 2025-06-26 NOTE — TELEPHONE ENCOUNTER
Labs reviewed from date of OV 6/23/25. Mostly stable. Platelet morphology was normal. RBC morphology was abnormal but nonspecific. Hgb up a little but still with mild anemia. No changes recommended based on lab results. EE

## 2025-06-27 NOTE — PROGRESS NOTES
I tried to call pt about lab results. Pt didn't answer and VM is full. Will try calling her again next week. Galilea TORRES June 27, 2025, 3:47 PM

## 2025-07-01 NOTE — PROGRESS NOTES
RN called pt again attempted to review lab results that resulted post OV w/Dr. Holland on 6/23/25. VM is still full. Mailed pt a results letter. Ghada Monterroso RN on 7/1/2025 at 12:56 PM

## 2025-07-24 ENCOUNTER — LAB REQUISITION (OUTPATIENT)
Dept: LAB | Facility: CLINIC | Age: 70
End: 2025-07-24
Payer: COMMERCIAL

## 2025-07-24 DIAGNOSIS — I50.22 CHRONIC SYSTOLIC (CONGESTIVE) HEART FAILURE (H): ICD-10-CM

## 2025-07-25 LAB
ANION GAP SERPL CALCULATED.3IONS-SCNC: 9 MMOL/L (ref 7–15)
BUN SERPL-MCNC: 28.7 MG/DL (ref 8–23)
CALCIUM SERPL-MCNC: 10 MG/DL (ref 8.8–10.4)
CHLORIDE SERPL-SCNC: 103 MMOL/L (ref 98–107)
CREAT SERPL-MCNC: 1.04 MG/DL (ref 0.51–0.95)
EGFRCR SERPLBLD CKD-EPI 2021: 57 ML/MIN/1.73M2
GLUCOSE SERPL-MCNC: 119 MG/DL (ref 70–99)
HCO3 SERPL-SCNC: 26 MMOL/L (ref 22–29)
POTASSIUM SERPL-SCNC: 4.5 MMOL/L (ref 3.4–5.3)
SODIUM SERPL-SCNC: 138 MMOL/L (ref 135–145)

## 2025-07-25 PROCEDURE — 80048 BASIC METABOLIC PNL TOTAL CA: CPT | Mod: ORL | Performed by: NURSE PRACTITIONER

## 2025-07-25 PROCEDURE — 36415 COLL VENOUS BLD VENIPUNCTURE: CPT | Mod: ORL | Performed by: NURSE PRACTITIONER

## 2025-07-25 PROCEDURE — P9604 ONE-WAY ALLOW PRORATED TRIP: HCPCS | Mod: ORL | Performed by: NURSE PRACTITIONER
